# Patient Record
Sex: FEMALE | Race: BLACK OR AFRICAN AMERICAN | NOT HISPANIC OR LATINO | Employment: OTHER | URBAN - METROPOLITAN AREA
[De-identification: names, ages, dates, MRNs, and addresses within clinical notes are randomized per-mention and may not be internally consistent; named-entity substitution may affect disease eponyms.]

---

## 2017-04-03 ENCOUNTER — TELEPHONE (OUTPATIENT)
Dept: INTERNAL MEDICINE | Facility: CLINIC | Age: 82
End: 2017-04-03

## 2017-04-04 ENCOUNTER — TELEPHONE (OUTPATIENT)
Dept: INTERNAL MEDICINE | Facility: CLINIC | Age: 82
End: 2017-04-04

## 2017-04-04 NOTE — TELEPHONE ENCOUNTER
----- Message from Stella Wallace sent at 4/3/2017 11:03 AM CDT -----  Contact: Patient  Type: Sooner appointment than  is able to schedule    When is the first available appointment?  7/11/17    What is the nature of the appointment? Establish Care     What appointment type: NPP    Comments:  The patient was a patient of Dr. Bradley in the past, but she moved for a few years and is living here again.  She says she really needs to see Dr. Bradley and she cannot wait until July.  Please call her at [phone].      Thanks!

## 2017-04-06 ENCOUNTER — TELEPHONE (OUTPATIENT)
Dept: INTERNAL MEDICINE | Facility: CLINIC | Age: 82
End: 2017-04-06

## 2017-04-06 NOTE — TELEPHONE ENCOUNTER
----- Message from Arik Fierro sent at 4/5/2017 11:33 AM CDT -----  Contact: Shravan/ Chirag / [phone]  / [phone] fax /Ref # [id number]  Pt has signed up for a specialty program based on her chronic condition and the company needs to verify the chronic condition of diabetes.  Please call and verify.      Thank you

## 2017-04-06 NOTE — TELEPHONE ENCOUNTER
She just needs to call [phone] to make an appointment with tiffanie or [facility] to see Dr. Prado for glaucoma

## 2017-04-06 NOTE — TELEPHONE ENCOUNTER
Forms faxed.  Pt informed.    Pt wants to know if you could placed an referral in for her to see your  as an eye doctor. Please advise.

## 2017-04-18 ENCOUNTER — TELEPHONE (OUTPATIENT)
Dept: INTERNAL MEDICINE | Facility: CLINIC | Age: 82
End: 2017-04-18

## 2017-04-18 NOTE — TELEPHONE ENCOUNTER
----- Message from Haley S Soto sent at 4/17/2017  1:17 PM CDT -----  Contact: Call Daughter, Yolanda, [phone]  Yolanda called requesting to speak to you concerning a call to Pt for Medical records from previous DrBraxton Guerrero has access to all records.

## 2017-04-20 ENCOUNTER — OFFICE VISIT (OUTPATIENT)
Dept: INTERNAL MEDICINE | Facility: CLINIC | Age: 82
End: 2017-04-20
Payer: MEDICARE

## 2017-04-20 ENCOUNTER — TELEPHONE (OUTPATIENT)
Dept: INTERNAL MEDICINE | Facility: CLINIC | Age: 82
End: 2017-04-20

## 2017-04-20 ENCOUNTER — HOSPITAL ENCOUNTER (OUTPATIENT)
Dept: RADIOLOGY | Facility: HOSPITAL | Age: 82
Discharge: HOME | End: 2017-04-20
Attending: INTERNAL MEDICINE
Payer: MEDICARE

## 2017-04-20 VITALS
DIASTOLIC BLOOD PRESSURE: 60 MMHG | OXYGEN SATURATION: 98 % | WEIGHT: 162.06 LBS | HEART RATE: 58 BPM | SYSTOLIC BLOOD PRESSURE: 90 MMHG | BODY MASS INDEX: 24.56 KG/M2 | HEIGHT: 68 IN

## 2017-04-20 DIAGNOSIS — S80.00XA CONTUSION OF KNEE, UNSPECIFIED LATERALITY, INITIAL ENCOUNTER: ICD-10-CM
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
## 2017-04-20 DIAGNOSIS — Z79.4 TYPE 2 DIABETES MELLITUS WITH DIABETIC POLYNEUROPATHY, WITH LONG-TERM CURRENT USE OF INSULIN: ICD-10-CM

## 2017-04-20 DIAGNOSIS — I25.118 CORONARY ARTERY DISEASE WITH EXERTIONAL ANGINA: ICD-10-CM
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
## 2017-04-20 DIAGNOSIS — E53.8 VITAMIN B 12 DEFICIENCY: ICD-10-CM

## 2017-04-20 DIAGNOSIS — I48.20 CHRONIC ATRIAL FIBRILLATION: ICD-10-CM

## 2017-04-20 DIAGNOSIS — I10 ESSENTIAL HYPERTENSION: ICD-10-CM

## 2017-04-20 DIAGNOSIS — H40.9 GLAUCOMA, UNSPECIFIED GLAUCOMA, UNSPECIFIED LATERALITY: ICD-10-CM

## 2017-04-20 DIAGNOSIS — I25.2 OLD MI (MYOCARDIAL INFARCTION): ICD-10-CM

## 2017-04-20 DIAGNOSIS — I73.9 PERIPHERAL ANGIOPATHY: ICD-10-CM

## 2017-04-20 DIAGNOSIS — M11.20 PSEUDOGOUT: ICD-10-CM

## 2017-04-20 DIAGNOSIS — E79.0 HYPERURICEMIA WITHOUT SIGNS OF INFLAMMATORY ARTHRITIS AND TOPHACEOUS DISEASE: ICD-10-CM

## 2017-04-20 DIAGNOSIS — Z79.01 LONG TERM (CURRENT) USE OF ANTICOAGULANTS: ICD-10-CM

## 2017-04-20 DIAGNOSIS — E11.42 TYPE 2 DIABETES MELLITUS WITH DIABETIC POLYNEUROPATHY, WITH LONG-TERM CURRENT USE OF INSULIN: ICD-10-CM

## 2017-04-20 DIAGNOSIS — E78.2 HYPERLIPEMIA, MIXED: ICD-10-CM

## 2017-04-20 PROCEDURE — 71020 XR CHEST PA AND LATERAL: CPT | Mod: TC

## 2017-04-20 PROCEDURE — 1160F RVW MEDS BY RX/DR IN RCRD: CPT | Mod: S$GLB,,, | Performed by: INTERNAL MEDICINE

## 2017-04-20 PROCEDURE — 73560 X-RAY EXAM OF KNEE 1 OR 2: CPT | Mod: TC,50

## 2017-04-20 PROCEDURE — 73560 X-RAY EXAM OF KNEE 1 OR 2: CPT | Mod: 26,50,, | Performed by: RADIOLOGY

## 2017-04-20 PROCEDURE — 99205 OFFICE O/P NEW HI 60 MIN: CPT | Mod: S$GLB,,, | Performed by: INTERNAL MEDICINE

## 2017-04-20 PROCEDURE — 1159F MED LIST DOCD IN RCRD: CPT | Mod: S$GLB,,, | Performed by: INTERNAL MEDICINE

## 2017-04-20 PROCEDURE — 99999 PR PBB SHADOW E&M-EST. PATIENT-LVL V: CPT | Mod: PBBFAC,,, | Performed by: INTERNAL MEDICINE

## 2017-04-20 PROCEDURE — 1126F AMNT PAIN NOTED NONE PRSNT: CPT | Mod: S$GLB,,, | Performed by: INTERNAL MEDICINE

## 2017-04-20 PROCEDURE — 71020 XR CHEST PA AND LATERAL: CPT | Mod: 26,,, | Performed by: RADIOLOGY

## 2017-04-20 PROCEDURE — 93005 ELECTROCARDIOGRAM TRACING: CPT | Mod: S$GLB,,, | Performed by: INTERNAL MEDICINE

## 2017-04-20 PROCEDURE — 99499 UNLISTED E&M SERVICE: CPT | Mod: S$GLB,,, | Performed by: INTERNAL MEDICINE

## 2017-04-20 PROCEDURE — 93010 ELECTROCARDIOGRAM REPORT: CPT | Mod: S$GLB,,, | Performed by: INTERNAL MEDICINE

## 2017-04-20 RX ORDER — METOPROLOL TARTRATE 25 MG/1
25 TABLET, FILM COATED ORAL 2 TIMES DAILY
COMMUNITY
End: 2017-04-20 | Stop reason: SDUPTHER

## 2017-04-20 RX ORDER — ISOSORBIDE MONONITRATE 30 MG/1
30 TABLET, EXTENDED RELEASE ORAL EVERY MORNING
Qty: 90 TABLET | Refills: 3 | Status: SHIPPED | OUTPATIENT
Start: 2017-04-20 | End: 2018-04-17 | Stop reason: ALTCHOICE

## 2017-04-20 RX ORDER — RIVAROXABAN 10 MG/1
TABLET, FILM COATED ORAL
Refills: 0 | COMMUNITY
Start: 2017-04-15 | End: 2017-04-20 | Stop reason: SDUPTHER

## 2017-04-20 RX ORDER — RIVAROXABAN 10 MG/1
10 TABLET, FILM COATED ORAL
Qty: 30 TABLET | Refills: 11 | Status: SHIPPED | OUTPATIENT
Start: 2017-04-20 | End: 2017-05-25 | Stop reason: SDUPTHER

## 2017-04-20 RX ORDER — LEVOTHYROXINE SODIUM 75 UG/1
75 TABLET ORAL
Qty: 90 TABLET | Refills: 3 | Status: SHIPPED | OUTPATIENT
Start: 2017-04-20 | End: 2018-06-12 | Stop reason: SDUPTHER

## 2017-04-20 RX ORDER — FUROSEMIDE 40 MG/1
40 TABLET ORAL 2 TIMES DAILY
Qty: 180 TABLET | Refills: 3 | Status: SHIPPED | OUTPATIENT
Start: 2017-04-20 | End: 2018-05-04 | Stop reason: SDUPTHER

## 2017-04-20 RX ORDER — AMLODIPINE BESYLATE 5 MG/1
5 TABLET ORAL EVERY MORNING
Qty: 90 TABLET | Refills: 3 | Status: SHIPPED | OUTPATIENT
Start: 2017-04-20 | End: 2018-04-23 | Stop reason: SDUPTHER

## 2017-04-20 RX ORDER — ATORVASTATIN CALCIUM 40 MG/1
40 TABLET, FILM COATED ORAL DAILY
COMMUNITY
End: 2017-04-20 | Stop reason: SDUPTHER

## 2017-04-20 RX ORDER — AMLODIPINE BESYLATE 5 MG/1
5 TABLET ORAL DAILY
COMMUNITY
End: 2017-04-20 | Stop reason: SDUPTHER

## 2017-04-20 RX ORDER — ALBUTEROL SULFATE 90 UG/1
2 AEROSOL, METERED RESPIRATORY (INHALATION) EVERY 4 HOURS PRN
Qty: 3 INHALER | Refills: 4 | Status: SHIPPED | OUTPATIENT
Start: 2017-04-20 | End: 2018-07-17 | Stop reason: SDUPTHER

## 2017-04-20 RX ORDER — INSULIN PUMP SYRINGE, 3 ML
EACH MISCELLANEOUS
Qty: 1 EACH | Refills: 1 | Status: SHIPPED | OUTPATIENT
Start: 2017-04-20 | End: 2017-08-10 | Stop reason: SDUPTHER

## 2017-04-20 RX ORDER — METOPROLOL TARTRATE 25 MG/1
50 TABLET, FILM COATED ORAL 2 TIMES DAILY
Qty: 360 TABLET | Refills: 3 | Status: SHIPPED | OUTPATIENT
Start: 2017-04-20 | End: 2017-06-15 | Stop reason: ALTCHOICE

## 2017-04-20 RX ORDER — BRIMONIDINE TARTRATE 2 MG/ML
1 SOLUTION/ DROPS OPHTHALMIC 2 TIMES DAILY
COMMUNITY
End: 2017-08-10

## 2017-04-20 RX ORDER — FLUTICASONE PROPIONATE AND SALMETEROL 250; 50 UG/1; UG/1
1 POWDER RESPIRATORY (INHALATION) NIGHTLY
Qty: 60 EACH | Refills: 11 | Status: SHIPPED | OUTPATIENT
Start: 2017-04-20 | End: 2018-05-10 | Stop reason: SDUPTHER

## 2017-04-20 RX ORDER — ATORVASTATIN CALCIUM 40 MG/1
40 TABLET, FILM COATED ORAL DAILY
Qty: 90 TABLET | Refills: 3 | Status: SHIPPED | OUTPATIENT
Start: 2017-04-20 | End: 2018-05-11 | Stop reason: SDUPTHER

## 2017-04-20 RX ORDER — ISOSORBIDE MONONITRATE 30 MG/1
30 TABLET, EXTENDED RELEASE ORAL DAILY
COMMUNITY
End: 2017-04-20 | Stop reason: SDUPTHER

## 2017-04-20 RX ORDER — INSULIN ASPART 100 [IU]/ML
20 INJECTION, SUSPENSION SUBCUTANEOUS 2 TIMES DAILY
Qty: 1 BOX | Refills: 11 | Status: SHIPPED | OUTPATIENT
Start: 2017-04-20 | End: 2017-08-15 | Stop reason: SDUPTHER

## 2017-04-20 RX ORDER — FUROSEMIDE 40 MG/1
40 TABLET ORAL 2 TIMES DAILY
COMMUNITY
End: 2017-04-20 | Stop reason: SDUPTHER

## 2017-04-20 RX ORDER — MECLIZINE HCL 12.5 MG 12.5 MG/1
12.5 TABLET ORAL 3 TIMES DAILY PRN
COMMUNITY
End: 2017-04-20 | Stop reason: SDUPTHER

## 2017-04-20 RX ORDER — LANCETS
1 EACH MISCELLANEOUS
Qty: 300 EACH | Refills: 4 | Status: SHIPPED | OUTPATIENT
Start: 2017-04-20 | End: 2020-04-09 | Stop reason: SDUPTHER

## 2017-04-20 RX ORDER — MECLIZINE HCL 12.5 MG 12.5 MG/1
12.5 TABLET ORAL 3 TIMES DAILY PRN
Qty: 50 TABLET | Refills: 1 | Status: SHIPPED | OUTPATIENT
Start: 2017-04-20 | End: 2018-07-17 | Stop reason: SDUPTHER

## 2017-04-20 NOTE — MR AVS SNAPSHOT
[facility]  [address]  [facility] [address]  Phone: [phone]  Fax: [phone]                  Tiana KRISHNA [place]   2017 8:00 AM   Office Visit    Description:  Female : 3/14/1925   Provider:  Belen Braldey MD   Department:  [facility]           Reason for Visit     Annual Exam           Diagnoses this Visit        Comments    Fall, initial encounter    -  Primary     Old MI (myocardial infarction)         Coronary artery disease with exertional angina         Chronic atrial fibrillation         Type 2 diabetes mellitus with diabetic polyneuropathy, with long-term current use of insulin         Pseudogout         Persistent asthma         Peripheral angiopathy         Hyperuricemia without signs of inflammatory arthritis and tophaceous disease         Hyperlipemia, mixed         Essential hypertension         Long term (current) use of anticoagulants         Vitamin B 12 deficiency         Glaucoma, unspecified glaucoma, unspecified laterality         Contusion of knee, unspecified laterality, initial encounter                To Do List           Future Appointments        Provider Department Dept Phone    2017 9:40 AM Belen Bradley MD [facility] Medicine [phone]      Goals (5 Years of Data)     None       These Medications        Disp Refills Start End    XARELTO 10 mg Tab 30 tablet 11 2017     Take 1 tablet (10 mg total) by mouth daily with dinner or evening meal. - Oral    Pharmacy: [facility] [address] [place] [address] [address] [facility] [address] Ph #: [phone]       metoprolol tartrate (LOPRESSOR) 25 MG tablet 360 tablet 3 2017     Take 2 tablets (50 mg total) by mouth 2 (two) times daily. - Oral    Pharmacy: [facility] [address] [place] [address] [address] AT [employer] & [address] Ph #: [phone]       levothyroxine  (SYNTHROID) 75 MCG tablet 90 tablet 3 4/20/2017     Take 1 tablet (75 mcg total) by mouth before breakfast. - Oral    Pharmacy: [facility] [address] AT [place] & [address] Ph #: [phone]       furosemide (LASIX) 40 MG tablet 180 tablet 3 4/20/2017     Take 1 tablet (40 mg total) by mouth 2 (two) times daily. 1 tb am. 1 at 4pm. - Oral    Pharmacy: [facility] [address] AT [place] & [address] Ph #: [phone]       insulin aspart protamine-insulin aspart (NOVOLOG MIX 70-30 FLEXPEN) 100 unit/mL (70-30) InPn pen 1 Box 11 4/20/2017     Inject 20 Units into the skin 2 (two) times daily. 20 units before breakfast 5 units before supper - Subcutaneous    Pharmacy: [facility] [address] AT [place] & [address] Ph #: [phone]       isosorbide mononitrate (IMDUR) 30 MG 24 hr tablet 90 tablet 3 4/20/2017     Take 1 tablet (30 mg total) by mouth every morning. Taking for heart - Oral    Pharmacy: [facility] [address] AT [place] & [address] Ph #: [phone]       atorvastatin (LIPITOR) 40 MG tablet 90 tablet 3 4/20/2017     Take 1 tablet (40 mg total) by mouth once daily. - Oral    Pharmacy: [facility] [address] AT [place] & [address] Ph #: [phone]       amlodipine (NORVASC) 5 MG tablet 90 tablet 3 4/20/2017     Take 1 tablet (5 mg total) by mouth every morning. - Oral    Pharmacy: [facility] [address] AT [place] & [address] Ph #: [phone]       albuterol 90 mcg/actuation inhaler 3 Inhaler 4 4/20/2017     Inhale 2 puffs into the lungs every 4 (four) hours as needed. - Inhalation    Pharmacy: [id number] - [facility] [address] AT [place] & [address] Ph #: [phone]       meclizine (ANTIVERT) 12.5 mg tablet 50 tablet 1 4/20/2017     Take 1 tablet (12.5 mg total) by mouth 3 (three) times daily as needed. - Oral    Pharmacy: [facility] [address] [place] [address] AVE AT [place] & [address] Ph #: [phone]       fluticasone-salmeterol 250-50 mcg/dose (ADVAIR) 250-50 mcg/dose diskus inhaler 60 each 11 4/20/2017 4/20/2018    Inhale 1 puff into the lungs nightly. Controller - Inhalation    Ph[facility]: [facility] [address] [place] [address] AVE AT [place] & [address] Ph #: [phone]         [facility]     [facility] Nurse Care Line - 24/7 Assistance  Unless otherwise directed by your provider, please contact [facility], our nurse care line that is available for 24/7 assistance.     Registered nurses in the [facility] provide: appointment scheduling, clinical advisement, health education, and other advisory services.  Call: [phone] (toll free)               Medications           Message regarding Medications     Verify the changes and/or additions to your medication regime listed below are the same as discussed with your clinician today.  If any of these changes or additions are incorrect, please notify your healthcare provider.        START taking these NEW medications        Refills    XARELTO 10 mg Tab 11    Sig: Take 1 tablet (10 mg total) by mouth daily with dinner or evening meal.    Class: Normal    Route: Oral    metoprolol tartrate (LOPRESSOR) 25 MG tablet 3    Sig: Take 2 tablets (50 mg total) by mouth 2 (two) times daily.    Class: Normal    Route: Oral    furosemide (LASIX) 40 MG tablet 3    Sig: Take 1 tablet (40 mg total) by mouth 2 (two) times daily. 1 tb am. 1 at 4pm.    Class: Normal    Route: Oral    isosorbide mononitrate (IMDUR) 30 MG 24 hr tablet 3    Sig: Take 1 tablet (30 mg total) by  mouth every morning. Taking for heart    Class: Normal    Route: Oral    atorvastatin (LIPITOR) 40 MG tablet 3    Sig: Take 1 tablet (40 mg total) by mouth once daily.    Class: Normal    Route: Oral    amlodipine (NORVASC) 5 MG tablet 3    Sig: Take 1 tablet (5 mg total) by mouth every morning.    Class: Normal    Route: Oral    meclizine (ANTIVERT) 12.5 mg tablet 1    Sig: Take 1 tablet (12.5 mg total) by mouth 3 (three) times daily as needed.    Class: Normal    Route: Oral    fluticasone-salmeterol 250-50 mcg/dose (ADVAIR) 250-50 mcg/dose diskus inhaler 11    Sig: Inhale 1 puff into the lungs nightly. Controller    Class: Normal    Route: Inhalation      CHANGE how you are taking these medications     Start Taking Instead of    levothyroxine (SYNTHROID) 75 MCG tablet levothyroxine (SYNTHROID) 88 MCG tablet    Dosage:  Take 1 tablet (75 mcg total) by mouth before breakfast. Dosage:  Take 1 tablet (88 mcg total) by mouth before breakfast.    Reason for Change:  Reorder     insulin aspart protamine-insulin aspart (NOVOLOG MIX 70-30 FLEXPEN) 100 unit/mL (70-30) InPn pen insulin aspart protamine-insulin aspart (NOVOLOG MIX 70-30 FLEXPEN) 100 unit/mL (70-30) InPn pen    Dosage:  Inject 20 Units into the skin 2 (two) times daily. 20 units before breakfast 5 units before supper Dosage:  Inject 20 Units into the skin 2 (two) times daily. 20 units before breakfast 20 units before supper    Reason for Change:  Reorder     albuterol 90 mcg/actuation inhaler albuterol 90 mcg/actuation HFAA    Dosage:  Inhale 2 puffs into the lungs every 4 (four) hours as needed. Dosage:  Inhale 2 puffs into the lungs every 4 (four) hours as needed.    Reason for Change:  Reorder       STOP taking these medications     ADULT LOW DOSE ASPIRIN ORAL Take 81 mg by mouth Daily.    calcium-vitamin D 600 mg(1,500mg) -200 unit Tab Take 1 tablet by mouth 2 (two) times daily.     esomeprazole (NEXIUM) 40 MG capsule Take 1 capsule (40 mg total) by mouth  before breakfast.    hydrochlorothiazide (HYDRODIURIL) 12.5 MG Tab Take 1 tablet (12.5 mg total) by mouth once daily.    hydrocortisone-pramoxine (ANALPRAM-HC) 2.5-1 % Crea Place rectally 3 (three) times daily. Apply tid    lisinopril (PRINIVIL,ZESTRIL) 20 MG tablet Take 1 tablet (20 mg total) by mouth once daily.    lisinopril 10 MG tablet Take 10 mg by mouth nightly.    pravastatin (PRAVACHOL) 10 MG tablet Take 1 tablet (10 mg total) by mouth every evening.    predniSONE (DELTASONE) 20 MG tablet     terconazole (TERAZOL 7) 0.4 % Crea Place 1 applicator vaginally every evening.    vitamin E 400 UNIT capsule Take 400 Units by mouth once daily.    lorazepam (ATIVAN) 1 MG tablet Take 1 tablet (1 mg total) by mouth every 12 (twelve) hours as needed for Anxiety. Take half to one po prn for anxiety or insomnia           Verify that the below list of medications is an accurate representation of the medications you are currently taking.  If none reported, the list may be blank. If incorrect, please contact your healthcare provider. Carry this list with you in case of emergency.           Current Medications     albuterol 90 mcg/actuation inhaler Inhale 2 puffs into the lungs every 4 (four) hours as needed.    amlodipine (NORVASC) 5 MG tablet Take 1 tablet (5 mg total) by mouth every morning.    atorvastatin (LIPITOR) 40 MG tablet Take 1 tablet (40 mg total) by mouth once daily.    blood sugar diagnostic (ONE TOUCH ULTRA TEST) Strp Inject 1 strip into the skin 2 (two) times daily.    brimonidine 0.2% (ALPHAGAN) 0.2 % Drop 1 drop every 8 (eight) hours.    diclofenac sodium (VOLTAREN) 1 % Gel Apply topically 4 (four) times daily.    dorzolamide (TRUSOPT) 2 % ophthalmic solution Place 1 drop into both eyes 2 (two) times daily.    fluticasone-salmeterol 250-50 mcg/dose (ADVAIR DISKUS) 250-50 mcg/dose diskus inhaler Inhale 1 puff into the lungs 2 (two) times daily.    furosemide (LASIX) 40 MG tablet Take 1 tablet (40 mg total)  "by mouth 2 (two) times daily. 1 tb am. 1 at 4pm.    insulin aspart protamine-insulin aspart (NOVOLOG MIX 70-30 FLEXPEN) 100 unit/mL (70-30) InPn pen Inject 20 Units into the skin 2 (two) times daily. 20 units before breakfast 5 units before supper    insulin needles, disposable, (BD INSULIN PEN NEEDLE UF SHORT) 31 X 5/16 " Ndle Inject 1 Box into the skin 2 (two) times daily.    isosorbide mononitrate (IMDUR) 30 MG 24 hr tablet Take 1 tablet (30 mg total) by mouth every morning. Taking for heart    latanoprost 0.005 % ophthalmic solution Place 1 drop into the right eye every evening.    levothyroxine (SYNTHROID) 75 MCG tablet Take 1 tablet (75 mcg total) by mouth before breakfast.    meclizine (ANTIVERT) 12.5 mg tablet Take 1 tablet (12.5 mg total) by mouth 3 (three) times daily as needed.    metoprolol tartrate (LOPRESSOR) 25 MG tablet Take 2 tablets (50 mg total) by mouth 2 (two) times daily.    multivit-mineral-iron-lutein (CENTRUM SILVER ULTRA WOMEN'S) Tab Take 1 tablet by mouth once daily.    ONE TOUCH DELICA LANCETS Misc USE TO CHECK BLOOD SUGAR TWICE DAILY BEFORE BREAKFASTS AND DINNER AS NEEDED    XARELTO 10 mg Tab Take 1 tablet (10 mg total) by mouth daily with dinner or evening meal.    blood sugar diagnostic Strp 1 strip by Misc.(Non-Drug; Combo Route) route 3 (three) times daily with meals. Please provide the insurance preferred product.    blood-glucose meter kit Please provide the insurance approved preferred product. Thank you.    fluticasone-salmeterol 250-50 mcg/dose (ADVAIR) 250-50 mcg/dose diskus inhaler Inhale 1 puff into the lungs nightly. Controller    lancets Misc 1 Device by Misc.(Non-Drug; Combo Route) route 3 (three) times daily before meals. Please provide the insurance company preferred product.           Clinical Reference Information           Your Vitals Were     BP Pulse Height Weight SpO2 BMI    90/60 58 5' 8" (1.727 m) 73.5 kg (162 lb 0.6 oz) 98% 24.64 kg/m2      Blood Pressure       "    Most Recent Value    BP  90/60      Allergies as of 4/20/2017     Codeine      Immunizations Administered on Date of Encounter - 4/20/2017     None      Orders Placed During Today's Visit      Normal Orders This Visit    Ambulatory Referral to Cardiology     Ambulatory referral to Ophthalmology     Ambulatory referral to Podiatry     Future Labs/Procedures Expected by Expires    CBC auto differential  4/20/2017 4/20/2018    Comprehensive metabolic panel  4/20/2017 6/19/2018    Hemoglobin A1c  4/20/2017 6/19/2018    Lipid panel  4/20/2017 6/19/2018    TSH  4/20/2017 6/19/2018    Uric acid  4/20/2017 6/19/2018    Vitamin B12  4/20/2017 6/19/2018    X-ray AP Standing Knees with Both Lateral  4/20/2017 4/20/2018    X-Ray Chest PA And Lateral  4/20/2017 4/20/2018    Hemoglobin A1c  7/20/2017 6/19/2018    EKG 12-lead  As directed 4/20/2018      [facility] Sign-Up     Activating your [facility] account is as easy as 1-2-3!     1) Visit [email or web address], select Sign Up Now, enter this activation code and your date of birth, then select Next.  [email or web address]  Expires: 6/4/2017  9:33 AM      2) Create a username and password to use when you visit [facility] in the future and select a security question in case you lose your password and select Next.    3) Enter your e-mail address and click Sign Up!    Additional Information  If you have questions, please e-mail [email or web address] or call [phone] to talk to our [facility] staff. Remember, [facility] is NOT to be used for urgent needs. For medical emergencies, dial 911.         Language Assistance Services     ATTENTION: Language assistance services are available, free of charge. Please call [phone].      ATENCIÓN: Si habla español, tiene a modi disposición servicios gratuitos de asistencia lingüística. Llame al [phone].     CHÚ Ý: N?u b?n nói Ti?ng Vi?t, có các d?ch v? h? tr? ngôn ng? mi?n phí dành cho b?n. G?i s? [phone].         [facility] complies with applicable Federal civil rights laws and does not discriminate on the basis of race, color, national origin, age, disability, or sex.

## 2017-04-20 NOTE — PROGRESS NOTES
Subjective:       Patient ID: Tiana Mead is a 89 y.o. female.    Chief Complaint: Fall  She was last seen September 24, 2013.  She moved to [place], [place], Where she was unhappy.  She is so happy to be back in [place].  She missed her [employer], she missed her friends and she missed [place] itself.  She is living with her daughter Melanie who is recently retired.  Her home is on one level. So far, they have been getting along well.  Her daughter Yolanda accompanies her to this visit.    All of her medications were reviewed, ordered in a 90 day supply with refills and established at the pharmacy closest to her home which is a [facility].    In 2013, shortly after arriving in [place], she had a heart attack.  She has been in chronic atrial fib with controlled rate on a beta Blocker and Xarelto since the heart attack.This has weakened her heart.  However, she feels that her congestive heart failure is well compensated at the present time.  She has persistent peripheral edema that currently is mild.  She is not short of breath.  She has stable exertional angina.  She carries sublingual nitroglycerin.  She returned to [place] on March 23 and she has only used nitroglycerin 1 tablet sublingual once and she was just doing too much at that time.    2 weeks ago, on [place], she fell and landed on both of her knees.  She has minor abrasions, ecchymosis and small effusions.  She has been taking care of this and feels as though she is doing better each day.  She has been using a cane to help her walk for some time.    Her vision has deteriorated and she needs to reestablish with her glaucoma specialist.    She is also eager to establish with her podiatrist, Dr. Gentile.  She has diabetic peripheral neuropathy with loss of sensation in her feet and receives regular podiatry care every 3 months.  HPI  Review of Systems   Constitutional: Negative for activity change, appetite change, chills, fatigue, fever  and unexpected weight change.   HENT: Negative for dental problem, hearing loss, tinnitus, trouble swallowing and voice change.    Eyes: Negative for visual disturbance.   Respiratory: Negative for cough, chest tightness, shortness of breath and wheezing.    Cardiovascular: Negative for chest pain, palpitations and leg swelling.   Gastrointestinal: Negative for abdominal pain, blood in stool, constipation, diarrhea and nausea.   Genitourinary: Negative for difficulty urinating, dysuria, frequency and urgency.   Musculoskeletal: Negative for arthralgias, back pain, gait problem, joint swelling, myalgias, neck pain and neck stiffness.   Skin: Negative for rash.   Neurological: Negative for dizziness, tremors, speech difficulty, weakness, light-headedness and headaches.   Psychiatric/Behavioral: Negative for dysphoric mood and sleep disturbance. The patient is not nervous/anxious.        Objective:      Physical Exam   Constitutional: She is oriented to person, place, and time. She appears well-developed and well-nourished. No distress.   HENT:   Head: Normocephalic and atraumatic.   Right Ear: External ear normal.   Left Ear: External ear normal.   Nose: Nose normal.   Mouth/Throat: Oropharynx is clear and moist. No oropharyngeal exudate.   Eyes: Conjunctivae and EOM are normal. Pupils are equal, round, and reactive to light. Right eye exhibits no discharge. No scleral icterus.   Neck: Normal range of motion and full passive range of motion without pain. Neck supple. No spinous process tenderness and no muscular tenderness present. Carotid bruit is not present. No thyromegaly present.   Cardiovascular: Normal rate, S1 normal, S2 normal and normal heart sounds.  Exam reveals no gallop and no friction rub.    No murmur heard.  Atrial fib  controlled rate of 60-78    Absent peripheral pulses   Pulmonary/Chest: Effort normal and breath sounds normal. No respiratory distress. She has no wheezes. She has no rales. She  exhibits no tenderness.   Abdominal: Soft. Bowel sounds are normal. She exhibits no distension and no mass. There is no tenderness. There is no rebound and no guarding.   Genitourinary: Pelvic exam was performed with patient supine. Uterus is not deviated, not enlarged, not fixed and not tender. Cervix exhibits no motion tenderness, no discharge and no friability. Right adnexum displays no mass, no tenderness and no fullness. Left adnexum displays no mass, no tenderness and no fullness.   Musculoskeletal: Normal range of motion. She exhibits edema. She exhibits no tenderness.   Minor abrasions and contusions on both knees with marked ecchymosis.  Small bilateral cool effusions.   Lymphadenopathy:        Head (right side): No submental and no submandibular adenopathy present.        Head (left side): No submental and no submandibular adenopathy present.     She has no cervical adenopathy.        Right cervical: No superficial cervical, no deep cervical and no posterior cervical adenopathy present.       Left cervical: No superficial cervical, no deep cervical and no posterior cervical adenopathy present.        Right axillary: No pectoral and no lateral adenopathy present.        Left axillary: No pectoral and no lateral adenopathy present.       Right: No supraclavicular adenopathy present.        Left: No supraclavicular adenopathy present.   Neurological: She is alert and oriented to person, place, and time. She exhibits normal muscle tone. Coordination normal.   Skin: Skin is warm and dry. No rash noted.   Psychiatric: She has a normal mood and affect. Her behavior is normal. Her mood appears not anxious. Her speech is not rapid and/or pressured. She is not agitated. She does not exhibit a depressed mood.   Normal behavior, thought content, insight and judgement.   Nursing note and vitals reviewed.      Assessment:       1. Fall, initial encounter    2. Old MI (myocardial infarction), 2013    3. Coronary artery  disease with exertional angina, stable    4. Chronic atrial fibrillation    5. Type 2 diabetes mellitus with diabetic polyneuropathy, with long-term current use of insulin    6. Pseudogout, both knees.    7. Persistent asthma    8. Peripheral angiopathy    9. Hyperuricemia    10. Hyperlipemia, mixed    11. Essential hypertension    12. Long term (current) use of anticoagulants, Xarelto    13. Vitamin B 12 deficiency    14. Glaucoma, unspecified glaucoma, unspecified laterality    15. Contusion of knee, unspecified laterality, initial encounter        Plan:   Tiana was seen today for fall.    Diagnoses and all orders for this visit:    Fall, initial encounter  -     X-ray AP Standing Knees with Both Lateral; Future    Old MI (myocardial infarction), 2013  -     EKG 12-lead; Future  -     X-Ray Chest PA And Lateral; Future  -     Lipid panel; Future  -     Ambulatory Referral to Cardiology  -     EKG 12-lead    Coronary artery disease with exertional angina, stable  -     EKG 12-lead; Future  -     X-Ray Chest PA And Lateral; Future  -     Ambulatory Referral to Cardiology  -     EKG 12-lead    Chronic atrial fibrillation  -     EKG 12-lead; Future  -     X-Ray Chest PA And Lateral; Future  -     Ambulatory Referral to Cardiology  -     EKG 12-lead    Type 2 diabetes mellitus with diabetic polyneuropathy, with long-term current use of insulin  -     EKG 12-lead; Future  -     Comprehensive metabolic panel; Future  -     Lipid panel; Future  -     Hemoglobin A1c; Future  -     TSH; Future  -     Ambulatory Referral to Cardiology  -     Ambulatory referral to Podiatry  -     Hemoglobin A1c; Future  -     EKG 12-lead    Pseudogout, both knees.  -     Uric acid; Future    Persistent asthma    Peripheral angiopathy    Hyperuricemia    Hyperlipemia, mixed    Essential hypertension  -     EKG 12-lead; Future  -     EKG 12-lead    Long term (current) use of anticoagulants, Xarelto  -     CBC auto differential;  Future    Vitamin B 12 deficiency  -     Vitamin B12; Future    Glaucoma, unspecified glaucoma, unspecified laterality  -     Ambulatory referral to Ophthalmology    Contusion of knee, unspecified laterality, initial encounter  -     X-ray AP Standing Knees with Both Lateral; Future    Other orders  -     XARELTO 10 mg Tab; Take 1 tablet (10 mg total) by mouth daily with dinner or evening meal.  -     metoprolol tartrate (LOPRESSOR) 25 MG tablet; Take 2 tablets (50 mg total) by mouth 2 (two) times daily.  -     levothyroxine (SYNTHROID) 75 MCG tablet; Take 1 tablet (75 mcg total) by mouth before breakfast.  -     furosemide (LASIX) 40 MG tablet; Take 1 tablet (40 mg total) by mouth 2 (two) times daily. 1 tb am. 1 at 4pm.  -     insulin aspart protamine-insulin aspart (NOVOLOG MIX 70-30 FLEXPEN) 100 unit/mL (70-30) InPn pen; Inject 20 Units into the skin 2 (two) times daily. 20 units before breakfast 5 units before supper  -     isosorbide mononitrate (IMDUR) 30 MG 24 hr tablet; Take 1 tablet (30 mg total) by mouth every morning. Taking for heart  -     atorvastatin (LIPITOR) 40 MG tablet; Take 1 tablet (40 mg total) by mouth once daily.  -     amlodipine (NORVASC) 5 MG tablet; Take 1 tablet (5 mg total) by mouth every morning.  -     albuterol 90 mcg/actuation inhaler; Inhale 2 puffs into the lungs every 4 (four) hours as needed.  -     meclizine (ANTIVERT) 12.5 mg tablet; Take 1 tablet (12.5 mg total) by mouth 3 (three) times daily as needed.  -     fluticasone-salmeterol 250-50 mcg/dose (ADVAIR) 250-50 mcg/dose diskus inhaler; Inhale 1 puff into the lungs nightly. Controller    Return in about 3 months (around 7/25/2017).

## 2017-04-21 NOTE — PROGRESS NOTES
Patient, Tiana Mead (MRN #[id number]), presented with a recent Estimated Glumerular Filtration Rate (EGFR) between 15 and 29 consistent with the definition of chronic kidney disease stage 4 (ICD10 - N18.4).    eGFR if    Date Value Ref Range Status   04/20/2017 26.0 (A) >60 mL/min/1.73 m^2 Final       The patient's chronic kidney disease stage 4 was monitored, evaluated, addressed and/or treated. This addendum to the medical record is made on 04/20/2017.

## 2017-04-24 ENCOUNTER — TELEPHONE (OUTPATIENT)
Dept: INTERNAL MEDICINE | Facility: CLINIC | Age: 82
End: 2017-04-24

## 2017-04-24 DIAGNOSIS — E11.9 TYPE 2 DIABETES MELLITUS WITHOUT COMPLICATION: ICD-10-CM

## 2017-04-24 DIAGNOSIS — E11.618 TYPE 2 DIABETES MELLITUS WITH OTHER DIABETIC ARTHROPATHY, WITH LONG-TERM CURRENT USE OF INSULIN: ICD-10-CM

## 2017-04-24 DIAGNOSIS — E11.51 TYPE 2 DIABETES MELLITUS WITH DIABETIC PERIPHERAL ANGIOPATHY WITHOUT GANGRENE, WITH LONG-TERM CURRENT USE OF INSULIN: ICD-10-CM

## 2017-04-24 DIAGNOSIS — Z79.4 TYPE 2 DIABETES MELLITUS WITH OTHER DIABETIC ARTHROPATHY, WITH LONG-TERM CURRENT USE OF INSULIN: ICD-10-CM

## 2017-04-24 DIAGNOSIS — Z79.4 TYPE 2 DIABETES MELLITUS WITH DIABETIC PERIPHERAL ANGIOPATHY WITHOUT GANGRENE, WITH LONG-TERM CURRENT USE OF INSULIN: ICD-10-CM

## 2017-04-24 RX ORDER — LANCETS 33 GAUGE
1 EACH MISCELLANEOUS 3 TIMES DAILY
Qty: 300 EACH | Refills: 4 | Status: SHIPPED | OUTPATIENT
Start: 2017-04-24 | End: 2018-08-21 | Stop reason: SDUPTHER

## 2017-04-24 NOTE — TELEPHONE ENCOUNTER
----- Message from Antionette Sands sent at 4/24/2017  9:03 AM CDT -----  Contact: self [phone]  Patient would like a call back from the office to discuss visit from 04/20/17 and medication.        Please advise , Thanks !

## 2017-05-02 ENCOUNTER — OFFICE VISIT (OUTPATIENT)
Dept: CARDIOLOGY | Facility: CLINIC | Age: 82
End: 2017-05-02
Payer: MEDICARE

## 2017-05-02 ENCOUNTER — OFFICE VISIT (OUTPATIENT)
Dept: PODIATRY | Facility: CLINIC | Age: 82
End: 2017-05-02
Payer: MEDICARE

## 2017-05-02 VITALS
WEIGHT: 161.63 LBS | HEART RATE: 71 BPM | DIASTOLIC BLOOD PRESSURE: 67 MMHG | OXYGEN SATURATION: 99 % | BODY MASS INDEX: 23.94 KG/M2 | SYSTOLIC BLOOD PRESSURE: 125 MMHG | HEIGHT: 69 IN

## 2017-05-02 VITALS
BODY MASS INDEX: 24.4 KG/M2 | RESPIRATION RATE: 18 BRPM | WEIGHT: 161 LBS | HEART RATE: 75 BPM | DIASTOLIC BLOOD PRESSURE: 69 MMHG | SYSTOLIC BLOOD PRESSURE: 123 MMHG | HEIGHT: 68 IN

## 2017-05-02 DIAGNOSIS — I48.20 CHRONIC ATRIAL FIBRILLATION: ICD-10-CM

## 2017-05-02 DIAGNOSIS — E11.42 TYPE 2 DIABETES MELLITUS WITH DIABETIC POLYNEUROPATHY, WITH LONG-TERM CURRENT USE OF INSULIN: Primary | ICD-10-CM

## 2017-05-02 DIAGNOSIS — Z79.4 TYPE 2 DIABETES MELLITUS WITH DIABETIC POLYNEUROPATHY, WITH LONG-TERM CURRENT USE OF INSULIN: Primary | ICD-10-CM

## 2017-05-02 DIAGNOSIS — I50.9 CHRONIC CONGESTIVE HEART FAILURE, UNSPECIFIED CONGESTIVE HEART FAILURE TYPE: Primary | ICD-10-CM

## 2017-05-02 DIAGNOSIS — L84 CORN OR CALLUS: ICD-10-CM

## 2017-05-02 DIAGNOSIS — E78.2 HYPERLIPEMIA, MIXED: ICD-10-CM

## 2017-05-02 DIAGNOSIS — R07.9 CHEST PAIN AT REST: ICD-10-CM

## 2017-05-02 DIAGNOSIS — B35.1 ONYCHOMYCOSIS DUE TO DERMATOPHYTE: ICD-10-CM

## 2017-05-02 DIAGNOSIS — N18.4 CKD (CHRONIC KIDNEY DISEASE) STAGE 4, GFR 15-29 ML/MIN: Chronic | ICD-10-CM

## 2017-05-02 DIAGNOSIS — I10 ESSENTIAL HYPERTENSION: ICD-10-CM

## 2017-05-02 PROCEDURE — 99999 PR PBB SHADOW E&M-EST. PATIENT-LVL V: CPT | Mod: PBBFAC,,, | Performed by: INTERNAL MEDICINE

## 2017-05-02 PROCEDURE — 99203 OFFICE O/P NEW LOW 30 MIN: CPT | Mod: 25,S$GLB,, | Performed by: PODIATRIST

## 2017-05-02 PROCEDURE — 1160F RVW MEDS BY RX/DR IN RCRD: CPT | Mod: S$GLB,,, | Performed by: INTERNAL MEDICINE

## 2017-05-02 PROCEDURE — 1160F RVW MEDS BY RX/DR IN RCRD: CPT | Mod: S$GLB,,, | Performed by: PODIATRIST

## 2017-05-02 PROCEDURE — 99204 OFFICE O/P NEW MOD 45 MIN: CPT | Mod: S$GLB,,, | Performed by: INTERNAL MEDICINE

## 2017-05-02 PROCEDURE — 11721 DEBRIDE NAIL 6 OR MORE: CPT | Mod: 59,Q9,S$GLB, | Performed by: PODIATRIST

## 2017-05-02 PROCEDURE — 1159F MED LIST DOCD IN RCRD: CPT | Mod: S$GLB,,, | Performed by: INTERNAL MEDICINE

## 2017-05-02 PROCEDURE — 1126F AMNT PAIN NOTED NONE PRSNT: CPT | Mod: S$GLB,,, | Performed by: INTERNAL MEDICINE

## 2017-05-02 PROCEDURE — 1126F AMNT PAIN NOTED NONE PRSNT: CPT | Mod: S$GLB,,, | Performed by: PODIATRIST

## 2017-05-02 PROCEDURE — 99999 PR PBB SHADOW E&M-EST. PATIENT-LVL III: CPT | Mod: PBBFAC,,, | Performed by: PODIATRIST

## 2017-05-02 PROCEDURE — 11056 PARNG/CUTG B9 HYPRKR LES 2-4: CPT | Mod: Q9,S$GLB,, | Performed by: PODIATRIST

## 2017-05-02 PROCEDURE — 1159F MED LIST DOCD IN RCRD: CPT | Mod: S$GLB,,, | Performed by: PODIATRIST

## 2017-05-02 RX ORDER — BLOOD-GLUCOSE METER
EACH MISCELLANEOUS
Refills: 4 | COMMUNITY
Start: 2017-04-25 | End: 2020-04-09 | Stop reason: SDUPTHER

## 2017-05-02 RX ORDER — GLUCOSAM/CHON-MSM1/C/MANG/BOSW 500-416.6
TABLET ORAL
Refills: 2 | Status: ON HOLD | COMMUNITY
Start: 2017-04-22 | End: 2020-11-04

## 2017-05-02 NOTE — PROGRESS NOTES
Subjective:      Patient ID: Tiana Mead is a 89 y.o. female.    Chief Complaint: PCP (Belen Bradley MD 4/20/17); Diabetic Foot Exam; and Nail Care    Tiana is a 89 y.o. female who presents to the clinic for evaluation and treatment of high risk feet. Tiana has a past medical history of Allergy; Anemia; Arthritis; Asthma; Cataract; Chronic kidney disease; Degenerative disc disease; Diabetes mellitus type II; Glaucoma; History of pseudogout (8/23/2012); Hyperlipidemia; Hypertension; Iritis; and Thyroid disease. The patient's chief complaint is long, thick toenails. This patient has documented high risk feet requiring routine maintenance secondary to diabetes mellitis and those secondary complications of diabetes, as mentioned..    PCP: Belen Bradley MD    Date Last Seen by PCP:   Chief Complaint   Patient presents with    PCP     Belen Bradley MD 4/20/17    Diabetic Foot Exam    Nail Care         Current shoe gear:  Dm shoes      Hemoglobin A1C   Date Value Ref Range Status   04/20/2017 6.6 (H) 4.5 - 6.2 % Final     Comment:     According to ADA guidelines, hemoglobin A1C <7.0% represents  optimal control in non-pregnant diabetic patients.  Different  metrics may apply to specific populations.   Standards of Medical Care in Diabetes - 2016.  For the purpose of screening for the presence of diabetes:  <5.7%     Consistent with the absence of diabetes  5.7-6.4%  Consistent with increasing risk for diabetes   (prediabetes)  >or=6.5%  Consistent with diabetes  Currently no consensus exists for use of hemoglobin A1C  for diagnosis of diabetes for children.     04/20/2017 6.6 (H) 4.5 - 6.2 % Final     Comment:     According to ADA guidelines, hemoglobin A1C <7.0% represents  optimal control in non-pregnant diabetic patients.  Different  metrics may apply to specific populations.   Standards of Medical Care in Diabetes - 2016.  For the purpose of screening for the presence of diabetes:  <5.7%      Consistent with the absence of diabetes  5.7-6.4%  Consistent with increasing risk for diabetes   (prediabetes)  >or=6.5%  Consistent with diabetes  Currently no consensus exists for use of hemoglobin A1C  for diagnosis of diabetes for children.     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    HTN (hypertension)    Hyperuricemia    Fuch's endothelial dystrophy - Both Eyes    Chest pain, unspecified - nonischemic    Helicobacter pylori gastritis    Old MI (myocardial infarction), 2013    Coronary artery disease with exertional angina, stable    Chronic atrial fibrillation    Type 2 diabetes mellitus with diabetic polyneuropathy, with long-term current use of insulin    Long term (current) use of anticoagulants, Xarelto       Current Outpatient Prescriptions on File Prior to Visit   Medication Sig Dispense Refill    albuterol 90 mcg/actuation inhaler Inhale 2 puffs into the lungs every 4 (four) hours as needed. 3 Inhaler 4    amlodipine (NORVASC) 5 MG tablet Take 1 tablet (5 mg total) by mouth every morning. 90 tablet 3    atorvastatin (LIPITOR) 40 MG tablet Take 1 tablet (40 mg total) by mouth once daily. 90 tablet 3    blood sugar diagnostic (ONETOUCH ULTRA TEST) Strp Inject 1 strip into the skin 3 (three) times daily. 300 each 4    blood sugar diagnostic Strp 1 strip by Misc.(Non-Drug; Combo Route) route 3 (three) times daily with meals. Please provide the insurance preferred product. 300 strip 4     "blood-glucose meter kit Please provide the insurance approved preferred product. Thank you. 1 each 1    brimonidine 0.2% (ALPHAGAN) 0.2 % Drop 1 drop every 8 (eight) hours.      diclofenac sodium (VOLTAREN) 1 % Gel Apply topically 4 (four) times daily. 10 Tube 3    dorzolamide (TRUSOPT) 2 % ophthalmic solution Place 1 drop into both eyes 2 (two) times daily. 10 mL 4    fluticasone-salmeterol 250-50 mcg/dose (ADVAIR DISKUS) 250-50 mcg/dose diskus inhaler Inhale 1 puff into the lungs 2 (two) times daily. 60 each 5    fluticasone-salmeterol 250-50 mcg/dose (ADVAIR) 250-50 mcg/dose diskus inhaler Inhale 1 puff into the lungs nightly. Controller 60 each 11    furosemide (LASIX) 40 MG tablet Take 1 tablet (40 mg total) by mouth 2 (two) times daily. 1 tb am. 1 at 4pm. 180 tablet 3    insulin aspart protamine-insulin aspart (NOVOLOG MIX 70-30 FLEXPEN) 100 unit/mL (70-30) InPn pen Inject 20 Units into the skin 2 (two) times daily. 20 units before breakfast 5 units before supper 1 Box 11    insulin needles, disposable, (BD INSULIN PEN NEEDLE UF SHORT) 31 X 5/16 " Ndle Inject 1 Box into the skin 2 (two) times daily. 100 each 11    isosorbide mononitrate (IMDUR) 30 MG 24 hr tablet Take 1 tablet (30 mg total) by mouth every morning. Taking for heart 90 tablet 3    lancets (ONETOUCH DELICA LANCETS) 33 gauge Misc Apply 1 lancet topically 3 (three) times daily. 300 each 4    lancets Misc 1 Device by Misc.(Non-Drug; Combo Route) route 3 (three) times daily before meals. Please provide the insurance company preferred product. 300 each 4    latanoprost 0.005 % ophthalmic solution Place 1 drop into the right eye every evening. 3 Bottle 6    levothyroxine (SYNTHROID) 75 MCG tablet Take 1 tablet (75 mcg total) by mouth before breakfast. 90 tablet 3    meclizine (ANTIVERT) 12.5 mg tablet Take 1 tablet (12.5 mg total) by mouth 3 (three) times daily as needed. 50 tablet 1    metoprolol tartrate (LOPRESSOR) 25 MG tablet Take 2 " "tablets (50 mg total) by mouth 2 (two) times daily. 360 tablet 3    multivit-mineral-iron-lutein (CENTRUM SILVER ULTRA WOMEN'S) Tab Take 1 tablet by mouth once daily.      XARELTO 10 mg Tab Take 1 tablet (10 mg total) by mouth daily with dinner or evening meal. 30 tablet 11     No current facility-administered medications on file prior to visit.        Review of patient's allergies indicates:   Allergen Reactions    Codeine      Other reaction(s): Itching  Other reaction(s): Nausea       Past Surgical History:   Procedure Laterality Date    CARDIAC CATHETERIZATION      CATARACT EXTRACTION      IOL OS    gall stone      HYSTERECTOMY      VARICOSE VEIN SURGERY         Family History   Problem Relation Age of Onset    Diabetes Mother     Hypertension Mother     Glaucoma Mother     Hypertension Father     Thyroid disease Sister        Social History     Social History    Marital status:      Spouse name: N/A    Number of children: N/A    Years of education: N/A     Occupational History    Not on file.     Social History Main Topics    Smoking status: Never Smoker    Smokeless tobacco: Never Used    Alcohol use No    Drug use: No    Sexual activity: No     Other Topics Concern    Not on file     Social History Narrative           Review of Systems   Constitution: Negative for chills, decreased appetite and fever.   Cardiovascular: Negative for leg swelling.   Musculoskeletal: Negative for arthritis, joint pain, joint swelling and myalgias.   Gastrointestinal: Negative for nausea and vomiting.   Neurological: Negative for loss of balance, numbness and paresthesias.           Objective:       Vitals:    05/02/17 1126   BP: 123/69   Pulse: 75   Resp: 18   Weight: 73 kg (161 lb)   Height: 5' 8" (1.727 m)   PainSc: 0-No pain        Physical Exam   Constitutional: She is oriented to person, place, and time. She appears well-developed and well-nourished.   Cardiovascular:   Dorsalis pedis and " posterior tibial pulses are diminished Bilaterally. Toes are cool to touch. Feet are warm proximally.There is decreased digital hair. Skin is atrophic, slightly hyperpigmented, and mildly edematous       Musculoskeletal: Normal range of motion. She exhibits no edema or tenderness.   Adequate joint range of motion without pain, limitation, nor crepitation Bilateral feet and ankle joints. Muscle strength is 5/5 in all groups bilaterally.         Neurological: She is alert and oriented to person, place, and time.   Red[place]-Yolande 5.07 monofilamant testing is diminished Ney feet. Sharp/dull sensation diminished Bilaterally. Light touch absent Bilaterally.       Skin: Skin is warm and dry. No ecchymosis and no lesion noted. No erythema.   Nails x10 are elongated by  2-4mm's, thickened by 2-5 mm's, dystrophic, and are darkened in  coloration . Xerosis Bilaterally. No open lesions noted.    Hyperkeratotic tissue noted to lateral 5th toe b/l     Psychiatric: She has a normal mood and affect. Her behavior is normal.             Assessment:       Encounter Diagnoses   Name Primary?    Type 2 diabetes mellitus with diabetic polyneuropathy, with long-term current use of insulin Yes    Onychomycosis due to dermatophyte     Corn or callus          Plan:       Tiana was seen today for pcp, diabetic foot exam and nail care.    Diagnoses and all orders for this visit:    Type 2 diabetes mellitus with diabetic polyneuropathy, with long-term current use of insulin    Onychomycosis due to dermatophyte    Corn or callus      I counseled the patient on her conditions, their implications and medical management.        - Shoe inspection. Diabetic Foot Education. Patient reminded of the importance of good nutrition and blood sugar control to help prevent podiatric complications of diabetes. Patient instructed on proper foot hygeine. We discussed wearing proper shoe gear, daily foot inspections, never walking without protective shoe  gear, never putting sharp instruments to feet, routine podiatric nail visits every 2-3 months.      - With patient's permission, nails were aggressively reduced and debrided x 10 to their soft tissue attachment mechanically and with electric , removing all offending nail and debris. Patient relates relief following the procedure. She will continue to monitor the areas daily, inspect her feet, wear protective shoe gear when ambulatory, moisturizer to maintain skin integrity and follow in this office in approximately 2-3 months, sooner p.r.n.    - After cleansing the  area w/ alcohol prep pad the above mentioned hyperkeratosis was trimmed utilizing No 15 scapel, to a smooth base with out incident. Patient tolerated this  well and reported comfort to the area of lateral 5th toe b/l

## 2017-05-02 NOTE — LETTER
May 2, 2017      Belen Bradley MD  [address]  [facility] [address]           [facility]  [address]  [facility] [address]  Phone: [phone]          Patient: Tiana Mead   MR Number: [id number]   YOB: 1925   Date of Visit: 5/2/2017       Dear Dr. Belen Bradley:    Thank you for referring Tiana Mead to me for evaluation. Attached you will find relevant portions of my assessment and plan of care.    If you have questions, please do not hesitate to call me. I look forward to following Tiana Mead along with you.    Sincerely,    Mickey Erwin MD    Enclosure  CC:  No Recipients    If you would like to receive this communication electronically, please contact [email or web address] or [phone] to request more information on [email or web address] [employer] access.    For providers and/or their staff who would like to refer a patient to [facility], please contact us through our one-stop-shop provider referral line, [facility], at [phone].    If you feel you have received this communication in error or would no longer like to receive these types of communications, please e-mail [email or web address]

## 2017-05-02 NOTE — PROGRESS NOTES
"Subjective:   Patient ID:  Tiana Mead is a 89 y.o. female who presents for evaluation of Atrial Fibrillation and Congestive Heart Failure      HPI:   Tiana Meadpresents to re  establish cardiovascular care. She has been in [place] and while there had CHF as well as atrial fibrillation. Apparently had persistent edema and was over diuresed at one point. She was not told she had a " heart attack " and has not had coronary angiography.She will have occasional nocturnal chest pains for which she takes a TNG. No exertional pain but will have NOVOA.Tiana Mead has hypertension with good control. Tiana Mead has dyslipidemia  on high intensity statin At LDL goal. Tiana Mead denies  palpitations, presyncope , or syncope.   .  Review of Systems   Constitution: Negative for weakness, malaise/fatigue, weight gain and weight loss.   HENT: Negative for headaches.    Eyes: Positive for vision loss in left eye and vision loss in right eye. Negative for blurred vision.   Cardiovascular: Positive for chest pain and dyspnea on exertion. Negative for claudication, cyanosis, irregular heartbeat, leg swelling, near-syncope, orthopnea, palpitations, paroxysmal nocturnal dyspnea and syncope.   Respiratory: Negative for cough, shortness of breath and wheezing.    Musculoskeletal: Positive for arthritis and joint pain. Negative for falls and myalgias.   Gastrointestinal: Negative for abdominal pain, heartburn, nausea and vomiting.   Genitourinary: Negative for nocturia.   Neurological: Negative for brief paralysis, dizziness, focal weakness, numbness and paresthesias.        Recent mechanical fall.   Psychiatric/Behavioral: Negative for altered mental status.       Current Outpatient Prescriptions   Medication Sig    albuterol 90 mcg/actuation inhaler Inhale 2 puffs into the lungs every 4 (four) hours as needed.    amlodipine (NORVASC) 5 MG tablet Take 1 tablet (5 mg total) by mouth every morning.    " "atorvastatin (LIPITOR) 40 MG tablet Take 1 tablet (40 mg total) by mouth once daily.    blood sugar diagnostic (ONETOUCH ULTRA TEST) Strp Inject 1 strip into the skin 3 (three) times daily.    blood sugar diagnostic Strp 1 strip by Misc.(Non-Drug; Combo Route) route 3 (three) times daily with meals. Please provide the insurance preferred product.    blood-glucose meter kit Please provide the insurance approved preferred product. Thank you.    brimonidine 0.2% (ALPHAGAN) 0.2 % Drop 1 drop every 8 (eight) hours.    diclofenac sodium (VOLTAREN) 1 % Gel Apply topically 4 (four) times daily.    dorzolamide (TRUSOPT) 2 % ophthalmic solution Place 1 drop into both eyes 2 (two) times daily.    fluticasone-salmeterol 250-50 mcg/dose (ADVAIR DISKUS) 250-50 mcg/dose diskus inhaler Inhale 1 puff into the lungs 2 (two) times daily.    fluticasone-salmeterol 250-50 mcg/dose (ADVAIR) 250-50 mcg/dose diskus inhaler Inhale 1 puff into the lungs nightly. Controller    furosemide (LASIX) 40 MG tablet Take 1 tablet (40 mg total) by mouth 2 (two) times daily. 1 tb am. 1 at 4pm.    insulin aspart protamine-insulin aspart (NOVOLOG MIX 70-30 FLEXPEN) 100 unit/mL (70-30) InPn pen Inject 20 Units into the skin 2 (two) times daily. 20 units before breakfast 5 units before supper    insulin needles, disposable, (BD INSULIN PEN NEEDLE UF SHORT) 31 X 5/16 " Ndle Inject 1 Box into the skin 2 (two) times daily.    isosorbide mononitrate (IMDUR) 30 MG 24 hr tablet Take 1 tablet (30 mg total) by mouth every morning. Taking for heart    lancets (ONETOUCH DELICA LANCETS) 33 gauge Misc Apply 1 lancet topically 3 (three) times daily.    lancets Misc 1 Device by Misc.(Non-Drug; Combo Route) route 3 (three) times daily before meals. Please provide the insurance company preferred product.    latanoprost 0.005 % ophthalmic solution Place 1 drop into the right eye every evening.    levothyroxine (SYNTHROID) 75 MCG tablet Take 1 tablet (75 mcg " "total) by mouth before breakfast.    meclizine (ANTIVERT) 12.5 mg tablet Take 1 tablet (12.5 mg total) by mouth 3 (three) times daily as needed.    metoprolol tartrate (LOPRESSOR) 25 MG tablet Take 2 tablets (50 mg total) by mouth 2 (two) times daily.    multivit-mineral-iron-lutein (CENTRUM SILVER ULTRA WOMEN'S) Tab Take 1 tablet by mouth once daily.    TRUE METRIX GLUCOSE METER Misc TEST TID    TRUEPLUS LANCETS 30 gauge Misc USE TO TEST BLOOD SUGAR TID AC    XARELTO 10 mg Tab Take 1 tablet (10 mg total) by mouth daily with dinner or evening meal.     No current facility-administered medications for this visit.      Objective:   Physical Exam   Constitutional: She is oriented to person, place, and time. She appears well-developed. No distress.   /67 (BP Location: Left arm, Patient Position: Sitting, BP Method: Automatic)  Pulse 71  Ht 5' 8.5" (1.74 m)  Wt 73.3 kg (161 lb 9.6 oz)  SpO2 99%  BMI 24.21 kg/m2   HENT:   Head: Normocephalic.   Eyes: Conjunctivae are normal. Pupils are equal, round, and reactive to light. No scleral icterus.   Neck: Neck supple. No JVD present. No thyromegaly present.   Cardiovascular: Normal rate and intact distal pulses.  An irregularly irregular rhythm present. PMI is not displaced.  Exam reveals no gallop and no friction rub.    Murmur heard.  High-pitched mid to late systolic murmur is present with a grade of 2/6  at the lower left sternal border, apex Trace-1+ bilateral pedal edema.  Pulmonary/Chest: Effort normal and breath sounds normal. No respiratory distress. She has no wheezes. She has no rales.   Abdominal: Soft. She exhibits no distension. There is no splenomegaly or hepatomegaly. There is no tenderness.   Musculoskeletal: She exhibits no edema or tenderness.   Gait normal   Neurological: She is alert and oriented to person, place, and time.   Skin: Skin is warm and dry. She is not diaphoretic.   Psychiatric: She has a normal mood and affect. Her behavior is " normal.       Lab Results   Component Value Date     04/20/2017    K 4.1 04/20/2017     04/20/2017    CO2 29 04/20/2017    BUN 38 (H) 04/20/2017    CREATININE 1.9 (H) 04/20/2017     04/20/2017    HGBA1C 6.6 (H) 04/20/2017    HGBA1C 6.6 (H) 04/20/2017    MG 2.0 03/22/2012    AST 25 04/20/2017    ALT 30 04/20/2017    ALBUMIN 3.4 (L) 04/20/2017    PROT 6.7 04/20/2017    BILITOT 0.6 04/20/2017    WBC 6.97 04/20/2017    HGB 10.6 (L) 04/20/2017    HCT 32.8 (L) 04/20/2017    MCV 87 04/20/2017     04/20/2017    TSH 4.066 (H) 04/20/2017    CHOL 116 (L) 04/20/2017    HDL 52 04/20/2017    LDLCALC 55.2 (L) 04/20/2017    TRIG 44 04/20/2017       Assessment:     1. Chronic congestive heart failure, unspecified congestive heart failure type : Currently compensated   2. Chronic atrial fibrillation : Rate controlled on a DOAC   3. Chest pain at rest : Uncertain etiology. Will review records when available   4. Essential hypertension : Good control.   5. Hyperlipemia, mixed :  on high intensity statin At LDL goal   6. CKD (chronic kidney disease) stage 4, GFR 15-29 ml/min        Plan:     Tiana was seen today for atrial fibrillation and congestive heart failure.    Diagnoses and all orders for this visit:    Chronic congestive heart failure, unspecified congestive heart failure type  -     2D Echo w/ Color Flow Doppler; Future  Records from [place] and [place]  Chronic atrial fibrillation  -     2D Echo w/ Color Flow Doppler; Future  Continue current regimen    Chest pain at rest  Will review records.  Essential hypertension  Continue current regimen    Hyperlipemia, mixed  Continue current regimen    CKD (chronic kidney disease) stage 4, GFR 15-29 ml/min

## 2017-05-02 NOTE — LETTER
May 2, 2017      Belen Bradley MD  [address]  [facility] [address]           [facility]  [address]  [facility] [address]  Phone: [phone]          Patient: Tiana Mead   MR Number: [id number]   YOB: 1925   Date of Visit: 5/2/2017       Dear Dr. Belen Bradley:    Thank you for referring Tiana Mead to me for evaluation. Attached you will find relevant portions of my assessment and plan of care.    If you have questions, please do not hesitate to call me. I look forward to following Tiana Mead along with you.    Sincerely,    Aria Krishnamurthy, FABIANA    Enclosure  CC:  No Recipients    If you would like to receive this communication electronically, please contact [email or web address] or [phone] to request more information on Meet[employer]com Link access.    For providers and/or their staff who would like to refer a patient to [facility], please contact us through our one-stop-shop provider referral line, [facility] , at [phone].    If you feel you have received this communication in error or would no longer like to receive these types of communications, please e-mail [email or web address]

## 2017-05-02 NOTE — MR AVS SNAPSHOT
[facility] - Cardiology  [address]  [facility] [address]  Phone: [phone]                  Tiana KRISHNA [place]   2017 1:00 PM   Office Visit    Description:  Female : 3/14/1925   Provider:  Mickey Erwin MD   Department:  [facility]           Reason for Visit     Atrial Fibrillation     Congestive Heart Failure           Diagnoses this Visit        Comments    Chronic congestive heart failure, unspecified congestive heart failure type    -  Primary     Chronic atrial fibrillation         Chest pain at rest         Essential hypertension         Hyperlipemia, mixed                To Do List           Future Appointments        Provider Department Dept Phone    2017 8:30 AM David Prado MD [facility] - Ophthalmology [phone]    2017 11:20 AM Belen Bradley MD [facility] - Internal Medicine [phone]      Goals (5 Years of Data)     None      Follow-Up and Disposition     Return in about 3 months (around 2017).      [facility]     [facility] On Call Nurse Care Line -  Assistance  Unless otherwise directed by your provider, please contact [facility], our nurse care line that is available for  assistance.     Registered nurses in the [facility] On Call Center provide: appointment scheduling, clinical advisement, health education, and other advisory services.  Call: [phone] (toll free)               Medications           Message regarding Medications     Verify the changes and/or additions to your medication regime listed below are the same as discussed with your clinician today.  If any of these changes or additions are incorrect, please notify your healthcare provider.             Verify that the below list of medications is an accurate representation of the medications you are currently taking.  If none reported, the list may be blank. If incorrect, please contact your healthcare provider. Carry this list with you in case of emergency.  "          Current Medications     albuterol 90 mcg/actuation inhaler Inhale 2 puffs into the lungs every 4 (four) hours as needed.    amlodipine (NORVASC) 5 MG tablet Take 1 tablet (5 mg total) by mouth every morning.    atorvastatin (LIPITOR) 40 MG tablet Take 1 tablet (40 mg total) by mouth once daily.    blood sugar diagnostic (ONETOUCH ULTRA TEST) Strp Inject 1 strip into the skin 3 (three) times daily.    blood sugar diagnostic Strp 1 strip by Misc.(Non-Drug; Combo Route) route 3 (three) times daily with meals. Please provide the insurance preferred product.    blood-glucose meter kit Please provide the insurance approved preferred product. Thank you.    brimonidine 0.2% (ALPHAGAN) 0.2 % Drop 1 drop every 8 (eight) hours.    diclofenac sodium (VOLTAREN) 1 % Gel Apply topically 4 (four) times daily.    dorzolamide (TRUSOPT) 2 % ophthalmic solution Place 1 drop into both eyes 2 (two) times daily.    fluticasone-salmeterol 250-50 mcg/dose (ADVAIR DISKUS) 250-50 mcg/dose diskus inhaler Inhale 1 puff into the lungs 2 (two) times daily.    fluticasone-salmeterol 250-50 mcg/dose (ADVAIR) 250-50 mcg/dose diskus inhaler Inhale 1 puff into the lungs nightly. Controller    furosemide (LASIX) 40 MG tablet Take 1 tablet (40 mg total) by mouth 2 (two) times daily. 1 tb am. 1 at 4pm.    insulin aspart protamine-insulin aspart (NOVOLOG MIX 70-30 FLEXPEN) 100 unit/mL (70-30) InPn pen Inject 20 Units into the skin 2 (two) times daily. 20 units before breakfast 5 units before supper    insulin needles, disposable, (BD INSULIN PEN NEEDLE UF SHORT) 31 X 5/16 " Ndle Inject 1 Box into the skin 2 (two) times daily.    isosorbide mononitrate (IMDUR) 30 MG 24 hr tablet Take 1 tablet (30 mg total) by mouth every morning. Taking for heart    lancets (ONETOUCH DELICA LANCETS) 33 gauge Misc Apply 1 lancet topically 3 (three) times daily.    lancets Misc 1 Device by Misc.(Non-Drug; Combo Route) route 3 (three) times daily before meals. " "Please provide the insurance company preferred product.    latanoprost 0.005 % ophthalmic solution Place 1 drop into the right eye every evening.    levothyroxine (SYNTHROID) 75 MCG tablet Take 1 tablet (75 mcg total) by mouth before breakfast.    meclizine (ANTIVERT) 12.5 mg tablet Take 1 tablet (12.5 mg total) by mouth 3 (three) times daily as needed.    metoprolol tartrate (LOPRESSOR) 25 MG tablet Take 2 tablets (50 mg total) by mouth 2 (two) times daily.    multivit-mineral-iron-lutein (CENTRUM SILVER ULTRA WOMEN'S) Tab Take 1 tablet by mouth once daily.    TRUE METRIX GLUCOSE METER Misc TEST TID    TRUEPLUS LANCETS 30 gauge Misc USE TO TEST BLOOD SUGAR TID AC    XARELTO 10 mg Tab Take 1 tablet (10 mg total) by mouth daily with dinner or evening meal.           Clinical Reference Information           Your Vitals Were     BP Pulse Height Weight SpO2 BMI    125/67 (BP Location: Left arm, Patient Position: Sitting, BP Method: Automatic) 71 5' 8.5" (1.74 m) 73.3 kg (161 lb 9.6 oz) 99% 24.21 kg/m2      Blood Pressure          Most Recent Value    Right Arm BP - Sitting  130/72    Left Arm BP - Sitting  125/67    BP  125/67      Allergies as of 5/2/2017     Codeine      Immunizations Administered on Date of Encounter - 5/2/2017     None      Orders Placed During Today's Visit     Future Labs/Procedures Expected by Expires    2D Echo w/ Color Flow Doppler  As directed 5/2/2018      [facility] Sign-Up     Activating your [facility] account is as easy as 1-2-3!     1) Visit [email or web address], select Sign Up Now, enter this activation code and your date of birth, then select Next.  [email or web address]  Expires: 6/4/2017  9:33 AM      2) Create a username and password to use when you visit [facility] in the future and select a security question in case you lose your password and select Next.    3) Enter your e-mail address and click Sign Up!    Additional Information  If you have questions, please e-mail [email or web address] or " call [phone] to talk to our [facility] staff. Remember, [facility] is NOT to be used for urgent needs. For medical emergencies, dial 911.         Instructions    Continue current regimen         Language Assistance Services     ATTENTION: Language assistance services are available, free of charge. Please call [phone].      ATENCIÓN: Si habla español, tiene a modi disposición servicios gratuitos de asistencia lingüística. Llame al [phone].     CHÚ Ý: N?u b?n nói Ti?ng Vi?t, có các d?ch v? h? tr? ngôn ng? mi?n phí dành cho b?n. G?i s? [phone].         [facility] complies with applicable Federal civil rights laws and does not discriminate on the basis of race, color, national origin, age, disability, or sex.

## 2017-05-25 RX ORDER — RIVAROXABAN 10 MG/1
10 TABLET, FILM COATED ORAL
Qty: 30 TABLET | Refills: 11 | Status: SHIPPED | OUTPATIENT
Start: 2017-05-25 | End: 2017-08-15 | Stop reason: ALTCHOICE

## 2017-05-25 NOTE — TELEPHONE ENCOUNTER
----- Message from Mikel Smart sent at 5/25/2017 11:05 AM CDT -----  Contact: Self [phone]  Type: Rx    Name of medication(s):XARELTO 10 mg Tab     Is this a refill? New rx? Refill    Who prescribed medication? Dr Bradley    Pharmacy Name, Phone, & Location: [facility] on [address] on [address]    Comments: Balwinder ALVARADO, advice      Thanks

## 2017-05-29 ENCOUNTER — INITIAL CONSULT (OUTPATIENT)
Dept: OPHTHALMOLOGY | Facility: CLINIC | Age: 82
End: 2017-05-29
Payer: MEDICARE

## 2017-05-29 ENCOUNTER — TELEPHONE (OUTPATIENT)
Dept: CARDIOLOGY | Facility: CLINIC | Age: 82
End: 2017-05-29

## 2017-05-29 ENCOUNTER — HOSPITAL ENCOUNTER (OUTPATIENT)
Dept: CARDIOLOGY | Facility: CLINIC | Age: 82
Discharge: HOME | End: 2017-05-29
Payer: MEDICARE

## 2017-05-29 DIAGNOSIS — Z96.1 PSEUDOPHAKIA: ICD-10-CM

## 2017-05-29 DIAGNOSIS — I31.39 PERICARDIAL EFFUSION: ICD-10-CM

## 2017-05-29 DIAGNOSIS — I50.9 CHRONIC CONGESTIVE HEART FAILURE, UNSPECIFIED CONGESTIVE HEART FAILURE TYPE: ICD-10-CM

## 2017-05-29 DIAGNOSIS — H40.1132 PRIMARY OPEN ANGLE GLAUCOMA OF BOTH EYES, MODERATE STAGE: Primary | ICD-10-CM

## 2017-05-29 DIAGNOSIS — H25.11 NUCLEAR SCLEROSIS, RIGHT: ICD-10-CM

## 2017-05-29 DIAGNOSIS — H18.519 FUCHS' CORNEAL DYSTROPHY: ICD-10-CM

## 2017-05-29 DIAGNOSIS — I48.20 CHRONIC ATRIAL FIBRILLATION: ICD-10-CM

## 2017-05-29 DIAGNOSIS — H40.30X0 GLAUCOMA ASSOCIATED WITH OCULAR TRAUMA: ICD-10-CM

## 2017-05-29 LAB
AORTIC VALVE REGURGITATION: ABNORMAL
ESTIMATED PA SYSTOLIC PRESSURE: 57.25
GLOBAL PERICARDIAL EFFUSION: ABNORMAL
MITRAL VALVE MOBILITY: ABNORMAL
MITRAL VALVE STENOSIS: ABNORMAL
RETIRED EF AND QEF - SEE NOTES: 65 (ref 55–65)
TRICUSPID VALVE REGURGITATION: ABNORMAL

## 2017-05-29 PROCEDURE — 92004 COMPRE OPH EXAM NEW PT 1/>: CPT | Mod: S$GLB,,, | Performed by: OPHTHALMOLOGY

## 2017-05-29 PROCEDURE — 93306 TTE W/DOPPLER COMPLETE: CPT | Mod: S$GLB,,, | Performed by: INTERNAL MEDICINE

## 2017-05-29 PROCEDURE — 99999 PR PBB SHADOW E&M-EST. PATIENT-LVL II: CPT | Mod: PBBFAC,,, | Performed by: OPHTHALMOLOGY

## 2017-05-29 PROCEDURE — 92250 FUNDUS PHOTOGRAPHY W/I&R: CPT | Mod: S$GLB,,, | Performed by: OPHTHALMOLOGY

## 2017-05-29 NOTE — PROGRESS NOTES
HPI     Glaucoma    Additional comments: Overdue Glaucoma Chk           Comments   Patient states she has been followed by an Ophthalmologist in [place], [place] and in [place] over the past 4 years. She feels like her va   has decreased over the past several years.     Brimonidine BID OU  Dorzolamide BID OU  Latanoprost QHS OU       Last edited by Ino Estevez on 5/29/2017  8:44 AM. (History)            Assessment /Plan     For exam results, see Encounter Report.    Primary open angle glaucoma of both eyes, moderate stage  -     Color Fundus Photography - OU - Both Eyes    Nuclear sclerosis, right    Pseudophakia - Left Eye    Glaucoma associated with ocular trauma  Comments:  left eye : CE IOL elsewhere  Orders:  -     Color Fundus Photography - OU - Both Eyes    Fuchs' corneal dystrophy            Stable Glaucoma & Patient near target IOP range    Resolved IOP OD >OS with steroids R knee --> Has steroid response with elevated IOP    CCT  517 / 574    Mid teens    Both Eyes:  TCAI Trusopt BIDl  PG q day --> iritis quiet  Alphagan BID --> had dizziness in past    Hx Ryan in Past    No BB --> Asthma    Hold SLT OS    Cataract right Eyes: Cataract surgery PRN with good understanding.  Patient hesitant @ CE IOL OD --> sign Guttata as discussed    Fuchs K dystrophy   Observe      NIDDM  No BDR or CSME  control         Plan  IOP 5 month IOP & OCT RNFL  RTC sooner prn with good understanding

## 2017-05-29 NOTE — TELEPHONE ENCOUNTER
Tiana Mead called and the echocardiogram results discussed. Will repeat in 3 weeks r.e. Pericardial effusion.

## 2017-05-29 NOTE — LETTER
May 29, 2017      Belen Bradley MD  [address]  [facility] [address]           [facility]  [address]  [facility] [address]  Phone: [phone]  Fax: [phone]          Patient: Tiana Mead   MR Number: [id number]   YOB: 1925   Date of Visit: 5/29/2017       Dear Dr. Belen Bradley:    Thank you for referring Tiana Mead to me for evaluation. Attached you will find relevant portions of my assessment and plan of care.    If you have questions, please do not hesitate to call me. I look forward to following Tiana Mead along with you.    Sincerely,    David Prado MD    Enclosure  CC:  No Recipients    If you would like to receive this communication electronically, please contact [email or web address] or [phone] to request more information on Car[employer] Link access.    For providers and/or their staff who would like to refer a patient to [facility], please contact us through our one-stop-shop provider referral line, [facility], at [phone].    If you feel you have received this communication in error or would no longer like to receive these types of communications, please e-mail [email or web address]

## 2017-05-30 NOTE — TELEPHONE ENCOUNTER
Called patient and scheduled a repeat echo in 3 wks (6/22/17)  as  requested.     MD Reyna Lepe MA 6 hours ago (12:54 PM)      Please schedule 2 D echo for three weeks. (Routing comment)       Mickey Erwin MD 6 hours ago (12:51 PM)      Tiana Mead called and the echocardiogram results discussed. Will repeat in 3 weeks r.e. Pericardial effusion.

## 2017-06-12 ENCOUNTER — TELEPHONE (OUTPATIENT)
Dept: INTERNAL MEDICINE | Facility: CLINIC | Age: 82
End: 2017-06-12

## 2017-06-12 NOTE — TELEPHONE ENCOUNTER
----- Message from Antionette Sands sent at 6/12/2017 11:19 AM CDT -----  Contact: self [phone]  Patient stated she need a Prior Authorization on medication   XARELTO 10 mg Tab.  Patient only have two pills left . Please advise , Thanks !      ====  Please start PA for Dr. Marroquin

## 2017-06-13 ENCOUNTER — TELEPHONE (OUTPATIENT)
Dept: INTERNAL MEDICINE | Facility: CLINIC | Age: 82
End: 2017-06-13

## 2017-06-13 NOTE — TELEPHONE ENCOUNTER
Please schedule an appointment for this patient to discuss anticoagulant. Can't get Mynor authorized.  See if you can get her in 06-14 or 06-15, the sooner the better

## 2017-06-13 NOTE — TELEPHONE ENCOUNTER
I could not reach the patient but I left a detailed voicemail on her home answering machine.  Firstly, I have not been able to get Xarelto prior authorization.  Secondly, of the novel oral anticoagulants available I don't know if Xarelto is the safest agent for her to be taking given her chronic kidney disease, fall risk and visual impairment.  I think that Eliquis might be safer than Xarelto, but I don't know that for sure.    I'm going to send this message to Dr. Erwin in cardiology to ask for his input  to see which novel oral anticoagulant he recommends and if it would be possible to get [facility] to provide prior authorization for this medication.

## 2017-06-13 NOTE — TELEPHONE ENCOUNTER
----- Message from Stella Wallace sent at 6/13/2017  4:18 PM CDT -----  Contact: Patient, phone [phone]  The patient says that [facility] has not received prior authorization for Xarelto.  She states that the physician needs to call [facility].  She would like a call back.  She says she only has one pill left.     Thanks!

## 2017-06-15 ENCOUNTER — OFFICE VISIT (OUTPATIENT)
Dept: INTERNAL MEDICINE | Facility: CLINIC | Age: 82
End: 2017-06-15
Payer: MEDICARE

## 2017-06-15 ENCOUNTER — HOSPITAL ENCOUNTER (OUTPATIENT)
Dept: RADIOLOGY | Facility: HOSPITAL | Age: 82
Discharge: HOME | End: 2017-06-15
Attending: INTERNAL MEDICINE
Payer: MEDICARE

## 2017-06-15 VITALS
WEIGHT: 168.88 LBS | SYSTOLIC BLOOD PRESSURE: 110 MMHG | HEIGHT: 69 IN | TEMPERATURE: 97 F | BODY MASS INDEX: 25.01 KG/M2 | DIASTOLIC BLOOD PRESSURE: 60 MMHG | OXYGEN SATURATION: 98 % | HEART RATE: 68 BPM

## 2017-06-15 DIAGNOSIS — M79.89 LEFT LEG SWELLING: Primary | ICD-10-CM

## 2017-06-15 DIAGNOSIS — N18.4 CKD (CHRONIC KIDNEY DISEASE) STAGE 4, GFR 15-29 ML/MIN: Chronic | ICD-10-CM

## 2017-06-15 DIAGNOSIS — I48.20 CHRONIC ATRIAL FIBRILLATION: ICD-10-CM

## 2017-06-15 DIAGNOSIS — I50.9 CHRONIC CONGESTIVE HEART FAILURE, UNSPECIFIED CONGESTIVE HEART FAILURE TYPE: ICD-10-CM

## 2017-06-15 DIAGNOSIS — Z79.01 LONG TERM (CURRENT) USE OF ANTICOAGULANTS: ICD-10-CM

## 2017-06-15 DIAGNOSIS — M79.89 LEFT LEG SWELLING: ICD-10-CM

## 2017-06-15 DIAGNOSIS — I82.412 DVT FEMORAL (DEEP VENOUS THROMBOSIS) WITH THROMBOPHLEBITIS, LEFT: ICD-10-CM

## 2017-06-15 PROBLEM — I82.409 DEEP VEIN THROMBOSIS (DVT) DURING CURRENT HOSPITALIZATION: Status: ACTIVE | Noted: 2017-06-15

## 2017-06-15 PROCEDURE — 93970 EXTREMITY STUDY: CPT | Mod: 26,,, | Performed by: RADIOLOGY

## 2017-06-15 PROCEDURE — 99214 OFFICE O/P EST MOD 30 MIN: CPT | Mod: S$GLB,,, | Performed by: INTERNAL MEDICINE

## 2017-06-15 PROCEDURE — 1159F MED LIST DOCD IN RCRD: CPT | Mod: S$GLB,,, | Performed by: INTERNAL MEDICINE

## 2017-06-15 PROCEDURE — 99999 PR PBB SHADOW E&M-EST. PATIENT-LVL V: CPT | Mod: PBBFAC,,, | Performed by: INTERNAL MEDICINE

## 2017-06-15 PROCEDURE — 93970 EXTREMITY STUDY: CPT | Mod: TC

## 2017-06-15 PROCEDURE — 99499 UNLISTED E&M SERVICE: CPT | Mod: S$GLB,,, | Performed by: INTERNAL MEDICINE

## 2017-06-15 PROCEDURE — 1126F AMNT PAIN NOTED NONE PRSNT: CPT | Mod: S$GLB,,, | Performed by: INTERNAL MEDICINE

## 2017-06-15 RX ORDER — METOPROLOL TARTRATE 50 MG/1
50 TABLET ORAL 2 TIMES DAILY
Qty: 180 TABLET | Refills: 4 | Status: SHIPPED | OUTPATIENT
Start: 2017-06-15 | End: 2018-07-17 | Stop reason: SDUPTHER

## 2017-06-15 NOTE — LETTER
Yoly 15, 2017    Tiana Mead  [address] [facility] [address]             [facility]  [address]  [facility] [address]  Phone: [phone]  Fax: [phone] Dear Mrs. Tiana Mead:    Below are the results from your recent visit:    Resulted Orders   US Lower Extremity Veins Bilateral    Narrative    Time of Procedure: 06/15/17 14:24:00  Accession # [id number]    Technique: Duplex and color flow Doppler evaluation of the bilateral lower extremities.    Clinical indication:  Rule out DVT.    Findings:    Right - There is no evidence of acute venous thrombosis in the common femoral, superficial femoral, greater saphenous, popliteal, peroneal, anterior tibial and posterior tibial veins.  There is no reflux to suggest valvular incompetence.    Left - There is no evidence of acute venous thrombosis in the common femoral, superficial femoral, greater saphenous, popliteal, peroneal, anterior tibial and posterior tibial veins.  There is no reflux to suggest valvular incompetence.    Impression    1. No evidence of deep venous thrombosis bilaterally.  ______________________________________     Electronically signed by resident: ABBI FINE MD  Date:     06/15/17  Time:    14:52            As the supervising and teaching physician, I personally reviewed the images and resident's interpretation and I agree with the findings.          Electronically signed by: BERLIN OCHOA MD  Date:     06/15/17  Time:    14:59      Your results are normal.  Please don't hesitate to call our office if you have any questions or concerns.      Sincerely,        Belen Bradley MD

## 2017-06-15 NOTE — LETTER
Yoly 15, 2017    Tiana Mead  [address] [facility] [address]             [facility]  [address]  [facility] [address]  Phone: [phone]  Fax: [phone] Dear Mrs. Tiana Mead:    Below are the results from your recent visit:    Resulted Orders   US Lower Extremity Veins Bilateral    Narrative    Time of Procedure: 06/15/17 14:24:00  Accession # [id number]    Technique: Duplex and color flow Doppler evaluation of the bilateral lower extremities.    Clinical indication:  Rule out DVT.    Findings:    Right - There is no evidence of acute venous thrombosis in the common femoral, superficial femoral, greater saphenous, popliteal, peroneal, anterior tibial and posterior tibial veins.  There is no reflux to suggest valvular incompetence.    Left - There is no evidence of acute venous thrombosis in the common femoral, superficial femoral, greater saphenous, popliteal, peroneal, anterior tibial and posterior tibial veins.  There is no reflux to suggest valvular incompetence.    Impression    1. No evidence of deep venous thrombosis bilaterally.  ______________________________________     Electronically signed by resident: ABBI FINE MD  Date:     06/15/17  Time:    14:52            As the supervising and teaching physician, I personally reviewed the images and resident's interpretation and I agree with the findings.          Electronically signed by: BERLIN OCHOA MD  Date:     06/15/17  Time:    14:59      Your results are normal.  Please don't hesitate to call our office if you have any questions or concerns.      Sincerely,        Belen Bradley MD

## 2017-06-15 NOTE — LETTER
Yoly 15, 2017    Tiana Mead  [address] [facility] [address]             [facility]  [address]  [facility] [address]  Phone: [phone]  Fax: [phone] Dear Mrs. Tiana Mead:    Below are the results from your recent visit:    Resulted Orders   US Lower Extremity Veins Bilateral    Narrative    Time of Procedure: 06/15/17 14:24:00  Accession # [id number]    Technique: Duplex and color flow Doppler evaluation of the bilateral lower extremities.    Clinical indication:  Rule out DVT.    Findings:    Right - There is no evidence of acute venous thrombosis in the common femoral, superficial femoral, greater saphenous, popliteal, peroneal, anterior tibial and posterior tibial veins.  There is no reflux to suggest valvular incompetence.    Left - There is no evidence of acute venous thrombosis in the common femoral, superficial femoral, greater saphenous, popliteal, peroneal, anterior tibial and posterior tibial veins.  There is no reflux to suggest valvular incompetence.    Impression    1. No evidence of deep venous thrombosis bilaterally.  ______________________________________     Electronically signed by resident: ABBI FINE MD  Date:     06/15/17  Time:    14:52            As the supervising and teaching physician, I personally reviewed the images and resident's interpretation and I agree with the findings.          Electronically signed by: BERLIN OCHOA MD  Date:     06/15/17  Time:    14:59      Your results are normal.  Please don't hesitate to call our office if you have any questions or concerns.      Sincerely,        Belen Bradley MD

## 2017-06-16 NOTE — ADDENDUM NOTE
Encounter addended by: Belen Bradley MD on: 6/15/2017  8:06 PM<BR>    Actions taken: Letter status changed

## 2017-06-16 NOTE — PROGRESS NOTES
Subjective:       Patient ID: Tiana Mead is a 89 y.o. female.    Chief Complaint: medication discuss   the patient presents to the office today to discuss novel oral anticoagulation.    Her dtr krystyna is present.  In January 2016, less than 6 months ago, the patient was started on Xarelto initially at 20 mg per day and now taking 10 mg per day because of the deep venous thrombosis involving her right leg.  She returned to [place] and came in to reestablish care April 20, 2017 at which time her CMP demonstrated chronic kidney disease stage IV with an estimated glomerular filtration rate of 26.    I had called the patient to ask her to come in today to discuss Xarelto.  She's been taking 10 mg daily.  I got erroneous information that her insurance company refused to provide prior authorization.  I wanted to change her to Eliquis 2.5 mg twice daily because of her decreased kidney function.  She is not comfortable changing from Xarelto, largely because the physician who initially prescribed emphasized the danger of stephen a blood clot if the medication is stopped abruptly.    I was alarmed today when I saw her extremely swollen right leg with bulging superficial varicosities and have ordered a Doppler venous ultrasound of both lower extremities.  HPI  Review of Systems   Constitutional: Negative for activity change, appetite change, chills, fatigue, fever and unexpected weight change.   HENT: Negative for hearing loss.    Eyes: Negative for visual disturbance.   Respiratory: Negative for cough, chest tightness, shortness of breath and wheezing.    Cardiovascular: Negative for chest pain and palpitations.   Gastrointestinal: Negative for abdominal pain, constipation, nausea and vomiting.   Genitourinary: Negative for dysuria, frequency and urgency.   Musculoskeletal: Negative for arthralgias, back pain, gait problem, joint swelling and myalgias.   Skin: Negative for rash.   Neurological: Negative for  light-headedness and headaches.   Psychiatric/Behavioral: Negative for dysphoric mood and sleep disturbance. The patient is not nervous/anxious.        Objective:      Physical Exam   Constitutional: She appears well-nourished.   HENT:   Head: Atraumatic.   Eyes: Conjunctivae are normal. No scleral icterus.   Neck: Neck supple.   Cardiovascular: Normal rate and regular rhythm.    Pulmonary/Chest: Effort normal and breath sounds normal.   Abdominal: Soft. There is no tenderness.   Musculoskeletal: She exhibits edema.   Lymphadenopathy:     She has no cervical adenopathy.   Neurological: She is alert.   Skin: Skin is warm and dry.   Psychiatric: She has a normal mood and affect. Her behavior is normal.       Assessment:       1. Left leg swelling    2. CKD (chronic kidney disease) stage 4, GFR 15-29 ml/min    3. Chronic congestive heart failure, unspecified congestive heart failure type    4. Chronic atrial fibrillation    5. Long term (current) use of anticoagulants, Xarelto    6. DVT femoral (deep venous thrombosis) with thrombophlebitis, left        Plan:   Tiana was seen today for medication discuss.    Diagnoses and all orders for this visit:    Left leg swelling. No DVT  -     US Lower Extremity Veins Bilateral; Future    CKD (chronic kidney disease) stage 4, GFR 15-29 ml/min, monitor. All meds reviewed and call to [facility] pharmacist to discuss all meds.  -     US Lower Extremity Veins Bilateral; Future  -     CBC auto differential; Future  -     Comprehensive metabolic panel; Future    Chronic congestive heart failure, unspecified congestive heart failure type, compensated  -     US Lower Extremity Veins Bilateral; Future    Chronic atrial fibrillation  -     US Lower Extremity Veins Bilateral; Future    Long term (current) use of anticoagulants, Xarelto. I think Eliquis may be safer  -     Cancel: Cardiology Lab US Lower Extremity Veins Bilateral; Future  -     US Lower Extremity Veins Bilateral;  Future    DVT femoral (deep venous thrombosis) with thrombophlebitis, left, by history  -     Cancel: Cardiology Lab US Lower Extremity Veins Bilateral; Future  -     US Lower Extremity Veins Bilateral; Future    Other orders. Labs before appointment   -     metoprolol tartrate (LOPRESSOR) 50 MG tablet; Take 1 tablet (50 mg total) by mouth 2 (two) times daily.

## 2017-06-22 ENCOUNTER — HOSPITAL ENCOUNTER (OUTPATIENT)
Dept: CARDIOLOGY | Facility: CLINIC | Age: 82
Discharge: HOME | End: 2017-06-22
Payer: MEDICARE

## 2017-06-22 DIAGNOSIS — I31.39 PERICARDIAL EFFUSION: ICD-10-CM

## 2017-06-22 LAB
GLOBAL PERICARDIAL EFFUSION: ABNORMAL
MITRAL VALVE MOBILITY: ABNORMAL
RETIRED EF AND QEF - SEE NOTES: 68 (ref 55–65)

## 2017-06-22 PROCEDURE — 93307 TTE W/O DOPPLER COMPLETE: CPT | Mod: S$GLB,,, | Performed by: INTERNAL MEDICINE

## 2017-06-29 ENCOUNTER — HOSPITAL ENCOUNTER (OUTPATIENT)
Dept: CARDIOLOGY | Facility: CLINIC | Age: 82
Discharge: HOME | End: 2017-06-29
Payer: MEDICARE

## 2017-06-29 ENCOUNTER — OFFICE VISIT (OUTPATIENT)
Dept: CARDIOLOGY | Facility: CLINIC | Age: 82
End: 2017-06-29
Payer: MEDICARE

## 2017-06-29 VITALS
BODY MASS INDEX: 25.23 KG/M2 | WEIGHT: 166.44 LBS | SYSTOLIC BLOOD PRESSURE: 104 MMHG | HEART RATE: 64 BPM | HEIGHT: 68 IN | DIASTOLIC BLOOD PRESSURE: 56 MMHG

## 2017-06-29 DIAGNOSIS — I48.19 PERSISTENT ATRIAL FIBRILLATION: Chronic | ICD-10-CM

## 2017-06-29 DIAGNOSIS — I10 ESSENTIAL HYPERTENSION: Chronic | ICD-10-CM

## 2017-06-29 DIAGNOSIS — I82.409 DEEP VEIN THROMBOSIS (DVT) DURING CURRENT HOSPITALIZATION: ICD-10-CM

## 2017-06-29 DIAGNOSIS — I50.32 CHRONIC DIASTOLIC CONGESTIVE HEART FAILURE: Chronic | ICD-10-CM

## 2017-06-29 DIAGNOSIS — N18.4 CKD (CHRONIC KIDNEY DISEASE) STAGE 4, GFR 15-29 ML/MIN: Chronic | ICD-10-CM

## 2017-06-29 DIAGNOSIS — E78.2 HYPERLIPEMIA, MIXED: Chronic | ICD-10-CM

## 2017-06-29 DIAGNOSIS — I31.39 PERICARDIAL EFFUSION: ICD-10-CM

## 2017-06-29 DIAGNOSIS — I25.2 OLD MI (MYOCARDIAL INFARCTION): ICD-10-CM

## 2017-06-29 DIAGNOSIS — Z79.01 LONG TERM (CURRENT) USE OF ANTICOAGULANTS: Chronic | ICD-10-CM

## 2017-06-29 DIAGNOSIS — Z79.4 TYPE 2 DIABETES MELLITUS WITH DIABETIC POLYNEUROPATHY, WITH LONG-TERM CURRENT USE OF INSULIN: Chronic | ICD-10-CM

## 2017-06-29 DIAGNOSIS — E11.42 TYPE 2 DIABETES MELLITUS WITH DIABETIC POLYNEUROPATHY, WITH LONG-TERM CURRENT USE OF INSULIN: Chronic | ICD-10-CM

## 2017-06-29 DIAGNOSIS — I31.39 PERICARDIAL EFFUSION: Primary | Chronic | ICD-10-CM

## 2017-06-29 LAB
ESTIMATED PA SYSTOLIC PRESSURE: 30.85
GLOBAL PERICARDIAL EFFUSION: ABNORMAL
MITRAL VALVE REGURGITATION: ABNORMAL
RETIRED EF AND QEF - SEE NOTES: 60 (ref 55–65)
TRICUSPID VALVE REGURGITATION: ABNORMAL

## 2017-06-29 PROCEDURE — 99499 UNLISTED E&M SERVICE: CPT | Mod: S$GLB,,, | Performed by: NURSE PRACTITIONER

## 2017-06-29 PROCEDURE — 99214 OFFICE O/P EST MOD 30 MIN: CPT | Mod: S$GLB,,, | Performed by: NURSE PRACTITIONER

## 2017-06-29 PROCEDURE — 99999 PR PBB SHADOW E&M-EST. PATIENT-LVL V: CPT | Mod: PBBFAC,,, | Performed by: NURSE PRACTITIONER

## 2017-06-29 PROCEDURE — 1159F MED LIST DOCD IN RCRD: CPT | Mod: S$GLB,,, | Performed by: NURSE PRACTITIONER

## 2017-06-29 PROCEDURE — 93306 TTE W/DOPPLER COMPLETE: CPT | Mod: S$GLB,,, | Performed by: INTERNAL MEDICINE

## 2017-06-29 NOTE — PATIENT INSTRUCTIONS
1. Echo today  2. Blood draw in 1 week  3. Daily weights. Let us know if you do not have a decrease in your weight  4. Increase your lasix (furosemide) to 80mg two times a day for the next 5 days.   5. Call the clinic on Monday  Left- or Right- Side Congestive Heart Failure    The heart is a large muscle. It is a pump that circulates blood throughout the body. Blood carries oxygen to all of the organs, including the brain, muscles, and skin. After your body takes the oxygen out of the blood, the blood returns to the heart. The right side of the heart collects the blood from the body and pumps it to the lungs. In the lungs, it gets fresh oxygen and gives up carbon dioxide. The oxygen-rich blood from the lungs then returns to the left side of the heart, where it is pumped back out to the rest of your body, starting the process all over.  Congestive heart failure (CHF) occurs when the heart muscle is weakened. This affects the pumping action of the heart. Heart failure can affect the right side of the heart or the left side. But heart failure may affect not only the right side of the heart or only the left side. Although it may have started on one side, it can and often eventually does affect both sides.  Right-side heart failure  When the right side of the heart is weakened, it cant handle the blood it is getting from the rest of the body. This blood returns to the heart through veins. When too much pressure builds up in the veins, fluid leaks out into the tissues. Gravity then causes that fluid to move to those parts of the body that are the lowest. So one of the first symptoms of right-side CHF can include swelling in the feet and ankles. If the condition gets worse, the swelling can even go up past the knees. Sometimes it gets so severe, the liver can get congested as well.  Left-side heart failure  When the left side of the heart is weakened, it cant handle the blood it gets from the lungs. Pressure then builds  up in the veins of the lungs, causing fluid to leak into the lung tissues. This may cause CHF and pulmonary edema. This causes you to feel short of breath, weak, or dizzy. These symptoms are often worse with exertion, such as when climbing stairs or walking up hills. Lying with your head flat is uncomfortable and can make your breathing worse. This may make sleeping difficult. You may need to use extra pillows to elevate your upper body to sleep well. The same is true when just resting during the daytime.  There are many causes of heart failure including:  · Coronary artery disease  · Past heart attack (also known as acute myocardial infarction, or AMI)  · High blood pressure  · Damaged heart valve  · Diabetes  · Obesity  · Cigarette smoking  · Alcohol abuse  Heart failure is a chronic condition. There is no cure. The purpose of medical treatment is to improve the pumping action of the heart. The main way to do this is to remove excess water from the body. A number of medicines can help reach this goal, improve symptoms, and prevent the heart from becoming weaker. Sometimes, heart failure can become so severe that a device is placed in the heart to help with pumping. Another major goal is to better treat the causes of heart failure, such as diabetes and high blood pressure, by making changes in your lifestyle and maximizing medical control when needed.  Home care  Follow these guidelines when caring for yourself at home:  · Check your weight every day. This is very important because a sudden increase in weight gain could mean worsening heart failure. Keep these things in mind:  ¨ Use the same scale every day.  ¨ Weigh yourself at the same time every day.  ¨ Make sure the scale is on a hard floor surface, not on a rug or carpet.  ¨ Keep a record of your weight every day so your healthcare provider can see it. If you are not given a log sheet for this, keep a separate journal for this purpose.   · Cut back on the  amount of salt (sodium) you eat. Follow your healthcare provider's recommendation on how much salt or sodium you should have each day.  ¨ Avoid high-salt foods. These include olives, pickles, smoked meats, salted potato chips, and most prepared foods.  ¨ Don't add salt to your food at the table. Use only small amounts of salt when cooking.  ¨ Read the labels carefully on food packages to learn how much salt or sodium is in each serving in the package. Remember, a can or package of food may contain more than 1 serving. So if you eat all the food in the package, you may be getting more salt than you think.  · Follow your healthcare provider's recommendations about how much fluid you should have. Be aware that some foods, such as soup, pudding, and juicy fruits like oranges or melons, contain liquid. You'll need to count the liquid in those foods as part of your daily fluid intake. Your provider can help you with this.  · Stop smoking.  · Cut back on how much alcohol you drink.  · Lose weight if you are overweight. The excess weight adds a lot of stress on the workload of the heart.  · Stay active. Talk with your provider about an exercise program that is safe for your heart.  · Keep your feet elevated to reduce swelling. Ask your provider about support hose as a preventive treatment for daytime leg swelling.  Besides taking your medicine as instructed, an important part of treatment is lifestyle changes. These include diet, physical activity, stopping smoking, and weight control.  Improve your diet by including more fresh foods, cutting back on how much sugar and saturated fat you eat, and eating fewer processed foods and less salt.  Follow-up care  Follow up with your healthcare provider, or as advised.  Make sure to keep any appointments that were made for you. These can help better control your congestive heart failure. You will need to follow up with your provider on a routine basis to make sure your heart failure  is well managed.  If an X-ray, ECG, or other tests were done, you will be told of any new findings that may affect your care.  Call 911  Call 911 if you:  · Become severely short of breath  · Feel lightheaded, or feel like you might pass out or faint  · Have chest pain or discomfort that is different than usual, the medicines your doctor told you to use for this don't help, or the pain lasts longer than 10 to 15 minutes  · You suddenly develop a rapid heart rate  When to seek medical advice  The following may be signs that your heart failure is getting worse. Call your healthcare provider right away if any of these happen:  · Sudden weight gain. This means 3 or more pounds in one day, or 5 or more pounds in 1 week.  · Trouble breathing not related to being active  · New or increased swelling of your legs or ankles  · Swelling or pain in your abdomen  · Breathing trouble at night. This means waking up short of breath or needing more pillows to breathe.  · Frequent coughing that doesnt go away  · Feeling much more tired than usual  Date Last Reviewed: 1/4/2016  © 4515-9295 [employer]. [address] [place] [address]. All rights reserved. This information is not intended as a substitute for professional medical care. Always follow your healthcare professional's instructions.        Heart Failure: Making Changes to Your Diet  You have a condition called heart failure. When you have heart failure, excess fluid is more likely to build up in your body because your heart isn't working well. This makes the heart work harder to pump blood. Fluid buildup causes symptoms such as shortness of breath and swelling (edema). This is often referred to as congestive heart failure or CHF. Controlling the amount of salt (sodium) you eat may help stop fluid from building up. Your doctor may also tell you to reduce the amount of fluid you drink.  Reading food labels    Your healthcare provider will tell you how  much sodium you can eat each day. Read food labels to keep track. Keep in mind that certain foods are high in salt. These include canned, frozen, and processed foods. Check the amount of sodium in each serving. Watch out for high-sodium ingredients. These include MSG (monosodium glutamate), baking soda, and sodium phosphate.   Eating less salt  Give yourself time to get used to eating less salt. It may take a little while. Here are some tips to help:  · Take the saltshaker off the table. Replace it with salt-free herb mixes and spices.  · Eat fresh or plain frozen vegetables. These have much less salt than canned vegetables.  · Choose low-sodium snacks like sodium-free pretzels, crackers, or air-popped popcorn.  · Dont add salt to your food when youre cooking. Instead, season your foods with pepper, lemon, garlic, or onion.  · When you eat out, ask that your food be cooked without added salt.  · Avoid eating fried foods as these often have a great deal of salt.  If youre told to limit fluids  You may need to limit how much fluid you have to help prevent swelling. This includes anything that is liquid at room temperature, such as ice cream and soup. If your doctor tells you to limit fluid, try these tips:  · Measure drinks in a measuring cup before you drink them. This will help you meet daily goals.  · Chill drinks to make them more refreshing.  · Suck on frozen lemon wedges to quench thirst.  · Only drink when youre thirsty.  · Chew sugarless gum or suck on hard candy to keep your mouth moist.  · Weigh yourself daily to know if your body's fluid content is rising.  My sodium goal  Your healthcare provider may give you a sodium goal to meet each day. This includes sodium found in food as well as salt that you add. My goal is to eat no more than ___________ mg of sodium per day.     When to call your doctor  Call your doctor right away if you have any symptoms of worsening heart failure. These can  include:  · Sudden weight gain  · Increased swelling of your legs or ankles  · Trouble breathing when youre resting or at night  · Increase in the number of pillows you have to sleep on  · Chest pain, pressure, discomfort, or pain in the jaw, neck, or back   Date Last Reviewed: 3/21/2016  © 2624-8307 [employer]. [address] [place] [address]. All rights reserved. This information is not intended as a substitute for professional medical care. Always follow your healthcare professional's instructions.        Heart Failure: Evaluating Your Heart  You have a condition called heart failure. To evaluate your condition, your doctor will examine you, ask questions, and do some tests. Along with looking for signs of heart failure, the doctor looks for any other health problems that may have led to heart failure. The results of your evaluation will help your doctor form a treatment plan.  Health history and physical exam  Your visit will start with a health history. Tell the doctor about any symptoms youve noticed and about all medicines you take. Then youll have a physical exam. This includes listening to your heartbeat and breathing. Youll also be checked for swelling (edema) in your legs and neck. When you have fluid buildup or fluid in the lungs, it may be called congestive heart failure.  Diagnosing heart failure     During an echocardiogram, sound waves bounce off the heart. These are converted into a picture on the screen.   The following may be done to help your doctor form a diagnosis:  · X-rays show the size and shape of your heart. These pictures can also show fluid in your lungs.  · An electrocardiogram (ECG or EKG) shows the pattern of your heartbeat. Small pads (electrodes) are placed on your chest, arms, and legs. Wires connect the pads to the ECG machine, which records your hearts electrical signals. This can give the doctor information about heart function.  · An  echocardiogram uses ultrasound waves to show the structure and movement of your heart muscle. This shows how well the heart pumps. It also shows the thickness of the heart walls, and if the heart is enlarged. It is one of the most useful, non-invasive tests as it provides information about the heart's general function. This helps your doctor make treatment decisions.  · Lab tests evaluate small amounts of blood or urine for signs of problems. A BNP lab test can help diagnose and evaluate heart failure. BNP stands for B-type natriuretic peptide. The ventricles secrete more BNP when heart failure worsens. Lab tests can also provide information about metabolic dysfunction or heart dysfunction.  Your treatment plan  Based on the results of your evaluation and tests, your doctor will develop a treatment plan. This plan is designed to relieve some of your heart failure symptoms and help make you more comfortable. Your treatment plan may include:  · Medicine to help your heart work better and improve your quality of life  · Changes in what you eat and drink to help prevent fluid from backing up in your body  · Daily monitoring of your weight and heart failure symptoms to see how well your treatment plan is working  · Exercise to help you stay healthy  · Help with quitting smoking  · Emotional and psychological support to help adjust to the changes  · Referrals to other specialists to make sure you are being treated comprehensively  Date Last Reviewed: 3/21/2016  © 5278-9317 [employer], [employer]. [address] [place] [address], [place] [address]. All rights reserved. This information is not intended as a substitute for professional medical care. Always follow your healthcare professional's instructions.

## 2017-06-29 NOTE — PROGRESS NOTES
Ms. Mead is a patient of Dr. Erwin and was last seen in [facility] 5/2/2017.    Subjective:   Patient ID:  Tiana Mead is a 89 y.o. female who presents for follow-up of Shortness of Breath    HPI  Ms. Mead is a 89y.o.  female with HFpEF (EF 60-65%), moderate pericardial effusion with mild restriction on 6/22/17, hypertension, hyperlipidemia, asthma, and diabetes mellitus type II.  NOVOA started about 2 weeks ago and can only walk about a half block before she needs to catch her breath. She has noted some lower extremity edema that started about 3 weeks ago.  She started taking Amlodipine 5mg about a year ago.  She moved back from [place] to [place] in 3/2017 and was seen by a new PCP and was prescribed refills of her medications.  She began taking a second prescription of amlodipine about a month ago for a total of 10mg a day.  She is taking 40mg lasix twice a day but has noticed that it is no longer helping with the swelling and her breathing has worsened.  She needs at least 2 pillows to sleep because she feels like she's suffocating. She weighs herself daily and she has noticed a 5 pound increase in her weight.  She is following a low salt diet.  Denies nausea or change in appetite.    Review of Systems   Constitution: Negative for decreased appetite, diaphoresis, weakness, malaise/fatigue, weight gain and weight loss.   HENT: Negative for headaches.    Eyes: Negative for visual disturbance.   Cardiovascular: Positive for chest pain, dyspnea on exertion and orthopnea. Negative for claudication, irregular heartbeat, leg swelling, near-syncope, palpitations, paroxysmal nocturnal dyspnea and syncope.        Denies chest pressure   Respiratory: Negative for cough, hemoptysis, shortness of breath, sleep disturbances due to breathing and snoring.    Endocrine: Negative for cold intolerance and heat intolerance.   Hematologic/Lymphatic: Negative for bleeding problem. Does not bruise/bleed easily.    Musculoskeletal: Negative for myalgias.   Gastrointestinal: Negative for bloating, abdominal pain, anorexia, change in bowel habit, constipation, diarrhea, nausea and vomiting.   Neurological: Negative for difficulty with concentration, disturbances in coordination, excessive daytime sleepiness, dizziness, light-headedness, loss of balance and numbness.   Psychiatric/Behavioral: The patient does not have insomnia.      Allergies and current medications updated and reviewed:  Review of patient's allergies indicates:   Allergen Reactions    Codeine      Other reaction(s): Itching  Other reaction(s): Nausea     Current Outpatient Prescriptions   Medication Sig    albuterol 90 mcg/actuation inhaler Inhale 2 puffs into the lungs every 4 (four) hours as needed.    amlodipine (NORVASC) 5 MG tablet Take 1 tablet (5 mg total) by mouth every morning.    atorvastatin (LIPITOR) 40 MG tablet Take 1 tablet (40 mg total) by mouth once daily.    blood sugar diagnostic (ONETOUCH ULTRA TEST) Strp Inject 1 strip into the skin 3 (three) times daily.    blood sugar diagnostic Strp 1 strip by Misc.(Non-Drug; Combo Route) route 3 (three) times daily with meals. Please provide the insurance preferred product.    blood-glucose meter kit Please provide the insurance approved preferred product. Thank you.    brimonidine 0.2% (ALPHAGAN) 0.2 % Drop Place 1 drop into both eyes 2 (two) times daily.     diclofenac sodium (VOLTAREN) 1 % Gel Apply topically 4 (four) times daily.    dorzolamide (TRUSOPT) 2 % ophthalmic solution Place 1 drop into both eyes 2 (two) times daily.    fluticasone-salmeterol 250-50 mcg/dose (ADVAIR DISKUS) 250-50 mcg/dose diskus inhaler Inhale 1 puff into the lungs 2 (two) times daily.    fluticasone-salmeterol 250-50 mcg/dose (ADVAIR) 250-50 mcg/dose diskus inhaler Inhale 1 puff into the lungs nightly. Controller    furosemide (LASIX) 40 MG tablet Take 1 tablet (40 mg total) by mouth 2 (two) times daily. 1  "tb am. 1 at 4pm.    insulin aspart protamine-insulin aspart (NOVOLOG MIX 70-30 FLEXPEN) 100 unit/mL (70-30) InPn pen Inject 20 Units into the skin 2 (two) times daily. 20 units before breakfast 5 units before supper    insulin needles, disposable, (BD INSULIN PEN NEEDLE UF SHORT) 31 X 5/16 " Ndle Inject 1 Box into the skin 2 (two) times daily.    isosorbide mononitrate (IMDUR) 30 MG 24 hr tablet Take 1 tablet (30 mg total) by mouth every morning. Taking for heart    lancets (ONETOUCH DELICA LANCETS) 33 gauge Misc Apply 1 lancet topically 3 (three) times daily.    lancets Misc 1 Device by Misc.(Non-Drug; Combo Route) route 3 (three) times daily before meals. Please provide the insurance company preferred product.    latanoprost 0.005 % ophthalmic solution Place 1 drop into the right eye every evening. (Patient taking differently: Place 1 drop into both eyes every evening. )    levothyroxine (SYNTHROID) 75 MCG tablet Take 1 tablet (75 mcg total) by mouth before breakfast.    meclizine (ANTIVERT) 12.5 mg tablet Take 1 tablet (12.5 mg total) by mouth 3 (three) times daily as needed.    metoprolol tartrate (LOPRESSOR) 50 MG tablet Take 1 tablet (50 mg total) by mouth 2 (two) times daily.    multivit-mineral-iron-lutein (CENTRUM SILVER ULTRA WOMEN'S) Tab Take 1 tablet by mouth once daily.    TRUE METRIX GLUCOSE METER Misc TEST TID    TRUEPLUS LANCETS 30 gauge Misc USE TO TEST BLOOD SUGAR TID AC    XARELTO 10 mg Tab Take 1 tablet (10 mg total) by mouth daily with dinner or evening meal.     No current facility-administered medications for this visit.      Objective:     Right Arm BP - Sittin/60 (17)  Left Arm BP - Sittin/56 (17)    BP (!) 104/56 (BP Location: Left arm, Patient Position: Sitting, BP Method: Manual)   Pulse 64   Ht 5' 8" (1.727 m)   Wt 75.5 kg (166 lb 7.2 oz)   BMI 25.31 kg/m²     Physical Exam   Constitutional: She is oriented to person, place, and time. " Vital signs are normal. She appears well-developed and well-nourished. She is active. No distress.   HENT:   Head: Normocephalic and atraumatic.   Eyes: Conjunctivae and lids are normal. No scleral icterus.   Neck: Neck supple. Hepatojugular reflux and JVD (Tricuspid regurg that is moderate) present. Normal carotid pulses present. Carotid bruit is not present.   Cardiovascular: Normal rate, S1 normal and S2 normal.  An irregularly irregular rhythm present. PMI is not displaced.  Exam reveals no gallop and no friction rub.    Murmur heard.   Harsh midsystolic murmur is present with a grade of 1/6  at the upper right sternal border radiating to the neck Aortic flow murmur  Pulses:       Carotid pulses are 2+ on the right side, and 2+ on the left side.       Radial pulses are 2+ on the right side, and 2+ on the left side.        Dorsalis pedis pulses are 0 on the right side, and 0 on the left side.        Posterior tibial pulses are 0 on the right side, and 0 on the left side.   Pulmonary/Chest: Effort normal. No respiratory distress. She has decreased breath sounds in the left lower field. She has no wheezes. She has no rhonchi. She has no rales. She exhibits no tenderness.   Abdominal: Soft. Normal appearance and bowel sounds are normal. She exhibits no distension, no fluid wave, no abdominal bruit, no ascites and no pulsatile midline mass. There is no hepatosplenomegaly. There is no tenderness.   Musculoskeletal: She exhibits edema (BLE, sacral, and thigh edema).   Neurological: She is alert and oriented to person, place, and time. Gait normal.   Skin: Skin is warm, dry and intact. No rash noted. She is not diaphoretic. Nails show no clubbing.   Psychiatric: She has a normal mood and affect. Her speech is normal and behavior is normal. Judgment and thought content normal. Cognition and memory are normal.   Nursing note and vitals reviewed.      Chemistry        Component Value Date/Time     04/20/2017 1010     K 4.1 04/20/2017 1010     04/20/2017 1010    CO2 29 04/20/2017 1010    BUN 38 (H) 04/20/2017 1010    CREATININE 1.9 (H) 04/20/2017 1010     04/20/2017 1010        Component Value Date/Time    CALCIUM 9.8 04/20/2017 1010    ALKPHOS 129 04/20/2017 1010    AST 25 04/20/2017 1010    ALT 30 04/20/2017 1010    BILITOT 0.6 04/20/2017 1010    ESTGFRAFRICA 26.0 (A) 04/20/2017 1010    EGFRNONAA 22.6 (A) 04/20/2017 1010          Recent Labs  Lab 04/20/17  1010   WHITE BLOOD CELL COUNT 6.97   HEMOGLOBIN 10.6 L   HEMATOCRIT 32.8 L   MCV 87   PLATELETS 200   TSH 4.066 H   CHOLESTEROL 116 L   HDL 52   LDL CHOLESTEROL 55.2 L   TRIGLYCERIDES 44   HDL/CHOLESTEROL RATIO 44.8     Lab Results   Component Value Date    HGBA1C 6.6 (H) 04/20/2017          Test(s) Reviewed  I have reviewed the following in detail:  [] Stress test   [] Angiography   [x] Echocardiogram   [x] Labs   [] Other:       Assessment:     1. Pericardial effusion  Moderate pericardial effusion with mild restriction on 6/22. Distant heart sounds. Echo today the IVC pressure went from 15 to 8 but the pericardial effusion is still moderate with mild restriction.    2. Acute on chronic diastolic heart failure Sacral and BLE edema with increased IVC pressures and pericardial effusion suggest fluid overload secondary to acute on chronic diastolic heart failure.   3. Persistent atrial fibrillation  Anticoagulated and rate controlled   4. Old MI (myocardial infarction), 2013     6. Hyperlipemia, mixed  LDL <70   7. Essential hypertension  Blood pressure well controlled   8. H/O Deep vein thrombosis (DVT)     9. CKD (chronic kidney disease) stage 4, GFR 15-29 ml/min  CRT increased from 1.5 to 1.9.  Worsening renal function could be secondary to cardiorenal syndrome and would likely improve with increased diuresis short term.    10. Long term (current) use of anticoagulants, Xarelto     11. Type 2 diabetes mellitus with diabetic polyneuropathy, with long-term  current use of insulin  A1C <7      Plan:     Pericardial effusion  Comments:  Echo repeated today. Double lasix for the next 5 days. Daily weights. Restrict sodium to 2gm  Orders:  -     2D Echo w/ Color Flow Doppler; Future    Persistent atrial fibrillation  Comments:  Continue current therapy    Old MI (myocardial infarction), 2013    Hyperlipemia, mixed  Comments:  Continue atorvastatin 40mg    Essential hypertension  Comments:  Stop taking the extra 5mg of Norvasc.     H/O Deep vein thrombosis (DVT)    CKD (chronic kidney disease) stage 4, GFR 15-29 ml/min  Comments:  Repeat bmp in 1 week after increased lasix  Orders:  -     Comprehensive metabolic panel; Future; Expected date: 07/06/2017    Long term (current) use of anticoagulants, Xarelto    Type 2 diabetes mellitus with diabetic polyneuropathy, with long-term current use of insulin    Chronic diastolic congestive heart failure  Comments:  Cardiorenal syndrome will improve with increased diuresis in the short term.       Return in about 1 week (around 7/6/2017).

## 2017-07-03 ENCOUNTER — TELEPHONE (OUTPATIENT)
Dept: CARDIOLOGY | Facility: CLINIC | Age: 82
End: 2017-07-03

## 2017-07-03 NOTE — TELEPHONE ENCOUNTER
Pt instructed as ordered by Gretel FRAUSTO.She is able to repeat them back correctly.Reviewed dates and time for labs & f/u vist reports understanding of these instructions.

## 2017-07-03 NOTE — TELEPHONE ENCOUNTER
----- Message from Mel Dunawayt sent at 7/3/2017  8:34 AM CDT -----  Contact: pt  Pt called to report her weight since she saw NP on 6/29/17.  6/, 7/1-162, 7/2-160, 7/3-159  She also was told to double her fluid pill to 80 mg and she wants to know if she should continue this until she return Thursday? Please call the pt at the number listed.    Thanks

## 2017-07-03 NOTE — TELEPHONE ENCOUNTER
Ramonita Carcamo NP,        LOV:6/29/17.Pt is calling to report her wt since doubling her lasix dose from 40 mg BID to 80 mg BID.She reports her wt is down to 159 lbs,she was 166 lbs at her LOV(see msg below).She does report that the swelling to her LE's has improved and she said she is not SOB.She has an appt for Thurs. day 7/6/17 but she wanted to know if she should continue the lasix at 80 mg BID or should she resume her previous dose.Please advise.Thanks,Kathy

## 2017-07-06 ENCOUNTER — LAB VISIT (OUTPATIENT)
Dept: LAB | Facility: HOSPITAL | Age: 82
End: 2017-07-06
Payer: MEDICARE

## 2017-07-06 ENCOUNTER — OFFICE VISIT (OUTPATIENT)
Dept: CARDIOLOGY | Facility: CLINIC | Age: 82
End: 2017-07-06
Payer: MEDICARE

## 2017-07-06 VITALS
DIASTOLIC BLOOD PRESSURE: 65 MMHG | HEART RATE: 72 BPM | WEIGHT: 159.38 LBS | BODY MASS INDEX: 24.15 KG/M2 | SYSTOLIC BLOOD PRESSURE: 143 MMHG | HEIGHT: 68 IN

## 2017-07-06 DIAGNOSIS — N18.4 CKD (CHRONIC KIDNEY DISEASE) STAGE 4, GFR 15-29 ML/MIN: Chronic | ICD-10-CM

## 2017-07-06 DIAGNOSIS — Z79.4 TYPE 2 DIABETES MELLITUS WITH DIABETIC POLYNEUROPATHY, WITH LONG-TERM CURRENT USE OF INSULIN: ICD-10-CM

## 2017-07-06 DIAGNOSIS — E11.42 TYPE 2 DIABETES MELLITUS WITH DIABETIC POLYNEUROPATHY, WITH LONG-TERM CURRENT USE OF INSULIN: ICD-10-CM

## 2017-07-06 DIAGNOSIS — I50.32 CHRONIC DIASTOLIC CONGESTIVE HEART FAILURE: Primary | Chronic | ICD-10-CM

## 2017-07-06 DIAGNOSIS — I10 ESSENTIAL HYPERTENSION: Chronic | ICD-10-CM

## 2017-07-06 DIAGNOSIS — I31.39 PERICARDIAL EFFUSION: ICD-10-CM

## 2017-07-06 DIAGNOSIS — I82.409 DEEP VEIN THROMBOSIS (DVT) DURING CURRENT HOSPITALIZATION: ICD-10-CM

## 2017-07-06 DIAGNOSIS — I48.19 PERSISTENT ATRIAL FIBRILLATION: Chronic | ICD-10-CM

## 2017-07-06 DIAGNOSIS — E78.2 HYPERLIPEMIA, MIXED: ICD-10-CM

## 2017-07-06 DIAGNOSIS — I25.2 OLD MI (MYOCARDIAL INFARCTION): ICD-10-CM

## 2017-07-06 PROBLEM — R07.9 CHEST PAIN AT REST: Status: RESOLVED | Noted: 2017-05-02 | Resolved: 2017-07-06

## 2017-07-06 LAB
ALBUMIN SERPL BCP-MCNC: 3.4 G/DL
ALP SERPL-CCNC: 125 U/L
ALT SERPL W/O P-5'-P-CCNC: 19 U/L
ANION GAP SERPL CALC-SCNC: 8 MMOL/L
AST SERPL-CCNC: 23 U/L
BILIRUB SERPL-MCNC: 0.6 MG/DL
BUN SERPL-MCNC: 24 MG/DL
CALCIUM SERPL-MCNC: 9.5 MG/DL
CHLORIDE SERPL-SCNC: 103 MMOL/L
CO2 SERPL-SCNC: 30 MMOL/L
CREAT SERPL-MCNC: 1.6 MG/DL
EST. GFR  (AFRICAN AMERICAN): 32 ML/MIN/1.73 M^2
EST. GFR  (NON AFRICAN AMERICAN): 27.8 ML/MIN/1.73 M^2
GLUCOSE SERPL-MCNC: 74 MG/DL
POTASSIUM SERPL-SCNC: 4 MMOL/L
PROT SERPL-MCNC: 6.8 G/DL
SODIUM SERPL-SCNC: 141 MMOL/L

## 2017-07-06 PROCEDURE — 99214 OFFICE O/P EST MOD 30 MIN: CPT | Mod: S$GLB,,, | Performed by: NURSE PRACTITIONER

## 2017-07-06 PROCEDURE — 99499 UNLISTED E&M SERVICE: CPT | Mod: S$GLB,,, | Performed by: NURSE PRACTITIONER

## 2017-07-06 PROCEDURE — 1159F MED LIST DOCD IN RCRD: CPT | Mod: S$GLB,,, | Performed by: NURSE PRACTITIONER

## 2017-07-06 PROCEDURE — 99999 PR PBB SHADOW E&M-EST. PATIENT-LVL III: CPT | Mod: PBBFAC,,, | Performed by: NURSE PRACTITIONER

## 2017-07-06 PROCEDURE — 1126F AMNT PAIN NOTED NONE PRSNT: CPT | Mod: S$GLB,,, | Performed by: NURSE PRACTITIONER

## 2017-07-06 RX ORDER — LORAZEPAM 1 MG/1
1 TABLET ORAL EVERY 6 HOURS PRN
COMMUNITY
End: 2017-08-10

## 2017-07-06 RX ORDER — NITROGLYCERIN 0.4 MG/1
0.4 TABLET SUBLINGUAL EVERY 5 MIN PRN
COMMUNITY
End: 2018-07-17 | Stop reason: SDUPTHER

## 2017-07-06 NOTE — PROGRESS NOTES
Ms. Mead is a patient of Dr. Erwin and was last seen in [facility] 6/29/2017.    Subjective:   Patient ID:  Tiana Mead is a 89 y.o. female who presents for follow-up of Pericardial effusion (1 week fu)    HPI  Ms. Mead is a 89y.o.  female with HFpEF (EF 60-65%), moderate pericardial effusion with mild restriction on 6/22/17, hypertension, hyperlipidemia, asthma, and diabetes mellitus type II.  She is in clinic today to follow up after increased diuresis for acute heart failure and pericardial effusion.  Reports NOVOA and orthopnea are significantly improved.  She has been taking 80mg lasix twice a day and has lost about 7 pounds in the last week. Renal function has returned to baseline, CRT down from 1.9 to 1.6 today. She is weighing herself daily and does note improvement in lower extremity edema.  She is following a low salt diet.  Denies nausea or change in appetite.    Review of Systems   Constitution: Negative for decreased appetite, diaphoresis, weakness, malaise/fatigue, weight gain and weight loss.   HENT: Negative for headaches.    Eyes: Negative for visual disturbance.   Cardiovascular: Positive for leg swelling. Negative for chest pain, claudication, dyspnea on exertion, irregular heartbeat, near-syncope, orthopnea, palpitations, paroxysmal nocturnal dyspnea and syncope.        Denies chest pressure   Respiratory: Negative for cough, hemoptysis, shortness of breath, sleep disturbances due to breathing and snoring.    Endocrine: Negative for cold intolerance and heat intolerance.   Hematologic/Lymphatic: Negative for bleeding problem. Does not bruise/bleed easily.   Musculoskeletal: Negative for myalgias.   Gastrointestinal: Negative for bloating, abdominal pain, anorexia, change in bowel habit, constipation, diarrhea, nausea and vomiting.   Neurological: Negative for difficulty with concentration, disturbances in coordination, excessive daytime sleepiness, dizziness,  "light-headedness, loss of balance and numbness.   Psychiatric/Behavioral: The patient does not have insomnia.      Allergies and current medications updated and reviewed:  Review of patient's allergies indicates:   Allergen Reactions    Codeine      Other reaction(s): Itching  Other reaction(s): Nausea     Current Outpatient Prescriptions   Medication Sig    albuterol 90 mcg/actuation inhaler Inhale 2 puffs into the lungs every 4 (four) hours as needed.    amlodipine (NORVASC) 5 MG tablet Take 1 tablet (5 mg total) by mouth every morning.    atorvastatin (LIPITOR) 40 MG tablet Take 1 tablet (40 mg total) by mouth once daily.    blood sugar diagnostic (ONETOUCH ULTRA TEST) Strp Inject 1 strip into the skin 3 (three) times daily.    blood sugar diagnostic Strp 1 strip by Misc.(Non-Drug; Combo Route) route 3 (three) times daily with meals. Please provide the insurance preferred product.    blood-glucose meter kit Please provide the insurance approved preferred product. Thank you.    brimonidine 0.2% (ALPHAGAN) 0.2 % Drop Place 1 drop into both eyes 2 (two) times daily.     diclofenac sodium (VOLTAREN) 1 % Gel Apply topically 4 (four) times daily.    dorzolamide (TRUSOPT) 2 % ophthalmic solution Place 1 drop into both eyes 2 (two) times daily.    fluticasone-salmeterol 250-50 mcg/dose (ADVAIR) 250-50 mcg/dose diskus inhaler Inhale 1 puff into the lungs nightly. Controller    furosemide (LASIX) 40 MG tablet Take 1 tablet (40 mg total) by mouth 2 (two) times daily. 1 tb am. 1 at 4pm.    insulin aspart protamine-insulin aspart (NOVOLOG MIX 70-30 FLEXPEN) 100 unit/mL (70-30) InPn pen Inject 20 Units into the skin 2 (two) times daily. 20 units before breakfast 5 units before supper (Patient taking differently: Inject into the skin 2 (two) times daily. 15 units before breakfast 5 units before supper)    insulin needles, disposable, (BD INSULIN PEN NEEDLE UF SHORT) 31 X 5/16 " Ndle Inject 1 Box into the skin 2 " "(two) times daily.    isosorbide mononitrate (IMDUR) 30 MG 24 hr tablet Take 1 tablet (30 mg total) by mouth every morning. Taking for heart    lancets (ONETOUCH DELICA LANCETS) 33 gauge Misc Apply 1 lancet topically 3 (three) times daily.    lancets Misc 1 Device by Misc.(Non-Drug; Combo Route) route 3 (three) times daily before meals. Please provide the insurance company preferred product.    latanoprost 0.005 % ophthalmic solution Place 1 drop into the right eye every evening. (Patient taking differently: Place 1 drop into both eyes every evening. )    levothyroxine (SYNTHROID) 75 MCG tablet Take 1 tablet (75 mcg total) by mouth before breakfast.    lorazepam (ATIVAN) 1 MG tablet Take 1 mg by mouth every 6 (six) hours as needed for Anxiety.    meclizine (ANTIVERT) 12.5 mg tablet Take 1 tablet (12.5 mg total) by mouth 3 (three) times daily as needed.    metoprolol tartrate (LOPRESSOR) 50 MG tablet Take 1 tablet (50 mg total) by mouth 2 (two) times daily.    multivit-mineral-iron-lutein (CENTRUM SILVER ULTRA WOMEN'S) Tab Take 1 tablet by mouth once daily.    nitroGLYCERIN (NITROSTAT) 0.4 MG SL tablet Place 0.4 mg under the tongue every 5 (five) minutes as needed for Chest pain.    TRUE METRIX GLUCOSE METER Misc TEST TID    TRUEPLUS LANCETS 30 gauge Misc USE TO TEST BLOOD SUGAR TID AC    XARELTO 10 mg Tab Take 1 tablet (10 mg total) by mouth daily with dinner or evening meal.     No current facility-administered medications for this visit.        Objective:     Right Arm BP - Sittin/64 (17 1254)  Left Arm BP - Sittin/65 (17 1254)    BP (!) 143/65 (BP Location: Left arm, Patient Position: Sitting, BP Method: Automatic)   Pulse 72   Ht 5' 8" (1.727 m)   Wt 72.3 kg (159 lb 6.3 oz)   BMI 24.24 kg/m²       Physical Exam   Constitutional: She is oriented to person, place, and time. Vital signs are normal. She appears well-developed and well-nourished. She is active and cooperative. " No distress.   HENT:   Head: Normocephalic and atraumatic.   Eyes: Conjunctivae and lids are normal. No scleral icterus.   Neck: Neck supple. JVD present. Normal carotid pulses and no hepatojugular reflux present. Carotid bruit is not present.   Cardiovascular: Normal rate, S1 normal, S2 normal and intact distal pulses.  An irregularly irregular rhythm present. PMI is not displaced.  Exam reveals no gallop and no friction rub.    Murmur heard.  Pulses:       Carotid pulses are 2+ on the right side, and 2+ on the left side.       Radial pulses are 2+ on the right side, and 2+ on the left side.        Dorsalis pedis pulses are 2+ on the right side, and 2+ on the left side.        Posterior tibial pulses are 1+ on the right side, and 1+ on the left side.   Pulmonary/Chest: Effort normal. No respiratory distress. She has decreased breath sounds in the right lower field and the left lower field. She has no wheezes. She has no rhonchi. She has no rales. She exhibits no tenderness.   Abdominal: Soft. Normal appearance and bowel sounds are normal. She exhibits no distension, no fluid wave, no abdominal bruit, no ascites and no pulsatile midline mass. There is no hepatosplenomegaly. There is no tenderness.   Musculoskeletal: She exhibits edema (BLE +1 pitting).   Neurological: She is alert and oriented to person, place, and time. Gait normal.   Skin: Skin is warm, dry and intact. No rash noted. She is not diaphoretic. Nails show no clubbing.   Psychiatric: She has a normal mood and affect. Her speech is normal and behavior is normal. Judgment and thought content normal. Cognition and memory are normal.   Nursing note and vitals reviewed.      Chemistry        Component Value Date/Time     07/06/2017 1050    K 4.0 07/06/2017 1050     07/06/2017 1050    CO2 30 (H) 07/06/2017 1050    BUN 24 07/06/2017 1050    CREATININE 1.6 (H) 07/06/2017 1050    GLU 74 07/06/2017 1050        Component Value Date/Time    CALCIUM 9.5  07/06/2017 1050    ALKPHOS 125 07/06/2017 1050    AST 23 07/06/2017 1050    ALT 19 07/06/2017 1050    BILITOT 0.6 07/06/2017 1050    ESTGFRAFRICA 32.0 (A) 07/06/2017 1050    EGFRNONAA 27.8 (A) 07/06/2017 1050          Recent Labs  Lab 04/20/17  1010   WHITE BLOOD CELL COUNT 6.97   HEMOGLOBIN 10.6 L   HEMATOCRIT 32.8 L   MCV 87   PLATELETS 200   TSH 4.066 H   CHOLESTEROL 116 L   HDL 52   LDL CHOLESTEROL 55.2 L   TRIGLYCERIDES 44   HDL/CHOLESTEROL RATIO 44.8     Lab Results   Component Value Date    HGBA1C 6.6 (H) 04/20/2017          Test(s) Reviewed  I have reviewed the following in detail:  [] Stress test   [] Angiography   [x] Echocardiogram   [x] Labs   [] Other:       Assessment:     1. Chronic diastolic congestive heart failure  HF sx have improved subjectively and objectively. Edema has greatly improved but still persists in BLE +1 to just above knees. Lung sounds improved but continues to have very diminished basilar breath sounds. She no longer appears ill and is breathing without labor today.   2. H/O Deep vein thrombosis (DVT)     3. Essential hypertension  Well controlled   4. Hyperlipemia, mixed  LDL<70   5. Old MI (myocardial infarction), 2013     6. Pericardial effusion  IVC and restriction from the pericardial effusion had improved on 6/22 echo   7. Persistent atrial fibrillation     8. CKD (chronic kidney disease) stage 4, GFR 15-29 ml/min  Basic metabolic panel   9. Type 2 diabetes mellitus with diabetic polyneuropathy, with long-term current use of insulin  Well controlled. A1C<7     Plan:     Chronic diastolic congestive heart failure  Comments:  Decrease lasix to 40mg BID. Continue daily weights. 1,500mg salt restriction. 1.5L fluid restriction. Repeat BMP in 2 weeks.     H/O Deep vein thrombosis (DVT)    Essential hypertension  Comments:  Continue current regimen. Reduce salt intake to 1500mg/day    Hyperlipemia, mixed    Old MI (myocardial infarction), 2013    Pericardial  effusion  Comments:  Repeat echo after she is euvolemic to re-assess effussion    Persistent atrial fibrillation  Comments:  Continue anticoagulation with coumadin and metoprolol for rate control    CKD (chronic kidney disease) stage 4, GFR 15-29 ml/min  Comments:  CRT returned to baseline. Diuretic decreased and will repeat bmp in 2 weeks.  Orders:  -     Basic metabolic panel; Future; Expected date: 07/20/2017    Type 2 diabetes mellitus with diabetic polyneuropathy, with long-term current use of insulin    Return in about 2 weeks (around 7/20/2017).

## 2017-07-06 NOTE — PATIENT INSTRUCTIONS
1. Decrease lasix (furosemide) to 40 mg  2. Please get your blood drawn in 2 weeks  3. Continue weighing yourself daily and keep a record.

## 2017-07-17 ENCOUNTER — LAB VISIT (OUTPATIENT)
Dept: LAB | Facility: HOSPITAL | Age: 82
End: 2017-07-17
Payer: MEDICARE

## 2017-07-17 ENCOUNTER — TELEPHONE (OUTPATIENT)
Dept: CARDIOLOGY | Facility: CLINIC | Age: 82
End: 2017-07-17

## 2017-07-17 ENCOUNTER — OFFICE VISIT (OUTPATIENT)
Dept: CARDIOLOGY | Facility: CLINIC | Age: 82
End: 2017-07-17
Payer: MEDICARE

## 2017-07-17 VITALS
OXYGEN SATURATION: 99 % | BODY MASS INDEX: 24.49 KG/M2 | WEIGHT: 161.63 LBS | HEART RATE: 70 BPM | DIASTOLIC BLOOD PRESSURE: 59 MMHG | SYSTOLIC BLOOD PRESSURE: 116 MMHG | HEIGHT: 68 IN

## 2017-07-17 DIAGNOSIS — I48.19 PERSISTENT ATRIAL FIBRILLATION: ICD-10-CM

## 2017-07-17 DIAGNOSIS — I50.32 CHRONIC DIASTOLIC CONGESTIVE HEART FAILURE: Primary | Chronic | ICD-10-CM

## 2017-07-17 DIAGNOSIS — I10 ESSENTIAL HYPERTENSION: Chronic | ICD-10-CM

## 2017-07-17 DIAGNOSIS — E78.2 HYPERLIPEMIA, MIXED: ICD-10-CM

## 2017-07-17 DIAGNOSIS — Z79.01 LONG TERM (CURRENT) USE OF ANTICOAGULANTS: Chronic | ICD-10-CM

## 2017-07-17 DIAGNOSIS — I31.39 PERICARDIAL EFFUSION: ICD-10-CM

## 2017-07-17 DIAGNOSIS — N18.4 CKD (CHRONIC KIDNEY DISEASE) STAGE 4, GFR 15-29 ML/MIN: Chronic | ICD-10-CM

## 2017-07-17 PROBLEM — M79.89 LEFT LEG SWELLING: Status: RESOLVED | Noted: 2017-06-15 | Resolved: 2017-07-17

## 2017-07-17 LAB
ANION GAP SERPL CALC-SCNC: 11 MMOL/L
BUN SERPL-MCNC: 26 MG/DL
CALCIUM SERPL-MCNC: 9.1 MG/DL
CHLORIDE SERPL-SCNC: 103 MMOL/L
CO2 SERPL-SCNC: 25 MMOL/L
CREAT SERPL-MCNC: 1.5 MG/DL
EST. GFR  (AFRICAN AMERICAN): 34.6 ML/MIN/1.73 M^2
EST. GFR  (NON AFRICAN AMERICAN): 30 ML/MIN/1.73 M^2
GLUCOSE SERPL-MCNC: 49 MG/DL
POTASSIUM SERPL-SCNC: 3.8 MMOL/L
SODIUM SERPL-SCNC: 139 MMOL/L

## 2017-07-17 PROCEDURE — 99214 OFFICE O/P EST MOD 30 MIN: CPT | Mod: S$GLB,,, | Performed by: NURSE PRACTITIONER

## 2017-07-17 PROCEDURE — 1126F AMNT PAIN NOTED NONE PRSNT: CPT | Mod: S$GLB,,, | Performed by: NURSE PRACTITIONER

## 2017-07-17 PROCEDURE — 99499 UNLISTED E&M SERVICE: CPT | Mod: S$GLB,,, | Performed by: NURSE PRACTITIONER

## 2017-07-17 PROCEDURE — 99999 PR PBB SHADOW E&M-EST. PATIENT-LVL III: CPT | Mod: PBBFAC,,, | Performed by: NURSE PRACTITIONER

## 2017-07-17 PROCEDURE — 1159F MED LIST DOCD IN RCRD: CPT | Mod: S$GLB,,, | Performed by: NURSE PRACTITIONER

## 2017-07-17 NOTE — PROGRESS NOTES
Ms. Mead is a patient of Dr. Dennis and was last seen in [facility] 7/6/2017.    Subjective:   Patient ID:  Tiana Mead is a 89 y.o. female who presents for follow-up of Chronic diastolic congestive heart failure (2 weeks fu)    Problems:  HFpEF (EF 60-65%)  Pericardial effusion moderate  HTN  HLD    HPI  Ms. Mead is in clinic today to follow up regarding pericardial effusion with mild restriction. Last echo 6/29/17.  Patient denies chest pain with exertion or at rest, palpitations, SOB, NOVOA, dizziness, syncope, edema, orthopnea, PND, or claudication.  Reports no routine exercise.  She is walking with a walker inside the house.  Reports feeling much better.  Denies bleeding gums, epistaxis, hematuria, and hematochezia.    Review of Systems   Constitution: Negative for decreased appetite, diaphoresis, weakness, malaise/fatigue, weight gain and weight loss.   HENT: Negative for headaches.    Eyes: Negative for visual disturbance.   Cardiovascular: Negative for chest pain, claudication, dyspnea on exertion, irregular heartbeat, leg swelling, near-syncope, orthopnea, palpitations, paroxysmal nocturnal dyspnea and syncope.        Denies chest pressure   Respiratory: Negative for cough, hemoptysis, shortness of breath, sleep disturbances due to breathing and snoring.    Endocrine: Negative for cold intolerance and heat intolerance.   Hematologic/Lymphatic: Negative for bleeding problem. Does not bruise/bleed easily.   Musculoskeletal: Negative for myalgias.   Gastrointestinal: Negative for bloating, abdominal pain, anorexia, change in bowel habit, constipation, diarrhea, nausea and vomiting.   Neurological: Negative for difficulty with concentration, disturbances in coordination, excessive daytime sleepiness, dizziness, light-headedness, loss of balance and numbness.   Psychiatric/Behavioral: The patient does not have insomnia.        Allergies and current medications updated and reviewed:  Review of patient's  "allergies indicates:   Allergen Reactions    Codeine      Other reaction(s): Itching  Other reaction(s): Nausea     Current Outpatient Prescriptions   Medication Sig    albuterol 90 mcg/actuation inhaler Inhale 2 puffs into the lungs every 4 (four) hours as needed.    amlodipine (NORVASC) 5 MG tablet Take 1 tablet (5 mg total) by mouth every morning.    atorvastatin (LIPITOR) 40 MG tablet Take 1 tablet (40 mg total) by mouth once daily.    blood sugar diagnostic (ONETOUCH ULTRA TEST) Strp Inject 1 strip into the skin 3 (three) times daily.    blood sugar diagnostic Strp 1 strip by Misc.(Non-Drug; Combo Route) route 3 (three) times daily with meals. Please provide the insurance preferred product.    blood-glucose meter kit Please provide the insurance approved preferred product. Thank you.    brimonidine 0.2% (ALPHAGAN) 0.2 % Drop Place 1 drop into both eyes 2 (two) times daily.     diclofenac sodium (VOLTAREN) 1 % Gel Apply topically 4 (four) times daily.    dorzolamide (TRUSOPT) 2 % ophthalmic solution Place 1 drop into both eyes 2 (two) times daily.    fluticasone-salmeterol 250-50 mcg/dose (ADVAIR) 250-50 mcg/dose diskus inhaler Inhale 1 puff into the lungs nightly. Controller    furosemide (LASIX) 40 MG tablet Take 1 tablet (40 mg total) by mouth 2 (two) times daily. 1 tb am. 1 at 4pm.    insulin aspart protamine-insulin aspart (NOVOLOG MIX 70-30 FLEXPEN) 100 unit/mL (70-30) InPn pen Inject 20 Units into the skin 2 (two) times daily. 20 units before breakfast 5 units before supper (Patient taking differently: Inject into the skin 2 (two) times daily. 15 units before breakfast 5 units before supper)    insulin needles, disposable, (BD INSULIN PEN NEEDLE UF SHORT) 31 X 5/16 " Ndle Inject 1 Box into the skin 2 (two) times daily.    isosorbide mononitrate (IMDUR) 30 MG 24 hr tablet Take 1 tablet (30 mg total) by mouth every morning. Taking for heart    lancets (ONETOUCH DELICA LANCETS) 33 gauge Misc " "Apply 1 lancet topically 3 (three) times daily.    lancets Misc 1 Device by Misc.(Non-Drug; Combo Route) route 3 (three) times daily before meals. Please provide the insurance company preferred product.    latanoprost 0.005 % ophthalmic solution Place 1 drop into the right eye every evening. (Patient taking differently: Place 1 drop into both eyes every evening. )    levothyroxine (SYNTHROID) 75 MCG tablet Take 1 tablet (75 mcg total) by mouth before breakfast.    lorazepam (ATIVAN) 1 MG tablet Take 1 mg by mouth every 6 (six) hours as needed for Anxiety.    meclizine (ANTIVERT) 12.5 mg tablet Take 1 tablet (12.5 mg total) by mouth 3 (three) times daily as needed.    metoprolol tartrate (LOPRESSOR) 50 MG tablet Take 1 tablet (50 mg total) by mouth 2 (two) times daily.    multivit-mineral-iron-lutein (CENTRUM SILVER ULTRA WOMEN'S) Tab Take 1 tablet by mouth once daily.    nitroGLYCERIN (NITROSTAT) 0.4 MG SL tablet Place 0.4 mg under the tongue every 5 (five) minutes as needed for Chest pain.    TRUE METRIX GLUCOSE METER Misc TEST TID    TRUEPLUS LANCETS 30 gauge Misc USE TO TEST BLOOD SUGAR TID AC    XARELTO 10 mg Tab Take 1 tablet (10 mg total) by mouth daily with dinner or evening meal.     No current facility-administered medications for this visit.        Objective:     Right Arm BP - Sittin/56 (17 1321)  Left Arm BP - Sittin/59 (17 1321)    BP (!) 116/59 (BP Location: Left arm, Patient Position: Sitting, BP Method: Automatic)   Pulse 70   Ht 5' 8" (1.727 m)   Wt 73.3 kg (161 lb 9.6 oz)   SpO2 99%   BMI 24.57 kg/m²       Physical Exam   Constitutional: She is oriented to person, place, and time. Vital signs are normal. She appears well-developed and well-nourished. She is active. No distress.   HENT:   Head: Normocephalic and atraumatic.   Eyes: Conjunctivae and lids are normal. No scleral icterus.   Neck: Neck supple. Normal carotid pulses, no hepatojugular reflux and no " JVD present. Carotid bruit is not present.   Cardiovascular: Normal rate, regular rhythm, S1 normal, S2 normal and intact distal pulses.  PMI is not displaced.  Exam reveals no gallop and no friction rub.    No murmur heard.  Pulses:       Carotid pulses are 2+ on the right side, and 2+ on the left side.       Radial pulses are 2+ on the right side, and 2+ on the left side.        Dorsalis pedis pulses are 2+ on the right side, and 2+ on the left side.        Posterior tibial pulses are 1+ on the right side, and 1+ on the left side.   Pulmonary/Chest: Effort normal and breath sounds normal. No respiratory distress. She has no decreased breath sounds. She has no wheezes. She has no rhonchi. She has no rales. She exhibits no tenderness.   Abdominal: Soft. Normal appearance and bowel sounds are normal. She exhibits no distension, no fluid wave, no abdominal bruit, no ascites and no pulsatile midline mass. There is no hepatosplenomegaly. There is no tenderness.   Musculoskeletal: She exhibits no edema.   Neurological: She is alert and oriented to person, place, and time. Gait normal.   Skin: Skin is warm, dry and intact. No rash noted. She is not diaphoretic. Nails show no clubbing.   Psychiatric: She has a normal mood and affect. Her speech is normal and behavior is normal. Judgment and thought content normal. Cognition and memory are normal.   Nursing note and vitals reviewed.      Chemistry        Component Value Date/Time     07/17/2017 1135    K 3.8 07/17/2017 1135     07/17/2017 1135    CO2 25 07/17/2017 1135    BUN 26 07/17/2017 1135    CREATININE 1.5 (H) 07/17/2017 1135    GLU 49 (LL) 07/17/2017 1135        Component Value Date/Time    CALCIUM 9.1 07/17/2017 1135    ALKPHOS 125 07/06/2017 1050    AST 23 07/06/2017 1050    ALT 19 07/06/2017 1050    BILITOT 0.6 07/06/2017 1050    ESTGFRAFRICA 34.6 (A) 07/17/2017 1135    EGFRNONAA 30.0 (A) 07/17/2017 1135          Recent Labs  Lab 04/20/17  1010   WHITE  BLOOD CELL COUNT 6.97   HEMOGLOBIN 10.6 L   HEMATOCRIT 32.8 L   MCV 87   PLATELETS 200   TSH 4.066 H   CHOLESTEROL 116 L   HDL 52   LDL CHOLESTEROL 55.2 L   TRIGLYCERIDES 44   HDL/CHOLESTEROL RATIO 44.8              Test(s) Reviewed  I have reviewed the following in detail:  [] Stress test   [] Angiography   [x] Echocardiogram   [x] Labs   [] Other:       Assessment:     1. Pericardial effusion  No s/s of cardiac tampanade   2. Chronic diastolic congestive heart failure  No s/s of acute heart failure   3. Essential hypertension  Well controlled.    4. Hyperlipemia, mixed  LDL<70   5. Persistent atrial fibrillation  Rate controlled. Anticoagulated   6. CKD (chronic kidney disease) stage 4, GFR 15-29 ml/min  Stable   7. Long term (current) use of anticoagulants, Xarelto  No reported bleeding episodes      Plan:     Chronic diastolic congestive heart failure  Comments:  Continue current regimen. Restrict salt to no more than 2gm/day  Orders:  -     Brain natriuretic peptide; Future; Expected date: 10/17/2017    Essential hypertension  Comments:  No changes    Hyperlipemia, mixed    Pericardial effusion  -     TSH; Future; Expected date: 07/17/2017  -     Brain natriuretic peptide; Future; Expected date: 10/17/2017    Persistent atrial fibrillation    CKD (chronic kidney disease) stage 4, GFR 15-29 ml/min    Long term (current) use of anticoagulants, Xarelto        Return in about 3 months (around 10/17/2017).

## 2017-08-10 ENCOUNTER — LAB VISIT (OUTPATIENT)
Dept: LAB | Facility: HOSPITAL | Age: 82
End: 2017-08-10
Attending: INTERNAL MEDICINE
Payer: MEDICARE

## 2017-08-10 ENCOUNTER — OFFICE VISIT (OUTPATIENT)
Dept: RHEUMATOLOGY | Facility: CLINIC | Age: 82
End: 2017-08-10
Payer: MEDICARE

## 2017-08-10 VITALS — HEART RATE: 81 BPM | DIASTOLIC BLOOD PRESSURE: 61 MMHG | SYSTOLIC BLOOD PRESSURE: 101 MMHG

## 2017-08-10 DIAGNOSIS — N18.4 CKD (CHRONIC KIDNEY DISEASE) STAGE 4, GFR 15-29 ML/MIN: Chronic | ICD-10-CM

## 2017-08-10 DIAGNOSIS — M11.20 CHONDROCALCINOSIS: ICD-10-CM

## 2017-08-10 DIAGNOSIS — M15.9 GENERALIZED OSTEOARTHRITIS OF MULTIPLE SITES: Primary | ICD-10-CM

## 2017-08-10 LAB
ALBUMIN SERPL BCP-MCNC: 3.2 G/DL
ALP SERPL-CCNC: 146 U/L
ALT SERPL W/O P-5'-P-CCNC: 33 U/L
ANION GAP SERPL CALC-SCNC: 8 MMOL/L
AST SERPL-CCNC: 39 U/L
BASOPHILS # BLD AUTO: 0.05 K/UL
BASOPHILS NFR BLD: 0.9 %
BILIRUB SERPL-MCNC: 0.6 MG/DL
BUN SERPL-MCNC: 20 MG/DL
CALCIUM SERPL-MCNC: 9.3 MG/DL
CHLORIDE SERPL-SCNC: 106 MMOL/L
CO2 SERPL-SCNC: 27 MMOL/L
CREAT SERPL-MCNC: 1.6 MG/DL
DIFFERENTIAL METHOD: ABNORMAL
EOSINOPHIL # BLD AUTO: 0.2 K/UL
EOSINOPHIL NFR BLD: 4.3 %
ERYTHROCYTE [DISTWIDTH] IN BLOOD BY AUTOMATED COUNT: 16.6 %
EST. GFR  (AFRICAN AMERICAN): 32 ML/MIN/1.73 M^2
EST. GFR  (NON AFRICAN AMERICAN): 27.8 ML/MIN/1.73 M^2
GLUCOSE SERPL-MCNC: 56 MG/DL
HCT VFR BLD AUTO: 32.3 %
HGB BLD-MCNC: 10.7 G/DL
LYMPHOCYTES # BLD AUTO: 1.6 K/UL
LYMPHOCYTES NFR BLD: 29.1 %
MCH RBC QN AUTO: 28.2 PG
MCHC RBC AUTO-ENTMCNC: 33.1 G/DL
MCV RBC AUTO: 85 FL
MONOCYTES # BLD AUTO: 0.7 K/UL
MONOCYTES NFR BLD: 11.6 %
NEUTROPHILS # BLD AUTO: 3 K/UL
NEUTROPHILS NFR BLD: 53.7 %
PLATELET # BLD AUTO: 188 K/UL
PMV BLD AUTO: 10.8 FL
POTASSIUM SERPL-SCNC: 4.4 MMOL/L
PROT SERPL-MCNC: 6.3 G/DL
RBC # BLD AUTO: 3.79 M/UL
SODIUM SERPL-SCNC: 141 MMOL/L
WBC # BLD AUTO: 5.61 K/UL

## 2017-08-10 PROCEDURE — 99999 PR PBB SHADOW E&M-EST. PATIENT-LVL II: CPT | Mod: PBBFAC,,, | Performed by: INTERNAL MEDICINE

## 2017-08-10 PROCEDURE — 99203 OFFICE O/P NEW LOW 30 MIN: CPT | Mod: S$GLB,,, | Performed by: INTERNAL MEDICINE

## 2017-08-10 PROCEDURE — 1159F MED LIST DOCD IN RCRD: CPT | Mod: S$GLB,,, | Performed by: INTERNAL MEDICINE

## 2017-08-10 PROCEDURE — 1125F AMNT PAIN NOTED PAIN PRSNT: CPT | Mod: S$GLB,,, | Performed by: INTERNAL MEDICINE

## 2017-08-13 NOTE — PROGRESS NOTES
History of present illness:  89-year-old female has a history of osteoarthritis of the knees.  She also has chondrocalcinosis.  She was last seen here in 2013 by Dr. Eng.  She had an injection in the knee which helped.  She then moved out of the area.  She has had one further injection in her knee since that time.  She comes in now because of increasing pain in the knee.  It is been worse since March.  She has pain with walking.  She has had no swelling in the knee.  The pain does not wake her up at night.  She denies any increase morning pain or morning stiffness.  She has been taking Tylenol as needed which helps.  She had also been placed on Voltaren gel which she uses every other day.  She has had some pain in the hands but no other peripheral joint complaints.    Physical examination:  Musculoskeletal: Cervical spine is unremarkable.  She has bony hypertrophy of the shoulders bilaterally.  She has decreased range of motion at shoulders.  Elbows and wrist are unremarkable.  She has bony hypertrophy of the DIPs and PIPs.  She has no synovitis in the hands.  Lumbar spine and hips are unremarkable.  She has bony hypertrophy of the knees, worse on the right than on the left.  She has no effusion.  She has decreased range of motion of the knees.  Ankles and feet are unremarkable.    Assessment: Osteoarthritis    Plans: I offered to inject her knees but she does not think it is bad enough yet.  I recommend the use of Voltaren gel more frequently.  She is to return to see me as needed.

## 2017-08-15 ENCOUNTER — OFFICE VISIT (OUTPATIENT)
Dept: INTERNAL MEDICINE | Facility: CLINIC | Age: 82
End: 2017-08-15
Payer: MEDICARE

## 2017-08-15 ENCOUNTER — TELEPHONE (OUTPATIENT)
Dept: INTERNAL MEDICINE | Facility: CLINIC | Age: 82
End: 2017-08-15

## 2017-08-15 VITALS
HEART RATE: 66 BPM | HEIGHT: 68 IN | SYSTOLIC BLOOD PRESSURE: 104 MMHG | DIASTOLIC BLOOD PRESSURE: 58 MMHG | DIASTOLIC BLOOD PRESSURE: 58 MMHG | SYSTOLIC BLOOD PRESSURE: 104 MMHG | WEIGHT: 159.19 LBS | BODY MASS INDEX: 24.13 KG/M2 | BODY MASS INDEX: 24.2 KG/M2 | HEART RATE: 66 BPM | WEIGHT: 159.19 LBS

## 2017-08-15 DIAGNOSIS — I73.9 PERIPHERAL ANGIOPATHY: ICD-10-CM

## 2017-08-15 DIAGNOSIS — I25.2 OLD MI (MYOCARDIAL INFARCTION): ICD-10-CM

## 2017-08-15 DIAGNOSIS — E11.21 TYPE 2 DIABETES MELLITUS WITH DIABETIC NEPHROPATHY, WITH LONG-TERM CURRENT USE OF INSULIN: ICD-10-CM

## 2017-08-15 DIAGNOSIS — I82.409 DEEP VEIN THROMBOSIS (DVT) DURING CURRENT HOSPITALIZATION: ICD-10-CM

## 2017-08-15 DIAGNOSIS — I70.0 AORTIC ATHEROSCLEROSIS: ICD-10-CM

## 2017-08-15 DIAGNOSIS — Z79.4 TYPE 2 DIABETES MELLITUS WITH DIABETIC NEPHROPATHY, WITH LONG-TERM CURRENT USE OF INSULIN: ICD-10-CM

## 2017-08-15 DIAGNOSIS — Z79.4 TYPE 2 DIABETES MELLITUS WITH DIABETIC POLYNEUROPATHY, WITH LONG-TERM CURRENT USE OF INSULIN: ICD-10-CM

## 2017-08-15 DIAGNOSIS — N18.4 CKD (CHRONIC KIDNEY DISEASE) STAGE 4, GFR 15-29 ML/MIN: Chronic | ICD-10-CM

## 2017-08-15 DIAGNOSIS — M15.9 GENERALIZED OSTEOARTHRITIS OF MULTIPLE SITES: ICD-10-CM

## 2017-08-15 DIAGNOSIS — Z79.01 LONG TERM (CURRENT) USE OF ANTICOAGULANTS: ICD-10-CM

## 2017-08-15 DIAGNOSIS — I48.19 PERSISTENT ATRIAL FIBRILLATION: ICD-10-CM

## 2017-08-15 DIAGNOSIS — I25.10 CORONARY ARTERY DISEASE INVOLVING NATIVE CORONARY ARTERY OF NATIVE HEART WITHOUT ANGINA PECTORIS: ICD-10-CM

## 2017-08-15 DIAGNOSIS — Z79.4 TYPE 2 DIABETES MELLITUS WITH DIABETIC NEPHROPATHY, WITH LONG-TERM CURRENT USE OF INSULIN: Primary | ICD-10-CM

## 2017-08-15 DIAGNOSIS — M11.20 PSEUDOGOUT: ICD-10-CM

## 2017-08-15 DIAGNOSIS — E11.42 TYPE 2 DIABETES MELLITUS WITH DIABETIC POLYNEUROPATHY, WITH LONG-TERM CURRENT USE OF INSULIN: ICD-10-CM

## 2017-08-15 DIAGNOSIS — Z00.00 ENCOUNTER FOR PREVENTIVE HEALTH EXAMINATION: Primary | ICD-10-CM

## 2017-08-15 DIAGNOSIS — E11.42 DIABETIC POLYNEUROPATHY ASSOCIATED WITH TYPE 2 DIABETES MELLITUS: ICD-10-CM

## 2017-08-15 DIAGNOSIS — E78.2 HYPERLIPEMIA, MIXED: ICD-10-CM

## 2017-08-15 DIAGNOSIS — I31.39 PERICARDIAL EFFUSION: ICD-10-CM

## 2017-08-15 DIAGNOSIS — N18.30 CKD (CHRONIC KIDNEY DISEASE) STAGE 3, GFR 30-59 ML/MIN: ICD-10-CM

## 2017-08-15 DIAGNOSIS — E11.21 TYPE 2 DIABETES MELLITUS WITH DIABETIC NEPHROPATHY, WITH LONG-TERM CURRENT USE OF INSULIN: Primary | ICD-10-CM

## 2017-08-15 DIAGNOSIS — I50.32 CHRONIC DIASTOLIC (CONGESTIVE) HEART FAILURE: ICD-10-CM

## 2017-08-15 DIAGNOSIS — H40.1130 PRIMARY OPEN ANGLE GLAUCOMA OF BOTH EYES, UNSPECIFIED GLAUCOMA STAGE: ICD-10-CM

## 2017-08-15 DIAGNOSIS — I10 ESSENTIAL HYPERTENSION: ICD-10-CM

## 2017-08-15 PROBLEM — E11.29 TYPE 2 DIABETES MELLITUS WITH RENAL MANIFESTATIONS: Status: ACTIVE | Noted: 2017-08-15

## 2017-08-15 PROCEDURE — G0439 PPPS, SUBSEQ VISIT: HCPCS | Mod: S$GLB,,, | Performed by: NURSE PRACTITIONER

## 2017-08-15 PROCEDURE — 1126F AMNT PAIN NOTED NONE PRSNT: CPT | Mod: S$GLB,,, | Performed by: INTERNAL MEDICINE

## 2017-08-15 PROCEDURE — 99214 OFFICE O/P EST MOD 30 MIN: CPT | Mod: S$GLB,,, | Performed by: INTERNAL MEDICINE

## 2017-08-15 PROCEDURE — 99999 PR PBB SHADOW E&M-EST. PATIENT-LVL IV: CPT | Mod: PBBFAC,,, | Performed by: INTERNAL MEDICINE

## 2017-08-15 PROCEDURE — 3008F BODY MASS INDEX DOCD: CPT | Mod: S$GLB,,, | Performed by: INTERNAL MEDICINE

## 2017-08-15 PROCEDURE — 1159F MED LIST DOCD IN RCRD: CPT | Mod: S$GLB,,, | Performed by: INTERNAL MEDICINE

## 2017-08-15 PROCEDURE — 99499 UNLISTED E&M SERVICE: CPT | Mod: S$GLB,,, | Performed by: NURSE PRACTITIONER

## 2017-08-15 PROCEDURE — 99999 PR PBB SHADOW E&M-EST. PATIENT-LVL IV: CPT | Mod: PBBFAC,,, | Performed by: NURSE PRACTITIONER

## 2017-08-15 PROCEDURE — 99499 UNLISTED E&M SERVICE: CPT | Mod: S$GLB,,, | Performed by: INTERNAL MEDICINE

## 2017-08-15 RX ORDER — INSULIN ASPART 100 [IU]/ML
INJECTION, SUSPENSION SUBCUTANEOUS
Qty: 1 BOX | Refills: 11 | Status: SHIPPED | OUTPATIENT
Start: 2017-08-15 | End: 2017-12-13 | Stop reason: SDUPTHER

## 2017-08-15 NOTE — PROGRESS NOTES
Tianadanny Mead presented for a  Medicare AWV and comprehensive Health Risk Assessment today. The following components were reviewed and updated:    · Medical history  · Family History  · Social history  · Allergies and Current Medications  · Health Risk Assessment  · Health Maintenance  · Care Team     ** See Completed Assessments for Annual Wellness Visit within the encounter summary.**       The following assessments were completed:  · Living Situation  · CAGE  · Depression Screening  · Timed Get Up and Go  · Whisper Test  · Cognitive Function Screening  ·   ·   ·   · Nutrition Screening  · ADL Screening  · PAQ Screening    Vitals:    08/15/17 1405   BP: (!) 104/58   BP Location: Left arm   Pulse: 66   Weight: 72.2 kg (159 lb 2.8 oz)     Body mass index is 24.2 kg/m².  Physical Exam   Constitutional: She is oriented to person, place, and time. She appears well-developed and well-nourished.   Musculoskeletal:   Gait antalgic, in wheelchair today, difficulty rising from chair   Neurological: She is alert and oriented to person, place, and time.   Skin: Skin is warm and dry.   Psychiatric: She has a normal mood and affect.   Nursing note and vitals reviewed.        Diagnoses and health risks identified today and associated recommendations/orders:    1. Encounter for preventive health examination  Here for Health Risk Assessment. Follow up in one year.    2. Essential hypertension  Chronic, stable on current medications. Followed by PCP.    3. Persistent atrial fibrillation  Chronic, stable on current medications. Followed by Cardiology.    4. Long term (current) use of anticoagulants, Xarelto  Chronic, stable.  Followed by Cardiology.    5. Aortic atherosclerosis  Chronic, stable on current medications. Noted CXR 4/14/11. Followed by PCP.    6. Coronary artery disease involving native coronary artery of native heart without angina pectoris  Chronic, stable on current medications. Followed by Cardiology    7. Old MI  (myocardial infarction), 2013  Stable on current medications. Followed by Cardiology.    8. Chronic diastolic (congestive) heart failure  Chronic, stable on current medications. Followed by Cardiology.    9. Pericardial effusion  Chronic, stable on current medications. Followed by Cardiology    10. Peripheral angiopathy  Chronic, stable on current medications. Followed by Cardiology    11. Hyperlipemia, mixed  Chronic, stable on current medication. Followed by PCP, Cardiology.    12. H/O Deep vein thrombosis (DVT)  Chronic, stable on current medication. Followed by PCP, Cardiology.    13. Type 2 diabetes mellitus with diabetic nephropathy, with long-term current use of insulin  Chronic, stable on current medication. Followed by PCP.    14. CKD (chronic kidney disease) stage 4, GFR 15-29 ml/min  Chronic, stable. Followed by PCP.    15. Type 2 diabetes mellitus with diabetic polyneuropathy, with long-term current use of insulin  Chronic, stable on current medication. Followed by PCP.    16. Diabetic polyneuropathy associated with type 2 diabetes mellitus  Chronic, stable on current medication. Followed by PCP.    17. Persistent asthma  Chronic, stable on current medications. Followed by PCP.    18. Pseudogout, both knees.  Chronic, stable on current medications. Followed by PCP, Rheumatology.    19. Generalized osteoarthritis of multiple sites  Chronic, stable on current medications. Followed by PCP, Rheumatology.    20. Primary open angle glaucoma of both eyes, unspecified glaucoma stage  Chronic, stable on current medications. Followed by Ophthalmology.      Provided Tiana with a 5-10 year written screening schedule and personal prevention plan. Recommendations were developed using the USPSTF age appropriate recommendations. Education, counseling, and referrals were provided as needed. After Visit Summary printed and given to patient which includes a list of additional screenings\tests needed.    Follow up PCP:  8/15/2017    Ramonita Maynard, NP

## 2017-08-15 NOTE — TELEPHONE ENCOUNTER
Please schedule hemoglobin A1c and comprehensive metabolic panel on October 10.  She will be seeing the cardiologist on this day and can have her blood drawn at the same location as the cardiologist and she does not need to fast.

## 2017-08-15 NOTE — PROGRESS NOTES
Subjective:       Patient ID: Tiana Mead is a 89 y.o. female.    Chief Complaint: Follow-up  In 2013, shortly after arriving in [place], she had a heart attack.  She has been in chronic atrial fib with controlled rate on a beta Blocker and Xarelto since the heart attack.This has weakened her heart.  However, she feels that her congestive heart failure is well compensated at the present time.  She has persistent peripheral edema that currently is mild.  She is not short of breath.  She has stable exertional angina.  She carries sublingual nitroglycerin.  She returned to [place] on March 23 and she has only used nitroglycerin 1 tablet sublingual once and she was just doing too much at that time.     Because her estimated creatinine clearance has been between 29 and 34, I thought that she might be safer on apixiban if her cardiologist thinks it is ramos for her to continue on anticoagulation for persistent atrial fib.    She is being monitored closely in cardiology because of pericardial effusion.    Her most recent hospitalization occurred in January 2017 when she was living in [place] because of fluid buildup secondary to persistent atrial.    Overall, at the present time now she feels as though she is doing well and she is happy to be back in [place].  HPI  Review of Systems   Constitutional: Negative.  Negative for activity change, appetite change, chills, fatigue, fever and unexpected weight change.   HENT: Negative for hearing loss and tinnitus.    Eyes: Negative for visual disturbance.   Respiratory: Negative for cough, shortness of breath and wheezing.    Cardiovascular: Negative.  Negative for chest pain, palpitations and leg swelling.   Gastrointestinal: Negative for abdominal pain, blood in stool, constipation, diarrhea and nausea.   Genitourinary: Negative for dysuria, frequency and urgency.   Musculoskeletal: Negative for back pain and neck stiffness.   Skin: Negative for rash.    Neurological: Negative for headaches.   Psychiatric/Behavioral: Negative for dysphoric mood and sleep disturbance. The patient is not nervous/anxious.        Objective:      Physical Exam   Constitutional: She is oriented to person, place, and time. She appears well-developed and well-nourished. No distress.   HENT:   Head: Normocephalic and atraumatic.   Mouth/Throat: Oropharynx is clear and moist.   Eyes: Conjunctivae are normal. No scleral icterus.   Cardiovascular: Exam reveals no gallop.    Atrial fib controlled rate.   Pulmonary/Chest: Effort normal and breath sounds normal.   Abdominal: Soft.   Musculoskeletal: She exhibits edema.   Neurological: She is alert and oriented to person, place, and time.   Skin: Skin is warm and dry.   Psychiatric: She has a normal mood and affect. Her behavior is normal.       Assessment:       1. Type 2 diabetes mellitus with diabetic nephropathy, with long-term current use of insulin    2. CKD (chronic kidney disease) stage 3, GFR 30-59 ml/min, eGFR 32     3. Persistent atrial fibrillation    4. Long term (current) use of anticoagulants, Eliquis    5. Old MI (myocardial infarction), 2013    6. Pericardial effusion        Plan:   Tiana was seen today for follow-up.    Diagnoses and all orders for this visit:    Type 2 diabetes mellitus with diabetic nephropathy, with long-term current use of insulin.  Hypoglycemia is more dangerous than hyperglycemia.  The patient likes to have tight control of her diabetes.  She is very regular with her mealtime.  She does not report any symptomatic hypoglycemia.  She has been checking her blood sugar often 3 times daily; before breakfast, before lunch and before supper.  I recommend she reduce her insulin to NovoLog 70/30 taken once daily;   15 units before breakfast.  She is advised to stop using 5 units before supper.  She is advised to only check her blood sugar twice daily; once before breakfast and once before supper.  If she  finds that her blood sugar is about the same from one day to the next, then she is advised to only check her blood sugar once before breakfast.  I will repeat an A1c and comprehensive metabolic panel in October.    CKD (chronic kidney disease) stage 3, GFR 30-59 ml/min, eGFR 32 .  Because of her declining kidney function, I recommend apixiban instead of Xarelto as a novel oral anticoagulant.  She was unhappy on Coumadin because her diet is mostly vegetarian.    Persistent atrial fibrillation.  She will be seeing her cardiologist in October.  If her cardiologist thinks that she is best served by long-term anticoagulation,  I will send this note to ask if they think she should stay with Xarelto or switch to apixiban.    Long term (current) use of anticoagulants, Eliquis, new prescription sent to the [facility] pharmacy for possible change to this agent if prior authorization is obtainable and if her cardiologist thinks that this is the safest oral anticoagulant for her.    Old MI (myocardial infarction), 2013.  Her blood pressure has been running low.  She does not have symptoms from her blood pressure being low.  She specifically denies any lightheadedness upon standing.  I thought about stopping amlodipine 5 mg once daily,  but decided against stopping amlodipine for right now.    Pericardial effusion    Other orders.  Hemoglobin A1c encumbrance metabolic panel in October.  -     apixaban 2.5 mg Tab; Take 1 tablet (2.5 mg total) by mouth 2 (two) times daily.    Return in about 4 months (around 12/15/2017).

## 2017-08-15 NOTE — PATIENT INSTRUCTIONS
Counseling and Referral of Other Preventative  (Italic type indicates deductible and co-insurance are waived)    Patient Name: Tiana Mead  Today's Date: 8/15/2017      SERVICE LIMITATIONS RECOMMENDATION    Vaccines    · Pneumococcal (once after 65)    · Influenza (annually)    · Hepatitis B (if medium/high risk)    · Prevnar 13      Hepatitis B medium/high risk factors:       - End-stage renal disease       - Hemophiliacs who received Factor VII or         IX concentrates       - Clients of institutions for the mentally             retarded       - Persons who live in the same house as          a HepB carrier       - Homosexual men       - Illicit injectable drug abusers     Pneumococcal: Done, no repeat necessary     Influenza: due fall 2017     Hepatitis B: N/A     Prevnar 13: check with PCP    Mammogram (biennial age 50-74)  Annually (age 40 or over)  N/A    Pap (up to age 70 and after 70 if unknown history or abnormal study last 10 years)    N/A     The USPSTF recommends against screening for cervical cancer in women older than age 65 years who have had adequate prior screening and are not otherwise at high risk for cervical cancer.      Colorectal cancer screening (to age 75)    · Fecal occult blood test (annual)  · Flexible sigmoidoscopy (5y)  · Screening colonoscopy (10y)  · Barium enema   N/A    Diabetes self-management training (no USPSTF recommendations)  Requires referral by treating physician for patient with diabetes or renal disease. 10 hours of initial DSMT sessions of no less than 30 minutes each in a continuous 12-month period. 2 hours of follow-up DSMT in subsequent years.  N/A    Bone mass measurements (age 65 & older, biennial)  Requires diagnosis related to osteoporosis or estrogen deficiency. Biennial benefit unless patient has history of long-term glucocorticoid  Recommended to patient, declined    Glaucoma screening (no USPSTF recommendation)  Diabetes mellitus, family history     American, age 50 or over    American, age 65 or over  Done this year, repeat every year    Medical nutrition therapy for diabetes or renal disease (no recommended schedule)  Requires referral by treating physician for patient with diabetes or renal disease or kidney transplant within the past 3 years.  Can be provided in same year as diabetes self-management training (DSMT), and CMS recommends medical nutrition therapy take place after DSMT. Up to 3 hours for initial year and 2 hours in subsequent years.  N/A    Cardiovascular screening blood tests (every 5 years)  · Fasting lipid panel  Order as a panel if possible  Last done 1, recommend to repeat every year  years    Diabetes screening tests (at least every 3 years, Medicare covers annually or at 6-month intervals for prediabetic patients)  · Fasting blood sugar (FBS) or glucose tolerance test (GTT)  Patient must be diagnosed with one of the following:       - Hypertension       - Dyslipidemia       - Obesity (BMI 30kg/m2)       - Previous elevated impaired FBS or GTT       ... or any two of the following:       - Overweight (BMI 25 but <30)       - Family history of diabetes       - Age 65 or older       - History of gestational diabetes or birth of baby weighing more than 9 pounds  Last done 4/2017, recommend to repeat every 6  months    HIV screening (annually for increased risk patients)  · HIV-1 and HIV-2 by EIA, or JESSE, rapid antibody test or oral mucosa transudate  Patients must be at increased risk for HIV infection per USPSTF guidelines or pregnant. Tests covered annually for patient at increased risk or as requested by the patient. Pregnant patients may receive up to 3 tests during pregnancy.  Risks discussed, screening is not recommended    Smoking cessation counseling (up to 8 sessions per year)  Patients must be asymptomatic of tobacco-related conditions to receive as a preventative service.  Non-smoker    Subsequent annual wellness visit   At least 12 months since last AWV  Return in one year     The following information is provided to all patients.  This information is to help you find resources for any of the problems found today that may be affecting your health:                Living healthy guide: www.[email or web address]      Understanding Diabetes: www.diabetes.org      Eating healthy: www.cdc.gov/healthyweight      [facility] home safety checklist: www.cdc.gov/steadi/patient.html      Agency on Aging: www.goea.[place].[facility]      Alcoholics anonymous (AA): www.aa.org      Physical Activity: www.olinda.nih.gov/fm5uhym      Tobacco use: www.quitwithusla.org

## 2017-09-06 ENCOUNTER — TELEPHONE (OUTPATIENT)
Dept: OPHTHALMOLOGY | Facility: CLINIC | Age: 82
End: 2017-09-06

## 2017-10-03 ENCOUNTER — TELEPHONE (OUTPATIENT)
Dept: INTERNAL MEDICINE | Facility: CLINIC | Age: 82
End: 2017-10-03

## 2017-10-03 NOTE — TELEPHONE ENCOUNTER
----- Message from Francia Quintanilla sent at 10/3/2017 10:41 AM CDT -----  Contact: self/[phone]  Patient called in regards needing to talk with Dr Bradley about she is having a bad cough and was expecting for someone from the office call her back yesterday when she sent the 1st message. Please call and advise.       Thank you!!!

## 2017-10-04 ENCOUNTER — OFFICE VISIT (OUTPATIENT)
Dept: INTERNAL MEDICINE | Facility: CLINIC | Age: 82
End: 2017-10-04
Payer: MEDICARE

## 2017-10-04 VITALS
OXYGEN SATURATION: 98 % | DIASTOLIC BLOOD PRESSURE: 60 MMHG | BODY MASS INDEX: 23.65 KG/M2 | WEIGHT: 156.06 LBS | SYSTOLIC BLOOD PRESSURE: 118 MMHG | HEART RATE: 63 BPM | HEIGHT: 68 IN

## 2017-10-04 DIAGNOSIS — Z79.4 TYPE 2 DIABETES MELLITUS WITH DIABETIC NEPHROPATHY, WITH LONG-TERM CURRENT USE OF INSULIN: ICD-10-CM

## 2017-10-04 DIAGNOSIS — J44.1 ASTHMA EXACERBATION IN COPD: Primary | ICD-10-CM

## 2017-10-04 DIAGNOSIS — N18.30 CKD (CHRONIC KIDNEY DISEASE) STAGE 3, GFR 30-59 ML/MIN: ICD-10-CM

## 2017-10-04 DIAGNOSIS — J45.901 ASTHMA EXACERBATION IN COPD: Primary | ICD-10-CM

## 2017-10-04 DIAGNOSIS — E11.21 TYPE 2 DIABETES MELLITUS WITH DIABETIC NEPHROPATHY, WITH LONG-TERM CURRENT USE OF INSULIN: ICD-10-CM

## 2017-10-04 PROCEDURE — 99499 UNLISTED E&M SERVICE: CPT | Mod: S$GLB,,, | Performed by: INTERNAL MEDICINE

## 2017-10-04 PROCEDURE — 99999 PR PBB SHADOW E&M-EST. PATIENT-LVL III: CPT | Mod: PBBFAC,,, | Performed by: INTERNAL MEDICINE

## 2017-10-04 PROCEDURE — 99214 OFFICE O/P EST MOD 30 MIN: CPT | Mod: S$GLB,,, | Performed by: INTERNAL MEDICINE

## 2017-10-04 RX ORDER — AMOXICILLIN 500 MG/1
500 TABLET, FILM COATED ORAL EVERY 12 HOURS
Qty: 20 TABLET | Refills: 0 | Status: SHIPPED | OUTPATIENT
Start: 2017-10-04 | End: 2017-10-14

## 2017-10-05 NOTE — PROGRESS NOTES
Subjective:       Patient ID: Tiana Mead is a 89 y.o. female.    Chief Complaint: Cough and Nasal Congestion  This is her fourth day of being sick with cough and shortness of breath.  She is having fits of coughing and bringing up yellow phlegm.  She is wheezing much more.  She did feel like she had fever at one point took her temperature and it was 99.1.  She's had asthma all of her life.  She is concerned it could be a sinus infection because she also has some face pain and purulent nasal secretions.  HPI  Review of Systems   Constitutional: Positive for fatigue. Negative for activity change, appetite change, chills, fever and unexpected weight change.   HENT: Negative for hearing loss.    Eyes: Negative for visual disturbance.   Respiratory: Positive for cough and shortness of breath. Negative for chest tightness and wheezing.    Cardiovascular: Negative for chest pain, palpitations and leg swelling.   Gastrointestinal: Negative for abdominal pain, constipation, nausea and vomiting.   Genitourinary: Negative for dysuria, frequency and urgency.   Musculoskeletal: Negative for arthralgias, back pain, gait problem, joint swelling and myalgias.   Skin: Negative for rash.   Neurological: Negative for light-headedness and headaches.   Psychiatric/Behavioral: Negative for dysphoric mood and sleep disturbance. The patient is not nervous/anxious.        Objective:      Physical Exam   Constitutional: She is oriented to person, place, and time. She appears well-developed and well-nourished. No distress.   HENT:   Head: Normocephalic and atraumatic.   Right Ear: External ear normal.   Left Ear: External ear normal.   Nose: Nose normal.   Mouth/Throat: Oropharynx is clear and moist. No oropharyngeal exudate.   Eyes: Conjunctivae and EOM are normal. Pupils are equal, round, and reactive to light. Right eye exhibits no discharge. No scleral icterus.   Neck: Normal range of motion and full passive range of motion without  pain. Neck supple. No spinous process tenderness and no muscular tenderness present. Carotid bruit is not present. No thyromegaly present.   Cardiovascular: Normal rate, regular rhythm, S1 normal, S2 normal, normal heart sounds and intact distal pulses.  Exam reveals no gallop and no friction rub.    No murmur heard.  Pulmonary/Chest: Effort normal and breath sounds normal. No respiratory distress. She has no wheezes. She has no rales. She exhibits no tenderness.   Abdominal: Soft. Bowel sounds are normal. She exhibits no distension and no mass. There is no tenderness. There is no rebound and no guarding.   Genitourinary: Pelvic exam was performed with patient supine. Uterus is not deviated, not enlarged, not fixed and not tender. Cervix exhibits no motion tenderness, no discharge and no friability. Right adnexum displays no mass, no tenderness and no fullness. Left adnexum displays no mass, no tenderness and no fullness.   Musculoskeletal: Normal range of motion. She exhibits no edema or tenderness.   Lymphadenopathy:        Head (right side): No submental and no submandibular adenopathy present.        Head (left side): No submental and no submandibular adenopathy present.     She has no cervical adenopathy.        Right cervical: No superficial cervical, no deep cervical and no posterior cervical adenopathy present.       Left cervical: No superficial cervical, no deep cervical and no posterior cervical adenopathy present.        Right axillary: No pectoral and no lateral adenopathy present.        Left axillary: No pectoral and no lateral adenopathy present.       Right: No supraclavicular adenopathy present.        Left: No supraclavicular adenopathy present.   Neurological: She is alert and oriented to person, place, and time. She has normal reflexes. No cranial nerve deficit. She exhibits normal muscle tone. Coordination normal.   Skin: Skin is warm and dry. No rash noted.   Psychiatric: She has a normal mood  and affect. Her behavior is normal. Her mood appears not anxious. Her speech is not rapid and/or pressured. She is not agitated. She does not exhibit a depressed mood.   Normal behavior, thought content, insight and judgement.       Assessment:       1. Asthma exacerbation in COPD    2. CKD (chronic kidney disease) stage 3, GFR 30-59 ml/min, eGFR 32     3. Type 2 diabetes mellitus with diabetic nephropathy, with long-term current use of insulin        Plan:   Tiana was seen today for cough and nasal congestion.    Diagnoses and all orders for this visit:    Asthma exacerbation in COPD    CKD (chronic kidney disease) stage 3, GFR 30-59 ml/min, eGFR 32     Type 2 diabetes mellitus with diabetic nephropathy, with long-term current use of insulin    Other orders  -     amoxicillin (AMOXIL) 500 MG Tab; Take 1 tablet (500 mg total) by mouth every 12 (twelve) hours.    All her medical problems are addressed.  She's had a myocardial infarction 2013.  She has persistent atrial fib and is anticoagulated.  Although her blood pressure runs very low consistently, systolic of  and diastolicusually around 60, she denies any lightheadedness or easy fatigability.  No changes will be made in her medications

## 2017-10-10 ENCOUNTER — OFFICE VISIT (OUTPATIENT)
Dept: CARDIOLOGY | Facility: CLINIC | Age: 82
End: 2017-10-10
Payer: MEDICARE

## 2017-10-10 VITALS
BODY MASS INDEX: 23.52 KG/M2 | WEIGHT: 155.19 LBS | SYSTOLIC BLOOD PRESSURE: 123 MMHG | HEART RATE: 71 BPM | DIASTOLIC BLOOD PRESSURE: 64 MMHG | HEIGHT: 68 IN

## 2017-10-10 DIAGNOSIS — Z79.01 LONG TERM CURRENT USE OF ANTICOAGULANT THERAPY: ICD-10-CM

## 2017-10-10 DIAGNOSIS — I48.19 PERSISTENT ATRIAL FIBRILLATION: ICD-10-CM

## 2017-10-10 DIAGNOSIS — E78.2 HYPERLIPEMIA, MIXED: ICD-10-CM

## 2017-10-10 DIAGNOSIS — N18.30 CKD (CHRONIC KIDNEY DISEASE) STAGE 3, GFR 30-59 ML/MIN: ICD-10-CM

## 2017-10-10 DIAGNOSIS — I31.39 PERICARDIAL EFFUSION: ICD-10-CM

## 2017-10-10 DIAGNOSIS — I50.32 CHRONIC DIASTOLIC CONGESTIVE HEART FAILURE: Primary | ICD-10-CM

## 2017-10-10 DIAGNOSIS — I10 ESSENTIAL HYPERTENSION: ICD-10-CM

## 2017-10-10 DIAGNOSIS — R07.9 CHEST PAIN AT REST: ICD-10-CM

## 2017-10-10 PROCEDURE — 99999 PR PBB SHADOW E&M-EST. PATIENT-LVL III: CPT | Mod: PBBFAC,,, | Performed by: INTERNAL MEDICINE

## 2017-10-10 PROCEDURE — 99214 OFFICE O/P EST MOD 30 MIN: CPT | Mod: S$GLB,,, | Performed by: INTERNAL MEDICINE

## 2017-10-10 PROCEDURE — 99499 UNLISTED E&M SERVICE: CPT | Mod: S$GLB,,, | Performed by: INTERNAL MEDICINE

## 2017-10-10 RX ORDER — BRIMONIDINE TARTRATE 2 MG/ML
SOLUTION/ DROPS OPHTHALMIC
Refills: 3 | COMMUNITY
Start: 2017-09-06 | End: 2019-06-11

## 2017-10-10 NOTE — PROGRESS NOTES
Subjective:   Patient ID:  Tiana Mead is a 89 y.o. female who presents for follow-up of Chronic diastolic congestive heart failure (3 months fu)      HPI:   The patient returns for follow up of presu,med chronic diastolic heart failure ( preserved EF ). She is taking BID furosemide and denies SOB. She currently has a URI with nasal congestion and cough. Given an antibiotic with little change in symptoms. She has a moderate pericardial effusion with little change on repeat echos. Tiana Mead has chronic persistent  atrial fibrillation with rate control and on a DOAC. Tiana Mead chronic kidney disease stage II. Tiana Mead has dyslipidemia  on high intensity statin At goal..  Review of Systems   Constitution: Negative for weakness, malaise/fatigue, weight gain and weight loss.   HENT: Positive for congestion.    Eyes: Negative for blurred vision.   Cardiovascular: Positive for chest pain. Negative for claudication, cyanosis, dyspnea on exertion, irregular heartbeat, leg swelling, near-syncope, orthopnea, palpitations, paroxysmal nocturnal dyspnea and syncope.        Right submammary chest pain with coughing or torso movement.   Respiratory: Positive for cough. Negative for shortness of breath and wheezing.    Musculoskeletal: Positive for joint pain. Negative for falls and myalgias.   Gastrointestinal: Negative for abdominal pain, heartburn, nausea and vomiting.   Genitourinary: Negative for nocturia.   Neurological: Negative for brief paralysis, dizziness, focal weakness, headaches, numbness and paresthesias.   Psychiatric/Behavioral: Negative for altered mental status.       Current Outpatient Prescriptions   Medication Sig    albuterol 90 mcg/actuation inhaler Inhale 2 puffs into the lungs every 4 (four) hours as needed.    amlodipine (NORVASC) 5 MG tablet Take 1 tablet (5 mg total) by mouth every morning.    amoxicillin (AMOXIL) 500 MG Tab Take 1 tablet (500 mg total) by mouth every 12  "(twelve) hours.    apixaban 2.5 mg Tab Take 1 tablet (2.5 mg total) by mouth 2 (two) times daily.    atorvastatin (LIPITOR) 40 MG tablet Take 1 tablet (40 mg total) by mouth once daily.    blood sugar diagnostic (ONETOUCH ULTRA TEST) Strp Inject 1 strip into the skin 3 (three) times daily.    brimonidine 0.2% (ALPHAGAN) 0.2 % Drop INT 1 GTT INTO OU BID    diclofenac sodium (VOLTAREN) 1 % Gel Apply topically 4 (four) times daily.    dorzolamide (TRUSOPT) 2 % ophthalmic solution Place 1 drop into both eyes 2 (two) times daily.    fluticasone-salmeterol 250-50 mcg/dose (ADVAIR) 250-50 mcg/dose diskus inhaler Inhale 1 puff into the lungs nightly. Controller    furosemide (LASIX) 40 MG tablet Take 1 tablet (40 mg total) by mouth 2 (two) times daily. 1 tb am. 1 at 4pm.    insulin aspart protamine-insulin aspart (NOVOLOG MIX 70-30 FLEXPEN) 100 unit/mL (70-30) InPn pen Take 15 units once daily before breakfast.    insulin needles, disposable, (BD INSULIN PEN NEEDLE UF SHORT) 31 X 5/16 " Ndle Inject 1 Box into the skin 2 (two) times daily.    isosorbide mononitrate (IMDUR) 30 MG 24 hr tablet Take 1 tablet (30 mg total) by mouth every morning. Taking for heart    lancets (ONETOUCH DELICA LANCETS) 33 gauge Misc Apply 1 lancet topically 3 (three) times daily.    lancets Misc 1 Device by Misc.(Non-Drug; Combo Route) route 3 (three) times daily before meals. Please provide the insurance company preferred product.    latanoprost 0.005 % ophthalmic solution Place 1 drop into the right eye every evening. (Patient taking differently: Place 1 drop into both eyes every evening. )    levothyroxine (SYNTHROID) 75 MCG tablet Take 1 tablet (75 mcg total) by mouth before breakfast.    meclizine (ANTIVERT) 12.5 mg tablet Take 1 tablet (12.5 mg total) by mouth 3 (three) times daily as needed.    metoprolol tartrate (LOPRESSOR) 50 MG tablet Take 1 tablet (50 mg total) by mouth 2 (two) times daily.    " multivit-mineral-iron-lutein (CENTRUM SILVER ULTRA WOMEN'S) Tab Take 1 tablet by mouth once daily.    nitroGLYCERIN (NITROSTAT) 0.4 MG SL tablet Place 0.4 mg under the tongue every 5 (five) minutes as needed for Chest pain.    TRUE METRIX GLUCOSE METER Misc TEST TID    TRUEPLUS LANCETS 30 gauge Misc USE TO TEST BLOOD SUGAR TID AC     No current facility-administered medications for this visit.      Objective:   Physical Exam   Cardiovascular: An irregularly irregular rhythm present.   Murmur heard.   Medium-pitched mid to late systolic murmur is present with a grade of 1/6  at the lower left sternal border 1+ pedal edema bilateral.      Lab Results   Component Value Date     08/10/2017    K 4.4 08/10/2017     08/10/2017    CO2 27 08/10/2017    BUN 20 08/10/2017    CREATININE 1.6 (H) 08/10/2017    GLU 56 (L) 08/10/2017    HGBA1C 6.6 (H) 04/20/2017    HGBA1C 6.6 (H) 04/20/2017    MG 2.0 03/22/2012    AST 39 08/10/2017    ALT 33 08/10/2017    ALBUMIN 3.2 (L) 08/10/2017    PROT 6.3 08/10/2017    BILITOT 0.6 08/10/2017    WBC 5.61 08/10/2017    HGB 10.7 (L) 08/10/2017    HCT 32.3 (L) 08/10/2017    MCV 85 08/10/2017     08/10/2017    TSH 4.066 (H) 04/20/2017    CHOL 116 (L) 04/20/2017    HDL 52 04/20/2017    LDLCALC 55.2 (L) 04/20/2017    TRIG 44 04/20/2017       Assessment:     Problem List Items Addressed This Visit                Chronic diastolic congestive heart failure - Primary: Compensated    CKD (chronic kidney disease) stage 3, GFR 30-59 ml/min, eGFR 32     Essential hypertension: Adequate control    Hyperlipemia, mixed:  on high intensity statin At goal    Long term current use of anticoagulant therapy    Pericardial effusion: moderate - stable    Chest pain at rest: Appears musculoskeletal from coughing        Persistent atrial fibrillation: Rate controlled on a DOAC                  Plan:     Tiana was seen today for chronic diastolic congestive heart failure.    Diagnoses and  all orders for this visit:    Chronic diastolic congestive heart failure  Continue current regimen    Persistent atrial fibrillation  Continue current regimen    Pericardial effusion  -     2D Echo Only; Future; Expected date: 01/10/2018    Long term current use of anticoagulant therapy    Hyperlipemia, mixed  Continue current regimen    Essential hypertension  Continue current regimen    CKD (chronic kidney disease) stage 3, GFR 30-59 ml/min, eGFR 32     Other orders  -     brimonidine 0.2% (ALPHAGAN) 0.2 % Drop; INT 1 GTT INTO OU BID

## 2017-11-06 ENCOUNTER — TELEPHONE (OUTPATIENT)
Dept: INTERNAL MEDICINE | Facility: CLINIC | Age: 82
End: 2017-11-06

## 2017-11-06 NOTE — TELEPHONE ENCOUNTER
Spoke with pt and rescheduled her appt and a appt letter will be mailed out as a reminder for pt appt

## 2017-12-06 ENCOUNTER — LAB VISIT (OUTPATIENT)
Dept: LAB | Facility: HOSPITAL | Age: 82
End: 2017-12-06
Attending: INTERNAL MEDICINE
Payer: MEDICARE

## 2017-12-06 DIAGNOSIS — Z79.4 TYPE 2 DIABETES MELLITUS WITH DIABETIC NEPHROPATHY, WITH LONG-TERM CURRENT USE OF INSULIN: ICD-10-CM

## 2017-12-06 DIAGNOSIS — N18.30 CKD (CHRONIC KIDNEY DISEASE) STAGE 3, GFR 30-59 ML/MIN: ICD-10-CM

## 2017-12-06 DIAGNOSIS — E11.21 TYPE 2 DIABETES MELLITUS WITH DIABETIC NEPHROPATHY, WITH LONG-TERM CURRENT USE OF INSULIN: ICD-10-CM

## 2017-12-06 LAB
ALBUMIN SERPL BCP-MCNC: 3.6 G/DL
ALP SERPL-CCNC: 167 U/L
ALT SERPL W/O P-5'-P-CCNC: 45 U/L
ANION GAP SERPL CALC-SCNC: 7 MMOL/L
AST SERPL-CCNC: 46 U/L
BILIRUB SERPL-MCNC: 0.7 MG/DL
BUN SERPL-MCNC: 29 MG/DL
CALCIUM SERPL-MCNC: 9.6 MG/DL
CHLORIDE SERPL-SCNC: 107 MMOL/L
CO2 SERPL-SCNC: 31 MMOL/L
CREAT SERPL-MCNC: 1.7 MG/DL
EST. GFR  (AFRICAN AMERICAN): 29.8 ML/MIN/1.73 M^2
EST. GFR  (NON AFRICAN AMERICAN): 25.8 ML/MIN/1.73 M^2
ESTIMATED AVG GLUCOSE: 146 MG/DL
GLUCOSE SERPL-MCNC: 104 MG/DL
HBA1C MFR BLD HPLC: 6.7 %
POTASSIUM SERPL-SCNC: 4.1 MMOL/L
PROT SERPL-MCNC: 7.3 G/DL
SODIUM SERPL-SCNC: 145 MMOL/L

## 2017-12-06 PROCEDURE — 36415 COLL VENOUS BLD VENIPUNCTURE: CPT | Mod: PO

## 2017-12-06 PROCEDURE — 83036 HEMOGLOBIN GLYCOSYLATED A1C: CPT

## 2017-12-06 PROCEDURE — 80053 COMPREHEN METABOLIC PANEL: CPT

## 2017-12-13 ENCOUNTER — OFFICE VISIT (OUTPATIENT)
Dept: INTERNAL MEDICINE | Facility: CLINIC | Age: 82
End: 2017-12-13
Payer: MEDICARE

## 2017-12-13 VITALS
SYSTOLIC BLOOD PRESSURE: 115 MMHG | BODY MASS INDEX: 23.99 KG/M2 | DIASTOLIC BLOOD PRESSURE: 67 MMHG | WEIGHT: 158.31 LBS | HEART RATE: 66 BPM | HEIGHT: 68 IN

## 2017-12-13 DIAGNOSIS — Z79.4 TYPE 2 DIABETES MELLITUS WITH DIABETIC POLYNEUROPATHY, WITH LONG-TERM CURRENT USE OF INSULIN: Primary | ICD-10-CM

## 2017-12-13 DIAGNOSIS — Z79.01 LONG TERM CURRENT USE OF ANTICOAGULANT THERAPY: ICD-10-CM

## 2017-12-13 DIAGNOSIS — E03.9 PRIMARY HYPOTHYROIDISM: ICD-10-CM

## 2017-12-13 DIAGNOSIS — N18.30 CKD (CHRONIC KIDNEY DISEASE) STAGE 3, GFR 30-59 ML/MIN: ICD-10-CM

## 2017-12-13 DIAGNOSIS — I10 ESSENTIAL HYPERTENSION: ICD-10-CM

## 2017-12-13 DIAGNOSIS — I82.409 DEEP VEIN THROMBOSIS (DVT) DURING CURRENT HOSPITALIZATION: ICD-10-CM

## 2017-12-13 DIAGNOSIS — E11.42 TYPE 2 DIABETES MELLITUS WITH DIABETIC POLYNEUROPATHY, WITH LONG-TERM CURRENT USE OF INSULIN: Primary | ICD-10-CM

## 2017-12-13 DIAGNOSIS — M17.0 PRIMARY OSTEOARTHRITIS OF BOTH KNEES: ICD-10-CM

## 2017-12-13 DIAGNOSIS — M15.9 GENERALIZED OSTEOARTHRITIS OF MULTIPLE SITES: ICD-10-CM

## 2017-12-13 DIAGNOSIS — M25.469 EFFUSION OF KNEE, UNSPECIFIED LATERALITY: ICD-10-CM

## 2017-12-13 DIAGNOSIS — M11.20 PSEUDOGOUT: ICD-10-CM

## 2017-12-13 DIAGNOSIS — I48.19 PERSISTENT ATRIAL FIBRILLATION: ICD-10-CM

## 2017-12-13 PROCEDURE — 99999 PR PBB SHADOW E&M-EST. PATIENT-LVL III: CPT | Mod: PBBFAC,,, | Performed by: INTERNAL MEDICINE

## 2017-12-13 PROCEDURE — 99215 OFFICE O/P EST HI 40 MIN: CPT | Mod: S$GLB,,, | Performed by: INTERNAL MEDICINE

## 2017-12-13 PROCEDURE — 99499 UNLISTED E&M SERVICE: CPT | Mod: S$GLB,,, | Performed by: INTERNAL MEDICINE

## 2017-12-13 RX ORDER — DICLOFENAC SODIUM 10 MG/G
GEL TOPICAL 4 TIMES DAILY
Qty: 100 TUBE | Refills: 3 | Status: SHIPPED | OUTPATIENT
Start: 2017-12-13 | End: 2018-10-30 | Stop reason: SDUPTHER

## 2017-12-13 RX ORDER — INSULIN ASPART 100 [IU]/ML
INJECTION, SUSPENSION SUBCUTANEOUS
Qty: 1 BOX | Refills: 11 | Status: SHIPPED | OUTPATIENT
Start: 2017-12-13 | End: 2018-04-17 | Stop reason: SDUPTHER

## 2017-12-13 NOTE — PATIENT INSTRUCTIONS
Desitin or April's Butt Paste     Goal A1c 7.0 to 8.0.   On 12- 6.7, which is too low!  Reduce insulin to 12 units taken once daily before breakfast.    Arthritis in hands, [facility] carries IMAK arthritis gloves    For your shoulders try application of heat 20 min at a time during the day. Salon Pas many people find it provides good pain relief for hours.

## 2017-12-19 NOTE — PROGRESS NOTES
Subjective:       Patient ID: Tiana Mead is a 89 y.o. female.    Chief Complaint: Follow-up (type 2 diabetes mellitus with diabetic nepropathy, with long-term current use of insulin)  This note was created with the use of Dragon dictation  Her chief complaint is bodily discomfort from arthritis pain in both shoulders and in the small joints of the fingers of her hands as well as both knees.  She's not had any shortness of breath, chest pain, palpitations, orthopnea, or paroxysmal nocturnal dyspnea.  She's having problems with skin irritation in her gluteal fold.  She would like to continue on anticoagulant therapy.  She has not had any hypoglycemic episodes.  Diabetes Management Status    Statin: Taking  ACE/ARB: Not taking    Screening or Prevention Patient's value Goal Complete/Controlled?   HgA1C Testing and Control   Lab Results   Component Value Date    HGBA1C 6.7 (H) 12/06/2017      Annually/Less than 8% Yes   Lipid profile : 04/20/2017 Annually Yes   LDL control Lab Results   Component Value Date    LDLCALC 55.2 (L) 04/20/2017    Annually/Less than 100 mg/dl  Yes   Nephropathy screening Lab Results   Component Value Date    LABMICR 2.7 10/25/2011     Lab Results   Component Value Date    PROTEINUA Negative 04/02/2013    Annually Seeing a nephrologist   Blood pressure BP Readings from Last 1 Encounters:   12/13/17 115/67    Less than 140/90 Yes   Dilated retinal exam : 05/29/2017 Annually Yes   Foot exam   : 05/02/2017 Annually Yes     HPI  Review of Systems   Constitutional: Negative for activity change, appetite change, chills, fatigue, fever and unexpected weight change.   HENT: Negative for hearing loss.    Eyes: Negative for visual disturbance.   Respiratory: Negative for cough, chest tightness, shortness of breath and wheezing.    Cardiovascular: Negative for chest pain, palpitations and leg swelling.   Gastrointestinal: Negative for abdominal pain, constipation, nausea and vomiting.    Genitourinary: Negative for dysuria, frequency and urgency.   Musculoskeletal: Negative for arthralgias, back pain, gait problem, joint swelling and myalgias.   Skin: Negative for rash.   Neurological: Negative for light-headedness and headaches.   Psychiatric/Behavioral: Negative for dysphoric mood and sleep disturbance. The patient is not nervous/anxious.        Objective:      Physical Exam   Constitutional: She is oriented to person, place, and time. She appears well-developed and well-nourished. No distress.   HENT:   Head: Normocephalic and atraumatic.   Right Ear: External ear normal.   Left Ear: External ear normal.   Nose: Nose normal.   Mouth/Throat: Oropharynx is clear and moist. No oropharyngeal exudate.   Eyes: Conjunctivae and EOM are normal. Pupils are equal, round, and reactive to light. Right eye exhibits no discharge. No scleral icterus.   Neck: Normal range of motion and full passive range of motion without pain. Neck supple. No spinous process tenderness and no muscular tenderness present. Carotid bruit is not present. No thyromegaly present.   Cardiovascular: Normal rate, regular rhythm, S1 normal, S2 normal, normal heart sounds and intact distal pulses.  Exam reveals no gallop and no friction rub.    No murmur heard.  Pulmonary/Chest: Effort normal and breath sounds normal. No respiratory distress. She has no wheezes. She has no rales. She exhibits no tenderness.   Abdominal: Soft. Bowel sounds are normal. She exhibits no distension and no mass. There is no tenderness. There is no rebound and no guarding.   Genitourinary: Pelvic exam was performed with patient supine. Uterus is not deviated, not enlarged, not fixed and not tender. Cervix exhibits no motion tenderness, no discharge and no friability. Right adnexum displays no mass, no tenderness and no fullness. Left adnexum displays no mass, no tenderness and no fullness.   Musculoskeletal: Normal range of motion. She exhibits no edema or  tenderness.   Lymphadenopathy:        Head (right side): No submental and no submandibular adenopathy present.        Head (left side): No submental and no submandibular adenopathy present.     She has no cervical adenopathy.        Right cervical: No superficial cervical, no deep cervical and no posterior cervical adenopathy present.       Left cervical: No superficial cervical, no deep cervical and no posterior cervical adenopathy present.        Right axillary: No pectoral and no lateral adenopathy present.        Left axillary: No pectoral and no lateral adenopathy present.       Right: No supraclavicular adenopathy present.        Left: No supraclavicular adenopathy present.   Neurological: She is alert and oriented to person, place, and time. She has normal reflexes. No cranial nerve deficit. She exhibits normal muscle tone. Coordination normal.   Skin: Skin is warm and dry. No rash noted.   Psychiatric: She has a normal mood and affect. Her behavior is normal. Her mood appears not anxious. Her speech is not rapid and/or pressured. She is not agitated. She does not exhibit a depressed mood.   Normal behavior, thought content, insight and judgement.       Assessment:       1. Type 2 diabetes mellitus with diabetic polyneuropathy, with long-term current use of insulin    2. Long term current use of anticoagulant therapy    3. H/O Deep vein thrombosis (DVT)    4. Persistent atrial fibrillation    5. Pseudogout, both knees.    6. Essential hypertension    7. Generalized osteoarthritis of multiple sites    8. CKD (chronic kidney disease) stage 3, GFR 30-59 ml/min, eGFR 32     9. Effusion of knee, unspecified laterality    10. Primary osteoarthritis of both knees    11. Primary hypothyroidism        Plan:   Tiana was seen today for follow-up.    Diagnoses and all orders for this visit:    Type 2 diabetes mellitus with diabetic polyneuropathy, with long-term current use of insulin  -     Hemoglobin A1c;  Future    Long term current use of anticoagulant therapy  -     CBC auto differential; Future    H/O Deep vein thrombosis (DVT)    Persistent atrial fibrillation    Pseudogout, both knees.    Essential hypertension.  Comes any congestive heart failure.  Daily weight is stable from 153-156 pounds.  Blood pressure is steady at 115/67-70.    Generalized osteoarthritis of multiple sites.  Recommend sit and be fit.  -     diclofenac sodium (VOLTAREN) 1 % Gel; Apply topically 4 (four) times daily.    CKD (chronic kidney disease) stage 3, GFR 30-59 ml/min, eGFR 32   -     CBC auto differential; Future  -     Comprehensive metabolic panel; Future    Effusion of knee, unspecified laterality  -     diclofenac sodium (VOLTAREN) 1 % Gel; Apply topically 4 (four) times daily.    Primary osteoarthritis of both knees  -     diclofenac sodium (VOLTAREN) 1 % Gel; Apply topically 4 (four) times daily.    Primary hypothyroidism  -     TSH; Future    Other orders.  Over-the-counter zinc oxide ointment to gluteal fold.  No evidence of skin breakdown.  -     insulin aspart protamine-insulin aspart (NOVOLOG MIX 70-30 FLEXPEN) 100 unit/mL (70-30) InPn pen; Take 12 units once daily before breakfast.    Medication List with Changes/Refills   Current Medications    ALBUTEROL 90 MCG/ACTUATION INHALER    Inhale 2 puffs into the lungs every 4 (four) hours as needed.    AMLODIPINE (NORVASC) 5 MG TABLET    Take 1 tablet (5 mg total) by mouth every morning.    APIXABAN 2.5 MG TAB    Take 1 tablet (2.5 mg total) by mouth 2 (two) times daily.    ATORVASTATIN (LIPITOR) 40 MG TABLET    Take 1 tablet (40 mg total) by mouth once daily.    BLOOD SUGAR DIAGNOSTIC (ONETOUCH ULTRA TEST) STRP    Inject 1 strip into the skin 3 (three) times daily.    BRIMONIDINE 0.2% (ALPHAGAN) 0.2 % DROP    INT 1 GTT INTO OU BID    DORZOLAMIDE (TRUSOPT) 2 % OPHTHALMIC SOLUTION    Place 1 drop into both eyes 2 (two) times daily.    FLUTICASONE-SALMETEROL 250-50 MCG/DOSE  "(ADVAIR) 250-50 MCG/DOSE DISKUS INHALER    Inhale 1 puff into the lungs nightly. Controller    FUROSEMIDE (LASIX) 40 MG TABLET    Take 1 tablet (40 mg total) by mouth 2 (two) times daily. 1 tb am. 1 at 4pm.    INSULIN NEEDLES, DISPOSABLE, (BD INSULIN PEN NEEDLE UF SHORT) 31 X 5/16 " NDLE    Inject 1 Box into the skin 2 (two) times daily.    ISOSORBIDE MONONITRATE (IMDUR) 30 MG 24 HR TABLET    Take 1 tablet (30 mg total) by mouth every morning. Taking for heart    LANCETS (ONETOUCH DELICA LANCETS) 33 GAUGE MISC    Apply 1 lancet topically 3 (three) times daily.    LANCETS MISC    1 Device by Misc.(Non-Drug; Combo Route) route 3 (three) times daily before meals. Please provide the insurance company preferred product.    LATANOPROST 0.005 % OPHTHALMIC SOLUTION    Place 1 drop into the right eye every evening.    LEVOTHYROXINE (SYNTHROID) 75 MCG TABLET    Take 1 tablet (75 mcg total) by mouth before breakfast.    MECLIZINE (ANTIVERT) 12.5 MG TABLET    Take 1 tablet (12.5 mg total) by mouth 3 (three) times daily as needed.    METOPROLOL TARTRATE (LOPRESSOR) 50 MG TABLET    Take 1 tablet (50 mg total) by mouth 2 (two) times daily.    MULTIVIT-MINERAL-IRON-LUTEIN (CENTRUM SILVER ULTRA WOMEN'S) TAB    Take 1 tablet by mouth once daily.    NITROGLYCERIN (NITROSTAT) 0.4 MG SL TABLET    Place 0.4 mg under the tongue every 5 (five) minutes as needed for Chest pain.    TRUE METRIX GLUCOSE METER MISC    TEST TID    TRUEPLUS LANCETS 30 GAUGE MISC    USE TO TEST BLOOD SUGAR TID AC   Changed and/or Refilled Medications    Modified Medication Previous Medication    DICLOFENAC SODIUM (VOLTAREN) 1 % GEL diclofenac sodium (VOLTAREN) 1 % Gel       Apply topically 4 (four) times daily.    Apply topically 4 (four) times daily.    INSULIN ASPART PROTAMINE-INSULIN ASPART (NOVOLOG MIX 70-30 FLEXPEN) 100 UNIT/ML (70-30) INPN PEN insulin aspart protamine-insulin aspart (NOVOLOG MIX 70-30 FLEXPEN) 100 unit/mL (70-30) InPn pen       Take 12 units " once daily before breakfast.    Take 15 units once daily before breakfast.     Return in about 4 months (around 4/14/2018).

## 2017-12-29 NOTE — PROGRESS NOTES
Assessment /Plan     For exam results, see Encounter Report.    Primary open angle glaucoma of both eyes, unspecified glaucoma stage    Glaucoma associated with ocular trauma, unspecified glaucoma stage, unspecified laterality    Pseudophakia - Left Eye    Nuclear sclerosis of right eye      + Asthma  CHF        POAG  Stable Glaucoma & Patient near target IOP range    Steroid responder    CCT  517 / 574    Mid teens    Both Eyes:  Trusopt BID  PG q day --> iritis quiet  Alphagan BID --> had dizziness in past    Hx Ryan in Past    No BB --> Asthma    Hold SLT OS    Cataract right Eyes: Cataract surgery PRN with good understanding.  Patient hesitant @ CE IOL OD --> sign Guttata as discussed    Fuchs K dystrophy   Observe      NIDDM  No BDR or CSME  control         Plan  IOP 6 month IOP & DFE --> holding HVF  RTC sooner prn with good understanding

## 2017-12-30 ENCOUNTER — HOSPITAL ENCOUNTER (INPATIENT)
Facility: HOSPITAL | Age: 82
LOS: 3 days | Discharge: HOME | DRG: 291 | End: 2018-01-02
Attending: EMERGENCY MEDICINE | Admitting: EMERGENCY MEDICINE
Payer: MEDICARE

## 2017-12-30 DIAGNOSIS — Z79.01 LONG TERM CURRENT USE OF ANTICOAGULANT THERAPY: ICD-10-CM

## 2017-12-30 DIAGNOSIS — I50.9 CONGESTIVE HEART FAILURE, UNSPECIFIED CONGESTIVE HEART FAILURE CHRONICITY, UNSPECIFIED CONGESTIVE HEART FAILURE TYPE: ICD-10-CM

## 2017-12-30 DIAGNOSIS — E78.2 HYPERLIPEMIA, MIXED: ICD-10-CM

## 2017-12-30 DIAGNOSIS — J06.9 VIRAL URI: ICD-10-CM

## 2017-12-30 DIAGNOSIS — E11.42 DIABETIC POLYNEUROPATHY ASSOCIATED WITH TYPE 2 DIABETES MELLITUS: ICD-10-CM

## 2017-12-30 DIAGNOSIS — Z79.4 TYPE 2 DIABETES MELLITUS WITH DIABETIC POLYNEUROPATHY, WITH LONG-TERM CURRENT USE OF INSULIN: ICD-10-CM

## 2017-12-30 DIAGNOSIS — R06.02 SHORTNESS OF BREATH: ICD-10-CM

## 2017-12-30 DIAGNOSIS — I82.409 DEEP VEIN THROMBOSIS (DVT) DURING CURRENT HOSPITALIZATION: ICD-10-CM

## 2017-12-30 DIAGNOSIS — J45.901 MODERATE ASTHMA WITH EXACERBATION, UNSPECIFIED WHETHER PERSISTENT: ICD-10-CM

## 2017-12-30 DIAGNOSIS — M15.9 GENERALIZED OSTEOARTHRITIS OF MULTIPLE SITES: ICD-10-CM

## 2017-12-30 DIAGNOSIS — I25.10 CORONARY ARTERY DISEASE INVOLVING NATIVE CORONARY ARTERY OF NATIVE HEART WITHOUT ANGINA PECTORIS: ICD-10-CM

## 2017-12-30 DIAGNOSIS — E03.9 PRIMARY HYPOTHYROIDISM: ICD-10-CM

## 2017-12-30 DIAGNOSIS — Z79.4 TYPE 2 DIABETES MELLITUS WITH DIABETIC NEPHROPATHY, WITH LONG-TERM CURRENT USE OF INSULIN: ICD-10-CM

## 2017-12-30 DIAGNOSIS — E11.21 TYPE 2 DIABETES MELLITUS WITH DIABETIC NEPHROPATHY, WITH LONG-TERM CURRENT USE OF INSULIN: ICD-10-CM

## 2017-12-30 DIAGNOSIS — E11.9 DM2 (DIABETES MELLITUS, TYPE 2): ICD-10-CM

## 2017-12-30 DIAGNOSIS — E11.42 TYPE 2 DIABETES MELLITUS WITH DIABETIC POLYNEUROPATHY, WITH LONG-TERM CURRENT USE OF INSULIN: ICD-10-CM

## 2017-12-30 DIAGNOSIS — N18.30 CKD (CHRONIC KIDNEY DISEASE) STAGE 3, GFR 30-59 ML/MIN: ICD-10-CM

## 2017-12-30 DIAGNOSIS — I10 ESSENTIAL HYPERTENSION: ICD-10-CM

## 2017-12-30 DIAGNOSIS — H40.30X0 GLAUCOMA ASSOCIATED WITH OCULAR TRAUMA, UNSPECIFIED GLAUCOMA STAGE, UNSPECIFIED LATERALITY: ICD-10-CM

## 2017-12-30 DIAGNOSIS — I31.39 PERICARDIAL EFFUSION: ICD-10-CM

## 2017-12-30 DIAGNOSIS — I50.33 ACUTE ON CHRONIC DIASTOLIC CONGESTIVE HEART FAILURE: Primary | ICD-10-CM

## 2017-12-30 DIAGNOSIS — I50.9 CHF (CONGESTIVE HEART FAILURE): ICD-10-CM

## 2017-12-30 DIAGNOSIS — I48.19 PERSISTENT ATRIAL FIBRILLATION: ICD-10-CM

## 2017-12-30 DIAGNOSIS — M11.20 CHONDROCALCINOSIS: ICD-10-CM

## 2017-12-30 DIAGNOSIS — N18.9 CKD (CHRONIC KIDNEY DISEASE): ICD-10-CM

## 2017-12-30 LAB
ALBUMIN SERPL BCP-MCNC: 3.6 G/DL
ALP SERPL-CCNC: 166 U/L
ALT SERPL W/O P-5'-P-CCNC: 38 U/L
ANION GAP SERPL CALC-SCNC: 12 MMOL/L
AST SERPL-CCNC: 51 U/L
BASOPHILS # BLD AUTO: 0.04 K/UL
BASOPHILS NFR BLD: 0.4 %
BILIRUB SERPL-MCNC: 0.8 MG/DL
BNP SERPL-MCNC: 1279 PG/ML
BUN SERPL-MCNC: 24 MG/DL
CALCIUM SERPL-MCNC: 9.9 MG/DL
CHLORIDE SERPL-SCNC: 105 MMOL/L
CO2 SERPL-SCNC: 27 MMOL/L
CREAT SERPL-MCNC: 1.6 MG/DL
DIFFERENTIAL METHOD: ABNORMAL
EOSINOPHIL # BLD AUTO: 0.1 K/UL
EOSINOPHIL NFR BLD: 0.5 %
ERYTHROCYTE [DISTWIDTH] IN BLOOD BY AUTOMATED COUNT: 15.2 %
EST. GFR  (AFRICAN AMERICAN): 32 ML/MIN/1.73 M^2
EST. GFR  (NON AFRICAN AMERICAN): 27.8 ML/MIN/1.73 M^2
ESTIMATED AVG GLUCOSE: 143 MG/DL
FLUAV AG SPEC QL IA: NEGATIVE
FLUBV AG SPEC QL IA: NEGATIVE
GLUCOSE SERPL-MCNC: 170 MG/DL
HBA1C MFR BLD HPLC: 6.6 %
HCT VFR BLD AUTO: 36.8 %
HGB BLD-MCNC: 12 G/DL
IMM GRANULOCYTES # BLD AUTO: 0.05 K/UL
IMM GRANULOCYTES NFR BLD AUTO: 0.5 %
INR PPP: 1
LYMPHOCYTES # BLD AUTO: 0.6 K/UL
LYMPHOCYTES NFR BLD: 5.3 %
MCH RBC QN AUTO: 29 PG
MCHC RBC AUTO-ENTMCNC: 32.6 G/DL
MCV RBC AUTO: 89 FL
MONOCYTES # BLD AUTO: 0.4 K/UL
MONOCYTES NFR BLD: 3.3 %
NEUTROPHILS # BLD AUTO: 9.6 K/UL
NEUTROPHILS NFR BLD: 90 %
NRBC BLD-RTO: 0 /100 WBC
PLATELET # BLD AUTO: 154 K/UL
PMV BLD AUTO: 11.3 FL
POCT GLUCOSE: 168 MG/DL (ref 70–110)
POCT GLUCOSE: 281 MG/DL (ref 70–110)
POCT GLUCOSE: 306 MG/DL (ref 70–110)
POTASSIUM SERPL-SCNC: 4 MMOL/L
PROT SERPL-MCNC: 7.6 G/DL
PROTHROMBIN TIME: 10.4 SEC
RBC # BLD AUTO: 4.14 M/UL
SODIUM SERPL-SCNC: 144 MMOL/L
SPECIMEN SOURCE: NORMAL
TROPONIN I SERPL DL<=0.01 NG/ML-MCNC: 0.01 NG/ML
TROPONIN I SERPL DL<=0.01 NG/ML-MCNC: <0.006 NG/ML
WBC # BLD AUTO: 10.63 K/UL

## 2017-12-30 PROCEDURE — 12000002 HC ACUTE/MED SURGE SEMI-PRIVATE ROOM

## 2017-12-30 PROCEDURE — 84484 ASSAY OF TROPONIN QUANT: CPT | Mod: 91

## 2017-12-30 PROCEDURE — 63600175 PHARM REV CODE 636 W HCPCS: Performed by: PHYSICIAN ASSISTANT

## 2017-12-30 PROCEDURE — 93010 ELECTROCARDIOGRAM REPORT: CPT | Mod: ,,, | Performed by: INTERNAL MEDICINE

## 2017-12-30 PROCEDURE — 96374 THER/PROPH/DIAG INJ IV PUSH: CPT

## 2017-12-30 PROCEDURE — 63600175 PHARM REV CODE 636 W HCPCS: Performed by: HOSPITALIST

## 2017-12-30 PROCEDURE — 94640 AIRWAY INHALATION TREATMENT: CPT

## 2017-12-30 PROCEDURE — 83036 HEMOGLOBIN GLYCOSYLATED A1C: CPT

## 2017-12-30 PROCEDURE — 80053 COMPREHEN METABOLIC PANEL: CPT

## 2017-12-30 PROCEDURE — 85025 COMPLETE CBC W/AUTO DIFF WBC: CPT

## 2017-12-30 PROCEDURE — 25000242 PHARM REV CODE 250 ALT 637 W/ HCPCS: Performed by: PHYSICIAN ASSISTANT

## 2017-12-30 PROCEDURE — 36415 COLL VENOUS BLD VENIPUNCTURE: CPT

## 2017-12-30 PROCEDURE — 82962 GLUCOSE BLOOD TEST: CPT

## 2017-12-30 PROCEDURE — 93005 ELECTROCARDIOGRAM TRACING: CPT

## 2017-12-30 PROCEDURE — 84484 ASSAY OF TROPONIN QUANT: CPT

## 2017-12-30 PROCEDURE — 99285 EMERGENCY DEPT VISIT HI MDM: CPT | Mod: 25

## 2017-12-30 PROCEDURE — 99285 EMERGENCY DEPT VISIT HI MDM: CPT | Mod: ,,, | Performed by: EMERGENCY MEDICINE

## 2017-12-30 PROCEDURE — 25000003 PHARM REV CODE 250: Performed by: PHYSICIAN ASSISTANT

## 2017-12-30 PROCEDURE — 96372 THER/PROPH/DIAG INJ SC/IM: CPT

## 2017-12-30 PROCEDURE — 99223 1ST HOSP IP/OBS HIGH 75: CPT | Mod: AI,,, | Performed by: HOSPITALIST

## 2017-12-30 PROCEDURE — 87400 INFLUENZA A/B EACH AG IA: CPT | Mod: 59

## 2017-12-30 PROCEDURE — 25000242 PHARM REV CODE 250 ALT 637 W/ HCPCS: Performed by: HOSPITALIST

## 2017-12-30 PROCEDURE — 83880 ASSAY OF NATRIURETIC PEPTIDE: CPT

## 2017-12-30 PROCEDURE — 85610 PROTHROMBIN TIME: CPT

## 2017-12-30 PROCEDURE — 25000003 PHARM REV CODE 250: Performed by: HOSPITALIST

## 2017-12-30 RX ORDER — ACETAMINOPHEN 325 MG/1
650 TABLET ORAL EVERY 6 HOURS PRN
Status: DISCONTINUED | OUTPATIENT
Start: 2017-12-30 | End: 2018-01-02 | Stop reason: HOSPADM

## 2017-12-30 RX ORDER — IBUPROFEN 200 MG
24 TABLET ORAL
Status: DISCONTINUED | OUTPATIENT
Start: 2017-12-30 | End: 2018-01-02 | Stop reason: HOSPADM

## 2017-12-30 RX ORDER — PREDNISONE 20 MG/1
40 TABLET ORAL
Status: COMPLETED | OUTPATIENT
Start: 2017-12-30 | End: 2017-12-30

## 2017-12-30 RX ORDER — FUROSEMIDE 10 MG/ML
40 INJECTION INTRAMUSCULAR; INTRAVENOUS 2 TIMES DAILY
Status: DISCONTINUED | OUTPATIENT
Start: 2017-12-31 | End: 2018-01-01

## 2017-12-30 RX ORDER — SODIUM CHLORIDE 0.9 % (FLUSH) 0.9 %
5 SYRINGE (ML) INJECTION
Status: DISCONTINUED | OUTPATIENT
Start: 2017-12-30 | End: 2018-01-02 | Stop reason: HOSPADM

## 2017-12-30 RX ORDER — ISOSORBIDE MONONITRATE 30 MG/1
30 TABLET, EXTENDED RELEASE ORAL EVERY MORNING
Status: DISCONTINUED | OUTPATIENT
Start: 2017-12-31 | End: 2018-01-02 | Stop reason: HOSPADM

## 2017-12-30 RX ORDER — ONDANSETRON 2 MG/ML
4 INJECTION INTRAMUSCULAR; INTRAVENOUS EVERY 8 HOURS PRN
Status: DISCONTINUED | OUTPATIENT
Start: 2017-12-30 | End: 2018-01-02 | Stop reason: HOSPADM

## 2017-12-30 RX ORDER — INSULIN ASPART 100 [IU]/ML
0-5 INJECTION, SOLUTION INTRAVENOUS; SUBCUTANEOUS
Status: DISCONTINUED | OUTPATIENT
Start: 2017-12-30 | End: 2018-01-02 | Stop reason: HOSPADM

## 2017-12-30 RX ORDER — AMLODIPINE BESYLATE 5 MG/1
5 TABLET ORAL EVERY MORNING
Status: DISCONTINUED | OUTPATIENT
Start: 2017-12-31 | End: 2018-01-02 | Stop reason: HOSPADM

## 2017-12-30 RX ORDER — IPRATROPIUM BROMIDE AND ALBUTEROL SULFATE 2.5; .5 MG/3ML; MG/3ML
3 SOLUTION RESPIRATORY (INHALATION) EVERY 4 HOURS
Status: DISCONTINUED | OUTPATIENT
Start: 2017-12-30 | End: 2018-01-02 | Stop reason: HOSPADM

## 2017-12-30 RX ORDER — FUROSEMIDE 10 MG/ML
40 INJECTION INTRAMUSCULAR; INTRAVENOUS 2 TIMES DAILY
Status: DISCONTINUED | OUTPATIENT
Start: 2017-12-30 | End: 2017-12-30

## 2017-12-30 RX ORDER — LATANOPROST 50 UG/ML
1 SOLUTION/ DROPS OPHTHALMIC NIGHTLY
Status: DISCONTINUED | OUTPATIENT
Start: 2017-12-30 | End: 2018-01-02 | Stop reason: HOSPADM

## 2017-12-30 RX ORDER — OSELTAMIVIR PHOSPHATE 75 MG/1
75 CAPSULE ORAL
Status: COMPLETED | OUTPATIENT
Start: 2017-12-30 | End: 2017-12-30

## 2017-12-30 RX ORDER — IBUPROFEN 200 MG
16 TABLET ORAL
Status: DISCONTINUED | OUTPATIENT
Start: 2017-12-30 | End: 2018-01-02 | Stop reason: HOSPADM

## 2017-12-30 RX ORDER — ATORVASTATIN CALCIUM 20 MG/1
40 TABLET, FILM COATED ORAL DAILY
Status: DISCONTINUED | OUTPATIENT
Start: 2017-12-31 | End: 2018-01-02 | Stop reason: HOSPADM

## 2017-12-30 RX ORDER — NITROGLYCERIN 0.4 MG/1
0.4 TABLET SUBLINGUAL EVERY 5 MIN PRN
Status: DISCONTINUED | OUTPATIENT
Start: 2017-12-30 | End: 2018-01-02 | Stop reason: HOSPADM

## 2017-12-30 RX ORDER — DICLOFENAC SODIUM 10 MG/G
GEL TOPICAL 4 TIMES DAILY
Status: DISCONTINUED | OUTPATIENT
Start: 2017-12-30 | End: 2018-01-02 | Stop reason: HOSPADM

## 2017-12-30 RX ORDER — GLUCAGON 1 MG
1 KIT INJECTION
Status: DISCONTINUED | OUTPATIENT
Start: 2017-12-30 | End: 2018-01-02 | Stop reason: HOSPADM

## 2017-12-30 RX ORDER — METOPROLOL TARTRATE 50 MG/1
50 TABLET ORAL 2 TIMES DAILY
Status: DISCONTINUED | OUTPATIENT
Start: 2017-12-30 | End: 2018-01-02 | Stop reason: HOSPADM

## 2017-12-30 RX ORDER — PREDNISONE 20 MG/1
40 TABLET ORAL DAILY
Status: DISCONTINUED | OUTPATIENT
Start: 2017-12-31 | End: 2018-01-02 | Stop reason: HOSPADM

## 2017-12-30 RX ORDER — BRIMONIDINE TARTRATE 2 MG/ML
1 SOLUTION/ DROPS OPHTHALMIC 2 TIMES DAILY
Status: DISCONTINUED | OUTPATIENT
Start: 2017-12-30 | End: 2018-01-02 | Stop reason: HOSPADM

## 2017-12-30 RX ORDER — IPRATROPIUM BROMIDE AND ALBUTEROL SULFATE 2.5; .5 MG/3ML; MG/3ML
3 SOLUTION RESPIRATORY (INHALATION)
Status: COMPLETED | OUTPATIENT
Start: 2017-12-30 | End: 2017-12-30

## 2017-12-30 RX ORDER — DORZOLAMIDE HCL 20 MG/ML
1 SOLUTION/ DROPS OPHTHALMIC 2 TIMES DAILY
Status: DISCONTINUED | OUTPATIENT
Start: 2017-12-30 | End: 2018-01-02 | Stop reason: HOSPADM

## 2017-12-30 RX ORDER — FUROSEMIDE 10 MG/ML
20 INJECTION INTRAMUSCULAR; INTRAVENOUS ONCE
Status: COMPLETED | OUTPATIENT
Start: 2017-12-30 | End: 2017-12-30

## 2017-12-30 RX ORDER — LEVOTHYROXINE SODIUM 75 UG/1
75 TABLET ORAL
Status: DISCONTINUED | OUTPATIENT
Start: 2017-12-31 | End: 2018-01-02 | Stop reason: HOSPADM

## 2017-12-30 RX ORDER — FLUTICASONE FUROATE AND VILANTEROL 100; 25 UG/1; UG/1
1 POWDER RESPIRATORY (INHALATION) DAILY
Status: DISCONTINUED | OUTPATIENT
Start: 2017-12-31 | End: 2018-01-02 | Stop reason: HOSPADM

## 2017-12-30 RX ORDER — FUROSEMIDE 10 MG/ML
40 INJECTION INTRAMUSCULAR; INTRAVENOUS
Status: COMPLETED | OUTPATIENT
Start: 2017-12-30 | End: 2017-12-30

## 2017-12-30 RX ORDER — METOPROLOL TARTRATE 1 MG/ML
5 INJECTION, SOLUTION INTRAVENOUS EVERY 5 MIN PRN
Status: DISCONTINUED | OUTPATIENT
Start: 2017-12-30 | End: 2018-01-02 | Stop reason: HOSPADM

## 2017-12-30 RX ADMIN — IPRATROPIUM BROMIDE AND ALBUTEROL SULFATE 3 ML: .5; 3 SOLUTION RESPIRATORY (INHALATION) at 01:12

## 2017-12-30 RX ADMIN — FUROSEMIDE 20 MG: 10 INJECTION, SOLUTION INTRAMUSCULAR; INTRAVENOUS at 08:12

## 2017-12-30 RX ADMIN — OSELTAMIVIR PHOSPHATE 75 MG: 75 CAPSULE ORAL at 04:12

## 2017-12-30 RX ADMIN — APIXABAN 2.5 MG: 2.5 TABLET, FILM COATED ORAL at 08:12

## 2017-12-30 RX ADMIN — FUROSEMIDE 40 MG: 10 INJECTION, SOLUTION INTRAMUSCULAR; INTRAVENOUS at 03:12

## 2017-12-30 RX ADMIN — METOPROLOL TARTRATE 5 MG: 5 INJECTION INTRAVENOUS at 07:12

## 2017-12-30 RX ADMIN — BRIMONIDINE TARTRATE 1 DROP: 2 SOLUTION OPHTHALMIC at 09:12

## 2017-12-30 RX ADMIN — LATANOPROST 1 DROP: 50 SOLUTION OPHTHALMIC at 08:12

## 2017-12-30 RX ADMIN — PREDNISONE 40 MG: 20 TABLET ORAL at 04:12

## 2017-12-30 RX ADMIN — METOPROLOL TARTRATE 50 MG: 50 TABLET ORAL at 08:12

## 2017-12-30 RX ADMIN — DORZOLAMIDE HYDROCHLORIDE 1 DROP: 20 SOLUTION/ DROPS OPHTHALMIC at 09:12

## 2017-12-30 RX ADMIN — IPRATROPIUM BROMIDE AND ALBUTEROL SULFATE 3 ML: .5; 3 SOLUTION RESPIRATORY (INHALATION) at 09:12

## 2017-12-30 RX ADMIN — INSULIN ASPART 3 UNITS: 100 INJECTION, SOLUTION INTRAVENOUS; SUBCUTANEOUS at 06:12

## 2017-12-30 NOTE — ED PROVIDER NOTES
Encounter Date: 12/30/2017    SCRIBE #1 NOTE: I, Marzena Boo, am scribing for, and in the presence of,  Dr. Lopez. I have scribed the following portions of the note - the APC attestation.       History     Chief Complaint   Patient presents with    Shortness of Breath     This is a 89 year old female with a PMH of HTN, persistent A Fib rate controlled on Eliquis, diastolic HFpEF (60%), CKD, hypothyroid, asthma who presents to the ED with a chief complaint of shortness of breath. Patient reports she began with URI symptoms 2 days ago including nasal congestion, dry cough, and fatigue. She awoke last night with difficulty breathing and orthopnea, having to sleep in her recliner. She reports worsening SOB, chest tightness, wheezing and gasping for air early this morning. Her daughter brought her to urgent care where she was transferred via EMS here for further evaluation. She received Duo nebs en route with significant symptomatic improvement, however after 1 hour wait to be seen by provider her symptoms have returned. She notes chest pain associated with coughing, denies chest pain at rest. She reports medication compliance and has taken her morning medications today. Denies fever, headache, vision changes, nausea/vomiting, abdominal pain, dysuria, diarrhea, peripheral edema. She is a nonsmoker. She does admit to increased dietary sodium intake over the holidays with her relatives preparing foods; she usually seasons her food with salt free seasonings. She weighs herself daily and has not noticed weight changes.          Review of patient's allergies indicates:   Allergen Reactions    Codeine      Other reaction(s): Itching  Other reaction(s): Nausea     Past Medical History:   Diagnosis Date    Allergy     Anemia     Arthritis     Asthma     Cataract     right eye    CHF (congestive heart failure) 5/2/2017    Chronic kidney disease     Degenerative disc disease     Diabetes mellitus type II      Glaucoma     History of pseudogout 8/23/2012    Hyperlipidemia     Hypertension     Iritis     Thyroid disease      Past Surgical History:   Procedure Laterality Date    CARDIAC CATHETERIZATION      CATARACT EXTRACTION      IOL OS    CHOLECYSTECTOMY      EYE SURGERY      gall stone      HYSTERECTOMY      VARICOSE VEIN SURGERY       Family History   Problem Relation Age of Onset    Diabetes Mother     Hypertension Mother     Glaucoma Mother     Hypertension Father     Diabetes Son     No Known Problems Daughter     Diabetes Daughter     No Known Problems Son      Social History   Substance Use Topics    Smoking status: Never Smoker    Smokeless tobacco: Never Used    Alcohol use No     Review of Systems   Constitutional: Negative for chills and fever.   HENT: Positive for congestion and rhinorrhea. Negative for sore throat.    Respiratory: Positive for cough, chest tightness, shortness of breath and wheezing.    Cardiovascular: Positive for chest pain.   Gastrointestinal: Negative for nausea.   Genitourinary: Negative for dysuria.   Musculoskeletal: Negative for back pain.   Skin: Negative for rash.   Neurological: Negative for weakness.   Hematological: Does not bruise/bleed easily.       Physical Exam     Initial Vitals [12/30/17 1135]   BP Pulse Resp Temp SpO2   (!) 166/88 88 (!) 22 97.1 °F (36.2 °C) 99 %      MAP       114         Physical Exam    Constitutional: She appears well-developed and well-nourished.   HENT:   Head: Normocephalic and atraumatic.   Right Ear: Tympanic membrane and ear canal normal.   Left Ear: Tympanic membrane and ear canal normal.   Mouth/Throat: Mucous membranes are dry.   Eyes: Conjunctivae and EOM are normal. Pupils are equal, round, and reactive to light.   Neck: Normal range of motion. Neck supple. No JVD present.   Cardiovascular: Normal rate, regular rhythm, normal heart sounds and intact distal pulses.   No peripheral edema.   Pulmonary/Chest: No  respiratory distress. She has decreased breath sounds. She has wheezes (throughout). She has no rhonchi. She has no rales.   Abdominal: Soft. Bowel sounds are normal. There is no tenderness. There is no rebound and no guarding.   Neurological: She is alert and oriented to person, place, and time.   Skin: Skin is warm and dry. No rash noted.   Skin tenting         ED Course   Procedures  Labs Reviewed   CBC W/ AUTO DIFFERENTIAL - Abnormal; Notable for the following:        Result Value    Hematocrit 36.8 (*)     RDW 15.2 (*)     Gran # 9.6 (*)     Immature Grans (Abs) 0.05 (*)     Lymph # 0.6 (*)     Gran% 90.0 (*)     Lymph% 5.3 (*)     Mono% 3.3 (*)     All other components within normal limits   COMPREHENSIVE METABOLIC PANEL - Abnormal; Notable for the following:     Glucose 170 (*)     Creatinine 1.6 (*)     Alkaline Phosphatase 166 (*)     AST 51 (*)     eGFR if  32.0 (*)     eGFR if non  27.8 (*)     All other components within normal limits   B-TYPE NATRIURETIC PEPTIDE - Abnormal; Notable for the following:     BNP 1,279 (*)     All other components within normal limits   HEMOGLOBIN A1C - Abnormal; Notable for the following:     Hemoglobin A1C 6.6 (*)     Estimated Avg Glucose 143 (*)     All other components within normal limits    Narrative:     Hemoglobin Alc add on per MD Shelby  12/30/2017  16:08    POCT GLUCOSE - Abnormal; Notable for the following:     POCT Glucose 168 (*)     All other components within normal limits   POCT GLUCOSE - Abnormal; Notable for the following:     POCT Glucose 306 (*)     All other components within normal limits   TROPONIN I   PROTIME-INR   INFLUENZA A AND B ANTIGEN   HEMOGLOBIN A1C   TROPONIN I   POCT GLUCOSE MONITORING CONTINUOUS   POCT GLUCOSE MONITORING CONTINUOUS         Imaging Results          X-Ray Chest AP Portable (Final result)  Result time 12/30/17 13:54:13    Final result by Leland Santiago MD (12/30/17 13:54:13)                  Impression:      Minimal central hilar congestive change, otherwise chronic interstitial findings, no convincing large focal consolidation.        Electronically signed by: ARTURO GRANGER MD  Date:     12/30/17  Time:    13:54              Narrative:    Chest AP portable    Indication:Congestive heart failure    Comparison:4/20/2017    Findings: Exam is limited secondary to patient rotation. The cardiomediastinal silhouette is prominent, although appears slightly decreased in size as compared to the previous exam noting calcified tortuous aorta.  There is no pleural effusion.  The trachea is midline.  The lungs are symmetrically expanded bilaterally with prominent interstitial attenuation bilaterally, primarily in a perihilar distribution suggesting likely chronic congestive change.  Evaluation of the right upper lung zone is limited given patient rotation, hazy attenuation is likely reflection of rotation rather than developing consolidation. No large focal consolidation seen.  There is no pneumothorax.  The osseous structures are remarkable for degenerative changes.                                   Medical Decision Making:   History:   Old Medical Records: I decided to obtain old medical records.  Clinical Tests:   Lab Tests: Ordered and Reviewed  Radiological Study: Ordered and Reviewed  Medical Tests: Ordered and Reviewed  Other:   I have discussed this case with another health care provider.       APC / Resident Notes:   89 year old female presents with URI symptoms and SOB.  On exam she is afebrile and nontoxic. She has diffuse wheezing throughout all lung fields, decreased sounds at the bases. No peripheral edema or JVD. She appears euvolemic.     DDX includes but is not limited to influenza, viral URI, CHF exacerbation, pneumonia.    Patient reports significant improvement with Duonebs, wheezing has resolved. She continues to have decreased breath sounds at the bases bilaterally. Her labs show  elevated BNP 1279, normal troponin. CXR with mild edema, no focal consolidation. Will given prednisone and tamiflu to address viral URI with asthma exacerbation, lasix 40mg and admit with CHF exacerbation.  I discussed the care of this patient with my supervising MD.        Heri Attestation:   Scribe #1: I performed the above scribed service and the documentation accurately describes the services I performed. I attest to the accuracy of the note.    Attending Attestation:     Physician Attestation Statement for NP/PA:   I have conducted a face to face encounter with this patient in addition to the NP/PA, due to Medical Complexity    Other NP/PA Attestation Additions:    History of Present Illness: Pt presents with SOB, coughing and wheezing.   Physical Exam: On exam, she has diffuse wheezes.   Medical Decision Making: CXR has mild pulmonary edema and she has elevated BNP. Will bring in to Medicine for mild CHF and RAD.                   ED Course      Clinical Impression:   The primary encounter diagnosis was Acute on chronic diastolic congestive heart failure. Diagnoses of Shortness of breath, Viral URI, and CHF (congestive heart failure) were also pertinent to this visit.    Disposition:   Disposition: Admitted  Condition: Stable                        Janny Pineda PA-C  12/30/17 1588

## 2017-12-30 NOTE — ED NOTES
Attempted to call report to floor- Pt has not been assigned to an RN yet, unable to give report at this time.

## 2017-12-30 NOTE — H&P
[facility] Hospital Medicine  History & Physical    Patient Name: Tiana Mead  MRN: [id number]  Admission Date: 12/30/2017  Attending Physician: Jean Carlos Garcia MD  Primary Care Provider: Belen Wood MD    [facility] Medicine Team: [facility]OSP MED B Jean Carlos Garcia MD     Patient information was obtained from patient, past medical records and ER records.     Subjective:     Principal Problem:Acute on chronic diastolic congestive heart failure    Chief Complaint:   Chief Complaint   Patient presents with    Shortness of Breath        HPI: Tiana Mead is a 89 y.o. female with a PMH of Diastolic CHF  who presented to the ED on 12/30/2017 diagnosed with  Shortness of Breath  AAOx3,  reports she began having rhinorrhea with fatigue  2 days ago iassociated. productive cough  started yesterday with associated  worsening SOB at rest and exertion, chest tightness and wheezing .  She awoke last night with difficulty breathing and orthopnea, having to sleep in her recliner elda for 2-3hr. reports weight gain of 1-2lb and increased intake of salt recently on christmas eve.Daughter took her to urgent care where she was transferred via EMS to [facility]  for further evaluation. She received Duo nebs en route. C/o  chest pain associated with coughing, denies chest pain at rest. She reports medication complianc. smoked a pack of cigaretted in her life.    Lives with daughter. ambulates with cane. ADL independent        Past Medical History:   Diagnosis Date    Allergy     Anemia     Arthritis     Asthma     Cataract     right eye    CHF (congestive heart failure) 5/2/2017    Chronic kidney disease     Degenerative disc disease     Diabetes mellitus type II     Glaucoma     History of pseudogout 8/23/2012    Hyperlipidemia     Hypertension     Iritis     Thyroid disease        Past Surgical History:   Procedure Laterality Date    CARDIAC CATHETERIZATION      CATARACT EXTRACTION      IOL OS  "   CHOLECYSTECTOMY      EYE SURGERY      gall stone      HYSTERECTOMY      VARICOSE VEIN SURGERY         Review of patient's allergies indicates:   Allergen Reactions    Codeine      Other reaction(s): Itching  Other reaction(s): Nausea       No current facility-administered medications on file prior to encounter.      Current Outpatient Prescriptions on File Prior to Encounter   Medication Sig    albuterol 90 mcg/actuation inhaler Inhale 2 puffs into the lungs every 4 (four) hours as needed.    amlodipine (NORVASC) 5 MG tablet Take 1 tablet (5 mg total) by mouth every morning.    apixaban 2.5 mg Tab Take 1 tablet (2.5 mg total) by mouth 2 (two) times daily.    atorvastatin (LIPITOR) 40 MG tablet Take 1 tablet (40 mg total) by mouth once daily.    brimonidine 0.2% (ALPHAGAN) 0.2 % Drop INT 1 GTT INTO OU BID    diclofenac sodium (VOLTAREN) 1 % Gel Apply topically 4 (four) times daily.    dorzolamide (TRUSOPT) 2 % ophthalmic solution Place 1 drop into both eyes 2 (two) times daily.    fluticasone-salmeterol 250-50 mcg/dose (ADVAIR) 250-50 mcg/dose diskus inhaler Inhale 1 puff into the lungs nightly. Controller    insulin aspart protamine-insulin aspart (NOVOLOG MIX 70-30 FLEXPEN) 100 unit/mL (70-30) InPn pen Take 12 units once daily before breakfast.    insulin needles, disposable, (BD INSULIN PEN NEEDLE UF SHORT) 31 X 5/16 " Ndle Inject 1 Box into the skin 2 (two) times daily.    isosorbide mononitrate (IMDUR) 30 MG 24 hr tablet Take 1 tablet (30 mg total) by mouth every morning. Taking for heart    lancets (ONETOUCH DELICA LANCETS) 33 gauge Misc Apply 1 lancet topically 3 (three) times daily.    lancets Misc 1 Device by Misc.(Non-Drug; Combo Route) route 3 (three) times daily before meals. Please provide the insurance company preferred product.    latanoprost 0.005 % ophthalmic solution Place 1 drop into the right eye every evening. (Patient taking differently: Place 1 drop into both eyes every " evening. )    levothyroxine (SYNTHROID) 75 MCG tablet Take 1 tablet (75 mcg total) by mouth before breakfast.    metoprolol tartrate (LOPRESSOR) 50 MG tablet Take 1 tablet (50 mg total) by mouth 2 (two) times daily.    multivit-mineral-iron-lutein (CENTRUM SILVER ULTRA WOMEN'S) Tab Take 1 tablet by mouth once daily.    TRUE METRIX GLUCOSE METER Misc TEST TID    TRUEPLUS LANCETS 30 gauge Misc USE TO TEST BLOOD SUGAR TID AC    blood sugar diagnostic (ONETOUCH ULTRA TEST) Strp Inject 1 strip into the skin 3 (three) times daily.    furosemide (LASIX) 40 MG tablet Take 1 tablet (40 mg total) by mouth 2 (two) times daily. 1 tb am. 1 at 4pm.    meclizine (ANTIVERT) 12.5 mg tablet Take 1 tablet (12.5 mg total) by mouth 3 (three) times daily as needed.    nitroGLYCERIN (NITROSTAT) 0.4 MG SL tablet Place 0.4 mg under the tongue every 5 (five) minutes as needed for Chest pain.     Family History     Problem Relation (Age of Onset)    Diabetes Mother, Son, Daughter    Glaucoma Mother    Hypertension Mother, Father    No Known Problems Daughter, Son        Social History Main Topics    Smoking status: Never Smoker    Smokeless tobacco: Never Used    Alcohol use No    Drug use: No    Sexual activity: No     Review of Systems   Constitutional: Positive for activity change, fatigue (weight gain of 1-2Lb) and unexpected weight change. Negative for appetite change, chills, diaphoresis and fever.   HENT: Positive for rhinorrhea and sneezing. Negative for dental problem, ear discharge, ear pain, facial swelling, hearing loss and sinus pressure. Postnasal drip: intermittent.    Eyes: Negative for pain, discharge and itching.   Respiratory: Positive for cough, shortness of breath and wheezing. Negative for apnea, choking and chest tightness (productive).    Cardiovascular: Positive for chest pain (with cough), palpitations and leg swelling (trace).   Gastrointestinal: Negative for abdominal distention, abdominal pain,  constipation, diarrhea, nausea and vomiting.   Endocrine: Negative for cold intolerance.   Genitourinary: Negative for difficulty urinating, dysuria, frequency, hematuria and urgency.   Musculoskeletal: Positive for arthralgias (shoulders, knee and fingers) and gait problem. Negative for back pain (ambulates with cane), joint swelling, myalgias, neck pain and neck stiffness.   Skin: Negative for color change, pallor and rash.   Allergic/Immunologic: Negative for environmental allergies.   Neurological: Positive for weakness. Negative for dizziness, tremors, syncope, facial asymmetry, speech difficulty, light-headedness and headaches.   Psychiatric/Behavioral: Negative for dysphoric mood.     Objective:     Vital Signs (Most Recent):  Temp: 99 °F (37.2 °C) (12/30/17 1450)  Pulse: (!) 118 (12/30/17 1450)  Resp: 18 (12/30/17 1450)  BP: 129/69 (12/30/17 1450)  SpO2: (!) 92 % (12/30/17 1450) Vital Signs (24h Range):  Temp:  [97.1 °F (36.2 °C)-99 °F (37.2 °C)] 99 °F (37.2 °C)  Pulse:  [] 118  Resp:  [18-22] 18  SpO2:  [92 %-99 %] 92 %  BP: (129-166)/(69-88) 129/69        There is no height or weight on file to calculate BMI.    Physical Exam   Constitutional: She is oriented to person, place, and time. She appears well-developed and well-nourished.   HENT:   Head: Normocephalic and atraumatic.   Mouth/Throat: Oropharynx is clear and moist. No oropharyngeal exudate.   Eyes: Conjunctivae and EOM are normal. Right eye exhibits no discharge. Left eye exhibits no discharge.   left eye surgical pupil, cataracts present   Neck: Normal range of motion. Neck supple. No JVD present. No tracheal deviation present.   Cardiovascular: Normal rate, regular rhythm and normal heart sounds.  Exam reveals no gallop and no friction rub.    No murmur heard.  Pulmonary/Chest: She has wheezes. She has rales. She exhibits no tenderness.   Abdominal: Soft. Bowel sounds are normal. She exhibits no distension and no mass. There is no  tenderness. There is no guarding.   Musculoskeletal: Normal range of motion. She exhibits edema (trace).   RLE > LLE (chronic)   Lymphadenopathy:     She has no cervical adenopathy.   Neurological: She is oriented to person, place, and time.   able to move upper and lower extremities without limitation   Skin: Skin is warm and dry.   Psychiatric: She has a normal mood and affect.   Nursing note and vitals reviewed.        CRANIAL NERVES     CN III, IV, VI   Extraocular motions are normal.       Significant Labs:   A1C:     Recent Labs  Lab 12/06/17  0852   HGBA1C 6.7*     ABGs: No results for input(s): PH, PCO2, HCO3, POCSATURATED, BE, TOTALHB, COHB, METHB, O2HB, POCFIO2 in the last 48 hours.  Bilirubin:     Recent Labs  Lab 12/06/17  0852 12/30/17  1333   BILITOT 0.7 0.8     Blood Culture: No results for input(s): LABBLOO in the last 48 hours.  BMP:     Recent Labs  Lab 12/30/17  1333   *      K 4.0      CO2 27   BUN 24   CREATININE 1.6*   CALCIUM 9.9     CBC:     Recent Labs  Lab 12/30/17  1333   WBC 10.63   HGB 12.0   HCT 36.8*        CMP:     Recent Labs  Lab 12/30/17  1333      K 4.0      CO2 27   *   BUN 24   CREATININE 1.6*   CALCIUM 9.9   PROT 7.6   ALBUMIN 3.6   BILITOT 0.8   ALKPHOS 166*   AST 51*   ALT 38   ANIONGAP 12   EGFRNONAA 27.8*     Cardiac Markers:     Recent Labs  Lab 12/30/17  1333   BNP 1,279*     Coagulation:     Recent Labs  Lab 12/30/17  1333   INR 1.0     Lactic Acid: No results for input(s): LACTATE in the last 48 hours.  Lipase: No results for input(s): LIPASE in the last 48 hours.  Lipid Panel: No results for input(s): CHOL, HDL, LDLCALC, TRIG, CHOLHDL in the last 48 hours.  Magnesium: No results for input(s): MG in the last 48 hours.  POCT Glucose:     Recent Labs  Lab 12/30/17  1342   POCTGLUCOSE 168*     Prealbumin: No results for input(s): PREALBUMIN in the last 48 hours.  Respiratory Culture: No results for input(s): GSRESP, RESPIRATORYC  in the last 48 hours.  Troponin:     Recent Labs  Lab 12/30/17  1333   TROPONINI <0.006     TSH: No results for input(s): TSH in the last 4320 hours.  Urine Culture: No results for input(s): LABURIN in the last 48 hours.  Urine Studies: No results for input(s): COLORU, APPEARANCEUA, PHUR, SPECGRAV, PROTEINUA, GLUCUA, KETONESU, BILIRUBINUA, OCCULTUA, NITRITE, UROBILINOGEN, LEUKOCYTESUR, RBCUA, WBCUA, BACTERIA, SQUAMEPITHEL, HYALINECASTS in the last 48 hours.    Invalid input(s): WRIGHTSUR  All pertinent labs within the past 24 hours have been reviewed.    Significant Imaging:   Imaging Results          X-Ray Chest AP Portable (Final result)  Result time 12/30/17 13:54:13    Final result by Arturo Santiago MD (12/30/17 13:54:13)                 Impression:      Minimal central hilar congestive change, otherwise chronic interstitial findings, no convincing large focal consolidation.        Electronically signed by: ARTURO SANTIAGO MD  Date:     12/30/17  Time:    13:54              Narrative:    Chest AP portable    Indication:Congestive heart failure    Comparison:4/20/2017    Findings: Exam is limited secondary to patient rotation. The cardiomediastinal silhouette is prominent, although appears slightly decreased in size as compared to the previous exam noting calcified tortuous aorta.  There is no pleural effusion.  The trachea is midline.  The lungs are symmetrically expanded bilaterally with prominent interstitial attenuation bilaterally, primarily in a perihilar distribution suggesting likely chronic congestive change.  Evaluation of the right upper lung zone is limited given patient rotation, hazy attenuation is likely reflection of rotation rather than developing consolidation. No large focal consolidation seen.  There is no pneumothorax.  The osseous structures are remarkable for degenerative changes.                            EKG -     Assessment/Plan:     Active Diagnoses:    Diagnosis Date Noted POA     PRINCIPAL PROBLEM:  Acute on chronic diastolic congestive heart failure [I50.33]BNP 1200. I/O monitor, daily weight. on 3L oxygen NC. Taper oxygen as tolerated to Spo2 goal of low 90s. Echo 06/17.s erial troponins   12/30/2017 Yes    Asthma exacerbation [J45.901] influenza swab negative. Duonebs q 4hrly. prednisone 12/30/2017 Yes    Primary hypothyroidism [E03.9]on levothyroxine 12/13/2017 Yes    CKD (chronic kidney disease) stage 3, GFR 30-59 ml/min, eGFR 32  [N18.3]seems to be at baseline  08/15/2017 Yes    Coronary artery disease involving native coronary artery without angina pectoris [I25.10] 08/15/2017 Yes    Polyneuropathy, diabetic [E11.42]not on medications 08/15/2017 Yes    Type 2 diabetes mellitus with renal manifestations [E11.29]as above 08/15/2017 Yes    H/O Deep vein thrombosis (DVT) [I82.409]10yr back 06/15/2017 Yes    Glaucoma associated with ocular trauma [H40.30X0]continue eye drops 05/29/2017 Yes    Pericardial effusion [I31.3]Normal left ventricular systolic function (EF 60-65%) with Moderate pericardial effusion.repeat echocardiogram  05/29/2017 Yes    Long term current use of anticoagulant therapy [Z79.01]continue apixaban  04/20/2017 Not Applicable    Persistent atrial fibrillation [I48.1]continue metoprolol and apixaban  04/20/2017 Yes    Type 2 diabetes mellitus with diabetic polyneuropathy, with long-term current use of insulin [E11.42, Z79.4]FS AC and HS. SSI 04/20/2017 Not Applicable    Chondrocalcinosis [M11.20]knee, shoulder and hand pains. Tylenol as needed  10/30/2012 Yes    Generalized osteoarthritis of multiple sites [M15.9]as above 08/15/2012 Yes    Essential hypertension [I10]controlled on norvasc and metoprolol  07/03/2012 Yes    Hyperlipemia, mixed [E78.2]on Atorvastatin  07/03/2012 Yes      Problems Resolved During this Admission:    Diagnosis Date Noted Date Resolved POA     VTE Risk Mitigation     None            Jean Carlos Garcia MD  Department of  Hospital Medicine [facility]

## 2017-12-30 NOTE — ED TRIAGE NOTES
"SOB x last two days. Has achest cold x 3 days , Feels "short winded". Denies swelling. Productive cough w/ white. Coming from [facility] in [place]. Chest pain with coughing. Denies fever.Denies sick contacts.   "

## 2017-12-30 NOTE — ED NOTES
LOC: The patient is awake and alert; oriented x 3 and speaking appropriately.  APPEARANCE: Patient resting comfortably, patient is clean and well groomed  SKIN: warm and dry, normal skin turgor & moist mucus membranes, skin intact, no breakdown noted.  MUSCULOSKELETAL: Patient moving all extremities well, no obvious swelling or deformities noted  RESPIRATORY: Airway is open and patent, audible wheezing noted; respirations are spontaneous, normal effort and rate  CARDIAC: Patient has a normal rate, no peripheral edema noted, capillary refill < 3 seconds;  complaints of chest pain with coughing  ABDOMEN: Soft and non tender to palpation, no distention noted. Bowel sounds present x 4

## 2017-12-31 LAB
ALBUMIN SERPL BCP-MCNC: 3.4 G/DL
ALBUMIN SERPL BCP-MCNC: 3.4 G/DL
ALP SERPL-CCNC: 154 U/L
ALP SERPL-CCNC: 154 U/L
ALT SERPL W/O P-5'-P-CCNC: 37 U/L
ALT SERPL W/O P-5'-P-CCNC: 37 U/L
ANION GAP SERPL CALC-SCNC: 13 MMOL/L
AST SERPL-CCNC: 37 U/L
AST SERPL-CCNC: 37 U/L
BASOPHILS # BLD AUTO: 0.01 K/UL
BASOPHILS NFR BLD: 0.1 %
BILIRUB DIRECT SERPL-MCNC: 0.3 MG/DL
BILIRUB SERPL-MCNC: 0.7 MG/DL
BILIRUB SERPL-MCNC: 0.7 MG/DL
BUN SERPL-MCNC: 29 MG/DL
CALCIUM SERPL-MCNC: 9.5 MG/DL
CHLORIDE SERPL-SCNC: 103 MMOL/L
CO2 SERPL-SCNC: 27 MMOL/L
CREAT SERPL-MCNC: 1.6 MG/DL
DIFFERENTIAL METHOD: ABNORMAL
EOSINOPHIL # BLD AUTO: 0 K/UL
EOSINOPHIL NFR BLD: 0.1 %
ERYTHROCYTE [DISTWIDTH] IN BLOOD BY AUTOMATED COUNT: 15.1 %
EST. GFR  (AFRICAN AMERICAN): 32 ML/MIN/1.73 M^2
EST. GFR  (NON AFRICAN AMERICAN): 27.8 ML/MIN/1.73 M^2
GLUCOSE SERPL-MCNC: 242 MG/DL
HCT VFR BLD AUTO: 35.5 %
HGB BLD-MCNC: 11.7 G/DL
IMM GRANULOCYTES # BLD AUTO: 0.07 K/UL
IMM GRANULOCYTES NFR BLD AUTO: 0.5 %
LYMPHOCYTES # BLD AUTO: 0.8 K/UL
LYMPHOCYTES NFR BLD: 5.9 %
MAGNESIUM SERPL-MCNC: 1.9 MG/DL
MCH RBC QN AUTO: 29 PG
MCHC RBC AUTO-ENTMCNC: 33 G/DL
MCV RBC AUTO: 88 FL
MONOCYTES # BLD AUTO: 1 K/UL
MONOCYTES NFR BLD: 6.9 %
NEUTROPHILS # BLD AUTO: 12 K/UL
NEUTROPHILS NFR BLD: 86.5 %
NRBC BLD-RTO: 0 /100 WBC
PHOSPHATE SERPL-MCNC: 3.8 MG/DL
PLATELET # BLD AUTO: 159 K/UL
PMV BLD AUTO: 12.1 FL
POCT GLUCOSE: 276 MG/DL (ref 70–110)
POCT GLUCOSE: 286 MG/DL (ref 70–110)
POCT GLUCOSE: 289 MG/DL (ref 70–110)
POCT GLUCOSE: 296 MG/DL (ref 70–110)
POTASSIUM SERPL-SCNC: 4.1 MMOL/L
PROT SERPL-MCNC: 7.4 G/DL
PROT SERPL-MCNC: 7.4 G/DL
RBC # BLD AUTO: 4.03 M/UL
SODIUM SERPL-SCNC: 143 MMOL/L
TROPONIN I SERPL DL<=0.01 NG/ML-MCNC: 0.01 NG/ML
TROPONIN I SERPL DL<=0.01 NG/ML-MCNC: 0.03 NG/ML
WBC # BLD AUTO: 13.83 K/UL

## 2017-12-31 PROCEDURE — 27000221 HC OXYGEN, UP TO 24 HOURS

## 2017-12-31 PROCEDURE — 80053 COMPREHEN METABOLIC PANEL: CPT

## 2017-12-31 PROCEDURE — 25000003 PHARM REV CODE 250: Performed by: HOSPITALIST

## 2017-12-31 PROCEDURE — 25000242 PHARM REV CODE 250 ALT 637 W/ HCPCS: Performed by: HOSPITALIST

## 2017-12-31 PROCEDURE — 85025 COMPLETE CBC W/AUTO DIFF WBC: CPT

## 2017-12-31 PROCEDURE — 12000002 HC ACUTE/MED SURGE SEMI-PRIVATE ROOM

## 2017-12-31 PROCEDURE — 83735 ASSAY OF MAGNESIUM: CPT

## 2017-12-31 PROCEDURE — 84100 ASSAY OF PHOSPHORUS: CPT

## 2017-12-31 PROCEDURE — 99233 SBSQ HOSP IP/OBS HIGH 50: CPT | Mod: ,,, | Performed by: HOSPITALIST

## 2017-12-31 PROCEDURE — 94761 N-INVAS EAR/PLS OXIMETRY MLT: CPT

## 2017-12-31 PROCEDURE — 63600175 PHARM REV CODE 636 W HCPCS: Performed by: HOSPITALIST

## 2017-12-31 PROCEDURE — 84484 ASSAY OF TROPONIN QUANT: CPT

## 2017-12-31 PROCEDURE — 94640 AIRWAY INHALATION TREATMENT: CPT

## 2017-12-31 RX ORDER — GUAIFENESIN 100 MG/5ML
200 SOLUTION ORAL EVERY 4 HOURS PRN
Status: DISCONTINUED | OUTPATIENT
Start: 2017-12-31 | End: 2018-01-02 | Stop reason: HOSPADM

## 2017-12-31 RX ADMIN — APIXABAN 2.5 MG: 2.5 TABLET, FILM COATED ORAL at 08:12

## 2017-12-31 RX ADMIN — APIXABAN 2.5 MG: 2.5 TABLET, FILM COATED ORAL at 09:12

## 2017-12-31 RX ADMIN — LATANOPROST 1 DROP: 50 SOLUTION OPHTHALMIC at 09:12

## 2017-12-31 RX ADMIN — BRIMONIDINE TARTRATE 1 DROP: 2 SOLUTION OPHTHALMIC at 09:12

## 2017-12-31 RX ADMIN — IPRATROPIUM BROMIDE AND ALBUTEROL SULFATE 3 ML: .5; 3 SOLUTION RESPIRATORY (INHALATION) at 04:12

## 2017-12-31 RX ADMIN — GUAIFENESIN 200 MG: 200 SOLUTION ORAL at 09:12

## 2017-12-31 RX ADMIN — LEVOTHYROXINE SODIUM 75 MCG: 75 TABLET ORAL at 06:12

## 2017-12-31 RX ADMIN — INSULIN ASPART 2 UNITS: 100 INJECTION, SOLUTION INTRAVENOUS; SUBCUTANEOUS at 11:12

## 2017-12-31 RX ADMIN — METOPROLOL TARTRATE 50 MG: 50 TABLET ORAL at 09:12

## 2017-12-31 RX ADMIN — INSULIN DETEMIR 10 UNITS: 100 INJECTION, SOLUTION SUBCUTANEOUS at 08:12

## 2017-12-31 RX ADMIN — BRIMONIDINE TARTRATE 1 DROP: 2 SOLUTION OPHTHALMIC at 08:12

## 2017-12-31 RX ADMIN — DICLOFENAC: 10 GEL TOPICAL at 12:12

## 2017-12-31 RX ADMIN — AMLODIPINE BESYLATE 5 MG: 5 TABLET ORAL at 06:12

## 2017-12-31 RX ADMIN — INSULIN ASPART 3 UNITS: 100 INJECTION, SOLUTION INTRAVENOUS; SUBCUTANEOUS at 06:12

## 2017-12-31 RX ADMIN — PREDNISONE 40 MG: 20 TABLET ORAL at 08:12

## 2017-12-31 RX ADMIN — DORZOLAMIDE HYDROCHLORIDE 1 DROP: 20 SOLUTION/ DROPS OPHTHALMIC at 09:12

## 2017-12-31 RX ADMIN — IPRATROPIUM BROMIDE AND ALBUTEROL SULFATE 3 ML: .5; 3 SOLUTION RESPIRATORY (INHALATION) at 08:12

## 2017-12-31 RX ADMIN — FUROSEMIDE 40 MG: 10 INJECTION, SOLUTION INTRAMUSCULAR; INTRAVENOUS at 05:12

## 2017-12-31 RX ADMIN — DORZOLAMIDE HYDROCHLORIDE 1 DROP: 20 SOLUTION/ DROPS OPHTHALMIC at 08:12

## 2017-12-31 RX ADMIN — ISOSORBIDE MONONITRATE 30 MG: 30 TABLET, EXTENDED RELEASE ORAL at 06:12

## 2017-12-31 RX ADMIN — INSULIN ASPART 3 UNITS: 100 INJECTION, SOLUTION INTRAVENOUS; SUBCUTANEOUS at 01:12

## 2017-12-31 RX ADMIN — ATORVASTATIN CALCIUM 40 MG: 20 TABLET, FILM COATED ORAL at 08:12

## 2017-12-31 RX ADMIN — DICLOFENAC: 10 GEL TOPICAL at 05:12

## 2017-12-31 RX ADMIN — INSULIN ASPART 1 UNITS: 100 INJECTION, SOLUTION INTRAVENOUS; SUBCUTANEOUS at 12:12

## 2017-12-31 RX ADMIN — FUROSEMIDE 40 MG: 10 INJECTION, SOLUTION INTRAMUSCULAR; INTRAVENOUS at 08:12

## 2017-12-31 RX ADMIN — INSULIN ASPART 3 UNITS: 100 INJECTION, SOLUTION INTRAVENOUS; SUBCUTANEOUS at 09:12

## 2017-12-31 RX ADMIN — FLUTICASONE FUROATE AND VILANTEROL TRIFENATATE 1 PUFF: 100; 25 POWDER RESPIRATORY (INHALATION) at 10:12

## 2017-12-31 RX ADMIN — IPRATROPIUM BROMIDE AND ALBUTEROL SULFATE 3 ML: .5; 3 SOLUTION RESPIRATORY (INHALATION) at 12:12

## 2017-12-31 RX ADMIN — IPRATROPIUM BROMIDE AND ALBUTEROL SULFATE 3 ML: .5; 3 SOLUTION RESPIRATORY (INHALATION) at 01:12

## 2017-12-31 RX ADMIN — METOPROLOL TARTRATE 50 MG: 50 TABLET ORAL at 08:12

## 2017-12-31 RX ADMIN — IPRATROPIUM BROMIDE AND ALBUTEROL SULFATE 3 ML: .5; 3 SOLUTION RESPIRATORY (INHALATION) at 09:12

## 2017-12-31 NOTE — PLAN OF CARE
Problem: Patient Care Overview  Goal: Plan of Care Review   12/31/17 0319   Coping/Psychosocial   Plan Of Care Reviewed With patient   Plan of care reviewed with patient; patient on cardiac monitoring and cont pulse ox; call light within place; frequent monitoring continued; side rails x3; no s/sx of distress. Will cont to monitor patient.

## 2017-12-31 NOTE — PLAN OF CARE
Problem: Patient Care Overview  Goal: Plan of Care Review  Outcome: Ongoing (interventions implemented as appropriate)  Received patient awake in bed. Greeted her and gained consent for nursing care. Remains on cardiac monitor/continous pulse ox. With ongoing O2 3 LPM via [place]. Weaned on O2, sat 98% at room air. Mobilized to the toilet with assistance. Capillary blood sugar monitored. On strict I&O, patient on external catheter. On regular neurological assessment. Will continue to monitor.

## 2017-12-31 NOTE — PROGRESS NOTES
Progress Note  Hospital Medicine    Admit Date: 12/30/2017  Length of Stay:  LOS: 1 day     SUBJECTIVE:         Follow-up For:  Acute on chronic diastolic congestive heart failure    HPI/Interval history (See H&P for complete P,F,SHx) :     12/31/17  negative balance of 700ml. had atrial fibrillation RVR last night controlled with IV Metoprolol       Review of Systems: List if applicable  Pain scale: 0 /10  Constitutional- Positive for Weakness  Resp- Positive for Cough, SOB  Musculoskeletal: Positive for arthralgias (shoulders, knee and fingers)    OBJECTIVE:     Vital Signs Range (Last 24H):  Temp:  [97.1 °F (36.2 °C)-99.1 °F (37.3 °C)]   Pulse:  []   Resp:  [16-22]   BP: (124-166)/(69-98)   SpO2:  [90 %-99 %]     Physical Exam   Constitutional: She is oriented to person, place, and time. She appears well-developed and well-nourished.   HENT:   Head: Normocephalic and atraumatic.   Mouth/Throat: Oropharynx is clear and moist. No oropharyngeal exudate.   Eyes: Conjunctivae and EOM are normal. Right eye exhibits no discharge. Left eye exhibits no discharge.   left eye surgical pupil, cataracts present   Neck: Normal range of motion. Neck supple. No JVD present. No tracheal deviation present.   Cardiovascular: Normal rate, regular rhythm and normal heart sounds.  Exam reveals no gallop and no friction rub.    No murmur heard.  Pulmonary/Chest: She has wheezes. She has rales. She exhibits no tenderness.   Abdominal: Soft. Bowel sounds are normal. She exhibits no distension and no mass. There is no tenderness. There is no guarding.   Musculoskeletal: Normal range of motion. She exhibits edema (trace).   RLE > LLE (chronic)   Lymphadenopathy:     She has no cervical adenopathy.   Neurological: She is oriented to person, place, and time.   able to move upper and lower extremities without limitation   Skin: Skin is warm and dry.   Psychiatric: She has a normal mood and affect.   Nursing note and vitals  reviewed.          CRANIAL NERVES      CN III, IV, VI   Extraocular motions are normal.        Medications:  Medication list was reviewed and changes noted under Assessment/Plan.      Current Facility-Administered Medications:     acetaminophen tablet 650 mg, 650 mg, Oral, Q6H PRN, Jean Carlos Garcia MD    albuterol-ipratropium 2.5mg-0.5mg/3mL nebulizer solution 3 mL, 3 mL, Nebulization, Q4H, Jean Carlos Garcia MD, 3 mL at 12/31/17 0906    amLODIPine tablet 5 mg, 5 mg, Oral, QAM, Jean Carlos Garcia MD, 5 mg at 12/31/17 0610    apixaban tablet 2.5 mg, 2.5 mg, Oral, BID, Jean Carlos Garcia MD, 2.5 mg at 12/31/17 0824    atorvastatin tablet 40 mg, 40 mg, Oral, Daily, Jean Carlos Garcia MD, 40 mg at 12/31/17 0824    brimonidine 0.2% ophthalmic solution 1 drop, 1 drop, Both Eyes, BID, Jean Carlos Garcia MD, 1 drop at 12/31/17 0824    dextrose 50% injection 12.5 g, 12.5 g, Intravenous, PRN, Jean Carlos Garcia MD    dextrose 50% injection 25 g, 25 g, Intravenous, PRN, Jean Carlos Garcia MD    diclofenac sodium 1 % gel, , Topical, QID, Jean Carlos Garcia MD    dorzolamide 2 % ophthalmic solution 1 drop, 1 drop, Both Eyes, BID, Jean Carlos Garcia MD, 1 drop at 12/31/17 0824    fluticasone-vilanterol 100-25 mcg/dose diskus inhaler 1 puff, 1 puff, Inhalation, Daily, Jean Carlos Garcia MD, 1 puff at 12/31/17 1041    furosemide injection 40 mg, 40 mg, Intravenous, BID, Jean Carlos Garcia MD, 40 mg at 12/31/17 0825    glucagon (human recombinant) injection 1 mg, 1 mg, Intramuscular, PRN, Jean Carlos Garcia MD    glucose chewable tablet 16 g, 16 g, Oral, PRN, Jean Carlos Garcia MD    glucose chewable tablet 24 g, 24 g, Oral, PRN, Jean Carlos Garcia MD    insulin aspart pen 0-5 Units, 0-5 Units, Subcutaneous, QID (AC + HS) PRN, Jean Carlos Garcia MD, 3 Units at 12/31/17 0923    insulin detemir pen 10 Units, 10 Units, Subcutaneous, Daily, Jean Carlos Garcia MD, 10 Units at 12/31/17 0824    isosorbide mononitrate 24  hr tablet 30 mg, 30 mg, Oral, QAM, Jean Carlos Garcia MD, 30 mg at 12/31/17 0610    latanoprost 0.005 % ophthalmic solution 1 drop, 1 drop, Both Eyes, QHS, Jean Carlos Garcia MD, 1 drop at 12/30/17 2047    levothyroxine tablet 75 mcg, 75 mcg, Oral, Before breakfast, Jean Carlos Garcia MD, 75 mcg at 12/31/17 0610    metoprolol injection 5 mg, 5 mg, Intravenous, Q5 Min PRN, Lokesh Baum PA-C, 5 mg at 12/30/17 1957    metoprolol tartrate (LOPRESSOR) tablet 50 mg, 50 mg, Oral, BID, Jean Carlos Garcia MD, 50 mg at 12/31/17 0824    nitroGLYCERIN SL tablet 0.4 mg, 0.4 mg, Sublingual, Q5 Min PRN, Jean Carlos Garcia MD    ondansetron injection 4 mg, 4 mg, Intravenous, Q8H PRN, Jean Carlos Garcia MD    predniSONE tablet 40 mg, 40 mg, Oral, Daily, Jean Carlos Garcia MD, 40 mg at 12/31/17 0823    sodium chloride 0.9% flush 5 mL, 5 mL, Intravenous, PRN, Jean Carlos Garcia MD    acetaminophen, dextrose 50%, dextrose 50%, glucagon (human recombinant), glucose, glucose, insulin aspart, metoprolol, nitroGLYCERIN, ondansetron, sodium chloride 0.9%    Laboratory/Diagnostic Data:  Reviewed and noted in plan where applicable- Please see chart for full lab data.      Recent Labs  Lab 12/30/17  1333 12/31/17  0402   WBC 10.63 13.83*   HGB 12.0 11.7*   HCT 36.8* 35.5*    159         Recent Labs  Lab 12/30/17  1333 12/31/17  0403    143   K 4.0 4.1    103   CO2 27 27   BUN 24 29   CREATININE 1.6* 1.6*   * 242*   CALCIUM 9.9 9.5   MG  --  1.9   PHOS  --  3.8         Recent Labs  Lab 12/30/17  1333 12/31/17  0403   ALKPHOS 166* 154*  154*   ALT 38 37  37   AST 51* 37  37   ALBUMIN 3.6 3.4*  3.4*   PROT 7.6 7.4  7.4   BILITOT 0.8 0.7  0.7   INR 1.0  --         Microbiology labs for the last week  Microbiology Results (last 7 days)     ** No results found for the last 168 hours. **           Imaging Results          X-Ray Chest AP Portable (Final result)  Result time 12/30/17 13:54:13    Final result by  "Arturo Santiago MD (12/30/17 13:54:13)                 Impression:      Minimal central hilar congestive change, otherwise chronic interstitial findings, no convincing large focal consolidation.        Electronically signed by: ARTURO SANTIAGO MD  Date:     12/30/17  Time:    13:54              Narrative:    Chest AP portable    Indication:Congestive heart failure    Comparison:4/20/2017    Findings: Exam is limited secondary to patient rotation. The cardiomediastinal silhouette is prominent, although appears slightly decreased in size as compared to the previous exam noting calcified tortuous aorta.  There is no pleural effusion.  The trachea is midline.  The lungs are symmetrically expanded bilaterally with prominent interstitial attenuation bilaterally, primarily in a perihilar distribution suggesting likely chronic congestive change.  Evaluation of the right upper lung zone is limited given patient rotation, hazy attenuation is likely reflection of rotation rather than developing consolidation. No large focal consolidation seen.  There is no pneumothorax.  The osseous structures are remarkable for degenerative changes.                              Estimated body mass index is 23.81 kg/m² as calculated from the following:    Height as of this encounter: 5' 8" (1.727 m).    Weight as of this encounter: 71 kg (156 lb 9.6 oz).    I & O (Last 24H):    Intake/Output Summary (Last 24 hours) at 12/31/17 1050  Last data filed at 12/31/17 0612   Gross per 24 hour   Intake                0 ml   Output              700 ml   Net             -700 ml       Estimated Creatinine Clearance: 22.6 mL/min (based on SCr of 1.6 mg/dL (H)).    ASSESSMENT/PLAN:     Active Problems:    Active Diagnoses:     Diagnosis Date Noted POA    PRINCIPAL PROBLEM:  Acute on chronic diastolic congestive heart failure [I50.33]BNP 1200. I/O monitor, daily weight. tapered to 2L oxygen NC. Taper oxygen as tolerated to Spo2 goal of low 90s. Echo " 06/17.s erial troponins negative.negative balance of 700ml 12/30/2017 Yes    Asthma exacerbation [J45.901] influenza swab negative. Duonebs q 4hrly. 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 SSI. uncontrolled with prednisone. determir increased to 13units  04/20/2017 Not Applicable    Chondrocalcinosis [M11.20]knee, shoulder and hand pains. Tylenol as needed  10/30/2012 Yes    Generalized osteoarthritis of multiple sites [M15.9]as above 08/15/2012 Yes    Essential hypertension [I10]controlled on norvasc and metoprolol  07/03/2012 Yes    Hyperlipemia, mixed [E78.2]on Atorvastatin  07/03/2012 Yes         Disposition- PT /OT pending    DVT prophylaxis addressed with: yana Garcia MD  Attending Staff Physician  [facility]  pager- [phone]  Spectralink -  [phone]

## 2017-12-31 NOTE — NURSING
Patient to unit @ 1915; arrival to floor patient HR 120s-140s afib; CHRISTIANO Baum notified. PRN lopressor IV push ordered. Will continue to monitor patient.   Patient oriented to room and plan of care; Patient A&Ox4; call light within place; siderails up x3. No s/sx of distress @ this time. Patient remains on cardiac monitoring/continuous pulse ox. Will continue to monitor.

## 2018-01-01 PROBLEM — I50.33 ACUTE ON CHRONIC DIASTOLIC CONGESTIVE HEART FAILURE: Status: RESOLVED | Noted: 2017-12-30 | Resolved: 2018-01-01

## 2018-01-01 LAB
ALBUMIN SERPL BCP-MCNC: 3.1 G/DL
ALBUMIN SERPL BCP-MCNC: 3.1 G/DL
ALP SERPL-CCNC: 122 U/L
ALP SERPL-CCNC: 122 U/L
ALT SERPL W/O P-5'-P-CCNC: 29 U/L
ALT SERPL W/O P-5'-P-CCNC: 29 U/L
ANION GAP SERPL CALC-SCNC: 11 MMOL/L
AST SERPL-CCNC: 29 U/L
AST SERPL-CCNC: 29 U/L
BASOPHILS # BLD AUTO: 0.02 K/UL
BASOPHILS NFR BLD: 0.1 %
BILIRUB DIRECT SERPL-MCNC: 0.3 MG/DL
BILIRUB SERPL-MCNC: 0.6 MG/DL
BILIRUB SERPL-MCNC: 0.6 MG/DL
BUN SERPL-MCNC: 36 MG/DL
CALCIUM SERPL-MCNC: 9.2 MG/DL
CHLORIDE SERPL-SCNC: 100 MMOL/L
CO2 SERPL-SCNC: 28 MMOL/L
CREAT SERPL-MCNC: 1.5 MG/DL
DIFFERENTIAL METHOD: ABNORMAL
EOSINOPHIL # BLD AUTO: 0.1 K/UL
EOSINOPHIL NFR BLD: 0.9 %
ERYTHROCYTE [DISTWIDTH] IN BLOOD BY AUTOMATED COUNT: 15 %
EST. GFR  (AFRICAN AMERICAN): 34.6 ML/MIN/1.73 M^2
EST. GFR  (NON AFRICAN AMERICAN): 30 ML/MIN/1.73 M^2
GLUCOSE SERPL-MCNC: 156 MG/DL
HCT VFR BLD AUTO: 34.8 %
HGB BLD-MCNC: 11.8 G/DL
IMM GRANULOCYTES # BLD AUTO: 0.07 K/UL
IMM GRANULOCYTES NFR BLD AUTO: 0.5 %
LYMPHOCYTES # BLD AUTO: 1.5 K/UL
LYMPHOCYTES NFR BLD: 10.4 %
MAGNESIUM SERPL-MCNC: 1.8 MG/DL
MCH RBC QN AUTO: 29.5 PG
MCHC RBC AUTO-ENTMCNC: 33.9 G/DL
MCV RBC AUTO: 87 FL
MONOCYTES # BLD AUTO: 1.6 K/UL
MONOCYTES NFR BLD: 11.1 %
NEUTROPHILS # BLD AUTO: 10.8 K/UL
NEUTROPHILS NFR BLD: 77 %
NRBC BLD-RTO: 0 /100 WBC
PHOSPHATE SERPL-MCNC: 2.8 MG/DL
PLATELET # BLD AUTO: 151 K/UL
PMV BLD AUTO: 11.3 FL
POCT GLUCOSE: 155 MG/DL (ref 70–110)
POCT GLUCOSE: 229 MG/DL (ref 70–110)
POCT GLUCOSE: 245 MG/DL (ref 70–110)
POCT GLUCOSE: 300 MG/DL (ref 70–110)
POCT GLUCOSE: 303 MG/DL (ref 70–110)
POTASSIUM SERPL-SCNC: 3.9 MMOL/L
PROT SERPL-MCNC: 6.6 G/DL
PROT SERPL-MCNC: 6.6 G/DL
RBC # BLD AUTO: 4 M/UL
SODIUM SERPL-SCNC: 139 MMOL/L
WBC # BLD AUTO: 14 K/UL

## 2018-01-01 PROCEDURE — 36415 COLL VENOUS BLD VENIPUNCTURE: CPT

## 2018-01-01 PROCEDURE — 12000002 HC ACUTE/MED SURGE SEMI-PRIVATE ROOM

## 2018-01-01 PROCEDURE — 83735 ASSAY OF MAGNESIUM: CPT

## 2018-01-01 PROCEDURE — 84100 ASSAY OF PHOSPHORUS: CPT

## 2018-01-01 PROCEDURE — 94664 DEMO&/EVAL PT USE INHALER: CPT

## 2018-01-01 PROCEDURE — 25000242 PHARM REV CODE 250 ALT 637 W/ HCPCS: Performed by: HOSPITALIST

## 2018-01-01 PROCEDURE — 99232 SBSQ HOSP IP/OBS MODERATE 35: CPT | Mod: ,,, | Performed by: HOSPITALIST

## 2018-01-01 PROCEDURE — 85025 COMPLETE CBC W/AUTO DIFF WBC: CPT

## 2018-01-01 PROCEDURE — 94640 AIRWAY INHALATION TREATMENT: CPT

## 2018-01-01 PROCEDURE — 80053 COMPREHEN METABOLIC PANEL: CPT

## 2018-01-01 PROCEDURE — 25000003 PHARM REV CODE 250: Performed by: HOSPITALIST

## 2018-01-01 PROCEDURE — 94761 N-INVAS EAR/PLS OXIMETRY MLT: CPT

## 2018-01-01 PROCEDURE — 63600175 PHARM REV CODE 636 W HCPCS: Performed by: HOSPITALIST

## 2018-01-01 RX ORDER — PREDNISONE 20 MG/1
40 TABLET ORAL DAILY
Qty: 6 TABLET | Refills: 0 | Status: SHIPPED | OUTPATIENT
Start: 2018-01-01 | End: 2018-01-04

## 2018-01-01 RX ORDER — FUROSEMIDE 40 MG/1
40 TABLET ORAL 2 TIMES DAILY
Status: DISCONTINUED | OUTPATIENT
Start: 2018-01-01 | End: 2018-01-02 | Stop reason: HOSPADM

## 2018-01-01 RX ADMIN — METOPROLOL TARTRATE 50 MG: 50 TABLET ORAL at 08:01

## 2018-01-01 RX ADMIN — DICLOFENAC: 10 GEL TOPICAL at 05:01

## 2018-01-01 RX ADMIN — IPRATROPIUM BROMIDE AND ALBUTEROL SULFATE 3 ML: .5; 3 SOLUTION RESPIRATORY (INHALATION) at 01:01

## 2018-01-01 RX ADMIN — DICLOFENAC: 10 GEL TOPICAL at 06:01

## 2018-01-01 RX ADMIN — GUAIFENESIN 200 MG: 200 SOLUTION ORAL at 09:01

## 2018-01-01 RX ADMIN — IPRATROPIUM BROMIDE AND ALBUTEROL SULFATE 3 ML: .5; 3 SOLUTION RESPIRATORY (INHALATION) at 04:01

## 2018-01-01 RX ADMIN — LATANOPROST 1 DROP: 50 SOLUTION OPHTHALMIC at 09:01

## 2018-01-01 RX ADMIN — FLUTICASONE FUROATE AND VILANTEROL TRIFENATATE 1 PUFF: 100; 25 POWDER RESPIRATORY (INHALATION) at 08:01

## 2018-01-01 RX ADMIN — INSULIN ASPART 2 UNITS: 100 INJECTION, SOLUTION INTRAVENOUS; SUBCUTANEOUS at 12:01

## 2018-01-01 RX ADMIN — DORZOLAMIDE HYDROCHLORIDE 1 DROP: 20 SOLUTION/ DROPS OPHTHALMIC at 08:01

## 2018-01-01 RX ADMIN — ACETAMINOPHEN 650 MG: 325 TABLET ORAL at 11:01

## 2018-01-01 RX ADMIN — ATORVASTATIN CALCIUM 40 MG: 20 TABLET, FILM COATED ORAL at 08:01

## 2018-01-01 RX ADMIN — METOPROLOL TARTRATE 50 MG: 50 TABLET ORAL at 09:01

## 2018-01-01 RX ADMIN — ISOSORBIDE MONONITRATE 30 MG: 30 TABLET, EXTENDED RELEASE ORAL at 06:01

## 2018-01-01 RX ADMIN — INSULIN ASPART 1 UNITS: 100 INJECTION, SOLUTION INTRAVENOUS; SUBCUTANEOUS at 11:01

## 2018-01-01 RX ADMIN — DICLOFENAC: 10 GEL TOPICAL at 11:01

## 2018-01-01 RX ADMIN — IPRATROPIUM BROMIDE AND ALBUTEROL SULFATE 3 ML: .5; 3 SOLUTION RESPIRATORY (INHALATION) at 09:01

## 2018-01-01 RX ADMIN — BRIMONIDINE TARTRATE 1 DROP: 2 SOLUTION OPHTHALMIC at 08:01

## 2018-01-01 RX ADMIN — FUROSEMIDE 40 MG: 40 TABLET ORAL at 08:01

## 2018-01-01 RX ADMIN — BRIMONIDINE TARTRATE 1 DROP: 2 SOLUTION OPHTHALMIC at 09:01

## 2018-01-01 RX ADMIN — DORZOLAMIDE HYDROCHLORIDE 1 DROP: 20 SOLUTION/ DROPS OPHTHALMIC at 09:01

## 2018-01-01 RX ADMIN — ACETAMINOPHEN 650 MG: 325 TABLET ORAL at 09:01

## 2018-01-01 RX ADMIN — LEVOTHYROXINE SODIUM 75 MCG: 75 TABLET ORAL at 05:01

## 2018-01-01 RX ADMIN — APIXABAN 2.5 MG: 2.5 TABLET, FILM COATED ORAL at 09:01

## 2018-01-01 RX ADMIN — INSULIN ASPART 4 UNITS: 100 INJECTION, SOLUTION INTRAVENOUS; SUBCUTANEOUS at 05:01

## 2018-01-01 RX ADMIN — PREDNISONE 40 MG: 20 TABLET ORAL at 08:01

## 2018-01-01 RX ADMIN — IPRATROPIUM BROMIDE AND ALBUTEROL SULFATE 3 ML: .5; 3 SOLUTION RESPIRATORY (INHALATION) at 08:01

## 2018-01-01 RX ADMIN — AMLODIPINE BESYLATE 5 MG: 5 TABLET ORAL at 06:01

## 2018-01-01 RX ADMIN — FUROSEMIDE 40 MG: 40 TABLET ORAL at 05:01

## 2018-01-01 RX ADMIN — APIXABAN 2.5 MG: 2.5 TABLET, FILM COATED ORAL at 08:01

## 2018-01-01 NOTE — PROGRESS NOTES
Progress Note  Hospital Medicine    Admit Date: 12/30/2017  Length of Stay:  LOS: 2 days     SUBJECTIVE:         Follow-up For:  Acute on chronic diastolic congestive heart failure    HPI/Interval history (See H&P for complete P,F,SHx) :     12/31/17  negative balance of 700ml. had atrial fibrillation RVR last night controlled with IV Metoprolol     1/1/18  tapered to room air. not comfortable going home today as no family to help her at home    Review of Systems: List if applicable  Pain scale: 0 /10  Constitutional- Positive for Weakness  Resp- Positive for Cough, SOB  Musculoskeletal: Positive for arthralgias (shoulders, knee and fingers)    OBJECTIVE:     Vital Signs Range (Last 24H):  Temp:  [97.6 °F (36.4 °C)-99 °F (37.2 °C)]   Pulse:  []   Resp:  [16-20]   BP: (122-140)/(60-82)   SpO2:  [91 %-100 %]     Physical Exam   Constitutional: She is oriented to person, place, and time. She appears well-developed and well-nourished.   HENT:   Head: Normocephalic and atraumatic.   Mouth/Throat: Oropharynx is clear and moist. No oropharyngeal exudate.   Eyes: Conjunctivae and EOM are normal. Right eye exhibits no discharge. Left eye exhibits no discharge.   left eye surgical pupil, cataracts present   Neck: Normal range of motion. Neck supple. No JVD present. No tracheal deviation present.   Cardiovascular: Normal rate, regular rhythm and normal heart sounds.  Exam reveals no gallop and no friction rub.    No murmur heard.  Pulmonary/Chest: She has wheezes. She has rales. She exhibits no tenderness.   Abdominal: Soft. Bowel sounds are normal. She exhibits no distension and no mass. There is no tenderness. There is no guarding.   Musculoskeletal: Normal range of motion. She exhibits edema (trace).   RLE > LLE (chronic)   Lymphadenopathy:     She has no cervical adenopathy.   Neurological: She is oriented to person, place, and time.   able to move upper and lower extremities without limitation   Skin: Skin is warm  and dry.   Psychiatric: She has a normal mood and affect.   Nursing note and vitals reviewed.          CRANIAL NERVES      CN III, IV, VI   Extraocular motions are normal.        Medications:  Medication list was reviewed and changes noted under Assessment/Plan.      Current Facility-Administered Medications:     acetaminophen tablet 650 mg, 650 mg, Oral, Q6H PRN, Jean Carlos Garcia MD    albuterol-ipratropium 2.5mg-0.5mg/3mL nebulizer solution 3 mL, 3 mL, Nebulization, Q4H, Jean Carlos Garcia MD, 3 mL at 01/01/18 0828    amLODIPine tablet 5 mg, 5 mg, Oral, QAM, Jean Carlos Garcia MD, 5 mg at 01/01/18 0600    apixaban tablet 2.5 mg, 2.5 mg, Oral, BID, Jean Carlos Garcia MD, 2.5 mg at 01/01/18 0804    atorvastatin tablet 40 mg, 40 mg, Oral, Daily, Jean Carlos Garcia MD, 40 mg at 01/01/18 0804    brimonidine 0.2% ophthalmic solution 1 drop, 1 drop, Both Eyes, BID, Jean Carlos Garcia MD, 1 drop at 01/01/18 0804    dextrose 50% injection 12.5 g, 12.5 g, Intravenous, PRN, Jean Carlos Garcia MD    dextrose 50% injection 25 g, 25 g, Intravenous, PRN, Jean Carlos Garcia MD    diclofenac sodium 1 % gel, , Topical, QID, Jean Carlos Garcia MD    dorzolamide 2 % ophthalmic solution 1 drop, 1 drop, Both Eyes, BID, Jean Carlos Garcia MD, 1 drop at 01/01/18 0804    fluticasone-vilanterol 100-25 mcg/dose diskus inhaler 1 puff, 1 puff, Inhalation, Daily, Jean Carlos Garcia MD, 1 puff at 01/01/18 0804    furosemide tablet 40 mg, 40 mg, Oral, BID, Jean Carlos Garcia MD, 40 mg at 01/01/18 0804    glucagon (human recombinant) injection 1 mg, 1 mg, Intramuscular, PRN, Jean Carlos Garcia MD    glucose chewable tablet 16 g, 16 g, Oral, PRN, Jean Carlos Garcia MD    glucose chewable tablet 24 g, 24 g, Oral, PRN, Jean Carlos Garcia MD    guaifenesin 100 mg/5 ml syrup 200 mg, 200 mg, Oral, Q4H PRN, Jean Carlos Garcia MD, 200 mg at 12/31/17 2122    insulin aspart pen 0-5 Units, 0-5 Units, Subcutaneous, QID (AC + HS) PRN, Jean Carlos  LIVAN Garcia MD, 2 Units at 12/31/17 2328    insulin detemir pen 13 Units, 13 Units, Subcutaneous, Daily, Jean Carlos Garcia MD, 13 Units at 01/01/18 0804    isosorbide mononitrate 24 hr tablet 30 mg, 30 mg, Oral, QAM, Jaen Carlos Garcia MD, 30 mg at 01/01/18 0600    latanoprost 0.005 % ophthalmic solution 1 drop, 1 drop, Both Eyes, QHS, Jean Carlos Garcia MD, 1 drop at 12/31/17 2124    levothyroxine tablet 75 mcg, 75 mcg, Oral, Before breakfast, Jean Carlos Garcia MD, 75 mcg at 01/01/18 0559    metoprolol injection 5 mg, 5 mg, Intravenous, Q5 Min PRN, Lokesh Baum PA-C, 5 mg at 12/30/17 1957    metoprolol tartrate (LOPRESSOR) tablet 50 mg, 50 mg, Oral, BID, Jean Carlos Garcia MD, 50 mg at 01/01/18 0804    nitroGLYCERIN SL tablet 0.4 mg, 0.4 mg, Sublingual, Q5 Min PRN, Jean Carlos Garcia MD    ondansetron injection 4 mg, 4 mg, Intravenous, Q8H PRN, Jean Carlos Garcia MD    predniSONE tablet 40 mg, 40 mg, Oral, Daily, Jean Carlos Garcia MD, 40 mg at 01/01/18 0804    sodium chloride 0.9% flush 5 mL, 5 mL, Intravenous, PRN, Jean Carlos Garcia MD    acetaminophen, dextrose 50%, dextrose 50%, glucagon (human recombinant), glucose, glucose, guaifenesin 100 mg/5 ml, insulin aspart, metoprolol, nitroGLYCERIN, ondansetron, sodium chloride 0.9%    Laboratory/Diagnostic Data:  Reviewed and noted in plan where applicable- Please see chart for full lab data.      Recent Labs  Lab 12/30/17  1333 12/31/17  0402 01/01/18  0730   WBC 10.63 13.83* 14.00*   HGB 12.0 11.7* 11.8*   HCT 36.8* 35.5* 34.8*    159 151         Recent Labs  Lab 12/30/17  1333 12/31/17  0403 01/01/18  0730    143 139   K 4.0 4.1 3.9    103 100   CO2 27 27 28   BUN 24 29 36*   CREATININE 1.6* 1.6* 1.5*   * 242* 156*   CALCIUM 9.9 9.5 9.2   MG  --  1.9 1.8   PHOS  --  3.8 2.8         Recent Labs  Lab 12/30/17  1333 12/31/17  0403 01/01/18  0730   ALKPHOS 166* 154*  154* 122  122   ALT 38 37  37 29  29   AST 51* 37  37  "29  29   ALBUMIN 3.6 3.4*  3.4* 3.1*  3.1*   PROT 7.6 7.4  7.4 6.6  6.6   BILITOT 0.8 0.7  0.7 0.6  0.6   INR 1.0  --   --         Microbiology labs for the last week  Microbiology Results (last 7 days)     ** No results found for the last 168 hours. **           Imaging Results          X-Ray Chest AP Portable (Final result)  Result time 12/30/17 13:54:13    Final result by Arturo Santiago MD (12/30/17 13:54:13)                 Impression:      Minimal central hilar congestive change, otherwise chronic interstitial findings, no convincing large focal consolidation.        Electronically signed by: ARTURO SANTIAGO MD  Date:     12/30/17  Time:    13:54              Narrative:    Chest AP portable    Indication:Congestive heart failure    Comparison:4/20/2017    Findings: Exam is limited secondary to patient rotation. The cardiomediastinal silhouette is prominent, although appears slightly decreased in size as compared to the previous exam noting calcified tortuous aorta.  There is no pleural effusion.  The trachea is midline.  The lungs are symmetrically expanded bilaterally with prominent interstitial attenuation bilaterally, primarily in a perihilar distribution suggesting likely chronic congestive change.  Evaluation of the right upper lung zone is limited given patient rotation, hazy attenuation is likely reflection of rotation rather than developing consolidation. No large focal consolidation seen.  There is no pneumothorax.  The osseous structures are remarkable for degenerative changes.                              Estimated body mass index is 23.28 kg/m² as calculated from the following:    Height as of this encounter: 5' 8" (1.727 m).    Weight as of this encounter: 69.4 kg (153 lb 1.6 oz).    I & O (Last 24H):    Intake/Output Summary (Last 24 hours) at 01/01/18 0959  Last data filed at 01/01/18 0608   Gross per 24 hour   Intake             1130 ml   Output             1930 ml   Net             " -800 ml       Estimated Creatinine Clearance: 24.1 mL/min (based on SCr of 1.5 mg/dL (H)).    ASSESSMENT/PLAN:     Active Problems:    Active Diagnoses:     Diagnosis Date Noted POA    PRINCIPAL PROBLEM:  Acute on chronic diastolic congestive heart failure [I50.33]BNP 1200. I/O monitor, daily weight. tapered to 2L oxygen NC. Taper oxygen as tolerated to Spo2 goal of low 90s. Echo 06/17.s erial troponins negative.negative balance of 1500ml. changed to oral lasix 12/30/2017 Yes    Asthma exacerbation [J45.901] influenza swab negative. Duonebs q 4hrly. 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 SSI. uncontrolled with prednisone. determir increased to 13units  04/20/2017 Not Applicable    Chondrocalcinosis [M11.20]knee, shoulder and hand pains. Tylenol as needed  10/30/2012 Yes     Generalized osteoarthritis of multiple sites [M15.9]as above 08/15/2012 Yes    Essential hypertension [I10]controlled on norvasc and metoprolol  07/03/2012 Yes    Hyperlipemia, mixed [E78.2]on Atorvastatin  07/03/2012 Yes         Disposition- PT /OT pending    DVT prophylaxis addressed with: apixaban            Jean Carlos Garcia MD  Attending Staff Physician  [facility]  pager- [phone] [id number] [phone]

## 2018-01-01 NOTE — DISCHARGE SUMMARY
[facility] Hospital Medicine  Discharge Summary      Patient Name: Tiana Mead  MRN: [id number]  Admission Date: 12/30/2017  Hospital Length of Stay: 2 days  Discharge Date and Time:  01/01/2018 7:17 AM  Attending Physician: Jean Carlos Garcia MD   Discharging Provider: Jean Carlos Garcia MD  Primary Care Provider: Belen Wood MD    [place] Medicine [place] [facility] Jean Carlos Garcia MD    HPI:    Tiana Mead is a 89 y.o. female with a PMH of Diastolic CHF  who presented to the ED on 12/30/2017 diagnosed with  Shortness of Breath  AAOx3,  reports she began having rhinorrhea with fatigue  2 days ago iassociated. productive cough  started yesterday with associated  worsening SOB at rest and exertion, chest tightness and wheezing .  She awoke last night with difficulty breathing and orthopnea, having to sleep in her recliner elda for 2-3hr. reports weight gain of 1-2lb and increased intake of salt recently on christmas eve.Daughter took her to urgent care where [place] [facility]  [place]. She received Duo nebs en route. C/o  chest pain associated with coughing, denies chest pain at rest. She reports medication complianc. smoked a pack of cigaretted in her life.     Lives with daughter. ambulates with cane. ADL independent    * No surgery found *      Hospital Course:     Active Diagnoses:     Diagnosis Date Noted POA    PRINCIPAL PROBLEM:  Acute on chronic diastolic congestive heart failure [I50.33]BNP 1200. I/O monitor, daily weight. tapered to 2L oxygen NC. Tapered to room air Echo 06/17.s erial troponins negative.negative balance of 700ml 12/30/2017 Yes    Asthma exacerbation [J45.901] influenza swab negative. Duonebs q 4hrly. 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 SSI. uncontrolled with prednisone. determir increased to 13units  04/20/2017 Not Applicable    Chondrocalcinosis [M11.20]knee, shoulder and hand pains. Tylenol as needed  10/30/2012 Yes    Generalized osteoarthritis of multiple sites [M15.9]as above 08/15/2012 Yes    Essential hypertension [I10]controlled on norvasc and metoprolol  07/03/2012 Yes    Hyperlipemia, mixed [E78.2]on Atorvastatin  07/03/2012 Yes      Being discharged with PCP follow up  Consults:     Final Active Diagnoses:    Diagnosis Date Noted POA    PRINCIPAL PROBLEM:  Acute on chronic diastolic congestive heart failure [I50.33] 12/30/2017 Yes    Asthma exacerbation [J45.901] 12/30/2017 Yes    Primary hypothyroidism [E03.9] 12/13/2017 Yes    CKD (chronic kidney disease) stage 3, GFR 30-59 ml/min, eGFR 32  [N18.3] 08/15/2017 Yes    Coronary artery disease involving native coronary artery without angina pectoris [I25.10] 08/15/2017 Yes    Polyneuropathy, diabetic [E11.42]  08/15/2017 Yes    Type 2 diabetes mellitus with renal manifestations [E11.29] 08/15/2017 Yes    H/O Deep vein thrombosis (DVT) [I82.409] 06/15/2017 Yes    Glaucoma associated with ocular trauma [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] 05/29/2017 Yes    Pericardial effusion [I31.3] 05/29/2017 Yes    Long term current use of anticoagulant therapy [Z79.01] 04/20/2017 Not Applicable    Persistent atrial fibrillation [I48.1] 04/20/2017 Yes    Type 2 diabetes mellitus with diabetic polyneuropathy, with long-term current use of insulin [E11.42, Z79.4] 04/20/2017 Not Applicable    Chondrocalcinosis [M11.20] 10/30/2012 Yes    Generalized osteoarthritis of multiple sites [M15.9] 08/15/2012 Yes    Essential hypertension [I10] 07/03/2012 Yes    Hyperlipemia, mixed [E78.2] 07/03/2012 Yes      Problems Resolved During this Admission:    Diagnosis Date Noted Date Resolved POA      Discharged Condition: fair    Disposition: Home    Follow Up:    Patient Instructions:   No discharge procedures on file.  Medications:  Reconciled Home Medications:   Current Discharge Medication List      START taking these medications    Details   predniSONE (DELTASONE) 20 MG tablet Take 2 tablets (40 mg total) by mouth once daily.  Qty: 6 tablet, Refills: 0         CONTINUE these medications which have NOT CHANGED    Details   albuterol 90 mcg/actuation inhaler Inhale 2 puffs into the lungs every 4 (four) hours as needed.  Qty: 3 Inhaler, Refills: 4      amlodipine (NORVASC) 5 MG tablet Take 1 tablet (5 mg total) by mouth every morning.  Qty: 90 tablet, Refills: 3      apixaban 2.5 mg Tab Take 1 tablet (2.5 mg total) by mouth 2 (two) times daily.  Qty: 60 tablet, Refills: 5      atorvastatin (LIPITOR) 40 MG tablet Take 1 tablet (40 mg total) by mouth once daily.  Qty: 90 tablet, Refills: 3      brimonidine 0.2% (ALPHAGAN) 0.2 % Drop INT 1 GTT INTO OU BID  Refills: 3      diclofenac sodium (VOLTAREN) 1 % Gel Apply topically 4 (four) times daily.  Qty: 100 Tube, Refills:  "3    Associated Diagnoses: Effusion of knee, unspecified laterality; Primary osteoarthritis of both knees; Generalized osteoarthritis of multiple sites      dorzolamide (TRUSOPT) 2 % ophthalmic solution Place 1 drop into both eyes 2 (two) times daily.  Qty: 10 mL, Refills: 4    Associated Diagnoses: Primary open-angle glaucoma(365.11)      fluticasone-salmeterol 250-50 mcg/dose (ADVAIR) 250-50 mcg/dose diskus inhaler Inhale 1 puff into the lungs nightly. Controller  Qty: 60 each, Refills: 11      insulin aspart protamine-insulin aspart (NOVOLOG MIX 70-30 FLEXPEN) 100 unit/mL (70-30) InPn pen Take 12 units once daily before breakfast.  Qty: 1 Box, Refills: 11      insulin needles, disposable, (BD INSULIN PEN NEEDLE UF SHORT) 31 X 5/16 " Ndle Inject 1 Box into the skin 2 (two) times daily.  Qty: 100 each, Refills: 11      isosorbide mononitrate (IMDUR) 30 MG 24 hr tablet Take 1 tablet (30 mg total) by mouth every morning. Taking for heart  Qty: 90 tablet, Refills: 3      !! lancets (ONETOUCH DELICA LANCETS) 33 gauge Misc Apply 1 lancet topically 3 (three) times daily.  Qty: 300 each, Refills: 4    Comments: I don't know what brand the patient needs  Associated Diagnoses: Type 2 diabetes mellitus with other diabetic arthropathy, with long-term current use of insulin      !! lancets Misc 1 Device by Misc.(Non-Drug; Combo Route) route 3 (three) times daily before meals. Please provide the insurance company preferred product.  Qty: 300 each, Refills: 4      latanoprost 0.005 % ophthalmic solution Place 1 drop into the right eye every evening.  Qty: 3 Bottle, Refills: 6    Associated Diagnoses: POAG (primary open-angle glaucoma)      levothyroxine (SYNTHROID) 75 MCG tablet Take 1 tablet (75 mcg total) by mouth before breakfast.  Qty: 90 tablet, Refills: 3      metoprolol tartrate (LOPRESSOR) 50 MG tablet Take 1 tablet (50 mg total) by mouth 2 (two) times daily.  Qty: 180 tablet, Refills: 4    Comments: Previously taking " two 25 mg tablets twice daily      multivit-mineral-iron-lutein (CENTRUM SILVER ULTRA WOMEN'S) Tab Take 1 tablet by mouth once daily.      TRUE METRIX GLUCOSE METER Misc TEST TID  Refills: 4      !! TRUEPLUS LANCETS 30 gauge Misc USE TO TEST BLOOD SUGAR TID AC  Refills: 2      blood sugar diagnostic (ONETOUCH ULTRA TEST) Strp Inject 1 strip into the skin 3 (three) times daily.  Qty: 300 each, Refills: 4    Comments: I don't know what brand the patient needs  Associated Diagnoses: Type 2 diabetes mellitus with diabetic peripheral angiopathy without gangrene, with long-term current use of insulin      furosemide (LASIX) 40 MG tablet Take 1 tablet (40 mg total) by mouth 2 (two) times daily. 1 tb am. 1 at 4pm.  Qty: 180 tablet, Refills: 3      meclizine (ANTIVERT) 12.5 mg tablet Take 1 tablet (12.5 mg total) by mouth 3 (three) times daily as needed.  Qty: 50 tablet, Refills: 1      nitroGLYCERIN (NITROSTAT) 0.4 MG SL tablet Place 0.4 mg under the tongue every 5 (five) minutes as needed for Chest pain.       !! - Potential duplicate medications found. Please discuss with provider.          Significant Diagnostic Studies: Labs:   BMP:   Recent Labs  Lab 12/30/17  1333 12/31/17  0403   * 242*    143   K 4.0 4.1    103   CO2 27 27   BUN 24 29   CREATININE 1.6* 1.6*   CALCIUM 9.9 9.5   MG  --  1.9   , CMP   Recent Labs  Lab 12/30/17  1333 12/31/17  0403    143   K 4.0 4.1    103   CO2 27 27   * 242*   BUN 24 29   CREATININE 1.6* 1.6*   CALCIUM 9.9 9.5   PROT 7.6 7.4  7.4   ALBUMIN 3.6 3.4*  3.4*   BILITOT 0.8 0.7  0.7   ALKPHOS 166* 154*  154*   AST 51* 37  37   ALT 38 37  37   ANIONGAP 12 13   ESTGFRAFRICA 32.0* 32.0*   EGFRNONAA 27.8* 27.8*   , CBC   Recent Labs  Lab 12/30/17  1333 12/31/17  0402   WBC 10.63 13.83*   HGB 12.0 11.7*   HCT 36.8* 35.5*    159   , INR   Lab Results   Component Value Date    INR 1.0 12/30/2017    INR 0.9 06/11/2013    INR 1.0 03/22/2012   ,  Lipid Panel   Lab Results   Component Value Date    CHOL 116 (L) 04/20/2017    HDL 52 04/20/2017    LDLCALC 55.2 (L) 04/20/2017    TRIG 44 04/20/2017    CHOLHDL 44.8 04/20/2017   , Troponin   Recent Labs  Lab 12/31/17  0403   TROPONINI 0.030*   , A1C:   Recent Labs  Lab 12/06/17  0852 12/30/17  1333   HGBA1C 6.7* 6.6*    and All labs within the past 24 hours have been reviewed  Microbiology:   Blood Culture No results found for: LABBLOO, Sputum Culture No results found for: GSRESP, RESPIRATORYC and Urine Culture    Lab Results   Component Value Date    LABURIN  10/25/2011     MULTIPLE ORGANISMS ISOLATED. NONE IN PREDOMINANCE.  REPEAT IF CLINICALLY NECESSARY.     Radiology:  Imaging Results          X-Ray Chest AP Portable (Final result)  Result time 12/30/17 13:54:13    Final result by Arturo Santiago MD (12/30/17 13:54:13)                 Impression:      Minimal central hilar congestive change, otherwise chronic interstitial findings, no convincing large focal consolidation.        Electronically signed by: ARTURO SANTIAGO MD  Date:     12/30/17  Time:    13:54              Narrative:    Chest AP portable    Indication:Congestive heart failure    Comparison:4/20/2017    Findings: Exam is limited secondary to patient rotation. The cardiomediastinal silhouette is prominent, although appears slightly decreased in size as compared to the previous exam noting calcified tortuous aorta.  There is no pleural effusion.  The trachea is midline.  The lungs are symmetrically expanded bilaterally with prominent interstitial attenuation bilaterally, primarily in a perihilar distribution suggesting likely chronic congestive change.  Evaluation of the right upper lung zone is limited given patient rotation, hazy attenuation is likely reflection of rotation rather than developing consolidation. No large focal consolidation seen.  There is no pneumothorax.  The osseous structures are remarkable for degenerative changes.                             Cardiac Graphics: EKG - Atrial fibrillation with premature ventricular or aberrantly conducted complexes    Pending Diagnostic Studies:     Procedure Component Value Units Date/Time    2D echo with color flow doppler [[id number]]     Order Status:  Sent Lab Status:  No result     CBC auto differential [[id number]]     Order Status:  Sent Lab Status:  No result     Specimen:  Blood from Blood     CBC auto differential [[id number]]     Order Status:  Sent Lab Status:  No result     Specimen:  Blood from Blood     Comprehensive Metabolic Panel (CMP) [[id number]]     Order Status:  Sent Lab Status:  No result     Specimen:  Blood from Blood     Comprehensive metabolic panel [[id number]]     Order Status:  Sent Lab Status:  No result     Specimen:  Blood from Blood     Hepatic function panel [[id number]]     Order Status:  Sent Lab Status:  No result     Specimen:  Blood from Blood     Magnesium [[id number]]     Order Status:  Sent Lab Status:  No result     Specimen:  Blood from Blood     Phosphorus [[id number]]     Order Status:  Sent Lab Status:  No result     Specimen:  Blood from Blood         Indwelling Lines/Drains at time of discharge:   Lines/Drains/Airways     Drain            Female External Urinary Catheter 12/31/17 0000 1 day                       Jean Carlos Garcia MD  Department of Hospital Medicine  [facility]

## 2018-01-01 NOTE — PLAN OF CARE
Problem: Patient Care Overview  Goal: Plan of Care Review  Outcome: Ongoing (interventions implemented as appropriate)  Pt A&O x 4.  VSS on room air, SpO2 95-96%.  Telemetry a-fib.  Tachycardic at start of shift 100-110s bpm.  Lowered to 80s-100s bpm after metoprolol administration.  No complaints of pain.  Given PRN guanfenisen for cough.  Moderate relief obtained.  No SOB reported.  Urinary output 850 mL via purewick cath.  Evening BG high at 300.  Given 2 units supplemental insulin.

## 2018-01-01 NOTE — PROGRESS NOTES
Pt with d/c order in place, <48 hour admission to IP. Unable to assess for DMHF program this admit. Will assess for DMHF program enrollment should patient return to hospital for CHF exacerbation.    Removed from HF list.

## 2018-01-02 ENCOUNTER — TELEPHONE (OUTPATIENT)
Dept: INTERNAL MEDICINE | Facility: CLINIC | Age: 83
End: 2018-01-02

## 2018-01-02 VITALS
HEART RATE: 104 BPM | OXYGEN SATURATION: 94 % | WEIGHT: 160.31 LBS | RESPIRATION RATE: 18 BRPM | DIASTOLIC BLOOD PRESSURE: 83 MMHG | SYSTOLIC BLOOD PRESSURE: 134 MMHG | HEIGHT: 68 IN | BODY MASS INDEX: 24.3 KG/M2 | TEMPERATURE: 98 F

## 2018-01-02 PROBLEM — E11.21 TYPE 2 DIABETES MELLITUS WITH DIABETIC NEPHROPATHY, WITH LONG-TERM CURRENT USE OF INSULIN: Status: ACTIVE | Noted: 2017-08-15

## 2018-01-02 PROBLEM — Z79.4 TYPE 2 DIABETES MELLITUS WITH DIABETIC NEPHROPATHY, WITH LONG-TERM CURRENT USE OF INSULIN: Status: ACTIVE | Noted: 2017-08-15

## 2018-01-02 PROBLEM — J45.901 MODERATE ASTHMA WITH EXACERBATION: Status: ACTIVE | Noted: 2018-01-02

## 2018-01-02 PROBLEM — J06.9 VIRAL URI: Status: ACTIVE | Noted: 2018-01-02

## 2018-01-02 PROBLEM — I50.9 CONGESTIVE HEART FAILURE: Status: ACTIVE | Noted: 2018-01-02

## 2018-01-02 PROBLEM — I25.10 CORONARY ARTERY DISEASE INVOLVING NATIVE CORONARY ARTERY OF NATIVE HEART WITHOUT ANGINA PECTORIS: Status: ACTIVE | Noted: 2018-01-02

## 2018-01-02 LAB
ALBUMIN SERPL BCP-MCNC: 3 G/DL
ALBUMIN SERPL BCP-MCNC: 3 G/DL
ALP SERPL-CCNC: 143 U/L
ALP SERPL-CCNC: 143 U/L
ALT SERPL W/O P-5'-P-CCNC: 27 U/L
ALT SERPL W/O P-5'-P-CCNC: 27 U/L
ANION GAP SERPL CALC-SCNC: 9 MMOL/L
AORTIC VALVE REGURGITATION: ABNORMAL
AST SERPL-CCNC: 26 U/L
AST SERPL-CCNC: 26 U/L
BASOPHILS # BLD AUTO: 0.03 K/UL
BASOPHILS NFR BLD: 0.3 %
BILIRUB DIRECT SERPL-MCNC: 0.3 MG/DL
BILIRUB SERPL-MCNC: 0.6 MG/DL
BILIRUB SERPL-MCNC: 0.6 MG/DL
BUN SERPL-MCNC: 37 MG/DL
CALCIUM SERPL-MCNC: 9 MG/DL
CHLORIDE SERPL-SCNC: 102 MMOL/L
CO2 SERPL-SCNC: 28 MMOL/L
CREAT SERPL-MCNC: 1.4 MG/DL
DIFFERENTIAL METHOD: ABNORMAL
EOSINOPHIL # BLD AUTO: 0.1 K/UL
EOSINOPHIL NFR BLD: 0.5 %
ERYTHROCYTE [DISTWIDTH] IN BLOOD BY AUTOMATED COUNT: 14.9 %
EST. GFR  (AFRICAN AMERICAN): 37.6 ML/MIN/1.73 M^2
EST. GFR  (NON AFRICAN AMERICAN): 32.6 ML/MIN/1.73 M^2
ESTIMATED PA SYSTOLIC PRESSURE: 50.28
GLOBAL PERICARDIAL EFFUSION: ABNORMAL
GLUCOSE SERPL-MCNC: 138 MG/DL
HCT VFR BLD AUTO: 36.6 %
HGB BLD-MCNC: 12 G/DL
IMM GRANULOCYTES # BLD AUTO: 0.05 K/UL
IMM GRANULOCYTES NFR BLD AUTO: 0.5 %
LYMPHOCYTES # BLD AUTO: 1.5 K/UL
LYMPHOCYTES NFR BLD: 14.6 %
MAGNESIUM SERPL-MCNC: 2.1 MG/DL
MCH RBC QN AUTO: 28.6 PG
MCHC RBC AUTO-ENTMCNC: 32.8 G/DL
MCV RBC AUTO: 87 FL
MITRAL VALVE MOBILITY: ABNORMAL
MITRAL VALVE STENOSIS: ABNORMAL
MONOCYTES # BLD AUTO: 1.3 K/UL
MONOCYTES NFR BLD: 12.7 %
NEUTROPHILS # BLD AUTO: 7.1 K/UL
NEUTROPHILS NFR BLD: 71.4 %
NRBC BLD-RTO: 0 /100 WBC
PHOSPHATE SERPL-MCNC: 2.5 MG/DL
PLATELET # BLD AUTO: 151 K/UL
PMV BLD AUTO: 11.8 FL
POCT GLUCOSE: 127 MG/DL (ref 70–110)
POTASSIUM SERPL-SCNC: 4.1 MMOL/L
PROT SERPL-MCNC: 6.7 G/DL
PROT SERPL-MCNC: 6.7 G/DL
RBC # BLD AUTO: 4.2 M/UL
RETIRED EF AND QEF - SEE NOTES: 65 (ref 55–65)
SODIUM SERPL-SCNC: 139 MMOL/L
TRICUSPID VALVE REGURGITATION: ABNORMAL
WBC # BLD AUTO: 9.98 K/UL

## 2018-01-02 PROCEDURE — 36415 COLL VENOUS BLD VENIPUNCTURE: CPT

## 2018-01-02 PROCEDURE — 99238 HOSP IP/OBS DSCHRG MGMT 30/<: CPT | Mod: ,,, | Performed by: HOSPITALIST

## 2018-01-02 PROCEDURE — 94640 AIRWAY INHALATION TREATMENT: CPT

## 2018-01-02 PROCEDURE — 93306 TTE W/DOPPLER COMPLETE: CPT

## 2018-01-02 PROCEDURE — 83735 ASSAY OF MAGNESIUM: CPT

## 2018-01-02 PROCEDURE — 25000003 PHARM REV CODE 250: Performed by: HOSPITALIST

## 2018-01-02 PROCEDURE — 85025 COMPLETE CBC W/AUTO DIFF WBC: CPT

## 2018-01-02 PROCEDURE — 93306 TTE W/DOPPLER COMPLETE: CPT | Mod: 26,,, | Performed by: INTERNAL MEDICINE

## 2018-01-02 PROCEDURE — 84100 ASSAY OF PHOSPHORUS: CPT

## 2018-01-02 PROCEDURE — 97161 PT EVAL LOW COMPLEX 20 MIN: CPT

## 2018-01-02 PROCEDURE — 97165 OT EVAL LOW COMPLEX 30 MIN: CPT

## 2018-01-02 PROCEDURE — 63600175 PHARM REV CODE 636 W HCPCS: Performed by: HOSPITALIST

## 2018-01-02 PROCEDURE — 25000242 PHARM REV CODE 250 ALT 637 W/ HCPCS: Performed by: HOSPITALIST

## 2018-01-02 PROCEDURE — 94761 N-INVAS EAR/PLS OXIMETRY MLT: CPT

## 2018-01-02 PROCEDURE — 80053 COMPREHEN METABOLIC PANEL: CPT

## 2018-01-02 PROCEDURE — 99900035 HC TECH TIME PER 15 MIN (STAT)

## 2018-01-02 RX ADMIN — APIXABAN 2.5 MG: 2.5 TABLET, FILM COATED ORAL at 09:01

## 2018-01-02 RX ADMIN — DICLOFENAC: 10 GEL TOPICAL at 06:01

## 2018-01-02 RX ADMIN — FLUTICASONE FUROATE AND VILANTEROL TRIFENATATE 1 PUFF: 100; 25 POWDER RESPIRATORY (INHALATION) at 09:01

## 2018-01-02 RX ADMIN — IPRATROPIUM BROMIDE AND ALBUTEROL SULFATE 3 ML: .5; 3 SOLUTION RESPIRATORY (INHALATION) at 05:01

## 2018-01-02 RX ADMIN — AMLODIPINE BESYLATE 5 MG: 5 TABLET ORAL at 06:01

## 2018-01-02 RX ADMIN — IPRATROPIUM BROMIDE AND ALBUTEROL SULFATE 3 ML: .5; 3 SOLUTION RESPIRATORY (INHALATION) at 01:01

## 2018-01-02 RX ADMIN — METOPROLOL TARTRATE 50 MG: 50 TABLET ORAL at 09:01

## 2018-01-02 RX ADMIN — FUROSEMIDE 40 MG: 40 TABLET ORAL at 09:01

## 2018-01-02 RX ADMIN — GUAIFENESIN 200 MG: 200 SOLUTION ORAL at 06:01

## 2018-01-02 RX ADMIN — BRIMONIDINE TARTRATE 1 DROP: 2 SOLUTION OPHTHALMIC at 09:01

## 2018-01-02 RX ADMIN — PREDNISONE 40 MG: 20 TABLET ORAL at 09:01

## 2018-01-02 RX ADMIN — ATORVASTATIN CALCIUM 40 MG: 20 TABLET, FILM COATED ORAL at 09:01

## 2018-01-02 RX ADMIN — LEVOTHYROXINE SODIUM 75 MCG: 75 TABLET ORAL at 06:01

## 2018-01-02 RX ADMIN — ISOSORBIDE MONONITRATE 30 MG: 30 TABLET, EXTENDED RELEASE ORAL at 06:01

## 2018-01-02 RX ADMIN — DORZOLAMIDE HYDROCHLORIDE 1 DROP: 20 SOLUTION/ DROPS OPHTHALMIC at 09:01

## 2018-01-02 NOTE — PLAN OF CARE
Discharge planning: Discussed home health recommendation with patient . She prefers [facility]. Referral sent via [facility]. She states that she has a ride home with a friend.    Received VM from Sarah with [facility] and they are unable to accept patient due to staffing. Referral sent to [facility].

## 2018-01-02 NOTE — PT/OT/SLP EVAL
Occupational Therapy   Evaluation and Discharge Note    Name: Tiana Mead  MRN: [id number]  Admitting Diagnosis:  Acute on chronic diastolic congestive heart failure      Recommendations:     Discharge Recommendations: home with home health  Discharge Equipment Recommendations:   (Possibly TTB vs shower chair; HH therapy to determine)  Barriers to discharge:  None    History:     Occupational Profile:  Living Environment: pt lives with daughter in a 1SH with no CHERELLE, has a tub/shower combo  Previous level of function: (I) in ADLs and uses QC for ambulation  Roles and Routines: mother  Equipment Owned:  cane, quad  Assistance upon Discharge: available nightly    Past Medical History:   Diagnosis Date    Allergy     Anemia     Arthritis     Asthma     Cataract     right eye    CHF (congestive heart failure) 5/2/2017    Chronic kidney disease     Degenerative disc disease     Diabetes mellitus type II     Glaucoma     History of pseudogout 8/23/2012    Hyperlipidemia     Hypertension     Iritis     Thyroid disease        Past Surgical History:   Procedure Laterality Date    CARDIAC CATHETERIZATION      CATARACT EXTRACTION      IOL OS    CHOLECYSTECTOMY      EYE SURGERY      gall stone      HYSTERECTOMY      VARICOSE VEIN SURGERY         Subjective     Chief Complaint: cough  Patient/Family stated goals: get better and go home  Communicated with: RN prior to session.  Pain/Comfort:  · Pain Rating 1: 0/10  · Pain Rating Post-Intervention 1: 0/10    Objective:     Patient found with: telemetryPatriciock    General Precautions: Standard, fall     Occupational Performance:    Bed Mobility:    · Patient completed Supine to Sit with supervision    Functional Mobility/Transfers:  · Patient completed Sit <> Stand Transfer with stand by assistance with  no assistive device   · Patient completed Bed <> Chair Transfer using Stand Pivot technique with stand by assistance with quad cane  · Patient completed  "Toilet Transfer Stand Pivot technique with stand by assistance with  quad cane    Activities of Daily Living:  · UB Dressing: stand by assistance robe in stance  · LB Dressing: stand by assistance socks  · Toileting: supervision at toilet    Cognitive/Visual Perceptual:  Cognitive/Psychosocial Skills:     -       Oriented to: Person, Place, Time and Situation   -       Follows Commands/attention:Follows multistep  commands  -       Communication: clear/fluent  -       Memory: No Deficits noted  -       Safety awareness/insight to disability: intact   -       Mood/Affect/Coping skills/emotional control: Appropriate to situation  Visual/Perceptual:      -Intact    Physical Exam:  Balance:    -       Good sitting, fair standing  Postural examination/scapula alignment:    -       No postural abnormalities identified  Dominant hand:    -       R  Upper Extremity Range of Motion:     -       Right Upper Extremity: WFL  -       Left Upper Extremity: WFL  Upper Extremity Strength:    -       Right Upper Extremity: 3+/5  -       Left Upper Extremity: 3+/5   Strength:    -       Right Upper Extremity: WFL  -       Left Upper Extremity: WFL  Fine Motor Coordination:    -       Intact  Gross motor coordination:   WFL    Patient left up in chair with RN present    [facility] 6 Click:  [facility] Total Score: 20    Treatment & Education:  Pt ed re OT role and POC. Pt is able to perform UBD, don socks, perform toileting with SBA - Supervision, and able to ambulate with SBA and QC.  Education:    Assessment:     Tiana Mead is a 89 y.o. female with a medical diagnosis of Acute on chronic diastolic congestive heart failure. At this time, patient is functioning at their prior level of function and does not require further acute OT services.     Clinical Decision Makin.  OT Low:  "Pt evaluation falls under low complexity for evaluation coding due to performance deficits noted in 1-3 areas as stated above and 0 co-morbities " "affecting current functional status. Data obtained from problem focused assessments. No modifications or assistance was required for completion of evaluation. Only brief occupational profile and history review completed."     Plan:     During this hospitalization, patient does not require further acute OT services.  Please re-consult if situation changes.    · Plan of Care Reviewed with: patient    This Plan of care has been discussed with the patient who was involved in its development and understands and is in agreement with the identified goals and treatment plan    GOALS:    Occupational Therapy Goals     Not on file          Multidisciplinary Problems (Resolved)        Problem: Occupational Therapy Goal    Goal Priority Disciplines Outcome Interventions   Occupational Therapy Goal   (Resolved)     OT, PT/OT Outcome(s) achieved                    Time Tracking:     OT Date of Treatment: 01/02/18  OT Start Time: 0739  OT Stop Time: 0747  OT Total Time (min): 8 min    Billable Minutes:Evaluation 8 minutes    SHAKIR Valdes  1/2/2018  Pager: [phone]    "

## 2018-01-02 NOTE — PLAN OF CARE
Problem: Patient Care Overview  Goal: Plan of Care Review  Outcome: Ongoing (interventions implemented as appropriate)  Pt A&O x 4.  VSS on room air, SpO2 95-96%.  Telemetry a-fib.  Given PRN guanfenisen for cough.  Moderate relief obtained.  Given tylenol PRN x 1 for L shoulder pain 5/10.  Reassessed pain 2/10.  No SOB reported.  Urinary output via purewick cath recorded in flowsheets.  Evening .  Given  supplemental insulin.

## 2018-01-02 NOTE — TELEPHONE ENCOUNTER
----- Message from Sallie Hallman sent at 1/2/2018  9:25 AM CST -----  Contact: [facility]Rosalinda [phone]  Patient was admited at [facility] on 12/30.    Please call and advise.    Thank You

## 2018-01-02 NOTE — PLAN OF CARE
Problem: Occupational Therapy Goal  Goal: Occupational Therapy Goal  Outcome: Outcome(s) achieved Date Met: 01/02/18  Pt has no acute OT needs, but would benefit from HH therapy to improve strength, balance, and safety with self care, as well as to assess for DME needs after home assessment.  OT to d/c.    SHAKIR Valdes  1/2/2018  Pager: [phone]

## 2018-01-02 NOTE — PROGRESS NOTES
Pt discharged to home via wheelchair. Pt denies pain at the present time. Condition stable. Follows commands. IV removed and catheter intact. Pt given prescriptions and copy of discharge home care instructions. Pt verbalized understanding of activity limitations and s/s of when to call the Dr. Pt also given information on followup appointment. All belongings with the pt. Pt verbalized understanding of all discharge instructions. Pt waiting for transport. Family at bedside.

## 2018-01-02 NOTE — PT/OT/SLP EVAL
Physical Therapy Evaluation and Discharge Note    Patient Name:  Tiana Mead   MRN:  [id number]    Recommendations:     Discharge Recommendations:  home with home health   Discharge Equipment Recommendations:     Barriers to discharge: None    Assessment:     Tiana Mead is a 89 y.o. female admitted with a medical diagnosis of Acute on chronic diastolic congestive heart failure. .  At this time, patient is functioning at their prior level of function and does not require further acute PT services.     Recent Surgery: * No surgery found *      Plan:     During this hospitalization, patient does not require further acute PT services.  Please re-consult if situation changes.     Plan of Care Reviewed with: patient    Subjective     Communicated with nurse prior to session.  Patient found supine upon PT entry to room, agreeable to evaluation.      Chief Complaint: sob  Patient comments/goals: to get better  Pain/Comfort:  · Pain Rating 1: 0/10    Patients cultural, spiritual, Methodist conflicts given the current situation: none stated    Living Environment:  Pt lives with her daughter in a [facility], [address]. Patient has a tub/shower combo and is alone during the day.    Prior to admission, patients level of function was Independent.  Patient has the following equipment: cane, quad.  DME owned (not currently used): quad cane.  Upon discharge, patient will have assistance from daughter at night.    Objective:     Patient found with: telemetry     General Precautions: Standard,     Orthopedic Precautions:    Braces:       Exams:  · Cognitive Exam:  Patient is oriented to Person, Place, Time and Situation and follows 100% of all commands   · Gross Motor Coordination:  WFL  · RLE ROM: WFL  · RLE Strength: WFL  · LLE ROM: WFL  · LLE Strength: WFL    Functional Mobility:  · Bed Mobility:     · Supine to Sit: stand by assistance  · Transfers:     · Sit to Stand:  stand by assistance with no AD  · Gait: 100'  · Patient able to  ambulate 50' w/ no AD and 50' w/ quad cane at SBA. No LOB noted throughout gait. Patient has decreased step length and nathen.    AM-PAC 6 CLICK MOBILITY  Total Score:22       Therapeutic Activities and Exercises:   PT eval completed    Patient left standing in restroom with all lines intact and nurse present.    GOALS:    Physical Therapy Goals     Not on file          Multidisciplinary Problems (Resolved)        Problem: Physical Therapy Goal    Goal Priority Disciplines Outcome Goal Variances Interventions   Physical Therapy Goal   (Resolved)     PT/OT, PT Outcome(s) achieved     Description:  Eval and DC complete  Patient has no inpatient PT needs  Discontinue Orders                          History:     Past Medical History:   Diagnosis Date    Allergy     Anemia     Arthritis     Asthma     Cataract     right eye    CHF (congestive heart failure) 5/2/2017    Chronic kidney disease     Degenerative disc disease     Diabetes mellitus type II     Glaucoma     History of pseudogout 8/23/2012    Hyperlipidemia     Hypertension     Iritis     Thyroid disease        Past Surgical History:   Procedure Laterality Date    CARDIAC CATHETERIZATION      CATARACT EXTRACTION      IOL OS    CHOLECYSTECTOMY      EYE SURGERY      gall stone      HYSTERECTOMY      VARICOSE VEIN SURGERY         Clinical Decision Making:     History  Co-morbidities and personal factors that may impact the plan of care Examination  Body Structures and Functions, activity limitations and participation restrictions that may impact the plan of care Clinical Presentation   Decision Making/ Complexity Score   Co-morbidities:   [] Time since onset of injury / illness / exacerbation  [] Status of current condition  []Patient's cognitive status and safety concerns    [] Multiple Medical Problems (see med hx)  Personal Factors:   [] Patient's age  [] Prior Level of function   [] Patient's home situation (environment and family  support)  [] Patient's level of motivation  [] Expected progression of patient      HISTORY:(criteria)    [] 91620 - no personal factors/history    [x] 46417 - has 1-2 personal factor/comorbidity     [] 64612 - has >3 personal factor/comorbidity     Body Regions:  [] Objective examination findings  [] Head     []  Neck  [] Trunk   [] Upper Extremity  [] Lower Extremity    Body Systems:  [] For communication ability, affect, cognition, language, and learning style: the assessment of the ability to make needs known, consciousness, orientation (person, place, and time), expected emotional /behavioral responses, and learning preferences (eg, learning barriers, education  needs)  [] For the neuromuscular system: a general assessment of gross coordinated movement (eg, balance, gait, locomotion, transfers, and transitions) and motor function  (motor control and motor learning)  [] For the musculoskeletal system: the assessment of gross symmetry, gross range of motion, gross strength, height, and weight  [] For the integumentary system: the assessment of pliability(texture), presence of scar formation, skin color, and skin integrity  [] For cardiovascular/pulmonary system: the assessment of heart rate, respiratory rate, blood pressure, and edema     Activity limitations:    [] Patient's cognitive status and saf ety concerns          [] Status of current condition      [] Weight bearing restriction  [] Cardiopulmunary Restriction    Participation Restrictions:   [] Goals and goal agreement with the patient     [] Rehab potential (prognosis) and probable outcome      Examination of Body System: (criteria)    [x] 10370 - addressing 1-2 elements    [] 87622 - addressing a total of 3 or more elements     [] 20262 -  Addressing a total of 4 or more elements         Clinical Presentation: (criteria)  Stable - 72251     On examination of body system using standardized tests and measures patient presents with 1-2 elements from any  of the following: body structures and functions, activity limitations, and/or participation restrictions.  Leading to a clinical presentation that is considered stable and/or uncomplicated                              Clinical Decision Making  (Eval Complexity):  Low- 38783     Time Tracking:     PT Received On: 01/02/18  PT Start Time: 0738     PT Stop Time: 0748  PT Total Time (min): 10 min     Billable Minutes: Evaluation 10 Min      Markos Leyva, PT  01/02/2018

## 2018-01-02 NOTE — PLAN OF CARE
[facility]    HOME HEALTH ORDERS  FACE TO FACE ENCOUNTER    Patient Name: Tiana Mead  YOB: 1925    PCP: Belen Wood MD   PCP Address: [address] / [place] [address]  PCP Phone Number: [phone]  PCP Fax: [phone]    Encounter Date: 01/02/2018    Admit to Home Health    Diagnoses:  Active Hospital Problems    Diagnosis  POA    Moderate asthma with exacerbation [J45.901]  Yes    Congestive heart failure [I50.9]  Yes    Coronary artery disease involving native coronary artery of native heart without angina pectoris [I25.10]  Yes    Viral URI [J06.9, B97.89]  Yes    Asthma exacerbation [J45.901]  Yes    Primary hypothyroidism [E03.9]  Yes    CKD (chronic kidney disease) stage 3, GFR 30-59 ml/min, eGFR 32  [N18.3]  Yes    Coronary artery disease involving native coronary artery without angina pectoris [I25.10]  Yes    Diabetic polyneuropathy associated with type 2 diabetes mellitus [E11.42]  Yes    Type 2 diabetes mellitus with diabetic nephropathy, with long-term current use of insulin [E11.21, Z79.4]  Yes    H/O Deep vein thrombosis (DVT) [I82.409]  Yes    Glaucoma associated with ocular trauma [H40.30X0]  Yes    Pericardial effusion [I31.3]  Yes    Long term current use of anticoagulant therapy [Z79.01]  Not Applicable    Persistent atrial fibrillation [I48.1]  Yes    Type 2 diabetes mellitus with diabetic polyneuropathy, with long-term current use of insulin [E11.42, Z79.4]  Not Applicable    Chondrocalcinosis [M11.20]  Yes    Generalized osteoarthritis of multiple sites [M15.9]  Yes    Essential hypertension [I10]  Yes    Hyperlipemia, mixed [E78.2]  Yes      Resolved Hospital Problems    Diagnosis Date Resolved POA    *Acute on chronic diastolic congestive heart failure [I50.33] 01/01/2018 Yes       Future Appointments  Date Time Provider Department Center   1/8/2018 10:30 AM David Prado MD [facility]    1/10/2018 4:00 PM Mickey Erwin MD [facility]   4/17/2018 9:00 AM Belen Wood MD [facility] PC[facility]     Follow-up Information     Belen Wood MD In 1 week.    Specialty:  Internal Medicine  Contact information:  [address]  [place] [address]  [phone]             Mickey Erwin MD On 1/10/2018.    Specialty:  Cardiology  Why:  appointment 4:00pm  Contact information:  [address] [place] [address]  [phone]                     I have seen and examined this patient face to face today. My clinical findings that support the need for the home health skilled services and home bound status are the following:  Weakness/numbness causing balance and gait disturbance due to Heart Failure making it taxing to leave home.    Allergies:  Review of patient's allergies indicates:   Allergen Reactions    Codeine      Other reaction(s): Itching  Other reaction(s): Nausea       Diet: cardiac diet    Activities: activity as tolerated    Nursing:   SN to complete comprehensive assessment including routine vital signs. Instruct on disease process and s/s of complications to report to MD. Review/verify medication list sent home with the patient at time of discharge  and instruct patient/caregiver as needed. Frequency may be adjusted depending on start of care date.    Notify MD if SBP > 160 or < 90; DBP > 90 or < 50; HR > 120 or < 50; Temp > 101; Other:         CONSULTS:    Physical Therapy to evaluate and treat. Evaluate for home safety and equipment needs; Establish/upgrade home exercise program. Perform / instruct on therapeutic exercises, gait training, transfer training, and Range of Motion.  Occupational Therapy to evaluate and treat. Evaluate home environment for safety and equipment needs. Perform/Instruct on transfers, ADL training, ROM, and therapeutic exercises.   to evaluate for community resources/long-range planning.  Aide to provide assistance with personal  care, ADLs, and vital signs.    MISCELLANEOUS CARE:  Heart Failure:      SN to instruct on the following:    Instruct on the definition of CHF.   Instruct on the signs/sympoms of CHF to be reported.   Instruct on and monitor daily weights.   Instruct on factors that cause exacerbation.   Instruct on action, dose, schedule, and side effects of medications.   Instruct on diet as prescribed.   Instruct on activity allowed.   Instruct on life-style modifications for life long management of CHF   SN to assess compliance with daily weights, diet, medications, fluid retention,    safety precautions, activities permitted and life-style modifications.   Additional 1-2 SN visits per week as needed for signs and symptoms     of CHF exacerbation.      For Weight Gain > 2-3 lbs in 1 day or 4-6 lbs over 1 week notify PCP:  Increase Lasix 40mg  (oral diuretic) dose to 80 mg for 5 days temporarily  Contact your Primary Care Physician for follow-up ASAP.    WOUND CARE ORDERS  n/a      Medications: Review discharge medications with patient and family and provide education.      Current Discharge Medication List      START taking these medications    Details   predniSONE (DELTASONE) 20 MG tablet Take 2 tablets (40 mg total) by mouth once daily.  Qty: 6 tablet, Refills: 0         CONTINUE these medications which have NOT CHANGED    Details   albuterol 90 mcg/actuation inhaler Inhale 2 puffs into the lungs every 4 (four) hours as needed.  Qty: 3 Inhaler, Refills: 4      amlodipine (NORVASC) 5 MG tablet Take 1 tablet (5 mg total) by mouth every morning.  Qty: 90 tablet, Refills: 3      apixaban 2.5 mg Tab Take 1 tablet (2.5 mg total) by mouth 2 (two) times daily.  Qty: 60 tablet, Refills: 5      atorvastatin (LIPITOR) 40 MG tablet Take 1 tablet (40 mg total) by mouth once daily.  Qty: 90 tablet, Refills: 3      brimonidine 0.2% (ALPHAGAN) 0.2 % Drop INT 1 GTT INTO OU BID  Refills: 3      diclofenac sodium (VOLTAREN) 1 % Gel Apply  "topically 4 (four) times daily.  Qty: 100 Tube, Refills: 3    Associated Diagnoses: Effusion of knee, unspecified laterality; Primary osteoarthritis of both knees; Generalized osteoarthritis of multiple sites      dorzolamide (TRUSOPT) 2 % ophthalmic solution Place 1 drop into both eyes 2 (two) times daily.  Qty: 10 mL, Refills: 4    Associated Diagnoses: Primary open-angle glaucoma(365.11)      fluticasone-salmeterol 250-50 mcg/dose (ADVAIR) 250-50 mcg/dose diskus inhaler Inhale 1 puff into the lungs nightly. Controller  Qty: 60 each, Refills: 11      insulin aspart protamine-insulin aspart (NOVOLOG MIX 70-30 FLEXPEN) 100 unit/mL (70-30) InPn pen Take 12 units once daily before breakfast.  Qty: 1 Box, Refills: 11      insulin needles, disposable, (BD INSULIN PEN NEEDLE UF SHORT) 31 X 5/16 " Ndle Inject 1 Box into the skin 2 (two) times daily.  Qty: 100 each, Refills: 11      isosorbide mononitrate (IMDUR) 30 MG 24 hr tablet Take 1 tablet (30 mg total) by mouth every morning. Taking for heart  Qty: 90 tablet, Refills: 3      !! lancets (ONETOUCH DELICA LANCETS) 33 gauge Misc Apply 1 lancet topically 3 (three) times daily.  Qty: 300 each, Refills: 4    Comments: I don't know what brand the patient needs  Associated Diagnoses: Type 2 diabetes mellitus with other diabetic arthropathy, with long-term current use of insulin      !! lancets Misc 1 Device by Misc.(Non-Drug; Combo Route) route 3 (three) times daily before meals. Please provide the insurance company preferred product.  Qty: 300 each, Refills: 4      latanoprost 0.005 % ophthalmic solution Place 1 drop into the right eye every evening.  Qty: 3 Bottle, Refills: 6    Associated Diagnoses: POAG (primary open-angle glaucoma)      levothyroxine (SYNTHROID) 75 MCG tablet Take 1 tablet (75 mcg total) by mouth before breakfast.  Qty: 90 tablet, Refills: 3      metoprolol tartrate (LOPRESSOR) 50 MG tablet Take 1 tablet (50 mg total) by mouth 2 (two) times daily.  Qty: " 180 tablet, Refills: 4    Comments: Previously taking two 25 mg tablets twice daily      multivit-mineral-iron-lutein (CENTRUM SILVER ULTRA WOMEN'S) Tab Take 1 tablet by mouth once daily.      TRUE METRIX GLUCOSE METER Misc TEST TID  Refills: 4      !! TRUEPLUS LANCETS 30 gauge Misc USE TO TEST BLOOD SUGAR TID AC  Refills: 2      blood sugar diagnostic (ONETOUCH ULTRA TEST) Strp Inject 1 strip into the skin 3 (three) times daily.  Qty: 300 each, Refills: 4    Comments: I don't know what brand the patient needs  Associated Diagnoses: Type 2 diabetes mellitus with diabetic peripheral angiopathy without gangrene, with long-term current use of insulin      furosemide (LASIX) 40 MG tablet Take 1 tablet (40 mg total) by mouth 2 (two) times daily. 1 tb am. 1 at 4pm.  Qty: 180 tablet, Refills: 3      meclizine (ANTIVERT) 12.5 mg tablet Take 1 tablet (12.5 mg total) by mouth 3 (three) times daily as needed.  Qty: 50 tablet, Refills: 1      nitroGLYCERIN (NITROSTAT) 0.4 MG SL tablet Place 0.4 mg under the tongue every 5 (five) minutes as needed for Chest pain.       !! - Potential duplicate medications found. Please discuss with provider.          I certify that this patient is confined to her home and needs physical therapy and occupational therapy.

## 2018-01-02 NOTE — PLAN OF CARE
Problem: Physical Therapy Goal  Goal: Physical Therapy Goal  Eval and DC complete  Patient has no inpatient PT needs  Discontinue Orders        Outcome: Outcome(s) achieved Date Met: 01/02/18  Eval and DC complete  Patient has no inpatient PT needs  Discontinue Orders    Markos Leyva, PT  1/2/2018

## 2018-01-04 ENCOUNTER — PATIENT OUTREACH (OUTPATIENT)
Dept: ADMINISTRATIVE | Facility: CLINIC | Age: 83
End: 2018-01-04

## 2018-01-05 NOTE — PATIENT INSTRUCTIONS

## 2018-01-08 ENCOUNTER — OFFICE VISIT (OUTPATIENT)
Dept: OPHTHALMOLOGY | Facility: CLINIC | Age: 83
End: 2018-01-08
Payer: MEDICARE

## 2018-01-08 DIAGNOSIS — Z96.1 PSEUDOPHAKIA: ICD-10-CM

## 2018-01-08 DIAGNOSIS — H40.30X0 GLAUCOMA ASSOCIATED WITH OCULAR TRAUMA: ICD-10-CM

## 2018-01-08 DIAGNOSIS — H25.11 NUCLEAR SCLEROSIS OF RIGHT EYE: ICD-10-CM

## 2018-01-08 DIAGNOSIS — H40.1132 PRIMARY OPEN ANGLE GLAUCOMA (POAG) OF BOTH EYES, MODERATE STAGE: ICD-10-CM

## 2018-01-08 DIAGNOSIS — H40.1132 PRIMARY OPEN ANGLE GLAUCOMA OF BOTH EYES, MODERATE STAGE: ICD-10-CM

## 2018-01-08 DIAGNOSIS — H40.30X0 GLAUCOMA ASSOCIATED WITH OCULAR TRAUMA, UNSPECIFIED GLAUCOMA STAGE, UNSPECIFIED LATERALITY: Primary | ICD-10-CM

## 2018-01-08 PROCEDURE — 92014 COMPRE OPH EXAM EST PT 1/>: CPT | Mod: S$GLB,,, | Performed by: OPHTHALMOLOGY

## 2018-01-08 PROCEDURE — 92133 CPTRZD OPH DX IMG PST SGM ON: CPT | Mod: S$GLB,,, | Performed by: OPHTHALMOLOGY

## 2018-01-08 PROCEDURE — 99999 PR PBB SHADOW E&M-EST. PATIENT-LVL II: CPT | Mod: PBBFAC,,, | Performed by: OPHTHALMOLOGY

## 2018-01-08 PROCEDURE — 99499 UNLISTED E&M SERVICE: CPT | Mod: S$GLB,,, | Performed by: OPHTHALMOLOGY

## 2018-01-10 ENCOUNTER — OFFICE VISIT (OUTPATIENT)
Dept: CARDIOLOGY | Facility: CLINIC | Age: 83
End: 2018-01-10
Payer: MEDICARE

## 2018-01-10 VITALS
HEART RATE: 105 BPM | DIASTOLIC BLOOD PRESSURE: 63 MMHG | BODY MASS INDEX: 24.15 KG/M2 | OXYGEN SATURATION: 97 % | WEIGHT: 159.38 LBS | HEIGHT: 68 IN | SYSTOLIC BLOOD PRESSURE: 100 MMHG

## 2018-01-10 DIAGNOSIS — I27.20 PULMONARY HYPERTENSION: ICD-10-CM

## 2018-01-10 DIAGNOSIS — I31.39 PERICARDIAL EFFUSION: ICD-10-CM

## 2018-01-10 DIAGNOSIS — E78.2 HYPERLIPEMIA, MIXED: ICD-10-CM

## 2018-01-10 DIAGNOSIS — I36.1 NON-RHEUMATIC TRICUSPID VALVE INSUFFICIENCY: ICD-10-CM

## 2018-01-10 DIAGNOSIS — J45.909 PERSISTENT ASTHMA, UNSPECIFIED ASTHMA SEVERITY, UNSPECIFIED WHETHER COMPLICATED: ICD-10-CM

## 2018-01-10 DIAGNOSIS — I48.19 PERSISTENT ATRIAL FIBRILLATION: ICD-10-CM

## 2018-01-10 DIAGNOSIS — I50.32 CHRONIC DIASTOLIC CONGESTIVE HEART FAILURE: Primary | ICD-10-CM

## 2018-01-10 DIAGNOSIS — I10 ESSENTIAL HYPERTENSION: ICD-10-CM

## 2018-01-10 PROCEDURE — 99999 PR PBB SHADOW E&M-EST. PATIENT-LVL IV: CPT | Mod: PBBFAC,,, | Performed by: INTERNAL MEDICINE

## 2018-01-10 PROCEDURE — 99499 UNLISTED E&M SERVICE: CPT | Mod: S$GLB,,, | Performed by: INTERNAL MEDICINE

## 2018-01-10 PROCEDURE — 99214 OFFICE O/P EST MOD 30 MIN: CPT | Mod: S$GLB,,, | Performed by: INTERNAL MEDICINE

## 2018-01-10 NOTE — PATIENT INSTRUCTIONS
Continue current regimen  Continue to weigh and adjust furosemide per weight gain as discussed  Appointment to see Dr Bradley as soon as available

## 2018-01-10 NOTE — PROGRESS NOTES
Subjective:   Patient ID:  Tiana Mead is a 89 y.o. female who presents for follow-up of Congestive Heart Failure (Hospital d/c1/02/17); Shortness of Breath; and Cough      HPI:   Tiana Mead presents for follow up of a recent hospitalization for decompensated diastolic CHF. BNP was elevated. She was diuresed but still is having issues with coughing and wheezing ( known asthma ). Has not had swelling or weight gain. She has moderate pulmonary hypertension and severe TR. Moderate stable pericardial effusion.  Tiana Mead has chronic atrial fibrillation with rate control on a DOAC. Tiana Maed denies chest pain,  palpitations, presyncope , or syncope. Tiana Mead has hypertension with good control.Tiana Mead has dyslipidemia  on high intensity statin.  Review of Systems   Constitution: Negative for weakness, malaise/fatigue, weight gain and weight loss.   Eyes: Negative for blurred vision.   Cardiovascular: Positive for dyspnea on exertion. Negative for chest pain, claudication, cyanosis, irregular heartbeat, leg swelling, near-syncope, orthopnea, palpitations, paroxysmal nocturnal dyspnea and syncope.   Respiratory: Positive for wheezing. Negative for shortness of breath.    Musculoskeletal: Positive for joint pain. Negative for falls and myalgias.   Gastrointestinal: Negative for abdominal pain, heartburn, nausea and vomiting.   Genitourinary: Negative for nocturia.   Neurological: Negative for brief paralysis, dizziness, focal weakness, headaches, numbness and paresthesias.   Psychiatric/Behavioral: Negative for altered mental status.       Current Outpatient Prescriptions   Medication Sig    albuterol 90 mcg/actuation inhaler Inhale 2 puffs into the lungs every 4 (four) hours as needed.    amlodipine (NORVASC) 5 MG tablet Take 1 tablet (5 mg total) by mouth every morning.    apixaban 2.5 mg Tab Take 1 tablet (2.5 mg total) by mouth 2 (two) times daily.    atorvastatin (LIPITOR)  "40 MG tablet Take 1 tablet (40 mg total) by mouth once daily.    blood sugar diagnostic (ONETOUCH ULTRA TEST) Strp Inject 1 strip into the skin 3 (three) times daily.    brimonidine 0.2% (ALPHAGAN) 0.2 % Drop INT 1 GTT INTO OU BID    diclofenac sodium (VOLTAREN) 1 % Gel Apply topically 4 (four) times daily.    dorzolamide (TRUSOPT) 2 % ophthalmic solution Place 1 drop into both eyes 2 (two) times daily.    fluticasone-salmeterol 250-50 mcg/dose (ADVAIR) 250-50 mcg/dose diskus inhaler Inhale 1 puff into the lungs nightly. Controller    furosemide (LASIX) 40 MG tablet Take 1 tablet (40 mg total) by mouth 2 (two) times daily. 1 tb am. 1 at 4pm.    insulin aspart protamine-insulin aspart (NOVOLOG MIX 70-30 FLEXPEN) 100 unit/mL (70-30) InPn pen Take 12 units once daily before breakfast. (Patient taking differently: Take 12 units once daily before breakfast. Pt takes additional units in the evening per sliding scale while prednisone)    insulin needles, disposable, (BD INSULIN PEN NEEDLE UF SHORT) 31 X 5/16 " Ndle Inject 1 Box into the skin 2 (two) times daily.    isosorbide mononitrate (IMDUR) 30 MG 24 hr tablet Take 1 tablet (30 mg total) by mouth every morning. Taking for heart    lancets (ONETOUCH DELICA LANCETS) 33 gauge Misc Apply 1 lancet topically 3 (three) times daily.    lancets Misc 1 Device by Misc.(Non-Drug; Combo Route) route 3 (three) times daily before meals. Please provide the insurance company preferred product.    latanoprost 0.005 % ophthalmic solution Place 1 drop into the right eye every evening. (Patient taking differently: Place 1 drop into both eyes every evening. )    levothyroxine (SYNTHROID) 75 MCG tablet Take 1 tablet (75 mcg total) by mouth before breakfast.    meclizine (ANTIVERT) 12.5 mg tablet Take 1 tablet (12.5 mg total) by mouth 3 (three) times daily as needed.    metoprolol tartrate (LOPRESSOR) 50 MG tablet Take 1 tablet (50 mg total) by mouth 2 (two) times daily.    " "multivit-mineral-iron-lutein (CENTRUM SILVER ULTRA WOMEN'S) Tab Take 1 tablet by mouth once daily.    nitroGLYCERIN (NITROSTAT) 0.4 MG SL tablet Place 0.4 mg under the tongue every 5 (five) minutes as needed for Chest pain.    TRUE METRIX GLUCOSE METER Misc TEST TID    TRUEPLUS LANCETS 30 gauge Misc USE TO TEST BLOOD SUGAR TID AC     No current facility-administered medications for this visit.      Objective:   Physical Exam   Constitutional: She is oriented to person, place, and time. She appears well-developed. No distress.   /63 (BP Location: Left arm, Patient Position: Sitting, BP Method: Large (Automatic))   Pulse 105   Ht 5' 8" (1.727 m)   Wt 72.3 kg (159 lb 6.3 oz)   LMP  (LMP Unknown)   SpO2 97%   BMI 24.24 kg/m²    HENT:   Head: Normocephalic.   Eyes: Conjunctivae are normal. Pupils are equal, round, and reactive to light. No scleral icterus.   Neck: Neck supple. No JVD present. No thyromegaly present.   Cardiovascular: Normal rate and intact distal pulses.  An irregularly irregular rhythm present. PMI is not displaced.  Exam reveals no gallop and no friction rub.    Murmur heard.  High-pitched mid to late systolic murmur is present with a grade of 2/6  at the lower left sternal border No presacral or pedal edema.  Pulmonary/Chest: Effort normal. No respiratory distress. She has wheezes. She has no rales.   End inspiratory wheeze   Abdominal: Soft. She exhibits no distension. There is no splenomegaly or hepatomegaly. There is no tenderness.   Musculoskeletal: She exhibits no edema or tenderness.   Gait normal   Neurological: She is alert and oriented to person, place, and time.   Skin: Skin is warm and dry. She is not diaphoretic.   Psychiatric: She has a normal mood and affect. Her behavior is normal.       Lab Results   Component Value Date     01/02/2018    K 4.1 01/02/2018     01/02/2018    CO2 28 01/02/2018    BUN 37 (H) 01/02/2018    CREATININE 1.4 01/02/2018     (H) " 01/02/2018    HGBA1C 6.6 (H) 12/30/2017    MG 2.1 01/02/2018    AST 26 01/02/2018    AST 26 01/02/2018    ALT 27 01/02/2018    ALT 27 01/02/2018    ALBUMIN 3.0 (L) 01/02/2018    ALBUMIN 3.0 (L) 01/02/2018    PROT 6.7 01/02/2018    PROT 6.7 01/02/2018    BILITOT 0.6 01/02/2018    BILITOT 0.6 01/02/2018    WBC 9.98 01/02/2018    HGB 12.0 01/02/2018    HCT 36.6 (L) 01/02/2018    MCV 87 01/02/2018     01/02/2018    TSH 4.066 (H) 04/20/2017    CHOL 116 (L) 04/20/2017    HDL 52 04/20/2017    LDLCALC 55.2 (L) 04/20/2017    TRIG 44 04/20/2017       Assessment:     1. Chronic diastolic congestive heart failure : Does not currently appear volume overloaded   2. Persistent atrial fibrillation : Rate controlled on a DOAC   3. Persistent asthma, unspecified asthma severity, unspecified whether complicated : Contributing to sxs   4. Essential hypertension : Good control.   5. Pulmonary hypertension : Moderate   6. Non-rheumatic tricuspid valve insufficiency: Severe     7. Pericardial effusion : Stable   8. Hyperlipemia, mixed :  on high intensity statin       Plan:     Tiana was seen today for congestive heart failure, shortness of breath and cough.    Diagnoses and all orders for this visit:    Chronic diastolic congestive heart failure  Continue current regimen including adjusting furosemide for weight gain    Persistent atrial fibrillation  Continue current regimen    Persistent asthma, unspecified asthma severity, unspecified whether complicated  To see her PCP  Essential hypertension  Continue current regimen    Pulmonary hypertension    Non-rheumatic tricuspid valve insufficiency    Pericardial effusion    Hyperlipemia, mixed

## 2018-01-11 ENCOUNTER — TELEPHONE (OUTPATIENT)
Dept: INTERNAL MEDICINE | Facility: CLINIC | Age: 83
End: 2018-01-11

## 2018-01-11 NOTE — TELEPHONE ENCOUNTER
----- Message from Rose Marie Benjamin sent at 1/11/2018  6:48 AM CST -----  Contact: self  Patient states was in hospital 4 days.   Patient states was  advised need to follow up with PCP for today.   Please call pt at [phone]

## 2018-01-15 ENCOUNTER — OFFICE VISIT (OUTPATIENT)
Dept: INTERNAL MEDICINE | Facility: CLINIC | Age: 83
End: 2018-01-15
Payer: MEDICARE

## 2018-01-15 ENCOUNTER — TELEPHONE (OUTPATIENT)
Dept: INTERNAL MEDICINE | Facility: CLINIC | Age: 83
End: 2018-01-15

## 2018-01-15 VITALS — OXYGEN SATURATION: 98 % | DIASTOLIC BLOOD PRESSURE: 62 MMHG | HEART RATE: 101 BPM | SYSTOLIC BLOOD PRESSURE: 110 MMHG

## 2018-01-15 DIAGNOSIS — J31.0 RHINITIS, UNSPECIFIED CHRONICITY, UNSPECIFIED TYPE: ICD-10-CM

## 2018-01-15 DIAGNOSIS — J32.9 SINUSITIS, UNSPECIFIED CHRONICITY, UNSPECIFIED LOCATION: Primary | ICD-10-CM

## 2018-01-15 PROCEDURE — 99499 UNLISTED E&M SERVICE: CPT | Mod: S$GLB,,, | Performed by: NURSE PRACTITIONER

## 2018-01-15 PROCEDURE — 99214 OFFICE O/P EST MOD 30 MIN: CPT | Mod: S$GLB,,, | Performed by: NURSE PRACTITIONER

## 2018-01-15 PROCEDURE — 99999 PR PBB SHADOW E&M-EST. PATIENT-LVL V: CPT | Mod: PBBFAC,,, | Performed by: NURSE PRACTITIONER

## 2018-01-15 RX ORDER — IPRATROPIUM BROMIDE 21 UG/1
2 SPRAY, METERED NASAL 2 TIMES DAILY
Qty: 30 ML | Refills: 1 | Status: SHIPPED | OUTPATIENT
Start: 2018-01-15 | End: 2018-02-24 | Stop reason: SDUPTHER

## 2018-01-15 RX ORDER — AMOXICILLIN AND CLAVULANATE POTASSIUM 500; 125 MG/1; MG/1
1 TABLET, FILM COATED ORAL 2 TIMES DAILY
Qty: 14 TABLET | Refills: 0 | Status: SHIPPED | OUTPATIENT
Start: 2018-01-15 | End: 2018-04-17 | Stop reason: ALTCHOICE

## 2018-01-15 NOTE — PATIENT INSTRUCTIONS
Use nasal spray twice a day    Take antibiotic with a meal    Continue other medications the same    Call for any worsening

## 2018-01-15 NOTE — PROGRESS NOTES
Subjective:       Patient ID: Tiana Mead is a 89 y.o. female.    Chief Complaint: Hospital Follow Up    Pt here for hospital follow up.  Pt was admitted 12/30/17 with SOB, PMHx of CHF.  Pt on O2 in hospital which was tapered off.  Pt has already seen cardiology but here today with c/o continued cough.  Tiana Mead presents for follow up of a recent hospitalization for decompensated diastolic CHF. BNP was elevated. She was diuresed but still is having issues with coughing and wheezing ( known asthma ). Has not had swelling or weight gain. She has moderate pulmonary hypertension and severe TR. Moderate stable pericardial effusion.  Tiana Mead has chronic atrial fibrillation with rate control on a DOAC. Tiana Mead denies chest pain,  palpitations, presyncope , or syncope. Tiana Mead has hypertension with good control.Tiana Mead has dyslipidemia  on high intensity statin    Pt and daughter state appetite is good.  No SOB.  No swelling of legs.  Weight self daily.  Has Lasix parameters per cardiology        Past Medical History:   Diagnosis Date    Allergy     Anemia     Arthritis     Asthma     Cataract     right eye    CHF (congestive heart failure) 5/2/2017    Chronic kidney disease     Degenerative disc disease     Diabetes mellitus type II     Glaucoma     History of pseudogout 8/23/2012    Hyperlipidemia     Hypertension     Iritis     Thyroid disease      Past Surgical History:   Procedure Laterality Date    CARDIAC CATHETERIZATION      CATARACT EXTRACTION      IOL OS    CHOLECYSTECTOMY      EYE SURGERY      gall stone      HYSTERECTOMY      VARICOSE VEIN SURGERY       Social History     Social History Narrative    No narrative on file     Family History   Problem Relation Age of Onset    Diabetes Mother     Hypertension Mother     Glaucoma Mother     Hypertension Father     Diabetes Son     No Known Problems Daughter     Diabetes Daughter     No  "Known Problems Son      Vitals:    01/15/18 1356 01/15/18 1435   BP: (!) 80/60 110/62   Pulse: 101    SpO2: 98%    PainSc: 0-No pain      Outpatient Encounter Prescriptions as of 1/15/2018   Medication Sig Dispense Refill    albuterol 90 mcg/actuation inhaler Inhale 2 puffs into the lungs every 4 (four) hours as needed. 3 Inhaler 4    amlodipine (NORVASC) 5 MG tablet Take 1 tablet (5 mg total) by mouth every morning. 90 tablet 3    apixaban 2.5 mg Tab Take 1 tablet (2.5 mg total) by mouth 2 (two) times daily. 60 tablet 5    atorvastatin (LIPITOR) 40 MG tablet Take 1 tablet (40 mg total) by mouth once daily. 90 tablet 3    blood sugar diagnostic (ONETOUCH ULTRA TEST) Strp Inject 1 strip into the skin 3 (three) times daily. 300 each 4    brimonidine 0.2% (ALPHAGAN) 0.2 % Drop INT 1 GTT INTO OU BID  3    diclofenac sodium (VOLTAREN) 1 % Gel Apply topically 4 (four) times daily. 100 Tube 3    dorzolamide (TRUSOPT) 2 % ophthalmic solution Place 1 drop into both eyes 2 (two) times daily. 10 mL 4    fluticasone-salmeterol 250-50 mcg/dose (ADVAIR) 250-50 mcg/dose diskus inhaler Inhale 1 puff into the lungs nightly. Controller 60 each 11    furosemide (LASIX) 40 MG tablet Take 1 tablet (40 mg total) by mouth 2 (two) times daily. 1 tb am. 1 at 4pm. 180 tablet 3    insulin aspart protamine-insulin aspart (NOVOLOG MIX 70-30 FLEXPEN) 100 unit/mL (70-30) InPn pen Take 12 units once daily before breakfast. (Patient taking differently: Take 12 units once daily before breakfast. Pt takes additional units in the evening per sliding scale while prednisone) 1 Box 11    insulin needles, disposable, (BD INSULIN PEN NEEDLE UF SHORT) 31 X 5/16 " Ndle Inject 1 Box into the skin 2 (two) times daily. 100 each 11    isosorbide mononitrate (IMDUR) 30 MG 24 hr tablet Take 1 tablet (30 mg total) by mouth every morning. Taking for heart 90 tablet 3    lancets (ONETOUCH DELICA LANCETS) 33 gauge Misc Apply 1 lancet topically 3 (three) " "times daily. 300 each 4    lancets Misc 1 Device by Misc.(Non-Drug; Combo Route) route 3 (three) times daily before meals. Please provide the insurance company preferred product. 300 each 4    latanoprost 0.005 % ophthalmic solution Place 1 drop into the right eye every evening. (Patient taking differently: Place 1 drop into both eyes every evening. ) 3 Bottle 6    levothyroxine (SYNTHROID) 75 MCG tablet Take 1 tablet (75 mcg total) by mouth before breakfast. 90 tablet 3    meclizine (ANTIVERT) 12.5 mg tablet Take 1 tablet (12.5 mg total) by mouth 3 (three) times daily as needed. 50 tablet 1    metoprolol tartrate (LOPRESSOR) 50 MG tablet Take 1 tablet (50 mg total) by mouth 2 (two) times daily. 180 tablet 4    multivit-mineral-iron-lutein (CENTRUM SILVER ULTRA WOMEN'S) Tab Take 1 tablet by mouth once daily.      nitroGLYCERIN (NITROSTAT) 0.4 MG SL tablet Place 0.4 mg under the tongue every 5 (five) minutes as needed for Chest pain.      TRUE METRIX GLUCOSE METER Misc TEST TID  4    TRUEPLUS LANCETS 30 gauge Misc USE TO TEST BLOOD SUGAR TID AC  2    amoxicillin-clavulanate 500-125mg (AUGMENTIN) 500-125 mg Tab Take 1 tablet (500 mg total) by mouth 2 (two) times daily. 14 tablet 0    ipratropium (ATROVENT) 0.03 % nasal spray 2 sprays by Nasal route 2 (two) times daily. 30 mL 1     No facility-administered encounter medications on file as of 1/15/2018.      Wt Readings from Last 3 Encounters:   01/10/18 72.3 kg (159 lb 6.3 oz)   01/02/18 72.7 kg (160 lb 4.8 oz)   12/13/17 71.8 kg (158 lb 4.6 oz)     Last 3 sets of Vitals    Vitals - 1 value per visit 1/2/2018 1/10/2018 1/15/2018   SYSTOLIC 134 100 110   DIASTOLIC 83 63 62   PULSE 104 105 101   TEMPERATURE 97.5 - -   RESPIRATIONS 18 - -   SPO2 94 97 98   Weight (lb) 160.3 159.39 -   Weight (kg) 72.712 72.3 -   HEIGHT - 5' 8" -   BODY MASS INDEX - 24.24 -   VISIT REPORT - - -   Pain Score  - 0 0   Some recent data might be hidden     No results found for: " CBC  Review of Systems   Constitutional: Negative for activity change and appetite change.   Respiratory: Positive for cough and wheezing.    Cardiovascular: Negative for chest pain and palpitations.   Gastrointestinal: Negative for abdominal pain, diarrhea, nausea and vomiting.   Genitourinary: Negative for difficulty urinating.   Musculoskeletal: Negative for arthralgias, myalgias, neck pain and neck stiffness.   Skin: Negative for rash.   Psychiatric/Behavioral: Negative for sleep disturbance.       Objective:      Physical Exam   Constitutional: She is oriented to person, place, and time. She appears well-developed and well-nourished. No distress.   HENT:   Head: Normocephalic and atraumatic.   Right Ear: External ear normal.   Left Ear: External ear normal.   Nose: Mucosal edema and rhinorrhea present.   Mouth/Throat: Uvula is midline, oropharynx is clear and moist and mucous membranes are normal. No oropharyngeal exudate. No tonsillar exudate.   Cloudy PND noted     Eyes: EOM are normal. Pupils are equal, round, and reactive to light. Right eye exhibits no discharge. Left eye exhibits no discharge.   Neck: Normal range of motion.   Cardiovascular: Normal rate and normal heart sounds.    Pulmonary/Chest: Effort normal and breath sounds normal. No stridor. No respiratory distress. She has no wheezes. She has no rales.   Abdominal: Soft.   Lymphadenopathy:     She has no cervical adenopathy.   Neurological: She is alert and oriented to person, place, and time.   Skin: Skin is warm and dry. Capillary refill takes less than 2 seconds. No rash noted. She is not diaphoretic. No erythema. No pallor.   Psychiatric: She has a normal mood and affect. Her behavior is normal. Judgment and thought content normal.   Nursing note and vitals reviewed.      Assessment:       1. Sinusitis, unspecified chronicity, unspecified location    2. Rhinitis, unspecified chronicity, unspecified type        Plan:       Tiana was seen  today for hospital follow up.    Diagnoses and all orders for this visit:    Sinusitis, unspecified chronicity, unspecified location  -     amoxicillin-clavulanate 500-125mg (AUGMENTIN) 500-125 mg Tab; Take 1 tablet (500 mg total) by mouth 2 (two) times daily.    Rhinitis, unspecified chronicity, unspecified type  -     ipratropium (ATROVENT) 0.03 % nasal spray; 2 sprays by Nasal route 2 (two) times daily.      Patient Instructions   Use nasal spray twice a day    Take antibiotic with a meal    Continue other medications the same    Call for any worsening

## 2018-01-22 RX ORDER — APIXABAN 2.5 MG/1
TABLET, FILM COATED ORAL
Qty: 60 TABLET | Refills: 5 | Status: SHIPPED | OUTPATIENT
Start: 2018-01-22 | End: 2018-07-17 | Stop reason: SDUPTHER

## 2018-01-30 ENCOUNTER — OFFICE VISIT (OUTPATIENT)
Dept: PODIATRY | Facility: CLINIC | Age: 83
End: 2018-01-30
Payer: MEDICARE

## 2018-01-30 ENCOUNTER — TELEPHONE (OUTPATIENT)
Dept: CARDIOLOGY | Facility: CLINIC | Age: 83
End: 2018-01-30

## 2018-01-30 VITALS
BODY MASS INDEX: 24.1 KG/M2 | RESPIRATION RATE: 18 BRPM | WEIGHT: 159 LBS | HEIGHT: 68 IN | HEART RATE: 61 BPM | DIASTOLIC BLOOD PRESSURE: 65 MMHG | SYSTOLIC BLOOD PRESSURE: 139 MMHG

## 2018-01-30 DIAGNOSIS — L84 CORN OR CALLUS: ICD-10-CM

## 2018-01-30 DIAGNOSIS — B35.1 ONYCHOMYCOSIS DUE TO DERMATOPHYTE: ICD-10-CM

## 2018-01-30 DIAGNOSIS — Z79.4 TYPE 2 DIABETES MELLITUS WITH DIABETIC POLYNEUROPATHY, WITH LONG-TERM CURRENT USE OF INSULIN: Primary | ICD-10-CM

## 2018-01-30 DIAGNOSIS — E11.42 TYPE 2 DIABETES MELLITUS WITH DIABETIC POLYNEUROPATHY, WITH LONG-TERM CURRENT USE OF INSULIN: Primary | ICD-10-CM

## 2018-01-30 PROCEDURE — 11721 DEBRIDE NAIL 6 OR MORE: CPT | Mod: 59,Q9,S$GLB, | Performed by: PODIATRIST

## 2018-01-30 PROCEDURE — 11056 PARNG/CUTG B9 HYPRKR LES 2-4: CPT | Mod: Q9,S$GLB,, | Performed by: PODIATRIST

## 2018-01-30 PROCEDURE — 99499 UNLISTED E&M SERVICE: CPT | Mod: S$GLB,,, | Performed by: PODIATRIST

## 2018-01-30 PROCEDURE — 99999 PR PBB SHADOW E&M-EST. PATIENT-LVL III: CPT | Mod: PBBFAC,,, | Performed by: PODIATRIST

## 2018-01-30 NOTE — TELEPHONE ENCOUNTER
Called the pt to schedule her an appt with another Cardiologist or with the [facility] since  is out until 2/5/18.Pt said that she depends on others for transportation but did agree to schedule an appt on Wed to see oziel Carcamo NP.Pt reports that when she saw  on 1/10/18 and he did tell her to f/u with her PCP ASAP and has not done this yet.She will keep the appt scheduled with the NP on Wed. 1/31/18 for now but if she can get an federico with her PCP she will cancel this appt .Notified Consuelo  nurse with this information.She reports understanding of these instructions and said she will be seeing the pt again next week.

## 2018-01-30 NOTE — PROGRESS NOTES
Subjective:      Patient ID: Tiana Mead is a 89 y.o. female.    Chief Complaint: PCP (KAYLEN Pascual 1/15/18); Diabetic Foot Exam; Nail Problem; and Nail Care    Tiana is a 89 y.o. female who presents to the clinic for evaluation and treatment of high risk feet. Tiana has a past medical history of Allergy; Anemia; Arthritis; Asthma; Cataract; CHF (congestive heart failure) (5/2/2017); Chronic kidney disease; Degenerative disc disease; Diabetes mellitus type II; Glaucoma; History of pseudogout (8/23/2012); Hyperlipidemia; Hypertension; Iritis; and Thyroid disease. The patient's chief complaint is long, thick toenails. This patient has documented high risk feet requiring routine maintenance secondary to diabetes mellitis and those secondary complications of diabetes, as mentioned..    PCP: Belen Wood MD    Date Last Seen by PCP:   Chief Complaint   Patient presents with    PCP     KAYLEN Pascual 1/15/18    Diabetic Foot Exam    Nail Problem    Nail Care         Current shoe gear:  Dm shoes      Hemoglobin A1C   Date Value Ref Range Status   12/30/2017 6.6 (H) 4.0 - 5.6 % Final     Comment:     According to ADA guidelines, hemoglobin A1c <7.0% represents  optimal control in non-pregnant diabetic patients. Different  metrics may apply to specific patient populations.   Standards of Medical Care in Diabetes-2016.  For the purpose of screening for the presence of diabetes:  <5.7%     Consistent with the absence of diabetes  5.7-6.4%  Consistent with increasing risk for diabetes   (prediabetes)  >or=6.5%  Consistent with diabetes  Currently, no consensus exists for use of hemoglobin A1c  for diagnosis of diabetes for children.  This Hemoglobin A1c assay has significant interference with fetal   hemoglobin   (HbF). The results are invalid for patients with abnormal amounts of   HbF,   including those with known Hereditary Persistence   of Fetal Hemoglobin. Heterozygous  hemoglobin variants (HbAS, HbAC,   HbAD, HbAE, HbA2) do not significantly interfere with this assay;   however, presence of multiple variants in a sample may impact the %   interference.     12/06/2017 6.7 (H) 4.0 - 5.6 % Final     Comment:     According to ADA guidelines, hemoglobin A1c <7.0% represents  optimal control in non-pregnant diabetic patients. Different  metrics may apply to specific patient populations.   Standards of Medical Care in Diabetes-2016.  For the purpose of screening for the presence of diabetes:  <5.7%     Consistent with the absence of diabetes  5.7-6.4%  Consistent with increasing risk for diabetes   (prediabetes)  >or=6.5%  Consistent with diabetes  Currently, no consensus exists for use of hemoglobin A1c  for diagnosis of diabetes for children.  This Hemoglobin A1c assay has significant interference with fetal   hemoglobin   (HbF). The results are invalid for patients with abnormal amounts of   HbF,   including those with known Hereditary Persistence   of Fetal Hemoglobin. Heterozygous hemoglobin variants (HbAS, HbAC,   HbAD, HbAE, HbA2) do not significantly interfere with this assay;   however, presence of multiple variants in a sample may impact the %   interference.     04/20/2017 6.6 (H) 4.5 - 6.2 % Final     Comment:     According to ADA guidelines, hemoglobin A1C <7.0% represents  optimal control in non-pregnant diabetic patients.  Different  metrics may apply to specific populations.   Standards of Medical Care in Diabetes - 2016.  For the purpose of screening for the presence of diabetes:  <5.7%     Consistent with the absence of diabetes  5.7-6.4%  Consistent with increasing risk for diabetes   (prediabetes)  >or=6.5%  Consistent with diabetes  Currently no consensus exists for use of hemoglobin A1C  for diagnosis of diabetes for children.     04/20/2017 6.6 (H) 4.5 - 6.2 % Final     Comment:     According to ADA guidelines, hemoglobin A1C <7.0% represents  optimal control in  non-pregnant diabetic patients.  Different  metrics may apply to specific populations.   Standards of Medical Care in Diabetes - 2016.  For the purpose of screening for the presence of diabetes:  <5.7%     Consistent with the absence of diabetes  5.7-6.4%  Consistent with increasing risk for diabetes   (prediabetes)  >or=6.5%  Consistent with diabetes  Currently no consensus exists for use of hemoglobin A1C  for diagnosis of diabetes for children.               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    Hyperuricemia    Helicobacter pylori gastritis    Old MI (myocardial infarction), 2013    Persistent atrial fibrillation    Type 2 diabetes mellitus with diabetic polyneuropathy, with long-term current use of insulin    Long term current use of anticoagulant therapy    Chest pain at rest    Glaucoma associated with ocular trauma    Fuchs' corneal dystrophy    Pericardial effusion    H/O Deep vein thrombosis (DVT)    Aortic atherosclerosis    Chronic diastolic congestive heart failure    Type 2 diabetes mellitus with diabetic nephropathy, with long-term current use of insulin    Diabetic polyneuropathy associated with type 2 diabetes mellitus    CKD (chronic kidney disease) stage 3, GFR 30-59 ml/min, eGFR 32     Primary hypothyroidism    Asthma exacerbation    Moderate asthma with exacerbation    Coronary artery disease involving native coronary artery of native heart without angina pectoris    Viral URI    Primary open angle glaucoma (POAG) of both eyes, moderate stage  "   Pulmonary hypertension    Non-rheumatic tricuspid valve insufficiency       Current Outpatient Prescriptions on File Prior to Visit   Medication Sig Dispense Refill    albuterol 90 mcg/actuation inhaler Inhale 2 puffs into the lungs every 4 (four) hours as needed. 3 Inhaler 4    amlodipine (NORVASC) 5 MG tablet Take 1 tablet (5 mg total) by mouth every morning. 90 tablet 3    amoxicillin-clavulanate 500-125mg (AUGMENTIN) 500-125 mg Tab Take 1 tablet (500 mg total) by mouth 2 (two) times daily. 14 tablet 0    atorvastatin (LIPITOR) 40 MG tablet Take 1 tablet (40 mg total) by mouth once daily. 90 tablet 3    blood sugar diagnostic (ONETOUCH ULTRA TEST) Strp Inject 1 strip into the skin 3 (three) times daily. 300 each 4    brimonidine 0.2% (ALPHAGAN) 0.2 % Drop INT 1 GTT INTO OU BID  3    diclofenac sodium (VOLTAREN) 1 % Gel Apply topically 4 (four) times daily. 100 Tube 3    dorzolamide (TRUSOPT) 2 % ophthalmic solution Place 1 drop into both eyes 2 (two) times daily. 10 mL 4    ELIQUIS 2.5 mg Tab TAKE ONE TABLET BY MOUTH TWICE DAILY 60 tablet 5    fluticasone-salmeterol 250-50 mcg/dose (ADVAIR) 250-50 mcg/dose diskus inhaler Inhale 1 puff into the lungs nightly. Controller 60 each 11    furosemide (LASIX) 40 MG tablet Take 1 tablet (40 mg total) by mouth 2 (two) times daily. 1 tb am. 1 at 4pm. 180 tablet 3    insulin aspart protamine-insulin aspart (NOVOLOG MIX 70-30 FLEXPEN) 100 unit/mL (70-30) InPn pen Take 12 units once daily before breakfast. (Patient taking differently: Take 12 units once daily before breakfast. Pt takes additional units in the evening per sliding scale while prednisone) 1 Box 11    insulin needles, disposable, (BD INSULIN PEN NEEDLE UF SHORT) 31 X 5/16 " Ndle Inject 1 Box into the skin 2 (two) times daily. 100 each 11    ipratropium (ATROVENT) 0.03 % nasal spray 2 sprays by Nasal route 2 (two) times daily. 30 mL 1    isosorbide mononitrate (IMDUR) 30 MG 24 hr tablet Take 1 " tablet (30 mg total) by mouth every morning. Taking for heart 90 tablet 3    lancets (ONETOUCH DELICA LANCETS) 33 gauge Misc Apply 1 lancet topically 3 (three) times daily. 300 each 4    lancets Misc 1 Device by Misc.(Non-Drug; Combo Route) route 3 (three) times daily before meals. Please provide the insurance company preferred product. 300 each 4    latanoprost 0.005 % ophthalmic solution Place 1 drop into the right eye every evening. (Patient taking differently: Place 1 drop into both eyes every evening. ) 3 Bottle 6    levothyroxine (SYNTHROID) 75 MCG tablet Take 1 tablet (75 mcg total) by mouth before breakfast. 90 tablet 3    meclizine (ANTIVERT) 12.5 mg tablet Take 1 tablet (12.5 mg total) by mouth 3 (three) times daily as needed. 50 tablet 1    metoprolol tartrate (LOPRESSOR) 50 MG tablet Take 1 tablet (50 mg total) by mouth 2 (two) times daily. 180 tablet 4    multivit-mineral-iron-lutein (CENTRUM SILVER ULTRA WOMEN'S) Tab Take 1 tablet by mouth once daily.      nitroGLYCERIN (NITROSTAT) 0.4 MG SL tablet Place 0.4 mg under the tongue every 5 (five) minutes as needed for Chest pain.      TRUE METRIX GLUCOSE METER Misc TEST TID  4    TRUEPLUS LANCETS 30 gauge Misc USE TO TEST BLOOD SUGAR TID AC  2     No current facility-administered medications on file prior to visit.        Review of patient's allergies indicates:   Allergen Reactions    Codeine      Other reaction(s): Itching  Other reaction(s): Nausea       Past Surgical History:   Procedure Laterality Date    CARDIAC CATHETERIZATION      CATARACT EXTRACTION      IOL OS    CHOLECYSTECTOMY      EYE SURGERY      gall stone      HYSTERECTOMY      VARICOSE VEIN SURGERY         Family History   Problem Relation Age of Onset    Diabetes Mother     Hypertension Mother     Glaucoma Mother     Hypertension Father     Diabetes Son     No Known Problems Daughter     Diabetes Daughter     No Known Problems Son        Social History     Social  "History    Marital status:      Spouse name: N/A    Number of children: N/A    Years of education: N/A     Occupational History    Not on file.     Social History Main Topics    Smoking status: Never Smoker    Smokeless tobacco: Never Used    Alcohol use No    Drug use: No    Sexual activity: No     Other Topics Concern    Not on file     Social History Narrative    No narrative on file           Review of Systems   Constitution: Negative for chills, decreased appetite and fever.   Cardiovascular: Negative for leg swelling.   Skin: Positive for dry skin and nail changes.   Musculoskeletal: Positive for arthritis. Negative for joint pain, joint swelling and myalgias.   Gastrointestinal: Negative for nausea and vomiting.   Neurological: Negative for loss of balance, numbness and paresthesias.           Objective:       Vitals:    01/30/18 1529   BP: 139/65   Pulse: 61   Resp: 18   Weight: 72.1 kg (159 lb)   Height: 5' 8" (1.727 m)   PainSc: 0-No pain        Physical Exam   Constitutional: She is oriented to person, place, and time. She appears well-developed and well-nourished.   Cardiovascular:   Dorsalis pedis and posterior tibial pulses are diminished Bilaterally. Toes are cool to touch. Feet are warm proximally.There is decreased digital hair. Skin is atrophic, slightly hyperpigmented, and mildly edematous       Musculoskeletal: Normal range of motion. She exhibits no edema or tenderness.   Adequate joint range of motion without pain, limitation, nor crepitation Bilateral feet and ankle joints. Muscle strength is 5/5 in all groups bilaterally.         Neurological: She is alert and oriented to person, place, and time.   [place] 5.07 monofilamant testing is diminished Ney feet. Sharp/dull sensation diminished Bilaterally. Light touch absent Bilaterally.       Skin: Skin is warm and dry. No ecchymosis and no lesion noted. No erythema.   Nails x10 are elongated by  2-6mm's, thickened by 2-5 " mm's, dystrophic, and are darkened in  coloration . Xerosis Bilaterally. No open lesions noted.    Hyperkeratotic tissue noted to lateral 5th toe b/l     Psychiatric: She has a normal mood and affect. Her behavior is normal.   Vitals reviewed.            Assessment:       Encounter Diagnoses   Name Primary?    Type 2 diabetes mellitus with diabetic polyneuropathy, with long-term current use of insulin Yes    Onychomycosis due to dermatophyte     Corn or callus          Plan:       Tiana was seen today for pcp, diabetic foot exam, nail problem and nail care.    Diagnoses and all orders for this visit:    Type 2 diabetes mellitus with diabetic polyneuropathy, with long-term current use of insulin    Onychomycosis due to dermatophyte    Corn or callus      I counseled the patient on her conditions, their implications and medical management.        - Shoe inspection. Diabetic Foot Education. Patient reminded of the importance of good nutrition and blood sugar control to help prevent podiatric complications of diabetes. Patient instructed on proper foot hygeine. We discussed wearing proper shoe gear, daily foot inspections, never walking without protective shoe gear, never putting sharp instruments to feet, routine podiatric nail visits every 2-3 months.      - With patient's permission, nails were aggressively reduced and debrided x 10 to their soft tissue attachment mechanically and with electric , removing all offending nail and debris. Patient relates relief following the procedure. She will continue to monitor the areas daily, inspect her feet, wear protective shoe gear when ambulatory, moisturizer to maintain skin integrity and follow in this office in approximately 2-3 months, sooner p.r.n.    - After cleansing the  area w/ alcohol prep pad the above mentioned hyperkeratosis was trimmed utilizing No 15 scapel, to a smooth base with out incident. Patient tolerated this  well and reported comfort to the  area of lateral 5th toe b/l

## 2018-02-02 ENCOUNTER — TELEPHONE (OUTPATIENT)
Dept: INTERNAL MEDICINE | Facility: CLINIC | Age: 83
End: 2018-02-02

## 2018-02-02 NOTE — TELEPHONE ENCOUNTER
----- Message from Audrey Banerjee sent at 2/1/2018  3:55 PM CST -----  Contact: Self. Call her at [phone]  Patient would like to get roller walker with the seat. Please write an rx for this and when it's ready for . Mail this to her.

## 2018-02-05 ENCOUNTER — TELEPHONE (OUTPATIENT)
Dept: INTERNAL MEDICINE | Facility: CLINIC | Age: 83
End: 2018-02-05

## 2018-02-05 DIAGNOSIS — J45.41 MODERATE PERSISTENT ASTHMA WITH EXACERBATION: ICD-10-CM

## 2018-02-05 DIAGNOSIS — E11.42 TYPE 2 DIABETES MELLITUS WITH DIABETIC POLYNEUROPATHY, WITH LONG-TERM CURRENT USE OF INSULIN: ICD-10-CM

## 2018-02-05 DIAGNOSIS — E11.42 DIABETIC POLYNEUROPATHY ASSOCIATED WITH TYPE 2 DIABETES MELLITUS: ICD-10-CM

## 2018-02-05 DIAGNOSIS — M11.20 PSEUDOGOUT: Primary | ICD-10-CM

## 2018-02-05 DIAGNOSIS — H40.1132 PRIMARY OPEN ANGLE GLAUCOMA (POAG) OF BOTH EYES, MODERATE STAGE: ICD-10-CM

## 2018-02-05 DIAGNOSIS — H18.519 FUCHS' CORNEAL DYSTROPHY: ICD-10-CM

## 2018-02-05 DIAGNOSIS — Z79.4 TYPE 2 DIABETES MELLITUS WITH DIABETIC POLYNEUROPATHY, WITH LONG-TERM CURRENT USE OF INSULIN: ICD-10-CM

## 2018-02-05 DIAGNOSIS — M15.9 GENERALIZED OSTEOARTHRITIS OF MULTIPLE SITES: ICD-10-CM

## 2018-02-05 NOTE — TELEPHONE ENCOUNTER
Order for walker entered  However, she might be able to find what she is looking for on [employer] for a better price.

## 2018-02-05 NOTE — TELEPHONE ENCOUNTER
Phuc ordered  But she might find a better product at a better price than using her [employer] insurance on [employer]

## 2018-02-06 NOTE — TELEPHONE ENCOUNTER
----- Message from Dane Oliva sent at 2/6/2018  1:47 PM CST -----  Contact: Melanie/Nurse Reviewer with [employer] /[phone] ext [phone]  [employer] is calling to speak with someone in the office. Please call and advise.    Thank You

## 2018-02-06 NOTE — TELEPHONE ENCOUNTER
Spoke with pt and she stated that she needs a prescription wrote out for a walker that you can sit on mailed out to her so she can take it to the place where her insurance can pay for it. Pt was told that arvind will be told that a prescription is needed and will be mailed out to pt

## 2018-02-15 ENCOUNTER — OFFICE VISIT (OUTPATIENT)
Dept: INTERNAL MEDICINE | Facility: CLINIC | Age: 83
End: 2018-02-15
Payer: MEDICARE

## 2018-02-15 VITALS
WEIGHT: 158 LBS | OXYGEN SATURATION: 99 % | BODY MASS INDEX: 23.95 KG/M2 | HEART RATE: 73 BPM | SYSTOLIC BLOOD PRESSURE: 130 MMHG | HEIGHT: 68 IN | DIASTOLIC BLOOD PRESSURE: 68 MMHG

## 2018-02-15 DIAGNOSIS — E03.9 PRIMARY HYPOTHYROIDISM: ICD-10-CM

## 2018-02-15 DIAGNOSIS — I10 ESSENTIAL HYPERTENSION: Primary | ICD-10-CM

## 2018-02-15 DIAGNOSIS — E11.42 TYPE 2 DIABETES MELLITUS WITH DIABETIC POLYNEUROPATHY, WITH LONG-TERM CURRENT USE OF INSULIN: ICD-10-CM

## 2018-02-15 DIAGNOSIS — Z79.4 TYPE 2 DIABETES MELLITUS WITH DIABETIC POLYNEUROPATHY, WITH LONG-TERM CURRENT USE OF INSULIN: ICD-10-CM

## 2018-02-15 DIAGNOSIS — E11.21 TYPE 2 DIABETES MELLITUS WITH DIABETIC NEPHROPATHY, WITH LONG-TERM CURRENT USE OF INSULIN: ICD-10-CM

## 2018-02-15 DIAGNOSIS — E11.42 DIABETIC POLYNEUROPATHY ASSOCIATED WITH TYPE 2 DIABETES MELLITUS: ICD-10-CM

## 2018-02-15 DIAGNOSIS — Z79.01 LONG TERM CURRENT USE OF ANTICOAGULANT THERAPY: ICD-10-CM

## 2018-02-15 DIAGNOSIS — I82.409 DEEP VEIN THROMBOSIS (DVT) DURING CURRENT HOSPITALIZATION: ICD-10-CM

## 2018-02-15 DIAGNOSIS — I48.19 PERSISTENT ATRIAL FIBRILLATION: ICD-10-CM

## 2018-02-15 DIAGNOSIS — N18.30 CKD (CHRONIC KIDNEY DISEASE) STAGE 3, GFR 30-59 ML/MIN: ICD-10-CM

## 2018-02-15 DIAGNOSIS — I25.2 OLD MI (MYOCARDIAL INFARCTION): ICD-10-CM

## 2018-02-15 DIAGNOSIS — Z79.4 TYPE 2 DIABETES MELLITUS WITH DIABETIC NEPHROPATHY, WITH LONG-TERM CURRENT USE OF INSULIN: ICD-10-CM

## 2018-02-15 DIAGNOSIS — M15.9 GENERALIZED OSTEOARTHRITIS OF MULTIPLE SITES: ICD-10-CM

## 2018-02-15 PROCEDURE — 1125F AMNT PAIN NOTED PAIN PRSNT: CPT | Mod: S$GLB,,, | Performed by: INTERNAL MEDICINE

## 2018-02-15 PROCEDURE — 99999 PR PBB SHADOW E&M-EST. PATIENT-LVL III: CPT | Mod: PBBFAC,,, | Performed by: INTERNAL MEDICINE

## 2018-02-15 PROCEDURE — 3008F BODY MASS INDEX DOCD: CPT | Mod: S$GLB,,, | Performed by: INTERNAL MEDICINE

## 2018-02-15 PROCEDURE — 1159F MED LIST DOCD IN RCRD: CPT | Mod: S$GLB,,, | Performed by: INTERNAL MEDICINE

## 2018-02-15 PROCEDURE — 99214 OFFICE O/P EST MOD 30 MIN: CPT | Mod: S$GLB,,, | Performed by: INTERNAL MEDICINE

## 2018-02-15 PROCEDURE — 99499 UNLISTED E&M SERVICE: CPT | Mod: S$GLB,,, | Performed by: INTERNAL MEDICINE

## 2018-02-15 RX ORDER — TRAMADOL HYDROCHLORIDE 50 MG/1
50 TABLET ORAL NIGHTLY PRN
Qty: 30 TABLET | Refills: 0 | Status: SHIPPED | OUTPATIENT
Start: 2018-02-15 | End: 2018-02-25

## 2018-02-15 NOTE — PATIENT INSTRUCTIONS
Tramadol tablets  What is this medicine?  TRAMADOL (TRA ma dole) is a pain reliever. It is used to treat moderate to severe pain in adults.  How should I use this medicine?  Take this medicine by mouth with a full glass of water. Follow the directions on the prescription label. You can take it with or without food. If it upsets your stomach, take it with food. Do not take your medicine more often than directed.  A special MedGuide will be given to you by the pharmacist with each prescription and refill. Be sure to read this information carefully each time.  Talk to your pediatrician regarding the use of this medicine in children. Special care may be needed.  What side effects may I notice from receiving this medicine?  Side effects that you should report to your doctor or health care professional as soon as possible:  · allergic reactions like skin rash, itching or hives, swelling of the face, lips, or tongue  · breathing problems  · confusion  · seizures  · signs and symptoms of low blood pressure like dizziness; feeling faint or lightheaded, falls; unusually weak or tired  · trouble passing urine or change in the amount of urine  Side effects that usually do not require medical attention (report to your doctor or health care professional if they continue or are bothersome):  · constipation  · dry mouth  · nausea, vomiting  · tiredness  What may interact with this medicine?  Do not take this medication with any of the following medicines:  · MAOIs like Carbex, Eldepryl, Marplan, Nardil, and Parnate  This medicine may also interact with the following medications:  · alcohol  · antihistamines for allergy, cough and cold  · certain medicines for anxiety or sleep  · certain medicines for depression like amitriptyline, fluoxetine, sertraline  · certain medicines for migraine headache like almotriptan, eletriptan, frovatriptan, naratriptan, rizatriptan, sumatriptan, zolmitriptan  · certain medicines for seizures like  carbamazepine, oxcarbazepine, phenobarbital, primidone  · certain medicines that treat or prevent blood clots like warfarin  · digoxin  · furazolidone  · general anesthetics like halothane, isoflurane, methoxyflurane, propofol  · linezolid  · local anesthetics like lidocaine, pramoxine, tetracaine  · medicines that relax muscles for surgery  · other narcotic medicines for pain or cough  · phenothiazines like chlorpromazine, mesoridazine, prochlorperazine, thioridazine  · procarbazine  What if I miss a dose?  If you miss a dose, take it as soon as you can. If it is almost time for your next dose, take only that dose. Do not take double or extra doses.  Where should I keep my medicine?  Keep out of the reach of children.  This medicine may cause accidental overdose and death if it taken by other adults, children, or pets. Mix any unused medicine with a substance like cat litter or coffee grounds. Then throw the medicine away in a sealed container like a sealed bag or a coffee can with a lid. Do not use the medicine after the expiration date.  Store at room temperature between 15 and 30 degrees C (59 and 86 degrees F).  What should I tell my health care provider before I take this medicine?  They need to know if you have any of these conditions:  · brain tumor  · depression  · drug abuse or addiction  · head injury  · if you frequently drink alcohol containing drinks  · kidney disease or trouble passing urine  · liver disease  · lung disease, asthma, or breathing problems  · seizures or epilepsy  · suicidal thoughts, plans, or attempt; a previous suicide attempt by you or a family member  · an unusual or allergic reaction to tramadol, codeine, other medicines, foods, dyes, or preservatives  · pregnant or trying to get pregnant  · breast-feeding  What should I watch for while using this medicine?  Tell your doctor or health care professional if your pain does not go away, if it gets worse, or if you have new or a  different type of pain. You may develop tolerance to the medicine. Tolerance means that you will need a higher dose of the medicine for pain relief. Tolerance is normal and is expected if you take this medicine for a long time.  Do not suddenly stop taking your medicine because you may develop a severe reaction. Your body becomes used to the medicine. This does NOT mean you are addicted. Addiction is a behavior related to getting and using a drug for a non-medical reason. If you have pain, you have a medical reason to take pain medicine. Your doctor will tell you how much medicine to take. If your doctor wants you to stop the medicine, the dose will be slowly lowered over time to avoid any side effects.  There are different types of narcotic medicines (opiates). If you take more than one type at the same time or if you are taking another medicine that also causes drowsiness, you may have more side effects. Give your health care provider a list of all medicines you use. Your doctor will tell you how much medicine to take. Do not take more medicine than directed. Call emergency for help if you have problems breathing or unusual sleepiness.  You may get drowsy or dizzy. Do not drive, use machinery, or do anything that needs mental alertness until you know how this medicine affects you. Do not stand or sit up quickly, especially if you are an older patient. This reduces the risk of dizzy or fainting spells. Alcohol can increase or decrease the effects of this medicine. Avoid alcoholic drinks.  You may have constipation. Try to have a bowel movement at least every 2 to 3 days. If you do not have a bowel movement for 3 days, call your doctor or health care professional.  Your mouth may get dry. Chewing sugarless gum or sucking hard candy, and drinking plenty of water may help. Contact your doctor if the problem does not go away or is severe.  NOTE:This sheet is a summary. It may not cover all possible information. If you  have questions about this medicine, talk to your doctor, pharmacist, or health care provider. Copyright© 2017 [employer]

## 2018-02-16 NOTE — PROGRESS NOTES
Subjective:       Patient ID: Tiana Mead is a 89 y.o. female.    Chief Complaint: Follow-up   she was hospitalized December 30, December 31 and discharged January 1, 2018  It looks like she had a viral respiratory infection which resulted in exacerbation of her underlying asthma and an exacerbation of her underlying congestive heart failure with orthopnea and paroxysmal nocturnal dyspnea.  Her chief complaint was shortness of breath.    The past 6 weeks, since her discharge she has mostly been inside.  She has been taking it easy.  She does not have any shortness of breath.  Her weight has been stable.    Her chief complaint today is that of arthritis pain.  Both of her shoulders, especially her right shoulder ache and she has difficulty getting comfortable in bed to fall asleep.  She request a pain pill.  She has been applying Voltaren gel and taking over-the-counter Tylenol arthritis with some relief but not enough to feel comfortable or to initiate sleep.    Pain pills in general were discussed.  The patient cannot remember if she has ever taken tramadol.  She has never had a problem with pain medication.    The possible side effect of tramadol causing confusion, drowsiness, constipation was discussed.  She would like to try this medication.  She will only take it if needed to initiate sleep because of pain in her shoulders and neck.  HPI  Review of Systems   Constitutional: Negative.  Negative for activity change, appetite change, chills, fatigue, fever and unexpected weight change.   HENT: Negative for hearing loss and tinnitus.    Eyes: Negative for visual disturbance.   Respiratory: Negative for cough, shortness of breath and wheezing.    Cardiovascular: Negative.  Negative for chest pain, palpitations and leg swelling.   Gastrointestinal: Negative for abdominal pain, blood in stool, constipation, diarrhea and nausea.   Genitourinary: Negative for dysuria, frequency and urgency.   Musculoskeletal:  Positive for arthralgias, gait problem, neck pain and neck stiffness. Negative for back pain.   Skin: Negative for rash.   Neurological: Negative for headaches.   Psychiatric/Behavioral: Negative for dysphoric mood and sleep disturbance. The patient is not nervous/anxious.        Objective:      Physical Exam   Constitutional: She appears well-nourished.   HENT:   Head: Atraumatic.   Eyes: Conjunctivae are normal. No scleral icterus.   Neck: Neck supple.   Cardiovascular: Normal rate and regular rhythm.    Pulmonary/Chest: Effort normal and breath sounds normal.   Abdominal: Soft. There is no tenderness.   Musculoskeletal: She exhibits no edema.   Lymphadenopathy:     She has no cervical adenopathy.   Neurological: She is alert.   Skin: Skin is warm and dry.   Psychiatric: She has a normal mood and affect. Her behavior is normal.   Nursing note and vitals reviewed.      Assessment:       1. Essential hypertension    2. Diabetic polyneuropathy associated with type 2 diabetes mellitus    3. Generalized osteoarthritis of multiple sites    4. H/O Deep vein thrombosis (DVT)    5. Long term current use of anticoagulant therapy    6. Old MI (myocardial infarction), 2013    7. Persistent atrial fibrillation    8. Primary hypothyroidism    9. Type 2 diabetes mellitus with diabetic nephropathy, with long-term current use of insulin    10. Type 2 diabetes mellitus with diabetic polyneuropathy, with long-term current use of insulin    11. CKD (chronic kidney disease) stage 3, GFR 30-59 ml/min, eGFR 32         Plan:   Tiana was seen today for follow-up.    Diagnoses and all orders for this visit:    Essential hypertension, good control.    Diabetic polyneuropathy associated with type 2 diabetes mellitus.  Her unsteady gait has been addressed with a new walker.  She is getting around better.    Generalized osteoarthritis of multiple sites.  She will continue to use Voltaren gel and over-the-counter Tylenol.    H/O Deep  vein thrombosis (DVT)  Long term current use of anticoagulant therapy.  No difficulty with compliance.    Old MI (myocardial infarction), 2013    Persistent atrial fibrillation    Primary hypothyroidism    Type 2 diabetes mellitus with diabetic nephropathy, with long-term current use of insulin    Type 2 diabetes mellitus with diabetic polyneuropathy, with long-term current use of insulin    CKD (chronic kidney disease) stage 3, GFR 30-59 ml/min, eGFR 32     Other orders  -     traMADol (ULTRAM) 50 mg tablet; Take 1 tablet (50 mg total) by mouth nightly as needed for Pain (for shoulder and neck pain that disturbs sleep).    Medication List with Changes/Refills   New Medications    TRAMADOL (ULTRAM) 50 MG TABLET    Take 1 tablet (50 mg total) by mouth nightly as needed for Pain (for shoulder and neck pain that disturbs sleep).   Current Medications    ALBUTEROL 90 MCG/ACTUATION INHALER    Inhale 2 puffs into the lungs every 4 (four) hours as needed.    AMLODIPINE (NORVASC) 5 MG TABLET    Take 1 tablet (5 mg total) by mouth every morning.    AMOXICILLIN-CLAVULANATE 500-125MG (AUGMENTIN) 500-125 MG TAB    Take 1 tablet (500 mg total) by mouth 2 (two) times daily.    ATORVASTATIN (LIPITOR) 40 MG TABLET    Take 1 tablet (40 mg total) by mouth once daily.    BLOOD SUGAR DIAGNOSTIC (ONETOUCH ULTRA TEST) STRP    Inject 1 strip into the skin 3 (three) times daily.    BRIMONIDINE 0.2% (ALPHAGAN) 0.2 % DROP    INT 1 GTT INTO OU BID    DICLOFENAC SODIUM (VOLTAREN) 1 % GEL    Apply topically 4 (four) times daily.    DORZOLAMIDE (TRUSOPT) 2 % OPHTHALMIC SOLUTION    Place 1 drop into both eyes 2 (two) times daily.    ELIQUIS 2.5 MG TAB    TAKE ONE TABLET BY MOUTH TWICE DAILY    FLUTICASONE-SALMETEROL 250-50 MCG/DOSE (ADVAIR) 250-50 MCG/DOSE DISKUS INHALER    Inhale 1 puff into the lungs nightly. Controller    FUROSEMIDE (LASIX) 40 MG TABLET    Take 1 tablet (40 mg total) by mouth 2 (two) times daily. 1 tb am. 1 at 4pm.     "INSULIN ASPART PROTAMINE-INSULIN ASPART (NOVOLOG MIX 70-30 FLEXPEN) 100 UNIT/ML (70-30) INPN PEN    Take 12 units once daily before breakfast.    INSULIN NEEDLES, DISPOSABLE, (BD INSULIN PEN NEEDLE UF SHORT) 31 X 5/16 " NDLE    Inject 1 Box into the skin 2 (two) times daily.    IPRATROPIUM (ATROVENT) 0.03 % NASAL SPRAY    2 sprays by Nasal route 2 (two) times daily.    ISOSORBIDE MONONITRATE (IMDUR) 30 MG 24 HR TABLET    Take 1 tablet (30 mg total) by mouth every morning. Taking for heart    LANCETS (ONETOUCH DELICA LANCETS) 33 GAUGE MISC    Apply 1 lancet topically 3 (three) times daily.    LANCETS MISC    1 Device by Misc.(Non-Drug; Combo Route) route 3 (three) times daily before meals. Please provide the insurance company preferred product.    LATANOPROST 0.005 % OPHTHALMIC SOLUTION    Place 1 drop into the right eye every evening.    LEVOTHYROXINE (SYNTHROID) 75 MCG TABLET    Take 1 tablet (75 mcg total) by mouth before breakfast.    MECLIZINE (ANTIVERT) 12.5 MG TABLET    Take 1 tablet (12.5 mg total) by mouth 3 (three) times daily as needed.    METOPROLOL TARTRATE (LOPRESSOR) 50 MG TABLET    Take 1 tablet (50 mg total) by mouth 2 (two) times daily.    MULTIVIT-MINERAL-IRON-LUTEIN (CENTRUM SILVER ULTRA WOMEN'S) TAB    Take 1 tablet by mouth once daily.    NITROGLYCERIN (NITROSTAT) 0.4 MG SL TABLET    Place 0.4 mg under the tongue every 5 (five) minutes as needed for Chest pain.    TRUE METRIX GLUCOSE METER MISC    TEST TID    TRUEPLUS LANCETS 30 GAUGE MISC    USE TO TEST BLOOD SUGAR TID AC       She has appointment for April 17 for a recheck.      "

## 2018-02-24 DIAGNOSIS — J31.0 RHINITIS, UNSPECIFIED CHRONICITY, UNSPECIFIED TYPE: ICD-10-CM

## 2018-02-26 RX ORDER — IPRATROPIUM BROMIDE 21 UG/1
SPRAY, METERED NASAL
Qty: 60 ML | Refills: 1 | Status: ON HOLD | OUTPATIENT
Start: 2018-02-26 | End: 2020-11-04

## 2018-03-13 ENCOUNTER — TELEPHONE (OUTPATIENT)
Dept: INTERNAL MEDICINE | Facility: CLINIC | Age: 83
End: 2018-03-13

## 2018-03-13 NOTE — TELEPHONE ENCOUNTER
Spoke with pt scheduled lab appt. A letter will be mailed out as a reminder for pt appt date and time

## 2018-03-13 NOTE — TELEPHONE ENCOUNTER
----- Message from Kiersten Kumar sent at 3/13/2018 10:51 AM CDT -----  Contact: self [phone]  Patient is calling to request if some blood work can be ordered before her apt on 4/17. She states she's concern she may have some infection because she continues to have pain on her extremities, please call her back and advise    Thank you

## 2018-04-04 ENCOUNTER — LAB VISIT (OUTPATIENT)
Dept: LAB | Facility: HOSPITAL | Age: 83
End: 2018-04-04
Attending: INTERNAL MEDICINE
Payer: MEDICARE

## 2018-04-04 DIAGNOSIS — E11.42 TYPE 2 DIABETES MELLITUS WITH DIABETIC POLYNEUROPATHY, WITH LONG-TERM CURRENT USE OF INSULIN: ICD-10-CM

## 2018-04-04 DIAGNOSIS — Z79.4 TYPE 2 DIABETES MELLITUS WITH DIABETIC POLYNEUROPATHY, WITH LONG-TERM CURRENT USE OF INSULIN: ICD-10-CM

## 2018-04-04 DIAGNOSIS — Z79.01 LONG TERM CURRENT USE OF ANTICOAGULANT THERAPY: ICD-10-CM

## 2018-04-04 DIAGNOSIS — E03.9 PRIMARY HYPOTHYROIDISM: ICD-10-CM

## 2018-04-04 DIAGNOSIS — N18.30 CKD (CHRONIC KIDNEY DISEASE) STAGE 3, GFR 30-59 ML/MIN: ICD-10-CM

## 2018-04-04 LAB
ALBUMIN SERPL BCP-MCNC: 3.6 G/DL
ALP SERPL-CCNC: 160 U/L
ALT SERPL W/O P-5'-P-CCNC: 36 U/L
ANION GAP SERPL CALC-SCNC: 8 MMOL/L
AST SERPL-CCNC: 30 U/L
BASOPHILS # BLD AUTO: 0.05 K/UL
BASOPHILS NFR BLD: 0.7 %
BILIRUB SERPL-MCNC: 0.7 MG/DL
BUN SERPL-MCNC: 30 MG/DL
CALCIUM SERPL-MCNC: 9.5 MG/DL
CHLORIDE SERPL-SCNC: 105 MMOL/L
CO2 SERPL-SCNC: 30 MMOL/L
CREAT SERPL-MCNC: 1.6 MG/DL
DIFFERENTIAL METHOD: ABNORMAL
EOSINOPHIL # BLD AUTO: 0.2 K/UL
EOSINOPHIL NFR BLD: 2.3 %
ERYTHROCYTE [DISTWIDTH] IN BLOOD BY AUTOMATED COUNT: 14.8 %
EST. GFR  (AFRICAN AMERICAN): 31.8 ML/MIN/1.73 M^2
EST. GFR  (NON AFRICAN AMERICAN): 27.6 ML/MIN/1.73 M^2
ESTIMATED AVG GLUCOSE: 146 MG/DL
GLUCOSE SERPL-MCNC: 118 MG/DL
HBA1C MFR BLD HPLC: 6.7 %
HCT VFR BLD AUTO: 36.5 %
HGB BLD-MCNC: 12 G/DL
IMM GRANULOCYTES # BLD AUTO: 0.02 K/UL
IMM GRANULOCYTES NFR BLD AUTO: 0.3 %
LYMPHOCYTES # BLD AUTO: 1.5 K/UL
LYMPHOCYTES NFR BLD: 20.7 %
MCH RBC QN AUTO: 30.2 PG
MCHC RBC AUTO-ENTMCNC: 32.9 G/DL
MCV RBC AUTO: 92 FL
MONOCYTES # BLD AUTO: 1 K/UL
MONOCYTES NFR BLD: 13.4 %
NEUTROPHILS # BLD AUTO: 4.6 K/UL
NEUTROPHILS NFR BLD: 62.6 %
NRBC BLD-RTO: 0 /100 WBC
PLATELET # BLD AUTO: 169 K/UL
PMV BLD AUTO: 11.2 FL
POTASSIUM SERPL-SCNC: 4.3 MMOL/L
PROT SERPL-MCNC: 6.6 G/DL
RBC # BLD AUTO: 3.98 M/UL
SODIUM SERPL-SCNC: 143 MMOL/L
T4 FREE SERPL-MCNC: 1.21 NG/DL
TSH SERPL DL<=0.005 MIU/L-ACNC: 6.93 UIU/ML
WBC # BLD AUTO: 7.33 K/UL

## 2018-04-04 PROCEDURE — 84443 ASSAY THYROID STIM HORMONE: CPT

## 2018-04-04 PROCEDURE — 80053 COMPREHEN METABOLIC PANEL: CPT

## 2018-04-04 PROCEDURE — 83036 HEMOGLOBIN GLYCOSYLATED A1C: CPT

## 2018-04-04 PROCEDURE — 84439 ASSAY OF FREE THYROXINE: CPT

## 2018-04-04 PROCEDURE — 36415 COLL VENOUS BLD VENIPUNCTURE: CPT | Mod: PO

## 2018-04-04 PROCEDURE — 85025 COMPLETE CBC W/AUTO DIFF WBC: CPT

## 2018-04-12 ENCOUNTER — PES CALL (OUTPATIENT)
Dept: ADMINISTRATIVE | Facility: CLINIC | Age: 83
End: 2018-04-12

## 2018-04-17 ENCOUNTER — OFFICE VISIT (OUTPATIENT)
Dept: INTERNAL MEDICINE | Facility: CLINIC | Age: 83
End: 2018-04-17
Payer: MEDICARE

## 2018-04-17 ENCOUNTER — LAB VISIT (OUTPATIENT)
Dept: LAB | Facility: HOSPITAL | Age: 83
End: 2018-04-17
Attending: INTERNAL MEDICINE
Payer: MEDICARE

## 2018-04-17 ENCOUNTER — OFFICE VISIT (OUTPATIENT)
Dept: CARDIOLOGY | Facility: CLINIC | Age: 83
End: 2018-04-17
Payer: MEDICARE

## 2018-04-17 VITALS
WEIGHT: 159.38 LBS | SYSTOLIC BLOOD PRESSURE: 100 MMHG | HEART RATE: 71 BPM | DIASTOLIC BLOOD PRESSURE: 54 MMHG | BODY MASS INDEX: 24.15 KG/M2 | HEIGHT: 68 IN | OXYGEN SATURATION: 97 %

## 2018-04-17 VITALS
WEIGHT: 159.19 LBS | SYSTOLIC BLOOD PRESSURE: 108 MMHG | HEART RATE: 60 BPM | BODY MASS INDEX: 24.13 KG/M2 | DIASTOLIC BLOOD PRESSURE: 58 MMHG | HEIGHT: 68 IN

## 2018-04-17 DIAGNOSIS — J32.9 SINUSITIS, UNSPECIFIED CHRONICITY, UNSPECIFIED LOCATION: ICD-10-CM

## 2018-04-17 DIAGNOSIS — I50.32 CHRONIC DIASTOLIC CONGESTIVE HEART FAILURE: ICD-10-CM

## 2018-04-17 DIAGNOSIS — E11.42 DIABETIC POLYNEUROPATHY ASSOCIATED WITH TYPE 2 DIABETES MELLITUS: Primary | ICD-10-CM

## 2018-04-17 DIAGNOSIS — I31.39 PERICARDIAL EFFUSION: ICD-10-CM

## 2018-04-17 DIAGNOSIS — E11.42 DIABETIC POLYNEUROPATHY ASSOCIATED WITH TYPE 2 DIABETES MELLITUS: ICD-10-CM

## 2018-04-17 DIAGNOSIS — N18.30 CKD (CHRONIC KIDNEY DISEASE) STAGE 3, GFR 30-59 ML/MIN: ICD-10-CM

## 2018-04-17 DIAGNOSIS — I48.19 PERSISTENT ATRIAL FIBRILLATION: ICD-10-CM

## 2018-04-17 DIAGNOSIS — I10 ESSENTIAL HYPERTENSION: ICD-10-CM

## 2018-04-17 DIAGNOSIS — E78.2 HYPERLIPEMIA, MIXED: ICD-10-CM

## 2018-04-17 DIAGNOSIS — I50.32 CHRONIC DIASTOLIC CONGESTIVE HEART FAILURE: Primary | ICD-10-CM

## 2018-04-17 DIAGNOSIS — R10.31 CONTINUOUS RLQ ABDOMINAL PAIN: ICD-10-CM

## 2018-04-17 DIAGNOSIS — I36.1 NON-RHEUMATIC TRICUSPID VALVE INSUFFICIENCY: ICD-10-CM

## 2018-04-17 DIAGNOSIS — I27.20 PULMONARY HYPERTENSION: ICD-10-CM

## 2018-04-17 DIAGNOSIS — K59.00 CONSTIPATION, UNSPECIFIED CONSTIPATION TYPE: ICD-10-CM

## 2018-04-17 LAB
ALBUMIN SERPL BCP-MCNC: 3.8 G/DL
ALP SERPL-CCNC: 163 U/L
ALT SERPL W/O P-5'-P-CCNC: 24 U/L
ANION GAP SERPL CALC-SCNC: 8 MMOL/L
AST SERPL-CCNC: 26 U/L
BILIRUB SERPL-MCNC: 0.6 MG/DL
BUN SERPL-MCNC: 38 MG/DL
CALCIUM SERPL-MCNC: 9.9 MG/DL
CHLORIDE SERPL-SCNC: 103 MMOL/L
CO2 SERPL-SCNC: 30 MMOL/L
CREAT SERPL-MCNC: 1.7 MG/DL
EST. GFR  (AFRICAN AMERICAN): 29.5 ML/MIN/1.73 M^2
EST. GFR  (NON AFRICAN AMERICAN): 25.6 ML/MIN/1.73 M^2
ESTIMATED AVG GLUCOSE: 146 MG/DL
GLUCOSE SERPL-MCNC: 50 MG/DL
HBA1C MFR BLD HPLC: 6.7 %
POTASSIUM SERPL-SCNC: 4.2 MMOL/L
PROT SERPL-MCNC: 7.1 G/DL
SODIUM SERPL-SCNC: 141 MMOL/L

## 2018-04-17 PROCEDURE — 99999 PR PBB SHADOW E&M-EST. PATIENT-LVL V: CPT | Mod: PBBFAC,,, | Performed by: INTERNAL MEDICINE

## 2018-04-17 PROCEDURE — 99214 OFFICE O/P EST MOD 30 MIN: CPT | Mod: S$GLB,,, | Performed by: INTERNAL MEDICINE

## 2018-04-17 PROCEDURE — 80053 COMPREHEN METABOLIC PANEL: CPT

## 2018-04-17 PROCEDURE — 99999 PR PBB SHADOW E&M-EST. PATIENT-LVL III: CPT | Mod: PBBFAC,,, | Performed by: INTERNAL MEDICINE

## 2018-04-17 PROCEDURE — 36415 COLL VENOUS BLD VENIPUNCTURE: CPT

## 2018-04-17 PROCEDURE — 99499 UNLISTED E&M SERVICE: CPT | Mod: S$PBB,,, | Performed by: INTERNAL MEDICINE

## 2018-04-17 PROCEDURE — 83036 HEMOGLOBIN GLYCOSYLATED A1C: CPT

## 2018-04-17 RX ORDER — INSULIN ASPART 100 [IU]/ML
INJECTION, SUSPENSION SUBCUTANEOUS
Qty: 1 BOX | Refills: 11
Start: 2018-04-17 | End: 2019-01-06 | Stop reason: SDUPTHER

## 2018-04-17 NOTE — PROGRESS NOTES
Subjective:   Patient ID:  Tiana Mead is a 89 y.o. female who presents for follow-up of Congestive Heart Failure      HPI:   Tiana Mead presents for follow up of diastolic heart failure.In addition, she has a stable moderate pericardial effusion as well as moderate pulmonary hypertension and severe TR. Since last seen, she has had no worsening SOB. She has  chronic atrial fibrillation with rate control on a DOAC.Tiana Mead denies chest pain,palpitations, presyncope , or syncope. Tiana Mead has hypertension with BP currently low. Tiana Mead has dyslipidemia  on high intensity statin. Tiana Mead chronic kidney disease stage III.    Review of Systems   Constitution: Negative for weakness, malaise/fatigue, weight gain and weight loss.   Eyes: Negative for blurred vision.   Cardiovascular: Positive for dyspnea on exertion. Negative for chest pain, claudication, cyanosis, irregular heartbeat, leg swelling, near-syncope, orthopnea, palpitations, paroxysmal nocturnal dyspnea and syncope.   Respiratory: Negative for cough, shortness of breath and wheezing.    Musculoskeletal: Positive for arthritis and joint pain. Negative for falls and myalgias.   Gastrointestinal: Positive for abdominal pain and constipation. Negative for heartburn, nausea and vomiting.   Genitourinary: Positive for nocturia.   Neurological: Negative for brief paralysis, dizziness, focal weakness, headaches, numbness and paresthesias.   Psychiatric/Behavioral: Negative for altered mental status.       Current Outpatient Prescriptions   Medication Sig    albuterol 90 mcg/actuation inhaler Inhale 2 puffs into the lungs every 4 (four) hours as needed.    amlodipine (NORVASC) 5 MG tablet Take 1 tablet (5 mg total) by mouth every morning.    atorvastatin (LIPITOR) 40 MG tablet Take 1 tablet (40 mg total) by mouth once daily.    blood sugar diagnostic (ONETOUCH ULTRA TEST) Strp Inject 1 strip into the skin 3 (three) times  "daily.    brimonidine 0.2% (ALPHAGAN) 0.2 % Drop INT 1 GTT INTO OU BID    diclofenac sodium (VOLTAREN) 1 % Gel Apply topically 4 (four) times daily.    dorzolamide (TRUSOPT) 2 % ophthalmic solution Place 1 drop into both eyes 2 (two) times daily.    ELIQUIS 2.5 mg Tab TAKE ONE TABLET BY MOUTH TWICE DAILY    fluticasone-salmeterol 250-50 mcg/dose (ADVAIR) 250-50 mcg/dose diskus inhaler Inhale 1 puff into the lungs nightly. Controller    furosemide (LASIX) 40 MG tablet Take 1 tablet (40 mg total) by mouth 2 (two) times daily. 1 tb am. 1 at 4pm.    insulin aspart protamine-insulin aspart (NOVOLOG MIX 70-30FLEXPEN U-100) 100 unit/mL (70-30) InPn pen Take 10 units once daily before breakfast.    insulin needles, disposable, (BD INSULIN PEN NEEDLE UF SHORT) 31 X 5/16 " Ndle Inject 1 Box into the skin 2 (two) times daily.    ipratropium (ATROVENT) 0.03 % nasal spray USE 2 SPRAYS IN EACH NOSTRIL TWICE DAILY    lancets (ONETOUCH DELICA LANCETS) 33 gauge Misc Apply 1 lancet topically 3 (three) times daily.    lancets Misc 1 Device by Misc.(Non-Drug; Combo Route) route 3 (three) times daily before meals. Please provide the insurance company preferred product.    latanoprost 0.005 % ophthalmic solution Place 1 drop into the right eye every evening. (Patient taking differently: Place 1 drop into both eyes every evening. )    levothyroxine (SYNTHROID) 75 MCG tablet Take 1 tablet (75 mcg total) by mouth before breakfast.    meclizine (ANTIVERT) 12.5 mg tablet Take 1 tablet (12.5 mg total) by mouth 3 (three) times daily as needed.    metoprolol tartrate (LOPRESSOR) 50 MG tablet Take 1 tablet (50 mg total) by mouth 2 (two) times daily.    multivit-mineral-iron-lutein (CENTRUM SILVER ULTRA WOMEN'S) Tab Take 1 tablet by mouth once daily.    nitroGLYCERIN (NITROSTAT) 0.4 MG SL tablet Place 0.4 mg under the tongue every 5 (five) minutes as needed for Chest pain.    TRUE METRIX GLUCOSE METER Misc TEST TID    TRUEPLUS " "LANCETS 30 gauge Misc USE TO TEST BLOOD SUGAR TID AC     No current facility-administered medications for this visit.      Objective:   Physical Exam   Constitutional: She is oriented to person, place, and time. She appears well-developed. No distress.   BP (!) 108/58 (BP Location: Left arm, Patient Position: Sitting, BP Method: Large (Manual))   Pulse 60   Ht 5' 8" (1.727 m)   Wt 72.2 kg (159 lb 2.8 oz)   LMP  (LMP Unknown)   BMI 24.20 kg/m²    HENT:   Head: Normocephalic.   Eyes: Conjunctivae are normal. Pupils are equal, round, and reactive to light. No scleral icterus.   Neck: Neck supple. No JVD present. No thyromegaly present.   Cardiovascular: Normal rate and intact distal pulses.  An irregularly irregular rhythm present. PMI is not displaced.  Exam reveals no gallop and no friction rub.    Murmur heard.   Medium-pitched midsystolic murmur is present with a grade of 1/6  at the lower left sternal border No JVD or edema  Pulmonary/Chest: Effort normal and breath sounds normal. No respiratory distress. She has no wheezes. She has no rales.   Abdominal: Soft. She exhibits no distension. There is no splenomegaly or hepatomegaly. There is no tenderness.   Musculoskeletal: She exhibits no edema or tenderness.   Uses a walker.   Neurological: She is alert and oriented to person, place, and time.   Skin: Skin is warm and dry. She is not diaphoretic.   Psychiatric: She has a normal mood and affect. Her behavior is normal.       Lab Results   Component Value Date     04/04/2018    K 4.3 04/04/2018     04/04/2018    CO2 30 (H) 04/04/2018    BUN 30 04/04/2018    CREATININE 1.6 (H) 04/04/2018     (H) 04/04/2018    HGBA1C 6.7 (H) 04/04/2018    MG 2.1 01/02/2018    AST 30 04/04/2018    ALT 36 04/04/2018    ALBUMIN 3.6 04/04/2018    PROT 6.6 04/04/2018    BILITOT 0.7 04/04/2018    WBC 7.33 04/04/2018    HGB 12.0 04/04/2018    HCT 36.5 (L) 04/04/2018    MCV 92 04/04/2018     04/04/2018    TSH " 6.932 (H) 04/04/2018    CHOL 116 (L) 04/20/2017    HDL 52 04/20/2017    LDLCALC 55.2 (L) 04/20/2017    TRIG 44 04/20/2017       Assessment:     1. Chronic diastolic congestive heart failure : Compensated   2. Pulmonary hypertension: Moderate    3. Persistent atrial fibrillation : Rate controlled on a DOAC   4. Non-rheumatic tricuspid valve insufficiency : Severe but no JVD   5. Essential hypertension : BP now low   6. Pericardial effusion : Moderate stable   7. Hyperlipemia, mixed :  on high intensity statin At LDL goal   8. CKD (chronic kidney disease) stage 3, GFR 30-59 ml/min, eGFR 32         Plan:     Tiana was seen today for congestive heart failure.    Diagnoses and all orders for this visit:    Chronic diastolic congestive heart failure  Continue current regimen    Pulmonary hypertension    Persistent atrial fibrillation    Non-rheumatic tricuspid valve insufficiency    Essential hypertension  Stop  Imdur  Pericardial effusion    Hyperlipemia, mixed  Continue current regimen    CKD (chronic kidney disease) stage 3, GFR 30-59 ml/min, eGFR 32

## 2018-04-17 NOTE — PATIENT INSTRUCTIONS
Results for orders placed or performed in visit on 04/04/18   CBC auto differential   Result Value Ref Range    WBC 7.33 3.90 - 12.70 K/uL    RBC 3.98 (L) 4.00 - 5.40 M/uL    Hemoglobin 12.0 12.0 - 16.0 g/dL    Hematocrit 36.5 (L) 37.0 - 48.5 %    MCV 92 82 - 98 fL    MCH 30.2 27.0 - 31.0 pg    MCHC 32.9 32.0 - 36.0 g/dL    RDW 14.8 (H) 11.5 - 14.5 %    Platelets 169 150 - 350 K/uL    MPV 11.2 9.2 - 12.9 fL    Immature Granulocytes 0.3 0.0 - 0.5 %    Gran # (ANC) 4.6 1.8 - 7.7 K/uL    Immature Grans (Abs) 0.02 0.00 - 0.04 K/uL    Lymph # 1.5 1.0 - 4.8 K/uL    Mono # 1.0 0.3 - 1.0 K/uL    Eos # 0.2 0.0 - 0.5 K/uL    Baso # 0.05 0.00 - 0.20 K/uL    nRBC 0 0 /100 WBC    Gran% 62.6 38.0 - 73.0 %    Lymph% 20.7 18.0 - 48.0 %    Mono% 13.4 4.0 - 15.0 %    Eosinophil% 2.3 0.0 - 8.0 %    Basophil% 0.7 0.0 - 1.9 %    Differential Method Automated    Comprehensive metabolic panel   Result Value Ref Range    Sodium 143 136 - 145 mmol/L    Potassium 4.3 3.5 - 5.1 mmol/L    Chloride 105 95 - 110 mmol/L    CO2 30 (H) 23 - 29 mmol/L    Glucose 118 (H) 70 - 110 mg/dL    BUN, Bld 30 10 - 30 mg/dL    Creatinine 1.6 (H) 0.5 - 1.4 mg/dL    Calcium 9.5 8.7 - 10.5 mg/dL    Total Protein 6.6 6.0 - 8.4 g/dL    Albumin 3.6 3.5 - 5.2 g/dL    Total Bilirubin 0.7 0.1 - 1.0 mg/dL    Alkaline Phosphatase 160 (H) 55 - 135 U/L    AST 30 10 - 40 U/L    ALT 36 10 - 44 U/L    Anion Gap 8 8 - 16 mmol/L    eGFR if African American 31.8 (A) >60 mL/min/1.73 m^2    eGFR if non  27.6 (A) >60 mL/min/1.73 m^2   Hemoglobin A1c   Result Value Ref Range    Hemoglobin A1C 6.7 (H) 4.0 - 5.6 %    Estimated Avg Glucose 146 (H) 68 - 131 mg/dL   TSH   Result Value Ref Range    TSH 6.932 (H) 0.400 - 4.000 uIU/mL   T4, free   Result Value Ref Range    Free T4 1.21 0.71 - 1.51 ng/dL     Ask the cardiologist if okay to reduce Lasix   (furosemide).    Hemoglobin A1C   Date Value Ref Range Status   04/04/2018 6.7 (H) 4.0 - 5.6 % Final     Comment:      According to ADA guidelines, hemoglobin A1c <7.0% represents  optimal control in non-pregnant diabetic patients. Different  metrics may apply to specific patient populations.   Standards of Medical Care in Diabetes-2016.  For the purpose of screening for the presence of diabetes:  <5.7%     Consistent with the absence of diabetes  5.7-6.4%  Consistent with increasing risk for diabetes   (prediabetes)  >or=6.5%  Consistent with diabetes  Currently, no consensus exists for use of hemoglobin A1c  for diagnosis of diabetes for children.  This Hemoglobin A1c assay has significant interference with fetal   hemoglobin   (HbF). The results are invalid for patients with abnormal amounts of   HbF,   including those with known Hereditary Persistence   of Fetal Hemoglobin. Heterozygous hemoglobin variants (HbAS, HbAC,   HbAD, HbAE, HbA2) do not significantly interfere with this assay;   however, presence of multiple variants in a sample may impact the %   interference.     12/30/2017 6.6 (H) 4.0 - 5.6 % Final     Comment:     According to ADA guidelines, hemoglobin A1c <7.0% represents  optimal control in non-pregnant diabetic patients. Different  metrics may apply to specific patient populations.   Standards of Medical Care in Diabetes-2016.  For the purpose of screening for the presence of diabetes:  <5.7%     Consistent with the absence of diabetes  5.7-6.4%  Consistent with increasing risk for diabetes   (prediabetes)  >or=6.5%  Consistent with diabetes  Currently, no consensus exists for use of hemoglobin A1c  for diagnosis of diabetes for children.  This Hemoglobin A1c assay has significant interference with fetal   hemoglobin   (HbF). The results are invalid for patients with abnormal amounts of   HbF,   including those with known Hereditary Persistence   of Fetal Hemoglobin. Heterozygous hemoglobin variants (HbAS, HbAC,   HbAD, HbAE, HbA2) do not significantly interfere with this assay;   however, presence of multiple  variants in a sample may impact the %   interference.     12/06/2017 6.7 (H) 4.0 - 5.6 % Final     Comment:     According to ADA guidelines, hemoglobin A1c <7.0% represents  optimal control in non-pregnant diabetic patients. Different  metrics may apply to specific patient populations.   Standards of Medical Care in Diabetes-2016.  For the purpose of screening for the presence of diabetes:  <5.7%     Consistent with the absence of diabetes  5.7-6.4%  Consistent with increasing risk for diabetes   (prediabetes)  >or=6.5%  Consistent with diabetes  Currently, no consensus exists for use of hemoglobin A1c  for diagnosis of diabetes for children.  This Hemoglobin A1c assay has significant interference with fetal   hemoglobin   (HbF). The results are invalid for patients with abnormal amounts of   HbF,   including those with known Hereditary Persistence   of Fetal Hemoglobin. Heterozygous hemoglobin variants (HbAS, HbAC,   HbAD, HbAE, HbA2) do not significantly interfere with this assay;   however, presence of multiple variants in a sample may impact the %   interference.       Medication List with Changes/Refills   Current Medications    ALBUTEROL 90 MCG/ACTUATION INHALER    Inhale 2 puffs into the lungs every 4 (four) hours as needed.    AMLODIPINE (NORVASC) 5 MG TABLET    Take 1 tablet (5 mg total) by mouth every morning.    ATORVASTATIN (LIPITOR) 40 MG TABLET    Take 1 tablet (40 mg total) by mouth once daily.    BLOOD SUGAR DIAGNOSTIC (ONETOUCH ULTRA TEST) STRP    Inject 1 strip into the skin 3 (three) times daily.    BRIMONIDINE 0.2% (ALPHAGAN) 0.2 % DROP    INT 1 GTT INTO OU BID    DICLOFENAC SODIUM (VOLTAREN) 1 % GEL    Apply topically 4 (four) times daily.    DORZOLAMIDE (TRUSOPT) 2 % OPHTHALMIC SOLUTION    Place 1 drop into both eyes 2 (two) times daily.    ELIQUIS 2.5 MG TAB    TAKE ONE TABLET BY MOUTH TWICE DAILY    FLUTICASONE-SALMETEROL 250-50 MCG/DOSE (ADVAIR) 250-50 MCG/DOSE DISKUS INHALER    Inhale 1  "puff into the lungs nightly. Controller    FUROSEMIDE (LASIX) 40 MG TABLET    Take 1 tablet (40 mg total) by mouth 2 (two) times daily. 1 tb am. 1 at 4pm.    INSULIN NEEDLES, DISPOSABLE, (BD INSULIN PEN NEEDLE UF SHORT) 31 X 5/16 " NDLE    Inject 1 Box into the skin 2 (two) times daily.    IPRATROPIUM (ATROVENT) 0.03 % NASAL SPRAY    USE 2 SPRAYS IN EACH NOSTRIL TWICE DAILY    ISOSORBIDE MONONITRATE (IMDUR) 30 MG 24 HR TABLET    Take 1 tablet (30 mg total) by mouth every morning. Taking for heart    LANCETS (ONETOUCH DELICA LANCETS) 33 GAUGE MISC    Apply 1 lancet topically 3 (three) times daily.    LANCETS MISC    1 Device by Misc.(Non-Drug; Combo Route) route 3 (three) times daily before meals. Please provide the insurance company preferred product.    LATANOPROST 0.005 % OPHTHALMIC SOLUTION    Place 1 drop into the right eye every evening.    LEVOTHYROXINE (SYNTHROID) 75 MCG TABLET    Take 1 tablet (75 mcg total) by mouth before breakfast.    MECLIZINE (ANTIVERT) 12.5 MG TABLET    Take 1 tablet (12.5 mg total) by mouth 3 (three) times daily as needed.    METOPROLOL TARTRATE (LOPRESSOR) 50 MG TABLET    Take 1 tablet (50 mg total) by mouth 2 (two) times daily.    MULTIVIT-MINERAL-IRON-LUTEIN (CENTRUM SILVER ULTRA WOMEN'S) TAB    Take 1 tablet by mouth once daily.    NITROGLYCERIN (NITROSTAT) 0.4 MG SL TABLET    Place 0.4 mg under the tongue every 5 (five) minutes as needed for Chest pain.    TRUE METRIX GLUCOSE METER MISC    TEST TID    TRUEPLUS LANCETS 30 GAUGE MISC    USE TO TEST BLOOD SUGAR TID AC   Changed and/or Refilled Medications    Modified Medication Previous Medication    INSULIN ASPART PROTAMINE-INSULIN ASPART (NOVOLOG MIX 70-30FLEXPEN U-100) 100 UNIT/ML (70-30) INPN PEN insulin aspart protamine-insulin aspart (NOVOLOG MIX 70-30 FLEXPEN) 100 unit/mL (70-30) InPn pen       Take 10 units once daily before breakfast.    Take 12 units once daily before breakfast.   Discontinued Medications    " AMOXICILLIN-CLAVULANATE 500-125MG (AUGMENTIN) 500-125 MG TAB    Take 1 tablet (500 mg total) by mouth 2 (two) times daily.

## 2018-04-17 NOTE — PROGRESS NOTES
Subjective:       Patient ID: Tiana Mead is a 89 y.o. female.    Chief Complaint: Follow-up and Abdominal Pain (rate 7)    RLQ abd pain 2 months.  constipated     MAYBE DIURETIC IS TOO STRONG?    aic too low. Reduce insulin     BLOOD PRESSURE TOO LOW    Pulse is good.  HPI  Review of Systems   Constitutional: Negative for activity change, appetite change, chills, fatigue, fever and unexpected weight change.   HENT: Negative for hearing loss.    Eyes: Negative for visual disturbance.   Respiratory: Negative for cough, chest tightness, shortness of breath and wheezing.    Cardiovascular: Negative for chest pain, palpitations and leg swelling.   Gastrointestinal: Positive for abdominal pain and constipation. Negative for abdominal distention, anal bleeding, blood in stool, diarrhea, nausea, rectal pain and vomiting.   Genitourinary: Negative for dysuria, frequency and urgency.   Musculoskeletal: Negative for arthralgias, back pain, gait problem, joint swelling and myalgias.   Skin: Negative for rash.   Neurological: Negative for light-headedness and headaches.   Psychiatric/Behavioral: Negative for dysphoric mood and sleep disturbance. The patient is not nervous/anxious.        Objective:      Physical Exam   Constitutional: She is oriented to person, place, and time. She appears well-developed and well-nourished. No distress.   HENT:   Head: Normocephalic and atraumatic.   Right Ear: External ear normal.   Left Ear: External ear normal.   Nose: Nose normal.   Mouth/Throat: Oropharynx is clear and moist. No oropharyngeal exudate.   Eyes: Conjunctivae and EOM are normal. Pupils are equal, round, and reactive to light. Right eye exhibits no discharge. No scleral icterus.   Neck: Normal range of motion and full passive range of motion without pain. Neck supple. No spinous process tenderness and no muscular tenderness present. Carotid bruit is not present. No thyromegaly present.   Cardiovascular: Normal rate,  regular rhythm, S1 normal, S2 normal, normal heart sounds and intact distal pulses.  Exam reveals no gallop and no friction rub.    No murmur heard.  Pulmonary/Chest: Effort normal and breath sounds normal. No respiratory distress. She has no wheezes. She has no rales. She exhibits no tenderness.   Abdominal: Soft. Bowel sounds are normal. She exhibits no distension and no mass. There is tenderness. There is no rebound and no guarding.   Minimal RLQ tenderness   Genitourinary: Pelvic exam was performed with patient supine. Uterus is not deviated, not enlarged, not fixed and not tender. Cervix exhibits no motion tenderness, no discharge and no friability. Right adnexum displays no mass, no tenderness and no fullness. Left adnexum displays no mass, no tenderness and no fullness.   Musculoskeletal: Normal range of motion. She exhibits no edema or tenderness.   Lymphadenopathy:        Head (right side): No submental and no submandibular adenopathy present.        Head (left side): No submental and no submandibular adenopathy present.     She has no cervical adenopathy.        Right cervical: No superficial cervical, no deep cervical and no posterior cervical adenopathy present.       Left cervical: No superficial cervical, no deep cervical and no posterior cervical adenopathy present.        Right axillary: No pectoral and no lateral adenopathy present.        Left axillary: No pectoral and no lateral adenopathy present.       Right: No supraclavicular adenopathy present.        Left: No supraclavicular adenopathy present.   Neurological: She is alert and oriented to person, place, and time. She has normal reflexes. No cranial nerve deficit. She exhibits normal muscle tone. Coordination normal.   Skin: Skin is warm and dry. No rash noted.   Psychiatric: She has a normal mood and affect. Her behavior is normal. Her mood appears not anxious. Her speech is not rapid and/or pressured. She is not agitated. She does not  exhibit a depressed mood.   Normal behavior, thought content, insight and judgement.   Nursing note and vitals reviewed.      Assessment:       1. Diabetic polyneuropathy associated with type 2 diabetes mellitus    2. CKD (chronic kidney disease) stage 3, GFR 30-59 ml/min, eGFR 32     3. Chronic diastolic congestive heart failure    4. Essential hypertension    5. Continuous RLQ abdominal pain    6. Constipation, unspecified constipation type    7. Sinusitis, unspecified chronicity, unspecified location        Plan:   Tiana was seen today for follow-up and abdominal pain.    Diagnoses and all orders for this visit:    Diabetic polyneuropathy associated with type 2 diabetes mellitus  -     Hemoglobin A1c; Future  -     Comprehensive metabolic panel; Future    CKD (chronic kidney disease) stage 3, GFR 30-59 ml/min, eGFR 32   -     Hemoglobin A1c; Future  -     Comprehensive metabolic panel; Future    Chronic diastolic congestive heart failure    Essential hypertension    Continuous RLQ abdominal pain, observe. No associated sxs. Consider iFOBT    Constipation, unspecified constipation type. UP MIRALAX    Sinusitis, unspecified chronicity, unspecified location    Other orders  -     insulin aspart protamine-insulin aspart (NOVOLOG MIX 70-30FLEXPEN U-100) 100 unit/mL (70-30) InPn pen; Take 10 units once daily before breakfast.    Medication List with Changes/Refills   Current Medications    ALBUTEROL 90 MCG/ACTUATION INHALER    Inhale 2 puffs into the lungs every 4 (four) hours as needed.    AMLODIPINE (NORVASC) 5 MG TABLET    Take 1 tablet (5 mg total) by mouth every morning.    ATORVASTATIN (LIPITOR) 40 MG TABLET    Take 1 tablet (40 mg total) by mouth once daily.    BLOOD SUGAR DIAGNOSTIC (ONETOUCH ULTRA TEST) STRP    Inject 1 strip into the skin 3 (three) times daily.    BRIMONIDINE 0.2% (ALPHAGAN) 0.2 % DROP    INT 1 GTT INTO OU BID    DICLOFENAC SODIUM (VOLTAREN) 1 % GEL    Apply topically 4  "(four) times daily.    DORZOLAMIDE (TRUSOPT) 2 % OPHTHALMIC SOLUTION    Place 1 drop into both eyes 2 (two) times daily.    ELIQUIS 2.5 MG TAB    TAKE ONE TABLET BY MOUTH TWICE DAILY    FLUTICASONE-SALMETEROL 250-50 MCG/DOSE (ADVAIR) 250-50 MCG/DOSE DISKUS INHALER    Inhale 1 puff into the lungs nightly. Controller    FUROSEMIDE (LASIX) 40 MG TABLET    Take 1 tablet (40 mg total) by mouth 2 (two) times daily. 1 tb am. 1 at 4pm.    INSULIN NEEDLES, DISPOSABLE, (BD INSULIN PEN NEEDLE UF SHORT) 31 X 5/16 " NDLE    Inject 1 Box into the skin 2 (two) times daily.    IPRATROPIUM (ATROVENT) 0.03 % NASAL SPRAY    USE 2 SPRAYS IN EACH NOSTRIL TWICE DAILY    ISOSORBIDE MONONITRATE (IMDUR) 30 MG 24 HR TABLET    Take 1 tablet (30 mg total) by mouth every morning. Taking for heart    LANCETS (ONETOUCH DELICA LANCETS) 33 GAUGE MISC    Apply 1 lancet topically 3 (three) times daily.    LANCETS MISC    1 Device by Misc.(Non-Drug; Combo Route) route 3 (three) times daily before meals. Please provide the insurance company preferred product.    LATANOPROST 0.005 % OPHTHALMIC SOLUTION    Place 1 drop into the right eye every evening.    LEVOTHYROXINE (SYNTHROID) 75 MCG TABLET    Take 1 tablet (75 mcg total) by mouth before breakfast.    MECLIZINE (ANTIVERT) 12.5 MG TABLET    Take 1 tablet (12.5 mg total) by mouth 3 (three) times daily as needed.    METOPROLOL TARTRATE (LOPRESSOR) 50 MG TABLET    Take 1 tablet (50 mg total) by mouth 2 (two) times daily.    MULTIVIT-MINERAL-IRON-LUTEIN (CENTRUM SILVER ULTRA WOMEN'S) TAB    Take 1 tablet by mouth once daily.    NITROGLYCERIN (NITROSTAT) 0.4 MG SL TABLET    Place 0.4 mg under the tongue every 5 (five) minutes as needed for Chest pain.    TRUE METRIX GLUCOSE METER MISC    TEST TID    TRUEPLUS LANCETS 30 GAUGE MISC    USE TO TEST BLOOD SUGAR TID AC   Changed and/or Refilled Medications    Modified Medication Previous Medication    INSULIN ASPART PROTAMINE-INSULIN ASPART (NOVOLOG MIX " 70-30FLEXPEN U-100) 100 UNIT/ML (70-30) INPN PEN insulin aspart protamine-insulin aspart (NOVOLOG MIX 70-30 FLEXPEN) 100 unit/mL (70-30) InPn pen       Take 10 units once daily before breakfast.    Take 12 units once daily before breakfast.   Discontinued Medications    AMOXICILLIN-CLAVULANATE 500-125MG (AUGMENTIN) 500-125 MG TAB    Take 1 tablet (500 mg total) by mouth 2 (two) times daily.       Follow-up in about 3 months (around 7/17/2018) for after labs.

## 2018-04-18 ENCOUNTER — TELEPHONE (OUTPATIENT)
Dept: CARDIOLOGY | Facility: CLINIC | Age: 83
End: 2018-04-18

## 2018-04-18 NOTE — TELEPHONE ENCOUNTER
----- Message from Sujey Salas sent at 4/18/2018 10:55 AM CDT -----  Contact: Patient  The Pt is calling to clarify her medication. She was told on her last visit to stop her blood pressure medication and someone wrote down what she needed to stop and when she got home she saw it read on her bottle that Imdur was for her heart not blood pressure. Please call her back @ [phone] or [phone]. Thanks, Sujey   4/17/2018 Dr. Erwin

## 2018-04-18 NOTE — TELEPHONE ENCOUNTER
Spoke with the pt - Imdur was stopped by Dr. Erwin - discussed cardiac meds - and BP low now - the pt verbalized understanding.

## 2018-04-23 RX ORDER — AMLODIPINE BESYLATE 5 MG/1
TABLET ORAL
Qty: 90 TABLET | Refills: 0 | Status: SHIPPED | OUTPATIENT
Start: 2018-04-23 | End: 2018-07-17 | Stop reason: SDUPTHER

## 2018-04-25 ENCOUNTER — TELEPHONE (OUTPATIENT)
Dept: INTERNAL MEDICINE | Facility: CLINIC | Age: 83
End: 2018-04-25

## 2018-04-25 NOTE — TELEPHONE ENCOUNTER
Pt states that dr Bradley saw her recently for the same problem and was told to return if problem continued.  She refused to see another provider. She accepted an appointment with Dr Bradley tomorrow am.

## 2018-04-25 NOTE — TELEPHONE ENCOUNTER
----- Message from Rachel Vee MA sent at 4/25/2018  2:57 PM CDT -----  Contact: Patient [phone]      ----- Message -----  From: Emerita Ross  Sent: 4/25/2018   2:37 PM  To: Mirna BRICENO Staff    Patient is experiencing pain on right side of stomach and would like an appt next   Please call and advise.    Thank You

## 2018-04-26 ENCOUNTER — OFFICE VISIT (OUTPATIENT)
Dept: INTERNAL MEDICINE | Facility: CLINIC | Age: 83
End: 2018-04-26
Payer: MEDICARE

## 2018-04-26 VITALS
HEART RATE: 45 BPM | DIASTOLIC BLOOD PRESSURE: 64 MMHG | SYSTOLIC BLOOD PRESSURE: 114 MMHG | HEIGHT: 68 IN | BODY MASS INDEX: 24.49 KG/M2 | WEIGHT: 161.63 LBS | OXYGEN SATURATION: 98 %

## 2018-04-26 DIAGNOSIS — K59.00 CONSTIPATION, UNSPECIFIED CONSTIPATION TYPE: ICD-10-CM

## 2018-04-26 DIAGNOSIS — I95.9 HYPOTENSION, UNSPECIFIED HYPOTENSION TYPE: Primary | ICD-10-CM

## 2018-04-26 DIAGNOSIS — I50.32 CHRONIC DIASTOLIC CONGESTIVE HEART FAILURE: ICD-10-CM

## 2018-04-26 DIAGNOSIS — E16.2 HYPOGLYCEMIA: ICD-10-CM

## 2018-04-26 DIAGNOSIS — R10.11 RUQ ABDOMINAL PAIN: ICD-10-CM

## 2018-04-26 DIAGNOSIS — N18.30 CKD (CHRONIC KIDNEY DISEASE) STAGE 3, GFR 30-59 ML/MIN: ICD-10-CM

## 2018-04-26 DIAGNOSIS — N17.9 AKI (ACUTE KIDNEY INJURY): ICD-10-CM

## 2018-04-26 PROCEDURE — 99499 UNLISTED E&M SERVICE: CPT | Mod: S$PBB,,, | Performed by: INTERNAL MEDICINE

## 2018-04-26 PROCEDURE — 99214 OFFICE O/P EST MOD 30 MIN: CPT | Mod: S$GLB,,, | Performed by: INTERNAL MEDICINE

## 2018-04-26 PROCEDURE — 99999 PR PBB SHADOW E&M-EST. PATIENT-LVL V: CPT | Mod: PBBFAC,,, | Performed by: INTERNAL MEDICINE

## 2018-04-26 NOTE — PATIENT INSTRUCTIONS
Possible Gallstone with Biliary Colic (Presumed)    Your abdominal pain is may be due to spasm of the gallbladder. This is called gallbladder or biliary colic. The gallbladder is a small sac under the liver, which stores and releases bile. Bile is a fluid that aids in the digestion of fat. Eating fatty food stimulates the gallbladder to contract, and release the bile. A gallstone may form in this sac. Although most people do not have symptoms, when the stone moves and blocks the passage of bile out of the bladder, it can cause pain and even an infection.  To be more certain of the diagnosis, you may need to have an ultrasound, CT-scan or other special test.  A number of things increase the risk for developing gallstones:  · Women are more likely  · Obesity  · Increasing age  · Losing or gaining weight quickly  · High calorie diet  · Pregnancy  · Hormone therapy  · Diabetes  The most common symptoms are:  · Abdominal pain, cramping, aching  · Nausea, vomiting  · Fever  Many illnesses can cause these symptoms. This pain usually starts in the upper right side of your abdomen. Sometimes it can radiate to your right shoulder, back and arm. It usually starts suddenly, becomes more intense quickly, and then gradually decreases and disappears over a couple of hours. Elderly people and diabetics may have difficulty showing where the pain is exactly.  Home care  · Rest in bed and follow a clear liquid diet until feeling better. If pain or nausea medicine was given to help with your symptoms, take these as directed.  · You can take acetaminophen or ibuprofen for pain, unless you were given a different pain medicine to use.Note: If you have chronic liver or kidney disease or ever had a stomach ulcer or GI bleeding or are taking blood thinner medications, talk with your doctor before using these medicines.  · Fat in your diet makes the gallbladder contract and may cause increased pain. Therefore, avoid fat in your diet over  the next two days and follow a low-fat diet after that. If you are overweight, a low fat diet will help you lose weight.  Follow-up care  If a test was already scheduled for you, keep this appointment. Be sure you know how to prepare yourself for the test. Usually, you will be asked not to eat or drink anything for at least 8 hours before the test. Schedule an appointment with your own doctor after your test is complete to discuss the findings.  When to seek medical advice  Call your healthcare provider if any of the following occur:  · Pain gets worse or moves to the right lower abdomen  · Repeated vomiting  · Swelling of the abdomen  · Pain lasts over 6 hours  · Fever of 100.4º F (38º C) or higher, or as directed by your healthcare provider  · Weakness, dizziness  · Dark urine or light colored stools  · Yellow color of the skin or eyes  · Chest, arm, back, neck or jaw pain  Call 911  Get emergency medical care right away if any of these occur:  · Trouble breathing  · Very confused  · Very drowsy or trouble awakening  · Fainting or loss of consciousness  · Rapid heart rate  Date Last Reviewed: 8/23/2015  © 2586-9231 [employer]. [address] [place] [address], [place] [address]. All rights reserved. This information is not intended as a substitute for professional medical care. Always follow your healthcare professional's instructions.

## 2018-04-26 NOTE — PROGRESS NOTES
Subjective:       Patient ID: Tiana Mead is a 89 y.o. female.    Chief Complaint: Follow-up; Abdominal Pain; and Hip Pain  URGENT     RLQ abd pain 2 months, worse, constant now N, no V or D, no fever, no jaundice .  BM look normal        Reduced insulin no hypos, 120 fasting, checks bid     BLOOD PRESSURE is good. Weight steady        HPI  Review of Systems   Constitutional: Negative for activity change, appetite change, chills, fatigue, fever and unexpected weight change.   HENT: Negative for hearing loss.    Eyes: Negative for visual disturbance.   Respiratory: Negative for cough, chest tightness, shortness of breath and wheezing.    Cardiovascular: Negative for chest pain, palpitations and leg swelling.   Gastrointestinal: Positive for abdominal pain and nausea. Negative for abdominal distention, anal bleeding, blood in stool, constipation, diarrhea, rectal pain and vomiting.   Genitourinary: Negative for dysuria, frequency and urgency.   Musculoskeletal: Negative for arthralgias, back pain, gait problem, joint swelling and myalgias.   Skin: Negative for rash.   Neurological: Negative for light-headedness and headaches.   Psychiatric/Behavioral: Negative for dysphoric mood and sleep disturbance. The patient is not nervous/anxious.        Objective:      Physical Exam   Constitutional: She is oriented to person, place, and time. She appears well-developed and well-nourished. No distress.   HENT:   Head: Normocephalic and atraumatic.   Right Ear: External ear normal.   Left Ear: External ear normal.   Nose: Nose normal.   Mouth/Throat: Oropharynx is clear and moist. No oropharyngeal exudate.   Eyes: Conjunctivae and EOM are normal. Pupils are equal, round, and reactive to light. Right eye exhibits no discharge. No scleral icterus.   Neck: Normal range of motion and full passive range of motion without pain. Neck supple. No spinous process tenderness and no muscular tenderness present. Carotid bruit is not  present. No thyromegaly present.   Cardiovascular: Normal rate, regular rhythm, S1 normal, S2 normal, normal heart sounds and intact distal pulses.  Exam reveals no gallop and no friction rub.    No murmur heard.  Pulmonary/Chest: Effort normal and breath sounds normal. No respiratory distress. She has no wheezes. She has no rales. She exhibits no tenderness.   Abdominal: Soft. Bowel sounds are normal. She exhibits no distension and no mass. There is no tenderness. There is no rebound and no guarding.   Genitourinary: Pelvic exam was performed with patient supine. Uterus is not deviated, not enlarged, not fixed and not tender. Cervix exhibits no motion tenderness, no discharge and no friability. Right adnexum displays no mass, no tenderness and no fullness. Left adnexum displays no mass, no tenderness and no fullness.   Musculoskeletal: Normal range of motion. She exhibits no edema or tenderness.   Lymphadenopathy:        Head (right side): No submental and no submandibular adenopathy present.        Head (left side): No submental and no submandibular adenopathy present.     She has no cervical adenopathy.        Right cervical: No superficial cervical, no deep cervical and no posterior cervical adenopathy present.       Left cervical: No superficial cervical, no deep cervical and no posterior cervical adenopathy present.        Right axillary: No pectoral and no lateral adenopathy present.        Left axillary: No pectoral and no lateral adenopathy present.       Right: No supraclavicular adenopathy present.        Left: No supraclavicular adenopathy present.   Neurological: She is alert and oriented to person, place, and time. She has normal reflexes. No cranial nerve deficit. She exhibits normal muscle tone. Coordination normal.   Skin: Skin is warm and dry. No rash noted.   Psychiatric: She has a normal mood and affect. Her behavior is normal. Her mood appears not anxious. Her speech is not rapid and/or  pressured. She is not agitated. She does not exhibit a depressed mood.   Normal behavior, thought content, insight and judgement.   Nursing note and vitals reviewed.      Assessment:       1. Hypotension, unspecified hypotension type    2. Hypoglycemia    3. RUQ abdominal pain    4. Constipation, unspecified constipation type    5. MATHIEU (acute kidney injury)    6. CKD (chronic kidney disease) stage 3, GFR 30-59 ml/min, eGFR 32     7. Chronic diastolic congestive heart failure        Plan:   Tiana was seen today for follow-up, abdominal pain and hip pain.    Diagnoses and all orders for this visit:    Hypotension, better    Hypoglycemia, safer BS now    RUQ abdominal pain, worse, now with N, r/o sudge in bile ducts    Constipation, unspecified constipation type    MATHIEU (acute kidney injury)    CKD (chronic kidney disease) stage 3, GFR 30-59 ml/min, eGFR 32     Chronic diastolic congestive heart failure    Call your healthcare provider if any of the following occur:  · Pain gets worse or moves to the right lower abdomen  · Repeated vomiting  · Swelling of the abdomen  · Pain lasts over 6 hours  · Fever of 100.4º F (38º C) or higher, or as directed by your healthcare provider  · Weakness, dizziness  · Dark urine or light colored stools  · Yellow color of the skin or eyes  · Chest, arm, back, neck or jaw pain  Call 911  Get emergency medical care right away if any of these occur:  · Trouble breathing  · Very confused  · Very drowsy or trouble awakening  · Fainting or loss of consciousness  · Rapid heart rate

## 2018-05-07 RX ORDER — FUROSEMIDE 40 MG/1
TABLET ORAL
Qty: 180 TABLET | Refills: 0 | Status: SHIPPED | OUTPATIENT
Start: 2018-05-07 | End: 2018-07-17 | Stop reason: SDUPTHER

## 2018-05-09 ENCOUNTER — HOSPITAL ENCOUNTER (EMERGENCY)
Facility: OTHER | Age: 83
Discharge: HOME | End: 2018-05-09
Attending: EMERGENCY MEDICINE
Payer: MEDICARE

## 2018-05-09 ENCOUNTER — HOSPITAL ENCOUNTER (OUTPATIENT)
Dept: RADIOLOGY | Facility: OTHER | Age: 83
Discharge: HOME | End: 2018-05-09
Attending: INTERNAL MEDICINE
Payer: MEDICARE

## 2018-05-09 VITALS
HEART RATE: 102 BPM | DIASTOLIC BLOOD PRESSURE: 62 MMHG | TEMPERATURE: 98 F | WEIGHT: 155 LBS | OXYGEN SATURATION: 97 % | SYSTOLIC BLOOD PRESSURE: 125 MMHG | RESPIRATION RATE: 20 BRPM | HEIGHT: 69 IN | BODY MASS INDEX: 22.96 KG/M2

## 2018-05-09 DIAGNOSIS — K52.9 GASTROENTERITIS: Primary | ICD-10-CM

## 2018-05-09 DIAGNOSIS — R74.8 ELEVATED LIPASE: ICD-10-CM

## 2018-05-09 DIAGNOSIS — R11.2 NAUSEA & VOMITING: ICD-10-CM

## 2018-05-09 DIAGNOSIS — R10.11 RUQ ABDOMINAL PAIN: ICD-10-CM

## 2018-05-09 LAB
ALBUMIN SERPL BCP-MCNC: 4 G/DL
ALP SERPL-CCNC: 164 U/L
ALT SERPL W/O P-5'-P-CCNC: 34 U/L
ANION GAP SERPL CALC-SCNC: 14 MMOL/L
AST SERPL-CCNC: 33 U/L
BASOPHILS # BLD AUTO: 0.01 K/UL
BASOPHILS NFR BLD: 0.1 %
BILIRUB SERPL-MCNC: 0.7 MG/DL
BUN SERPL-MCNC: 32 MG/DL
CALCIUM SERPL-MCNC: 10 MG/DL
CHLORIDE SERPL-SCNC: 103 MMOL/L
CO2 SERPL-SCNC: 23 MMOL/L
CREAT SERPL-MCNC: 1.5 MG/DL
DIFFERENTIAL METHOD: ABNORMAL
EOSINOPHIL # BLD AUTO: 0.1 K/UL
EOSINOPHIL NFR BLD: 0.6 %
ERYTHROCYTE [DISTWIDTH] IN BLOOD BY AUTOMATED COUNT: 13.9 %
EST. GFR  (AFRICAN AMERICAN): 34 ML/MIN/1.73 M^2
EST. GFR  (NON AFRICAN AMERICAN): 30 ML/MIN/1.73 M^2
GLUCOSE SERPL-MCNC: 177 MG/DL
HCT VFR BLD AUTO: 40.5 %
HGB BLD-MCNC: 13.4 G/DL
LACTATE SERPL-SCNC: 2.3 MMOL/L
LIPASE SERPL-CCNC: 115 U/L
LYMPHOCYTES # BLD AUTO: 1 K/UL
LYMPHOCYTES NFR BLD: 8.3 %
MCH RBC QN AUTO: 30 PG
MCHC RBC AUTO-ENTMCNC: 33.1 G/DL
MCV RBC AUTO: 91 FL
MONOCYTES # BLD AUTO: 0.6 K/UL
MONOCYTES NFR BLD: 5.3 %
NEUTROPHILS # BLD AUTO: 9.8 K/UL
NEUTROPHILS NFR BLD: 85.4 %
PLATELET # BLD AUTO: 162 K/UL
PMV BLD AUTO: 11.4 FL
POTASSIUM SERPL-SCNC: 4.7 MMOL/L
PROT SERPL-MCNC: 7.7 G/DL
RBC # BLD AUTO: 4.46 M/UL
SODIUM SERPL-SCNC: 140 MMOL/L
WBC # BLD AUTO: 11.46 K/UL

## 2018-05-09 PROCEDURE — 83605 ASSAY OF LACTIC ACID: CPT

## 2018-05-09 PROCEDURE — 76700 US EXAM ABDOM COMPLETE: CPT | Mod: TC

## 2018-05-09 PROCEDURE — 76700 US EXAM ABDOM COMPLETE: CPT | Mod: 26,,, | Performed by: RADIOLOGY

## 2018-05-09 PROCEDURE — 25000003 PHARM REV CODE 250: Performed by: EMERGENCY MEDICINE

## 2018-05-09 PROCEDURE — 85025 COMPLETE CBC W/AUTO DIFF WBC: CPT

## 2018-05-09 PROCEDURE — 80053 COMPREHEN METABOLIC PANEL: CPT

## 2018-05-09 PROCEDURE — 83690 ASSAY OF LIPASE: CPT

## 2018-05-09 PROCEDURE — 96360 HYDRATION IV INFUSION INIT: CPT

## 2018-05-09 PROCEDURE — 93010 ELECTROCARDIOGRAM REPORT: CPT | Mod: ,,, | Performed by: INTERNAL MEDICINE

## 2018-05-09 PROCEDURE — 93005 ELECTROCARDIOGRAM TRACING: CPT

## 2018-05-09 PROCEDURE — 99284 EMERGENCY DEPT VISIT MOD MDM: CPT | Mod: 25

## 2018-05-09 RX ORDER — ONDANSETRON 4 MG/1
4 TABLET, FILM COATED ORAL EVERY 6 HOURS
Qty: 12 TABLET | Refills: 0 | Status: SHIPPED | OUTPATIENT
Start: 2018-05-09 | End: 2018-07-17 | Stop reason: SDUPTHER

## 2018-05-09 RX ORDER — ONDANSETRON 4 MG/1
4 TABLET, FILM COATED ORAL
Status: COMPLETED | OUTPATIENT
Start: 2018-05-09 | End: 2018-05-09

## 2018-05-09 RX ADMIN — SODIUM CHLORIDE 500 ML: 9 INJECTION, SOLUTION INTRAVENOUS at 07:05

## 2018-05-09 RX ADMIN — ONDANSETRON 4 MG: 4 TABLET, FILM COATED ORAL at 07:05

## 2018-05-09 NOTE — ED TRIAGE NOTES
Pt states that she has had RLQ abd pain x 2 months. Pt had US today. Pt presents to ED for numerous episodes of bilious vomiting and diarrhea x 4. Pt denies blood in vomit or stool. Pt mucous membranes are dry. Pt denies: CP, SHOB, Cough or urinary symptoms. Pt lung sounds clear/equal bilat. Pt noted to have redness to her left eye, pt denies pain and states that she has chronic eye issues. Pt placed in gown on continuous BP, cardiac and SPO2 monitoring. Pt has call bell, family at bedside, bed locked and in lowest position with side rails up.

## 2018-05-09 NOTE — ED PROVIDER NOTES
"Encounter Date: 5/9/2018    SCRIBE #1 NOTE: I, Kavita Coleman, am scribing for, and in the presence of, Dr. Mendoza.       History     Chief Complaint   Patient presents with    Emesis     pt reports emesis starting after having an ultrasound of her gallbladder today, also reports having several bowel movements      Time seen by provider: 6:54 PM    This is a 89 y.o. female who presents with complaint of six episodes of vomiting today. Onset began after abdominal ultrasound this morning. She denies recently consuming any food outside of her normal diet. She notes experiencing 8/10 "cramping" abdominal pain for two months, but denies pain today. She reports nausea and diarrhea. She denies fever, cough, SOB, chest pain, blood in vomit or stool, back pain, dysuria, change in color of urine, or headache.      The history is provided by the patient.     Review of patient's allergies indicates:   Allergen Reactions    Codeine      Other reaction(s): Itching  Other reaction(s): Nausea     Past Medical History:   Diagnosis Date    Allergy     Anemia     Arthritis     Asthma     Cataract     right eye    CHF (congestive heart failure) 5/2/2017    Chronic kidney disease     Degenerative disc disease     Diabetes mellitus type II     Glaucoma     History of pseudogout 8/23/2012    Hyperlipidemia     Hypertension     Iritis     Thyroid disease      Past Surgical History:   Procedure Laterality Date    CARDIAC CATHETERIZATION      CATARACT EXTRACTION      IOL OS    CHOLECYSTECTOMY      EYE SURGERY      gall stone      HYSTERECTOMY      VARICOSE VEIN SURGERY       Family History   Problem Relation Age of Onset    Diabetes Mother     Hypertension Mother     Glaucoma Mother     Hypertension Father     Diabetes Son     No Known Problems Daughter     Diabetes Daughter     No Known Problems Son      Social History   Substance Use Topics    Smoking status: Never Smoker    Smokeless tobacco: Never Used "    Alcohol use No     Review of Systems   Constitutional: Negative for chills and fever.   HENT: Negative for congestion, rhinorrhea and sore throat.    Respiratory: Negative for cough and shortness of breath.    Cardiovascular: Negative for chest pain.   Gastrointestinal: Positive for abdominal pain, diarrhea, nausea and vomiting. Negative for abdominal distention, blood in stool and constipation.        Negative for blood in vomit.   Endocrine: Negative for polyuria.   Genitourinary: Negative for decreased urine volume and dysuria.        Negative for color change of urine.   Musculoskeletal: Negative for back pain.   Skin: Negative for rash and wound.   Allergic/Immunologic: Negative for immunocompromised state.   Neurological: Negative for dizziness, weakness, light-headedness, numbness and headaches.   Hematological: Does not bruise/bleed easily.   Psychiatric/Behavioral: Negative for confusion.       Physical Exam     Initial Vitals [05/09/18 1800]   BP Pulse Resp Temp SpO2   (!) 164/74 87 18 97.8 °F (36.6 °C) 96 %      MAP       104         Physical Exam    Nursing note and vitals reviewed.  Constitutional: She appears well-developed and well-nourished. She is not diaphoretic. No distress.   HENT:   Head: Normocephalic and atraumatic.   Mouth/Throat: Mucous membranes are dry.   Eyes: EOM are normal. Pupils are equal, round, and reactive to light. Left conjunctiva is injected (mild). No scleral icterus.   Neck: Normal range of motion. Neck supple.   Cardiovascular: Normal rate, normal heart sounds and intact distal pulses. An irregularly irregular rhythm present. Exam reveals no gallop and no friction rub.    No murmur heard.  2+ distal pulses.   Pulmonary/Chest: Breath sounds normal. No respiratory distress. She has no wheezes. She has no rhonchi. She has no rales.   Abdominal: Soft. Bowel sounds are normal. She exhibits no distension. There is no tenderness. There is no rebound, no guarding, no tenderness  at McBurney's point and negative Blanca's sign.   Musculoskeletal: Normal range of motion. She exhibits no edema or tenderness.   Neurological: She is alert and oriented to person, place, and time.   Skin: Skin is warm and dry. No rash noted. No pallor.         ED Course   Procedures  Labs Reviewed   CBC W/ AUTO DIFFERENTIAL - Abnormal; Notable for the following:        Result Value    Gran # (ANC) 9.8 (*)     Gran% 85.4 (*)     Lymph% 8.3 (*)     All other components within normal limits   COMPREHENSIVE METABOLIC PANEL - Abnormal; Notable for the following:     Glucose 177 (*)     BUN, Bld 32 (*)     Creatinine 1.5 (*)     Alkaline Phosphatase 164 (*)     eGFR if  34 (*)     eGFR if non  30 (*)     All other components within normal limits   LIPASE - Abnormal; Notable for the following:     Lipase 115 (*)     All other components within normal limits   LACTIC ACID, PLASMA - Abnormal; Notable for the following:     Lactate (Lactic Acid) 2.3 (*)     All other components within normal limits     EKG Readings: (Independently Interpreted)   Atrial fibrillation at rate of 90 bpm with PVCs. No ischemic changes.          Medical Decision Making:   History:   Old Medical Records: I decided to obtain old medical records.  Old Records Summarized: records from previous admission(s).       <> Summary of Records: 5/9/2018: Right upper quadrant ultrasound negative for acute gallbladder pathology  Initial Assessment:   Emergent evaluation of 89-year-old female presenting with nausea, vomiting, diarrhea.  Patient has been having significant history of right upper quadrant pain but states this is different.    Differential Diagnosis:   Gastroenteritis, colitis, dehydration, electrolyte abnormality, ischemic colitis  Clinical Tests:   Lab Tests: Reviewed and Ordered  Medical Tests: Ordered and Reviewed  ED Management:  - labs  - 500 cc IVF  - zofran ODT  - EKG    Labs reviewed, significant for mildly  elevated lactate and lipase.  Pt reports significant improvement of symptoms after treatment.      UA was ordered, pt tried to collect specimen but was not able to collect in urine hat.  She denies dysuria, malodours urine, frequency or urgency.     Patient PO challenged.  Pt stable for discharge to follow up with Dr. Mirna Liriano Attestation:   Scribe #1: I performed the above scribed service and the documentation accurately describes the services I performed. I attest to the accuracy of the note.    Attending Attestation:           Physician Attestation for Scribe:  Physician Attestation Statement for Scribe #1: I, Dr. Mendoza, reviewed documentation, as scribed by Kavita Coleman in my presence, and it is both accurate and complete.     Comments: I, Dr. Mary Mendoza, personally performed the services described in this documentation. All medical record entries made by the scribe were at my direction and in my presence.  I have reviewed the chart and agree that the record reflects my personal performance and is accurate and complete. Mary Mendoza MD.                 Clinical Impression:     1. Gastroenteritis    2. Nausea & vomiting    3. Elevated lipase        Disposition:   Disposition: Discharged  Condition: Stable                        Mary Mendoza MD  05/09/18 [id number]

## 2018-05-09 NOTE — Clinical Note
Removed PIV. Provided/reviewed D/C instructions with pt and answered all questions. Provided prescription x 1. Discussed follow-up and when to seek emergency assit

## 2018-05-10 RX ORDER — FLUTICASONE PROPIONATE AND SALMETEROL 50; 250 UG/1; UG/1
POWDER RESPIRATORY (INHALATION)
Qty: 60 EACH | Refills: 0 | Status: SHIPPED | OUTPATIENT
Start: 2018-05-10 | End: 2018-07-12 | Stop reason: SDUPTHER

## 2018-05-10 NOTE — ED NOTES
Pt states she would like to attempt to void now. Pt escorted to bathroom. Will collect urine specimen if one provided.

## 2018-05-10 NOTE — ED NOTES
Pt still unable to void. Pt will notify RN when she needs to. Pt reports nausea has improved post fluids and zofran.

## 2018-05-10 NOTE — ED NOTES
MD to bedside to speak with pt. Pt allowed to drink water. Will note if pt tolerates without emesis. Per MD okay to not collect UA.

## 2018-05-14 RX ORDER — ATORVASTATIN CALCIUM 40 MG/1
TABLET, FILM COATED ORAL
Qty: 90 TABLET | Refills: 0 | Status: SHIPPED | OUTPATIENT
Start: 2018-05-14 | End: 2018-07-17 | Stop reason: SDUPTHER

## 2018-05-16 ENCOUNTER — OFFICE VISIT (OUTPATIENT)
Dept: PODIATRY | Facility: CLINIC | Age: 83
End: 2018-05-16
Payer: MEDICARE

## 2018-05-16 VITALS
RESPIRATION RATE: 18 BRPM | DIASTOLIC BLOOD PRESSURE: 66 MMHG | BODY MASS INDEX: 22.96 KG/M2 | SYSTOLIC BLOOD PRESSURE: 115 MMHG | WEIGHT: 155 LBS | HEIGHT: 69 IN | HEART RATE: 62 BPM

## 2018-05-16 DIAGNOSIS — E11.42 TYPE 2 DIABETES MELLITUS WITH DIABETIC POLYNEUROPATHY, WITH LONG-TERM CURRENT USE OF INSULIN: Primary | ICD-10-CM

## 2018-05-16 DIAGNOSIS — Z79.4 TYPE 2 DIABETES MELLITUS WITH DIABETIC POLYNEUROPATHY, WITH LONG-TERM CURRENT USE OF INSULIN: Primary | ICD-10-CM

## 2018-05-16 DIAGNOSIS — B35.1 ONYCHOMYCOSIS DUE TO DERMATOPHYTE: ICD-10-CM

## 2018-05-16 DIAGNOSIS — L84 CORN OR CALLUS: ICD-10-CM

## 2018-05-16 PROCEDURE — 99999 PR PBB SHADOW E&M-EST. PATIENT-LVL III: CPT | Mod: PBBFAC,,, | Performed by: PODIATRIST

## 2018-05-16 PROCEDURE — 11721 DEBRIDE NAIL 6 OR MORE: CPT | Mod: 59,Q9,S$GLB, | Performed by: PODIATRIST

## 2018-05-16 PROCEDURE — 99499 UNLISTED E&M SERVICE: CPT | Mod: S$PBB,,, | Performed by: PODIATRIST

## 2018-05-16 PROCEDURE — 11056 PARNG/CUTG B9 HYPRKR LES 2-4: CPT | Mod: Q9,S$GLB,, | Performed by: PODIATRIST

## 2018-05-16 PROCEDURE — 99499 UNLISTED E&M SERVICE: CPT | Mod: S$GLB,,, | Performed by: PODIATRIST

## 2018-05-16 NOTE — PROGRESS NOTES
Subjective:      Patient ID: Tiana Mead is a 89 y.o. female.    Chief Complaint: PCP (Belen Bradley MD 4/26/18); Diabetic Foot Exam; Nail Care; and Nail Problem    Tiana is a 89 y.o. female who presents to the clinic for evaluation and treatment of high risk feet. Tiana has a past medical history of Allergy; Anemia; Arthritis; Asthma; Cataract; CHF (congestive heart failure) (5/2/2017); Chronic kidney disease; Degenerative disc disease; Diabetes mellitus type II; Glaucoma; History of pseudogout (8/23/2012); Hyperlipidemia; Hypertension; Iritis; and Thyroid disease. The patient's chief complaint is long, thick toenails. This patient has documented high risk feet requiring routine maintenance secondary to diabetes mellitis and those secondary complications of diabetes, as mentioned..    PCP: Belen Bradley MD    Date Last Seen by PCP:   Chief Complaint   Patient presents with    PCP     Belen Bradley MD 4/26/18    Diabetic Foot Exam    Nail Care    Nail Problem         Current shoe gear:  Dm shoes      Hemoglobin A1C   Date Value Ref Range Status   04/17/2018 6.7 (H) 4.0 - 5.6 % Final     Comment:     According to ADA guidelines, hemoglobin A1c <7.0% represents  optimal control in non-pregnant diabetic patients. Different  metrics may apply to specific patient populations.   Standards of Medical Care in Diabetes-2016.  For the purpose of screening for the presence of diabetes:  <5.7%     Consistent with the absence of diabetes  5.7-6.4%  Consistent with increasing risk for diabetes   (prediabetes)  >or=6.5%  Consistent with diabetes  Currently, no consensus exists for use of hemoglobin A1c  for diagnosis of diabetes for children.  This Hemoglobin A1c assay has significant interference with fetal   hemoglobin   (HbF). The results are invalid for patients with abnormal amounts of   HbF,   including those with known Hereditary Persistence   of Fetal Hemoglobin. Heterozygous hemoglobin  variants (HbAS, HbAC,   HbAD, HbAE, HbA2) do not significantly interfere with this assay;   however, presence of multiple variants in a sample may impact the %   interference.     04/04/2018 6.7 (H) 4.0 - 5.6 % Final     Comment:     According to ADA guidelines, hemoglobin A1c <7.0% represents  optimal control in non-pregnant diabetic patients. Different  metrics may apply to specific patient populations.   Standards of Medical Care in Diabetes-2016.  For the purpose of screening for the presence of diabetes:  <5.7%     Consistent with the absence of diabetes  5.7-6.4%  Consistent with increasing risk for diabetes   (prediabetes)  >or=6.5%  Consistent with diabetes  Currently, no consensus exists for use of hemoglobin A1c  for diagnosis of diabetes for children.  This Hemoglobin A1c assay has significant interference with fetal   hemoglobin   (HbF). The results are invalid for patients with abnormal amounts of   HbF,   including those with known Hereditary Persistence   of Fetal Hemoglobin. Heterozygous hemoglobin variants (HbAS, HbAC,   HbAD, HbAE, HbA2) do not significantly interfere with this assay;   however, presence of multiple variants in a sample may impact the %   interference.     12/30/2017 6.6 (H) 4.0 - 5.6 % Final     Comment:     According to ADA guidelines, hemoglobin A1c <7.0% represents  optimal control in non-pregnant diabetic patients. Different  metrics may apply to specific patient populations.   Standards of Medical Care in Diabetes-2016.  For the purpose of screening for the presence of diabetes:  <5.7%     Consistent with the absence of diabetes  5.7-6.4%  Consistent with increasing risk for diabetes   (prediabetes)  >or=6.5%  Consistent with diabetes  Currently, no consensus exists for use of hemoglobin A1c  for diagnosis of diabetes for children.  This Hemoglobin A1c assay has significant interference with fetal   hemoglobin   (HbF). The results are invalid for patients with abnormal  amounts of   HbF,   including those with known Hereditary Persistence   of Fetal Hemoglobin. Heterozygous hemoglobin variants (HbAS, HbAC,   HbAD, HbAE, HbA2) do not significantly interfere with this assay;   however, presence of multiple variants in a sample may impact the %   interference.               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    Hyperuricemia    Helicobacter pylori gastritis    Old MI (myocardial infarction), 2013    Persistent atrial fibrillation    Type 2 diabetes mellitus with diabetic polyneuropathy, with long-term current use of insulin    Long term current use of anticoagulant therapy    Chest pain at rest    Glaucoma associated with ocular trauma    Fuchs' corneal dystrophy    Pericardial effusion    H/O Deep vein thrombosis (DVT)    Aortic atherosclerosis    Chronic diastolic congestive heart failure    Type 2 diabetes mellitus with diabetic nephropathy, with long-term current use of insulin    Diabetic polyneuropathy associated with type 2 diabetes mellitus    CKD (chronic kidney disease) stage 3, GFR 30-59 ml/min, eGFR 32     Primary hypothyroidism    Asthma exacerbation    Moderate asthma with exacerbation    Viral URI    Primary open angle glaucoma (POAG) of both eyes, moderate stage    Pulmonary hypertension    Non-rheumatic tricuspid valve insufficiency       Current Outpatient Prescriptions on File Prior to Visit   Medication Sig Dispense Refill    ADVAIR DISKUS 250-50 mcg/dose diskus inhaler INHALE 1 PUFF BY MOUTH INTO THE LUNGS  "NIGHTLY 60 each 0    albuterol 90 mcg/actuation inhaler Inhale 2 puffs into the lungs every 4 (four) hours as needed. 3 Inhaler 4    amLODIPine (NORVASC) 5 MG tablet TAKE 1 TABLET BY MOUTH EVERY MORNING 90 tablet 0    atorvastatin (LIPITOR) 40 MG tablet TAKE 1 TABLET BY MOUTH EVERY DAY 90 tablet 0    blood sugar diagnostic (ONETOUCH ULTRA TEST) Strp Inject 1 strip into the skin 3 (three) times daily. 300 each 4    brimonidine 0.2% (ALPHAGAN) 0.2 % Drop INT 1 GTT INTO OU BID  3    diclofenac sodium (VOLTAREN) 1 % Gel Apply topically 4 (four) times daily. 100 Tube 3    dorzolamide (TRUSOPT) 2 % ophthalmic solution Place 1 drop into both eyes 2 (two) times daily. 10 mL 4    ELIQUIS 2.5 mg Tab TAKE ONE TABLET BY MOUTH TWICE DAILY 60 tablet 5    furosemide (LASIX) 40 MG tablet TAKE 1 TABLET BY MOUTH TWICE DAILY. ONE TABLET IN THE MORNING AND 1AM TABLET BY MOUTH AT 4PM 180 tablet 0    insulin aspart protamine-insulin aspart (NOVOLOG MIX 70-30FLEXPEN U-100) 100 unit/mL (70-30) InPn pen Take 10 units once daily before breakfast. 1 Box 11    insulin needles, disposable, (BD INSULIN PEN NEEDLE UF SHORT) 31 X 5/16 " Ndle Inject 1 Box into the skin 2 (two) times daily. 100 each 11    ipratropium (ATROVENT) 0.03 % nasal spray USE 2 SPRAYS IN EACH NOSTRIL TWICE DAILY 60 mL 1    lancets (ONETOUCH DELICA LANCETS) 33 gauge Misc Apply 1 lancet topically 3 (three) times daily. 300 each 4    lancets Misc 1 Device by Misc.(Non-Drug; Combo Route) route 3 (three) times daily before meals. Please provide the insurance company preferred product. 300 each 4    latanoprost 0.005 % ophthalmic solution Place 1 drop into the right eye every evening. (Patient taking differently: Place 1 drop into both eyes every evening. ) 3 Bottle 6    levothyroxine (SYNTHROID) 75 MCG tablet Take 1 tablet (75 mcg total) by mouth before breakfast. 90 tablet 3    meclizine (ANTIVERT) 12.5 mg tablet Take 1 tablet (12.5 mg total) by mouth 3 (three) " times daily as needed. 50 tablet 1    metoprolol tartrate (LOPRESSOR) 50 MG tablet Take 1 tablet (50 mg total) by mouth 2 (two) times daily. 180 tablet 4    multivit-mineral-iron-lutein (CENTRUM SILVER ULTRA WOMEN'S) Tab Take 1 tablet by mouth once daily.      nitroGLYCERIN (NITROSTAT) 0.4 MG SL tablet Place 0.4 mg under the tongue every 5 (five) minutes as needed for Chest pain.      ondansetron (ZOFRAN) 4 MG tablet Take 1 tablet (4 mg total) by mouth every 6 (six) hours. 12 tablet 0    TRUE METRIX GLUCOSE METER Misc TEST TID  4    TRUEPLUS LANCETS 30 gauge Misc USE TO TEST BLOOD SUGAR TID AC  2     No current facility-administered medications on file prior to visit.        Review of patient's allergies indicates:   Allergen Reactions    Codeine      Other reaction(s): Itching  Other reaction(s): Nausea       Past Surgical History:   Procedure Laterality Date    CARDIAC CATHETERIZATION      CATARACT EXTRACTION      IOL OS    CHOLECYSTECTOMY      EYE SURGERY      gall stone      HYSTERECTOMY      VARICOSE VEIN SURGERY         Family History   Problem Relation Age of Onset    Diabetes Mother     Hypertension Mother     Glaucoma Mother     Hypertension Father     Diabetes Son     No Known Problems Daughter     Diabetes Daughter     No Known Problems Son        Social History     Social History    Marital status:      Spouse name: N/A    Number of children: N/A    Years of education: N/A     Occupational History    Not on file.     Social History Main Topics    Smoking status: Never Smoker    Smokeless tobacco: Never Used    Alcohol use No    Drug use: No    Sexual activity: No     Other Topics Concern    Not on file     Social History Narrative    No narrative on file           Review of Systems   Constitution: Negative for chills, decreased appetite and fever.   Cardiovascular: Negative for leg swelling.   Skin: Positive for dry skin and nail changes. Negative for color change.  "  Musculoskeletal: Positive for arthritis. Negative for joint pain, joint swelling and myalgias.   Gastrointestinal: Negative for nausea and vomiting.   Neurological: Negative for loss of balance, numbness and paresthesias.           Objective:       Vitals:    05/16/18 0906   BP: 115/66   Pulse: 62   Resp: 18   Weight: 70.3 kg (155 lb)   Height: 5' 9" (1.753 m)   PainSc: 0-No pain        Physical Exam   Constitutional: She is oriented to person, place, and time. She appears well-developed and well-nourished.   Cardiovascular:   Dorsalis pedis and posterior tibial pulses are diminished Bilaterally. Toes are cool to touch. Feet are warm proximally.There is decreased digital hair. Skin is atrophic, slightly hyperpigmented, and mildly edematous       Musculoskeletal: Normal range of motion. She exhibits no edema or tenderness.   Adequate joint range of motion without pain, limitation, nor crepitation Bilateral feet and ankle joints. Muscle strength is 5/5 in all groups bilaterally.         Neurological: She is alert and oriented to person, place, and time.   [place] 5.07 monofilamant testing is diminished Ney feet. Sharp/dull sensation diminished Bilaterally. Light touch absent Bilaterally.       Skin: Skin is warm and dry. No ecchymosis and no lesion noted. No erythema.   Nails x10 are elongated by  2-4mm's, thickened by 2-5 mm's, dystrophic, and are darkened in  coloration . Xerosis Bilaterally. No open lesions noted.    Hyperkeratotic tissue noted to lateral 5th toe b/l     Psychiatric: She has a normal mood and affect. Her behavior is normal.   Vitals reviewed.            Assessment:       Encounter Diagnoses   Name Primary?    Type 2 diabetes mellitus with diabetic polyneuropathy, with long-term current use of insulin Yes    Onychomycosis due to dermatophyte     Corn or callus          Plan:       Tiana was seen today for pcp, diabetic foot exam, nail care and nail problem.    Diagnoses and all " orders for this visit:    Type 2 diabetes mellitus with diabetic polyneuropathy, with long-term current use of insulin    Onychomycosis due to dermatophyte    Corn or callus      I counseled the patient on her conditions, their implications and medical management.        - Shoe inspection. Diabetic Foot Education. Patient reminded of the importance of good nutrition and blood sugar control to help prevent podiatric complications of diabetes. Patient instructed on proper foot hygeine. We discussed wearing proper shoe gear, daily foot inspections, never walking without protective shoe gear, never putting sharp instruments to feet, routine podiatric nail visits every 2-3 months.      - With patient's permission, nails were aggressively reduced and debrided x 10 to their soft tissue attachment mechanically and with electric , removing all offending nail and debris. Patient relates relief following the procedure. She will continue to monitor the areas daily, inspect her feet, wear protective shoe gear when ambulatory, moisturizer to maintain skin integrity and follow in this office in approximately 2-3 months, sooner p.r.n.    - After cleansing the  area w/ alcohol prep pad the above mentioned hyperkeratosis was trimmed utilizing No 15 scapel, to a smooth base with out incident. Patient tolerated this  well and reported comfort to the area of lateral 5th toe b/l

## 2018-05-21 RX ORDER — ISOSORBIDE MONONITRATE 30 MG/1
TABLET, EXTENDED RELEASE ORAL
Qty: 90 TABLET | Refills: 0 | Status: SHIPPED | OUTPATIENT
Start: 2018-05-21 | End: 2019-01-06 | Stop reason: SDUPTHER

## 2018-05-23 ENCOUNTER — TELEPHONE (OUTPATIENT)
Dept: INTERNAL MEDICINE | Facility: CLINIC | Age: 83
End: 2018-05-23

## 2018-05-23 NOTE — TELEPHONE ENCOUNTER
Message Contents   Samuel Glover L Staff   Caller: [facility] [phone] (Today,  8:37 AM)             Pharmacy stating patient is wondering if are suppose to take Rx isosorbide mononitrate (IMDUR) 30 MG 24 hr tablet, patient stated to pharmacy that  Took patient off Rx, Pharmacy stating wanting to know if patient is suppose to take Rx before patient  the Rx from pharmacy, Pharmacy requesting clarification from  Office, please. [phone]     Please call and advise   Thank you      Please advise  Thank you  MAYANK Cline

## 2018-05-23 NOTE — TELEPHONE ENCOUNTER
----- Message from Samuel Colon sent at 5/23/2018  8:37 AM CDT -----  Contact: [facility] [phone]  Pharmacy stating patient is wondering if are suppose to take Rx isosorbide mononitrate (IMDUR) 30 MG 24 hr tablet, patient stated to pharmacy that  Took patient off Rx, Pharmacy stating wanting to know if patient is suppose to take Rx before patient  the Rx from pharmacy, Pharmacy requesting clarification from  Office, please. [phone]    Please call and advise  Thank you

## 2018-05-24 NOTE — TELEPHONE ENCOUNTER
Dear Nurse,  Please call patient.  On 04-, the EPIC chart says that   Dr. Erwin in cardiology discontinued Isosorbide Mononitrate.  So, unless there was some other visit: such as ER, UC, Same Day that I cannot find in the EPIC chart,   Then she is to stop this.  Please notify the pharmacy that medication has been   discontinued by Dr. Erwin unless the pharmacy knows something else or the patient.  Sincerely, Dr. Belen Bradley

## 2018-05-31 ENCOUNTER — TELEPHONE (OUTPATIENT)
Dept: INTERNAL MEDICINE | Facility: CLINIC | Age: 83
End: 2018-05-31

## 2018-05-31 DIAGNOSIS — H40.63X0 STEROID-INDUCED GLAUCOMA OF BOTH EYES: ICD-10-CM

## 2018-05-31 DIAGNOSIS — T38.0X5A STEROID-INDUCED GLAUCOMA OF BOTH EYES: ICD-10-CM

## 2018-05-31 DIAGNOSIS — M11.20 PSEUDOGOUT: Primary | ICD-10-CM

## 2018-05-31 NOTE — TELEPHONE ENCOUNTER
Received: Today   Message Contents   Ashlee BRICENO Staff   Caller: self/[phone] (Today,  2:15 PM)             Pt called in regards to getting a referral for a rheumatology due to leg hurting and knees swollen.       Please advise      Please advise  Thank you

## 2018-05-31 NOTE — TELEPHONE ENCOUNTER
----- Message from Ashlee Joyce sent at 5/31/2018  2:15 PM CDT -----  Contact: self/[phone]  Pt called in regards to getting a referral for a rheumatology due to leg hurting and knees swollen.      Please advise

## 2018-06-01 NOTE — TELEPHONE ENCOUNTER
Dear Shazia,  The patient wants to see rheumatology.  I placed the order.  Can she be scheduled in rheumatology?  Sincerely, Dr. Belen Bradley

## 2018-06-13 ENCOUNTER — OFFICE VISIT (OUTPATIENT)
Dept: INTERNAL MEDICINE | Facility: CLINIC | Age: 83
End: 2018-06-13
Payer: MEDICARE

## 2018-06-13 VITALS
WEIGHT: 159.63 LBS | OXYGEN SATURATION: 98 % | SYSTOLIC BLOOD PRESSURE: 115 MMHG | DIASTOLIC BLOOD PRESSURE: 66 MMHG | BODY MASS INDEX: 23.64 KG/M2 | HEART RATE: 64 BPM | HEIGHT: 69 IN

## 2018-06-13 DIAGNOSIS — Z79.4 TYPE 2 DIABETES MELLITUS WITH DIABETIC POLYNEUROPATHY, WITH LONG-TERM CURRENT USE OF INSULIN: ICD-10-CM

## 2018-06-13 DIAGNOSIS — I27.20 PULMONARY HYPERTENSION: ICD-10-CM

## 2018-06-13 DIAGNOSIS — I36.1 NON-RHEUMATIC TRICUSPID VALVE INSUFFICIENCY: ICD-10-CM

## 2018-06-13 DIAGNOSIS — E78.2 HYPERLIPEMIA, MIXED: ICD-10-CM

## 2018-06-13 DIAGNOSIS — H20.10 CHRONIC IRITIS: ICD-10-CM

## 2018-06-13 DIAGNOSIS — M15.9 GENERALIZED OSTEOARTHRITIS OF MULTIPLE SITES: ICD-10-CM

## 2018-06-13 DIAGNOSIS — Z87.898 HISTORY OF CHEST PAIN AT REST: ICD-10-CM

## 2018-06-13 DIAGNOSIS — I77.1 TORTUOUS AORTA: ICD-10-CM

## 2018-06-13 DIAGNOSIS — Z79.4 TYPE 2 DIABETES MELLITUS WITH DIABETIC NEPHROPATHY, WITH LONG-TERM CURRENT USE OF INSULIN: ICD-10-CM

## 2018-06-13 DIAGNOSIS — I82.409 DEEP VEIN THROMBOSIS (DVT) DURING CURRENT HOSPITALIZATION: ICD-10-CM

## 2018-06-13 DIAGNOSIS — J45.41 MODERATE PERSISTENT ASTHMA WITH EXACERBATION: ICD-10-CM

## 2018-06-13 DIAGNOSIS — H18.519 FUCHS' CORNEAL DYSTROPHY: ICD-10-CM

## 2018-06-13 DIAGNOSIS — I73.9 PERIPHERAL ANGIOPATHY: ICD-10-CM

## 2018-06-13 DIAGNOSIS — Z00.00 ENCOUNTER FOR PREVENTIVE HEALTH EXAMINATION: Primary | ICD-10-CM

## 2018-06-13 DIAGNOSIS — E11.21 TYPE 2 DIABETES MELLITUS WITH DIABETIC NEPHROPATHY, WITH LONG-TERM CURRENT USE OF INSULIN: ICD-10-CM

## 2018-06-13 DIAGNOSIS — I10 ESSENTIAL HYPERTENSION: ICD-10-CM

## 2018-06-13 DIAGNOSIS — I25.2 OLD MI (MYOCARDIAL INFARCTION): ICD-10-CM

## 2018-06-13 DIAGNOSIS — I70.0 AORTIC ATHEROSCLEROSIS: ICD-10-CM

## 2018-06-13 DIAGNOSIS — Z79.01 LONG TERM CURRENT USE OF ANTICOAGULANT THERAPY: ICD-10-CM

## 2018-06-13 DIAGNOSIS — I48.19 PERSISTENT ATRIAL FIBRILLATION: ICD-10-CM

## 2018-06-13 DIAGNOSIS — E11.42 DIABETIC POLYNEUROPATHY ASSOCIATED WITH TYPE 2 DIABETES MELLITUS: ICD-10-CM

## 2018-06-13 DIAGNOSIS — N18.30 CKD (CHRONIC KIDNEY DISEASE) STAGE 3, GFR 30-59 ML/MIN: ICD-10-CM

## 2018-06-13 DIAGNOSIS — I50.32 CHRONIC DIASTOLIC CONGESTIVE HEART FAILURE: ICD-10-CM

## 2018-06-13 DIAGNOSIS — H40.1132 PRIMARY OPEN ANGLE GLAUCOMA (POAG) OF BOTH EYES, MODERATE STAGE: ICD-10-CM

## 2018-06-13 DIAGNOSIS — E11.42 TYPE 2 DIABETES MELLITUS WITH DIABETIC POLYNEUROPATHY, WITH LONG-TERM CURRENT USE OF INSULIN: ICD-10-CM

## 2018-06-13 PROBLEM — J45.901 ASTHMA EXACERBATION: Status: RESOLVED | Noted: 2017-12-30 | Resolved: 2018-06-13

## 2018-06-13 PROBLEM — H40.30X0 GLAUCOMA ASSOCIATED WITH OCULAR TRAUMA: Status: RESOLVED | Noted: 2017-05-29 | Resolved: 2018-06-13

## 2018-06-13 PROBLEM — J06.9 VIRAL URI: Status: RESOLVED | Noted: 2018-01-02 | Resolved: 2018-06-13

## 2018-06-13 PROCEDURE — 99999 PR PBB SHADOW E&M-EST. PATIENT-LVL V: CPT | Mod: PBBFAC,,, | Performed by: NURSE PRACTITIONER

## 2018-06-13 PROCEDURE — G0439 PPPS, SUBSEQ VISIT: HCPCS | Mod: S$GLB,,, | Performed by: NURSE PRACTITIONER

## 2018-06-13 PROCEDURE — 99499 UNLISTED E&M SERVICE: CPT | Mod: S$PBB,,, | Performed by: NURSE PRACTITIONER

## 2018-06-13 RX ORDER — ACETAMINOPHEN 500 MG
1000 TABLET ORAL DAILY PRN
COMMUNITY
End: 2021-08-27 | Stop reason: SDUPTHER

## 2018-06-13 RX ORDER — LEVOTHYROXINE SODIUM 75 UG/1
TABLET ORAL
Qty: 90 TABLET | Refills: 3 | Status: SHIPPED | OUTPATIENT
Start: 2018-06-13 | End: 2019-05-30 | Stop reason: SDUPTHER

## 2018-06-13 RX ORDER — LORAZEPAM 1 MG/1
1 TABLET ORAL EVERY 6 HOURS PRN
COMMUNITY
End: 2018-09-26

## 2018-06-13 RX ORDER — PEN NEEDLE, DIABETIC 31 GX5/16"
NEEDLE, DISPOSABLE MISCELLANEOUS
Qty: 100 EACH | Refills: 4 | Status: SHIPPED | OUTPATIENT
Start: 2018-06-13 | End: 2019-03-31 | Stop reason: SDUPTHER

## 2018-06-13 NOTE — PROGRESS NOTES
"Tiana Mead presented for a  Medicare AWV and comprehensive Health Risk Assessment today. The following components were reviewed and updated:    · Medical history  · Family History  · Social history  · Allergies and Current Medications  · Health Risk Assessment  · Health Maintenance  · Care Team     ** See Completed Assessments for Annual Wellness Visit within the encounter summary.**       The following assessments were completed:  · Living Situation  · CAGE  · Depression Screening  · Timed Get Up and Go  · Whisper Test  · Cognitive Function Screening*extremely poor eyesight-per patient  · Nutrition Screening  · ADL Screening  · PAQ Screening    Vitals:    06/13/18 0914   BP: 115/66   Pulse: 64   SpO2: 98%   Weight: 72.4 kg (159 lb 9.8 oz)   Height: 5' 9" (1.753 m)     Body mass index is 23.57 kg/m².  Physical Exam   Constitutional: She is oriented to person, place, and time. She appears well-developed and well-nourished.   HENT:   Head: Normocephalic and atraumatic.   Nose: Nose normal.   Eyes: Conjunctivae and EOM are normal.   Cardiovascular: Normal rate, regular rhythm, normal heart sounds and intact distal pulses.    No murmur heard.  Pulmonary/Chest: Effort normal and breath sounds normal.   Musculoskeletal:   Cane and wheelchair use today.   Neurological: She is alert and oriented to person, place, and time.   Skin: Skin is warm and dry.   Psychiatric: She has a normal mood and affect. Her behavior is normal. Judgment and thought content normal.   Nursing note and vitals reviewed.        Diagnoses and health risks identified today and associated recommendations/orders:    1. Encounter for preventive health examination  Assessment performed. Health maintenance updated. Chart review completed.  Patient reports having second pneumococcal vaccine in [place]. Advised to bring records.  Fasting labs due.     2. Aortic atherosclerosis  Noted on imaging. Stable with blood pressure control and statin therapy. " Followed by PCP.    3. Chronic diastolic congestive heart failure  Chronic. Continue regimen as instructed. Followed by Cardiology.    4. Non-rheumatic tricuspid valve insufficiency  Chronic. Continue regimen as instructed. Followed by Cardiology.    5. Peripheral angiopathy  Chronic. Continue regimen as instructed. Followed by Cardiology.    6. Persistent atrial fibrillation  Chronic. Continue regimen as instructed. Followed by Cardiology.    7. Old MI (myocardial infarction), 2013  Chronic. Continue regimen as instructed. Followed by Cardiology.    8. Pulmonary hypertension  Chronic. Continue regimen as instructed. Followed by Cardiology.    9. Moderate persistent asthma with exacerbation  Chronic. Stable with current regimen. Followed by PCP.    10. Diabetic polyneuropathy associated with type 2 diabetes mellitus  Chronic. Stable. Followed by PCP.  Component      Latest Ref Rng & Units 4/17/2018             Hemoglobin A1C      4.0 - 5.6 % 6.7 (H)     11. Type 2 diabetes mellitus with diabetic nephropathy, with long-term current use of insulin  Chronic. Stable. Followed by PCP.  Component      Latest Ref Rng & Units 5/9/2018             Creatinine      0.5 - 1.4 mg/dL 1.5 (H)     Component      Latest Ref Rng & Units 4/17/2018             Hemoglobin A1C      4.0 - 5.6 % 6.7 (H)     12. CKD (chronic kidney disease) stage 3, GFR 30-59 ml/min, eGFR 32   Chronic. Stable. Followed by PCP.  Component      Latest Ref Rng & Units 5/9/2018             Creatinine      0.5 - 1.4 mg/dL 1.5 (H)     13. Type 2 diabetes mellitus with diabetic polyneuropathy, with long-term current use of insulin  Chronic. Stable. Followed by PCP.  Component      Latest Ref Rng & Units 4/17/2018             Hemoglobin A1C      4.0 - 5.6 % 6.7 (H)     14. Fuchs' corneal dystrophy  Chronic. Stable. Followed by Opthalmology.    15. Primary open angle glaucoma (POAG) of both eyes, moderate stage  Chronic. Stable. Followed by  Opthalmology.    16. Chronic iritis - Left Eye  Chronic. Stable. Followed by Opthalmology.    17. Essential hypertension  Chronic. Stable on current regimen. Followed by PCP.    18. Hyperlipemia, mixed  Chronic. Stable on current regimen. Followed by PCP.    19. H/O Deep vein thrombosis (DVT)  Chronic. Stable on current regimen. Followed by Cardiology.    20. Long term current use of anticoagulant therapy  Chronic. Stable on current regimen. Followed by Cardiology.    21. Generalized osteoarthritis of multiple sites  Chronic. Stable. Followed by PCP.    22. History of chest pain at rest  Stable. Carries nitroglycerin in purse. Reports that it does not  until September. Followed by PCP.    23. Tortuous aorta  Noted on imaging. Stable with blood pressure control and statin therapy. Followed by PCP.      Provided Tiana with a 5-10 year written screening schedule and personal prevention plan. Recommendations were developed using the USPSTF age appropriate recommendations. Education, counseling, and referrals were provided as needed. After Visit Summary printed and given to patient which includes a list of additional screenings\tests needed.    Follow-up for follow up with Primary Care Provider as instructed, ;sooner if problems, HRA in 1 year.    SRINATH Barton

## 2018-06-13 NOTE — PATIENT INSTRUCTIONS
Counseling and Referral of Other Preventative  (Italic type indicates deductible and co-insurance are waived)    Patient Name: Tiana Mead  Today's Date: 6/13/2018    Health Maintenance       Date Due Completion Date    Pneumococcal (65+) (2 of 2 - PCV13) 10/02/2002 10/2/2001    Lipid Panel 04/20/2018 4/20/2017    TETANUS VACCINE 08/15/2018 (Originally 3/14/1943) ---    Influenza Vaccine 08/01/2018 12/13/2017 (Done)    Override on 12/13/2017: Done    Hemoglobin A1c 10/17/2018 4/17/2018    Eye Exam 01/08/2019 1/8/2018    Override on 6/20/2012: (N/S)    Foot Exam 05/16/2019 5/16/2018 (Done)    Override on 5/16/2018: Done    Override on 5/2/2017: Done        No orders of the defined types were placed in this encounter.    The following information is provided to all patients.  This information is to help you find resources for any of the problems found today that may be affecting your health:                Living healthy guide: www.[facility].[place].gov      Understanding Diabetes: www.diabetes.org      Eating healthy: www.cdc.gov/healthyweight      CDC home safety checklist: www.cdc.gov/steadi/patient.html      Agency on Aging: www.goea.[place].gov      Alcoholics anonymous (AA): www.aa.org      Physical Activity: www.olinda.nih.gov/uw0eonh      Tobacco use: www.quitwithusla.org

## 2018-06-20 ENCOUNTER — OFFICE VISIT (OUTPATIENT)
Dept: RHEUMATOLOGY | Facility: CLINIC | Age: 83
End: 2018-06-20
Payer: MEDICARE

## 2018-06-20 VITALS
DIASTOLIC BLOOD PRESSURE: 71 MMHG | WEIGHT: 160 LBS | SYSTOLIC BLOOD PRESSURE: 127 MMHG | HEART RATE: 75 BPM | BODY MASS INDEX: 23.63 KG/M2

## 2018-06-20 DIAGNOSIS — E11.42 TYPE 2 DIABETES MELLITUS WITH DIABETIC POLYNEUROPATHY, WITH LONG-TERM CURRENT USE OF INSULIN: ICD-10-CM

## 2018-06-20 DIAGNOSIS — Z79.4 TYPE 2 DIABETES MELLITUS WITH DIABETIC POLYNEUROPATHY, WITH LONG-TERM CURRENT USE OF INSULIN: ICD-10-CM

## 2018-06-20 DIAGNOSIS — M17.11 PRIMARY OSTEOARTHRITIS OF RIGHT KNEE: Primary | ICD-10-CM

## 2018-06-20 DIAGNOSIS — M11.20 CHONDROCALCINOSIS: ICD-10-CM

## 2018-06-20 DIAGNOSIS — M15.9 GENERALIZED OSTEOARTHRITIS OF MULTIPLE SITES: ICD-10-CM

## 2018-06-20 PROCEDURE — 99499 UNLISTED E&M SERVICE: CPT | Mod: S$PBB,,, | Performed by: INTERNAL MEDICINE

## 2018-06-20 PROCEDURE — 99213 OFFICE O/P EST LOW 20 MIN: CPT | Mod: 25,S$GLB,, | Performed by: INTERNAL MEDICINE

## 2018-06-20 PROCEDURE — 20610 DRAIN/INJ JOINT/BURSA W/O US: CPT | Mod: RT,S$GLB,, | Performed by: INTERNAL MEDICINE

## 2018-06-20 PROCEDURE — 99999 PR PBB SHADOW E&M-EST. PATIENT-LVL III: CPT | Mod: PBBFAC,,, | Performed by: INTERNAL MEDICINE

## 2018-06-20 RX ORDER — TRIAMCINOLONE ACETONIDE 40 MG/ML
40 INJECTION, SUSPENSION INTRA-ARTICULAR; INTRAMUSCULAR
Status: DISCONTINUED | OUTPATIENT
Start: 2018-06-20 | End: 2018-06-20 | Stop reason: HOSPADM

## 2018-06-20 RX ADMIN — TRIAMCINOLONE ACETONIDE 40 MG: 40 INJECTION, SUSPENSION INTRA-ARTICULAR; INTRAMUSCULAR at 09:06

## 2018-06-20 NOTE — LETTER
June 20, 2018      Belen Bradley MD  [address]  [place] [address]           [facility] - Rheumatology  [address]  [place] [address]  Phone: [phone]  Fax: [phone]          Patient: Tiana Mead   MR Number: [id number]   YOB: 1925   Date of Visit: 6/20/2018       Dear Dr. Belen Bradley:    Thank you for referring Tiana Mead to me for evaluation. Attached you will find relevant portions of my assessment and plan of care.    If you have questions, please do not hesitate to call me. I look forward to following Tiana Mead along with you.    Sincerely,    Grabiel Bean MD    Enclosure  CC:  No Recipients    If you would like to receive this communication electronically, please contact [email or web address] or [phone] to request more information on [facility] access.    For providers and/or their staff who would like to refer a patient to [facility], please contact us through our one-stop-shop provider referral line, [facility], at [phone].    If you feel you have received this communication in error or would no longer like to receive these types of communications, please e-mail [email or web address]

## 2018-06-20 NOTE — PROCEDURES
Large Joint Aspiration/Injection  Date/Time: 6/20/2018 9:48 AM  Performed by: KACI PRITCHARD  Authorized by: KACI PRITCHARD     Consent Done?:  Yes (Verbal)  Indications:  Pain  Procedure site marked: Yes      Location:  Knee  Site:  R knee  Prep: Patient was prepped and draped in usual sterile fashion    Needle size:  25 G  Approach:  Medial  Medications:  40 mg triamcinolone acetonide 40 mg/mL  Patient tolerance:  Patient tolerated the procedure well with no immediate complications

## 2018-06-25 ENCOUNTER — TELEPHONE (OUTPATIENT)
Dept: OPHTHALMOLOGY | Facility: CLINIC | Age: 83
End: 2018-06-25

## 2018-06-25 NOTE — TELEPHONE ENCOUNTER
----- Message from Heather Bolanos sent at 6/25/2018  9:52 AM CDT -----  Contact: Tiana Mead   Pt would like to speak with  nurse to rescheduled the appointment please ,pt can be reached at [phone] please thank you.

## 2018-07-03 ENCOUNTER — TELEPHONE (OUTPATIENT)
Dept: OPHTHALMOLOGY | Facility: CLINIC | Age: 83
End: 2018-07-03

## 2018-07-10 ENCOUNTER — TELEPHONE (OUTPATIENT)
Dept: INTERNAL MEDICINE | Facility: CLINIC | Age: 83
End: 2018-07-10

## 2018-07-10 DIAGNOSIS — E78.2 HYPERLIPEMIA, MIXED: ICD-10-CM

## 2018-07-10 DIAGNOSIS — N18.30 CKD (CHRONIC KIDNEY DISEASE) STAGE 3, GFR 30-59 ML/MIN: Primary | ICD-10-CM

## 2018-07-10 NOTE — TELEPHONE ENCOUNTER
Blood tests ordered. I do not recommend fasting if fasting changes her routine or is difficult for her to do

## 2018-07-10 NOTE — TELEPHONE ENCOUNTER
----- Message from Samuel Colon sent at 7/10/2018  3:53 PM CDT -----  Contact: Patient [phone]  7/17/18 Annual Physical need lab orders placed and linked    Patient is requesting to have labs done at the [facility]. Location and can get them done on 07/12/18, requesting a call back to inform Appt can be scheduled for labs.    Thank you

## 2018-07-10 NOTE — TELEPHONE ENCOUNTER
Patient is requesting to have labs done at the [facility]. Location and can get them done on 07/12/18, requesting a call back to inform Appt can be scheduled for labs.     Please advise   And place annual labs   Thank you

## 2018-07-12 ENCOUNTER — LAB VISIT (OUTPATIENT)
Dept: LAB | Facility: HOSPITAL | Age: 83
End: 2018-07-12
Attending: INTERNAL MEDICINE
Payer: MEDICARE

## 2018-07-12 DIAGNOSIS — N18.30 CKD (CHRONIC KIDNEY DISEASE) STAGE 3, GFR 30-59 ML/MIN: ICD-10-CM

## 2018-07-12 DIAGNOSIS — E78.2 HYPERLIPEMIA, MIXED: ICD-10-CM

## 2018-07-12 LAB
ALBUMIN SERPL BCP-MCNC: 3.5 G/DL
ALP SERPL-CCNC: 139 U/L
ALT SERPL W/O P-5'-P-CCNC: 30 U/L
ANION GAP SERPL CALC-SCNC: 7 MMOL/L
AST SERPL-CCNC: 29 U/L
BILIRUB SERPL-MCNC: 0.7 MG/DL
BUN SERPL-MCNC: 35 MG/DL
CALCIUM SERPL-MCNC: 9.4 MG/DL
CHLORIDE SERPL-SCNC: 103 MMOL/L
CHOLEST SERPL-MCNC: 148 MG/DL
CHOLEST/HDLC SERPL: 2.7 {RATIO}
CO2 SERPL-SCNC: 29 MMOL/L
CREAT SERPL-MCNC: 1.7 MG/DL
EST. GFR  (AFRICAN AMERICAN): 29.5 ML/MIN/1.73 M^2
EST. GFR  (NON AFRICAN AMERICAN): 25.6 ML/MIN/1.73 M^2
GLUCOSE SERPL-MCNC: 160 MG/DL
HDLC SERPL-MCNC: 54 MG/DL
HDLC SERPL: 36.5 %
LDLC SERPL CALC-MCNC: 78.8 MG/DL
NONHDLC SERPL-MCNC: 94 MG/DL
POTASSIUM SERPL-SCNC: 4.6 MMOL/L
PROT SERPL-MCNC: 6.7 G/DL
SODIUM SERPL-SCNC: 139 MMOL/L
TRIGL SERPL-MCNC: 76 MG/DL

## 2018-07-12 PROCEDURE — 80061 LIPID PANEL: CPT

## 2018-07-12 PROCEDURE — 80053 COMPREHEN METABOLIC PANEL: CPT

## 2018-07-12 PROCEDURE — 36415 COLL VENOUS BLD VENIPUNCTURE: CPT | Mod: PO

## 2018-07-12 RX ORDER — FLUTICASONE PROPIONATE AND SALMETEROL 50; 250 UG/1; UG/1
POWDER RESPIRATORY (INHALATION)
Qty: 60 EACH | Refills: 0 | Status: SHIPPED | OUTPATIENT
Start: 2018-07-12 | End: 2018-09-26 | Stop reason: SDUPTHER

## 2018-07-17 ENCOUNTER — OFFICE VISIT (OUTPATIENT)
Dept: INTERNAL MEDICINE | Facility: CLINIC | Age: 83
End: 2018-07-17
Payer: MEDICARE

## 2018-07-17 VITALS
OXYGEN SATURATION: 95 % | DIASTOLIC BLOOD PRESSURE: 58 MMHG | HEART RATE: 62 BPM | WEIGHT: 157.19 LBS | BODY MASS INDEX: 23.21 KG/M2 | SYSTOLIC BLOOD PRESSURE: 110 MMHG

## 2018-07-17 DIAGNOSIS — Z79.4 TYPE 2 DIABETES MELLITUS WITH DIABETIC NEPHROPATHY, WITH LONG-TERM CURRENT USE OF INSULIN: ICD-10-CM

## 2018-07-17 DIAGNOSIS — G89.29 CHRONIC RIGHT SHOULDER PAIN: ICD-10-CM

## 2018-07-17 DIAGNOSIS — E11.42 TYPE 2 DIABETES MELLITUS WITH DIABETIC POLYNEUROPATHY, WITH LONG-TERM CURRENT USE OF INSULIN: ICD-10-CM

## 2018-07-17 DIAGNOSIS — I20.89 ANGINA AT REST: Primary | ICD-10-CM

## 2018-07-17 DIAGNOSIS — Z79.4 TYPE 2 DIABETES MELLITUS WITH DIABETIC POLYNEUROPATHY, WITH LONG-TERM CURRENT USE OF INSULIN: ICD-10-CM

## 2018-07-17 DIAGNOSIS — M54.2 NECK PAIN: ICD-10-CM

## 2018-07-17 DIAGNOSIS — M25.511 CHRONIC RIGHT SHOULDER PAIN: ICD-10-CM

## 2018-07-17 DIAGNOSIS — Z79.01 LONG TERM CURRENT USE OF ANTICOAGULANT THERAPY: ICD-10-CM

## 2018-07-17 DIAGNOSIS — N18.30 CKD (CHRONIC KIDNEY DISEASE) STAGE 3, GFR 30-59 ML/MIN: ICD-10-CM

## 2018-07-17 DIAGNOSIS — I50.32 CHRONIC DIASTOLIC CONGESTIVE HEART FAILURE: ICD-10-CM

## 2018-07-17 DIAGNOSIS — I48.19 PERSISTENT ATRIAL FIBRILLATION: ICD-10-CM

## 2018-07-17 DIAGNOSIS — E11.21 TYPE 2 DIABETES MELLITUS WITH DIABETIC NEPHROPATHY, WITH LONG-TERM CURRENT USE OF INSULIN: ICD-10-CM

## 2018-07-17 DIAGNOSIS — I25.2 OLD MI (MYOCARDIAL INFARCTION): ICD-10-CM

## 2018-07-17 PROCEDURE — 99999 PR PBB SHADOW E&M-EST. PATIENT-LVL III: CPT | Mod: PBBFAC,,, | Performed by: INTERNAL MEDICINE

## 2018-07-17 PROCEDURE — 99214 OFFICE O/P EST MOD 30 MIN: CPT | Mod: S$GLB,,, | Performed by: INTERNAL MEDICINE

## 2018-07-17 PROCEDURE — 99499 UNLISTED E&M SERVICE: CPT | Mod: HCNC,S$GLB,, | Performed by: INTERNAL MEDICINE

## 2018-07-17 RX ORDER — AMLODIPINE BESYLATE 5 MG/1
5 TABLET ORAL EVERY MORNING
Qty: 90 TABLET | Refills: 3 | Status: SHIPPED | OUTPATIENT
Start: 2018-07-17 | End: 2019-07-25 | Stop reason: SDUPTHER

## 2018-07-17 RX ORDER — ATORVASTATIN CALCIUM 40 MG/1
40 TABLET, FILM COATED ORAL DAILY
Qty: 90 TABLET | Refills: 3 | Status: SHIPPED | OUTPATIENT
Start: 2018-07-17 | End: 2019-07-29 | Stop reason: SDUPTHER

## 2018-07-17 RX ORDER — ALBUTEROL SULFATE 90 UG/1
2 AEROSOL, METERED RESPIRATORY (INHALATION) EVERY 4 HOURS PRN
Qty: 1 INHALER | Refills: 11 | Status: ON HOLD | OUTPATIENT
Start: 2018-07-17 | End: 2020-04-05 | Stop reason: SDUPTHER

## 2018-07-17 RX ORDER — LORAZEPAM 1 MG/1
1 TABLET ORAL EVERY 6 HOURS PRN
Status: CANCELLED | OUTPATIENT
Start: 2018-07-17

## 2018-07-17 RX ORDER — METOPROLOL TARTRATE 50 MG/1
50 TABLET ORAL 2 TIMES DAILY
Qty: 180 TABLET | Refills: 4 | Status: SHIPPED | OUTPATIENT
Start: 2018-07-17 | End: 2019-09-27 | Stop reason: SDUPTHER

## 2018-07-17 RX ORDER — NITROGLYCERIN 0.4 MG/1
0.4 TABLET SUBLINGUAL EVERY 5 MIN PRN
Qty: 25 TABLET | Refills: 3 | Status: SHIPPED | OUTPATIENT
Start: 2018-07-17 | End: 2018-07-17 | Stop reason: SDUPTHER

## 2018-07-17 RX ORDER — FUROSEMIDE 40 MG/1
TABLET ORAL
Qty: 180 TABLET | Refills: 3 | Status: SHIPPED | OUTPATIENT
Start: 2018-07-17 | End: 2018-09-26 | Stop reason: SDUPTHER

## 2018-07-17 RX ORDER — MECLIZINE HCL 12.5 MG 12.5 MG/1
12.5 TABLET ORAL 3 TIMES DAILY PRN
Qty: 50 TABLET | Refills: 1 | Status: SHIPPED | OUTPATIENT
Start: 2018-07-17 | End: 2019-11-20 | Stop reason: ALTCHOICE

## 2018-07-17 RX ORDER — NITROGLYCERIN 0.4 MG/1
TABLET SUBLINGUAL
Qty: 150 TABLET | Refills: 3 | Status: SHIPPED | OUTPATIENT
Start: 2018-07-17 | End: 2019-07-25 | Stop reason: SDUPTHER

## 2018-07-17 RX ORDER — ONDANSETRON 4 MG/1
4 TABLET, FILM COATED ORAL EVERY 12 HOURS PRN
Qty: 30 TABLET | Refills: 0 | Status: SHIPPED | OUTPATIENT
Start: 2018-07-17 | End: 2018-10-17

## 2018-07-17 NOTE — PATIENT INSTRUCTIONS
Results for orders placed or performed in visit on 07/12/18   Lipid panel   Result Value Ref Range    Cholesterol 148 120 - 199 mg/dL    Triglycerides 76 30 - 150 mg/dL    HDL 54 40 - 75 mg/dL    LDL Cholesterol 78.8 63.0 - 159.0 mg/dL    HDL/Chol Ratio 36.5 20.0 - 50.0 %    Total Cholesterol/HDL Ratio 2.7 2.0 - 5.0    Non-HDL Cholesterol 94 mg/dL   Comprehensive metabolic panel   Result Value Ref Range    Sodium 139 136 - 145 mmol/L    Potassium 4.6 3.5 - 5.1 mmol/L    Chloride 103 95 - 110 mmol/L    CO2 29 23 - 29 mmol/L    Glucose 160 (H) 70 - 110 mg/dL    BUN, Bld 35 (H) 10 - 30 mg/dL    Creatinine 1.7 (H) 0.5 - 1.4 mg/dL    Calcium 9.4 8.7 - 10.5 mg/dL    Total Protein 6.7 6.0 - 8.4 g/dL    Albumin 3.5 3.5 - 5.2 g/dL    Total Bilirubin 0.7 0.1 - 1.0 mg/dL    Alkaline Phosphatase 139 (H) 55 - 135 U/L    AST 29 10 - 40 U/L    ALT 30 10 - 44 U/L    Anion Gap 7 (L) 8 - 16 mmol/L    eGFR if African American 29.5 (A) >60 mL/min/1.73 m^2    eGFR if non African American 25.6 (A) >60 mL/min/1.73 m^2     Hemoglobin A1C   Date Value Ref Range Status   04/17/2018 6.7 (H) 4.0 - 5.6 % Final     Comment:     According to ADA guidelines, hemoglobin A1c <7.0% represents  optimal control in non-pregnant diabetic patients. Different  metrics may apply to specific patient populations.   Standards of Medical Care in Diabetes-2016.  For the purpose of screening for the presence of diabetes:  <5.7%     Consistent with the absence of diabetes  5.7-6.4%  Consistent with increasing risk for diabetes   (prediabetes)  >or=6.5%  Consistent with diabetes  Currently, no consensus exists for use of hemoglobin A1c  for diagnosis of diabetes for children.  This Hemoglobin A1c assay has significant interference with fetal   hemoglobin   (HbF). The results are invalid for patients with abnormal amounts of   HbF,   including those with known Hereditary Persistence   of Fetal Hemoglobin. Heterozygous hemoglobin variants (HbAS, HbAC,   HbAD, HbAE,  HbA2) do not significantly interfere with this assay;   however, presence of multiple variants in a sample may impact the %   interference.     04/04/2018 6.7 (H) 4.0 - 5.6 % Final     Comment:     According to ADA guidelines, hemoglobin A1c <7.0% represents  optimal control in non-pregnant diabetic patients. Different  metrics may apply to specific patient populations.   Standards of Medical Care in Diabetes-2016.  For the purpose of screening for the presence of diabetes:  <5.7%     Consistent with the absence of diabetes  5.7-6.4%  Consistent with increasing risk for diabetes   (prediabetes)  >or=6.5%  Consistent with diabetes  Currently, no consensus exists for use of hemoglobin A1c  for diagnosis of diabetes for children.  This Hemoglobin A1c assay has significant interference with fetal   hemoglobin   (HbF). The results are invalid for patients with abnormal amounts of   HbF,   including those with known Hereditary Persistence   of Fetal Hemoglobin. Heterozygous hemoglobin variants (HbAS, HbAC,   HbAD, HbAE, HbA2) do not significantly interfere with this assay;   however, presence of multiple variants in a sample may impact the %   interference.     12/30/2017 6.6 (H) 4.0 - 5.6 % Final     Comment:     According to ADA guidelines, hemoglobin A1c <7.0% represents  optimal control in non-pregnant diabetic patients. Different  metrics may apply to specific patient populations.   Standards of Medical Care in Diabetes-2016.  For the purpose of screening for the presence of diabetes:  <5.7%     Consistent with the absence of diabetes  5.7-6.4%  Consistent with increasing risk for diabetes   (prediabetes)  >or=6.5%  Consistent with diabetes  Currently, no consensus exists for use of hemoglobin A1c  for diagnosis of diabetes for children.  This Hemoglobin A1c assay has significant interference with fetal   hemoglobin   (HbF). The results are invalid for patients with abnormal amounts of   HbF,   including those with  known Hereditary Persistence   of Fetal Hemoglobin. Heterozygous hemoglobin variants (HbAS, HbAC,   HbAD, HbAE, HbA2) do not significantly interfere with this assay;   however, presence of multiple variants in a sample may impact the %   interference.       Fasting blood sugar less than 180 goal  2 hours after a meal less than 200  At bedtime less than 180  Goals  Goal a1c is 7.0 to 8.0  So, you are actually a bit TOO LOW!    Salon [place]   Neck Exercises: Passive Neck Rotation    To start, lie on your back, knees bent and feet flat on the floor. Keep your ears, shoulders, and hips aligned, but dont press your lower back to the floor. Rest your hands on your pelvis. Breathe deeply and relax.  · With your neck relaxed, place the palm of one hand on your forehead. Use your hand to turn your head to one side until you feel a stretch in the neck muscles. Do not push through pain.  · Hold for 5 seconds. Then turn to the other side.  · Repeat 5 times on each side.   Note: Keep your shoulders on the floor. Dont lift your chin as you turn your head.  Date Last Reviewed: 8/16/2015 © 2000-2017 [employer]. [address] [place] [address]. All rights reserved. This information is not intended as a substitute for professional medical care. Always follow your healthcare professional's instructions.        Neck Exercises: Active Neck Rotation    To start, lie on your back, knees bent and feet flat on the floor. Keep your ears, shoulders, and hips aligned, but dont press your lower back to the floor. Rest your hands on your pelvis. Breathe deeply and relax.  · Use your neck muscles to turn your head to one side until you feel a stretch in the muscles.  · Hold for 5 seconds. Then turn to the other side.  · Repeat 5 times on each side.  Note: Keep your shoulders on the floor. Dont lift or tuck your chin as you turn your head.  Date Last Reviewed: 8/16/2015 © 2000-2017 [employer]  [address] [place] [address]. All rights reserved. This information is not intended as a substitute for professional medical care. Always follow your healthcare professional's instructions.        Treating Frozen Shoulder: Preparing for Your Exercises  Doing special exercises is the first way to treat frozen shoulder. You may see a physical therapist who can help you learn to do them. If these exercises dont help, you may need further medical treatment.  Shoulder stretches    Doing stretches is often the best way to treat frozen shoulder. Stretching each day can help lessen the pain and restore shoulder flexibility. But it often takes a significant amount of time before you notice results. Try to be patient.  To warm up, do the pendulum. While standing, let the hand on your frozen side dangle freely as you hold the back of a chair or a table top with your other hand. Slowly make circles and side-to-side motions with the frozen arm.  Physical therapy  Your healthcare provider may refer you to physical therapy. This hands-on care helps you learn how to do stretching exercises at home. A physical therapist may also work on restoring your shoulder flexibility. To do this, he or she may gently stretch and move your frozen shoulder.  Tips for shoulder stretches  · Anti-inflammatory medicines can help relieve pain. This may help you do your stretches. Your healthcare provider can tell you more.  · Mild and moist heat can help loosen your shoulder. Try taking a warm shower or bath just before you stretch.  · A cold or ice pack can limit pain and swelling. Try icing your shoulder for a few minutes after you do your stretches.  Date Last Reviewed: 10/14/2015  © 4324-9766 [employer]. [address] [place] [address]. All rights reserved. This information is not intended as a substitute for professional medical care. Always follow your healthcare professional's instructions.

## 2018-07-19 ENCOUNTER — TELEPHONE (OUTPATIENT)
Dept: CARDIOLOGY | Facility: CLINIC | Age: 83
End: 2018-07-19

## 2018-07-19 ENCOUNTER — NURSE TRIAGE (OUTPATIENT)
Dept: ADMINISTRATIVE | Facility: CLINIC | Age: 83
End: 2018-07-19

## 2018-07-19 NOTE — TELEPHONE ENCOUNTER
----- Message from Sujey Salas sent at 7/19/2018  1:20 PM CDT -----  Contact: Patient  The Pt would like to know if it's okay for her to ride in a van for 4 hours? Please call her back @ [phone]. Thanks, Sujey

## 2018-07-20 NOTE — PROGRESS NOTES
Assessment /Plan     For exam results, see Encounter Report.    Primary open angle glaucoma (POAG) of both eyes, moderate stage    Chronic iritis - Left Eye    Nuclear sclerosis of right eye    Fuchs' corneal dystrophy    Pseudophakia - Left Eye      + Asthma  CHF      POAG  Stable Glaucoma & Patient near target IOP range    Steroid responder    CCT  517 / 574    Mid teens    Both Eyes:  Trusopt BID  PG q day --> iritis quiet  Alphagan BID --> had dizziness in past    Hx Ryan in Past    No BB --> Asthma    Hold SLT OS    Cataract right Eyes: Cataract surgery PRN with good understanding.  Patient hesitant @ CE IOL OD --> sign Guttata as discussed    Fuchs K dystrophy   Observe      NIDDM  No BDR or CSME  control         Plan  RTC Cornea service --> consider CE IOL with Fucraya  RTC Johan 6 months  RTC sooner prn with good understanding

## 2018-07-20 NOTE — PROGRESS NOTES
Subjective:       Patient ID: Tiana Mead is a 89 y.o. female.    Chief Complaint: Annual Exam and Shoulder Pain (rate 8)    Since stopping isosorbide mononitrate tablet every morning on April 17, 2018 she has had more exertional chest pain and chest pain in general, she requests a prescription for sublingual nitroglycerin.    Overall, she is getting along well enough.      Her chief complaint today is right shoulder pain.  Sometimes the entire right arm is painful.    Just this morning she noticed a swelling on the left side of her neck.  It is nontender.  She is not having any difficulty swallowing or breathing.  It is not painful.    She continues to monitor her blood sugar at home frequently.  I recommend she check her blood sugar about 3 times a week.  Or she can check her blood sugar if she is concerned that there could be a problem.  Otherwise her numbers are about the same from 1 day to the next.  I think she should save her fingers from unnecessary sticks.  HPI  Review of Systems   Constitutional: Negative for activity change, appetite change, chills, fatigue, fever and unexpected weight change.   HENT: Negative for hearing loss.    Eyes: Negative for visual disturbance.   Respiratory: Negative for cough, chest tightness, shortness of breath and wheezing.    Cardiovascular: Negative for chest pain, palpitations and leg swelling.   Gastrointestinal: Negative for abdominal pain, constipation, nausea and vomiting.   Genitourinary: Negative for dysuria, frequency and urgency.   Musculoskeletal: Positive for arthralgias and neck pain. Negative for back pain, gait problem, joint swelling and myalgias.   Skin: Negative for rash.   Neurological: Negative for light-headedness and headaches.   Psychiatric/Behavioral: Negative for dysphoric mood and sleep disturbance. The patient is not nervous/anxious.        Objective:      Physical Exam   Constitutional: She is oriented to person, place, and time. She appears  well-developed and well-nourished. No distress.   HENT:   Head: Normocephalic and atraumatic.   Right Ear: External ear normal.   Left Ear: External ear normal.   Nose: Nose normal.   Mouth/Throat: Oropharynx is clear and moist. No oropharyngeal exudate.   Eyes: Conjunctivae and EOM are normal. Pupils are equal, round, and reactive to light. Right eye exhibits no discharge. No scleral icterus.   Neck: Normal range of motion and full passive range of motion without pain. Neck supple. No spinous process tenderness and no muscular tenderness present. Carotid bruit is not present. No thyromegaly present.   Cardiovascular: Normal rate, S1 normal, S2 normal and intact distal pulses.  Exam reveals no gallop and no friction rub.    No murmur heard.  Pulmonary/Chest: Effort normal and breath sounds normal. No respiratory distress. She has no wheezes. She has no rales. She exhibits no tenderness.   Abdominal: Soft. Bowel sounds are normal. She exhibits no distension and no mass. There is no tenderness. There is no rebound and no guarding.   Genitourinary: Pelvic exam was performed with patient supine. Uterus is not deviated, not enlarged, not fixed and not tender. Cervix exhibits no motion tenderness, no discharge and no friability. Right adnexum displays no mass, no tenderness and no fullness. Left adnexum displays no mass, no tenderness and no fullness.   Musculoskeletal: Normal range of motion. She exhibits no edema or tenderness.   Lymphadenopathy:        Head (right side): No submental and no submandibular adenopathy present.        Head (left side): No submental and no submandibular adenopathy present.     She has no cervical adenopathy.        Right cervical: No superficial cervical, no deep cervical and no posterior cervical adenopathy present.       Left cervical: No superficial cervical, no deep cervical and no posterior cervical adenopathy present.        Right axillary: No pectoral and no lateral adenopathy  present.        Left axillary: No pectoral and no lateral adenopathy present.       Right: No supraclavicular adenopathy present.        Left: No supraclavicular adenopathy present.   Neurological: She is alert and oriented to person, place, and time. She has normal reflexes. No cranial nerve deficit. She exhibits normal muscle tone. Coordination normal.   Skin: Skin is warm and dry. No rash noted.   Psychiatric: She has a normal mood and affect. Her behavior is normal. Her mood appears not anxious. Her speech is not rapid and/or pressured. She is not agitated. She does not exhibit a depressed mood.   Normal behavior, thought content, insight and judgement.   Nursing note and vitals reviewed.      Assessment:       1. Angina, stable    2. Old MI (myocardial infarction), 2013    3. CKD (chronic kidney disease) stage 3, GFR 30-59 ml/min, eGFR 32     4. Chronic diastolic congestive heart failure    5. Type 2 diabetes mellitus with diabetic nephropathy, with long-term current use of insulin    6. Type 2 diabetes mellitus with diabetic polyneuropathy, with long-term current use of insulin    7. Persistent atrial fibrillation    8. Chronic right shoulder pain    9. Neck pain    10. Long term current use of anticoagulant therapy        Plan:   Tiana was seen today for annual exam and shoulder pain.    Diagnoses and all orders for this visit:    Angina, stable.  Her son who lives in [facility] accompanies her to this visit today.  The patient knows how to use sublingual nitroglycerin.  -     CBC auto differential; Future    Old MI (myocardial infarction), 2013  -     CBC auto differential; Future    CKD (chronic kidney disease) stage 3, GFR 30-59 ml/min, eGFR 32   -     CBC auto differential; Future  -     Comprehensive metabolic panel; Future  -     Hemoglobin A1c; Future    Chronic diastolic congestive heart failure, compensated.    Type 2 diabetes mellitus with diabetic nephropathy, with  long-term current use of insulin.  Blood test reviewed.  Blood test attached to after visit summary.  -     Hemoglobin A1c; Future    Type 2 diabetes mellitus with diabetic polyneuropathy, with long-term current use of insulin    Persistent atrial fibrillation, rate controlled and anticoagulated.    Chronic right shoulder pain. I recommend she try topical analgesics such as spray salon pas.  Physical therapy exercises given, walking the wall and circles.    Neck pain, no definite findings on physical examination.  Monitor.  Physical therapy exercises attached After Visit summary.    Long term current use of anticoagulant therapy  -     CBC auto differential; Future    Other orders  -     meclizine (ANTIVERT) 12.5 mg tablet; Take 1 tablet (12.5 mg total) by mouth 3 (three) times daily as needed.  -     Discontinue: nitroGLYCERIN (NITROSTAT) 0.4 MG SL tablet; Place 1 tablet (0.4 mg total) under the tongue every 5 (five) minutes as needed for Chest pain. Pharmacist supply as provided by   -     Cancel: LORazepam (ATIVAN) 1 MG tablet; Take 1 tablet (1 mg total) by mouth every 6 (six) hours as needed for Anxiety (For flying).  -     metoprolol tartrate (LOPRESSOR) 50 MG tablet; Take 1 tablet (50 mg total) by mouth 2 (two) times daily.  -     amLODIPine (NORVASC) 5 MG tablet; Take 1 tablet (5 mg total) by mouth every morning.  -     apixaban (ELIQUIS) 2.5 mg Tab; Take 1 tablet (2.5 mg total) by mouth 2 (two) times daily.  -     ondansetron (ZOFRAN) 4 MG tablet; Take 1 tablet (4 mg total) by mouth every 12 (twelve) hours as needed for Nausea.  -     atorvastatin (LIPITOR) 40 MG tablet; Take 1 tablet (40 mg total) by mouth once daily.  -     furosemide (LASIX) 40 MG tablet; TAKE 1 TABLET BY MOUTH TWICE DAILY. ONE TABLET IN THE MORNING AND 1AM TABLET BY MOUTH AT 4PM  -     albuterol 90 mcg/actuation inhaler; Inhale 2 puffs into the lungs every 4 (four) hours as needed.    Medication List with Changes/Refills  "  Current Medications    ACETAMINOPHEN (TYLENOL) 500 MG TABLET    Take 500 mg by mouth every 6 (six) hours as needed for Pain.    ADVAIR DISKUS 250-50 MCG/DOSE DISKUS INHALER    INHALE 1 PUFF INTO THE LUNGS EVERY EVENING    BD ULTRA-FINE SHORT PEN NEEDLE 31 GAUGE X 5/16" NDLE    USE WITH INSULIN PENS AS DIRECTED    BLOOD SUGAR DIAGNOSTIC (ONETOUCH ULTRA TEST) STRP    Inject 1 strip into the skin 3 (three) times daily.    BRIMONIDINE 0.2% (ALPHAGAN) 0.2 % DROP    INT 1 GTT INTO OU BID    DICLOFENAC SODIUM (VOLTAREN) 1 % GEL    Apply topically 4 (four) times daily.    DORZOLAMIDE (TRUSOPT) 2 % OPHTHALMIC SOLUTION    Place 1 drop into both eyes 2 (two) times daily.    INSULIN ASPART PROTAMINE-INSULIN ASPART (NOVOLOG MIX 70-30FLEXPEN U-100) 100 UNIT/ML (70-30) INPN PEN    Take 10 units once daily before breakfast.    IPRATROPIUM (ATROVENT) 0.03 % NASAL SPRAY    USE 2 SPRAYS IN EACH NOSTRIL TWICE DAILY    LANCETS (ONETOUCH DELICA LANCETS) 33 GAUGE MISC    Apply 1 lancet topically 3 (three) times daily.    LANCETS MISC    1 Device by Misc.(Non-Drug; Combo Route) route 3 (three) times daily before meals. Please provide the insurance company preferred product.    LATANOPROST 0.005 % OPHTHALMIC SOLUTION    Place 1 drop into the right eye every evening.    LEVOTHYROXINE (SYNTHROID) 75 MCG TABLET    TAKE 1 TABLET BY MOUTH EVERY DAY BEFORE BREAKFAST    LORAZEPAM (ATIVAN) 1 MG TABLET    Take 1 mg by mouth every 6 (six) hours as needed for Anxiety (For flying).    MULTIVIT-MINERAL-IRON-LUTEIN (CENTRUM SILVER ULTRA WOMEN'S) TAB    Take 1 tablet by mouth once daily.    TRUE METRIX GLUCOSE METER MISC    TEST TID    TRUEPLUS LANCETS 30 GAUGE MISC    USE TO TEST BLOOD SUGAR TID AC   Changed and/or Refilled Medications    Modified Medication Previous Medication    ALBUTEROL 90 MCG/ACTUATION INHALER albuterol 90 mcg/actuation inhaler       Inhale 2 puffs into the lungs every 4 (four) hours as needed.    Inhale 2 puffs into the lungs " every 4 (four) hours as needed.    AMLODIPINE (NORVASC) 5 MG TABLET amLODIPine (NORVASC) 5 MG tablet       Take 1 tablet (5 mg total) by mouth every morning.    TAKE 1 TABLET BY MOUTH EVERY MORNING    APIXABAN (ELIQUIS) 2.5 MG TAB ELIQUIS 2.5 mg Tab       Take 1 tablet (2.5 mg total) by mouth 2 (two) times daily.    TAKE ONE TABLET BY MOUTH TWICE DAILY    ATORVASTATIN (LIPITOR) 40 MG TABLET atorvastatin (LIPITOR) 40 MG tablet       Take 1 tablet (40 mg total) by mouth once daily.    TAKE 1 TABLET BY MOUTH EVERY DAY    FUROSEMIDE (LASIX) 40 MG TABLET furosemide (LASIX) 40 MG tablet       TAKE 1 TABLET BY MOUTH TWICE DAILY. ONE TABLET IN THE MORNING AND 1AM TABLET BY MOUTH AT 4PM    TAKE 1 TABLET BY MOUTH TWICE DAILY. ONE TABLET IN THE MORNING AND 1AM TABLET BY MOUTH AT 4PM    MECLIZINE (ANTIVERT) 12.5 MG TABLET meclizine (ANTIVERT) 12.5 mg tablet       Take 1 tablet (12.5 mg total) by mouth 3 (three) times daily as needed.    Take 1 tablet (12.5 mg total) by mouth 3 (three) times daily as needed.    METOPROLOL TARTRATE (LOPRESSOR) 50 MG TABLET metoprolol tartrate (LOPRESSOR) 50 MG tablet       Take 1 tablet (50 mg total) by mouth 2 (two) times daily.    Take 1 tablet (50 mg total) by mouth 2 (two) times daily.    NITROGLYCERIN (NITROSTAT) 0.4 MG SL TABLET nitroGLYCERIN (NITROSTAT) 0.4 MG SL tablet       ONE TABLET UNDER THE TONGUE EVERY 5 MINUTES AS NEEDED FOR CHEST PAIN    Place 0.4 mg under the tongue every 5 (five) minutes as needed for Chest pain.    ONDANSETRON (ZOFRAN) 4 MG TABLET ondansetron (ZOFRAN) 4 MG tablet       Take 1 tablet (4 mg total) by mouth every 12 (twelve) hours as needed for Nausea.    Take 1 tablet (4 mg total) by mouth every 6 (six) hours.       Follow-up in about 3 months (around 10/17/2018).

## 2018-07-20 NOTE — TELEPHONE ENCOUNTER
"  Reason for Disposition   [1] Caller requesting NON-URGENT health information AND [2] PCP's office is the best resource    Answer Assessment - Initial Assessment Questions  1. REASON FOR CALL or QUESTION: "What is your reason for calling today?" or "How can I best help you?" or "What question do you have that I can help answer?"      Pt had called earlier to find out from her cardiologist if she was ok to take a trip in a van for about 4 hrs each way. Wants to go on a Taoist trip at the end of August for the week-end. Has to pay her money now and if she pays and finds out the dr doesn't recommend she go she loses her money.    Protocols used: ST INFORMATION ONLY CALL-A-AH    "

## 2018-08-01 ENCOUNTER — OFFICE VISIT (OUTPATIENT)
Dept: OPHTHALMOLOGY | Facility: CLINIC | Age: 83
End: 2018-08-01
Payer: MEDICARE

## 2018-08-01 DIAGNOSIS — H18.519 FUCHS' CORNEAL DYSTROPHY: ICD-10-CM

## 2018-08-01 DIAGNOSIS — H40.1132 PRIMARY OPEN ANGLE GLAUCOMA (POAG) OF BOTH EYES, MODERATE STAGE: Primary | ICD-10-CM

## 2018-08-01 DIAGNOSIS — H20.10 CHRONIC IRITIS: ICD-10-CM

## 2018-08-01 DIAGNOSIS — H25.11 NUCLEAR SCLEROSIS OF RIGHT EYE: ICD-10-CM

## 2018-08-01 DIAGNOSIS — Z96.1 PSEUDOPHAKIA: ICD-10-CM

## 2018-08-01 PROCEDURE — 99999 PR PBB SHADOW E&M-EST. PATIENT-LVL II: CPT | Mod: PBBFAC,,, | Performed by: OPHTHALMOLOGY

## 2018-08-01 PROCEDURE — 92014 COMPRE OPH EXAM EST PT 1/>: CPT | Mod: S$GLB,,, | Performed by: OPHTHALMOLOGY

## 2018-08-06 ENCOUNTER — TELEPHONE (OUTPATIENT)
Dept: PODIATRY | Facility: CLINIC | Age: 83
End: 2018-08-06

## 2018-08-06 ENCOUNTER — TELEPHONE (OUTPATIENT)
Dept: INTERNAL MEDICINE | Facility: CLINIC | Age: 83
End: 2018-08-06

## 2018-08-06 NOTE — TELEPHONE ENCOUNTER
Returned patients call, LVM for patient to call the office in regards to her message.    MAYANK Cline

## 2018-08-09 ENCOUNTER — OFFICE VISIT (OUTPATIENT)
Dept: PODIATRY | Facility: CLINIC | Age: 83
End: 2018-08-09
Payer: MEDICARE

## 2018-08-09 VITALS
WEIGHT: 157 LBS | BODY MASS INDEX: 23.25 KG/M2 | HEIGHT: 69 IN | HEART RATE: 74 BPM | DIASTOLIC BLOOD PRESSURE: 58 MMHG | SYSTOLIC BLOOD PRESSURE: 98 MMHG

## 2018-08-09 DIAGNOSIS — E11.42 DIABETIC POLYNEUROPATHY ASSOCIATED WITH TYPE 2 DIABETES MELLITUS: Primary | ICD-10-CM

## 2018-08-09 DIAGNOSIS — B35.1 ONYCHOMYCOSIS DUE TO DERMATOPHYTE: ICD-10-CM

## 2018-08-09 DIAGNOSIS — L84 CORN OR CALLUS: ICD-10-CM

## 2018-08-09 PROCEDURE — 11721 DEBRIDE NAIL 6 OR MORE: CPT | Mod: 59,Q9,S$GLB, | Performed by: PODIATRIST

## 2018-08-09 PROCEDURE — 99499 UNLISTED E&M SERVICE: CPT | Mod: S$GLB,,, | Performed by: PODIATRIST

## 2018-08-09 PROCEDURE — 99999 PR PBB SHADOW E&M-EST. PATIENT-LVL V: CPT | Mod: PBBFAC,,, | Performed by: PODIATRIST

## 2018-08-09 PROCEDURE — 11056 PARNG/CUTG B9 HYPRKR LES 2-4: CPT | Mod: Q9,S$GLB,, | Performed by: PODIATRIST

## 2018-08-09 NOTE — PROGRESS NOTES
Subjective:      Patient ID: Tiana Mead is a 89 y.o. female.    Chief Complaint: Diabetes Mellitus (biateral pcp Dr Bradley 7/17/18); Diabetic Foot Exam; and Callouses    Tiana is a 89 y.o. female who presents to the clinic for evaluation and treatment of high risk feet. Tiana has a past medical history of Allergy, Anemia, Arthritis, Asthma, Cataract, CHF (congestive heart failure) (5/2/2017), Chronic kidney disease, Degenerative disc disease, Diabetes mellitus type II, Glaucoma, History of pseudogout (8/23/2012), Hyperlipidemia, Hypertension, Iritis, and Thyroid disease. The patient's chief complaint is long, thick toenails and calluses on both feet. Routine trimming helps. No other pedal concerns today.  This patient has documented high risk feet requiring routine maintenance secondary to diabetes mellitis and those secondary complications of diabetes, as mentioned..    PCP: Belen Bradley MD    Date Last Seen by PCP:   Chief Complaint   Patient presents with    Diabetes Mellitus     biateral pcp Dr Bradley 7/17/18    Diabetic Foot Exam    Callouses         Current shoe gear:  Dm shoes      Hemoglobin A1C   Date Value Ref Range Status   04/17/2018 6.7 (H) 4.0 - 5.6 % Final     Comment:     According to ADA guidelines, hemoglobin A1c <7.0% represents  optimal control in non-pregnant diabetic patients. Different  metrics may apply to specific patient populations.   Standards of Medical Care in Diabetes-2016.  For the purpose of screening for the presence of diabetes:  <5.7%     Consistent with the absence of diabetes  5.7-6.4%  Consistent with increasing risk for diabetes   (prediabetes)  >or=6.5%  Consistent with diabetes  Currently, no consensus exists for use of hemoglobin A1c  for diagnosis of diabetes for children.  This Hemoglobin A1c assay has significant interference with fetal   hemoglobin   (HbF). The results are invalid for patients with abnormal amounts of   HbF,   including those  with known Hereditary Persistence   of Fetal Hemoglobin. Heterozygous hemoglobin variants (HbAS, HbAC,   HbAD, HbAE, HbA2) do not significantly interfere with this assay;   however, presence of multiple variants in a sample may impact the %   interference.     04/04/2018 6.7 (H) 4.0 - 5.6 % Final     Comment:     According to ADA guidelines, hemoglobin A1c <7.0% represents  optimal control in non-pregnant diabetic patients. Different  metrics may apply to specific patient populations.   Standards of Medical Care in Diabetes-2016.  For the purpose of screening for the presence of diabetes:  <5.7%     Consistent with the absence of diabetes  5.7-6.4%  Consistent with increasing risk for diabetes   (prediabetes)  >or=6.5%  Consistent with diabetes  Currently, no consensus exists for use of hemoglobin A1c  for diagnosis of diabetes for children.  This Hemoglobin A1c assay has significant interference with fetal   hemoglobin   (HbF). The results are invalid for patients with abnormal amounts of   HbF,   including those with known Hereditary Persistence   of Fetal Hemoglobin. Heterozygous hemoglobin variants (HbAS, HbAC,   HbAD, HbAE, HbA2) do not significantly interfere with this assay;   however, presence of multiple variants in a sample may impact the %   interference.     12/30/2017 6.6 (H) 4.0 - 5.6 % Final     Comment:     According to ADA guidelines, hemoglobin A1c <7.0% represents  optimal control in non-pregnant diabetic patients. Different  metrics may apply to specific patient populations.   Standards of Medical Care in Diabetes-2016.  For the purpose of screening for the presence of diabetes:  <5.7%     Consistent with the absence of diabetes  5.7-6.4%  Consistent with increasing risk for diabetes   (prediabetes)  >or=6.5%  Consistent with diabetes  Currently, no consensus exists for use of hemoglobin A1c  for diagnosis of diabetes for children.  This Hemoglobin A1c assay has significant interference with  fetal   hemoglobin   (HbF). The results are invalid for patients with abnormal amounts of   HbF,   including those with known Hereditary Persistence   of Fetal Hemoglobin. Heterozygous hemoglobin variants (HbAS, HbAC,   HbAD, HbAE, HbA2) do not significantly interfere with this assay;   however, presence of multiple variants in a sample may impact the %   interference.               Patient Active Problem List   Diagnosis    Essential hypertension    Hyperlipemia, mixed    Generalized osteoarthritis of multiple sites    Chronic iritis - Left Eye    Nuclear sclerosis - Right Eye    Pseudophakia - Left Eye    Osteoarthritis of both knees    Chondrocalcinosis    Peripheral angiopathy    Pseudogout, both knees.    Hyperuricemia    Helicobacter pylori gastritis    Old MI (myocardial infarction), 2013    Persistent atrial fibrillation    Type 2 diabetes mellitus with diabetic polyneuropathy, with long-term current use of insulin    Long term current use of anticoagulant therapy, eliquis    History of chest pain at rest    Fuchs' corneal dystrophy    Pericardial effusion    H/O Deep vein thrombosis (DVT)    Aortic atherosclerosis    Chronic diastolic congestive heart failure    Type 2 diabetes mellitus with diabetic nephropathy, with long-term current use of insulin    Diabetic polyneuropathy associated with type 2 diabetes mellitus    CKD (chronic kidney disease) stage 3, GFR 30-59 ml/min, eGFR 32     Primary hypothyroidism    Moderate asthma with exacerbation    Primary open angle glaucoma (POAG) of both eyes, moderate stage    Pulmonary hypertension    Non-rheumatic tricuspid valve insufficiency    Tortuous aorta    Angina, stable       Current Outpatient Medications on File Prior to Visit   Medication Sig Dispense Refill    acetaminophen (TYLENOL) 500 MG tablet Take 500 mg by mouth every 6 (six) hours as needed for Pain.      ADVAIR DISKUS 250-50 mcg/dose diskus inhaler INHALE 1  "PUFF INTO THE LUNGS EVERY EVENING 60 each 0    albuterol 90 mcg/actuation inhaler Inhale 2 puffs into the lungs every 4 (four) hours as needed. 1 Inhaler 11    amLODIPine (NORVASC) 5 MG tablet Take 1 tablet (5 mg total) by mouth every morning. 90 tablet 3    apixaban (ELIQUIS) 2.5 mg Tab Take 1 tablet (2.5 mg total) by mouth 2 (two) times daily. 60 tablet 5    atorvastatin (LIPITOR) 40 MG tablet Take 1 tablet (40 mg total) by mouth once daily. 90 tablet 3    BD ULTRA-FINE SHORT PEN NEEDLE 31 gauge x 5/16" Ndle USE WITH INSULIN PENS AS DIRECTED 100 each 4    blood sugar diagnostic (ONETOUCH ULTRA TEST) Strp Inject 1 strip into the skin 3 (three) times daily. 300 each 4    brimonidine 0.2% (ALPHAGAN) 0.2 % Drop INT 1 GTT INTO OU BID  3    diclofenac sodium (VOLTAREN) 1 % Gel Apply topically 4 (four) times daily. 100 Tube 3    dorzolamide (TRUSOPT) 2 % ophthalmic solution Place 1 drop into both eyes 2 (two) times daily. 10 mL 4    furosemide (LASIX) 40 MG tablet TAKE 1 TABLET BY MOUTH TWICE DAILY. ONE TABLET IN THE MORNING AND 1AM TABLET BY MOUTH AT 4PM 180 tablet 3    insulin aspart protamine-insulin aspart (NOVOLOG MIX 70-30FLEXPEN U-100) 100 unit/mL (70-30) InPn pen Take 10 units once daily before breakfast. 1 Box 11    ipratropium (ATROVENT) 0.03 % nasal spray USE 2 SPRAYS IN EACH NOSTRIL TWICE DAILY 60 mL 1    lancets (ONETOUCH DELICA LANCETS) 33 gauge Misc Apply 1 lancet topically 3 (three) times daily. 300 each 4    lancets Misc 1 Device by Misc.(Non-Drug; Combo Route) route 3 (three) times daily before meals. Please provide the insurance company preferred product. 300 each 4    latanoprost 0.005 % ophthalmic solution Place 1 drop into the right eye every evening. (Patient taking differently: Place 1 drop into both eyes every evening. ) 3 Bottle 6    levothyroxine (SYNTHROID) 75 MCG tablet TAKE 1 TABLET BY MOUTH EVERY DAY BEFORE BREAKFAST 90 tablet 3    LORazepam (ATIVAN) 1 MG tablet Take 1 mg " by mouth every 6 (six) hours as needed for Anxiety (For flying).      meclizine (ANTIVERT) 12.5 mg tablet Take 1 tablet (12.5 mg total) by mouth 3 (three) times daily as needed. 50 tablet 1    metoprolol tartrate (LOPRESSOR) 50 MG tablet Take 1 tablet (50 mg total) by mouth 2 (two) times daily. 180 tablet 4    multivit-mineral-iron-lutein (CENTRUM SILVER ULTRA WOMEN'S) Tab Take 1 tablet by mouth once daily.      nitroGLYCERIN (NITROSTAT) 0.4 MG SL tablet ONE TABLET UNDER THE TONGUE EVERY 5 MINUTES AS NEEDED FOR CHEST PAIN 150 tablet 3    ondansetron (ZOFRAN) 4 MG tablet Take 1 tablet (4 mg total) by mouth every 12 (twelve) hours as needed for Nausea. 30 tablet 0    TRUE METRIX GLUCOSE METER Misc TEST TID  4    TRUEPLUS LANCETS 30 gauge Misc USE TO TEST BLOOD SUGAR TID AC  2     No current facility-administered medications on file prior to visit.        Review of patient's allergies indicates:   Allergen Reactions    Codeine      Other reaction(s): Itching  Other reaction(s): Nausea       Past Surgical History:   Procedure Laterality Date    CARDIAC CATHETERIZATION      CATARACT EXTRACTION      IOL OS    CHOLECYSTECTOMY      EYE SURGERY      gall stone      HYSTERECTOMY      VARICOSE VEIN SURGERY         Family History   Problem Relation Age of Onset    Diabetes Mother     Hypertension Mother     Glaucoma Mother     Hypertension Father     Diabetes Son     No Known Problems Daughter     Diabetes Daughter     No Known Problems Son        Social History     Socioeconomic History    Marital status:      Spouse name: Not on file    Number of children: Not on file    Years of education: Not on file    Highest education level: Not on file   Social Needs    Financial resource strain: Not on file    Food insecurity - worry: Not on file    Food insecurity - inability: Not on file    Transportation needs - medical: Not on file    Transportation needs - non-medical: Not on file  "  Occupational History    Not on file   Tobacco Use    Smoking status: Never Smoker    Smokeless tobacco: Never Used   Substance and Sexual Activity    Alcohol use: No    Drug use: No    Sexual activity: No   Other Topics Concern    Not on file   Social History Narrative    Not on file           Review of Systems   Constitution: Negative for chills, decreased appetite and fever.   Cardiovascular: Negative for leg swelling.   Skin: Positive for dry skin and nail changes. Negative for color change.   Musculoskeletal: Positive for arthritis. Negative for joint pain, joint swelling and myalgias.   Gastrointestinal: Negative for nausea and vomiting.   Neurological: Negative for loss of balance, numbness and paresthesias.           Objective:       Vitals:    08/09/18 0757   BP: (!) 98/58   Pulse: 74   Weight: 71.2 kg (157 lb)   Height: 5' 9" (1.753 m)   PainSc: 0-No pain        Physical Exam   Constitutional: She is oriented to person, place, and time. She appears well-developed and well-nourished.   Cardiovascular:   Dorsalis pedis and posterior tibial pulses are diminished Bilaterally. Toes are cool to touch. Feet are warm proximally.There is decreased digital hair. Skin is atrophic, slightly hyperpigmented, and mildly edematous       Musculoskeletal: Normal range of motion. She exhibits no edema or tenderness.   Adequate joint range of motion without pain, limitation, nor crepitation Bilateral feet and ankle joints. Muscle strength is 5/5 in all groups bilaterally.         Neurological: She is alert and oriented to person, place, and time.   [place] 5.07 monofilamant testing is diminished Ney feet. Sharp/dull sensation diminished Bilaterally. Light touch absent Bilaterally.       Skin: Skin is warm and dry. No ecchymosis and no lesion noted. No erythema.   Nails x10 are elongated by  2-4mm's, thickened by 2-5 mm's, dystrophic, and are darkened in  coloration . Xerosis Bilaterally. No open lesions " noted.    Hyperkeratotic tissue noted to lateral 5th toe b/l     Psychiatric: She has a normal mood and affect. Her behavior is normal.   Vitals reviewed.            Assessment:       Encounter Diagnoses   Name Primary?    Diabetic polyneuropathy associated with type 2 diabetes mellitus Yes    Onychomycosis due to dermatophyte     Corn or callus          Plan:       Tiana was seen today for diabetes mellitus, diabetic foot exam and callouses.    Diagnoses and all orders for this visit:    Diabetic polyneuropathy associated with type 2 diabetes mellitus    Onychomycosis due to dermatophyte    Corn or callus      I counseled the patient on her conditions, their implications and medical management.        - Shoe inspection. Diabetic Foot Education. Patient reminded of the importance of good nutrition and blood sugar control to help prevent podiatric complications of diabetes. Patient instructed on proper foot hygeine. We discussed wearing proper shoe gear, daily foot inspections, never walking without protective shoe gear, never putting sharp instruments to feet, routine podiatric nail visits every 2-3 months.      - With patient's permission, nails were aggressively reduced and debrided x 10 to their soft tissue attachment mechanically and with electric , removing all offending nail and debris. Patient relates relief following the procedure. She will continue to monitor the areas daily, inspect her feet, wear protective shoe gear when ambulatory, moisturizer to maintain skin integrity and follow in this office in approximately 2-3 months, sooner p.r.n.    - After cleansing the  area w/ alcohol prep pad the above mentioned hyperkeratosis was trimmed utilizing No 15 scapel, to a smooth base with out incident. Patient tolerated this  well and reported comfort to the area of lateral 5th toe b/l    Discussed regular and routine moisturizer to skin of both feet to help improve dry skin. Advised to apply twice  daily until resolution of symptoms. Avoid between toes.     RTC 3 months, sooner PRN

## 2018-08-12 RX ORDER — ATORVASTATIN CALCIUM 40 MG/1
TABLET, FILM COATED ORAL
Qty: 90 TABLET | Refills: 0 | Status: SHIPPED | OUTPATIENT
Start: 2018-08-12 | End: 2018-11-11 | Stop reason: SDUPTHER

## 2018-08-12 RX ORDER — FUROSEMIDE 40 MG/1
TABLET ORAL
Qty: 180 TABLET | Refills: 0 | Status: SHIPPED | OUTPATIENT
Start: 2018-08-12 | End: 2018-10-17 | Stop reason: SDUPTHER

## 2018-08-14 ENCOUNTER — TELEPHONE (OUTPATIENT)
Dept: CARDIOLOGY | Facility: CLINIC | Age: 83
End: 2018-08-14

## 2018-08-14 DIAGNOSIS — R00.2 PALPITATIONS: Primary | ICD-10-CM

## 2018-08-15 ENCOUNTER — OFFICE VISIT (OUTPATIENT)
Dept: CARDIOLOGY | Facility: CLINIC | Age: 83
End: 2018-08-15
Payer: MEDICARE

## 2018-08-15 VITALS
DIASTOLIC BLOOD PRESSURE: 67 MMHG | HEART RATE: 67 BPM | SYSTOLIC BLOOD PRESSURE: 115 MMHG | WEIGHT: 160.69 LBS | HEIGHT: 68 IN | BODY MASS INDEX: 24.35 KG/M2

## 2018-08-15 DIAGNOSIS — N18.30 CKD (CHRONIC KIDNEY DISEASE) STAGE 3, GFR 30-59 ML/MIN: ICD-10-CM

## 2018-08-15 DIAGNOSIS — Z79.4 TYPE 2 DIABETES MELLITUS WITH DIABETIC POLYNEUROPATHY, WITH LONG-TERM CURRENT USE OF INSULIN: ICD-10-CM

## 2018-08-15 DIAGNOSIS — I27.20 PULMONARY HYPERTENSION: ICD-10-CM

## 2018-08-15 DIAGNOSIS — I31.39 PERICARDIAL EFFUSION: Primary | ICD-10-CM

## 2018-08-15 DIAGNOSIS — E11.42 TYPE 2 DIABETES MELLITUS WITH DIABETIC POLYNEUROPATHY, WITH LONG-TERM CURRENT USE OF INSULIN: ICD-10-CM

## 2018-08-15 DIAGNOSIS — I48.19 PERSISTENT ATRIAL FIBRILLATION: ICD-10-CM

## 2018-08-15 DIAGNOSIS — E11.42 DIABETIC POLYNEUROPATHY ASSOCIATED WITH TYPE 2 DIABETES MELLITUS: ICD-10-CM

## 2018-08-15 PROCEDURE — 99214 OFFICE O/P EST MOD 30 MIN: CPT | Mod: S$GLB,,, | Performed by: INTERNAL MEDICINE

## 2018-08-15 PROCEDURE — 99999 PR PBB SHADOW E&M-EST. PATIENT-LVL III: CPT | Mod: PBBFAC,,, | Performed by: INTERNAL MEDICINE

## 2018-08-15 NOTE — PROGRESS NOTES
Subjective:   Patient ID:  Tiana Mead is a 89 y.o. female who presents for follow-up of Chronic diastolic congestive heart failure (4 months fu)    Tiana Mead presents for follow up of diastolic heart failure.In addition, she has a stable moderate pericardial effusion as well as moderate pulmonary hypertension and severe TR. Since last seen, she has had no worsening SOB. She has  chronic atrial fibrillation with rate control on a DOAC.Tiana Mead denies chest pain,palpitations, presyncope , or syncope. Tiana Mead has hypertension with BP currently low. Tiana Mead has dyslipidemia  on high intensity statin. Tiana Mead chronic kidney disease stage III.    HPI:   Denies palpitations or fluttering in the chest  Patient stays at home     Echo: 1/2018    1 - Concentric remodeling.     2 - No wall motion abnormalities.     3 - Normal left ventricular systolic function (EF 60-65%).     4 - Biatrial enlargement.     5 - Low normal right ventricular systolic function .     6 - Pulmonary hypertension. The estimated PA systolic pressure is 50 mmHg.     7 - Trivial aortic regurgitation.     8 - The mitral valve is moderately sclerotic with mildly restricted leaflet mobility.     9 - Mild mitral stenosis, MVA = 2.29 cm2.     10 - Severe tricuspid regurgitation.     11 - Trivial pulmonic regurgitation.     12 - Moderate pericardial effusion.     13 - Increased central venous pressure.     Patient Active Problem List   Diagnosis    Hyperlipemia, mixed    Generalized osteoarthritis of multiple sites    Chronic iritis - Left Eye    Nuclear sclerosis - Right Eye    Pseudophakia - Left Eye    Osteoarthritis of both knees    Chondrocalcinosis    Peripheral angiopathy    Pseudogout, both knees.    Hyperuricemia    Helicobacter pylori gastritis    Old MI (myocardial infarction), 2013    Persistent atrial fibrillation    Type 2 diabetes mellitus with diabetic polyneuropathy, with long-term  "current use of insulin    Long term current use of anticoagulant therapy, eliquis    History of chest pain at rest    Fuchs' corneal dystrophy    Pericardial effusion    H/O Deep vein thrombosis (DVT)    Aortic atherosclerosis    Chronic diastolic congestive heart failure    Type 2 diabetes mellitus with diabetic nephropathy, with long-term current use of insulin    Diabetic polyneuropathy associated with type 2 diabetes mellitus    CKD (chronic kidney disease) stage 3, GFR 30-59 ml/min, eGFR 32     Primary hypothyroidism    Moderate asthma with exacerbation    Primary open angle glaucoma (POAG) of both eyes, moderate stage    Pulmonary hypertension    Non-rheumatic tricuspid valve insufficiency    Tortuous aorta    Angina, stable     /67 (BP Location: Left arm, Patient Position: Sitting, BP Method: Large (Automatic))   Pulse 67   Ht 5' 8" (1.727 m)   Wt 72.9 kg (160 lb 11.5 oz)   LMP  (LMP Unknown)   BMI 24.44 kg/m²   Body mass index is 24.44 kg/m².  CrCl cannot be calculated (Patient's most recent lab result is older than the maximum 7 days allowed.).    Lab Results   Component Value Date     07/12/2018    K 4.6 07/12/2018     07/12/2018    CO2 29 07/12/2018    BUN 35 (H) 07/12/2018    CREATININE 1.7 (H) 07/12/2018     (H) 07/12/2018    HGBA1C 6.7 (H) 04/17/2018    MG 2.1 01/02/2018    AST 29 07/12/2018    ALT 30 07/12/2018    ALBUMIN 3.5 07/12/2018    PROT 6.7 07/12/2018    BILITOT 0.7 07/12/2018    WBC 11.46 05/09/2018    HGB 13.4 05/09/2018    HCT 40.5 05/09/2018    MCV 91 05/09/2018     05/09/2018    INR 1.0 12/30/2017    TSH 6.932 (H) 04/04/2018    CHOL 148 07/12/2018    HDL 54 07/12/2018    LDLCALC 78.8 07/12/2018    TRIG 76 07/12/2018       Current Outpatient Medications   Medication Sig    acetaminophen (TYLENOL) 500 MG tablet Take 500 mg by mouth every 6 (six) hours as needed for Pain.    ADVAIR DISKUS 250-50 mcg/dose diskus inhaler INHALE 1 " "PUFF INTO THE LUNGS EVERY EVENING    albuterol 90 mcg/actuation inhaler Inhale 2 puffs into the lungs every 4 (four) hours as needed.    amLODIPine (NORVASC) 5 MG tablet Take 1 tablet (5 mg total) by mouth every morning.    apixaban (ELIQUIS) 2.5 mg Tab Take 1 tablet (2.5 mg total) by mouth 2 (two) times daily.    atorvastatin (LIPITOR) 40 MG tablet Take 1 tablet (40 mg total) by mouth once daily.    atorvastatin (LIPITOR) 40 MG tablet TAKE 1 TABLET BY MOUTH EVERY DAY    BD ULTRA-FINE SHORT PEN NEEDLE 31 gauge x 5/16" Ndle USE WITH INSULIN PENS AS DIRECTED    blood sugar diagnostic (ONETOUCH ULTRA TEST) Strp Inject 1 strip into the skin 3 (three) times daily.    brimonidine 0.2% (ALPHAGAN) 0.2 % Drop INT 1 GTT INTO OU BID    diclofenac sodium (VOLTAREN) 1 % Gel Apply topically 4 (four) times daily.    dorzolamide (TRUSOPT) 2 % ophthalmic solution Place 1 drop into both eyes 2 (two) times daily.    furosemide (LASIX) 40 MG tablet TAKE 1 TABLET BY MOUTH TWICE DAILY. ONE TABLET IN THE MORNING AND 1AM TABLET BY MOUTH AT 4PM    furosemide (LASIX) 40 MG tablet TAKE 1 TABLET BY MOUTH TWICE DAILY. ONE TABLET IN THE MORNING AND 1AM TABLET BY MOUTH AT 4PM    insulin aspart protamine-insulin aspart (NOVOLOG MIX 70-30FLEXPEN U-100) 100 unit/mL (70-30) InPn pen Take 10 units once daily before breakfast.    ipratropium (ATROVENT) 0.03 % nasal spray USE 2 SPRAYS IN EACH NOSTRIL TWICE DAILY    lancets (ONETOUCH DELICA LANCETS) 33 gauge Misc Apply 1 lancet topically 3 (three) times daily.    lancets Misc 1 Device by Misc.(Non-Drug; Combo Route) route 3 (three) times daily before meals. Please provide the insurance company preferred product.    latanoprost 0.005 % ophthalmic solution Place 1 drop into the right eye every evening. (Patient taking differently: Place 1 drop into both eyes every evening. )    levothyroxine (SYNTHROID) 75 MCG tablet TAKE 1 TABLET BY MOUTH EVERY DAY BEFORE BREAKFAST    LORazepam (ATIVAN) " 1 MG tablet Take 1 mg by mouth every 6 (six) hours as needed for Anxiety (For flying).    meclizine (ANTIVERT) 12.5 mg tablet Take 1 tablet (12.5 mg total) by mouth 3 (three) times daily as needed.    metoprolol tartrate (LOPRESSOR) 50 MG tablet Take 1 tablet (50 mg total) by mouth 2 (two) times daily.    multivit-mineral-iron-lutein (CENTRUM SILVER ULTRA WOMEN'S) Tab Take 1 tablet by mouth once daily.    nitroGLYCERIN (NITROSTAT) 0.4 MG SL tablet ONE TABLET UNDER THE TONGUE EVERY 5 MINUTES AS NEEDED FOR CHEST PAIN    ondansetron (ZOFRAN) 4 MG tablet Take 1 tablet (4 mg total) by mouth every 12 (twelve) hours as needed for Nausea.    TRUE METRIX GLUCOSE METER Misc TEST TID    TRUEPLUS LANCETS 30 gauge Misc USE TO TEST BLOOD SUGAR TID AC     No current facility-administered medications for this visit.        Review of Systems   Constitution: Negative for malaise/fatigue, weight gain and weight loss.   Eyes: Negative for blurred vision, double vision and visual disturbance.   Cardiovascular: Negative for chest pain, claudication, cyanosis, dyspnea on exertion, irregular heartbeat, leg swelling, near-syncope, orthopnea, palpitations, paroxysmal nocturnal dyspnea and syncope.   Respiratory: Negative for cough, hemoptysis, shortness of breath, sleep disturbances due to breathing and snoring.    Neurological: Negative for difficulty with concentration, dizziness, light-headedness, loss of balance and numbness.       Objective:   Physical Exam   Constitutional: She is oriented to person, place, and time. She appears well-developed and well-nourished. No distress.   HENT:   Head: Normocephalic and atraumatic.   Nose: Nose normal.   Mouth/Throat: Oropharynx is clear and moist. No oropharyngeal exudate.   Eyes: Conjunctivae and EOM are normal. Pupils are equal, round, and reactive to light. Right eye exhibits no discharge. Left eye exhibits no discharge. No scleral icterus.   Neck: Normal range of motion. Neck supple.  No JVD present. No tracheal deviation present. No thyromegaly present.   Cardiovascular: Normal rate, regular rhythm, normal heart sounds and intact distal pulses. Exam reveals no gallop and no friction rub.   No murmur heard.  Pulmonary/Chest: Effort normal and breath sounds normal. No stridor. No respiratory distress. She has no wheezes. She has no rales. She exhibits no tenderness.   Abdominal: Soft. Bowel sounds are normal. She exhibits no distension and no mass. There is no tenderness. There is no rebound and no guarding.   Musculoskeletal: Normal range of motion. She exhibits no edema or tenderness.   Lymphadenopathy:     She has no cervical adenopathy.   Neurological: She is alert and oriented to person, place, and time. She has normal reflexes. No cranial nerve deficit. She exhibits normal muscle tone. Coordination normal.   Skin: Skin is warm. No rash noted. She is not diaphoretic. No erythema. No pallor.   Psychiatric: She has a normal mood and affect. Her behavior is normal. Judgment and thought content normal.       Assessment:     1. Pericardial effusion    2. Diabetic polyneuropathy associated with type 2 diabetes mellitus    3. Pulmonary hypertension    4. CKD (chronic kidney disease) stage 3, GFR 30-59 ml/min, eGFR 32     5. Type 2 diabetes mellitus with diabetic polyneuropathy, with long-term current use of insulin    6. Persistent atrial fibrillation        Plan:   Discussion with patient re etiology of pericardial effusion. This could be connective tissue disease, malignancy or CKD Given she has mild CKD with  No significant volume overload it is lower on my list of differential.  Patient to make a decision if she wants to proceed with a diagnostic tap after the echo.    No changes in medications  Counseled on importance of heart healthy diet low in saturated and trans fat and salt as well gradually starting a regular aerobic exercise regimen with goal of 30min 5x/week. Recommend BP diary.  Call if systolic BP > 130 mmHg on checking repeatedly  Limit sodium intake to less then 2 gram sodium and 1500cc fluid restriction.  Graded exercise program as tolerated.  Call if  more than 3 lbs in 1 day or 5 lbs in 1 week.      RTC 3-4 mon with Dr. Erwin or me.

## 2018-08-20 ENCOUNTER — TELEPHONE (OUTPATIENT)
Dept: OPHTHALMOLOGY | Facility: CLINIC | Age: 83
End: 2018-08-20

## 2018-08-21 DIAGNOSIS — E11.618 TYPE 2 DIABETES MELLITUS WITH OTHER DIABETIC ARTHROPATHY, WITH LONG-TERM CURRENT USE OF INSULIN: ICD-10-CM

## 2018-08-21 DIAGNOSIS — Z79.4 TYPE 2 DIABETES MELLITUS WITH OTHER DIABETIC ARTHROPATHY, WITH LONG-TERM CURRENT USE OF INSULIN: ICD-10-CM

## 2018-08-21 RX ORDER — LANCETS 33 GAUGE
EACH MISCELLANEOUS
Qty: 300 EACH | Refills: 0 | Status: SHIPPED | OUTPATIENT
Start: 2018-08-21 | End: 2018-11-17 | Stop reason: SDUPTHER

## 2018-08-23 RX ORDER — CALCIUM CITRATE/VITAMIN D3 200MG-6.25
TABLET ORAL
Qty: 300 STRIP | Refills: 0 | Status: SHIPPED | OUTPATIENT
Start: 2018-08-23 | End: 2018-11-23 | Stop reason: SDUPTHER

## 2018-08-31 ENCOUNTER — HOSPITAL ENCOUNTER (OUTPATIENT)
Dept: CARDIOLOGY | Facility: CLINIC | Age: 83
Discharge: HOME | End: 2018-08-31
Attending: INTERNAL MEDICINE
Payer: MEDICARE

## 2018-08-31 DIAGNOSIS — I31.39 PERICARDIAL EFFUSION: ICD-10-CM

## 2018-08-31 LAB
ESTIMATED PA SYSTOLIC PRESSURE: 26.83
MITRAL VALVE MOBILITY: ABNORMAL
MITRAL VALVE STENOSIS: ABNORMAL
RETIRED EF AND QEF - SEE NOTES: 70 (ref 55–65)
TRICUSPID VALVE REGURGITATION: ABNORMAL

## 2018-08-31 PROCEDURE — 93306 TTE W/DOPPLER COMPLETE: CPT | Mod: S$GLB,,, | Performed by: INTERNAL MEDICINE

## 2018-09-10 RX ORDER — FLUTICASONE PROPIONATE AND SALMETEROL 50; 250 UG/1; UG/1
POWDER RESPIRATORY (INHALATION)
Refills: 0 | OUTPATIENT
Start: 2018-09-10

## 2018-09-16 ENCOUNTER — NURSE TRIAGE (OUTPATIENT)
Dept: ADMINISTRATIVE | Facility: CLINIC | Age: 83
End: 2018-09-16

## 2018-09-16 NOTE — TELEPHONE ENCOUNTER
LM x2 at 317pm at [phone]    Reason for Disposition   Message left on identified answering machine.    Protocols used: ST NO CONTACT OR DUPLICATE CONTACT CALL-A-AH

## 2018-09-21 RX ORDER — FLUTICASONE PROPIONATE AND SALMETEROL 50; 250 UG/1; UG/1
POWDER RESPIRATORY (INHALATION)
Qty: 1 EACH | Refills: 11 | Status: SHIPPED | OUTPATIENT
Start: 2018-09-21 | End: 2019-02-20 | Stop reason: SDUPTHER

## 2018-09-26 ENCOUNTER — OFFICE VISIT (OUTPATIENT)
Dept: OPHTHALMOLOGY | Facility: CLINIC | Age: 83
End: 2018-09-26
Payer: MEDICARE

## 2018-09-26 DIAGNOSIS — H25.11 NUCLEAR SCLEROSIS OF RIGHT EYE: Primary | ICD-10-CM

## 2018-09-26 PROCEDURE — 92014 COMPRE OPH EXAM EST PT 1/>: CPT | Mod: S$PBB,,, | Performed by: OPHTHALMOLOGY

## 2018-09-26 PROCEDURE — 99212 OFFICE O/P EST SF 10 MIN: CPT | Mod: PBBFAC | Performed by: OPHTHALMOLOGY

## 2018-09-26 PROCEDURE — 99499 UNLISTED E&M SERVICE: CPT | Mod: HCNC,S$GLB,, | Performed by: OPHTHALMOLOGY

## 2018-09-26 PROCEDURE — 99999 PR PBB SHADOW E&M-EST. PATIENT-LVL II: CPT | Mod: PBBFAC,,, | Performed by: OPHTHALMOLOGY

## 2018-09-26 RX ORDER — TETRACAINE HYDROCHLORIDE 5 MG/ML
1 SOLUTION OPHTHALMIC
Status: CANCELLED | OUTPATIENT
Start: 2018-09-26

## 2018-09-26 RX ORDER — MOXIFLOXACIN 5 MG/ML
1 SOLUTION/ DROPS OPHTHALMIC
Status: CANCELLED | OUTPATIENT
Start: 2018-09-26

## 2018-09-26 RX ORDER — PHENYLEPHRINE HYDROCHLORIDE 25 MG/ML
1 SOLUTION/ DROPS OPHTHALMIC
Status: CANCELLED | OUTPATIENT
Start: 2018-09-26

## 2018-09-26 RX ORDER — TROPICAMIDE 10 MG/ML
1 SOLUTION/ DROPS OPHTHALMIC
Status: CANCELLED | OUTPATIENT
Start: 2018-09-26

## 2018-09-26 NOTE — PROGRESS NOTES
HPI     Referred by Dr Prado    S/p phaco w/IOL OS (Kristal at age 65)    Meds:  Brimonidine bid ou               Dorzolamide bid ou               Latanoprost qhs ou     Pt referred by Dr Prado for cataract eval and DSEK eval OD.  Pt states   she doesn't see well at all.  Pt states pain OS (8 on scale).    Last edited by Jinny Wallace MA on 9/26/2018  9:13 AM.   (History)            Assessment /Plan     For exam results, see Encounter Report.    Nuclear sclerosis of right eye      Visually Significant Cataract: Patient reports decreased vision consistent with the clinical amount of lenticular opacity, which reaches the level of visual significance and affects activities of daily living. Risks, benefits, and alternatives to cataract surgery were discussed and the consent reviewed. IOL options were discussed, including ATIOLs and the associated side effects and additional patient cost associated with them.   IOL Selections:   Right eye  IOL: [id number] 21.0 at 180     Left eye  IOL: na    Pt wishes to have right eye done first. Referred by JN for guttata and pigment. . Discussed poss need for DMEK later.  The patient expresses a desire to reduce spectacle dependence. I reviewed various IOL and LASER refractive surgical options and we will attempt to minimize spectacle dependence by managing astigmatism and optimizing IOL selection. Femtosecond LASER assisted cataract surgery (FLACS) technology was explained to the patient with educational videos and discussion.  The patient voices understanding and wishes to implement this technology during the cataract procedure.  I explained the increased precision of the LASER versus manual techniques, especially as it relates to astigmatism reduction with arcuate incisions.  I emphasized that although our goal is to reduce the need for refractive correction after surgery, there may still be a need for spectacle correction to achieve optimal visual acuity, and that  a reasonable range of functional vision should be the expectation.  No guarantees are made about post operative refraction or visual acuity, as the eye may heal in unpredictable ways, and the standard risks, benefits, and alternatives to cataract surgery were explained.  The patient understands that the refractive portions of this cataract procedure are not covered by insurance, and that there is an out of pocket expense of $1500 per eye. I also explained that even though our pre-operative plan is to utilize advanced refractive technologies during surgery, that I may decide to eliminate part or all of this plan if surgical challenges or complications arise, or I feel that it is not in the patient's best interest. Consent forms and an ABN form were given to the patient to review.    Catalys Parameters:  Right Eye:   CARTER:  12mm   ?Need to salvador patient sitting up?: Yes  Capsulotomy: Scanned Capsule   stGstrstastdstest:st st1st Arcuate:  Toric Salvador: at  Axis: 180   Incisions: OFF

## 2018-09-26 NOTE — H&P (VIEW-ONLY)
HPI     Referred by Dr Prado    S/p phaco w/IOL OS (Kristal at age 65)    Meds:  Brimonidine bid ou               Dorzolamide bid ou               Latanoprost qhs ou     Pt referred by Dr Prado for cataract eval and DSEK eval OD.  Pt states   she doesn't see well at all.  Pt states pain OS (8 on scale).    Last edited by Jinny Wallace MA on 9/26/2018  9:13 AM.   (History)            Assessment /Plan     For exam results, see Encounter Report.    Nuclear sclerosis of right eye      Visually Significant Cataract: Patient reports decreased vision consistent with the clinical amount of lenticular opacity, which reaches the level of visual significance and affects activities of daily living. Risks, benefits, and alternatives to cataract surgery were discussed and the consent reviewed. IOL options were discussed, including ATIOLs and the associated side effects and additional patient cost associated with them.   IOL Selections:   Right eye  IOL: [id number] 21.0 at 180     Left eye  IOL: na    Pt wishes to have right eye done first. Referred by JN for guttata and pigment. . Discussed poss need for DMEK later.  The patient expresses a desire to reduce spectacle dependence. I reviewed various IOL and LASER refractive surgical options and we will attempt to minimize spectacle dependence by managing astigmatism and optimizing IOL selection. Femtosecond LASER assisted cataract surgery (FLACS) technology was explained to the patient with educational videos and discussion.  The patient voices understanding and wishes to implement this technology during the cataract procedure.  I explained the increased precision of the LASER versus manual techniques, especially as it relates to astigmatism reduction with arcuate incisions.  I emphasized that although our goal is to reduce the need for refractive correction after surgery, there may still be a need for spectacle correction to achieve optimal visual acuity, and that  a reasonable range of functional vision should be the expectation.  No guarantees are made about post operative refraction or visual acuity, as the eye may heal in unpredictable ways, and the standard risks, benefits, and alternatives to cataract surgery were explained.  The patient understands that the refractive portions of this cataract procedure are not covered by insurance, and that there is an out of pocket expense of $1500 per eye. I also explained that even though our pre-operative plan is to utilize advanced refractive technologies during surgery, that I may decide to eliminate part or all of this plan if surgical challenges or complications arise, or I feel that it is not in the patient's best interest. Consent forms and an ABN form were given to the patient to review.    Catalys Parameters:  Right Eye:   CARTER:  12mm   ?Need to salvador patient sitting up?: Yes  Capsulotomy: Scanned Capsule   rdGrdrrdarddrderd:rd rd3rd Arcuate:  Toric Salvador: at  Axis: 180   Incisions: OFF

## 2018-10-02 ENCOUNTER — TELEPHONE (OUTPATIENT)
Dept: OPHTHALMOLOGY | Facility: CLINIC | Age: 83
End: 2018-10-02

## 2018-10-02 DIAGNOSIS — H25.11 NUCLEAR SCLEROTIC CATARACT OF RIGHT EYE: Primary | ICD-10-CM

## 2018-10-03 ENCOUNTER — TELEPHONE (OUTPATIENT)
Dept: OPHTHALMOLOGY | Facility: CLINIC | Age: 83
End: 2018-10-03

## 2018-10-03 NOTE — TELEPHONE ENCOUNTER
----- Message from Elisabet Paz sent at 10/3/2018  4:17 PM CDT -----  Contact: Tiana  Needs Advice    Reason for call:Pt called to f/u on getting advice about her meds before surgery.        Communication Preference:[phone]    Additional Information:Pt ask to please call her twice if she misses the call.

## 2018-10-03 NOTE — TELEPHONE ENCOUNTER
Patient asking about what meds not to take the morning of sx. Instructed patient not to take Dm meds or fluid pills. Told patient she can take the rest of her meds.

## 2018-10-04 ENCOUNTER — LAB VISIT (OUTPATIENT)
Dept: LAB | Facility: HOSPITAL | Age: 83
End: 2018-10-04
Attending: INTERNAL MEDICINE
Payer: MEDICARE

## 2018-10-04 ENCOUNTER — TELEPHONE (OUTPATIENT)
Dept: OPTOMETRY | Facility: CLINIC | Age: 83
End: 2018-10-04

## 2018-10-04 DIAGNOSIS — N18.30 CKD (CHRONIC KIDNEY DISEASE) STAGE 3, GFR 30-59 ML/MIN: ICD-10-CM

## 2018-10-04 DIAGNOSIS — I20.89 ANGINA AT REST: ICD-10-CM

## 2018-10-04 DIAGNOSIS — Z79.01 LONG TERM CURRENT USE OF ANTICOAGULANT THERAPY: ICD-10-CM

## 2018-10-04 DIAGNOSIS — E11.21 TYPE 2 DIABETES MELLITUS WITH DIABETIC NEPHROPATHY, WITH LONG-TERM CURRENT USE OF INSULIN: ICD-10-CM

## 2018-10-04 DIAGNOSIS — I25.2 OLD MI (MYOCARDIAL INFARCTION): ICD-10-CM

## 2018-10-04 DIAGNOSIS — Z79.4 TYPE 2 DIABETES MELLITUS WITH DIABETIC NEPHROPATHY, WITH LONG-TERM CURRENT USE OF INSULIN: ICD-10-CM

## 2018-10-04 LAB
ALBUMIN SERPL BCP-MCNC: 3.6 G/DL
ALP SERPL-CCNC: 137 U/L
ALT SERPL W/O P-5'-P-CCNC: 19 U/L
ANION GAP SERPL CALC-SCNC: 10 MMOL/L
AST SERPL-CCNC: 28 U/L
BASOPHILS # BLD AUTO: 0.06 K/UL
BASOPHILS NFR BLD: 0.8 %
BILIRUB SERPL-MCNC: 0.7 MG/DL
BUN SERPL-MCNC: 29 MG/DL
CALCIUM SERPL-MCNC: 9.9 MG/DL
CHLORIDE SERPL-SCNC: 104 MMOL/L
CO2 SERPL-SCNC: 26 MMOL/L
CREAT SERPL-MCNC: 1.7 MG/DL
DIFFERENTIAL METHOD: ABNORMAL
EOSINOPHIL # BLD AUTO: 0.2 K/UL
EOSINOPHIL NFR BLD: 2.1 %
ERYTHROCYTE [DISTWIDTH] IN BLOOD BY AUTOMATED COUNT: 14.1 %
EST. GFR  (AFRICAN AMERICAN): 29.5 ML/MIN/1.73 M^2
EST. GFR  (NON AFRICAN AMERICAN): 25.6 ML/MIN/1.73 M^2
GLUCOSE SERPL-MCNC: 146 MG/DL
HCT VFR BLD AUTO: 38.2 %
HGB BLD-MCNC: 12.4 G/DL
IMM GRANULOCYTES # BLD AUTO: 0.02 K/UL
IMM GRANULOCYTES NFR BLD AUTO: 0.3 %
LYMPHOCYTES # BLD AUTO: 1.8 K/UL
LYMPHOCYTES NFR BLD: 22.7 %
MCH RBC QN AUTO: 30 PG
MCHC RBC AUTO-ENTMCNC: 32.5 G/DL
MCV RBC AUTO: 92 FL
MONOCYTES # BLD AUTO: 1.2 K/UL
MONOCYTES NFR BLD: 15.3 %
NEUTROPHILS # BLD AUTO: 4.6 K/UL
NEUTROPHILS NFR BLD: 58.8 %
NRBC BLD-RTO: 0 /100 WBC
PLATELET # BLD AUTO: 166 K/UL
PMV BLD AUTO: 11.8 FL
POTASSIUM SERPL-SCNC: 4.4 MMOL/L
PROT SERPL-MCNC: 7 G/DL
RBC # BLD AUTO: 4.14 M/UL
SODIUM SERPL-SCNC: 140 MMOL/L
WBC # BLD AUTO: 7.76 K/UL

## 2018-10-04 PROCEDURE — 80053 COMPREHEN METABOLIC PANEL: CPT

## 2018-10-04 PROCEDURE — 85025 COMPLETE CBC W/AUTO DIFF WBC: CPT

## 2018-10-04 PROCEDURE — 83036 HEMOGLOBIN GLYCOSYLATED A1C: CPT

## 2018-10-04 PROCEDURE — 36415 COLL VENOUS BLD VENIPUNCTURE: CPT | Mod: PO

## 2018-10-05 ENCOUNTER — TELEPHONE (OUTPATIENT)
Dept: OPHTHALMOLOGY | Facility: CLINIC | Age: 83
End: 2018-10-05

## 2018-10-05 LAB
ESTIMATED AVG GLUCOSE: 171 MG/DL
HBA1C MFR BLD HPLC: 7.6 %

## 2018-10-05 NOTE — TELEPHONE ENCOUNTER
Spoke with Daughter and she understands patients they will  drops at the sx Center the day of sx.

## 2018-10-05 NOTE — TELEPHONE ENCOUNTER
----- Message from Aria Gould sent at 10/5/2018  2:33 PM CDT -----  Contact: Tiana  Pt calling because she has not yet received her drops for pre surgery.  She also questions with regard to her being a diabetic and not eating after 9pm the night before.  Please contact her to discuss [phone].

## 2018-10-05 NOTE — TELEPHONE ENCOUNTER
Spoke with pharmacy and they did not have an rx for pt. Spoke with Melvi and they will dispense drops to patient Monday after sx. SDF

## 2018-10-08 ENCOUNTER — ANESTHESIA EVENT (OUTPATIENT)
Dept: SURGERY | Facility: OTHER | Age: 83
End: 2018-10-08
Payer: MEDICARE

## 2018-10-08 ENCOUNTER — HOSPITAL ENCOUNTER (OUTPATIENT)
Facility: OTHER | Age: 83
Discharge: HOME | End: 2018-10-08
Attending: OPHTHALMOLOGY | Admitting: OPHTHALMOLOGY
Payer: MEDICARE

## 2018-10-08 ENCOUNTER — ANESTHESIA (OUTPATIENT)
Dept: SURGERY | Facility: OTHER | Age: 83
End: 2018-10-08
Payer: MEDICARE

## 2018-10-08 VITALS
DIASTOLIC BLOOD PRESSURE: 73 MMHG | SYSTOLIC BLOOD PRESSURE: 121 MMHG | TEMPERATURE: 98 F | BODY MASS INDEX: 23.79 KG/M2 | WEIGHT: 157 LBS | HEIGHT: 68 IN | HEART RATE: 88 BPM | OXYGEN SATURATION: 98 % | RESPIRATION RATE: 18 BRPM

## 2018-10-08 DIAGNOSIS — H25.11 NUCLEAR SCLEROSIS OF RIGHT EYE: Primary | ICD-10-CM

## 2018-10-08 LAB — POCT GLUCOSE: 128 MG/DL (ref 70–110)

## 2018-10-08 PROCEDURE — 66984 XCAPSL CTRC RMVL W/O ECP: CPT | Mod: RT,,, | Performed by: OPHTHALMOLOGY

## 2018-10-08 PROCEDURE — 25000003 PHARM REV CODE 250: Performed by: OPHTHALMOLOGY

## 2018-10-08 PROCEDURE — 37000008 HC ANESTHESIA 1ST 15 MINUTES: Performed by: OPHTHALMOLOGY

## 2018-10-08 PROCEDURE — 63600175 PHARM REV CODE 636 W HCPCS: Performed by: NURSE ANESTHETIST, CERTIFIED REGISTERED

## 2018-10-08 PROCEDURE — 71000015 HC POSTOP RECOV 1ST HR: Performed by: OPHTHALMOLOGY

## 2018-10-08 PROCEDURE — 37000009 HC ANESTHESIA EA ADD 15 MINS: Performed by: OPHTHALMOLOGY

## 2018-10-08 PROCEDURE — 66999 UNLISTED PX ANT SEGMENT EYE: CPT | Mod: ,,, | Performed by: OPHTHALMOLOGY

## 2018-10-08 PROCEDURE — 36000707: Performed by: OPHTHALMOLOGY

## 2018-10-08 PROCEDURE — V2787 ASTIGMATISM-CORRECT FUNCTION: HCPCS | Performed by: OPHTHALMOLOGY

## 2018-10-08 PROCEDURE — 36000706: Performed by: OPHTHALMOLOGY

## 2018-10-08 RX ORDER — PHENYLEPHRINE HYDROCHLORIDE 25 MG/ML
1 SOLUTION/ DROPS OPHTHALMIC
Status: COMPLETED | OUTPATIENT
Start: 2018-10-08 | End: 2018-10-08

## 2018-10-08 RX ORDER — TETRACAINE HYDROCHLORIDE 5 MG/ML
SOLUTION OPHTHALMIC
Status: DISCONTINUED | OUTPATIENT
Start: 2018-10-08 | End: 2018-10-08 | Stop reason: HOSPADM

## 2018-10-08 RX ORDER — MOXIFLOXACIN 5 MG/ML
SOLUTION/ DROPS OPHTHALMIC
Status: DISCONTINUED | OUTPATIENT
Start: 2018-10-08 | End: 2018-10-08 | Stop reason: HOSPADM

## 2018-10-08 RX ORDER — TROPICAMIDE 10 MG/ML
1 SOLUTION/ DROPS OPHTHALMIC
Status: DISCONTINUED | OUTPATIENT
Start: 2018-10-08 | End: 2018-10-08

## 2018-10-08 RX ORDER — PROPARACAINE HYDROCHLORIDE 5 MG/ML
1 SOLUTION/ DROPS OPHTHALMIC
Status: DISCONTINUED | OUTPATIENT
Start: 2018-10-08 | End: 2018-10-08 | Stop reason: HOSPADM

## 2018-10-08 RX ORDER — MOXIFLOXACIN 5 MG/ML
1 SOLUTION/ DROPS OPHTHALMIC
Status: COMPLETED | OUTPATIENT
Start: 2018-10-08 | End: 2018-10-08

## 2018-10-08 RX ORDER — TETRACAINE HYDROCHLORIDE 5 MG/ML
1 SOLUTION OPHTHALMIC
Status: DISCONTINUED | OUTPATIENT
Start: 2018-10-08 | End: 2018-10-08

## 2018-10-08 RX ORDER — TROPICAMIDE 10 MG/ML
1 SOLUTION/ DROPS OPHTHALMIC
Status: COMPLETED | OUTPATIENT
Start: 2018-10-08 | End: 2018-10-08

## 2018-10-08 RX ORDER — ACETAMINOPHEN 325 MG/1
650 TABLET ORAL EVERY 4 HOURS PRN
Status: DISCONTINUED | OUTPATIENT
Start: 2018-10-08 | End: 2018-10-08 | Stop reason: HOSPADM

## 2018-10-08 RX ORDER — PHENYLEPHRINE HYDROCHLORIDE 25 MG/ML
1 SOLUTION/ DROPS OPHTHALMIC
Status: DISCONTINUED | OUTPATIENT
Start: 2018-10-08 | End: 2018-10-08

## 2018-10-08 RX ORDER — PHENYLEPHRINE HYDROCHLORIDE 100 MG/ML
SOLUTION/ DROPS OPHTHALMIC
Status: DISCONTINUED | OUTPATIENT
Start: 2018-10-08 | End: 2018-10-08 | Stop reason: HOSPADM

## 2018-10-08 RX ORDER — MIDAZOLAM HYDROCHLORIDE 1 MG/ML
INJECTION INTRAMUSCULAR; INTRAVENOUS
Status: DISCONTINUED | OUTPATIENT
Start: 2018-10-08 | End: 2018-10-08

## 2018-10-08 RX ORDER — LIDOCAINE HYDROCHLORIDE 40 MG/ML
INJECTION, SOLUTION RETROBULBAR
Status: DISCONTINUED | OUTPATIENT
Start: 2018-10-08 | End: 2018-10-08 | Stop reason: HOSPADM

## 2018-10-08 RX ORDER — TETRACAINE HYDROCHLORIDE 5 MG/ML
1 SOLUTION OPHTHALMIC
Status: COMPLETED | OUTPATIENT
Start: 2018-10-08 | End: 2018-10-08

## 2018-10-08 RX ORDER — MOXIFLOXACIN 5 MG/ML
1 SOLUTION/ DROPS OPHTHALMIC
Status: DISCONTINUED | OUTPATIENT
Start: 2018-10-08 | End: 2018-10-08

## 2018-10-08 RX ADMIN — TETRACAINE HYDROCHLORIDE 1 DROP: 5 SOLUTION OPHTHALMIC at 08:10

## 2018-10-08 RX ADMIN — MOXIFLOXACIN HYDROCHLORIDE 1 DROP: 5 SOLUTION/ DROPS OPHTHALMIC at 08:10

## 2018-10-08 RX ADMIN — TROPICAMIDE 1 DROP: 10 SOLUTION/ DROPS OPHTHALMIC at 08:10

## 2018-10-08 RX ADMIN — MIDAZOLAM HYDROCHLORIDE 1 MG: 1 INJECTION, SOLUTION INTRAMUSCULAR; INTRAVENOUS at 10:10

## 2018-10-08 RX ADMIN — PHENYLEPHRINE HYDROCHLORIDE 1 DROP: 25 SOLUTION/ DROPS OPHTHALMIC at 08:10

## 2018-10-08 RX ADMIN — MOXIFLOXACIN HYDROCHLORIDE 1 DROP: 5 SOLUTION/ DROPS OPHTHALMIC at 11:10

## 2018-10-08 NOTE — DISCHARGE SUMMARY
Outcome: Successful outpatient ophthalmic surgical procedure  Preprinted Instructions given to patient.  Regular diet.  Activity: No restrictions  Meds: see Med Rec  Condition: stable  Follow up: 1 day with Dr Henson  Disposition: Home  Diagnosis: s/p eye surgery

## 2018-10-08 NOTE — ANESTHESIA PREPROCEDURE EVALUATION
10/08/2018  Tiana Mead is a 89 y.o., female.    Anesthesia Evaluation    I have reviewed the Patient Summary Reports.    I have reviewed the Nursing Notes.   I have reviewed the Medications.     Review of Systems  Social:  Non-Smoker    Cardiovascular:   Hypertension, well controlled Past MI  Angina CHF    Pulmonary:   Asthma mild    Renal/:   Chronic Renal Disease, CRI    Musculoskeletal:   Arthritis     Neurological:   Neuromuscular Disease,    Endocrine:   Diabetes, well controlled Hypothyroidism        Physical Exam  General:  Well nourished    Airway/Jaw/Neck:  Airway Findings: Mouth Opening: Normal Tongue: Normal  General Airway Assessment: Adult  Mallampati: II  TM Distance: Normal, at least 6 cm  Jaw/Neck Findings:     Neck ROM: Normal ROM      Dental:  Dental Findings: Upper Dentures, Lower Dentures             Anesthesia Plan  Type of Anesthesia, risks & benefits discussed:  Anesthesia Type:  MAC  Patient's Preference:   Intra-op Monitoring Plan:   Intra-op Monitoring Plan Comments:   Post Op Pain Control Plan:   Post Op Pain Control Plan Comments:   Induction:   IV  Beta Blocker:         Informed Consent: Patient understands risks and agrees with Anesthesia plan.  Questions answered. Anesthesia consent signed with patient.  ASA Score: 3     Day of Surgery Review of History & Physical:    H&P update referred to the surgeon.         Ready For Surgery From Anesthesia Perspective.

## 2018-10-08 NOTE — DISCHARGE INSTRUCTIONS
Home Care Instructions for Eye Surgeries    1. ACTIVITY:   Limit your activity today. Increase activity gradually. You may return to work or school as directed by your physician.    2. DIET:   Drink plenty of fluids. Resume your normal diet unless instructed otherwise.    3. PAIN:   Expect a moderate amount of pain. If a prescription for pain is not sent home with you, you may take your commonly used pain reliever as directed. If this is not sufficient, call your physician. You may resume any other prescription medication unless otherwise directed by your physician.     4. DRESSING:   Keep your dressing dry and intact.    5. COMMENTS:   No driving, operating heavy machinery or signing legal documents for 24 hours.    No bending forward at the waist.   See attached schedule of eye drops.    Discuss any problem with your physician as soon as it arises. Do not Delay.      EMERGENCY- If you are unable to contact your physician, please go to the nearest Emergency Room.       Anesthesia: Monitored Anesthesia Care (MAC)    Anesthesia Safety  · Have an adult family member or friend drive you home after the procedure.  · For the first 24 hours after your surgery:  ¨ Do not drive or use heavy equipment.  ¨ Do not make important decisions or sign documents.  ¨ Avoid alcohol.  ¨ Have someone stay with you, if possible. They can watch for problems and help keep you safe.    PLEASE FOLLOW ANY OTHER INSTRUCTIONS PROVIDED TO YOU BY DR. BROWN!

## 2018-10-08 NOTE — OP NOTE
SURGEON:  Gume Henson M.D.    PREOPERATIVE DIAGNOSIS:    Nuclear Sclerotic Cataract Right Eye    POSTOPERATIVE DIAGNOSIS:    Nuclear Sclerotic Cataract Right Eye    PROCEDURES:    Phacoemulsification with  intraocular lens, Right eye (43777)  With Femtosecond LASER assist    DATE OF SURGERY: 10/08/2018    IMPLANT: [id number] 21.0    ANESTHESIA:  MAC with topical Lidocaine    COMPLICATIONS:  None    ESTIMATED BLOOD LOSS: None    SPECIMENS: None    INDICATIONS:    The patient has a history of painless progressive visual loss and  difficulty with activities of daily living secondary to cataract formation.  After a thorough discussion of the risks, benefits, and alternatives to cataract surgery, including, but not limited to, the rare risks of infection, retinal detachment, hemorrhage, need for additional surgery, loss of vision, and even loss of the eye, the patient voices understanding and desires to proceed.    DESCRIPTION OF PROCEDURE:    After verification of consent and marking of the operative eye, the patient was positioned under the femtosecond LASER. Topical anesthetic drops were administered. A surgical timeout was initiated with verification of patient identifiers and the laser surgical plan. The eye was docked securely and the laser portion of the cataract procedure was carried out without complication.  The patient was returned to the pre-operative area to await the intraocular surgical portion of the cataract procedure.  The patients IOL calculations were reviewed, and the lens selection confirmed.   After verification and marking of the proper eye in the preop holding area, the patient was brought to the operating room in supine position where the eye was prepped and draped in standard sterile fashion with 5% Betadine and a lid speculum placed in the eye.   Topical 4% Lidocaine was used in addition to the preoperative anesthesia and the procedure was begun by the creation of a paracentesis incision through  which viscoelastic was used to fill the anterior chamber.  Next, a keratome blade was used to create a triplanar temporal clear corneal incision and a cystotome and Utrata forceps used to fashion a continuous curvilinear capsulorrhexis.  Hydrodissection was carried out using the Mistry hydrodissection cannula and the nucleus was found to be mobile.  Phacoemulsification of the nucleus was carried out using a quick chop technique, and all remaining epinuclear and cortical material was removed.  The eye was then reformed with Viscoelastic and the  intraocular lens was implanted into the capsular bag.  All remaining viscoelastics were removed from the eye and at the end of the case the pupil was round, the lens was well-centered within the capsular bag and all wounds were found to be water tight.  Drops of Vigamox and Pred Forte were instilled and a shield was placed over the eye. The patient will follow up with Dr. Henson in the morning.

## 2018-10-08 NOTE — ANESTHESIA POSTPROCEDURE EVALUATION
"Anesthesia Post Evaluation    Patient: Tiana H Boston    Procedure(s) Performed: Procedure(s) (LRB):  EXTRACTION, CATARACT, WITH IOL INSERTION (Right)    Final Anesthesia Type: MAC  Patient location during evaluation: [facility]  Patient participation: Yes- Able to Participate  Level of consciousness: awake and alert  Post-procedure vital signs: reviewed and stable  Pain management: adequate  Airway patency: patent  PONV status at discharge: No PONV  Anesthetic complications: no      Cardiovascular status: blood pressure returned to baseline  Respiratory status: unassisted, room air and spontaneous ventilation  Hydration status: euvolemic  Follow-up not needed.        Visit Vitals  BP (!) 142/67 (BP Location: Left arm, Patient Position: Lying)   Pulse 61   Temp 36.6 °C (97.8 °F) (Oral)   Resp 18   Ht 5' 8" (1.727 m)   Wt 71.2 kg (157 lb)   LMP  (LMP Unknown)   SpO2 95%   BMI 23.87 kg/m²       Pain/Lavelle Score: Pain Assessment Performed: Yes (10/8/2018  8:51 AM)  Presence of Pain: denies (10/8/2018  8:51 AM)  Lavelle Score: 10 (10/8/2018  8:51 AM)        "

## 2018-10-09 ENCOUNTER — OFFICE VISIT (OUTPATIENT)
Dept: OPHTHALMOLOGY | Facility: CLINIC | Age: 83
End: 2018-10-09
Attending: OPHTHALMOLOGY
Payer: MEDICARE

## 2018-10-09 DIAGNOSIS — Z98.890 POST-OPERATIVE STATE: Primary | ICD-10-CM

## 2018-10-09 PROCEDURE — 99212 OFFICE O/P EST SF 10 MIN: CPT | Mod: PBBFAC | Performed by: OPHTHALMOLOGY

## 2018-10-09 PROCEDURE — 99999 PR PBB SHADOW E&M-EST. PATIENT-LVL II: CPT | Mod: PBBFAC,,, | Performed by: OPHTHALMOLOGY

## 2018-10-09 PROCEDURE — 99024 POSTOP FOLLOW-UP VISIT: CPT | Mod: ,,, | Performed by: OPHTHALMOLOGY

## 2018-10-09 NOTE — PROGRESS NOTES
HPI     1 day CE with IOL OD (10/8/2018).  Pt states that vision seems to be slightly improved since having sx   yesterday. No flashes or floaters OU. No pain in the eye but does have a   slight headache.     Pt confirms using  Pred, gati, brom TID OD          Last edited by Reny Brunner on 10/9/2018  8:21 AM. (History)            Assessment /Plan     For exam results, see Encounter Report.    Post-operative state      Slit lamp exam:  L/L: nl  K: clear, wound sealed  AC: 1+ cell  Lens: IOL centered and stable    POD1 s/p Phaco/IOL  Appropriate precautions and post op medications reviewed.  Patient instructed to call or come in if symptoms of redness, decreased vision, or pain are experienced.  TOric IOL recheck MRx in 1 week.

## 2018-10-17 ENCOUNTER — OFFICE VISIT (OUTPATIENT)
Dept: INTERNAL MEDICINE | Facility: CLINIC | Age: 83
End: 2018-10-17
Payer: MEDICARE

## 2018-10-17 ENCOUNTER — OFFICE VISIT (OUTPATIENT)
Dept: OPHTHALMOLOGY | Facility: CLINIC | Age: 83
End: 2018-10-17
Payer: MEDICARE

## 2018-10-17 ENCOUNTER — IMMUNIZATION (OUTPATIENT)
Dept: INTERNAL MEDICINE | Facility: CLINIC | Age: 83
End: 2018-10-17
Payer: MEDICARE

## 2018-10-17 VITALS
WEIGHT: 162.06 LBS | HEART RATE: 67 BPM | HEIGHT: 68 IN | BODY MASS INDEX: 24.56 KG/M2 | DIASTOLIC BLOOD PRESSURE: 62 MMHG | SYSTOLIC BLOOD PRESSURE: 110 MMHG | OXYGEN SATURATION: 96 %

## 2018-10-17 DIAGNOSIS — I50.32 CHRONIC DIASTOLIC CONGESTIVE HEART FAILURE: ICD-10-CM

## 2018-10-17 DIAGNOSIS — I20.89 ANGINA AT REST: Primary | ICD-10-CM

## 2018-10-17 DIAGNOSIS — Z79.4 TYPE 2 DIABETES MELLITUS WITH DIABETIC NEPHROPATHY, WITH LONG-TERM CURRENT USE OF INSULIN: ICD-10-CM

## 2018-10-17 DIAGNOSIS — Z98.890 POST-OPERATIVE STATE: Primary | ICD-10-CM

## 2018-10-17 DIAGNOSIS — M15.9 GENERALIZED OSTEOARTHRITIS OF MULTIPLE SITES: ICD-10-CM

## 2018-10-17 DIAGNOSIS — N18.30 CKD (CHRONIC KIDNEY DISEASE) STAGE 3, GFR 30-59 ML/MIN: ICD-10-CM

## 2018-10-17 DIAGNOSIS — E03.9 PRIMARY HYPOTHYROIDISM: ICD-10-CM

## 2018-10-17 DIAGNOSIS — E11.21 TYPE 2 DIABETES MELLITUS WITH DIABETIC NEPHROPATHY, WITH LONG-TERM CURRENT USE OF INSULIN: ICD-10-CM

## 2018-10-17 DIAGNOSIS — E11.42 TYPE 2 DIABETES MELLITUS WITH DIABETIC POLYNEUROPATHY, WITH LONG-TERM CURRENT USE OF INSULIN: ICD-10-CM

## 2018-10-17 DIAGNOSIS — Z79.4 TYPE 2 DIABETES MELLITUS WITH DIABETIC POLYNEUROPATHY, WITH LONG-TERM CURRENT USE OF INSULIN: ICD-10-CM

## 2018-10-17 DIAGNOSIS — I82.409 DEEP VEIN THROMBOSIS (DVT) DURING CURRENT HOSPITALIZATION: ICD-10-CM

## 2018-10-17 DIAGNOSIS — E11.42 DIABETIC POLYNEUROPATHY ASSOCIATED WITH TYPE 2 DIABETES MELLITUS: ICD-10-CM

## 2018-10-17 PROCEDURE — 99024 POSTOP FOLLOW-UP VISIT: CPT | Mod: ,,, | Performed by: OPHTHALMOLOGY

## 2018-10-17 PROCEDURE — 90662 IIV NO PRSV INCREASED AG IM: CPT | Mod: PBBFAC

## 2018-10-17 PROCEDURE — 1101F PT FALLS ASSESS-DOCD LE1/YR: CPT | Mod: CPTII,,, | Performed by: INTERNAL MEDICINE

## 2018-10-17 PROCEDURE — 99999 PR PBB SHADOW E&M-EST. PATIENT-LVL III: CPT | Mod: PBBFAC,,, | Performed by: INTERNAL MEDICINE

## 2018-10-17 PROCEDURE — 99999 PR PBB SHADOW E&M-EST. PATIENT-LVL II: CPT | Mod: PBBFAC,,, | Performed by: OPHTHALMOLOGY

## 2018-10-17 PROCEDURE — 99213 OFFICE O/P EST LOW 20 MIN: CPT | Mod: PBBFAC,27 | Performed by: INTERNAL MEDICINE

## 2018-10-17 PROCEDURE — 99212 OFFICE O/P EST SF 10 MIN: CPT | Mod: PBBFAC,25 | Performed by: OPHTHALMOLOGY

## 2018-10-17 PROCEDURE — 99214 OFFICE O/P EST MOD 30 MIN: CPT | Mod: S$PBB,,, | Performed by: INTERNAL MEDICINE

## 2018-10-17 RX ORDER — FUROSEMIDE 40 MG/1
40 TABLET ORAL 2 TIMES DAILY
Qty: 180 TABLET | Refills: 1 | Status: SHIPPED | OUTPATIENT
Start: 2018-10-17 | End: 2019-02-20 | Stop reason: SDUPTHER

## 2018-10-17 NOTE — PROGRESS NOTES
HPI     Post-op Evaluation      Additional comments: Pt c/o itchy right eye but denies any pain or other   complaints              Comments     S/p Phaco IOL OD with Femtosecond LASER Assist 10/8/18    MEDS:  PGB TID OD  Brimonidine BID OU  Dorzolmaide BID OU  Latanoprost QHS OU          Last edited by Cecille Gonzales MA on 10/17/2018  8:18 AM. (History)            Assessment /Plan     For exam results, see Encounter Report.    Post-operative state      Slit lamp exam:  L/L: nl  K: clear, wound sealed  AC: trace cell  Iris/Lens: IOL centered and stable    POW1 s/p phaco: Surgery healing well with no signs of infection or abnormal inflammation.   Significant pigment dispersion and guttata

## 2018-10-18 ENCOUNTER — TELEPHONE (OUTPATIENT)
Dept: INTERNAL MEDICINE | Facility: CLINIC | Age: 83
End: 2018-10-18

## 2018-10-18 NOTE — PROGRESS NOTES
Subjective:       Patient ID: Tiana Mead is a 89 y.o. female.    Chief Complaint: Follow-up (3 month f/u)     She has had eye surgery which has improved her vision.  She can see at a distance and she can read.  This surgery occurred on October 8th.    She continues to have pain in the  right side of her abdomen, (normal abdominal ultrasound May 9, 2018)  but does not wish to pursue any additional diagnostic testing at this time.    Diabetes Management Status    Statin: Taking  ACE/ARB: Not taking    Screening or Prevention Patient's value Goal Complete/Controlled?   HgA1C Testing and Control   Lab Results   Component Value Date    HGBA1C 7.6 (H) 10/04/2018      Annually/Less than 8% Yes   Lipid profile : 07/12/2018 Annually Yes   LDL control Lab Results   Component Value Date    LDLCALC 78.8 07/12/2018    Annually/Less than 100 mg/dl  Yes   Nephropathy screening Lab Results   Component Value Date    LABMICR 2.7 10/25/2011     Lab Results   Component Value Date    PROTEINUA Negative 04/02/2013    Annually Yes   Blood pressure BP Readings from Last 1 Encounters:   10/17/18 110/62    Less than 140/90 Yes   Dilated retinal exam : 09/26/2018 Annually Yes   Foot exam   : 05/16/2018 Annually Yes       HPI  Review of Systems    Objective:      Physical Exam   Constitutional: She appears well-nourished.   HENT:   Head: Atraumatic.   Eyes: Conjunctivae are normal. No scleral icterus.   Neck: Neck supple.   Cardiovascular: Normal rate and regular rhythm.   Pulmonary/Chest: Effort normal and breath sounds normal.   Abdominal: Soft. There is no tenderness.   Musculoskeletal: She exhibits no edema.   Protective Sensation (w/ 10 gram monofilament):  Right: Decreased  Left: Decreased    Visual Inspection:  Normal -  Bilateral    Pedal Pulses:   Right: Diminshed  Left: Diminshed    Posterior tibialis:   Right:Diminshed  Left: Diminshed   Lymphadenopathy:     She has no cervical adenopathy.   Neurological: She is alert.   Skin:  Skin is warm and dry.   Psychiatric: She has a normal mood and affect. Her behavior is normal.   Nursing note and vitals reviewed.      Assessment:       1. Angina, stable    2. Chronic diastolic congestive heart failure    3. CKD (chronic kidney disease) stage 3, GFR 30-59 ml/min, eGFR 32     4. Diabetic polyneuropathy associated with type 2 diabetes mellitus    5. Generalized osteoarthritis of multiple sites    6. H/O Deep vein thrombosis (DVT)    7. Primary hypothyroidism    8. Type 2 diabetes mellitus with diabetic nephropathy, with long-term current use of insulin    9. Type 2 diabetes mellitus with diabetic polyneuropathy, with long-term current use of insulin        Plan:   Tiana was seen today for follow-up and hip pain.    Diagnoses and all orders for this visit:    Angina, stable    Chronic diastolic congestive heart failure, compensated.    CKD (chronic kidney disease) stage 3, GFR 30-59 ml/min, eGFR 32 , stable continue to monitor  -     Comprehensive metabolic panel; Future  -     CBC auto differential; Future    Diabetic polyneuropathy associated with type 2 diabetes mellitus, she is taking excellent care of her feet.    Generalized osteoarthritis of multiple sites, she is managing.    H/O Deep vein thrombosis (DVT), anticoagulated    Primary hypothyroidism, check TSH.       Type 2 diabetes mellitus with diabetic nephropathy, with long-term current use of insulin    Type 2 diabetes mellitus with diabetic polyneuropathy, with long-term current use of insulin  -     Hemoglobin A1c; Future  -     Comprehensive metabolic panel; Future  -     CBC auto differential; Future    Other orders  -     apixaban (ELIQUIS) 2.5 mg Tab; Take 1 tablet (2.5 mg total) by mouth 2 (two) times daily.  -     furosemide (LASIX) 40 MG tablet; Take 1 tablet (40 mg total) by mouth 2 (two) times daily.    Follow-up in about 4 months (around 2/20/2019).

## 2018-10-29 ENCOUNTER — TELEPHONE (OUTPATIENT)
Dept: INTERNAL MEDICINE | Facility: CLINIC | Age: 83
End: 2018-10-29

## 2018-10-29 NOTE — TELEPHONE ENCOUNTER
----- Message from Jannet Paz sent at 10/29/2018 11:19 AM CDT -----  Contact: Patient [phone]  Pt is calling to speak with someone in the office in regards to a pain in her hip. She states that the pain is bothering her even to sit. Please call back and advise.      Thanks

## 2018-10-30 ENCOUNTER — OFFICE VISIT (OUTPATIENT)
Dept: INTERNAL MEDICINE | Facility: CLINIC | Age: 83
End: 2018-10-30
Payer: MEDICARE

## 2018-10-30 ENCOUNTER — HOSPITAL ENCOUNTER (OUTPATIENT)
Dept: RADIOLOGY | Facility: HOSPITAL | Age: 83
Discharge: HOME | End: 2018-10-30
Attending: INTERNAL MEDICINE
Payer: MEDICARE

## 2018-10-30 VITALS
DIASTOLIC BLOOD PRESSURE: 64 MMHG | SYSTOLIC BLOOD PRESSURE: 104 MMHG | WEIGHT: 161.19 LBS | BODY MASS INDEX: 24.43 KG/M2 | HEART RATE: 60 BPM | HEIGHT: 68 IN

## 2018-10-30 DIAGNOSIS — M17.0 PRIMARY OSTEOARTHRITIS OF BOTH KNEES: ICD-10-CM

## 2018-10-30 DIAGNOSIS — M25.551 RIGHT HIP PAIN: Primary | ICD-10-CM

## 2018-10-30 DIAGNOSIS — M15.9 GENERALIZED OSTEOARTHRITIS OF MULTIPLE SITES: ICD-10-CM

## 2018-10-30 DIAGNOSIS — M25.551 RIGHT HIP PAIN: ICD-10-CM

## 2018-10-30 DIAGNOSIS — M54.30 SCIATICA, UNSPECIFIED LATERALITY: ICD-10-CM

## 2018-10-30 PROCEDURE — 73502 X-RAY EXAM HIP UNI 2-3 VIEWS: CPT | Mod: 26,RT,, | Performed by: RADIOLOGY

## 2018-10-30 PROCEDURE — 99214 OFFICE O/P EST MOD 30 MIN: CPT | Mod: S$PBB,,, | Performed by: INTERNAL MEDICINE

## 2018-10-30 PROCEDURE — 99999 PR PBB SHADOW E&M-EST. PATIENT-LVL III: CPT | Mod: PBBFAC,,, | Performed by: INTERNAL MEDICINE

## 2018-10-30 PROCEDURE — 73502 X-RAY EXAM HIP UNI 2-3 VIEWS: CPT | Mod: TC,RT

## 2018-10-30 PROCEDURE — 99213 OFFICE O/P EST LOW 20 MIN: CPT | Mod: PBBFAC,25 | Performed by: INTERNAL MEDICINE

## 2018-10-30 PROCEDURE — 1101F PT FALLS ASSESS-DOCD LE1/YR: CPT | Mod: CPTII,,, | Performed by: INTERNAL MEDICINE

## 2018-10-30 RX ORDER — DICLOFENAC SODIUM 10 MG/G
GEL TOPICAL 4 TIMES DAILY PRN
Qty: 100 TUBE | Refills: 3 | Status: SHIPPED | OUTPATIENT
Start: 2018-10-30 | End: 2019-11-20 | Stop reason: SDUPTHER

## 2018-10-30 NOTE — PROGRESS NOTES
"Subjective:       Patient ID: Tiana Mead is a 89 y.o. female.    Chief Complaint: Hip Pain   This is a 89-year-old presents today with hip discomfort she reports calls no injury or fall.  She does have trouble with arthritis in her knees and hips and over the last 4 days has been having increasing discomfort in the right hip.  She denies back pain has some pain in the right hip area and into the right top of the thigh on occasion but no pain radiating down the leg she does have pain in her knees  She has good range of motion but reports it hurts her when she sits or stands or changes position.  She is unable to take anti-inflammatories due to her multiple medical problems has used topical anti-inflammatory ointment in the past she would like a refill of that.  She has also been using Tylenol    HPI  Review of Systems   Gastrointestinal: Negative for abdominal pain.   Musculoskeletal: Negative for back pain.        Right hip pain  No fall or bruising  Some radiation to top leg at times with walking  Denies back pain     Has arthritis knees        Objective:     Blood pressure 104/64, pulse 60, height 5' 8" (1.727 m), weight 73.1 kg (161 lb 2.5 oz).    Physical Exam   Constitutional: No distress.   Walks with walker    HENT:   Head: Normocephalic.   Eyes: No scleral icterus.   Cardiovascular: Normal rate and regular rhythm. Exam reveals no gallop and no friction rub.   No murmur heard.  Pulmonary/Chest: Effort normal. No respiratory distress.   Abdominal: Soft. Bowel sounds are normal. She exhibits no mass. There is no tenderness.   Musculoskeletal: She exhibits no edema.   Some discomfort with positional changes  Negative leg raise  No bruisiing or swelling noted     Crepitus knees    Skin: No erythema.   Psychiatric: She has a normal mood and affect.   Vitals reviewed.      Assessment:       1. Right hip pain    2. Primary osteoarthritis of both knees    3. Generalized osteoarthritis of multiple sites    4. " Sciatica, unspecified laterality        Plan:       Tiana was seen today for hip pain.    Diagnoses and all orders for this visit:    Right hip pain  -     diclofenac sodium (VOLTAREN) 1 % Gel; Apply topically 4 (four) times daily as needed.  -     X-Ray Hip 2 View Right; Future    Primary osteoarthritis of both knees  Continued fall precautions discussed  -     diclofenac sodium (VOLTAREN) 1 % Gel; Apply topically 4 (four) times daily as needed.    Generalized osteoarthritis of multiple sites  -     diclofenac sodium (VOLTAREN) 1 % Gel; Apply topically 4 (four) times daily as needed.    Sciatica, unspecified laterality  Discussed xray for further evaluation    pcp follow up as recommended     Consider pt/ortho if no improvement

## 2018-10-31 ENCOUNTER — TELEPHONE (OUTPATIENT)
Dept: INTERNAL MEDICINE | Facility: CLINIC | Age: 83
End: 2018-10-31

## 2018-10-31 NOTE — TELEPHONE ENCOUNTER
----- Message from Netta Estevez MD sent at 10/30/2018  1:00 PM CDT -----  Call and notify pt her xray showed mild arthirtis changes and calcifications  But no fracture or significant bony abnormalities

## 2018-11-12 RX ORDER — FUROSEMIDE 40 MG/1
TABLET ORAL
Qty: 180 TABLET | Refills: 0 | Status: SHIPPED | OUTPATIENT
Start: 2018-11-12 | End: 2019-02-20 | Stop reason: SDUPTHER

## 2018-11-12 RX ORDER — ATORVASTATIN CALCIUM 40 MG/1
TABLET, FILM COATED ORAL
Qty: 90 TABLET | Refills: 0 | Status: SHIPPED | OUTPATIENT
Start: 2018-11-12 | End: 2019-02-20 | Stop reason: SDUPTHER

## 2018-11-14 ENCOUNTER — OFFICE VISIT (OUTPATIENT)
Dept: OPTOMETRY | Facility: CLINIC | Age: 83
End: 2018-11-14
Payer: MEDICARE

## 2018-11-14 DIAGNOSIS — Z98.41 STATUS POST RIGHT CATARACT EXTRACTION: Primary | ICD-10-CM

## 2018-11-14 PROCEDURE — 99999 PR PBB SHADOW E&M-EST. PATIENT-LVL II: CPT | Mod: PBBFAC,HCNC,, | Performed by: OPTOMETRIST

## 2018-11-14 PROCEDURE — 99024 POSTOP FOLLOW-UP VISIT: CPT | Mod: HCNC,S$GLB,, | Performed by: OPTOMETRIST

## 2018-11-14 NOTE — PROGRESS NOTES
HPI     Post-op Evaluation      Additional comments: 5 wk phaco OD              Comments     Last eye exam was 10/17/18 with Dr. Henson.  Patient states vision is better since cataract sx OD. Wanted to get an   updated glasses rx today.    10/08/2018 IMPLANT: [id number] 21.0 OD    Brimonidine BID OU  Dorzolmaide BID OU  Latanoprost QHS OU           Last edited by Chayito Rodriguez on 11/14/2018  8:56 AM. (History)            Assessment /Plan     For exam results, see Encounter Report.    Status post right cataract extraction            1.  Pt doing well.  Bifocal rx given.   Retina flat and intact OU--no holes, tears, breaks, or RDs.  RTC as scheduled with Dr. Prado.

## 2018-11-17 DIAGNOSIS — E11.618 TYPE 2 DIABETES MELLITUS WITH OTHER DIABETIC ARTHROPATHY, WITH LONG-TERM CURRENT USE OF INSULIN: ICD-10-CM

## 2018-11-17 DIAGNOSIS — Z79.4 TYPE 2 DIABETES MELLITUS WITH OTHER DIABETIC ARTHROPATHY, WITH LONG-TERM CURRENT USE OF INSULIN: ICD-10-CM

## 2018-11-17 RX ORDER — LANCETS 33 GAUGE
EACH MISCELLANEOUS
Qty: 300 EACH | Refills: 0 | Status: SHIPPED | OUTPATIENT
Start: 2018-11-17 | End: 2020-04-09 | Stop reason: SDUPTHER

## 2018-11-23 RX ORDER — CALCIUM CITRATE/VITAMIN D3 200MG-6.25
TABLET ORAL
Qty: 300 STRIP | Refills: 0 | Status: SHIPPED | OUTPATIENT
Start: 2018-11-23 | End: 2019-02-20 | Stop reason: SDUPTHER

## 2018-11-28 ENCOUNTER — OFFICE VISIT (OUTPATIENT)
Dept: PODIATRY | Facility: CLINIC | Age: 83
End: 2018-11-28
Payer: MEDICARE

## 2018-11-28 VITALS
SYSTOLIC BLOOD PRESSURE: 142 MMHG | BODY MASS INDEX: 24.4 KG/M2 | DIASTOLIC BLOOD PRESSURE: 77 MMHG | WEIGHT: 161 LBS | HEIGHT: 68 IN | HEART RATE: 80 BPM

## 2018-11-28 DIAGNOSIS — L84 CORN OR CALLUS: ICD-10-CM

## 2018-11-28 DIAGNOSIS — E11.42 DIABETIC POLYNEUROPATHY ASSOCIATED WITH TYPE 2 DIABETES MELLITUS: Primary | ICD-10-CM

## 2018-11-28 DIAGNOSIS — B35.1 ONYCHOMYCOSIS DUE TO DERMATOPHYTE: ICD-10-CM

## 2018-11-28 PROCEDURE — 99999 PR PBB SHADOW E&M-EST. PATIENT-LVL III: CPT | Mod: PBBFAC,HCNC,, | Performed by: PODIATRIST

## 2018-11-28 PROCEDURE — 11721 DEBRIDE NAIL 6 OR MORE: CPT | Mod: 59,Q9,HCNC,S$GLB | Performed by: PODIATRIST

## 2018-11-28 PROCEDURE — 11057 PARNG/CUTG B9 HYPRKR LES >4: CPT | Mod: Q9,HCNC,S$GLB, | Performed by: PODIATRIST

## 2018-11-28 PROCEDURE — 99499 UNLISTED E&M SERVICE: CPT | Mod: HCNC,S$GLB,, | Performed by: PODIATRIST

## 2018-11-28 NOTE — PROGRESS NOTES
Subjective:      Patient ID: Tiana Mead is a 89 y.o. female.    Chief Complaint: PCP (Netta Estevez MD  10/30/18); Diabetic Foot Exam (Corns on the toes ); Nail Problem; and Nail Care    Tiana is a 89 y.o. female who presents to the clinic for evaluation and treatment of high risk feet. Tiana has a past medical history of Allergy, Anemia, Arthritis, Asthma, CHF (congestive heart failure) (5/2/2017), Chronic kidney disease, Degenerative disc disease, Diabetes mellitus type II, Essential hypertension (7/3/2012), Glaucoma, History of pseudogout (8/23/2012), Hyperlipidemia, Hypertension, Iritis, and Thyroid disease. The patient's chief complaint is long, thick toenails and calluses on both feet. Routine trimming helps. No other pedal concerns today.  This patient has documented high risk feet requiring routine maintenance secondary to diabetes mellitis and those secondary complications of diabetes, as mentioned..    PCP: Belen Wood MD    Date Last Seen by PCP:   Chief Complaint   Patient presents with    PCP     Netta Estevez MD  10/30/18    Diabetic Foot Exam     Corns on the toes     Nail Problem    Nail Care         Current shoe gear:  Dm shoes      Hemoglobin A1C   Date Value Ref Range Status   10/04/2018 7.6 (H) 4.0 - 5.6 % Final     Comment:     ADA Screening Guidelines:  5.7-6.4%  Consistent with prediabetes  >or=6.5%  Consistent with diabetes  High levels of fetal hemoglobin interfere with the HbA1C  assay. Heterozygous hemoglobin variants (HbS, HgC, etc)do  not significantly interfere with this assay.   However, presence of multiple variants may affect accuracy.     04/17/2018 6.7 (H) 4.0 - 5.6 % Final     Comment:     According to ADA guidelines, hemoglobin A1c <7.0% represents  optimal control in non-pregnant diabetic patients. Different  metrics may apply to specific patient populations.   Standards of Medical Care in Diabetes-2016.  For the purpose of screening  for the presence of diabetes:  <5.7%     Consistent with the absence of diabetes  5.7-6.4%  Consistent with increasing risk for diabetes   (prediabetes)  >or=6.5%  Consistent with diabetes  Currently, no consensus exists for use of hemoglobin A1c  for diagnosis of diabetes for children.  This Hemoglobin A1c assay has significant interference with fetal   hemoglobin   (HbF). The results are invalid for patients with abnormal amounts of   HbF,   including those with known Hereditary Persistence   of Fetal Hemoglobin. Heterozygous hemoglobin variants (HbAS, HbAC,   HbAD, HbAE, HbA2) do not significantly interfere with this assay;   however, presence of multiple variants in a sample may impact the %   interference.     04/04/2018 6.7 (H) 4.0 - 5.6 % Final     Comment:     According to ADA guidelines, hemoglobin A1c <7.0% represents  optimal control in non-pregnant diabetic patients. Different  metrics may apply to specific patient populations.   Standards of Medical Care in Diabetes-2016.  For the purpose of screening for the presence of diabetes:  <5.7%     Consistent with the absence of diabetes  5.7-6.4%  Consistent with increasing risk for diabetes   (prediabetes)  >or=6.5%  Consistent with diabetes  Currently, no consensus exists for use of hemoglobin A1c  for diagnosis of diabetes for children.  This Hemoglobin A1c assay has significant interference with fetal   hemoglobin   (HbF). The results are invalid for patients with abnormal amounts of   HbF,   including those with known Hereditary Persistence   of Fetal Hemoglobin. Heterozygous hemoglobin variants (HbAS, HbAC,   HbAD, HbAE, HbA2) do not significantly interfere with this assay;   however, presence of multiple variants in a sample may impact the %   interference.               Patient Active Problem List   Diagnosis    Hyperlipemia, mixed    Generalized osteoarthritis of multiple sites    Chronic iritis - Left Eye    Nuclear sclerosis - Right Eye     "Pseudophakia - Left Eye    Osteoarthritis of both knees    Chondrocalcinosis    Peripheral angiopathy    Pseudogout, both knees.    Hyperuricemia    Helicobacter pylori gastritis    Old MI (myocardial infarction), 2013    Persistent atrial fibrillation    Type 2 diabetes mellitus with diabetic polyneuropathy, with long-term current use of insulin    Long term current use of anticoagulant therapy, eliquis    History of chest pain at rest    Fuchs' corneal dystrophy    Pericardial effusion    H/O Deep vein thrombosis (DVT)    Aortic atherosclerosis    Chronic diastolic congestive heart failure    Type 2 diabetes mellitus with diabetic nephropathy, with long-term current use of insulin    Diabetic polyneuropathy associated with type 2 diabetes mellitus    CKD (chronic kidney disease) stage 3, GFR 30-59 ml/min, eGFR 32     Primary hypothyroidism    Moderate asthma with exacerbation    Primary open angle glaucoma (POAG) of both eyes, moderate stage    Pulmonary hypertension    Non-rheumatic tricuspid valve insufficiency    Tortuous aorta    Angina, stable       Current Outpatient Medications on File Prior to Visit   Medication Sig Dispense Refill    acetaminophen (TYLENOL) 500 MG tablet Take 500 mg by mouth every 6 (six) hours as needed for Pain.      ADVAIR DISKUS 250-50 mcg/dose diskus inhaler INHALE 1 PUFF BY MOUTH INTO THE LUNGS NIGHTLY 1 each 11    albuterol 90 mcg/actuation inhaler Inhale 2 puffs into the lungs every 4 (four) hours as needed. 1 Inhaler 11    amLODIPine (NORVASC) 5 MG tablet Take 1 tablet (5 mg total) by mouth every morning. 90 tablet 3    apixaban (ELIQUIS) 2.5 mg Tab Take 1 tablet (2.5 mg total) by mouth 2 (two) times daily. 60 tablet 5    atorvastatin (LIPITOR) 40 MG tablet TAKE 1 TABLET BY MOUTH EVERY DAY 90 tablet 0    BD ULTRA-FINE SHORT PEN NEEDLE 31 gauge x 5/16" Ndle USE WITH INSULIN PENS AS DIRECTED 100 each 4    blood sugar diagnostic (ONETOUCH " ULTRA TEST) Strp Inject 1 strip into the skin 3 (three) times daily. 300 each 4    brimonidine 0.2% (ALPHAGAN) 0.2 % Drop INT 1 GTT INTO OU BID  3    diclofenac sodium (VOLTAREN) 1 % Gel Apply topically 4 (four) times daily as needed. 100 Tube 3    dorzolamide (TRUSOPT) 2 % ophthalmic solution Place 1 drop into both eyes 2 (two) times daily. 10 mL 4    furosemide (LASIX) 40 MG tablet Take 1 tablet (40 mg total) by mouth 2 (two) times daily. 180 tablet 1    furosemide (LASIX) 40 MG tablet TAKE 1 TABLET BY MOUTH TWICE DAILY. ONE TABLET IN THE MORNING AND 1AM TABLET BY MOUTH AT 4PM 180 tablet 0    insulin aspart protamine-insulin aspart (NOVOLOG MIX 70-30FLEXPEN U-100) 100 unit/mL (70-30) InPn pen Take 10 units once daily before breakfast. 1 Box 11    ipratropium (ATROVENT) 0.03 % nasal spray USE 2 SPRAYS IN EACH NOSTRIL TWICE DAILY 60 mL 1    lancets Misc 1 Device by Misc.(Non-Drug; Combo Route) route 3 (three) times daily before meals. Please provide the insurance company preferred product. 300 each 4    latanoprost 0.005 % ophthalmic solution Place 1 drop into the right eye every evening. (Patient taking differently: Place 1 drop into both eyes every evening. ) 3 Bottle 6    levothyroxine (SYNTHROID) 75 MCG tablet TAKE 1 TABLET BY MOUTH EVERY DAY BEFORE BREAKFAST 90 tablet 3    meclizine (ANTIVERT) 12.5 mg tablet Take 1 tablet (12.5 mg total) by mouth 3 (three) times daily as needed. 50 tablet 1    metoprolol tartrate (LOPRESSOR) 50 MG tablet Take 1 tablet (50 mg total) by mouth 2 (two) times daily. 180 tablet 4    multivit-mineral-iron-lutein (CENTRUM SILVER ULTRA WOMEN'S) Tab Take 1 tablet by mouth once daily.      nitroGLYCERIN (NITROSTAT) 0.4 MG SL tablet ONE TABLET UNDER THE TONGUE EVERY 5 MINUTES AS NEEDED FOR CHEST PAIN 150 tablet 3    TRUE METRIX GLUCOSE METER Misc TEST TID  4    TRUE METRIX GLUCOSE TEST STRIP Strp USE TO TEST BLOOD SUGAR WITH MEALS THREE TIMES DAILY 300 strip 0    TRUEPLUS  LANCETS 30 gauge Misc USE TO TEST BLOOD SUGAR TID AC  2    TRUEPLUS LANCETS 33 gauge Misc TESTING THREE TIMES DAILY 300 each 0     No current facility-administered medications on file prior to visit.        Review of patient's allergies indicates:   Allergen Reactions    Codeine      Other reaction(s): Itching  Other reaction(s): Nausea       Past Surgical History:   Procedure Laterality Date    CARDIAC CATHETERIZATION      CATARACT EXTRACTION W/  INTRAOCULAR LENS IMPLANT Left n/a    CATARACT EXTRACTION W/  INTRAOCULAR LENS IMPLANT Right 10/8/2018    With Femtosecond LASER assist (Dr. Henson)    CHOLECYSTECTOMY      EXTRACTION, CATARACT, WITH IOL INSERTION Right 10/8/2018    Performed by Gume Henson [facility] at [facility] OR    EYE SURGERY      gall stone      HYSTERECTOMY      VARICOSE VEIN SURGERY         Family History   Problem Relation Age of Onset    Diabetes Mother     Hypertension Mother     Glaucoma Mother     Hypertension Father     Diabetes Son     No Known Problems Daughter     Diabetes Daughter     No Known Problems Son        Social History     Socioeconomic History    Marital status:      Spouse name: Not on file    Number of children: Not on file    Years of education: Not on file    Highest education level: Not on file   Social Needs    Financial resource strain: Not on file    Food insecurity - worry: Not on file    Food insecurity - inability: Not on file    Transportation needs - medical: Not on file    Transportation needs - non-medical: Not on file   Occupational History    Not on file   Tobacco Use    Smoking status: Never Smoker    Smokeless tobacco: Never Used   Substance and Sexual Activity    Alcohol use: No    Drug use: No    Sexual activity: No   Other Topics Concern    Not on file   Social History Narrative    Not on file           Review of Systems   Constitution: Negative for chills, decreased appetite and fever.   Cardiovascular: Negative for leg  "swelling.   Skin: Positive for dry skin and nail changes. Negative for color change, flushing and itching.   Musculoskeletal: Positive for arthritis. Negative for joint pain, joint swelling and myalgias.   Gastrointestinal: Negative for nausea and vomiting.   Neurological: Negative for loss of balance, numbness and paresthesias.           Objective:       Vitals:    11/28/18 1027   BP: (!) 142/77   Pulse: 80   Weight: 73 kg (161 lb)   Height: 5' 8" (1.727 m)   PainSc: 0-No pain        Physical Exam   Constitutional: She is oriented to person, place, and time. She appears well-developed and well-nourished.   Cardiovascular:   Dorsalis pedis and posterior tibial pulses are diminished Bilaterally. Toes are cool to touch. Feet are warm proximally.There is decreased digital hair. Skin is atrophic, slightly hyperpigmented, and mildly edematous       Musculoskeletal: Normal range of motion. She exhibits no edema or tenderness.   Adequate joint range of motion without pain, limitation, nor crepitation Bilateral feet and ankle joints. Muscle strength is 5/5 in all groups bilaterally.         Neurological: She is alert and oriented to person, place, and time.   [place] 5.07 monofilamant testing is diminished Ney feet. Sharp/dull sensation diminished Bilaterally. Light touch absent Bilaterally.       Skin: Skin is warm, dry and intact. No ecchymosis and no lesion noted. No erythema.   Nails x10 are elongated by  2-6mm's, thickened by 2-5 mm's, dystrophic, and are darkened in  coloration . Xerosis Bilaterally. No open lesions noted.    Hyperkeratotic tissue noted to lateral 5th toe b/l, distal 3rd toe b/l, medial L 5th toe      Psychiatric: She has a normal mood and affect. Her behavior is normal.   Vitals reviewed.            Assessment:       Encounter Diagnoses   Name Primary?    Diabetic polyneuropathy associated with type 2 diabetes mellitus Yes    Onychomycosis due to dermatophyte     Corn or callus        "   Plan:       Tiana was seen today for pcp, diabetic foot exam, nail problem and nail care.    Diagnoses and all orders for this visit:    Diabetic polyneuropathy associated with type 2 diabetes mellitus    Onychomycosis due to dermatophyte    Corn or callus      I counseled the patient on her conditions, their implications and medical management.        - Shoe inspection. Diabetic Foot Education. Patient reminded of the importance of good nutrition and blood sugar control to help prevent podiatric complications of diabetes. Patient instructed on proper foot hygeine. We discussed wearing proper shoe gear, daily foot inspections, never walking without protective shoe gear, never putting sharp instruments to feet, routine podiatric nail visits every 2-3 months.      - With patient's permission, nails were aggressively reduced and debrided x 10 to their soft tissue attachment mechanically and with electric , removing all offending nail and debris. Patient relates relief following the procedure. She will continue to monitor the areas daily, inspect her feet, wear protective shoe gear when ambulatory, moisturizer to maintain skin integrity and follow in this office in approximately 2-3 months, sooner p.r.n.    - After cleansing the  area w/ alcohol prep pad the above mentioned hyperkeratosis was trimmed utilizing No 15 scapel, to a smooth base with out incident. Patient tolerated this  well and reported comfort to the area of lateral 5th toe b/l, distal 3rd toe b/l, medial L 5th toe

## 2018-12-03 ENCOUNTER — TELEPHONE (OUTPATIENT)
Dept: OPHTHALMOLOGY | Facility: CLINIC | Age: 83
End: 2018-12-03

## 2019-01-06 RX ORDER — ISOSORBIDE MONONITRATE 30 MG/1
TABLET, EXTENDED RELEASE ORAL
Qty: 90 TABLET | Refills: 0 | Status: SHIPPED | OUTPATIENT
Start: 2019-01-06 | End: 2019-02-20 | Stop reason: SDUPTHER

## 2019-01-06 RX ORDER — INSULIN ASPART 100 [IU]/ML
INJECTION, SUSPENSION SUBCUTANEOUS
Qty: 15 ML | Refills: 0 | Status: SHIPPED | OUTPATIENT
Start: 2019-01-06 | End: 2019-02-20 | Stop reason: SDUPTHER

## 2019-01-09 ENCOUNTER — OFFICE VISIT (OUTPATIENT)
Dept: CARDIOLOGY | Facility: CLINIC | Age: 84
End: 2019-01-09
Payer: MEDICARE

## 2019-01-09 ENCOUNTER — HOSPITAL ENCOUNTER (OUTPATIENT)
Dept: CARDIOLOGY | Facility: CLINIC | Age: 84
Discharge: HOME | End: 2019-01-09
Payer: MEDICARE

## 2019-01-09 ENCOUNTER — HOSPITAL ENCOUNTER (OUTPATIENT)
Dept: CARDIOLOGY | Facility: CLINIC | Age: 84
Discharge: HOME | End: 2019-01-09
Attending: INTERNAL MEDICINE
Payer: MEDICARE

## 2019-01-09 VITALS
BODY MASS INDEX: 24.4 KG/M2 | DIASTOLIC BLOOD PRESSURE: 69 MMHG | WEIGHT: 164.88 LBS | HEIGHT: 68 IN | SYSTOLIC BLOOD PRESSURE: 135 MMHG | HEIGHT: 68 IN | BODY MASS INDEX: 24.99 KG/M2 | WEIGHT: 161 LBS | HEART RATE: 70 BPM | DIASTOLIC BLOOD PRESSURE: 72 MMHG | SYSTOLIC BLOOD PRESSURE: 138 MMHG | HEART RATE: 82 BPM

## 2019-01-09 DIAGNOSIS — I48.19 PERSISTENT ATRIAL FIBRILLATION: ICD-10-CM

## 2019-01-09 DIAGNOSIS — N18.4 CKD (CHRONIC KIDNEY DISEASE) STAGE 4, GFR 15-29 ML/MIN: Chronic | ICD-10-CM

## 2019-01-09 DIAGNOSIS — I50.32 CHRONIC DIASTOLIC CONGESTIVE HEART FAILURE: Primary | ICD-10-CM

## 2019-01-09 DIAGNOSIS — R07.9 CHEST PAIN AT REST: ICD-10-CM

## 2019-01-09 DIAGNOSIS — R00.2 PALPITATIONS: ICD-10-CM

## 2019-01-09 DIAGNOSIS — I27.20 PULMONARY HYPERTENSION: ICD-10-CM

## 2019-01-09 DIAGNOSIS — I31.39 PERICARDIAL EFFUSION: ICD-10-CM

## 2019-01-09 DIAGNOSIS — I10 ESSENTIAL HYPERTENSION: ICD-10-CM

## 2019-01-09 DIAGNOSIS — E78.2 HYPERLIPEMIA, MIXED: ICD-10-CM

## 2019-01-09 DIAGNOSIS — I36.1 NON-RHEUMATIC TRICUSPID VALVE INSUFFICIENCY: ICD-10-CM

## 2019-01-09 LAB
ASCENDING AORTA: 3.31 CM
AV INDEX (PROSTH): 0.9
AV MEAN GRADIENT: 3.86 MMHG
AV PEAK GRADIENT: 6.45 MMHG
AV VALVE AREA: 2.79 CM2
BSA FOR ECHO PROCEDURE: 1.87 M2
CV ECHO LV RWT: 0.49 CM
DOP CALC AO PEAK VEL: 1.27 M/S
DOP CALC AO VTI: 27.85 CM
DOP CALC LVOT AREA: 3.11 CM2
DOP CALC LVOT DIAMETER: 1.99 CM
DOP CALC LVOT STROKE VOLUME: 77.75 CM3
DOP CALCLVOT PEAK VEL VTI: 25.01 CM
E/E' RATIO: 23.71
ECHO LV POSTERIOR WALL: 1.08 CM (ref 0.6–1.1)
FRACTIONAL SHORTENING: 35 % (ref 28–44)
INTERVENTRICULAR SEPTUM: 0.98 CM (ref 0.6–1.1)
IVRT: 0.09 MSEC
LA MAJOR: 7.7 CM
LA MINOR: 7.25 CM
LA WIDTH: 4.68 CM
LEFT ATRIUM SIZE: 5.37 CM
LEFT ATRIUM VOLUME INDEX: 85.6 ML/M2
LEFT ATRIUM VOLUME: 159.54 CM3
LEFT INTERNAL DIMENSION IN SYSTOLE: 2.86 CM (ref 2.1–4)
LEFT VENTRICLE DIASTOLIC VOLUME INDEX: 47.78 ML/M2
LEFT VENTRICLE DIASTOLIC VOLUME: 89.06 ML
LEFT VENTRICLE MASS INDEX: 83.5 G/M2
LEFT VENTRICLE SYSTOLIC VOLUME INDEX: 16.8 ML/M2
LEFT VENTRICLE SYSTOLIC VOLUME: 31.25 ML
LEFT VENTRICULAR INTERNAL DIMENSION IN DIASTOLE: 4.43 CM (ref 3.5–6)
LEFT VENTRICULAR MASS: 155.7 G
LV LATERAL E/E' RATIO: 20.75
LV SEPTAL E/E' RATIO: 27.67
MV PEAK E VEL: 1.66 M/S
PISA TR MAX VEL: 2.5 M/S
PULM VEIN S/D RATIO: 0.72
PV PEAK D VEL: 0.32 M/S
PV PEAK S VEL: 0.23 M/S
RA MAJOR: 7.76 CM
RA PRESSURE: 3 MMHG
RA WIDTH: 5.39 CM
RIGHT VENTRICULAR END-DIASTOLIC DIMENSION: 4.77 CM
SINUS: 4 CM
STJ: 2.34 CM
TDI LATERAL: 0.08
TDI SEPTAL: 0.06
TDI: 0.07
TR MAX PG: 25 MMHG
TRICUSPID ANNULAR PLANE SYSTOLIC EXCURSION: 1.73 CM
TV REST PULMONARY ARTERY PRESSURE: 28 MMHG

## 2019-01-09 PROCEDURE — 1101F PT FALLS ASSESS-DOCD LE1/YR: CPT | Mod: CPTII,HCNC,S$GLB, | Performed by: INTERNAL MEDICINE

## 2019-01-09 PROCEDURE — 99499 RISK ADDL DX/OHS AUDIT: ICD-10-PCS | Mod: HCNC,S$GLB,, | Performed by: INTERNAL MEDICINE

## 2019-01-09 PROCEDURE — 93010 ELECTROCARDIOGRAM REPORT: CPT | Mod: HCNC,S$GLB,, | Performed by: INTERNAL MEDICINE

## 2019-01-09 PROCEDURE — 99999 PR PBB SHADOW E&M-EST. PATIENT-LVL III: CPT | Mod: PBBFAC,HCNC,, | Performed by: INTERNAL MEDICINE

## 2019-01-09 PROCEDURE — 93306 TTE W/DOPPLER COMPLETE: CPT | Mod: HCNC,S$GLB,, | Performed by: INTERNAL MEDICINE

## 2019-01-09 PROCEDURE — 93306 TRANSTHORACIC ECHO (TTE) COMPLETE: ICD-10-PCS | Mod: HCNC,S$GLB,, | Performed by: INTERNAL MEDICINE

## 2019-01-09 PROCEDURE — 93005 ELECTROCARDIOGRAM TRACING: CPT | Mod: HCNC,S$GLB,, | Performed by: INTERNAL MEDICINE

## 2019-01-09 PROCEDURE — 1101F PR PT FALLS ASSESS DOC 0-1 FALLS W/OUT INJ PAST YR: ICD-10-PCS | Mod: CPTII,HCNC,S$GLB, | Performed by: INTERNAL MEDICINE

## 2019-01-09 PROCEDURE — 99214 OFFICE O/P EST MOD 30 MIN: CPT | Mod: HCNC,S$GLB,, | Performed by: INTERNAL MEDICINE

## 2019-01-09 PROCEDURE — 99999 PR PBB SHADOW E&M-EST. PATIENT-LVL III: ICD-10-PCS | Mod: PBBFAC,HCNC,, | Performed by: INTERNAL MEDICINE

## 2019-01-09 PROCEDURE — 93005 EKG 12-LEAD: ICD-10-PCS | Mod: HCNC,S$GLB,, | Performed by: INTERNAL MEDICINE

## 2019-01-09 PROCEDURE — 93010 EKG 12-LEAD: ICD-10-PCS | Mod: HCNC,S$GLB,, | Performed by: INTERNAL MEDICINE

## 2019-01-09 PROCEDURE — 99214 PR OFFICE/OUTPT VISIT, EST, LEVL IV, 30-39 MIN: ICD-10-PCS | Mod: HCNC,S$GLB,, | Performed by: INTERNAL MEDICINE

## 2019-01-09 PROCEDURE — 99499 UNLISTED E&M SERVICE: CPT | Mod: HCNC,S$GLB,, | Performed by: INTERNAL MEDICINE

## 2019-01-09 NOTE — PROGRESS NOTES
Subjective:   Patient ID:  Tiana Mead is a 89 y.o. female who presents for follow-up of Pericardial effusion (4 month f/u )      HPI:   The patient returns for follow up of chronic diastolic heart failure. In addition, the patient has a chronic moderate pericardial effusion, moderate pulmonary hypertension, severe TR, and chronic rate controlled atrial fibrillation. She has had ans still does, random episodes of chest pain only at rest and primarily;y at night. Limited activity but Tiana Mead denies  shortness of breath, palpitations, presyncope , or syncope. Tiana Mead has hypertension with home BPs in the 120s/ .Tiana Mead has dyslipidemia  on high intensity statin At LDL goal  ..    Review of Systems   Constitution: Negative for weakness, malaise/fatigue, weight gain and weight loss.   Eyes: Negative for blurred vision.   Cardiovascular: Positive for chest pain. Negative for claudication, cyanosis, dyspnea on exertion, irregular heartbeat, leg swelling, near-syncope, orthopnea, palpitations, paroxysmal nocturnal dyspnea and syncope.   Respiratory: Negative for cough, shortness of breath and wheezing.    Musculoskeletal: Positive for arthritis and joint pain. Negative for falls and myalgias.   Gastrointestinal: Positive for constipation. Negative for abdominal pain, heartburn, nausea and vomiting.   Genitourinary: Negative for nocturia.   Neurological: Negative for brief paralysis, dizziness, focal weakness, headaches, numbness and paresthesias.   Psychiatric/Behavioral: Negative for altered mental status.       Current Outpatient Medications   Medication Sig    acetaminophen (TYLENOL) 500 MG tablet Take 500 mg by mouth every 6 (six) hours as needed for Pain.    ADVAIR DISKUS 250-50 mcg/dose diskus inhaler INHALE 1 PUFF BY MOUTH INTO THE LUNGS NIGHTLY    albuterol 90 mcg/actuation inhaler Inhale 2 puffs into the lungs every 4 (four) hours as needed.    amLODIPine (NORVASC) 5 MG tablet  "Take 1 tablet (5 mg total) by mouth every morning.    apixaban (ELIQUIS) 2.5 mg Tab Take 1 tablet (2.5 mg total) by mouth 2 (two) times daily.    atorvastatin (LIPITOR) 40 MG tablet TAKE 1 TABLET BY MOUTH EVERY DAY    BD ULTRA-FINE SHORT PEN NEEDLE 31 gauge x 5/16" Ndle USE WITH INSULIN PENS AS DIRECTED    blood sugar diagnostic (ONETOUCH ULTRA TEST) Strp Inject 1 strip into the skin 3 (three) times daily.    brimonidine 0.2% (ALPHAGAN) 0.2 % Drop INT 1 GTT INTO OU BID    diclofenac sodium (VOLTAREN) 1 % Gel Apply topically 4 (four) times daily as needed.    dorzolamide (TRUSOPT) 2 % ophthalmic solution Place 1 drop into both eyes 2 (two) times daily.    furosemide (LASIX) 40 MG tablet Take 1 tablet (40 mg total) by mouth 2 (two) times daily.    furosemide (LASIX) 40 MG tablet TAKE 1 TABLET BY MOUTH TWICE DAILY. ONE TABLET IN THE MORNING AND 1AM TABLET BY MOUTH AT 4PM    insulin aspart protamine-insulin aspart (NOVOLOG MIX 70-30FLEXPEN U-100) 100 unit/mL (70-30) InPn pen INJECT 15 UNITS UNDER THE SKIN EVERY MORNING BEFORE BREAKFAST    ipratropium (ATROVENT) 0.03 % nasal spray USE 2 SPRAYS IN EACH NOSTRIL TWICE DAILY    isosorbide mononitrate (IMDUR) 30 MG 24 hr tablet TAKE 1 TABLET BY MOUTH EVERY MORNING FOR HEART    lancets Misc 1 Device by Misc.(Non-Drug; Combo Route) route 3 (three) times daily before meals. Please provide the insurance company preferred product.    latanoprost 0.005 % ophthalmic solution Place 1 drop into the right eye every evening. (Patient taking differently: Place 1 drop into both eyes every evening. )    levothyroxine (SYNTHROID) 75 MCG tablet TAKE 1 TABLET BY MOUTH EVERY DAY BEFORE BREAKFAST    meclizine (ANTIVERT) 12.5 mg tablet Take 1 tablet (12.5 mg total) by mouth 3 (three) times daily as needed.    metoprolol tartrate (LOPRESSOR) 50 MG tablet Take 1 tablet (50 mg total) by mouth 2 (two) times daily.    multivit-mineral-iron-lutein (CENTRUM SILVER ULTRA WOMEN'S) Tab " "Take 1 tablet by mouth once daily.    nitroGLYCERIN (NITROSTAT) 0.4 MG SL tablet ONE TABLET UNDER THE TONGUE EVERY 5 MINUTES AS NEEDED FOR CHEST PAIN    TRUE METRIX GLUCOSE METER Misc TEST TID    TRUE METRIX GLUCOSE TEST STRIP Strp USE TO TEST BLOOD SUGAR WITH MEALS THREE TIMES DAILY    TRUEPLUS LANCETS 30 gauge Misc USE TO TEST BLOOD SUGAR TID AC    TRUEPLUS LANCETS 33 gauge Misc TESTING THREE TIMES DAILY     No current facility-administered medications for this visit.      Objective:   Physical Exam   Constitutional: She is oriented to person, place, and time. She appears well-developed. No distress.   /69 (BP Location: Left arm, Patient Position: Sitting, BP Method: Large (Automatic))   Pulse 70   Ht 5' 8" (1.727 m)   Wt 74.8 kg (164 lb 14.5 oz)   LMP  (LMP Unknown)   BMI 25.07 kg/m²    HENT:   Head: Normocephalic.   Eyes: Conjunctivae are normal. Pupils are equal, round, and reactive to light. No scleral icterus.   Neck: Neck supple. No JVD present. No thyromegaly present.   Cardiovascular: Normal rate and intact distal pulses. An irregularly irregular rhythm present. PMI is not displaced. Exam reveals no gallop and no friction rub.   Murmur heard.   Medium-pitched mid to late systolic murmur is present with a grade of 1/6 at the lower left sternal border.  No JVD  No edema   Pulmonary/Chest: Effort normal and breath sounds normal. No respiratory distress. She has no wheezes. She has no rales.   Abdominal: Soft. She exhibits no distension. There is no splenomegaly or hepatomegaly. There is no tenderness.   Musculoskeletal: She exhibits no edema or tenderness.   Uses a cane   Neurological: She is alert and oriented to person, place, and time.   Skin: Skin is warm and dry. She is not diaphoretic.   Psychiatric: She has a normal mood and affect. Her behavior is normal.       Lab Results   Component Value Date     10/04/2018    K 4.4 10/04/2018     10/04/2018    CO2 26 10/04/2018    BUN " 29 10/04/2018    CREATININE 1.7 (H) 10/04/2018     (H) 10/04/2018    HGBA1C 7.6 (H) 10/04/2018    MG 2.1 01/02/2018    AST 28 10/04/2018    ALT 19 10/04/2018    ALBUMIN 3.6 10/04/2018    PROT 7.0 10/04/2018    BILITOT 0.7 10/04/2018    WBC 7.76 10/04/2018    HGB 12.4 10/04/2018    HCT 38.2 10/04/2018    MCV 92 10/04/2018     10/04/2018    TSH 6.932 (H) 04/04/2018    CHOL 148 07/12/2018    HDL 54 07/12/2018    LDLCALC 78.8 07/12/2018    TRIG 76 07/12/2018       Assessment:     1. Chronic diastolic congestive heart failure: Compensated    2. Essential hypertension: Good control per home readings    3. Non-rheumatic tricuspid valve insufficiency : Severe   4. Persistent atrial fibrillation    5. Pulmonary hypertension : Moderate   6. Pericardial effusion : Moderate-stable   7. Hyperlipemia, mixed    8. CKD (chronic kidney disease) stage 4, GFR 15-29 ml/min    9. Chest pain at rest : Atypical for myocardial ischemia       Plan:     Tiana was seen today for pericardial effusion.    Diagnoses and all orders for this visit:    Chronic diastolic congestive heart failure  Continue current regimen    Essential hypertension  Continue current regimen    Non-rheumatic tricuspid valve insufficiency    Persistent atrial fibrillation  Continue current regimen    Pulmonary hypertension    Pericardial effusion    Hyperlipemia, mixed  Continue current regimen    CKD (chronic kidney disease) stage 4, GFR 15-29 ml/min    Chest pain at rest

## 2019-01-10 ENCOUNTER — TELEPHONE (OUTPATIENT)
Dept: CARDIOLOGY | Facility: CLINIC | Age: 84
End: 2019-01-10

## 2019-02-04 ENCOUNTER — TELEPHONE (OUTPATIENT)
Dept: OPHTHALMOLOGY | Facility: CLINIC | Age: 84
End: 2019-02-04

## 2019-02-04 NOTE — TELEPHONE ENCOUNTER
----- Message from Netta Lange sent at 2/4/2019 11:50 AM CST -----  Contact: Pt  Patient Requesting Appointment.     Reason for appt.: Follow up, Pt is requesting the [facility]     Communication Preference: [phone]  Additional Information:

## 2019-02-13 ENCOUNTER — LAB VISIT (OUTPATIENT)
Dept: LAB | Facility: HOSPITAL | Age: 84
End: 2019-02-13
Attending: INTERNAL MEDICINE
Payer: MEDICARE

## 2019-02-13 DIAGNOSIS — E03.9 PRIMARY HYPOTHYROIDISM: ICD-10-CM

## 2019-02-13 DIAGNOSIS — N18.30 CKD (CHRONIC KIDNEY DISEASE) STAGE 3, GFR 30-59 ML/MIN: ICD-10-CM

## 2019-02-13 DIAGNOSIS — Z79.4 TYPE 2 DIABETES MELLITUS WITH DIABETIC POLYNEUROPATHY, WITH LONG-TERM CURRENT USE OF INSULIN: ICD-10-CM

## 2019-02-13 DIAGNOSIS — E11.42 TYPE 2 DIABETES MELLITUS WITH DIABETIC POLYNEUROPATHY, WITH LONG-TERM CURRENT USE OF INSULIN: ICD-10-CM

## 2019-02-13 LAB
ALBUMIN SERPL BCP-MCNC: 3.6 G/DL
ALP SERPL-CCNC: 151 U/L
ALT SERPL W/O P-5'-P-CCNC: 17 U/L
ANION GAP SERPL CALC-SCNC: 7 MMOL/L
AST SERPL-CCNC: 21 U/L
BASOPHILS # BLD AUTO: 0.06 K/UL
BASOPHILS NFR BLD: 0.9 %
BILIRUB SERPL-MCNC: 0.5 MG/DL
BUN SERPL-MCNC: 25 MG/DL
CALCIUM SERPL-MCNC: 9.8 MG/DL
CHLORIDE SERPL-SCNC: 104 MMOL/L
CO2 SERPL-SCNC: 28 MMOL/L
CREAT SERPL-MCNC: 1.7 MG/DL
DIFFERENTIAL METHOD: ABNORMAL
EOSINOPHIL # BLD AUTO: 0.2 K/UL
EOSINOPHIL NFR BLD: 3.3 %
ERYTHROCYTE [DISTWIDTH] IN BLOOD BY AUTOMATED COUNT: 14.2 %
EST. GFR  (AFRICAN AMERICAN): 29.5 ML/MIN/1.73 M^2
EST. GFR  (NON AFRICAN AMERICAN): 25.6 ML/MIN/1.73 M^2
ESTIMATED AVG GLUCOSE: 163 MG/DL
GLUCOSE SERPL-MCNC: 132 MG/DL
HBA1C MFR BLD HPLC: 7.3 %
HCT VFR BLD AUTO: 37.1 %
HGB BLD-MCNC: 12 G/DL
IMM GRANULOCYTES # BLD AUTO: 0.02 K/UL
IMM GRANULOCYTES NFR BLD AUTO: 0.3 %
LYMPHOCYTES # BLD AUTO: 1.5 K/UL
LYMPHOCYTES NFR BLD: 22.1 %
MCH RBC QN AUTO: 30.3 PG
MCHC RBC AUTO-ENTMCNC: 32.3 G/DL
MCV RBC AUTO: 94 FL
MONOCYTES # BLD AUTO: 0.9 K/UL
MONOCYTES NFR BLD: 13.4 %
NEUTROPHILS # BLD AUTO: 4 K/UL
NEUTROPHILS NFR BLD: 60 %
NRBC BLD-RTO: 0 /100 WBC
PLATELET # BLD AUTO: 161 K/UL
PMV BLD AUTO: 12 FL
POTASSIUM SERPL-SCNC: 4.4 MMOL/L
PROT SERPL-MCNC: 6.9 G/DL
RBC # BLD AUTO: 3.96 M/UL
SODIUM SERPL-SCNC: 139 MMOL/L
TSH SERPL DL<=0.005 MIU/L-ACNC: 3.95 UIU/ML
WBC # BLD AUTO: 6.62 K/UL

## 2019-02-13 PROCEDURE — 80053 COMPREHEN METABOLIC PANEL: CPT | Mod: HCNC

## 2019-02-13 PROCEDURE — 85025 COMPLETE CBC W/AUTO DIFF WBC: CPT | Mod: HCNC

## 2019-02-13 PROCEDURE — 84443 ASSAY THYROID STIM HORMONE: CPT | Mod: HCNC

## 2019-02-13 PROCEDURE — 83036 HEMOGLOBIN GLYCOSYLATED A1C: CPT | Mod: HCNC

## 2019-02-13 PROCEDURE — 36415 COLL VENOUS BLD VENIPUNCTURE: CPT | Mod: HCNC,PO

## 2019-02-20 ENCOUNTER — OFFICE VISIT (OUTPATIENT)
Dept: INTERNAL MEDICINE | Facility: CLINIC | Age: 84
End: 2019-02-20
Payer: MEDICARE

## 2019-02-20 VITALS
DIASTOLIC BLOOD PRESSURE: 68 MMHG | WEIGHT: 165 LBS | SYSTOLIC BLOOD PRESSURE: 110 MMHG | HEART RATE: 55 BPM | BODY MASS INDEX: 25.01 KG/M2 | HEIGHT: 68 IN

## 2019-02-20 DIAGNOSIS — I77.1 TORTUOUS AORTA: ICD-10-CM

## 2019-02-20 DIAGNOSIS — M15.9 GENERALIZED OSTEOARTHRITIS OF MULTIPLE SITES: ICD-10-CM

## 2019-02-20 DIAGNOSIS — N18.30 CKD (CHRONIC KIDNEY DISEASE) STAGE 3, GFR 30-59 ML/MIN: ICD-10-CM

## 2019-02-20 DIAGNOSIS — J45.41 MODERATE PERSISTENT ASTHMA WITH EXACERBATION: ICD-10-CM

## 2019-02-20 DIAGNOSIS — E03.9 PRIMARY HYPOTHYROIDISM: ICD-10-CM

## 2019-02-20 DIAGNOSIS — I25.2 OLD MI (MYOCARDIAL INFARCTION): ICD-10-CM

## 2019-02-20 DIAGNOSIS — E11.42 TYPE 2 DIABETES MELLITUS WITH DIABETIC POLYNEUROPATHY, WITH LONG-TERM CURRENT USE OF INSULIN: Primary | ICD-10-CM

## 2019-02-20 DIAGNOSIS — I48.19 PERSISTENT ATRIAL FIBRILLATION: ICD-10-CM

## 2019-02-20 DIAGNOSIS — Z79.01 LONG TERM CURRENT USE OF ANTICOAGULANT THERAPY: ICD-10-CM

## 2019-02-20 DIAGNOSIS — E11.21 TYPE 2 DIABETES MELLITUS WITH DIABETIC NEPHROPATHY, WITH LONG-TERM CURRENT USE OF INSULIN: ICD-10-CM

## 2019-02-20 DIAGNOSIS — H61.23 BILATERAL IMPACTED CERUMEN: ICD-10-CM

## 2019-02-20 DIAGNOSIS — Z79.4 TYPE 2 DIABETES MELLITUS WITH DIABETIC POLYNEUROPATHY, WITH LONG-TERM CURRENT USE OF INSULIN: Primary | ICD-10-CM

## 2019-02-20 DIAGNOSIS — I27.20 PULMONARY HYPERTENSION: ICD-10-CM

## 2019-02-20 DIAGNOSIS — H83.3X1 NOISE-INDUCED HEARING LOSS OF RIGHT EAR, UNSPECIFIED HEARING STATUS ON CONTRALATERAL SIDE: ICD-10-CM

## 2019-02-20 DIAGNOSIS — E11.42 DIABETIC POLYNEUROPATHY ASSOCIATED WITH TYPE 2 DIABETES MELLITUS: ICD-10-CM

## 2019-02-20 DIAGNOSIS — Z79.4 TYPE 2 DIABETES MELLITUS WITH DIABETIC NEPHROPATHY, WITH LONG-TERM CURRENT USE OF INSULIN: ICD-10-CM

## 2019-02-20 DIAGNOSIS — I70.0 AORTIC ATHEROSCLEROSIS: ICD-10-CM

## 2019-02-20 DIAGNOSIS — E78.2 HYPERLIPEMIA, MIXED: ICD-10-CM

## 2019-02-20 DIAGNOSIS — I82.409 DEEP VEIN THROMBOSIS (DVT) DURING CURRENT HOSPITALIZATION: ICD-10-CM

## 2019-02-20 PROBLEM — R07.9 CHEST PAIN AT REST: Status: RESOLVED | Noted: 2019-01-09 | Resolved: 2019-02-20

## 2019-02-20 PROCEDURE — 99499 RISK ADDL DX/OHS AUDIT: ICD-10-PCS | Mod: HCNC,S$GLB,, | Performed by: INTERNAL MEDICINE

## 2019-02-20 PROCEDURE — 1101F PR PT FALLS ASSESS DOC 0-1 FALLS W/OUT INJ PAST YR: ICD-10-PCS | Mod: HCNC,CPTII,S$GLB, | Performed by: INTERNAL MEDICINE

## 2019-02-20 PROCEDURE — 99214 PR OFFICE/OUTPT VISIT, EST, LEVL IV, 30-39 MIN: ICD-10-PCS | Mod: HCNC,S$GLB,, | Performed by: INTERNAL MEDICINE

## 2019-02-20 PROCEDURE — 99999 PR PBB SHADOW E&M-EST. PATIENT-LVL IV: CPT | Mod: PBBFAC,HCNC,, | Performed by: INTERNAL MEDICINE

## 2019-02-20 PROCEDURE — 99214 OFFICE O/P EST MOD 30 MIN: CPT | Mod: HCNC,S$GLB,, | Performed by: INTERNAL MEDICINE

## 2019-02-20 PROCEDURE — 99999 PR PBB SHADOW E&M-EST. PATIENT-LVL IV: ICD-10-PCS | Mod: PBBFAC,HCNC,, | Performed by: INTERNAL MEDICINE

## 2019-02-20 PROCEDURE — 99499 UNLISTED E&M SERVICE: CPT | Mod: HCNC,S$GLB,, | Performed by: INTERNAL MEDICINE

## 2019-02-20 PROCEDURE — 1101F PT FALLS ASSESS-DOCD LE1/YR: CPT | Mod: HCNC,CPTII,S$GLB, | Performed by: INTERNAL MEDICINE

## 2019-02-20 RX ORDER — FLUTICASONE PROPIONATE AND SALMETEROL 250; 50 UG/1; UG/1
POWDER RESPIRATORY (INHALATION)
Qty: 1 EACH | Refills: 11 | Status: SHIPPED | OUTPATIENT
Start: 2019-02-20 | End: 2019-11-20 | Stop reason: SDUPTHER

## 2019-02-20 RX ORDER — FUROSEMIDE 40 MG/1
40 TABLET ORAL 2 TIMES DAILY
Qty: 180 TABLET | Refills: 3 | Status: SHIPPED | OUTPATIENT
Start: 2019-02-20 | End: 2019-11-20 | Stop reason: SDUPTHER

## 2019-02-20 RX ORDER — ATORVASTATIN CALCIUM 40 MG/1
40 TABLET, FILM COATED ORAL DAILY
Qty: 90 TABLET | Refills: 3 | Status: SHIPPED | OUTPATIENT
Start: 2019-02-20 | End: 2019-11-20 | Stop reason: SDUPTHER

## 2019-02-20 RX ORDER — ISOSORBIDE MONONITRATE 30 MG/1
TABLET, EXTENDED RELEASE ORAL
Qty: 90 TABLET | Refills: 3 | Status: SHIPPED | OUTPATIENT
Start: 2019-02-20 | End: 2019-07-24 | Stop reason: SDUPTHER

## 2019-02-20 RX ORDER — CALCIUM CITRATE/VITAMIN D3 200MG-6.25
TABLET ORAL
Qty: 300 STRIP | Refills: 4 | Status: ON HOLD | OUTPATIENT
Start: 2019-02-20 | End: 2020-04-02

## 2019-02-20 RX ORDER — INSULIN ASPART 100 [IU]/ML
INJECTION, SUSPENSION SUBCUTANEOUS
Qty: 15 ML | Refills: 0 | Status: SHIPPED | OUTPATIENT
Start: 2019-02-20 | End: 2019-10-03 | Stop reason: SDUPTHER

## 2019-02-20 NOTE — PATIENT INSTRUCTIONS
Results for orders placed or performed in visit on 02/13/19   Hemoglobin A1c   Result Value Ref Range    Hemoglobin A1C 7.3 (H) 4.0 - 5.6 %    Estimated Avg Glucose 163 (H) 68 - 131 mg/dL   Comprehensive metabolic panel   Result Value Ref Range    Sodium 139 136 - 145 mmol/L    Potassium 4.4 3.5 - 5.1 mmol/L    Chloride 104 95 - 110 mmol/L    CO2 28 23 - 29 mmol/L    Glucose 132 (H) 70 - 110 mg/dL    BUN, Bld 25 10 - 30 mg/dL    Creatinine 1.7 (H) 0.5 - 1.4 mg/dL    Calcium 9.8 8.7 - 10.5 mg/dL    Total Protein 6.9 6.0 - 8.4 g/dL    Albumin 3.6 3.5 - 5.2 g/dL    Total Bilirubin 0.5 0.1 - 1.0 mg/dL    Alkaline Phosphatase 151 (H) 55 - 135 U/L    AST 21 10 - 40 U/L    ALT 17 10 - 44 U/L    Anion Gap 7 (L) 8 - 16 mmol/L    eGFR if African American 29.5 (A) >60 mL/min/1.73 m^2    eGFR if non African American 25.6 (A) >60 mL/min/1.73 m^2   CBC auto differential   Result Value Ref Range    WBC 6.62 3.90 - 12.70 K/uL    RBC 3.96 (L) 4.00 - 5.40 M/uL    Hemoglobin 12.0 12.0 - 16.0 g/dL    Hematocrit 37.1 37.0 - 48.5 %    MCV 94 82 - 98 fL    MCH 30.3 27.0 - 31.0 pg    MCHC 32.3 32.0 - 36.0 g/dL    RDW 14.2 11.5 - 14.5 %    Platelets 161 150 - 350 K/uL    MPV 12.0 9.2 - 12.9 fL    Immature Granulocytes 0.3 0.0 - 0.5 %    Gran # (ANC) 4.0 1.8 - 7.7 K/uL    Immature Grans (Abs) 0.02 0.00 - 0.04 K/uL    Lymph # 1.5 1.0 - 4.8 K/uL    Mono # 0.9 0.3 - 1.0 K/uL    Eos # 0.2 0.0 - 0.5 K/uL    Baso # 0.06 0.00 - 0.20 K/uL    nRBC 0 0 /100 WBC    Gran% 60.0 38.0 - 73.0 %    Lymph% 22.1 18.0 - 48.0 %    Mono% 13.4 4.0 - 15.0 %    Eosinophil% 3.3 0.0 - 8.0 %    Basophil% 0.9 0.0 - 1.9 %    Differential Method Automated    TSH   Result Value Ref Range    TSH 3.950 0.400 - 4.000 uIU/mL

## 2019-03-06 ENCOUNTER — OFFICE VISIT (OUTPATIENT)
Dept: PODIATRY | Facility: CLINIC | Age: 84
End: 2019-03-06
Payer: MEDICARE

## 2019-03-06 VITALS
HEART RATE: 67 BPM | SYSTOLIC BLOOD PRESSURE: 121 MMHG | RESPIRATION RATE: 18 BRPM | HEIGHT: 69 IN | DIASTOLIC BLOOD PRESSURE: 69 MMHG | BODY MASS INDEX: 24.69 KG/M2 | WEIGHT: 166.69 LBS

## 2019-03-06 DIAGNOSIS — E11.42 DIABETIC POLYNEUROPATHY ASSOCIATED WITH TYPE 2 DIABETES MELLITUS: Primary | ICD-10-CM

## 2019-03-06 DIAGNOSIS — B35.1 ONYCHOMYCOSIS DUE TO DERMATOPHYTE: ICD-10-CM

## 2019-03-06 DIAGNOSIS — L84 CORN OR CALLUS: ICD-10-CM

## 2019-03-06 PROCEDURE — 99999 PR PBB SHADOW E&M-EST. PATIENT-LVL III: ICD-10-PCS | Mod: PBBFAC,HCNC,, | Performed by: PODIATRIST

## 2019-03-06 PROCEDURE — 11721 DEBRIDE NAIL 6 OR MORE: CPT | Mod: 59,Q9,HCNC,S$GLB | Performed by: PODIATRIST

## 2019-03-06 PROCEDURE — 11057 PARNG/CUTG B9 HYPRKR LES >4: CPT | Mod: Q9,HCNC,S$GLB, | Performed by: PODIATRIST

## 2019-03-06 PROCEDURE — 99499 UNLISTED E&M SERVICE: CPT | Mod: HCNC,S$GLB,, | Performed by: PODIATRIST

## 2019-03-06 PROCEDURE — 99999 PR PBB SHADOW E&M-EST. PATIENT-LVL III: CPT | Mod: PBBFAC,HCNC,, | Performed by: PODIATRIST

## 2019-03-06 PROCEDURE — 11057 PR TRIM BENIGN HYPERKERATOTIC SKIN LESION,>4: ICD-10-PCS | Mod: Q9,HCNC,S$GLB, | Performed by: PODIATRIST

## 2019-03-06 PROCEDURE — 11721 PR DEBRIDEMENT OF NAILS, 6 OR MORE: ICD-10-PCS | Mod: 59,Q9,HCNC,S$GLB | Performed by: PODIATRIST

## 2019-03-06 PROCEDURE — 99499 NO LOS: ICD-10-PCS | Mod: HCNC,S$GLB,, | Performed by: PODIATRIST

## 2019-03-06 NOTE — PROGRESS NOTES
Subjective:      Patient ID: Tiana Mead is a 89 y.o. female.    Chief Complaint: PCP (Belen Bradley MD 2/20/19); Diabetic Foot Exam; Nail Care; and Callouses (Corns )    Tiana is a 89 y.o. female who presents to the clinic for evaluation and treatment of high risk feet. Tiana has a past medical history of Allergy, Anemia, Arthritis, Asthma, CHF (congestive heart failure) (5/2/2017), Chronic kidney disease, Degenerative disc disease, Diabetes mellitus type II, Essential hypertension (7/3/2012), Glaucoma, History of pseudogout (8/23/2012), Hyperlipidemia, Hypertension, Iritis, and Thyroid disease. The patient's chief complaint is long, thick toenails and calluses on both feet. Routine trimming helps. No other pedal concerns today.  This patient has documented high risk feet requiring routine maintenance secondary to diabetes mellitis and those secondary complications of diabetes, as mentioned..    PCP: Belen Bradley MD    Date Last Seen by PCP:   Chief Complaint   Patient presents with    PCP     Belen Bradley MD 2/20/19    Diabetic Foot Exam    Nail Care    Callouses     Corns          Current shoe gear:  Dm shoes      Hemoglobin A1C   Date Value Ref Range Status   02/13/2019 7.3 (H) 4.0 - 5.6 % Final     Comment:     ADA Screening Guidelines:  5.7-6.4%  Consistent with prediabetes  >or=6.5%  Consistent with diabetes  High levels of fetal hemoglobin interfere with the HbA1C  assay. Heterozygous hemoglobin variants (HbS, HgC, etc)do  not significantly interfere with this assay.   However, presence of multiple variants may affect accuracy.     10/04/2018 7.6 (H) 4.0 - 5.6 % Final     Comment:     ADA Screening Guidelines:  5.7-6.4%  Consistent with prediabetes  >or=6.5%  Consistent with diabetes  High levels of fetal hemoglobin interfere with the HbA1C  assay. Heterozygous hemoglobin variants (HbS, HgC, etc)do  not significantly interfere with this assay.   However, presence of multiple  variants may affect accuracy.     04/17/2018 6.7 (H) 4.0 - 5.6 % Final     Comment:     According to ADA guidelines, hemoglobin A1c <7.0% represents  optimal control in non-pregnant diabetic patients. Different  metrics may apply to specific patient populations.   Standards of Medical Care in Diabetes-2016.  For the purpose of screening for the presence of diabetes:  <5.7%     Consistent with the absence of diabetes  5.7-6.4%  Consistent with increasing risk for diabetes   (prediabetes)  >or=6.5%  Consistent with diabetes  Currently, no consensus exists for use of hemoglobin A1c  for diagnosis of diabetes for children.  This Hemoglobin A1c assay has significant interference with fetal   hemoglobin   (HbF). The results are invalid for patients with abnormal amounts of   HbF,   including those with known Hereditary Persistence   of Fetal Hemoglobin. Heterozygous hemoglobin variants (HbAS, HbAC,   HbAD, HbAE, HbA2) do not significantly interfere with this assay;   however, presence of multiple variants in a sample may impact the %   interference.               Patient Active Problem List   Diagnosis    Hyperlipemia, mixed    Generalized osteoarthritis of multiple sites    Chronic iritis - Left Eye    Nuclear sclerosis - Right Eye    Pseudophakia - Left Eye    Osteoarthritis of both knees    Chondrocalcinosis    Peripheral angiopathy    Pseudogout, both knees.    Hyperuricemia    Helicobacter pylori gastritis    Old MI (myocardial infarction), 2013    Persistent atrial fibrillation    Type 2 diabetes mellitus with diabetic polyneuropathy, with long-term current use of insulin    Long term current use of anticoagulant therapy, eliquis    History of chest pain at rest    Fuchs' corneal dystrophy    Pericardial effusion    H/O Deep vein thrombosis (DVT)    Aortic atherosclerosis    Chronic diastolic congestive heart failure    Type 2 diabetes mellitus with diabetic nephropathy, with long-term current  "use of insulin    Diabetic polyneuropathy associated with type 2 diabetes mellitus    CKD (chronic kidney disease) stage 3, GFR 30-59 ml/min, eGFR 32     Primary hypothyroidism    Moderate persistent asthma with exacerbation    Primary open angle glaucoma (POAG) of both eyes, moderate stage    Pulmonary hypertension    Non-rheumatic tricuspid valve insufficiency    Tortuous aorta       Current Outpatient Medications on File Prior to Visit   Medication Sig Dispense Refill    acetaminophen (TYLENOL) 500 MG tablet Take 500 mg by mouth every 6 (six) hours as needed for Pain.      albuterol 90 mcg/actuation inhaler Inhale 2 puffs into the lungs every 4 (four) hours as needed. 1 Inhaler 11    amLODIPine (NORVASC) 5 MG tablet Take 1 tablet (5 mg total) by mouth every morning. 90 tablet 3    apixaban (ELIQUIS) 2.5 mg Tab Take 1 tablet (2.5 mg total) by mouth 2 (two) times daily. 60 tablet 5    atorvastatin (LIPITOR) 40 MG tablet Take 1 tablet (40 mg total) by mouth once daily. 90 tablet 3    BD ULTRA-FINE SHORT PEN NEEDLE 31 gauge x 5/16" Ndle USE WITH INSULIN PENS AS DIRECTED 100 each 4    blood sugar diagnostic (ONETOUCH ULTRA TEST) Strp Inject 1 strip into the skin 3 (three) times daily. 300 each 4    brimonidine 0.2% (ALPHAGAN) 0.2 % Drop INT 1 GTT INTO OU BID  3    diclofenac sodium (VOLTAREN) 1 % Gel Apply topically 4 (four) times daily as needed. 100 Tube 3    dorzolamide (TRUSOPT) 2 % ophthalmic solution Place 1 drop into both eyes 2 (two) times daily. 10 mL 4    fluticasone-salmeterol 250-50 mcg/dose (ADVAIR DISKUS) 250-50 mcg/dose diskus inhaler INHALE 1 PUFF BY MOUTH INTO THE LUNGS NIGHTLY 1 each 11    furosemide (LASIX) 40 MG tablet Take 1 tablet (40 mg total) by mouth 2 (two) times daily. 180 tablet 3    insulin aspart protamine-insulin aspart (NOVOLOG MIX 70-30FLEXPEN U-100) 100 unit/mL (70-30) InPn pen INJECT 10 UNITS UNDER THE SKIN EVERY MORNING BEFORE BREAKFAST 15 mL 0    " ipratropium (ATROVENT) 0.03 % nasal spray USE 2 SPRAYS IN EACH NOSTRIL TWICE DAILY 60 mL 1    isosorbide mononitrate (IMDUR) 30 MG 24 hr tablet TAKE 1 TABLET BY MOUTH EVERY MORNING FOR HEART, to prevent chest pain and reduce shortness of breath 90 tablet 3    lancets Misc 1 Device by Misc.(Non-Drug; Combo Route) route 3 (three) times daily before meals. Please provide the insurance company preferred product. 300 each 4    latanoprost 0.005 % ophthalmic solution Place 1 drop into the right eye every evening. (Patient taking differently: Place 1 drop into both eyes every evening. ) 3 Bottle 6    levothyroxine (SYNTHROID) 75 MCG tablet TAKE 1 TABLET BY MOUTH EVERY DAY BEFORE BREAKFAST 90 tablet 3    meclizine (ANTIVERT) 12.5 mg tablet Take 1 tablet (12.5 mg total) by mouth 3 (three) times daily as needed. 50 tablet 1    metoprolol tartrate (LOPRESSOR) 50 MG tablet Take 1 tablet (50 mg total) by mouth 2 (two) times daily. 180 tablet 4    multivit-mineral-iron-lutein (CENTRUM SILVER ULTRA WOMEN'S) Tab Take 1 tablet by mouth once daily.      nitroGLYCERIN (NITROSTAT) 0.4 MG SL tablet ONE TABLET UNDER THE TONGUE EVERY 5 MINUTES AS NEEDED FOR CHEST PAIN 150 tablet 3    TRUE METRIX GLUCOSE METER Misc TEST TID  4    TRUE METRIX GLUCOSE TEST STRIP Strp USE TO TEST BLOOD SUGAR WITH MEALS THREE TIMES DAILY 300 strip 4    TRUEPLUS LANCETS 30 gauge Misc USE TO TEST BLOOD SUGAR TID AC  2    TRUEPLUS LANCETS 33 gauge Misc TESTING THREE TIMES DAILY 300 each 0     No current facility-administered medications on file prior to visit.        Review of patient's allergies indicates:   Allergen Reactions    Codeine      Other reaction(s): Itching  Other reaction(s): Nausea       Past Surgical History:   Procedure Laterality Date    CARDIAC CATHETERIZATION      CATARACT EXTRACTION W/  INTRAOCULAR LENS IMPLANT Left n/a    CHOLECYSTECTOMY      EXTRACTION, CATARACT, WITH IOL INSERTION Right 10/8/2018    Performed by Gume OROURKE  "[facility] at [facility]    EYE SURGERY      gall stone      HYSTERECTOMY      VARICOSE VEIN SURGERY         Family History   Problem Relation Age of Onset    Diabetes Mother     Hypertension Mother     Glaucoma Mother     Hypertension Father     Diabetes Son     No Known Problems Daughter     Diabetes Daughter     No Known Problems Son        Social History     Socioeconomic History    Marital status:      Spouse name: Not on file    Number of children: Not on file    Years of education: Not on file    Highest education level: Not on file   Social Needs    Financial resource strain: Not on file    Food insecurity - worry: Not on file    Food insecurity - inability: Not on file    Transportation needs - medical: Not on file    Transportation needs - non-medical: Not on file   Occupational History    Not on file   Tobacco Use    Smoking status: Never Smoker    Smokeless tobacco: Never Used   Substance and Sexual Activity    Alcohol use: No    Drug use: No    Sexual activity: No   Other Topics Concern    Not on file   Social History Narrative    Not on file           Review of Systems   Constitution: Negative for chills, decreased appetite and fever.   Cardiovascular: Negative for leg swelling.   Skin: Positive for dry skin and nail changes. Negative for color change, flushing, itching and poor wound healing.   Musculoskeletal: Positive for arthritis. Negative for joint pain, joint swelling and myalgias.   Gastrointestinal: Negative for nausea and vomiting.   Neurological: Negative for loss of balance, numbness and paresthesias.           Objective:       Vitals:    03/06/19 0859   BP: 121/69   Pulse: 67   Resp: 18   Weight: 75.6 kg (166 lb 10.7 oz)   Height: 5' 9" (1.753 m)   PainSc: 0-No pain        Physical Exam   Constitutional: She is oriented to person, place, and time. She appears well-developed and well-nourished.   Cardiovascular:   Dorsalis pedis and posterior tibial pulses are " diminished Bilaterally. Toes are cool to touch. Feet are warm proximally.There is decreased digital hair. Skin is atrophic, slightly hyperpigmented, and mildly edematous       Musculoskeletal: Normal range of motion. She exhibits no edema or tenderness.   Adequate joint range of motion without pain, limitation, nor crepitation Bilateral feet and ankle joints. Muscle strength is 5/5 in all groups bilaterally.         Neurological: She is alert and oriented to person, place, and time.   [place] 5.07 monofilamant testing is diminished Ney feet. Sharp/dull sensation diminished Bilaterally. Light touch absent Bilaterally.       Skin: Skin is warm, dry and intact. No ecchymosis and no lesion noted. No erythema.   Nails x10 are elongated by  2-4mm's, thickened by 2-5 mm's, dystrophic, and are darkened in  coloration . Xerosis Bilaterally. No open lesions noted.    Hyperkeratotic tissue noted to lateral 5th toe b/l, distal 3rd toe b/l, medial L 5th toe      Psychiatric: She has a normal mood and affect. Her behavior is normal.   Vitals reviewed.            Assessment:       Encounter Diagnoses   Name Primary?    Diabetic polyneuropathy associated with type 2 diabetes mellitus Yes    Onychomycosis due to dermatophyte     Corn or callus          Plan:       Tiana was seen today for pcp, diabetic foot exam, nail care and callouses.    Diagnoses and all orders for this visit:    Diabetic polyneuropathy associated with type 2 diabetes mellitus  -     DIABETIC SHOES FOR HOME USE    Onychomycosis due to dermatophyte  -     DIABETIC SHOES FOR HOME USE    Corn or callus  -     DIABETIC SHOES FOR HOME USE      I counseled the patient on her conditions, their implications and medical management.        - Shoe inspection. Diabetic Foot Education. Patient reminded of the importance of good nutrition and blood sugar control to help prevent podiatric complications of diabetes. Patient instructed on proper foot hygeine. We  discussed wearing proper shoe gear, daily foot inspections, never walking without protective shoe gear, never putting sharp instruments to feet, routine podiatric nail visits every 2-3 months.      - With patient's permission, nails were aggressively reduced and debrided x 10 to their soft tissue attachment mechanically and with electric , removing all offending nail and debris. Patient relates relief following the procedure. She will continue to monitor the areas daily, inspect her feet, wear protective shoe gear when ambulatory, moisturizer to maintain skin integrity and follow in this office in approximately 2-3 months, sooner p.r.n.    - After cleansing the  area w/ alcohol prep pad the above mentioned hyperkeratosis was trimmed utilizing No 15 scapel, to a smooth base with out incident. Patient tolerated this  well and reported comfort to the area of lateral 5th toe b/l, distal 3rd toe b/l, medial L 5th toe

## 2019-03-08 ENCOUNTER — NURSE TRIAGE (OUTPATIENT)
Dept: ADMINISTRATIVE | Facility: CLINIC | Age: 84
End: 2019-03-08

## 2019-03-09 NOTE — TELEPHONE ENCOUNTER
Patient called to report the following:     -not sure if I took my medication   -pill box is filled for today   -bp is 111/57 HR 62  -advised home care, taking medication as prescribed, keeping med log   -advised on when to call back       Reason for Disposition   Caller has medication question only, adult not sick, and triager answers question    Protocols used: MEDICATION QUESTION CALL-A-AH

## 2019-03-18 ENCOUNTER — TELEPHONE (OUTPATIENT)
Dept: PODIATRY | Facility: CLINIC | Age: 84
End: 2019-03-18

## 2019-03-18 NOTE — TELEPHONE ENCOUNTER
Spoke to patient and she said she lost her RX for diabetic shoes. Printed a new RX and send it out to her house with Vendor list

## 2019-03-18 NOTE — TELEPHONE ENCOUNTER
----- Message from Lia Soler sent at 3/18/2019  9:41 AM CDT -----  Contact: [email or web address]  Patient needs an prescription mailed to her for diabetic shoes please call patient.

## 2019-03-25 ENCOUNTER — TELEPHONE (OUTPATIENT)
Dept: PODIATRY | Facility: CLINIC | Age: 84
End: 2019-03-25

## 2019-03-29 ENCOUNTER — TELEPHONE (OUTPATIENT)
Dept: INTERNAL MEDICINE | Facility: CLINIC | Age: 84
End: 2019-03-29

## 2019-03-29 ENCOUNTER — HOSPITAL ENCOUNTER (OUTPATIENT)
Dept: RADIOLOGY | Facility: HOSPITAL | Age: 84
Discharge: HOME | End: 2019-03-29
Attending: INTERNAL MEDICINE
Payer: MEDICARE

## 2019-03-29 ENCOUNTER — OFFICE VISIT (OUTPATIENT)
Dept: INTERNAL MEDICINE | Facility: CLINIC | Age: 84
End: 2019-03-29
Payer: MEDICARE

## 2019-03-29 VITALS
BODY MASS INDEX: 23.87 KG/M2 | OXYGEN SATURATION: 97 % | WEIGHT: 161.19 LBS | SYSTOLIC BLOOD PRESSURE: 122 MMHG | TEMPERATURE: 98 F | DIASTOLIC BLOOD PRESSURE: 68 MMHG | HEART RATE: 62 BPM | HEIGHT: 69 IN

## 2019-03-29 DIAGNOSIS — R05.9 COUGH: ICD-10-CM

## 2019-03-29 DIAGNOSIS — R06.2 WHEEZING: ICD-10-CM

## 2019-03-29 DIAGNOSIS — R05.9 COUGH: Primary | ICD-10-CM

## 2019-03-29 PROCEDURE — 71046 XR CHEST PA AND LATERAL: ICD-10-PCS | Mod: 26,HCNC,, | Performed by: RADIOLOGY

## 2019-03-29 PROCEDURE — 71046 X-RAY EXAM CHEST 2 VIEWS: CPT | Mod: TC,HCNC

## 2019-03-29 PROCEDURE — 99999 PR PBB SHADOW E&M-EST. PATIENT-LVL IV: CPT | Mod: PBBFAC,HCNC,, | Performed by: INTERNAL MEDICINE

## 2019-03-29 PROCEDURE — 1101F PR PT FALLS ASSESS DOC 0-1 FALLS W/OUT INJ PAST YR: ICD-10-PCS | Mod: HCNC,CPTII,S$GLB, | Performed by: INTERNAL MEDICINE

## 2019-03-29 PROCEDURE — 71046 X-RAY EXAM CHEST 2 VIEWS: CPT | Mod: 26,HCNC,, | Performed by: RADIOLOGY

## 2019-03-29 PROCEDURE — 94640 AIRWAY INHALATION TREATMENT: CPT | Mod: HCNC,S$GLB,, | Performed by: INTERNAL MEDICINE

## 2019-03-29 PROCEDURE — 99214 OFFICE O/P EST MOD 30 MIN: CPT | Mod: HCNC,25,S$GLB, | Performed by: INTERNAL MEDICINE

## 2019-03-29 PROCEDURE — 99214 PR OFFICE/OUTPT VISIT, EST, LEVL IV, 30-39 MIN: ICD-10-PCS | Mod: HCNC,25,S$GLB, | Performed by: INTERNAL MEDICINE

## 2019-03-29 PROCEDURE — 1101F PT FALLS ASSESS-DOCD LE1/YR: CPT | Mod: HCNC,CPTII,S$GLB, | Performed by: INTERNAL MEDICINE

## 2019-03-29 PROCEDURE — 99999 PR PBB SHADOW E&M-EST. PATIENT-LVL IV: ICD-10-PCS | Mod: PBBFAC,HCNC,, | Performed by: INTERNAL MEDICINE

## 2019-03-29 PROCEDURE — 94640 PR INHAL RX, AIRWAY OBST/DX SPUTUM INDUCT: ICD-10-PCS | Mod: HCNC,S$GLB,, | Performed by: INTERNAL MEDICINE

## 2019-03-29 RX ORDER — IPRATROPIUM BROMIDE AND ALBUTEROL SULFATE 2.5; .5 MG/3ML; MG/3ML
3 SOLUTION RESPIRATORY (INHALATION)
Status: COMPLETED | OUTPATIENT
Start: 2019-03-29 | End: 2019-03-29

## 2019-03-29 RX ORDER — DOXYCYCLINE 100 MG/1
100 CAPSULE ORAL 2 TIMES DAILY
Qty: 20 CAPSULE | Refills: 0 | Status: SHIPPED | OUTPATIENT
Start: 2019-03-29 | End: 2019-04-08

## 2019-03-29 RX ADMIN — IPRATROPIUM BROMIDE AND ALBUTEROL SULFATE 3 ML: 2.5; .5 SOLUTION RESPIRATORY (INHALATION) at 02:03

## 2019-03-29 NOTE — PROGRESS NOTES
Subjective:       Patient ID: Tiana Mead is a 89 y.o. female.    Chief Complaint: Sinusitis (nasal congestion)    89 year old lady with history of asthma says she cannot get rid of her cold.  She has begun to wheeze after 2 weeks of cold.  No fever or chills.  Has history of a fib on Eliquis    Review of Systems   Constitutional: Negative for activity change, chills, fatigue and fever.   HENT: Negative for congestion, ear pain, nosebleeds, postnasal drip, sinus pressure and sore throat.    Eyes: Negative.  Negative for visual disturbance.   Respiratory: Negative for cough, chest tightness, shortness of breath and wheezing.    Cardiovascular: Negative for chest pain.   Gastrointestinal: Negative for abdominal pain, diarrhea, nausea and vomiting.   Genitourinary: Negative for difficulty urinating, dysuria, frequency and urgency.   Musculoskeletal: Negative for arthralgias and neck stiffness.   Skin: Negative for rash.   Neurological: Negative for dizziness, weakness and headaches.   Psychiatric/Behavioral: Negative for sleep disturbance. The patient is not nervous/anxious.        Objective:      Physical Exam   Constitutional: She is oriented to person, place, and time. She appears well-developed and well-nourished.  Non-toxic appearance. No distress.   HENT:   Head: Normocephalic and atraumatic.   Right Ear: Tympanic membrane, external ear and ear canal normal.   Left Ear: Tympanic membrane, external ear and ear canal normal.   Eyes: Pupils are equal, round, and reactive to light. EOM are normal. No scleral icterus.   Neck: Normal range of motion. Neck supple. No thyromegaly present.   Cardiovascular: Normal rate, regular rhythm and normal heart sounds.   Pulmonary/Chest: Effort normal. She has wheezes in the right upper field, the right middle field, the right lower field, the left upper field, the left middle field and the left lower field. She has no rhonchi. She has no rales.           Abdominal: Soft.  Bowel sounds are normal. She exhibits no mass. There is no tenderness. There is no rebound.   Musculoskeletal: Normal range of motion.   Lymphadenopathy:     She has no cervical adenopathy.   Neurological: She is alert and oriented to person, place, and time. She has normal reflexes. She displays normal reflexes. No cranial nerve deficit. She exhibits normal muscle tone. Coordination normal.   Skin: Skin is warm and dry.   Psychiatric: She has a normal mood and affect. Her behavior is normal.       Assessment:       1. Cough    2. Wheezing        Plan:   Tiana was seen today for sinusitis.    Diagnoses and all orders for this visit:    Cough  -     X-Ray Chest PA And Lateral; Future    Wheezing  -     X-Ray Chest PA And Lateral; Future    Other orders  -     albuterol-ipratropium 2.5 mg-0.5 mg/3 mL nebulizer solution 3 mL  -     doxycycline (VIBRAMYCIN) 100 MG Cap; Take 1 capsule (100 mg total) by mouth 2 (two) times daily. for 10 days

## 2019-03-29 NOTE — PROGRESS NOTES
Two pt identifiers verified (name & ). Pt tolerated well.  Pt advised to remain in clinic to complete visit with MD.

## 2019-04-01 RX ORDER — PEN NEEDLE, DIABETIC 31 GX5/16"
NEEDLE, DISPOSABLE MISCELLANEOUS
Qty: 100 EACH | Refills: 11 | Status: ON HOLD | OUTPATIENT
Start: 2019-04-01 | End: 2020-04-03 | Stop reason: SDUPTHER

## 2019-04-15 ENCOUNTER — CLINICAL SUPPORT (OUTPATIENT)
Dept: AUDIOLOGY | Facility: CLINIC | Age: 84
End: 2019-04-15
Payer: MEDICARE

## 2019-04-15 ENCOUNTER — OFFICE VISIT (OUTPATIENT)
Dept: OTOLARYNGOLOGY | Facility: CLINIC | Age: 84
End: 2019-04-15
Payer: MEDICARE

## 2019-04-15 VITALS
SYSTOLIC BLOOD PRESSURE: 141 MMHG | WEIGHT: 163 LBS | DIASTOLIC BLOOD PRESSURE: 68 MMHG | HEART RATE: 72 BPM | BODY MASS INDEX: 24.07 KG/M2

## 2019-04-15 DIAGNOSIS — H90.3 SENSORINEURAL HEARING LOSS, BILATERAL: ICD-10-CM

## 2019-04-15 DIAGNOSIS — H90.3 SENSORINEURAL HEARING LOSS OF BOTH EARS: Primary | ICD-10-CM

## 2019-04-15 DIAGNOSIS — H61.23 BILATERAL IMPACTED CERUMEN: Primary | ICD-10-CM

## 2019-04-15 PROCEDURE — 99999 PR PBB SHADOW E&M-EST. PATIENT-LVL V: ICD-10-PCS | Mod: PBBFAC,HCNC,, | Performed by: OTOLARYNGOLOGY

## 2019-04-15 PROCEDURE — 99203 PR OFFICE/OUTPT VISIT, NEW, LEVL III, 30-44 MIN: ICD-10-PCS | Mod: 25,HCNC,S$GLB, | Performed by: OTOLARYNGOLOGY

## 2019-04-15 PROCEDURE — 1101F PT FALLS ASSESS-DOCD LE1/YR: CPT | Mod: HCNC,CPTII,S$GLB, | Performed by: OTOLARYNGOLOGY

## 2019-04-15 PROCEDURE — 92567 TYMPANOMETRY: CPT | Mod: HCNC,S$GLB,, | Performed by: AUDIOLOGIST

## 2019-04-15 PROCEDURE — 99203 OFFICE O/P NEW LOW 30 MIN: CPT | Mod: 25,HCNC,S$GLB, | Performed by: OTOLARYNGOLOGY

## 2019-04-15 PROCEDURE — 92557 COMPREHENSIVE HEARING TEST: CPT | Mod: HCNC,S$GLB,, | Performed by: AUDIOLOGIST

## 2019-04-15 PROCEDURE — 92557 PR COMPREHENSIVE HEARING TEST: ICD-10-PCS | Mod: HCNC,S$GLB,, | Performed by: AUDIOLOGIST

## 2019-04-15 PROCEDURE — 99999 PR PBB SHADOW E&M-EST. PATIENT-LVL V: CPT | Mod: PBBFAC,HCNC,, | Performed by: OTOLARYNGOLOGY

## 2019-04-15 PROCEDURE — 99999 PR PBB SHADOW E&M-EST. PATIENT-LVL I: ICD-10-PCS | Mod: PBBFAC,HCNC,,

## 2019-04-15 PROCEDURE — 1101F PR PT FALLS ASSESS DOC 0-1 FALLS W/OUT INJ PAST YR: ICD-10-PCS | Mod: HCNC,CPTII,S$GLB, | Performed by: OTOLARYNGOLOGY

## 2019-04-15 PROCEDURE — G0268 PR REMOVAL OF IMPACTED WAX MD: ICD-10-PCS | Mod: HCNC,S$GLB,, | Performed by: OTOLARYNGOLOGY

## 2019-04-15 PROCEDURE — 99999 PR PBB SHADOW E&M-EST. PATIENT-LVL I: CPT | Mod: PBBFAC,HCNC,,

## 2019-04-15 PROCEDURE — 92567 PR TYMPA2METRY: ICD-10-PCS | Mod: HCNC,S$GLB,, | Performed by: AUDIOLOGIST

## 2019-04-15 PROCEDURE — G0268 REMOVAL OF IMPACTED WAX MD: HCPCS | Mod: HCNC,S$GLB,, | Performed by: OTOLARYNGOLOGY

## 2019-04-15 NOTE — PROGRESS NOTES
Subjective:       Patient ID: Tiana Mead is a 89 y.o. female.    Chief Complaint: Cerumen Impaction and Hearing Loss    HPI: Ms. Mead is a 89-year-old  female who is here for ear cleaning procedure today as well as audiometric testing.  Her ears were last cleaned while she was out in [facility].  It was a painful procedure as she remembers.  She last completed an audiometric study in 2012 here which indicated her bilateral 20 decibel SRT scores and significant high-frequency sensorineural hearing loss in both ears  measured at 6 and 8 K re:  pure tone responses.  There was evidence for a mild hearing loss in both ears at 1 and 2 K. Tympanometry revealed low amplitude grafts the -15 pressure indicated for the left ear versus -5 for the right ear.  She was evaluated by Dr. Belen Bradley 02/20/2019.  She was diagnosed with type 2 diabetes with polyneuropathy with long term current use of insulin, diabetic nephropathy, aortic atherosclerosis, chronic kidney disease stage 3, generalized osteoarthritis, history of deep vein thrombosis, mixed hyperlipidemia, use of Eliquis, moderate persistent asthma, old MRI 2013, persistent atrial fibrillation, primary hypothyroidism, pulmonary hypertension, tortuous aorta, noise induced hearing loss of right ear and bilateral cerumen impactions.  His she was referred to the ENT service for evaluation of hearing loss.  She was evaluated for cough and wheezing by Dr. Arevalo 03/29/2019.    PMH:  Heart disease, diabetes, hearing loss  Past Medical History:   Diagnosis Date    Allergy     Anemia     Arthritis     Asthma     CHF (congestive heart failure) 5/2/2017    Chronic kidney disease     Degenerative disc disease     Diabetes mellitus type II     Essential hypertension 7/3/2012    Glaucoma     History of pseudogout 8/23/2012    Hyperlipidemia     Hypertension     Iritis     Thyroid disease      Past Surgical History:   Procedure Laterality  "Date    CARDIAC CATHETERIZATION      CATARACT EXTRACTION W/  INTRAOCULAR LENS IMPLANT Left n/a    CHOLECYSTECTOMY      EXTRACTION, CATARACT, WITH IOL INSERTION Right 10/8/2018    [facility]    EYE SURGERY      gall stone      HYSTERECTOMY      VARICOSE VEIN SURGERY      Family history:  Heart disease, high blood pressure, glaucoma, diabetes  Occupation:  Retired      Review of Systems   Ears: Positive for hearing loss, ear pain and ear pressure.    Respiratory:  Positive for asthma.    Other:  Negative for rash.        The  patient completed an audiometric study performed by the [facility] audiology service after her ears were cleaned.  The study is duplicated below and the results are reviewed with her in detail  Objective:          Blood pressure 141/68 pulse 72 height 5 ft 9 in weight 163 lb  General:  Alert and oriented lady in no acute distress  Both ears were examined under the microscope in the micro procedure room  Procedures:  A large dry cerumen "plug" is extracted from the right ear canal with a blunt curette micro forceps.  Dry cerumen is removed from the  occluded left ear canal with micro forceps.  Physical Exam   Constitutional: She is oriented to person, place, and time. She appears well-developed and well-nourished.   HENT:   Head: Normocephalic.   Right Ear: Tympanic membrane and external ear normal. No drainage. No foreign bodies. No mastoid tenderness. Tympanic membrane is not perforated. No decreased hearing is noted.   Left Ear: Tympanic membrane and external ear normal. No drainage. No foreign bodies. No mastoid tenderness. Tympanic membrane is not perforated. No decreased hearing is noted.   Ears:    Nose: Nose normal. No nasal deformity, septal deviation or nasal septal hematoma. No epistaxis. Right sinus exhibits no maxillary sinus tenderness and no frontal sinus tenderness. Left sinus exhibits no maxillary sinus tenderness and no frontal sinus tenderness. "   Mouth/Throat: Uvula is midline, oropharynx is clear and moist and mucous membranes are normal. No oral lesions. No trismus in the jaw. No uvula swelling. No oropharyngeal exudate or tonsillar abscesses.   Neck: Neck supple. No tracheal deviation present. No thyromegaly present.   Pulmonary/Chest: Effort normal. No stridor.   Lymphadenopathy:     She has no cervical adenopathy.   Neurological: She is alert and oriented to person, place, and time.   Skin: No rash noted.       Assessment:       1. Bilateral impacted cerumen    2. Sensorineural hearing loss, bilateral        Plan:     Dry cerumen plug removed from AD eac  Dry cerumen removed from AS eac  Audiometry reviewed  Pt. is a candidate for amplificaton for one or both ears  Copy of audiogram/HAVEN Holley's card provided  Monitor hearing yearly  RTC prn ear cleaning/yearly

## 2019-04-15 NOTE — LETTER
April 16, 2019      Belen Bradley MD  [address]  [facility] [address]           [facility]  [address]  [facility] [address]  Phone: [phone]  Fax: [phone]          Patient: Tiana Mead   MR Number: [id number]   YOB: 1925   Date of Visit: 4/15/2019       Dear Dr. Belen Bradley:    Thank you for referring Tiana Mead to me for evaluation. Attached you will find relevant portions of my assessment and plan of care.    If you have questions, please do not hesitate to call me. I look forward to following Tiana Mead along with you.    Sincerely,    Mathew Martinez III, MD    Enclosure  CC:  No Recipients    If you would like to receive this communication electronically, please contact [email or web address] or [phone] to request more information on [facility] Link access.    For providers and/or their staff who would like to refer a patient to [facility], please contact us through our one-stop-shop provider referral line, [facility], at [phone].    If you feel you have received this communication in error or would no longer like to receive these types of communications, please e-mail [email or web address]

## 2019-04-15 NOTE — PATIENT INSTRUCTIONS
Dry cerumen plug removed from AD eac  Dry cerumen removed from AS eac  Audiometry reviewed  Pt. is a candidate for amplificaton for one or both ears  Copy of audiogram/HAVEN Holley's card provided  Monitor hearing yearly  RTC prn ear cleaning/yearly

## 2019-04-15 NOTE — PROGRESS NOTES
Ms. Tiana Mead was seen in the clinic today for an audiological evaluation following cerumen removal performed by Dr. Martinez.    Audiological testing revealed normal hearing at 250 Hz sloping to a mild to moderately-severe sensorineural hearing loss of the right ear and a mild to severe sensorineural hearing loss of the left ear.  A speech reception threshold was obtained at 30 dBHL for each ear.  Speech discrimination was 84% for the right ear and 88% for the left ear.      Tympanometry testing revealed a Type As tympanogram for each ear.     Recommendations:  1. Annual audiological evaluation  2. Hearing protection when in noise   3. Hearing aid consultation

## 2019-04-18 NOTE — PROGRESS NOTES
Assessment /Plan     For exam results, see Encounter Report.    Primary open angle glaucoma (POAG) of both eyes, moderate stage    Chronic iritis - Left Eye    Allergic conjunctivitis of both eyes    Status post cataract extraction and insertion of intraocular lens, unspecified laterality      + Asthma  CHF      POAG  Stable Glaucoma & Patient near target IOP range    Steroid responder    CCT  517 / 574    Mid teens    Both Eyes:  Trusopt BID  PG q day --> iritis quiet  Alphagan BID --> stop --> itchy // Follicles --> ? Allergic component --> discussed possible hi IOP  No BB --> Asthma    Hx Ryan in Past    Hold SLT OS    PC IOL OU  Quiet    Fuchs K dystrophy   Observe      NIDDM  No BDR or CSME  control       Plan  RTC [place] 1 months IOP DFE & HVF / OCT RNFL --> off alphagan  RTC sooner prn with good understanding

## 2019-04-20 RX ORDER — ONDANSETRON 4 MG/1
TABLET, FILM COATED ORAL
Qty: 30 TABLET | Refills: 0 | Status: SHIPPED | OUTPATIENT
Start: 2019-04-20 | End: 2019-11-20 | Stop reason: SDUPTHER

## 2019-04-26 RX ORDER — AMLODIPINE BESYLATE 5 MG/1
TABLET ORAL
Qty: 90 TABLET | Refills: 0 | Status: SHIPPED | OUTPATIENT
Start: 2019-04-26 | End: 2019-09-27 | Stop reason: SDUPTHER

## 2019-05-01 ENCOUNTER — OFFICE VISIT (OUTPATIENT)
Dept: OPHTHALMOLOGY | Facility: CLINIC | Age: 84
End: 2019-05-01
Payer: MEDICARE

## 2019-05-01 DIAGNOSIS — H20.10 CHRONIC IRITIS: ICD-10-CM

## 2019-05-01 DIAGNOSIS — H10.13 ALLERGIC CONJUNCTIVITIS OF BOTH EYES: ICD-10-CM

## 2019-05-01 DIAGNOSIS — Z96.1 STATUS POST CATARACT EXTRACTION AND INSERTION OF INTRAOCULAR LENS, UNSPECIFIED LATERALITY: ICD-10-CM

## 2019-05-01 DIAGNOSIS — H40.1132 PRIMARY OPEN ANGLE GLAUCOMA (POAG) OF BOTH EYES, MODERATE STAGE: Primary | ICD-10-CM

## 2019-05-01 DIAGNOSIS — Z98.49 STATUS POST CATARACT EXTRACTION AND INSERTION OF INTRAOCULAR LENS, UNSPECIFIED LATERALITY: ICD-10-CM

## 2019-05-01 PROCEDURE — 92012 PR EYE EXAM, EST PATIENT,INTERMED: ICD-10-PCS | Mod: HCNC,S$GLB,, | Performed by: OPHTHALMOLOGY

## 2019-05-01 PROCEDURE — 99999 PR PBB SHADOW E&M-EST. PATIENT-LVL II: CPT | Mod: PBBFAC,HCNC,, | Performed by: OPHTHALMOLOGY

## 2019-05-01 PROCEDURE — 92012 INTRM OPH EXAM EST PATIENT: CPT | Mod: HCNC,S$GLB,, | Performed by: OPHTHALMOLOGY

## 2019-05-01 PROCEDURE — 99999 PR PBB SHADOW E&M-EST. PATIENT-LVL II: ICD-10-PCS | Mod: PBBFAC,HCNC,, | Performed by: OPHTHALMOLOGY

## 2019-05-13 ENCOUNTER — TELEPHONE (OUTPATIENT)
Dept: PODIATRY | Facility: CLINIC | Age: 84
End: 2019-05-13

## 2019-05-13 NOTE — TELEPHONE ENCOUNTER
----- Message from Leobardo Gottlieb sent at 5/13/2019  9:59 AM CDT -----  Contact: Self/ [phone]  Patient would like the office to mail her a list of location that she can go to for her orthopedic shoes.

## 2019-05-28 ENCOUNTER — OFFICE VISIT (OUTPATIENT)
Dept: INTERNAL MEDICINE | Facility: CLINIC | Age: 84
End: 2019-05-28
Payer: MEDICARE

## 2019-05-28 VITALS
HEIGHT: 68 IN | HEART RATE: 60 BPM | DIASTOLIC BLOOD PRESSURE: 62 MMHG | WEIGHT: 164.88 LBS | SYSTOLIC BLOOD PRESSURE: 116 MMHG | BODY MASS INDEX: 24.99 KG/M2

## 2019-05-28 DIAGNOSIS — N18.4 CKD (CHRONIC KIDNEY DISEASE) STAGE 4, GFR 15-29 ML/MIN: Chronic | ICD-10-CM

## 2019-05-28 DIAGNOSIS — I50.32 CHRONIC DIASTOLIC CONGESTIVE HEART FAILURE: ICD-10-CM

## 2019-05-28 DIAGNOSIS — I82.409 DEEP VEIN THROMBOSIS (DVT) DURING CURRENT HOSPITALIZATION: ICD-10-CM

## 2019-05-28 DIAGNOSIS — E11.21 TYPE 2 DIABETES MELLITUS WITH DIABETIC NEPHROPATHY, WITH LONG-TERM CURRENT USE OF INSULIN: ICD-10-CM

## 2019-05-28 DIAGNOSIS — Z79.4 TYPE 2 DIABETES MELLITUS WITH DIABETIC NEPHROPATHY, WITH LONG-TERM CURRENT USE OF INSULIN: ICD-10-CM

## 2019-05-28 DIAGNOSIS — H40.1132 PRIMARY OPEN ANGLE GLAUCOMA (POAG) OF BOTH EYES, MODERATE STAGE: ICD-10-CM

## 2019-05-28 DIAGNOSIS — E78.2 HYPERLIPEMIA, MIXED: ICD-10-CM

## 2019-05-28 DIAGNOSIS — I70.0 AORTIC ATHEROSCLEROSIS: ICD-10-CM

## 2019-05-28 DIAGNOSIS — E03.9 PRIMARY HYPOTHYROIDISM: ICD-10-CM

## 2019-05-28 DIAGNOSIS — J45.40 MODERATE PERSISTENT ASTHMA WITHOUT COMPLICATION: ICD-10-CM

## 2019-05-28 DIAGNOSIS — I25.2 OLD MI (MYOCARDIAL INFARCTION): ICD-10-CM

## 2019-05-28 DIAGNOSIS — Z79.01 LONG TERM CURRENT USE OF ANTICOAGULANT THERAPY: ICD-10-CM

## 2019-05-28 DIAGNOSIS — Z00.00 ENCOUNTER FOR PREVENTIVE HEALTH EXAMINATION: Primary | ICD-10-CM

## 2019-05-28 DIAGNOSIS — I73.9 PERIPHERAL ANGIOPATHY: ICD-10-CM

## 2019-05-28 DIAGNOSIS — E11.42 DIABETIC POLYNEUROPATHY ASSOCIATED WITH TYPE 2 DIABETES MELLITUS: ICD-10-CM

## 2019-05-28 DIAGNOSIS — Z79.4 TYPE 2 DIABETES MELLITUS WITH DIABETIC POLYNEUROPATHY, WITH LONG-TERM CURRENT USE OF INSULIN: ICD-10-CM

## 2019-05-28 DIAGNOSIS — I77.1 TORTUOUS AORTA: ICD-10-CM

## 2019-05-28 DIAGNOSIS — I48.19 PERSISTENT ATRIAL FIBRILLATION: ICD-10-CM

## 2019-05-28 DIAGNOSIS — I27.20 PULMONARY HYPERTENSION: ICD-10-CM

## 2019-05-28 DIAGNOSIS — M15.9 GENERALIZED OSTEOARTHRITIS OF MULTIPLE SITES: ICD-10-CM

## 2019-05-28 DIAGNOSIS — E11.42 TYPE 2 DIABETES MELLITUS WITH DIABETIC POLYNEUROPATHY, WITH LONG-TERM CURRENT USE OF INSULIN: ICD-10-CM

## 2019-05-28 PROCEDURE — G0439 PPPS, SUBSEQ VISIT: HCPCS | Mod: HCNC,S$GLB,, | Performed by: NURSE PRACTITIONER

## 2019-05-28 PROCEDURE — G0439 PR MEDICARE ANNUAL WELLNESS SUBSEQUENT VISIT: ICD-10-PCS | Mod: HCNC,S$GLB,, | Performed by: NURSE PRACTITIONER

## 2019-05-28 PROCEDURE — 99999 PR PBB SHADOW E&M-EST. PATIENT-LVL V: CPT | Mod: PBBFAC,HCNC,, | Performed by: NURSE PRACTITIONER

## 2019-05-28 PROCEDURE — 99999 PR PBB SHADOW E&M-EST. PATIENT-LVL V: ICD-10-PCS | Mod: PBBFAC,HCNC,, | Performed by: NURSE PRACTITIONER

## 2019-05-28 NOTE — PATIENT INSTRUCTIONS
Counseling and Referral of Other Preventative  (Italic type indicates deductible and co-insurance are waived)    Patient Name: Tiana Mead  Today's Date: 5/28/2019    Health Maintenance       Date Due Completion Date    TETANUS VACCINE 03/14/1943 ---    Shingles Vaccine (2 of 3) 09/09/2010 7/15/2010    Lipid Panel 07/12/2019 7/12/2018    Influenza Vaccine 08/01/2019 10/17/2018    Override on 12/13/2017: Done    Hemoglobin A1c 08/13/2019 2/13/2019    Foot Exam 10/17/2019 10/17/2018    Override on 5/16/2018: Done    Override on 5/2/2017: Done    Eye Exam 05/01/2020 5/1/2019    Override on 6/20/2012: (N/S)        No orders of the defined types were placed in this encounter.    The following information is provided to all patients.  This information is to help you find resources for any of the problems found today that may be affecting your health:                Living healthy guide: www.[facility].[place].[facility]      Understanding Diabetes: www.diabetes.org      Eating healthy: www.cdc.gov/healthyweight      CDC home safety checklist: www.cdc.gov/steadi/patient.html      Agency on Aging: www.goea.[place].gov      Alcoholics anonymous (AA): www.aa.org      Physical Activity: www.olinda.nih.gov/bm1mqkh      Tobacco use: www.quitwithusla.org

## 2019-05-28 NOTE — PROGRESS NOTES
"Tiana Mead presented for a  Medicare AWV and comprehensive Health Risk Assessment today. The following components were reviewed and updated:    · Medical history  · Family History  · Social history  · Allergies and Current Medications  · Health Risk Assessment  · Health Maintenance  · Care Team     ** See Completed Assessments for Annual Wellness Visit within the encounter summary.**       The following assessments were completed:  · Living Situation  · CAGE  · Depression Screening  · Timed Get Up and Go  · Whisper Test  · Cognitive Function Screening  · Nutrition Screening  · ADL Screening  · PAQ Screening        Vitals:    05/28/19 1044   BP: 116/62   BP Location: Left arm   Patient Position: Sitting   Pulse: 60   Weight: 74.8 kg (164 lb 14.5 oz)   Height: 5' 8" (1.727 m)     Body mass index is 25.07 kg/m².     Physical Exam   Constitutional: She is oriented to person, place, and time. She appears well-developed and well-nourished. No distress.   Ambulating with walker   HENT:   Head: Normocephalic and atraumatic.   Eyes: No scleral icterus.   Neck: Normal range of motion. Neck supple.   Cardiovascular: Normal rate, regular rhythm, normal heart sounds and intact distal pulses.   Pulmonary/Chest: Effort normal and breath sounds normal. No respiratory distress.   Abdominal: She exhibits no distension.   Musculoskeletal: Normal range of motion. She exhibits no edema.   Neurological: She is alert and oriented to person, place, and time.   Skin: Skin is warm and dry. She is not diaphoretic.   Psychiatric: She has a normal mood and affect. Her behavior is normal.       Diagnoses and health risks identified today and associated recommendations/orders:    1. Encounter for preventive health examination  Assessments completed  Preventive health recommendations reviewed.   Mini cognitive screening abnormal: patient lives with daughter, she does not drive, she does cook some (cooking safety discussed), she denies serious " memory concerns.    2. Type 2 diabetes mellitus with diabetic nephropathy, with long-term current use of insulin  Stable. Last HgbA1c was 7.3  Controlled with current medical therapy  Followed by PCP.     3. CKD (chronic kidney disease) stage 4, GFR 15-29 ml/min  Stable.   Followed by PCP.     4. Type 2 diabetes mellitus with diabetic polyneuropathy, with long-term current use of insulin  Stable. Last HgbA1C was 7.3  Controlled with current medical therapy  Followed by PCP.     5. Diabetic polyneuropathy associated with type 2 diabetes mellitus  Stable.   Controlled with current medical therapy  Followed by PCP and podiatry     6. Moderate persistent asthma without complication  Stable.   Controlled with current medical therapy  Followed by PCP.     7. Pulmonary hypertension  Stable.   Controlled with current medical therapy  Followed by cardiology    8. Aortic atherosclerosis  Stable. Seen on imangint from 12/30/17  Controlled with current medical therapy  Followed by PCP.     9. Chronic diastolic congestive heart failure  Stable.   Controlled with current medical therapy  Followed by cardiology.     10. Peripheral angiopathy  Stable.   Controlled with current medical therapy  Followed by PCP.     11. Persistent atrial fibrillation  Stable.   Controlled with current medical therapy  Followed by cardiology     12. Long term current use of anticoagulant therapy, eliquis  Stable.   Controlled with current medical therapy  Followed by cardiology     13. Tortuous aorta  Stable.   Seen on imaging from 03/13/13  Followed by PCP.     14. Hyperlipemia, mixed  Stable.   Controlled with current medical therapy  Followed by PCP.     15. Old MI (myocardial infarction), 2013  Stable.   Controlled with current medical therapy  Followed by cardiology    16. H/O Deep vein thrombosis (DVT)  Stable.   Controlled with current medical therapy  Followed by PCP.     17. Primary hypothyroidism  Stable.   Controlled with current medical  therapy  Followed by PCP.     18. Generalized osteoarthritis of multiple sites  Stable.   Controlled with current medical therapy  Followed by PCP.     19. Primary open angle glaucoma (POAG) of both eyes, moderate stage  Stable.   Controlled with current medical therapy  Followed by ophthalmology     Provided Tiana with a 5-10 year written screening schedule and personal prevention plan. Recommendations were developed using the USPSTF age appropriate recommendations. Education, counseling, and referrals were provided as needed. After Visit Summary printed and given to patient which includes a list of additional screenings\tests needed.    Follow up in 8 weeks (on 7/24/2019) for a routine visit with your primary care provider or sooner if problems arise. .    Suzette Mcduffie, NP

## 2019-05-28 NOTE — PROGRESS NOTES
I offered to discuss end of life issues, including information on how to make advance directives that the patient could use to name someone who would make medical decisions on their behalf if they became too ill to make themselves.    _x__Patient declined, has already had discussions with her children about this, does not feel that paperwork is necessary  _Patient is interested, I provided paper work and offered to discuss.

## 2019-05-30 RX ORDER — LEVOTHYROXINE SODIUM 75 UG/1
TABLET ORAL
Qty: 90 TABLET | Refills: 2 | Status: SHIPPED | OUTPATIENT
Start: 2019-05-30 | End: 2019-11-20 | Stop reason: SDUPTHER

## 2019-06-03 ENCOUNTER — TELEPHONE (OUTPATIENT)
Dept: PODIATRY | Facility: CLINIC | Age: 84
End: 2019-06-03

## 2019-06-03 NOTE — TELEPHONE ENCOUNTER
----- Message from Sharla Sands sent at 6/3/2019 12:55 PM CDT -----  Contact: self  Needs Advice    Reason for call: Due to transportation, pt ask to reschedule her appointment to 6/11 the same day as her eye appointment. Pt ask for a call        Communication Preference: Phone     Additional Information: n/a

## 2019-06-06 NOTE — PROGRESS NOTES
Assessment /Plan     For exam results, see Encounter Report.    Fuchs' corneal dystrophy    Primary open angle glaucoma (POAG) of both eyes, moderate stage    Chronic iritis - Left Eye    Status post cataract extraction and insertion of intraocular lens, unspecified laterality      + Asthma  CHF      POAG  Stable Glaucoma & Patient near target IOP range    Steroid responder    CCT  517 / 574    Mid teens    Both Eyes --> good adherence  Trusopt BID  PG q day --> iritis quiet  Alphagan BID --> stop --> itchy // Follicles --> resolved / improved greatly  No BB --> Asthma    Hx Ryan in Past    Hold SLT OS    PC IOL OU  Quiet    Fuchs K dystrophy   Observe      NIDDM  No BDR or CSME  control     ERM OD  Not VS  Observe      Plan  RTC 4 months IOP  RTC sooner prn with good understanding

## 2019-06-11 ENCOUNTER — OFFICE VISIT (OUTPATIENT)
Dept: PODIATRY | Facility: CLINIC | Age: 84
End: 2019-06-11
Payer: MEDICARE

## 2019-06-11 ENCOUNTER — OFFICE VISIT (OUTPATIENT)
Dept: OPHTHALMOLOGY | Facility: CLINIC | Age: 84
End: 2019-06-11
Payer: MEDICARE

## 2019-06-11 ENCOUNTER — CLINICAL SUPPORT (OUTPATIENT)
Dept: OPHTHALMOLOGY | Facility: CLINIC | Age: 84
End: 2019-06-11
Payer: MEDICARE

## 2019-06-11 VITALS
SYSTOLIC BLOOD PRESSURE: 121 MMHG | WEIGHT: 164 LBS | RESPIRATION RATE: 18 BRPM | HEIGHT: 68 IN | HEART RATE: 62 BPM | DIASTOLIC BLOOD PRESSURE: 74 MMHG | BODY MASS INDEX: 24.86 KG/M2

## 2019-06-11 DIAGNOSIS — H40.1132 PRIMARY OPEN ANGLE GLAUCOMA (POAG) OF BOTH EYES, MODERATE STAGE: Primary | ICD-10-CM

## 2019-06-11 DIAGNOSIS — L84 CORN OR CALLUS: ICD-10-CM

## 2019-06-11 DIAGNOSIS — H20.10 CHRONIC IRITIS: ICD-10-CM

## 2019-06-11 DIAGNOSIS — H18.519 FUCHS' CORNEAL DYSTROPHY: ICD-10-CM

## 2019-06-11 DIAGNOSIS — Z98.49 STATUS POST CATARACT EXTRACTION AND INSERTION OF INTRAOCULAR LENS, UNSPECIFIED LATERALITY: ICD-10-CM

## 2019-06-11 DIAGNOSIS — Z96.1 STATUS POST CATARACT EXTRACTION AND INSERTION OF INTRAOCULAR LENS, UNSPECIFIED LATERALITY: ICD-10-CM

## 2019-06-11 DIAGNOSIS — E11.42 DIABETIC POLYNEUROPATHY ASSOCIATED WITH TYPE 2 DIABETES MELLITUS: Primary | ICD-10-CM

## 2019-06-11 DIAGNOSIS — B35.1 ONYCHOMYCOSIS DUE TO DERMATOPHYTE: ICD-10-CM

## 2019-06-11 LAB
LEFT EYE DM RETINOPATHY: NEGATIVE
RIGHT EYE DM RETINOPATHY: NEGATIVE

## 2019-06-11 PROCEDURE — 99999 PR PBB SHADOW E&M-EST. PATIENT-LVL III: ICD-10-PCS | Mod: PBBFAC,HCNC,, | Performed by: PODIATRIST

## 2019-06-11 PROCEDURE — 99999 PR PBB SHADOW E&M-EST. PATIENT-LVL II: ICD-10-PCS | Mod: PBBFAC,HCNC,, | Performed by: OPHTHALMOLOGY

## 2019-06-11 PROCEDURE — 92014 COMPRE OPH EXAM EST PT 1/>: CPT | Mod: HCNC,S$GLB,, | Performed by: OPHTHALMOLOGY

## 2019-06-11 PROCEDURE — 99499 RISK ADDL DX/OHS AUDIT: ICD-10-PCS | Mod: HCNC,S$GLB,, | Performed by: OPHTHALMOLOGY

## 2019-06-11 PROCEDURE — 99499 UNLISTED E&M SERVICE: CPT | Mod: HCNC,S$GLB,, | Performed by: OPHTHALMOLOGY

## 2019-06-11 PROCEDURE — 99999 PR PBB SHADOW E&M-EST. PATIENT-LVL II: CPT | Mod: PBBFAC,HCNC,, | Performed by: OPHTHALMOLOGY

## 2019-06-11 PROCEDURE — 11057 PR TRIM BENIGN HYPERKERATOTIC SKIN LESION,>4: ICD-10-PCS | Mod: Q9,HCNC,S$GLB, | Performed by: PODIATRIST

## 2019-06-11 PROCEDURE — 92083 HUMPHREY VISUAL FIELD - OU - BOTH EYES: ICD-10-PCS | Mod: HCNC,S$GLB,, | Performed by: OPHTHALMOLOGY

## 2019-06-11 PROCEDURE — 11721 PR DEBRIDEMENT OF NAILS, 6 OR MORE: ICD-10-PCS | Mod: 59,Q9,HCNC,S$GLB | Performed by: PODIATRIST

## 2019-06-11 PROCEDURE — 11721 DEBRIDE NAIL 6 OR MORE: CPT | Mod: 59,Q9,HCNC,S$GLB | Performed by: PODIATRIST

## 2019-06-11 PROCEDURE — 11057 PARNG/CUTG B9 HYPRKR LES >4: CPT | Mod: Q9,HCNC,S$GLB, | Performed by: PODIATRIST

## 2019-06-11 PROCEDURE — 92014 PR EYE EXAM, EST PATIENT,COMPREHESV: ICD-10-PCS | Mod: HCNC,S$GLB,, | Performed by: OPHTHALMOLOGY

## 2019-06-11 PROCEDURE — 99999 PR PBB SHADOW E&M-EST. PATIENT-LVL III: CPT | Mod: PBBFAC,HCNC,, | Performed by: PODIATRIST

## 2019-06-11 PROCEDURE — 99499 NO LOS: ICD-10-PCS | Mod: HCNC,S$GLB,, | Performed by: PODIATRIST

## 2019-06-11 PROCEDURE — 92133 CPTRZD OPH DX IMG PST SGM ON: CPT | Mod: HCNC,S$GLB,, | Performed by: OPHTHALMOLOGY

## 2019-06-11 PROCEDURE — 92083 EXTENDED VISUAL FIELD XM: CPT | Mod: HCNC,S$GLB,, | Performed by: OPHTHALMOLOGY

## 2019-06-11 PROCEDURE — 92133 POSTERIOR SEGMENT OCT OPTIC NERVE(OCULAR COHERENCE TOMOGRAPHY) - OU - BOTH EYES: ICD-10-PCS | Mod: HCNC,S$GLB,, | Performed by: OPHTHALMOLOGY

## 2019-06-11 PROCEDURE — 99499 UNLISTED E&M SERVICE: CPT | Mod: HCNC,S$GLB,, | Performed by: PODIATRIST

## 2019-06-12 NOTE — PROGRESS NOTES
Subjective:      Patient ID: Tiana Mead is a 89 y.o. female.    Chief Complaint: PCP (Suzette Mcduffie NP 5/28/19); Diabetic Foot Exam; and Nail Care    Tiana is a 89 y.o. female who presents to the clinic for evaluation and treatment of high risk feet. Tiana has a past medical history of Allergy, Anemia, Arthritis, Asthma, CHF (congestive heart failure) (5/2/2017), Chronic kidney disease, Degenerative disc disease, Diabetes mellitus type II, Essential hypertension (7/3/2012), Glaucoma, History of pseudogout (8/23/2012), Hyperlipidemia, Hypertension, Iritis, Myocardial infarction, Pneumonia, and Thyroid disease. The patient's chief complaint is long, thick toenails and calluses on both feet. Routine trimming helps. No other pedal concerns today.  This patient has documented high risk feet requiring routine maintenance secondary to diabetes mellitis and those secondary complications of diabetes, as mentioned..    PCP: Belen Wood MD    Date Last Seen by PCP:   Chief Complaint   Patient presents with    PCP     Suzette Mcduffie NP 5/28/19    Diabetic Foot Exam    Nail Care         Current shoe gear:  Dm shoes      Hemoglobin A1C   Date Value Ref Range Status   02/13/2019 7.3 (H) 4.0 - 5.6 % Final     Comment:     ADA Screening Guidelines:  5.7-6.4%  Consistent with prediabetes  >or=6.5%  Consistent with diabetes  High levels of fetal hemoglobin interfere with the HbA1C  assay. Heterozygous hemoglobin variants (HbS, HgC, etc)do  not significantly interfere with this assay.   However, presence of multiple variants may affect accuracy.     10/04/2018 7.6 (H) 4.0 - 5.6 % Final     Comment:     ADA Screening Guidelines:  5.7-6.4%  Consistent with prediabetes  >or=6.5%  Consistent with diabetes  High levels of fetal hemoglobin interfere with the HbA1C  assay. Heterozygous hemoglobin variants (HbS, HgC, etc)do  not significantly interfere with this assay.   However, presence of multiple variants  may affect accuracy.     04/17/2018 6.7 (H) 4.0 - 5.6 % Final     Comment:     According to ADA guidelines, hemoglobin A1c <7.0% represents  optimal control in non-pregnant diabetic patients. Different  metrics may apply to specific patient populations.   Standards of Medical Care in Diabetes-2016.  For the purpose of screening for the presence of diabetes:  <5.7%     Consistent with the absence of diabetes  5.7-6.4%  Consistent with increasing risk for diabetes   (prediabetes)  >or=6.5%  Consistent with diabetes  Currently, no consensus exists for use of hemoglobin A1c  for diagnosis of diabetes for children.  This Hemoglobin A1c assay has significant interference with fetal   hemoglobin   (HbF). The results are invalid for patients with abnormal amounts of   HbF,   including those with known Hereditary Persistence   of Fetal Hemoglobin. Heterozygous hemoglobin variants (HbAS, HbAC,   HbAD, HbAE, HbA2) do not significantly interfere with this assay;   however, presence of multiple variants in a sample may impact the %   interference.               Patient Active Problem List   Diagnosis    Hyperlipemia, mixed    Generalized osteoarthritis of multiple sites    Chronic iritis - Left Eye    Osteoarthritis of both knees    Chondrocalcinosis    Peripheral angiopathy    Pseudogout, both knees.    Hyperuricemia    Helicobacter pylori gastritis    Old MI (myocardial infarction), 2013    Persistent atrial fibrillation    Type 2 diabetes mellitus with diabetic polyneuropathy, with long-term current use of insulin    Long term current use of anticoagulant therapy, eliquis    History of chest pain at rest    CKD (chronic kidney disease) stage 4, GFR 15-29 ml/min    Fuchs' corneal dystrophy    Pericardial effusion    H/O Deep vein thrombosis (DVT)    Aortic atherosclerosis    Chronic diastolic congestive heart failure    Type 2 diabetes mellitus with diabetic nephropathy, with long-term current use of  "insulin    Diabetic polyneuropathy associated with type 2 diabetes mellitus    Primary hypothyroidism    Moderate persistent asthma without complication    Primary open angle glaucoma (POAG) of both eyes, moderate stage    Pulmonary hypertension    Non-rheumatic tricuspid valve insufficiency    Tortuous aorta    Allergic conjunctivitis of both eyes    Status post cataract extraction and insertion of intraocular lens       Current Outpatient Medications on File Prior to Visit   Medication Sig Dispense Refill    acetaminophen (TYLENOL) 500 MG tablet Take 500 mg by mouth every 6 (six) hours as needed for Pain.      albuterol 90 mcg/actuation inhaler Inhale 2 puffs into the lungs every 4 (four) hours as needed. 1 Inhaler 11    amLODIPine (NORVASC) 5 MG tablet TAKE 1 TABLET(5 MG) BY MOUTH EVERY MORNING 90 tablet 0    apixaban (ELIQUIS) 2.5 mg Tab Take 1 tablet (2.5 mg total) by mouth 2 (two) times daily. 60 tablet 5    atorvastatin (LIPITOR) 40 MG tablet Take 1 tablet (40 mg total) by mouth once daily. 90 tablet 3    BD ULTRA-FINE SHORT PEN NEEDLE 31 gauge x 5/16" Ndle USE WITH INSULIN PENS AS DIRECTED 100 each 11    blood sugar diagnostic (ONETOUCH ULTRA TEST) Strp Inject 1 strip into the skin 3 (three) times daily. 300 each 4    diclofenac sodium (VOLTAREN) 1 % Gel Apply topically 4 (four) times daily as needed. 100 Tube 3    dorzolamide (TRUSOPT) 2 % ophthalmic solution Place 1 drop into both eyes 2 (two) times daily. 10 mL 4    fluticasone-salmeterol 250-50 mcg/dose (ADVAIR DISKUS) 250-50 mcg/dose diskus inhaler INHALE 1 PUFF BY MOUTH INTO THE LUNGS NIGHTLY 1 each 11    furosemide (LASIX) 40 MG tablet Take 1 tablet (40 mg total) by mouth 2 (two) times daily. 180 tablet 3    insulin aspart protamine-insulin aspart (NOVOLOG MIX 70-30FLEXPEN U-100) 100 unit/mL (70-30) InPn pen INJECT 10 UNITS UNDER THE SKIN EVERY MORNING BEFORE BREAKFAST 15 mL 0    ipratropium (ATROVENT) 0.03 % nasal spray USE 2 " SPRAYS IN EACH NOSTRIL TWICE DAILY 60 mL 1    isosorbide mononitrate (IMDUR) 30 MG 24 hr tablet TAKE 1 TABLET BY MOUTH EVERY MORNING FOR HEART, to prevent chest pain and reduce shortness of breath 90 tablet 3    lancets Misc 1 Device by Misc.(Non-Drug; Combo Route) route 3 (three) times daily before meals. Please provide the insurance company preferred product. 300 each 4    latanoprost 0.005 % ophthalmic solution Place 1 drop into the right eye every evening. (Patient taking differently: Place 1 drop into both eyes every evening. ) 3 Bottle 6    levothyroxine (SYNTHROID) 75 MCG tablet TAKE 1 TABLET BY MOUTH EVERY DAY BEFORE BREAKFAST 90 tablet 2    meclizine (ANTIVERT) 12.5 mg tablet Take 1 tablet (12.5 mg total) by mouth 3 (three) times daily as needed. 50 tablet 1    metoprolol tartrate (LOPRESSOR) 50 MG tablet Take 1 tablet (50 mg total) by mouth 2 (two) times daily. 180 tablet 4    multivit-mineral-iron-lutein (CENTRUM SILVER ULTRA WOMEN'S) Tab Take 1 tablet by mouth once daily.      nitroGLYCERIN (NITROSTAT) 0.4 MG SL tablet ONE TABLET UNDER THE TONGUE EVERY 5 MINUTES AS NEEDED FOR CHEST PAIN 150 tablet 3    ondansetron (ZOFRAN) 4 MG tablet TAKE ONE TABLET BY MOUTH EVERY 12 HOURS AS NEEDED FOR NAUSEA 30 tablet 0    TRUE METRIX GLUCOSE METER Misc TEST TID  4    TRUE METRIX GLUCOSE TEST STRIP Strp USE TO TEST BLOOD SUGAR WITH MEALS THREE TIMES DAILY 300 strip 4    TRUEPLUS LANCETS 30 gauge Misc USE TO TEST BLOOD SUGAR TID AC  2    TRUEPLUS LANCETS 33 gauge Misc TESTING THREE TIMES DAILY 300 each 0     No current facility-administered medications on file prior to visit.        Review of patient's allergies indicates:   Allergen Reactions    Codeine      Other reaction(s): Itching  Other reaction(s): Nausea       Past Surgical History:   Procedure Laterality Date    CARDIAC CATHETERIZATION      CATARACT EXTRACTION W/  INTRAOCULAR LENS IMPLANT Left n/a    CHOLECYSTECTOMY      EXTRACTION, CATARACT,  WITH IOL INSERTION Right 10/8/2018    [facility]    EYE SURGERY      gall stone      HYSTERECTOMY      VARICOSE VEIN SURGERY         Family History   Problem Relation Age of Onset    Diabetes Mother     Hypertension Mother     Glaucoma Mother     Hypertension Father     Diabetes Son     No Known Problems Daughter     Diabetes Daughter     No Known Problems Son        Social History     Socioeconomic History    Marital status:      Spouse name: Not on file    Number of children: Not on file    Years of education: Not on file    Highest education level: Not on file   Occupational History    Not on file   Social Needs    Financial resource strain: Not on file    Food insecurity:     Worry: Not on file     Inability: Not on file    Transportation needs:     Medical: Not on file     Non-medical: Not on file   Tobacco Use    Smoking status: Never Smoker    Smokeless tobacco: Never Used   Substance and Sexual Activity    Alcohol use: No    Drug use: No    Sexual activity: Never   Lifestyle    Physical activity:     Days per week: Not on file     Minutes per session: Not on file    Stress: Not on file   Relationships    Social connections:     Talks on phone: Not on file     Gets together: Not on file     Attends Restorationism service: Not on file     Active member of club or organization: Not on file     Attends meetings of clubs or organizations: Not on file     Relationship status: Not on file   Other Topics Concern    Not on file   Social History Narrative    Not on file           Review of Systems   Constitution: Negative for chills, decreased appetite and fever.   Cardiovascular: Negative for leg swelling.   Skin: Positive for dry skin and nail changes. Negative for color change, flushing, itching and poor wound healing.   Musculoskeletal: Positive for arthritis. Negative for falls, joint pain, joint swelling and myalgias.   Gastrointestinal: Negative for  "nausea and vomiting.   Neurological: Negative for loss of balance, numbness and paresthesias.           Objective:       Vitals:    06/11/19 1053   BP: 121/74   Pulse: 62   Resp: 18   Weight: 74.4 kg (164 lb)   Height: 5' 8" (1.727 m)   PainSc: 0-No pain        Physical Exam   Constitutional: She is oriented to person, place, and time. She appears well-developed and well-nourished.   Cardiovascular:   Dorsalis pedis and posterior tibial pulses are diminished Bilaterally. Toes are cool to touch. Feet are warm proximally.There is decreased digital hair. Skin is atrophic, slightly hyperpigmented, and mildly edematous       Musculoskeletal: Normal range of motion. She exhibits no edema or tenderness.   Adequate joint range of motion without pain, limitation, nor crepitation Bilateral feet and ankle joints. Muscle strength is 5/5 in all groups bilaterally.         Neurological: She is alert and oriented to person, place, and time.   [place] 5.07 monofilamant testing is diminished Ney feet. Sharp/dull sensation diminished Bilaterally. Light touch absent Bilaterally.       Skin: Skin is warm, dry and intact. No ecchymosis and no lesion noted. No erythema.   Nails x10 are elongated by  4-5 mm's, thickened by 2-5 mm's, dystrophic, and are darkened in  coloration . Xerosis Bilaterally. No open lesions noted.    Hyperkeratotic tissue noted to lateral 5th toe b/l, distal 3rd toe b/l, medial L 5th toe      Psychiatric: She has a normal mood and affect. Her behavior is normal.   Vitals reviewed.            Assessment:       Encounter Diagnoses   Name Primary?    Diabetic polyneuropathy associated with type 2 diabetes mellitus Yes    Onychomycosis due to dermatophyte     Corn or callus          Plan:       Tiana was seen today for pcp, diabetic foot exam and nail care.    Diagnoses and all orders for this visit:    Diabetic polyneuropathy associated with type 2 diabetes mellitus    Onychomycosis due to " dermatophyte    Corn or callus      I counseled the patient on her conditions, their implications and medical management.        - Shoe inspection. Diabetic Foot Education. Patient reminded of the importance of good nutrition and blood sugar control to help prevent podiatric complications of diabetes. Patient instructed on proper foot hygeine. We discussed wearing proper shoe gear, daily foot inspections, never walking without protective shoe gear, never putting sharp instruments to feet, routine podiatric nail visits every 2-3 months.      - With patient's permission, nails were aggressively reduced and debrided x 10 to their soft tissue attachment mechanically and with electric , removing all offending nail and debris. Patient relates relief following the procedure. She will continue to monitor the areas daily, inspect her feet, wear protective shoe gear when ambulatory, moisturizer to maintain skin integrity and follow in this office in approximately 2-3 months, sooner p.r.n.    - After cleansing the  area w/ alcohol prep pad the above mentioned hyperkeratosis was trimmed utilizing No 15 scapel, to a smooth base with out incident. Patient tolerated this  well and reported comfort to the area of lateral 5th toe b/l, distal 3rd toe b/l, medial L 5th toe

## 2019-07-08 ENCOUNTER — HOSPITAL ENCOUNTER (OUTPATIENT)
Dept: CARDIOLOGY | Facility: CLINIC | Age: 84
Discharge: HOME | End: 2019-07-08
Payer: MEDICARE

## 2019-07-08 ENCOUNTER — OFFICE VISIT (OUTPATIENT)
Dept: CARDIOLOGY | Facility: CLINIC | Age: 84
End: 2019-07-08
Payer: MEDICARE

## 2019-07-08 VITALS
DIASTOLIC BLOOD PRESSURE: 67 MMHG | SYSTOLIC BLOOD PRESSURE: 140 MMHG | BODY MASS INDEX: 24.79 KG/M2 | WEIGHT: 163.56 LBS | OXYGEN SATURATION: 96 % | HEART RATE: 67 BPM | HEIGHT: 68 IN

## 2019-07-08 DIAGNOSIS — I50.32 CHRONIC DIASTOLIC CONGESTIVE HEART FAILURE: ICD-10-CM

## 2019-07-08 DIAGNOSIS — I10 ESSENTIAL HYPERTENSION: ICD-10-CM

## 2019-07-08 DIAGNOSIS — I27.20 PULMONARY HYPERTENSION: ICD-10-CM

## 2019-07-08 DIAGNOSIS — I48.19 PERSISTENT ATRIAL FIBRILLATION: ICD-10-CM

## 2019-07-08 DIAGNOSIS — R07.89 OTHER CHEST PAIN: Primary | ICD-10-CM

## 2019-07-08 DIAGNOSIS — N18.4 CKD (CHRONIC KIDNEY DISEASE) STAGE 4, GFR 15-29 ML/MIN: ICD-10-CM

## 2019-07-08 DIAGNOSIS — I31.39 PERICARDIAL EFFUSION: ICD-10-CM

## 2019-07-08 DIAGNOSIS — I82.409 DEEP VEIN THROMBOSIS (DVT) DURING CURRENT HOSPITALIZATION: ICD-10-CM

## 2019-07-08 PROCEDURE — 99214 OFFICE O/P EST MOD 30 MIN: CPT | Mod: HCNC,S$GLB,, | Performed by: INTERNAL MEDICINE

## 2019-07-08 PROCEDURE — 1101F PR PT FALLS ASSESS DOC 0-1 FALLS W/OUT INJ PAST YR: ICD-10-PCS | Mod: HCNC,CPTII,S$GLB, | Performed by: INTERNAL MEDICINE

## 2019-07-08 PROCEDURE — 1101F PT FALLS ASSESS-DOCD LE1/YR: CPT | Mod: HCNC,CPTII,S$GLB, | Performed by: INTERNAL MEDICINE

## 2019-07-08 PROCEDURE — 93000 EKG 12-LEAD: ICD-10-PCS | Mod: HCNC,S$GLB,, | Performed by: INTERNAL MEDICINE

## 2019-07-08 PROCEDURE — 93000 ELECTROCARDIOGRAM COMPLETE: CPT | Mod: HCNC,S$GLB,, | Performed by: INTERNAL MEDICINE

## 2019-07-08 PROCEDURE — 99214 PR OFFICE/OUTPT VISIT, EST, LEVL IV, 30-39 MIN: ICD-10-PCS | Mod: HCNC,S$GLB,, | Performed by: INTERNAL MEDICINE

## 2019-07-08 PROCEDURE — 99999 PR PBB SHADOW E&M-EST. PATIENT-LVL III: ICD-10-PCS | Mod: PBBFAC,HCNC,, | Performed by: INTERNAL MEDICINE

## 2019-07-08 PROCEDURE — 99999 PR PBB SHADOW E&M-EST. PATIENT-LVL III: CPT | Mod: PBBFAC,HCNC,, | Performed by: INTERNAL MEDICINE

## 2019-07-08 NOTE — PROGRESS NOTES
Subjective:   Patient ID:  Tiana Mead is a 89 y.o. female who presents for follow-up of Chronic diastolic congestive heart failure (6 month f/u ); Chest Pain; and Shortness of Breath    Tiana Mead is a 89 y.o. female who presents for follow-up of Chronic diastolic congestive heart failure (4 months fu)  Tiana Mead presents for follow up of diastolic heart failure.In addition, she has a stable moderate pericardial effusion as well as moderate pulmonary hypertension and severe TR. Since last seen, she has had no worsening SOB. She has  chronic atrial fibrillation with rate control on a DOAC.Tiana Mead denies chest pain,palpitations, presyncope , or syncope. Tiana Mead has hypertension with BP currently low. Tiana Mead has dyslipidemia  on high intensity statin. Tiana Mead chronic kidney disease stage III.     HPI:   Denies palpitations or fluttering in the chest  Patient stays at home      Echo: 1/2018    1 - Concentric remodeling.     2 - No wall motion abnormalities.     3 - Normal left ventricular systolic function (EF 60-65%).     4 - Biatrial enlargement.     5 - Low normal right ventricular systolic function .     6 - Pulmonary hypertension. The estimated PA systolic pressure is 50 mmHg.     7 - Trivial aortic regurgitation.     8 - The mitral valve is moderately sclerotic with mildly restricted leaflet mobility.     9 - Mild mitral stenosis, MVA = 2.29 cm2.     10 - Severe tricuspid regurgitation.     11 - Trivial pulmonic regurgitation.     12 - Moderate pericardial effusion.     13 - Increased central venous pressure.     1/19  · Concentric left ventricular remodeling. Normal left ventricular systolic function. The estimated ejection fraction is 60%  · Severe left atrial enlargement.  · Left ventricular diastolic dysfunction.  · Normal right ventricular systolic function.  · Normal central venous pressure (3 mm Hg).  · The estimated PA systolic pressure is 28 mm  "Hg    HPI:   Patient feels chest pressure especially after eating or other activity lasts few seconds or minute.   Patient gets very SOB after a shower and does cooking as well but overall feels well.  Occasional pounding in the chest.  Patient is overall active.       Patient Active Problem List   Diagnosis    Hyperlipemia, mixed    Generalized osteoarthritis of multiple sites    Chronic iritis - Left Eye    Osteoarthritis of both knees    Chondrocalcinosis    Peripheral angiopathy    Pseudogout, both knees.    Hyperuricemia    Helicobacter pylori gastritis    Old MI (myocardial infarction), 2013    Persistent atrial fibrillation    Type 2 diabetes mellitus with diabetic polyneuropathy, with long-term current use of insulin    Long term current use of anticoagulant therapy, eliquis    History of chest pain at rest    CKD (chronic kidney disease) stage 4, GFR 15-29 ml/min    Fuchs' corneal dystrophy    Pericardial effusion    H/O Deep vein thrombosis (DVT)    Aortic atherosclerosis    Chronic diastolic congestive heart failure    Type 2 diabetes mellitus with diabetic nephropathy, with long-term current use of insulin    Diabetic polyneuropathy associated with type 2 diabetes mellitus    Primary hypothyroidism    Moderate persistent asthma without complication    Primary open angle glaucoma (POAG) of both eyes, moderate stage    Pulmonary hypertension    Non-rheumatic tricuspid valve insufficiency    Tortuous aorta    Allergic conjunctivitis of both eyes    Status post cataract extraction and insertion of intraocular lens     BP (!) 140/67 (BP Location: Left arm, Patient Position: Sitting, BP Method: Medium (Automatic))   Pulse 67   Ht 5' 8" (1.727 m)   Wt 74.2 kg (163 lb 9.3 oz)   LMP  (LMP Unknown)   SpO2 96%   BMI 24.87 kg/m²   Body mass index is 24.87 kg/m².  CrCl cannot be calculated (Patient's most recent lab result is older than the maximum 7 days allowed.).    Lab Results " "  Component Value Date     02/13/2019    K 4.4 02/13/2019     02/13/2019    CO2 28 02/13/2019    BUN 25 02/13/2019    CREATININE 1.7 (H) 02/13/2019     (H) 02/13/2019    HGBA1C 7.3 (H) 02/13/2019    MG 2.1 01/02/2018    AST 21 02/13/2019    ALT 17 02/13/2019    ALBUMIN 3.6 02/13/2019    PROT 6.9 02/13/2019    BILITOT 0.5 02/13/2019    WBC 6.62 02/13/2019    HGB 12.0 02/13/2019    HCT 37.1 02/13/2019    MCV 94 02/13/2019     02/13/2019    INR 1.0 12/30/2017    TSH 3.950 02/13/2019    CHOL 148 07/12/2018    HDL 54 07/12/2018    LDLCALC 78.8 07/12/2018    TRIG 76 07/12/2018       Current Outpatient Medications   Medication Sig    acetaminophen (TYLENOL) 500 MG tablet Take 500 mg by mouth every 6 (six) hours as needed for Pain.    albuterol 90 mcg/actuation inhaler Inhale 2 puffs into the lungs every 4 (four) hours as needed.    amLODIPine (NORVASC) 5 MG tablet TAKE 1 TABLET(5 MG) BY MOUTH EVERY MORNING    apixaban (ELIQUIS) 2.5 mg Tab Take 1 tablet (2.5 mg total) by mouth 2 (two) times daily.    atorvastatin (LIPITOR) 40 MG tablet Take 1 tablet (40 mg total) by mouth once daily.    BD ULTRA-FINE SHORT PEN NEEDLE 31 gauge x 5/16" Ndle USE WITH INSULIN PENS AS DIRECTED    blood sugar diagnostic (ONETOUCH ULTRA TEST) Strp Inject 1 strip into the skin 3 (three) times daily.    diclofenac sodium (VOLTAREN) 1 % Gel Apply topically 4 (four) times daily as needed.    dorzolamide (TRUSOPT) 2 % ophthalmic solution Place 1 drop into both eyes 2 (two) times daily.    fluticasone-salmeterol 250-50 mcg/dose (ADVAIR DISKUS) 250-50 mcg/dose diskus inhaler INHALE 1 PUFF BY MOUTH INTO THE LUNGS NIGHTLY    furosemide (LASIX) 40 MG tablet Take 1 tablet (40 mg total) by mouth 2 (two) times daily.    insulin aspart protamine-insulin aspart (NOVOLOG MIX 70-30FLEXPEN U-100) 100 unit/mL (70-30) InPn pen INJECT 10 UNITS UNDER THE SKIN EVERY MORNING BEFORE BREAKFAST    ipratropium (ATROVENT) 0.03 % nasal " spray USE 2 SPRAYS IN EACH NOSTRIL TWICE DAILY    isosorbide mononitrate (IMDUR) 30 MG 24 hr tablet TAKE 1 TABLET BY MOUTH EVERY MORNING FOR HEART, to prevent chest pain and reduce shortness of breath    lancets Misc 1 Device by Misc.(Non-Drug; Combo Route) route 3 (three) times daily before meals. Please provide the insurance company preferred product.    latanoprost 0.005 % ophthalmic solution Place 1 drop into the right eye every evening. (Patient taking differently: Place 1 drop into both eyes every evening. )    levothyroxine (SYNTHROID) 75 MCG tablet TAKE 1 TABLET BY MOUTH EVERY DAY BEFORE BREAKFAST    meclizine (ANTIVERT) 12.5 mg tablet Take 1 tablet (12.5 mg total) by mouth 3 (three) times daily as needed.    metoprolol tartrate (LOPRESSOR) 50 MG tablet Take 1 tablet (50 mg total) by mouth 2 (two) times daily.    multivit-mineral-iron-lutein (CENTRUM SILVER ULTRA WOMEN'S) Tab Take 1 tablet by mouth once daily.    nitroGLYCERIN (NITROSTAT) 0.4 MG SL tablet ONE TABLET UNDER THE TONGUE EVERY 5 MINUTES AS NEEDED FOR CHEST PAIN    ondansetron (ZOFRAN) 4 MG tablet TAKE ONE TABLET BY MOUTH EVERY 12 HOURS AS NEEDED FOR NAUSEA    TRUE METRIX GLUCOSE METER Misc TEST TID    TRUE METRIX GLUCOSE TEST STRIP Strp USE TO TEST BLOOD SUGAR WITH MEALS THREE TIMES DAILY    TRUEPLUS LANCETS 30 gauge Misc USE TO TEST BLOOD SUGAR TID AC    TRUEPLUS LANCETS 33 gauge Misc TESTING THREE TIMES DAILY     No current facility-administered medications for this visit.        ROS    Objective:   Physical Exam   Constitutional: She is oriented to person, place, and time. She appears well-developed and well-nourished. No distress.   HENT:   Head: Normocephalic and atraumatic.   Nose: Nose normal.   Mouth/Throat: Oropharynx is clear and moist. No oropharyngeal exudate.   Eyes: Pupils are equal, round, and reactive to light. Conjunctivae and EOM are normal. Right eye exhibits no discharge. Left eye exhibits no discharge. No scleral  icterus.   Neck: Normal range of motion. Neck supple. No JVD present. No tracheal deviation present. No thyromegaly present.   Cardiovascular: Normal rate, regular rhythm, normal heart sounds and intact distal pulses. Exam reveals no gallop and no friction rub.   No murmur heard.  Pulmonary/Chest: Effort normal and breath sounds normal. No stridor. No respiratory distress. She has no wheezes. She has no rales. She exhibits no tenderness.   Abdominal: Soft. Bowel sounds are normal. She exhibits no distension and no mass. There is no tenderness. There is no rebound and no guarding.   Musculoskeletal: Normal range of motion. She exhibits no edema or tenderness.   Lymphadenopathy:     She has no cervical adenopathy.   Neurological: She is alert and oriented to person, place, and time. She has normal reflexes. No cranial nerve deficit. She exhibits normal muscle tone. Coordination normal.   Skin: Skin is warm. No rash noted. She is not diaphoretic. No erythema. No pallor.   Psychiatric: She has a normal mood and affect. Her behavior is normal. Judgment and thought content normal.       Assessment:     1. Other chest pain    2. Pulmonary hypertension    3. Essential hypertension    4. Pericardial effusion    5. H/O Deep vein thrombosis (DVT)    6. CKD (chronic kidney disease) stage 4, GFR 15-29 ml/min    7. Persistent atrial fibrillation    8. Chronic diastolic congestive heart failure        Plan:   We discussed goal of care in a 89 y/o female with chest pain/SOB and prior pericardial effusion. Will r/o another pericardial effusion, we discussed stress testing in this patient, they did not want to pursue any aggressive intervention in the setting of obstructive CAD (though this is less likely based on her sx). Will continue to monitor  Her.  No changes in meds. Continue diuretics.

## 2019-07-10 ENCOUNTER — LAB VISIT (OUTPATIENT)
Dept: LAB | Facility: OTHER | Age: 84
End: 2019-07-10
Payer: MEDICARE

## 2019-07-10 DIAGNOSIS — Z79.4 TYPE 2 DIABETES MELLITUS WITH DIABETIC POLYNEUROPATHY, WITH LONG-TERM CURRENT USE OF INSULIN: ICD-10-CM

## 2019-07-10 DIAGNOSIS — E11.42 TYPE 2 DIABETES MELLITUS WITH DIABETIC POLYNEUROPATHY, WITH LONG-TERM CURRENT USE OF INSULIN: ICD-10-CM

## 2019-07-10 DIAGNOSIS — Z79.01 LONG TERM CURRENT USE OF ANTICOAGULANT THERAPY: ICD-10-CM

## 2019-07-10 LAB
ALBUMIN SERPL BCP-MCNC: 3.9 G/DL (ref 3.5–5.2)
ALP SERPL-CCNC: 173 U/L (ref 55–135)
ALT SERPL W/O P-5'-P-CCNC: 20 U/L (ref 10–44)
ANION GAP SERPL CALC-SCNC: 11 MMOL/L (ref 8–16)
AST SERPL-CCNC: 23 U/L (ref 10–40)
BASOPHILS # BLD AUTO: 0.06 K/UL (ref 0–0.2)
BASOPHILS NFR BLD: 1 % (ref 0–1.9)
BILIRUB SERPL-MCNC: 0.5 MG/DL (ref 0.1–1)
BUN SERPL-MCNC: 24 MG/DL (ref 10–30)
CALCIUM SERPL-MCNC: 10 MG/DL (ref 8.7–10.5)
CHLORIDE SERPL-SCNC: 104 MMOL/L (ref 95–110)
CO2 SERPL-SCNC: 28 MMOL/L (ref 23–29)
CREAT SERPL-MCNC: 1.7 MG/DL (ref 0.5–1.4)
DIFFERENTIAL METHOD: NORMAL
EOSINOPHIL # BLD AUTO: 0.3 K/UL (ref 0–0.5)
EOSINOPHIL NFR BLD: 4.4 % (ref 0–8)
ERYTHROCYTE [DISTWIDTH] IN BLOOD BY AUTOMATED COUNT: 14.3 % (ref 11.5–14.5)
EST. GFR  (AFRICAN AMERICAN): 29 ML/MIN/1.73 M^2
EST. GFR  (NON AFRICAN AMERICAN): 25 ML/MIN/1.73 M^2
ESTIMATED AVG GLUCOSE: 154 MG/DL (ref 68–131)
GLUCOSE SERPL-MCNC: 114 MG/DL (ref 70–110)
HBA1C MFR BLD HPLC: 7 % (ref 4–5.6)
HCT VFR BLD AUTO: 37.7 % (ref 37–48.5)
HGB BLD-MCNC: 12.4 G/DL (ref 12–16)
LYMPHOCYTES # BLD AUTO: 1.9 K/UL (ref 1–4.8)
LYMPHOCYTES NFR BLD: 31.1 % (ref 18–48)
MCH RBC QN AUTO: 30 PG (ref 27–31)
MCHC RBC AUTO-ENTMCNC: 32.9 G/DL (ref 32–36)
MCV RBC AUTO: 91 FL (ref 82–98)
MONOCYTES # BLD AUTO: 0.8 K/UL (ref 0.3–1)
MONOCYTES NFR BLD: 12.4 % (ref 4–15)
NEUTROPHILS # BLD AUTO: 3.1 K/UL (ref 1.8–7.7)
NEUTROPHILS NFR BLD: 51.1 % (ref 38–73)
PLATELET # BLD AUTO: 179 K/UL (ref 150–350)
PMV BLD AUTO: 11.5 FL (ref 9.2–12.9)
POTASSIUM SERPL-SCNC: 4.4 MMOL/L (ref 3.5–5.1)
PROT SERPL-MCNC: 7.3 G/DL (ref 6–8.4)
RBC # BLD AUTO: 4.14 M/UL (ref 4–5.4)
SODIUM SERPL-SCNC: 143 MMOL/L (ref 136–145)
WBC # BLD AUTO: 6.15 K/UL (ref 3.9–12.7)

## 2019-07-10 PROCEDURE — 36415 COLL VENOUS BLD VENIPUNCTURE: CPT | Mod: HCNC

## 2019-07-10 PROCEDURE — 85025 COMPLETE CBC W/AUTO DIFF WBC: CPT | Mod: HCNC

## 2019-07-10 PROCEDURE — 83036 HEMOGLOBIN GLYCOSYLATED A1C: CPT | Mod: HCNC

## 2019-07-10 PROCEDURE — 80053 COMPREHEN METABOLIC PANEL: CPT | Mod: HCNC

## 2019-07-17 ENCOUNTER — HOSPITAL ENCOUNTER (OUTPATIENT)
Dept: CARDIOLOGY | Facility: OTHER | Age: 84
Discharge: HOME | End: 2019-07-17
Attending: INTERNAL MEDICINE
Payer: MEDICARE

## 2019-07-17 DIAGNOSIS — R07.89 OTHER CHEST PAIN: ICD-10-CM

## 2019-07-17 DIAGNOSIS — I27.20 PULMONARY HYPERTENSION: ICD-10-CM

## 2019-07-17 PROCEDURE — 93306 TRANSTHORACIC ECHO (TTE) COMPLETE (CUPID ONLY): ICD-10-PCS | Mod: 26,HCNC,, | Performed by: INTERNAL MEDICINE

## 2019-07-17 PROCEDURE — 93306 TTE W/DOPPLER COMPLETE: CPT | Mod: 26,HCNC,, | Performed by: INTERNAL MEDICINE

## 2019-07-17 PROCEDURE — 93306 TTE W/DOPPLER COMPLETE: CPT | Mod: HCNC

## 2019-07-19 LAB
ASCENDING AORTA: 3.2 CM
AV INDEX (PROSTH): 0.85
AV MEAN GRADIENT: 5 MMHG
AV PEAK GRADIENT: 8 MMHG
AV VALVE AREA: 2.87 CM2
AV VELOCITY RATIO: 0.94
CV ECHO LV RWT: 0.54 CM
DOP CALC AO PEAK VEL: 1.41 M/S
DOP CALC AO VTI: 40.88 CM
DOP CALC LVOT AREA: 3.4 CM2
DOP CALC LVOT DIAMETER: 2.07 CM
DOP CALC LVOT PEAK VEL: 1.33 M/S
DOP CALC LVOT STROKE VOLUME: 117.29 CM3
DOP CALCLVOT PEAK VEL VTI: 34.87 CM
E/E' RATIO: 21.13 M/S
ECHO LV POSTERIOR WALL: 1.04 CM (ref 0.6–1.1)
FRACTIONAL SHORTENING: 34 % (ref 28–44)
INTERVENTRICULAR SEPTUM: 1.1 CM (ref 0.6–1.1)
LA MAJOR: 7.48 CM
LA MINOR: 6.83 CM
LA WIDTH: 4.35 CM
LEFT ATRIUM SIZE: 4.68 CM
LEFT ATRIUM VOLUME: 123.56 CM3
LEFT INTERNAL DIMENSION IN SYSTOLE: 2.56 CM (ref 2.1–4)
LEFT VENTRICLE DIASTOLIC VOLUME: 63.89 ML
LEFT VENTRICLE SYSTOLIC VOLUME: 23.6 ML
LEFT VENTRICULAR INTERNAL DIMENSION IN DIASTOLE: 3.85 CM (ref 3.5–6)
LEFT VENTRICULAR MASS: 131.94 G
LV LATERAL E/E' RATIO: 18.78 M/S
LV SEPTAL E/E' RATIO: 24.14 M/S
MV MEAN GRADIENT: 5 MMHG
MV PEAK E VEL: 1.69 M/S
PISA TR MAX VEL: 3.19 M/S
PV PEAK VELOCITY: 1.15 CM/S
RA MAJOR: 6.91 CM
RA PRESSURE: 15 MMHG
RA WIDTH: 4.13 CM
RIGHT VENTRICULAR END-DIASTOLIC DIMENSION: 3.53 CM
SINUS: 3.06 CM
STJ: 2.63 CM
TDI LATERAL: 0.09 M/S
TDI SEPTAL: 0.07 M/S
TDI: 0.08 M/S
TR MAX PG: 41 MMHG
TRICUSPID ANNULAR PLANE SYSTOLIC EXCURSION: 1.28 CM
TV REST PULMONARY ARTERY PRESSURE: 56 MMHG

## 2019-07-22 ENCOUNTER — TELEPHONE (OUTPATIENT)
Dept: CARDIOLOGY | Facility: CLINIC | Age: 84
End: 2019-07-22

## 2019-07-22 NOTE — TELEPHONE ENCOUNTER
----- Message from Dia Murcia MD sent at 7/22/2019  2:22 PM CDT -----  plz let pt. Know that echo (ultrasound of the heart) is normal. Pressures in the heart are high that is well known. Please make sure that you are taking your lasix pill 2 times a day

## 2019-07-22 NOTE — PROGRESS NOTES
plz let pt. Know that echo (ultrasound of the heart) is normal. Pressures in the heart are high that is well known. Please make sure that you are taking your lasix pill 2 times a day

## 2019-07-24 ENCOUNTER — OFFICE VISIT (OUTPATIENT)
Dept: INTERNAL MEDICINE | Facility: CLINIC | Age: 84
End: 2019-07-24
Payer: MEDICARE

## 2019-07-24 VITALS
HEART RATE: 67 BPM | BODY MASS INDEX: 25.29 KG/M2 | WEIGHT: 166.88 LBS | HEIGHT: 68 IN | SYSTOLIC BLOOD PRESSURE: 112 MMHG | OXYGEN SATURATION: 98 % | DIASTOLIC BLOOD PRESSURE: 52 MMHG

## 2019-07-24 DIAGNOSIS — I27.20 PULMONARY HYPERTENSION: ICD-10-CM

## 2019-07-24 DIAGNOSIS — E03.9 PRIMARY HYPOTHYROIDISM: ICD-10-CM

## 2019-07-24 DIAGNOSIS — I25.2 OLD MI (MYOCARDIAL INFARCTION): ICD-10-CM

## 2019-07-24 DIAGNOSIS — N18.4 CKD (CHRONIC KIDNEY DISEASE) STAGE 4, GFR 15-29 ML/MIN: Chronic | ICD-10-CM

## 2019-07-24 DIAGNOSIS — R07.9 CHEST PAIN, UNSPECIFIED TYPE: ICD-10-CM

## 2019-07-24 DIAGNOSIS — E11.21 TYPE 2 DIABETES MELLITUS WITH DIABETIC NEPHROPATHY, WITH LONG-TERM CURRENT USE OF INSULIN: ICD-10-CM

## 2019-07-24 DIAGNOSIS — Z79.4 TYPE 2 DIABETES MELLITUS WITH DIABETIC NEPHROPATHY, WITH LONG-TERM CURRENT USE OF INSULIN: ICD-10-CM

## 2019-07-24 DIAGNOSIS — R06.09 DOE (DYSPNEA ON EXERTION): Primary | ICD-10-CM

## 2019-07-24 PROCEDURE — 1101F PR PT FALLS ASSESS DOC 0-1 FALLS W/OUT INJ PAST YR: ICD-10-PCS | Mod: HCNC,CPTII,S$GLB, | Performed by: INTERNAL MEDICINE

## 2019-07-24 PROCEDURE — 99999 PR PBB SHADOW E&M-EST. PATIENT-LVL III: CPT | Mod: PBBFAC,HCNC,, | Performed by: INTERNAL MEDICINE

## 2019-07-24 PROCEDURE — 99999 PR PBB SHADOW E&M-EST. PATIENT-LVL III: ICD-10-PCS | Mod: PBBFAC,HCNC,, | Performed by: INTERNAL MEDICINE

## 2019-07-24 PROCEDURE — 99214 OFFICE O/P EST MOD 30 MIN: CPT | Mod: HCNC,S$GLB,, | Performed by: INTERNAL MEDICINE

## 2019-07-24 PROCEDURE — 1101F PT FALLS ASSESS-DOCD LE1/YR: CPT | Mod: HCNC,CPTII,S$GLB, | Performed by: INTERNAL MEDICINE

## 2019-07-24 PROCEDURE — 99214 PR OFFICE/OUTPT VISIT, EST, LEVL IV, 30-39 MIN: ICD-10-PCS | Mod: HCNC,S$GLB,, | Performed by: INTERNAL MEDICINE

## 2019-07-24 RX ORDER — ISOSORBIDE MONONITRATE 60 MG/1
TABLET, EXTENDED RELEASE ORAL
Qty: 90 TABLET | Refills: 3 | Status: SHIPPED | OUTPATIENT
Start: 2019-07-24 | End: 2020-08-01

## 2019-07-24 NOTE — PATIENT INSTRUCTIONS
Isosorbide mononitrate take two of the 30 mg tablets once daily to reduce chest pain and shortness of breath on exertion.  I sent a new prescription to your [facility] for the 60 mg tablet.

## 2019-07-25 RX ORDER — AMLODIPINE BESYLATE 5 MG/1
TABLET ORAL
Qty: 90 TABLET | Refills: 0 | Status: SHIPPED | OUTPATIENT
Start: 2019-07-25 | End: 2019-09-27 | Stop reason: SDUPTHER

## 2019-07-25 RX ORDER — NITROGLYCERIN 0.4 MG/1
TABLET SUBLINGUAL
Qty: 25 TABLET | Refills: 0 | Status: SHIPPED | OUTPATIENT
Start: 2019-07-25 | End: 2019-09-23 | Stop reason: SDUPTHER

## 2019-07-25 NOTE — PROGRESS NOTES
Subjective:       Patient ID: Tiana Mead is a 89 y.o. female.    Chief Complaint: Chest Pain (about 2 weeks, goes from front to back and across chest. pain is a 9 )   For about the past 2 weeks she has pain in her chest that comes and goes, may be brought on by exertion but also occurs at rest   goes across the front of her chest between her shoulders.  She has noticed that she is more short of breath recently without having any wheezing or cough and no sinus difficulty.  For example when walking from the bathroom to the kitchen she is feeling short of breath, a little bit more short of breath than usual.    She is sleeping well she wakes up about 4 times a night to go to the bathroom to urinate whether she takes furosemide early not.  So she has been taking her medication twice a day at 9 a.m. and 6:00 p.m.  She generally naps a couple of hours during the day  Her recent labs are reviewed:  Diabetes Management Status    Statin: Taking  ACE/ARB: Not taking    Screening or Prevention Patient's value Goal Complete/Controlled?   HgA1C Testing and Control   Lab Results   Component Value Date    HGBA1C 7.0 (H) 07/10/2019      Annually/Less than 8% Yes   Lipid profile : 07/12/2018 Annually No   LDL control Lab Results   Component Value Date    LDLCALC 78.8 07/12/2018    Annually/Less than 100 mg/dl  No   Nephropathy screening Lab Results   Component Value Date    LABMICR 2.7 10/25/2011     Lab Results   Component Value Date    PROTEINUA Negative 04/02/2013    Annually No   Blood pressure BP Readings from Last 1 Encounters:   07/24/19 (!) 112/52    Less than 140/90 Yes   Dilated retinal exam : 06/11/2019 Annually Yes   Foot exam   : 10/17/2018 Annually Yes       EKG and transthoracic echo are reviewed.    HPI  Review of Systems   Constitutional: Negative for activity change, appetite change, chills, fatigue, fever and unexpected weight change.   HENT: Negative for hearing loss.    Eyes: Negative for visual  disturbance.   Respiratory: Positive for chest tightness and shortness of breath. Negative for cough and wheezing.    Cardiovascular: Negative for chest pain, palpitations and leg swelling.   Gastrointestinal: Negative for abdominal pain, constipation, nausea and vomiting.   Genitourinary: Negative for dysuria, frequency and urgency.   Musculoskeletal: Negative for arthralgias, back pain, gait problem, joint swelling and myalgias.   Skin: Negative for rash.   Neurological: Negative for light-headedness and headaches.   Psychiatric/Behavioral: Negative for dysphoric mood and sleep disturbance. The patient is not nervous/anxious.        Objective:      Physical Exam   Constitutional: She appears well-developed and well-nourished. No distress.   HENT:   Head: Normocephalic and atraumatic.   Eyes: Conjunctivae are normal. No scleral icterus.   Neck: No thyromegaly present.   Cardiovascular: Normal rate. Exam reveals no gallop and no friction rub.   No murmur heard.  AFib controlled rate   Pulmonary/Chest: Effort normal and breath sounds normal. She has no wheezes. She has no rales.   Musculoskeletal:   Plus one pretibial edema   Lymphadenopathy:     She has no cervical adenopathy.   Psychiatric: She has a normal mood and affect. Her behavior is normal.   Nursing note and vitals reviewed.      Assessment:       1. NOVOA (dyspnea on exertion)    2. Chest pain, unspecified type    3. Primary hypothyroidism    4. CKD (chronic kidney disease) stage 4, GFR 15-29 ml/min    5. Old MI (myocardial infarction), 2013    6. Pulmonary hypertension    7. Type 2 diabetes mellitus with diabetic nephropathy, with long-term current use of insulin        Plan:   Tiana was seen today for chest pain.    Diagnoses and all orders for this visit:    NOVOA (dyspnea on exertion)  Chest pain, unspecified type, will try some a simple measure, increasing isosorbide mononitrate from 30 mg daily  to 60 mg taken once daily.  Should her chest pain or  shortness of breath worsen   or if any new symptoms develop, she will contact the office.    Primary hypothyroidism    CKD (chronic kidney disease) stage 4, GFR 15-29 ml/min  -     Comprehensive metabolic panel; Future  -     CBC auto differential; Future    Old MI (myocardial infarction), 2013    Pulmonary hypertension    Type 2 diabetes mellitus with diabetic nephropathy, with long-term current use of insulin  -     Hemoglobin A1c; Future    Other orders  -     isosorbide mononitrate (IMDUR) 60 MG 24 hr tablet; TAKE 1 TABLET BY MOUTH EVERY MORNING FOR HEART, to prevent chest pain and reduce shortness of breath    Follow up in about 4 months (around 11/10/2019) for after labs.

## 2019-07-29 RX ORDER — ATORVASTATIN CALCIUM 40 MG/1
TABLET, FILM COATED ORAL
Qty: 90 TABLET | Refills: 0 | Status: SHIPPED | OUTPATIENT
Start: 2019-07-29 | End: 2019-11-20 | Stop reason: SDUPTHER

## 2019-08-23 ENCOUNTER — HOSPITAL ENCOUNTER (OUTPATIENT)
Facility: HOSPITAL | Age: 84
Discharge: HOME HEALTH | End: 2019-08-27
Attending: EMERGENCY MEDICINE | Admitting: INTERNAL MEDICINE
Payer: MEDICARE

## 2019-08-23 ENCOUNTER — OFFICE VISIT (OUTPATIENT)
Dept: URGENT CARE | Facility: CLINIC | Age: 84
End: 2019-08-23
Payer: MEDICARE

## 2019-08-23 VITALS
HEART RATE: 76 BPM | TEMPERATURE: 100 F | BODY MASS INDEX: 24.4 KG/M2 | WEIGHT: 161 LBS | OXYGEN SATURATION: 96 % | HEIGHT: 68 IN | RESPIRATION RATE: 18 BRPM | SYSTOLIC BLOOD PRESSURE: 150 MMHG | DIASTOLIC BLOOD PRESSURE: 86 MMHG

## 2019-08-23 DIAGNOSIS — M11.20 PSEUDOGOUT: ICD-10-CM

## 2019-08-23 DIAGNOSIS — I20.89 ANGINA AT REST: ICD-10-CM

## 2019-08-23 DIAGNOSIS — M15.9 GENERALIZED OSTEOARTHRITIS OF MULTIPLE SITES: ICD-10-CM

## 2019-08-23 DIAGNOSIS — M25.562 CHRONIC PAIN OF LEFT KNEE: ICD-10-CM

## 2019-08-23 DIAGNOSIS — R06.02 SHORTNESS OF BREATH: ICD-10-CM

## 2019-08-23 DIAGNOSIS — G89.29 CHRONIC PAIN OF LEFT KNEE: ICD-10-CM

## 2019-08-23 DIAGNOSIS — M54.2 NECK PAIN: ICD-10-CM

## 2019-08-23 DIAGNOSIS — R07.9 CHEST PAIN: Primary | ICD-10-CM

## 2019-08-23 DIAGNOSIS — R07.9 CHEST PAIN, RULE OUT ACUTE MYOCARDIAL INFARCTION: ICD-10-CM

## 2019-08-23 DIAGNOSIS — M25.462 EFFUSION OF LEFT KNEE JOINT: Primary | ICD-10-CM

## 2019-08-23 DIAGNOSIS — I48.91 ATRIAL FIBRILLATION: ICD-10-CM

## 2019-08-23 DIAGNOSIS — M25.462 EFFUSION, LEFT KNEE: ICD-10-CM

## 2019-08-23 PROBLEM — J45.40 MODERATE PERSISTENT ASTHMA WITHOUT COMPLICATION: Chronic | Status: ACTIVE | Noted: 2018-01-02

## 2019-08-23 PROBLEM — E11.42 TYPE 2 DIABETES MELLITUS WITH DIABETIC POLYNEUROPATHY, WITH LONG-TERM CURRENT USE OF INSULIN: Chronic | Status: ACTIVE | Noted: 2017-04-20

## 2019-08-23 PROBLEM — Z79.01 LONG TERM CURRENT USE OF ANTICOAGULANT THERAPY: Chronic | Status: ACTIVE | Noted: 2017-04-20

## 2019-08-23 PROBLEM — I82.409 DEEP VEIN THROMBOSIS (DVT) DURING CURRENT HOSPITALIZATION: Chronic | Status: ACTIVE | Noted: 2017-06-15

## 2019-08-23 PROBLEM — Z79.4 TYPE 2 DIABETES MELLITUS WITH DIABETIC POLYNEUROPATHY, WITH LONG-TERM CURRENT USE OF INSULIN: Chronic | Status: ACTIVE | Noted: 2017-04-20

## 2019-08-23 PROBLEM — I50.32 CHRONIC DIASTOLIC CONGESTIVE HEART FAILURE: Chronic | Status: ACTIVE | Noted: 2017-08-15

## 2019-08-23 PROBLEM — I48.19 PERSISTENT ATRIAL FIBRILLATION: Chronic | Status: ACTIVE | Noted: 2017-04-20

## 2019-08-23 PROBLEM — E03.9 PRIMARY HYPOTHYROIDISM: Chronic | Status: ACTIVE | Noted: 2017-12-13

## 2019-08-23 LAB
ALBUMIN SERPL BCP-MCNC: 3.6 G/DL (ref 3.5–5.2)
ALP SERPL-CCNC: 132 U/L (ref 55–135)
ALT SERPL W/O P-5'-P-CCNC: 24 U/L (ref 10–44)
ANION GAP SERPL CALC-SCNC: 9 MMOL/L (ref 8–16)
AST SERPL-CCNC: 23 U/L (ref 10–40)
BASOPHILS # BLD AUTO: 0.04 K/UL (ref 0–0.2)
BASOPHILS NFR BLD: 0.4 % (ref 0–1.9)
BILIRUB SERPL-MCNC: 0.8 MG/DL (ref 0.1–1)
BNP SERPL-MCNC: 687 PG/ML (ref 0–99)
BUN SERPL-MCNC: 24 MG/DL (ref 10–30)
CALCIUM SERPL-MCNC: 9.6 MG/DL (ref 8.7–10.5)
CHLORIDE SERPL-SCNC: 104 MMOL/L (ref 95–110)
CO2 SERPL-SCNC: 26 MMOL/L (ref 23–29)
CREAT SERPL-MCNC: 1.5 MG/DL (ref 0.5–1.4)
DIFFERENTIAL METHOD: ABNORMAL
EOSINOPHIL # BLD AUTO: 0.1 K/UL (ref 0–0.5)
EOSINOPHIL NFR BLD: 0.8 % (ref 0–8)
ERYTHROCYTE [DISTWIDTH] IN BLOOD BY AUTOMATED COUNT: 14.2 % (ref 11.5–14.5)
EST. GFR  (AFRICAN AMERICAN): 34.1 ML/MIN/1.73 M^2
EST. GFR  (NON AFRICAN AMERICAN): 29.6 ML/MIN/1.73 M^2
GLUCOSE SERPL-MCNC: 200 MG/DL (ref 70–110)
HCT VFR BLD AUTO: 37.5 % (ref 37–48.5)
HGB BLD-MCNC: 12.3 G/DL (ref 12–16)
IMM GRANULOCYTES # BLD AUTO: 0.06 K/UL (ref 0–0.04)
IMM GRANULOCYTES NFR BLD AUTO: 0.6 % (ref 0–0.5)
LYMPHOCYTES # BLD AUTO: 1.4 K/UL (ref 1–4.8)
LYMPHOCYTES NFR BLD: 15.1 % (ref 18–48)
MCH RBC QN AUTO: 30.3 PG (ref 27–31)
MCHC RBC AUTO-ENTMCNC: 32.8 G/DL (ref 32–36)
MCV RBC AUTO: 92 FL (ref 82–98)
MONOCYTES # BLD AUTO: 1.6 K/UL (ref 0.3–1)
MONOCYTES NFR BLD: 17 % (ref 4–15)
NEUTROPHILS # BLD AUTO: 6.1 K/UL (ref 1.8–7.7)
NEUTROPHILS NFR BLD: 66.1 % (ref 38–73)
NRBC BLD-RTO: 0 /100 WBC
PLATELET # BLD AUTO: 147 K/UL (ref 150–350)
PMV BLD AUTO: 11.6 FL (ref 9.2–12.9)
POTASSIUM SERPL-SCNC: 4.2 MMOL/L (ref 3.5–5.1)
PROT SERPL-MCNC: 7 G/DL (ref 6–8.4)
RBC # BLD AUTO: 4.06 M/UL (ref 4–5.4)
SODIUM SERPL-SCNC: 139 MMOL/L (ref 136–145)
TROPONIN I SERPL DL<=0.01 NG/ML-MCNC: <0.006 NG/ML (ref 0–0.03)
TROPONIN I SERPL DL<=0.01 NG/ML-MCNC: <0.006 NG/ML (ref 0–0.03)
WBC # BLD AUTO: 9.29 K/UL (ref 3.9–12.7)

## 2019-08-23 PROCEDURE — 36415 COLL VENOUS BLD VENIPUNCTURE: CPT | Mod: HCNC

## 2019-08-23 PROCEDURE — 99220 PR INITIAL OBSERVATION CARE,LEVL III: ICD-10-PCS | Mod: HCNC,,, | Performed by: PHYSICIAN ASSISTANT

## 2019-08-23 PROCEDURE — 99285 PR EMERGENCY DEPT VISIT,LEVEL V: ICD-10-PCS | Mod: ,,, | Performed by: EMERGENCY MEDICINE

## 2019-08-23 PROCEDURE — 93010 EKG 12-LEAD: ICD-10-PCS | Mod: HCNC,,, | Performed by: INTERNAL MEDICINE

## 2019-08-23 PROCEDURE — 85025 COMPLETE CBC W/AUTO DIFF WBC: CPT | Mod: HCNC

## 2019-08-23 PROCEDURE — 99285 EMERGENCY DEPT VISIT HI MDM: CPT | Mod: 25,HCNC

## 2019-08-23 PROCEDURE — 99499 UNLISTED E&M SERVICE: CPT | Mod: S$GLB,,, | Performed by: EMERGENCY MEDICINE

## 2019-08-23 PROCEDURE — 25000003 PHARM REV CODE 250: Mod: HCNC | Performed by: PHYSICIAN ASSISTANT

## 2019-08-23 PROCEDURE — G0378 HOSPITAL OBSERVATION PER HR: HCPCS | Mod: HCNC

## 2019-08-23 PROCEDURE — 99214 PR OFFICE/OUTPT VISIT, EST, LEVL IV, 30-39 MIN: ICD-10-PCS | Mod: 25,S$GLB,, | Performed by: EMERGENCY MEDICINE

## 2019-08-23 PROCEDURE — 84484 ASSAY OF TROPONIN QUANT: CPT | Mod: HCNC

## 2019-08-23 PROCEDURE — 20610 DRAIN/INJ JOINT/BURSA W/O US: CPT | Mod: LT,S$GLB,, | Performed by: EMERGENCY MEDICINE

## 2019-08-23 PROCEDURE — 80053 COMPREHEN METABOLIC PANEL: CPT | Mod: HCNC

## 2019-08-23 PROCEDURE — 93005 ELECTROCARDIOGRAM TRACING: CPT | Mod: HCNC

## 2019-08-23 PROCEDURE — 1101F PT FALLS ASSESS-DOCD LE1/YR: CPT | Mod: CPTII,S$GLB,, | Performed by: EMERGENCY MEDICINE

## 2019-08-23 PROCEDURE — 93010 ELECTROCARDIOGRAM REPORT: CPT | Mod: HCNC,,, | Performed by: INTERNAL MEDICINE

## 2019-08-23 PROCEDURE — 99499 RISK ADDL DX/OHS AUDIT: ICD-10-PCS | Mod: S$GLB,,, | Performed by: EMERGENCY MEDICINE

## 2019-08-23 PROCEDURE — 1101F PR PT FALLS ASSESS DOC 0-1 FALLS W/OUT INJ PAST YR: ICD-10-PCS | Mod: CPTII,S$GLB,, | Performed by: EMERGENCY MEDICINE

## 2019-08-23 PROCEDURE — 99220 PR INITIAL OBSERVATION CARE,LEVL III: CPT | Mod: HCNC,,, | Performed by: PHYSICIAN ASSISTANT

## 2019-08-23 PROCEDURE — 99214 OFFICE O/P EST MOD 30 MIN: CPT | Mod: 25,S$GLB,, | Performed by: EMERGENCY MEDICINE

## 2019-08-23 PROCEDURE — 83880 ASSAY OF NATRIURETIC PEPTIDE: CPT | Mod: HCNC

## 2019-08-23 PROCEDURE — 20610 PR DRAIN/INJECT LARGE JOINT/BURSA: ICD-10-PCS | Mod: LT,S$GLB,, | Performed by: EMERGENCY MEDICINE

## 2019-08-23 PROCEDURE — 99285 EMERGENCY DEPT VISIT HI MDM: CPT | Mod: ,,, | Performed by: EMERGENCY MEDICINE

## 2019-08-23 RX ORDER — ACETAMINOPHEN 325 MG/1
650 TABLET ORAL
Status: COMPLETED | OUTPATIENT
Start: 2019-08-23 | End: 2019-08-23

## 2019-08-23 RX ORDER — IBUPROFEN 200 MG
16 TABLET ORAL
Status: DISCONTINUED | OUTPATIENT
Start: 2019-08-23 | End: 2019-08-27 | Stop reason: HOSPADM

## 2019-08-23 RX ORDER — ONDANSETRON 8 MG/1
8 TABLET, ORALLY DISINTEGRATING ORAL EVERY 8 HOURS PRN
Status: DISCONTINUED | OUTPATIENT
Start: 2019-08-23 | End: 2019-08-27 | Stop reason: HOSPADM

## 2019-08-23 RX ORDER — FUROSEMIDE 40 MG/1
40 TABLET ORAL 2 TIMES DAILY
Status: DISCONTINUED | OUTPATIENT
Start: 2019-08-23 | End: 2019-08-27 | Stop reason: HOSPADM

## 2019-08-23 RX ORDER — LEVOTHYROXINE SODIUM 75 UG/1
75 TABLET ORAL
Status: DISCONTINUED | OUTPATIENT
Start: 2019-08-24 | End: 2019-08-27 | Stop reason: HOSPADM

## 2019-08-23 RX ORDER — GLUCAGON 1 MG
1 KIT INJECTION
Status: DISCONTINUED | OUTPATIENT
Start: 2019-08-23 | End: 2019-08-27 | Stop reason: HOSPADM

## 2019-08-23 RX ORDER — ASPIRIN 325 MG
325 TABLET ORAL
Status: ACTIVE | OUTPATIENT
Start: 2019-08-23 | End: 2019-08-24

## 2019-08-23 RX ORDER — RAMELTEON 8 MG/1
8 TABLET ORAL NIGHTLY PRN
Status: DISCONTINUED | OUTPATIENT
Start: 2019-08-23 | End: 2019-08-27 | Stop reason: HOSPADM

## 2019-08-23 RX ORDER — ATORVASTATIN CALCIUM 20 MG/1
40 TABLET, FILM COATED ORAL DAILY
Status: DISCONTINUED | OUTPATIENT
Start: 2019-08-24 | End: 2019-08-27 | Stop reason: HOSPADM

## 2019-08-23 RX ORDER — IPRATROPIUM BROMIDE AND ALBUTEROL SULFATE 2.5; .5 MG/3ML; MG/3ML
3 SOLUTION RESPIRATORY (INHALATION) EVERY 4 HOURS PRN
Status: DISCONTINUED | OUTPATIENT
Start: 2019-08-23 | End: 2019-08-27 | Stop reason: HOSPADM

## 2019-08-23 RX ORDER — ISOSORBIDE MONONITRATE 30 MG/1
60 TABLET, EXTENDED RELEASE ORAL DAILY
Status: DISCONTINUED | OUTPATIENT
Start: 2019-08-24 | End: 2019-08-24

## 2019-08-23 RX ORDER — PROMETHAZINE HYDROCHLORIDE 25 MG/1
25 TABLET ORAL EVERY 6 HOURS PRN
Status: DISCONTINUED | OUTPATIENT
Start: 2019-08-23 | End: 2019-08-27 | Stop reason: HOSPADM

## 2019-08-23 RX ORDER — SODIUM CHLORIDE 0.9 % (FLUSH) 0.9 %
10 SYRINGE (ML) INJECTION
Status: DISCONTINUED | OUTPATIENT
Start: 2019-08-23 | End: 2019-08-27 | Stop reason: HOSPADM

## 2019-08-23 RX ORDER — IBUPROFEN 200 MG
24 TABLET ORAL
Status: DISCONTINUED | OUTPATIENT
Start: 2019-08-23 | End: 2019-08-27 | Stop reason: HOSPADM

## 2019-08-23 RX ORDER — DICLOFENAC SODIUM 10 MG/G
2 GEL TOPICAL 4 TIMES DAILY PRN
Status: DISCONTINUED | OUTPATIENT
Start: 2019-08-23 | End: 2019-08-27 | Stop reason: HOSPADM

## 2019-08-23 RX ORDER — ACETAMINOPHEN 325 MG/1
650 TABLET ORAL EVERY 4 HOURS PRN
Status: DISCONTINUED | OUTPATIENT
Start: 2019-08-23 | End: 2019-08-27 | Stop reason: HOSPADM

## 2019-08-23 RX ORDER — LATANOPROST 50 UG/ML
1 SOLUTION/ DROPS OPHTHALMIC NIGHTLY
Status: DISCONTINUED | OUTPATIENT
Start: 2019-08-23 | End: 2019-08-27 | Stop reason: HOSPADM

## 2019-08-23 RX ORDER — SODIUM CHLORIDE 0.9 % (FLUSH) 0.9 %
5 SYRINGE (ML) INJECTION
Status: DISCONTINUED | OUTPATIENT
Start: 2019-08-23 | End: 2019-08-27 | Stop reason: HOSPADM

## 2019-08-23 RX ORDER — FLUTICASONE FUROATE AND VILANTEROL 100; 25 UG/1; UG/1
1 POWDER RESPIRATORY (INHALATION) DAILY
Status: DISCONTINUED | OUTPATIENT
Start: 2019-08-24 | End: 2019-08-27 | Stop reason: HOSPADM

## 2019-08-23 RX ORDER — MECLIZINE HCL 12.5 MG 12.5 MG/1
12.5 TABLET ORAL 3 TIMES DAILY PRN
Status: DISCONTINUED | OUTPATIENT
Start: 2019-08-23 | End: 2019-08-27 | Stop reason: HOSPADM

## 2019-08-23 RX ORDER — LIDOCAINE 50 MG/G
1 PATCH TOPICAL
Status: DISCONTINUED | OUTPATIENT
Start: 2019-08-23 | End: 2019-08-27 | Stop reason: HOSPADM

## 2019-08-23 RX ORDER — AMOXICILLIN 250 MG
1 CAPSULE ORAL 2 TIMES DAILY
Status: DISCONTINUED | OUTPATIENT
Start: 2019-08-23 | End: 2019-08-27 | Stop reason: HOSPADM

## 2019-08-23 RX ORDER — AMLODIPINE BESYLATE 5 MG/1
5 TABLET ORAL DAILY
Status: DISCONTINUED | OUTPATIENT
Start: 2019-08-24 | End: 2019-08-27 | Stop reason: HOSPADM

## 2019-08-23 RX ADMIN — ACETAMINOPHEN 650 MG: 325 TABLET ORAL at 08:08

## 2019-08-23 RX ADMIN — APIXABAN 2.5 MG: 2.5 TABLET, FILM COATED ORAL at 11:08

## 2019-08-23 RX ADMIN — FUROSEMIDE 40 MG: 40 TABLET ORAL at 11:08

## 2019-08-23 RX ADMIN — SENNOSIDES,DOCUSATE SODIUM 1 TABLET: 8.6; 5 TABLET, FILM COATED ORAL at 11:08

## 2019-08-23 RX ADMIN — LIDOCAINE 1 PATCH: 50 PATCH TOPICAL at 10:08

## 2019-08-23 NOTE — ED NOTES
Pt identifiers Tiana H [place] were checked and are correct  LOC: The patient is awake, alert, aware of environment with an appropriate affect. Oriented x4, speaking appropriately  APPEARANCE: Pt rates left knee pain an 8/10  And 7/10 chronic neck pain across back of neck , in no acute distress, pt is clean and well groomed, clothing properly fastened  SKIN: Skin warm, dry and intact, normal skin turgor, moist mucus membranes  RESPIRATORY: Airway is open and patent, respirations are spontaneous, even and unlabored, normal effort and rate Breath sounds clear iliana to all lung fields on auscultation  CARDIAC: Radial pulses strong and irregular, no peripheral edema noted, capillary refill < 3 seconds, bilateral radial pulses 2+  ABDOMEN: Soft, nontender, nondistended. Bowel sounds present to all four quad of abd on auscultation  NEUROLOGIC: PERRL, facial expression is symmetrical, patient moving all extremities spontaneously, normal sensation in all extremities when touched with a finger.  Follows all commands appropriately  MUSCULOSKELETAL: Ace wrap noted to left knee

## 2019-08-23 NOTE — ED PROVIDER NOTES
"Encounter Date: 8/23/2019       History     Chief Complaint   Patient presents with    Chest Pain     c/o chest pain onset yesterday, seen at urgent care today for chest pain and left knee pain post slip and fall, knee wrapped with ace wrap, was instructed to come to ED to address chest pain     Patient is a 89-yo -American female with a PMHx of asthma, CHF, CKD, DMII, HLD, HTN, MI, and thyroid disease who presents to the ED from Urgent Care for urgent evaluation of chest pain. Patient underwent a right knee joint aspiration at  and was sent here for further evaluation of CP. Patient reports a 2 day history of intermittent left upper chest pain with radiation to bilateral neck and associated SOB.  She states the pain is brought on both with exertion and at rest.  She states if I walk fast using my walker down the hallway, the pain will come back". She states she has used sublingual NTG with relief of pain. She states in the past she has used NTG about once per month, but since the beginning of August she has taken it twice per week. She notes bilateral neck pain has been present for 1 month and is exacerbating with head turning. She also reports increased use of her albuterol inhaler, stating that she uses it twice per day when in the past it was used "once in a while". She denies chest pain at present. She denies fever/chills, HA, nausea/vomiting, abdominal pain, diarrhea, constipation, dysuria, or focal weakness.     The history is provided by the patient.     Review of patient's allergies indicates:   Allergen Reactions    Codeine Itching and Nausea Only            Past Medical History:   Diagnosis Date    Allergy     Anemia     Arthritis     Asthma     CHF (congestive heart failure) 5/2/2017    Chronic kidney disease     Degenerative disc disease     Diabetes mellitus type II     Essential hypertension 7/3/2012    Glaucoma     History of pseudogout 8/23/2012    Hyperlipidemia     " Hypertension     Iritis     Myocardial infarction     Pneumonia     Thyroid disease      Past Surgical History:   Procedure Laterality Date    CARDIAC CATHETERIZATION      CATARACT EXTRACTION W/  INTRAOCULAR LENS IMPLANT Left n/a    CHOLECYSTECTOMY      EXTRACTION, CATARACT, WITH IOL INSERTION Right 10/8/2018    Performed by Gume Henson MD at [facility] OR    EYE SURGERY      gall stone      HYSTERECTOMY      VARICOSE VEIN SURGERY       Family History   Problem Relation Age of Onset    Diabetes Mother     Hypertension Mother     Glaucoma Mother     Hypertension Father     Diabetes Son     No Known Problems Daughter     Diabetes Daughter     No Known Problems Son      Social History     Tobacco Use    Smoking status: Never Smoker    Smokeless tobacco: Never Used   Substance Use Topics    Alcohol use: No    Drug use: No     Review of Systems   Constitutional: Negative for chills and fever.   HENT: Negative for sore throat.    Eyes: Negative for visual disturbance.   Respiratory: Positive for shortness of breath. Negative for cough.    Cardiovascular: Positive for chest pain. Negative for leg swelling.   Gastrointestinal: Negative for abdominal pain, constipation, diarrhea, nausea and vomiting.   Genitourinary: Negative for dysuria.   Musculoskeletal: Positive for arthralgias and neck pain.   Skin: Negative for rash.   Neurological: Negative for dizziness, weakness and numbness.   Psychiatric/Behavioral: Negative for dysphoric mood.       Physical Exam     Initial Vitals [08/23/19 1526]   BP Pulse Resp Temp SpO2   (!) 135/59 83 15 99.1 °F (37.3 °C) 98 %      MAP       --         Physical Exam    Nursing note and vitals reviewed.  Constitutional: She appears well-developed and well-nourished. She is not diaphoretic. No distress.   HENT:   Head: Normocephalic and atraumatic.   Mouth/Throat: Oropharynx is clear and moist. No oropharyngeal exudate.   Eyes: Conjunctivae and EOM are normal. Pupils are  equal, round, and reactive to light.   Neck: Normal range of motion. Neck supple.   No cervical midline spinal tenderness. No trapezius tenderness. Full neck ROM, noting pain with neck extension.   Cardiovascular: Normal rate, normal heart sounds and intact distal pulses. An irregularly irregular rhythm present.    Pulmonary/Chest: Breath sounds normal. No respiratory distress. She has no wheezes. She has no rales.   Abdominal: Soft. Bowel sounds are normal. There is no tenderness. There is no rebound and no guarding.   Musculoskeletal: Normal range of motion. She exhibits no edema or tenderness.   Neurological: She is alert and oriented to person, place, and time. She has normal strength. GCS score is 15. GCS eye subscore is 4. GCS verbal subscore is 5. GCS motor subscore is 6.   Skin: Skin is warm and dry.   Psychiatric: She has a normal mood and affect. Thought content normal.         ED Course   Procedures  Labs Reviewed   CBC W/ AUTO DIFFERENTIAL - Abnormal; Notable for the following components:       Result Value    Platelets 147 (*)     Immature Granulocytes 0.6 (*)     Immature Grans (Abs) 0.06 (*)     Mono # 1.6 (*)     Lymph% 15.1 (*)     Mono% 17.0 (*)     All other components within normal limits   COMPREHENSIVE METABOLIC PANEL - Abnormal; Notable for the following components:    Glucose 200 (*)     Creatinine 1.5 (*)     eGFR if  34.1 (*)     eGFR if non  29.6 (*)     All other components within normal limits   B-TYPE NATRIURETIC PEPTIDE - Abnormal; Notable for the following components:     (*)     All other components within normal limits   TROPONIN I   TROPONIN I        ECG Results          EKG 12-lead (Final result)  Result time 08/23/19 16:56:16    Final result by Interface, Lab In [facility] (08/23/19 16:56:16)                 Narrative:    Test Reason : R07.9,    Vent. Rate : 088 BPM     Atrial Rate : 110 BPM     P-R Int : 000 ms          QRS Dur : 086 ms       QT Int : 358 ms       P-R-T Axes : 000 062 -09 degrees     QTc Int : 433 ms    Atrial fibrillation  Abnormal ECG  When compared with ECG of 08-JUL-2019 12:11,  Nonspecific T wave abnormality No longer present  Confirmed by CARRIE HALL MD ([id number]) on 8/23/2019 4:56:12 PM    Referred By:             Confirmed By:CARRIE HALL MD                            Imaging Results          X-Ray Chest PA And Lateral (Final result)  Result time 08/23/19 19:23:49    Final result by Justin Dias MD (08/23/19 19:23:49)                 Impression:      No acute process.  Stable appearance of cardiomegaly without evidence of CHF.      Electronically signed by: Justin Dias MD  Date:    08/23/2019  Time:    19:23             Narrative:    EXAMINATION:  XR CHEST PA AND LATERAL    CLINICAL HISTORY:  Chest Pain;    TECHNIQUE:  PA and lateral views of the chest were performed.    COMPARISON:  03/29/2019.    FINDINGS:  Monitoring EKG leads are present.  The trachea is unremarkable.  There is stable enlargement of the cardiomediastinal silhouette.  There is mild elevation the right hemidiaphragm.  The left hemidiaphragm is unremarkable.  There is no evidence of free air beneath the hemidiaphragms.  There are no pleural effusions.  There is no evidence of a pneumothorax.  There is no evidence of pneumomediastinum.  No airspace opacity is present.  The osseous structures demonstrate degenerative changes.  There are chronic deformities involving the thoracic vertebral bodies.  No acute fracture is present.                                 Medical Decision Making:   History:   Old Medical Records: I decided to obtain old medical records.  Clinical Tests:   Lab Tests: Ordered and Reviewed  Radiological Study: Ordered and Reviewed  Medical Tests: Ordered and Reviewed  Other:   I have discussed this case with another health care provider.       <> Summary of the Discussion: Hospital Medicine    Additional MDM:   Heart Score:    History:           Moderately suspicious.  ECG:             Normal  Age:               >65 years  Risk factors: >= 3 risk factors or history of atherosclerotic disease  Troponin:       Less than or equal to normal limit  Final Score: 5        APC / Resident Notes:   89 year old patient with hx of asthma, CHF, CKD, DMII, HLD, HTN, MI, and thyroid disease  presenting secondary to chest pain. Patient is at higher risk of ACS due to risk factors and HPI with a heart score of 5. Patient has received an aspirin. Troponins, Cxr, ekg, and labs ordered which showed neg Trop x1, CBC unremarkable, CMP with Glu 200, BUN 24, Cr 1.5 (baseline), CXR without acute process.  (previous 1279 from 12/17).  Patient denies chest pain at this time.    Also considered but less likely:     Pneumonia: chest xray negative. No fever or leukoscytosis. No cough and lungs non consistent with pna.  Tamponade: unlikely due to chest xray and ekg  STEMI: No STEMI on ekg.  Dissection: equal pulses bilaterally and no ripping chest pain to the back  Esophageal rupture: no dysphagia or vomiting and chest xray negative for mediastinal air  CHF: no fluid overload on Cxr and physical exam    Patient reassessed, reports persistent neck pain; denies chest pain. Will apply Lidocaine patch and give Tylenol 650 mg PO. Suspect musculoskeletal component, however, given significant risk factors for CVD, patient to be admitted to observation for ACS rule-out. Patient understands and agrees with plan.  All questions answered. I have discussed patient case with my supervisory physician, who is in agreement with my assessment and plan.                     Clinical Impression:       ICD-10-CM ICD-9-CM   1. Chest pain R07.9 786.50   2. Neck pain M54.2 723.1   3. Shortness of breath R06.02 786.05         Disposition:   Disposition: Placed in Observation  Condition: Stable                        Suzette Betancourt PA-C  08/23/19 2015

## 2019-08-23 NOTE — ED TRIAGE NOTES
Pt complains of swelling to left knee times three days  She was seen at Urgent care today and had fluid aspirated from her knee  She was sent to the main campus by urgent care MD

## 2019-08-23 NOTE — PROGRESS NOTES
"Subjective:       Patient ID: Tiana Meda is a 89 y.o. female.    Vitals:  height is 5' 8" (1.727 m) and weight is 73 kg (161 lb). Her temperature is 100.3 °F (37.9 °C). Her blood pressure is 150/86 (abnormal) and her pulse is 76. Her respiration is 18 and oxygen saturation is 96%.     Chief Complaint: Knee Pain (Left ) and Chest Pain    Pt states been having chest,knee, and neck pain for months. Pt states no injury to knee, just pain and sever swelling, limiting her from getting around. In the past she has had arthrocentesis with relief. No trauma, no falls, no injuries. Also she remarks that she has been having increase in amount, frequency, duration, of midsternal chest pain, neck pain and jaw pain that goes away with her nitroglycerin. She used to have to take it about once per week or oncve per month, however lately has been taking every other day. No sob    Chest Pain    This is a new problem. The current episode started 1 to 4 weeks ago. The onset quality is sudden. The problem occurs constantly. The problem has been unchanged. The quality of the pain is described as dull. The pain radiates to the left arm and left shoulder. Associated symptoms include numbness. Pertinent negatives include no cough, dizziness, fever, headaches, nausea, shortness of breath, vomiting or weakness. Treatments tried: Tylenol  There are no known risk factors.       Constitution: Negative for chills, fatigue and fever.   HENT: Negative for congestion and sore throat.    Neck: Positive for neck pain. Negative for painful lymph nodes.   Cardiovascular: Positive for chest pain. Negative for leg swelling.   Eyes: Negative for double vision and blurred vision.   Respiratory: Negative for cough and shortness of breath.    Gastrointestinal: Negative for nausea, vomiting and diarrhea.   Genitourinary: Negative for dysuria, frequency, urgency and history of kidney stones.   Musculoskeletal: Positive for pain. Negative for joint pain, " joint swelling, muscle cramps and muscle ache.   Skin: Negative for color change, pale, rash and bruising.   Allergic/Immunologic: Negative for seasonal allergies.   Neurological: Positive for numbness. Negative for dizziness, history of vertigo, light-headedness, passing out and headaches.   Hematologic/Lymphatic: Negative for swollen lymph nodes.   Psychiatric/Behavioral: Negative for nervous/anxious, sleep disturbance and depression. The patient is not nervous/anxious.        Objective:      Physical Exam   Constitutional: She is oriented to person, place, and time. She appears well-developed and well-nourished. She is cooperative.  Non-toxic appearance. She does not appear ill. No distress.   HENT:   Head: Normocephalic and atraumatic.   Right Ear: Hearing, tympanic membrane, external ear and ear canal normal.   Left Ear: Hearing, tympanic membrane, external ear and ear canal normal.   Nose: Nose normal. No mucosal edema, rhinorrhea or nasal deformity. No epistaxis. Right sinus exhibits no maxillary sinus tenderness and no frontal sinus tenderness. Left sinus exhibits no maxillary sinus tenderness and no frontal sinus tenderness.   Mouth/Throat: Uvula is midline, oropharynx is clear and moist and mucous membranes are normal. No trismus in the jaw. Normal dentition. No uvula swelling. No posterior oropharyngeal erythema.   Eyes: Conjunctivae, EOM and lids are normal. No scleral icterus.   Sclera clear bilat   Neck: Trachea normal, normal range of motion, full passive range of motion without pain and phonation normal. Neck supple.   Cardiovascular: Normal rate, regular rhythm, normal heart sounds, intact distal pulses and normal pulses.   Pulmonary/Chest: Effort normal and breath sounds normal. No respiratory distress. She exhibits no tenderness.   Abdominal: Soft. Normal appearance and bowel sounds are normal. There is no tenderness.   Musculoskeletal: She exhibits edema and tenderness. She exhibits no deformity.    Distally nv intact, severe swelling to the left knee, no erythema   Neurological: She is alert and oriented to person, place, and time. She exhibits normal muscle tone. Coordination normal.   Skin: Skin is warm, dry and intact. She is not diaphoretic. No pallor.   Psychiatric: She has a normal mood and affect. Her speech is normal. Cognition and memory are normal.   Nursing note and vitals reviewed.        Procedure note:  indication knee effusion left knee  After the knee prepped with betadine and anesthetised with 1% lidocaine, an 18 gauge needle used for arthrocentesis of the left knee and 80 cc of straw colored viscous synovial fluid removed, no pus, no blood, and patient tolerated very well. No complication.    Patient is having concerning symptoms of unstable angina, increasing usage of ntg, and although not having active chest pain now, sent to the ER for symptoms of UA    Assessment:       1. Effusion of left knee joint    2. 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      Patient Instructions   ALTHOUGH NOT HAVING CHEST PAIN AT THIS TIME OR ARM//NECK PAIN. SINCE YOU ARE HAVING MORE EPISODES OVER THE LAST FEW DAYS OF HAVING TO TAKE YOUR NITROGLYCERIN EVERY OTHER DAY OR DAILY WITH RELIEF, PLEASE GO TO THE ER FOR FURTHER EVALUATION OF CHEST PAIN, R/O ACS/UNSTABLE ANGINA, NECK AND ARM PAIN. URGENT CARE IS NOT THE RIGHT PLACE TO EVALUATE AND APPROPRIATELY WORK THIS UP.    80 CC OF JOINT FLUID REMOVED WITHOUT COMPLICATION FROM THE LEFT KNEE JOINT.  REST, ICE, ELEVATE, ACE WRAP AND FOLLOW UP WITH ORTHOPEDICS FOR CHRONIC AND RECURRENT LEFT KNEE PAIN      Water on the Knee    Water on the knee is also known as knee effusion. The knee joint normally has less than 1 ounce of fluid. Injury or inflammation of the knee joint causes extra fluid to collect  there. When this happens, the knee joint looks swollen and is usually painful. It may be hard to fully bend the knee.  The most common cause of water on the knee is osteoarthritis due to wear and tear on the joint cartilage. Other causes include injury to the cartilage, inflammatory arthritis such as gout or rheumatoid arthritis, and infection of the joint.  You may need a needle aspiration, if the cause of your water on the knee is not certain. This procedure removes a sample of joint fluid from the knee for testing. This is done with a local anesthetic. Removing excess fluid may also relieve swelling and pain.  Home care  · Limit your activities. Stay off the injured leg as much as possible until pain improves.  · Keep your leg elevated to reduce pain and swelling. When sleeping, place a pillow under the injured leg. When sitting, support the injured leg so it is level with your waist. This is very important during the first 48 hours.  · Apply an ice pack over the injured area for 15 to 20 minutes every 3 to 6 hours. You should do this for the first 24 to 48 hours. You can make an ice pack by filling a plastic bag that seals at the top with ice cubes and then wrapping it with a thin towel. Continue to use ice packs for relief of pain and swelling as needed. As the ice melts, be careful to avoid getting your wrap, splint, or cast wet. After 48 hours, apply heat(warm shower or warm bath) for 15 to 20 minutes several times a day, or alternate ice and heat. If you have to wear a hook-and-loop knee brace, you can open it to apply the ice pack, or heat, directly to the knee. Never put ice directly on the skin. Always wrap the ice in a towel or other type of cloth.  · You may use over-the-counter pain medicine to control pain, unless another pain medicine was prescribed. If you have chronic liver or kidney disease or have ever had a stomach ulcer or GI bleeding, talk with your healthcare provider before using these  medicines.  · If crutches or a walker have been recommended, do not put weight on the injured leg until you can do so without pain. Check with your healthcare provider before returning to sports or full work duties.  · If you have a hook-and-loop knee brace, you can remove it to bathe and sleep, unless told otherwise.  Follow-up care  Follow up with your healthcare provider as advised.  If you are overweight, talk to your healthcare provider about a weight loss program. The excess weight puts extra strain on your knees.  When to seek medical advice  Call your healthcare provider right away if any of these occur:  · Increasing pain, redness, or swelling of the knee  · Fever of 100.4°F (38°C) or above lasting for 24 to 48 hours  Date Last Reviewed: 11/23/2015  © 7450-8933 [employer]. [address] [place] [address], [place] [address]. All rights reserved. This information is not intended as a substitute for professional medical care. Always follow your healthcare professional's instructions.

## 2019-08-23 NOTE — PATIENT INSTRUCTIONS
ALTHOUGH NOT HAVING CHEST PAIN AT THIS TIME OR ARM//NECK PAIN. SINCE YOU ARE HAVING MORE EPISODES OVER THE LAST FEW DAYS OF HAVING TO TAKE YOUR NITROGLYCERIN EVERY OTHER DAY OR DAILY WITH RELIEF, PLEASE GO TO THE ER FOR FURTHER EVALUATION OF CHEST PAIN, R/O ACS/UNSTABLE ANGINA, NECK AND ARM PAIN. URGENT CARE IS NOT THE RIGHT PLACE TO EVALUATE AND APPROPRIATELY WORK THIS UP.    80 CC OF JOINT FLUID REMOVED WITHOUT COMPLICATION FROM THE LEFT KNEE JOINT.  REST, ICE, ELEVATE, ACE WRAP AND FOLLOW UP WITH ORTHOPEDICS FOR CHRONIC AND RECURRENT LEFT KNEE PAIN      Water on the Knee    Water on the knee is also known as knee effusion. The knee joint normally has less than 1 ounce of fluid. Injury or inflammation of the knee joint causes extra fluid to collect there. When this happens, the knee joint looks swollen and is usually painful. It may be hard to fully bend the knee.  The most common cause of water on the knee is osteoarthritis due to wear and tear on the joint cartilage. Other causes include injury to the cartilage, inflammatory arthritis such as gout or rheumatoid arthritis, and infection of the joint.  You may need a needle aspiration, if the cause of your water on the knee is not certain. This procedure removes a sample of joint fluid from the knee for testing. This is done with a local anesthetic. Removing excess fluid may also relieve swelling and pain.  Home care  · Limit your activities. Stay off the injured leg as much as possible until pain improves.  · Keep your leg elevated to reduce pain and swelling. When sleeping, place a pillow under the injured leg. When sitting, support the injured leg so it is level with your waist. This is very important during the first 48 hours.  · Apply an ice pack over the injured area for 15 to 20 minutes every 3 to 6 hours. You should do this for the first 24 to 48 hours. You can make an ice pack by filling a plastic bag that seals at the top with ice cubes and then wrapping  it with a thin towel. Continue to use ice packs for relief of pain and swelling as needed. As the ice melts, be careful to avoid getting your wrap, splint, or cast wet. After 48 hours, apply heat(warm shower or warm bath) for 15 to 20 minutes several times a day, or alternate ice and heat. If you have to wear a hook-and-loop knee brace, you can open it to apply the ice pack, or heat, directly to the knee. Never put ice directly on the skin. Always wrap the ice in a towel or other type of cloth.  · You may use over-the-counter pain medicine to control pain, unless another pain medicine was prescribed. If you have chronic liver or kidney disease or have ever had a stomach ulcer or GI bleeding, talk with your healthcare provider before using these medicines.  · If crutches or a walker have been recommended, do not put weight on the injured leg until you can do so without pain. Check with your healthcare provider before returning to sports or full work duties.  · If you have a hook-and-loop knee brace, you can remove it to bathe and sleep, unless told otherwise.  Follow-up care  Follow up with your healthcare provider as advised.  If you are overweight, talk to your healthcare provider about a weight loss program. The excess weight puts extra strain on your knees.  When to seek medical advice  Call your healthcare provider right away if any of these occur:  · Increasing pain, redness, or swelling of the knee  · Fever of 100.4°F (38°C) or above lasting for 24 to 48 hours  Date Last Reviewed: 11/23/2015  © 5000-1277 [employer] [facility]. [address] [place] [address], [place] [address]. All rights reserved. This information is not intended as a substitute for professional medical care. Always follow your healthcare professional's instructions.

## 2019-08-23 NOTE — PROVIDER PROGRESS NOTES - EMERGENCY DEPT.
Encounter Date: 8/23/2019    ED Physician Progress Notes         EKG - STEMI Decision  Initial Reading: No STEMI present.  Response: No Action Needed.     AFib with 88 beats per minute ventricular response.  ____________________  Oral Hernandez MD, [facility]  Emergency Medicine Staff  3:35 PM 8/23/2019

## 2019-08-24 PROBLEM — M25.562 LEFT KNEE PAIN: Status: ACTIVE | Noted: 2019-08-24

## 2019-08-24 LAB
ALBUMIN SERPL BCP-MCNC: 3.3 G/DL (ref 3.5–5.2)
ALP SERPL-CCNC: 123 U/L (ref 55–135)
ALT SERPL W/O P-5'-P-CCNC: 20 U/L (ref 10–44)
ANION GAP SERPL CALC-SCNC: 11 MMOL/L (ref 8–16)
AST SERPL-CCNC: 21 U/L (ref 10–40)
BASOPHILS # BLD AUTO: 0.05 K/UL (ref 0–0.2)
BASOPHILS NFR BLD: 0.7 % (ref 0–1.9)
BILIRUB SERPL-MCNC: 0.8 MG/DL (ref 0.1–1)
BUN SERPL-MCNC: 23 MG/DL (ref 10–30)
CALCIUM SERPL-MCNC: 9.1 MG/DL (ref 8.7–10.5)
CHLORIDE SERPL-SCNC: 104 MMOL/L (ref 95–110)
CO2 SERPL-SCNC: 23 MMOL/L (ref 23–29)
CREAT SERPL-MCNC: 1.5 MG/DL (ref 0.5–1.4)
CRP SERPL-MCNC: 72.6 MG/L (ref 0–8.2)
DIFFERENTIAL METHOD: ABNORMAL
EOSINOPHIL # BLD AUTO: 0.1 K/UL (ref 0–0.5)
EOSINOPHIL NFR BLD: 1.3 % (ref 0–8)
ERYTHROCYTE [DISTWIDTH] IN BLOOD BY AUTOMATED COUNT: 14.2 % (ref 11.5–14.5)
ERYTHROCYTE [SEDIMENTATION RATE] IN BLOOD BY WESTERGREN METHOD: 71 MM/HR (ref 0–36)
EST. GFR  (AFRICAN AMERICAN): 34.1 ML/MIN/1.73 M^2
EST. GFR  (NON AFRICAN AMERICAN): 29.6 ML/MIN/1.73 M^2
ESTIMATED AVG GLUCOSE: 151 MG/DL (ref 68–131)
GLUCOSE SERPL-MCNC: 143 MG/DL (ref 70–110)
HBA1C MFR BLD HPLC: 6.9 % (ref 4–5.6)
HCT VFR BLD AUTO: 35.4 % (ref 37–48.5)
HGB BLD-MCNC: 11.2 G/DL (ref 12–16)
IMM GRANULOCYTES # BLD AUTO: 0.04 K/UL (ref 0–0.04)
IMM GRANULOCYTES NFR BLD AUTO: 0.5 % (ref 0–0.5)
LYMPHOCYTES # BLD AUTO: 1.3 K/UL (ref 1–4.8)
LYMPHOCYTES NFR BLD: 16.4 % (ref 18–48)
MAGNESIUM SERPL-MCNC: 1.9 MG/DL (ref 1.6–2.6)
MCH RBC QN AUTO: 29.3 PG (ref 27–31)
MCHC RBC AUTO-ENTMCNC: 31.6 G/DL (ref 32–36)
MCV RBC AUTO: 93 FL (ref 82–98)
MONOCYTES # BLD AUTO: 1.5 K/UL (ref 0.3–1)
MONOCYTES NFR BLD: 19.9 % (ref 4–15)
NEUTROPHILS # BLD AUTO: 4.7 K/UL (ref 1.8–7.7)
NEUTROPHILS NFR BLD: 61.2 % (ref 38–73)
NRBC BLD-RTO: 0 /100 WBC
PHOSPHATE SERPL-MCNC: 3 MG/DL (ref 2.7–4.5)
PLATELET # BLD AUTO: 135 K/UL (ref 150–350)
PMV BLD AUTO: 12 FL (ref 9.2–12.9)
POCT GLUCOSE: 121 MG/DL (ref 70–110)
POCT GLUCOSE: 123 MG/DL (ref 70–110)
POCT GLUCOSE: 137 MG/DL (ref 70–110)
POCT GLUCOSE: 162 MG/DL (ref 70–110)
POTASSIUM SERPL-SCNC: 3.8 MMOL/L (ref 3.5–5.1)
PROT SERPL-MCNC: 6.6 G/DL (ref 6–8.4)
RBC # BLD AUTO: 3.82 M/UL (ref 4–5.4)
SODIUM SERPL-SCNC: 138 MMOL/L (ref 136–145)
TROPONIN I SERPL DL<=0.01 NG/ML-MCNC: 0.01 NG/ML (ref 0–0.03)
TROPONIN I SERPL DL<=0.01 NG/ML-MCNC: 0.01 NG/ML (ref 0–0.03)
WBC # BLD AUTO: 7.69 K/UL (ref 3.9–12.7)

## 2019-08-24 PROCEDURE — 83036 HEMOGLOBIN GLYCOSYLATED A1C: CPT | Mod: HCNC

## 2019-08-24 PROCEDURE — 84484 ASSAY OF TROPONIN QUANT: CPT | Mod: 91,HCNC

## 2019-08-24 PROCEDURE — 99226 PR SUBSEQUENT OBSERVATION CARE,LEVEL III: ICD-10-PCS | Mod: HCNC,,, | Performed by: PHYSICIAN ASSISTANT

## 2019-08-24 PROCEDURE — G0378 HOSPITAL OBSERVATION PER HR: HCPCS | Mod: HCNC

## 2019-08-24 PROCEDURE — 99226 PR SUBSEQUENT OBSERVATION CARE,LEVEL III: CPT | Mod: HCNC,,, | Performed by: PHYSICIAN ASSISTANT

## 2019-08-24 PROCEDURE — 85652 RBC SED RATE AUTOMATED: CPT | Mod: HCNC

## 2019-08-24 PROCEDURE — 83735 ASSAY OF MAGNESIUM: CPT | Mod: HCNC

## 2019-08-24 PROCEDURE — 80053 COMPREHEN METABOLIC PANEL: CPT | Mod: HCNC

## 2019-08-24 PROCEDURE — 25000003 PHARM REV CODE 250: Mod: HCNC | Performed by: PHYSICIAN ASSISTANT

## 2019-08-24 PROCEDURE — 25000242 PHARM REV CODE 250 ALT 637 W/ HCPCS: Mod: HCNC | Performed by: PHYSICIAN ASSISTANT

## 2019-08-24 PROCEDURE — 36415 COLL VENOUS BLD VENIPUNCTURE: CPT | Mod: HCNC

## 2019-08-24 PROCEDURE — 85025 COMPLETE CBC W/AUTO DIFF WBC: CPT | Mod: HCNC

## 2019-08-24 PROCEDURE — 84100 ASSAY OF PHOSPHORUS: CPT | Mod: HCNC

## 2019-08-24 PROCEDURE — 86140 C-REACTIVE PROTEIN: CPT | Mod: HCNC

## 2019-08-24 PROCEDURE — 84484 ASSAY OF TROPONIN QUANT: CPT | Mod: HCNC

## 2019-08-24 RX ORDER — POTASSIUM CHLORIDE 20 MEQ/1
40 TABLET, EXTENDED RELEASE ORAL EVERY 4 HOURS
Status: DISCONTINUED | OUTPATIENT
Start: 2019-08-24 | End: 2019-08-24

## 2019-08-24 RX ORDER — ISOSORBIDE MONONITRATE 30 MG/1
90 TABLET, EXTENDED RELEASE ORAL DAILY
Status: DISCONTINUED | OUTPATIENT
Start: 2019-08-25 | End: 2019-08-27 | Stop reason: HOSPADM

## 2019-08-24 RX ORDER — ISOSORBIDE MONONITRATE 30 MG/1
30 TABLET, EXTENDED RELEASE ORAL ONCE
Status: COMPLETED | OUTPATIENT
Start: 2019-08-24 | End: 2019-08-24

## 2019-08-24 RX ORDER — DORZOLAMIDE HCL 20 MG/ML
1 SOLUTION/ DROPS OPHTHALMIC 2 TIMES DAILY
Status: DISCONTINUED | OUTPATIENT
Start: 2019-08-24 | End: 2019-08-27 | Stop reason: HOSPADM

## 2019-08-24 RX ADMIN — AMLODIPINE BESYLATE 5 MG: 5 TABLET ORAL at 08:08

## 2019-08-24 RX ADMIN — ISOSORBIDE MONONITRATE 30 MG: 30 TABLET, EXTENDED RELEASE ORAL at 02:08

## 2019-08-24 RX ADMIN — FLUTICASONE FUROATE AND VILANTEROL TRIFENATATE 1 PUFF: 100; 25 POWDER RESPIRATORY (INHALATION) at 11:08

## 2019-08-24 RX ADMIN — DICLOFENAC 2 G: 10 GEL TOPICAL at 04:08

## 2019-08-24 RX ADMIN — DORZOLAMIDE HYDROCHLORIDE 1 DROP: 20 SOLUTION/ DROPS OPHTHALMIC at 07:08

## 2019-08-24 RX ADMIN — FLUTICASONE FUROATE AND VILANTEROL TRIFENATATE 1 PUFF: 100; 25 POWDER RESPIRATORY (INHALATION) at 09:08

## 2019-08-24 RX ADMIN — ATORVASTATIN CALCIUM 40 MG: 20 TABLET, FILM COATED ORAL at 08:08

## 2019-08-24 RX ADMIN — DORZOLAMIDE HYDROCHLORIDE 1 DROP: 20 SOLUTION/ DROPS OPHTHALMIC at 11:08

## 2019-08-24 RX ADMIN — LIDOCAINE 1 PATCH: 50 PATCH TOPICAL at 07:08

## 2019-08-24 RX ADMIN — SENNOSIDES,DOCUSATE SODIUM 1 TABLET: 8.6; 5 TABLET, FILM COATED ORAL at 08:08

## 2019-08-24 RX ADMIN — APIXABAN 2.5 MG: 2.5 TABLET, FILM COATED ORAL at 08:08

## 2019-08-24 RX ADMIN — SENNOSIDES,DOCUSATE SODIUM 1 TABLET: 8.6; 5 TABLET, FILM COATED ORAL at 07:08

## 2019-08-24 RX ADMIN — ISOSORBIDE MONONITRATE 60 MG: 30 TABLET, EXTENDED RELEASE ORAL at 08:08

## 2019-08-24 RX ADMIN — ACETAMINOPHEN 650 MG: 325 TABLET ORAL at 04:08

## 2019-08-24 RX ADMIN — LEVOTHYROXINE SODIUM 75 MCG: 75 TABLET ORAL at 08:08

## 2019-08-24 RX ADMIN — FUROSEMIDE 40 MG: 40 TABLET ORAL at 07:08

## 2019-08-24 RX ADMIN — APIXABAN 2.5 MG: 2.5 TABLET, FILM COATED ORAL at 07:08

## 2019-08-24 RX ADMIN — FUROSEMIDE 40 MG: 40 TABLET ORAL at 08:08

## 2019-08-24 RX ADMIN — LATANOPROST 1 DROP: 50 SOLUTION OPHTHALMIC at 08:08

## 2019-08-24 NOTE — ASSESSMENT & PLAN NOTE
- C/W Fluticasone furoate-vilanterol 100-25 mcg/dose diskus inhaler 1 puff inhaled daily  - C/W albuterol-ipratropium 2.5 mg-0.5 mg/3mL nebulizer solution 3 mL q4 hours PRN

## 2019-08-24 NOTE — SUBJECTIVE & OBJECTIVE
Interval History: No repeated episodes of chest pain, patient unable to have DSE due to atrial fibrillation. Will discuss further testing with cardiology. She has been without acute episodes of chest pain. She also reports impaired mobility due to knee pain. Apparently she presented to an urgent care facility on yesterday and an aspiration was completed. Unable to find documentation in the chart. Will order X-rays and PT/OT evaluation.      Review of Systems   Constitutional: Negative for activity change, appetite change, chills, fatigue, fever and unexpected weight change.   HENT: Negative for congestion, rhinorrhea, sore throat, trouble swallowing and voice change.    Eyes: Negative for visual disturbance.   Respiratory: Positive for shortness of breath. Negative for cough, choking, chest tightness and wheezing.         RESOLVED   Cardiovascular: Positive for chest pain. Negative for palpitations and leg swelling.        RESOLVED   Gastrointestinal: Negative for abdominal distention, abdominal pain, anal bleeding, blood in stool, constipation, diarrhea, nausea and vomiting.   Endocrine: Negative for cold intolerance, heat intolerance, polydipsia and polyuria.   Genitourinary: Negative for dysuria, flank pain, frequency, hematuria and urgency.   Musculoskeletal: Positive for arthralgias (L knee), joint swelling and neck pain. Negative for back pain and myalgias.        Bilateral knee swelling and pain, currently resolved.   Skin: Negative for color change and rash.   Neurological: Negative for dizziness, seizures, syncope, facial asymmetry, speech difficulty, weakness, light-headedness, numbness and headaches.   Hematological: Negative for adenopathy. Does not bruise/bleed easily.   Psychiatric/Behavioral: Negative for agitation, confusion, hallucinations and suicidal ideas.     Objective:     Vital Signs (Most Recent):  Temp: 98.1 °F (36.7 °C) (08/24/19 1147)  Pulse: 92 (08/24/19 1508)  Resp: 18 (08/24/19  1147)  BP: (!) 154/73 (08/24/19 1147)  SpO2: 96 % (08/24/19 1147) Vital Signs (24h Range):  Temp:  [98.1 °F (36.7 °C)-99.9 °F (37.7 °C)] 98.1 °F (36.7 °C)  Pulse:  [75-98] 92  Resp:  [16-21] 18  SpO2:  [95 %-98 %] 96 %  BP: (152-185)/(73-79) 154/73     Weight: 76 kg (167 lb 8.8 oz)  Body mass index is 25.48 kg/m².    Intake/Output Summary (Last 24 hours) at 8/24/2019 1535  Last data filed at 8/24/2019 1200  Gross per 24 hour   Intake 170 ml   Output 200 ml   Net -30 ml      Physical Exam   Constitutional: She is oriented to person, place, and time. She appears well-developed and well-nourished. No distress.   HENT:   Head: Normocephalic and atraumatic.   Eyes: Pupils are equal, round, and reactive to light.   Neck: Neck supple. Carotid bruit is not present. No thyromegaly present.   Cardiovascular: Normal rate and normal heart sounds. An irregularly irregular rhythm present. Exam reveals no gallop.   No murmur heard.  Pulmonary/Chest: Effort normal and breath sounds normal. No respiratory distress. She has no wheezes. She exhibits no tenderness.   Abdominal: Soft. Bowel sounds are normal. She exhibits no distension and no mass. There is no splenomegaly or hepatomegaly. There is no tenderness.   Musculoskeletal: She exhibits edema (L knee). She exhibits no deformity.   Decreased ROM to L knee   Neurological: She is alert and oriented to person, place, and time. No cranial nerve deficit or sensory deficit.   Skin: Skin is warm and dry. No rash noted.   Psychiatric: She has a normal mood and affect. Her behavior is normal.   Nursing note and vitals reviewed.      Significant Labs: All pertinent labs within the past 24 hours have been reviewed.    Significant Imaging: I have reviewed all pertinent imaging results/findings within the past 24 hours.

## 2019-08-24 NOTE — ASSESSMENT & PLAN NOTE
- C/W furosemide tablet 40 mg PO BID  -  (Previous 1,279 1 yr ago)  - Last Echocardiogram on 7/19/2019 showed concentric LVH, estimated EF >70%, sever bi-atrial enlargement, severe tricuspid regurgitation, estimated PA systolic pressure is 56 mm Hg, and elevated central venous pressure of 15 mm Hg.  - CXR PA and Lateral showed no acute abnormalities or any evidence of CHF exacerbation.  - Strict I&Os, daily weights.  - BB held pending cardiac workup.  Not on ACEi/ARB likely secondary to CKD.

## 2019-08-24 NOTE — PLAN OF CARE
Problem: Adult Inpatient Plan of Care  Goal: Plan of Care Review  Outcome: Ongoing (interventions implemented as appropriate)  POC reviewed with pt, verbalized an understanding; NADN; VSS; pt rested quietly through the night; pt denies any chest pain this morning; no acute event/fall this shift; bed alarm on, call bell in reach, bed in lowest position

## 2019-08-24 NOTE — HPI
Tiana Mead is a 89 y.o. AA female with PMHx of MI, HTN, HLD, CHF, OA, T2DM, Asthma, Thyroid dz, and CKD who presents to the ED c/o increased frequency of chest pain with associated SOB with exertion and at rest and bilateral neck pain (x1 month). Pt additionally complains of chronic bilaterally knee pain and swelling that is frequently treated with aspirations and unspecified injections. Pt reports she deals with chest pain and SOB chronically for which she treats with a Nitro tablet and Albuterol inhaler. She reports aborting the CP and SOB through use of a single Nitro tablet and single use of albuterol inhaler. Pt states she is having CP with SOB more frequently as of late and subsequently having to take Nitro tablets and Albuterol inhaler more frequently. Pt reports SOB with lying down, and has to sleep with multiple pillows for elevation. Pt states neck pain is worse with movement of neck. She currently denies CP, SOB, nausea, vomiting, fever, chills, sweats, abdominal pain, constipation, diarrhea, dysuria, and hematuria. Pt reports no complaints at this time. Pt states compliance with her medications.      ED: AFVSS, no leukocytosis, Troponin <0.006 >>> <0.006, Will complete trend.  (prev 1,279 1y ago) Creatinine at 1.5 (BL 1.4-1.7). eGFR at 34.1 (BL 25-32.6). Glucose elevated at 200 (114-177). EKG showed A-fib. CXR PA and Lateral showed no acute findings and no evidence of CHF exacerbation.

## 2019-08-24 NOTE — ASSESSMENT & PLAN NOTE
Remote MI    - Troponin trend flat   - EKG with atrial fibrillation  - will discuss further work-up with cards as atrial fib is a contraindication to DSE - consider nuclear stress  - C/W Isosorbide Mononitrate 24 hr tablet 60 mg PO daily --- increased to 90 mg   - CXR PA and Lateral showed no acute abnormalities or any evidence of CHF exacerbation.  - Last echo from July 19, 2019 noted below.  -  monitor on telemetry

## 2019-08-24 NOTE — ASSESSMENT & PLAN NOTE
Remote MI    - Troponin <0.006 x2, will complete trend.  - C/W Isosorbide Mononitrate 24 hr tablet 60 mg PO daily  - CXR PA and Lateral showed no acute abnormalities or any evidence of CHF exacerbation.  - Last echo from July 19, 2019 noted below.  - Will hold BB, monitor on telemetry, NPO midnight.

## 2019-08-24 NOTE — SUBJECTIVE & OBJECTIVE
"Past Medical History:   Diagnosis Date    Allergy     Anemia     Arthritis     Asthma     CHF (congestive heart failure) 5/2/2017    Chronic kidney disease     Degenerative disc disease     Diabetes mellitus type II     Essential hypertension 7/3/2012    Glaucoma     History of pseudogout 8/23/2012    Hyperlipidemia     Hypertension     Iritis     Myocardial infarction     Pneumonia     Thyroid disease        Past Surgical History:   Procedure Laterality Date    CARDIAC CATHETERIZATION      CATARACT EXTRACTION W/  INTRAOCULAR LENS IMPLANT Left n/a    CHOLECYSTECTOMY      EXTRACTION, CATARACT, WITH IOL INSERTION Right 10/8/2018    Performed by Gume Henson [facility] at [facility] OR    EYE SURGERY      gall stone      HYSTERECTOMY      VARICOSE VEIN SURGERY         Review of patient's allergies indicates:   Allergen Reactions    Codeine Itching and Nausea Only              No current facility-administered medications on file prior to encounter.      Current Outpatient Medications on File Prior to Encounter   Medication Sig    albuterol 90 mcg/actuation inhaler Inhale 2 puffs into the lungs every 4 (four) hours as needed.    amLODIPine (NORVASC) 5 MG tablet TAKE 1 TABLET(5 MG) BY MOUTH EVERY MORNING    apixaban (ELIQUIS) 2.5 mg Tab Take 1 tablet (2.5 mg total) by mouth 2 (two) times daily.    atorvastatin (LIPITOR) 40 MG tablet Take 1 tablet (40 mg total) by mouth once daily.    BD ULTRA-FINE SHORT PEN NEEDLE 31 gauge x 5/16" Ndle USE WITH INSULIN PENS AS DIRECTED    blood sugar diagnostic (ONETOUCH ULTRA TEST) Strp Inject 1 strip into the skin 3 (three) times daily.    diclofenac sodium (VOLTAREN) 1 % Gel Apply topically 4 (four) times daily as needed.    dorzolamide (TRUSOPT) 2 % ophthalmic solution Place 1 drop into both eyes 2 (two) times daily.    fluticasone-salmeterol 250-50 mcg/dose (ADVAIR DISKUS) 250-50 mcg/dose diskus inhaler INHALE 1 PUFF BY MOUTH INTO THE LUNGS NIGHTLY    " furosemide (LASIX) 40 MG tablet Take 1 tablet (40 mg total) by mouth 2 (two) times daily.    insulin aspart protamine-insulin aspart (NOVOLOG MIX 70-30FLEXPEN U-100) 100 unit/mL (70-30) InPn pen INJECT 10 UNITS UNDER THE SKIN EVERY MORNING BEFORE BREAKFAST    latanoprost 0.005 % ophthalmic solution Place 1 drop into the right eye every evening. (Patient taking differently: Place 1 drop into both eyes every evening. )    levothyroxine (SYNTHROID) 75 MCG tablet TAKE 1 TABLET BY MOUTH EVERY DAY BEFORE BREAKFAST    metoprolol tartrate (LOPRESSOR) 50 MG tablet Take 1 tablet (50 mg total) by mouth 2 (two) times daily.    multivit-mineral-iron-lutein (CENTRUM SILVER ULTRA WOMEN'S) Tab Take 1 tablet by mouth once daily.    TRUE METRIX GLUCOSE TEST STRIP Strp USE TO TEST BLOOD SUGAR WITH MEALS THREE TIMES DAILY    TRUEPLUS LANCETS 33 gauge Misc TESTING THREE TIMES DAILY    acetaminophen (TYLENOL) 500 MG tablet Take 500 mg by mouth every 6 (six) hours as needed for Pain.    amLODIPine (NORVASC) 5 MG tablet TAKE 1 TABLET(5 MG) BY MOUTH EVERY MORNING    atorvastatin (LIPITOR) 40 MG tablet TAKE 1 TABLET(40 MG) BY MOUTH EVERY DAY    ipratropium (ATROVENT) 0.03 % nasal spray USE 2 SPRAYS IN EACH NOSTRIL TWICE DAILY    isosorbide mononitrate (IMDUR) 60 MG 24 hr tablet TAKE 1 TABLET BY MOUTH EVERY MORNING FOR HEART, to prevent chest pain and reduce shortness of breath    lancets Misc 1 Device by Misc.(Non-Drug; Combo Route) route 3 (three) times daily before meals. Please provide the insurance company preferred product.    meclizine (ANTIVERT) 12.5 mg tablet Take 1 tablet (12.5 mg total) by mouth 3 (three) times daily as needed.    nitroGLYCERIN (NITROSTAT) 0.4 MG SL tablet ONE TABLET UNDER THE TONGUE EVERY 5 MINUTES AS NEEDED FOR CHEST PAIN    ondansetron (ZOFRAN) 4 MG tablet TAKE ONE TABLET BY MOUTH EVERY 12 HOURS AS NEEDED FOR NAUSEA    TRUE METRIX GLUCOSE METER Misc TEST TID    TRUEPLUS LANCETS 30 gauge Misc  USE TO TEST BLOOD SUGAR TID AC     Family History     Problem Relation (Age of Onset)    Diabetes Mother, Son, Daughter    Glaucoma Mother    Hypertension Mother, Father    No Known Problems Daughter, Son        Tobacco Use    Smoking status: Never Smoker    Smokeless tobacco: Never Used   Substance and Sexual Activity    Alcohol use: No    Drug use: No    Sexual activity: Never     Review of Systems   Constitutional: Negative for activity change, appetite change, chills, fatigue, fever and unexpected weight change.   HENT: Negative for congestion, rhinorrhea, sore throat, trouble swallowing and voice change.    Eyes: Negative for visual disturbance.   Respiratory: Positive for shortness of breath. Negative for cough, choking, chest tightness and wheezing.         RESOLVED   Cardiovascular: Positive for chest pain. Negative for palpitations and leg swelling.        RESOLVED   Gastrointestinal: Negative for abdominal distention, abdominal pain, anal bleeding, blood in stool, constipation, diarrhea, nausea and vomiting.   Endocrine: Negative for cold intolerance, heat intolerance, polydipsia and polyuria.   Genitourinary: Negative for dysuria, flank pain, frequency, hematuria and urgency.   Musculoskeletal: Positive for joint swelling and neck pain. Negative for arthralgias, back pain and myalgias.        Bilateral knee swelling and pain, currently resolved.   Skin: Negative for color change and rash.   Neurological: Negative for dizziness, seizures, syncope, facial asymmetry, speech difficulty, weakness, light-headedness, numbness and headaches.   Hematological: Negative for adenopathy. Does not bruise/bleed easily.   Psychiatric/Behavioral: Negative for agitation, confusion, hallucinations and suicidal ideas.     Objective:     Vital Signs (Most Recent):  Temp: 98.7 °F (37.1 °C) (08/23/19 2156)  Pulse: 75 (08/23/19 2319)  Resp: 16 (08/23/19 2156)  BP: (!) 185/79 (08/23/19 2156)  SpO2: 95 % (08/23/19 2156) Vital  Signs (24h Range):  Temp:  [98.7 °F (37.1 °C)-100.3 °F (37.9 °C)] 98.7 °F (37.1 °C)  Pulse:  [75-98] 75  Resp:  [15-21] 16  SpO2:  [95 %-98 %] 95 %  BP: (135-185)/(59-86) 185/79     Weight: 76 kg (167 lb 8.8 oz)  Body mass index is 25.48 kg/m².    Physical Exam   Constitutional: She is oriented to person, place, and time. She appears well-developed and well-nourished. No distress.   HENT:   Head: Normocephalic and atraumatic.   Eyes: Pupils are equal, round, and reactive to light.   Neck: Neck supple. Carotid bruit is not present. No thyromegaly present.   Cardiovascular: Normal rate and normal heart sounds. An irregularly irregular rhythm present. Exam reveals no gallop.   No murmur heard.  Pulmonary/Chest: Effort normal and breath sounds normal. No respiratory distress. She has no wheezes. She exhibits no tenderness.   Abdominal: Soft. Bowel sounds are normal. She exhibits no distension and no mass. There is no splenomegaly or hepatomegaly. There is no tenderness.   Musculoskeletal: Normal range of motion. She exhibits no edema or deformity.   Neurological: She is alert and oriented to person, place, and time. No cranial nerve deficit or sensory deficit.   Skin: Skin is warm and dry. No rash noted.   Psychiatric: She has a normal mood and affect. Her behavior is normal.         CRANIAL NERVES     CN III, IV, VI   Pupils are equal, round, and reactive to light.       Significant Labs:   CBC:   Recent Labs   Lab 08/23/19  1820   WBC 9.29   HGB 12.3   HCT 37.5   *     CMP:   Recent Labs   Lab 08/23/19  1820      K 4.2      CO2 26   *   BUN 24   CREATININE 1.5*   CALCIUM 9.6   PROT 7.0   ALBUMIN 3.6   BILITOT 0.8   ALKPHOS 132   AST 23   ALT 24   ANIONGAP 9   EGFRNONAA 29.6*     Cardiac Markers:   Recent Labs   Lab 08/23/19  1820   *     Troponin:   Recent Labs   Lab 08/23/19  1820 08/23/19  2201   TROPONINI <0.006 <0.006     Urine Studies: No results for input(s): ARIADNA  APPEARANCEUA, PHUR, SPECGRAV, PROTEINUA, GLUCUA, KETONESU, BILIRUBINUA, OCCULTUA, NITRITE, UROBILINOGEN, LEUKOCYTESUR, RBCUA, WBCUA, BACTERIA, SQUAMEPITHEL, HYALINECASTS in the last 48 hours.    Invalid input(s): MARIA    Significant Imaging: I have reviewed all pertinent imaging results/findings within the past 24 hours.

## 2019-08-24 NOTE — H&P
[facility] Hospital Medicine  History & Physical    Patient Name: Tiana Mead  MRN: [id number]  Admission Date: 8/23/2019  Attending Physician: NGA Vaughn MD   Primary Care Provider: Belen Wood MD    [facility] Medicine Team: [facility] MED E Diana Feldman PA-C     Patient information was obtained from patient, past medical records and ER records.     Subjective:     Principal Problem:Chest pain, rule out acute myocardial infarction    Chief Complaint:   Chief Complaint   Patient presents with    Chest Pain     c/o chest pain onset yesterday, seen at urgent care today for chest pain and left knee pain post slip and fall, knee wrapped with ace wrap, was instructed to come to ED to address chest pain        HPI: Tiana Mead is a 89 y.o. AA female with PMHx of MI, HTN, HLD, CHF, OA, T2DM, Asthma, Thyroid dz, and CKD who presents to the ED c/o increased frequency of chest pain with associated SOB with exertion and at rest and bilateral neck pain (x1 month). Pt additionally complains of chronic bilaterally knee pain and swelling that is frequently treated with aspirations and unspecified injections. Pt reports she deals with chest pain and SOB chronically for which she treats with a Nitro tablet and Albuterol inhaler. She reports aborting the CP and SOB through use of a single Nitro tablet and single use of albuterol inhaler. Pt states she is having CP with SOB more frequently as of late and subsequently having to take Nitro tablets and Albuterol inhaler more frequently. Pt reports SOB with lying down, and has to sleep with multiple pillows for elevation. Pt states neck pain is worse with movement of neck. She currently denies CP, SOB, nausea, vomiting, fever, chills, sweats, abdominal pain, constipation, diarrhea, dysuria, and hematuria. Pt reports no complaints at this time. Pt states compliance with her medications.      ED: AFVSS, no leukocytosis, Troponin <0.006 >>> <0.006,  "Will complete trend.  (prev 1,279 1y ago) Creatinine at 1.5 (BL 1.4-1.7). eGFR at 34.1 (BL 25-32.6). Glucose elevated at 200 (114-177). EKG showed A-fib. CXR PA and Lateral showed no acute findings and no evidence of CHF exacerbation.     Past Medical History:   Diagnosis Date    Allergy     Anemia     Arthritis     Asthma     CHF (congestive heart failure) 5/2/2017    Chronic kidney disease     Degenerative disc disease     Diabetes mellitus type II     Essential hypertension 7/3/2012    Glaucoma     History of pseudogout 8/23/2012    Hyperlipidemia     Hypertension     Iritis     Myocardial infarction     Pneumonia     Thyroid disease        Past Surgical History:   Procedure Laterality Date    CARDIAC CATHETERIZATION      CATARACT EXTRACTION W/  INTRAOCULAR LENS IMPLANT Left n/a    CHOLECYSTECTOMY      EXTRACTION, CATARACT, WITH IOL INSERTION Right 10/8/2018    Performed by Gume Henson [facility] at [facility] OR    EYE SURGERY      gall stone      HYSTERECTOMY      VARICOSE VEIN SURGERY         Review of patient's allergies indicates:   Allergen Reactions    Codeine Itching and Nausea Only              No current facility-administered medications on file prior to encounter.      Current Outpatient Medications on File Prior to Encounter   Medication Sig    albuterol 90 mcg/actuation inhaler Inhale 2 puffs into the lungs every 4 (four) hours as needed.    amLODIPine (NORVASC) 5 MG tablet TAKE 1 TABLET(5 MG) BY MOUTH EVERY MORNING    apixaban (ELIQUIS) 2.5 mg Tab Take 1 tablet (2.5 mg total) by mouth 2 (two) times daily.    atorvastatin (LIPITOR) 40 MG tablet Take 1 tablet (40 mg total) by mouth once daily.    BD ULTRA-FINE SHORT PEN NEEDLE 31 gauge x 5/16" Ndle USE WITH INSULIN PENS AS DIRECTED    blood sugar diagnostic (ONETOUCH ULTRA TEST) Strp Inject 1 strip into the skin 3 (three) times daily.    diclofenac sodium (VOLTAREN) 1 % Gel Apply topically 4 (four) times daily as needed. "    dorzolamide (TRUSOPT) 2 % ophthalmic solution Place 1 drop into both eyes 2 (two) times daily.    fluticasone-salmeterol 250-50 mcg/dose (ADVAIR DISKUS) 250-50 mcg/dose diskus inhaler INHALE 1 PUFF BY MOUTH INTO THE LUNGS NIGHTLY    furosemide (LASIX) 40 MG tablet Take 1 tablet (40 mg total) by mouth 2 (two) times daily.    insulin aspart protamine-insulin aspart (NOVOLOG MIX 70-30FLEXPEN U-100) 100 unit/mL (70-30) InPn pen INJECT 10 UNITS UNDER THE SKIN EVERY MORNING BEFORE BREAKFAST    latanoprost 0.005 % ophthalmic solution Place 1 drop into the right eye every evening. (Patient taking differently: Place 1 drop into both eyes every evening. )    levothyroxine (SYNTHROID) 75 MCG tablet TAKE 1 TABLET BY MOUTH EVERY DAY BEFORE BREAKFAST    metoprolol tartrate (LOPRESSOR) 50 MG tablet Take 1 tablet (50 mg total) by mouth 2 (two) times daily.    multivit-mineral-iron-lutein (CENTRUM SILVER ULTRA WOMEN'S) Tab Take 1 tablet by mouth once daily.    TRUE METRIX GLUCOSE TEST STRIP Strp USE TO TEST BLOOD SUGAR WITH MEALS THREE TIMES DAILY    TRUEPLUS LANCETS 33 gauge Misc TESTING THREE TIMES DAILY    acetaminophen (TYLENOL) 500 MG tablet Take 500 mg by mouth every 6 (six) hours as needed for Pain.    amLODIPine (NORVASC) 5 MG tablet TAKE 1 TABLET(5 MG) BY MOUTH EVERY MORNING    atorvastatin (LIPITOR) 40 MG tablet TAKE 1 TABLET(40 MG) BY MOUTH EVERY DAY    ipratropium (ATROVENT) 0.03 % nasal spray USE 2 SPRAYS IN EACH NOSTRIL TWICE DAILY    isosorbide mononitrate (IMDUR) 60 MG 24 hr tablet TAKE 1 TABLET BY MOUTH EVERY MORNING FOR HEART, to prevent chest pain and reduce shortness of breath    lancets Misc 1 Device by Misc.(Non-Drug; Combo Route) route 3 (three) times daily before meals. Please provide the insurance company preferred product.    meclizine (ANTIVERT) 12.5 mg tablet Take 1 tablet (12.5 mg total) by mouth 3 (three) times daily as needed.    nitroGLYCERIN (NITROSTAT) 0.4 MG SL tablet ONE  TABLET UNDER THE TONGUE EVERY 5 MINUTES AS NEEDED FOR CHEST PAIN    ondansetron (ZOFRAN) 4 MG tablet TAKE ONE TABLET BY MOUTH EVERY 12 HOURS AS NEEDED FOR NAUSEA    TRUE METRIX GLUCOSE METER Misc TEST TID    TRUEPLUS LANCETS 30 gauge Misc USE TO TEST BLOOD SUGAR TID AC     Family History     Problem Relation (Age of Onset)    Diabetes Mother, Son, Daughter    Glaucoma Mother    Hypertension Mother, Father    No Known Problems Daughter, Son        Tobacco Use    Smoking status: Never Smoker    Smokeless tobacco: Never Used   Substance and Sexual Activity    Alcohol use: No    Drug use: No    Sexual activity: Never     Review of Systems   Constitutional: Negative for activity change, appetite change, chills, fatigue, fever and unexpected weight change.   HENT: Negative for congestion, rhinorrhea, sore throat, trouble swallowing and voice change.    Eyes: Negative for visual disturbance.   Respiratory: Positive for shortness of breath. Negative for cough, choking, chest tightness and wheezing.         RESOLVED   Cardiovascular: Positive for chest pain. Negative for palpitations and leg swelling.        RESOLVED   Gastrointestinal: Negative for abdominal distention, abdominal pain, anal bleeding, blood in stool, constipation, diarrhea, nausea and vomiting.   Endocrine: Negative for cold intolerance, heat intolerance, polydipsia and polyuria.   Genitourinary: Negative for dysuria, flank pain, frequency, hematuria and urgency.   Musculoskeletal: Positive for joint swelling and neck pain. Negative for arthralgias, back pain and myalgias.        Bilateral knee swelling and pain, currently resolved.   Skin: Negative for color change and rash.   Neurological: Negative for dizziness, seizures, syncope, facial asymmetry, speech difficulty, weakness, light-headedness, numbness and headaches.   Hematological: Negative for adenopathy. Does not bruise/bleed easily.   Psychiatric/Behavioral: Negative for agitation,  confusion, hallucinations and suicidal ideas.     Objective:     Vital Signs (Most Recent):  Temp: 98.7 °F (37.1 °C) (08/23/19 2156)  Pulse: 75 (08/23/19 2319)  Resp: 16 (08/23/19 2156)  BP: (!) 185/79 (08/23/19 2156)  SpO2: 95 % (08/23/19 2156) Vital Signs (24h Range):  Temp:  [98.7 °F (37.1 °C)-100.3 °F (37.9 °C)] 98.7 °F (37.1 °C)  Pulse:  [75-98] 75  Resp:  [15-21] 16  SpO2:  [95 %-98 %] 95 %  BP: (135-185)/(59-86) 185/79     Weight: 76 kg (167 lb 8.8 oz)  Body mass index is 25.48 kg/m².    Physical Exam   Constitutional: She is oriented to person, place, and time. She appears well-developed and well-nourished. No distress.   HENT:   Head: Normocephalic and atraumatic.   Eyes: Pupils are equal, round, and reactive to light.   Neck: Neck supple. Carotid bruit is not present. No thyromegaly present.   Cardiovascular: Normal rate and normal heart sounds. An irregularly irregular rhythm present. Exam reveals no gallop.   No murmur heard.  Pulmonary/Chest: Effort normal and breath sounds normal. No respiratory distress. She has no wheezes. She exhibits no tenderness.   Abdominal: Soft. Bowel sounds are normal. She exhibits no distension and no mass. There is no splenomegaly or hepatomegaly. There is no tenderness.   Musculoskeletal: Normal range of motion. She exhibits no edema or deformity.   Neurological: She is alert and oriented to person, place, and time. No cranial nerve deficit or sensory deficit.   Skin: Skin is warm and dry. No rash noted.   Psychiatric: She has a normal mood and affect. Her behavior is normal.         CRANIAL NERVES     CN III, IV, VI   Pupils are equal, round, and reactive to light.       Significant Labs:   CBC:   Recent Labs   Lab 08/23/19  1820   WBC 9.29   HGB 12.3   HCT 37.5   *     CMP:   Recent Labs   Lab 08/23/19  1820      K 4.2      CO2 26   *   BUN 24   CREATININE 1.5*   CALCIUM 9.6   PROT 7.0   ALBUMIN 3.6   BILITOT 0.8   ALKPHOS 132   AST 23   ALT 24    ANIONGAP 9   EGFRNONAA 29.6*     Cardiac Markers:   Recent Labs   Lab 08/23/19  1820   *     Troponin:   Recent Labs   Lab 08/23/19  1820 08/23/19  2201   TROPONINI <0.006 <0.006     Urine Studies: No results for input(s): COLORU, APPEARANCEUA, PHUR, SPECGRAV, PROTEINUA, GLUCUA, KETONESU, BILIRUBINUA, OCCULTUA, NITRITE, UROBILINOGEN, LEUKOCYTESUR, RBCUA, WBCUA, BACTERIA, SQUAMEPITHEL, HYALINECASTS in the last 48 hours.    Invalid input(s): WRIGHTSUR    Significant Imaging: I have reviewed all pertinent imaging results/findings within the past 24 hours.    Assessment/Plan:     * Chest pain, rule out acute myocardial infarction  Remote MI    - Troponin <0.006 x2, will complete trend.  - C/W Isosorbide Mononitrate 24 hr tablet 60 mg PO daily  - CXR PA and Lateral showed no acute abnormalities or any evidence of CHF exacerbation.  - Last echo from July 19, 2019 noted below.  - Will hold BB, monitor on telemetry, NPO midnight.    Moderate persistent asthma without complication  - C/W Fluticasone furoate-vilanterol 100-25 mcg/dose diskus inhaler 1 puff inhaled daily  - C/W albuterol-ipratropium 2.5 mg-0.5 mg/3mL nebulizer solution 3 mL q4 hours PRN      Primary hypothyroidism  - C/W Levothyroxine 75 mcg PO before breakfast daily    Chronic diastolic congestive heart failure  - C/W furosemide tablet 40 mg PO BID  -  (Previous 1,279 1 yr ago)  - Last Echocardiogram on 7/19/2019 showed concentric LVH, estimated EF >70%, sever bi-atrial enlargement, severe tricuspid regurgitation, estimated PA systolic pressure is 56 mm Hg, and elevated central venous pressure of 15 mm Hg.  - CXR PA and Lateral showed no acute abnormalities or any evidence of CHF exacerbation.  - Strict I&Os, daily weights.  - BB held pending cardiac workup.  Not on ACEi/ARB likely secondary to CKD.      CKD (chronic kidney disease) stage 4, GFR 15-29 ml/min  - Creatinine at baseline of 1.5, GFR 34.1  - Avoid Nephrotoxic substances  - Renally  dose medications        Type 2 diabetes mellitus with diabetic polyneuropathy, with long-term current use of insulin  - NPO SSI      Persistent atrial fibrillation  H/O Deep vein thrombosis (DVT)  Long term current use of anticoagulant therapy, eliquis    - C/W Apixaban tablet 2.5 mg PO BID  - EKG showed atrial fibrillation with no other abnormalities present.    Hyperlipemia, mixed  - C/W Atorvastatin tablet 40 mg PO daily      VTE Risk Mitigation (From admission, onward)        Ordered     apixaban tablet 2.5 mg  2 times daily      08/23/19 2243     IP VTE HIGH RISK PATIENT  Once      08/23/19 2243     Place sequential compression device  Until discontinued      08/23/19 2243     Place JOANIE hose  Until discontinued      08/23/19 2243     Reason for No Pharmacological VTE Prophylaxis  Once      08/23/19 2243             Diana Feldman PA-C  Department of Hospital Medicine   [facility]

## 2019-08-24 NOTE — HOSPITAL COURSE
89 y.o. who was admitted to hospital medicine for chest pain and impaired mobility due to R knee pain. Patient with chronic atrial fibrillation therefore DSE a contraindication. Her chest pain improved with increase of Imdur. As patient's chest pain resolved, EKG was non-ischemic and troponin's were unremarkable, outpatient nuclear stress testing ordered. Patient was also found to have pseudogout per synovial fluid analysis; aspiration performed per orthopedic surgery. Patient was started on PO prednisone with improvement in symptoms, will send outpatient referral to rheumatology for follow-up. PT/OT consulted and recommended HH. Stable for discharge. Return precautions discussed, no further questions at discharge.

## 2019-08-24 NOTE — ASSESSMENT & PLAN NOTE
- Creatinine at baseline of 1.5, GFR 34.1  - Avoid Nephrotoxic substances  - Renally dose medications

## 2019-08-24 NOTE — ASSESSMENT & PLAN NOTE
- impaired mobility due to acute L knee pain, unable to ambulate   - seen at urgent care on yesterday and reports having it aspirated  - XRAYs ordered  - ESR/CRP, uric acid  ordered   - PT/OT eval

## 2019-08-24 NOTE — NURSING
Pt arrived to floor via stretcher; NADN; tele monitor on; pt denies cp/sob at this time; will continue to monitor

## 2019-08-24 NOTE — PLAN OF CARE
Problem: Adult Inpatient Plan of Care  Goal: Plan of Care Review  Outcome: Ongoing (interventions implemented as appropriate)  Ms. Mead is alert, oriented x 4 and has mobility that is impaired only by a left knee that is swollen and painful. She had a dressing and ace wrap to left knee from what she describes as a tap that was done outside the hospital at her doctor's house.She removed ace wrap and asked me for ice pack, she said this was recommended by her doctor yesterday. Her knee is somewhat swollen and definitely painful. She is on telemetry and has been in Afib today, additional isosorbide given this afternoon. She will have xray of knee today and PT referral ordered. She understands the current plan of care.

## 2019-08-24 NOTE — ASSESSMENT & PLAN NOTE
H/O Deep vein thrombosis (DVT)  Long term current use of anticoagulant therapy, eliquis    - C/W Apixaban tablet 2.5 mg PO BID  - EKG showed atrial fibrillation with no other abnormalities present.

## 2019-08-24 NOTE — PROGRESS NOTES
[facility] Hospital Medicine  Progress Note    Patient Name: Tiana Mead  MRN: [id number]  Patient Class: OP- Observation   Admission Date: 8/23/2019  Length of Stay: 0 days  Attending Physician: NGA Vaughn MD  Primary Care Provider: Belen Wood MD    [facility] Medicine Team: [facility] [place] [facility] MED E Olaf Marquez PA-C    Subjective:     Principal Problem:Chest pain, rule out acute myocardial infarction        HPI:  Tiana Mead is a 89 y.o. AA female with PMHx of MI, HTN, HLD, CHF, OA, T2DM, Asthma, Thyroid dz, and CKD who presents to the ED c/o increased frequency of chest pain with associated SOB with exertion and at rest and bilateral neck pain (x1 month). Pt additionally complains of chronic bilaterally knee pain and swelling that is frequently treated with aspirations and unspecified injections. Pt reports she deals with chest pain and SOB chronically for which she treats with a Nitro tablet and Albuterol inhaler. She reports aborting the CP and SOB through use of a single Nitro tablet and single use of albuterol inhaler. Pt states she is having CP with SOB more frequently as of late and subsequently having to take Nitro tablets and Albuterol inhaler more frequently. Pt reports SOB with lying down, and has to sleep with multiple pillows for elevation. Pt states neck pain is worse with movement of neck. She currently denies CP, SOB, nausea, vomiting, fever, chills, sweats, abdominal pain, constipation, diarrhea, dysuria, and hematuria. Pt reports no complaints at this time. Pt states compliance with her medications.      ED: AFVSS, no leukocytosis, Troponin <0.006 >>> <0.006, Will complete trend.  (prev 1,279 1y ago) Creatinine at 1.5 (BL 1.4-1.7). eGFR at 34.1 (BL 25-32.6). Glucose elevated at 200 (114-177). EKG showed A-fib. CXR PA and Lateral showed no acute findings and no evidence of CHF exacerbation.     Overview/Hospital Course:  89 y.o. who was admitted to hospital  medicine for chest pain and impaired mobility due to R knee pain. X-rays ordered for further evaluation.     Interval History: No repeated episodes of chest pain, patient unable to have DSE due to atrial fibrillation. Will discuss further testing with cardiology. She has been without acute episodes of chest pain. She also reports impaired mobility due to knee pain. Apparently she presented to an urgent care facility on yesterday and an aspiration was completed. Unable to find documentation in the chart. Will order X-rays and PT/OT evaluation.      Review of Systems   Constitutional: Negative for activity change, appetite change, chills, fatigue, fever and unexpected weight change.   HENT: Negative for congestion, rhinorrhea, sore throat, trouble swallowing and voice change.    Eyes: Negative for visual disturbance.   Respiratory: Positive for shortness of breath. Negative for cough, choking, chest tightness and wheezing.         RESOLVED   Cardiovascular: Positive for chest pain. Negative for palpitations and leg swelling.        RESOLVED   Gastrointestinal: Negative for abdominal distention, abdominal pain, anal bleeding, blood in stool, constipation, diarrhea, nausea and vomiting.   Endocrine: Negative for cold intolerance, heat intolerance, polydipsia and polyuria.   Genitourinary: Negative for dysuria, flank pain, frequency, hematuria and urgency.   Musculoskeletal: Positive for arthralgias (L knee), joint swelling and neck pain. Negative for back pain and myalgias.        Bilateral knee swelling and pain, currently resolved.   Skin: Negative for color change and rash.   Neurological: Negative for dizziness, seizures, syncope, facial asymmetry, speech difficulty, weakness, light-headedness, numbness and headaches.   Hematological: Negative for adenopathy. Does not bruise/bleed easily.   Psychiatric/Behavioral: Negative for agitation, confusion, hallucinations and suicidal ideas.     Objective:     Vital Signs  (Most Recent):  Temp: 98.1 °F (36.7 °C) (08/24/19 1147)  Pulse: 92 (08/24/19 1508)  Resp: 18 (08/24/19 1147)  BP: (!) 154/73 (08/24/19 1147)  SpO2: 96 % (08/24/19 1147) Vital Signs (24h Range):  Temp:  [98.1 °F (36.7 °C)-99.9 °F (37.7 °C)] 98.1 °F (36.7 °C)  Pulse:  [75-98] 92  Resp:  [16-21] 18  SpO2:  [95 %-98 %] 96 %  BP: (152-185)/(73-79) 154/73     Weight: 76 kg (167 lb 8.8 oz)  Body mass index is 25.48 kg/m².    Intake/Output Summary (Last 24 hours) at 8/24/2019 1535  Last data filed at 8/24/2019 1200  Gross per 24 hour   Intake 170 ml   Output 200 ml   Net -30 ml      Physical Exam   Constitutional: She is oriented to person, place, and time. She appears well-developed and well-nourished. No distress.   HENT:   Head: Normocephalic and atraumatic.   Eyes: Pupils are equal, round, and reactive to light.   Neck: Neck supple. Carotid bruit is not present. No thyromegaly present.   Cardiovascular: Normal rate and normal heart sounds. An irregularly irregular rhythm present. Exam reveals no gallop.   No murmur heard.  Pulmonary/Chest: Effort normal and breath sounds normal. No respiratory distress. She has no wheezes. She exhibits no tenderness.   Abdominal: Soft. Bowel sounds are normal. She exhibits no distension and no mass. There is no splenomegaly or hepatomegaly. There is no tenderness.   Musculoskeletal: She exhibits edema (L knee). She exhibits no deformity.   Decreased ROM to L knee   Neurological: She is alert and oriented to person, place, and time. No cranial nerve deficit or sensory deficit.   Skin: Skin is warm and dry. No rash noted.   Psychiatric: She has a normal mood and affect. Her behavior is normal.   Nursing note and vitals reviewed.      Significant Labs: All pertinent labs within the past 24 hours have been reviewed.    Significant Imaging: I have reviewed all pertinent imaging results/findings within the past 24 hours.      Assessment/Plan:      * Chest pain, rule out acute myocardial  infarction  Remote MI    - Troponin trend flat   - EKG with atrial fibrillation  - will discuss further work-up with cards as atrial fib is a contraindication to DSE - consider nuclear stress  - C/W Isosorbide Mononitrate 24 hr tablet 60 mg PO daily --- increased to 90 mg   - CXR PA and Lateral showed no acute abnormalities or any evidence of CHF exacerbation.  - Last echo from July 19, 2019 noted below.  -  monitor on telemetry    Left knee pain  - impaired mobility due to acute L knee pain, unable to ambulate   - seen at urgent care on yesterday and reports having it aspirated  - XRAYs ordered  - ESR/CRP, uric acid  ordered   - PT/OT eval     Moderate persistent asthma without complication  - C/W Fluticasone furoate-vilanterol 100-25 mcg/dose diskus inhaler 1 puff inhaled daily  - C/W albuterol-ipratropium 2.5 mg-0.5 mg/3mL nebulizer solution 3 mL q4 hours PRN      Primary hypothyroidism  - C/W Levothyroxine 75 mcg PO before breakfast daily    Chronic diastolic congestive heart failure  - C/W furosemide tablet 40 mg PO BID  -  (Previous 1,279 1 yr ago)  - Last Echocardiogram on 7/19/2019 showed concentric LVH, estimated EF >70%, sever bi-atrial enlargement, severe tricuspid regurgitation, estimated PA systolic pressure is 56 mm Hg, and elevated central venous pressure of 15 mm Hg.  - CXR PA and Lateral showed no acute abnormalities or any evidence of CHF exacerbation.  - Strict I&Os, daily weights.  - BB held pending cardiac workup.  Not on ACEi/ARB likely secondary to CKD.      CKD (chronic kidney disease) stage 4, GFR 15-29 ml/min  - Creatinine at baseline of 1.5, GFR 34.1  - Avoid Nephrotoxic substances  - Renally dose medications        Type 2 diabetes mellitus with diabetic polyneuropathy, with long-term current use of insulin  - NPO SSI      Persistent atrial fibrillation  H/O Deep vein thrombosis (DVT)  Long term current use of anticoagulant therapy, eliquis    - C/W Apixaban tablet 2.5 mg PO BID  -  EKG showed atrial fibrillation with no other abnormalities present.    Hyperlipemia, mixed  - C/W Atorvastatin tablet 40 mg PO daily      VTE Risk Mitigation (From admission, onward)        Ordered     apixaban tablet 2.5 mg  2 times daily      08/23/19 2243     IP VTE HIGH RISK PATIENT  Once      08/23/19 2243     Place sequential compression device  Until discontinued      08/23/19 2243     Place JOANIE hose  Until discontinued      08/23/19 2243     Reason for No Pharmacological VTE Prophylaxis  Once      08/23/19 2243                Olaf Marquez PA-C  Department of Hospital Medicine   [facility]

## 2019-08-25 PROBLEM — R50.9 FEVER: Status: ACTIVE | Noted: 2019-08-25

## 2019-08-25 LAB
ALBUMIN SERPL BCP-MCNC: 3.2 G/DL (ref 3.5–5.2)
ALP SERPL-CCNC: 108 U/L (ref 55–135)
ALT SERPL W/O P-5'-P-CCNC: 17 U/L (ref 10–44)
ANION GAP SERPL CALC-SCNC: 11 MMOL/L (ref 8–16)
APPEARANCE FLD: NORMAL
AST SERPL-CCNC: 19 U/L (ref 10–40)
BACTERIA #/AREA URNS AUTO: ABNORMAL /HPF
BASOPHILS # BLD AUTO: 0.04 K/UL (ref 0–0.2)
BASOPHILS NFR BLD: 0.5 % (ref 0–1.9)
BILIRUB SERPL-MCNC: 1 MG/DL (ref 0.1–1)
BILIRUB UR QL STRIP: NEGATIVE
BODY FLD TYPE: ABNORMAL
BODY FLD TYPE: NORMAL
BUN SERPL-MCNC: 19 MG/DL (ref 10–30)
CALCIUM SERPL-MCNC: 9 MG/DL (ref 8.7–10.5)
CHLORIDE SERPL-SCNC: 102 MMOL/L (ref 95–110)
CLARITY UR REFRACT.AUTO: CLEAR
CO2 SERPL-SCNC: 25 MMOL/L (ref 23–29)
COLOR FLD: YELLOW
COLOR UR AUTO: YELLOW
CREAT SERPL-MCNC: 1.4 MG/DL (ref 0.5–1.4)
CRYSTALS FLD MICRO: POSITIVE
DIFFERENTIAL METHOD: ABNORMAL
EOSINOPHIL # BLD AUTO: 0.1 K/UL (ref 0–0.5)
EOSINOPHIL NFR BLD: 0.6 % (ref 0–8)
ERYTHROCYTE [DISTWIDTH] IN BLOOD BY AUTOMATED COUNT: 13.9 % (ref 11.5–14.5)
EST. GFR  (AFRICAN AMERICAN): 37.1 ML/MIN/1.73 M^2
EST. GFR  (NON AFRICAN AMERICAN): 32.2 ML/MIN/1.73 M^2
GLUCOSE SERPL-MCNC: 159 MG/DL (ref 70–110)
GLUCOSE UR QL STRIP: NEGATIVE
GRAM STN SPEC: NORMAL
GRAM STN SPEC: NORMAL
HCT VFR BLD AUTO: 35.9 % (ref 37–48.5)
HGB BLD-MCNC: 11.6 G/DL (ref 12–16)
HGB UR QL STRIP: ABNORMAL
HYALINE CASTS UR QL AUTO: 3 /LPF
IMM GRANULOCYTES # BLD AUTO: 0.03 K/UL (ref 0–0.04)
IMM GRANULOCYTES NFR BLD AUTO: 0.4 % (ref 0–0.5)
INR PPP: 1 (ref 0.8–1.2)
KETONES UR QL STRIP: NEGATIVE
LEUKOCYTE ESTERASE UR QL STRIP: ABNORMAL
LYMPHOCYTES # BLD AUTO: 1.1 K/UL (ref 1–4.8)
LYMPHOCYTES NFR BLD: 13 % (ref 18–48)
LYMPHOCYTES NFR FLD MANUAL: 2 %
MCH RBC QN AUTO: 29.4 PG (ref 27–31)
MCHC RBC AUTO-ENTMCNC: 32.3 G/DL (ref 32–36)
MCV RBC AUTO: 91 FL (ref 82–98)
MICROSCOPIC COMMENT: ABNORMAL
MONOCYTES # BLD AUTO: 1.4 K/UL (ref 0.3–1)
MONOCYTES NFR BLD: 17.1 % (ref 4–15)
MONOS+MACROS NFR FLD MANUAL: 10 %
NEUTROPHILS # BLD AUTO: 5.6 K/UL (ref 1.8–7.7)
NEUTROPHILS NFR BLD: 68.4 % (ref 38–73)
NEUTROPHILS NFR FLD MANUAL: 88 %
NITRITE UR QL STRIP: NEGATIVE
NRBC BLD-RTO: 0 /100 WBC
PH UR STRIP: 6 [PH] (ref 5–8)
PLATELET # BLD AUTO: 141 K/UL (ref 150–350)
PMV BLD AUTO: 12.6 FL (ref 9.2–12.9)
POCT GLUCOSE: 137 MG/DL (ref 70–110)
POCT GLUCOSE: 170 MG/DL (ref 70–110)
POCT GLUCOSE: 193 MG/DL (ref 70–110)
POCT GLUCOSE: 262 MG/DL (ref 70–110)
POCT GLUCOSE: 274 MG/DL (ref 70–110)
POTASSIUM SERPL-SCNC: 3.6 MMOL/L (ref 3.5–5.1)
PROT SERPL-MCNC: 6.6 G/DL (ref 6–8.4)
PROT UR QL STRIP: NEGATIVE
PROTHROMBIN TIME: 10.8 SEC (ref 9–12.5)
RBC # BLD AUTO: 3.94 M/UL (ref 4–5.4)
RBC #/AREA URNS AUTO: 4 /HPF (ref 0–4)
SODIUM SERPL-SCNC: 138 MMOL/L (ref 136–145)
SP GR UR STRIP: 1.01 (ref 1–1.03)
SQUAMOUS #/AREA URNS AUTO: 4 /HPF
URATE SERPL-MCNC: 6.6 MG/DL (ref 2.4–5.7)
URATE SERPL-MCNC: 6.8 MG/DL (ref 2.4–5.7)
URN SPEC COLLECT METH UR: ABNORMAL
WBC # BLD AUTO: 8.2 K/UL (ref 3.9–12.7)
WBC # FLD: NORMAL /CU MM
WBC #/AREA URNS AUTO: 11 /HPF (ref 0–5)

## 2019-08-25 PROCEDURE — 89060 EXAM SYNOVIAL FLUID CRYSTALS: CPT | Mod: HCNC

## 2019-08-25 PROCEDURE — 81001 URINALYSIS AUTO W/SCOPE: CPT | Mod: HCNC

## 2019-08-25 PROCEDURE — 84550 ASSAY OF BLOOD/URIC ACID: CPT | Mod: 91,HCNC

## 2019-08-25 PROCEDURE — 87116 MYCOBACTERIA CULTURE: CPT | Mod: HCNC

## 2019-08-25 PROCEDURE — 87205 SMEAR GRAM STAIN: CPT | Mod: HCNC

## 2019-08-25 PROCEDURE — 89051 BODY FLUID CELL COUNT: CPT | Mod: HCNC

## 2019-08-25 PROCEDURE — 85610 PROTHROMBIN TIME: CPT | Mod: HCNC

## 2019-08-25 PROCEDURE — 99226 PR SUBSEQUENT OBSERVATION CARE,LEVEL III: ICD-10-PCS | Mod: HCNC,,, | Performed by: PHYSICIAN ASSISTANT

## 2019-08-25 PROCEDURE — 80053 COMPREHEN METABOLIC PANEL: CPT | Mod: HCNC

## 2019-08-25 PROCEDURE — 63600175 PHARM REV CODE 636 W HCPCS: Mod: HCNC | Performed by: PHYSICIAN ASSISTANT

## 2019-08-25 PROCEDURE — 87040 BLOOD CULTURE FOR BACTERIA: CPT | Mod: HCNC

## 2019-08-25 PROCEDURE — 84550 ASSAY OF BLOOD/URIC ACID: CPT | Mod: HCNC

## 2019-08-25 PROCEDURE — 87102 FUNGUS ISOLATION CULTURE: CPT | Mod: HCNC

## 2019-08-25 PROCEDURE — 87077 CULTURE AEROBIC IDENTIFY: CPT | Mod: HCNC

## 2019-08-25 PROCEDURE — 87088 URINE BACTERIA CULTURE: CPT | Mod: HCNC

## 2019-08-25 PROCEDURE — 99203 OFFICE O/P NEW LOW 30 MIN: CPT | Mod: HCNC,,, | Performed by: ORTHOPAEDIC SURGERY

## 2019-08-25 PROCEDURE — 87186 SC STD MICRODIL/AGAR DIL: CPT | Mod: 59,HCNC

## 2019-08-25 PROCEDURE — 87075 CULTR BACTERIA EXCEPT BLOOD: CPT | Mod: HCNC

## 2019-08-25 PROCEDURE — 87206 SMEAR FLUORESCENT/ACID STAI: CPT | Mod: HCNC

## 2019-08-25 PROCEDURE — 20610 DRAIN/INJ JOINT/BURSA W/O US: CPT

## 2019-08-25 PROCEDURE — 87070 CULTURE OTHR SPECIMN AEROBIC: CPT | Mod: HCNC,59

## 2019-08-25 PROCEDURE — 85025 COMPLETE CBC W/AUTO DIFF WBC: CPT | Mod: HCNC

## 2019-08-25 PROCEDURE — 99203 PR OFFICE/OUTPT VISIT, NEW, LEVL III, 30-44 MIN: ICD-10-PCS | Mod: HCNC,,, | Performed by: ORTHOPAEDIC SURGERY

## 2019-08-25 PROCEDURE — 25000003 PHARM REV CODE 250: Mod: HCNC | Performed by: PHYSICIAN ASSISTANT

## 2019-08-25 PROCEDURE — 87086 URINE CULTURE/COLONY COUNT: CPT | Mod: HCNC

## 2019-08-25 PROCEDURE — 99226 PR SUBSEQUENT OBSERVATION CARE,LEVEL III: CPT | Mod: HCNC,,, | Performed by: PHYSICIAN ASSISTANT

## 2019-08-25 PROCEDURE — 36415 COLL VENOUS BLD VENIPUNCTURE: CPT | Mod: HCNC

## 2019-08-25 PROCEDURE — G0378 HOSPITAL OBSERVATION PER HR: HCPCS | Mod: HCNC

## 2019-08-25 RX ORDER — INSULIN ASPART 100 [IU]/ML
0-5 INJECTION, SOLUTION INTRAVENOUS; SUBCUTANEOUS
Status: DISCONTINUED | OUTPATIENT
Start: 2019-08-25 | End: 2019-08-27 | Stop reason: HOSPADM

## 2019-08-25 RX ORDER — PREDNISONE 20 MG/1
40 TABLET ORAL DAILY
Status: DISCONTINUED | OUTPATIENT
Start: 2019-08-25 | End: 2019-08-26

## 2019-08-25 RX ADMIN — LEVOTHYROXINE SODIUM 75 MCG: 75 TABLET ORAL at 04:08

## 2019-08-25 RX ADMIN — APIXABAN 2.5 MG: 2.5 TABLET, FILM COATED ORAL at 08:08

## 2019-08-25 RX ADMIN — LATANOPROST 1 DROP: 50 SOLUTION OPHTHALMIC at 08:08

## 2019-08-25 RX ADMIN — FLUTICASONE FUROATE AND VILANTEROL TRIFENATATE 1 PUFF: 100; 25 POWDER RESPIRATORY (INHALATION) at 09:08

## 2019-08-25 RX ADMIN — LIDOCAINE 1 PATCH: 50 PATCH TOPICAL at 08:08

## 2019-08-25 RX ADMIN — PREDNISONE 40 MG: 20 TABLET ORAL at 03:08

## 2019-08-25 RX ADMIN — AMLODIPINE BESYLATE 5 MG: 5 TABLET ORAL at 09:08

## 2019-08-25 RX ADMIN — ATORVASTATIN CALCIUM 40 MG: 20 TABLET, FILM COATED ORAL at 09:08

## 2019-08-25 RX ADMIN — FUROSEMIDE 40 MG: 40 TABLET ORAL at 08:08

## 2019-08-25 RX ADMIN — ISOSORBIDE MONONITRATE 90 MG: 30 TABLET, EXTENDED RELEASE ORAL at 09:08

## 2019-08-25 RX ADMIN — INSULIN ASPART 1 UNITS: 100 INJECTION, SOLUTION INTRAVENOUS; SUBCUTANEOUS at 09:08

## 2019-08-25 RX ADMIN — FUROSEMIDE 40 MG: 40 TABLET ORAL at 09:08

## 2019-08-25 RX ADMIN — DORZOLAMIDE HYDROCHLORIDE 1 DROP: 20 SOLUTION/ DROPS OPHTHALMIC at 09:08

## 2019-08-25 RX ADMIN — SENNOSIDES,DOCUSATE SODIUM 1 TABLET: 8.6; 5 TABLET, FILM COATED ORAL at 08:08

## 2019-08-25 RX ADMIN — DORZOLAMIDE HYDROCHLORIDE 1 DROP: 20 SOLUTION/ DROPS OPHTHALMIC at 08:08

## 2019-08-25 RX ADMIN — APIXABAN 2.5 MG: 2.5 TABLET, FILM COATED ORAL at 09:08

## 2019-08-25 RX ADMIN — ACETAMINOPHEN 650 MG: 325 TABLET ORAL at 04:08

## 2019-08-25 NOTE — SUBJECTIVE & OBJECTIVE
Past Medical History:   Diagnosis Date    Allergy     Anemia     Arthritis     Asthma     CHF (congestive heart failure) 5/2/2017    Chronic kidney disease     Degenerative disc disease     Diabetes mellitus type II     Essential hypertension 7/3/2012    Glaucoma     History of pseudogout 8/23/2012    Hyperlipidemia     Hypertension     Iritis     Myocardial infarction     Pneumonia     Thyroid disease        Past Surgical History:   Procedure Laterality Date    CARDIAC CATHETERIZATION      CATARACT EXTRACTION W/  INTRAOCULAR LENS IMPLANT Left n/a    CHOLECYSTECTOMY      EXTRACTION, CATARACT, WITH IOL INSERTION Right 10/8/2018    Performed by Gume Henson MD at [facility] [place]    EYE SURGERY      gall stone      HYSTERECTOMY      VARICOSE VEIN SURGERY         Review of patient's allergies indicates:   Allergen Reactions    Codeine Itching and Nausea Only              Current Facility-Administered Medications   Medication    acetaminophen tablet 650 mg    albuterol-ipratropium 2.5 mg-0.5 mg/3 mL nebulizer solution 3 mL    amLODIPine tablet 5 mg    apixaban tablet 2.5 mg    atorvastatin tablet 40 mg    dextrose 10% (D10W) Bolus    dextrose 10% (D10W) Bolus    diclofenac sodium 1 % gel 2 g    dorzolamide 2 % ophthalmic solution 1 drop    fluticasone furoate-vilanterol 100-25 mcg/dose diskus inhaler 1 puff    furosemide tablet 40 mg    glucagon (human recombinant) injection 1 mg    glucose chewable tablet 16 g    glucose chewable tablet 24 g    isosorbide mononitrate 24 hr tablet 90 mg    latanoprost 0.005 % ophthalmic solution 1 drop    levothyroxine tablet 75 mcg    lidocaine 5 % patch 1 patch    meclizine tablet 12.5 mg    ondansetron disintegrating tablet 8 mg    promethazine tablet 25 mg    ramelteon tablet 8 mg    senna-docusate 8.6-50 mg per tablet 1 tablet    sodium chloride 0.9% flush 10 mL    sodium chloride 0.9% flush 5 mL     Family History     Problem Relation  "(Age of Onset)    Diabetes Mother, Son, Daughter    Glaucoma Mother    Hypertension Mother, Father    No Known Problems Daughter, Son        Tobacco Use    Smoking status: Never Smoker    Smokeless tobacco: Never Used   Substance and Sexual Activity    Alcohol use: No    Drug use: No    Sexual activity: Never     Review of Systems   Constitution: Negative for chills and fever.   Eyes: Positive for pain. Negative for visual disturbance.   Cardiovascular: Negative for chest pain and syncope.   Respiratory: Negative for cough and shortness of breath.    Skin: Negative for color change.   Musculoskeletal: Positive for joint pain and myalgias. Negative for back pain and falls.   Gastrointestinal: Negative for abdominal pain, nausea and vomiting.   Genitourinary: Negative for hematuria.   Neurological: Negative for headaches, light-headedness and numbness.      Objective:     Vital Signs (Most Recent):  Temp: (!) 100.9 °F (38.3 °C) (08/25/19 0748)  Pulse: 100 (08/25/19 0922)  Resp: 18 (08/25/19 0922)  BP: 133/61 (08/25/19 0748)  SpO2: 97 % (08/25/19 0748) Vital Signs (24h Range):  Temp:  [98.1 °F (36.7 °C)-101.2 °F (38.4 °C)] 100.9 °F (38.3 °C)  Pulse:  [] 100  Resp:  [17-18] 18  SpO2:  [96 %-98 %] 97 %  BP: (133-168)/(61-74) 133/61     Weight: 76 kg (167 lb 8.8 oz)  Height: 5' 8" (172.7 cm)  Body mass index is 25.48 kg/m².      Intake/Output Summary (Last 24 hours) at 8/25/2019 1030  Last data filed at 8/25/2019 0900  Gross per 24 hour   Intake 240 ml   Output --   Net 240 ml       Ortho/SPM Exam     Gen:  No acute distress  CV:  Peripherally well-perfused.    Lungs:  Normal respiratory effort.  Head/Neck:  Normocephalic.  Atraumatic.    LLE:  - Skin intact throughout, no scars present  - Suprapatellar swelling with erythema compared to contralateral side  - Mild TTP suprapatellar bursae, nonTTP on rest of exam  - AROM and PROM intact, but limited to 70 deg flexion 2/2 to swelling and pain  - " TA/EHL/Gastroc/FHL assessed in isolation without deficit  - SILT throughout  - Compartments soft  - DP and PT palpated  2+  - Capillary Refill <3s  - Negative Log roll  - Negative Stinchfield    All other joints (shoulder/elbow/wrist/hip/knee/ankle) were examined and had full ROM and were non-tender to palpation.    Significant Labs: All pertinent labs within the past 24 hours have been reviewed.    Significant Imaging: I have reviewed all pertinent imaging results/findings.   Xray L knee: severe DJD w/ osteophyte over 2 oclock patella. No acute fractures or dislocations.    Procedure Note: L knee aspiration  Patient was explained risks, benefits, and alternatives to treatment and verbalized consent to proceed. Time out was performed and patient name, , site, and procedure were confirmed. Skin was sterilely prepared with alcohol solution. 18 gauge needle on a 60 cc syringe was used for aspiration through superolateral approach. 50 cc of straw colored fluid collected. Fluid and cultures were obtained and sent for analysis. Aspiration site was covered with a bandaid.

## 2019-08-25 NOTE — ASSESSMENT & PLAN NOTE
Left knee  Orthopedic surgery consulted, appreciate assistance    - aspiration performed 8/25: Gram stain negative, +Ca P crystals, 37k WBCs (88% segs)  - prednisone 40mg daily   - febrile without leukocytosis concern for joint infection as infectious work-up unremarkable  - pt lives alone and is completes all ADLs independently at discharge, currently unable to ambulate indendently   - denies trauma to knee, has had knee swelling in the past  - impaired mobility due to acute L knee pain, unable to ambulate   - seen at urgent care on yesterday and reports having it aspirated  - XRAYs ordered with effusion   - ESR/CRP 71/72, uric acid 6.6  - PT/OT eval

## 2019-08-25 NOTE — CONSULTS
[facility]  Orthopedics  Consult Note    Patient Name: Tiana Mead  MRN: [id number]  Admission Date: 8/23/2019  Hospital Length of Stay: 0 days  Attending Provider: NGA Vaughn MD  Primary Care Provider: Belen Wood MD    Inpatient consult to Orthopedic Surgery  Consult performed by: Jacob Mcneill MD  Consult ordered by: Olaf Marquez PA-C        Subjective:     Principal Problem:Chest pain, rule out acute myocardial infarction    Chief Complaint:   Chief Complaint   Patient presents with    Chest Pain     c/o chest pain onset yesterday, seen at urgent care today for chest pain and left knee pain post slip and fall, knee wrapped with ace wrap, was instructed to come to ED to address chest pain        HPI: Tiana Mead is a 89 y.o. female with PMH of asthma, CHF, CKD, DMII, HLD, HTN, MI, and thyroid disease who presents with L knee pain and swelling. Pt presented to urgent care 2 days ago for her knee giving out (denies hitting or falling on it) and feeling immediate pain and swelling. Her knee was aspirated at urgent care and can't remember how much and what the aspirate looked like. Later that day she had chest pain and presented to ED, then admitted for CP r/o. She has been febrile (101.2) and hasn't had leukocytosis. She has elevated inflammatory markers and uric acid. Orthopedic surgery was consulted for septic joint r/o. Currently she says her pain is suprapatellar that is painful on activity and has gotten mildly better. She has not been able to weight bear since injury, but walks with a walker at baseline. Patient denies numbness and tingling.Pt denies hx of any lacerations to knee or recent illnesses. Denies hx of infected joint, or I&D of any joints. Denies hx of gout or any orthopedic injuries other than chronic bilateral knee arthritis. Patient denies any head trauma or LOC. The patient denies prior hx of falls. On eliquis. Last ate sausage and biscuit for  breakfast at 7AM today.      Past Medical History:   Diagnosis Date    Allergy     Anemia     Arthritis     Asthma     CHF (congestive heart failure) 5/2/2017    Chronic kidney disease     Degenerative disc disease     Diabetes mellitus type II     Essential hypertension 7/3/2012    Glaucoma     History of pseudogout 8/23/2012    Hyperlipidemia     Hypertension     Iritis     Myocardial infarction     Pneumonia     Thyroid disease        Past Surgical History:   Procedure Laterality Date    CARDIAC CATHETERIZATION      CATARACT EXTRACTION W/  INTRAOCULAR LENS IMPLANT Left n/a    CHOLECYSTECTOMY      EXTRACTION, CATARACT, WITH IOL INSERTION Right 10/8/2018    Per[facility] at [facility]    EYE SURGERY      gall stone      HYSTERECTOMY      VARICOSE VEIN SURGERY         Review of patient's allergies indicates:   Allergen Reactions    Codeine Itching and Nausea Only              Current Facility-Administered Medications   Medication    acetaminophen tablet 650 mg    albuterol-ipratropium 2.5 mg-0.5 mg/3 mL nebulizer solution 3 mL    amLODIPine tablet 5 mg    apixaban tablet 2.5 mg    atorvastatin tablet 40 mg    dextrose 10% (D10W) Bolus    dextrose 10% (D10W) Bolus    diclofenac sodium 1 % gel 2 g    dorzolamide 2 % ophthalmic solution 1 drop    fluticasone furoate-vilanterol 100-25 mcg/dose diskus inhaler 1 puff    furosemide tablet 40 mg    glucagon (human recombinant) injection 1 mg    glucose chewable tablet 16 g    glucose chewable tablet 24 g    isosorbide mononitrate 24 hr tablet 90 mg    latanoprost 0.005 % ophthalmic solution 1 drop    levothyroxine tablet 75 mcg    lidocaine 5 % patch 1 patch    meclizine tablet 12.5 mg    ondansetron disintegrating tablet 8 mg    promethazine tablet 25 mg    ramelteon tablet 8 mg    senna-docusate 8.6-50 mg per tablet 1 tablet    sodium chloride 0.9% flush 10 mL    sodium chloride 0.9% flush 5 mL     Family  "History     Problem Relation (Age of Onset)    Diabetes Mother, Son, Daughter    Glaucoma Mother    Hypertension Mother, Father    No Known Problems Daughter, Son        Tobacco Use    Smoking status: Never Smoker    Smokeless tobacco: Never Used   Substance and Sexual Activity    Alcohol use: No    Drug use: No    Sexual activity: Never     Review of Systems   Constitution: Negative for chills and fever.   Eyes: Positive for pain. Negative for visual disturbance.   Cardiovascular: Negative for chest pain and syncope.   Respiratory: Negative for cough and shortness of breath.    Skin: Negative for color change.   Musculoskeletal: Positive for joint pain and myalgias. Negative for back pain and falls.   Gastrointestinal: Negative for abdominal pain, nausea and vomiting.   Genitourinary: Negative for hematuria.   Neurological: Negative for headaches, light-headedness and numbness.      Objective:     Vital Signs (Most Recent):  Temp: (!) 100.9 °F (38.3 °C) (08/25/19 0748)  Pulse: 100 (08/25/19 0922)  Resp: 18 (08/25/19 0922)  BP: 133/61 (08/25/19 0748)  SpO2: 97 % (08/25/19 0748) Vital Signs (24h Range):  Temp:  [98.1 °F (36.7 °C)-101.2 °F (38.4 °C)] 100.9 °F (38.3 °C)  Pulse:  [] 100  Resp:  [17-18] 18  SpO2:  [96 %-98 %] 97 %  BP: (133-168)/(61-74) 133/61     Weight: 76 kg (167 lb 8.8 oz)  Height: 5' 8" (172.7 cm)  Body mass index is 25.48 kg/m².      Intake/Output Summary (Last 24 hours) at 8/25/2019 1030  Last data filed at 8/25/2019 0900  Gross per 24 hour   Intake 240 ml   Output --   Net 240 ml       Ortho/SPM Exam     Gen:  No acute distress  CV:  Peripherally well-perfused.    Lungs:  Normal respiratory effort.  Head/Neck:  Normocephalic.  Atraumatic.    LLE:  - Skin intact throughout, no scars present  - Suprapatellar swelling with erythema compared to contralateral side  - Mild TTP suprapatellar bursae, nonTTP on rest of exam  - AROM and PROM intact, but limited to 70 deg flexion 2/2 to swelling " and pain  - TA/EHL/Gastroc/FHL assessed in isolation without deficit  - SILT throughout  - Compartments soft  - DP and PT palpated  2+  - Capillary Refill <3s  - Negative Log roll  - Negative Stinchfield    All other joints (shoulder/elbow/wrist/hip/knee/ankle) were examined and had full ROM and were non-tender to palpation.    Significant Labs: All pertinent labs within the past 24 hours have been reviewed.    Significant Imaging: I have reviewed all pertinent imaging results/findings.   Xray L knee: severe DJD w/ osteophyte over 2 oclock patella. No acute fractures or dislocations.    Procedure Note: L knee aspiration  Patient was explained risks, benefits, and alternatives to treatment and verbalized consent to proceed. Time out was performed and patient name, , site, and procedure were confirmed. Skin was sterilely prepared with alcohol solution. 18 gauge needle on a 60 cc syringe was used for aspiration through superolateral approach. 50 cc of straw colored fluid collected. Fluid and cultures were obtained and sent for analysis. Aspiration site was covered with a bandaid.      Assessment/Plan:     Left knee pain  Tiana ERENDIRA Mead is a 89 y.o. female with chronic B knee arthritis presents w L knee swelling, denies hx of gout or septic arthritis of any joint. Tmax 102.2, no leukocytosis, ESR 71, CRP 72.9, Uric acid 6.8   - Aspirated L knee joint and sent to lab for analysis which returned: Gram stain negative, +Ca P crystals, 37k WBCs (88% segs)  - WBAT LLE  - Abx: none   - Pain control per primary  - DVTppx: per primary team    Dispo: Patient with gout of L knee. Recommend inpatient Rheumatology consult. No orthopedic intervention warranted.           Jacob Mcneill MD  Orthopedics  [facility]

## 2019-08-25 NOTE — ASSESSMENT & PLAN NOTE
Orthopedic surgery consulted, appreciate assistance    - aspiration performed 8/25   - follow fluid analysis   - impaired mobility due to acute L knee pain, unable to ambulate   - seen at urgent care on yesterday and reports having it aspirated  - XRAYs ordered  - ESR/CRP 71/72, uric acid 6.6  - PT/OT eval

## 2019-08-25 NOTE — SUBJECTIVE & OBJECTIVE
Interval History: Patient febrile overnight and this morning. Infectious work-up in process. Ortho consulted for aspiration, fluid to be evaluated.     Review of Systems   Constitutional: Negative for activity change, appetite change, chills, fatigue, fever and unexpected weight change.   HENT: Negative for congestion, rhinorrhea, sore throat, trouble swallowing and voice change.    Eyes: Negative for visual disturbance.   Respiratory: Positive for shortness of breath. Negative for cough, choking, chest tightness and wheezing.         RESOLVED   Cardiovascular: Positive for chest pain. Negative for palpitations and leg swelling.        RESOLVED   Gastrointestinal: Negative for abdominal distention, abdominal pain, anal bleeding, blood in stool, constipation, diarrhea, nausea and vomiting.   Endocrine: Negative for cold intolerance, heat intolerance, polydipsia and polyuria.   Genitourinary: Negative for dysuria, flank pain, frequency, hematuria and urgency.   Musculoskeletal: Positive for arthralgias (L knee), joint swelling and neck pain. Negative for back pain and myalgias.        Bilateral knee swelling and pain, currently resolved.   Skin: Negative for color change and rash.   Neurological: Negative for dizziness, seizures, syncope, facial asymmetry, speech difficulty, weakness, light-headedness, numbness and headaches.   Hematological: Negative for adenopathy. Does not bruise/bleed easily.   Psychiatric/Behavioral: Negative for agitation, confusion, hallucinations and suicidal ideas.     Objective:     Vital Signs (Most Recent):  Temp: (!) 100.9 °F (38.3 °C) (08/25/19 0748)  Pulse: 100 (08/25/19 0922)  Resp: 18 (08/25/19 0922)  BP: 133/61 (08/25/19 0748)  SpO2: 97 % (08/25/19 0748) Vital Signs (24h Range):  Temp:  [98.1 °F (36.7 °C)-101.2 °F (38.4 °C)] 100.9 °F (38.3 °C)  Pulse:  [] 100  Resp:  [17-18] 18  SpO2:  [96 %-98 %] 97 %  BP: (133-168)/(61-74) 133/61     Weight: 76 kg (167 lb 8.8 oz)  Body mass  index is 25.48 kg/m².    Intake/Output Summary (Last 24 hours) at 8/25/2019 1004  Last data filed at 8/25/2019 0900  Gross per 24 hour   Intake 240 ml   Output --   Net 240 ml      Physical Exam   Constitutional: She is oriented to person, place, and time. She appears well-developed and well-nourished. No distress.   HENT:   Head: Normocephalic and atraumatic.   Eyes: Pupils are equal, round, and reactive to light.   Neck: Neck supple. Carotid bruit is not present. No thyromegaly present.   Cardiovascular: Normal rate and normal heart sounds. An irregularly irregular rhythm present. Exam reveals no gallop.   No murmur heard.  Pulmonary/Chest: Effort normal and breath sounds normal. No respiratory distress. She has no wheezes. She exhibits no tenderness.   Abdominal: Soft. Bowel sounds are normal. She exhibits no distension and no mass. There is no splenomegaly or hepatomegaly. There is no tenderness.   Musculoskeletal: She exhibits edema (L knee). She exhibits no deformity.   Decreased ROM to L knee   Neurological: She is alert and oriented to person, place, and time. No cranial nerve deficit or sensory deficit.   Skin: Skin is warm and dry. No rash noted.   Psychiatric: She has a normal mood and affect. Her behavior is normal.   Nursing note and vitals reviewed.      Significant Labs: All pertinent labs within the past 24 hours have been reviewed.    Significant Imaging: I have reviewed all pertinent imaging results/findings within the past 24 hours.

## 2019-08-25 NOTE — HPI
Tiana Mead is a 89 y.o. female with PMH of asthma, CHF, CKD, DMII, HLD, HTN, MI, and thyroid disease who presents with L knee pain and swelling. Pt presented to urgent care 2 days ago for her knee giving out (denies hitting or falling on it) and feeling immediate pain and swelling. Her knee was aspirated at urgent care and can't remember how much and what the aspirate looked like. Later that day she had chest pain and presented to ED, then admitted for CP r/o. She has been febrile (101.2) and hasn't had leukocytosis. She has elevated inflammatory markers and uric acid. Orthopedic surgery was consulted for septic joint r/o. Currently she says her pain is suprapatellar that is painful on activity and has gotten mildly better. She has not been able to weight bear since injury, but walks with a walker at baseline. Patient denies numbness and tingling.Pt denies hx of any lacerations to knee or recent illnesses. Denies hx of infected joint, or I&D of any joints. Denies hx of gout or any orthopedic injuries other than chronic bilateral knee arthritis. Patient denies any head trauma or LOC. The patient denies prior hx of falls. On eliquis. Last ate sausage and biscuit for breakfast at 7AM today.

## 2019-08-25 NOTE — PROGRESS NOTES
[facility] Hospital Medicine  Progress Note    Patient Name: Tiana Mead  MRN: [id number]  Patient Class: OP- Observation   Admission Date: 8/23/2019  Length of Stay: 0 days  Attending Physician: NGA Vaughn MD  Primary Care Provider: Belen Wood MD    [facility]: [facility] E Olaf Marquez PA-C    Subjective:     Principal Problem:Chest pain, rule out acute myocardial infarction        HPI:  Tiana Mead is a 89 y.o. AA female with PMHx of MI, HTN, HLD, CHF, OA, T2DM, Asthma, Thyroid dz, and CKD who presents to the ED c/o increased frequency of chest pain with associated SOB with exertion and at rest and bilateral neck pain (x1 month). Pt additionally complains of chronic bilaterally knee pain and swelling that is frequently treated with aspirations and unspecified injections. Pt reports she deals with chest pain and SOB chronically for which she treats with a Nitro tablet and Albuterol inhaler. She reports aborting the CP and SOB through use of a single Nitro tablet and single use of albuterol inhaler. Pt states she is having CP with SOB more frequently as of late and subsequently having to take Nitro tablets and Albuterol inhaler more frequently. Pt reports SOB with lying down, and has to sleep with multiple pillows for elevation. Pt states neck pain is worse with movement of neck. She currently denies CP, SOB, nausea, vomiting, fever, chills, sweats, abdominal pain, constipation, diarrhea, dysuria, and hematuria. Pt reports no complaints at this time. Pt states compliance with her medications.      ED: AFVSS, no leukocytosis, Troponin <0.006 >>> <0.006, Will complete trend.  (prev 1,279 1y ago) Creatinine at 1.5 (BL 1.4-1.7). eGFR at 34.1 (BL 25-32.6). Glucose elevated at 200 (114-177). EKG showed A-fib. CXR PA and Lateral showed no acute findings and no evidence of CHF exacerbation.     Overview/Hospital Course:  89 y.o. who was admitted to hospital  medicine for chest pain and impaired mobility due to R knee pain. X-rays ordered for further evaluation. Ortho consulted for aspiration.     Interval History: Patient febrile overnight and this morning. Infectious work-up in process. Ortho consulted for aspiration, fluid to be evaluated.     Review of Systems   Constitutional: Negative for activity change, appetite change, chills, fatigue, fever and unexpected weight change.   HENT: Negative for congestion, rhinorrhea, sore throat, trouble swallowing and voice change.    Eyes: Negative for visual disturbance.   Respiratory: Positive for shortness of breath. Negative for cough, choking, chest tightness and wheezing.         RESOLVED   Cardiovascular: Positive for chest pain. Negative for palpitations and leg swelling.        RESOLVED   Gastrointestinal: Negative for abdominal distention, abdominal pain, anal bleeding, blood in stool, constipation, diarrhea, nausea and vomiting.   Endocrine: Negative for cold intolerance, heat intolerance, polydipsia and polyuria.   Genitourinary: Negative for dysuria, flank pain, frequency, hematuria and urgency.   Musculoskeletal: Positive for arthralgias (L knee), joint swelling and neck pain. Negative for back pain and myalgias.        Bilateral knee swelling and pain, currently resolved.   Skin: Negative for color change and rash.   Neurological: Negative for dizziness, seizures, syncope, facial asymmetry, speech difficulty, weakness, light-headedness, numbness and headaches.   Hematological: Negative for adenopathy. Does not bruise/bleed easily.   Psychiatric/Behavioral: Negative for agitation, confusion, hallucinations and suicidal ideas.     Objective:     Vital Signs (Most Recent):  Temp: (!) 100.9 °F (38.3 °C) (08/25/19 0748)  Pulse: 100 (08/25/19 0922)  Resp: 18 (08/25/19 0922)  BP: 133/61 (08/25/19 0748)  SpO2: 97 % (08/25/19 0748) Vital Signs (24h Range):  Temp:  [98.1 °F (36.7 °C)-101.2 °F (38.4 °C)] 100.9 °F (38.3  °C)  Pulse:  [] 100  Resp:  [17-18] 18  SpO2:  [96 %-98 %] 97 %  BP: (133-168)/(61-74) 133/61     Weight: 76 kg (167 lb 8.8 oz)  Body mass index is 25.48 kg/m².    Intake/Output Summary (Last 24 hours) at 8/25/2019 1004  Last data filed at 8/25/2019 0900  Gross per 24 hour   Intake 240 ml   Output --   Net 240 ml      Physical Exam   Constitutional: She is oriented to person, place, and time. She appears well-developed and well-nourished. No distress.   HENT:   Head: Normocephalic and atraumatic.   Eyes: Pupils are equal, round, and reactive to light.   Neck: Neck supple. Carotid bruit is not present. No thyromegaly present.   Cardiovascular: Normal rate and normal heart sounds. An irregularly irregular rhythm present. Exam reveals no gallop.   No murmur heard.  Pulmonary/Chest: Effort normal and breath sounds normal. No respiratory distress. She has no wheezes. She exhibits no tenderness.   Abdominal: Soft. Bowel sounds are normal. She exhibits no distension and no mass. There is no splenomegaly or hepatomegaly. There is no tenderness.   Musculoskeletal: She exhibits edema (L knee). She exhibits no deformity.   Decreased ROM to L knee   Neurological: She is alert and oriented to person, place, and time. No cranial nerve deficit or sensory deficit.   Skin: Skin is warm and dry. No rash noted.   Psychiatric: She has a normal mood and affect. Her behavior is normal.   Nursing note and vitals reviewed.      Significant Labs: All pertinent labs within the past 24 hours have been reviewed.    Significant Imaging: I have reviewed all pertinent imaging results/findings within the past 24 hours.      Assessment/Plan:      * Chest pain, rule out acute myocardial infarction  Remote MI    - Troponin trend flat   - EKG with atrial fibrillation  - will discuss further work-up with cards as atrial fib is a contraindication to DSE - consider outpatient nuclear stress   - C/W Isosorbide Mononitrate 24 hr tablet 60 mg PO daily  --- increased to 90 mg   - CXR PA and Lateral showed no acute abnormalities or any evidence of CHF exacerbation.  - Last echo from July 19, 2019 noted below.  -  monitor on telemetry    Left knee pain  Orthopedic surgery consulted, appreciate assistance    - aspiration performed 8/25   - follow fluid analysis   - febrile without leukocytosis concern for joint infection as infectious work-up unremarkable  - pt lives alone and is completes all ADLs independently at discharge, currently unable to ambulate indendently   - denies trauma to knee, has had knee swelling in the past  - impaired mobility due to acute L knee pain, unable to ambulate   - seen at urgent care on yesterday and reports having it aspirated  - XRAYs ordered with effusion   - ESR/CRP 71/72, uric acid 6.6  - PT/OT eval     Moderate persistent asthma without complication  - C/W Fluticasone furoate-vilanterol 100-25 mcg/dose diskus inhaler 1 puff inhaled daily  - C/W albuterol-ipratropium 2.5 mg-0.5 mg/3mL nebulizer solution 3 mL q4 hours PRN      Primary hypothyroidism  - C/W Levothyroxine 75 mcg PO before breakfast daily    Chronic diastolic congestive heart failure  - C/W furosemide tablet 40 mg PO BID  -  (Previous 1,279 1 yr ago)  - Last Echocardiogram on 7/19/2019 showed concentric LVH, estimated EF >70%, sever bi-atrial enlargement, severe tricuspid regurgitation, estimated PA systolic pressure is 56 mm Hg, and elevated central venous pressure of 15 mm Hg.  - CXR PA and Lateral showed no acute abnormalities or any evidence of CHF exacerbation.  - Strict I&Os, daily weights.  - BB held pending cardiac workup.  Not on ACEi/ARB likely secondary to CKD.      CKD (chronic kidney disease) stage 4, GFR 15-29 ml/min  - Creatinine at baseline of 1.5, GFR 34.1  - Avoid Nephrotoxic substances  - Renally dose medications        Type 2 diabetes mellitus with diabetic polyneuropathy, with long-term current use of insulin  - NPO SSI      Persistent atrial  fibrillation  H/O Deep vein thrombosis (DVT)  Long term current use of anticoagulant therapy, eliquis    - C/W Apixaban tablet 2.5 mg PO BID  - EKG showed atrial fibrillation with no other abnormalities present.    Hyperlipemia, mixed  - C/W Atorvastatin tablet 40 mg PO daily      VTE Risk Mitigation (From admission, onward)        Ordered     apixaban tablet 2.5 mg  2 times daily      08/23/19 2243     IP VTE HIGH RISK PATIENT  Once      08/23/19 2243     Place sequential compression device  Until discontinued      08/23/19 2243     Place JOANIE hose  Until discontinued      08/23/19 2243     Reason for No Pharmacological VTE Prophylaxis  Once      08/23/19 2243                Olaf Marquez PA-C  Department of Hospital Medicine   [facility]

## 2019-08-25 NOTE — ASSESSMENT & PLAN NOTE
Remote MI    - Troponin trend flat   - EKG with atrial fibrillation  - will discuss further work-up with cards as atrial fib is a contraindication to DSE - consider outpatient nuclear stress   - C/W Isosorbide Mononitrate 24 hr tablet 60 mg PO daily --- increased to 90 mg   - CXR PA and Lateral showed no acute abnormalities or any evidence of CHF exacerbation.  - Last echo from July 19, 2019 noted below.  -  monitor on telemetry

## 2019-08-25 NOTE — ASSESSMENT & PLAN NOTE
Tiana Mead is a 89 y.o. female with chronic B knee arthritis presents w L knee swelling, denies hx of gout or septic arthritis of any joint. Tmax 102.2, no leukocytosis, ESR 71, CRP 72.9, Uric acid 6.8   - Aspirated L knee joint and sent to lab for analysis which returned: Gram stain negative, +Ca P crystals, 37k WBCs (88% segs)  - WBAT LLE  - Abx: none   - Pain control per primary  - DVTppx: per primary team    Dispo: Patient with gout of L knee. Recommend inpatient Rheumatology consult. No orthopedic intervention warranted.

## 2019-08-26 PROBLEM — M11.20 PSEUDOGOUT: Status: ACTIVE | Noted: 2019-08-24

## 2019-08-26 LAB
ALBUMIN SERPL BCP-MCNC: 2.8 G/DL (ref 3.5–5.2)
ALP SERPL-CCNC: 101 U/L (ref 55–135)
ALT SERPL W/O P-5'-P-CCNC: 16 U/L (ref 10–44)
ANION GAP SERPL CALC-SCNC: 11 MMOL/L (ref 8–16)
AST SERPL-CCNC: 18 U/L (ref 10–40)
BACTERIA #/AREA URNS AUTO: ABNORMAL /HPF
BASOPHILS # BLD AUTO: 0.01 K/UL (ref 0–0.2)
BASOPHILS NFR BLD: 0.1 % (ref 0–1.9)
BILIRUB SERPL-MCNC: 0.6 MG/DL (ref 0.1–1)
BILIRUB UR QL STRIP: NEGATIVE
BUN SERPL-MCNC: 29 MG/DL (ref 10–30)
CALCIUM SERPL-MCNC: 8.8 MG/DL (ref 8.7–10.5)
CHLORIDE SERPL-SCNC: 102 MMOL/L (ref 95–110)
CLARITY UR REFRACT.AUTO: ABNORMAL
CO2 SERPL-SCNC: 23 MMOL/L (ref 23–29)
COLOR UR AUTO: YELLOW
CREAT SERPL-MCNC: 1.7 MG/DL (ref 0.5–1.4)
DIFFERENTIAL METHOD: ABNORMAL
EOSINOPHIL # BLD AUTO: 0 K/UL (ref 0–0.5)
EOSINOPHIL NFR BLD: 0 % (ref 0–8)
ERYTHROCYTE [DISTWIDTH] IN BLOOD BY AUTOMATED COUNT: 13.7 % (ref 11.5–14.5)
EST. GFR  (AFRICAN AMERICAN): 29.3 ML/MIN/1.73 M^2
EST. GFR  (NON AFRICAN AMERICAN): 25.5 ML/MIN/1.73 M^2
GLUCOSE SERPL-MCNC: 274 MG/DL (ref 70–110)
GLUCOSE UR QL STRIP: ABNORMAL
HCT VFR BLD AUTO: 34.1 % (ref 37–48.5)
HGB BLD-MCNC: 11.1 G/DL (ref 12–16)
HGB UR QL STRIP: ABNORMAL
HYALINE CASTS UR QL AUTO: 6 /LPF
IMM GRANULOCYTES # BLD AUTO: 0.03 K/UL (ref 0–0.04)
IMM GRANULOCYTES NFR BLD AUTO: 0.4 % (ref 0–0.5)
KETONES UR QL STRIP: NEGATIVE
LEUKOCYTE ESTERASE UR QL STRIP: ABNORMAL
LYMPHOCYTES # BLD AUTO: 0.7 K/UL (ref 1–4.8)
LYMPHOCYTES NFR BLD: 9.3 % (ref 18–48)
MAGNESIUM SERPL-MCNC: 1.9 MG/DL (ref 1.6–2.6)
MCH RBC QN AUTO: 29.3 PG (ref 27–31)
MCHC RBC AUTO-ENTMCNC: 32.6 G/DL (ref 32–36)
MCV RBC AUTO: 90 FL (ref 82–98)
MICROSCOPIC COMMENT: ABNORMAL
MONOCYTES # BLD AUTO: 0.4 K/UL (ref 0.3–1)
MONOCYTES NFR BLD: 6.1 % (ref 4–15)
NEUTROPHILS # BLD AUTO: 6.1 K/UL (ref 1.8–7.7)
NEUTROPHILS NFR BLD: 84.1 % (ref 38–73)
NITRITE UR QL STRIP: NEGATIVE
NRBC BLD-RTO: 0 /100 WBC
PATH INTERP FLD-IMP: NORMAL
PH UR STRIP: 5 [PH] (ref 5–8)
PLATELET # BLD AUTO: 138 K/UL (ref 150–350)
PMV BLD AUTO: 12.3 FL (ref 9.2–12.9)
POCT GLUCOSE: 233 MG/DL (ref 70–110)
POCT GLUCOSE: 369 MG/DL (ref 70–110)
POCT GLUCOSE: 413 MG/DL (ref 70–110)
POCT GLUCOSE: 417 MG/DL (ref 70–110)
POTASSIUM SERPL-SCNC: 4.2 MMOL/L (ref 3.5–5.1)
PROT SERPL-MCNC: 6.2 G/DL (ref 6–8.4)
PROT UR QL STRIP: NEGATIVE
RBC # BLD AUTO: 3.79 M/UL (ref 4–5.4)
RBC #/AREA URNS AUTO: 2 /HPF (ref 0–4)
SODIUM SERPL-SCNC: 136 MMOL/L (ref 136–145)
SP GR UR STRIP: 1.01 (ref 1–1.03)
URN SPEC COLLECT METH UR: ABNORMAL
WBC # BLD AUTO: 7.22 K/UL (ref 3.9–12.7)
WBC #/AREA URNS AUTO: 12 /HPF (ref 0–5)

## 2019-08-26 PROCEDURE — 83735 ASSAY OF MAGNESIUM: CPT | Mod: HCNC

## 2019-08-26 PROCEDURE — 82962 GLUCOSE BLOOD TEST: CPT | Mod: HCNC

## 2019-08-26 PROCEDURE — 25000003 PHARM REV CODE 250: Mod: HCNC | Performed by: PHYSICIAN ASSISTANT

## 2019-08-26 PROCEDURE — 85025 COMPLETE CBC W/AUTO DIFF WBC: CPT | Mod: HCNC

## 2019-08-26 PROCEDURE — G0378 HOSPITAL OBSERVATION PER HR: HCPCS | Mod: HCNC

## 2019-08-26 PROCEDURE — 99226 PR SUBSEQUENT OBSERVATION CARE,LEVEL III: ICD-10-PCS | Mod: HCNC,,, | Performed by: PHYSICIAN ASSISTANT

## 2019-08-26 PROCEDURE — 87086 URINE CULTURE/COLONY COUNT: CPT | Mod: HCNC

## 2019-08-26 PROCEDURE — 97116 GAIT TRAINING THERAPY: CPT | Mod: HCNC

## 2019-08-26 PROCEDURE — 97161 PT EVAL LOW COMPLEX 20 MIN: CPT | Mod: HCNC

## 2019-08-26 PROCEDURE — 80053 COMPREHEN METABOLIC PANEL: CPT | Mod: HCNC

## 2019-08-26 PROCEDURE — 36415 COLL VENOUS BLD VENIPUNCTURE: CPT | Mod: HCNC

## 2019-08-26 PROCEDURE — 63600175 PHARM REV CODE 636 W HCPCS: Mod: HCNC | Performed by: PHYSICIAN ASSISTANT

## 2019-08-26 PROCEDURE — 81001 URINALYSIS AUTO W/SCOPE: CPT | Mod: HCNC

## 2019-08-26 PROCEDURE — 99226 PR SUBSEQUENT OBSERVATION CARE,LEVEL III: CPT | Mod: HCNC,,, | Performed by: PHYSICIAN ASSISTANT

## 2019-08-26 PROCEDURE — 93010 EKG 12-LEAD: ICD-10-PCS | Mod: HCNC,,, | Performed by: INTERNAL MEDICINE

## 2019-08-26 PROCEDURE — 93005 ELECTROCARDIOGRAM TRACING: CPT | Mod: HCNC

## 2019-08-26 PROCEDURE — 93010 ELECTROCARDIOGRAM REPORT: CPT | Mod: HCNC,,, | Performed by: INTERNAL MEDICINE

## 2019-08-26 PROCEDURE — S5571 INSULIN DISPOS PEN 3 ML: HCPCS | Mod: HCNC | Performed by: PHYSICIAN ASSISTANT

## 2019-08-26 RX ORDER — PREDNISONE 20 MG/1
40 TABLET ORAL DAILY
Status: DISCONTINUED | OUTPATIENT
Start: 2019-08-26 | End: 2019-08-27 | Stop reason: HOSPADM

## 2019-08-26 RX ORDER — METOPROLOL TARTRATE 50 MG/1
50 TABLET ORAL 2 TIMES DAILY
Status: DISCONTINUED | OUTPATIENT
Start: 2019-08-26 | End: 2019-08-27 | Stop reason: HOSPADM

## 2019-08-26 RX ORDER — METOPROLOL TARTRATE 50 MG/1
50 TABLET ORAL 2 TIMES DAILY
Status: DISCONTINUED | OUTPATIENT
Start: 2019-08-26 | End: 2019-08-26

## 2019-08-26 RX ORDER — INSULIN ASPART 100 [IU]/ML
7 INJECTION, SOLUTION INTRAVENOUS; SUBCUTANEOUS
Status: DISCONTINUED | OUTPATIENT
Start: 2019-08-26 | End: 2019-08-27 | Stop reason: HOSPADM

## 2019-08-26 RX ORDER — METOPROLOL TARTRATE 1 MG/ML
5 INJECTION, SOLUTION INTRAVENOUS EVERY 5 MIN PRN
Status: DISCONTINUED | OUTPATIENT
Start: 2019-08-26 | End: 2019-08-27 | Stop reason: HOSPADM

## 2019-08-26 RX ORDER — INSULIN ASPART 100 [IU]/ML
4 INJECTION, SOLUTION INTRAVENOUS; SUBCUTANEOUS
Status: DISCONTINUED | OUTPATIENT
Start: 2019-08-26 | End: 2019-08-26

## 2019-08-26 RX ORDER — PREDNISONE 20 MG/1
40 TABLET ORAL DAILY
Qty: 10 TABLET | Refills: 0 | Status: SHIPPED | OUTPATIENT
Start: 2019-08-26 | End: 2019-08-31

## 2019-08-26 RX ADMIN — INSULIN ASPART 5 UNITS: 100 INJECTION, SOLUTION INTRAVENOUS; SUBCUTANEOUS at 04:08

## 2019-08-26 RX ADMIN — SODIUM CHLORIDE 500 ML: 0.9 INJECTION, SOLUTION INTRAVENOUS at 10:08

## 2019-08-26 RX ADMIN — DORZOLAMIDE HYDROCHLORIDE 1 DROP: 20 SOLUTION/ DROPS OPHTHALMIC at 08:08

## 2019-08-26 RX ADMIN — METOPROLOL TARTRATE 50 MG: 50 TABLET ORAL at 03:08

## 2019-08-26 RX ADMIN — INSULIN ASPART 2 UNITS: 100 INJECTION, SOLUTION INTRAVENOUS; SUBCUTANEOUS at 07:08

## 2019-08-26 RX ADMIN — LATANOPROST 1 DROP: 50 SOLUTION OPHTHALMIC at 08:08

## 2019-08-26 RX ADMIN — INSULIN DETEMIR 11 UNITS: 100 INJECTION, SOLUTION SUBCUTANEOUS at 12:08

## 2019-08-26 RX ADMIN — LEVOTHYROXINE SODIUM 75 MCG: 75 TABLET ORAL at 06:08

## 2019-08-26 RX ADMIN — INSULIN ASPART 5 UNITS: 100 INJECTION, SOLUTION INTRAVENOUS; SUBCUTANEOUS at 11:08

## 2019-08-26 RX ADMIN — SENNOSIDES,DOCUSATE SODIUM 1 TABLET: 8.6; 5 TABLET, FILM COATED ORAL at 08:08

## 2019-08-26 RX ADMIN — FUROSEMIDE 40 MG: 40 TABLET ORAL at 08:08

## 2019-08-26 RX ADMIN — APIXABAN 2.5 MG: 2.5 TABLET, FILM COATED ORAL at 08:08

## 2019-08-26 RX ADMIN — INSULIN ASPART 7 UNITS: 100 INJECTION, SOLUTION INTRAVENOUS; SUBCUTANEOUS at 04:08

## 2019-08-26 RX ADMIN — PREDNISONE 40 MG: 20 TABLET ORAL at 07:08

## 2019-08-26 RX ADMIN — INSULIN ASPART 4 UNITS: 100 INJECTION, SOLUTION INTRAVENOUS; SUBCUTANEOUS at 11:08

## 2019-08-26 RX ADMIN — LIDOCAINE 1 PATCH: 50 PATCH TOPICAL at 08:08

## 2019-08-26 RX ADMIN — ISOSORBIDE MONONITRATE 90 MG: 30 TABLET, EXTENDED RELEASE ORAL at 08:08

## 2019-08-26 RX ADMIN — AMLODIPINE BESYLATE 5 MG: 5 TABLET ORAL at 08:08

## 2019-08-26 RX ADMIN — ATORVASTATIN CALCIUM 40 MG: 20 TABLET, FILM COATED ORAL at 08:08

## 2019-08-26 NOTE — ASSESSMENT & PLAN NOTE
Remote MI    Outpatient cardiac testing ordered  - Troponin trend flat   - EKG with atrial fibrillation, non-ischemic  - will discuss further work-up with cards as atrial fib is a contraindication to DSE - consider outpatient nuclear stress   - C/W Isosorbide Mononitrate 24 hr tablet 60 mg PO daily --- increased to 90 mg with improvement of symptoms   - CXR PA and Lateral showed no acute abnormalities or any evidence of CHF exacerbation.  - Last echo from July 19, 2019 noted below.

## 2019-08-26 NOTE — PLAN OF CARE
PCP- DR. SHERLY MARVIN    PT HAS A RIDE HOME AND FAMILY SUPPORT WITH ADULT DAUGHTER. PT REQUESTS [facility].     PHARMACY- [facility] [id number] [place] [address]    Coverage Name [employer] MEDICARE HMO Auth Phone     Employer Group   Group Number [id number]   Subscriber Name LOUISA MANNING Subscriber Number [id number]   Subscriber Date of Birth 3/14/1925            08/26/19 1720   Discharge Assessment   Assessment Type Discharge Planning Assessment   Confirmed/corrected address and phone number on facesheet? Yes   Assessment information obtained from? Patient   Expected Length of Stay (days) 3   Communicated expected length of stay with patient/caregiver yes   Prior to hospitilization cognitive status: Alert/Oriented   Prior to hospitalization functional status: Assistive Equipment   Current cognitive status: Alert/Oriented   Current Functional Status: Assistive Equipment   Lives With child(sarthak), adult   Able to Return to Prior Arrangements yes   Is patient able to care for self after discharge? Yes   Patient's perception of discharge disposition home health   Readmission Within the Last 30 Days no previous admission in last 30 days   Patient currently being followed by outpatient case management? No   Patient currently receives any other outside agency services? No   Equipment Currently Used at Home bath bench;raised toilet;rollator;cane, straight   Do you have any problems affording any of your prescribed medications? No   Is the patient taking medications as prescribed? yes   Does the patient have transportation home? Yes   Transportation Anticipated family or friend will provide;health plan transportation   Does the patient receive services at the [facility]? No   Discharge Plan A Home Health;Home with family   Discharge Plan B Home with family;Home Health   Patient/Family in Agreement with Plan yes

## 2019-08-26 NOTE — PLAN OF CARE
08/26/19 1505   Post-Acute Status   Post-Acute Authorization Home Health/Hospice   Post-Acute Placement Status Referrals Sent     Sw sent home health orders to [facility] per request, Sw to follow with acceptance. Pt accepted to [facility], will be seen on Wed.

## 2019-08-26 NOTE — SUBJECTIVE & OBJECTIVE
Interval History: Mobility and L knee improved. RVR resolved with beta blocker. Glucose elevated, insulin ordered and titrated.     Review of Systems   Constitutional: Negative for activity change, appetite change, chills, fatigue, fever and unexpected weight change.   HENT: Negative for congestion, rhinorrhea, sore throat, trouble swallowing and voice change.    Eyes: Negative for visual disturbance.   Respiratory: Positive for shortness of breath. Negative for cough, choking, chest tightness and wheezing.         RESOLVED   Cardiovascular: Positive for chest pain. Negative for palpitations and leg swelling.        RESOLVED   Gastrointestinal: Negative for abdominal distention, abdominal pain, anal bleeding, blood in stool, constipation, diarrhea, nausea and vomiting.   Endocrine: Negative for cold intolerance, heat intolerance, polydipsia and polyuria.   Genitourinary: Negative for dysuria, flank pain, frequency, hematuria and urgency.   Musculoskeletal: Positive for arthralgias (L knee), joint swelling and neck pain. Negative for back pain and myalgias.        Bilateral knee swelling and pain, currently resolved.   Skin: Negative for color change and rash.   Neurological: Negative for dizziness, seizures, syncope, facial asymmetry, speech difficulty, weakness, light-headedness, numbness and headaches.   Hematological: Negative for adenopathy. Does not bruise/bleed easily.   Psychiatric/Behavioral: Negative for agitation, confusion, hallucinations and suicidal ideas.     Objective:     Vital Signs (Most Recent):  Temp: 98.1 °F (36.7 °C) (08/26/19 1605)  Pulse: 83 (08/26/19 1605)  Resp: 18 (08/26/19 1605)  BP: (!) 120/59 (08/26/19 1605)  SpO2: 96 % (08/26/19 1605) Vital Signs (24h Range):  Temp:  [97.9 °F (36.6 °C)-98.9 °F (37.2 °C)] 98.1 °F (36.7 °C)  Pulse:  [] 83  Resp:  [18-19] 18  SpO2:  [96 %-97 %] 96 %  BP: (116-148)/(59-71) 120/59     Weight: 76 kg (167 lb 8.8 oz)  Body mass index is 25.48  kg/m².    Intake/Output Summary (Last 24 hours) at 8/26/2019 1727  Last data filed at 8/26/2019 0800  Gross per 24 hour   Intake --   Output 650 ml   Net -650 ml      Physical Exam   Constitutional: She is oriented to person, place, and time. She appears well-developed and well-nourished. No distress.   HENT:   Head: Normocephalic and atraumatic.   Eyes: Pupils are equal, round, and reactive to light.   Neck: Neck supple. Carotid bruit is not present. No thyromegaly present.   Cardiovascular: Normal rate and normal heart sounds. An irregularly irregular rhythm present. Exam reveals no gallop.   No murmur heard.  Pulmonary/Chest: Effort normal and breath sounds normal. No respiratory distress. She has no wheezes. She exhibits no tenderness.   Abdominal: Soft. Bowel sounds are normal. She exhibits no distension and no mass. There is no splenomegaly or hepatomegaly. There is no tenderness.   Musculoskeletal: She exhibits edema (L knee). She exhibits no deformity.   Decreased ROM to L knee   Neurological: She is alert and oriented to person, place, and time. No cranial nerve deficit or sensory deficit.   Skin: Skin is warm and dry. No rash noted.   Psychiatric: She has a normal mood and affect. Her behavior is normal.   Nursing note and vitals reviewed.      Significant Labs: All pertinent labs within the past 24 hours have been reviewed.    Significant Imaging: I have reviewed all pertinent imaging results/findings within the past 24 hours.

## 2019-08-26 NOTE — PROGRESS NOTES
[facility] Hospital Medicine  Progress Note    Patient Name: Tiana Mead  MRN: [id number]  Patient Class: OP- Observation   Admission Date: 8/23/2019  Length of Stay: 0 days  Attending Physician: NGA Vaughn MD  Primary Care Provider: Belen Wood MD    [facility] Medicine Team: [facility] [place] Olaf Marquez PA-C    Subjective:     Principal Problem:Chest pain, rule out acute myocardial infarction        HPI:  Tiana Mead is a 89 y.o. AA female with PMHx of MI, HTN, HLD, CHF, OA, T2DM, Asthma, Thyroid dz, and CKD who presents to the ED c/o increased frequency of chest pain with associated SOB with exertion and at rest and bilateral neck pain (x1 month). Pt additionally complains of chronic bilaterally knee pain and swelling that is frequently treated with aspirations and unspecified injections. Pt reports she deals with chest pain and SOB chronically for which she treats with a Nitro tablet and Albuterol inhaler. She reports aborting the CP and SOB through use of a single Nitro tablet and single use of albuterol inhaler. Pt states she is having CP with SOB more frequently as of late and subsequently having to take Nitro tablets and Albuterol inhaler more frequently. Pt reports SOB with lying down, and has to sleep with multiple pillows for elevation. Pt states neck pain is worse with movement of neck. She currently denies CP, SOB, nausea, vomiting, fever, chills, sweats, abdominal pain, constipation, diarrhea, dysuria, and hematuria. Pt reports no complaints at this time. Pt states compliance with her medications.      ED: AFVSS, no leukocytosis, Troponin <0.006 >>> <0.006, Will complete trend.  (prev 1,279 1y ago) Creatinine at 1.5 (BL 1.4-1.7). eGFR at 34.1 (BL 25-32.6). Glucose elevated at 200 (114-177). EKG showed A-fib. CXR PA and Lateral showed no acute findings and no evidence of CHF exacerbation.     Overview/Hospital Course:  89 y.o. who was admitted to hospital  medicine for chest pain and impaired mobility due to R knee pain. Patient with chronic atrial fibrillation therefore DSE a contraindication. Her chest pain improved with increase of Imdur. As patient's chest pain resolved, EKG was non-ischemic and troponin's were unremarkable, outpatient nuclear stress testing ordered. Patient was also found to have pseudogout per synovial fluid analysis; aspiration performed per orthopedic surgery. Patient was started on PO prednisone with improvement in symptoms, will send outpatient referral to rheumatology for follow-up. PT/OT consulted and recommended HH.     Interval History: Mobility and L knee improved. RVR resolved with beta blocker. Glucose elevated, insulin ordered and titrated.     Review of Systems   Constitutional: Negative for activity change, appetite change, chills, fatigue, fever and unexpected weight change.   HENT: Negative for congestion, rhinorrhea, sore throat, trouble swallowing and voice change.    Eyes: Negative for visual disturbance.   Respiratory: Positive for shortness of breath. Negative for cough, choking, chest tightness and wheezing.         RESOLVED   Cardiovascular: Positive for chest pain. Negative for palpitations and leg swelling.        RESOLVED   Gastrointestinal: Negative for abdominal distention, abdominal pain, anal bleeding, blood in stool, constipation, diarrhea, nausea and vomiting.   Endocrine: Negative for cold intolerance, heat intolerance, polydipsia and polyuria.   Genitourinary: Negative for dysuria, flank pain, frequency, hematuria and urgency.   Musculoskeletal: Positive for arthralgias (L knee), joint swelling and neck pain. Negative for back pain and myalgias.        Bilateral knee swelling and pain, currently resolved.   Skin: Negative for color change and rash.   Neurological: Negative for dizziness, seizures, syncope, facial asymmetry, speech difficulty, weakness, light-headedness, numbness and headaches.   Hematological:  Negative for adenopathy. Does not bruise/bleed easily.   Psychiatric/Behavioral: Negative for agitation, confusion, hallucinations and suicidal ideas.     Objective:     Vital Signs (Most Recent):  Temp: 98.1 °F (36.7 °C) (08/26/19 1605)  Pulse: 83 (08/26/19 1605)  Resp: 18 (08/26/19 1605)  BP: (!) 120/59 (08/26/19 1605)  SpO2: 96 % (08/26/19 1605) Vital Signs (24h Range):  Temp:  [97.9 °F (36.6 °C)-98.9 °F (37.2 °C)] 98.1 °F (36.7 °C)  Pulse:  [] 83  Resp:  [18-19] 18  SpO2:  [96 %-97 %] 96 %  BP: (116-148)/(59-71) 120/59     Weight: 76 kg (167 lb 8.8 oz)  Body mass index is 25.48 kg/m².    Intake/Output Summary (Last 24 hours) at 8/26/2019 1727  Last data filed at 8/26/2019 0800  Gross per 24 hour   Intake --   Output 650 ml   Net -650 ml      Physical Exam   Constitutional: She is oriented to person, place, and time. She appears well-developed and well-nourished. No distress.   HENT:   Head: Normocephalic and atraumatic.   Eyes: Pupils are equal, round, and reactive to light.   Neck: Neck supple. Carotid bruit is not present. No thyromegaly present.   Cardiovascular: Normal rate and normal heart sounds. An irregularly irregular rhythm present. Exam reveals no gallop.   No murmur heard.  Pulmonary/Chest: Effort normal and breath sounds normal. No respiratory distress. She has no wheezes. She exhibits no tenderness.   Abdominal: Soft. Bowel sounds are normal. She exhibits no distension and no mass. There is no splenomegaly or hepatomegaly. There is no tenderness.   Musculoskeletal: She exhibits edema (L knee). She exhibits no deformity.   Decreased ROM to L knee   Neurological: She is alert and oriented to person, place, and time. No cranial nerve deficit or sensory deficit.   Skin: Skin is warm and dry. No rash noted.   Psychiatric: She has a normal mood and affect. Her behavior is normal.   Nursing note and vitals reviewed.      Significant Labs: All pertinent labs within the past 24 hours have been  reviewed.    Significant Imaging: I have reviewed all pertinent imaging results/findings within the past 24 hours.      Assessment/Plan:      * Chest pain, rule out acute myocardial infarction  Remote MI    Outpatient cardiac testing ordered  - Troponin trend flat   - EKG with atrial fibrillation, non-ischemic  - will discuss further work-up with cards as atrial fib is a contraindication to DSE - consider outpatient nuclear stress   - C/W Isosorbide Mononitrate 24 hr tablet 60 mg PO daily --- increased to 90 mg with improvement of symptoms   - CXR PA and Lateral showed no acute abnormalities or any evidence of CHF exacerbation.  - Last echo from July 19, 2019 noted below.    Pseudogout  Left knee  Orthopedic surgery consulted, appreciate assistance    - aspiration performed 8/25: Gram stain negative, +Ca P crystals, 37k WBCs (88% segs)  - prednisone 40mg daily   - febrile without leukocytosis concern for joint infection as infectious work-up unremarkable  - pt lives alone and is completes all ADLs independently at discharge, currently unable to ambulate indendently   - denies trauma to knee, has had knee swelling in the past  - impaired mobility due to acute L knee pain, unable to ambulate   - seen at urgent care on yesterday and reports having it aspirated  - XRAYs ordered with effusion   - ESR/CRP 71/72, uric acid 6.6  - PT/OT eval     Moderate persistent asthma without complication  - C/W Fluticasone furoate-vilanterol 100-25 mcg/dose diskus inhaler 1 puff inhaled daily  - C/W albuterol-ipratropium 2.5 mg-0.5 mg/3mL nebulizer solution 3 mL q4 hours PRN      Primary hypothyroidism  - C/W Levothyroxine 75 mcg PO before breakfast daily    Chronic diastolic congestive heart failure  - C/W furosemide tablet 40 mg PO BID  -  (Previous 1,279 1 yr ago)  - Last Echocardiogram on 7/19/2019 showed concentric LVH, estimated EF >70%, sever bi-atrial enlargement, severe tricuspid regurgitation, estimated PA systolic  pressure is 56 mm Hg, and elevated central venous pressure of 15 mm Hg.  - CXR PA and Lateral showed no acute abnormalities or any evidence of CHF exacerbation.  - Strict I&Os, daily weights.  - BB held pending cardiac workup.  Not on ACEi/ARB likely secondary to CKD.      CKD (chronic kidney disease) stage 4, GFR 15-29 ml/min  - Creatinine at baseline of 1.5, GFR 34.1  - Avoid Nephrotoxic substances  - Renally dose medications        Type 2 diabetes mellitus with diabetic polyneuropathy, with long-term current use of insulin  - uncontrolled, >400  - insulin detemir and aspart administered  - titrate as needed    Persistent atrial fibrillation  H/O Deep vein thrombosis (DVT)  Long term current use of anticoagulant therapy, eliquis    - C/W Apixaban tablet 2.5 mg PO BID  - EKG showed atrial fibrillation with no other abnormalities present.    Hyperlipemia, mixed  - C/W Atorvastatin tablet 40 mg PO daily      VTE Risk Mitigation (From admission, onward)        Ordered     apixaban tablet 2.5 mg  2 times daily      08/23/19 2243     IP VTE HIGH RISK PATIENT  Once      08/23/19 2243     Place sequential compression device  Until discontinued      08/23/19 2243     Place JOANIE hose  Until discontinued      08/23/19 2243     Reason for No Pharmacological VTE Prophylaxis  Once      08/23/19 2243                RILEY ChadwickC  Department of Hospital Medicine   [facility]

## 2019-08-26 NOTE — PT/OT/SLP EVAL
Physical Therapy  Evaluation and Treatment    Tiana Mead   [id number]    Time Tracking:     PT Received On: 08/26/19   PT Start Time: 1330   PT Stop Time: 1350   PT Total Time (min): 20 min    Billable Minutes: Evaluation 12 and Gait Training 8      Recommendations:     Discharge recommendations: Home with HHPT     Equipment recommendations: Rolling Walker    Barriers to Discharge: Decreased caregiver support (lives with her daughter but daughter works 8-10 hours per day, has other friend/family she can call PRN)    Patient Information:     Recent Surgery: none    Diagnosis: Chest pain, rule out acute myocardial infarction    General Precautions: Standard, fall  Orthopedic Precautions: LLE WBAT    Assessment:     Tiana Mead is a 89 y.o. [place] [facility] [place] for Chest pain, rule out acute myocardial infarction. Patient also with recent onset of L knee pain, limiting mobility; seen by orthopedics, suspected gout, cleared for LLE weightbearing. Tiana Mead tolerated evaluation well today. She was very pleasant and agreeable to evaluation. Endorses improved L knee function today, joint aspirated yesterday. Demonstrates full L knee flexion/extension AROM at edge of chair, slight increase in pain with extension compared to flexion. Educated on transfer and gait mechanics using rolling walker, educated on gait sequencing with 3 pt-pattern due to L knee pain. Ambulates ~30 ft with rolling walker and stand-by assistance. Safe to d/c home with family, will need rolling walker upon d/c (has a rollator at home); recommend  PT and OT services. Discussed PT role, POC, goals and recommendations with patient; verbalized understanding. Tiana Mead would benefit from acute PT services to promote mobility during this admission and improve return to PLOF.    Problem List: weakness, impaired self-care skills, impaired mobility, decreased sitting or standing balance, gait instability and pain    Rehab  Prognosis: Good; patient would benefit from acute skilled PT services to address these deficits and reach maximum level of function.    Plan:     Patient to be seen 3 x/week to address the above listed problems via therapeutic activities, therapeutic exercises, gait training    Plan of Care Expires: 09/25/19  Plan of Care reviewed with: patient    Subjective:     Communicated with RN prior to evaluation, appropriate to see for evaluation.    Pt found reclined in bedside chair upon PT entry to room, agreeable to evaluation.    Does this patient have any cultural, spiritual, Yazidism conflicts given the current situation? Patient has no barriers to learning. Patient verbalizes understanding of his/her program and goals and demonstrates them correctly. No cultural, spiritual, or educational needs identified.    Past Medical History:   Diagnosis Date    Allergy     Anemia     Arthritis     Asthma     CHF (congestive heart failure) 5/2/2017    Chronic kidney disease     Degenerative disc disease     Diabetes mellitus type II     Essential hypertension 7/3/2012    Glaucoma     History of pseudogout 8/23/2012    Hyperlipidemia     Hypertension     Iritis     Myocardial infarction     Pneumonia     Thyroid disease      Past Surgical History:   Procedure Laterality Date    CARDIAC CATHETERIZATION      CATARACT EXTRACTION W/  INTRAOCULAR LENS IMPLANT Left n/a    CHOLECYSTECTOMY      EXTRACTION, CATARACT, WITH IOL INSERTION Right 10/8/2018    Performed by [facility] Henson [facility]    EYE SURGERY      gall stone      HYSTERECTOMY      VARICOSE VEIN SURGERY       Living Environment:  Pt states she lives with her daughter in a 1  with 1 CHERELLE. Uses a tub-shower combo, has transfer tub bench at home. Has raised toilet over existing toilet at home.    PLOF:  Prior to admission, patient was ambulatory using either her straight cane or rollator. Most recently using rollator due to L knee pain. She is (I)  with toileting with raised toilet, (I) with bathing using bath bench though she does state it takes increased time. Dresses herself (I).    DME:  Patient owns or has access to the following DME: Transfer Tub Bench and rollator, raised toilet, straight cane    Upon discharge, patient will have assistance from daughter or friend.    Objective:     Patient found with: no lines    Pain:  Pain Rating 1: 0/10 at L knee while reclined in chair  Pain Rating Post-Intervention 1: 2/10 at L knee while ambulating    Cognitive Exam:  Patient is oriented to Person, Place, Time and Situation.  Patient follows 100% of single-step commands.    Sensation:   Intact    Skin:  Noted L knee warmth compared to R    Lower Extremity Range of Motion:  Right Lower Extremity: WFL  Left Lower Extremity: WFL    Lower Extremity Strength:  Right Lower Extremity: WFL  Left Lower Extremity: WFL except increased pain noted with knee ext    Functional Mobility:    · Bed Mobility:  · NT today; OOBTC before and after session    · Transfers:  · Sit to Stand: SBA from BS chair with RW x 1 trial; initial cueing for pushing R hand from armrest, L hand on walker  · Stand to Sit: SBA to BS chair with no AD x 1 trial(s)    · Gait:  · 30 feet; used 3 pt-gait sequence, decreased weightbearing on L compared to R. Distance limited by pain at L knee    · Assist level: Stand-By Assist  · Device: Rolling walker    · Balance:  · Static Sit: Independent at edge of chair    · Static Stand: Supervision with Rolling walker    Additional Therapeutic Activity/Exercises:     1. Discussed PT role, POC, goals and recommendations with patient and/or family; verbalized understanding.    AM-PAC 6 CLICK MOBILITY  Turning over in bed (including adjusting bedclothes, sheets and blankets)?: 4  Sitting down on and standing up from a chair with arms (e.g., wheelchair, bedside commode, etc.): 3  Moving from lying on back to sitting on the side of the bed?: 4  Moving to and from a bed to  a chair (including a wheelchair)?: 3  Need to walk in hospital room?: 3  Climbing 3-5 steps with a railing?: 3  Basic Mobility Total Score: 20    Patient was left reclined in bedside chair with all lines intact, call button in reach and RN notified.    Clinical Decision Making for Evaluation Complexity:  1. Body System(s) Examination: 1-2  2. Clinical Presentation: Evolving  3. Evaluation Complexity: Low    GOALS:   Multidisciplinary Problems     Physical Therapy Goals        Problem: Physical Therapy Goal    Goal Priority Disciplines Outcome Goal Variances Interventions   Physical Therapy Goal     PT, PT/OT      Description:  Goals to be met by: 19    Patient will increase functional independence with mobility by performin. Supine to sit with Modified [place] - Not met  2. Sit to stand transfer with Supervision with RW - Not met  3. Bed to chair transfer with Supervision using Rolling Walker - Not met  4. Gait  x 100 feet with Stand-by Assistance using Rolling Walker - Not met  5. Ascend/Descend 6 inch curb step with Contact Guard Assistance using Rolling Walker - Not met  6. Lower extremity exercise program x 15 reps per handout, with independence - Not met                  Manan Villa, PT  2019

## 2019-08-26 NOTE — PLAN OF CARE
Problem: Adult Inpatient Plan of Care  Goal: Plan of Care Review  Tiana Mead is a 89 y.o. female admitted to [facility] [place]23/19 for Chest pain, rule out acute myocardial infarction. Patient also with recent onset of L knee pain, limiting mobility; seen by orthopedics, suspected gout, cleared for LLE weightbearing. Tiana Mead tolerated evaluation well today. She was very pleasant and agreeable to evaluation. Endorses improved L knee function today, joint aspirated yesterday. Demonstrates full L knee flexion/extension AROM at edge of chair, slight increase in pain with extension compared to flexion. Educated on transfer and gait mechanics using rolling walker, educated on gait sequencing with 3 pt-pattern due to L knee pain. Ambulates ~30 ft with rolling walker and stand-by assistance. Safe to d/c home with family, will need rolling walker upon d/c (has a rollator at home); recommend  PT and OT services. Discussed PT role, POC, goals and recommendations with patient; verbalized understanding. Tiana Mead would benefit from acute PT services to promote mobility during this admission and improve return to PLOF.    **Patient is safe to ambulate with nursing using rolling walker**    Manan Villa, PT  8/26/2019

## 2019-08-26 NOTE — PLAN OF CARE
[facility]    HOME HEALTH ORDERS  FACE TO FACE ENCOUNTER    Patient Name: Tiana Mead  YOB: 1925    PCP: Belen Wood MD   PCP Address: [address] [facility] [place] [address]  PCP Phone Number: [phone]  PCP Fax: [phone]    Encounter Date: 08/26/2019    Admit to Home Health    Diagnoses:  Active Hospital Problems    Diagnosis  POA    *Chest pain, rule out acute myocardial infarction [R07.9]  Yes    Pseudogout [M11.20]  Yes     Priority: 2     Fever [R50.9]  No    Effusion, left knee [M25.462]  No    Moderate persistent asthma without complication [J45.40]  Yes     Chronic    Primary hypothyroidism [E03.9]  Yes     Chronic    Chronic diastolic congestive heart failure [I50.32]  Yes     Chronic    CKD (chronic kidney disease) stage 4, GFR 15-29 ml/min [N18.4]  Yes     Chronic    Persistent atrial fibrillation [I48.1]  Yes     Chronic    Type 2 diabetes mellitus with diabetic polyneuropathy, with long-term current use of insulin [E11.42, Z79.4]  Not Applicable     Chronic    Hyperlipemia, mixed [E78.2]  Yes     Chronic      Resolved Hospital Problems   No resolved problems to display.       Future Appointments   Date Time Provider Department Center   9/16/2019  1:00 PM Aria Krishnamurthy DPM [facility]   10/15/2019 10:30 AM David Prado MD [facility] [facility]   11/13/2019  8:00 AM LAB, SAME DAY [facility]   11/20/2019  9:20 AM Belen Wood MD [facility] PCW           I have seen and examined this patient face to face today. My clinical findings that support the need for the home health skilled services and home bound status are the following:  Weakness/numbness causing balance and gait disturbance due to Weakness/Debility and pseudogout making it taxing to leave home.    Allergies:  Review of patient's allergies indicates:   Allergen Reactions    Codeine Itching and Nausea Only              Diet:  cardiac diet    Activities: activity as tolerated and no driving for today and ambulate in house with assistance    Nursing:   SN to complete comprehensive assessment including routine vital signs. Instruct on disease process and s/s of complications to report to MD. Review/verify medication list sent home with the patient at time of discharge  and instruct patient/caregiver as needed. Frequency may be adjusted depending on start of care date.    Notify MD if SBP > 160 or < 90; DBP > 90 or < 50; HR > 120 or < 50; Temp > 101      CONSULTS:    Physical Therapy to evaluate and treat. Evaluate for home safety and equipment needs; Establish/upgrade home exercise program. Perform / instruct on therapeutic exercises, gait training, transfer training, and Range of Motion.  Occupational Therapy to evaluate and treat. Evaluate home environment for safety and equipment needs. Perform/Instruct on transfers, ADL training, ROM, and therapeutic exercises.  Aide to provide assistance with personal care, ADLs, and vital signs.    MISCELLANEOUS CARE:  N/A    WOUND CARE ORDERS  n/a      Medications: Review discharge medications with patient and family and provide education.      Current Discharge Medication List      START taking these medications    Details   predniSONE (DELTASONE) 20 MG tablet Take 2 tablets (40 mg total) by mouth once daily. for 5 days  Qty: 10 tablet, Refills: 0         CONTINUE these medications which have NOT CHANGED    Details   albuterol 90 mcg/actuation inhaler Inhale 2 puffs into the lungs every 4 (four) hours as needed.  Qty: 1 Inhaler, Refills: 11      !! amLODIPine (NORVASC) 5 MG tablet TAKE 1 TABLET(5 MG) BY MOUTH EVERY MORNING  Qty: 90 tablet, Refills: 0      apixaban (ELIQUIS) 2.5 mg Tab Take 1 tablet (2.5 mg total) by mouth 2 (two) times daily.  Qty: 60 tablet, Refills: 5      !! atorvastatin (LIPITOR) 40 MG tablet Take 1 tablet (40 mg total) by mouth once daily.  Qty: 90 tablet, Refills: 3      BD  "ULTRA-FINE SHORT PEN NEEDLE 31 gauge x 5/16" Ndle USE WITH INSULIN PENS AS DIRECTED  Qty: 100 each, Refills: 11      !! blood sugar diagnostic (ONETOUCH ULTRA TEST) Strp Inject 1 strip into the skin 3 (three) times daily.  Qty: 300 each, Refills: 4    Comments: I don't know what brand the patient needs  Associated Diagnoses: Type 2 diabetes mellitus with diabetic peripheral angiopathy without gangrene, with long-term current use of insulin      diclofenac sodium (VOLTAREN) 1 % Gel Apply topically 4 (four) times daily as needed.  Qty: 100 Tube, Refills: 3    Associated Diagnoses: Primary osteoarthritis of both knees; Generalized osteoarthritis of multiple sites; Right hip pain      dorzolamide (TRUSOPT) 2 % ophthalmic solution Place 1 drop into both eyes 2 (two) times daily.  Qty: 10 mL, Refills: 4    Associated Diagnoses: Primary open-angle glaucoma(365.11)      fluticasone-salmeterol 250-50 mcg/dose (ADVAIR DISKUS) 250-50 mcg/dose diskus inhaler INHALE 1 PUFF BY MOUTH INTO THE LUNGS NIGHTLY  Qty: 1 each, Refills: 11      furosemide (LASIX) 40 MG tablet Take 1 tablet (40 mg total) by mouth 2 (two) times daily.  Qty: 180 tablet, Refills: 3      insulin aspart protamine-insulin aspart (NOVOLOG MIX 70-30FLEXPEN U-100) 100 unit/mL (70-30) InPn pen INJECT 10 UNITS UNDER THE SKIN EVERY MORNING BEFORE BREAKFAST  Qty: 15 mL, Refills: 0      latanoprost 0.005 % ophthalmic solution Place 1 drop into the right eye every evening.  Qty: 3 Bottle, Refills: 6    Associated Diagnoses: POAG (primary open-angle glaucoma)      levothyroxine (SYNTHROID) 75 MCG tablet TAKE 1 TABLET BY MOUTH EVERY DAY BEFORE BREAKFAST  Qty: 90 tablet, Refills: 2      metoprolol tartrate (LOPRESSOR) 50 MG tablet Take 1 tablet (50 mg total) by mouth 2 (two) times daily.  Qty: 180 tablet, Refills: 4      multivit-mineral-iron-lutein (CENTRUM SILVER ULTRA WOMEN'S) Tab Take 1 tablet by mouth once daily.      !! TRUE METRIX GLUCOSE TEST STRIP Strp USE TO TEST " BLOOD SUGAR WITH MEALS THREE TIMES DAILY  Qty: 300 strip, Refills: 4      !! TRUEPLUS LANCETS 33 gauge Misc TESTING THREE TIMES DAILY  Qty: 300 each, Refills: 0    Associated Diagnoses: Type 2 diabetes mellitus with other diabetic arthropathy, with long-term current use of insulin      acetaminophen (TYLENOL) 500 MG tablet Take 500 mg by mouth every 6 (six) hours as needed for Pain.      !! amLODIPine (NORVASC) 5 MG tablet TAKE 1 TABLET(5 MG) BY MOUTH EVERY MORNING  Qty: 90 tablet, Refills: 0      !! atorvastatin (LIPITOR) 40 MG tablet TAKE 1 TABLET(40 MG) BY MOUTH EVERY DAY  Qty: 90 tablet, Refills: 0      ipratropium (ATROVENT) 0.03 % nasal spray USE 2 SPRAYS IN EACH NOSTRIL TWICE DAILY  Qty: 60 mL, Refills: 1    Comments: **Patient requests 90 days supply**  Associated Diagnoses: Rhinitis, unspecified chronicity, unspecified type      isosorbide mononitrate (IMDUR) 60 MG 24 hr tablet TAKE 1 TABLET BY MOUTH EVERY MORNING FOR HEART, to prevent chest pain and reduce shortness of breath  Qty: 90 tablet, Refills: 3      !! lancets Misc 1 Device by Misc.(Non-Drug; Combo Route) route 3 (three) times daily before meals. Please provide the insurance company preferred product.  Qty: 300 each, Refills: 4      meclizine (ANTIVERT) 12.5 mg tablet Take 1 tablet (12.5 mg total) by mouth 3 (three) times daily as needed.  Qty: 50 tablet, Refills: 1      nitroGLYCERIN (NITROSTAT) 0.4 MG SL tablet ONE TABLET UNDER THE TONGUE EVERY 5 MINUTES AS NEEDED FOR CHEST PAIN  Qty: 25 tablet, Refills: 0      ondansetron (ZOFRAN) 4 MG tablet TAKE ONE TABLET BY MOUTH EVERY 12 HOURS AS NEEDED FOR NAUSEA  Qty: 30 tablet, Refills: 0      TRUE METRIX GLUCOSE METER Misc TEST TID  Refills: 4      !! TRUEPLUS LANCETS 30 gauge Misc USE TO TEST BLOOD SUGAR TID AC  Refills: 2       !! - Potential duplicate medications found. Please discuss with provider.          I certify that this patient is confined to her home and needs intermittent skilled nursing  care, physical therapy and occupational therapy.

## 2019-08-27 ENCOUNTER — TELEPHONE (OUTPATIENT)
Dept: INTERNAL MEDICINE | Facility: CLINIC | Age: 84
End: 2019-08-27

## 2019-08-27 ENCOUNTER — TELEPHONE (OUTPATIENT)
Dept: PODIATRY | Facility: CLINIC | Age: 84
End: 2019-08-27

## 2019-08-27 VITALS
TEMPERATURE: 98 F | SYSTOLIC BLOOD PRESSURE: 126 MMHG | HEART RATE: 83 BPM | OXYGEN SATURATION: 98 % | RESPIRATION RATE: 20 BRPM | BODY MASS INDEX: 25.39 KG/M2 | DIASTOLIC BLOOD PRESSURE: 60 MMHG | HEIGHT: 68 IN | WEIGHT: 167.56 LBS

## 2019-08-27 LAB
ALBUMIN SERPL BCP-MCNC: 2.8 G/DL (ref 3.5–5.2)
ALP SERPL-CCNC: 107 U/L (ref 55–135)
ALT SERPL W/O P-5'-P-CCNC: 15 U/L (ref 10–44)
ANION GAP SERPL CALC-SCNC: 8 MMOL/L (ref 8–16)
AST SERPL-CCNC: 18 U/L (ref 10–40)
BACTERIA UR CULT: NORMAL
BASOPHILS # BLD AUTO: 0.01 K/UL (ref 0–0.2)
BASOPHILS NFR BLD: 0.1 % (ref 0–1.9)
BILIRUB SERPL-MCNC: 0.4 MG/DL (ref 0.1–1)
BUN SERPL-MCNC: 39 MG/DL (ref 10–30)
CALCIUM SERPL-MCNC: 9.1 MG/DL (ref 8.7–10.5)
CHLORIDE SERPL-SCNC: 104 MMOL/L (ref 95–110)
CO2 SERPL-SCNC: 24 MMOL/L (ref 23–29)
CREAT SERPL-MCNC: 1.6 MG/DL (ref 0.5–1.4)
DIFFERENTIAL METHOD: ABNORMAL
EOSINOPHIL # BLD AUTO: 0 K/UL (ref 0–0.5)
EOSINOPHIL NFR BLD: 0.1 % (ref 0–8)
ERYTHROCYTE [DISTWIDTH] IN BLOOD BY AUTOMATED COUNT: 13.6 % (ref 11.5–14.5)
EST. GFR  (AFRICAN AMERICAN): 31.6 ML/MIN/1.73 M^2
EST. GFR  (NON AFRICAN AMERICAN): 27.4 ML/MIN/1.73 M^2
GLUCOSE SERPL-MCNC: 162 MG/DL (ref 70–110)
HCT VFR BLD AUTO: 32.8 % (ref 37–48.5)
HGB BLD-MCNC: 10.9 G/DL (ref 12–16)
IMM GRANULOCYTES # BLD AUTO: 0.06 K/UL (ref 0–0.04)
IMM GRANULOCYTES NFR BLD AUTO: 0.6 % (ref 0–0.5)
LYMPHOCYTES # BLD AUTO: 1.2 K/UL (ref 1–4.8)
LYMPHOCYTES NFR BLD: 11.6 % (ref 18–48)
MCH RBC QN AUTO: 29.3 PG (ref 27–31)
MCHC RBC AUTO-ENTMCNC: 33.2 G/DL (ref 32–36)
MCV RBC AUTO: 88 FL (ref 82–98)
MONOCYTES # BLD AUTO: 0.9 K/UL (ref 0.3–1)
MONOCYTES NFR BLD: 8.7 % (ref 4–15)
NEUTROPHILS # BLD AUTO: 8.3 K/UL (ref 1.8–7.7)
NEUTROPHILS NFR BLD: 78.9 % (ref 38–73)
NRBC BLD-RTO: 0 /100 WBC
PLATELET # BLD AUTO: 151 K/UL (ref 150–350)
PMV BLD AUTO: 12.1 FL (ref 9.2–12.9)
POCT GLUCOSE: 135 MG/DL (ref 70–110)
POCT GLUCOSE: 186 MG/DL (ref 70–110)
POTASSIUM SERPL-SCNC: 4.4 MMOL/L (ref 3.5–5.1)
PROT SERPL-MCNC: 6.3 G/DL (ref 6–8.4)
RBC # BLD AUTO: 3.72 M/UL (ref 4–5.4)
SODIUM SERPL-SCNC: 136 MMOL/L (ref 136–145)
WBC # BLD AUTO: 10.55 K/UL (ref 3.9–12.7)

## 2019-08-27 PROCEDURE — G0378 HOSPITAL OBSERVATION PER HR: HCPCS | Mod: HCNC

## 2019-08-27 PROCEDURE — 99217 PR OBSERVATION CARE DISCHARGE: ICD-10-PCS | Mod: HCNC,,, | Performed by: PHYSICIAN ASSISTANT

## 2019-08-27 PROCEDURE — 25000003 PHARM REV CODE 250: Mod: HCNC | Performed by: PHYSICIAN ASSISTANT

## 2019-08-27 PROCEDURE — 85025 COMPLETE CBC W/AUTO DIFF WBC: CPT | Mod: HCNC

## 2019-08-27 PROCEDURE — 63600175 PHARM REV CODE 636 W HCPCS: Mod: HCNC | Performed by: PHYSICIAN ASSISTANT

## 2019-08-27 PROCEDURE — 36415 COLL VENOUS BLD VENIPUNCTURE: CPT | Mod: HCNC

## 2019-08-27 PROCEDURE — 99217 PR OBSERVATION CARE DISCHARGE: CPT | Mod: HCNC,,, | Performed by: PHYSICIAN ASSISTANT

## 2019-08-27 PROCEDURE — S5571 INSULIN DISPOS PEN 3 ML: HCPCS | Mod: HCNC | Performed by: PHYSICIAN ASSISTANT

## 2019-08-27 PROCEDURE — 80053 COMPREHEN METABOLIC PANEL: CPT | Mod: HCNC

## 2019-08-27 RX ADMIN — APIXABAN 2.5 MG: 2.5 TABLET, FILM COATED ORAL at 08:08

## 2019-08-27 RX ADMIN — ISOSORBIDE MONONITRATE 90 MG: 30 TABLET, EXTENDED RELEASE ORAL at 08:08

## 2019-08-27 RX ADMIN — METOPROLOL TARTRATE 50 MG: 50 TABLET ORAL at 08:08

## 2019-08-27 RX ADMIN — INSULIN DETEMIR 11 UNITS: 100 INJECTION, SOLUTION SUBCUTANEOUS at 08:08

## 2019-08-27 RX ADMIN — DICLOFENAC 2 G: 10 GEL TOPICAL at 08:08

## 2019-08-27 RX ADMIN — LEVOTHYROXINE SODIUM 75 MCG: 75 TABLET ORAL at 06:08

## 2019-08-27 RX ADMIN — ATORVASTATIN CALCIUM 40 MG: 20 TABLET, FILM COATED ORAL at 08:08

## 2019-08-27 RX ADMIN — PREDNISONE 40 MG: 20 TABLET ORAL at 08:08

## 2019-08-27 RX ADMIN — AMLODIPINE BESYLATE 5 MG: 5 TABLET ORAL at 08:08

## 2019-08-27 RX ADMIN — FLUTICASONE FUROATE AND VILANTEROL TRIFENATATE 1 PUFF: 100; 25 POWDER RESPIRATORY (INHALATION) at 08:08

## 2019-08-27 RX ADMIN — FUROSEMIDE 40 MG: 40 TABLET ORAL at 08:08

## 2019-08-27 NOTE — ASSESSMENT & PLAN NOTE
- C/W furosemide tablet 40 mg PO BID  -  (Previous 1,279 1 yr ago)  - Last Echocardiogram on 7/19/2019 showed concentric LVH, estimated EF >70%, sever bi-atrial enlargement, severe tricuspid regurgitation, estimated PA systolic pressure is 56 mm Hg, and elevated central venous pressure of 15 mm Hg.  - CXR PA and Lateral showed no acute abnormalities or any evidence of CHF exacerbation.  - Strict I&Os, daily weights.  - BB held pending cardiac workup.  Not on ACEi/ARB likely secondary to CKD.  OK to resume BB

## 2019-08-27 NOTE — TELEPHONE ENCOUNTER
Call placed to patient concerning the need to reschedule their podiatry appt.  Nature of call and contact information left on recorder.

## 2019-08-27 NOTE — NURSING
Pt is a/ox4, [place] [facility] She denies pain, n/v, dizziness, and sob. Fall risks and precautions, as well as pain management  were discussed, and any questions addressed. Pt remains free from falls and injury. Personal belongings and call light are within reach. Will continue to monitor.

## 2019-08-27 NOTE — PLAN OF CARE
08/27/19 1517   Final Note   Assessment Type Final Discharge Note     Patient discharged home with [facility] on 8/27/19.

## 2019-08-27 NOTE — DISCHARGE SUMMARY
[facility] Hospital Medicine  Discharge Summary      Patient Name: Tiana Mead  MRN: [id number]  Admission Date: 8/23/2019  Hospital Length of Stay: 0 days  Discharge Date and Time: 8/27/2019 10:50 AM  Attending Physician: Fela att. providers found   Discharging Provider: Yue Vega PA-C  Primary Care Provider: Belen Wood MD  [facility] Medicine Team: [facility] HOSP MED E Yue Vega PA-C    HPI:   Tiana Mead is a 89 y.o. AA female with PMHx of MI, HTN, HLD, CHF, OA, T2DM, Asthma, Thyroid dz, and CKD who presents to the ED c/o increased frequency of chest pain with associated SOB with exertion and at rest and bilateral neck pain (x1 month). Pt additionally complains of chronic bilaterally knee pain and swelling that is frequently treated with aspirations and unspecified injections. Pt reports she deals with chest pain and SOB chronically for which she treats with a Nitro tablet and Albuterol inhaler. She reports aborting the CP and SOB through use of a single Nitro tablet and single use of albuterol inhaler. Pt states she is having CP with SOB more frequently as of late and subsequently having to take Nitro tablets and Albuterol inhaler more frequently. Pt reports SOB with lying down, and has to sleep with multiple pillows for elevation. Pt states neck pain is worse with movement of neck. She currently denies CP, SOB, nausea, vomiting, fever, chills, sweats, abdominal pain, constipation, diarrhea, dysuria, and hematuria. Pt reports no complaints at this time. Pt states compliance with her medications.      ED: AFVSS, no leukocytosis, Troponin <0.006 >>> <0.006, Will complete trend.  (prev 1,279 1y ago) Creatinine at 1.5 (BL 1.4-1.7). eGFR at 34.1 (BL 25-32.6). Glucose elevated at 200 (114-177). EKG showed A-fib. CXR PA and Lateral showed no acute findings and no evidence of CHF exacerbation.     * No surgery found *      Hospital Course:   89 y.o. who was admitted to  hospital medicine for chest pain and impaired mobility due to R knee pain. Patient with chronic atrial fibrillation therefore DSE a contraindication. Her chest pain improved with increase of Imdur. As patient's chest pain resolved, EKG was non-ischemic and troponin's were unremarkable, outpatient nuclear stress testing ordered. Patient was also found to have pseudogout per synovial fluid analysis; aspiration performed per orthopedic surgery. Patient was started on PO prednisone with improvement in symptoms, will send outpatient referral to rheumatology for follow-up. PT/OT consulted and recommended HH. Stable for discharge. Return precautions discussed, no further questions at discharge.      Consults:   Consults (From admission, onward)        Status Ordering Provider     Inpatient consult to Orthopedic Surgery  Once     Provider:  (Not yet assigned)    Completed ASPEN CHRISTOPHER          * Chest pain, rule out acute myocardial infarction  Remote MI    Outpatient cardiac testing ordered  - Troponin trend flat   - EKG with atrial fibrillation, non-ischemic  - will discuss further work-up with cards as atrial fib is a contraindication to DSE - consider outpatient nuclear stress   - C/W Isosorbide Mononitrate 24 hr tablet 60 mg PO daily --- increased to 90 mg with improvement of symptoms   - CXR PA and Lateral showed no acute abnormalities or any evidence of CHF exacerbation.  - Last echo from July 19, 2019 noted below.  No CP at time of d/c    Pseudogout  Left knee  Orthopedic surgery consulted, appreciate assistance    - aspiration performed 8/25: Gram stain negative, +Ca P crystals, 37k WBCs (88% segs)  - prednisone 40mg daily   - febrile without leukocytosis concern for joint infection as infectious work-up unremarkable  - pt lives alone and is completes all ADLs independently at discharge, currently unable to ambulate indendently   - denies trauma to knee, has had knee swelling in the past  - impaired mobility  due to acute L knee pain, unable to ambulate   - seen at urgent care on yesterday and reports having it aspirated  - XRAYs ordered with effusion   - ESR/CRP 71/72, uric acid 6.6  - PT/OT eval : [facility]  - rheum follow up outpt    Moderate persistent asthma without complication  - C/W Fluticasone furoate-vilanterol 100-25 mcg/dose diskus inhaler 1 puff inhaled daily  - C/W albuterol-ipratropium 2.5 mg-0.5 mg/3mL nebulizer solution 3 mL q4 hours PRN      Primary hypothyroidism  - C/W Levothyroxine 75 mcg PO before breakfast daily    Chronic diastolic congestive heart failure  - C/W furosemide tablet 40 mg PO BID  -  (Previous 1,279 1 yr ago)  - Last Echocardiogram on 7/19/2019 showed concentric LVH, estimated EF >70%, sever bi-atrial enlargement, severe tricuspid regurgitation, estimated PA systolic pressure is 56 mm Hg, and elevated central venous pressure of 15 mm Hg.  - CXR PA and Lateral showed no acute abnormalities or any evidence of CHF exacerbation.  - Strict I&Os, daily weights.  - BB held pending cardiac workup.  Not on ACEi/ARB likely secondary to CKD.  OK to resume BB       CKD (chronic kidney disease) stage 4, GFR 15-29 ml/min  - Creatinine at baseline of 1.5, GFR 34.1  - Avoid Nephrotoxic substances  - Renally dose medications        Type 2 diabetes mellitus with diabetic polyneuropathy, with long-term current use of insulin  - uncontrolled, >400  - insulin detemir and aspart administered  - titrate as needed  BG controlled at d/c; PCP follow up    Persistent atrial fibrillation  H/O Deep vein thrombosis (DVT)  Long term current use of anticoagulant therapy, eliquis    - C/W Apixaban tablet 2.5 mg PO BID and BB  - EKG showed atrial fibrillation with no other abnormalities present.    Hyperlipemia, mixed  - C/W Atorvastatin tablet 40 mg PO daily        Final Active Diagnoses:    Diagnosis Date Noted POA    PRINCIPAL PROBLEM:  Chest pain, rule out acute myocardial infarction [R07.9] 08/23/2019 Yes     "Pseudogout [M11.20] 08/24/2019 Yes    Moderate persistent asthma without complication [J45.40] 01/02/2018 Yes     Chronic    Primary hypothyroidism [E03.9] 12/13/2017 Yes     Chronic    Chronic diastolic congestive heart failure [I50.32] 08/15/2017 Yes     Chronic    CKD (chronic kidney disease) stage 4, GFR 15-29 ml/min [N18.4] 05/02/2017 Yes     Chronic    Type 2 diabetes mellitus with diabetic polyneuropathy, with long-term current use of insulin [E11.42, Z79.4] 04/20/2017 Not Applicable     Chronic    Persistent atrial fibrillation [I48.1] 04/20/2017 Yes     Chronic    Hyperlipemia, mixed [E78.2] 07/03/2012 Yes     Chronic    Fever [R50.9] 08/25/2019 No    Effusion, left knee [M25.462]  No      Problems Resolved During this Admission:       Discharged Condition: stable    Disposition: Home or Self Care    Follow Up:  Follow-up Information     [facility] In 1 day.    Specialty:  Home Health Services  Contact information:  [address] [place] [address].  [address]  [facility] [address]  [phone]             Belen Wood MD On 9/4/2019.    Specialty:  Internal Medicine  Why:  at 9:40 AM; hospital follow up appointment  Contact information:  [address] [place] [id number] [phone]                 Patient Instructions:      WALKER FOR HOME USE     Order Specific Question Answer Comments   Type of Walker: Adult (5'4"-6'6")    With wheels? Yes    Height: 5' 8" (1.727 m)    Weight: 76 kg (167 lb 8.8 oz)    Length of need (1-99 months): 99    Does patient have medical equipment at home? bath bench    Does patient have medical equipment at home? raised toilet    Does patient have medical equipment at home? rollator    Does patient have medical equipment at home? cane, straight    Please check all that apply: Patient is unable to safely ambulate without equipment.    Vendor: Other (use comments)    Expected Date of Delivery: 8/26/2019      WALKER FOR HOME USE     Order Specific " "Question Answer Comments   Type of Walker: Adult (5'4"-6'6")    With wheels? Yes    Height: 5' 8" (1.727 m)    Weight: 76 kg (167 lb 8.8 oz)    Length of need (1-99 months): 99    Does patient have medical equipment at home? bath bench    Does patient have medical equipment at home? raised toilet    Does patient have medical equipment at home? rollator    Does patient have medical equipment at home? cane, straight    Please check all that apply: Patient's condition impairs ambulation.    Please check all that apply: Patient is unable to safely ambulate without equipment.    Please check all that apply: Patient needs help to get in and out of chair.    Please check all that apply: Walker will be used for gait training.    Vendor: Other (use comments)    Expected Date of Delivery: 8/26/2019      Ambulatory referral to Outpatient Case Management   Referral Priority: Routine Referral Type: Consultation   Referral Reason: Specialty Services Required   Number of Visits Requested: 1     Notify your health care provider if you experience any of the following:  temperature >100.4     Notify your health care provider if you experience any of the following:  severe uncontrolled pain     Notify your health care provider if you experience any of the following:  redness, tenderness, or signs of infection (pain, swelling, redness, odor or green/yellow discharge around incision site)     Notify your health care provider if you experience any of the following:  worsening rash     Notify your health care provider if you experience any of the following:  increased confusion or weakness     Activity as tolerated       Significant Diagnostic Studies: Labs:   Troponin   Recent Labs   Lab 08/24/19  1555   TROPONINI 0.013     Radiology: X-Ray: knee: moderate left suprapatellar synovial complex/effusion.    Pending Diagnostic Studies:     None         Medications:  Reconciled Home Medications:      Medication List      START taking these " "medications    predniSONE 20 MG tablet  Commonly known as:  DELTASONE  Take 2 tablets (40 mg total) by mouth once daily. for 5 days        CHANGE how you take these medications    latanoprost 0.005 % ophthalmic solution  Place 1 drop into the right eye every evening.  What changed:  how to take this        CONTINUE taking these medications    acetaminophen 500 MG tablet  Commonly known as:  TYLENOL  Take 500 mg by mouth every 6 (six) hours as needed for Pain.     albuterol 90 mcg/actuation inhaler  Commonly known as:  PROVENTIL/VENTOLIN HFA  Inhale 2 puffs into the lungs every 4 (four) hours as needed.     * amLODIPine 5 MG tablet  Commonly known as:  NORVASC  TAKE 1 TABLET(5 MG) BY MOUTH EVERY MORNING     * amLODIPine 5 MG tablet  Commonly known as:  NORVASC  TAKE 1 TABLET(5 MG) BY MOUTH EVERY MORNING     apixaban 2.5 mg Tab  Commonly known as:  ELIQUIS  Take 1 tablet (2.5 mg total) by mouth 2 (two) times daily.     * atorvastatin 40 MG tablet  Commonly known as:  LIPITOR  Take 1 tablet (40 mg total) by mouth once daily.     * atorvastatin 40 MG tablet  Commonly known as:  LIPITOR  TAKE 1 TABLET(40 MG) BY MOUTH EVERY DAY     BD ULTRA-FINE SHORT PEN NEEDLE 31 gauge x 5/16" Ndle  Generic drug:  pen needle, diabetic  USE WITH INSULIN PENS AS DIRECTED     * blood sugar diagnostic Strp  Commonly known as:  ONETOUCH ULTRA TEST  Inject 1 strip into the skin 3 (three) times daily.     * TRUE METRIX GLUCOSE TEST STRIP Strp  Generic drug:  blood sugar diagnostic  USE TO TEST BLOOD SUGAR WITH MEALS THREE TIMES DAILY     CENTRUM SILVER ULTRA WOMEN'S Tab  Generic drug:  multivit-mineral-iron-lutein  Take 1 tablet by mouth once daily.     diclofenac sodium 1 % Gel  Commonly known as:  VOLTAREN  Apply topically 4 (four) times daily as needed.     dorzolamide 2 % ophthalmic solution  Commonly known as:  TRUSOPT  Place 1 drop into both eyes 2 (two) times daily.     fluticasone-salmeterol 250-50 mcg/dose 250-50 mcg/dose diskus " inhaler  Commonly known as:  ADVAIR DISKUS  INHALE 1 PUFF BY MOUTH INTO THE LUNGS NIGHTLY     furosemide 40 MG tablet  Commonly known as:  LASIX  Take 1 tablet (40 mg total) by mouth 2 (two) times daily.     insulin aspart protamine-insulin aspart 100 unit/mL (70-30) Inpn pen  Commonly known as:  NovoLOG Mix 70-30FlexPen U-100  INJECT 10 UNITS UNDER THE SKIN EVERY MORNING BEFORE BREAKFAST     ipratropium 0.03 % nasal spray  Commonly known as:  ATROVENT  USE 2 SPRAYS IN EACH NOSTRIL TWICE DAILY     isosorbide mononitrate 60 MG 24 hr tablet  Commonly known as:  IMDUR  TAKE 1 TABLET BY MOUTH EVERY MORNING FOR HEART, to prevent chest pain and reduce shortness of breath     * lancets Misc  1 Device by Misc.(Non-Drug; Combo Route) route 3 (three) times daily before meals. Please provide the insurance company preferred product.     * TRUEPLUS LANCETS 30 gauge Misc  Generic drug:  lancets  USE TO TEST BLOOD SUGAR TID AC     * TRUEPLUS LANCETS 33 gauge Misc  Generic drug:  lancets  TESTING THREE TIMES DAILY     levothyroxine 75 MCG tablet  Commonly known as:  SYNTHROID  TAKE 1 TABLET BY MOUTH EVERY DAY BEFORE BREAKFAST     meclizine 12.5 mg tablet  Commonly known as:  ANTIVERT  Take 1 tablet (12.5 mg total) by mouth 3 (three) times daily as needed.     metoprolol tartrate 50 MG tablet  Commonly known as:  LOPRESSOR  Take 1 tablet (50 mg total) by mouth 2 (two) times daily.     nitroGLYCERIN 0.4 MG SL tablet  Commonly known as:  NITROSTAT  ONE TABLET UNDER THE TONGUE EVERY 5 MINUTES AS NEEDED FOR CHEST PAIN     ondansetron 4 MG tablet  Commonly known as:  ZOFRAN  TAKE ONE TABLET BY MOUTH EVERY 12 HOURS AS NEEDED FOR NAUSEA     TRUE METRIX GLUCOSE METER Misc  Generic drug:  blood-glucose meter  TEST TID         * This list has 9 medication(s) that are the same as other medications prescribed for you. Read the directions carefully, and ask your doctor or other care provider to review them with you.                Indwelling  Lines/Drains at time of discharge:   Lines/Drains/Airways          None          Time spent on the discharge of patient: >35 minutes  Patient was seen and examined on the date of discharge and determined to be suitable for discharge.       Discussed with staff  Yue Vega PA-C  Department of Hospital Medicine  [facility]

## 2019-08-27 NOTE — ASSESSMENT & PLAN NOTE
- uncontrolled, >400  - insulin detemir and aspart administered  - titrate as needed  BG controlled at d/c; PCP follow up

## 2019-08-27 NOTE — ASSESSMENT & PLAN NOTE
Remote MI    Outpatient cardiac testing ordered  - Troponin trend flat   - EKG with atrial fibrillation, non-ischemic  - will discuss further work-up with cards as atrial fib is a contraindication to DSE - consider outpatient nuclear stress   - C/W Isosorbide Mononitrate 24 hr tablet 60 mg PO daily --- increased to 90 mg with improvement of symptoms   - CXR PA and Lateral showed no acute abnormalities or any evidence of CHF exacerbation.  - Last echo from July 19, 2019 noted below.  No CP at time of d/c

## 2019-08-27 NOTE — PLAN OF CARE
[facility]    HOME HEALTH ORDERS  FACE TO FACE ENCOUNTER    Patient Name: Tiana Mead  YOB: 1925    PCP: Belen Wood MD   PCP Address: [address] [facility] [place] [address]  PCP Phone Number: [phone]  PCP Fax: [phone]    Encounter Date: 08/27/2019    Admit to Home Health    Diagnoses:  Active Hospital Problems    Diagnosis  POA    *Chest pain, rule out acute myocardial infarction [R07.9]  Yes    Fever [R50.9]  No    Effusion, left knee [M25.462]  No    Pseudogout [M11.20]  Yes    Moderate persistent asthma without complication [J45.40]  Yes     Chronic    Primary hypothyroidism [E03.9]  Yes     Chronic    Chronic diastolic congestive heart failure [I50.32]  Yes     Chronic    CKD (chronic kidney disease) stage 4, GFR 15-29 ml/min [N18.4]  Yes     Chronic    Persistent atrial fibrillation [I48.1]  Yes     Chronic    Type 2 diabetes mellitus with diabetic polyneuropathy, with long-term current use of insulin [E11.42, Z79.4]  Not Applicable     Chronic    Hyperlipemia, mixed [E78.2]  Yes     Chronic      Resolved Hospital Problems   No resolved problems to display.       Future Appointments   Date Time Provider Department Center   9/16/2019  1:00 PM Aria Krishnamurthy DPM [facility] POD [facility]   10/15/2019 10:30 AM David Prado MD [facility] OPHTHAL [facility]   11/13/2019  8:00 AM [facility]   11/20/2019  9:20 AM [facility]     Follow-up Information     [facility] [place] In 2 days.    Specialty:  Home Health Services  Contact information:  [address] [place] [address].  [address]  [place] [id number] [phone]                     I have seen and examined this patient face to face today. My clinical findings that support the need for the home health skilled services and home bound status are the following:  Weakness/numbness causing balance and gait disturbance due to  Weakness/Debility making it taxing to leave home.    Allergies:  Review of patient's allergies indicates:   Allergen Reactions    Codeine Itching and Nausea Only              Diet: cardiac diet    Activities: activity as tolerated    Nursing:   SN to complete comprehensive assessment including routine vital signs. Instruct on disease process and s/s of complications to report to MD. Review/verify medication list sent home with the patient at time of discharge  and instruct patient/caregiver as needed. Frequency may be adjusted depending on start of care date.    Notify MD if SBP > 160 or < 90; DBP > 90 or < 50; HR > 120 or < 50; Temp > 101      CONSULTS:    Physical Therapy to evaluate and treat. Evaluate for home safety and equipment needs; Establish/upgrade home exercise program. Perform / instruct on therapeutic exercises, gait training, transfer training, and Range of Motion.  Occupational Therapy to evaluate and treat. Evaluate home environment for safety and equipment needs. Perform/Instruct on transfers, ADL training, ROM, and therapeutic exercises.    MISCELLANEOUS CARE:  N/A    WOUND CARE ORDERS  n/a      Medications: Review discharge medications with patient and family and provide education.      Current Discharge Medication List      START taking these medications    Details   predniSONE (DELTASONE) 20 MG tablet Take 2 tablets (40 mg total) by mouth once daily. for 5 days  Qty: 10 tablet, Refills: 0         CONTINUE these medications which have NOT CHANGED    Details   albuterol 90 mcg/actuation inhaler Inhale 2 puffs into the lungs every 4 (four) hours as needed.  Qty: 1 Inhaler, Refills: 11      !! amLODIPine (NORVASC) 5 MG tablet TAKE 1 TABLET(5 MG) BY MOUTH EVERY MORNING  Qty: 90 tablet, Refills: 0      apixaban (ELIQUIS) 2.5 mg Tab Take 1 tablet (2.5 mg total) by mouth 2 (two) times daily.  Qty: 60 tablet, Refills: 5      !! atorvastatin (LIPITOR) 40 MG tablet Take 1 tablet (40 mg total) by mouth  "once daily.  Qty: 90 tablet, Refills: 3      BD ULTRA-FINE SHORT PEN NEEDLE 31 gauge x 5/16" Ndle USE WITH INSULIN PENS AS DIRECTED  Qty: 100 each, Refills: 11      !! blood sugar diagnostic (ONETOUCH ULTRA TEST) Strp Inject 1 strip into the skin 3 (three) times daily.  Qty: 300 each, Refills: 4    Comments: I don't know what brand the patient needs  Associated Diagnoses: Type 2 diabetes mellitus with diabetic peripheral angiopathy without gangrene, with long-term current use of insulin      diclofenac sodium (VOLTAREN) 1 % Gel Apply topically 4 (four) times daily as needed.  Qty: 100 Tube, Refills: 3    Associated Diagnoses: Primary osteoarthritis of both knees; Generalized osteoarthritis of multiple sites; Right hip pain      dorzolamide (TRUSOPT) 2 % ophthalmic solution Place 1 drop into both eyes 2 (two) times daily.  Qty: 10 mL, Refills: 4    Associated Diagnoses: Primary open-angle glaucoma(365.11)      fluticasone-salmeterol 250-50 mcg/dose (ADVAIR DISKUS) 250-50 mcg/dose diskus inhaler INHALE 1 PUFF BY MOUTH INTO THE LUNGS NIGHTLY  Qty: 1 each, Refills: 11      furosemide (LASIX) 40 MG tablet Take 1 tablet (40 mg total) by mouth 2 (two) times daily.  Qty: 180 tablet, Refills: 3      insulin aspart protamine-insulin aspart (NOVOLOG MIX 70-30FLEXPEN U-100) 100 unit/mL (70-30) InPn pen INJECT 10 UNITS UNDER THE SKIN EVERY MORNING BEFORE BREAKFAST  Qty: 15 mL, Refills: 0      latanoprost 0.005 % ophthalmic solution Place 1 drop into the right eye every evening.  Qty: 3 Bottle, Refills: 6    Associated Diagnoses: POAG (primary open-angle glaucoma)      levothyroxine (SYNTHROID) 75 MCG tablet TAKE 1 TABLET BY MOUTH EVERY DAY BEFORE BREAKFAST  Qty: 90 tablet, Refills: 2      metoprolol tartrate (LOPRESSOR) 50 MG tablet Take 1 tablet (50 mg total) by mouth 2 (two) times daily.  Qty: 180 tablet, Refills: 4      multivit-mineral-iron-lutein (CENTRUM SILVER ULTRA WOMEN'S) Tab Take 1 tablet by mouth once daily.      !! " TRUE METRIX GLUCOSE TEST STRIP Strp USE TO TEST BLOOD SUGAR WITH MEALS THREE TIMES DAILY  Qty: 300 strip, Refills: 4      !! TRUEPLUS LANCETS 33 gauge Misc TESTING THREE TIMES DAILY  Qty: 300 each, Refills: 0    Associated Diagnoses: Type 2 diabetes mellitus with other diabetic arthropathy, with long-term current use of insulin      acetaminophen (TYLENOL) 500 MG tablet Take 500 mg by mouth every 6 (six) hours as needed for Pain.      !! amLODIPine (NORVASC) 5 MG tablet TAKE 1 TABLET(5 MG) BY MOUTH EVERY MORNING  Qty: 90 tablet, Refills: 0      !! atorvastatin (LIPITOR) 40 MG tablet TAKE 1 TABLET(40 MG) BY MOUTH EVERY DAY  Qty: 90 tablet, Refills: 0      ipratropium (ATROVENT) 0.03 % nasal spray USE 2 SPRAYS IN EACH NOSTRIL TWICE DAILY  Qty: 60 mL, Refills: 1    Comments: **Patient requests 90 days supply**  Associated Diagnoses: Rhinitis, unspecified chronicity, unspecified type      isosorbide mononitrate (IMDUR) 60 MG 24 hr tablet TAKE 1 TABLET BY MOUTH EVERY MORNING FOR HEART, to prevent chest pain and reduce shortness of breath  Qty: 90 tablet, Refills: 3      !! lancets Misc 1 Device by Misc.(Non-Drug; Combo Route) route 3 (three) times daily before meals. Please provide the insurance company preferred product.  Qty: 300 each, Refills: 4      meclizine (ANTIVERT) 12.5 mg tablet Take 1 tablet (12.5 mg total) by mouth 3 (three) times daily as needed.  Qty: 50 tablet, Refills: 1      nitroGLYCERIN (NITROSTAT) 0.4 MG SL tablet ONE TABLET UNDER THE TONGUE EVERY 5 MINUTES AS NEEDED FOR CHEST PAIN  Qty: 25 tablet, Refills: 0      ondansetron (ZOFRAN) 4 MG tablet TAKE ONE TABLET BY MOUTH EVERY 12 HOURS AS NEEDED FOR NAUSEA  Qty: 30 tablet, Refills: 0      TRUE METRIX GLUCOSE METER Misc TEST TID  Refills: 4      !! TRUEPLUS LANCETS 30 gauge Misc USE TO TEST BLOOD SUGAR TID AC  Refills: 2       !! - Potential duplicate medications found. Please discuss with provider.          I certify that this patient is confined to  her home and needs physical therapy and occupational therapy.

## 2019-08-27 NOTE — PLAN OF CARE
08/27/19 0829   Post-Acute Status   Post-Acute Authorization Home Health/Hospice   Post-Acute Placement Status Awaiting Internal Medical Clearance

## 2019-08-27 NOTE — ASSESSMENT & PLAN NOTE
H/O Deep vein thrombosis (DVT)  Long term current use of anticoagulant therapy, eliquis    - C/W Apixaban tablet 2.5 mg PO BID and BB  - EKG showed atrial fibrillation with no other abnormalities present.

## 2019-08-27 NOTE — ASSESSMENT & PLAN NOTE
Left knee  Orthopedic surgery consulted, appreciate assistance    - aspiration performed 8/25: Gram stain negative, +Ca P crystals, 37k WBCs (88% segs)  - prednisone 40mg daily   - febrile without leukocytosis concern for joint infection as infectious work-up unremarkable  - pt lives alone and is completes all ADLs independently at discharge, currently unable to ambulate indendently   - denies trauma to knee, has had knee swelling in the past  - impaired mobility due to acute L knee pain, unable to ambulate   - seen at urgent care on yesterday and reports having it aspirated  - XRAYs ordered with effusion   - ESR/CRP 71/72, uric acid 6.6  - PT/OT eval : [facility]  - rheum follow up outpt

## 2019-08-27 NOTE — PLAN OF CARE
08/27/19 0848   Post-Acute Status   Post-Acute Authorization Home Health/Hospice   Post-Acute Placement Status Referrals Sent     HH orders resent to [facility], Pt ok to [facility] [place] today.

## 2019-08-27 NOTE — NURSING
Patient given discharge instructions, reviewed w/ pt and family. IV removed and bandage applied. Telemetry monitor removed and returned to the nursing station. Patient awaiting transportation out of facility.  8/27/2019 10:01 AM

## 2019-08-27 NOTE — TELEPHONE ENCOUNTER
----- Message from Sherry Fraga sent at 8/27/2019  3:52 PM CDT -----  Contact: self / [phone]  Patient is requesting a call back regarding, lab orders. Please advise

## 2019-08-27 NOTE — TELEPHONE ENCOUNTER
Informed patient that she does not need labs for 9-4-19 appointment. Labs that were drawn while she was in the hospital will be reviewed at the appointment.

## 2019-08-28 LAB
BACTERIA SPEC AEROBE CULT: NO GROWTH
BACTERIA UR CULT: ABNORMAL
BACTERIA UR CULT: ABNORMAL

## 2019-08-29 ENCOUNTER — TELEPHONE (OUTPATIENT)
Dept: OPHTHALMOLOGY | Facility: CLINIC | Age: 84
End: 2019-08-29

## 2019-08-29 PROCEDURE — G0180 PR HOME HEALTH MD CERTIFICATION: ICD-10-PCS | Mod: ,,, | Performed by: INTERNAL MEDICINE

## 2019-08-29 PROCEDURE — G0180 MD CERTIFICATION HHA PATIENT: HCPCS | Mod: ,,, | Performed by: INTERNAL MEDICINE

## 2019-08-30 LAB
BACTERIA BLD CULT: NORMAL
BACTERIA BLD CULT: NORMAL

## 2019-09-02 LAB — BACTERIA SPEC ANAEROBE CULT: NORMAL

## 2019-09-04 ENCOUNTER — OFFICE VISIT (OUTPATIENT)
Dept: INTERNAL MEDICINE | Facility: CLINIC | Age: 84
End: 2019-09-04
Payer: MEDICARE

## 2019-09-04 ENCOUNTER — TELEPHONE (OUTPATIENT)
Dept: INTERNAL MEDICINE | Facility: CLINIC | Age: 84
End: 2019-09-04

## 2019-09-04 VITALS
HEIGHT: 68 IN | WEIGHT: 157.63 LBS | BODY MASS INDEX: 23.89 KG/M2 | DIASTOLIC BLOOD PRESSURE: 64 MMHG | HEART RATE: 77 BPM | OXYGEN SATURATION: 97 % | SYSTOLIC BLOOD PRESSURE: 116 MMHG

## 2019-09-04 DIAGNOSIS — M11.20 PSEUDOGOUT: Primary | ICD-10-CM

## 2019-09-04 PROCEDURE — 99999 PR PBB SHADOW E&M-EST. PATIENT-LVL III: ICD-10-PCS | Mod: PBBFAC,HCNC,, | Performed by: INTERNAL MEDICINE

## 2019-09-04 PROCEDURE — 99999 PR PBB SHADOW E&M-EST. PATIENT-LVL III: CPT | Mod: PBBFAC,HCNC,, | Performed by: INTERNAL MEDICINE

## 2019-09-04 PROCEDURE — 99499 UNLISTED E&M SERVICE: CPT | Mod: HCNC,S$GLB,, | Performed by: INTERNAL MEDICINE

## 2019-09-04 PROCEDURE — 99499 RISK ADDL DX/OHS AUDIT: ICD-10-PCS | Mod: HCNC,S$GLB,, | Performed by: INTERNAL MEDICINE

## 2019-09-04 PROCEDURE — 99214 OFFICE O/P EST MOD 30 MIN: CPT | Mod: HCNC,S$GLB,, | Performed by: INTERNAL MEDICINE

## 2019-09-04 PROCEDURE — 99214 PR OFFICE/OUTPT VISIT, EST, LEVL IV, 30-39 MIN: ICD-10-PCS | Mod: HCNC,S$GLB,, | Performed by: INTERNAL MEDICINE

## 2019-09-05 NOTE — TELEPHONE ENCOUNTER
Call her daughter, Yolanda Jones on her mobile.  The patient is 89 years old and she has pseudogout involving her knees.  She was recently hospitalized with a flare.  I heard back from the Rheumatology Department.  There is no reason for her to make an appointment to be seen in Rheumatology now,   as long as she is happy with how her knees are doing at the present time.    She should continue in physical therapy.    Her appointment in the Department of Rheumatology is not until mid October, so she can wait a while before she cancels the appointment.  If her knee flares up she should call me and my office will arrange for her to be taken care of in the Rheumatology Department.  The only way that she would end up in the emergency room is if the flare occurred after office hours or on a Saturday, holiday or Sunday  If she is up in the emergency room the on call Rheumatology doctor will attend her in the emergency room

## 2019-09-05 NOTE — PROGRESS NOTES
Transitional Care Note  Subjective:       Patient ID: Tiana Mead is a 89 y.o. female.  Chief Complaint: Hospital Follow Up and Leg Pain (pain rate 5)    Family and/or Caretaker present at visit?  Yes.  Diagnostic tests reviewed/disposition: No diagnosic tests pending after this hospitalization.  Disease/illness education: ONGOING  Home health/community services discussion/referrals: Patient has home health established at [facility].   Establishment or re-establishment of referral orders for community resources: No other necessary community resources.   Discussion with other health care providers: case discussed with Rheumatology.     The patient awakened with swollen and painful knees, she fell to the floor, striking her left knee.  Her daughter took her to the urgent care center where the doctor withdrew fluid from her left knee and while at the urgent care center the patient complained of chest pain and the   instructed the daughter to bring her to the emergency room.  Orthopedic surgery was consulted, they swathi fluid from her knee submitted for analysis and it was confirmed pseudogout.  She was treated with oral prednisone and her blood sugar went to 400 since she is an insulin-dependent diabetic.  She has felt very weak since her hospital discharge. She has a home health nurse who came to evaluate her yesterday and a physical therapist is also, seeing her in her home to help her regain strength in her legs.  She will try to increase protein in her diet and she has lost nearly 10 lb.  HPI  Review of Systems   Musculoskeletal: Positive for gait problem.   Neurological: Positive for weakness.       Objective:      Physical Exam   Constitutional: She is oriented to person, place, and time. She appears well-developed and well-nourished. No distress.   HENT:   Head: Normocephalic and atraumatic.   Right Ear: External ear normal.   Left Ear: External ear normal.   Nose: Nose normal.   Mouth/Throat: Oropharynx is  clear and moist. No oropharyngeal exudate.   Eyes: Pupils are equal, round, and reactive to light. Conjunctivae and EOM are normal. Right eye exhibits no discharge. No scleral icterus.   Neck: Normal range of motion and full passive range of motion without pain. Neck supple. No spinous process tenderness and no muscular tenderness present. Carotid bruit is not present. No thyromegaly present.   Cardiovascular: Normal rate, regular rhythm, S1 normal, S2 normal, normal heart sounds and intact distal pulses. Exam reveals no gallop and no friction rub.   No murmur heard.  Pulmonary/Chest: Effort normal and breath sounds normal. No respiratory distress. She has no wheezes. She has no rales. She exhibits no tenderness.   Abdominal: Soft. Bowel sounds are normal. She exhibits no distension and no mass. There is no tenderness. There is no rebound and no guarding.   Genitourinary: Pelvic exam was performed with patient supine. Uterus is not deviated, not enlarged, not fixed and not tender. Cervix exhibits no motion tenderness, no discharge and no friability. Right adnexum displays no mass, no tenderness and no fullness. Left adnexum displays no mass, no tenderness and no fullness.   Musculoskeletal: Normal range of motion. She exhibits no edema or tenderness.   She is unable to stand without pushing up with her arms.  Her left leg is weaker than her right leg.   Lymphadenopathy:        Head (right side): No submental and no submandibular adenopathy present.        Head (left side): No submental and no submandibular adenopathy present.     She has no cervical adenopathy.        Right cervical: No superficial cervical, no deep cervical and no posterior cervical adenopathy present.       Left cervical: No superficial cervical, no deep cervical and no posterior cervical adenopathy present.        Right axillary: No pectoral and no lateral adenopathy present.        Left axillary: No pectoral and no lateral adenopathy present.        Right: No supraclavicular adenopathy present.        Left: No supraclavicular adenopathy present.   Neurological: She is alert and oriented to person, place, and time. She has normal reflexes. No cranial nerve deficit. She exhibits normal muscle tone. Coordination normal.   Skin: Skin is warm and dry. No rash noted.   Psychiatric: She has a normal mood and affect. Her behavior is normal. Her mood appears not anxious. Her speech is not rapid and/or pressured. She is not agitated. She does not exhibit a depressed mood.   Normal behavior, thought content, insight and judgement.   Vitals reviewed.      Assessment:       1. Pseudogout, knees        Plan:        At the present time, the attack of pseudogout has resolved.  Future attacks r unpredictable.  I spoke with the Department of Rheumatology and they made an appointment for her to be seen in mid October but she may not need to go    if she is doing okay.    She will wait to cancel that appointment.    If she has another attack and develops a large knee effusion the thing to do would be to call my office and I will make arrangements for her to be seen in Rheumatology urgently.    The only way that she would end up in the emergency room again is if a flare-up occurs on holiday, Saturday or Sunday or after hours.  Nutritional counseling considered.  Cardiovascular status is stable.

## 2019-09-13 ENCOUNTER — TELEPHONE (OUTPATIENT)
Dept: INTERNAL MEDICINE | Facility: CLINIC | Age: 84
End: 2019-09-13

## 2019-09-13 RX ORDER — NYSTATIN 100000 [USP'U]/G
POWDER TOPICAL 4 TIMES DAILY
Qty: 60 G | Refills: 3 | Status: SHIPPED | OUTPATIENT
Start: 2019-09-13 | End: 2021-09-09 | Stop reason: SDUPTHER

## 2019-09-13 NOTE — TELEPHONE ENCOUNTER
Nurse Ji with [facility] called due to pt having a red rash under her left breast and would like to know if the doctor wants to call in a power or something else for the pt and would like a call back at [phone]       Mrs Mead is requesting advise and medication for a rash under her left breast.    Please advise  Thank you

## 2019-09-13 NOTE — TELEPHONE ENCOUNTER
I left a voicemail for Margy, the home health nurse.  I sent nystatin powder to her [facility]  This is an antifungal powder that she can use under her breast.

## 2019-09-16 ENCOUNTER — TELEPHONE (OUTPATIENT)
Dept: PODIATRY | Facility: CLINIC | Age: 84
End: 2019-09-16

## 2019-09-16 NOTE — TELEPHONE ENCOUNTER
Spoke to patient daughter and advice her that day is already book and im not able to reschedule it for an early time.

## 2019-09-16 NOTE — TELEPHONE ENCOUNTER
----- Message from Sharla Sands sent at 9/16/2019  2:40 PM CDT -----  Contact: Daughter  Yolanda   Dr Gentile      Daughter ask if pt can be seen for 11a if possible on her schedule appointment date      Contact info: [phone]

## 2019-09-18 ENCOUNTER — OUTPATIENT CASE MANAGEMENT (OUTPATIENT)
Dept: ADMINISTRATIVE | Facility: OTHER | Age: 84
End: 2019-09-18

## 2019-09-18 NOTE — PROGRESS NOTES
Summary:  Contacted patient to complete low/mod risk assessment.  No concerns reported by patient or identified by LCSW at this time.     Interventions:  None.    Plan:  Discharge

## 2019-09-20 ENCOUNTER — EXTERNAL HOME HEALTH (OUTPATIENT)
Dept: HOME HEALTH SERVICES | Facility: HOSPITAL | Age: 84
End: 2019-09-20
Payer: MEDICARE

## 2019-09-20 ENCOUNTER — PATIENT OUTREACH (OUTPATIENT)
Dept: ADMINISTRATIVE | Facility: OTHER | Age: 84
End: 2019-09-20

## 2019-09-23 ENCOUNTER — OFFICE VISIT (OUTPATIENT)
Dept: CARDIOLOGY | Facility: CLINIC | Age: 84
End: 2019-09-23
Payer: MEDICARE

## 2019-09-23 ENCOUNTER — OFFICE VISIT (OUTPATIENT)
Dept: PODIATRY | Facility: CLINIC | Age: 84
End: 2019-09-23
Payer: MEDICARE

## 2019-09-23 VITALS
WEIGHT: 164.25 LBS | HEIGHT: 68 IN | BODY MASS INDEX: 24.89 KG/M2 | SYSTOLIC BLOOD PRESSURE: 112 MMHG | HEART RATE: 68 BPM | DIASTOLIC BLOOD PRESSURE: 59 MMHG | OXYGEN SATURATION: 98 %

## 2019-09-23 VITALS
BODY MASS INDEX: 24.86 KG/M2 | HEART RATE: 68 BPM | DIASTOLIC BLOOD PRESSURE: 79 MMHG | SYSTOLIC BLOOD PRESSURE: 127 MMHG | WEIGHT: 164 LBS | HEIGHT: 68 IN

## 2019-09-23 DIAGNOSIS — E78.2 HYPERLIPEMIA, MIXED: Chronic | ICD-10-CM

## 2019-09-23 DIAGNOSIS — I50.32 CHRONIC DIASTOLIC CONGESTIVE HEART FAILURE: Chronic | ICD-10-CM

## 2019-09-23 DIAGNOSIS — I48.19 PERSISTENT ATRIAL FIBRILLATION: Chronic | ICD-10-CM

## 2019-09-23 DIAGNOSIS — E11.21 TYPE 2 DIABETES MELLITUS WITH DIABETIC NEPHROPATHY, WITH LONG-TERM CURRENT USE OF INSULIN: ICD-10-CM

## 2019-09-23 DIAGNOSIS — I27.20 PULMONARY HYPERTENSION: ICD-10-CM

## 2019-09-23 DIAGNOSIS — E11.42 DIABETIC POLYNEUROPATHY ASSOCIATED WITH TYPE 2 DIABETES MELLITUS: Primary | ICD-10-CM

## 2019-09-23 DIAGNOSIS — Z79.4 TYPE 2 DIABETES MELLITUS WITH DIABETIC NEPHROPATHY, WITH LONG-TERM CURRENT USE OF INSULIN: ICD-10-CM

## 2019-09-23 DIAGNOSIS — I31.39 PERICARDIAL EFFUSION: ICD-10-CM

## 2019-09-23 DIAGNOSIS — R07.9 CHEST PAIN AT REST: Primary | ICD-10-CM

## 2019-09-23 DIAGNOSIS — L84 CORN OR CALLUS: ICD-10-CM

## 2019-09-23 DIAGNOSIS — B35.1 ONYCHOMYCOSIS DUE TO DERMATOPHYTE: ICD-10-CM

## 2019-09-23 DIAGNOSIS — I36.1 NON-RHEUMATIC TRICUSPID VALVE INSUFFICIENCY: ICD-10-CM

## 2019-09-23 DIAGNOSIS — Z79.01 LONG TERM CURRENT USE OF ANTICOAGULANT THERAPY: Chronic | ICD-10-CM

## 2019-09-23 PROCEDURE — 99214 PR OFFICE/OUTPT VISIT, EST, LEVL IV, 30-39 MIN: ICD-10-PCS | Mod: HCNC,S$GLB,, | Performed by: INTERNAL MEDICINE

## 2019-09-23 PROCEDURE — 1101F PR PT FALLS ASSESS DOC 0-1 FALLS W/OUT INJ PAST YR: ICD-10-PCS | Mod: HCNC,CPTII,S$GLB, | Performed by: INTERNAL MEDICINE

## 2019-09-23 PROCEDURE — 99999 PR PBB SHADOW E&M-EST. PATIENT-LVL IV: CPT | Mod: PBBFAC,HCNC,, | Performed by: INTERNAL MEDICINE

## 2019-09-23 PROCEDURE — 1101F PR PT FALLS ASSESS DOC 0-1 FALLS W/OUT INJ PAST YR: ICD-10-PCS | Mod: HCNC,CPTII,S$GLB, | Performed by: PODIATRIST

## 2019-09-23 PROCEDURE — 99213 OFFICE O/P EST LOW 20 MIN: CPT | Mod: 25,HCNC,S$GLB, | Performed by: PODIATRIST

## 2019-09-23 PROCEDURE — 99999 PR PBB SHADOW E&M-EST. PATIENT-LVL III: CPT | Mod: PBBFAC,HCNC,, | Performed by: PODIATRIST

## 2019-09-23 PROCEDURE — 99999 PR PBB SHADOW E&M-EST. PATIENT-LVL IV: ICD-10-PCS | Mod: PBBFAC,HCNC,, | Performed by: INTERNAL MEDICINE

## 2019-09-23 PROCEDURE — 99999 PR PBB SHADOW E&M-EST. PATIENT-LVL III: ICD-10-PCS | Mod: PBBFAC,HCNC,, | Performed by: PODIATRIST

## 2019-09-23 PROCEDURE — 99213 PR OFFICE/OUTPT VISIT, EST, LEVL III, 20-29 MIN: ICD-10-PCS | Mod: 25,HCNC,S$GLB, | Performed by: PODIATRIST

## 2019-09-23 PROCEDURE — 1101F PT FALLS ASSESS-DOCD LE1/YR: CPT | Mod: HCNC,CPTII,S$GLB, | Performed by: INTERNAL MEDICINE

## 2019-09-23 PROCEDURE — 99214 OFFICE O/P EST MOD 30 MIN: CPT | Mod: HCNC,S$GLB,, | Performed by: INTERNAL MEDICINE

## 2019-09-23 PROCEDURE — 1101F PT FALLS ASSESS-DOCD LE1/YR: CPT | Mod: HCNC,CPTII,S$GLB, | Performed by: PODIATRIST

## 2019-09-23 RX ORDER — NITROGLYCERIN 0.4 MG/1
TABLET SUBLINGUAL
Qty: 25 TABLET | Refills: 0 | Status: SHIPPED | OUTPATIENT
Start: 2019-09-23 | End: 2019-11-20 | Stop reason: SDUPTHER

## 2019-09-23 NOTE — PROGRESS NOTES
Subjective:   Patient ID:  Tiana Mead is a 89 y.o. female who presents for evaluation of Chest Pain; Palpitations; Shortness of Breath; and Excessive Sweating      HPI:   Tiana Mead presents for follow up of a recent hospitalization for chest pain . She was recently admitted with chest pain and right knee discomfort felt to be pseudogout . Bio markers and ECGs unremarkable other than know atrial fib with RVR during the admission. Outpatient stress test planned but not ordered. Since returning home she has continued to have episodes of anterior chest pain often with torso movement in bed. Doing some rehab exercises. 2 days ago had an episode of sudden sweating and weakness with increased heart rate. Did not check her blood sugar. Has chronic diastolic heart failure with moderate PHTN and severe TR. Pericardial effusion has resolved. Tiana Mead has hypertension with good control. Tiana Mead has dyslipidemia  on high intensity statin.  Review of Systems   Constitution: Negative for malaise/fatigue, weight gain and weight loss.   Eyes: Negative for blurred vision.   Cardiovascular: Positive for chest pain. Negative for claudication, cyanosis, dyspnea on exertion, irregular heartbeat, leg swelling, near-syncope, orthopnea, palpitations, paroxysmal nocturnal dyspnea and syncope.   Respiratory: Negative for cough, shortness of breath and wheezing.    Musculoskeletal: Positive for arthritis and joint pain. Negative for falls and myalgias.   Gastrointestinal: Positive for constipation. Negative for abdominal pain, heartburn, nausea and vomiting.   Genitourinary: Negative for nocturia.   Neurological: Negative for brief paralysis, dizziness, focal weakness, headaches, numbness, paresthesias and weakness.   Psychiatric/Behavioral: Negative for altered mental status.       Current Outpatient Medications   Medication Sig    acetaminophen (TYLENOL) 500 MG tablet Take 500 mg by mouth every 6 (six) hours as  "needed for Pain.    albuterol 90 mcg/actuation inhaler Inhale 2 puffs into the lungs every 4 (four) hours as needed.    amLODIPine (NORVASC) 5 MG tablet TAKE 1 TABLET(5 MG) BY MOUTH EVERY MORNING    amLODIPine (NORVASC) 5 MG tablet TAKE 1 TABLET(5 MG) BY MOUTH EVERY MORNING    apixaban (ELIQUIS) 2.5 mg Tab Take 1 tablet (2.5 mg total) by mouth 2 (two) times daily.    atorvastatin (LIPITOR) 40 MG tablet Take 1 tablet (40 mg total) by mouth once daily.    atorvastatin (LIPITOR) 40 MG tablet TAKE 1 TABLET(40 MG) BY MOUTH EVERY DAY    BD ULTRA-FINE SHORT PEN NEEDLE 31 gauge x 5/16" Ndle USE WITH INSULIN PENS AS DIRECTED    blood sugar diagnostic (ONETOUCH ULTRA TEST) Strp Inject 1 strip into the skin 3 (three) times daily.    diclofenac sodium (VOLTAREN) 1 % Gel Apply topically 4 (four) times daily as needed.    dorzolamide (TRUSOPT) 2 % ophthalmic solution Place 1 drop into both eyes 2 (two) times daily.    fluticasone-salmeterol 250-50 mcg/dose (ADVAIR DISKUS) 250-50 mcg/dose diskus inhaler INHALE 1 PUFF BY MOUTH INTO THE LUNGS NIGHTLY    furosemide (LASIX) 40 MG tablet Take 1 tablet (40 mg total) by mouth 2 (two) times daily.    insulin aspart protamine-insulin aspart (NOVOLOG MIX 70-30FLEXPEN U-100) 100 unit/mL (70-30) InPn pen INJECT 10 UNITS UNDER THE SKIN EVERY MORNING BEFORE BREAKFAST    isosorbide mononitrate (IMDUR) 60 MG 24 hr tablet TAKE 1 TABLET BY MOUTH EVERY MORNING FOR HEART, to prevent chest pain and reduce shortness of breath    lancets Misc 1 Device by Misc.(Non-Drug; Combo Route) route 3 (three) times daily before meals. Please provide the insurance company preferred product.    latanoprost 0.005 % ophthalmic solution Place 1 drop into the right eye every evening. (Patient taking differently: Place 1 drop into both eyes every evening. )    levothyroxine (SYNTHROID) 75 MCG tablet TAKE 1 TABLET BY MOUTH EVERY DAY BEFORE BREAKFAST    meclizine (ANTIVERT) 12.5 mg tablet Take 1 tablet " "(12.5 mg total) by mouth 3 (three) times daily as needed.    metoprolol tartrate (LOPRESSOR) 50 MG tablet Take 1 tablet (50 mg total) by mouth 2 (two) times daily.    multivit-mineral-iron-lutein (CENTRUM SILVER ULTRA WOMEN'S) Tab Take 1 tablet by mouth once daily.    nitroGLYCERIN (NITROSTAT) 0.4 MG SL tablet ONE TABLET UNDER THE TONGUE EVERY 5 MINUTES AS NEEDED FOR CHEST PAIN    nystatin (MYCOSTATIN) powder Apply topically 4 (four) times daily.    ondansetron (ZOFRAN) 4 MG tablet TAKE ONE TABLET BY MOUTH EVERY 12 HOURS AS NEEDED FOR NAUSEA    TRUE METRIX GLUCOSE METER Misc TEST TID    TRUE METRIX GLUCOSE TEST STRIP Strp USE TO TEST BLOOD SUGAR WITH MEALS THREE TIMES DAILY    TRUEPLUS LANCETS 30 gauge Misc USE TO TEST BLOOD SUGAR TID AC    TRUEPLUS LANCETS 33 gauge Misc TESTING THREE TIMES DAILY    ipratropium (ATROVENT) 0.03 % nasal spray USE 2 SPRAYS IN EACH NOSTRIL TWICE DAILY (Patient not taking: Reported on 9/23/2019)     No current facility-administered medications for this visit.      Objective:   Physical Exam   Constitutional: She is oriented to person, place, and time. She appears well-developed. No distress.   BP (!) 112/59   Pulse 68   Ht 5' 8" (1.727 m)   Wt 74.5 kg (164 lb 3.9 oz)   LMP  (LMP Unknown)   SpO2 98%   BMI 24.97 kg/m²    HENT:   Head: Normocephalic.   Eyes: Pupils are equal, round, and reactive to light. Conjunctivae are normal. No scleral icterus.   Neck: Neck supple. No JVD present. No thyromegaly present.   Cardiovascular: Normal rate and intact distal pulses. An irregularly irregular rhythm present. PMI is not displaced. Exam reveals no gallop and no friction rub.   Murmur heard.   Medium-pitched midsystolic murmur is present with a grade of 2/6 at the lower left sternal border. No JVD sitting and no edema  Pulmonary/Chest: Effort normal and breath sounds normal. No respiratory distress. She has no wheezes. She has no rales.   Abdominal: Soft. She exhibits no " distension. There is no splenomegaly or hepatomegaly. There is no tenderness.   Musculoskeletal: She exhibits no edema or tenderness.   In a wheelchair   Neurological: She is alert and oriented to person, place, and time.   Skin: Skin is warm and dry. She is not diaphoretic.   Psychiatric: She has a normal mood and affect. Her behavior is normal.       Lab Results   Component Value Date     08/27/2019    K 4.4 08/27/2019     08/27/2019    CO2 24 08/27/2019    BUN 39 (H) 08/27/2019    CREATININE 1.6 (H) 08/27/2019     (H) 08/27/2019    HGBA1C 6.9 (H) 08/24/2019    MG 1.9 08/26/2019    AST 18 08/27/2019    ALT 15 08/27/2019    ALBUMIN 2.8 (L) 08/27/2019    PROT 6.3 08/27/2019    BILITOT 0.4 08/27/2019    WBC 10.55 08/27/2019    HGB 10.9 (L) 08/27/2019    HCT 32.8 (L) 08/27/2019    MCV 88 08/27/2019     08/27/2019    TSH 3.950 02/13/2019    CHOL 148 07/12/2018    HDL 54 07/12/2018    LDLCALC 78.8 07/12/2018    TRIG 76 07/12/2018       Assessment:     1. Chest pain at rest : Atypical for myocardial ischemia but multiple risk factors   2. Chronic diastolic congestive heart failure : Compensated   3. Pulmonary hypertension : Moderate   4. Non-rheumatic tricuspid valve insufficiency    5. Persistent atrial fibrillation : rate controlled   6. Pericardial effusion : Resolved   7. Hyperlipemia, mixed:  on high intensity statin needing recheck    8. Long term current use of anticoagulant therapy, eliquis    9. Type 2 diabetes mellitus with diabetic nephropathy, with long-term current use of insulin      Episode of diaphoresis and weakness uncertain origin . Possible hypoglycemia  Plan:     Tiana was seen today for chest pain, palpitations, shortness of breath and excessive sweating.    Diagnoses and all orders for this visit:    Chest pain at rest  -     Cardiac PET Scan Stress; Future    Chronic diastolic congestive heart failure  Continue current regimen    Pulmonary hypertension    Non-rheumatic  tricuspid valve insufficiency    Persistent atrial fibrillation  Continue current regimen    Pericardial effusion    Hyperlipemia, mixed  -     Lipid panel; Future; Expected date: 09/23/2019  Continue current regimen    Long term current use of anticoagulant therapy, eliquis    Type 2 diabetes mellitus with diabetic nephropathy, with long-term current use of insulin

## 2019-09-24 LAB — FUNGUS SPEC CULT: NORMAL

## 2019-09-27 RX ORDER — AMLODIPINE BESYLATE 5 MG/1
TABLET ORAL
Qty: 90 TABLET | Refills: 0 | Status: SHIPPED | OUTPATIENT
Start: 2019-09-27 | End: 2019-11-20 | Stop reason: SDUPTHER

## 2019-09-27 RX ORDER — METOPROLOL TARTRATE 50 MG/1
TABLET ORAL
Qty: 180 TABLET | Refills: 0 | Status: SHIPPED | OUTPATIENT
Start: 2019-09-27 | End: 2019-11-20 | Stop reason: SDUPTHER

## 2019-09-29 NOTE — PROGRESS NOTES
Subjective:      Patient ID: Tiana Mead is a 89 y.o. female.    Chief Complaint: Diabetic polyneuropathy associated with type 2 diabetes dejuan (bilateral pcp Dr Bradley 9/4/19)    Tiana is a 89 y.o. female who presents to the clinic for evaluation and treatment of high risk feet. Tiana has a past medical history of Allergy, Anemia, Arthritis, Asthma, CHF (congestive heart failure) (5/2/2017), Chronic kidney disease, Degenerative disc disease, Diabetes mellitus type II, Essential hypertension (7/3/2012), Glaucoma, History of pseudogout (8/23/2012), Hyperlipidemia, Hypertension, Iritis, Myocardial infarction, Pneumonia, and Thyroid disease. The patient's chief complaint is long, thick toenails and calluses on both feet. Routine trimming helps. No other pedal concerns today.  This patient has documented high risk feet requiring routine maintenance secondary to diabetes mellitis and those secondary complications of diabetes, as mentioned..    PCP: Belen Bradley MD    Date Last Seen by PCP:   Chief Complaint   Patient presents with    Diabetic polyneuropathy associated with type 2 diabetes dejuan     bilateral pcp Dr Bradley 9/4/19         Current shoe gear:  Dm shoes      Hemoglobin A1C   Date Value Ref Range Status   08/24/2019 6.9 (H) 4.0 - 5.6 % Final     Comment:     ADA Screening Guidelines:  5.7-6.4%  Consistent with prediabetes  >or=6.5%  Consistent with diabetes  High levels of fetal hemoglobin interfere with the HbA1C  assay. Heterozygous hemoglobin variants (HbS, HgC, etc)do  not significantly interfere with this assay.   However, presence of multiple variants may affect accuracy.     07/10/2019 7.0 (H) 4.0 - 5.6 % Final     Comment:     ADA Screening Guidelines:  5.7-6.4%  Consistent with prediabetes  >or=6.5%  Consistent with diabetes  High levels of fetal hemoglobin interfere with the HbA1C  assay. Heterozygous hemoglobin variants (HbS, HgC, etc)do  not significantly interfere with this  assay.   However, presence of multiple variants may affect accuracy.     02/13/2019 7.3 (H) 4.0 - 5.6 % Final     Comment:     ADA Screening Guidelines:  5.7-6.4%  Consistent with prediabetes  >or=6.5%  Consistent with diabetes  High levels of fetal hemoglobin interfere with the HbA1C  assay. Heterozygous hemoglobin variants (HbS, HgC, etc)do  not significantly interfere with this assay.   However, presence of multiple variants may affect accuracy.               Patient Active Problem List   Diagnosis    Hyperlipemia, mixed    Generalized osteoarthritis of multiple sites    Chronic iritis - Left Eye    Osteoarthritis of both knees    Chondrocalcinosis    Peripheral angiopathy    Hyperuricemia    Helicobacter pylori gastritis    Old MI (myocardial infarction), 2013    Persistent atrial fibrillation    Type 2 diabetes mellitus with diabetic polyneuropathy, with long-term current use of insulin    Long term current use of anticoagulant therapy, eliquis    History of chest pain at rest    CKD (chronic kidney disease) stage 4, GFR 15-29 ml/min    Fuchs' corneal dystrophy    Pericardial effusion    H/O Deep vein thrombosis (DVT)    Aortic atherosclerosis    Chronic diastolic congestive heart failure    Type 2 diabetes mellitus with diabetic nephropathy, with long-term current use of insulin    Diabetic polyneuropathy associated with type 2 diabetes mellitus    Primary hypothyroidism    Moderate persistent asthma without complication    Primary open angle glaucoma (POAG) of both eyes, moderate stage    Pulmonary hypertension    Non-rheumatic tricuspid valve insufficiency    Tortuous aorta    Chest pain at rest    Allergic conjunctivitis of both eyes    Status post cataract extraction and insertion of intraocular lens    Chest pain, rule out acute myocardial infarction    Pseudogout, knees    Fever    Effusion, left knee       Current Outpatient Medications on File Prior to Visit  "  Medication Sig Dispense Refill    acetaminophen (TYLENOL) 500 MG tablet Take 500 mg by mouth every 6 (six) hours as needed for Pain.      albuterol 90 mcg/actuation inhaler Inhale 2 puffs into the lungs every 4 (four) hours as needed. 1 Inhaler 11    apixaban (ELIQUIS) 2.5 mg Tab Take 1 tablet (2.5 mg total) by mouth 2 (two) times daily. 60 tablet 5    atorvastatin (LIPITOR) 40 MG tablet Take 1 tablet (40 mg total) by mouth once daily. 90 tablet 3    atorvastatin (LIPITOR) 40 MG tablet TAKE 1 TABLET(40 MG) BY MOUTH EVERY DAY 90 tablet 0    BD ULTRA-FINE SHORT PEN NEEDLE 31 gauge x 5/16" Ndle USE WITH INSULIN PENS AS DIRECTED 100 each 11    blood sugar diagnostic (ONETOUCH ULTRA TEST) Strp Inject 1 strip into the skin 3 (three) times daily. 300 each 4    diclofenac sodium (VOLTAREN) 1 % Gel Apply topically 4 (four) times daily as needed. 100 Tube 3    dorzolamide (TRUSOPT) 2 % ophthalmic solution Place 1 drop into both eyes 2 (two) times daily. 10 mL 4    fluticasone-salmeterol 250-50 mcg/dose (ADVAIR DISKUS) 250-50 mcg/dose diskus inhaler INHALE 1 PUFF BY MOUTH INTO THE LUNGS NIGHTLY 1 each 11    furosemide (LASIX) 40 MG tablet Take 1 tablet (40 mg total) by mouth 2 (two) times daily. 180 tablet 3    insulin aspart protamine-insulin aspart (NOVOLOG MIX 70-30FLEXPEN U-100) 100 unit/mL (70-30) InPn pen INJECT 10 UNITS UNDER THE SKIN EVERY MORNING BEFORE BREAKFAST 15 mL 0    ipratropium (ATROVENT) 0.03 % nasal spray USE 2 SPRAYS IN EACH NOSTRIL TWICE DAILY 60 mL 1    isosorbide mononitrate (IMDUR) 60 MG 24 hr tablet TAKE 1 TABLET BY MOUTH EVERY MORNING FOR HEART, to prevent chest pain and reduce shortness of breath 90 tablet 3    lancets Misc 1 Device by Misc.(Non-Drug; Combo Route) route 3 (three) times daily before meals. Please provide the insurance company preferred product. 300 each 4    latanoprost 0.005 % ophthalmic solution Place 1 drop into the right eye every evening. (Patient taking " differently: Place 1 drop into both eyes every evening. ) 3 Bottle 6    levothyroxine (SYNTHROID) 75 MCG tablet TAKE 1 TABLET BY MOUTH EVERY DAY BEFORE BREAKFAST 90 tablet 2    meclizine (ANTIVERT) 12.5 mg tablet Take 1 tablet (12.5 mg total) by mouth 3 (three) times daily as needed. 50 tablet 1    multivit-mineral-iron-lutein (CENTRUM SILVER ULTRA WOMEN'S) Tab Take 1 tablet by mouth once daily.      nystatin (MYCOSTATIN) powder Apply topically 4 (four) times daily. 60 g 3    ondansetron (ZOFRAN) 4 MG tablet TAKE ONE TABLET BY MOUTH EVERY 12 HOURS AS NEEDED FOR NAUSEA 30 tablet 0    TRUE METRIX GLUCOSE METER Misc TEST TID  4    TRUE METRIX GLUCOSE TEST STRIP Strp USE TO TEST BLOOD SUGAR WITH MEALS THREE TIMES DAILY 300 strip 4    TRUEPLUS LANCETS 30 gauge Misc USE TO TEST BLOOD SUGAR TID AC  2    TRUEPLUS LANCETS 33 gauge Misc TESTING THREE TIMES DAILY 300 each 0     No current facility-administered medications on file prior to visit.        Review of patient's allergies indicates:   Allergen Reactions    Codeine      Other reaction(s): Itching  Other reaction(s): Nausea       Past Surgical History:   Procedure Laterality Date    CARDIAC CATHETERIZATION      CATARACT EXTRACTION W/  INTRAOCULAR LENS IMPLANT Left n/a    CATARACT EXTRACTION W/  INTRAOCULAR LENS IMPLANT Right 10/8/2018    With Femtosecond LASER assist (Dr. Henson)    CHOLECYSTECTOMY      EYE SURGERY      gall stone      HYSTERECTOMY      VARICOSE VEIN SURGERY         Family History   Problem Relation Age of Onset    Diabetes Mother     Hypertension Mother     Glaucoma Mother     Hypertension Father     Diabetes Son     No Known Problems Daughter     Diabetes Daughter     No Known Problems Son        Social History     Socioeconomic History    Marital status:      Spouse name: Not on file    Number of children: Not on file    Years of education: Not on file    Highest education level: Not on file   Occupational History  "   Not on file   Social Needs    Financial resource strain: Not on file    Food insecurity:     Worry: Not on file     Inability: Not on file    Transportation needs:     Medical: Not on file     Non-medical: Not on file   Tobacco Use    Smoking status: Never Smoker    Smokeless tobacco: Never Used   Substance and Sexual Activity    Alcohol use: No    Drug use: No    Sexual activity: Never   Lifestyle    Physical activity:     Days per week: Not on file     Minutes per session: Not on file    Stress: Not on file   Relationships    Social connections:     Talks on phone: Not on file     Gets together: Not on file     Attends Yazdanism service: Not on file     Active member of club or organization: Not on file     Attends meetings of clubs or organizations: Not on file     Relationship status: Not on file   Other Topics Concern    Not on file   Social History Narrative    Not on file           Review of Systems   Constitution: Negative for chills, decreased appetite and fever.   Cardiovascular: Negative for leg swelling.   Skin: Positive for dry skin and nail changes. Negative for color change, flushing, itching and poor wound healing.   Musculoskeletal: Positive for arthritis. Negative for falls, joint pain, joint swelling and myalgias.   Gastrointestinal: Negative for nausea and vomiting.   Neurological: Negative for loss of balance, numbness and paresthesias.           Objective:       Vitals:    09/23/19 1323   BP: 127/79   Pulse: 68   Weight: 74.4 kg (164 lb)   Height: 5' 8" (1.727 m)   PainSc: 0-No pain        Physical Exam   Constitutional: She is oriented to person, place, and time. She appears well-developed and well-nourished.   Cardiovascular:   Dorsalis pedis and posterior tibial pulses are diminished Bilaterally. Toes are cool to touch. Feet are warm proximally.There is decreased digital hair. Skin is atrophic, slightly hyperpigmented, and mildly edematous       Musculoskeletal: Normal range " of motion. She exhibits no edema or tenderness.   Adequate joint range of motion without pain, limitation, nor crepitation Bilateral feet and ankle joints. Muscle strength is 5/5 in all groups bilaterally.         Neurological: She is alert and oriented to person, place, and time.   [place] 5.07 monofilamant testing is diminished Ney feet. Sharp/dull sensation diminished Bilaterally. Light touch absent Bilaterally.       Skin: Skin is warm, dry and intact. No ecchymosis and no lesion noted. No erythema.   Nails x10 are elongated by  4-5 mm's, thickened by 2-5 mm's, dystrophic, and are darkened in  coloration . Xerosis Bilaterally. No open lesions noted.    Hyperkeratotic tissue noted to lateral 5th toe b/l, distal 3rd toe b/l, medial L 5th toe      Psychiatric: She has a normal mood and affect. Her behavior is normal.   Vitals reviewed.            Assessment:       Encounter Diagnoses   Name Primary?    Diabetic polyneuropathy associated with type 2 diabetes mellitus Yes    Onychomycosis due to dermatophyte     Corn or callus          Plan:       Tiana was seen today for diabetic polyneuropathy associated with type 2 diabetes dejuan.    Diagnoses and all orders for this visit:    Diabetic polyneuropathy associated with type 2 diabetes mellitus    Onychomycosis due to dermatophyte    Corn or callus      I counseled the patient on her conditions, their implications and medical management.      Routine Foot Care    Performed by:  Filiberto Nath. FABIANA  Authorized by:  Patient     Consent Done?:  Yes (Verbal)     Nail Care Type:  Debride  Location(s): All  (Left 1st Toe, Left 3rd Toe, Left 2nd Toe, Left 4th Toe, Left 5th Toe, Right 1st Toe, Right 2nd Toe, Right 3rd Toe, Right 4th Toe and Right 5th Toe)  Patient tolerance:  Patient tolerated the procedure well with no immediate complications     With patient's permission, the toenails mentioned above were aggressively reduced and debrided using a nail  nipper, removing all offending nail and debris. The patient will continue to monitor the areas daily, inspect the feet, wear protective shoe gear when ambulatory, and moisturizer to maintain skin integrity.      Callus Care Type: Debride    With patient's permission, the calluses/hyperkeratotic lesions mentioned above were aggressively reduced and debrided using a number 15 blade. The patient will continue to monitor the areas daily, inspect the feet, wear protective shoe gear when ambulatory, and moisturizer to maintain skin integrity.     Shoe inspection. Diabetic Foot Education. Patient reminded of the importance of good nutrition and blood sugar control to help prevent podiatric complications of diabetes. Patient instructed on proper foot hygeine. We discussed wearing proper shoe gear, daily foot inspections and Diabetic foot education in detail.    Return to clinic in 3-6 months or sooner if problems arise

## 2019-09-30 ENCOUNTER — TELEPHONE (OUTPATIENT)
Dept: INTERNAL MEDICINE | Facility: CLINIC | Age: 84
End: 2019-09-30

## 2019-09-30 NOTE — TELEPHONE ENCOUNTER
Contact: self/[phone]  Pt is calling to speak with Dr. Bradley in regards to her Stress test to go and take on 10/10.  did not order for the pt she just has some concerns about taking it and wanted to speak with Dr. Bradley about what she thinks. Please call and advise.      Thank You         Please advise

## 2019-10-01 NOTE — TELEPHONE ENCOUNTER
Patient is very nervous about PET stress test on 10-10-19, and would really prefer pcp to call her about some concerns she is having.

## 2019-10-01 NOTE — PROGRESS NOTES
Assessment /Plan     For exam results, see Encounter Report.    Primary open angle glaucoma (POAG) of both eyes, moderate stage    Chronic iritis - Left Eye    Status post cataract extraction and insertion of intraocular lens, unspecified laterality      + Asthma  CHF      POAG  Stable Glaucoma & Patient near target IOP range    Steroid responder    CCT  517 / 574    Mid teens    Both Eyes --> good adherence  Trusopt BID  PG q day --> iritis quiet  Alphagan BID --> stop --> itchy // Follicles --> resolved / improved greatly  No BB --> Asthma    Hx Ryan in Past    Hold SLT OS    PC IOL OU  Quiet    Fuchs K dystrophy --> edema OS  Observe  Jony 128 2% QID      NIDDM  No BDR or CSME  control     ERM OD  Not VS  Observe      Plan  RTC 6 months IOP & DFE  RTC sooner prn with good understanding

## 2019-10-01 NOTE — TELEPHONE ENCOUNTER
She was worried about her medication on the morning of the Pet STRESS test  I recommend she take all of her medication as usual EXCEPT do not take insulin that morning, to prevent hypoglycemia in the event the test is delayed or runs late and she does not have time to get lunch

## 2019-10-03 ENCOUNTER — PATIENT OUTREACH (OUTPATIENT)
Dept: ADMINISTRATIVE | Facility: HOSPITAL | Age: 84
End: 2019-10-03

## 2019-10-03 RX ORDER — INSULIN ASPART 100 [IU]/ML
INJECTION, SUSPENSION SUBCUTANEOUS
Qty: 15 ML | Refills: 0 | Status: SHIPPED | OUTPATIENT
Start: 2019-10-03 | End: 2019-11-20 | Stop reason: SDUPTHER

## 2019-10-03 NOTE — PROGRESS NOTES
Rcvd notification from [facility] RE: pt got flu shot 9/24/19. Immunizations updated. Chart review completed. Pt had eye exam 6/2019 [facility]/ Dr. Prado, negative for DM retinopathy- results entered. Pt due for labs, appt has been scheduled. Needs shingles & tetanus vaccines.

## 2019-10-09 ENCOUNTER — TELEPHONE (OUTPATIENT)
Dept: INTERNAL MEDICINE | Facility: CLINIC | Age: 84
End: 2019-10-09

## 2019-10-10 ENCOUNTER — CLINICAL SUPPORT (OUTPATIENT)
Dept: CARDIOLOGY | Facility: CLINIC | Age: 84
End: 2019-10-10
Attending: INTERNAL MEDICINE
Payer: MEDICARE

## 2019-10-10 VITALS — DIASTOLIC BLOOD PRESSURE: 50 MMHG | SYSTOLIC BLOOD PRESSURE: 118 MMHG | HEART RATE: 74 BPM

## 2019-10-10 DIAGNOSIS — R07.9 CHEST PAIN AT REST: ICD-10-CM

## 2019-10-10 LAB
CFR FLOW - ANTERIOR: 2.21 CC/MIN/G
CFR FLOW - INFERIOR: 1.95 CC/MIN/G
CFR FLOW - LATERAL: 2.22 CC/MIN/G
CFR FLOW - MAX: 2.9 CC/MIN/G
CFR FLOW - MIN: 1.3 CC/MIN/G
CFR FLOW - SEPTAL: 1.92 CC/MIN/G
CFR FLOW - WHOLE HEART: 2.08 CC/MIN/G
CV PHARM DOSE: 41.8 MG
CV STRESS BASE HR: 71 BPM
DIASTOLIC BLOOD PRESSURE: 83 MMHG
END DIASTOLIC INDEX-HIGH: 170 ML/M2
END SYSTOLIC INDEX-HIGH: 70 ML/M2
NUC REST DIASTOLIC VOLUME INDEX: 32
NUC REST EJECTION FRACTION: 90
NUC REST SYSTOLIC VOLUME INDEX: 3
NUC STRESS DIASTOLIC VOLUME INDEX: 45
NUC STRESS EJECTION FRACTION: 90 %
NUC STRESS SYSTOLIC VOLUME INDEX: 5
OHS CV CPX 85 PERCENT MAX PREDICTED HEART RATE MALE: 105
OHS CV CPX MAX PREDICTED HEART RATE: 123
OHS CV CPX PATIENT IS FEMALE: 1
OHS CV CPX PATIENT IS MALE: 0
OHS CV CPX PEAK DIASTOLIC BLOOD PRESSURE: 61 MMHG
OHS CV CPX PEAK HEAR RATE: 80 BPM
OHS CV CPX PEAK RATE PRESSURE PRODUCT: NORMAL
OHS CV CPX PEAK SYSTOLIC BLOOD PRESSURE: 127 MMHG
OHS CV CPX PERCENT MAX PREDICTED HEART RATE ACHIEVED: 65
OHS CV CPX RATE PRESSURE PRODUCT PRESENTING: NORMAL
REST FLOW - ANTERIOR: 0.97 CC/MIN/G
REST FLOW - INFERIOR: 1.34 CC/MIN/G
REST FLOW - LATERAL: 0.99 CC/MIN/G
REST FLOW - MAX: 1.7 CC/MIN/G
REST FLOW - MIN: 0.7 CC/MIN/G
REST FLOW - SEPTAL: 1.29 CC/MIN/G
REST FLOW - WHOLE HEART: 1.15
RETIRED EF AND QEF - SEE NOTES: 51 %
STRESS ECHO TARGET HR: 107.1 BPM
STRESS FLOW - ANTERIOR: 2.08 CC/MIN/G
STRESS FLOW - INFERIOR: 2.6 CC/MIN/G
STRESS FLOW - LATERAL: 2.16 CC/MIN/G
STRESS FLOW - MAX: 3.2 CC/MIN/G
STRESS FLOW - MIN: 1.4 CC/MIN/G
STRESS FLOW - SEPTAL: 2.44 CC/MIN/G
STRESS FLOW - WHOLE HEART: 2.32 CC/MIN/G
SYSTOLIC BLOOD PRESSURE: 146 MMHG

## 2019-10-10 PROCEDURE — 78492 MYOCRD IMG PET MLT RST&STRS: CPT | Mod: HCNC,S$GLB,, | Performed by: INTERNAL MEDICINE

## 2019-10-10 PROCEDURE — 99999 PR PBB SHADOW E&M-EST. PATIENT-LVL I: ICD-10-PCS | Mod: PBBFAC,HCNC,,

## 2019-10-10 PROCEDURE — 93015 CV STRESS TEST SUPVJ I&R: CPT | Mod: HCNC,S$GLB,, | Performed by: INTERNAL MEDICINE

## 2019-10-10 PROCEDURE — 99999 PR PBB SHADOW E&M-EST. PATIENT-LVL I: CPT | Mod: PBBFAC,HCNC,,

## 2019-10-10 PROCEDURE — 78492 CARDIAC PET SCAN STRESS (CUPID ONLY): ICD-10-PCS | Mod: HCNC,S$GLB,, | Performed by: INTERNAL MEDICINE

## 2019-10-10 PROCEDURE — A9555 CARDIAC PET SCAN STRESS (CUPID ONLY): ICD-10-PCS | Mod: HCNC,S$GLB,, | Performed by: INTERNAL MEDICINE

## 2019-10-10 PROCEDURE — 93015 CARDIAC PET SCAN STRESS (CUPID ONLY): ICD-10-PCS | Mod: HCNC,S$GLB,, | Performed by: INTERNAL MEDICINE

## 2019-10-10 PROCEDURE — A9555 RB82 RUBIDIUM: HCPCS | Mod: HCNC,S$GLB,, | Performed by: INTERNAL MEDICINE

## 2019-10-10 RX ORDER — DIPYRIDAMOLE 5 MG/ML
41.75 INJECTION INTRAVENOUS
Status: COMPLETED | OUTPATIENT
Start: 2019-10-10 | End: 2019-10-10

## 2019-10-10 RX ADMIN — DIPYRIDAMOLE 41.75 MG: 5 INJECTION INTRAVENOUS at 11:10

## 2019-10-10 NOTE — TELEPHONE ENCOUNTER
----- Message from Tabatha Burrell sent at 10/10/2019  2:11 PM CDT -----  Contact: Pt self Mobile/Home [phone]  Caller is requesting to schedule their annual screening mammogram appointment. Order is not listed in Epic.  Please enter order and contact patient to schedule.  Where would they like the mammogram performed?:  At the [facility] location   Would the patient rather receive a phone call back or a response via [facility]?:   A phone call please.

## 2019-10-10 NOTE — TELEPHONE ENCOUNTER
Spoke with patient and informed her that I have requested mammo orders from pcp, and once they are placed, I will call her to schedule

## 2019-10-11 ENCOUNTER — TELEPHONE (OUTPATIENT)
Dept: CARDIOLOGY | Facility: CLINIC | Age: 84
End: 2019-10-11

## 2019-10-11 NOTE — TELEPHONE ENCOUNTER
----- Message from Mickey Erwin MD sent at 10/10/2019  6:20 PM CDT -----  Please inform the patient that the stress test is negative indicating her chest pain is not from her heart.

## 2019-10-14 ENCOUNTER — PATIENT OUTREACH (OUTPATIENT)
Dept: ADMINISTRATIVE | Facility: OTHER | Age: 84
End: 2019-10-14

## 2019-10-14 DIAGNOSIS — Z12.31 ENCOUNTER FOR SCREENING MAMMOGRAM FOR MALIGNANT NEOPLASM OF BREAST: Primary | ICD-10-CM

## 2019-10-14 NOTE — TELEPHONE ENCOUNTER
Dr Bradley, Ms Mead said that you all discussed having her take 5 extra units at night if her glucose was running high which means she doesn't have enough and running short when she does have to take 5 units so she needs a new rx for her insulin she will be running out tomorrow afternoon.

## 2019-10-14 NOTE — TELEPHONE ENCOUNTER
Please call patient - I'm not sure what her question is regarding mammogram.    Please also confirm insulin that is needed - looks like Dr. Bradley sent to pharmacy 10/3/19.

## 2019-10-14 NOTE — TELEPHONE ENCOUNTER
Mammogram booked      ----- Message from Rama Gonzalez sent at 10/14/2019 11:52 AM CDT -----  Contact: [phone]  Patient is requesting a call from the office regarding mammogram requesting and medication.    Please advise, thank you

## 2019-10-15 ENCOUNTER — HOSPITAL ENCOUNTER (OUTPATIENT)
Dept: RADIOLOGY | Facility: HOSPITAL | Age: 84
Discharge: HOME | End: 2019-10-15
Attending: INTERNAL MEDICINE
Payer: MEDICARE

## 2019-10-15 ENCOUNTER — OFFICE VISIT (OUTPATIENT)
Dept: OPHTHALMOLOGY | Facility: CLINIC | Age: 84
End: 2019-10-15
Payer: MEDICARE

## 2019-10-15 ENCOUNTER — OFFICE VISIT (OUTPATIENT)
Dept: RHEUMATOLOGY | Facility: CLINIC | Age: 84
End: 2019-10-15
Payer: MEDICARE

## 2019-10-15 VITALS
BODY MASS INDEX: 25.42 KG/M2 | HEART RATE: 65 BPM | WEIGHT: 167.75 LBS | HEIGHT: 68 IN | DIASTOLIC BLOOD PRESSURE: 61 MMHG | SYSTOLIC BLOOD PRESSURE: 114 MMHG

## 2019-10-15 DIAGNOSIS — H20.10 CHRONIC IRITIS: ICD-10-CM

## 2019-10-15 DIAGNOSIS — M25.561 RIGHT KNEE PAIN, UNSPECIFIED CHRONICITY: ICD-10-CM

## 2019-10-15 DIAGNOSIS — Z96.1 STATUS POST CATARACT EXTRACTION AND INSERTION OF INTRAOCULAR LENS, UNSPECIFIED LATERALITY: ICD-10-CM

## 2019-10-15 DIAGNOSIS — M11.20 PSEUDOGOUT: ICD-10-CM

## 2019-10-15 DIAGNOSIS — H40.1132 PRIMARY OPEN ANGLE GLAUCOMA (POAG) OF BOTH EYES, MODERATE STAGE: Primary | ICD-10-CM

## 2019-10-15 DIAGNOSIS — Z98.49 STATUS POST CATARACT EXTRACTION AND INSERTION OF INTRAOCULAR LENS, UNSPECIFIED LATERALITY: ICD-10-CM

## 2019-10-15 DIAGNOSIS — M25.561 RIGHT KNEE PAIN, UNSPECIFIED CHRONICITY: Primary | ICD-10-CM

## 2019-10-15 PROCEDURE — 99215 OFFICE O/P EST HI 40 MIN: CPT | Mod: HCNC,S$GLB,, | Performed by: INTERNAL MEDICINE

## 2019-10-15 PROCEDURE — 1101F PR PT FALLS ASSESS DOC 0-1 FALLS W/OUT INJ PAST YR: ICD-10-PCS | Mod: HCNC,CPTII,S$GLB, | Performed by: INTERNAL MEDICINE

## 2019-10-15 PROCEDURE — 73562 X-RAY EXAM OF KNEE 3: CPT | Mod: TC,HCNC,RT

## 2019-10-15 PROCEDURE — 92012 PR EYE EXAM, EST PATIENT,INTERMED: ICD-10-PCS | Mod: HCNC,S$GLB,, | Performed by: OPHTHALMOLOGY

## 2019-10-15 PROCEDURE — 73562 XR KNEE 3 VIEW RIGHT: ICD-10-PCS | Mod: 26,HCNC,RT, | Performed by: RADIOLOGY

## 2019-10-15 PROCEDURE — 99999 PR PBB SHADOW E&M-EST. PATIENT-LVL III: ICD-10-PCS | Mod: PBBFAC,HCNC,, | Performed by: INTERNAL MEDICINE

## 2019-10-15 PROCEDURE — 99499 RISK ADDL DX/OHS AUDIT: ICD-10-PCS | Mod: HCNC,S$GLB,, | Performed by: OPHTHALMOLOGY

## 2019-10-15 PROCEDURE — 73562 X-RAY EXAM OF KNEE 3: CPT | Mod: 26,HCNC,RT, | Performed by: RADIOLOGY

## 2019-10-15 PROCEDURE — 99999 PR PBB SHADOW E&M-EST. PATIENT-LVL III: CPT | Mod: PBBFAC,HCNC,, | Performed by: INTERNAL MEDICINE

## 2019-10-15 PROCEDURE — 99215 PR OFFICE/OUTPT VISIT, EST, LEVL V, 40-54 MIN: ICD-10-PCS | Mod: HCNC,S$GLB,, | Performed by: INTERNAL MEDICINE

## 2019-10-15 PROCEDURE — 99999 PR PBB SHADOW E&M-EST. PATIENT-LVL II: CPT | Mod: PBBFAC,HCNC,, | Performed by: OPHTHALMOLOGY

## 2019-10-15 PROCEDURE — 92012 INTRM OPH EXAM EST PATIENT: CPT | Mod: HCNC,S$GLB,, | Performed by: OPHTHALMOLOGY

## 2019-10-15 PROCEDURE — 99499 UNLISTED E&M SERVICE: CPT | Mod: HCNC,S$GLB,, | Performed by: OPHTHALMOLOGY

## 2019-10-15 PROCEDURE — 99999 PR PBB SHADOW E&M-EST. PATIENT-LVL II: ICD-10-PCS | Mod: PBBFAC,HCNC,, | Performed by: OPHTHALMOLOGY

## 2019-10-15 PROCEDURE — 1101F PT FALLS ASSESS-DOCD LE1/YR: CPT | Mod: HCNC,CPTII,S$GLB, | Performed by: INTERNAL MEDICINE

## 2019-10-15 RX ORDER — INSULIN ASPART 100 [IU]/ML
INJECTION, SUSPENSION SUBCUTANEOUS
Qty: 15 ML | Refills: 1 | Status: SHIPPED | OUTPATIENT
Start: 2019-10-15 | End: 2019-11-20 | Stop reason: SDUPTHER

## 2019-10-15 NOTE — PROGRESS NOTES
Subjective:       Patient ID: Tiana Mead is a 89 y.o. female.    Chief Complaint: Disease Management    HPI    89 year old F with PMH of  DM II, asthma, CKD, iritis,  H/o DVT on eliquis, HTN, HL here for evaluation.  She was previously seen by Dr. Bean. She was admitted to the hospital for chest pain in august and was found to have left knee swelling.  Orthopedic surgery consulted, appreciate assistance           - aspiration performed 8/25: Gram stain negative, +Ca P crystals, 37k WBCs (88% segs).  She was given prednisone but her glucose went up.    She is having pain in right knee for 3 weeks. Reports that her pain level is 7/10, aching. Reports swelling in right knee.  Denies trauma to the knee.      Past Medical History:   Diagnosis Date    Allergy     Anemia     Arthritis     Asthma     CHF (congestive heart failure) 5/2/2017    Chronic kidney disease     Degenerative disc disease     Diabetes mellitus type II     Essential hypertension 7/3/2012    Glaucoma     History of pseudogout 8/23/2012    Hyperlipidemia     Hypertension     Iritis     Myocardial infarction     Pneumonia     Thyroid disease        Review of Systems   Constitutional: Positive for fatigue. Negative for activity change, appetite change, chills, diaphoresis, fever and unexpected weight change.   HENT: Negative for congestion, ear discharge, ear pain, facial swelling, mouth sores, sinus pressure, sneezing, sore throat, tinnitus and trouble swallowing.    Eyes: Negative for photophobia, pain, discharge, redness, itching and visual disturbance.   Respiratory: Negative for apnea, chest tightness, shortness of breath, wheezing and stridor.    Cardiovascular: Negative for leg swelling.   Gastrointestinal: Negative for abdominal distention, abdominal pain, anal bleeding, blood in stool, constipation, diarrhea and nausea.   Endocrine: Negative for cold intolerance and heat intolerance.   Genitourinary: Negative for difficulty  "urinating and dysuria.   Musculoskeletal: Positive for arthralgias and joint swelling. Negative for back pain, gait problem, myalgias and neck stiffness.   Skin: Negative for color change, pallor, rash and wound.   Neurological: Negative for dizziness, seizures, light-headedness and numbness.   Hematological: Negative for adenopathy. Does not bruise/bleed easily.   Psychiatric/Behavioral: Negative for sleep disturbance. The patient is not nervous/anxious.            Objective:   /61   Pulse 65   Ht 5' 8" (1.727 m)   Wt 76.1 kg (167 lb 12.3 oz)   LMP  (LMP Unknown)   BMI 25.51 kg/m²      Physical Exam   Constitutional: She is oriented to person, place, and time.   HENT:   Head: Normocephalic and atraumatic.   Right Ear: External ear normal.   Left Ear: External ear normal.   Nose: Nose normal.   Mouth/Throat: Oropharynx is clear and moist. No oropharyngeal exudate.   Eyes: Conjunctivae and EOM are normal. Pupils are equal, round, and reactive to light. Right eye exhibits no discharge. Left eye exhibits no discharge. No scleral icterus.   Neck: Neck supple. No JVD present. No thyromegaly present.   Cardiovascular: Normal rate, regular rhythm, normal heart sounds and intact distal pulses.  Exam reveals no gallop and no friction rub.    No murmur heard.  Pulmonary/Chest: Effort normal and breath sounds normal. No respiratory distress. She has no wheezes. She has no rales. She exhibits no tenderness.   Abdominal: Soft. Bowel sounds are normal. She exhibits no distension and no mass. There is no tenderness. There is no rebound and no guarding.   Lymphadenopathy:     She has no cervical adenopathy.   Neurological: She is alert and oriented to person, place, and time. No cranial nerve deficit. Gait normal. Coordination normal.   Skin: Skin is dry. No rash noted. No erythema. No pallor.     Psychiatric: Affect and judgment normal.   Musculoskeletal: She exhibits edema, tenderness and deformity.   Right knee with " bony hypertrophy and mild swelling              Left knee xray (2019): I personally reviewed; Increased moderate left suprapatellar synovial complex/effusion.    Unchanged severe left patellofemoral DJD.    Unchanged amorphous calcifications projecting in the cartilage of the medial and lateral compartments of the left knee femoral tibial articulation.  No data to display     Assessment:     89 year old F with PMH of  DM II, asthma, CKD, iritis,  H/o DVT on eliquis, HTN, HL here for follow up of pseudogout of knees.  She is flaring from right knee. On exam, she has advanced DJD of right knee as well.  Will set her up with orthopedics given her advanced DJD.  No diagnosis found.        Plan:     ortho consultation  Told daughter and patient that we can consider colchicine or plaquenil (would need clearance from eye doctor given her eye issues)  (they prefer no medications at this time)  Avoid NSAIDs given CKD and eliquis  Right knee xray    FOLLOW Up with Dr. Bean

## 2019-10-15 NOTE — LETTER
October 15, 2019      Belen Bradley MD  [address]  [place] [address]           [facility]  [address]  [facility] [address]  Phone: [phone]  Fax: [phone]          Patient: Tiana Mead   MR Number: [id number]   YOB: 1925   Date of Visit: 10/15/2019       Dear Dr. Belen Bradley:    Thank you for referring Tiana Mead to me for evaluation. Attached you will find relevant portions of my assessment and plan of care.    If you have questions, please do not hesitate to call me. I look forward to following Tiana Mead along with you.    Sincerely,    Francia Moreno MD    Enclosure  CC:  No Recipients    If you would like to receive this communication electronically, please contact [email or web address] or [phone] to request more information on [facility] [facility] access.    For providers and/or their staff who would like to refer a patient to [facility], please contact us through our one-stop-shop provider referral line, [facility], at [phone].    If you feel you have received this communication in error or would no longer like to receive these types of communications, please e-mail [email or web address]

## 2019-10-17 ENCOUNTER — TELEPHONE (OUTPATIENT)
Dept: ORTHOPEDICS | Facility: CLINIC | Age: 84
End: 2019-10-17

## 2019-10-20 ENCOUNTER — PATIENT OUTREACH (OUTPATIENT)
Dept: ADMINISTRATIVE | Facility: OTHER | Age: 84
End: 2019-10-20

## 2019-10-21 DIAGNOSIS — M25.561 RIGHT KNEE PAIN, UNSPECIFIED CHRONICITY: Primary | ICD-10-CM

## 2019-10-22 ENCOUNTER — HOSPITAL ENCOUNTER (OUTPATIENT)
Dept: RADIOLOGY | Facility: HOSPITAL | Age: 84
Discharge: HOME | End: 2019-10-22
Attending: ORTHOPAEDIC SURGERY
Payer: MEDICARE

## 2019-10-22 ENCOUNTER — OFFICE VISIT (OUTPATIENT)
Dept: ORTHOPEDICS | Facility: CLINIC | Age: 84
End: 2019-10-22
Payer: MEDICARE

## 2019-10-22 VITALS — WEIGHT: 162.38 LBS | BODY MASS INDEX: 24.61 KG/M2 | HEIGHT: 68 IN

## 2019-10-22 DIAGNOSIS — M17.11 PRIMARY OSTEOARTHRITIS OF RIGHT KNEE: Primary | ICD-10-CM

## 2019-10-22 DIAGNOSIS — M25.561 RIGHT KNEE PAIN, UNSPECIFIED CHRONICITY: ICD-10-CM

## 2019-10-22 PROCEDURE — 73562 XR KNEE ORTHO RIGHT WITH FLEXION: ICD-10-PCS | Mod: 26,59,HCNC,LT | Performed by: RADIOLOGY

## 2019-10-22 PROCEDURE — 20610 DRAIN/INJ JOINT/BURSA W/O US: CPT | Mod: HCNC,RT,S$GLB, | Performed by: ORTHOPAEDIC SURGERY

## 2019-10-22 PROCEDURE — 73564 X-RAY EXAM KNEE 4 OR MORE: CPT | Mod: 26,HCNC,RT, | Performed by: RADIOLOGY

## 2019-10-22 PROCEDURE — 20610 LARGE JOINT ASPIRATION/INJECTION: R KNEE: ICD-10-PCS | Mod: HCNC,RT,S$GLB, | Performed by: ORTHOPAEDIC SURGERY

## 2019-10-22 PROCEDURE — 99999 PR PBB SHADOW E&M-EST. PATIENT-LVL IV: CPT | Mod: PBBFAC,HCNC,, | Performed by: ORTHOPAEDIC SURGERY

## 2019-10-22 PROCEDURE — 99214 OFFICE O/P EST MOD 30 MIN: CPT | Mod: 25,HCNC,S$GLB, | Performed by: ORTHOPAEDIC SURGERY

## 2019-10-22 PROCEDURE — 1101F PT FALLS ASSESS-DOCD LE1/YR: CPT | Mod: HCNC,CPTII,S$GLB, | Performed by: ORTHOPAEDIC SURGERY

## 2019-10-22 PROCEDURE — 99214 PR OFFICE/OUTPT VISIT, EST, LEVL IV, 30-39 MIN: ICD-10-PCS | Mod: 25,HCNC,S$GLB, | Performed by: ORTHOPAEDIC SURGERY

## 2019-10-22 PROCEDURE — 73564 XR KNEE ORTHO RIGHT WITH FLEXION: ICD-10-PCS | Mod: 26,HCNC,RT, | Performed by: RADIOLOGY

## 2019-10-22 PROCEDURE — 73564 X-RAY EXAM KNEE 4 OR MORE: CPT | Mod: TC,HCNC,RT

## 2019-10-22 PROCEDURE — 73562 X-RAY EXAM OF KNEE 3: CPT | Mod: 26,59,HCNC,LT | Performed by: RADIOLOGY

## 2019-10-22 PROCEDURE — 1101F PR PT FALLS ASSESS DOC 0-1 FALLS W/OUT INJ PAST YR: ICD-10-PCS | Mod: HCNC,CPTII,S$GLB, | Performed by: ORTHOPAEDIC SURGERY

## 2019-10-22 PROCEDURE — 99999 PR PBB SHADOW E&M-EST. PATIENT-LVL IV: ICD-10-PCS | Mod: PBBFAC,HCNC,, | Performed by: ORTHOPAEDIC SURGERY

## 2019-10-22 RX ORDER — TRIAMCINOLONE ACETONIDE 40 MG/ML
40 INJECTION, SUSPENSION INTRA-ARTICULAR; INTRAMUSCULAR
Status: DISCONTINUED | OUTPATIENT
Start: 2019-10-22 | End: 2019-10-22 | Stop reason: HOSPADM

## 2019-10-22 RX ADMIN — TRIAMCINOLONE ACETONIDE 40 MG: 40 INJECTION, SUSPENSION INTRA-ARTICULAR; INTRAMUSCULAR at 08:10

## 2019-10-22 NOTE — LETTER
October 22, 2019      Francia Moreno MD  [address]  [address]  [facility] [address]           [facility] - Orthopedics  [address] [facility] [address]  [facility] [address]  Phone: [phone]          Patient: Tiana Mead   MR Number: [id number]   YOB: 1925   Date of Visit: 10/22/2019       Dear Dr. Francia Moreno:    Thank you for referring Tiana Mead to me for evaluation. Attached you will find relevant portions of my assessment and plan of care.    If you have questions, please do not hesitate to call me. I look forward to following Tiana Mead along with you.    Sincerely,    Harsh Sandy III, MD    Enclosure  CC:  No Recipients    If you would like to receive this communication electronically, please contact [email or web address] or [phone] to request more information on [employer] Link access.    For providers and/or their staff who would like to refer a patient to [facility], please contact us through our one-stop-shop provider referral line, [facility], at [phone].    If you feel you have received this communication in error or would no longer like to receive these types of communications, please e-mail [email or web address]

## 2019-10-22 NOTE — PROGRESS NOTES
Subjective:     HPI:   Tiana Mead is a 89 y.o. female who presents for evaluation of right knee arthritis referred by rheumatology Dr. Goff    She has got many years of who arthritic type pain in both knees.  She saw Dr. [facility] in 2012 and discuss knee replacement it is unclear why she did not proceed.  In any event she feels like she is too old for that now she does not want surgery    She was admitted to the hospital in August for chest pain that time she had left knee swelling ortho was consulted and on August 25th she had an aspiration is positive for pseudogout and had 37 white blood cells she was given some oral prednisone but it increased her blood sugars    She was seen more recently by Rheumatology and referred here for some right knee pain and swelling.   She complains of global moderate right knee pain activity related and relieved with rest.    She has Tylenol and Voltaren gel listed as medications she cannot take NSAIDs due to kidney disease.  She has had injections in the past she is unsure if this was the left to the right knee. No bracing assistive devices today she has a limited community ambulator.  She lives with her daughter who is currently out of town so she came here on her own today       ROS:  The updated medical history is in the chart and has been reviewed. A review of systems is updated and there is no reported vision changes, ear/nose/mouth/throat complaints,  chest pain, shortness of breath, abdominal pain, urological complaints, fevers or chills, psychiatric complaints. Musculoskeletal and neurologcial symptoms are as documented. All other systems are negative.      Objective:   Exam:  There were no vitals filed for this visit.  Body mass index is 24.69 kg/m².    Physical examination included assessment of the patient's general appearance with particular attention to development, nutrition, body habitus, attention to grooming, and any evidence of distress.  Constitutional:  The patient is a well-developed, well-nourished patient in no acute distress.   Cardiovascular: Vascular examination included warmth and capillary refill as well inspection for edema and assessment of pedal pulses. Pulses are palpable and regular.  Musculoskeletal: Gait was assessed as to whether it was steady, non-antalgic, and/or required the use of an assist device. The patient was also asked to walk independently and get onto the examination table.  Skin: The skin was examined for any obvious rashes or lesions in the extremity.  Neurologic: Sensation is intact to light touch in the extremity. The patient has good coordination without hyperreflexia and is alert and oriented to person, place and time and has normal mood and affect.     All of the above were examined and found to be within normal limits except for the following pertinent clinical findings:    Slight antalgic gait no significant limp favoring the right knee  degree knee range of motion valgus alignment knee stable anterior-posterior varus and valgus stresses moderate effusion tender palpation medial lateral patellofemoral joint line significant flexion contracture but no extensor lag      Imaging:  Indication:  Right knee pain  Exam Ordered: Radiographs of the right knee include a standing anteroposterior view, a standing posterioanterior view, a lateral view in full flexion, and a sunrise view  Details of Examination: Todays exam shows evidence of joint space narrowing, osteophyte formation, and subchondral sclerosis, all consistent with degenerative arthritis of the knee.  No other significant findings are noted.  Impression:  Degenerative Arthritis, Right Knee        Assessment:     Primary osteoarthritis of right knee [M17.11]  Bilateral knee arthritis.  Recent diagnosis of pseudogout the left knee August 25th    Diabetes, A1c 6.9  Significant cardiac history including atrial fibrillation on Eliquis congestive heart failure mile in 2013  tricuspid insufficiency and pulmonary hypertension  Stage 4 chronic kidney disease  History of DVT     Plan:       The above findings were discussed with patient length. We discussed the risks of conservative versus surgical management knee arthritis. Conservative management consisting of anti-inflammatory medications, glucosamine/chondroitin sulfate, weight loss, physical therapy, activity modification, as well as injections (lubricant versus corticosteroid) was discussed at length. At this point considering the patient's level of activity, pain, and radiographic findings I recommend continued conservative management of the knee arthritis. Conservative management could involve treatment with anti-inflammatory medications.     The patient was given a handout with treatment strategies for hip and knee joint care prior to surgery from [employer], the [employer].   This included information regarding medications, injections, weight loss, exercise, braces, physical therapy, and alternative therapies.     Given the patient's age and medical comorbidities I think she has missed the window to be an acceptable risk candidate for knee replacement. She agrees and says she does not want surgery.    She would like a right knee steroid injection today.  We discussed she needs to watch her blood sugars closely.  Will give her an information sheet about non operative arthritis management and a knee home exercise conditioning program.    Would recommend ongoing follow-up through with Rheumatology or 1 of our Ortho [facility]'s for continued non operative arthritis management    Procedure:  The patient would like to proceed with a steroid injection into the knee.  We discussed the risks and benefits of cortisone injections.  After sterile preparation 1 cc of 40 mg of triamcinolone and 4 cc of 1% lidocaine was injected into the knee.  The patient tolerated the injection without any difficulty.  We discussed  that this can be repeated every 3-4 months at the earliest.   Right knee    Orders Placed This Encounter   Procedures    Large Joint Aspiration/Injection: R knee     This order was created via procedure documentation       Past Medical History:   Diagnosis Date    Allergy     Anemia     Arthritis     Asthma     CHF (congestive heart failure) 5/2/2017    Chronic kidney disease     Degenerative disc disease     Diabetes mellitus type II     Essential hypertension 7/3/2012    Glaucoma     History of pseudogout 8/23/2012    Hyperlipidemia     Hypertension     Iritis     Myocardial infarction     Pneumonia     Thyroid disease        Past Surgical History:   Procedure Laterality Date    CARDIAC CATHETERIZATION      CATARACT EXTRACTION W/  INTRAOCULAR LENS IMPLANT Left n/a    CATARACT EXTRACTION W/  INTRAOCULAR LENS IMPLANT Right 10/8/2018    With Femtosecond LASER assist (Dr. Henson)    CHOLECYSTECTOMY      EYE SURGERY      gall stone      HYSTERECTOMY      VARICOSE VEIN SURGERY         Family History   Problem Relation Age of Onset    Diabetes Mother     Hypertension Mother     Glaucoma Mother     Hypertension Father     Diabetes Son     No Known Problems Daughter     Diabetes Daughter     No Known Problems Son        Social History     Socioeconomic History    Marital status:      Spouse name: Not on file    Number of children: Not on file    Years of education: Not on file    Highest education level: Not on file   Occupational History    Not on file   Social Needs    Financial resource strain: Not on file    Food insecurity:     Worry: Not on file     Inability: Not on file    Transportation needs:     Medical: Not on file     Non-medical: Not on file   Tobacco Use    Smoking status: Never Smoker    Smokeless tobacco: Never Used   Substance and Sexual Activity    Alcohol use: No    Drug use: No    Sexual activity: Never   Lifestyle    Physical activity:     Days per  week: Not on file     Minutes per session: Not on file    Stress: Not on file   Relationships    Social connections:     Talks on phone: Not on file     Gets together: Not on file     Attends Mandaen service: Not on file     Active member of club or organization: Not on file     Attends meetings of clubs or organizations: Not on file     Relationship status: Not on file   Other Topics Concern    Not on file   Social History Narrative    Not on file

## 2019-10-22 NOTE — PROCEDURES
Large Joint Aspiration/Injection: R knee  Date/Time: 10/22/2019 8:40 AM  Performed by: Harsh Sandy III, MD  Authorized by: Harsh Sandy III, MD     Consent Done?:  Yes (Verbal)  Indications:  Pain  Timeout: Prior to procedure the correct patient, procedure, and site was verified    Anesthesia  Local anesthesia used  Anesthetic: lidocaine 1% without epinephrine  Anesthetic total: 4mL    Location:  Knee  Site:  R knee  Prep: Patient was prepped and draped in usual sterile fashion    Needle size:  21 G  Approach: anterior peripatellar.  Medications:  40 mg triamcinolone acetonide 40 mg/mL  Patient tolerance:  Patient tolerated the procedure well with no immediate complications

## 2019-10-27 LAB
ACID FAST MOD KINY STN SPEC: NORMAL
MYCOBACTERIUM SPEC QL CULT: NORMAL

## 2019-10-29 RX ORDER — AMLODIPINE BESYLATE 5 MG/1
TABLET ORAL
Qty: 90 TABLET | Refills: 0 | Status: SHIPPED | OUTPATIENT
Start: 2019-10-29 | End: 2019-11-20 | Stop reason: SDUPTHER

## 2019-11-06 ENCOUNTER — PATIENT OUTREACH (OUTPATIENT)
Dept: ADMINISTRATIVE | Facility: HOSPITAL | Age: 84
End: 2019-11-06

## 2019-11-13 ENCOUNTER — LAB VISIT (OUTPATIENT)
Dept: LAB | Facility: HOSPITAL | Age: 84
End: 2019-11-13
Attending: INTERNAL MEDICINE
Payer: MEDICARE

## 2019-11-13 DIAGNOSIS — E11.21 TYPE 2 DIABETES MELLITUS WITH DIABETIC NEPHROPATHY, WITH LONG-TERM CURRENT USE OF INSULIN: ICD-10-CM

## 2019-11-13 DIAGNOSIS — Z79.4 TYPE 2 DIABETES MELLITUS WITH DIABETIC NEPHROPATHY, WITH LONG-TERM CURRENT USE OF INSULIN: ICD-10-CM

## 2019-11-13 DIAGNOSIS — N18.4 CKD (CHRONIC KIDNEY DISEASE) STAGE 4, GFR 15-29 ML/MIN: Chronic | ICD-10-CM

## 2019-11-13 DIAGNOSIS — E78.2 HYPERLIPEMIA, MIXED: Chronic | ICD-10-CM

## 2019-11-13 LAB
ALBUMIN SERPL BCP-MCNC: 3.6 G/DL (ref 3.5–5.2)
ALP SERPL-CCNC: 177 U/L (ref 55–135)
ALT SERPL W/O P-5'-P-CCNC: 44 U/L (ref 10–44)
ANION GAP SERPL CALC-SCNC: 8 MMOL/L (ref 8–16)
AST SERPL-CCNC: 46 U/L (ref 10–40)
BASOPHILS # BLD AUTO: 0.04 K/UL (ref 0–0.2)
BASOPHILS NFR BLD: 0.6 % (ref 0–1.9)
BILIRUB SERPL-MCNC: 0.6 MG/DL (ref 0.1–1)
BUN SERPL-MCNC: 32 MG/DL (ref 10–30)
CALCIUM SERPL-MCNC: 9.7 MG/DL (ref 8.7–10.5)
CHLORIDE SERPL-SCNC: 105 MMOL/L (ref 95–110)
CHOLEST SERPL-MCNC: 154 MG/DL (ref 120–199)
CHOLEST/HDLC SERPL: 2.5 {RATIO} (ref 2–5)
CO2 SERPL-SCNC: 29 MMOL/L (ref 23–29)
CREAT SERPL-MCNC: 1.7 MG/DL (ref 0.5–1.4)
DIFFERENTIAL METHOD: ABNORMAL
EOSINOPHIL # BLD AUTO: 0.2 K/UL (ref 0–0.5)
EOSINOPHIL NFR BLD: 3.4 % (ref 0–8)
ERYTHROCYTE [DISTWIDTH] IN BLOOD BY AUTOMATED COUNT: 15.1 % (ref 11.5–14.5)
EST. GFR  (AFRICAN AMERICAN): 29.3 ML/MIN/1.73 M^2
EST. GFR  (NON AFRICAN AMERICAN): 25.5 ML/MIN/1.73 M^2
ESTIMATED AVG GLUCOSE: 166 MG/DL (ref 68–131)
GLUCOSE SERPL-MCNC: 116 MG/DL (ref 70–110)
HBA1C MFR BLD HPLC: 7.4 % (ref 4–5.6)
HCT VFR BLD AUTO: 39 % (ref 37–48.5)
HDLC SERPL-MCNC: 61 MG/DL (ref 40–75)
HDLC SERPL: 39.6 % (ref 20–50)
HGB BLD-MCNC: 12 G/DL (ref 12–16)
IMM GRANULOCYTES # BLD AUTO: 0.01 K/UL (ref 0–0.04)
IMM GRANULOCYTES NFR BLD AUTO: 0.2 % (ref 0–0.5)
LDLC SERPL CALC-MCNC: 81.4 MG/DL (ref 63–159)
LYMPHOCYTES # BLD AUTO: 1.6 K/UL (ref 1–4.8)
LYMPHOCYTES NFR BLD: 25.9 % (ref 18–48)
MCH RBC QN AUTO: 28.9 PG (ref 27–31)
MCHC RBC AUTO-ENTMCNC: 30.8 G/DL (ref 32–36)
MCV RBC AUTO: 94 FL (ref 82–98)
MONOCYTES # BLD AUTO: 1 K/UL (ref 0.3–1)
MONOCYTES NFR BLD: 15.7 % (ref 4–15)
NEUTROPHILS # BLD AUTO: 3.4 K/UL (ref 1.8–7.7)
NEUTROPHILS NFR BLD: 54.2 % (ref 38–73)
NONHDLC SERPL-MCNC: 93 MG/DL
NRBC BLD-RTO: 0 /100 WBC
PLATELET # BLD AUTO: 152 K/UL (ref 150–350)
PMV BLD AUTO: 12.3 FL (ref 9.2–12.9)
POTASSIUM SERPL-SCNC: 4.1 MMOL/L (ref 3.5–5.1)
PROT SERPL-MCNC: 6.8 G/DL (ref 6–8.4)
RBC # BLD AUTO: 4.15 M/UL (ref 4–5.4)
SODIUM SERPL-SCNC: 142 MMOL/L (ref 136–145)
TRIGL SERPL-MCNC: 58 MG/DL (ref 30–150)
WBC # BLD AUTO: 6.26 K/UL (ref 3.9–12.7)

## 2019-11-13 PROCEDURE — 83036 HEMOGLOBIN GLYCOSYLATED A1C: CPT | Mod: HCNC

## 2019-11-13 PROCEDURE — 36415 COLL VENOUS BLD VENIPUNCTURE: CPT | Mod: HCNC,PN

## 2019-11-13 PROCEDURE — 80053 COMPREHEN METABOLIC PANEL: CPT | Mod: HCNC

## 2019-11-13 PROCEDURE — 80061 LIPID PANEL: CPT | Mod: HCNC

## 2019-11-13 PROCEDURE — 85025 COMPLETE CBC W/AUTO DIFF WBC: CPT | Mod: HCNC

## 2019-11-20 ENCOUNTER — OFFICE VISIT (OUTPATIENT)
Dept: INTERNAL MEDICINE | Facility: CLINIC | Age: 84
End: 2019-11-20
Payer: MEDICARE

## 2019-11-20 VITALS
BODY MASS INDEX: 24.25 KG/M2 | OXYGEN SATURATION: 98 % | HEIGHT: 68 IN | DIASTOLIC BLOOD PRESSURE: 60 MMHG | WEIGHT: 160 LBS | SYSTOLIC BLOOD PRESSURE: 108 MMHG | HEART RATE: 55 BPM

## 2019-11-20 DIAGNOSIS — E11.42 TYPE 2 DIABETES MELLITUS WITH DIABETIC POLYNEUROPATHY, WITH LONG-TERM CURRENT USE OF INSULIN: Primary | Chronic | ICD-10-CM

## 2019-11-20 DIAGNOSIS — E03.9 PRIMARY HYPOTHYROIDISM: Chronic | ICD-10-CM

## 2019-11-20 DIAGNOSIS — I48.19 PERSISTENT ATRIAL FIBRILLATION: Chronic | ICD-10-CM

## 2019-11-20 DIAGNOSIS — J45.40 MODERATE PERSISTENT ASTHMA WITHOUT COMPLICATION: Chronic | ICD-10-CM

## 2019-11-20 DIAGNOSIS — M11.20 PSEUDOGOUT: ICD-10-CM

## 2019-11-20 DIAGNOSIS — M15.9 GENERALIZED OSTEOARTHRITIS OF MULTIPLE SITES: ICD-10-CM

## 2019-11-20 DIAGNOSIS — M25.551 RIGHT HIP PAIN: ICD-10-CM

## 2019-11-20 DIAGNOSIS — E78.2 HYPERLIPEMIA, MIXED: Chronic | ICD-10-CM

## 2019-11-20 DIAGNOSIS — Z79.4 TYPE 2 DIABETES MELLITUS WITH DIABETIC POLYNEUROPATHY, WITH LONG-TERM CURRENT USE OF INSULIN: Primary | Chronic | ICD-10-CM

## 2019-11-20 DIAGNOSIS — E79.0 HYPERURICEMIA WITHOUT SIGNS OF INFLAMMATORY ARTHRITIS AND TOPHACEOUS DISEASE: ICD-10-CM

## 2019-11-20 DIAGNOSIS — N18.4 CKD (CHRONIC KIDNEY DISEASE) STAGE 4, GFR 15-29 ML/MIN: Chronic | ICD-10-CM

## 2019-11-20 DIAGNOSIS — I25.2 OLD MI (MYOCARDIAL INFARCTION): ICD-10-CM

## 2019-11-20 DIAGNOSIS — M17.0 PRIMARY OSTEOARTHRITIS OF BOTH KNEES: ICD-10-CM

## 2019-11-20 PROCEDURE — 99214 PR OFFICE/OUTPT VISIT, EST, LEVL IV, 30-39 MIN: ICD-10-PCS | Mod: HCNC,S$GLB,, | Performed by: INTERNAL MEDICINE

## 2019-11-20 PROCEDURE — 99999 PR PBB SHADOW E&M-EST. PATIENT-LVL III: ICD-10-PCS | Mod: PBBFAC,HCNC,, | Performed by: INTERNAL MEDICINE

## 2019-11-20 PROCEDURE — 1126F AMNT PAIN NOTED NONE PRSNT: CPT | Mod: HCNC,S$GLB,, | Performed by: INTERNAL MEDICINE

## 2019-11-20 PROCEDURE — 1101F PT FALLS ASSESS-DOCD LE1/YR: CPT | Mod: HCNC,CPTII,S$GLB, | Performed by: INTERNAL MEDICINE

## 2019-11-20 PROCEDURE — 1159F MED LIST DOCD IN RCRD: CPT | Mod: HCNC,S$GLB,, | Performed by: INTERNAL MEDICINE

## 2019-11-20 PROCEDURE — 1126F PR PAIN SEVERITY QUANTIFIED, NO PAIN PRESENT: ICD-10-PCS | Mod: HCNC,S$GLB,, | Performed by: INTERNAL MEDICINE

## 2019-11-20 PROCEDURE — 99214 OFFICE O/P EST MOD 30 MIN: CPT | Mod: HCNC,S$GLB,, | Performed by: INTERNAL MEDICINE

## 2019-11-20 PROCEDURE — 1101F PR PT FALLS ASSESS DOC 0-1 FALLS W/OUT INJ PAST YR: ICD-10-PCS | Mod: HCNC,CPTII,S$GLB, | Performed by: INTERNAL MEDICINE

## 2019-11-20 PROCEDURE — 99999 PR PBB SHADOW E&M-EST. PATIENT-LVL III: CPT | Mod: PBBFAC,HCNC,, | Performed by: INTERNAL MEDICINE

## 2019-11-20 PROCEDURE — 1159F PR MEDICATION LIST DOCUMENTED IN MEDICAL RECORD: ICD-10-PCS | Mod: HCNC,S$GLB,, | Performed by: INTERNAL MEDICINE

## 2019-11-20 RX ORDER — ATORVASTATIN CALCIUM 40 MG/1
40 TABLET, FILM COATED ORAL DAILY
Qty: 90 TABLET | Refills: 3 | Status: SHIPPED | OUTPATIENT
Start: 2019-11-20 | End: 2021-02-15 | Stop reason: SDUPTHER

## 2019-11-20 RX ORDER — CLOTRIMAZOLE AND BETAMETHASONE DIPROPIONATE 10; .64 MG/G; MG/G
CREAM TOPICAL 2 TIMES DAILY
Qty: 45 G | Refills: 3 | Status: SHIPPED | OUTPATIENT
Start: 2019-11-20 | End: 2021-09-09 | Stop reason: SDUPTHER

## 2019-11-20 RX ORDER — INSULIN ASPART 100 [IU]/ML
INJECTION, SUSPENSION SUBCUTANEOUS
Qty: 15 ML | Refills: 11 | Status: SHIPPED | OUTPATIENT
Start: 2019-11-20 | End: 2020-12-21 | Stop reason: ALTCHOICE

## 2019-11-20 RX ORDER — FUROSEMIDE 40 MG/1
40 TABLET ORAL 2 TIMES DAILY
Qty: 180 TABLET | Refills: 3 | Status: SHIPPED | OUTPATIENT
Start: 2019-11-20 | End: 2020-12-11 | Stop reason: SDUPTHER

## 2019-11-20 RX ORDER — NITROGLYCERIN 0.4 MG/1
TABLET SUBLINGUAL
Qty: 25 TABLET | Refills: 6 | Status: SHIPPED | OUTPATIENT
Start: 2019-11-20 | End: 2021-08-10 | Stop reason: SDUPTHER

## 2019-11-20 RX ORDER — AMLODIPINE BESYLATE 5 MG/1
TABLET ORAL
Qty: 90 TABLET | Refills: 3 | Status: SHIPPED | OUTPATIENT
Start: 2019-11-20 | End: 2021-02-03 | Stop reason: SDUPTHER

## 2019-11-20 RX ORDER — FLUTICASONE PROPIONATE AND SALMETEROL 250; 50 UG/1; UG/1
POWDER RESPIRATORY (INHALATION)
Qty: 1 EACH | Refills: 11 | Status: ON HOLD | OUTPATIENT
Start: 2019-11-20 | End: 2020-11-04

## 2019-11-20 RX ORDER — METOPROLOL TARTRATE 50 MG/1
TABLET ORAL
Qty: 180 TABLET | Refills: 3 | Status: ON HOLD | OUTPATIENT
Start: 2019-11-20 | End: 2020-11-06 | Stop reason: SDUPTHER

## 2019-11-20 RX ORDER — DICLOFENAC SODIUM 10 MG/G
GEL TOPICAL 4 TIMES DAILY PRN
Qty: 100 TUBE | Refills: 3 | Status: SHIPPED | OUTPATIENT
Start: 2019-11-20 | End: 2020-07-01

## 2019-11-20 RX ORDER — LEVOTHYROXINE SODIUM 75 UG/1
75 TABLET ORAL
Qty: 90 TABLET | Refills: 3 | Status: SHIPPED | OUTPATIENT
Start: 2019-11-20 | End: 2020-12-11 | Stop reason: SDUPTHER

## 2019-11-20 RX ORDER — ONDANSETRON 4 MG/1
TABLET, FILM COATED ORAL
Qty: 30 TABLET | Refills: 0 | Status: SHIPPED | OUTPATIENT
Start: 2019-11-20 | End: 2021-02-04 | Stop reason: ALTCHOICE

## 2019-11-20 NOTE — PROGRESS NOTES
Subjective:       Patient ID: Tiana Mead is a 89 y.o. female.    Chief Complaint: Follow-up (4 month f/u )  DOING GREAT  Best visit in a long time  Lots of meaningful work to do    HPI  Review of Systems   Constitutional: Negative for activity change, appetite change, chills, fatigue, fever and unexpected weight change.   HENT: Negative for hearing loss.    Eyes: Negative for visual disturbance.   Respiratory: Negative for cough, chest tightness, shortness of breath and wheezing.    Cardiovascular: Negative for chest pain, palpitations and leg swelling.   Gastrointestinal: Negative for abdominal pain, constipation, nausea and vomiting.   Genitourinary: Negative for dysuria, frequency and urgency.   Musculoskeletal: Negative for arthralgias, back pain, gait problem, joint swelling and myalgias.   Skin: Negative for rash.   Neurological: Negative for light-headedness and headaches.   Psychiatric/Behavioral: Negative for dysphoric mood and sleep disturbance. The patient is not nervous/anxious.        Objective:      Physical Exam   Constitutional: She is oriented to person, place, and time. She appears well-developed and well-nourished. No distress.   HENT:   Head: Normocephalic and atraumatic.   Eyes: Conjunctivae are normal. No scleral icterus.   Neck: Normal range of motion. Neck supple.   Cardiovascular: Normal rate. Exam reveals no gallop and no friction rub.   No murmur heard.  Bradycardia   Pulmonary/Chest: Effort normal and breath sounds normal.   Abdominal: Soft. There is no tenderness.   Musculoskeletal: She exhibits no edema.   Lymphadenopathy:     She has no cervical adenopathy.   Neurological: She is alert and oriented to person, place, and time. She exhibits normal muscle tone. Coordination normal.   Skin: Skin is warm and dry.   Psychiatric: She has a normal mood and affect. Her behavior is normal.   Nursing note and vitals reviewed.      Assessment:       1. Type 2 diabetes mellitus with diabetic  polyneuropathy, with long-term current use of insulin    2. Pseudogout, knees    3. Primary hypothyroidism    4. Persistent atrial fibrillation    5. Old MI (myocardial infarction), 2013    6. Moderate persistent asthma without complication    7. Hyperuricemia    8. Hyperlipemia, mixed    9. CKD (chronic kidney disease) stage 4, GFR 15-29 ml/min    10. Primary osteoarthritis of both knees    11. Generalized osteoarthritis of multiple sites    12. Right hip pain        Plan:   Tiana was seen today for follow-up.    Diagnoses and all orders for this visit:    Type 2 diabetes mellitus with diabetic polyneuropathy, with long-term current use of insulin  -     Hemoglobin A1c; Future    Pseudogout, knees    Primary hypothyroidism  -     TSH; Future    Persistent atrial fibrillation    Old MI (myocardial infarction), 2013    Moderate persistent asthma without complication    Hyperuricemia    Hyperlipemia, mixed    CKD (chronic kidney disease) stage 4, GFR 15-29 ml/min  -     Comprehensive metabolic panel; Future    Primary osteoarthritis of both knees  -     diclofenac sodium (VOLTAREN) 1 % Gel; Apply topically 4 (four) times daily as needed.    Generalized osteoarthritis of multiple sites  -     diclofenac sodium (VOLTAREN) 1 % Gel; Apply topically 4 (four) times daily as needed.    Right hip pain  -     diclofenac sodium (VOLTAREN) 1 % Gel; Apply topically 4 (four) times daily as needed.    Other orders  -     clotrimazole-betamethasone 1-0.05% (LOTRISONE) cream; Apply topically 2 (two) times daily. For intertriginous itching  -     nitroGLYCERIN (NITROSTAT) 0.4 MG SL tablet; ONE TABLET UNDER THE TONGUE EVERY 5 MINUTES AS NEEDED FOR CHEST PAIN  -     ondansetron (ZOFRAN) 4 MG tablet; TAKE ONE TABLET BY MOUTH EVERY 12 HOURS AS NEEDED FOR NAUSEA OR DIZZINESS  -     atorvastatin (LIPITOR) 40 MG tablet; Take 1 tablet (40 mg total) by mouth once daily.  -     fluticasone-salmeterol diskus inhaler 250-50 mcg; INHALE 1 PUFF  BY MOUTH INTO THE LUNGS NIGHTLY  -     metoprolol tartrate (LOPRESSOR) 50 MG tablet; TAKE 1 TABLET(50 MG) BY MOUTH TWICE DAILY  -     amLODIPine (NORVASC) 5 MG tablet; TAKE 1 TABLET(5 MG) BY MOUTH EVERY MORNING  -     apixaban (ELIQUIS) 2.5 mg Tab; Take 1 tablet (2.5 mg total) by mouth 2 (two) times daily.  -     furosemide (LASIX) 40 MG tablet; Take 1 tablet (40 mg total) by mouth 2 (two) times daily.  -     insulin aspart protamine-insulin aspart (NOVOLOG MIX 70-30FLEXPEN U-100) 100 unit/mL (70-30) InPn pen; INJECT 10 UNITS UNDER THE SKIN ONCE DAILY EVERY MORNING BEFORE BREAKFAST  -     levothyroxine (SYNTHROID) 75 MCG tablet; Take 1 tablet (75 mcg total) by mouth before breakfast.    Follow up in about 4 months (around 3/20/2020).  Results for orders placed or performed in visit on 11/13/19   Hemoglobin A1c   Result Value Ref Range    Hemoglobin A1C 7.4 (H) 4.0 - 5.6 %    Estimated Avg Glucose 166 (H) 68 - 131 mg/dL   Comprehensive metabolic panel   Result Value Ref Range    Sodium 142 136 - 145 mmol/L    Potassium 4.1 3.5 - 5.1 mmol/L    Chloride 105 95 - 110 mmol/L    CO2 29 23 - 29 mmol/L    Glucose 116 (H) 70 - 110 mg/dL    BUN, Bld 32 (H) 10 - 30 mg/dL    Creatinine 1.7 (H) 0.5 - 1.4 mg/dL    Calcium 9.7 8.7 - 10.5 mg/dL    Total Protein 6.8 6.0 - 8.4 g/dL    Albumin 3.6 3.5 - 5.2 g/dL    Total Bilirubin 0.6 0.1 - 1.0 mg/dL    Alkaline Phosphatase 177 (H) 55 - 135 U/L    AST 46 (H) 10 - 40 U/L    ALT 44 10 - 44 U/L    Anion Gap 8 8 - 16 mmol/L    eGFR if African American 29.3 (A) >60 mL/min/1.73 m^2    eGFR if non African American 25.5 (A) >60 mL/min/1.73 m^2   CBC auto differential   Result Value Ref Range    WBC 6.26 3.90 - 12.70 K/uL    RBC 4.15 4.00 - 5.40 M/uL    Hemoglobin 12.0 12.0 - 16.0 g/dL    Hematocrit 39.0 37.0 - 48.5 %    Mean Corpuscular Volume 94 82 - 98 fL    Mean Corpuscular Hemoglobin 28.9 27.0 - 31.0 pg    Mean Corpuscular Hemoglobin Conc 30.8 (L) 32.0 - 36.0 g/dL    RDW 15.1 (H) 11.5 -  14.5 %    Platelets 152 150 - 350 K/uL    MPV 12.3 9.2 - 12.9 fL    Immature Granulocytes 0.2 0.0 - 0.5 %    Gran # (ANC) 3.4 1.8 - 7.7 K/uL    Immature Grans (Abs) 0.01 0.00 - 0.04 K/uL    Lymph # 1.6 1.0 - 4.8 K/uL    Mono # 1.0 0.3 - 1.0 K/uL    Eos # 0.2 0.0 - 0.5 K/uL    Baso # 0.04 0.00 - 0.20 K/uL    nRBC 0 0 /100 WBC    Gran% 54.2 38.0 - 73.0 %    Lymph% 25.9 18.0 - 48.0 %    Mono% 15.7 (H) 4.0 - 15.0 %    Eosinophil% 3.4 0.0 - 8.0 %    Basophil% 0.6 0.0 - 1.9 %    Differential Method Automated    Lipid panel   Result Value Ref Range    Cholesterol 154 120 - 199 mg/dL    Triglycerides 58 30 - 150 mg/dL    HDL 61 40 - 75 mg/dL    LDL Cholesterol 81.4 63.0 - 159.0 mg/dL    Hdl/Cholesterol Ratio 39.6 20.0 - 50.0 %    Total Cholesterol/HDL Ratio 2.5 2.0 - 5.0    Non-HDL Cholesterol 93 mg/dL     CKD handout

## 2019-11-20 NOTE — PATIENT INSTRUCTIONS
Results for orders placed or performed in visit on 11/13/19   Hemoglobin A1c   Result Value Ref Range    Hemoglobin A1C 7.4 (H) 4.0 - 5.6 %    Estimated Avg Glucose 166 (H) 68 - 131 mg/dL   Comprehensive metabolic panel   Result Value Ref Range    Sodium 142 136 - 145 mmol/L    Potassium 4.1 3.5 - 5.1 mmol/L    Chloride 105 95 - 110 mmol/L    CO2 29 23 - 29 mmol/L    Glucose 116 (H) 70 - 110 mg/dL    BUN, Bld 32 (H) 10 - 30 mg/dL    Creatinine 1.7 (H) 0.5 - 1.4 mg/dL    Calcium 9.7 8.7 - 10.5 mg/dL    Total Protein 6.8 6.0 - 8.4 g/dL    Albumin 3.6 3.5 - 5.2 g/dL    Total Bilirubin 0.6 0.1 - 1.0 mg/dL    Alkaline Phosphatase 177 (H) 55 - 135 U/L    AST 46 (H) 10 - 40 U/L    ALT 44 10 - 44 U/L    Anion Gap 8 8 - 16 mmol/L    eGFR if African American 29.3 (A) >60 mL/min/1.73 m^2    eGFR if non African American 25.5 (A) >60 mL/min/1.73 m^2   CBC auto differential   Result Value Ref Range    WBC 6.26 3.90 - 12.70 K/uL    RBC 4.15 4.00 - 5.40 M/uL    Hemoglobin 12.0 12.0 - 16.0 g/dL    Hematocrit 39.0 37.0 - 48.5 %    Mean Corpuscular Volume 94 82 - 98 fL    Mean Corpuscular Hemoglobin 28.9 27.0 - 31.0 pg    Mean Corpuscular Hemoglobin Conc 30.8 (L) 32.0 - 36.0 g/dL    RDW 15.1 (H) 11.5 - 14.5 %    Platelets 152 150 - 350 K/uL    MPV 12.3 9.2 - 12.9 fL    Immature Granulocytes 0.2 0.0 - 0.5 %    Gran # (ANC) 3.4 1.8 - 7.7 K/uL    Immature Grans (Abs) 0.01 0.00 - 0.04 K/uL    Lymph # 1.6 1.0 - 4.8 K/uL    Mono # 1.0 0.3 - 1.0 K/uL    Eos # 0.2 0.0 - 0.5 K/uL    Baso # 0.04 0.00 - 0.20 K/uL    nRBC 0 0 /100 WBC    Gran% 54.2 38.0 - 73.0 %    Lymph% 25.9 18.0 - 48.0 %    Mono% 15.7 (H) 4.0 - 15.0 %    Eosinophil% 3.4 0.0 - 8.0 %    Basophil% 0.6 0.0 - 1.9 %    Differential Method Automated    Lipid panel   Result Value Ref Range    Cholesterol 154 120 - 199 mg/dL    Triglycerides 58 30 - 150 mg/dL    HDL 61 40 - 75 mg/dL    LDL Cholesterol 81.4 63.0 - 159.0 mg/dL    Hdl/Cholesterol Ratio 39.6 20.0 - 50.0 %    Total  Cholesterol/HDL Ratio 2.5 2.0 - 5.0    Non-HDL Cholesterol 93 mg/dL       Chronic Kidney Disease (CKD)     The role of the kidneys is to remove waste products and extra water from the blood.  When the kidneys do not work as they should, waste products begin to build up in the blood. This is called chronic kidney disease (CKD). CKD means that you have kidney damage or a decrease in kidney function lasting at least 3 months. CKD allows extra water, waste, and toxins to build up in the body. This can eventually become life-threatening. You might need dialysis or a kidney transplant to stay alive. This most severe form is called end stage renal disease.  Diabetes is the leading causes of chronic renal failure. Other causes include high blood pressure, hardening of the arteries (atherosclerosis), lupus, inflammation of the blood vessels (vasculitis), and past viral or bacterial infections. Certain over-the-counter pain medicines can cause renal failure when taken often over a long period of time. These include aspirin, ibuprofen, and related anti-inflammatory medicines called NSAIDs (nonsteroidal anti-inflammatory drugs).  Home care  The following guidelines will help you care for yourself at home:  · If you have diabetes, talk with your healthcare provider about keeping your blood sugar under control. Ask if you need to make and changes to your diet, lifestyle, or medicines.  · If you have high blood pressure:  ¨ Take prescribed medicine to lower your blood pressure to the recommended goal of less than 130/80.  ¨ Start a regular exercise program that you enjoy. Check with your healthcare provider to be sure your planned exercise program is right for you.  ¨ Eat less salt (sodium). Your healthcare provider can tell you how much salt per day is safe for you.  · If you are overweight, talk with your healthcare provider about a weight loss plan.  · If you smoke, you must quit. Smoking makes kidney disease worse. Talk with  your healthcare provider about ways to help you quit.  For more information, visit the following links:  ¨ www.smokefree.gov/sites/default/files/pdf/clearing-the-air-accessible.pdf  ¨ www.smokefree.gov  ¨ www.cancer.org/healthy/stayawayfromtobacco/guidetoquittingsmoking/  · Most people with CKD need to follow a special diet.  Be sure you understand yours. In general, you will need to limit protein, salt, potassium, and phosphorus. You also need to limit how much fluid you drink.   · CKD is a risk factor for heart disease. Talk with your healthcare provider about any other risk factors you might have and what you can do to lessen them.  · Talk with your healthcare provider about any medicines you are taking to find out if they need to be reduced or stopped.  · Don't use the following over-the-counter medicines, or consult your healthcare provider before using:  ¨ Aspirin and NSAIDs such as ibuprofen or naproxen. Using acetaminophen for fever or pain is OK.  ¨ Laxatives and antacids containing magnesium or aluminum  ¨ Fleet or phospho soda enemas containing phosphorus  ¨ Certain stomach acid-blocking medicine such as cimetidine or ranitidine   ¨ Decongestants containing pseudoephedrine   ¨ Herbal supplements  Follow-up care  Follow up with your healthcare provider, or as advised. Contact one of the following for more information:  · American Association of Kidney Patients 735-801-5862 www.aakp.org  · National Kidney Foundation 567-297-3589 www.kidney.org  · American Kidney Fund 746-450-6010 www.kidneyfund.org  · National Kidney Disease Education Program 866-4KIDNEY www.nkdep.nih.gov  If an X-ray, ECG (cardiogram), or other diagnostic test was taken, you will be told of any new findings that may affect your care.  Call 911  Call 911 if you have any of the following:  · Severe weakness, dizziness, fainting, drowsiness, or confusion  · Chest pain or shortness of breath  · Heart beating fast, slow, or irregularly  When to  seek medical advice  Call your healthcare provider right away if any of these occur:  · Nausea or vomiting  · Fever of 100.4°F (38°C) or higher, or as directed by your healthcare provider  · Unexpected weight gain or swelling in the legs, ankles, or around the eyes  · Decrease or absent urine output  Date Last Reviewed: 9/1/2016  © 0981-9889 [employer]. [address] [place] [address]. All rights reserved. This information is not intended as a substitute for professional medical care. Always follow your healthcare professional's instructions.

## 2019-11-22 RX ORDER — FLUTICASONE PROPIONATE AND SALMETEROL 250; 50 UG/1; UG/1
1 POWDER RESPIRATORY (INHALATION) 2 TIMES DAILY
COMMUNITY
End: 2019-11-22 | Stop reason: SDUPTHER

## 2019-11-22 RX ORDER — FLUTICASONE PROPIONATE AND SALMETEROL 250; 50 UG/1; UG/1
1 POWDER RESPIRATORY (INHALATION) 2 TIMES DAILY
Qty: 60 EACH | Refills: 11 | Status: SHIPPED | OUTPATIENT
Start: 2019-11-22 | End: 2021-02-04 | Stop reason: SDUPTHER

## 2019-11-22 NOTE — TELEPHONE ENCOUNTER
----- Message from Ashlee Joyce sent at 11/22/2019  1:05 PM CST -----  Contact: self/[phone]  Pt called in regards to getting a new Rx for     ADVAIR DISKUS 250-50 mcg/dose diskus inhaler (Discontinued) 1 each 11 9/21/2018 2/20/2019 No  Sig: INHALE 1 PUFF BY MOUTH INTO THE LUNGS NIGHTLY    Due to is was discontinued.     [phone]   Please advise

## 2019-12-11 ENCOUNTER — TELEPHONE (OUTPATIENT)
Dept: PODIATRY | Facility: CLINIC | Age: 84
End: 2019-12-11

## 2019-12-11 NOTE — TELEPHONE ENCOUNTER
Called pat back and resched her appt to see dr. Pereira in February, send appointment slip out to her house

## 2019-12-11 NOTE — TELEPHONE ENCOUNTER
----- Message from Yvette Dillard sent at 12/11/2019  9:36 AM CST -----  Contact: Pt  Pt would like to cancel due to not being able to see Aria Krishnamurthy. Would like a callback to be seen by Raghu    Callback [phone] (home)

## 2020-01-08 ENCOUNTER — PATIENT OUTREACH (OUTPATIENT)
Dept: ADMINISTRATIVE | Facility: OTHER | Age: 85
End: 2020-01-08

## 2020-01-09 ENCOUNTER — OFFICE VISIT (OUTPATIENT)
Dept: DERMATOLOGY | Facility: CLINIC | Age: 85
End: 2020-01-09
Payer: MEDICARE

## 2020-01-09 DIAGNOSIS — B02.9 HERPES ZOSTER WITHOUT COMPLICATION: Primary | ICD-10-CM

## 2020-01-09 DIAGNOSIS — L23.9 ALLERGIC CONTACT DERMATITIS, UNSPECIFIED TRIGGER: ICD-10-CM

## 2020-01-09 PROCEDURE — 3288F PR FALLS RISK ASSESSMENT DOCUMENTED: ICD-10-PCS | Mod: HCNC,CPTII,S$GLB, | Performed by: PATHOLOGY

## 2020-01-09 PROCEDURE — 1125F PR PAIN SEVERITY QUANTIFIED, PAIN PRESENT: ICD-10-PCS | Mod: HCNC,S$GLB,, | Performed by: PATHOLOGY

## 2020-01-09 PROCEDURE — 3288F FALL RISK ASSESSMENT DOCD: CPT | Mod: HCNC,CPTII,S$GLB, | Performed by: PATHOLOGY

## 2020-01-09 PROCEDURE — 99202 OFFICE O/P NEW SF 15 MIN: CPT | Mod: HCNC,S$GLB,, | Performed by: PATHOLOGY

## 2020-01-09 PROCEDURE — 99999 PR PBB SHADOW E&M-EST. PATIENT-LVL II: CPT | Mod: PBBFAC,HCNC,, | Performed by: PATHOLOGY

## 2020-01-09 PROCEDURE — 99999 PR PBB SHADOW E&M-EST. PATIENT-LVL II: ICD-10-PCS | Mod: PBBFAC,HCNC,, | Performed by: PATHOLOGY

## 2020-01-09 PROCEDURE — 99202 PR OFFICE/OUTPT VISIT, NEW, LEVL II, 15-29 MIN: ICD-10-PCS | Mod: HCNC,S$GLB,, | Performed by: PATHOLOGY

## 2020-01-09 PROCEDURE — 1159F MED LIST DOCD IN RCRD: CPT | Mod: HCNC,S$GLB,, | Performed by: PATHOLOGY

## 2020-01-09 PROCEDURE — 1125F AMNT PAIN NOTED PAIN PRSNT: CPT | Mod: HCNC,S$GLB,, | Performed by: PATHOLOGY

## 2020-01-09 PROCEDURE — 1100F PTFALLS ASSESS-DOCD GE2>/YR: CPT | Mod: HCNC,CPTII,S$GLB, | Performed by: PATHOLOGY

## 2020-01-09 PROCEDURE — 1159F PR MEDICATION LIST DOCUMENTED IN MEDICAL RECORD: ICD-10-PCS | Mod: HCNC,S$GLB,, | Performed by: PATHOLOGY

## 2020-01-09 PROCEDURE — 1100F PR PT FALLS ASSESS DOC 2+ FALLS/FALL W/INJURY/YR: ICD-10-PCS | Mod: HCNC,CPTII,S$GLB, | Performed by: PATHOLOGY

## 2020-01-09 RX ORDER — TRIAMCINOLONE ACETONIDE 0.25 MG/G
OINTMENT TOPICAL 2 TIMES DAILY
Qty: 80 G | Refills: 2 | Status: SHIPPED | OUTPATIENT
Start: 2020-01-09

## 2020-01-09 RX ORDER — VALACYCLOVIR HYDROCHLORIDE 1 G/1
1000 TABLET, FILM COATED ORAL 3 TIMES DAILY
Qty: 21 TABLET | Refills: 0 | Status: ON HOLD | OUTPATIENT
Start: 2020-01-09 | End: 2020-04-05 | Stop reason: HOSPADM

## 2020-01-09 NOTE — PROGRESS NOTES
Subjective:       Patient ID:  Tiana Mead is a 89 y.o. female who presents for   Chief Complaint   Patient presents with    Mass     Eyelid, x 2weeks, itchy, red , painful, Tx, none     Mass     Little bumps , scalp , itchy , painful, tx, selsum blue      HPI  Pt with 1 week h/o initially itchy but now tender lesions to right scalp, right forehead, right brow, and lateral to right eye.  Using vaseline to these areas.  At the same time, developed redness, focal scaling to left cheek, left side of nose, left forehead, left upper cutaneous lip.  Eyes itch but no pain, irritation or visual change.    Review of Systems   Constitutional: Negative for fever and chills.   Eyes: Positive for itching. Negative for eye irritation and visual change.   Skin: Positive for itching and rash. Negative for recent sunburn.        Objective:    Physical Exam   Constitutional: She appears well-developed and well-nourished.   Neurological: She is alert.   Skin:   Areas Examined (abnormalities noted in diagram):   Scalp / Hair Palpated and Inspected  Head / Face Inspection Performed  Neck Inspection Performed              Diagram Legend     Erythematous scaling macule/papule c/w actinic keratosis       Vascular papule c/w angioma      Pigmented verrucoid papule/plaque c/w seborrheic keratosis      Yellow umbilicated papule c/w sebaceous hyperplasia      Irregularly shaped tan macule c/w lentigo     1-2 mm smooth white papules consistent with Milia      Movable subcutaneous cyst with punctum c/w epidermal inclusion cyst      Subcutaneous movable cyst c/w pilar cyst      Firm pink to brown papule c/w dermatofibroma      Pedunculated fleshy papule(s) c/w skin tag(s)      Evenly pigmented macule c/w junctional nevus     Mildly variegated pigmented, slightly irregular-bordered macule c/w mildly atypical nevus      Flesh colored to evenly pigmented papule c/w intradermal nevus       Pink pearly papule/plaque c/w basal cell carcinoma       Erythematous hyperkeratotic cursted plaque c/w SCC      Surgical scar with no sign of skin cancer recurrence      Open and closed comedones      Inflammatory papules and pustules      Verrucoid papule consistent consistent with wart     Erythematous eczematous patches and plaques     Dystrophic onycholytic nail with subungual debris c/w onychomycosis     Umbilicated papule    Erythematous-base heme-crusted tan verrucoid plaque consistent with inflamed seborrheic keratosis     Erythematous Silvery Scaling Plaque c/w Psoriasis     See annotation      Assessment / Plan:        Herpes zoster without complication  -     valACYclovir (VALTREX) 1000 MG tablet; Take 1 tablet (1,000 mg total) by mouth 3 (three) times daily. for 7 days  Dispense: 21 tablet; Refill: 0  -     Varicella zoster virus DFA Other (specify) (left brow); Future; Expected date: 01/09/2020  - given patient's age, hesitate to add systemic steroids, but will follow her closely for improvement  - low threshold to involve ophthalmology    Allergic contact dermatitis, unspecified trigger - left side of face  -     triamcinolone acetonide 0.025% (KENALOG) 0.025 % Oint; Apply topically 2 (two) times daily.  Dispense: 80 g; Refill: 2               No follow-ups on file.

## 2020-01-10 ENCOUNTER — HOSPITAL ENCOUNTER (OUTPATIENT)
Dept: RADIOLOGY | Facility: HOSPITAL | Age: 85
Discharge: HOME | End: 2020-01-10
Attending: PHYSICIAN ASSISTANT
Payer: MEDICARE

## 2020-01-10 ENCOUNTER — TELEPHONE (OUTPATIENT)
Dept: INTERNAL MEDICINE | Facility: CLINIC | Age: 85
End: 2020-01-10

## 2020-01-10 ENCOUNTER — OFFICE VISIT (OUTPATIENT)
Dept: INTERNAL MEDICINE | Facility: CLINIC | Age: 85
End: 2020-01-10
Payer: MEDICARE

## 2020-01-10 ENCOUNTER — TELEPHONE (OUTPATIENT)
Dept: DERMATOLOGY | Facility: CLINIC | Age: 85
End: 2020-01-10

## 2020-01-10 VITALS — SYSTOLIC BLOOD PRESSURE: 130 MMHG | HEART RATE: 80 BPM | DIASTOLIC BLOOD PRESSURE: 70 MMHG | OXYGEN SATURATION: 98 %

## 2020-01-10 DIAGNOSIS — J45.40 MODERATE PERSISTENT ASTHMA WITHOUT COMPLICATION: Chronic | ICD-10-CM

## 2020-01-10 DIAGNOSIS — I27.20 PULMONARY HYPERTENSION: ICD-10-CM

## 2020-01-10 DIAGNOSIS — B02.9 HERPES ZOSTER WITHOUT COMPLICATION: Primary | ICD-10-CM

## 2020-01-10 DIAGNOSIS — M19.90 ARTHRITIS: ICD-10-CM

## 2020-01-10 DIAGNOSIS — E11.42 TYPE 2 DIABETES MELLITUS WITH DIABETIC POLYNEUROPATHY, WITH LONG-TERM CURRENT USE OF INSULIN: Chronic | ICD-10-CM

## 2020-01-10 DIAGNOSIS — M25.511 ACUTE PAIN OF RIGHT SHOULDER: Primary | ICD-10-CM

## 2020-01-10 DIAGNOSIS — N18.4 CKD (CHRONIC KIDNEY DISEASE) STAGE 4, GFR 15-29 ML/MIN: Chronic | ICD-10-CM

## 2020-01-10 DIAGNOSIS — Z79.4 TYPE 2 DIABETES MELLITUS WITH DIABETIC POLYNEUROPATHY, WITH LONG-TERM CURRENT USE OF INSULIN: Chronic | ICD-10-CM

## 2020-01-10 DIAGNOSIS — M25.411 SWELLING OF JOINT OF RIGHT SHOULDER: ICD-10-CM

## 2020-01-10 DIAGNOSIS — I77.1 TORTUOUS AORTA: ICD-10-CM

## 2020-01-10 DIAGNOSIS — M25.511 ACUTE PAIN OF RIGHT SHOULDER: ICD-10-CM

## 2020-01-10 DIAGNOSIS — I50.32 CHRONIC DIASTOLIC CONGESTIVE HEART FAILURE: Chronic | ICD-10-CM

## 2020-01-10 PROCEDURE — 99999 PR PBB SHADOW E&M-EST. PATIENT-LVL V: ICD-10-PCS | Mod: PBBFAC,HCNC,, | Performed by: PHYSICIAN ASSISTANT

## 2020-01-10 PROCEDURE — 99214 OFFICE O/P EST MOD 30 MIN: CPT | Mod: HCNC,S$GLB,, | Performed by: PHYSICIAN ASSISTANT

## 2020-01-10 PROCEDURE — 1100F PTFALLS ASSESS-DOCD GE2>/YR: CPT | Mod: HCNC,CPTII,S$GLB, | Performed by: PHYSICIAN ASSISTANT

## 2020-01-10 PROCEDURE — 99499 UNLISTED E&M SERVICE: CPT | Mod: HCNC,S$GLB,, | Performed by: PHYSICIAN ASSISTANT

## 2020-01-10 PROCEDURE — 1100F PR PT FALLS ASSESS DOC 2+ FALLS/FALL W/INJURY/YR: ICD-10-PCS | Mod: HCNC,CPTII,S$GLB, | Performed by: PHYSICIAN ASSISTANT

## 2020-01-10 PROCEDURE — 99999 PR PBB SHADOW E&M-EST. PATIENT-LVL V: CPT | Mod: PBBFAC,HCNC,, | Performed by: PHYSICIAN ASSISTANT

## 2020-01-10 PROCEDURE — 73030 X-RAY EXAM OF SHOULDER: CPT | Mod: TC,HCNC,RT

## 2020-01-10 PROCEDURE — 3288F PR FALLS RISK ASSESSMENT DOCUMENTED: ICD-10-PCS | Mod: HCNC,CPTII,S$GLB, | Performed by: PHYSICIAN ASSISTANT

## 2020-01-10 PROCEDURE — 99214 PR OFFICE/OUTPT VISIT, EST, LEVL IV, 30-39 MIN: ICD-10-PCS | Mod: HCNC,S$GLB,, | Performed by: PHYSICIAN ASSISTANT

## 2020-01-10 PROCEDURE — 1159F PR MEDICATION LIST DOCUMENTED IN MEDICAL RECORD: ICD-10-PCS | Mod: HCNC,S$GLB,, | Performed by: PHYSICIAN ASSISTANT

## 2020-01-10 PROCEDURE — 3288F FALL RISK ASSESSMENT DOCD: CPT | Mod: HCNC,CPTII,S$GLB, | Performed by: PHYSICIAN ASSISTANT

## 2020-01-10 PROCEDURE — 99499 RISK ADDL DX/OHS AUDIT: ICD-10-PCS | Mod: HCNC,S$GLB,, | Performed by: PHYSICIAN ASSISTANT

## 2020-01-10 PROCEDURE — 1125F AMNT PAIN NOTED PAIN PRSNT: CPT | Mod: HCNC,S$GLB,, | Performed by: PHYSICIAN ASSISTANT

## 2020-01-10 PROCEDURE — 1159F MED LIST DOCD IN RCRD: CPT | Mod: HCNC,S$GLB,, | Performed by: PHYSICIAN ASSISTANT

## 2020-01-10 PROCEDURE — 1125F PR PAIN SEVERITY QUANTIFIED, PAIN PRESENT: ICD-10-PCS | Mod: HCNC,S$GLB,, | Performed by: PHYSICIAN ASSISTANT

## 2020-01-10 PROCEDURE — 73030 XR SHOULDER COMPLETE 2 OR MORE VIEWS RIGHT: ICD-10-PCS | Mod: 26,HCNC,RT, | Performed by: RADIOLOGY

## 2020-01-10 PROCEDURE — 73030 X-RAY EXAM OF SHOULDER: CPT | Mod: 26,HCNC,RT, | Performed by: RADIOLOGY

## 2020-01-10 NOTE — TELEPHONE ENCOUNTER
My 1:30 pm appt was blocked as I had already offered to another patient. Her appointment has been cancelled. Please reschedule this patient.

## 2020-01-10 NOTE — TELEPHONE ENCOUNTER
Spoke to pt and daughter in regards to coming in to recheck right brow and get another sample for DFA. Pt is willing to come in. Daughter will bring her today.     KRISTIN

## 2020-01-10 NOTE — PROGRESS NOTES
Subjective:       Patient ID: Tiana Mead is a 89 y.o. female.    Chief Complaint: Shoulder Pain (right)    HPI   Established pt of Belen Wood MD (new to me)      C/o right shoulder pain, chronic in nature but flare started one day ago. She denies any injury. She questions if she possible slept wrong. She notes mild swelling to her shoulder. Was seen in dermatology today and advised to come to IM for an xray. Had ortho appt earlier this week but was unable to make it. Using voltaren gel which helps. No redness, warmth, numbness or tingling. Pain has limited ROM but was able to get dressed this AM unassisted.     Past Medical History:   Diagnosis Date    Allergy     Anemia     Arthritis     Asthma     CHF (congestive heart failure) 5/2/2017    Chronic kidney disease     Degenerative disc disease     Diabetes mellitus type II     Essential hypertension 7/3/2012    Glaucoma     History of pseudogout 8/23/2012    Hyperlipidemia     Hypertension     Iritis     Myocardial infarction     Pneumonia     Thyroid disease      Social History     Tobacco Use    Smoking status: Never Smoker    Smokeless tobacco: Never Used   Substance Use Topics    Alcohol use: No    Drug use: No     Review of patient's allergies indicates:   Allergen Reactions    Codeine Itching and Nausea Only              Review of Systems   Constitutional: Negative for chills, fever and unexpected weight change.   Respiratory: Negative for cough and shortness of breath.    Cardiovascular: Negative for chest pain and leg swelling.   Gastrointestinal: Negative for abdominal pain, nausea and vomiting.   Musculoskeletal: Positive for arthralgias and joint swelling.   Skin: Negative for rash.   Neurological: Negative for weakness and headaches.       Objective: /70   Pulse 80   LMP  (LMP Unknown)   SpO2 98%         Physical Exam   Constitutional: She appears well-developed and well-nourished. No distress.   HENT:   Head:  Normocephalic and atraumatic.   Mouth/Throat: Oropharynx is clear and moist.   Cardiovascular: Normal rate. An irregularly irregular rhythm present. Exam reveals no friction rub.   Murmur heard.  Pulmonary/Chest: Effort normal and breath sounds normal. She has no wheezes. She has no rales.   Abdominal: Soft. Bowel sounds are normal. There is no tenderness.   Musculoskeletal:        Right shoulder: She exhibits decreased range of motion, tenderness, swelling, pain and decreased strength (secondary to pain). She exhibits normal pulse.   Mild swelling to anterior shoulder. I question ?lipoma.   No redness, bruising or warmth.    Neurological: She is alert.   Skin: Skin is warm and dry. Capillary refill takes less than 2 seconds. No rash noted.   Psychiatric: She has a normal mood and affect.   Vitals reviewed.      Assessment:       1. Acute pain of right shoulder    2. Arthritis    3. Swelling of joint of right shoulder        Plan:         Tiana was seen today for shoulder pain.    Diagnoses and all orders for this visit:    Acute pain of right shoulder  Swelling of joint of right shoulder  Arthritis  -     X-ray Shoulder 2 or More Views Right; Future  -     Ambulatory referral/consult to Orthopedics; Future  -     Voltaren gel TID, Ice prn  -     Keep schedule Ortho f/u    Chronic diastolic congestive heart failure  Stable  Followed by Cardiology      Moderate persistent asthma without complication  Stable on current inhalers  Followed by PCP    Pulmonary hypertension  Stable  Last Echo July 2019  · The estimated PA systolic pressure is 56 mm Hg  Followed by Cardiology    Tortuous aorta  Noted on prior imaging  Stable    CKD (chronic kidney disease) stage 4, GFR 15-29 ml/min  Lab Results   Component Value Date    CREATININE 1.7 (H) 11/13/2019   AVOID NSAIDs  Followed by PCP      Type 2 diabetes mellitus with diabetic polyneuropathy, with long-term current use of insulin  Lab Results   Component Value Date     HGBA1C 7.4 (H) 11/13/2019   Stable  Followed by PCP        Daksha Stringer PA-C

## 2020-01-10 NOTE — PATIENT INSTRUCTIONS
ICE 20 MINS ON ABOUT 3 TIME A DAY    USE THE VOLTAREN GEL UP TO 3 - 4 TIMES DAY    FOLLOW UP WITH ORTHO.     IF THINGS WORSEN OR FAIL TO IMPROVE PLEASE CALL US.

## 2020-01-14 ENCOUNTER — PATIENT OUTREACH (OUTPATIENT)
Dept: ADMINISTRATIVE | Facility: OTHER | Age: 85
End: 2020-01-14

## 2020-01-16 ENCOUNTER — TELEPHONE (OUTPATIENT)
Dept: INTERNAL MEDICINE | Facility: CLINIC | Age: 85
End: 2020-01-16

## 2020-01-16 RX ORDER — MECLIZINE HCL 12.5 MG 12.5 MG/1
12.5 TABLET ORAL 3 TIMES DAILY PRN
Qty: 20 TABLET | Refills: 2 | Status: SHIPPED | OUTPATIENT
Start: 2020-01-16 | End: 2021-02-04 | Stop reason: SDUPTHER

## 2020-01-17 ENCOUNTER — OFFICE VISIT (OUTPATIENT)
Dept: DERMATOLOGY | Facility: CLINIC | Age: 85
End: 2020-01-17
Payer: MEDICARE

## 2020-01-17 ENCOUNTER — OFFICE VISIT (OUTPATIENT)
Dept: OPHTHALMOLOGY | Facility: CLINIC | Age: 85
End: 2020-01-17
Payer: MEDICARE

## 2020-01-17 ENCOUNTER — OFFICE VISIT (OUTPATIENT)
Dept: ORTHOPEDICS | Facility: CLINIC | Age: 85
End: 2020-01-17
Payer: MEDICARE

## 2020-01-17 DIAGNOSIS — H40.1132 PRIMARY OPEN ANGLE GLAUCOMA (POAG) OF BOTH EYES, MODERATE STAGE: ICD-10-CM

## 2020-01-17 DIAGNOSIS — Z98.49 STATUS POST CATARACT EXTRACTION AND INSERTION OF INTRAOCULAR LENS, UNSPECIFIED LATERALITY: ICD-10-CM

## 2020-01-17 DIAGNOSIS — B02.9 HERPES ZOSTER WITHOUT COMPLICATION: Primary | ICD-10-CM

## 2020-01-17 DIAGNOSIS — M19.90 ARTHRITIS: ICD-10-CM

## 2020-01-17 DIAGNOSIS — H20.10 CHRONIC IRITIS: ICD-10-CM

## 2020-01-17 DIAGNOSIS — M25.511 ACUTE PAIN OF RIGHT SHOULDER: Primary | ICD-10-CM

## 2020-01-17 DIAGNOSIS — L23.9 ALLERGIC CONTACT DERMATITIS, UNSPECIFIED TRIGGER: ICD-10-CM

## 2020-01-17 DIAGNOSIS — H18.519 FUCHS' CORNEAL DYSTROPHY: ICD-10-CM

## 2020-01-17 DIAGNOSIS — Z96.1 STATUS POST CATARACT EXTRACTION AND INSERTION OF INTRAOCULAR LENS, UNSPECIFIED LATERALITY: ICD-10-CM

## 2020-01-17 PROCEDURE — 99999 PR PBB SHADOW E&M-EST. PATIENT-LVL II: ICD-10-PCS | Mod: PBBFAC,HCNC,, | Performed by: OPHTHALMOLOGY

## 2020-01-17 PROCEDURE — 99999 PR PBB SHADOW E&M-EST. PATIENT-LVL I: CPT | Mod: PBBFAC,HCNC,, | Performed by: PATHOLOGY

## 2020-01-17 PROCEDURE — 1159F PR MEDICATION LIST DOCUMENTED IN MEDICAL RECORD: ICD-10-PCS | Mod: HCNC,S$GLB,, | Performed by: NURSE PRACTITIONER

## 2020-01-17 PROCEDURE — 92014 PR EYE EXAM, EST PATIENT,COMPREHESV: ICD-10-PCS | Mod: HCNC,S$GLB,, | Performed by: OPHTHALMOLOGY

## 2020-01-17 PROCEDURE — 1159F PR MEDICATION LIST DOCUMENTED IN MEDICAL RECORD: ICD-10-PCS | Mod: HCNC,S$GLB,, | Performed by: PATHOLOGY

## 2020-01-17 PROCEDURE — 99999 PR PBB SHADOW E&M-EST. PATIENT-LVL I: ICD-10-PCS | Mod: PBBFAC,HCNC,, | Performed by: PATHOLOGY

## 2020-01-17 PROCEDURE — 1101F PR PT FALLS ASSESS DOC 0-1 FALLS W/OUT INJ PAST YR: ICD-10-PCS | Mod: HCNC,CPTII,S$GLB, | Performed by: PATHOLOGY

## 2020-01-17 PROCEDURE — 99999 PR PBB SHADOW E&M-EST. PATIENT-LVL IV: CPT | Mod: PBBFAC,HCNC,, | Performed by: NURSE PRACTITIONER

## 2020-01-17 PROCEDURE — 1101F PT FALLS ASSESS-DOCD LE1/YR: CPT | Mod: HCNC,CPTII,S$GLB, | Performed by: NURSE PRACTITIONER

## 2020-01-17 PROCEDURE — 99999 PR PBB SHADOW E&M-EST. PATIENT-LVL II: CPT | Mod: PBBFAC,HCNC,, | Performed by: OPHTHALMOLOGY

## 2020-01-17 PROCEDURE — 1101F PR PT FALLS ASSESS DOC 0-1 FALLS W/OUT INJ PAST YR: ICD-10-PCS | Mod: HCNC,CPTII,S$GLB, | Performed by: NURSE PRACTITIONER

## 2020-01-17 PROCEDURE — 1159F MED LIST DOCD IN RCRD: CPT | Mod: HCNC,S$GLB,, | Performed by: NURSE PRACTITIONER

## 2020-01-17 PROCEDURE — 99212 OFFICE O/P EST SF 10 MIN: CPT | Mod: HCNC,S$GLB,, | Performed by: PATHOLOGY

## 2020-01-17 PROCEDURE — 92014 COMPRE OPH EXAM EST PT 1/>: CPT | Mod: HCNC,S$GLB,, | Performed by: OPHTHALMOLOGY

## 2020-01-17 PROCEDURE — 1101F PT FALLS ASSESS-DOCD LE1/YR: CPT | Mod: HCNC,CPTII,S$GLB, | Performed by: PATHOLOGY

## 2020-01-17 PROCEDURE — 20605 PR DRAIN/INJECT INTERMEDIATE JOINT/BURSA: ICD-10-PCS | Mod: HCNC,RT,S$GLB, | Performed by: NURSE PRACTITIONER

## 2020-01-17 PROCEDURE — 1159F MED LIST DOCD IN RCRD: CPT | Mod: HCNC,S$GLB,, | Performed by: PATHOLOGY

## 2020-01-17 PROCEDURE — 99214 OFFICE O/P EST MOD 30 MIN: CPT | Mod: HCNC,25,S$GLB, | Performed by: NURSE PRACTITIONER

## 2020-01-17 PROCEDURE — 99999 PR PBB SHADOW E&M-EST. PATIENT-LVL IV: ICD-10-PCS | Mod: PBBFAC,HCNC,, | Performed by: NURSE PRACTITIONER

## 2020-01-17 PROCEDURE — 99212 PR OFFICE/OUTPT VISIT, EST, LEVL II, 10-19 MIN: ICD-10-PCS | Mod: HCNC,S$GLB,, | Performed by: PATHOLOGY

## 2020-01-17 PROCEDURE — 99214 PR OFFICE/OUTPT VISIT, EST, LEVL IV, 30-39 MIN: ICD-10-PCS | Mod: HCNC,25,S$GLB, | Performed by: NURSE PRACTITIONER

## 2020-01-17 PROCEDURE — 20605 DRAIN/INJ JOINT/BURSA W/O US: CPT | Mod: HCNC,RT,S$GLB, | Performed by: NURSE PRACTITIONER

## 2020-01-17 RX ORDER — TRIAMCINOLONE ACETONIDE 40 MG/ML
40 INJECTION, SUSPENSION INTRA-ARTICULAR; INTRAMUSCULAR
Status: COMPLETED | OUTPATIENT
Start: 2020-01-17 | End: 2020-01-17

## 2020-01-17 RX ADMIN — TRIAMCINOLONE ACETONIDE 40 MG: 40 INJECTION, SUSPENSION INTRA-ARTICULAR; INTRAMUSCULAR at 11:01

## 2020-01-17 NOTE — PROGRESS NOTES
Subjective:       Patient ID:  Tiana Mead is a 89 y.o. female who presents for   Chief Complaint   Patient presents with    Herpes Zoster     Follow up     HPI  Pt with resolving zoster involving right superior scalp, right forehead, right brow, and lateral to right eye.  Also with suspected allergic contact dermatitis to left forehead, cheek, periorbital area with continued irritation and redness to left > right eye.  No new blisters and existing lesions resolving - currently on last day of valtrex.  Still with residual pruritus to right scalp and right forehead area.  Pt has been using TAC cream to this area, but not to the area with erythema and eczematous changes on left side of face.  Still with itching and irritation to left face.  Saw ophthalmology - chronic iritis.  No concern for ocular involvement by zoster.    Review of Systems   Constitutional: Negative for fever, chills, weight loss, weight gain, fatigue, night sweats and malaise.   Skin: Positive for itching and rash. Negative for daily sunscreen use, activity-related sunscreen use and recent sunburn.   Hematologic/Lymphatic: Bruises/bleeds easily.        Objective:    Physical Exam   Constitutional: She appears well-developed and well-nourished.   Neurological: She is alert.   Skin:   Areas Examined (abnormalities noted in diagram):   Scalp / Hair Palpated and Inspected  Head / Face Inspection Performed              Diagram Legend     Erythematous scaling macule/papule c/w actinic keratosis       Vascular papule c/w angioma      Pigmented verrucoid papule/plaque c/w seborrheic keratosis      Yellow umbilicated papule c/w sebaceous hyperplasia      Irregularly shaped tan macule c/w lentigo     1-2 mm smooth white papules consistent with Milia      Movable subcutaneous cyst with punctum c/w epidermal inclusion cyst      Subcutaneous movable cyst c/w pilar cyst      Firm pink to brown papule c/w dermatofibroma      Pedunculated fleshy papule(s)  c/w skin tag(s)      Evenly pigmented macule c/w junctional nevus     Mildly variegated pigmented, slightly irregular-bordered macule c/w mildly atypical nevus      Flesh colored to evenly pigmented papule c/w intradermal nevus       Pink pearly papule/plaque c/w basal cell carcinoma      Erythematous hyperkeratotic cursted plaque c/w SCC      Surgical scar with no sign of skin cancer recurrence      Open and closed comedones      Inflammatory papules and pustules      Verrucoid papule consistent consistent with wart     Erythematous eczematous patches and plaques     Dystrophic onycholytic nail with subungual debris c/w onychomycosis     Umbilicated papule    Erythematous-base heme-crusted tan verrucoid plaque consistent with inflamed seborrheic keratosis     Erythematous Silvery Scaling Plaque c/w Psoriasis     See annotation      Assessment / Plan:        Herpes zoster without complication - resolving on valtrex; no new lesions over the past week; finish last dose as prescribed today    Allergic contact dermatitis, unspecified trigger - left side of face; instructed pt of importance of applying TAC cream bid to these areas of pruritus and erythema.  Instructed pt to avoid other topical creams, ointments.  Consider biopsy and/or patch testing if this persists.             No follow-ups on file.

## 2020-01-17 NOTE — PROGRESS NOTES
Assessment /Plan     For exam results, see Encounter Report.    Herpes zoster without complication    Primary open angle glaucoma (POAG) of both eyes, moderate stage    Status post cataract extraction and insertion of intraocular lens, unspecified laterality    Fuchs' corneal dystrophy    Chronic iritis - Left Eye      + Asthma  CHF    Zoster Left V1 @ 2-3 weeks ago  No ophthalmic involvement  Eye tends to be irriatated --> long standing  Trial Acular BID PRN as discussed      POAG  Stable Glaucoma & Patient near target IOP range    Steroid responder    CCT  517 / 574    Mid teens    Both Eyes --> good adherence  Trusopt BID  PG q day --> iritis quiet  Alphagan BID --> stop --> itchy // Follicles --> resolved / improved greatly  No BB --> Asthma    Hx Ryan in Past    Hold SLT OS    PC IOL OU  Quiet    Fuchs K dystrophy --> edema OS  Observe  Jony 128 2% QID      NIDDM  No BDR or CSME  control     ERM OD  Not VS  Observe      Plan  RTC 6 months IOP / iritis OS check --> keep as scheduled  RTC sooner prn with good understanding

## 2020-01-17 NOTE — LETTER
January 17, 2020      Daksha Stringer PA-C  [address]  [facility] [address]           [facility] - Orthoped[facility]  [address]  [facility] [address]  Phone: [phone]          Patient: Tiana Mead   MR Number: [id number]   YOB: 1925   Date of Visit: 1/17/2020       Dear Daksha Stringer:    Thank you for referring Tiana Mead to me for evaluation. Attached you will find relevant portions of my assessment and plan of care.    If you have questions, please do not hesitate to call me. I look forward to following Tiana Mead along with you.    Sincerely,    Sidney Melgar, LISBET    Enclosure  CC:  No Recipients    If you would like to receive this communication electronically, please contact [email or web address] or [phone] to request more information on [employer] access.    For providers and/or their staff who would like to refer a patient to [facility], please contact us through our one-stop-shop provider referral line, [facility], at [phone].    If you feel you have received this communication in error or would no longer like to receive these types of communications, please e-mail [email or web address]

## 2020-01-17 NOTE — PROGRESS NOTES
"  SUBJECTIVE:     Chief Complaint & History of Present Illness:  Tiana Mead is a Established 89 y.o. year old female patient presenting with constant right shoulder pain that started 2 weeks ago.  She is Right hand dominant.  There is not a history of injury.  The pain is located in the anterior, posterior,  upper arm and AC joint aspect of the shoulder.  The pain is described as sharp.  It is aggravated by activity, lifting and pushing out of chair.  Associated symptoms include pain radiating to arm/hand.  Previous treatments include nothing.  There is not a history of previous injury or surgery to the shoulder.   She states she notices the pain more at night and does endorse some tension on the right side of her neck as well.    Review of patient's allergies indicates:   Allergen Reactions    Codeine Itching and Nausea Only                Current Outpatient Medications   Medication Sig Dispense Refill    acetaminophen (TYLENOL) 500 MG tablet Take 500 mg by mouth every 6 (six) hours as needed for Pain.      albuterol 90 mcg/actuation inhaler Inhale 2 puffs into the lungs every 4 (four) hours as needed. 1 Inhaler 11    amLODIPine (NORVASC) 5 MG tablet TAKE 1 TABLET(5 MG) BY MOUTH EVERY MORNING 90 tablet 3    apixaban (ELIQUIS) 2.5 mg Tab Take 1 tablet (2.5 mg total) by mouth 2 (two) times daily. 60 tablet 5    atorvastatin (LIPITOR) 40 MG tablet Take 1 tablet (40 mg total) by mouth once daily. 90 tablet 3    BD ULTRA-FINE SHORT PEN NEEDLE 31 gauge x 5/16" Ndle USE WITH INSULIN PENS AS DIRECTED 100 each 11    blood sugar diagnostic (ONETOUCH ULTRA TEST) Strp Inject 1 strip into the skin 3 (three) times daily. 300 each 4    diclofenac sodium (VOLTAREN) 1 % Gel Apply topically 4 (four) times daily as needed. 100 Tube 3    dorzolamide (TRUSOPT) 2 % ophthalmic solution Place 1 drop into both eyes 2 (two) times daily. 10 mL 4    fluticasone-salmeterol diskus inhaler 250-50 mcg Inhale 1 puff into the lungs " 2 (two) times daily. Controller 60 each 11    furosemide (LASIX) 40 MG tablet Take 1 tablet (40 mg total) by mouth 2 (two) times daily. 180 tablet 3    insulin aspart protamine-insulin aspart (NOVOLOG MIX 70-30FLEXPEN U-100) 100 unit/mL (70-30) InPn pen INJECT 10 UNITS UNDER THE SKIN ONCE DAILY EVERY MORNING BEFORE BREAKFAST 15 mL 11    ipratropium (ATROVENT) 0.03 % nasal spray USE 2 SPRAYS IN EACH NOSTRIL TWICE DAILY 60 mL 1    isosorbide mononitrate (IMDUR) 60 MG 24 hr tablet TAKE 1 TABLET BY MOUTH EVERY MORNING FOR HEART, to prevent chest pain and reduce shortness of breath 90 tablet 3    lancets Misc 1 Device by Misc.(Non-Drug; Combo Route) route 3 (three) times daily before meals. Please provide the insurance company preferred product. 300 each 4    latanoprost 0.005 % ophthalmic solution Place 1 drop into the right eye every evening. (Patient taking differently: Place 1 drop into both eyes every evening. ) 3 Bottle 6    levothyroxine (SYNTHROID) 75 MCG tablet Take 1 tablet (75 mcg total) by mouth before breakfast. 90 tablet 3    meclizine (ANTIVERT) 12.5 mg tablet Take 1 tablet (12.5 mg total) by mouth 3 (three) times daily as needed for Dizziness. 20 tablet 2    metoprolol tartrate (LOPRESSOR) 50 MG tablet TAKE 1 TABLET(50 MG) BY MOUTH TWICE DAILY 180 tablet 3    multivit-mineral-iron-lutein (CENTRUM SILVER ULTRA WOMEN'S) Tab Take 1 tablet by mouth once daily.      nitroGLYCERIN (NITROSTAT) 0.4 MG SL tablet ONE TABLET UNDER THE TONGUE EVERY 5 MINUTES AS NEEDED FOR CHEST PAIN 25 tablet 6    ondansetron (ZOFRAN) 4 MG tablet TAKE ONE TABLET BY MOUTH EVERY 12 HOURS AS NEEDED FOR NAUSEA OR DIZZINESS 30 tablet 0    triamcinolone acetonide 0.025% (KENALOG) 0.025 % Oint Apply topically 2 (two) times daily. 80 g 2    TRUE METRIX GLUCOSE TEST STRIP Str USE TO TEST BLOOD SUGAR WITH MEALS THREE TIMES DAILY 300 strip 4    TRUEPLUS LANCETS 30 gauge Misc USE TO TEST BLOOD SUGAR TID AC  2    TRUEPLUS LANCETS  33 gauge Misc TESTING THREE TIMES DAILY 300 each 0    clotrimazole-betamethasone 1-0.05% (LOTRISONE) cream Apply topically 2 (two) times daily. For intertriginous itching (Patient not taking: Reported on 1/17/2020) 45 g 3    fluticasone-salmeterol diskus inhaler 250-50 mcg INHALE 1 PUFF BY MOUTH INTO THE LUNGS NIGHTLY (Patient not taking: Reported on 1/17/2020) 1 each 11    FLUZONE HIGH-DOSE 2019-20, PF, 180 mcg/0.5 mL Syrg ADM 0.5ML IM UTD  0    nystatin (MYCOSTATIN) powder Apply topically 4 (four) times daily. (Patient not taking: Reported on 1/17/2020) 60 g 3    TRUE METRIX GLUCOSE METER Misc TEST TID  4    valACYclovir (VALTREX) 1000 MG tablet Take 1 tablet (1,000 mg total) by mouth 3 (three) times daily. for 7 days 21 tablet 0     No current facility-administered medications for this visit.        Past Medical History:   Diagnosis Date    Allergy     Anemia     Arthritis     Asthma     CHF (congestive heart failure) 5/2/2017    Chronic kidney disease     Degenerative disc disease     Diabetes mellitus type II     Essential hypertension 7/3/2012    Glaucoma     History of pseudogout 8/23/2012    Hyperlipidemia     Hypertension     Iritis     Myocardial infarction     Pneumonia     Thyroid disease        Past Surgical History:   Procedure Laterality Date    CARDIAC CATHETERIZATION      CATARACT EXTRACTION W/  INTRAOCULAR LENS IMPLANT Left n/a    CATARACT EXTRACTION W/  INTRAOCULAR LENS IMPLANT Right 10/8/2018    With Femtosecond LASER assist (Dr. Henson)    CHOLECYSTECTOMY      EYE SURGERY      gall stone      HYSTERECTOMY      VARICOSE VEIN SURGERY         Family History   Problem Relation Age of Onset    Diabetes Mother     Hypertension Mother     Glaucoma Mother     Hypertension Father     Diabetes Son     No Known Problems Daughter     Diabetes Daughter     No Known Problems Son     Melanoma Neg Hx            Review of Systems:  ROS:  Constitutional: no fever or  chills  Eyes: no visual changes  ENT: no nasal congestion or sore throat  Respiratory: no cough or shortness of breath  Cardiovascular: no chest pain or palpitations  Gastrointestinal: no nausea or vomiting, tolerating diet  Genitourinary: no hematuria or dysuria  Integument/Breast: no rash or pruritis  Hematologic/Lymphatic: no easy bruising or lymphadenopathy  Musculoskeletal: right shoulder pain  Neurological: no seizures or tremors  Behavioral/Psych: no auditory or visual hallucinations  Endocrine: no heat or cold intolerance      OBJECTIVE:     PHYSICAL EXAM:  Vital Signs (Most Recent)  There were no vitals filed for this visit.     ,   General Appearance: Well nourished, well developed, in no acute distress.  HENT: Normal cephalic, oropharynx pink and moist  Eyes: PERRLA bilaterally and EOM x 4  Respiratory: Even and unlabored  Skin: Warm and Dry.   Psychiatric: AAO x 4, Mood & affect are normal.    Shoulder exam: right  Tenderness: AC joint, trapezius muscle  ROM: pain at the extremes of mobility  Shoulder Strength: biceps 3/5, triceps 4/5, abduction 4/5, adduction 4/5  positive for tenderness over the acromioclavicular joint, sensory exam normal, motor exam normal and radial pulse intact  Stability tests: normal      RADIOGRAPHS:  X-ray of right shoulder obtained and personally reviewed by me.  There is no acute fractures or dislocations seen in her shoulder or clavicle region.    ASSESSMENT/PLAN:       ICD-10-CM ICD-9-CM   1. Acute pain of right shoulder M25.511 719.41   2. Arthritis M19.90 716.90       Plan: We discussed with the patient at length all the different treatment options available for her right shoulder including anti-inflammatories, acetaminophen, rest, ice, Physical therapy to include strengthening exercise, occasional cortisone injections for temporary relief, arthroscopic surgical repair, and finally shoulder arthroplasty.     -Patient presents with 2 week history of right shoulder pain.   No reported trauma.  -X-ray as above.  -We discussed therapy and CSI, patient would like to try CSI but wait on therapy due to transportation limitations.  Cannot use NSAIDs given she is on Eliquis.  -Advised patient to monitor her CBG closely as CSI can raise her glucose.  -Patient to call if pain not improve or worsens over next 2-3 weeks, if so she will do therapy and may consider MRI of her shoulder.      PROCEDURE:  I have explained the risks, benefits, and alternatives of the procedure in detail.  The patient voices understanding and all questions have been answered.  The patient agrees to proceed as planned. So after I performed a sterile prep of the skin in the normal fashion the right shoulder is injected from the posterior approach using a 22 gauge needle with a combination of 4cc 1% plain lidocaine and 40 mg of Kenalog. The patient is cautioned and immediate relief of pain is secondary to the local anesthetic and will be temporary.  After the anesthetic wears off there may be a increase in pain that may last for a few hours or a few days and they should use ice to help alleviate this flair up of pain.      Patient tolerated procedure well and post injection they reported improvement in their pain.

## 2020-01-18 ENCOUNTER — PES CALL (OUTPATIENT)
Dept: ADMINISTRATIVE | Facility: CLINIC | Age: 85
End: 2020-01-18

## 2020-02-02 ENCOUNTER — PATIENT OUTREACH (OUTPATIENT)
Dept: ADMINISTRATIVE | Facility: OTHER | Age: 85
End: 2020-02-02

## 2020-02-04 ENCOUNTER — OFFICE VISIT (OUTPATIENT)
Dept: PODIATRY | Facility: CLINIC | Age: 85
End: 2020-02-04
Payer: MEDICARE

## 2020-02-04 VITALS
BODY MASS INDEX: 24.25 KG/M2 | HEIGHT: 68 IN | DIASTOLIC BLOOD PRESSURE: 70 MMHG | RESPIRATION RATE: 18 BRPM | HEART RATE: 59 BPM | WEIGHT: 160 LBS | SYSTOLIC BLOOD PRESSURE: 114 MMHG

## 2020-02-04 DIAGNOSIS — B35.1 ONYCHOMYCOSIS DUE TO DERMATOPHYTE: ICD-10-CM

## 2020-02-04 DIAGNOSIS — E11.42 DIABETIC POLYNEUROPATHY ASSOCIATED WITH TYPE 2 DIABETES MELLITUS: Primary | ICD-10-CM

## 2020-02-04 DIAGNOSIS — L84 CORN OR CALLUS: ICD-10-CM

## 2020-02-04 PROCEDURE — 99499 NO LOS: ICD-10-PCS | Mod: HCNC,S$GLB,, | Performed by: PODIATRIST

## 2020-02-04 PROCEDURE — 11721 PR DEBRIDEMENT OF NAILS, 6 OR MORE: ICD-10-PCS | Mod: Q9,59,HCNC,S$GLB | Performed by: PODIATRIST

## 2020-02-04 PROCEDURE — 99499 UNLISTED E&M SERVICE: CPT | Mod: HCNC,S$GLB,, | Performed by: PODIATRIST

## 2020-02-04 PROCEDURE — 11057 PARNG/CUTG B9 HYPRKR LES >4: CPT | Mod: Q9,HCNC,S$GLB, | Performed by: PODIATRIST

## 2020-02-04 PROCEDURE — 99999 PR PBB SHADOW E&M-EST. PATIENT-LVL III: CPT | Mod: PBBFAC,HCNC,, | Performed by: PODIATRIST

## 2020-02-04 PROCEDURE — 11721 DEBRIDE NAIL 6 OR MORE: CPT | Mod: Q9,59,HCNC,S$GLB | Performed by: PODIATRIST

## 2020-02-04 PROCEDURE — 11057 PR TRIM BENIGN HYPERKERATOTIC SKIN LESION,>4: ICD-10-PCS | Mod: Q9,HCNC,S$GLB, | Performed by: PODIATRIST

## 2020-02-04 PROCEDURE — 99999 PR PBB SHADOW E&M-EST. PATIENT-LVL III: ICD-10-PCS | Mod: PBBFAC,HCNC,, | Performed by: PODIATRIST

## 2020-02-04 NOTE — PROGRESS NOTES
Subjective:      Patient ID: Tiana Mead is a 89 y.o. female.    Chief Complaint: PCP (Daksha Stringer PA-C 1/10/20); Diabetic Foot Exam; Nail Problem; Nail Care; and Callouses    Tiana is a 89 y.o. female who presents to the clinic for evaluation and treatment of high risk feet. Tiana has a past medical history of Allergy, Anemia, Arthritis, Asthma, CHF (congestive heart failure) (5/2/2017), Chronic kidney disease, Degenerative disc disease, Diabetes mellitus type II, Essential hypertension (7/3/2012), Glaucoma, History of pseudogout (8/23/2012), Hyperlipidemia, Hypertension, Iritis, Myocardial infarction, Pneumonia, and Thyroid disease. The patient's chief complaint is long, thick toenails and calluses on both feet. Routine trimming helps. No other pedal concerns today.  This patient has documented high risk feet requiring routine maintenance secondary to diabetes mellitis and those secondary complications of diabetes, as mentioned..    PCP: Belen Wood MD    Date Last Seen by PCP:   Chief Complaint   Patient presents with    PCP     Daksha Stringer PA-C 1/10/20    Diabetic Foot Exam    Nail Problem    Nail Care    Callouses         Current shoe gear:  Dm shoes      Hemoglobin A1C   Date Value Ref Range Status   11/13/2019 7.4 (H) 4.0 - 5.6 % Final     Comment:     ADA Screening Guidelines:  5.7-6.4%  Consistent with prediabetes  >or=6.5%  Consistent with diabetes  High levels of fetal hemoglobin interfere with the HbA1C  assay. Heterozygous hemoglobin variants (HbS, HgC, etc)do  not significantly interfere with this assay.   However, presence of multiple variants may affect accuracy.     08/24/2019 6.9 (H) 4.0 - 5.6 % Final     Comment:     ADA Screening Guidelines:  5.7-6.4%  Consistent with prediabetes  >or=6.5%  Consistent with diabetes  High levels of fetal hemoglobin interfere with the HbA1C  assay. Heterozygous hemoglobin variants (HbS, HgC, etc)do  not significantly interfere with  this assay.   However, presence of multiple variants may affect accuracy.     07/10/2019 7.0 (H) 4.0 - 5.6 % Final     Comment:     ADA Screening Guidelines:  5.7-6.4%  Consistent with prediabetes  >or=6.5%  Consistent with diabetes  High levels of fetal hemoglobin interfere with the HbA1C  assay. Heterozygous hemoglobin variants (HbS, HgC, etc)do  not significantly interfere with this assay.   However, presence of multiple variants may affect accuracy.               Patient Active Problem List   Diagnosis    Hyperlipemia, mixed    Generalized osteoarthritis of multiple sites    Chronic iritis - Left Eye    Osteoarthritis of both knees    Chondrocalcinosis    Peripheral angiopathy    Hyperuricemia    Helicobacter pylori gastritis    Old MI (myocardial infarction), 2013    Persistent atrial fibrillation    Type 2 diabetes mellitus with diabetic polyneuropathy, with long-term current use of insulin    Long term current use of anticoagulant therapy, eliquis    History of chest pain at rest    CKD (chronic kidney disease) stage 4, GFR 15-29 ml/min    Fuchs' corneal dystrophy    Pericardial effusion    H/O Deep vein thrombosis (DVT)    Aortic atherosclerosis    Chronic diastolic congestive heart failure    Type 2 diabetes mellitus with diabetic nephropathy, with long-term current use of insulin    Diabetic polyneuropathy associated with type 2 diabetes mellitus    Primary hypothyroidism    Moderate persistent asthma without complication    Primary open angle glaucoma (POAG) of both eyes, moderate stage    Pulmonary hypertension    Non-rheumatic tricuspid valve insufficiency    Tortuous aorta    Chest pain at rest    Allergic conjunctivitis of both eyes    Status post cataract extraction and insertion of intraocular lens    Chest pain, rule out acute myocardial infarction    Pseudogout, knees    Fever    Effusion, left knee    Herpes zoster without complication       Current Outpatient  "Medications on File Prior to Visit   Medication Sig Dispense Refill    acetaminophen (TYLENOL) 500 MG tablet Take 500 mg by mouth every 6 (six) hours as needed for Pain.      albuterol 90 mcg/actuation inhaler Inhale 2 puffs into the lungs every 4 (four) hours as needed. 1 Inhaler 11    amLODIPine (NORVASC) 5 MG tablet TAKE 1 TABLET(5 MG) BY MOUTH EVERY MORNING 90 tablet 3    apixaban (ELIQUIS) 2.5 mg Tab Take 1 tablet (2.5 mg total) by mouth 2 (two) times daily. 60 tablet 5    atorvastatin (LIPITOR) 40 MG tablet Take 1 tablet (40 mg total) by mouth once daily. 90 tablet 3    BD ULTRA-FINE SHORT PEN NEEDLE 31 gauge x 5/16" Ndle USE WITH INSULIN PENS AS DIRECTED 100 each 11    blood sugar diagnostic (ONETOUCH ULTRA TEST) Strp Inject 1 strip into the skin 3 (three) times daily. 300 each 4    clotrimazole-betamethasone 1-0.05% (LOTRISONE) cream Apply topically 2 (two) times daily. For intertriginous itching 45 g 3    diclofenac sodium (VOLTAREN) 1 % Gel Apply topically 4 (four) times daily as needed. 100 Tube 3    dorzolamide (TRUSOPT) 2 % ophthalmic solution Place 1 drop into both eyes 2 (two) times daily. 10 mL 4    fluticasone-salmeterol diskus inhaler 250-50 mcg INHALE 1 PUFF BY MOUTH INTO THE LUNGS NIGHTLY 1 each 11    fluticasone-salmeterol diskus inhaler 250-50 mcg Inhale 1 puff into the lungs 2 (two) times daily. Controller 60 each 11    FLUZONE HIGH-DOSE 2019-20, PF, 180 mcg/0.5 mL Syrg ADM 0.5ML IM UTD  0    furosemide (LASIX) 40 MG tablet Take 1 tablet (40 mg total) by mouth 2 (two) times daily. 180 tablet 3    insulin aspart protamine-insulin aspart (NOVOLOG MIX 70-30FLEXPEN U-100) 100 unit/mL (70-30) InPn pen INJECT 10 UNITS UNDER THE SKIN ONCE DAILY EVERY MORNING BEFORE BREAKFAST 15 mL 11    ipratropium (ATROVENT) 0.03 % nasal spray USE 2 SPRAYS IN EACH NOSTRIL TWICE DAILY 60 mL 1    isosorbide mononitrate (IMDUR) 60 MG 24 hr tablet TAKE 1 TABLET BY MOUTH EVERY MORNING FOR HEART, to " prevent chest pain and reduce shortness of breath 90 tablet 3    lancets Misc 1 Device by Misc.(Non-Drug; Combo Route) route 3 (three) times daily before meals. Please provide the insurance company preferred product. 300 each 4    latanoprost 0.005 % ophthalmic solution Place 1 drop into the right eye every evening. (Patient taking differently: Place 1 drop into both eyes every evening. ) 3 Bottle 6    levothyroxine (SYNTHROID) 75 MCG tablet Take 1 tablet (75 mcg total) by mouth before breakfast. 90 tablet 3    meclizine (ANTIVERT) 12.5 mg tablet Take 1 tablet (12.5 mg total) by mouth 3 (three) times daily as needed for Dizziness. 20 tablet 2    metoprolol tartrate (LOPRESSOR) 50 MG tablet TAKE 1 TABLET(50 MG) BY MOUTH TWICE DAILY 180 tablet 3    multivit-mineral-iron-lutein (CENTRUM SILVER ULTRA WOMEN'S) Tab Take 1 tablet by mouth once daily.      nitroGLYCERIN (NITROSTAT) 0.4 MG SL tablet ONE TABLET UNDER THE TONGUE EVERY 5 MINUTES AS NEEDED FOR CHEST PAIN 25 tablet 6    nystatin (MYCOSTATIN) powder Apply topically 4 (four) times daily. 60 g 3    ondansetron (ZOFRAN) 4 MG tablet TAKE ONE TABLET BY MOUTH EVERY 12 HOURS AS NEEDED FOR NAUSEA OR DIZZINESS 30 tablet 0    triamcinolone acetonide 0.025% (KENALOG) 0.025 % Oint Apply topically 2 (two) times daily. 80 g 2    TRUE METRIX GLUCOSE METER Misc TEST TID  4    TRUE METRIX GLUCOSE TEST STRIP Strp USE TO TEST BLOOD SUGAR WITH MEALS THREE TIMES DAILY 300 strip 4    TRUEPLUS LANCETS 30 gauge Misc USE TO TEST BLOOD SUGAR TID AC  2    TRUEPLUS LANCETS 33 gauge Misc TESTING THREE TIMES DAILY 300 each 0    valACYclovir (VALTREX) 1000 MG tablet Take 1 tablet (1,000 mg total) by mouth 3 (three) times daily. for 7 days 21 tablet 0     No current facility-administered medications on file prior to visit.        Review of patient's allergies indicates:   Allergen Reactions    Codeine      Other reaction(s): Itching  Other reaction(s): Nausea       Past Surgical  History:   Procedure Laterality Date    CARDIAC CATHETERIZATION      CATARACT EXTRACTION W/  INTRAOCULAR LENS IMPLANT Left n/a    CATARACT EXTRACTION W/  INTRAOCULAR LENS IMPLANT Right 10/8/2018    With Femtosecond LASER assist (Dr. Henson)    CHOLECYSTECTOMY      EYE SURGERY      gall stone      HYSTERECTOMY      VARICOSE VEIN SURGERY         Family History   Problem Relation Age of Onset    Diabetes Mother     Hypertension Mother     Glaucoma Mother     Hypertension Father     Diabetes Son     No Known Problems Daughter     Diabetes Daughter     No Known Problems Son     Melanoma Neg Hx        Social History     Socioeconomic History    Marital status:      Spouse name: Not on file    Number of children: Not on file    Years of education: Not on file    Highest education level: Not on file   Occupational History    Not on file   Social Needs    Financial resource strain: Not on file    Food insecurity:     Worry: Not on file     Inability: Not on file    Transportation needs:     Medical: Not on file     Non-medical: Not on file   Tobacco Use    Smoking status: Never Smoker    Smokeless tobacco: Never Used   Substance and Sexual Activity    Alcohol use: No    Drug use: No    Sexual activity: Never   Lifestyle    Physical activity:     Days per week: Not on file     Minutes per session: Not on file    Stress: Not on file   Relationships    Social connections:     Talks on phone: Not on file     Gets together: Not on file     Attends Taoism service: Not on file     Active member of club or organization: Not on file     Attends meetings of clubs or organizations: Not on file     Relationship status: Not on file   Other Topics Concern    Are you pregnant or think you may be? Not Asked    Breast-feeding Not Asked   Social History Narrative    Not on file           Review of Systems   Constitution: Negative for chills, decreased appetite and fever.   Cardiovascular: Negative  "for chest pain, claudication and leg swelling.   Skin: Positive for dry skin and nail changes. Negative for color change, flushing, itching and poor wound healing.   Musculoskeletal: Positive for arthritis. Negative for falls, joint pain, joint swelling and myalgias.   Gastrointestinal: Negative for nausea and vomiting.   Neurological: Negative for loss of balance, numbness and paresthesias.           Objective:       Vitals:    02/04/20 1009   BP: 114/70   Pulse: (!) 59   Resp: 18   Weight: 72.6 kg (160 lb)   Height: 5' 8" (1.727 m)   PainSc: 0-No pain        Physical Exam   Constitutional: She is oriented to person, place, and time. She appears well-developed and well-nourished.   Cardiovascular:   Dorsalis pedis and posterior tibial pulses are diminished Bilaterally. Toes are cool to touch. Feet are warm proximally.There is decreased digital hair. Skin is atrophic, slightly hyperpigmented, and mildly edematous       Musculoskeletal: Normal range of motion. She exhibits no edema or tenderness.   Adequate joint range of motion without pain, limitation, nor crepitation Bilateral feet and ankle joints. Muscle strength is 5/5 in all groups bilaterally.         Neurological: She is alert and oriented to person, place, and time.   C[place]-Yolande 5.07 monofilamant testing is diminished Ney feet. Sharp/dull sensation diminished Bilaterally. Light touch absent Bilaterally.       Skin: Skin is warm, dry and intact. No ecchymosis and no lesion noted. No erythema.   Nails x10 are elongated by  4-6 mm's, thickened by 2-5 mm's, dystrophic, and are darkened in  coloration . Xerosis Bilaterally. No open lesions noted.    Hyperkeratotic tissue noted to lateral 5th toe b/l, distal 3rd toe b/l, medial L 5th toe      Psychiatric: She has a normal mood and affect. Her behavior is normal.   Vitals reviewed.            Assessment:       Encounter Diagnoses   Name Primary?    Diabetic polyneuropathy associated with type 2 diabetes " mellitus Yes    Onychomycosis due to dermatophyte     Corn or callus          Plan:       Tiana was seen today for pcp, diabetic foot exam, nail problem, nail care and callouses.    Diagnoses and all orders for this visit:    Diabetic polyneuropathy associated with type 2 diabetes mellitus    Onychomycosis due to dermatophyte    Corn or callus      I counseled the patient on her conditions, their implications and medical management.    Shoe inspection. Diabetic Foot Education. Patient reminded of the importance of good nutrition and blood sugar control to help prevent podiatric complications of diabetes. Patient instructed on proper foot hygeine. We discussed wearing proper shoe gear, daily foot inspections, never walking without protective shoe gear, never putting sharp instruments to feet    - With patient's permission, nails were aggressively reduced and debrided x 10 to their soft tissue attachment mechanically and with electric , removing all offending nail and debris. Patient relates relief following the procedure. She will continue to monitor the areas daily, inspect her feet, wear protective shoe gear when ambulatory, moisturizer to maintain skin integrity and follow in this office in approximately 2-3 months, sooner p.r.n.    - After cleansing the  area w/ alcohol prep pad the above mentioned hyperkeratosis was trimmed utilizing No 15 scapel, to a smooth base with out incident. Patient tolerated this  well and reported comfort to the area of x6    - Return to clinic in 3m or sooner if problems arise

## 2020-02-14 ENCOUNTER — NURSE TRIAGE (OUTPATIENT)
Dept: ADMINISTRATIVE | Facility: CLINIC | Age: 85
End: 2020-02-14

## 2020-02-14 NOTE — TELEPHONE ENCOUNTER
Patient called requesting her [place] vaccination status. Informed of last shingles vaccine and need for additional immunization. Understanding verbalized.    Reason for Disposition   Health Information question, no triage required and triager able to answer question    Protocols used: ST INFORMATION ONLY CALL-A-AH

## 2020-03-11 ENCOUNTER — PATIENT OUTREACH (OUTPATIENT)
Dept: ADMINISTRATIVE | Facility: HOSPITAL | Age: 85
End: 2020-03-11

## 2020-03-12 ENCOUNTER — LAB VISIT (OUTPATIENT)
Dept: LAB | Facility: HOSPITAL | Age: 85
End: 2020-03-12
Attending: INTERNAL MEDICINE
Payer: MEDICARE

## 2020-03-12 DIAGNOSIS — Z79.4 TYPE 2 DIABETES MELLITUS WITH DIABETIC POLYNEUROPATHY, WITH LONG-TERM CURRENT USE OF INSULIN: Chronic | ICD-10-CM

## 2020-03-12 DIAGNOSIS — E11.42 TYPE 2 DIABETES MELLITUS WITH DIABETIC POLYNEUROPATHY, WITH LONG-TERM CURRENT USE OF INSULIN: Chronic | ICD-10-CM

## 2020-03-12 DIAGNOSIS — N18.4 CKD (CHRONIC KIDNEY DISEASE) STAGE 4, GFR 15-29 ML/MIN: Chronic | ICD-10-CM

## 2020-03-12 DIAGNOSIS — E03.9 PRIMARY HYPOTHYROIDISM: Chronic | ICD-10-CM

## 2020-03-12 LAB
ALBUMIN SERPL BCP-MCNC: 3.6 G/DL (ref 3.5–5.2)
ALP SERPL-CCNC: 152 U/L (ref 55–135)
ALT SERPL W/O P-5'-P-CCNC: 33 U/L (ref 10–44)
ANION GAP SERPL CALC-SCNC: 9 MMOL/L (ref 8–16)
AST SERPL-CCNC: 33 U/L (ref 10–40)
BILIRUB SERPL-MCNC: 0.5 MG/DL (ref 0.1–1)
BUN SERPL-MCNC: 27 MG/DL (ref 10–30)
CALCIUM SERPL-MCNC: 9.3 MG/DL (ref 8.7–10.5)
CHLORIDE SERPL-SCNC: 106 MMOL/L (ref 95–110)
CO2 SERPL-SCNC: 28 MMOL/L (ref 23–29)
CREAT SERPL-MCNC: 1.8 MG/DL (ref 0.5–1.4)
EST. GFR  (AFRICAN AMERICAN): 27.4 ML/MIN/1.73 M^2
EST. GFR  (NON AFRICAN AMERICAN): 23.8 ML/MIN/1.73 M^2
ESTIMATED AVG GLUCOSE: 154 MG/DL (ref 68–131)
GLUCOSE SERPL-MCNC: 171 MG/DL (ref 70–110)
HBA1C MFR BLD HPLC: 7 % (ref 4–5.6)
POTASSIUM SERPL-SCNC: 4 MMOL/L (ref 3.5–5.1)
PROT SERPL-MCNC: 6.7 G/DL (ref 6–8.4)
SODIUM SERPL-SCNC: 143 MMOL/L (ref 136–145)
TSH SERPL DL<=0.005 MIU/L-ACNC: 3.27 UIU/ML (ref 0.4–4)

## 2020-03-12 PROCEDURE — 80053 COMPREHEN METABOLIC PANEL: CPT | Mod: HCNC

## 2020-03-12 PROCEDURE — 36415 COLL VENOUS BLD VENIPUNCTURE: CPT | Mod: HCNC,PN

## 2020-03-12 PROCEDURE — 84443 ASSAY THYROID STIM HORMONE: CPT | Mod: HCNC

## 2020-03-12 PROCEDURE — 83036 HEMOGLOBIN GLYCOSYLATED A1C: CPT | Mod: HCNC

## 2020-03-19 ENCOUNTER — NURSE TRIAGE (OUTPATIENT)
Dept: ADMINISTRATIVE | Facility: CLINIC | Age: 85
End: 2020-03-19

## 2020-03-19 ENCOUNTER — OFFICE VISIT (OUTPATIENT)
Dept: INTERNAL MEDICINE | Facility: CLINIC | Age: 85
End: 2020-03-19
Payer: MEDICARE

## 2020-03-19 ENCOUNTER — HOSPITAL ENCOUNTER (OUTPATIENT)
Dept: RADIOLOGY | Facility: HOSPITAL | Age: 85
Discharge: HOME | End: 2020-03-19
Attending: PHYSICIAN ASSISTANT
Payer: MEDICARE

## 2020-03-19 ENCOUNTER — PES CALL (OUTPATIENT)
Dept: ADMINISTRATIVE | Facility: CLINIC | Age: 85
End: 2020-03-19

## 2020-03-19 VITALS
OXYGEN SATURATION: 96 % | HEART RATE: 76 BPM | DIASTOLIC BLOOD PRESSURE: 60 MMHG | TEMPERATURE: 100 F | SYSTOLIC BLOOD PRESSURE: 126 MMHG

## 2020-03-19 DIAGNOSIS — Z79.4 TYPE 2 DIABETES MELLITUS WITH DIABETIC NEPHROPATHY, WITH LONG-TERM CURRENT USE OF INSULIN: ICD-10-CM

## 2020-03-19 DIAGNOSIS — M15.9 GENERALIZED OSTEOARTHRITIS OF MULTIPLE SITES: ICD-10-CM

## 2020-03-19 DIAGNOSIS — R11.2 NON-INTRACTABLE VOMITING WITH NAUSEA, UNSPECIFIED VOMITING TYPE: ICD-10-CM

## 2020-03-19 DIAGNOSIS — R19.4 FREQUENT BOWEL MOVEMENTS: ICD-10-CM

## 2020-03-19 DIAGNOSIS — R10.84 GENERALIZED ABDOMINAL PAIN: Primary | ICD-10-CM

## 2020-03-19 DIAGNOSIS — E11.21 TYPE 2 DIABETES MELLITUS WITH DIABETIC NEPHROPATHY, WITH LONG-TERM CURRENT USE OF INSULIN: ICD-10-CM

## 2020-03-19 DIAGNOSIS — R10.84 GENERALIZED ABDOMINAL PAIN: ICD-10-CM

## 2020-03-19 DIAGNOSIS — N18.4 CKD (CHRONIC KIDNEY DISEASE) STAGE 4, GFR 15-29 ML/MIN: Chronic | ICD-10-CM

## 2020-03-19 LAB
BACTERIA #/AREA URNS AUTO: ABNORMAL /HPF
BILIRUB UR QL STRIP: NEGATIVE
CLARITY UR REFRACT.AUTO: ABNORMAL
COLOR UR AUTO: YELLOW
GLUCOSE UR QL STRIP: NEGATIVE
HGB UR QL STRIP: NEGATIVE
HYALINE CASTS UR QL AUTO: 18 /LPF
KETONES UR QL STRIP: NEGATIVE
LEUKOCYTE ESTERASE UR QL STRIP: ABNORMAL
MICROSCOPIC COMMENT: ABNORMAL
NITRITE UR QL STRIP: NEGATIVE
PH UR STRIP: 6 [PH] (ref 5–8)
PROT UR QL STRIP: ABNORMAL
RBC #/AREA URNS AUTO: 4 /HPF (ref 0–4)
SP GR UR STRIP: 1.01 (ref 1–1.03)
SQUAMOUS #/AREA URNS AUTO: 0 /HPF
URN SPEC COLLECT METH UR: ABNORMAL
WBC #/AREA URNS AUTO: 9 /HPF (ref 0–5)

## 2020-03-19 PROCEDURE — 99214 OFFICE O/P EST MOD 30 MIN: CPT | Mod: HCNC,25,S$GLB, | Performed by: PHYSICIAN ASSISTANT

## 2020-03-19 PROCEDURE — 81003 URINALYSIS AUTO W/O SCOPE: CPT | Mod: QW,HCNC,S$GLB, | Performed by: PHYSICIAN ASSISTANT

## 2020-03-19 PROCEDURE — 81003 POCT URINALYSIS: ICD-10-PCS | Mod: QW,HCNC,S$GLB, | Performed by: PHYSICIAN ASSISTANT

## 2020-03-19 PROCEDURE — 74018 XR ABDOMEN AP 1 VIEW: ICD-10-PCS | Mod: 26,HCNC,, | Performed by: RADIOLOGY

## 2020-03-19 PROCEDURE — 1101F PT FALLS ASSESS-DOCD LE1/YR: CPT | Mod: HCNC,CPTII,S$GLB, | Performed by: PHYSICIAN ASSISTANT

## 2020-03-19 PROCEDURE — 3051F PR MOST RECENT HEMOGLOBIN A1C LEVEL 7.0 - < 8.0%: ICD-10-PCS | Mod: HCNC,CPTII,S$GLB, | Performed by: PHYSICIAN ASSISTANT

## 2020-03-19 PROCEDURE — 1125F AMNT PAIN NOTED PAIN PRSNT: CPT | Mod: HCNC,S$GLB,, | Performed by: PHYSICIAN ASSISTANT

## 2020-03-19 PROCEDURE — 99999 PR PBB SHADOW E&M-EST. PATIENT-LVL V: CPT | Mod: PBBFAC,HCNC,, | Performed by: PHYSICIAN ASSISTANT

## 2020-03-19 PROCEDURE — 1125F PR PAIN SEVERITY QUANTIFIED, PAIN PRESENT: ICD-10-PCS | Mod: HCNC,S$GLB,, | Performed by: PHYSICIAN ASSISTANT

## 2020-03-19 PROCEDURE — 81001 URINALYSIS AUTO W/SCOPE: CPT | Mod: HCNC

## 2020-03-19 PROCEDURE — 1159F PR MEDICATION LIST DOCUMENTED IN MEDICAL RECORD: ICD-10-PCS | Mod: HCNC,S$GLB,, | Performed by: PHYSICIAN ASSISTANT

## 2020-03-19 PROCEDURE — 74018 RADEX ABDOMEN 1 VIEW: CPT | Mod: TC,HCNC

## 2020-03-19 PROCEDURE — 1159F MED LIST DOCD IN RCRD: CPT | Mod: HCNC,S$GLB,, | Performed by: PHYSICIAN ASSISTANT

## 2020-03-19 PROCEDURE — 3051F HG A1C>EQUAL 7.0%<8.0%: CPT | Mod: HCNC,CPTII,S$GLB, | Performed by: PHYSICIAN ASSISTANT

## 2020-03-19 PROCEDURE — 74018 RADEX ABDOMEN 1 VIEW: CPT | Mod: 26,HCNC,, | Performed by: RADIOLOGY

## 2020-03-19 PROCEDURE — 1101F PR PT FALLS ASSESS DOC 0-1 FALLS W/OUT INJ PAST YR: ICD-10-PCS | Mod: HCNC,CPTII,S$GLB, | Performed by: PHYSICIAN ASSISTANT

## 2020-03-19 PROCEDURE — 99214 PR OFFICE/OUTPT VISIT, EST, LEVL IV, 30-39 MIN: ICD-10-PCS | Mod: HCNC,25,S$GLB, | Performed by: PHYSICIAN ASSISTANT

## 2020-03-19 PROCEDURE — 99999 PR PBB SHADOW E&M-EST. PATIENT-LVL V: ICD-10-PCS | Mod: PBBFAC,HCNC,, | Performed by: PHYSICIAN ASSISTANT

## 2020-03-19 NOTE — TELEPHONE ENCOUNTER
Patient has been with no appetite for a couple of days associated with some weight loss. She's had body aches and more frequent stools over the past day as well as some vomiting this morning. She's started experincing some intermittent abdominal pain.  - Recommended fluids and to see patient today  - Patient did not feel as though she could participate in [facility] but did want to see a provider today  - Appointment was scheduled with Daksha Stringer today  - Told to call PCP or call back if symptoms worsen    Reason for Disposition   MODERATE OR MILD pain that comes and goes (cramps) lasts > 24 hours    Additional Information   Negative: Passed out (i.e., fainted, collapsed and was not responding)   Negative: Shock suspected (e.g., cold/pale/clammy skin, too weak to stand, low BP, rapid pulse)   Negative: Sounds like a life-threatening emergency to the triager   Negative: Chest pain   Negative: Pain is mainly in upper abdomen (if needed ask: 'is it mainly above the belly button?')   Negative: Abdominal pain and pregnant > 20 weeks   Negative: Abdominal pain and pregnant < 20 weeks   Negative: SEVERE abdominal pain (e.g., excruciating)   Negative: Vomiting red blood or black (coffee ground) material   Negative: Bloody, black, or tarry bowel movements   Negative: Constant abdominal pain lasting > 2 hours   Negative: Vomiting bile (green color)   Negative: Patient sounds very sick or weak to the triager   Negative: Vomiting and abdomen looks much more swollen than usual   Negative: White of the eyes have turned yellow (i.e., jaundice)   Negative: Blood in urine (red, pink, or tea-colored)   Negative: Fever > 103 F (39.4 C)   Negative: Fever > 101 F (38.3 C) and over 60 years of age   Negative: Fever > 100.0 F (37.8 C) and has diabetes mellitus or a weak immune system (e.g., HIV positive, cancer chemotherapy, organ transplant, splenectomy, chronic steroids)   Negative: Fever > 100.0 F  (37.8 C) and bedridden (e.g., nursing home patient, stroke, chronic illness, recovering from surgery)   Negative: Pregnant or could be pregnant (i.e., missed last menstrual period)    Protocols used: ABDOMINAL PAIN - FEMALE-A-OH

## 2020-03-19 NOTE — PROGRESS NOTES
Subjective:       Patient ID: Tiana Mead is a 89 y.o. female.    Chief Complaint: Abdominal Pain and Emesis    HPI     Established pt of Belen Wood MD       C/o generalized abdominal cramping, frequent bowels movements, and one episode of vomiting(food contents). Onset 3 days ago. Notes decrease in appetite. She denies any fevers, cough, sob, diarrhea, melena, or brbpr. No dysuria. No known sick exposures. No recent travel. Lives at home with her daughter. Last ate some apple sauce which she tolerated well. Glucose around 130s at home. Had lab on 3/12 that was fairly stable. Has not tried any relieving measures. Pian not improved by bowel movements. Of note she hx of taking Zofran chronically as needed for nausea, has not tried recently.     Past Medical History:   Diagnosis Date    Allergy     Anemia     Arthritis     Asthma     CHF (congestive heart failure) 5/2/2017    Chronic kidney disease     Degenerative disc disease     Diabetes mellitus type II     Essential hypertension 7/3/2012    Glaucoma     History of pseudogout 8/23/2012    Hyperlipidemia     Hypertension     Iritis     Myocardial infarction     Pneumonia     Thyroid disease      Social History     Tobacco Use    Smoking status: Never Smoker    Smokeless tobacco: Never Used   Substance Use Topics    Alcohol use: No    Drug use: No     Review of patient's allergies indicates:   Allergen Reactions    Codeine Itching and Nausea Only                       Review of Systems   Constitutional: Positive for appetite change. Negative for chills, diaphoresis, fever and unexpected weight change.   HENT: Negative for congestion and sore throat.    Respiratory: Negative for cough and shortness of breath.    Cardiovascular: Negative for chest pain and leg swelling.   Gastrointestinal: Positive for abdominal pain, nausea and vomiting. Negative for abdominal distention, blood in stool, constipation and diarrhea.   Genitourinary:  Negative for difficulty urinating, dysuria, flank pain and hematuria.   Musculoskeletal: Positive for arthralgias (chronic).   Skin: Negative for rash.   Neurological: Negative for weakness and headaches.       Objective: /60   Pulse 76   Temp 99.9 °F (37.7 °C)   LMP  (LMP Unknown)   SpO2 96%         Physical Exam   Constitutional: She appears well-developed and well-nourished. No distress.   HENT:   Head: Normocephalic and atraumatic.   Mouth/Throat: Oropharynx is clear and moist.   Cardiovascular: Normal rate. An irregular rhythm present. Exam reveals no friction rub.   Murmur heard.  Pulmonary/Chest: Effort normal and breath sounds normal. She has no wheezes. She has no rales.   Abdominal: Soft. Bowel sounds are normal. She exhibits no mass. There is no tenderness. There is no rebound and no guarding.   Musculoskeletal: She exhibits no edema.   Neurological: She is alert.   Skin: Skin is warm and dry. No rash noted.   Psychiatric: She has a normal mood and affect.   Vitals reviewed.      Assessment:       1. Generalized abdominal pain    2. Frequent bowel movements    3. Non-intractable vomiting with nausea, unspecified vomiting type    4. CKD (chronic kidney disease) stage 4, GFR 15-29 ml/min    5. Type 2 diabetes mellitus with diabetic nephropathy, with long-term current use of insulin    6. Generalized osteoarthritis of multiple sites        Plan:         Tiana was seen today for abdominal pain and emesis.    Diagnoses and all orders for this visit:    Generalized abdominal pain  -     CBC auto differential; Future  -     Comprehensive metabolic panel; Future  -     Lipase; Future  -     POCT URINALYSIS  -     Cancel: X-Ray Abdomen AP 1 View; Future  -     X-Ray Abdomen AP 1 View; Future  -     Cancel: Urinalysis, Reflex to Urine Culture Urine, Clean Catch; Future  -     Urinalysis, Reflex to Urine Culture Urine, Clean Catch; Future  -     Urinalysis, Reflex to Urine Culture Urine, Clean  Catch    Frequent bowel movements  -     X-Ray Abdomen AP 1 View; Future  -     Cancel: Urinalysis, Reflex to Urine Culture Urine, Clean Catch; Future  -     Urinalysis, Reflex to Urine Culture Urine, Clean Catch; Future  -     Urinalysis, Reflex to Urine Culture Urine, Clean Catch    Non-intractable vomiting with nausea, unspecified vomiting type  -     X-Ray Abdomen AP 1 View; Future  -     Cancel: Urinalysis, Reflex to Urine Culture Urine, Clean Catch; Future  -     Urinalysis, Reflex to Urine Culture Urine, Clean Catch; Future  -     Urinalysis, Reflex to Urine Culture Urine, Clean Catch    CKD (chronic kidney disease) stage 4, GFR 15-29 ml/min    Type 2 diabetes mellitus with diabetic nephropathy, with long-term current use of insulin    Generalized osteoarthritis of multiple sites      Stat lab and imaging as above  Advised on Zofran prn, hydration, tylenol prn  Reserve diet, could try Glucerna  Case reviewed/seen with Dr. Bradley  Further rec to follow pending lab/imaging.  Strict ED and RTC precautions discussed    Daksha Stringer PA-C

## 2020-03-20 ENCOUNTER — TELEPHONE (OUTPATIENT)
Dept: INTERNAL MEDICINE | Facility: CLINIC | Age: 85
End: 2020-03-20

## 2020-03-20 LAB
BILIRUB SERPL-MCNC: ABNORMAL MG/DL
BLOOD, POC UA: ABNORMAL
GLUCOSE UR QL STRIP: NORMAL
KETONES UR QL STRIP: ABNORMAL
LEUKOCYTE ESTERASE URINE, POC: ABNORMAL
NITRITE, POC UA: ABNORMAL
PH, POC UA: 6
PROTEIN, POC: ABNORMAL
SPECIFIC GRAVITY, POC UA: 1.02
UROBILINOGEN, POC UA: NORMAL

## 2020-03-23 ENCOUNTER — TELEPHONE (OUTPATIENT)
Dept: INTERNAL MEDICINE | Facility: CLINIC | Age: 85
End: 2020-03-23

## 2020-03-23 NOTE — TELEPHONE ENCOUNTER
Courtesy call to patient  No more vomiting.   Mild decrease of appetite eating applesauce, oatmeal and drinking cranberry juices. Tolerating well.   BM are now normal  No fevers.   Abdominal pain is mild  Advised to continue current plan of care  Call with any worsening symptoms or concerns.     Future Appointments   Date Time Provider Department Center   3/25/2020  8:30 AM Belen Bradley MD [facility] IM [facility]   4/14/2020  3:00 PM Mickey Erwin MD [facility] CARDIO [facility]   5/11/2020 10:30 AM Aria Krishnamurthy DPM [facility] POD [facility]   6/9/2020 11:00 AM Brissa Gonzalez NP [address]

## 2020-03-25 ENCOUNTER — TELEPHONE (OUTPATIENT)
Dept: INTERNAL MEDICINE | Facility: CLINIC | Age: 85
End: 2020-03-25

## 2020-03-25 ENCOUNTER — HOSPITAL ENCOUNTER (INPATIENT)
Facility: HOSPITAL | Age: 85
LOS: 11 days | Discharge: HOME | DRG: 177 | End: 2020-04-05
Attending: EMERGENCY MEDICINE | Admitting: EMERGENCY MEDICINE
Payer: MEDICARE

## 2020-03-25 DIAGNOSIS — J18.9 COMMUNITY ACQUIRED PNEUMONIA, UNSPECIFIED LATERALITY: Primary | ICD-10-CM

## 2020-03-25 DIAGNOSIS — Z20.822 SUSPECTED COVID-19 VIRUS INFECTION: ICD-10-CM

## 2020-03-25 DIAGNOSIS — R00.0 TACHYCARDIA: ICD-10-CM

## 2020-03-25 DIAGNOSIS — D72.829 LEUKOCYTOSIS, UNSPECIFIED TYPE: ICD-10-CM

## 2020-03-25 DIAGNOSIS — U07.1 COVID-19 VIRUS INFECTION: ICD-10-CM

## 2020-03-25 DIAGNOSIS — I48.91 ATRIAL FIBRILLATION WITH RVR: ICD-10-CM

## 2020-03-25 LAB
ALBUMIN SERPL BCP-MCNC: 2.4 G/DL (ref 3.5–5.2)
ALLENS TEST: ABNORMAL
ALP SERPL-CCNC: 81 U/L (ref 55–135)
ALT SERPL W/O P-5'-P-CCNC: 25 U/L (ref 10–44)
ANION GAP SERPL CALC-SCNC: 13 MMOL/L (ref 8–16)
AST SERPL-CCNC: 43 U/L (ref 10–40)
BASOPHILS # BLD AUTO: 0.02 K/UL (ref 0–0.2)
BASOPHILS NFR BLD: 0.1 % (ref 0–1.9)
BILIRUB SERPL-MCNC: 1.1 MG/DL (ref 0.1–1)
BNP SERPL-MCNC: 714 PG/ML (ref 0–99)
BUN SERPL-MCNC: 21 MG/DL (ref 10–30)
CALCIUM SERPL-MCNC: 8.5 MG/DL (ref 8.7–10.5)
CHLORIDE SERPL-SCNC: 99 MMOL/L (ref 95–110)
CK SERPL-CCNC: 86 U/L (ref 20–180)
CO2 SERPL-SCNC: 22 MMOL/L (ref 23–29)
CREAT SERPL-MCNC: 1.6 MG/DL (ref 0.5–1.4)
CRP SERPL-MCNC: 150.8 MG/L (ref 0–8.2)
DELSYS: ABNORMAL
DIFFERENTIAL METHOD: ABNORMAL
EOSINOPHIL # BLD AUTO: 0 K/UL (ref 0–0.5)
EOSINOPHIL NFR BLD: 0.1 % (ref 0–8)
ERYTHROCYTE [DISTWIDTH] IN BLOOD BY AUTOMATED COUNT: 15 % (ref 11.5–14.5)
EST. GFR  (AFRICAN AMERICAN): 31.3 ML/MIN/1.73 M^2
EST. GFR  (NON AFRICAN AMERICAN): 27.2 ML/MIN/1.73 M^2
FERRITIN SERPL-MCNC: 383 NG/ML (ref 20–300)
FIO2: 28
FLOW: 2
GLUCOSE SERPL-MCNC: 204 MG/DL (ref 70–110)
HCO3 UR-SCNC: 22.8 MMOL/L (ref 24–28)
HCT VFR BLD AUTO: 38.7 % (ref 37–48.5)
HGB BLD-MCNC: 12.9 G/DL (ref 12–16)
IMM GRANULOCYTES # BLD AUTO: 0.26 K/UL (ref 0–0.04)
IMM GRANULOCYTES NFR BLD AUTO: 1.7 % (ref 0–0.5)
INFLUENZA A, MOLECULAR: NEGATIVE
INFLUENZA B, MOLECULAR: NEGATIVE
LACTATE SERPL-SCNC: 1 MMOL/L (ref 0.5–2.2)
LACTATE SERPL-SCNC: 2 MMOL/L (ref 0.5–2.2)
LIPASE SERPL-CCNC: 141 U/L (ref 4–60)
LYMPHOCYTES # BLD AUTO: 0.6 K/UL (ref 1–4.8)
LYMPHOCYTES NFR BLD: 4.1 % (ref 18–48)
MAGNESIUM SERPL-MCNC: 1.8 MG/DL (ref 1.6–2.6)
MCH RBC QN AUTO: 29.5 PG (ref 27–31)
MCHC RBC AUTO-ENTMCNC: 33.3 G/DL (ref 32–36)
MCV RBC AUTO: 88 FL (ref 82–98)
MODE: ABNORMAL
MONOCYTES # BLD AUTO: 1 K/UL (ref 0.3–1)
MONOCYTES NFR BLD: 6.8 % (ref 4–15)
NEUTROPHILS # BLD AUTO: 13.4 K/UL (ref 1.8–7.7)
NEUTROPHILS NFR BLD: 87.2 % (ref 38–73)
NRBC BLD-RTO: 0 /100 WBC
PCO2 BLDA: 32.3 MMHG (ref 35–45)
PH SMN: 7.46 [PH] (ref 7.35–7.45)
PHOSPHATE SERPL-MCNC: 2.4 MG/DL (ref 2.7–4.5)
PLATELET # BLD AUTO: 176 K/UL (ref 150–350)
PMV BLD AUTO: 12.2 FL (ref 9.2–12.9)
PO2 BLDA: 46 MMHG (ref 40–60)
POC BE: -1 MMOL/L
POC SATURATED O2: 85 % (ref 95–100)
POC TCO2: 24 MMOL/L (ref 24–29)
POCT GLUCOSE: 194 MG/DL (ref 70–110)
POCT GLUCOSE: 210 MG/DL (ref 70–110)
POTASSIUM SERPL-SCNC: 3.6 MMOL/L (ref 3.5–5.1)
PROCALCITONIN SERPL IA-MCNC: 0.4 NG/ML
PROT SERPL-MCNC: 6.1 G/DL (ref 6–8.4)
RBC # BLD AUTO: 4.38 M/UL (ref 4–5.4)
SAMPLE: ABNORMAL
SITE: ABNORMAL
SODIUM SERPL-SCNC: 134 MMOL/L (ref 136–145)
SP02: 96
SPECIMEN SOURCE: NORMAL
TROPONIN I SERPL DL<=0.01 NG/ML-MCNC: 0.02 NG/ML (ref 0–0.03)
WBC # BLD AUTO: 15.31 K/UL (ref 3.9–12.7)

## 2020-03-25 PROCEDURE — 99900035 HC TECH TIME PER 15 MIN (STAT): Mod: HCNC

## 2020-03-25 PROCEDURE — 86140 C-REACTIVE PROTEIN: CPT | Mod: HCNC

## 2020-03-25 PROCEDURE — 96375 TX/PRO/DX INJ NEW DRUG ADDON: CPT | Mod: HCNC

## 2020-03-25 PROCEDURE — 99233 PR SUBSEQUENT HOSPITAL CARE,LEVL III: ICD-10-PCS | Mod: HCNC,GC,, | Performed by: HOSPITALIST

## 2020-03-25 PROCEDURE — 83605 ASSAY OF LACTIC ACID: CPT | Mod: HCNC

## 2020-03-25 PROCEDURE — 82803 BLOOD GASES ANY COMBINATION: CPT | Mod: HCNC

## 2020-03-25 PROCEDURE — 83735 ASSAY OF MAGNESIUM: CPT | Mod: HCNC

## 2020-03-25 PROCEDURE — 99291 CRITICAL CARE FIRST HOUR: CPT | Mod: ,,, | Performed by: EMERGENCY MEDICINE

## 2020-03-25 PROCEDURE — 85025 COMPLETE CBC W/AUTO DIFF WBC: CPT | Mod: HCNC

## 2020-03-25 PROCEDURE — 83880 ASSAY OF NATRIURETIC PEPTIDE: CPT | Mod: HCNC

## 2020-03-25 PROCEDURE — 84145 PROCALCITONIN (PCT): CPT | Mod: HCNC

## 2020-03-25 PROCEDURE — 93005 ELECTROCARDIOGRAM TRACING: CPT | Mod: HCNC

## 2020-03-25 PROCEDURE — 80053 COMPREHEN METABOLIC PANEL: CPT | Mod: HCNC

## 2020-03-25 PROCEDURE — 85347 COAGULATION TIME ACTIVATED: CPT | Mod: HCNC

## 2020-03-25 PROCEDURE — 93010 ELECTROCARDIOGRAM REPORT: CPT | Mod: HCNC,,, | Performed by: INTERNAL MEDICINE

## 2020-03-25 PROCEDURE — 84484 ASSAY OF TROPONIN QUANT: CPT | Mod: HCNC

## 2020-03-25 PROCEDURE — 96366 THER/PROPH/DIAG IV INF ADDON: CPT | Mod: HCNC

## 2020-03-25 PROCEDURE — 87040 BLOOD CULTURE FOR BACTERIA: CPT | Mod: 59,HCNC

## 2020-03-25 PROCEDURE — 96365 THER/PROPH/DIAG IV INF INIT: CPT | Mod: HCNC

## 2020-03-25 PROCEDURE — 99291 PR CRITICAL CARE, E/M 30-74 MINUTES: ICD-10-PCS | Mod: ,,, | Performed by: EMERGENCY MEDICINE

## 2020-03-25 PROCEDURE — 99233 SBSQ HOSP IP/OBS HIGH 50: CPT | Mod: HCNC,GC,, | Performed by: HOSPITALIST

## 2020-03-25 PROCEDURE — 93010 EKG 12-LEAD: ICD-10-PCS | Mod: HCNC,,, | Performed by: INTERNAL MEDICINE

## 2020-03-25 PROCEDURE — 82728 ASSAY OF FERRITIN: CPT | Mod: HCNC

## 2020-03-25 PROCEDURE — U0002 COVID-19 LAB TEST NON-CDC: HCPCS | Mod: HCNC

## 2020-03-25 PROCEDURE — 82550 ASSAY OF CK (CPK): CPT | Mod: HCNC

## 2020-03-25 PROCEDURE — 87502 INFLUENZA DNA AMP PROBE: CPT | Mod: HCNC

## 2020-03-25 PROCEDURE — 63600175 PHARM REV CODE 636 W HCPCS: Mod: HCNC | Performed by: EMERGENCY MEDICINE

## 2020-03-25 PROCEDURE — 11000001 HC ACUTE MED/SURG PRIVATE ROOM: Mod: HCNC

## 2020-03-25 PROCEDURE — 83690 ASSAY OF LIPASE: CPT | Mod: HCNC

## 2020-03-25 PROCEDURE — 84100 ASSAY OF PHOSPHORUS: CPT | Mod: HCNC

## 2020-03-25 PROCEDURE — 25000003 PHARM REV CODE 250: Mod: HCNC | Performed by: STUDENT IN AN ORGANIZED HEALTH CARE EDUCATION/TRAINING PROGRAM

## 2020-03-25 PROCEDURE — 63600175 PHARM REV CODE 636 W HCPCS: Mod: HCNC | Performed by: HOSPITALIST

## 2020-03-25 PROCEDURE — 25000003 PHARM REV CODE 250: Mod: HCNC | Performed by: EMERGENCY MEDICINE

## 2020-03-25 PROCEDURE — 99285 EMERGENCY DEPT VISIT HI MDM: CPT | Mod: 25,HCNC

## 2020-03-25 PROCEDURE — 96361 HYDRATE IV INFUSION ADD-ON: CPT | Mod: HCNC

## 2020-03-25 RX ORDER — ATORVASTATIN CALCIUM 20 MG/1
40 TABLET, FILM COATED ORAL DAILY
Status: DISCONTINUED | OUTPATIENT
Start: 2020-03-26 | End: 2020-04-05 | Stop reason: HOSPADM

## 2020-03-25 RX ORDER — LATANOPROST 50 UG/ML
1 SOLUTION/ DROPS OPHTHALMIC NIGHTLY
Status: DISCONTINUED | OUTPATIENT
Start: 2020-03-25 | End: 2020-04-05 | Stop reason: HOSPADM

## 2020-03-25 RX ORDER — ACETAMINOPHEN 325 MG/1
650 TABLET ORAL
Status: COMPLETED | OUTPATIENT
Start: 2020-03-25 | End: 2020-03-25

## 2020-03-25 RX ORDER — CEFTRIAXONE 1 G/1
1 INJECTION, POWDER, FOR SOLUTION INTRAMUSCULAR; INTRAVENOUS
Status: DISCONTINUED | OUTPATIENT
Start: 2020-03-26 | End: 2020-03-31

## 2020-03-25 RX ORDER — ONDANSETRON 4 MG/1
4 TABLET, FILM COATED ORAL EVERY 12 HOURS PRN
Status: DISCONTINUED | OUTPATIENT
Start: 2020-03-25 | End: 2020-04-05 | Stop reason: HOSPADM

## 2020-03-25 RX ORDER — ALBUTEROL SULFATE 90 UG/1
2 AEROSOL, METERED RESPIRATORY (INHALATION) EVERY 4 HOURS PRN
Status: DISCONTINUED | OUTPATIENT
Start: 2020-03-25 | End: 2020-04-05 | Stop reason: HOSPADM

## 2020-03-25 RX ORDER — GLUCAGON 1 MG
1 KIT INJECTION
Status: DISCONTINUED | OUTPATIENT
Start: 2020-03-25 | End: 2020-04-05 | Stop reason: HOSPADM

## 2020-03-25 RX ORDER — INSULIN ASPART 100 [IU]/ML
0-5 INJECTION, SOLUTION INTRAVENOUS; SUBCUTANEOUS
Status: DISCONTINUED | OUTPATIENT
Start: 2020-03-25 | End: 2020-04-05 | Stop reason: HOSPADM

## 2020-03-25 RX ORDER — SODIUM CHLORIDE 0.9 % (FLUSH) 0.9 %
10 SYRINGE (ML) INJECTION
Status: DISCONTINUED | OUTPATIENT
Start: 2020-03-25 | End: 2020-04-05 | Stop reason: HOSPADM

## 2020-03-25 RX ORDER — IBUPROFEN 200 MG
24 TABLET ORAL
Status: DISCONTINUED | OUTPATIENT
Start: 2020-03-25 | End: 2020-04-05 | Stop reason: HOSPADM

## 2020-03-25 RX ORDER — METOPROLOL TARTRATE 50 MG/1
50 TABLET ORAL 2 TIMES DAILY
Status: DISCONTINUED | OUTPATIENT
Start: 2020-03-25 | End: 2020-04-05 | Stop reason: HOSPADM

## 2020-03-25 RX ORDER — IBUPROFEN 200 MG
16 TABLET ORAL
Status: DISCONTINUED | OUTPATIENT
Start: 2020-03-25 | End: 2020-04-05 | Stop reason: HOSPADM

## 2020-03-25 RX ORDER — ISOSORBIDE MONONITRATE 60 MG/1
60 TABLET, EXTENDED RELEASE ORAL DAILY
Status: DISCONTINUED | OUTPATIENT
Start: 2020-03-26 | End: 2020-04-05 | Stop reason: HOSPADM

## 2020-03-25 RX ORDER — AZITHROMYCIN 250 MG/1
250 TABLET, FILM COATED ORAL DAILY
Status: COMPLETED | OUTPATIENT
Start: 2020-03-25 | End: 2020-03-29

## 2020-03-25 RX ORDER — LEVOTHYROXINE SODIUM 75 UG/1
75 TABLET ORAL
Status: DISCONTINUED | OUTPATIENT
Start: 2020-03-26 | End: 2020-04-05 | Stop reason: HOSPADM

## 2020-03-25 RX ORDER — NITROGLYCERIN 0.4 MG/1
0.4 TABLET SUBLINGUAL EVERY 5 MIN PRN
Status: DISCONTINUED | OUTPATIENT
Start: 2020-03-25 | End: 2020-04-05 | Stop reason: HOSPADM

## 2020-03-25 RX ORDER — CEFTRIAXONE 1 G/1
1 INJECTION, POWDER, FOR SOLUTION INTRAMUSCULAR; INTRAVENOUS ONCE
Status: COMPLETED | OUTPATIENT
Start: 2020-03-25 | End: 2020-03-25

## 2020-03-25 RX ORDER — AZITHROMYCIN 250 MG/1
250 TABLET, FILM COATED ORAL DAILY
Status: DISCONTINUED | OUTPATIENT
Start: 2020-03-26 | End: 2020-03-25

## 2020-03-25 RX ORDER — DORZOLAMIDE HCL 20 MG/ML
1 SOLUTION/ DROPS OPHTHALMIC 2 TIMES DAILY
Status: DISCONTINUED | OUTPATIENT
Start: 2020-03-25 | End: 2020-04-05 | Stop reason: HOSPADM

## 2020-03-25 RX ADMIN — APIXABAN 2.5 MG: 2.5 TABLET, FILM COATED ORAL at 08:03

## 2020-03-25 RX ADMIN — SODIUM CHLORIDE 1000 ML: 0.9 INJECTION, SOLUTION INTRAVENOUS at 10:03

## 2020-03-25 RX ADMIN — ACETAMINOPHEN 650 MG: 325 TABLET ORAL at 10:03

## 2020-03-25 RX ADMIN — AZITHROMYCIN MONOHYDRATE 250 MG: 250 TABLET ORAL at 08:03

## 2020-03-25 RX ADMIN — DEXTROSE 250 MG: 5 SOLUTION INTRAVENOUS at 11:03

## 2020-03-25 RX ADMIN — CEFTRIAXONE SODIUM 1 G: 1 INJECTION, POWDER, FOR SOLUTION INTRAMUSCULAR; INTRAVENOUS at 11:03

## 2020-03-25 RX ADMIN — DORZOLAMIDE HYDROCHLORIDE 1 DROP: 20 SOLUTION/ DROPS OPHTHALMIC at 08:03

## 2020-03-25 RX ADMIN — INSULIN ASPART 2 UNITS: 100 INJECTION, SOLUTION INTRAVENOUS; SUBCUTANEOUS at 06:03

## 2020-03-25 RX ADMIN — METOPROLOL TARTRATE 50 MG: 50 TABLET, FILM COATED ORAL at 08:03

## 2020-03-25 NOTE — ED TRIAGE NOTES
Tiana Mead, a 89 y.o. female presents to the ED w/ complaint of abdominal pain, weakness, reduced appetite for a couple of days     Triage note:  Chief Complaint   Patient presents with    Fatigue     loose stools, fever, abdominal pain     Review of patient's allergies indicates:   Allergen Reactions    Codeine Itching and Nausea Only            Past Medical History:   Diagnosis Date    Allergy     Anemia     Arthritis     Asthma     CHF (congestive heart failure) 5/2/2017    Chronic kidney disease     Degenerative disc disease     Diabetes mellitus type II     Essential hypertension 7/3/2012    Glaucoma     History of pseudogout 8/23/2012    Hyperlipidemia     Hypertension     Iritis     Myocardial infarction     Pneumonia     Thyroid disease      LOC: Patient name and date of birth verified. The patient is awake, alert and aware of environment with an appropriate affect, the patient is oriented x 3 and speaking appropriately.   APPEARANCE: Patient resting comfortably, patient is clean and well groomed, patient's clothing is properly fastened.  SKIN: The skin is warm and dry, color consistent with ethnicity, patient has normal skin turgor and moist mucus membranes, skin intact, no breakdown or bruising noted.  MUSCULOSKELETAL: Patient moving all extremities well, no obvious swelling or deformities noted.   RESPIRATORY: Respirations are spontaneous, patient has a normal effort and rate, no accessory muscle use noted.  CARDIAC: Patient has a normal rate and rhythm, no periphreal edema noted, capillary refill < 3 seconds.  ABDOMEN: Soft and non tender to palpation, no distention noted. Bowel sounds present in all four quadrants. Abdominal pain reported by patient.  NEUROLOGIC: Eyes open spontaneously, behavior appropriate to situation, follows commands, facial expression symmetrical, bilateral hand grasp equal and even, purposeful motor response noted, normal sensation in all extremities when  touched with a finger.

## 2020-03-25 NOTE — ED PROVIDER NOTES
Encounter Date: 3/25/2020    SCRIBE #1 NOTE: I, Supriya Johnson, am scribing for, and in the presence of,  Claudio Baldwin MD. I have scribed the following portions of the note - the EKG reading.       History     Chief Complaint   Patient presents with    Fatigue     loose stools, fever, abdominal pain     89 yo W with pmhx DM, HLD, HTN, CKD, HFpEF (PA pressure 56) presents with a chief complaint of weakness.  According to the patient, she has been suffering from abdominal pain for the past 4 days.  Pain is in generalized abdomen.  It is constant.  It is associated with anorexia and decreased p.o. intake.  Occasional loose stools.  Patient denies any nausea, vomiting, diarrhea, constipation, dysuria, urinary frequency.  Patient reports that today, the pain was associated with weakness so she decided to come to the emergency department.  According to review of the EMR, the patient has been suffering from this abdominal pain for the past 9 days.  Her daughter spoke with with pt's PCP today. Daughter is home as a COVID suspect. Pt has been suffering from elevated temperatures and a dry cough. No abdominal surgical history. Pt reports never having abdominal pain like this before.        Review of patient's allergies indicates:   Allergen Reactions    Codeine Itching and Nausea Only            Past Medical History:   Diagnosis Date    Allergy     Anemia     Arthritis     Asthma     CHF (congestive heart failure) 5/2/2017    Chronic kidney disease     Degenerative disc disease     Diabetes mellitus type II     Essential hypertension 7/3/2012    Glaucoma     History of pseudogout 8/23/2012    Hyperlipidemia     Hypertension     Iritis     Myocardial infarction     Pneumonia     Thyroid disease      Past Surgical History:   Procedure Laterality Date    CARDIAC CATHETERIZATION      CATARACT EXTRACTION W/  INTRAOCULAR LENS IMPLANT Left n/a    CATARACT EXTRACTION W/  INTRAOCULAR LENS IMPLANT Right 10/8/2018     With Femtosecond LASER assist (Dr. Henson)    CHOLECYSTECTOMY      EYE SURGERY      gall stone      HYSTERECTOMY      VARICOSE VEIN SURGERY       Family History   Problem Relation Age of Onset    Diabetes Mother     Hypertension Mother     Glaucoma Mother     Hypertension Father     Diabetes Son     No Known Problems Daughter     Diabetes Daughter     No Known Problems Son     Melanoma Neg Hx      Social History     Tobacco Use    Smoking status: Never Smoker    Smokeless tobacco: Never Used   Substance Use Topics    Alcohol use: No    Drug use: No     Review of Systems   Constitutional: Positive for activity change and fatigue.   Respiratory: Positive for cough (diarrhea). Negative for shortness of breath.    Cardiovascular: Negative for chest pain.   Gastrointestinal: Positive for abdominal pain. Negative for anal bleeding, blood in stool, constipation, diarrhea, nausea, rectal pain and vomiting.   Genitourinary: Negative for dysuria.   Musculoskeletal: Negative for back pain.   Skin: Negative for wound.   Neurological: Positive for weakness. Negative for headaches.   Psychiatric/Behavioral: Negative for confusion.       Physical Exam     Initial Vitals   BP Pulse Resp Temp SpO2   03/25/20 1055 03/25/20 1016 03/25/20 1055 03/25/20 1016 03/25/20 1016   (!) 105/55 (!) 135 20 99.6 °F (37.6 °C) (!) 85 %      MAP       --                Physical Exam    Nursing note and vitals reviewed.  Constitutional: She appears well-developed and well-nourished. She is not diaphoretic. No distress.   HENT:   Head: Normocephalic and atraumatic.   Dry mucus membranes   Eyes: EOM are normal. Right eye exhibits no discharge. Left eye exhibits no discharge. No scleral icterus.   Neck: Normal range of motion. Neck supple. No JVD present.   Cardiovascular: Intact distal pulses. An irregularly irregular rhythm present.  Tachycardia present.  Exam reveals no gallop and no friction rub.    No murmur  heard.  Pulmonary/Chest: Breath sounds normal. No respiratory distress. She has no wheezes. She has no rhonchi. She has no rales. She exhibits no tenderness.   Abdominal: Soft. Bowel sounds are normal. She exhibits no distension and no mass. There is no tenderness. There is no rebound and no guarding.   No CVA tenderness bilat   Musculoskeletal: Normal range of motion. She exhibits no edema or tenderness.   Neurological: She is alert. She has normal strength.   Oriented to month and year, uncertain of day of month   Skin: Skin is warm and dry. Capillary refill takes 2 to 3 seconds.   Psychiatric: She has a normal mood and affect.         ED Course   Procedures  Labs Reviewed   CBC W/ AUTO DIFFERENTIAL - Abnormal; Notable for the following components:       Result Value    WBC 15.31 (*)     RDW 15.0 (*)     Immature Granulocytes 1.7 (*)     Gran # (ANC) 13.4 (*)     Immature Grans (Abs) 0.26 (*)     Lymph # 0.6 (*)     Gran% 87.2 (*)     Lymph% 4.1 (*)     All other components within normal limits   COMPREHENSIVE METABOLIC PANEL - Abnormal; Notable for the following components:    Sodium 134 (*)     CO2 22 (*)     Glucose 204 (*)     Creatinine 1.6 (*)     Calcium 8.5 (*)     Albumin 2.4 (*)     Total Bilirubin 1.1 (*)     AST 43 (*)     eGFR if  31.3 (*)     eGFR if non  27.2 (*)     All other components within normal limits   PHOSPHORUS - Abnormal; Notable for the following components:    Phosphorus 2.4 (*)     All other components within normal limits   B-TYPE NATRIURETIC PEPTIDE - Abnormal; Notable for the following components:     (*)     All other components within normal limits   LIPASE - Abnormal; Notable for the following components:    Lipase 141 (*)     All other components within normal limits   PROCALCITONIN - Abnormal; Notable for the following components:    Procalcitonin 0.40 (*)     All other components within normal limits   FERRITIN - Abnormal; Notable for the  following components:    Ferritin 383 (*)     All other components within normal limits   C-REACTIVE PROTEIN - Abnormal; Notable for the following components:    .8 (*)     All other components within normal limits   ISTAT PROCEDURE - Abnormal; Notable for the following components:    POC PH 7.457 (*)     POC PCO2 32.3 (*)     POC HCO3 22.8 (*)     POC SATURATED O2 85 (*)     All other components within normal limits   INFLUENZA A & B BY MOLECULAR   CULTURE, BLOOD   CULTURE, BLOOD   LACTIC ACID, PLASMA   MAGNESIUM   TROPONIN I   LACTIC ACID, PLASMA   URINALYSIS, REFLEX TO URINE CULTURE   SARS-COV-2 (COVID-19) QUALITATIVE PCR   ISTAT CHEM8     EKG Readings: (Independently Interpreted)   Rhythm: Atrial Fibrillation. Heart Rate: 112. Conduction: RBBB (incomplete). ST Segments: Normal ST Segments. T Waves Flipped: III and AVF. Axis: Left Axis Deviation. Clinical Impression: Atrial Fibrillation with RVR     ECG Results          EKG 12-lead (In process)  Result time 03/25/20 13:48:14    In process by Interface, Lab In [facility] (03/25/20 13:48:14)                 Narrative:    Test Reason : R00.0,    Vent. Rate : 112 BPM     Atrial Rate : 174 BPM     P-R Int : 000 ms          QRS Dur : 098 ms      QT Int : 318 ms       P-R-T Axes : 000 003 -28 degrees     QTc Int : 434 ms    Age and gender specific analysis  Atrial fibrillation with rapid ventricular response  Cannot rule out Anterior infarct (cited on or before 25-MAR-2020)  Abnormal ECG  When compared with ECG of 10-OCT-2019 10:30,  Previous ECG has undetermined rhythm, needs review  ST now depressed in Inferior leads  Inverted T waves have replaced nonspecific T wave abnormality in Inferior  leads    Referred By: [email or web address]   SELF           Confirmed By:                             Imaging Results          X-Ray Chest AP Portable (Final result)  Result time 03/25/20 11:32:02    Final result by Ino Carrion MD (03/25/20 11:32:02)                 Impression:       See above      Electronically signed by: Ino Carrion MD  Date:    03/25/2020  Time:    11:32             Narrative:    EXAMINATION:  XR CHEST AP PORTABLE    CLINICAL HISTORY:  Sepsis;    TECHNIQUE:  Single frontal view of the chest was performed.    COMPARISON:  No 08/23/2019 ne    FINDINGS:  Cardiomegaly.  Ill-defined patchy airspace consolidation  identified in the left upper lobe and at the lung bases.  No significant pleural effusion.                                 Medical Decision Making:   History:   I obtained history from: someone other than patient.  Old Medical Records: I decided to obtain old medical records.  Initial Assessment:   89 yo W with pmhx DM, HLD, HTN, CKD, HFpEF (PA pressure 56) presents with a chief complaint of weakness, in the setting of 9 days of abdominal pain, decreased appetite, and now low-grade temperatures and a dry cough.  Patient is tachycardic and hypoxic.  Differential Diagnosis:   Dehydration, acute kidney injury, electrolyte abnormalities, ACS, sepsis, UTI, pneumonia, infectious issues, influenza, COVID-19, ischemic bowel  Independently Interpreted Test(s):   I have ordered and independently interpreted EKG Reading(s) - see prior notes  Clinical Tests:   Lab Tests: Ordered  Radiological Study: Ordered  Medical Tests: Ordered and Reviewed  ED Management:  Patient was placed on 2 L nasal cannula with improvement in pulse ox.  Despite the initial hypoxia, she was in no respiratory distress.  Will investigate for sepsis with labs and cultures and chest x-ray.  Will administer 1 L bolus given uncertain cardiac status and Tylenol.    Reassessment:  Chest x-ray with bilateral lower lobe pneumonia and left upper lobe pneumonia.  Will administer ceftriaxone and azithromycin for community-acquired pneumonia.  Overall, I am suspicious for COVID-19.  CBC with a leukocytosis of 15.31.  VBG reassuring.  Influenza is negative.  Procalcitonin is elevated at 0.4.  Lactic acid is 2.   "Troponin is normal.  BNP is elevated at 714.  CRP elevated at 151.  CMP with creatinine of 1.6 which is at the patient's baseline.  EKG revealed AFib with RVR however patient's rate returned to 84 on repeat assessment she remains resting on nasal cannula in no significant distress.  She reports feeling "better." Will admit for hypoxia.  Will trend lactic acid.            Scribe Attestation:   Scribe #1: I performed the above scribed service and the documentation accurately describes the services I performed. I attest to the accuracy of the note.    Attending Attestation:         Attending Critical Care:   Critical Care Times:   Direct Patient Care (initial evaluation, reassessments, and time considering the case)................................................................15 minutes.   Additional History from reviewing old medical records or taking additional history from the family, EMS, PCP, etc.......................5 minutes.   Ordering, Reviewing, and Interpreting Diagnostic Studies...............................................................................................................5 minutes.   Documentation..................................................................................................................................................................................5 minutes.   Consultation with other Physicians. .................................................................................................................................................5 minutes.   ==============================================================  · Total Critical Care Time - exclusive of procedural time: 35 minutes.  ==============================================================  Critical care was necessary to treat or prevent imminent or life-threatening deterioration of the following conditions: respiratory failure.     Physician Attestation for Scribe:      Comments: I, Dr. Claudio Baldwin, " personally performed the services described in this documentation. All medical record entries made by the scribe were at my direction and in my presence.  I have reviewed the chart and agree that the record reflects my personal performance and is accurate and complete. Claudio Baldwin MD.  2:21 PM 03/25/2020                            Clinical Impression:       ICD-10-CM ICD-9-CM   1. Community acquired pneumonia, unspecified laterality J18.9 486   2. Tachycardia R00.0 785.0   3. Suspected Covid-19 Virus Infection R68.89    4. Leukocytosis, unspecified type D72.829 288.60   5. Atrial fibrillation with RVR I48.91 427.31         Disposition:   Disposition: Admitted     ED Disposition Condition    Admit                           Claudio Baldwin MD  03/25/20 2006

## 2020-03-26 LAB
ALBUMIN SERPL BCP-MCNC: 2.1 G/DL (ref 3.5–5.2)
ALP SERPL-CCNC: 107 U/L (ref 55–135)
ALT SERPL W/O P-5'-P-CCNC: 20 U/L (ref 10–44)
ANION GAP SERPL CALC-SCNC: 9 MMOL/L (ref 8–16)
AST SERPL-CCNC: 37 U/L (ref 10–40)
BASOPHILS # BLD AUTO: 0.02 K/UL (ref 0–0.2)
BASOPHILS NFR BLD: 0.2 % (ref 0–1.9)
BILIRUB SERPL-MCNC: 0.8 MG/DL (ref 0.1–1)
BUN SERPL-MCNC: 20 MG/DL (ref 10–30)
CALCIUM SERPL-MCNC: 8.7 MG/DL (ref 8.7–10.5)
CHLORIDE SERPL-SCNC: 102 MMOL/L (ref 95–110)
CO2 SERPL-SCNC: 27 MMOL/L (ref 23–29)
CREAT SERPL-MCNC: 1.4 MG/DL (ref 0.5–1.4)
D DIMER PPP IA.FEU-MCNC: 15.95 MG/L FEU
DIFFERENTIAL METHOD: ABNORMAL
EOSINOPHIL # BLD AUTO: 0.1 K/UL (ref 0–0.5)
EOSINOPHIL NFR BLD: 1.1 % (ref 0–8)
ERYTHROCYTE [DISTWIDTH] IN BLOOD BY AUTOMATED COUNT: 15.3 % (ref 11.5–14.5)
ERYTHROCYTE [SEDIMENTATION RATE] IN BLOOD BY WESTERGREN METHOD: 62 MM/HR (ref 0–36)
EST. GFR  (AFRICAN AMERICAN): 36.8 ML/MIN/1.73 M^2
EST. GFR  (NON AFRICAN AMERICAN): 32 ML/MIN/1.73 M^2
GLUCOSE SERPL-MCNC: 121 MG/DL (ref 70–110)
HCT VFR BLD AUTO: 38.8 % (ref 37–48.5)
HGB BLD-MCNC: 12.6 G/DL (ref 12–16)
IMM GRANULOCYTES # BLD AUTO: 0.13 K/UL (ref 0–0.04)
IMM GRANULOCYTES NFR BLD AUTO: 1 % (ref 0–0.5)
LDH SERPL L TO P-CCNC: 431 U/L (ref 110–260)
LYMPHOCYTES # BLD AUTO: 0.8 K/UL (ref 1–4.8)
LYMPHOCYTES NFR BLD: 6.2 % (ref 18–48)
MAGNESIUM SERPL-MCNC: 1.8 MG/DL (ref 1.6–2.6)
MCH RBC QN AUTO: 29 PG (ref 27–31)
MCHC RBC AUTO-ENTMCNC: 32.5 G/DL (ref 32–36)
MCV RBC AUTO: 89 FL (ref 82–98)
MONOCYTES # BLD AUTO: 0.8 K/UL (ref 0.3–1)
MONOCYTES NFR BLD: 6.3 % (ref 4–15)
NEUTROPHILS # BLD AUTO: 10.9 K/UL (ref 1.8–7.7)
NEUTROPHILS NFR BLD: 85.2 % (ref 38–73)
NRBC BLD-RTO: 0 /100 WBC
PHOSPHATE SERPL-MCNC: 2.5 MG/DL (ref 2.7–4.5)
PLATELET # BLD AUTO: 179 K/UL (ref 150–350)
PMV BLD AUTO: 12.3 FL (ref 9.2–12.9)
POCT GLUCOSE: 132 MG/DL (ref 70–110)
POCT GLUCOSE: 181 MG/DL (ref 70–110)
POCT GLUCOSE: 185 MG/DL (ref 70–110)
POCT GLUCOSE: 202 MG/DL (ref 70–110)
POTASSIUM SERPL-SCNC: 4 MMOL/L (ref 3.5–5.1)
PROT SERPL-MCNC: 5.9 G/DL (ref 6–8.4)
RBC # BLD AUTO: 4.35 M/UL (ref 4–5.4)
SARS-COV-2 RNA RESP QL NAA+PROBE: DETECTED
SODIUM SERPL-SCNC: 138 MMOL/L (ref 136–145)
WBC # BLD AUTO: 12.8 K/UL (ref 3.9–12.7)

## 2020-03-26 PROCEDURE — 63600175 PHARM REV CODE 636 W HCPCS: Mod: HCNC | Performed by: STUDENT IN AN ORGANIZED HEALTH CARE EDUCATION/TRAINING PROGRAM

## 2020-03-26 PROCEDURE — 84100 ASSAY OF PHOSPHORUS: CPT | Mod: HCNC

## 2020-03-26 PROCEDURE — 83615 LACTATE (LD) (LDH) ENZYME: CPT | Mod: HCNC

## 2020-03-26 PROCEDURE — 82962 GLUCOSE BLOOD TEST: CPT | Mod: HCNC

## 2020-03-26 PROCEDURE — 85025 COMPLETE CBC W/AUTO DIFF WBC: CPT | Mod: HCNC

## 2020-03-26 PROCEDURE — 85379 FIBRIN DEGRADATION QUANT: CPT | Mod: HCNC

## 2020-03-26 PROCEDURE — 11000001 HC ACUTE MED/SURG PRIVATE ROOM: Mod: HCNC

## 2020-03-26 PROCEDURE — 83735 ASSAY OF MAGNESIUM: CPT | Mod: HCNC

## 2020-03-26 PROCEDURE — 36415 COLL VENOUS BLD VENIPUNCTURE: CPT | Mod: HCNC

## 2020-03-26 PROCEDURE — 99232 SBSQ HOSP IP/OBS MODERATE 35: CPT | Mod: HCNC,GC,, | Performed by: INTERNAL MEDICINE

## 2020-03-26 PROCEDURE — C9399 UNCLASSIFIED DRUGS OR BIOLOG: HCPCS | Mod: HCNC | Performed by: STUDENT IN AN ORGANIZED HEALTH CARE EDUCATION/TRAINING PROGRAM

## 2020-03-26 PROCEDURE — 99232 PR SUBSEQUENT HOSPITAL CARE,LEVL II: ICD-10-PCS | Mod: HCNC,GC,, | Performed by: INTERNAL MEDICINE

## 2020-03-26 PROCEDURE — 85652 RBC SED RATE AUTOMATED: CPT | Mod: HCNC

## 2020-03-26 PROCEDURE — 25000003 PHARM REV CODE 250: Mod: HCNC | Performed by: STUDENT IN AN ORGANIZED HEALTH CARE EDUCATION/TRAINING PROGRAM

## 2020-03-26 PROCEDURE — 80053 COMPREHEN METABOLIC PANEL: CPT | Mod: HCNC

## 2020-03-26 RX ADMIN — CEFTRIAXONE 1 G: 1 INJECTION, POWDER, FOR SOLUTION INTRAMUSCULAR; INTRAVENOUS at 10:03

## 2020-03-26 RX ADMIN — METOPROLOL TARTRATE 50 MG: 50 TABLET, FILM COATED ORAL at 10:03

## 2020-03-26 RX ADMIN — DORZOLAMIDE HYDROCHLORIDE 1 DROP: 20 SOLUTION/ DROPS OPHTHALMIC at 10:03

## 2020-03-26 RX ADMIN — ATORVASTATIN CALCIUM 40 MG: 20 TABLET, FILM COATED ORAL at 10:03

## 2020-03-26 RX ADMIN — ISOSORBIDE MONONITRATE 60 MG: 60 TABLET, EXTENDED RELEASE ORAL at 10:03

## 2020-03-26 RX ADMIN — APIXABAN 2.5 MG: 2.5 TABLET, FILM COATED ORAL at 10:03

## 2020-03-26 RX ADMIN — LEVOTHYROXINE SODIUM 75 MCG: 75 TABLET ORAL at 05:03

## 2020-03-26 RX ADMIN — LATANOPROST 1 DROP: 50 SOLUTION OPHTHALMIC at 10:03

## 2020-03-26 RX ADMIN — INSULIN DETEMIR 6 UNITS: 100 INJECTION, SOLUTION SUBCUTANEOUS at 09:03

## 2020-03-26 RX ADMIN — DORZOLAMIDE HYDROCHLORIDE 1 DROP: 20 SOLUTION/ DROPS OPHTHALMIC at 09:03

## 2020-03-26 NOTE — PLAN OF CARE
03/26/20 1334   Discharge Assessment   Assessment Type Discharge Planning Assessment   Assessment information obtained from? Medical Record   Expected Length of Stay (days) 4   Prior to hospitilization cognitive status: Alert/Oriented   Current cognitive status: Alert/Oriented   Lives With child(sarthak), adult   Able to Return to Prior Arrangements yes   Is patient able to care for self after discharge? Unable to determine at this time (comments)   Patient's perception of discharge disposition home or selfcare   Discharge Plan A Home with family   Discharge Plan B Home Health      obtained information from patient medical records per COVID-19 protocol. Per chart review, patient lives at home with her daughter who tested for COVID +1 day ago.     Belen Wood MD    [id number] - [facility] [address] [place] [address]  [address] [facility] [address]  Phone: [phone] Fax: [phone]    [facility] [place] - [place], [place] - [address]  [address]  [facility] [address]  Phone: [phone] Fax: [phone]    Elvia Tan LMSW  [facility]  [id number]

## 2020-03-26 NOTE — H&P
Hospital Medicine  History and Physical  [facility]      Patient Name: Tiana Mead  MRN:  [id number]  Hospital Medicine Team: [facility] S Min Wyatt MD  Date of Admission:  3/25/2020     Length of Stay:  LOS: 0 days     Principal Problem: Suspected Covid-19 Virus Infection    Chief complaint  Generalized weakness, fatigue and cough    HPI    Patient is 89 y.o. F w/ DM, CKD 4, HFpEF, CAD, AFib, HTN presented to ED w/ 4 days of generalized weakness, fatigue. Patient reports insidious onset of above symptoms which has worsened in the last 24 hours. Also developed a dry cough in the past 48 hours. Denies any fevers or chills. Her daughter was tested for COVID19 one day prior.     Patient reports 4-lb weight loss due to no appetite. Denies any swelling or orthopnea. Does not smoke or drink. Has h/o ischemic cardiomyopathy. Recent echo noted EF >70% with severe biatrial enlargement. Also is on apixaban for Afib; denies hematemesis or melena.     Review of Systems    Constitutional: Positive for fatigue, poor appetite   HENT: negative for trouble swallowing.    Eyes: Negative for photophobia, visual disturbance.   Respiratory: Positive for cough, shortness of breath  Cardiovascular: Negative for chest pain, palpitations, leg swelling.   Gastrointestinal: Positive for loose BM Negative for abdominal pain, constipation, nausea, vomiting.   Endocrine: Negative for cold intolerance, heat intolerance.   Genitourinary: Negative for dysuria, frequency.   Musculoskeletal: Negative for arthralgias, myalgias.   Skin: Negative for rash  Neurological: Negative for dizziness, syncope, light-headedness.   Psychiatric/Behavioral: Negative for confusion, hallucinations, anxiety    Past Medical History:   Diagnosis Date    Allergy     Anemia     Arthritis     Asthma     CHF (congestive heart failure) 5/2/2017    Chronic kidney disease     Degenerative disc disease     Diabetes mellitus type II      Essential hypertension 7/3/2012    Glaucoma     History of pseudogout 8/23/2012    Hyperlipidemia     Hypertension     Iritis     Myocardial infarction     Pneumonia     Thyroid disease      Past Surgical History:   Procedure Laterality Date    CARDIAC CATHETERIZATION      CATARACT EXTRACTION W/  INTRAOCULAR LENS IMPLANT Left n/a    CATARACT EXTRACTION W/  INTRAOCULAR LENS IMPLANT Right 10/8/2018    With Femtosecond LASER assist (Dr. Henson)    CHOLECYSTECTOMY      EYE SURGERY      gall stone      HYSTERECTOMY      VARICOSE VEIN SURGERY       Family History   Problem Relation Age of Onset    Diabetes Mother     Hypertension Mother     Glaucoma Mother     Hypertension Father     Diabetes Son     No Known Problems Daughter     Diabetes Daughter     No Known Problems Son     Melanoma Neg Hx      Social History     Socioeconomic History    Marital status:      Spouse name: Not on file    Number of children: Not on file    Years of education: Not on file    Highest education level: Not on file   Occupational History    Not on file   Social Needs    Financial resource strain: Not on file    Food insecurity:     Worry: Not on file     Inability: Not on file    Transportation needs:     Medical: Not on file     Non-medical: Not on file   Tobacco Use    Smoking status: Never Smoker    Smokeless tobacco: Never Used   Substance and Sexual Activity    Alcohol use: No    Drug use: No    Sexual activity: Never   Lifestyle    Physical activity:     Days per week: Not on file     Minutes per session: Not on file    Stress: Not on file   Relationships    Social connections:     Talks on phone: Not on file     Gets together: Not on file     Attends Denominational service: Not on file     Active member of club or organization: Not on file     Attends meetings of clubs or organizations: Not on file     Relationship status: Not on file   Other Topics Concern    Are you pregnant or think you may  "be? Not Asked    Breast-feeding Not Asked   Social History Narrative    Not on file       Medications  No current facility-administered medications on file prior to encounter.      Current Outpatient Medications on File Prior to Encounter   Medication Sig Dispense Refill    acetaminophen (TYLENOL) 500 MG tablet Take 500 mg by mouth every 6 (six) hours as needed for Pain.      albuterol 90 mcg/actuation inhaler Inhale 2 puffs into the lungs every 4 (four) hours as needed. 1 Inhaler 11    amLODIPine (NORVASC) 5 MG tablet TAKE 1 TABLET(5 MG) BY MOUTH EVERY MORNING 90 tablet 3    apixaban (ELIQUIS) 2.5 mg Tab Take 1 tablet (2.5 mg total) by mouth 2 (two) times daily. 60 tablet 5    atorvastatin (LIPITOR) 40 MG tablet Take 1 tablet (40 mg total) by mouth once daily. 90 tablet 3    BD ULTRA-FINE SHORT PEN NEEDLE 31 gauge x 5/16" Ndle USE WITH INSULIN PENS AS DIRECTED 100 each 11    blood sugar diagnostic (ONETOUCH ULTRA TEST) Strp Inject 1 strip into the skin 3 (three) times daily. 300 each 4    clotrimazole-betamethasone 1-0.05% (LOTRISONE) cream Apply topically 2 (two) times daily. For intertriginous itching 45 g 3    diclofenac sodium (VOLTAREN) 1 % Gel Apply topically 4 (four) times daily as needed. 100 Tube 3    dorzolamide (TRUSOPT) 2 % ophthalmic solution Place 1 drop into both eyes 2 (two) times daily. 10 mL 4    fluticasone-salmeterol diskus inhaler 250-50 mcg INHALE 1 PUFF BY MOUTH INTO THE LUNGS NIGHTLY 1 each 11    fluticasone-salmeterol diskus inhaler 250-50 mcg Inhale 1 puff into the lungs 2 (two) times daily. Controller 60 each 11    FLUZONE HIGH-DOSE 2019-20, PF, 180 mcg/0.5 mL Syrg ADM 0.5ML IM UTD  0    furosemide (LASIX) 40 MG tablet Take 1 tablet (40 mg total) by mouth 2 (two) times daily. 180 tablet 3    insulin aspart protamine-insulin aspart (NOVOLOG MIX 70-30FLEXPEN U-100) 100 unit/mL (70-30) InPn pen INJECT 10 UNITS UNDER THE SKIN ONCE DAILY EVERY MORNING BEFORE BREAKFAST 15 mL 11 "    ipratropium (ATROVENT) 0.03 % nasal spray USE 2 SPRAYS IN EACH NOSTRIL TWICE DAILY 60 mL 1    isosorbide mononitrate (IMDUR) 60 MG 24 hr tablet TAKE 1 TABLET BY MOUTH EVERY MORNING FOR HEART, to prevent chest pain and reduce shortness of breath 90 tablet 3    lancets Misc 1 Device by Misc.(Non-Drug; Combo Route) route 3 (three) times daily before meals. Please provide the insurance company preferred product. 300 each 4    latanoprost 0.005 % ophthalmic solution Place 1 drop into the right eye every evening. (Patient taking differently: Place 1 drop into both eyes every evening. ) 3 Bottle 6    levothyroxine (SYNTHROID) 75 MCG tablet Take 1 tablet (75 mcg total) by mouth before breakfast. 90 tablet 3    meclizine (ANTIVERT) 12.5 mg tablet Take 1 tablet (12.5 mg total) by mouth 3 (three) times daily as needed for Dizziness. 20 tablet 2    metoprolol tartrate (LOPRESSOR) 50 MG tablet TAKE 1 TABLET(50 MG) BY MOUTH TWICE DAILY 180 tablet 3    multivit-mineral-iron-lutein (CENTRUM SILVER ULTRA WOMEN'S) Tab Take 1 tablet by mouth once daily.      nitroGLYCERIN (NITROSTAT) 0.4 MG SL tablet ONE TABLET UNDER THE TONGUE EVERY 5 MINUTES AS NEEDED FOR CHEST PAIN 25 tablet 6    nystatin (MYCOSTATIN) powder Apply topically 4 (four) times daily. 60 g 3    ondansetron (ZOFRAN) 4 MG tablet TAKE ONE TABLET BY MOUTH EVERY 12 HOURS AS NEEDED FOR NAUSEA OR DIZZINESS 30 tablet 0    triamcinolone acetonide 0.025% (KENALOG) 0.025 % Oint Apply topically 2 (two) times daily. 80 g 2    TRUE METRIX GLUCOSE METER Misc TEST TID  4    TRUE METRIX GLUCOSE TEST STRIP Strp USE TO TEST BLOOD SUGAR WITH MEALS THREE TIMES DAILY 300 strip 4    TRUEPLUS LANCETS 30 gauge Misc USE TO TEST BLOOD SUGAR TID AC  2    TRUEPLUS LANCETS 33 gauge Misc TESTING THREE TIMES DAILY 300 each 0    valACYclovir (VALTREX) 1000 MG tablet Take 1 tablet (1,000 mg total) by mouth 3 (three) times daily. for 7 days 21 tablet 0        Allergies  Codeine    Physical Examination  Temp:  [98.1 °F (36.7 °C)-99.6 °F (37.6 °C)]   Pulse:  []   Resp:  [18-24]   BP: ()/(55-76)   SpO2:  [85 %-99 %]     Gen: NAD, conversant  Head: NC, AT  Eyes: PERRLA, EOMI  Throat: MMM, OP clear  CV: RRR, no M/R/G, no peripheral edema, no JVD  Resp: coarse bilateral breath sounds, no increased work of breathing on RA  GI: Soft, NT, ND, +BS  Ext: MAEW, no c/c/e  Neuro: AAOx3, CN grossly intact, no focal neurologic deficits  Psychiatry: Normal mood, normal affect    Laboratory:  Recent Labs   Lab 03/19/20 1645 03/25/20  1036   WBC 5.65 15.31*   LYMPH 16.5*  0.9* 4.1*  0.6*   HGB 12.6 12.9   HCT 36.9* 38.7   * 176     Recent Labs   Lab 03/19/20 1645 03/25/20  1036   * 134*   K 3.4* 3.6   CL 97 99   CO2 26 22*   BUN 21 21   CREATININE 1.6* 1.6*   * 204*   CALCIUM 8.9 8.5*   MG  --  1.8   PHOS  --  2.4*     Recent Labs   Lab 03/19/20  1645 03/25/20  1036   ALKPHOS 90 81   ALT 27 25   AST 38 43*   ALBUMIN 3.3* 2.4*   PROT 6.5 6.1   BILITOT 0.5 1.1*      Recent Labs     03/25/20  1036 03/25/20  1437   FERRITIN 383*  --    .8*  --    *  --    TROPONINI 0.023  --    LACTATE 2.0 1.0       All labs within the last 24 hours were reviewed.     Microbiology:  No results found for: RMD27MGPTLXA    Microbiology Results (last 7 days)     Procedure Component Value Units Date/Time    Culture, Respiratory with Gram Stain [[id number]]     Order Status:  No result Specimen:  Sputum, Expectorated     Influenza A & B by Molecular [[id number]] Collected:  03/25/20 1040    Order Status:  Completed Specimen:  Nasopharyngeal Swab Updated:  03/25/20 1143     Influenza A, Molecular Negative     Influenza B, Molecular Negative     Flu A & B Source Nasal swab    Blood culture x two cultures. Draw prior to antibiotics. [[id number]] Collected:  03/25/20 1037    Order Status:  Sent Specimen:  Blood from Peripheral, Hand, Right Updated:  03/25/20 1104     Blood culture x two cultures. Draw prior to antibiotics. [[id number]] Collected:  03/25/20 1036    Order Status:  Sent Specimen:  Blood from Peripheral, Antecubital, Right Updated:  03/25/20 1103            Imaging  ECG Results          EKG 12-lead (Final result)  Result time 03/25/20 15:08:57    Final result by Interface, Lab In [facility] (03/25/20 15:08:57)                 Narrative:    Test Reason : R00.0,    Vent. Rate : 112 BPM     Atrial Rate : 174 BPM     P-R Int : 000 ms          QRS Dur : 098 ms      QT Int : 318 ms       P-R-T Axes : 000 003 -28 degrees     QTc Int : 434 ms    Atrial fibrillation with rapid ventricular response  Nonspecific ST and/or T wave abnormalities  Abnormal ECG  When compared with ECG of 10-OCT-2019 10:30,  No significant change was found  Confirmed by Agatha Rosas MD ([id number]) on 3/25/2020 3:08:52 PM    Referred By: [email or web address]   SELF           Confirmed By:Agatha Rosas MD                              Results for orders placed during the hospital encounter of 07/17/19   Transthoracic echo (TTE) 2D with Color Flow    Narrative · Concentric left ventricular hypertrophy.  · Hyperdynamic left ventricular systolic function. The estimated ejection   fraction is >70%.  · Mildly reduced right ventricular systolic function.  · Severe batrial enlargement.  · Severe tricuspid regurgitation.  · The estimated PA systolic pressure is 56 mm Hg  · Elevated central venous pressure (15 mm Hg).          X-Ray Chest AP Portable  Narrative: EXAMINATION:  XR CHEST AP PORTABLE    CLINICAL HISTORY:  Sepsis;    TECHNIQUE:  Single frontal view of the chest was performed.    COMPARISON:  No 08/23/2019 ne    FINDINGS:  Cardiomegaly.  Ill-defined patchy airspace consolidation  identified in the left upper lobe and at the lung bases.  No significant pleural effusion.  Impression: See above    Electronically signed by: Ino Carrion MD  Date:    03/25/2020  Time:    11:32      All imaging within the last 24 hours was  reviewed.     Imaging  ECG Results          EKG 12-lead (Final result)  Result time 03/25/20 15:08:57    Final result by Interface, Lab In [facility] (03/25/20 15:08:57)                 Narrative:    Test Reason : R00.0,    Vent. Rate : 112 BPM     Atrial Rate : 174 BPM     P-R Int : 000 ms          QRS Dur : 098 ms      QT Int : 318 ms       P-R-T Axes : 000 003 -28 degrees     QTc Int : 434 ms    Atrial fibrillation with rapid ventricular response  Nonspecific ST and/or T wave abnormalities  Abnormal ECG  When compared with ECG of 10-OCT-2019 10:30,  No significant change was found  Confirmed by Agatha Rosas MD ([id number]) on 3/25/2020 3:08:52 PM    Referred By: [email or web address]   SELF           Confirmed By:Agatha Rosas MD                                Results for orders placed during the hospital encounter of 07/17/19   Transthoracic echo (TTE) 2D with Color Flow    Narrative · Concentric left ventricular hypertrophy.  · Hyperdynamic left ventricular systolic function. The estimated ejection   fraction is >70%.  · Mildly reduced right ventricular systolic function.  · Severe batrial enlargement.  · Severe tricuspid regurgitation.  · The estimated PA systolic pressure is 56 mm Hg  · Elevated central venous pressure (15 mm Hg).            X-Ray Chest AP Portable  Narrative: EXAMINATION:  XR CHEST AP PORTABLE    CLINICAL HISTORY:  Sepsis;    TECHNIQUE:  Single frontal view of the chest was performed.    COMPARISON:  No 08/23/2019 ne    FINDINGS:  Cardiomegaly.  Ill-defined patchy airspace consolidation  identified in the left upper lobe and at the lung bases.  No significant pleural effusion.  Impression: See above    Electronically signed by: Ino Carrion MD  Date:    03/25/2020  Time:    11:32          Assessment and Plan:    Active Hospital Problems    Diagnosis  POA    *Suspected Covid-19 Virus Infection [R68.89]  Yes    Community acquired pneumonia [J18.9]  Yes    H/O Deep vein thrombosis (DVT) [I82.409]  Yes      Chronic    CKD (chronic kidney disease) stage 4, GFR 15-29 ml/min [N18.4]  Yes     Chronic    Type 2 diabetes mellitus with diabetic polyneuropathy, with long-term current use of insulin [E11.42, Z79.4]  Not Applicable     Chronic      Resolved Hospital Problems   No resolved problems to display.       Suspected Covid-19 Virus Infection  Person under investigation (PUI) for COVID-19  - COVID-19 testing sent on 3/25/2020.  - Infection Control notified    - Isolation:   - Airborne and Droplet Precautions  - N95 masks must be fit tested, wear eye protection  - 20 second hand hygiene   - Limit visitors per hospital policy   - Consolidating lab draws, nursing care, and interventions    - Diagnostics: (leukopenia/lymphopenia, hyponatremia, hyperferritinemia, elevated troponin, elevated d-dimer, age, and comorbidities are significant predictors of poor clinical outcome)   - CBC:  trend Q48hrs  - CMP:       trend Q48hrs  - Procalcitonin:0.4  - D-dimer: trend Q48hrs  - Ferritin: 383  - CRP:       150; trend Q48hrs  - LDH:  pending  - BNP:  714  - Troponin: 0.023   - ECG:  AFib rhythm; nonspecific ST changes. QTc 434   - rapid Flu: negative   - Legionella antigen: pending   - Blood culture x2: pending   - Sputum culture: pending   - CXR:  Bilateral infiltrates   - UA and culture: pending    - CPK  86    - Management:   - Bundle care as able to minimize in/out of room   - Supplemental O2 to maintain SpO2 >92%,   if requiring 6L NC or higher, place on nonrebreather and discuss case with MICU   - Telemetry & continuous Pulse Ox   - If wheezing   - albuterol INHALER PRN 4puff Q6hr approximates a nebulizer (avoid nebulization of secretions)  - ipratropium daily    - apap PRN fever   - Avoiding NIPPV to prevent aerosolization   (including home CPAP/BiPAP unless on a case-by-case basis and only in negative pressure room)   - Cautious use of NSAIDS for fever per WHO recommendations (3/16/2020)   - No new ACEi/ARB start or  discontinuation of chronic med unless hypotensive (Esler et al. Journal of Hypertension 2020, 38:000-000)   - Careful use of steroids in the absence of other indications   - unless septic shock due to increased viral replication   - Fluid sparing resuscitation   - Empiric antibiotics per likely source & patient allergies    - CAP: x 5 day course  Ceftriaxone 1g IV Q24hrs            Azithromycin 500mg IV day #1, then 250mg PO daily x4 days                 If MRSA risk factors, add Vancomycin IV (PharmD consult)   - continue statin (if CPK WNL)   - hydroxychloroquine contraindicated in this patient with cardiomyopathy     Goals of care, counseling/discussion  - Discussed the typical clinical course of COVID19 with Tiana Boston, including the potential for acute decompensation requiring intubation and mechanical ventilation.  - Following this discussion, patient/POA requested code status of DNR/DNI, will update EMR and paper chart accordingly.    T2DM   - detemir 6U QD   - LDSSI    HFpEF   - Signs of volume overload   -    - diurese with IV furosemide 40mg BID   - strict I/O; daily weights   - resume home furosemide 40mg BID when euvolemic     CKD 4   - avoid nephrotoxins   - renally dose all medications    AFib   - continue apixaban 2.5mg BID    VTE High Risk Prophylaxis: apixaban    Initial Hospital Care   Level 2 35912 Total visit time was 50 minutes or greater with greater than 50% of time spent in counseling and coordination of care.     Min Wyatt MD  PGY-2  Internal Medicine

## 2020-03-26 NOTE — PROGRESS NOTES
Hospital Medicine  Progress Note  [facility]      Patient Name: Tiana Mead  MRN:  [id number]  [place] [facility] [place] [facility] [place] Min Wyatt MD  Date of Admission:  3/25/2020     Length of Stay:  LOS: 1 day       Principal Problem:  Suspected Covid-19 Virus Infection      Hospital Course:  Patient admitted for evaluation of possible COVID-19.    Interval History:     Stable on 2L NC overnight.     Review of Systems:  Constitutional: Positive for fatigue, poor appetite   HENT: negative for trouble swallowing.    Eyes: Negative for photophobia, visual disturbance.   Respiratory: Positive for cough, shortness of breath  Cardiovascular: Negative for chest pain, palpitations, leg swelling.   Gastrointestinal: Positive for loose BM Negative for abdominal pain, constipation, nausea, vomiting.     Inpatient Medications:    Current Facility-Administered Medications:     albuterol inhaler 2 puff, 2 puff, Inhalation, Q4H PRN, Min Wyatt MD    apixaban tablet 2.5 mg, 2.5 mg, Oral, BID, Min Wyatt MD, 2.5 mg at 03/26/20 1000    atorvastatin tablet 40 mg, 40 mg, Oral, Daily, Min Wyatt MD, 40 mg at 03/26/20 1000    azithromycin tablet 250 mg, 250 mg, Oral, Daily, Min Wyatt MD, 250 mg at 03/25/20 2045    cefTRIAXone injection 1 g, 1 g, Intravenous, Q24H, Min Wyatt MD, 1 g at 03/26/20 1044    dextrose 10% (D10W) Bolus, 12.5 g, Intravenous, PRN, Min Wyatt MD    dextrose 10% (D10W) Bolus, 25 g, Intravenous, PRN, Min Wyatt MD    dorzolamide 2 % ophthalmic solution 1 drop, 1 drop, Both Eyes, BID, Min Wyatt MD, 1 drop at 03/26/20 0900    glucagon (human recombinant) injection 1 mg, 1 mg, Intramuscular, PRN, Min Wyatt MD    glucagon (human recombinant) injection 1 mg, 1 mg, Intramuscular, PRN, Kendytammy Landon MD    glucose chewable tablet 16 g, 16 g, Oral, PRN, Min Wyatt MD    glucose chewable tablet 16 g, 16 g, Oral, PRN, Kendy SHERRI Landon MD    glucose chewable tablet 24 g, 24 g, Oral,  "PRN, Min Wyatt MD    glucose chewable tablet 24 g, 24 g, Oral, PRN, Kendy Landon MD    insulin aspart U-100 pen 0-5 Units, 0-5 Units, Subcutaneous, QID (AC + HS) PRN, Kendy Landon MD, 2 Units at 03/25/20 1857    insulin detemir U-100 pen 6 Units, 6 Units, Subcutaneous, Daily, Min Wyatt MD, 6 Units at 03/26/20 0900    isosorbide mononitrate 24 hr tablet 60 mg, 60 mg, Oral, Daily, Min Wyatt MD, 60 mg at 03/26/20 1000    latanoprost 0.005 % ophthalmic solution 1 drop, 1 drop, Both Eyes, QHS, Min Wyatt MD    levothyroxine tablet 75 mcg, 75 mcg, Oral, Before breakfast, Min Wyatt MD, 75 mcg at 03/26/20 0556    metoprolol tartrate (LOPRESSOR) tablet 50 mg, 50 mg, Oral, BID, Min Wyatt MD, 50 mg at 03/26/20 1000    nitroGLYCERIN SL tablet 0.4 mg, 0.4 mg, Sublingual, Q5 Min PRN, Min Wyatt MD    ondansetron tablet 4 mg, 4 mg, Oral, Q12H PRN, Min Wyatt MD    sodium chloride 0.9% flush 10 mL, 10 mL, Intravenous, PRN, Claudio Baldwin MD    sodium chloride 0.9% flush 10 mL, 10 mL, Intravenous, PRN, Min Wyatt MD      Physical Exam:      Intake/Output Summary (Last 24 hours) at 3/26/2020 1455  Last data filed at 3/26/2020 0054  Gross per 24 hour   Intake 240 ml   Output 300 ml   Net -60 ml     Wt Readings from Last 3 Encounters:   03/25/20 69.4 kg (153 lb)   02/04/20 72.6 kg (160 lb)   11/20/19 72.6 kg (160 lb)       /64 (Patient Position: Lying)   Pulse 93   Temp 99.3 °F (37.4 °C) (Oral)   Resp 20   Ht 5' 8" (1.727 m)   Wt 69.4 kg (153 lb)   LMP  (LMP Unknown)   SpO2 (!) 94%   Breastfeeding? No   BMI 23.26 kg/m²     GEN: NAD, conversant  Resp: no respiratory distress on NC  CV: no edema  GI: soft, NTND  Skin: no rash    Laboratory:  No results found for: [id number]    Recent Labs   Lab 03/19/20  1645 03/25/20  1036 03/26/20  0710   WBC 5.65 15.31* 12.80*   LYMPH 16.5*  0.9* 4.1*  0.6* 6.2*  0.8*   HGB 12.6 12.9 12.6   HCT 36.9* 38.7 38.8   * 176 179     Recent Labs   Lab " 03/19/20  1645 03/25/20  1036 03/26/20  0710   * 134* 138   K 3.4* 3.6 4.0   CL 97 99 102   CO2 26 22* 27   BUN 21 21 20   CREATININE 1.6* 1.6* 1.4   * 204* 121*   CALCIUM 8.9 8.5* 8.7   MG  --  1.8 1.8   PHOS  --  2.4* 2.5*   LIPASE 135* 141*  --      Recent Labs   Lab 03/19/20  1645 03/25/20  1036 03/26/20  0710   ALKPHOS 90 81 107   ALT 27 25 20   AST 38 43* 37   ALBUMIN 3.3* 2.4* 2.1*   PROT 6.5 6.1 5.9*   BILITOT 0.5 1.1* 0.8        Recent Labs     03/25/20  1036 03/26/20  0710   DDIMER  --  15.95*   FERRITIN 383*  --    .8*  --    LDH  --  431*   *  --    TROPONINI 0.023  --        All labs within the last 24 hours were reviewed.     Microbiology:  Microbiology Results (last 7 days)     Procedure Component Value Units Date/Time    Blood culture x two cultures. Draw prior to antibiotics. [[id number]] Collected:  03/25/20 1036    Order Status:  Completed Specimen:  Blood from Peripheral, Antecubital, Right Updated:  03/26/20 1315     Blood Culture, Routine No Growth to date    Narrative:       Aerobic and anaerobic    Blood culture x two cultures. Draw prior to antibiotics. [[id number]] Collected:  03/25/20 1037    Order Status:  Completed Specimen:  Blood from Peripheral, Hand, Right Updated:  03/26/20 1315     Blood Culture, Routine No Growth to date    Narrative:       Aerobic and anaerobic    Culture, Respiratory with Gram Stain [[id number]]     Order Status:  No result Specimen:  Sputum, Expectorated     Influenza A & B by Molecular [[id number]] Collected:  03/25/20 1040    Order Status:  Completed Specimen:  Nasopharyngeal Swab Updated:  03/25/20 1143     Influenza A, Molecular Negative     Influenza B, Molecular Negative     Flu A & B Source Nasal swab            Imaging  ECG Results          EKG 12-lead (Final result)  Result time 03/25/20 15:08:57    Final result by Interface, Lab In [facility] (03/25/20 15:08:57)                 Narrative:    Test Reason : R00.0,    Vent. Rate : 112  BPM     Atrial Rate : 174 BPM     P-R Int : 000 ms          QRS Dur : 098 ms      QT Int : 318 ms       P-R-T Axes : 000 003 -28 degrees     QTc Int : 434 ms    Atrial fibrillation with rapid ventricular response  Nonspecific ST and/or T wave abnormalities  Abnormal ECG  When compared with ECG of 10-OCT-2019 10:30,  No significant change was found  Confirmed by Agatha Rosas MD ([id number]) on 3/25/2020 3:08:52 PM    Referred By: [email or web address]   SELF           Confirmed By:Agatha Rosas MD                              Results for orders placed during the hospital encounter of 07/17/19   Transthoracic echo (TTE) 2D with Color Flow    Narrative · Concentric left ventricular hypertrophy.  · Hyperdynamic left ventricular systolic function. The estimated ejection   fraction is >70%.  · Mildly reduced right ventricular systolic function.  · Severe batrial enlargement.  · Severe tricuspid regurgitation.  · The estimated PA systolic pressure is 56 mm Hg  · Elevated central venous pressure (15 mm Hg).          X-Ray Chest AP Portable  Narrative: EXAMINATION:  XR CHEST AP PORTABLE    CLINICAL HISTORY:  Sepsis;    TECHNIQUE:  Single frontal view of the chest was performed.    COMPARISON:  No 08/23/2019 ne    FINDINGS:  Cardiomegaly.  Ill-defined patchy airspace consolidation  identified in the left upper lobe and at the lung bases.  No significant pleural effusion.  Impression: See above    Electronically signed by: Ino Carrion MD  Date:    03/25/2020  Time:    11:32      All imaging within the last 24 hours was reviewed.     Assessment and Plan:    Active Hospital Problems    Diagnosis  POA    *Suspected Covid-19 Virus Infection [R68.89]  Yes    Community acquired pneumonia [J18.9]  Yes    H/O Deep vein thrombosis (DVT) [I82.409]  Yes     Chronic    CKD (chronic kidney disease) stage 4, GFR 15-29 ml/min [N18.4]  Yes     Chronic    Type 2 diabetes mellitus with diabetic polyneuropathy, with long-term current use of insulin [E11.42,  Z79.4]  Not Applicable     Chronic      Resolved Hospital Problems   No resolved problems to display.       Suspected Covid-19 Virus Infection  Person under investigation (PUI) for COVID-19  - COVID-19 testing sent on 3/25/2020.  - Infection Control notified     - Isolation:   - Airborne and Droplet Precautions  - N95 masks must be fit tested, wear eye protection  - 20 second hand hygiene              - Limit visitors per hospital policy              - Consolidating lab draws, nursing care, and interventions     - Diagnostics: (leukopenia/lymphopenia, hyponatremia, hyperferritinemia, elevated troponin, elevated d-dimer, age, and comorbidities are significant predictors of poor clinical outcome)              - CBC:             trend Q48hrs  - CMP:             trend Q48hrs  - Procalcitonin:0.4  - D-dimer:        15.95; trend Q48hrs  - Ferritin:          383  - CRP:             150; trend Q48hrs  - LDH:              431  - BNP:              714  - Troponin:       0.023              - ECG:             AFib rhythm; nonspecific ST changes. QTc 434              - rapid Flu:       negative              - Legionella antigen:   pending              - Blood culture x2:       pending              - Sputum culture:        pending              - CXR:             Bilateral infiltrates              - UA and culture:         pending                         - CPK               86     - Management:              - Bundle care as able to minimize in/out of room   - Supplemental O2 to maintain SpO2 >92%,   if requiring 6L NC or higher, place on nonrebreather and discuss case with MICU              - Telemetry & continuous Pulse Ox              - If wheezing   - albuterol INHALER PRN 4puff Q6hr approximates a nebulizer (avoid nebulization of secretions)  - ipratropium daily               - apap PRN fever              - Avoiding NIPPV to prevent aerosolization   (including home CPAP/BiPAP unless on a case-by-case basis and only in negative  pressure room)              - Cautious use of NSAIDS for fever per WHO recommendations (3/16/2020)              - No new ACEi/ARB start or discontinuation of chronic med unless hypotensive (Esler et al. Journal of Hypertension 2020, 38:000-000)              - Careful use of steroids in the absence of other indications   - unless septic shock due to increased viral replication              - Fluid sparing resuscitation              - Empiric antibiotics per likely source & patient allergies                          - CAP: x 5 day course  Ceftriaxone 1g IV Q24hrs                                      Azithromycin 500mg IV day #1, then 250mg PO daily x4 days                                      If MRSA risk factors, add Vancomycin IV (PharmD consult)              - continue statin (if CPK WNL)              - hydroxychloroquine contraindicated in this patient with cardiomyopathy      Goals of care, counseling/discussion  - Discussed the typical clinical course of COVID19 with Tiana Boston, including the potential for acute decompensation requiring intubation and mechanical ventilation.  - Following this discussion, patient/POA requested code status of DNR/DNI, will update EMR and paper chart accordingly.     T2DM              - detemir 6U QD              - LDSSI     HFpEF              - Signs of volume overload              -               - diurese with IV furosemide 40mg BID              - strict I/O; daily weights              - resume home furosemide 40mg BID when euvolemic                CKD 4              - avoid nephrotoxins              - renally dose all medications     AFib              - continue apixaban 2.5mg BID     VTE High Risk Prophylaxis: apixaban    Subsequent Hospital Care   Level 1 [id number] Total visit time was 15 minutes or greater with greater than 50% of time spent in counseling and coordination of care.      Min Wyatt MD  PGY-2  Internal Medicine

## 2020-03-26 NOTE — NURSING
Pt in bed watching TV. Plan of care and meds discussed with pt. COVID isolation maintained. Fall precautions discussed and maintained. Pt assisted with putting on hospital gown. Pt given mesh underwear and maxi pads due to incontinence episodes. Pt instructed to call for any respiratory distress. Call light in reach. Comfort and safety maintained. Reassurance given.

## 2020-03-27 PROBLEM — U07.1 COVID-19 VIRUS INFECTION: Status: ACTIVE | Noted: 2020-03-25

## 2020-03-27 LAB
ALBUMIN SERPL BCP-MCNC: 2.1 G/DL (ref 3.5–5.2)
ALP SERPL-CCNC: 99 U/L (ref 55–135)
ALT SERPL W/O P-5'-P-CCNC: 20 U/L (ref 10–44)
ANION GAP SERPL CALC-SCNC: 9 MMOL/L (ref 8–16)
AST SERPL-CCNC: 32 U/L (ref 10–40)
BILIRUB SERPL-MCNC: 0.8 MG/DL (ref 0.1–1)
BILIRUB UR QL STRIP: NEGATIVE
BUN SERPL-MCNC: 25 MG/DL (ref 10–30)
CALCIUM SERPL-MCNC: 8.8 MG/DL (ref 8.7–10.5)
CHLORIDE SERPL-SCNC: 98 MMOL/L (ref 95–110)
CLARITY UR REFRACT.AUTO: CLEAR
CO2 SERPL-SCNC: 28 MMOL/L (ref 23–29)
COLOR UR AUTO: YELLOW
CREAT SERPL-MCNC: 1.6 MG/DL (ref 0.5–1.4)
CRP SERPL-MCNC: 148.7 MG/L (ref 0–8.2)
EST. GFR  (AFRICAN AMERICAN): 31.3 ML/MIN/1.73 M^2
EST. GFR  (NON AFRICAN AMERICAN): 27.2 ML/MIN/1.73 M^2
FERRITIN SERPL-MCNC: 420 NG/ML (ref 20–300)
GLUCOSE SERPL-MCNC: 145 MG/DL (ref 70–110)
GLUCOSE UR QL STRIP: NEGATIVE
HGB UR QL STRIP: NEGATIVE
KETONES UR QL STRIP: NEGATIVE
LDH SERPL L TO P-CCNC: 383 U/L (ref 110–260)
LEUKOCYTE ESTERASE UR QL STRIP: NEGATIVE
MAGNESIUM SERPL-MCNC: 2.1 MG/DL (ref 1.6–2.6)
NITRITE UR QL STRIP: NEGATIVE
PH UR STRIP: 5 [PH] (ref 5–8)
PHOSPHATE SERPL-MCNC: 2.9 MG/DL (ref 2.7–4.5)
POCT GLUCOSE: 123 MG/DL (ref 70–110)
POCT GLUCOSE: 170 MG/DL (ref 70–110)
POCT GLUCOSE: 188 MG/DL (ref 70–110)
POCT GLUCOSE: 219 MG/DL (ref 70–110)
POTASSIUM SERPL-SCNC: 3.6 MMOL/L (ref 3.5–5.1)
PROT SERPL-MCNC: 6.1 G/DL (ref 6–8.4)
PROT UR QL STRIP: NEGATIVE
SODIUM SERPL-SCNC: 135 MMOL/L (ref 136–145)
SP GR UR STRIP: 1.01 (ref 1–1.03)
URN SPEC COLLECT METH UR: NORMAL

## 2020-03-27 PROCEDURE — 36415 COLL VENOUS BLD VENIPUNCTURE: CPT | Mod: HCNC

## 2020-03-27 PROCEDURE — 99232 PR SUBSEQUENT HOSPITAL CARE,LEVL II: ICD-10-PCS | Mod: HCNC,GC,, | Performed by: INTERNAL MEDICINE

## 2020-03-27 PROCEDURE — 82728 ASSAY OF FERRITIN: CPT | Mod: HCNC

## 2020-03-27 PROCEDURE — 80053 COMPREHEN METABOLIC PANEL: CPT | Mod: HCNC

## 2020-03-27 PROCEDURE — 83735 ASSAY OF MAGNESIUM: CPT | Mod: HCNC

## 2020-03-27 PROCEDURE — 87449 NOS EACH ORGANISM AG IA: CPT | Mod: HCNC

## 2020-03-27 PROCEDURE — 99232 SBSQ HOSP IP/OBS MODERATE 35: CPT | Mod: HCNC,GC,, | Performed by: INTERNAL MEDICINE

## 2020-03-27 PROCEDURE — 81003 URINALYSIS AUTO W/O SCOPE: CPT | Mod: HCNC

## 2020-03-27 PROCEDURE — 84100 ASSAY OF PHOSPHORUS: CPT | Mod: HCNC

## 2020-03-27 PROCEDURE — 11000001 HC ACUTE MED/SURG PRIVATE ROOM: Mod: HCNC

## 2020-03-27 PROCEDURE — 83615 LACTATE (LD) (LDH) ENZYME: CPT | Mod: HCNC

## 2020-03-27 PROCEDURE — C9399 UNCLASSIFIED DRUGS OR BIOLOG: HCPCS | Mod: HCNC | Performed by: STUDENT IN AN ORGANIZED HEALTH CARE EDUCATION/TRAINING PROGRAM

## 2020-03-27 PROCEDURE — 63600175 PHARM REV CODE 636 W HCPCS: Mod: HCNC | Performed by: STUDENT IN AN ORGANIZED HEALTH CARE EDUCATION/TRAINING PROGRAM

## 2020-03-27 PROCEDURE — 86140 C-REACTIVE PROTEIN: CPT | Mod: HCNC

## 2020-03-27 PROCEDURE — 25000003 PHARM REV CODE 250: Mod: HCNC | Performed by: STUDENT IN AN ORGANIZED HEALTH CARE EDUCATION/TRAINING PROGRAM

## 2020-03-27 RX ORDER — POLYETHYLENE GLYCOL 3350 17 G/17G
17 POWDER, FOR SOLUTION ORAL DAILY PRN
Status: DISCONTINUED | OUTPATIENT
Start: 2020-03-27 | End: 2020-04-05 | Stop reason: HOSPADM

## 2020-03-27 RX ORDER — FUROSEMIDE 10 MG/ML
40 INJECTION INTRAMUSCULAR; INTRAVENOUS 2 TIMES DAILY
Status: DISCONTINUED | OUTPATIENT
Start: 2020-03-27 | End: 2020-03-30

## 2020-03-27 RX ADMIN — APIXABAN 2.5 MG: 2.5 TABLET, FILM COATED ORAL at 08:03

## 2020-03-27 RX ADMIN — LATANOPROST 1 DROP: 50 SOLUTION OPHTHALMIC at 09:03

## 2020-03-27 RX ADMIN — ATORVASTATIN CALCIUM 40 MG: 20 TABLET, FILM COATED ORAL at 10:03

## 2020-03-27 RX ADMIN — METOPROLOL TARTRATE 50 MG: 50 TABLET, FILM COATED ORAL at 08:03

## 2020-03-27 RX ADMIN — APIXABAN 2.5 MG: 2.5 TABLET, FILM COATED ORAL at 10:03

## 2020-03-27 RX ADMIN — METOPROLOL TARTRATE 50 MG: 50 TABLET, FILM COATED ORAL at 10:03

## 2020-03-27 RX ADMIN — DORZOLAMIDE HYDROCHLORIDE 1 DROP: 20 SOLUTION/ DROPS OPHTHALMIC at 09:03

## 2020-03-27 RX ADMIN — CEFTRIAXONE 1 G: 1 INJECTION, POWDER, FOR SOLUTION INTRAMUSCULAR; INTRAVENOUS at 11:03

## 2020-03-27 RX ADMIN — ISOSORBIDE MONONITRATE 60 MG: 60 TABLET, EXTENDED RELEASE ORAL at 10:03

## 2020-03-27 RX ADMIN — FUROSEMIDE 40 MG: 10 INJECTION, SOLUTION INTRAMUSCULAR; INTRAVENOUS at 06:03

## 2020-03-27 RX ADMIN — LEVOTHYROXINE SODIUM 75 MCG: 75 TABLET ORAL at 06:03

## 2020-03-27 RX ADMIN — AZITHROMYCIN MONOHYDRATE 250 MG: 250 TABLET ORAL at 10:03

## 2020-03-27 RX ADMIN — INSULIN DETEMIR 6 UNITS: 100 INJECTION, SOLUTION SUBCUTANEOUS at 09:03

## 2020-03-27 RX ADMIN — POLYETHYLENE GLYCOL 3350 17 G: 17 POWDER, FOR SOLUTION ORAL at 02:03

## 2020-03-27 NOTE — PLAN OF CARE
Patient AAOX4 vital signs stable. Safety and infection precautions maintained. Patient is comfortable at this time,bed is locked and in a low position with call light in reach. Will cont to monitor.

## 2020-03-27 NOTE — PLAN OF CARE
POC reviewed with pt, verbalized an understanding; NADN; VSS; pt denies complaint at this time. No acute event/fall this shift. Call light in reach, bed in lowest position. Safety precautions maintained.

## 2020-03-27 NOTE — PROGRESS NOTES
Hospital Medicine  Progress Note  [facility]      Patient Name: Tiana Mead  MRN:  [id number]  Hospital Medicine Team: [facility] MED S Min Wyatt MD  Date of Admission:  3/25/2020     Length of Stay:  LOS: 2 days       Principal Problem:  COVID-19 virus infection      Hospital Course:  Patient admitted for evaluation of possible COVID-19.    Interval History:     Reports stable symptoms, though O2 sat trending down today. On chart review, I realized that furosemide was never ordered, although documented as part of plan. Re-ordered furosemide 40mg BID IV to start immediately. Nurse notified.      Review of Systems:  Constitutional: Positive for fatigue, poor appetite   HENT: negative for trouble swallowing.    Eyes: Negative for photophobia, visual disturbance.   Respiratory: Positive for cough, shortness of breath    Inpatient Medications:    Current Facility-Administered Medications:     albuterol inhaler 2 puff, 2 puff, Inhalation, Q4H PRN, Min Wyatt MD    apixaban tablet 2.5 mg, 2.5 mg, Oral, BID, Min Wyatt MD, 2.5 mg at 03/27/20 1000    atorvastatin tablet 40 mg, 40 mg, Oral, Daily, Min Wyatt MD, 40 mg at 03/27/20 1000    azithromycin tablet 250 mg, 250 mg, Oral, Daily, Min Wyatt MD, 250 mg at 03/27/20 1000    cefTRIAXone injection 1 g, 1 g, Intravenous, Q24H, Min Wyatt MD, 1 g at 03/27/20 1113    dextrose 10% (D10W) Bolus, 12.5 g, Intravenous, PRN, Min Wyatt MD    dextrose 10% (D10W) Bolus, 25 g, Intravenous, PRN, Min Wyatt MD    dorzolamide 2 % ophthalmic solution 1 drop, 1 drop, Both Eyes, BID, Min Wyatt MD, 1 drop at 03/27/20 0900    glucagon (human recombinant) injection 1 mg, 1 mg, Intramuscular, PRN, Min Wyatt MD    glucagon (human recombinant) injection 1 mg, 1 mg, Intramuscular, PRN, Kendy Landon MD    glucose chewable tablet 16 g, 16 g, Oral, PRN, Min Wyatt MD    glucose chewable tablet 16 g, 16 g, Oral, PRN, Kendy Landon MD    glucose chewable  "tablet 24 g, 24 g, Oral, PRN, Min Wyatt MD    glucose chewable tablet 24 g, 24 g, Oral, PRN, Kendy Landon MD    insulin aspart U-100 pen 0-5 Units, 0-5 Units, Subcutaneous, QID (AC + HS) PRN, Kendy Landon MD, 2 Units at 03/25/20 1857    insulin detemir U-100 pen 6 Units, 6 Units, Subcutaneous, Daily, Min Wyatt MD, 6 Units at 03/27/20 0900    isosorbide mononitrate 24 hr tablet 60 mg, 60 mg, Oral, Daily, Min Wyatt MD, 60 mg at 03/27/20 1000    latanoprost 0.005 % ophthalmic solution 1 drop, 1 drop, Both Eyes, QHS, Min Wyatt MD, 1 drop at 03/26/20 2250    levothyroxine tablet 75 mcg, 75 mcg, Oral, Before breakfast, Min Wyatt MD, 75 mcg at 03/27/20 0627    metoprolol tartrate (LOPRESSOR) tablet 50 mg, 50 mg, Oral, BID, Min Wyatt MD, 50 mg at 03/27/20 1000    nitroGLYCERIN SL tablet 0.4 mg, 0.4 mg, Sublingual, Q5 Min PRN, Min Wyatt MD    ondansetron tablet 4 mg, 4 mg, Oral, Q12H PRN, Min Wyatt MD    polyethylene glycol packet 17 g, 17 g, Oral, Daily PRN, Kvng Perez MD, 17 g at 03/27/20 0201    sodium chloride 0.9% flush 10 mL, 10 mL, Intravenous, PRN, Claudio Baldwin MD    sodium chloride 0.9% flush 10 mL, 10 mL, Intravenous, PRN, Min Wyatt MD      Physical Exam:      Intake/Output Summary (Last 24 hours) at 3/27/2020 1606  Last data filed at 3/27/2020 0200  Gross per 24 hour   Intake 250 ml   Output 200 ml   Net 50 ml     Wt Readings from Last 3 Encounters:   03/25/20 69.4 kg (153 lb)   02/04/20 72.6 kg (160 lb)   11/20/19 72.6 kg (160 lb)       BP (!) 143/66 (BP Location: Right arm, Patient Position: Lying)   Pulse 99   Temp 97.7 °F (36.5 °C) (Oral)   Resp 19   Ht 5' 8" (1.727 m)   Wt 69.4 kg (153 lb)   LMP  (LMP Unknown)   SpO2 (!) 94%   Breastfeeding? No   BMI 23.26 kg/m²     Patient examined over telephone conversation.     Laboratory:  Lab Results   Component Value Date    [id number] Detected (A) 03/25/2020       Recent Labs   Lab 03/25/20  1036 03/26/20  0710   WBC " 15.31* 12.80*   LYMPH 4.1*  0.6* 6.2*  0.8*   HGB 12.9 12.6   HCT 38.7 38.8    179     Recent Labs   Lab 03/25/20  1036 03/26/20  0710 03/27/20  0851   * 138 135*   K 3.6 4.0 3.6   CL 99 102 98   CO2 22* 27 28   BUN 21 20 25   CREATININE 1.6* 1.4 1.6*   * 121* 145*   CALCIUM 8.5* 8.7 8.8   MG 1.8 1.8 2.1   PHOS 2.4* 2.5* 2.9   LIPASE 141*  --   --      Recent Labs   Lab 03/25/20  1036 03/26/20  0710 03/27/20  0851   ALKPHOS 81 107 99   ALT 25 20 20   AST 43* 37 32   ALBUMIN 2.4* 2.1* 2.1*   PROT 6.1 5.9* 6.1   BILITOT 1.1* 0.8 0.8        Recent Labs     03/25/20  1036  03/26/20  0710 03/27/20  0851   DDIMER  --   --  15.95*  --    FERRITIN 383*  --   --  420*   .8*  --   --  148.7*   LDH  --    < > 431* 383*   *  --   --   --    TROPONINI 0.023  --   --   --     < > = values in this interval not displayed.       All labs within the last 24 hours were reviewed.     Microbiology:  Microbiology Results (last 7 days)     Procedure Component Value Units Date/Time    Blood culture x two cultures. Draw prior to antibiotics. [[id number]] Collected:  03/25/20 1036    Order Status:  Completed Specimen:  Blood from Peripheral, Antecubital, Right Updated:  03/27/20 1212     Blood Culture, Routine No Growth to date      No Growth to date    Narrative:       Aerobic and anaerobic    Blood culture x two cultures. Draw prior to antibiotics. [[id number]] Collected:  03/25/20 1037    Order Status:  Completed Specimen:  Blood from Peripheral, Hand, Right Updated:  03/27/20 1212     Blood Culture, Routine No Growth to date      No Growth to date    Narrative:       Aerobic and anaerobic    Culture, Respiratory with Gram Stain [[id number]]     Order Status:  No result Specimen:  Sputum, Expectorated     Influenza A & B by Molecular [[id number]] Collected:  03/25/20 1040    Order Status:  Completed Specimen:  Nasopharyngeal Swab Updated:  03/25/20 1143     Influenza A, Molecular Negative     Influenza B,  Molecular Negative     Flu A & B Source Nasal swab            Imaging  ECG Results          EKG 12-lead (Final result)  Result time 03/25/20 15:08:57    Final result by Interface, Lab In [facility] (03/25/20 15:08:57)                 Narrative:    Test Reason : R00.0,    Vent. Rate : 112 BPM     Atrial Rate : 174 BPM     P-R Int : 000 ms          QRS Dur : 098 ms      QT Int : 318 ms       P-R-T Axes : 000 003 -28 degrees     QTc Int : 434 ms    Atrial fibrillation with rapid ventricular response  Nonspecific ST and/or T wave abnormalities  Abnormal ECG  When compared with ECG of 10-OCT-2019 10:30,  No significant change was found  Confirmed by Agatha Rosas MD ([id number]) on 3/25/2020 3:08:52 PM    Referred By: [email or web address]   SELF           Confirmed By:Agatha Rosas MD                              Results for orders placed during the hospital encounter of 07/17/19   Transthoracic echo (TTE) 2D with Color Flow    Narrative · Concentric left ventricular hypertrophy.  · Hyperdynamic left ventricular systolic function. The estimated ejection   fraction is >70%.  · Mildly reduced right ventricular systolic function.  · Severe batrial enlargement.  · Severe tricuspid regurgitation.  · The estimated PA systolic pressure is 56 mm Hg  · Elevated central venous pressure (15 mm Hg).          X-Ray Chest AP Portable  Narrative: EXAMINATION:  XR CHEST AP PORTABLE    CLINICAL HISTORY:  Sepsis;    TECHNIQUE:  Single frontal view of the chest was performed.    COMPARISON:  No 08/23/2019 ne    FINDINGS:  Cardiomegaly.  Ill-defined patchy airspace consolidation  identified in the left upper lobe and at the lung bases.  No significant pleural effusion.  Impression: See above    Electronically signed by: Ino Carrion MD  Date:    03/25/2020  Time:    11:32      All imaging within the last 24 hours was reviewed.     Assessment and Plan:    Active Hospital Problems    Diagnosis  POA    *COVID-19 virus infection [J22, B97.29]  Yes    Community  acquired pneumonia [J18.9]  Yes    H/O Deep vein thrombosis (DVT) [I82.409]  Yes     Chronic    CKD (chronic kidney disease) stage 4, GFR 15-29 ml/min [N18.4]  Yes     Chronic    Type 2 diabetes mellitus with diabetic polyneuropathy, with long-term current use of insulin [E11.42, Z79.4]  Not Applicable     Chronic      Resolved Hospital Problems   No resolved problems to display.       Suspected Covid-19 Virus Infection  Person under investigation (PUI) for COVID-19  - COVID-19 testing sent on 3/25/2020.  - Infection Control notified     - Isolation:   - Airborne and Droplet Precautions  - N95 masks must be fit tested, wear eye protection  - 20 second hand hygiene              - Limit visitors per hospital policy              - Consolidating lab draws, nursing care, and interventions     - Diagnostics: (leukopenia/lymphopenia, hyponatremia, hyperferritinemia, elevated troponin, elevated d-dimer, age, and comorbidities are significant predictors of poor clinical outcome)              - CBC:             trend Q48hrs  - CMP:             trend Q48hrs  - Procalcitonin:0.4  - D-dimer:        15.95; trend Q48hrs  - Ferritin:          383  - CRP:             150; trend Q48hrs  - LDH:              431  - BNP:              714  - Troponin:       0.023              - ECG:             AFib rhythm; nonspecific ST changes. QTc 434              - rapid Flu:       negative              - Legionella antigen:   pending              - Blood culture x2:       pending              - Sputum culture:        pending              - CXR:             Bilateral infiltrates              - UA and culture:         pending                         - CPK               86     - Management:              - Bundle care as able to minimize in/out of room   - Supplemental O2 to maintain SpO2 >92%,   if requiring 6L NC or higher, place on nonrebreather and discuss case with MICU              - Telemetry & continuous Pulse Ox              - If wheezing    - albuterol INHALER PRN 4puff Q6hr approximates a nebulizer (avoid nebulization of secretions)  - ipratropium daily               - apap PRN fever              - Avoiding NIPPV to prevent aerosolization   (including home CPAP/BiPAP unless on a case-by-case basis and only in negative pressure room)              - Cautious use of NSAIDS for fever per WHO recommendations (3/16/2020)              - No new ACEi/ARB start or discontinuation of chronic med unless hypotensive (Esler et al. Journal of Hypertension 2020, 38:000-000)              - Careful use of steroids in the absence of other indications   - unless septic shock due to increased viral replication              - Fluid sparing resuscitation              - Empiric antibiotics per likely source & patient allergies                          - CAP: x 5 day course  Ceftriaxone 1g IV Q24hrs                                      Azithromycin 500mg IV day #1, then 250mg PO daily x4 days                                      If MRSA risk factors, add Vancomycin IV (PharmD consult)              - continue statin (if CPK WNL)              - hydroxychloroquine contraindicated in this patient with cardiomyopathy      Goals of care, counseling/discussion  - Discussed the typical clinical course of COVID19 with Tiana Boston, including the potential for acute decompensation requiring intubation and mechanical ventilation.  - Following this discussion, patient/POA requested code status of DNR/DNI, will update EMR and paper chart accordingly.     T2DM              - detemir 6U QD              - LDSSI     HFpEF              - Signs of volume overload              -               - diurese with IV furosemide 40mg BID              - strict I/O; daily weights              - resume home furosemide 40mg BID when euvolemic     CKD 4              - avoid nephrotoxins              - renally dose all medications     AFib              - continue apixaban 2.5mg BID     VTE High  Risk Prophylaxis: apixaban     Subsequent Hospital Care   Level 1 [id number] Total visit time was 15 minutes or greater with greater than 50% of time spent in counseling and coordination of care.        Min Wyatt MD  PGY-2  Internal Medicine

## 2020-03-28 LAB
ALBUMIN SERPL BCP-MCNC: 1.9 G/DL (ref 3.5–5.2)
ALP SERPL-CCNC: 103 U/L (ref 55–135)
ALT SERPL W/O P-5'-P-CCNC: 20 U/L (ref 10–44)
ANION GAP SERPL CALC-SCNC: 11 MMOL/L (ref 8–16)
ANISOCYTOSIS BLD QL SMEAR: SLIGHT
AST SERPL-CCNC: 34 U/L (ref 10–40)
BASOPHILS # BLD AUTO: 0.04 K/UL (ref 0–0.2)
BASOPHILS NFR BLD: 0.5 % (ref 0–1.9)
BILIRUB SERPL-MCNC: 0.6 MG/DL (ref 0.1–1)
BUN SERPL-MCNC: 28 MG/DL (ref 10–30)
BURR CELLS BLD QL SMEAR: ABNORMAL
CALCIUM SERPL-MCNC: 8.3 MG/DL (ref 8.7–10.5)
CHLORIDE SERPL-SCNC: 99 MMOL/L (ref 95–110)
CO2 SERPL-SCNC: 23 MMOL/L (ref 23–29)
CREAT SERPL-MCNC: 1.5 MG/DL (ref 0.5–1.4)
CRP SERPL-MCNC: 124.5 MG/L (ref 0–8.2)
DIFFERENTIAL METHOD: ABNORMAL
EOSINOPHIL # BLD AUTO: 0.2 K/UL (ref 0–0.5)
EOSINOPHIL NFR BLD: 2.4 % (ref 0–8)
ERYTHROCYTE [DISTWIDTH] IN BLOOD BY AUTOMATED COUNT: 15.3 % (ref 11.5–14.5)
EST. GFR  (AFRICAN AMERICAN): 33.9 ML/MIN/1.73 M^2
EST. GFR  (NON AFRICAN AMERICAN): 29.4 ML/MIN/1.73 M^2
GLUCOSE SERPL-MCNC: 130 MG/DL (ref 70–110)
HCT VFR BLD AUTO: 34.8 % (ref 37–48.5)
HGB BLD-MCNC: 11.5 G/DL (ref 12–16)
HYPOCHROMIA BLD QL SMEAR: ABNORMAL
IMM GRANULOCYTES # BLD AUTO: 0.1 K/UL (ref 0–0.04)
IMM GRANULOCYTES NFR BLD AUTO: 1.1 % (ref 0–0.5)
LYMPHOCYTES # BLD AUTO: 1 K/UL (ref 1–4.8)
LYMPHOCYTES NFR BLD: 11.2 % (ref 18–48)
MAGNESIUM SERPL-MCNC: 2 MG/DL (ref 1.6–2.6)
MCH RBC QN AUTO: 29.3 PG (ref 27–31)
MCHC RBC AUTO-ENTMCNC: 33 G/DL (ref 32–36)
MCV RBC AUTO: 89 FL (ref 82–98)
MONOCYTES # BLD AUTO: 1.2 K/UL (ref 0.3–1)
MONOCYTES NFR BLD: 13.7 % (ref 4–15)
NEUTROPHILS # BLD AUTO: 6.3 K/UL (ref 1.8–7.7)
NEUTROPHILS NFR BLD: 71.1 % (ref 38–73)
NRBC BLD-RTO: 0 /100 WBC
OVALOCYTES BLD QL SMEAR: ABNORMAL
PHOSPHATE SERPL-MCNC: 2.8 MG/DL (ref 2.7–4.5)
PLATELET # BLD AUTO: 179 K/UL (ref 150–350)
PLATELET BLD QL SMEAR: ABNORMAL
PMV BLD AUTO: 12.2 FL (ref 9.2–12.9)
POCT GLUCOSE: 128 MG/DL (ref 70–110)
POCT GLUCOSE: 166 MG/DL (ref 70–110)
POCT GLUCOSE: 230 MG/DL (ref 70–110)
POCT GLUCOSE: 259 MG/DL (ref 70–110)
POIKILOCYTOSIS BLD QL SMEAR: SLIGHT
POLYCHROMASIA BLD QL SMEAR: ABNORMAL
POTASSIUM SERPL-SCNC: 3.7 MMOL/L (ref 3.5–5.1)
PROT SERPL-MCNC: 5.6 G/DL (ref 6–8.4)
RBC # BLD AUTO: 3.92 M/UL (ref 4–5.4)
SODIUM SERPL-SCNC: 133 MMOL/L (ref 136–145)
SPHEROCYTES BLD QL SMEAR: ABNORMAL
WBC # BLD AUTO: 8.78 K/UL (ref 3.9–12.7)

## 2020-03-28 PROCEDURE — 99232 PR SUBSEQUENT HOSPITAL CARE,LEVL II: ICD-10-PCS | Mod: HCNC,GC,, | Performed by: INTERNAL MEDICINE

## 2020-03-28 PROCEDURE — 83735 ASSAY OF MAGNESIUM: CPT | Mod: HCNC

## 2020-03-28 PROCEDURE — 63600175 PHARM REV CODE 636 W HCPCS: Mod: HCNC | Performed by: STUDENT IN AN ORGANIZED HEALTH CARE EDUCATION/TRAINING PROGRAM

## 2020-03-28 PROCEDURE — 11000001 HC ACUTE MED/SURG PRIVATE ROOM: Mod: HCNC

## 2020-03-28 PROCEDURE — 84100 ASSAY OF PHOSPHORUS: CPT | Mod: HCNC

## 2020-03-28 PROCEDURE — 80053 COMPREHEN METABOLIC PANEL: CPT | Mod: HCNC

## 2020-03-28 PROCEDURE — 25000003 PHARM REV CODE 250: Mod: HCNC | Performed by: STUDENT IN AN ORGANIZED HEALTH CARE EDUCATION/TRAINING PROGRAM

## 2020-03-28 PROCEDURE — 86140 C-REACTIVE PROTEIN: CPT | Mod: HCNC

## 2020-03-28 PROCEDURE — 85025 COMPLETE CBC W/AUTO DIFF WBC: CPT | Mod: HCNC

## 2020-03-28 PROCEDURE — 63600175 PHARM REV CODE 636 W HCPCS: Mod: HCNC | Performed by: HOSPITALIST

## 2020-03-28 PROCEDURE — 25000003 PHARM REV CODE 250: Mod: HCNC | Performed by: INTERNAL MEDICINE

## 2020-03-28 PROCEDURE — 99232 SBSQ HOSP IP/OBS MODERATE 35: CPT | Mod: HCNC,GC,, | Performed by: INTERNAL MEDICINE

## 2020-03-28 RX ORDER — AMLODIPINE BESYLATE 5 MG/1
5 TABLET ORAL DAILY
Status: DISCONTINUED | OUTPATIENT
Start: 2020-03-28 | End: 2020-04-05 | Stop reason: HOSPADM

## 2020-03-28 RX ORDER — POLYETHYLENE GLYCOL 3350 17 G/17G
17 POWDER, FOR SOLUTION ORAL DAILY
Status: DISCONTINUED | OUTPATIENT
Start: 2020-03-29 | End: 2020-03-31

## 2020-03-28 RX ORDER — SENNOSIDES 8.6 MG/1
8.6 TABLET ORAL DAILY PRN
Status: DISCONTINUED | OUTPATIENT
Start: 2020-03-28 | End: 2020-04-05 | Stop reason: HOSPADM

## 2020-03-28 RX ORDER — SENNOSIDES 8.6 MG/1
8.6 TABLET ORAL NIGHTLY
Status: DISCONTINUED | OUTPATIENT
Start: 2020-03-28 | End: 2020-04-05 | Stop reason: HOSPADM

## 2020-03-28 RX ADMIN — INSULIN DETEMIR 6 UNITS: 100 INJECTION, SOLUTION SUBCUTANEOUS at 09:03

## 2020-03-28 RX ADMIN — SENNOSIDES 8.6 MG: 8.6 TABLET, FILM COATED ORAL at 05:03

## 2020-03-28 RX ADMIN — LATANOPROST 1 DROP: 50 SOLUTION OPHTHALMIC at 09:03

## 2020-03-28 RX ADMIN — DORZOLAMIDE HYDROCHLORIDE 1 DROP: 20 SOLUTION/ DROPS OPHTHALMIC at 09:03

## 2020-03-28 RX ADMIN — AMLODIPINE BESYLATE 5 MG: 5 TABLET ORAL at 03:03

## 2020-03-28 RX ADMIN — INSULIN ASPART 2 UNITS: 100 INJECTION, SOLUTION INTRAVENOUS; SUBCUTANEOUS at 05:03

## 2020-03-28 RX ADMIN — FUROSEMIDE 40 MG: 10 INJECTION, SOLUTION INTRAMUSCULAR; INTRAVENOUS at 09:03

## 2020-03-28 RX ADMIN — CEFTRIAXONE 1 G: 1 INJECTION, POWDER, FOR SOLUTION INTRAMUSCULAR; INTRAVENOUS at 09:03

## 2020-03-28 RX ADMIN — ISOSORBIDE MONONITRATE 60 MG: 60 TABLET, EXTENDED RELEASE ORAL at 09:03

## 2020-03-28 RX ADMIN — APIXABAN 2.5 MG: 2.5 TABLET, FILM COATED ORAL at 09:03

## 2020-03-28 RX ADMIN — LEVOTHYROXINE SODIUM 75 MCG: 75 TABLET ORAL at 09:03

## 2020-03-28 RX ADMIN — ATORVASTATIN CALCIUM 40 MG: 20 TABLET, FILM COATED ORAL at 09:03

## 2020-03-28 RX ADMIN — APIXABAN 2.5 MG: 2.5 TABLET, FILM COATED ORAL at 08:03

## 2020-03-28 RX ADMIN — SENNOSIDES 8.6 MG: 8.6 TABLET, FILM COATED ORAL at 08:03

## 2020-03-28 RX ADMIN — AZITHROMYCIN MONOHYDRATE 250 MG: 250 TABLET ORAL at 09:03

## 2020-03-28 RX ADMIN — METOPROLOL TARTRATE 50 MG: 50 TABLET, FILM COATED ORAL at 09:03

## 2020-03-28 RX ADMIN — METOPROLOL TARTRATE 50 MG: 50 TABLET, FILM COATED ORAL at 08:03

## 2020-03-28 RX ADMIN — FUROSEMIDE 40 MG: 10 INJECTION, SOLUTION INTRAMUSCULAR; INTRAVENOUS at 05:03

## 2020-03-28 NOTE — PLAN OF CARE
Plan of care reviewed with patient. Oxygen extension applied for ambulation to the bathroom. Patient request for bedside commode as she gets dyspneic on exertion. Noted. Safety protocol reinforced. Call light within reach. Instructed pt to call for assistance to bathroom. Patient denies any discomfort. Will monitor.

## 2020-03-28 NOTE — PROGRESS NOTES
Hospital Medicine  Progress Note  [facility]      Patient Name: Tiana Mead  MRN:  [id number]  Hospital Medicine Team: [facility] MED S Min Wyatt MD  Date of Admission:  3/25/2020     Length of Stay:  LOS: 3 days       Principal Problem:  COVID-19 virus infection      Hospital Course:  Patient admitted for evaluation of possible COVID-19.    Interval History:     Continues to require 4L NC. Significant dyspnea on minimal exertion today. Pure wick ordered to prevent falls. Family updated.     Review of Systems:  Constitutional: Positive for fatigue, poor appetite   HENT: negative for trouble swallowing.    Eyes: Negative for photophobia, visual disturbance.   Respiratory: Positive for cough, shortness of breath    Inpatient Medications:    Current Facility-Administered Medications:     albuterol inhaler 2 puff, 2 puff, Inhalation, Q4H PRN, Min Wyatt MD    amLODIPine tablet 5 mg, 5 mg, Oral, Daily, Min Wyatt MD    apixaban tablet 2.5 mg, 2.5 mg, Oral, BID, iMn Wyatt MD, 2.5 mg at 03/28/20 0953    atorvastatin tablet 40 mg, 40 mg, Oral, Daily, Min Wyatt MD, 40 mg at 03/28/20 0955    azithromycin tablet 250 mg, 250 mg, Oral, Daily, Min Wyatt MD, 250 mg at 03/28/20 0953    cefTRIAXone injection 1 g, 1 g, Intravenous, Q24H, Min Wyatt MD, 1 g at 03/28/20 0909    dextrose 10% (D10W) Bolus, 12.5 g, Intravenous, PRN, Min Wyatt MD    dextrose 10% (D10W) Bolus, 25 g, Intravenous, PRN, Min Wyatt MD    dorzolamide 2 % ophthalmic solution 1 drop, 1 drop, Both Eyes, BID, Min Wyatt MD, 1 drop at 03/28/20 0900    furosemide injection 40 mg, 40 mg, Intravenous, BID, Min Wyatt MD, 40 mg at 03/28/20 0909    glucagon (human recombinant) injection 1 mg, 1 mg, Intramuscular, PRN, Min Wyatt MD    glucagon (human recombinant) injection 1 mg, 1 mg, Intramuscular, PRN, Kendy Landon MD    glucose chewable tablet 16 g, 16 g, Oral, PRN, Min Wyatt MD    glucose chewable tablet 16 g, 16 g,  "Oral, PRN, Kendy Landon MD    glucose chewable tablet 24 g, 24 g, Oral, PRN, Min Wyatt MD    glucose chewable tablet 24 g, 24 g, Oral, PRN, Kendy Landon MD    insulin aspart U-100 pen 0-5 Units, 0-5 Units, Subcutaneous, QID (AC + HS) PRN, Kendy Landon MD, 2 Units at 03/25/20 1857    insulin detemir U-100 pen 6 Units, 6 Units, Subcutaneous, Daily, Min Wyatt MD, 6 Units at 03/28/20 0957    isosorbide mononitrate 24 hr tablet 60 mg, 60 mg, Oral, Daily, Min Wyatt MD, 60 mg at 03/28/20 0955    latanoprost 0.005 % ophthalmic solution 1 drop, 1 drop, Both Eyes, QHS, Min Wyatt MD, 1 drop at 03/27/20 2100    levothyroxine tablet 75 mcg, 75 mcg, Oral, Before breakfast, Min Wyatt MD, 75 mcg at 03/28/20 0955    metoprolol tartrate (LOPRESSOR) tablet 50 mg, 50 mg, Oral, BID, Min Wyatt MD, 50 mg at 03/28/20 0954    nitroGLYCERIN SL tablet 0.4 mg, 0.4 mg, Sublingual, Q5 Min PRN, Min Wyatt MD    ondansetron tablet 4 mg, 4 mg, Oral, Q12H PRN, Min Wyatt MD    polyethylene glycol packet 17 g, 17 g, Oral, Daily PRN, Kvng Perez MD, 17 g at 03/27/20 0201    senna tablet 8.6 mg, 8.6 mg, Oral, Daily PRN, Min Wyatt MD    sodium chloride 0.9% flush 10 mL, 10 mL, Intravenous, PRN, Claudio Baldwin MD    sodium chloride 0.9% flush 10 mL, 10 mL, Intravenous, PRN, Min Wyatt MD      Physical Exam:      Intake/Output Summary (Last 24 hours) at 3/28/2020 1343  Last data filed at 3/28/2020 1145  Gross per 24 hour   Intake 530 ml   Output 700 ml   Net -170 ml     Wt Readings from Last 3 Encounters:   03/28/20 72.4 kg (159 lb 9.8 oz)   02/04/20 72.6 kg (160 lb)   11/20/19 72.6 kg (160 lb)       /60 (Patient Position: Lying)   Pulse 100   Temp 97.7 °F (36.5 °C) (Oral)   Resp 20   Ht 5' 8" (1.727 m)   Wt 72.4 kg (159 lb 9.8 oz)   LMP  (LMP Unknown)   SpO2 (!) 94%   Breastfeeding? No   BMI 24.27 kg/m²     GEN: NAD, conversant, lethargic  Resp: increased work of breathing.   CV: no edema  GI: soft, " NTND  Skin: no rash    Laboratory:  Lab Results   Component Value Date    [id number] Detected (A) 03/25/2020       Recent Labs   Lab 03/25/20  1036 03/26/20  0710 03/28/20  0500   WBC 15.31* 12.80* 8.78   LYMPH 4.1*  0.6* 6.2*  0.8* 11.2*  1.0   HGB 12.9 12.6 11.5*   HCT 38.7 38.8 34.8*    179 179     Recent Labs   Lab 03/25/20  1036 03/26/20  0710 03/27/20  0851 03/28/20  0500   * 138 135* 133*   K 3.6 4.0 3.6 3.7   CL 99 102 98 99   CO2 22* 27 28 23   BUN 21 20 25 28   CREATININE 1.6* 1.4 1.6* 1.5*   * 121* 145* 130*   CALCIUM 8.5* 8.7 8.8 8.3*   MG 1.8 1.8 2.1 2.0   PHOS 2.4* 2.5* 2.9 2.8   LIPASE 141*  --   --   --      Recent Labs   Lab 03/26/20  0710 03/27/20  0851 03/28/20  0500   ALKPHOS 107 99 103   ALT 20 20 20   AST 37 32 34   ALBUMIN 2.1* 2.1* 1.9*   PROT 5.9* 6.1 5.6*   BILITOT 0.8 0.8 0.6        Recent Labs     03/26/20  0710 03/27/20  0851 03/28/20  0500   DDIMER 15.95*  --   --    FERRITIN  --  420*  --    CRP  --  148.7* 124.5*   * 383*  --        All labs within the last 24 hours were reviewed.     Microbiology:  Microbiology Results (last 7 days)     Procedure Component Value Units Date/Time    Blood culture x two cultures. Draw prior to antibiotics. [[id number]] Collected:  03/25/20 1037    Order Status:  Completed Specimen:  Blood from Peripheral, Hand, Right Updated:  03/28/20 1212     Blood Culture, Routine No Growth to date      No Growth to date      No Growth to date    Narrative:       Aerobic and anaerobic    Blood culture x two cultures. Draw prior to antibiotics. [[id number]] Collected:  03/25/20 1036    Order Status:  Completed Specimen:  Blood from Peripheral, Antecubital, Right Updated:  03/28/20 1212     Blood Culture, Routine No Growth to date      No Growth to date      No Growth to date    Narrative:       Aerobic and anaerobic    Culture, Respiratory with Gram Stain [[id number]]     Order Status:  No result Specimen:  Sputum, Expectorated      Influenza A & B by Molecular [[id number]] Collected:  03/25/20 1040    Order Status:  Completed Specimen:  Nasopharyngeal Swab Updated:  03/25/20 1143     Influenza A, Molecular Negative     Influenza B, Molecular Negative     Flu A & B Source Nasal swab            Imaging  ECG Results          EKG 12-lead (Final result)  Result time 03/25/20 15:08:57    Final result by Interface, Lab In [facility] (03/25/20 15:08:57)                 Narrative:    Test Reason : R00.0,    Vent. Rate : 112 BPM     Atrial Rate : 174 BPM     P-R Int : 000 ms          QRS Dur : 098 ms      QT Int : 318 ms       P-R-T Axes : 000 003 -28 degrees     QTc Int : 434 ms    Atrial fibrillation with rapid ventricular response  Nonspecific ST and/or T wave abnormalities  Abnormal ECG  When compared with ECG of 10-OCT-2019 10:30,  No significant change was found  Confirmed by Agatha Rosas MD ([id number]) on 3/25/2020 3:08:52 PM    Referred By: [email or web address]   SELF           Confirmed By:Agatha Rosas MD                              Results for orders placed during the hospital encounter of 07/17/19   Transthoracic echo (TTE) 2D with Color Flow    Narrative · Concentric left ventricular hypertrophy.  · Hyperdynamic left ventricular systolic function. The estimated ejection   fraction is >70%.  · Mildly reduced right ventricular systolic function.  · Severe batrial enlargement.  · Severe tricuspid regurgitation.  · The estimated PA systolic pressure is 56 mm Hg  · Elevated central venous pressure (15 mm Hg).          X-Ray Chest AP Portable  Narrative: EXAMINATION:  XR CHEST AP PORTABLE    CLINICAL HISTORY:  Sepsis;    TECHNIQUE:  Single frontal view of the chest was performed.    COMPARISON:  No 08/23/2019 ne    FINDINGS:  Cardiomegaly.  Ill-defined patchy airspace consolidation  identified in the left upper lobe and at the lung bases.  No significant pleural effusion.  Impression: See above    Electronically signed by: Ino Carrion  MD  Date:    03/25/2020  Time:    11:32      All imaging within the last 24 hours was reviewed.     Assessment and Plan:    Active Hospital Problems    Diagnosis  POA    *COVID-19 virus infection [J22, B97.29]  Yes    Community acquired pneumonia [J18.9]  Yes    H/O Deep vein thrombosis (DVT) [I82.409]  Yes     Chronic    CKD (chronic kidney disease) stage 4, GFR 15-29 ml/min [N18.4]  Yes     Chronic    Type 2 diabetes mellitus with diabetic polyneuropathy, with long-term current use of insulin [E11.42, Z79.4]  Not Applicable     Chronic      Resolved Hospital Problems   No resolved problems to display.       Suspected Covid-19 Virus Infection  Person under investigation (PUI) for COVID-19  - COVID-19 testing sent on 3/25/2020.  - Infection Control notified     - Isolation:   - Airborne and Droplet Precautions  - N95 masks must be fit tested, wear eye protection  - 20 second hand hygiene              - Limit visitors per hospital policy              - Consolidating lab draws, nursing care, and interventions     - Diagnostics: (leukopenia/lymphopenia, hyponatremia, hyperferritinemia, elevated troponin, elevated d-dimer, age, and comorbidities are significant predictors of poor clinical outcome)              - CBC:             trend Q48hrs  - CMP:             trend Q48hrs  - Procalcitonin:0.4  - D-dimer:        15.95; trend Q48hrs  - Ferritin:          383  - CRP:             150; trend Q48hrs  - LDH:              431  - BNP:              714  - Troponin:       0.023              - ECG:             AFib rhythm; nonspecific ST changes. QTc 434              - rapid Flu:       negative              - Legionella antigen:   pending              - Blood culture x2:       pending              - Sputum culture:        pending              - CXR:             Bilateral infiltrates              - UA and culture:         pending                         - CPK               86     - Management:              - Bundle care as  able to minimize in/out of room   - Supplemental O2 to maintain SpO2 >92%,   if requiring 6L NC or higher, place on nonrebreather and discuss case with MICU              - Telemetry & continuous Pulse Ox              - If wheezing   - albuterol INHALER PRN 4puff Q6hr approximates a nebulizer (avoid nebulization of secretions)  - ipratropium daily               - apap PRN fever              - Avoiding NIPPV to prevent aerosolization   (including home CPAP/BiPAP unless on a case-by-case basis and only in negative pressure room)              - Cautious use of NSAIDS for fever per WHO recommendations (3/16/2020)              - No new ACEi/ARB start or discontinuation of chronic med unless hypotensive (Esler et al. Journal of Hypertension 2020, 38:000-000)              - Careful use of steroids in the absence of other indications   - unless septic shock due to increased viral replication              - Fluid sparing resuscitation              - Empiric antibiotics per likely source & patient allergies                          - CAP: x 5 day course  Ceftriaxone 1g IV Q24hrs                                      Azithromycin 500mg IV day #1, then 250mg PO daily x4 days                                      If MRSA risk factors, add Vancomycin IV (PharmD consult)              - continue statin (if CPK WNL)              - hydroxychloroquine contraindicated in this patient with cardiomyopathy      Goals of care, counseling/discussion  - Discussed the typical clinical course of COVID19 with Tiana Boston, including the potential for acute decompensation requiring intubation and mechanical ventilation.  - Following this discussion, patient/POA requested code status of DNR/DNI, will update EMR and paper chart accordingly.     T2DM              - detemir 6U QD              - LDSSI     HFpEF              - Signs of volume overload              -               - diurese with IV furosemide 40mg BID              - strict I/O;  daily weights              - resume home furosemide 40mg BID when euvolemic     CKD 4              - avoid nephrotoxins              - renally dose all medications     AFib              - continue apixaban 2.5mg BID     VTE High Risk Prophylaxis: apixaban     Subsequent Hospital Care   Level 1 10444 Total visit time was 15 minutes or greater with greater than 50% of time spent in counseling and coordination of care.        Min Wyatt MD  PGY-2  Internal Medicine

## 2020-03-28 NOTE — PLAN OF CARE
Patient AAOX4 vital signs stable. Safety and infection precautions maintained. Patient is comfortable at this time,bedis lock and in a low position with call light in reach.

## 2020-03-28 NOTE — PLAN OF CARE
Patient  AAOX4 vital signs stable. Safety and infection precautions maintained. Patient is comfortable at this time,bed is locked and in a low position with call light in reach.

## 2020-03-29 LAB
ALBUMIN SERPL BCP-MCNC: 1.9 G/DL (ref 3.5–5.2)
ALP SERPL-CCNC: 122 U/L (ref 55–135)
ALT SERPL W/O P-5'-P-CCNC: 21 U/L (ref 10–44)
ANION GAP SERPL CALC-SCNC: 10 MMOL/L (ref 8–16)
AST SERPL-CCNC: 32 U/L (ref 10–40)
BILIRUB SERPL-MCNC: 0.7 MG/DL (ref 0.1–1)
BUN SERPL-MCNC: 28 MG/DL (ref 10–30)
CALCIUM SERPL-MCNC: 8.6 MG/DL (ref 8.7–10.5)
CHLORIDE SERPL-SCNC: 101 MMOL/L (ref 95–110)
CO2 SERPL-SCNC: 25 MMOL/L (ref 23–29)
CREAT SERPL-MCNC: 1.2 MG/DL (ref 0.5–1.4)
EST. GFR  (AFRICAN AMERICAN): 44.4 ML/MIN/1.73 M^2
EST. GFR  (NON AFRICAN AMERICAN): 38.5 ML/MIN/1.73 M^2
GLUCOSE SERPL-MCNC: 158 MG/DL (ref 70–110)
MAGNESIUM SERPL-MCNC: 1.9 MG/DL (ref 1.6–2.6)
PHOSPHATE SERPL-MCNC: 2.7 MG/DL (ref 2.7–4.5)
POCT GLUCOSE: 135 MG/DL (ref 70–110)
POCT GLUCOSE: 147 MG/DL (ref 70–110)
POTASSIUM SERPL-SCNC: 3.6 MMOL/L (ref 3.5–5.1)
PROT SERPL-MCNC: 5.7 G/DL (ref 6–8.4)
SODIUM SERPL-SCNC: 136 MMOL/L (ref 136–145)

## 2020-03-29 PROCEDURE — 84100 ASSAY OF PHOSPHORUS: CPT | Mod: HCNC

## 2020-03-29 PROCEDURE — 99232 SBSQ HOSP IP/OBS MODERATE 35: CPT | Mod: HCNC,GC,, | Performed by: INTERNAL MEDICINE

## 2020-03-29 PROCEDURE — 25000003 PHARM REV CODE 250: Mod: HCNC | Performed by: INTERNAL MEDICINE

## 2020-03-29 PROCEDURE — 36415 COLL VENOUS BLD VENIPUNCTURE: CPT | Mod: HCNC

## 2020-03-29 PROCEDURE — 99232 PR SUBSEQUENT HOSPITAL CARE,LEVL II: ICD-10-PCS | Mod: HCNC,GC,, | Performed by: INTERNAL MEDICINE

## 2020-03-29 PROCEDURE — 11000001 HC ACUTE MED/SURG PRIVATE ROOM: Mod: HCNC

## 2020-03-29 PROCEDURE — 63600175 PHARM REV CODE 636 W HCPCS: Mod: HCNC | Performed by: STUDENT IN AN ORGANIZED HEALTH CARE EDUCATION/TRAINING PROGRAM

## 2020-03-29 PROCEDURE — 80053 COMPREHEN METABOLIC PANEL: CPT | Mod: HCNC

## 2020-03-29 PROCEDURE — 25000003 PHARM REV CODE 250: Mod: HCNC | Performed by: STUDENT IN AN ORGANIZED HEALTH CARE EDUCATION/TRAINING PROGRAM

## 2020-03-29 PROCEDURE — 83735 ASSAY OF MAGNESIUM: CPT | Mod: HCNC

## 2020-03-29 RX ADMIN — AZITHROMYCIN MONOHYDRATE 250 MG: 250 TABLET ORAL at 09:03

## 2020-03-29 RX ADMIN — LATANOPROST 1 DROP: 50 SOLUTION OPHTHALMIC at 09:03

## 2020-03-29 RX ADMIN — APIXABAN 2.5 MG: 2.5 TABLET, FILM COATED ORAL at 09:03

## 2020-03-29 RX ADMIN — DORZOLAMIDE HYDROCHLORIDE 1 DROP: 20 SOLUTION/ DROPS OPHTHALMIC at 09:03

## 2020-03-29 RX ADMIN — INSULIN DETEMIR 6 UNITS: 100 INJECTION, SOLUTION SUBCUTANEOUS at 10:03

## 2020-03-29 RX ADMIN — FUROSEMIDE 40 MG: 10 INJECTION, SOLUTION INTRAMUSCULAR; INTRAVENOUS at 04:03

## 2020-03-29 RX ADMIN — CEFTRIAXONE 1 G: 1 INJECTION, POWDER, FOR SOLUTION INTRAMUSCULAR; INTRAVENOUS at 09:03

## 2020-03-29 RX ADMIN — METOPROLOL TARTRATE 50 MG: 50 TABLET, FILM COATED ORAL at 09:03

## 2020-03-29 RX ADMIN — FUROSEMIDE 40 MG: 10 INJECTION, SOLUTION INTRAMUSCULAR; INTRAVENOUS at 09:03

## 2020-03-29 RX ADMIN — SENNOSIDES 8.6 MG: 8.6 TABLET, FILM COATED ORAL at 09:03

## 2020-03-29 RX ADMIN — ISOSORBIDE MONONITRATE 60 MG: 60 TABLET, EXTENDED RELEASE ORAL at 09:03

## 2020-03-29 RX ADMIN — AMLODIPINE BESYLATE 5 MG: 5 TABLET ORAL at 09:03

## 2020-03-29 RX ADMIN — POLYETHYLENE GLYCOL 3350 17 G: 17 POWDER, FOR SOLUTION ORAL at 09:03

## 2020-03-29 RX ADMIN — DORZOLAMIDE HYDROCHLORIDE 1 DROP: 20 SOLUTION/ DROPS OPHTHALMIC at 10:03

## 2020-03-29 RX ADMIN — ATORVASTATIN CALCIUM 40 MG: 20 TABLET, FILM COATED ORAL at 09:03

## 2020-03-29 RX ADMIN — LEVOTHYROXINE SODIUM 75 MCG: 75 TABLET ORAL at 05:03

## 2020-03-29 NOTE — PROGRESS NOTES
[facility] Hospital Medicine  Progress Note  [facility]      Patient Name: Tiana Mead  MRN:  [id number]  Hospital Medicine Team: [facility] HOSP MED S Min Wyatt MD  Date of Admission:  3/25/2020     Length of Stay:  LOS: 4 days       Principal Problem:  COVID-19 virus infection      Hospital Course:  Patient admitted for evaluation of possible COVID-19.    Interval History:     Requiring 5L NC today and tachycardic. Pure wick ordered to prevent falls and conserve energy. Bed rest.     Review of Systems:  Constitutional: Positive for fatigue, poor appetite   HENT: negative for trouble swallowing.    Eyes: Negative for photophobia, visual disturbance.   Respiratory: Positive for cough, shortness of breath    Inpatient Medications:    Current Facility-Administered Medications:     albuterol inhaler 2 puff, 2 puff, Inhalation, Q4H PRN, Min Wyatt MD    amLODIPine tablet 5 mg, 5 mg, Oral, Daily, Min Wyatt MD, 5 mg at 03/29/20 0955    apixaban tablet 2.5 mg, 2.5 mg, Oral, BID, Min Wyatt MD, 2.5 mg at 03/29/20 0955    atorvastatin tablet 40 mg, 40 mg, Oral, Daily, Min Wyatt MD, 40 mg at 03/29/20 0956    cefTRIAXone injection 1 g, 1 g, Intravenous, Q24H, Min Wyatt MD, 1 g at 03/29/20 0956    dextrose 10% (D10W) Bolus, 12.5 g, Intravenous, PRN, Min Wyatt MD    dextrose 10% (D10W) Bolus, 25 g, Intravenous, PRN, Min Wyatt MD    dorzolamide 2 % ophthalmic solution 1 drop, 1 drop, Both Eyes, BID, Min Wyatt MD, 1 drop at 03/29/20 1018    furosemide injection 40 mg, 40 mg, Intravenous, BID, Min Wyatt MD, 40 mg at 03/29/20 0957    glucagon (human recombinant) injection 1 mg, 1 mg, Intramuscular, PRN, Min Wyatt MD    glucagon (human recombinant) injection 1 mg, 1 mg, Intramuscular, PRN, Kendy SHERRI Landon MD    glucose chewable tablet 16 g, 16 g, Oral, PRN, Min Wyatt MD    glucose chewable tablet 16 g, 16 g, Oral, PRN, Kendy Landon MD    glucose chewable tablet 24 g, 24 g, Oral, PRN, Min Wyatt,  "MD    glucose chewable tablet 24 g, 24 g, Oral, PRN, Kendy Landon MD    insulin aspart U-100 pen 0-5 Units, 0-5 Units, Subcutaneous, QID (AC + HS) PRN, Kendy Landon MD, 2 Units at 03/28/20 1749    insulin detemir U-100 pen 6 Units, 6 Units, Subcutaneous, Daily, Min Wyatt MD, 6 Units at 03/29/20 1020    isosorbide mononitrate 24 hr tablet 60 mg, 60 mg, Oral, Daily, Min Wyatt MD, 60 mg at 03/29/20 0958    latanoprost 0.005 % ophthalmic solution 1 drop, 1 drop, Both Eyes, QHS, Min Wyatt MD, 1 drop at 03/28/20 2100    levothyroxine tablet 75 mcg, 75 mcg, Oral, Before breakfast, Min Wyatt MD, 75 mcg at 03/29/20 0533    metoprolol tartrate (LOPRESSOR) tablet 50 mg, 50 mg, Oral, BID, Min Wyatt MD, 50 mg at 03/29/20 0958    nitroGLYCERIN SL tablet 0.4 mg, 0.4 mg, Sublingual, Q5 Min PRN, Min Wyatt MD    ondansetron tablet 4 mg, 4 mg, Oral, Q12H PRN, Min Wyatt MD    polyethylene glycol packet 17 g, 17 g, Oral, Daily PRN, Kvng Perez MD, 17 g at 03/27/20 0201    polyethylene glycol packet 17 g, 17 g, Oral, Daily, Shivani Nelson MD, 17 g at 03/29/20 0958    senna tablet 8.6 mg, 8.6 mg, Oral, Daily PRN, Min Wyatt MD, 8.6 mg at 03/28/20 1750    senna tablet 8.6 mg, 8.6 mg, Oral, QHS, Shivani Nelson MD, 8.6 mg at 03/28/20 2012    sodium chloride 0.9% flush 10 mL, 10 mL, Intravenous, PRN, Claudio Baldwin MD    sodium chloride 0.9% flush 10 mL, 10 mL, Intravenous, PRN, Min Wyatt MD      Physical Exam:      Intake/Output Summary (Last 24 hours) at 3/29/2020 1414  Last data filed at 3/28/2020 1532  Gross per 24 hour   Intake --   Output 650 ml   Net -650 ml     Wt Readings from Last 3 Encounters:   03/28/20 72.4 kg (159 lb 9.8 oz)   02/04/20 72.6 kg (160 lb)   11/20/19 72.6 kg (160 lb)       BP (!) 144/67   Pulse 102   Temp 96.6 °F (35.9 °C)   Resp (!) 21   Ht 5' 8" (1.727 m)   Wt 72.4 kg (159 lb 9.8 oz)   LMP  (LMP Unknown)   SpO2 (!) 92%   Breastfeeding? No   BMI 24.27 kg/m² "     GEN: NAD, conversant, lethargic  Resp: increased work of breathing.   CV: no edema  GI: soft, NTND  Skin: no rash    Laboratory:  Lab Results   Component Value Date    [id number] Detected (A) 03/25/2020       Recent Labs   Lab 03/25/20  1036 03/26/20  0710 03/28/20  0500   WBC 15.31* 12.80* 8.78   LYMPH 4.1*  0.6* 6.2*  0.8* 11.2*  1.0   HGB 12.9 12.6 11.5*   HCT 38.7 38.8 34.8*    179 179     Recent Labs   Lab 03/25/20  1036  03/27/20  0851 03/28/20  0500 03/29/20  0444   *   < > 135* 133* 136   K 3.6   < > 3.6 3.7 3.6   CL 99   < > 98 99 101   CO2 22*   < > 28 23 25   BUN 21   < > 25 28 28   CREATININE 1.6*   < > 1.6* 1.5* 1.2   *   < > 145* 130* 158*   CALCIUM 8.5*   < > 8.8 8.3* 8.6*   MG 1.8   < > 2.1 2.0 1.9   PHOS 2.4*   < > 2.9 2.8 2.7   LIPASE 141*  --   --   --   --     < > = values in this interval not displayed.     Recent Labs   Lab 03/27/20  0851 03/28/20  0500 03/29/20  0444   ALKPHOS 99 103 122   ALT 20 20 21   AST 32 34 32   ALBUMIN 2.1* 1.9* 1.9*   PROT 6.1 5.6* 5.7*   BILITOT 0.8 0.6 0.7        Recent Labs     03/27/20  0851 03/28/20  0500   FERRITIN 420*  --    .7* 124.5*   *  --        All labs within the last 24 hours were reviewed.     Microbiology:  Microbiology Results (last 7 days)     Procedure Component Value Units Date/Time    Blood culture x two cultures. Draw prior to antibiotics. [[id number]] Collected:  03/25/20 1036    Order Status:  Completed Specimen:  Blood from Peripheral, Antecubital, Right Updated:  03/29/20 1212     Blood Culture, Routine No Growth to date      No Growth to date      No Growth to date      No Growth to date    Narrative:       Aerobic and anaerobic    Blood culture x two cultures. Draw prior to antibiotics. [[id number]] Collected:  03/25/20 1037    Order Status:  Completed Specimen:  Blood from Peripheral, Hand, Right Updated:  03/29/20 1212     Blood Culture, Routine No Growth to date      No Growth to date      No  Growth to date      No Growth to date    Narrative:       Aerobic and anaerobic    Culture, Respiratory with Gram Stain [[id number]]     Order Status:  No result Specimen:  Sputum, Expectorated     Influenza A & B by Molecular [[id number]] Collected:  03/25/20 1040    Order Status:  Completed Specimen:  Nasopharyngeal Swab Updated:  03/25/20 1143     Influenza A, Molecular Negative     Influenza B, Molecular Negative     Flu A & B Source Nasal swab            Imaging  ECG Results          EKG 12-lead (Final result)  Result time 03/25/20 15:08:57    Final result by Interface, Lab In [facility] (03/25/20 15:08:57)                 Narrative:    Test Reason : R00.0,    Vent. Rate : 112 BPM     Atrial Rate : 174 BPM     P-R Int : 000 ms          QRS Dur : 098 ms      QT Int : 318 ms       P-R-T Axes : 000 003 -28 degrees     QTc Int : 434 ms    Atrial fibrillation with rapid ventricular response  Nonspecific ST and/or T wave abnormalities  Abnormal ECG  When compared with ECG of 10-OCT-2019 10:30,  No significant change was found  Confirmed by Agatha Rosas MD ([id number]) on 3/25/2020 3:08:52 PM    Referred By: [email or web address]   SELF           Confirmed By:Agatha Rosas MD                              Results for orders placed during the hospital encounter of 07/17/19   Transthoracic echo (TTE) 2D with Color Flow    Narrative · Concentric left ventricular hypertrophy.  · Hyperdynamic left ventricular systolic function. The estimated ejection   fraction is >70%.  · Mildly reduced right ventricular systolic function.  · Severe batrial enlargement.  · Severe tricuspid regurgitation.  · The estimated PA systolic pressure is 56 mm Hg  · Elevated central venous pressure (15 mm Hg).          X-Ray Chest AP Portable  Narrative: EXAMINATION:  XR CHEST AP PORTABLE    CLINICAL HISTORY:  Sepsis;    TECHNIQUE:  Single frontal view of the chest was performed.    COMPARISON:  No 08/23/2019 ne    FINDINGS:  Cardiomegaly.  Ill-defined patchy airspace  consolidation  identified in the left upper lobe and at the lung bases.  No significant pleural effusion.  Impression: See above    Electronically signed by: Ino Carrion MD  Date:    03/25/2020  Time:    11:32      All imaging within the last 24 hours was reviewed.     Assessment and Plan:    Active Hospital Problems    Diagnosis  POA    *COVID-19 virus infection [J22, B97.29]  Yes    Community acquired pneumonia [J18.9]  Yes    H/O Deep vein thrombosis (DVT) [I82.409]  Yes     Chronic    CKD (chronic kidney disease) stage 4, GFR 15-29 ml/min [N18.4]  Yes     Chronic    Type 2 diabetes mellitus with diabetic polyneuropathy, with long-term current use of insulin [E11.42, Z79.4]  Not Applicable     Chronic      Resolved Hospital Problems   No resolved problems to display.       Suspected Covid-19 Virus Infection  Person under investigation (PUI) for COVID-19  - COVID-19 testing sent on 3/25/2020.  - Infection Control notified     - Isolation:   - Airborne and Droplet Precautions  - N95 masks must be fit tested, wear eye protection  - 20 second hand hygiene              - Limit visitors per hospital policy              - Consolidating lab draws, nursing care, and interventions     - Diagnostics: (leukopenia/lymphopenia, hyponatremia, hyperferritinemia, elevated troponin, elevated d-dimer, age, and comorbidities are significant predictors of poor clinical outcome)              - CBC:             trend Q48hrs  - CMP:             trend Q48hrs  - Procalcitonin:0.4  - D-dimer:        15.95; trend Q48hrs  - Ferritin:          383  - CRP:             150; trend Q48hrs  - LDH:              431  - BNP:              714  - Troponin:       0.023              - ECG:             AFib rhythm; nonspecific ST changes. QTc 434              - rapid Flu:       negative              - Legionella antigen:   pending              - Blood culture x2:       pending              - Sputum culture:        pending              -  CXR:             Bilateral infiltrates              - UA and culture:         pending                         - CPK               86     - Management:              - Bundle care as able to minimize in/out of room   - Supplemental O2 to maintain SpO2 >92%,   if requiring 6L NC or higher, place on nonrebreather and discuss case with MICU              - Telemetry & continuous Pulse Ox              - If wheezing   - albuterol INHALER PRN 4puff Q6hr approximates a nebulizer (avoid nebulization of secretions)  - ipratropium daily               - apap PRN fever              - Avoiding NIPPV to prevent aerosolization   (including home CPAP/BiPAP unless on a case-by-case basis and only in negative pressure room)              - Cautious use of NSAIDS for fever per WHO recommendations (3/16/2020)              - No new ACEi/ARB start or discontinuation of chronic med unless hypotensive (Esler et al. Journal of Hypertension 2020, 38:000-000)              - Careful use of steroids in the absence of other indications   - unless septic shock due to increased viral replication              - Fluid sparing resuscitation              - Empiric antibiotics per likely source & patient allergies                          - CAP: x 5 day course  Ceftriaxone 1g IV Q24hrs                                      Azithromycin 500mg IV day #1, then 250mg PO daily x4 days                                      If MRSA risk factors, add Vancomycin IV (PharmD consult)              - continue statin (if CPK WNL)              - hydroxychloroquine contraindicated in this patient with cardiomyopathy      Goals of care, counseling/discussion  - Discussed the typical clinical course of COVID19 with Tiana Boston, including the potential for acute decompensation requiring intubation and mechanical ventilation.  - Following this discussion, patient/POA requested code status of DNR/DNI, will update EMR and paper chart accordingly.     T2DM              -  detemir 6U QD              - LDSSI     HFpEF              - Signs of volume overload              -               - diurese with IV furosemide 40mg BID              - strict I/O; daily weights              - resume home furosemide 40mg BID when euvolemic     CKD 4              - avoid nephrotoxins              - renally dose all medications     AFib              - continue apixaban 2.5mg BID     VTE High Risk Prophylaxis: apixaban     Min Wyatt MD  PGY-2  Internal Medicine

## 2020-03-30 LAB
ALBUMIN SERPL BCP-MCNC: 1.9 G/DL (ref 3.5–5.2)
ALP SERPL-CCNC: 122 U/L (ref 55–135)
ALT SERPL W/O P-5'-P-CCNC: 19 U/L (ref 10–44)
ANION GAP SERPL CALC-SCNC: 8 MMOL/L (ref 8–16)
AST SERPL-CCNC: 31 U/L (ref 10–40)
BACTERIA BLD CULT: NORMAL
BACTERIA BLD CULT: NORMAL
BASOPHILS # BLD AUTO: 0.06 K/UL (ref 0–0.2)
BASOPHILS NFR BLD: 0.6 % (ref 0–1.9)
BILIRUB SERPL-MCNC: 0.7 MG/DL (ref 0.1–1)
BUN SERPL-MCNC: 27 MG/DL (ref 10–30)
CALCIUM SERPL-MCNC: 8.6 MG/DL (ref 8.7–10.5)
CHLORIDE SERPL-SCNC: 100 MMOL/L (ref 95–110)
CO2 SERPL-SCNC: 29 MMOL/L (ref 23–29)
CREAT SERPL-MCNC: 1.4 MG/DL (ref 0.5–1.4)
CRP SERPL-MCNC: 138.1 MG/L (ref 0–8.2)
DIFFERENTIAL METHOD: ABNORMAL
EOSINOPHIL # BLD AUTO: 0.2 K/UL (ref 0–0.5)
EOSINOPHIL NFR BLD: 1.8 % (ref 0–8)
ERYTHROCYTE [DISTWIDTH] IN BLOOD BY AUTOMATED COUNT: 15.2 % (ref 11.5–14.5)
EST. GFR  (AFRICAN AMERICAN): 36.8 ML/MIN/1.73 M^2
EST. GFR  (NON AFRICAN AMERICAN): 32 ML/MIN/1.73 M^2
GLUCOSE SERPL-MCNC: 81 MG/DL (ref 70–110)
HCT VFR BLD AUTO: 37.3 % (ref 37–48.5)
HGB BLD-MCNC: 12.1 G/DL (ref 12–16)
IMM GRANULOCYTES # BLD AUTO: 0.19 K/UL (ref 0–0.04)
IMM GRANULOCYTES NFR BLD AUTO: 1.8 % (ref 0–0.5)
L PNEUMO AG UR QL IA: NOT DETECTED
LYMPHOCYTES # BLD AUTO: 1 K/UL (ref 1–4.8)
LYMPHOCYTES NFR BLD: 9.3 % (ref 18–48)
MAGNESIUM SERPL-MCNC: 2 MG/DL (ref 1.6–2.6)
MCH RBC QN AUTO: 29 PG (ref 27–31)
MCHC RBC AUTO-ENTMCNC: 32.4 G/DL (ref 32–36)
MCV RBC AUTO: 89 FL (ref 82–98)
MONOCYTES # BLD AUTO: 1.5 K/UL (ref 0.3–1)
MONOCYTES NFR BLD: 14.2 % (ref 4–15)
NEUTROPHILS # BLD AUTO: 7.5 K/UL (ref 1.8–7.7)
NEUTROPHILS NFR BLD: 72.3 % (ref 38–73)
NRBC BLD-RTO: 0 /100 WBC
PHOSPHATE SERPL-MCNC: 2.9 MG/DL (ref 2.7–4.5)
PLATELET # BLD AUTO: 223 K/UL (ref 150–350)
PMV BLD AUTO: 12.2 FL (ref 9.2–12.9)
POCT GLUCOSE: 127 MG/DL (ref 70–110)
POCT GLUCOSE: 147 MG/DL (ref 70–110)
POCT GLUCOSE: 164 MG/DL (ref 70–110)
POCT GLUCOSE: 87 MG/DL (ref 70–110)
POTASSIUM SERPL-SCNC: 3.6 MMOL/L (ref 3.5–5.1)
PROT SERPL-MCNC: 6.1 G/DL (ref 6–8.4)
RBC # BLD AUTO: 4.17 M/UL (ref 4–5.4)
SODIUM SERPL-SCNC: 137 MMOL/L (ref 136–145)
WBC # BLD AUTO: 10.38 K/UL (ref 3.9–12.7)

## 2020-03-30 PROCEDURE — 83735 ASSAY OF MAGNESIUM: CPT | Mod: HCNC

## 2020-03-30 PROCEDURE — 25000003 PHARM REV CODE 250: Mod: HCNC | Performed by: STUDENT IN AN ORGANIZED HEALTH CARE EDUCATION/TRAINING PROGRAM

## 2020-03-30 PROCEDURE — 25000003 PHARM REV CODE 250: Mod: HCNC | Performed by: INTERNAL MEDICINE

## 2020-03-30 PROCEDURE — 11000001 HC ACUTE MED/SURG PRIVATE ROOM: Mod: HCNC

## 2020-03-30 PROCEDURE — 63600175 PHARM REV CODE 636 W HCPCS: Mod: HCNC | Performed by: STUDENT IN AN ORGANIZED HEALTH CARE EDUCATION/TRAINING PROGRAM

## 2020-03-30 PROCEDURE — 80053 COMPREHEN METABOLIC PANEL: CPT | Mod: HCNC

## 2020-03-30 PROCEDURE — 99232 SBSQ HOSP IP/OBS MODERATE 35: CPT | Mod: HCNC,GC,, | Performed by: INTERNAL MEDICINE

## 2020-03-30 PROCEDURE — 85025 COMPLETE CBC W/AUTO DIFF WBC: CPT | Mod: HCNC

## 2020-03-30 PROCEDURE — 99232 PR SUBSEQUENT HOSPITAL CARE,LEVL II: ICD-10-PCS | Mod: HCNC,GC,, | Performed by: INTERNAL MEDICINE

## 2020-03-30 PROCEDURE — 36415 COLL VENOUS BLD VENIPUNCTURE: CPT | Mod: HCNC

## 2020-03-30 PROCEDURE — 86140 C-REACTIVE PROTEIN: CPT | Mod: HCNC

## 2020-03-30 PROCEDURE — 84100 ASSAY OF PHOSPHORUS: CPT | Mod: HCNC

## 2020-03-30 RX ORDER — ACETAMINOPHEN 325 MG/1
650 TABLET ORAL EVERY 6 HOURS PRN
Status: DISCONTINUED | OUTPATIENT
Start: 2020-03-30 | End: 2020-04-05 | Stop reason: HOSPADM

## 2020-03-30 RX ADMIN — ATORVASTATIN CALCIUM 40 MG: 20 TABLET, FILM COATED ORAL at 09:03

## 2020-03-30 RX ADMIN — SENNOSIDES 8.6 MG: 8.6 TABLET, FILM COATED ORAL at 02:03

## 2020-03-30 RX ADMIN — AMLODIPINE BESYLATE 5 MG: 5 TABLET ORAL at 09:03

## 2020-03-30 RX ADMIN — INSULIN DETEMIR 6 UNITS: 100 INJECTION, SOLUTION SUBCUTANEOUS at 09:03

## 2020-03-30 RX ADMIN — CEFTRIAXONE 1 G: 1 INJECTION, POWDER, FOR SOLUTION INTRAMUSCULAR; INTRAVENOUS at 09:03

## 2020-03-30 RX ADMIN — APIXABAN 2.5 MG: 2.5 TABLET, FILM COATED ORAL at 09:03

## 2020-03-30 RX ADMIN — METOPROLOL TARTRATE 50 MG: 50 TABLET, FILM COATED ORAL at 08:03

## 2020-03-30 RX ADMIN — METOPROLOL TARTRATE 50 MG: 50 TABLET, FILM COATED ORAL at 09:03

## 2020-03-30 RX ADMIN — LEVOTHYROXINE SODIUM 75 MCG: 75 TABLET ORAL at 05:03

## 2020-03-30 RX ADMIN — FUROSEMIDE 40 MG: 10 INJECTION, SOLUTION INTRAMUSCULAR; INTRAVENOUS at 09:03

## 2020-03-30 RX ADMIN — FUROSEMIDE 40 MG: 10 INJECTION, SOLUTION INTRAMUSCULAR; INTRAVENOUS at 05:03

## 2020-03-30 RX ADMIN — SENNOSIDES 8.6 MG: 8.6 TABLET, FILM COATED ORAL at 08:03

## 2020-03-30 RX ADMIN — DORZOLAMIDE HYDROCHLORIDE 1 DROP: 20 SOLUTION/ DROPS OPHTHALMIC at 09:03

## 2020-03-30 RX ADMIN — LATANOPROST 1 DROP: 50 SOLUTION OPHTHALMIC at 09:03

## 2020-03-30 RX ADMIN — ISOSORBIDE MONONITRATE 60 MG: 60 TABLET, EXTENDED RELEASE ORAL at 09:03

## 2020-03-30 RX ADMIN — APIXABAN 2.5 MG: 2.5 TABLET, FILM COATED ORAL at 08:03

## 2020-03-31 LAB
ALBUMIN SERPL BCP-MCNC: 1.8 G/DL (ref 3.5–5.2)
ALP SERPL-CCNC: 122 U/L (ref 55–135)
ALT SERPL W/O P-5'-P-CCNC: 22 U/L (ref 10–44)
ANION GAP SERPL CALC-SCNC: 9 MMOL/L (ref 8–16)
AST SERPL-CCNC: 35 U/L (ref 10–40)
BILIRUB SERPL-MCNC: 0.7 MG/DL (ref 0.1–1)
BUN SERPL-MCNC: 33 MG/DL (ref 10–30)
CALCIUM SERPL-MCNC: 8.5 MG/DL (ref 8.7–10.5)
CHLORIDE SERPL-SCNC: 101 MMOL/L (ref 95–110)
CO2 SERPL-SCNC: 28 MMOL/L (ref 23–29)
CREAT SERPL-MCNC: 1.5 MG/DL (ref 0.5–1.4)
D DIMER PPP IA.FEU-MCNC: >33 MG/L FEU
EST. GFR  (AFRICAN AMERICAN): 33.9 ML/MIN/1.73 M^2
EST. GFR  (NON AFRICAN AMERICAN): 29.4 ML/MIN/1.73 M^2
FERRITIN SERPL-MCNC: 657 NG/ML (ref 20–300)
GLUCOSE SERPL-MCNC: 112 MG/DL (ref 70–110)
MAGNESIUM SERPL-MCNC: 2.1 MG/DL (ref 1.6–2.6)
PHOSPHATE SERPL-MCNC: 3.9 MG/DL (ref 2.7–4.5)
POCT GLUCOSE: 114 MG/DL (ref 70–110)
POCT GLUCOSE: 176 MG/DL (ref 70–110)
POCT GLUCOSE: 183 MG/DL (ref 70–110)
POCT GLUCOSE: 247 MG/DL (ref 70–110)
POTASSIUM SERPL-SCNC: 3.9 MMOL/L (ref 3.5–5.1)
PROT SERPL-MCNC: 6 G/DL (ref 6–8.4)
SODIUM SERPL-SCNC: 138 MMOL/L (ref 136–145)

## 2020-03-31 PROCEDURE — 80053 COMPREHEN METABOLIC PANEL: CPT | Mod: HCNC

## 2020-03-31 PROCEDURE — 36415 COLL VENOUS BLD VENIPUNCTURE: CPT | Mod: HCNC

## 2020-03-31 PROCEDURE — 82728 ASSAY OF FERRITIN: CPT | Mod: HCNC

## 2020-03-31 PROCEDURE — 85379 FIBRIN DEGRADATION QUANT: CPT | Mod: HCNC

## 2020-03-31 PROCEDURE — 84100 ASSAY OF PHOSPHORUS: CPT | Mod: HCNC

## 2020-03-31 PROCEDURE — 63600175 PHARM REV CODE 636 W HCPCS: Mod: HCNC | Performed by: STUDENT IN AN ORGANIZED HEALTH CARE EDUCATION/TRAINING PROGRAM

## 2020-03-31 PROCEDURE — 25000003 PHARM REV CODE 250: Mod: HCNC | Performed by: INTERNAL MEDICINE

## 2020-03-31 PROCEDURE — 83735 ASSAY OF MAGNESIUM: CPT | Mod: HCNC

## 2020-03-31 PROCEDURE — 25000003 PHARM REV CODE 250: Mod: HCNC | Performed by: STUDENT IN AN ORGANIZED HEALTH CARE EDUCATION/TRAINING PROGRAM

## 2020-03-31 PROCEDURE — 99232 SBSQ HOSP IP/OBS MODERATE 35: CPT | Mod: HCNC,GC,, | Performed by: INTERNAL MEDICINE

## 2020-03-31 PROCEDURE — 11000001 HC ACUTE MED/SURG PRIVATE ROOM: Mod: HCNC

## 2020-03-31 PROCEDURE — 99232 PR SUBSEQUENT HOSPITAL CARE,LEVL II: ICD-10-PCS | Mod: HCNC,GC,, | Performed by: INTERNAL MEDICINE

## 2020-03-31 RX ORDER — GLYCERIN 1 G/1
1 SUPPOSITORY RECTAL ONCE
Status: DISCONTINUED | OUTPATIENT
Start: 2020-03-31 | End: 2020-04-02

## 2020-03-31 RX ORDER — POLYETHYLENE GLYCOL 3350 17 G/17G
17 POWDER, FOR SOLUTION ORAL 2 TIMES DAILY
Status: DISCONTINUED | OUTPATIENT
Start: 2020-03-31 | End: 2020-04-05 | Stop reason: HOSPADM

## 2020-03-31 RX ADMIN — AMLODIPINE BESYLATE 5 MG: 5 TABLET ORAL at 09:03

## 2020-03-31 RX ADMIN — METOPROLOL TARTRATE 50 MG: 50 TABLET, FILM COATED ORAL at 08:03

## 2020-03-31 RX ADMIN — CEFTRIAXONE 1 G: 1 INJECTION, POWDER, FOR SOLUTION INTRAMUSCULAR; INTRAVENOUS at 09:03

## 2020-03-31 RX ADMIN — SENNOSIDES 8.6 MG: 8.6 TABLET, FILM COATED ORAL at 08:03

## 2020-03-31 RX ADMIN — LATANOPROST 1 DROP: 50 SOLUTION OPHTHALMIC at 09:03

## 2020-03-31 RX ADMIN — APIXABAN 2.5 MG: 2.5 TABLET, FILM COATED ORAL at 08:03

## 2020-03-31 RX ADMIN — ACETAMINOPHEN 650 MG: 325 TABLET ORAL at 01:03

## 2020-03-31 RX ADMIN — METOPROLOL TARTRATE 50 MG: 50 TABLET, FILM COATED ORAL at 09:03

## 2020-03-31 RX ADMIN — ATORVASTATIN CALCIUM 40 MG: 20 TABLET, FILM COATED ORAL at 09:03

## 2020-03-31 RX ADMIN — POLYETHYLENE GLYCOL 3350 17 G: 17 POWDER, FOR SOLUTION ORAL at 08:03

## 2020-03-31 RX ADMIN — INSULIN DETEMIR 6 UNITS: 100 INJECTION, SOLUTION SUBCUTANEOUS at 09:03

## 2020-03-31 RX ADMIN — POLYETHYLENE GLYCOL 3350 17 G: 17 POWDER, FOR SOLUTION ORAL at 09:03

## 2020-03-31 RX ADMIN — DORZOLAMIDE HYDROCHLORIDE 1 DROP: 20 SOLUTION/ DROPS OPHTHALMIC at 09:03

## 2020-03-31 RX ADMIN — ISOSORBIDE MONONITRATE 60 MG: 60 TABLET, EXTENDED RELEASE ORAL at 09:03

## 2020-03-31 RX ADMIN — INSULIN ASPART 1 UNITS: 100 INJECTION, SOLUTION INTRAVENOUS; SUBCUTANEOUS at 08:03

## 2020-03-31 RX ADMIN — APIXABAN 2.5 MG: 2.5 TABLET, FILM COATED ORAL at 09:03

## 2020-03-31 NOTE — PLAN OF CARE
Patient's VSS, AAOx4, COVID protocols maintained throughout shift. Initiated HR monitor with continuous O2. Blood glucose monitored and maintained throughout shift. Patient tolerated 5L NC with no decrease in O2 during shift. Continue to monitor patient

## 2020-03-31 NOTE — PLAN OF CARE
Pt is not medically ready at this time - pt currently on 5L o2 NC. Pt may need to d/c to home on home o2.     03/31/20 1228   Discharge Reassessment   Assessment Type Discharge Planning Reassessment   Discharge Plan A Home with family;Home Health   Discharge Plan B Skilled Nursing Facility   Post-Acute Status   Post-Acute Authorization Other   Other Status No Post-Acute Service Needs   Discharge Delays None known at this time

## 2020-03-31 NOTE — PROGRESS NOTES
Hospital Medicine  Progress Note  [facility]      Patient Name: Tiana Mead  MRN:  [id number]  Hospital Medicine Team: [facility] Moni Montilla MD  Date of Admission:  3/25/2020     Length of Stay:  LOS: 5 days       Principal Problem:  COVID-19 virus infection      Hospital Course:  Patient admitted for evaluation of possible COVID-19.    Interval History: NAEO. Patient reports some symptomatic improvement. Patient reports that she is tolerating 5L NC well.        Review of Systems:  Respiratory: Positive for cough and SOB  Cardiovascular: Negative for chest pain, palpitations, and LE edema   GI: Negative for N/V, abd pain, and diarrhea     Inpatient Medications:    Current Facility-Administered Medications:     acetaminophen tablet 650 mg, 650 mg, Oral, Q6H PRN, Shivani Nelson MD    albuterol inhaler 2 puff, 2 puff, Inhalation, Q4H PRN, Min Wyatt MD    amLODIPine tablet 5 mg, 5 mg, Oral, Daily, Min Wyatt MD, 5 mg at 03/30/20 0915    apixaban tablet 2.5 mg, 2.5 mg, Oral, BID, Min Wyatt MD, 2.5 mg at 03/30/20 2058    atorvastatin tablet 40 mg, 40 mg, Oral, Daily, Min Wyatt MD, 40 mg at 03/30/20 0915    cefTRIAXone injection 1 g, 1 g, Intravenous, Q24H, Min Wyatt MD, 1 g at 03/30/20 0915    dextrose 10% (D10W) Bolus, 12.5 g, Intravenous, PRN, Min Wyatt MD    dextrose 10% (D10W) Bolus, 25 g, Intravenous, PRN, Min Wyatt MD    dorzolamide 2 % ophthalmic solution 1 drop, 1 drop, Both Eyes, BID, Min Wyatt MD, 1 drop at 03/30/20 2100    furosemide injection 40 mg, 40 mg, Intravenous, BID, Min Wyatt MD, 40 mg at 03/30/20 1747    glucagon (human recombinant) injection 1 mg, 1 mg, Intramuscular, PRN, Min Wyatt MD    glucagon (human recombinant) injection 1 mg, 1 mg, Intramuscular, PRN, Kendy Landon MD    glucose chewable tablet 16 g, 16 g, Oral, PRN, Min Wyatt MD    glucose chewable tablet 16 g, 16 g, Oral, PRN, Kendy Landon MD    glucose chewable  "tablet 24 g, 24 g, Oral, PRN, Min Wyatt MD    glucose chewable tablet 24 g, 24 g, Oral, PRN, Kendy Landon MD    insulin aspart U-100 pen 0-5 Units, 0-5 Units, Subcutaneous, QID (AC + HS) PRN, Kendy Landon MD, 2 Units at 03/28/20 1749    insulin detemir U-100 pen 6 Units, 6 Units, Subcutaneous, Daily, Min Wyatt MD, 6 Units at 03/30/20 0916    isosorbide mononitrate 24 hr tablet 60 mg, 60 mg, Oral, Daily, Min Wyatt MD, 60 mg at 03/30/20 0915    latanoprost 0.005 % ophthalmic solution 1 drop, 1 drop, Both Eyes, QHS, Min Wyatt MD, 1 drop at 03/30/20 2100    levothyroxine tablet 75 mcg, 75 mcg, Oral, Before breakfast, Min Wyatt MD, 75 mcg at 03/30/20 0530    metoprolol tartrate (LOPRESSOR) tablet 50 mg, 50 mg, Oral, BID, Min Wyatt MD, 50 mg at 03/30/20 2058    nitroGLYCERIN SL tablet 0.4 mg, 0.4 mg, Sublingual, Q5 Min PRN, Min Wyatt MD    ondansetron tablet 4 mg, 4 mg, Oral, Q12H PRN, Min Wyatt MD    polyethylene glycol packet 17 g, 17 g, Oral, Daily PRN, Kvng Perez MD, 17 g at 03/27/20 0201    polyethylene glycol packet 17 g, 17 g, Oral, Daily, Shivani Nelson MD, 17 g at 03/29/20 0958    senna tablet 8.6 mg, 8.6 mg, Oral, Daily PRN, Min Wyatt MD, 8.6 mg at 03/30/20 1410    senna tablet 8.6 mg, 8.6 mg, Oral, QHS, Shivani Nelson MD, 8.6 mg at 03/30/20 2059    sodium chloride 0.9% flush 10 mL, 10 mL, Intravenous, PRN, Claudio Baldwin MD    sodium chloride 0.9% flush 10 mL, 10 mL, Intravenous, PRN, Min Wyatt MD      Physical Exam:      Intake/Output Summary (Last 24 hours) at 3/30/2020 2246  Last data filed at 3/30/2020 1200  Gross per 24 hour   Intake 440 ml   Output 1350 ml   Net -910 ml     Wt Readings from Last 3 Encounters:   03/28/20 72.4 kg (159 lb 9.8 oz)   02/04/20 72.6 kg (160 lb)   11/20/19 72.6 kg (160 lb)       /78 (BP Location: Left arm, Patient Position: Lying)   Pulse 110   Temp (!) 100.4 °F (38 °C) (Oral)   Resp 20   Ht 5' 8" (1.727 m)   Wt 72.4 kg " (159 lb 9.8 oz)   LMP  (LMP Unknown)   SpO2 97%   Breastfeeding? No   BMI 24.27 kg/m²     GEN: NAD, conversant  Resp: coarse bilateral breath sounds, no wheezes or rales, normal work of breathing   CV: RRR, no m/r/g, no edema  GI: soft, NTND  Skin: no rash    Laboratory:  Lab Results   Component Value Date    [id number] Detected (A) 03/25/2020       Recent Labs   Lab 03/26/20  0710 03/28/20  0500 03/30/20  0442   WBC 12.80* 8.78 10.38   LYMPH 6.2*  0.8* 11.2*  1.0 9.3*  1.0   HGB 12.6 11.5* 12.1   HCT 38.8 34.8* 37.3    179 223     Recent Labs   Lab 03/25/20  1036  03/28/20  0500 03/29/20  0444 03/30/20 0442   *   < > 133* 136 137   K 3.6   < > 3.7 3.6 3.6   CL 99   < > 99 101 100   CO2 22*   < > 23 25 29   BUN 21   < > 28 28 27   CREATININE 1.6*   < > 1.5* 1.2 1.4   *   < > 130* 158* 81   CALCIUM 8.5*   < > 8.3* 8.6* 8.6*   MG 1.8   < > 2.0 1.9 2.0   PHOS 2.4*   < > 2.8 2.7 2.9   LIPASE 141*  --   --   --   --     < > = values in this interval not displayed.     Recent Labs   Lab 03/28/20  0500 03/29/20 0444 03/30/20  0442   ALKPHOS 103 122 122   ALT 20 21 19   AST 34 32 31   ALBUMIN 1.9* 1.9* 1.9*   PROT 5.6* 5.7* 6.1   BILITOT 0.6 0.7 0.7        Recent Labs     03/28/20  0500 03/30/20  0442   .5* 138.1*       All labs within the last 24 hours were reviewed.     Microbiology:  Microbiology Results (last 7 days)     Procedure Component Value Units Date/Time    Blood culture x two cultures. Draw prior to antibiotics. [[id number]] Collected:  03/25/20 1036    Order Status:  Completed Specimen:  Blood from Peripheral, Antecubital, Right Updated:  03/30/20 1212     Blood Culture, Routine No Growth after 4 days.     Narrative:       Aerobic and anaerobic    Blood culture x two cultures. Draw prior to antibiotics. [[id number]] Collected:  03/25/20 1037    Order Status:  Completed Specimen:  Blood from Peripheral, Hand, Right Updated:  03/30/20 1212     Blood Culture, Routine No Growth  after 4 days.     Narrative:       Aerobic and anaerobic    Culture, Respiratory with Gram Stain [[id number]]     Order Status:  No result Specimen:  Sputum, Expectorated     Influenza A & B by Molecular [[id number]] Collected:  03/25/20 1040    Order Status:  Completed Specimen:  Nasopharyngeal Swab Updated:  03/25/20 1143     Influenza A, Molecular Negative     Influenza B, Molecular Negative     Flu A & B Source Nasal swab            Imaging  ECG Results          EKG 12-lead (Final result)  Result time 03/25/20 15:08:57    Final result by Interface, Lab In [facility] (03/25/20 15:08:57)                 Narrative:    Test Reason : R00.0,    Vent. Rate : 112 BPM     Atrial Rate : 174 BPM     P-R Int : 000 ms          QRS Dur : 098 ms      QT Int : 318 ms       P-R-T Axes : 000 003 -28 degrees     QTc Int : 434 ms    Atrial fibrillation with rapid ventricular response  Nonspecific ST and/or T wave abnormalities  Abnormal ECG  When compared with ECG of 10-OCT-2019 10:30,  No significant change was found  Confirmed by Agatha Rosas MD ([id number]) on 3/25/2020 3:08:52 PM    Referred By: [email or web address]   SELF           Confirmed By:Agatha Rosas MD                              Results for orders placed during the hospital encounter of 07/17/19   Transthoracic echo (TTE) 2D with Color Flow    Narrative · Concentric left ventricular hypertrophy.  · Hyperdynamic left ventricular systolic function. The estimated ejection   fraction is >70%.  · Mildly reduced right ventricular systolic function.  · Severe batrial enlargement.  · Severe tricuspid regurgitation.  · The estimated PA systolic pressure is 56 mm Hg  · Elevated central venous pressure (15 mm Hg).          X-Ray Chest AP Portable  Narrative: EXAMINATION:  XR CHEST AP PORTABLE    CLINICAL HISTORY:  Sepsis;    TECHNIQUE:  Single frontal view of the chest was performed.    COMPARISON:  No 08/23/2019 ne    FINDINGS:  Cardiomegaly.  Ill-defined patchy airspace consolidation  identified  in the left upper lobe and at the lung bases.  No significant pleural effusion.  Impression: See above    Electronically signed by: Ino Carrion MD  Date:    03/25/2020  Time:    11:32      All imaging within the last 24 hours was reviewed.     Assessment and Plan:    Active Hospital Problems    Diagnosis  POA    *COVID-19 virus infection [J22, B97.29]  Yes    Community acquired pneumonia [J18.9]  Yes    H/O Deep vein thrombosis (DVT) [I82.409]  Yes     Chronic    CKD (chronic kidney disease) stage 4, GFR 15-29 ml/min [N18.4]  Yes     Chronic    Type 2 diabetes mellitus with diabetic polyneuropathy, with long-term current use of insulin [E11.42, Z79.4]  Not Applicable     Chronic      Resolved Hospital Problems   No resolved problems to display.       Suspected Covid-19 Virus Infection  Person Under Investigation (PUI) for COVID-19  - COVID-19 testing: Collection Date: 3/25/2020 Collection Time:   2:37 PM  - Infection Control notified    - Isolation:   - Airborne and Droplet Precautions  - Surgical mask on patient   - N95 masks must be fit tested, wear eye protection  - 20 second hand hygiene   - Limit visitors per hospital policy   - Consolidate lab draws, nursing care, and interventions    - Diagnostics: (Lymphopenia, hyponatremia, hyperferritinemia, elevated troponin, elevated d-dimer, age, and comorbidities are significant predictors of poor clinical outcome)   - CBC:   trend Q48hrs  - CMP:        trend Q48hrs  - Procalcitonin:  - D-dimer:  repeat prior to discharge  - Ferritin:  repeat prior to discharge   - CRP:        trend Q48hrs  - LDH:   repeat prior to discharge  - BNP:  - Troponin:    - ECG:   - rapid Flu:   - RIP only if BMT/solid transplant:   - Legionella antigen:   - Blood culture x2:   - Sputum culture:   - CXR:   - UA and culture:   - CPK:    - Management:   Bundle care as able to maintain isolation & minimize in/out of room   - Supplemental O2 to maintain SpO2 92%-96%   if requiring 6L NC or  higher, place on nonrebreather and discuss case with MICU   - Telemetry & continuous pulse oximetry    - If wheezing   - albuterol inhaler 2-4 puff Q6hr PRN    - ipratropium daily    - acetaminophen 650mg PO Q6hr PRN fever   - loperamide PRN for viral diarrhea  - Empiric antibiotics per likely source & patient allergies    - CAP: x 5 day course (d/c early if low concern for bacterial co-infection)  Ceftriaxone 1g IV Q24hrs            Azithromycin 500mg IV day #1, then 250mg PO daily x4 days     If azithromycin is not available, start doxycycline                 If MRSA risk factors, add Vancomycin IV (PharmD consult)   - Investigational Treatment Protocol: (if patient meets criteria)   [email or web address]     - statin: atorvastatin 40mg po daily (if CPK WNL)    - start HCQ 400mg PO BID x1 day, then 400mg PO daily x 4 days   (check glucose 6 phosphate dehydrogenase (NOT G6PD Quant), ECG, and start Qshift POCT glucose)    Safety notes:   - Avoid NIPPV (CPAP/BiPAP) to prevent aerosolization, use on a case-by-case basis if in neg pressure room   - Cautious use of NSAIDS for fever per WHO recommendations (3/16/2020)   - No new ACEi/ARB start or discontinuation of chronic med unless hypotensive (Esler et al. Journal of Hypertension 2020, 38:000-000)   - Careful use of steroids in the absence of other indications (shock, ARDS)   - Fluid sparing resuscitation, avoid maintenance fluids           Advance Care Planning  Goals of care, counseling/discussion  - Discussed the typical clinical course of COVID19 with Tiana Boston, including the potential for acute decompensation requiring intubation and mechanical ventilation.  - Following this discussion, patient/POA requested code status of DNR/DNI, will update EMR and paper chart accordingly.     T2DM              - detemir 6U QD              - LDSSI     HFpEF              - Signs  of volume overload              -               - strict I/O; daily weights   - will hold IV lasix 2/2 increasing Cr and Bicarb              - resume home furosemide 40mg BID when euvolemic     CKD 4              - avoid nephrotoxins              - renally dose all medications     AFib              - continue apixaban 2.5mg BID     VTE High Risk Prophylaxis: apixaban    Moni Montilla MD/MS  [facility]   Internal Medicine, PGY-2

## 2020-03-31 NOTE — PROGRESS NOTES
Hospital Medicine  Progress Note  [facility]      Patient Name: Tiana Mead  MRN:  [id number]  Hospital Medicine Team: [facility] Moni Montilla MD  Date of Admission:  3/25/2020     Length of Stay:  LOS: 6 days       Principal Problem:  COVID-19 virus infection      Hospital Course:  Patient admitted for evaluation of possible COVID-19.    Interval History: Patient was febrile overnight. Patient without any complaints in the morning. Patient reports some symptomatic improvement. Patient reports that she is tolerating 5L NC well.        Review of Systems:  Respiratory: Positive for cough and SOB  Cardiovascular: Negative for chest pain, palpitations, and LE edema   GI: Negative for N/V, abd pain, and diarrhea     Inpatient Medications:    Current Facility-Administered Medications:     acetaminophen tablet 650 mg, 650 mg, Oral, Q6H PRN, Shivani Nelson MD, 650 mg at 03/31/20 0142    albuterol inhaler 2 puff, 2 puff, Inhalation, Q4H PRN, Min Wyatt MD    amLODIPine tablet 5 mg, 5 mg, Oral, Daily, Min Wyatt MD, 5 mg at 03/31/20 0908    apixaban tablet 2.5 mg, 2.5 mg, Oral, BID, Min Wyatt MD, 2.5 mg at 03/31/20 0908    atorvastatin tablet 40 mg, 40 mg, Oral, Daily, Min Wyatt MD, 40 mg at 03/31/20 0908    dextrose 10% (D10W) Bolus, 12.5 g, Intravenous, PRN, Min Wyatt MD    dextrose 10% (D10W) Bolus, 25 g, Intravenous, PRN, Min Wyatt MD    dorzolamide 2 % ophthalmic solution 1 drop, 1 drop, Both Eyes, BID, Min Wyatt MD, 1 drop at 03/31/20 0900    glucagon (human recombinant) injection 1 mg, 1 mg, Intramuscular, PRN, Min Wyatt MD    glucagon (human recombinant) injection 1 mg, 1 mg, Intramuscular, PRN, Kendy Landon MD    glucose chewable tablet 16 g, 16 g, Oral, PRN, Min Wyatt MD    glucose chewable tablet 16 g, 16 g, Oral, PRN, Kendy SHERRI Landon MD    glucose chewable tablet 24 g, 24 g, Oral, PRN, Min Wyatt MD    glucose chewable tablet 24 g, 24 g, Oral,  "PRN, Kendy Landon MD    glycerin adult suppository 1 suppository, 1 suppository, Rectal, Once, Moni Montilla MD    insulin aspart U-100 pen 0-5 Units, 0-5 Units, Subcutaneous, QID (AC + HS) PRN, Kendy Landon MD, 2 Units at 03/28/20 1749    insulin detemir U-100 pen 6 Units, 6 Units, Subcutaneous, Daily, Min Wyatt MD, 6 Units at 03/31/20 0900    isosorbide mononitrate 24 hr tablet 60 mg, 60 mg, Oral, Daily, Min Wyatt MD, 60 mg at 03/31/20 0910    latanoprost 0.005 % ophthalmic solution 1 drop, 1 drop, Both Eyes, QHS, Min Wyatt MD, 1 drop at 03/30/20 2100    levothyroxine tablet 75 mcg, 75 mcg, Oral, Before breakfast, Min Wyatt MD, 75 mcg at 03/30/20 0530    metoprolol tartrate (LOPRESSOR) tablet 50 mg, 50 mg, Oral, BID, Min Wyatt MD, 50 mg at 03/31/20 0910    nitroGLYCERIN SL tablet 0.4 mg, 0.4 mg, Sublingual, Q5 Min PRN, Min Wyatt MD    ondansetron tablet 4 mg, 4 mg, Oral, Q12H PRN, Min Wyatt MD    polyethylene glycol packet 17 g, 17 g, Oral, Daily PRN, Kvng Perez MD, 17 g at 03/27/20 0201    polyethylene glycol packet 17 g, 17 g, Oral, BID, Moni Montilla MD    senna tablet 8.6 mg, 8.6 mg, Oral, Daily PRN, Min Wyatt MD, 8.6 mg at 03/30/20 1410    senna tablet 8.6 mg, 8.6 mg, Oral, QHS, Shivani Nelson MD, 8.6 mg at 03/30/20 2059    sodium chloride 0.9% flush 10 mL, 10 mL, Intravenous, PRN, Claudio Baldwin MD    sodium chloride 0.9% flush 10 mL, 10 mL, Intravenous, PRN, iMn Wyatt MD      Physical Exam:      Intake/Output Summary (Last 24 hours) at 3/31/2020 1900  Last data filed at 3/31/2020 1700  Gross per 24 hour   Intake 510 ml   Output 900 ml   Net -390 ml     Wt Readings from Last 3 Encounters:   03/28/20 72.4 kg (159 lb 9.8 oz)   02/04/20 72.6 kg (160 lb)   11/20/19 72.6 kg (160 lb)       /69 (Patient Position: Lying)   Pulse 100   Temp 98.7 °F (37.1 °C) (Oral)   Resp 18   Ht 5' 8" (1.727 m)   Wt 72.4 kg (159 lb 9.8 oz)   LMP  (LMP Unknown)  "  SpO2 95%   Breastfeeding? No   BMI 24.27 kg/m²     GEN: NAD, conversant  Resp: coarse bilateral breath sounds, no wheezes or rales, normal work of breathing   CV: RRR, no m/r/g, no edema  GI: soft, NTND  Skin: no rash    Laboratory:  Lab Results   Component Value Date    [id number] Detected (A) 03/25/2020       Recent Labs   Lab 03/26/20  0710 03/28/20  0500 03/30/20 0442   WBC 12.80* 8.78 10.38   LYMPH 6.2*  0.8* 11.2*  1.0 9.3*  1.0   HGB 12.6 11.5* 12.1   HCT 38.8 34.8* 37.3    179 223     Recent Labs   Lab 03/25/20  1036  03/29/20 0444 03/30/20 0442 03/31/20  0542   *   < > 136 137 138   K 3.6   < > 3.6 3.6 3.9   CL 99   < > 101 100 101   CO2 22*   < > 25 29 28   BUN 21   < > 28 27 33*   CREATININE 1.6*   < > 1.2 1.4 1.5*   *   < > 158* 81 112*   CALCIUM 8.5*   < > 8.6* 8.6* 8.5*   MG 1.8   < > 1.9 2.0 2.1   PHOS 2.4*   < > 2.7 2.9 3.9   LIPASE 141*  --   --   --   --     < > = values in this interval not displayed.     Recent Labs   Lab 03/29/20 0444 03/30/20 0442 03/31/20  0542   ALKPHOS 122 122 122   ALT 21 19 22   AST 32 31 35   ALBUMIN 1.9* 1.9* 1.8*   PROT 5.7* 6.1 6.0   BILITOT 0.7 0.7 0.7        Recent Labs     03/30/20 0442 03/31/20  0542   DDIMER  --  >33.00*   FERRITIN  --  657*   .1*  --        All labs within the last 24 hours were reviewed.     Microbiology:  Microbiology Results (last 7 days)     Procedure Component Value Units Date/Time    Blood culture x two cultures. Draw prior to antibiotics. [[id number]] Collected:  03/25/20 1036    Order Status:  Completed Specimen:  Blood from Peripheral, Antecubital, Right Updated:  03/30/20 1212     Blood Culture, Routine No Growth after 4 days.     Narrative:       Aerobic and anaerobic    Blood culture x two cultures. Draw prior to antibiotics. [[id number]] Collected:  03/25/20 1037    Order Status:  Completed Specimen:  Blood from Peripheral, Hand, Right Updated:  03/30/20 1212     Blood Culture, Routine No  Growth after 4 days.     Narrative:       Aerobic and anaerobic    Culture, Respiratory with Gram Stain [[id number]]     Order Status:  No result Specimen:  Sputum, Expectorated     Influenza A & B by Molecular [[id number]] Collected:  03/25/20 1040    Order Status:  Completed Specimen:  Nasopharyngeal Swab Updated:  03/25/20 1143     Influenza A, Molecular Negative     Influenza B, Molecular Negative     Flu A & B Source Nasal swab            Imaging  ECG Results          EKG 12-lead (Final result)  Result time 03/25/20 15:08:57    Final result by Interface, Lab In [facility] (03/25/20 15:08:57)                 Narrative:    Test Reason : R00.0,    Vent. Rate : 112 BPM     Atrial Rate : 174 BPM     P-R Int : 000 ms          QRS Dur : 098 ms      QT Int : 318 ms       P-R-T Axes : 000 003 -28 degrees     QTc Int : 434 ms    Atrial fibrillation with rapid ventricular response  Nonspecific ST and/or T wave abnormalities  Abnormal ECG  When compared with ECG of 10-OCT-2019 10:30,  No significant change was found  Confirmed by Agatha Rosas MD ([id number]) on 3/25/2020 3:08:52 PM    Referred By: [email or web address]   SELF           Confirmed By:Agatha Rosas MD                              Results for orders placed during the hospital encounter of 07/17/19   Transthoracic echo (TTE) 2D with Color Flow    Narrative · Concentric left ventricular hypertrophy.  · Hyperdynamic left ventricular systolic function. The estimated ejection   fraction is >70%.  · Mildly reduced right ventricular systolic function.  · Severe batrial enlargement.  · Severe tricuspid regurgitation.  · The estimated PA systolic pressure is 56 mm Hg  · Elevated central venous pressure (15 mm Hg).          X-Ray Chest AP Portable  Narrative: EXAMINATION:  XR CHEST AP PORTABLE    CLINICAL HISTORY:  Sepsis;    TECHNIQUE:  Single frontal view of the chest was performed.    COMPARISON:  No 08/23/2019 ne    FINDINGS:  Cardiomegaly.  Ill-defined patchy airspace consolidation   identified in the left upper lobe and at the lung bases.  No significant pleural effusion.  Impression: See above    Electronically signed by: Ino Carrion MD  Date:    03/25/2020  Time:    11:32      All imaging within the last 24 hours was reviewed.     Assessment and Plan:    Active Hospital Problems    Diagnosis  POA    *COVID-19 virus infection [J22, B97.29]  Yes    Community acquired pneumonia [J18.9]  Yes    H/O Deep vein thrombosis (DVT) [I82.409]  Yes     Chronic    CKD (chronic kidney disease) stage 4, GFR 15-29 ml/min [N18.4]  Yes     Chronic    Type 2 diabetes mellitus with diabetic polyneuropathy, with long-term current use of insulin [E11.42, Z79.4]  Not Applicable     Chronic      Resolved Hospital Problems   No resolved problems to display.       Suspected Covid-19 Virus Infection  Person Under Investigation (PUI) for COVID-19  - COVID-19 testing: Collection Date: 3/25/2020 Collection Time:   2:37 PM  - Infection Control notified    - Isolation:   - Airborne and Droplet Precautions  - Surgical mask on patient   - N95 masks must be fit tested, wear eye protection  - 20 second hand hygiene   - Limit visitors per hospital policy   - Consolidate lab draws, nursing care, and interventions    - Diagnostics: (Lymphopenia, hyponatremia, hyperferritinemia, elevated troponin, elevated d-dimer, age, and comorbidities are significant predictors of poor clinical outcome)   - CBC:   trend Q48hrs  - CMP:        trend Q48hrs  - Procalcitonin:  - D-dimer:  repeat prior to discharge  - Ferritin:  repeat prior to discharge   - CRP:        trend Q48hrs  - LDH:   repeat prior to discharge  - BNP:  - Troponin:    - ECG:   - rapid Flu:   - RIP only if BMT/solid transplant:   - Legionella antigen:   - Blood culture x2:   - Sputum culture:   - CXR:   - UA and culture:   - CPK:    - Management:   Bundle care as able to maintain isolation & minimize in/out of room   - Supplemental O2 to maintain SpO2 92%-96%   if  requiring 6L NC or higher, place on nonrebreather and discuss case with MICU   - Telemetry & continuous pulse oximetry    - If wheezing   - albuterol inhaler 2-4 puff Q6hr PRN    - ipratropium daily    - acetaminophen 650mg PO Q6hr PRN fever   - loperamide PRN for viral diarrhea  - Empiric antibiotics per likely source & patient allergies    - CAP: x 5 day course (d/c early if low concern for bacterial co-infection)  Ceftriaxone 1g IV Q24hrs            Azithromycin 500mg IV day #1, then 250mg PO daily x4 days     If azithromycin is not available, start doxycycline                 If MRSA risk factors, add Vancomycin IV (PharmD consult)   - Investigational Treatment Protocol: (if patient meets criteria)   [email or web address]     - statin: atorvastatin 40mg po daily (if CPK WNL)    - start HCQ 400mg PO BID x1 day, then 400mg PO daily x 4 days   (check glucose 6 phosphate dehydrogenase (NOT G6PD Quant), ECG, and start Qshift POCT glucose)    Safety notes:   - Avoid NIPPV (CPAP/BiPAP) to prevent aerosolization, use on a case-by-case basis if in neg pressure room   - Cautious use of NSAIDS for fever per WHO recommendations (3/16/2020)   - No new ACEi/ARB start or discontinuation of chronic med unless hypotensive (Esler et al. Journal of Hypertension 2020, 38:000-000)   - Careful use of steroids in the absence of other indications (shock, ARDS)   - Fluid sparing resuscitation, avoid maintenance fluids           Advance Care Planning  Goals of care, counseling/discussion  - Discussed the typical clinical course of COVID19 with Tiana Boston, including the potential for acute decompensation requiring intubation and mechanical ventilation.  - Following this discussion, patient/POA requested code status of DNR/DNI, will update EMR and paper chart accordingly.     T2DM              - detemir 6U QD              -  LDSSI     HFpEF              - Signs of volume overload              -               - strict I/O; daily weights   - will hold IV lasix 2/2 increasing Cr and Bicarb              - resume home furosemide 40mg BID when euvolemic     CKD 4              - avoid nephrotoxins              - renally dose all medications     AFib              - continue apixaban 2.5mg BID     VTE High Risk Prophylaxis: apixaban    Moni Montilla MD/MS  [facility]   Internal Medicine, PGY-2

## 2020-03-31 NOTE — PLAN OF CARE
03/31/20 1108   Post-Acute Status   Post-Acute Authorization Other   Other Status   (awaiting internal medical clearance)   Discharge Delays None known at this time   Discharge Plan   Discharge Plan A   (TBD)

## 2020-04-01 LAB
ALBUMIN SERPL BCP-MCNC: 1.7 G/DL (ref 3.5–5.2)
ALP SERPL-CCNC: 120 U/L (ref 55–135)
ALT SERPL W/O P-5'-P-CCNC: 22 U/L (ref 10–44)
ANION GAP SERPL CALC-SCNC: 9 MMOL/L (ref 8–16)
AST SERPL-CCNC: 33 U/L (ref 10–40)
BILIRUB SERPL-MCNC: 0.6 MG/DL (ref 0.1–1)
BUN SERPL-MCNC: 34 MG/DL (ref 10–30)
CALCIUM SERPL-MCNC: 8.4 MG/DL (ref 8.7–10.5)
CHLORIDE SERPL-SCNC: 99 MMOL/L (ref 95–110)
CO2 SERPL-SCNC: 30 MMOL/L (ref 23–29)
CREAT SERPL-MCNC: 1.4 MG/DL (ref 0.5–1.4)
CRP SERPL-MCNC: 197.3 MG/L (ref 0–8.2)
EST. GFR  (AFRICAN AMERICAN): 36.8 ML/MIN/1.73 M^2
EST. GFR  (NON AFRICAN AMERICAN): 32 ML/MIN/1.73 M^2
GLUCOSE SERPL-MCNC: 116 MG/DL (ref 70–110)
MAGNESIUM SERPL-MCNC: 2.2 MG/DL (ref 1.6–2.6)
PHOSPHATE SERPL-MCNC: 2.7 MG/DL (ref 2.7–4.5)
POCT GLUCOSE: 118 MG/DL (ref 70–110)
POCT GLUCOSE: 121 MG/DL (ref 70–110)
POCT GLUCOSE: 155 MG/DL (ref 70–110)
POCT GLUCOSE: 173 MG/DL (ref 70–110)
POCT GLUCOSE: 233 MG/DL (ref 70–110)
POTASSIUM SERPL-SCNC: 3.8 MMOL/L (ref 3.5–5.1)
PROT SERPL-MCNC: 6.1 G/DL (ref 6–8.4)
SODIUM SERPL-SCNC: 138 MMOL/L (ref 136–145)

## 2020-04-01 PROCEDURE — 83735 ASSAY OF MAGNESIUM: CPT | Mod: HCNC

## 2020-04-01 PROCEDURE — 25000003 PHARM REV CODE 250: Mod: HCNC | Performed by: STUDENT IN AN ORGANIZED HEALTH CARE EDUCATION/TRAINING PROGRAM

## 2020-04-01 PROCEDURE — 80053 COMPREHEN METABOLIC PANEL: CPT | Mod: HCNC

## 2020-04-01 PROCEDURE — 25000003 PHARM REV CODE 250: Mod: HCNC | Performed by: INTERNAL MEDICINE

## 2020-04-01 PROCEDURE — 84100 ASSAY OF PHOSPHORUS: CPT | Mod: HCNC

## 2020-04-01 PROCEDURE — 11000001 HC ACUTE MED/SURG PRIVATE ROOM: Mod: HCNC

## 2020-04-01 PROCEDURE — 99232 SBSQ HOSP IP/OBS MODERATE 35: CPT | Mod: HCNC,GC,, | Performed by: INTERNAL MEDICINE

## 2020-04-01 PROCEDURE — 86140 C-REACTIVE PROTEIN: CPT | Mod: HCNC

## 2020-04-01 PROCEDURE — 99232 PR SUBSEQUENT HOSPITAL CARE,LEVL II: ICD-10-PCS | Mod: HCNC,GC,, | Performed by: INTERNAL MEDICINE

## 2020-04-01 RX ORDER — FUROSEMIDE 40 MG/1
40 TABLET ORAL 2 TIMES DAILY
Status: DISCONTINUED | OUTPATIENT
Start: 2020-04-01 | End: 2020-04-05 | Stop reason: HOSPADM

## 2020-04-01 RX ADMIN — APIXABAN 2.5 MG: 2.5 TABLET, FILM COATED ORAL at 08:04

## 2020-04-01 RX ADMIN — SENNOSIDES 8.6 MG: 8.6 TABLET, FILM COATED ORAL at 08:04

## 2020-04-01 RX ADMIN — ATORVASTATIN CALCIUM 40 MG: 20 TABLET, FILM COATED ORAL at 08:04

## 2020-04-01 RX ADMIN — ACETAMINOPHEN 650 MG: 325 TABLET ORAL at 08:04

## 2020-04-01 RX ADMIN — ISOSORBIDE MONONITRATE 60 MG: 60 TABLET, EXTENDED RELEASE ORAL at 08:04

## 2020-04-01 RX ADMIN — DORZOLAMIDE HYDROCHLORIDE 1 DROP: 20 SOLUTION/ DROPS OPHTHALMIC at 09:04

## 2020-04-01 RX ADMIN — LATANOPROST 1 DROP: 50 SOLUTION OPHTHALMIC at 09:04

## 2020-04-01 RX ADMIN — INSULIN DETEMIR 6 UNITS: 100 INJECTION, SOLUTION SUBCUTANEOUS at 08:04

## 2020-04-01 RX ADMIN — FUROSEMIDE 40 MG: 40 TABLET ORAL at 06:04

## 2020-04-01 RX ADMIN — INSULIN ASPART 1 UNITS: 100 INJECTION, SOLUTION INTRAVENOUS; SUBCUTANEOUS at 08:04

## 2020-04-01 RX ADMIN — LEVOTHYROXINE SODIUM 75 MCG: 75 TABLET ORAL at 08:04

## 2020-04-01 RX ADMIN — METOPROLOL TARTRATE 50 MG: 50 TABLET, FILM COATED ORAL at 08:04

## 2020-04-01 RX ADMIN — POLYETHYLENE GLYCOL 3350 17 G: 17 POWDER, FOR SOLUTION ORAL at 08:04

## 2020-04-01 RX ADMIN — AMLODIPINE BESYLATE 5 MG: 5 TABLET ORAL at 08:04

## 2020-04-01 NOTE — PHYSICIAN QUERY
"PT Name: Tiana Mead  MR #: [id number]    Physician Query Form - Heart  Condition Clarification     CDS/: Chris Soria               Contact information:   [email or web address]    This form is a permanent document in the medical record.     Query Date: April 1, 2020    By submitting this query, we are merely seeking further clarification of documentation. Please utilize your independent clinical judgment when addressing the question(s) below.    The medical record contains the following   Indicators     Supporting Clinical Findings Location in Medical Record   x  3/25 labs      x EF ......Recent echo noted EF >70% with severe biatrial enlargement.... 3/25  H&P Hospital medicine       x Radiology findings cardiomegaly 3/25 CXR       Echo Results      "Ascites" documented      "SOB" or "NOVOA" documented      "Hypoxia" documented      Heart Failure documented HFpEF:  - Signs of volume overload              -               - diurese with IV furosemide 40mg BID              - strict I/O; daily weights              - resume home furosemide 40mg BID when euvolemic 3/25 H&P - Hospital medicine    "Edema" documented     x Diuretics/Meds    Current Outpatient Medications on File Prior to Encounter:  ..... Lasix, isosorbide   mononitrate,  metoprolol tartrate,       Furosemide, 40 mg, IV; given 3/29-30  Metoprolol tartrate, oral; given 3/31-4/1  Isosorbide mononitrate, oral; given 3/30-4/1       3/25  H&P - Hospital medicine        MAR  "  "    Treatment:      Other:      Heart failure (HF) can be acute, chronic or both. It is generally further specificed as systolic, diastolic, or combined. Lastly, it is important to identify an underlying etiology if known or suspected.     Common clues to acute exacerbation:  Rapidly progressive symptoms (w/in 2 weeks of presentation), using IV diuretics to treat, using supplemental O2, pulmonary edema on Xray, MI w/in 4 weeks, and/or BNP >500    Systolic Heart " Failure: is defined as chart documentation of a left ventricular ejection fraction (LVEF) less than 40%     Diastolic Heart Failure: is defined as a left ventricular ejection fraction (LVEF) greater than 40%   +      Evidence of diastolic dysfunction on echocardiography OR    Right heart catheterization wedge pressure above 12 mm Hg OR    Left heart catheterization left ventricular end diastolic pressure 18 mm Hg or above.    References: *American Heart Association    The clinical guidelines noted below are only system guidelines, and do not replace the providers clinical judgment.     Provider, please specify the diagnosis associated with above clinical findings    Provider, please clarify the acuity of HFpEF (Diastolic Heart Failure) associated with the above clinical findings:     [   ] Acute Diastolic Heart Failure - New diagnosis.  EF > 40%  and acute HF symptoms documented   [   ] Acute on Chronic Diastolic Heart Failure -    Pre-existing diastoic HF diagnosis.  EF > 40%  and acute HF symptoms documented       [ X ] Chronic Diastolic Heart Failure - Pre-existing diastolic HF diagnosis.  EF > 40%  without  acute HF symptoms documented   [   ] Other Type of Heart Failure (please specify type):   [   ] Other (please specify):   [  ] Clinically Undetermined                           Please document in your progress notes daily for the duration of treatment until resolved and include in your discharge summary.

## 2020-04-01 NOTE — PHYSICIAN QUERY
PT Name: Tiana Mead  MR #: [id number]    Physician Query Form - Cause and Effect Relationship Clarification      CDS/: Chris Soria RN            Contact information:   [email or web address]    This form is a permanent document in the medical record.     Query Date: April 1, 2020    By submitting this query, we are merely seeking further clarification of documentation. Please utilize your independent clinical judgment when addressing the question(s) below.    The Medical record contains the following:  Supporting Clinical Findings   Location in record                                                                                                                                                                                           3/25 Other viral test- lab              - CAP: x 5 day course (d/c early if low concern for bacterial co-infection)  Ceftriaxone 1g IV Q24hrs                                      Azithromycin 500mg IV day #1, then 250mg PO daily x4 days                                      If azithromycin is not available, start doxycycline                                      If MRSA risk factors, add Vancomycin IV (PharmD consult)                                                                                                                                            Findings: ....Ill-defined patchy airspace consolidation  identified in the left upper lobe and at the lung bases.  No significant pleural effusion.            Resp: coarse bilateral breath sounds, no increased work of breathing on RA                                3/31 Progress note- Hospital medicine          3/25 CXR      3/25 H&P - Hospital medicine         Provider, please clarify if there is any correlation between:      COVID -19    and      Pneumonia/ CAP.           Are the conditions:      [ X ] Due to or associated with each other   [  ] Unrelated to each other   [  ] Other (Please Specify):  _________________________   [  ] Clinically Undetermined

## 2020-04-02 LAB
ALBUMIN SERPL BCP-MCNC: 1.6 G/DL (ref 3.5–5.2)
ALP SERPL-CCNC: 116 U/L (ref 55–135)
ALT SERPL W/O P-5'-P-CCNC: 21 U/L (ref 10–44)
ANION GAP SERPL CALC-SCNC: 9 MMOL/L (ref 8–16)
AST SERPL-CCNC: 34 U/L (ref 10–40)
BASOPHILS # BLD AUTO: 0.06 K/UL (ref 0–0.2)
BASOPHILS NFR BLD: 0.6 % (ref 0–1.9)
BILIRUB SERPL-MCNC: 0.7 MG/DL (ref 0.1–1)
BUN SERPL-MCNC: 35 MG/DL (ref 10–30)
CALCIUM SERPL-MCNC: 8.4 MG/DL (ref 8.7–10.5)
CHLORIDE SERPL-SCNC: 98 MMOL/L (ref 95–110)
CO2 SERPL-SCNC: 28 MMOL/L (ref 23–29)
CREAT SERPL-MCNC: 1.3 MG/DL (ref 0.5–1.4)
D DIMER PPP IA.FEU-MCNC: >33 MG/L FEU
DIFFERENTIAL METHOD: ABNORMAL
EOSINOPHIL # BLD AUTO: 0.1 K/UL (ref 0–0.5)
EOSINOPHIL NFR BLD: 1.3 % (ref 0–8)
ERYTHROCYTE [DISTWIDTH] IN BLOOD BY AUTOMATED COUNT: 14.9 % (ref 11.5–14.5)
EST. GFR  (AFRICAN AMERICAN): 40.3 ML/MIN/1.73 M^2
EST. GFR  (NON AFRICAN AMERICAN): 35 ML/MIN/1.73 M^2
FERRITIN SERPL-MCNC: 805 NG/ML (ref 20–300)
GLUCOSE SERPL-MCNC: 106 MG/DL (ref 70–110)
HCT VFR BLD AUTO: 35.6 % (ref 37–48.5)
HGB BLD-MCNC: 11.6 G/DL (ref 12–16)
IMM GRANULOCYTES # BLD AUTO: 0.09 K/UL (ref 0–0.04)
IMM GRANULOCYTES NFR BLD AUTO: 1 % (ref 0–0.5)
LYMPHOCYTES # BLD AUTO: 0.8 K/UL (ref 1–4.8)
LYMPHOCYTES NFR BLD: 8.7 % (ref 18–48)
MAGNESIUM SERPL-MCNC: 2.3 MG/DL (ref 1.6–2.6)
MCH RBC QN AUTO: 29.3 PG (ref 27–31)
MCHC RBC AUTO-ENTMCNC: 32.6 G/DL (ref 32–36)
MCV RBC AUTO: 90 FL (ref 82–98)
MONOCYTES # BLD AUTO: 1.1 K/UL (ref 0.3–1)
MONOCYTES NFR BLD: 11.9 % (ref 4–15)
NEUTROPHILS # BLD AUTO: 7.2 K/UL (ref 1.8–7.7)
NEUTROPHILS NFR BLD: 76.5 % (ref 38–73)
NRBC BLD-RTO: 0 /100 WBC
PLATELET # BLD AUTO: 239 K/UL (ref 150–350)
PMV BLD AUTO: 12 FL (ref 9.2–12.9)
POCT GLUCOSE: 105 MG/DL (ref 70–110)
POCT GLUCOSE: 140 MG/DL (ref 70–110)
POCT GLUCOSE: 174 MG/DL (ref 70–110)
POCT GLUCOSE: 207 MG/DL (ref 70–110)
POTASSIUM SERPL-SCNC: 4.1 MMOL/L (ref 3.5–5.1)
PROT SERPL-MCNC: 6.1 G/DL (ref 6–8.4)
RBC # BLD AUTO: 3.96 M/UL (ref 4–5.4)
SODIUM SERPL-SCNC: 135 MMOL/L (ref 136–145)
WBC # BLD AUTO: 9.43 K/UL (ref 3.9–12.7)

## 2020-04-02 PROCEDURE — 36415 COLL VENOUS BLD VENIPUNCTURE: CPT | Mod: HCNC

## 2020-04-02 PROCEDURE — 25000003 PHARM REV CODE 250: Mod: HCNC | Performed by: STUDENT IN AN ORGANIZED HEALTH CARE EDUCATION/TRAINING PROGRAM

## 2020-04-02 PROCEDURE — 25000003 PHARM REV CODE 250: Mod: HCNC | Performed by: INTERNAL MEDICINE

## 2020-04-02 PROCEDURE — 80053 COMPREHEN METABOLIC PANEL: CPT | Mod: HCNC

## 2020-04-02 PROCEDURE — 82728 ASSAY OF FERRITIN: CPT | Mod: HCNC

## 2020-04-02 PROCEDURE — 99232 SBSQ HOSP IP/OBS MODERATE 35: CPT | Mod: HCNC,,, | Performed by: INTERNAL MEDICINE

## 2020-04-02 PROCEDURE — 83735 ASSAY OF MAGNESIUM: CPT | Mod: HCNC

## 2020-04-02 PROCEDURE — 11000001 HC ACUTE MED/SURG PRIVATE ROOM: Mod: HCNC

## 2020-04-02 PROCEDURE — 85379 FIBRIN DEGRADATION QUANT: CPT | Mod: HCNC

## 2020-04-02 PROCEDURE — 85025 COMPLETE CBC W/AUTO DIFF WBC: CPT | Mod: HCNC

## 2020-04-02 PROCEDURE — 99232 PR SUBSEQUENT HOSPITAL CARE,LEVL II: ICD-10-PCS | Mod: HCNC,,, | Performed by: INTERNAL MEDICINE

## 2020-04-02 PROCEDURE — 82960 TEST FOR G6PD ENZYME: CPT | Mod: HCNC

## 2020-04-02 RX ADMIN — DORZOLAMIDE HYDROCHLORIDE 1 DROP: 20 SOLUTION/ DROPS OPHTHALMIC at 09:04

## 2020-04-02 RX ADMIN — APIXABAN 2.5 MG: 2.5 TABLET, FILM COATED ORAL at 08:04

## 2020-04-02 RX ADMIN — METOPROLOL TARTRATE 50 MG: 50 TABLET, FILM COATED ORAL at 09:04

## 2020-04-02 RX ADMIN — AMLODIPINE BESYLATE 5 MG: 5 TABLET ORAL at 08:04

## 2020-04-02 RX ADMIN — LATANOPROST 1 DROP: 50 SOLUTION OPHTHALMIC at 09:04

## 2020-04-02 RX ADMIN — POLYETHYLENE GLYCOL 3350 17 G: 17 POWDER, FOR SOLUTION ORAL at 08:04

## 2020-04-02 RX ADMIN — METOPROLOL TARTRATE 50 MG: 50 TABLET, FILM COATED ORAL at 08:04

## 2020-04-02 RX ADMIN — POLYETHYLENE GLYCOL 3350 17 G: 17 POWDER, FOR SOLUTION ORAL at 09:04

## 2020-04-02 RX ADMIN — ATORVASTATIN CALCIUM 40 MG: 20 TABLET, FILM COATED ORAL at 12:04

## 2020-04-02 RX ADMIN — LEVOTHYROXINE SODIUM 75 MCG: 75 TABLET ORAL at 12:04

## 2020-04-02 RX ADMIN — INSULIN DETEMIR 6 UNITS: 100 INJECTION, SOLUTION SUBCUTANEOUS at 09:04

## 2020-04-02 RX ADMIN — INSULIN ASPART 2 UNITS: 100 INJECTION, SOLUTION INTRAVENOUS; SUBCUTANEOUS at 02:04

## 2020-04-02 RX ADMIN — ISOSORBIDE MONONITRATE 60 MG: 60 TABLET, EXTENDED RELEASE ORAL at 08:04

## 2020-04-02 RX ADMIN — APIXABAN 2.5 MG: 2.5 TABLET, FILM COATED ORAL at 09:04

## 2020-04-02 RX ADMIN — SENNOSIDES 8.6 MG: 8.6 TABLET, FILM COATED ORAL at 09:04

## 2020-04-02 RX ADMIN — FUROSEMIDE 40 MG: 40 TABLET ORAL at 06:04

## 2020-04-02 RX ADMIN — FUROSEMIDE 40 MG: 40 TABLET ORAL at 08:04

## 2020-04-02 NOTE — PROGRESS NOTES
Hospital Medicine  Progress Note  [facility]      Patient Name: Tiana Mead  MRN:  [id number]  [place]ospital Medicine [place] [facility] [place] [facility] [place] Moni Montilla MD  Date of Admission:  3/25/2020     Length of Stay:  LOS: 8 days       Principal Problem:  COVID-19 virus infection      Hospital Course:  Patient admitted for evaluation of possible COVID-19.    Interval History: Patient was febrile this morning but was without any complaints in the morning at time of interview. Patient reports some symptomatic improvement. Patient now on 2L NC. Patient with increasing trend in Ferritin, Ddimer, and CRP    Review of Systems:  Respiratory: Positive for cough and SOB  Cardiovascular: Negative for chest pain, palpitations, and LE edema   GI: Negative for N/V, abd pain, and diarrhea     Inpatient Medications:    Current Facility-Administered Medications:     acetaminophen tablet 650 mg, 650 mg, Oral, Q6H PRN, Shivani Nelson MD, 650 mg at 04/01/20 0847    albuterol inhaler 2 puff, 2 puff, Inhalation, Q4H PRN, Min Wyatt MD    amLODIPine tablet 5 mg, 5 mg, Oral, Daily, Min Wyatt MD, 5 mg at 04/02/20 0821    apixaban tablet 2.5 mg, 2.5 mg, Oral, BID, Min Wyatt MD, 2.5 mg at 04/02/20 0821    atorvastatin tablet 40 mg, 40 mg, Oral, Daily, Min Wyatt MD, 40 mg at 04/02/20 1233    dextrose 10% (D10W) Bolus, 12.5 g, Intravenous, PRN, Min Wyatt MD    dextrose 10% (D10W) Bolus, 25 g, Intravenous, PRN, Min Wyatt MD    dorzolamide 2 % ophthalmic solution 1 drop, 1 drop, Both Eyes, BID, Min Wyatt MD, 1 drop at 04/02/20 0900    furosemide tablet 40 mg, 40 mg, Oral, BID, Moni Montilla MD, 40 mg at 04/02/20 0822    glucagon (human recombinant) injection 1 mg, 1 mg, Intramuscular, PRN, Min Wyatt MD    glucagon (human recombinant) injection 1 mg, 1 mg, Intramuscular, PRN, Kendy Landon MD    glucose chewable tablet 16 g, 16 g, Oral, PRN, Min Wyatt MD    glucose chewable tablet 16 g, 16 g,  Oral, PRN, Kendy Landon MD    glucose chewable tablet 24 g, 24 g, Oral, PRN, Min Wyatt MD    glucose chewable tablet 24 g, 24 g, Oral, PRN, Kendy Landon MD    insulin aspart U-100 pen 0-5 Units, 0-5 Units, Subcutaneous, QID (AC + HS) PRN, Kendy Landon MD, 1 Units at 04/01/20 2040    insulin detemir U-100 pen 6 Units, 6 Units, Subcutaneous, Daily, Min Wyatt MD, 6 Units at 04/02/20 0900    isosorbide mononitrate 24 hr tablet 60 mg, 60 mg, Oral, Daily, Min Wyatt MD, 60 mg at 04/02/20 0821    latanoprost 0.005 % ophthalmic solution 1 drop, 1 drop, Both Eyes, QHS, Min Wyatt MD, 1 drop at 04/01/20 2118    levothyroxine tablet 75 mcg, 75 mcg, Oral, Before breakfast, Min Wyatt MD, 75 mcg at 04/02/20 1238    metoprolol tartrate (LOPRESSOR) tablet 50 mg, 50 mg, Oral, BID, Min Wyatt MD, 50 mg at 04/02/20 0821    nitroGLYCERIN SL tablet 0.4 mg, 0.4 mg, Sublingual, Q5 Min PRN, Min Wyatt MD    ondansetron tablet 4 mg, 4 mg, Oral, Q12H PRN, Min Wyatt MD    polyethylene glycol packet 17 g, 17 g, Oral, Daily PRN, Kvng Perez MD, 17 g at 03/27/20 0201    polyethylene glycol packet 17 g, 17 g, Oral, BID, Moni Montilla MD, 17 g at 04/02/20 0821    senna tablet 8.6 mg, 8.6 mg, Oral, Daily PRN, Min Wyatt MD, 8.6 mg at 04/01/20 0846    senna tablet 8.6 mg, 8.6 mg, Oral, QHS, Shivani Nelson MD, 8.6 mg at 04/01/20 2035    sodium chloride 0.9% flush 10 mL, 10 mL, Intravenous, PRN, Claudio Baldwin MD    sodium chloride 0.9% flush 10 mL, 10 mL, Intravenous, PRN, Min Wyatt MD      Physical Exam:      Intake/Output Summary (Last 24 hours) at 4/2/2020 1501  Last data filed at 4/2/2020 0725  Gross per 24 hour   Intake 118 ml   Output 900 ml   Net -782 ml     Wt Readings from Last 3 Encounters:   03/28/20 72.4 kg (159 lb 9.8 oz)   02/04/20 72.6 kg (160 lb)   11/20/19 72.6 kg (160 lb)       /71 (Patient Position: Lying)   Pulse 97   Temp 97 °F (36.1 °C) (Oral)   Resp (!) 22   Ht 5'  "8" (1.727 m)   Wt 72.4 kg (159 lb 9.8 oz)   LMP  (LMP Unknown)   SpO2 96%   Breastfeeding? No   BMI 24.27 kg/m²     GEN: NAD, conversant  Resp: coarse bilateral breath sounds, no wheezes or rales, normal work of breathing   CV: RRR, no m/r/g, no edema  GI: soft, NTND  Skin: no rash    Laboratory:  Lab Results   Component Value Date    [id number] Detected (A) 03/25/2020       Recent Labs   Lab 03/28/20  0500 03/30/20  0442 04/02/20  0612   WBC 8.78 10.38 9.43   LYMPH 11.2*  1.0 9.3*  1.0 8.7*  0.8*   HGB 11.5* 12.1 11.6*   HCT 34.8* 37.3 35.6*    223 239     Recent Labs   Lab 03/30/20  0442 03/31/20  0542 04/01/20  0535 04/02/20  0612    138 138 135*   K 3.6 3.9 3.8 4.1    101 99 98   CO2 29 28 30* 28   BUN 27 33* 34* 35*   CREATININE 1.4 1.5* 1.4 1.3   GLU 81 112* 116* 106   CALCIUM 8.6* 8.5* 8.4* 8.4*   MG 2.0 2.1 2.2 2.3   PHOS 2.9 3.9 2.7  --      Recent Labs   Lab 03/31/20  0542 04/01/20  0535 04/02/20  0612   ALKPHOS 122 120 116   ALT 22 22 21   AST 35 33 34   ALBUMIN 1.8* 1.7* 1.6*   PROT 6.0 6.1 6.1   BILITOT 0.7 0.6 0.7        Recent Labs     03/31/20  0542 04/01/20  0535 04/02/20  0612   DDIMER >33.00*  --  >33.00*   FERRITIN 657*  --  805*   CRP  --  197.3*  --        All labs within the last 24 hours were reviewed.     Microbiology:  Microbiology Results (last 7 days)     Procedure Component Value Units Date/Time    Blood culture x two cultures. Draw prior to antibiotics. [[id number]] Collected:  03/25/20 1036    Order Status:  Completed Specimen:  Blood from Peripheral, Antecubital, Right Updated:  03/30/20 1212     Blood Culture, Routine No Growth after 4 days.     Narrative:       Aerobic and anaerobic    Blood culture x two cultures. Draw prior to antibiotics. [[id number]] Collected:  03/25/20 1037    Order Status:  Completed Specimen:  Blood from Peripheral, Hand, Right Updated:  03/30/20 1212     Blood Culture, Routine No Growth after 4 days.     Narrative:       Aerobic " and anaerobic            Imaging  ECG Results          EKG 12-lead (Final result)  Result time 03/25/20 15:08:57    Final result by Interface, Lab In [facility] (03/25/20 15:08:57)                 Narrative:    Test Reason : R00.0,    Vent. Rate : 112 BPM     Atrial Rate : 174 BPM     P-R Int : 000 ms          QRS Dur : 098 ms      QT Int : 318 ms       P-R-T Axes : 000 003 -28 degrees     QTc Int : 434 ms    Atrial fibrillation with rapid ventricular response  Nonspecific ST and/or T wave abnormalities  Abnormal ECG  When compared with ECG of 10-OCT-2019 10:30,  No significant change was found  Confirmed by Agatha Rosas MD ([id number]) on 3/25/2020 3:08:52 PM    Referred By: [email or web address]   SELF           Confirmed By:Agatha Rosas MD                              Results for orders placed during the hospital encounter of 07/17/19   Transthoracic echo (TTE) 2D with Color Flow    Narrative · Concentric left ventricular hypertrophy.  · Hyperdynamic left ventricular systolic function. The estimated ejection   fraction is >70%.  · Mildly reduced right ventricular systolic function.  · Severe batrial enlargement.  · Severe tricuspid regurgitation.  · The estimated PA systolic pressure is 56 mm Hg  · Elevated central venous pressure (15 mm Hg).          X-Ray Chest AP Portable  Narrative: EXAMINATION:  XR CHEST AP PORTABLE    CLINICAL HISTORY:  Sepsis;    TECHNIQUE:  Single frontal view of the chest was performed.    COMPARISON:  No 08/23/2019 ne    FINDINGS:  Cardiomegaly.  Ill-defined patchy airspace consolidation  identified in the left upper lobe and at the lung bases.  No significant pleural effusion.  Impression: See above    Electronically signed by: Ino Carrion MD  Date:    03/25/2020  Time:    11:32      All imaging within the last 24 hours was reviewed.     Assessment and Plan:    Active Hospital Problems    Diagnosis  POA    *COVID-19 virus infection [U07.1]  Yes    Community acquired pneumonia [J18.9]  Yes    H/O Deep  vein thrombosis (DVT) [I82.409]  Yes     Chronic    CKD (chronic kidney disease) stage 4, GFR 15-29 ml/min [N18.4]  Yes     Chronic    Type 2 diabetes mellitus with diabetic polyneuropathy, with long-term current use of insulin [E11.42, Z79.4]  Not Applicable     Chronic      Resolved Hospital Problems   No resolved problems to display.       Suspected Covid-19 Virus Infection  Person Under Investigation (PUI) for COVID-19  - COVID-19 testing: Collection Date: 3/25/2020 Collection Time:   2:37 PM  - Infection Control notified    - Isolation:   - Airborne and Droplet Precautions  - Surgical mask on patient   - N95 masks must be fit tested, wear eye protection  - 20 second hand hygiene   - Limit visitors per hospital policy   - Consolidate lab draws, nursing care, and interventions    - Diagnostics: (Lymphopenia, hyponatremia, hyperferritinemia, elevated troponin, elevated d-dimer, age, and comorbidities are significant predictors of poor clinical outcome)   - CBC:   trend Q48hrs  - CMP:        trend Q48hrs  - Procalcitonin:  - D-dimer:  repeat prior to discharge  - Ferritin:  repeat prior to discharge   - CRP:        trend Q48hrs  - LDH:   repeat prior to discharge  - BNP:  - Troponin:    - ECG:   - rapid Flu:   - RIP only if BMT/solid transplant:   - Legionella antigen:   - Blood culture x2:   - Sputum culture:   - CXR:   - UA and culture:   - CPK:    - Management:   Bundle care as able to maintain isolation & minimize in/out of room   - Supplemental O2 to maintain SpO2 92%-96%   if requiring 6L NC or higher, place on nonrebreather and discuss case with MICU   - Telemetry & continuous pulse oximetry    - If wheezing   - albuterol inhaler 2-4 puff Q6hr PRN    - ipratropium daily    - acetaminophen 650mg PO Q6hr PRN fever   - loperamide PRN for viral diarrhea  - Empiric antibiotics per likely source & patient allergies    - CAP: x 5 day course (d/c early if low concern for bacterial co-infection)  Ceftriaxone 1g IV  Q24hrs            Azithromycin 500mg IV day #1, then 250mg PO daily x4 days     If azithromycin is not available, start doxycycline                 If MRSA risk factors, add Vancomycin IV (PharmD consult)   - Investigational Treatment Protocol: (if patient meets criteria)   [email or web address]     - statin: atorvastatin 40mg po daily (if CPK WNL)    - start HCQ 400mg PO BID x1 day, then 400mg PO daily x 4 days   (check glucose 6 phosphate dehydrogenase (NOT G6PD Quant), ECG, and start Qshift POCT glucose)    Safety notes:   - Avoid NIPPV (CPAP/BiPAP) to prevent aerosolization, use on a case-by-case basis if in neg pressure room   - Cautious use of NSAIDS for fever per WHO recommendations (3/16/2020)   - No new ACEi/ARB start or discontinuation of chronic med unless hypotensive (Esler et al. Journal of Hypertension 2020, 38:000-000)   - Careful use of steroids in the absence of other indications (shock, ARDS)   - Fluid sparing resuscitation, avoid maintenance fluids    Advance Care Planning  Goals of care, counseling/discussion  - Discussed the typical clinical course of COVID19 with Tiana Boston, including the potential for acute decompensation requiring intubation and mechanical ventilation.  - Following this discussion, patient/POA requested code status of DNR/DNI, will update EMR and paper chart accordingly.     T2DM              - detemir 6U QD              - LDSSI     HFpEF              - Signs of volume overload              -               - strict I/O; daily weights   - will hold IV lasix 2/2 increasing Cr and Bicarb              - resume home furosemide 40mg BID when euvolemic     CKD 4              - avoid nephrotoxins              - renally dose all medications     AFib              - continue apixaban 2.5mg BID     VTE High Risk Prophylaxis: apixaban    Moni Montilla MD/MS  [facility]  [place] [facility]   Internal Medicine, PGY-2

## 2020-04-02 NOTE — TELEPHONE ENCOUNTER
----- Message from Audrey Banerjee sent at 1/16/2020  3:47 PM CST -----  Contact: self  [phone]  Patient is calling for an RX refill or new RX.  Is this a refill or new RX:  Refill  RX name and strength: meclizine (ANTIVERT) 25 mg tablet   Directions (copy/paste from chart): Take by mouth. - Oral   Is this a 30 day or 90 day RX:  30  Local pharmacy or mail order pharmacy:  local  Pharmacy name and phone # (copy/paste from chart): [facility]   [phone] (Phone)  [phone] (Fax)  Comments:         missing teeth no loose

## 2020-04-02 NOTE — PROGRESS NOTES
Refill Authorization Note     is requesting a refill authorization.    Brief assessment and rationale for refill: REVIEW: pt. currently in hospital          Medication Therapy Plan: Patient currently in hospital(COVID-19), review    Medication reconciliation completed: No   Pharmacist Review Requested: Yes                     Comments:   Refill Center Care Gap Closure protocols temporarily suspended.   Requested Prescriptions   Pending Prescriptions Disp Refills    TRUE METRIX GLUCOSE TEST STRIP Strp [Pharmacy Med Name: TRUE METRIX BLOOD GLUCOSE TEST STRP] 300 strip 3     Sig: USE TO TEST BLOOD SUGAR WITH MEALS THREE TIMES DAILY       Endocrinology: Diabetes - Supplies Passed - 4/2/2020  2:37 PM        Passed - Patient is at least 18 years old        Passed - Office visit in past 12 months or future 90 days.     Recent Outpatient Visits            2 weeks ago Generalized abdominal pain    [facility] Daksha Stringer PA-C    1 month ago Diabetic polyneuropathy associated with type 2 diabetes mellitus    [facility] - Podiatry Aria Krishnamurthy DPM    2 months ago Herpes zoster without complication    [facility] - Ophthalmology David Prado MD    2 months ago Acute pain of right shoulder    [facility] - Orthopedics Sidney Melgar NP    2 months ago Herpes zoster without complication    [facility] - Dermatology La Berger MD          Future Appointments              In 1 week MD [facility] - Cardiology, [facility]    In 1 month FABIANA Tang - Podiatry, [facility]    In 2 months Brissa Gonzalez NP [place] - [place] Vis[place], [place]                Passed - HBA1C is 7.9 or below and within 180 days     Hemoglobin A1C   Date Value Ref Range Status   03/12/2020 7.0 (H) 4.0 - 5.6 % Final     Comment:     ADA Screening Guidelines:  5.7-6.4%  Consistent with prediabetes  >or=6.5%  Consistent with diabetes  High levels of fetal hemoglobin  "interfere with the HbA1C  assay. Heterozygous hemoglobin variants (HbS, HgC, etc)do  not significantly interfere with this assay.   However, presence of multiple variants may affect accuracy.     11/13/2019 7.4 (H) 4.0 - 5.6 % Final     Comment:     ADA Screening Guidelines:  5.7-6.4%  Consistent with prediabetes  >or=6.5%  Consistent with diabetes  High levels of fetal hemoglobin interfere with the HbA1C  assay. Heterozygous hemoglobin variants (HbS, HgC, etc)do  not significantly interfere with this assay.   However, presence of multiple variants may affect accuracy.     08/24/2019 6.9 (H) 4.0 - 5.6 % Final     Comment:     ADA Screening Guidelines:  5.7-6.4%  Consistent with prediabetes  >or=6.5%  Consistent with diabetes  High levels of fetal hemoglobin interfere with the HbA1C  assay. Heterozygous hemoglobin variants (HbS, HgC, etc)do  not significantly interfere with this assay.   However, presence of multiple variants may affect accuracy.                Powered by [employer] - 4/2/2020  2:37 PM        The requested medication is not on the active medication list.       BD ULTRA-FINE SHORT PEN NEEDLE 31 gauge x 5/16" Ndle [Pharmacy Med Name: B-D PEN NDL SHRT [id number](5/16) GINA] 100 each 3     Sig: USE WITH INSULIN PENS AS DIRECTED       Endocrinology: Diabetes - Supplies Passed - 4/2/2020  2:37 PM        Passed - Patient is at least 18 years old        Passed - Office visit in past 12 months or future 90 days.     Recent Outpatient Visits            2 weeks ago Generalized abdominal pain    [facility] Internal Medicine Daksha Stringer PA-C    1 month ago Diabetic polyneuropathy associated with type 2 diabetes mellitus    [facility] Podiatry Aria Krishnamurthy DPM    2 months ago Herpes zoster without complication    [facility] - Ophthalmology David Prado MD    2 months ago Acute pain of right shoulder    [facility] - Orthopedics Sidney Melgar NP    2 months ago Herpes zoster without " complication    [facility] - Dermatology La Berger MD          Future Appointments              In 1 week MD [facility] - Cardiology, [facility]    In 1 month FABIANA Tang - Podiatry, [facility]    In 2 months Brissa Gonzalez, NP [place] - Home Visits, [place]                Passed - HBA1C is 7.9 or below and within 180 days     Hemoglobin A1C   Date Value Ref Range Status   03/12/2020 7.0 (H) 4.0 - 5.6 % Final     Comment:     ADA Screening Guidelines:  5.7-6.4%  Consistent with prediabetes  >or=6.5%  Consistent with diabetes  High levels of fetal hemoglobin interfere with the HbA1C  assay. Heterozygous hemoglobin variants (HbS, HgC, etc)do  not significantly interfere with this assay.   However, presence of multiple variants may affect accuracy.     11/13/2019 7.4 (H) 4.0 - 5.6 % Final     Comment:     ADA Screening Guidelines:  5.7-6.4%  Consistent with prediabetes  >or=6.5%  Consistent with diabetes  High levels of fetal hemoglobin interfere with the HbA1C  assay. Heterozygous hemoglobin variants (HbS, HgC, etc)do  not significantly interfere with this assay.   However, presence of multiple variants may affect accuracy.     08/24/2019 6.9 (H) 4.0 - 5.6 % Final     Comment:     ADA Screening Guidelines:  5.7-6.4%  Consistent with prediabetes  >or=6.5%  Consistent with diabetes  High levels of fetal hemoglobin interfere with the HbA1C  assay. Heterozygous hemoglobin variants (HbS, HgC, etc)do  not significantly interfere with this assay.   However, presence of multiple variants may affect accuracy.                Powered by [employer] - 4/2/2020  2:38 PM        Unable to evaluate active med list or whether request is a duplicate.         Appointments  past 12m or future 3m with PCP    Date Provider   Last Visit   11/20/2019 Belen Bradley MD   Next Visit   Visit date not found Belen Bradley MD   .  ED visits in past 90 days: 0       Note composed:2:43 PM  04/02/2020

## 2020-04-02 NOTE — PLAN OF CARE
Patient's VSS, AAOX4, patient tolerated 2L NC, in recliner tolerated well for a few hours, approved per MD. COVID airborne/droplet isolations followed throughout shift. Tele box & O2 monitored and maintained. Patient resting in bed

## 2020-04-03 DIAGNOSIS — E11.42 TYPE 2 DIABETES MELLITUS WITH DIABETIC POLYNEUROPATHY, WITH LONG-TERM CURRENT USE OF INSULIN: Primary | Chronic | ICD-10-CM

## 2020-04-03 DIAGNOSIS — Z79.4 TYPE 2 DIABETES MELLITUS WITH DIABETIC POLYNEUROPATHY, WITH LONG-TERM CURRENT USE OF INSULIN: Primary | Chronic | ICD-10-CM

## 2020-04-03 LAB
ALBUMIN SERPL BCP-MCNC: 1.6 G/DL (ref 3.5–5.2)
ALP SERPL-CCNC: 126 U/L (ref 55–135)
ALT SERPL W/O P-5'-P-CCNC: 21 U/L (ref 10–44)
ANION GAP SERPL CALC-SCNC: 7 MMOL/L (ref 8–16)
AST SERPL-CCNC: 31 U/L (ref 10–40)
BILIRUB SERPL-MCNC: 0.6 MG/DL (ref 0.1–1)
BUN SERPL-MCNC: 40 MG/DL (ref 10–30)
CALCIUM SERPL-MCNC: 8.3 MG/DL (ref 8.7–10.5)
CHLORIDE SERPL-SCNC: 99 MMOL/L (ref 95–110)
CO2 SERPL-SCNC: 29 MMOL/L (ref 23–29)
CREAT SERPL-MCNC: 1.4 MG/DL (ref 0.5–1.4)
CRP SERPL-MCNC: 164.8 MG/L (ref 0–8.2)
EST. GFR  (AFRICAN AMERICAN): 36.8 ML/MIN/1.73 M^2
EST. GFR  (NON AFRICAN AMERICAN): 32 ML/MIN/1.73 M^2
FERRITIN SERPL-MCNC: 767 NG/ML (ref 20–300)
GLUCOSE SERPL-MCNC: 164 MG/DL (ref 70–110)
GLUCOSE-6-PHOSPHATE DEHYDROGENASE QUAL: NORMAL
POCT GLUCOSE: 270 MG/DL (ref 70–110)
POCT GLUCOSE: 340 MG/DL (ref 70–110)
POTASSIUM SERPL-SCNC: 4.6 MMOL/L (ref 3.5–5.1)
PROT SERPL-MCNC: 6.2 G/DL (ref 6–8.4)
SODIUM SERPL-SCNC: 135 MMOL/L (ref 136–145)

## 2020-04-03 PROCEDURE — 80053 COMPREHEN METABOLIC PANEL: CPT | Mod: HCNC

## 2020-04-03 PROCEDURE — 11000001 HC ACUTE MED/SURG PRIVATE ROOM: Mod: HCNC

## 2020-04-03 PROCEDURE — 82728 ASSAY OF FERRITIN: CPT | Mod: HCNC

## 2020-04-03 PROCEDURE — 99232 SBSQ HOSP IP/OBS MODERATE 35: CPT | Mod: HCNC,,, | Performed by: INTERNAL MEDICINE

## 2020-04-03 PROCEDURE — 86140 C-REACTIVE PROTEIN: CPT | Mod: HCNC

## 2020-04-03 PROCEDURE — 36415 COLL VENOUS BLD VENIPUNCTURE: CPT | Mod: HCNC

## 2020-04-03 PROCEDURE — 63600175 PHARM REV CODE 636 W HCPCS: Mod: HCNC | Performed by: STUDENT IN AN ORGANIZED HEALTH CARE EDUCATION/TRAINING PROGRAM

## 2020-04-03 PROCEDURE — 25000003 PHARM REV CODE 250: Mod: HCNC | Performed by: STUDENT IN AN ORGANIZED HEALTH CARE EDUCATION/TRAINING PROGRAM

## 2020-04-03 PROCEDURE — 99232 PR SUBSEQUENT HOSPITAL CARE,LEVL II: ICD-10-PCS | Mod: HCNC,,, | Performed by: INTERNAL MEDICINE

## 2020-04-03 RX ORDER — PEN NEEDLE, DIABETIC 30 GX3/16"
NEEDLE, DISPOSABLE MISCELLANEOUS
Qty: 100 EACH | Refills: 3 | Status: SHIPPED | OUTPATIENT
Start: 2020-04-03 | End: 2020-04-04 | Stop reason: SDUPTHER

## 2020-04-03 RX ORDER — INSULIN ASPART 100 [IU]/ML
2 INJECTION, SOLUTION INTRAVENOUS; SUBCUTANEOUS
Status: DISCONTINUED | OUTPATIENT
Start: 2020-04-03 | End: 2020-04-05 | Stop reason: HOSPADM

## 2020-04-03 RX ADMIN — AMLODIPINE BESYLATE 5 MG: 5 TABLET ORAL at 09:04

## 2020-04-03 RX ADMIN — METOPROLOL TARTRATE 50 MG: 50 TABLET, FILM COATED ORAL at 08:04

## 2020-04-03 RX ADMIN — FUROSEMIDE 40 MG: 40 TABLET ORAL at 09:04

## 2020-04-03 RX ADMIN — INSULIN DETEMIR 6 UNITS: 100 INJECTION, SOLUTION SUBCUTANEOUS at 09:04

## 2020-04-03 RX ADMIN — INSULIN ASPART 1 UNITS: 100 INJECTION, SOLUTION INTRAVENOUS; SUBCUTANEOUS at 08:04

## 2020-04-03 RX ADMIN — ISOSORBIDE MONONITRATE 60 MG: 60 TABLET, EXTENDED RELEASE ORAL at 09:04

## 2020-04-03 RX ADMIN — APIXABAN 2.5 MG: 2.5 TABLET, FILM COATED ORAL at 08:04

## 2020-04-03 RX ADMIN — METOPROLOL TARTRATE 50 MG: 50 TABLET, FILM COATED ORAL at 09:04

## 2020-04-03 RX ADMIN — DORZOLAMIDE HYDROCHLORIDE 1 DROP: 20 SOLUTION/ DROPS OPHTHALMIC at 08:04

## 2020-04-03 RX ADMIN — DORZOLAMIDE HYDROCHLORIDE 1 DROP: 20 SOLUTION/ DROPS OPHTHALMIC at 09:04

## 2020-04-03 RX ADMIN — LATANOPROST 1 DROP: 50 SOLUTION OPHTHALMIC at 08:04

## 2020-04-03 RX ADMIN — INSULIN ASPART 2 UNITS: 100 INJECTION, SOLUTION INTRAVENOUS; SUBCUTANEOUS at 04:04

## 2020-04-03 RX ADMIN — APIXABAN 2.5 MG: 2.5 TABLET, FILM COATED ORAL at 09:04

## 2020-04-03 RX ADMIN — POLYETHYLENE GLYCOL 3350 17 G: 17 POWDER, FOR SOLUTION ORAL at 09:04

## 2020-04-03 RX ADMIN — LEVOTHYROXINE SODIUM 75 MCG: 75 TABLET ORAL at 06:04

## 2020-04-03 RX ADMIN — ATORVASTATIN CALCIUM 40 MG: 20 TABLET, FILM COATED ORAL at 09:04

## 2020-04-03 RX ADMIN — FUROSEMIDE 40 MG: 40 TABLET ORAL at 05:04

## 2020-04-03 NOTE — PLAN OF CARE
Recommendations    1. Continue current diet order as tolerated. Will monitor.   Goals: Pt to meet % EEN and EPN by RD follow-up.

## 2020-04-03 NOTE — PLAN OF CARE
Problem: Adult Inpatient Plan of Care  Goal: Plan of Care Review  Outcome: Ongoing, Progressing     Problem: Diabetes Comorbidity  Goal: Blood Glucose Level Within Desired Range  Outcome: Ongoing, Progressing   PT AAOX4. Pt NAD, respirations 16, even, and unlabored. Pt skin, warm, dry, and intact. Pt un accepting of not receiving dinner tray yesterday afternoon. Pt cbg 140's and no signs of hypoglycemia; no coverage of short acting insulin. Pt VSS. Pt remains afebrile. Pt remains free of falls. Pt informed of plan of care. Pt has purwick in place. Pt has dark yellow tea color urine in canister totaling 800mls for shift. Pt tolerating well. Pt right lower forearm iv access flushes without difficulty. Pt denies any needs throughout the night. Pt remain calm and cooperative throughout the night. Pt maintained oxygen saturation at 95-96% on 2L NC. Pt bed alarm set, wheels locked and call light within reach and bed in lowest position. Will continue to monitor.

## 2020-04-03 NOTE — TELEPHONE ENCOUNTER
Please see the following assessment. This refill request still requires some action on your part. Pt currently admitted for pneumonia. Defer to you.  Thank you.

## 2020-04-03 NOTE — PLAN OF CARE
Pt not medically ready - remains on o2 2L NC. Pt may need to d/c w/ home o2. CM will continue to follow for any discharge needs.    Elin SMITH [id number] - assisting 7th floor 4/3       04/03/20 1012   Discharge Reassessment   Assessment Type Discharge Planning Reassessment   Discharge Plan A Home with family;Home Health   Discharge Plan B Skilled Nursing Facility   Anticipated Discharge Disposition Home-Health   Post-Acute Status   Post-Acute Authorization Home Health   Home Health Status Awaiting Internal Medical Clearance   Discharge Delays None known at this time

## 2020-04-03 NOTE — PROGRESS NOTES
"[facility]  Adult Nutrition  Progress Note    SUMMARY       Recommendations    1. Continue current diet order as tolerated. Will monitor.   Goals: Pt to meet % EEN and EPN by RD follow-up.   Nutrition Goal Status: new  Communication of RD Recs: other (comment)(POC)    Reason for Assessment    Reason For Assessment: length of stay  Diagnosis: other (see comments)(COVID)  Relevant Medical History: DM, CKD 4, HF, CAD, HTN  General Information Comments: Remote assessment completed. Pt consuming approx 75% of meals. Noted that pt had approx 4-5lb wt loss pta r/t poor appetite. Due to recent hospital wide restrictions to limit the transfer of (COVID-19), we are not performing any physical exams at this time. All S/S will be observational; NFPE to be performed at a future date. Unable to assess for malnutrition at this time, but likely not malnourished as intake has improved.   Nutrition Discharge Planning: Adequate PO intake for optimal nutrition.     Nutrition/Diet History    Spiritual, Cultural Beliefs, Yarsanism Practices, Values that Affect Care: no  Factors Affecting Nutritional Intake: None identified at this time    Anthropometrics    Temp: 97.5 °F (36.4 °C)  Height Method: Stated  Height: 5' 8" (172.7 cm)  Height (inches): 68 in  Weight Method: Bed Scale  Weight: 72.4 kg (159 lb 9.8 oz)  Weight (lb): 159.61 lb  Ideal Body Weight (IBW), Female: 140 lb  % Ideal Body Weight, Female (lb): 109.29 %  BMI (Calculated): 24.3  BMI Grade: 18.5-24.9 - normal  Usual Body Weight (UBW), k kg  % Usual Body Weight: 98.04     Lab/Procedures/Meds    Pertinent Labs Reviewed: reviewed  Pertinent Medications Reviewed: reviewed  Pertinent Medications Comments: lasix, insulin    Estimated/Assessed Needs    Weight Used For Calorie Calculations: 72.4 kg (159 lb 9.8 oz)  Energy Calorie Requirements (kcal): 1459  Energy Need Method: [place](1.25 PAL)  Protein Requirements: 72-87g (1-1.2g/kg)  Weight " Used For Protein Calculations: 72.4 kg (159 lb 9.8 oz)  Fluid Requirements (mL): Per MD  RDA Method (mL): 1459     Nutrition Prescription Ordered    Current Diet Order: Cardiac, Low Phos, Fluid 1500mL    Evaluation of Received Nutrient/Fluid Intake    I/O: -6.1L since admit  Comments: LBM 3/31  % Intake of Estimated Energy Needs: 50 - 75 %  % Meal Intake: 50 - 75 %    Nutrition Risk    Level of Risk/Frequency of Follow-up: (1X/week)     Assessment and Plan    No nutrition related risk factor present at this time.     Monitor and Evaluation    Food and Nutrient Intake: energy intake, food and beverage intake  Food and Nutrient Adminstration: diet order  Anthropometric Measurements: weight, weight change  Biochemical Data, Medical Tests and Procedures: other (specify)(All labs)  Nutrition-Focused Physical Findings: overall appearance     Nutrition Follow-Up    RD Follow-up?: Yes

## 2020-04-03 NOTE — PROGRESS NOTES
"Refill Authorization Note     is requesting a refill authorization.    Brief assessment and rationale for refill: REVIEW: Pt currently in hospital          Medication Therapy Plan: Pt is in the hospital for community aquired pneumonia; please review    Medication reconciliation completed: No   Pharmacist Review Requested: Yes                     Comments:   Refill Center Care Gap Closure protocols temporarily suspended.   Requested Prescriptions   Pending Prescriptions Disp Refills    pen needle, diabetic (BD ULTRA-FINE SHORT PEN NEEDLE) 31 gauge x 5/16" Ndle 100 each 3     Sig: USE WITH INSULIN PENS AS DIRECTED       Endocrinology: Diabetes - Supplies Passed - 4/3/2020  9:09 AM        Passed - Patient is at least 18 years old        Passed - Office visit in past 12 months or future 90 days.     Recent Outpatient Visits            2 weeks ago Generalized abdominal pain    [facility] Medicine Daksha Stringer PA-C    1 month ago Diabetic polyneuropathy associated with type 2 diabetes mellitus    [facility] - Podiatry Aria Krishnamurthy DPM    2 months ago Herpes zoster without complication    [facility] - Ophthalmology David Prado MD    2 months ago Acute pain of right shoulder    [facility] - Orthopedics Sidney Melgar NP    2 months ago Herpes zoster without complication    [facility] - Dermatology La Berger MD          Future Appointments              In 1 week MD [facility] - Cardiology, [facility]    In 1 month FABIANA Tang - Podiatry, [facility]    In 2 months Brissa Gonzalez NP [place] - [place], [place]                Passed - HBA1C is 7.9 or below and within 180 days     Hemoglobin A1C   Date Value Ref Range Status   03/12/2020 7.0 (H) 4.0 - 5.6 % Final     Comment:     ADA Screening Guidelines:  5.7-6.4%  Consistent with prediabetes  >or=6.5%  Consistent with diabetes  High levels of fetal hemoglobin interfere with the " HbA1C  assay. Heterozygous hemoglobin variants (HbS, HgC, etc)do  not significantly interfere with this assay.   However, presence of multiple variants may affect accuracy.     11/13/2019 7.4 (H) 4.0 - 5.6 % Final     Comment:     ADA Screening Guidelines:  5.7-6.4%  Consistent with prediabetes  >or=6.5%  Consistent with diabetes  High levels of fetal hemoglobin interfere with the HbA1C  assay. Heterozygous hemoglobin variants (HbS, HgC, etc)do  not significantly interfere with this assay.   However, presence of multiple variants may affect accuracy.     08/24/2019 6.9 (H) 4.0 - 5.6 % Final     Comment:     ADA Screening Guidelines:  5.7-6.4%  Consistent with prediabetes  >or=6.5%  Consistent with diabetes  High levels of fetal hemoglobin interfere with the HbA1C  assay. Heterozygous hemoglobin variants (HbS, HgC, etc)do  not significantly interfere with this assay.   However, presence of multiple variants may affect accuracy.                Powered by [employer] - 4/3/2020  9:09 AM        Unable to evaluate active med list or whether request is a duplicate.         Appointments  past 12m or future 3m with PCP    Date Provider   Last Visit   11/20/2019 Belen Bradley MD   Next Visit   Visit date not found Belen Bradley MD   .  ED visits in past 90 days: 0       Note composed:9:26 AM 04/03/2020

## 2020-04-04 LAB
ALBUMIN SERPL BCP-MCNC: 1.5 G/DL (ref 3.5–5.2)
ALP SERPL-CCNC: 121 U/L (ref 55–135)
ALT SERPL W/O P-5'-P-CCNC: 20 U/L (ref 10–44)
ANION GAP SERPL CALC-SCNC: 6 MMOL/L (ref 8–16)
AST SERPL-CCNC: 33 U/L (ref 10–40)
BASOPHILS # BLD AUTO: 0.06 K/UL (ref 0–0.2)
BASOPHILS NFR BLD: 0.7 % (ref 0–1.9)
BILIRUB SERPL-MCNC: 0.5 MG/DL (ref 0.1–1)
BUN SERPL-MCNC: 36 MG/DL (ref 10–30)
CALCIUM SERPL-MCNC: 8.3 MG/DL (ref 8.7–10.5)
CHLORIDE SERPL-SCNC: 97 MMOL/L (ref 95–110)
CO2 SERPL-SCNC: 30 MMOL/L (ref 23–29)
CREAT SERPL-MCNC: 1.3 MG/DL (ref 0.5–1.4)
DIFFERENTIAL METHOD: ABNORMAL
EOSINOPHIL # BLD AUTO: 0.3 K/UL (ref 0–0.5)
EOSINOPHIL NFR BLD: 2.9 % (ref 0–8)
ERYTHROCYTE [DISTWIDTH] IN BLOOD BY AUTOMATED COUNT: 14.8 % (ref 11.5–14.5)
EST. GFR  (AFRICAN AMERICAN): 40.3 ML/MIN/1.73 M^2
EST. GFR  (NON AFRICAN AMERICAN): 35 ML/MIN/1.73 M^2
GLUCOSE SERPL-MCNC: 128 MG/DL (ref 70–110)
HCT VFR BLD AUTO: 34.5 % (ref 37–48.5)
HGB BLD-MCNC: 11.1 G/DL (ref 12–16)
IMM GRANULOCYTES # BLD AUTO: 0.06 K/UL (ref 0–0.04)
IMM GRANULOCYTES NFR BLD AUTO: 0.7 % (ref 0–0.5)
LYMPHOCYTES # BLD AUTO: 1 K/UL (ref 1–4.8)
LYMPHOCYTES NFR BLD: 10.9 % (ref 18–48)
MAGNESIUM SERPL-MCNC: 2.3 MG/DL (ref 1.6–2.6)
MCH RBC QN AUTO: 28.9 PG (ref 27–31)
MCHC RBC AUTO-ENTMCNC: 32.2 G/DL (ref 32–36)
MCV RBC AUTO: 90 FL (ref 82–98)
MONOCYTES # BLD AUTO: 1.4 K/UL (ref 0.3–1)
MONOCYTES NFR BLD: 15 % (ref 4–15)
NEUTROPHILS # BLD AUTO: 6.4 K/UL (ref 1.8–7.7)
NEUTROPHILS NFR BLD: 69.8 % (ref 38–73)
NRBC BLD-RTO: 0 /100 WBC
PLATELET # BLD AUTO: 280 K/UL (ref 150–350)
PMV BLD AUTO: 12.4 FL (ref 9.2–12.9)
POCT GLUCOSE: 176 MG/DL (ref 70–110)
POCT GLUCOSE: 198 MG/DL (ref 70–110)
POCT GLUCOSE: 214 MG/DL (ref 70–110)
POCT GLUCOSE: 223 MG/DL (ref 70–110)
POCT GLUCOSE: 253 MG/DL (ref 70–110)
POTASSIUM SERPL-SCNC: 4.7 MMOL/L (ref 3.5–5.1)
PROT SERPL-MCNC: 6.1 G/DL (ref 6–8.4)
RBC # BLD AUTO: 3.84 M/UL (ref 4–5.4)
SODIUM SERPL-SCNC: 133 MMOL/L (ref 136–145)
WBC # BLD AUTO: 9.09 K/UL (ref 3.9–12.7)

## 2020-04-04 PROCEDURE — 97165 OT EVAL LOW COMPLEX 30 MIN: CPT | Mod: HCNC

## 2020-04-04 PROCEDURE — 11000001 HC ACUTE MED/SURG PRIVATE ROOM: Mod: HCNC

## 2020-04-04 PROCEDURE — 97530 THERAPEUTIC ACTIVITIES: CPT | Mod: HCNC

## 2020-04-04 PROCEDURE — 97162 PT EVAL MOD COMPLEX 30 MIN: CPT | Mod: HCNC

## 2020-04-04 PROCEDURE — 99231 PR SUBSEQUENT HOSPITAL CARE,LEVL I: ICD-10-PCS | Mod: GT,HCNC,, | Performed by: INTERNAL MEDICINE

## 2020-04-04 PROCEDURE — 99231 SBSQ HOSP IP/OBS SF/LOW 25: CPT | Mod: GT,HCNC,, | Performed by: INTERNAL MEDICINE

## 2020-04-04 PROCEDURE — 80053 COMPREHEN METABOLIC PANEL: CPT | Mod: HCNC

## 2020-04-04 PROCEDURE — 94761 N-INVAS EAR/PLS OXIMETRY MLT: CPT | Mod: HCNC

## 2020-04-04 PROCEDURE — 25000003 PHARM REV CODE 250: Mod: HCNC | Performed by: STUDENT IN AN ORGANIZED HEALTH CARE EDUCATION/TRAINING PROGRAM

## 2020-04-04 PROCEDURE — 83735 ASSAY OF MAGNESIUM: CPT | Mod: HCNC

## 2020-04-04 PROCEDURE — 97535 SELF CARE MNGMENT TRAINING: CPT | Mod: HCNC

## 2020-04-04 PROCEDURE — 36415 COLL VENOUS BLD VENIPUNCTURE: CPT | Mod: HCNC

## 2020-04-04 PROCEDURE — 85025 COMPLETE CBC W/AUTO DIFF WBC: CPT | Mod: HCNC

## 2020-04-04 PROCEDURE — 25000003 PHARM REV CODE 250: Mod: HCNC | Performed by: INTERNAL MEDICINE

## 2020-04-04 PROCEDURE — 27000221 HC OXYGEN, UP TO 24 HOURS: Mod: HCNC

## 2020-04-04 RX ORDER — CALCIUM CITRATE/VITAMIN D3 200MG-6.25
TABLET ORAL
Qty: 300 STRIP | Refills: 3 | Status: SHIPPED | OUTPATIENT
Start: 2020-04-04 | End: 2020-04-09 | Stop reason: SDUPTHER

## 2020-04-04 RX ORDER — PEN NEEDLE, DIABETIC 31 GX5/16"
NEEDLE, DISPOSABLE MISCELLANEOUS
Qty: 100 EACH | Refills: 3 | Status: SHIPPED | OUTPATIENT
Start: 2020-04-04 | End: 2020-04-09 | Stop reason: SDUPTHER

## 2020-04-04 RX ADMIN — LATANOPROST 1 DROP: 50 SOLUTION OPHTHALMIC at 09:04

## 2020-04-04 RX ADMIN — ISOSORBIDE MONONITRATE 60 MG: 60 TABLET, EXTENDED RELEASE ORAL at 08:04

## 2020-04-04 RX ADMIN — INSULIN DETEMIR 6 UNITS: 100 INJECTION, SOLUTION SUBCUTANEOUS at 09:04

## 2020-04-04 RX ADMIN — INSULIN ASPART 4 UNITS: 100 INJECTION, SOLUTION INTRAVENOUS; SUBCUTANEOUS at 08:04

## 2020-04-04 RX ADMIN — POLYETHYLENE GLYCOL 3350 17 G: 17 POWDER, FOR SOLUTION ORAL at 08:04

## 2020-04-04 RX ADMIN — APIXABAN 2.5 MG: 2.5 TABLET, FILM COATED ORAL at 08:04

## 2020-04-04 RX ADMIN — METOPROLOL TARTRATE 50 MG: 50 TABLET, FILM COATED ORAL at 08:04

## 2020-04-04 RX ADMIN — DORZOLAMIDE HYDROCHLORIDE 1 DROP: 20 SOLUTION/ DROPS OPHTHALMIC at 09:04

## 2020-04-04 RX ADMIN — INSULIN ASPART 2 UNITS: 100 INJECTION, SOLUTION INTRAVENOUS; SUBCUTANEOUS at 04:04

## 2020-04-04 RX ADMIN — ATORVASTATIN CALCIUM 40 MG: 20 TABLET, FILM COATED ORAL at 08:04

## 2020-04-04 RX ADMIN — SENNOSIDES 8.6 MG: 8.6 TABLET, FILM COATED ORAL at 08:04

## 2020-04-04 RX ADMIN — INSULIN ASPART 2 UNITS: 100 INJECTION, SOLUTION INTRAVENOUS; SUBCUTANEOUS at 11:04

## 2020-04-04 RX ADMIN — AMLODIPINE BESYLATE 5 MG: 5 TABLET ORAL at 08:04

## 2020-04-04 RX ADMIN — FUROSEMIDE 40 MG: 40 TABLET ORAL at 05:04

## 2020-04-04 RX ADMIN — LEVOTHYROXINE SODIUM 75 MCG: 75 TABLET ORAL at 06:04

## 2020-04-04 RX ADMIN — INSULIN ASPART 2 UNITS: 100 INJECTION, SOLUTION INTRAVENOUS; SUBCUTANEOUS at 07:04

## 2020-04-04 RX ADMIN — FUROSEMIDE 40 MG: 40 TABLET ORAL at 09:04

## 2020-04-04 NOTE — PLAN OF CARE
Problem: Fall Injury Risk  Goal: Absence of Fall and Fall-Related Injury  Outcome: Ongoing, Progressing     Problem: Adult Inpatient Plan of Care  Goal: Plan of Care Review  Outcome: Ongoing, Progressing   Pt AAOx 3 Sitting up in bed. Pt NAD; respirations 18, even, and unlabored. Pt Skin is war,, dry, and intact. Pt IV access patent and flushed without difficulty. Pt VSS. Pt Afebrile throughout the night. Pt Pt taken all medications as ordered per MD. Pt informed of POC. Pt tolerating well; verbalize understanding. Pt mainatinaing a 95-96% oxygen saturation on 2 L NC. Pt tolerating well. Pt denies any SOB or distress with breathing. Pt remains free of falls. Pt bed is locked, set in lowest position and call light within reach. Pt bedside table has personal belongings within reach. Will continue to monitor. Pt airborne, droplet, and contact isolations maintained and strictly enforced.

## 2020-04-04 NOTE — PT/OT/SLP EVAL
"Occupational Therapy   Evaluation    Name: Tiana Mead  MRN: [id number]  Admitting Diagnosis:  COVID-19 virus infection      Recommendations:     Discharge Recommendations: home health PT, home health OT  Discharge Equipment Recommendations:  wheelchair  Barriers to discharge:  Other (Comment)(Oxygen demands, need for family support)    Assessment:     Tiana Mead is a 89 y.o. female with a medical diagnosis of COVID-19 virus infection.  She presents with performance deficits affecting function: weakness, impaired endurance, impaired self care skills, impaired functional mobilty, gait instability, decreased safety awareness, impaired cardiopulmonary response to activity.  Pt presenting with impaired functional endurance and fluctuating O2 sats throughout today's session, closely monitored and appropriate rest breaks encouraged, patient with fair insight into deficits. Would benefit from 24 hr supervision from family upon d/c home with HHOT.     Rehab Prognosis: Good; patient would benefit from acute skilled OT services to address these deficits and reach maximum level of function.       Plan:     Patient to be seen 4 x/week to address the above listed problems via self-care/home management, therapeutic activities, therapeutic exercises  · Plan of Care Expires: 05/02/20  · Plan of Care Reviewed with: patient    Subjective     Chief Complaint: "i'm just tired"   Patient/Family Comments/goals:  maximize return to PLOF    Occupational Profile:  Living Environment: Patient lives in [facility], [address], [facility] with tub bench with daughter  Previous level of function: Independent in ADLs, assistance from daughter with IADLs, mobility with rollator  Roles and Routines: mother, grandmother, retired aid to elderly, active socially and in community  Equipment Used at Home:  bedside commode, bath bench, walker, rolling, rollator  Assistance upon Discharge: supportive family    Pain/Comfort:  · Pain Rating 1: other (see " comments)(endorsed back pain , unrated)  · Location - Side 1: Bilateral  · Location - Orientation 1: generalized  · Location 1: back  · Pain Addressed 1: Reposition, Distraction  · Pain Rating Post-Intervention 1: 0/10(resting comfortably )    Patients cultural, spiritual, Adventism conflicts given the current situation: no    Objective:     Communicated with: RN prior to session.  Patient found HOB elevated with telemetry, oxygen, PureWick upon OT entry to room.    General Precautions: Standard, fall, contact, airborne, droplet   Orthopedic Precautions:N/A   Braces: N/A     Occupational Performance:    Bed Mobility:    · Patient completed Rolling/Turning to Right with minimum assistance  · Patient completed Scooting/Bridging with minimum assistance  · Patient completed Supine to Sit with minimum assistance  · Patient completed Sit to Supine with contact guard assistance    Functional Mobility/Transfers:  · Patient completed Sit <> Stand Transfer with contact guard assistance and minimum assistance  with  hand-held assist   · Functional Mobility: OOB endurance limited by fatigue and patient request to return to supine. HOB elevated, patient encouraged to maximize time OOB this date.     Activities of Daily Living:  · Toileting: purewcik in place, bedside commode in room 1 person assist    Cognitive/Visual Perceptual:  Cognitive/Psychosocial Skills:     -       Oriented to: Person, Place, Time, Situation and pleasantly confused   -       Follows Commands/attention:Follows one-step commands  -       Communication: clear/fluent  -       Memory: Poor immediate recall  -       Safety awareness/insight to disability: impaired and patient with limited insight into fatigue   -       Mood/Affect/Coping skills/emotional control: Appropriate to situation, Cooperative and Lethargic  Visual/Perceptual:      -Intact  pt wears glasses    Physical Exam:  Postural examination/scapula alignment:    -       Rounded shoulders  -        Forward head  -       Posterior pelvic tilt  Dominant hand:    -       R handed  Upper Extremity Range of Motion:     -       Right Upper Extremity: WFL  -       Left Upper Extremity: WFL  Upper Extremity Strength:    -       Right Upper Extremity: WFL except AROM limited by fatigue  -       Left Upper Extremity: WFL except AROM limited by fatigue   Strength:    -       Right Upper Extremity: WFL  -       Left Upper Extremity: WFL    AMPAC 6 Click ADL:  AMPAC Total Score: 19    Treatment & Education:  -Pt education on OT role and POC   -Importance of OOB activity with staff assistance  -Safety during functional transfer and mobility  -White board updated  -Multiple self-care tasks and functional mobility completed -- assistance level noted above  -All questions and concerns answered within OT scope of practice.     Education:    Patient left with bed in chair position with all lines intact, call button in reach, bed alarm on and RN notified    GOALS:   Multidisciplinary Problems     Occupational Therapy Goals        Problem: Occupational Therapy Goal    Goal Priority Disciplines Outcome Interventions   Occupational Therapy Goal     OT, PT/OT Ongoing, Progressing    Description:  Goals set on 4/4, with expiration date 4/16:  Patient will increase functional independence with ADLs by performing:    Supine <> Sit with Stand-by Assistance.  Feeding with Stand-by Assistance.  Grooming while standing at sink with Stand-by Assistance.  UB Dressing with Stand-by Assistance.  LB Dressing with Stand-by Assistance.  Step transfer with Stand-by Assistance with DME as needed.  Pt will demonstrate understanding of education provided regarding energy conservation and task modification through teach-back method.                           History:     Past Medical History:   Diagnosis Date    Allergy     Anemia     Arthritis     Asthma     CHF (congestive heart failure) 5/2/2017    Chronic kidney disease     Degenerative  disc disease     Diabetes mellitus type II     Essential hypertension 7/3/2012    Glaucoma     History of pseudogout 8/23/2012    Hyperlipidemia     Hypertension     Iritis     Myocardial infarction     Pneumonia     Thyroid disease        Past Surgical History:   Procedure Laterality Date    CARDIAC CATHETERIZATION      CATARACT EXTRACTION W/  INTRAOCULAR LENS IMPLANT Left n/a    CATARACT EXTRACTION W/  INTRAOCULAR LENS IMPLANT Right 10/8/2018    With Femtosecond LASER assist (Dr. Henson)    CHOLECYSTECTOMY      EYE SURGERY      gall stone      HYSTERECTOMY      VARICOSE VEIN SURGERY         Time Tracking:     OT Date of Treatment: 04/04/20  OT Start Time: 0942  OT Stop Time: 1016  OT Total Time (min): 34 min CoEval with PT    Billable Minutes:Evaluation 15  Self Care/Home Management 19    Christine Rivas, OT  4/4/2020

## 2020-04-04 NOTE — PROGRESS NOTES
Hospital Medicine  Progress Note  [facility]      Patient Name: Tiana Mead  MRN:  [id number]  Hospital Medicine Team: [facility] HOSP MED S Moni Montilla MD  Date of Admission:  3/25/2020     Length of Stay:  LOS: 9 days       Principal Problem:  COVID-19 virus infection      Hospital Course:  Patient admitted for evaluation of possible COVID-19. Patient is COVID-19 positive.    Interval History: Patient was afebrile. Patient reports some symptomatic improvement. Patient now on 2L NC. She was able to ambulate about the room without issue. Patient with improving  Ferritin and CRP today    Review of Systems:  Respiratory: Positive for cough and SOB  Cardiovascular: Negative for chest pain, palpitations, and LE edema   GI: Negative for N/V, abd pain, and diarrhea     Inpatient Medications:    Current Facility-Administered Medications:     acetaminophen tablet 650 mg, 650 mg, Oral, Q6H PRN, Shivani Nelson MD, 650 mg at 04/01/20 0847    albuterol inhaler 2 puff, 2 puff, Inhalation, Q4H PRN, Min Wyatt MD    amLODIPine tablet 5 mg, 5 mg, Oral, Daily, Min Wyatt MD, 5 mg at 04/03/20 0918    apixaban tablet 2.5 mg, 2.5 mg, Oral, BID, Min Wyatt MD, 2.5 mg at 04/03/20 2047    atorvastatin tablet 40 mg, 40 mg, Oral, Daily, Min Wyatt MD, 40 mg at 04/03/20 0917    dextrose 10% (D10W) Bolus, 12.5 g, Intravenous, PRN, Min Wyatt MD    dextrose 10% (D10W) Bolus, 25 g, Intravenous, PRN, Min Wyatt MD    dorzolamide 2 % ophthalmic solution 1 drop, 1 drop, Both Eyes, BID, Min Wyatt MD, 1 drop at 04/03/20 2048    furosemide tablet 40 mg, 40 mg, Oral, BID, Moni Montilla MD, 40 mg at 04/03/20 1728    glucagon (human recombinant) injection 1 mg, 1 mg, Intramuscular, PRN, Min Wyatt MD    glucagon (human recombinant) injection 1 mg, 1 mg, Intramuscular, PRN, Kendy Landon MD    glucose chewable tablet 16 g, 16 g, Oral, PRN, Min Wyatt MD    glucose chewable tablet 16 g, 16 g, Oral,  PRN, Kendy Landon MD    glucose chewable tablet 24 g, 24 g, Oral, PRN, Min Wyatt MD    glucose chewable tablet 24 g, 24 g, Oral, PRN, Kendy Landon MD    insulin aspart U-100 pen 0-5 Units, 0-5 Units, Subcutaneous, QID (AC + HS) PRN, Kendy Landon MD, 1 Units at 04/03/20 2048    insulin aspart U-100 pen 2 Units, 2 Units, Subcutaneous, TIDWM, Moni Montilla MD, 2 Units at 04/03/20 1645    insulin detemir U-100 pen 6 Units, 6 Units, Subcutaneous, Daily, Min Wyatt MD, 6 Units at 04/03/20 0900    isosorbide mononitrate 24 hr tablet 60 mg, 60 mg, Oral, Daily, Min Wyatt MD, 60 mg at 04/03/20 0917    latanoprost 0.005 % ophthalmic solution 1 drop, 1 drop, Both Eyes, QHS, Min Wyatt MD, 1 drop at 04/03/20 2048    levothyroxine tablet 75 mcg, 75 mcg, Oral, Before breakfast, Min Wyatt MD, 75 mcg at 04/03/20 0621    metoprolol tartrate (LOPRESSOR) tablet 50 mg, 50 mg, Oral, BID, Min Wyatt MD, 50 mg at 04/03/20 2048    nitroGLYCERIN SL tablet 0.4 mg, 0.4 mg, Sublingual, Q5 Min PRN, Min Wyatt MD    ondansetron tablet 4 mg, 4 mg, Oral, Q12H PRN, Min Wyatt MD    polyethylene glycol packet 17 g, 17 g, Oral, Daily PRN, Kvng Perez MD, 17 g at 03/27/20 0201    polyethylene glycol packet 17 g, 17 g, Oral, BID, Moni Montilla MD, 17 g at 04/03/20 0920    senna tablet 8.6 mg, 8.6 mg, Oral, Daily PRN, Min Wyatt MD, 8.6 mg at 04/01/20 0846    senna tablet 8.6 mg, 8.6 mg, Oral, QHS, Shivani Nelson MD, 8.6 mg at 04/02/20 2105    sodium chloride 0.9% flush 10 mL, 10 mL, Intravenous, PRN, Claudio Baldwin MD    sodium chloride 0.9% flush 10 mL, 10 mL, Intravenous, PRN, Min Wyatt MD      Physical Exam:      Intake/Output Summary (Last 24 hours) at 4/3/2020 2146  Last data filed at 4/3/2020 0600  Gross per 24 hour   Intake --   Output 800 ml   Net -800 ml     Wt Readings from Last 3 Encounters:   03/28/20 72.4 kg (159 lb 9.8 oz)   02/04/20 72.6 kg (160 lb)   11/20/19 72.6 kg (160 lb)  "      BP (!) 145/65 (BP Location: Left arm, Patient Position: Lying)   Pulse 107   Temp 99.3 °F (37.4 °C)   Resp 18   Ht 5' 8" (1.727 m)   Wt 72.4 kg (159 lb 9.8 oz)   LMP  (LMP Unknown)   SpO2 95%   Breastfeeding? No   BMI 24.27 kg/m²     GEN: NAD, conversant  Resp: coarse bilateral breath sounds, no wheezes or rales, normal work of breathing   CV: RRR, no m/r/g, no edema  GI: soft, NTND  Skin: no rash    Laboratory:  Lab Results   Component Value Date    [id number] Detected (A) 03/25/2020       Recent Labs   Lab 03/28/20  0500 03/30/20  0442 04/02/20  0612   WBC 8.78 10.38 9.43   LYMPH 11.2*  1.0 9.3*  1.0 8.7*  0.8*   HGB 11.5* 12.1 11.6*   HCT 34.8* 37.3 35.6*    223 239     Recent Labs   Lab 03/30/20  0442 03/31/20  0542 04/01/20  0535 04/02/20  0612 04/03/20  1012    138 138 135* 135*   K 3.6 3.9 3.8 4.1 4.6    101 99 98 99   CO2 29 28 30* 28 29   BUN 27 33* 34* 35* 40*   CREATININE 1.4 1.5* 1.4 1.3 1.4   GLU 81 112* 116* 106 164*   CALCIUM 8.6* 8.5* 8.4* 8.4* 8.3*   MG 2.0 2.1 2.2 2.3  --    PHOS 2.9 3.9 2.7  --   --      Recent Labs   Lab 04/01/20  0535 04/02/20  0612 04/03/20  1012   ALKPHOS 120 116 126   ALT 22 21 21   AST 33 34 31   ALBUMIN 1.7* 1.6* 1.6*   PROT 6.1 6.1 6.2   BILITOT 0.6 0.7 0.6        Recent Labs     04/01/20  0535 04/02/20  0612 04/03/20  1012   DDIMER  --  >33.00*  --    FERRITIN  --  805* 767*   .3*  --  164.8*       All labs within the last 24 hours were reviewed.     Microbiology:  Microbiology Results (last 7 days)     Procedure Component Value Units Date/Time    Blood culture x two cultures. Draw prior to antibiotics. [[id number]] Collected:  03/25/20 1036    Order Status:  Completed Specimen:  Blood from Peripheral, Antecubital, Right Updated:  03/30/20 1212     Blood Culture, Routine No Growth after 4 days.     Narrative:       Aerobic and anaerobic    Blood culture x two cultures. Draw prior to antibiotics. [[id number]] Collected:  03/25/20 " 1037    Order Status:  Completed Specimen:  Blood from Peripheral, Hand, Right Updated:  03/30/20 1212     Blood Culture, Routine No Growth after 4 days.     Narrative:       Aerobic and anaerobic            Imaging  ECG Results          EKG 12-lead (Final result)  Result time 03/25/20 15:08:57    Final result by Interface, Lab In [facility] (03/25/20 15:08:57)                 Narrative:    Test Reason : R00.0,    Vent. Rate : 112 BPM     Atrial Rate : 174 BPM     P-R Int : 000 ms          QRS Dur : 098 ms      QT Int : 318 ms       P-R-T Axes : 000 003 -28 degrees     QTc Int : 434 ms    Atrial fibrillation with rapid ventricular response  Nonspecific ST and/or T wave abnormalities  Abnormal ECG  When compared with ECG of 10-OCT-2019 10:30,  No significant change was found  Confirmed by Agatha Rosas MD ([id number]) on 3/25/2020 3:08:52 PM    Referred By: [email or web address]   SELF           Confirmed By:Agatha Rosas MD                              Results for orders placed during the hospital encounter of 07/17/19   Transthoracic echo (TTE) 2D with Color Flow    Narrative · Concentric left ventricular hypertrophy.  · Hyperdynamic left ventricular systolic function. The estimated ejection   fraction is >70%.  · Mildly reduced right ventricular systolic function.  · Severe batrial enlargement.  · Severe tricuspid regurgitation.  · The estimated PA systolic pressure is 56 mm Hg  · Elevated central venous pressure (15 mm Hg).          X-Ray Chest AP Portable  Narrative: EXAMINATION:  XR CHEST AP PORTABLE    CLINICAL HISTORY:  Sepsis;    TECHNIQUE:  Single frontal view of the chest was performed.    COMPARISON:  No 08/23/2019 ne    FINDINGS:  Cardiomegaly.  Ill-defined patchy airspace consolidation  identified in the left upper lobe and at the lung bases.  No significant pleural effusion.  Impression: See above    Electronically signed by: Ino Carrion MD  Date:    03/25/2020  Time:    11:32      All imaging within the last 24 hours was  reviewed.     Assessment and Plan:    Active Hospital Problems    Diagnosis  POA    *COVID-19 virus infection [U07.1]  Yes    Community acquired pneumonia [J18.9]  Yes    H/O Deep vein thrombosis (DVT) [I82.409]  Yes     Chronic    CKD (chronic kidney disease) stage 4, GFR 15-29 ml/min [N18.4]  Yes     Chronic    Type 2 diabetes mellitus with diabetic polyneuropathy, with long-term current use of insulin [E11.42, Z79.4]  Not Applicable     Chronic      Resolved Hospital Problems   No resolved problems to display.       Suspected Covid-19 Virus Infection  Person Under Investigation (PUI) for COVID-19  - COVID-19 testing: Collection Date: 3/25/2020 Collection Time:   2:37 PM  - Infection Control notified    - Isolation:   - Airborne and Droplet Precautions  - Surgical mask on patient   - N95 masks must be fit tested, wear eye protection  - 20 second hand hygiene   - Limit visitors per hospital policy   - Consolidate lab draws, nursing care, and interventions    - Diagnostics: (Lymphopenia, hyponatremia, hyperferritinemia, elevated troponin, elevated d-dimer, age, and comorbidities are significant predictors of poor clinical outcome)   - CBC:   trend Q48hrs  - CMP:        trend Q48hrs  - Procalcitonin:  - D-dimer:  repeat prior to discharge  - Ferritin:  repeat prior to discharge   - CRP:        trend Q48hrs  - LDH:   repeat prior to discharge  - BNP:  - Troponin:    - ECG:   - rapid Flu:   - RIP only if BMT/solid transplant:   - Legionella antigen:   - Blood culture x2:   - Sputum culture:   - CXR:   - UA and culture:   - CPK:    - Management:   Bundle care as able to maintain isolation & minimize in/out of room   - Supplemental O2 to maintain SpO2 92%-96%   if requiring 6L NC or higher, place on nonrebreather and discuss case with MICU   - Telemetry & continuous pulse oximetry    - If wheezing   - albuterol inhaler 2-4 puff Q6hr PRN    - ipratropium daily    - acetaminophen 650mg PO Q6hr PRN fever   - loperamide  PRN for viral diarrhea  - Empiric antibiotics per likely source & patient allergies    - CAP: x 5 day course (d/c early if low concern for bacterial co-infection)  Ceftriaxone 1g IV Q24hrs            Azithromycin 500mg IV day #1, then 250mg PO daily x4 days     If azithromycin is not available, start doxycycline                 If MRSA risk factors, add Vancomycin IV (PharmD consult)   - Investigational Treatment Protocol: (if patient meets criteria)   [email or web address]     - statin: atorvastatin 40mg po daily (if CPK WNL)    - start HCQ 400mg PO BID x1 day, then 400mg PO daily x 4 days   (check glucose 6 phosphate dehydrogenase (NOT G6PD Quant), ECG, and start Qshift POCT glucose)    Safety notes:   - Avoid NIPPV (CPAP/BiPAP) to prevent aerosolization, use on a case-by-case basis if in neg pressure room   - Cautious use of NSAIDS for fever per WHO recommendations (3/16/2020)   - No new ACEi/ARB start or discontinuation of chronic med unless hypotensive (Esler et al. Journal of Hypertension 2020, 38:000-000)   - Careful use of steroids in the absence of other indications (shock, ARDS)   - Fluid sparing resuscitation, avoid maintenance fluids    Advance Care Planning  Goals of care, counseling/discussion  - Discussed the typical clinical course of COVID19 with Tiana Boston, including the potential for acute decompensation requiring intubation and mechanical ventilation.  - Following this discussion, patient/POA requested code status of DNR/DNI, will update EMR and paper chart accordingly.     Hypothyroidism   - Levothyroxine 75 mcg QD  T2DM              - detemir 6U QD              - LDSSI     HFpEF              - Signs of volume overload              -               - strict I/O; daily weights   - will hold IV lasix 2/2 increasing Cr and Bicarb              - resume home furosemide 40mg BID when  euvolemic    HTN   - amlodipine 5 mg QD   - isosorbide mononitrate 60 mg QD    CKD 4              - avoid nephrotoxins              - renally dose all medications     AFib              - continue apixaban 2.5mg BID     VTE High Risk Prophylaxis: apixaban    Moni Montilla MD/MS  [facility]   Internal Medicine, PGY-2

## 2020-04-04 NOTE — CONSULTS
Tiana Mead  has been accepted for transfer to [facility] and will be followed through telemedicine services beginning 04/04/20  at 7am.    Kaela Reyes

## 2020-04-04 NOTE — PLAN OF CARE
Problem: Occupational Therapy Goal  Goal: Occupational Therapy Goal  Description  Goals set on 4/4, with expiration date 4/16:  Patient will increase functional independence with ADLs by performing:    Supine <> Sit with Stand-by Assistance.  Feeding with Stand-by Assistance.  Grooming while standing at sink with Stand-by Assistance.  UB Dressing with Stand-by Assistance.  LB Dressing with Stand-by Assistance.  Step transfer with Stand-by Assistance with DME as needed.  Pt will demonstrate understanding of education provided regarding energy conservation and task modification through teach-back method.          Outcome: Ongoing, Progressing     Eval complete. Pt tolerated session well. Initiate OT POC.    SHAKIR Govea  04/04/2020

## 2020-04-04 NOTE — PLAN OF CARE
PT evaluation complete and appropriate goals established.    Uma Bolanos, PT, DPT   4/4/2020  [phone]

## 2020-04-04 NOTE — PT/OT/SLP EVAL
Physical Therapy Evaluation    Patient Name:  Tiana Mead   MRN:  [id number]    Recommendations:     Discharge Recommendations:  home health PT, home health OT(with family assist)   Discharge Equipment Recommendations: wheelchair   Barriers to discharge: None    Assessment:     Tiana Mead is a 89 y.o. female admitted with a medical diagnosis of COVID-19 virus infection.  She presents with the following impairments/functional limitations:  weakness, impaired functional mobilty, impaired endurance, gait instability, impaired balance, impaired self care skills, impaired cardiopulmonary response to activity. Pt completing functional mobility with CGA-Kaylynn. Able to perform static standing with balance assist; however, unable to progress mobility further due to weakness, impaired endurance, and decreased activity tolerance. Pt would continue to benefit from skilled acute PT in order to address current deficits and progress functional mobility.     Rehab Prognosis: Good; patient would benefit from acute skilled PT services to address these deficits and reach maximum level of function.    Recent Surgery: * No surgery found *      Plan:     During this hospitalization, patient to be seen 4 x/week to address the identified rehab impairments via gait training, therapeutic activities, therapeutic exercises, neuromuscular re-education and progress toward the following goals:    · Plan of Care Expires:  05/03/20    Subjective     Chief Complaint: weakness   Patient/Family Comments/goals: return home  Pain/Comfort:  · Pain Rating 1: (reported back pain; did not rate)  · Pain Addressed 1: Reposition, Distraction    Patients cultural, spiritual, Orthodox conflicts given the current situation: no    Living Environment:  Pt lives with her daughter in a 1SH with 0 CHEERLLE through garage entrance.   Prior to admission, patient used rollator for mobility and was mod-I with ADLs.  Equipment used at home: bath bench, bedside commode,  walker, rolling, rollator.  Upon discharge, patient will have assistance from daughter.    Objective:     Communicated with RN prior to session.  Patient found supine with telemetry, oxygen, PureWick  upon PT entry to room.    General Precautions: Standard, fall, contact, airborne, droplet(COVID+)   Orthopedic Precautions:N/A   Braces: N/A     Exams:  · Cognitive Exam:  Patient is oriented to Person, Place, Time and Situation  · Sensation:    · -       Intact  · RLE ROM: WFL  · RLE Strength: WFL  · LLE ROM: WFL  · LLE Strength: WFL    Functional Mobility:  · Bed Mobility:     · Scooting: maximal assistance and of 2 persons, supine with drawsheet to HOB  · Supine to Sit: minimum assistance, with HOB elevated and using side rail  · Sit to Supine: contact guard assistance  · Transfers:     · Sit to Stand:  minimum assistance with no AD from EOB  · Balance:   · static standing: Kaylynn with B HHA; reported weakness while standing and requested return to supine      Therapeutic Activities and Exercises:   Pt educated on role of PT and PT POC. Pt verbalized understanding.   SpO2 monitored throughout session. SpO2 85-86% with sitting EOB and 83% with standing EOB. Increased supplemental O2 4>>5L to facilitate return to >90%. Cues for breathing technique provided. SpO2 94% on 4L at end of session.   Pt educated on importance of OOB activity and encouraged sitting UIC. Pt requesting return to supine at end of therapy session, but reports plan to transfer to chair with RN assist at approx noon. RN notified.     AM-PAC 6 CLICK MOBILITY  Total Score:16     Patient left supine with all lines intact, call button in reach and RN present.    GOALS:   Multidisciplinary Problems     Physical Therapy Goals        Problem: Physical Therapy Goal    Goal Priority Disciplines Outcome Goal Variances Interventions   Physical Therapy Goal     PT, PT/OT Ongoing, Progressing     Description:  Goals to be met by: 4/14/2020     Patient will  increase functional independence with mobility by performin. Supine to sit with Stand-by Assistance  2. Sit to stand transfer with Contact Guard Assistance  3. Bed to chair transfer with Contact Guard Assistance using AD as needed  4. Gait  x 25 feet with Contact Guard Assistance using AD as needed.  5. Lower extremity exercise program x15 reps, with supervision, in order to increase LE strength and (I) with functional mobility.                        History:     Past Medical History:   Diagnosis Date    Allergy     Anemia     Arthritis     Asthma     CHF (congestive heart failure) 2017    Chronic kidney disease     Degenerative disc disease     Diabetes mellitus type II     Essential hypertension 7/3/2012    Glaucoma     History of pseudogout 2012    Hyperlipidemia     Hypertension     Iritis     Myocardial infarction     Pneumonia     Thyroid disease        Past Surgical History:   Procedure Laterality Date    CARDIAC CATHETERIZATION      CATARACT EXTRACTION W/  INTRAOCULAR LENS IMPLANT Left n/a    CATARACT EXTRACTION W/  INTRAOCULAR LENS IMPLANT Right 10/8/2018    With Femtosecond LASER assist (Dr. Henson)    CHOLECYSTECTOMY      EYE SURGERY      gall stone      HYSTERECTOMY      VARICOSE VEIN SURGERY         Time Tracking:     PT Received On: 20  PT Start Time: 0942     PT Stop Time: 1016  PT Total Time (min): 34 min     Billable Minutes: Evaluation 20 and Therapeutic Activity 14  (co-eval with OT)    Uma Bolanos, PT, DPT   2020  [phone]

## 2020-04-04 NOTE — NURSING
Home Oxygen Evaluation    Date Performed: 4/4/2020    1) Patient's Home O2 Sat on room air, while at rest: 86%        If O2 sats on room air at rest are 88% or below, patient qualifies. No additional testing needed. Document N/A in steps 2 and 3. If 89% or above, complete steps 2.      2) Patient's O2 Sat on room air while exercising: N/A        If O2 sats on room air while exercising remain 89% or above patient does not qualify, no further testing needed Document N/A in step 3. If O2 sats on room air while exercising are 88% or below, continue to step 3.      3) Patient's O2 Sat while exercising on O2:   N/A           (Must show improvement from #2 for patients to qualify)    If O2 sats improve on oxygen, patient qualifies for portable oxygen. If not, the patient does not qualify.

## 2020-04-04 NOTE — PROGRESS NOTES
Hospital Medicine / Tele Medicine  Progress Note  [facility]      Patient Name: Tiana Mead  MRN:  [id number]  Hospital Medicine Team: VIRTUAL HOSPITAL MEDICINE TEAM 8 Rod Peterson MD  Date of Admission:  3/25/2020     Length of Stay:  LOS: 10 days     VIRTUAL TELENOTE    Start time: 8:45 AM  Chief complaint: Suspect COVID-19  End time:  9AM  Total time spent with patient: 15 mins    The attending portion of this evaluation, treatment, and documentation was performed per oRd Peterson MD via audio only.    Principal Problem:  COVID-19 virus infection      Hospital Course:  Patient admitted for COVID-19 infection.    Interval History:     Transferred to Telemedicine 4/4. Patient noted to be hemodynamically stable.  No acute events overnight.  Patient continues to complain of some shortness of breath, especially dyspnea on exertion but overall much improved.  Patient continues to have mild cough with minimal sputum production.  Appetite good,  No nausea, vomiting.  Encourage use of incentive spirometer, albuterol inhaler, p.r.n. antitussives.  Attempt to get out of bed, sit up in chair and walk around the room. 6 min oxygen ordered.     Review of Systems:  ROS (Positive in Bold, otherwise negative)  Constitutional: fever, chills, night sweats, malaise, weakness  CV: chest pain, edema, palpitations  Resp: SOB, cough, sputum production  GI: changes in appetite, NVDC, pain, melena, hematochezia, GERD, hematemesis  : Dysuria, hematuria, urinary urgency, frequency  MSK: arthralgia/myalgia, joint swelling  All other systems were reviewed & are negative.     Inpatient Medications:    Current Facility-Administered Medications:     acetaminophen tablet 650 mg, 650 mg, Oral, Q6H PRN, Shivani Nelson MD, 650 mg at 04/01/20 0847    albuterol inhaler 2 puff, 2 puff, Inhalation, Q4H PRN, Min Wyatt MD    amLODIPine tablet 5 mg, 5 mg, Oral, Daily, Min Wyatt MD, 5 mg at 04/04/20 0855     apixaban tablet 2.5 mg, 2.5 mg, Oral, BID, Min Wyatt MD, 2.5 mg at 04/04/20 0854    atorvastatin tablet 40 mg, 40 mg, Oral, Daily, Min Wyatt MD, 40 mg at 04/04/20 0855    dextrose 10% (D10W) Bolus, 12.5 g, Intravenous, PRN, Min Wyatt MD    dextrose 10% (D10W) Bolus, 25 g, Intravenous, PRN, Min Wyatt MD    dorzolamide 2 % ophthalmic solution 1 drop, 1 drop, Both Eyes, BID, Min Wyatt MD, 1 drop at 04/04/20 0900    furosemide tablet 40 mg, 40 mg, Oral, BID, Moni Montilla MD, 40 mg at 04/04/20 0900    glucagon (human recombinant) injection 1 mg, 1 mg, Intramuscular, PRN, Min Wyatt MD    glucagon (human recombinant) injection 1 mg, 1 mg, Intramuscular, PRN, Kendy Landon MD    glucose chewable tablet 16 g, 16 g, Oral, PRN, Min Wyatt MD    glucose chewable tablet 16 g, 16 g, Oral, PRN, Kendy Landon MD    glucose chewable tablet 24 g, 24 g, Oral, PRN, Min Wyatt MD    glucose chewable tablet 24 g, 24 g, Oral, PRN, Kendy Landon MD    insulin aspart U-100 pen 0-5 Units, 0-5 Units, Subcutaneous, QID (AC + HS) PRN, Kendy Landon MD, 4 Units at 04/04/20 0800    insulin aspart U-100 pen 2 Units, 2 Units, Subcutaneous, TIDWM, Moni Montilla MD, 2 Units at 04/04/20 1130    insulin detemir U-100 pen 6 Units, 6 Units, Subcutaneous, Daily, Min Wyatt MD, 6 Units at 04/04/20 0900    isosorbide mononitrate 24 hr tablet 60 mg, 60 mg, Oral, Daily, Min Wyatt MD, 60 mg at 04/04/20 0856    latanoprost 0.005 % ophthalmic solution 1 drop, 1 drop, Both Eyes, QHS, Min Wyatt MD, 1 drop at 04/03/20 2048    levothyroxine tablet 75 mcg, 75 mcg, Oral, Before breakfast, Min Wyatt MD, 75 mcg at 04/04/20 0609    metoprolol tartrate (LOPRESSOR) tablet 50 mg, 50 mg, Oral, BID, Min Wyatt MD, 50 mg at 04/04/20 0855    nitroGLYCERIN SL tablet 0.4 mg, 0.4 mg, Sublingual, Q5 Min PRN, Min Wyatt MD    ondansetron tablet 4 mg, 4 mg, Oral, Q12H PRN, Min Wyatt MD    polyethylene glycol packet 17 g, 17  "g, Oral, Daily PRN, Kvng Perez MD, 17 g at 03/27/20 0201    polyethylene glycol packet 17 g, 17 g, Oral, BID, Moni Montilla MD, 17 g at 04/04/20 0855    senna tablet 8.6 mg, 8.6 mg, Oral, Daily PRN, Min Wyatt MD, 8.6 mg at 04/01/20 0846    senna tablet 8.6 mg, 8.6 mg, Oral, QHS, Shivani Nelson MD, 8.6 mg at 04/02/20 2105    sodium chloride 0.9% flush 10 mL, 10 mL, Intravenous, PRN, Claudio Baldwin MD    sodium chloride 0.9% flush 10 mL, 10 mL, Intravenous, PRN, Min Wyatt MD      Physical Exam:    No intake or output data in the 24 hours ending 04/04/20 1543  Wt Readings from Last 3 Encounters:   03/28/20 72.4 kg (159 lb 9.8 oz)   02/04/20 72.6 kg (160 lb)   11/20/19 72.6 kg (160 lb)       /67 (Patient Position: Lying)   Pulse 87   Temp 97.3 °F (36.3 °C) (Oral)   Resp (!) 22   Ht 5' 8" (1.727 m)   Wt 72.4 kg (159 lb 9.8 oz)   LMP  (LMP Unknown)   SpO2 98%   Breastfeeding? No   BMI 24.27 kg/m²     Done by RN:  GEN: NAD  HEENT: EOMI  CV: RRR  Resp: coarse bilateral breath sounds, no wheezes or rales, normal work of breathing   GI: soft, NTND  Neuro: alert, oriented  Skin: no rash  MSK: no edema  Psych: normal affect    Laboratory:  Lab Results   Component Value Date    [id number] Detected (A) 03/25/2020       Recent Labs   Lab 03/30/20  0442 04/02/20  0612 04/04/20  0458   WBC 10.38 9.43 9.09   LYMPH 9.3*  1.0 8.7*  0.8* 10.9*  1.0   HGB 12.1 11.6* 11.1*   HCT 37.3 35.6* 34.5*    239 280     Recent Labs   Lab 03/30/20  0442 03/31/20  0542 04/01/20  0535 04/02/20  0612 04/03/20  1012 04/04/20  0458    138 138 135* 135* 133*   K 3.6 3.9 3.8 4.1 4.6 4.7    101 99 98 99 97   CO2 29 28 30* 28 29 30*   BUN 27 33* 34* 35* 40* 36*   CREATININE 1.4 1.5* 1.4 1.3 1.4 1.3   GLU 81 112* 116* 106 164* 128*   CALCIUM 8.6* 8.5* 8.4* 8.4* 8.3* 8.3*   MG 2.0 2.1 2.2 2.3  --  2.3   PHOS 2.9 3.9 2.7  --   --   --      Recent Labs   Lab 04/02/20  0612 04/03/20  1012 " 04/04/20  0458   ALKPHOS 116 126 121   ALT 21 21 20   AST 34 31 33   ALBUMIN 1.6* 1.6* 1.5*   PROT 6.1 6.2 6.1   BILITOT 0.7 0.6 0.5        Recent Labs     04/02/20  0612 04/03/20  1012   DDIMER >33.00*  --    FERRITIN 805* 767*   CRP  --  164.8*       All labs within the last 24 hours were reviewed.     Microbiology:  Microbiology Results (last 7 days)     Procedure Component Value Units Date/Time    Blood culture x two cultures. Draw prior to antibiotics. [[id number]] Collected:  03/25/20 1036    Order Status:  Completed Specimen:  Blood from Peripheral, Antecubital, Right Updated:  03/30/20 1212     Blood Culture, Routine No Growth after 4 days.     Narrative:       Aerobic and anaerobic    Blood culture x two cultures. Draw prior to antibiotics. [[id number]] Collected:  03/25/20 1037    Order Status:  Completed Specimen:  Blood from Peripheral, Hand, Right Updated:  03/30/20 1212     Blood Culture, Routine No Growth after 4 days.     Narrative:       Aerobic and anaerobic            Imaging  ECG Results          EKG 12-lead (Final result)  Result time 03/25/20 15:08:57    Final result by Interface, Lab In [facility] (03/25/20 15:08:57)                 Narrative:    Test Reason : R00.0,    Vent. Rate : 112 BPM     Atrial Rate : 174 BPM     P-R Int : 000 ms          QRS Dur : 098 ms      QT Int : 318 ms       P-R-T Axes : 000 003 -28 degrees     QTc Int : 434 ms    Atrial fibrillation with rapid ventricular response  Nonspecific ST and/or T wave abnormalities  Abnormal ECG  When compared with ECG of 10-OCT-2019 10:30,  No significant change was found  Confirmed by Alison ROBLES, Agatha ([id number]) on 3/25/2020 3:08:52 PM    Referred By: [email or web address]   SELF           Confirmed By:Agatha Rosas MD                              Results for orders placed during the hospital encounter of 07/17/19   Transthoracic echo (TTE) 2D with Color Flow    Narrative · Concentric left ventricular hypertrophy.  · Hyperdynamic left ventricular systolic  function. The estimated ejection   fraction is >70%.  · Mildly reduced right ventricular systolic function.  · Severe batrial enlargement.  · Severe tricuspid regurgitation.  · The estimated PA systolic pressure is 56 mm Hg  · Elevated central venous pressure (15 mm Hg).          X-Ray Chest AP Portable  Narrative: EXAMINATION:  XR CHEST AP PORTABLE    CLINICAL HISTORY:  Sepsis;    TECHNIQUE:  Single frontal view of the chest was performed.    COMPARISON:  No 08/23/2019 ne    FINDINGS:  Cardiomegaly.  Ill-defined patchy airspace consolidation  identified in the left upper lobe and at the lung bases.  No significant pleural effusion.  Impression: See above    Electronically signed by: Ino Carrion MD  Date:    03/25/2020  Time:    11:32      All imaging within the last 24 hours was reviewed.     Assessment and Plan:    Active Hospital Problems    Diagnosis  POA    *COVID-19 virus infection [U07.1]  Yes    Community acquired pneumonia [J18.9]  Yes    H/O Deep vein thrombosis (DVT) [I82.409]  Yes     Chronic    CKD (chronic kidney disease) stage 4, GFR 15-29 ml/min [N18.4]  Yes     Chronic    Type 2 diabetes mellitus with diabetic polyneuropathy, with long-term current use of insulin [E11.42, Z79.4]  Not Applicable     Chronic      Resolved Hospital Problems   No resolved problems to display.     Suspected Covid-19 Virus Infection  Person Under Investigation (PUI) for COVID-19  - COVID-19 testing: Collection Date: 3/25/2020 Collection Time:   2:37 PM  - Infection Control notified     - Isolation:   - Airborne and Droplet Precautions  - Surgical mask on patient   - N95 masks must be fit tested, wear eye protection  - 20 second hand hygiene              - Limit visitors per hospital policy              - Consolidate lab draws, nursing care, and interventions     - Diagnostics: (Lymphopenia, hyponatremia, hyperferritinemia, elevated troponin, elevated d-dimer, age, and comorbidities are significant predictors of poor  "clinical outcome)              - CBC:        stable                 trend Q48hrs  - CMP:        stable                 trend Q48hrs  - Procalcitonin:   - D-dimer:     > 33               repeat prior to discharge  - Ferritin:   657 --> 805 --> 767                   repeat prior to discharge   - CRP:   197 --> 164                      trend Q48hrs  - LDH:    431 --> 383                      repeat prior to discharge  - BNP: 714  - Troponin:     0.023                - ECG: 3/25 - AF with AVR              - rapid Flu: neg               - RIP only if BMT/solid transplant:              - Legionella antigen:              - Blood culture x2:              - Sputum culture:              - CXR: "Cardiomegaly.  Ill-defined patchy airspace consolidation  identified in the left upper lobe and at the lung bases.  No significant pleural effusion."              - UA and culture:              - CPK:     - Management:   Bundle care as able to maintain isolation & minimize in/out of room   - Supplemental O2 to maintain SpO2 92%-96%   if requiring 6L NC or higher, place on nonrebreather and discuss case with MICU              - Telemetry & continuous pulse oximetry               - If wheezing   - albuterol inhaler 2-4 puff Q6hr PRN    - ipratropium daily               - acetaminophen 650mg PO Q6hr PRN fever              - loperamide PRN for viral diarrhea  - Empiric antibiotics per likely source & patient allergies                          - CAP: x 5 day course (d/c early if low concern for bacterial co-infection)  Ceftriaxone 1g IV Q24hrs                                      Azithromycin 500mg IV day #1, then 250mg PO daily x4 days                                      If azithromycin is not available, start doxycycline                                      If MRSA risk factors, add Vancomycin IV (PharmD consult)              - Investigational Treatment Protocol: (if patient meets criteria) "   [email or web address]%[email or web address]%20Resources/[email or web address]_[email or web address]_Protocol.[email or web address]                           - statin: atorvastatin 40mg po daily (if CPK WNL)                          - start HCQ 400mg PO BID x1 day, then 400mg PO daily x 4 days   (check glucose 6 phosphate dehydrogenase (NOT G6PD Quant), ECG, and start Qshift POCT glucose)     Safety notes:              - Avoid NIPPV (CPAP/BiPAP) to prevent aerosolization, use on a case-by-case basis if in neg pressure room              - Cautious use of NSAIDS for fever per WHO recommendations (3/16/2020)              - No new ACEi/ARB start or discontinuation of chronic med unless hypotensive (Esler et al. Journal of Hypertension 2020, 38:000-000)              - Careful use of steroids in the absence of other indications (shock, ARDS)              - Fluid sparing resuscitation, avoid maintenance fluids         Advance Care Planning  Goals of care, counseling/discussion  - Discussed the typical clinical course of COVID19 with Tiana Boston, including the potential for acute decompensation requiring intubation and mechanical ventilation.  - Following this discussion, patient/POA requested code status of DNR/DNI, will update EMR and paper chart accordingly.       Hypothyroidism              - Levothyroxine 75 mcg QD    T2DM              - detemir 6U QD              - LDSSI     HFpEF              - Signs of mild volume overload as per prior provider with               -               - started on IV lasix then held due to increasing Cr and Bicarb               - strict I/O; daily weights              - resumed back home furosemide 40mg BID   - resolved issue      HTN              - amlodipine 5 mg QD              - isosorbide mononitrate 60 mg QD     CKD 4              - avoid nephrotoxins              - renally dose all medications     AFib              - continue apixaban 2.5mg BID      VTE High Risk  Prophylaxis: apixaban      Rod Peterson MD  4/4/2020 3:43 PM  Department of Hospital medicine

## 2020-04-05 VITALS
BODY MASS INDEX: 24.19 KG/M2 | WEIGHT: 159.63 LBS | TEMPERATURE: 98 F | OXYGEN SATURATION: 96 % | DIASTOLIC BLOOD PRESSURE: 58 MMHG | SYSTOLIC BLOOD PRESSURE: 131 MMHG | HEART RATE: 97 BPM | RESPIRATION RATE: 18 BRPM | HEIGHT: 68 IN

## 2020-04-05 LAB
CRP SERPL-MCNC: 110.6 MG/L (ref 0–8.2)
POCT GLUCOSE: 121 MG/DL (ref 70–110)
POCT GLUCOSE: 148 MG/DL (ref 70–110)
POCT GLUCOSE: 164 MG/DL (ref 70–110)

## 2020-04-05 PROCEDURE — 36415 COLL VENOUS BLD VENIPUNCTURE: CPT | Mod: HCNC

## 2020-04-05 PROCEDURE — 25000003 PHARM REV CODE 250: Mod: HCNC | Performed by: STUDENT IN AN ORGANIZED HEALTH CARE EDUCATION/TRAINING PROGRAM

## 2020-04-05 PROCEDURE — 99239 PR HOSPITAL DISCHARGE DAY,>30 MIN: ICD-10-PCS | Mod: HCNC,,, | Performed by: INTERNAL MEDICINE

## 2020-04-05 PROCEDURE — 86140 C-REACTIVE PROTEIN: CPT | Mod: HCNC

## 2020-04-05 PROCEDURE — 99239 HOSP IP/OBS DSCHRG MGMT >30: CPT | Mod: HCNC,,, | Performed by: INTERNAL MEDICINE

## 2020-04-05 RX ORDER — ALBUTEROL SULFATE 90 UG/1
2 AEROSOL, METERED RESPIRATORY (INHALATION) EVERY 4 HOURS PRN
Qty: 18 G | Refills: 2 | Status: SHIPPED | OUTPATIENT
Start: 2020-04-05 | End: 2021-05-05 | Stop reason: SDUPTHER

## 2020-04-05 RX ADMIN — POLYETHYLENE GLYCOL 3350 17 G: 17 POWDER, FOR SOLUTION ORAL at 08:04

## 2020-04-05 RX ADMIN — APIXABAN 2.5 MG: 2.5 TABLET, FILM COATED ORAL at 08:04

## 2020-04-05 RX ADMIN — AMLODIPINE BESYLATE 5 MG: 5 TABLET ORAL at 08:04

## 2020-04-05 RX ADMIN — INSULIN DETEMIR 6 UNITS: 100 INJECTION, SOLUTION SUBCUTANEOUS at 09:04

## 2020-04-05 RX ADMIN — DORZOLAMIDE HYDROCHLORIDE 1 DROP: 20 SOLUTION/ DROPS OPHTHALMIC at 09:04

## 2020-04-05 RX ADMIN — INSULIN ASPART 2 UNITS: 100 INJECTION, SOLUTION INTRAVENOUS; SUBCUTANEOUS at 07:04

## 2020-04-05 RX ADMIN — FUROSEMIDE 40 MG: 40 TABLET ORAL at 08:04

## 2020-04-05 RX ADMIN — INSULIN ASPART 2 UNITS: 100 INJECTION, SOLUTION INTRAVENOUS; SUBCUTANEOUS at 04:04

## 2020-04-05 RX ADMIN — LEVOTHYROXINE SODIUM 75 MCG: 75 TABLET ORAL at 05:04

## 2020-04-05 RX ADMIN — INSULIN ASPART 2 UNITS: 100 INJECTION, SOLUTION INTRAVENOUS; SUBCUTANEOUS at 11:04

## 2020-04-05 RX ADMIN — ATORVASTATIN CALCIUM 40 MG: 20 TABLET, FILM COATED ORAL at 08:04

## 2020-04-05 RX ADMIN — FUROSEMIDE 40 MG: 40 TABLET ORAL at 04:04

## 2020-04-05 RX ADMIN — METOPROLOL TARTRATE 50 MG: 50 TABLET, FILM COATED ORAL at 08:04

## 2020-04-05 RX ADMIN — ISOSORBIDE MONONITRATE 60 MG: 60 TABLET, EXTENDED RELEASE ORAL at 08:04

## 2020-04-05 NOTE — PLAN OF CARE
Update    [facility] accepted pt. Facility will contact pt/daughter. CM updated team.    Genna Escamilla, RN    [facility]  [phone]

## 2020-04-05 NOTE — PLAN OF CARE
UPDATE - MD confirmed pt ok to d/c without scheduled HH setup.    CM updated by MD that pt will d/c and needs HH and home o2. CM noted [facility] referral by [facility] 4/4 but was declined. CM will sent additional referrals for HH and updated MD that HH acceptance is uncertain due to Covid-19 positive. CM phoned referral to on-call, Vaughn Tamayo and forwarded documents via fax. Portable tank to be delivered to nurses station.    Genna Escamilla RN  [facility]  [phone]

## 2020-04-05 NOTE — PLAN OF CARE
04/05/20 1321   Final Note   Assessment Type Final Discharge Note   Anticipated Discharge Disposition Home   [facility] Referral Info   Referral Type SN, PT, OT   Facility Name Pending Acceptance   Post-Acute Status   Post-Acute Authorization Home Health   Home Health Status Pending Clinical Review   Other Status See Comments  (Home o2 referral to [facility])

## 2020-04-05 NOTE — PLAN OF CARE
[facility]    HOME HEALTH ORDERS  FACE TO FACE ENCOUNTER    Patient Name: Tiana Mead  YOB: 1925    PCP: Belen Wood MD   PCP Address: Malissa HUNT / [place] [address]  PCP Phone Number: [phone]  PCP Fax: [phone]    Encounter Date: 04/05/2020    Admit to Home Health    Diagnoses:  Active Hospital Problems    Diagnosis  POA    *COVID-19 virus infection [U07.1]  Yes    Community acquired pneumonia [J18.9]  Yes    H/O Deep vein thrombosis (DVT) [I82.409]  Yes     Chronic    CKD (chronic kidney disease) stage 4, GFR 15-29 ml/min [N18.4]  Yes     Chronic    Type 2 diabetes mellitus with diabetic polyneuropathy, with long-term current use of insulin [E11.42, Z79.4]  Not Applicable     Chronic      Resolved Hospital Problems   No resolved problems to display.       Future Appointments   Date Time Provider Department Center   4/14/2020  3:00 PM Mickey Erwin MD [facility] CARDIO [facility]   5/11/2020 10:30 AM Aria Krishnamurthy DPM [facility] POD [facility]   6/9/2020 11:00 AM Brissa Gonzalez NP [address]     Follow-up Information     Belen Wood MD. Schedule an appointment as soon as possible for a visit in 1 week.    Specialty:  Internal Medicine  Why:  Post hospital fu, COVID 19  Contact information:  [address]issa [address]  [facility] [address]  [phone]                     I have seen and examined this patient face to face today. My clinical findings that support the need for the home health skilled services and home bound status are the following:  Weakness/numbness causing balance and gait disturbance due to Weakness/Debility making it taxing to leave home.    Allergies:  Review of patient's allergies indicates:   Allergen Reactions    Codeine Itching and Nausea Only              Diet: diabetic diet: 2000 calorie    Activities: activity as tolerated    Nursing:   SN to complete comprehensive assessment including routine vital signs.  "Instruct on disease process and s/s of complications to report to MD. Review/verify medication list sent home with the patient at time of discharge  and instruct patient/caregiver as needed. Frequency may be adjusted depending on start of care date.    Notify MD if SBP > 160 or < 90; DBP > 90 or < 50; HR > 120 or < 50; Temp > 101; Other:         CONSULTS:    Physical Therapy to evaluate and treat. Evaluate for home safety and equipment needs; Establish/upgrade home exercise program. Perform / instruct on therapeutic exercises, gait training, transfer training, and Range of Motion.  Occupational Therapy to evaluate and treat. Evaluate home environment for safety and equipment needs. Perform/Instruct on transfers, ADL training, ROM, and therapeutic exercises.  Aide to provide assistance with personal care, ADLs, and vital signs.        Medications: Review discharge medications with patient and family and provide education.      Current Discharge Medication List      CONTINUE these medications which have CHANGED    Details   albuterol (PROVENTIL/VENTOLIN HFA) 90 mcg/actuation inhaler Inhale 2 puffs into the lungs every 4 (four) hours as needed.  Qty: 6.7 g, Refills: 2         CONTINUE these medications which have NOT CHANGED    Details   acetaminophen (TYLENOL) 500 MG tablet Take 500 mg by mouth every 6 (six) hours as needed for Pain.      amLODIPine (NORVASC) 5 MG tablet TAKE 1 TABLET(5 MG) BY MOUTH EVERY MORNING  Qty: 90 tablet, Refills: 3      apixaban (ELIQUIS) 2.5 mg Tab Take 1 tablet (2.5 mg total) by mouth 2 (two) times daily.  Qty: 60 tablet, Refills: 5      atorvastatin (LIPITOR) 40 MG tablet Take 1 tablet (40 mg total) by mouth once daily.  Qty: 90 tablet, Refills: 3      BD ULTRA-FINE SHORT PEN NEEDLE 31 gauge x 5/16" Ndle USE WITH INSULIN PENS AS DIRECTED  Qty: 100 each, Refills: 3    Comments: Please delete all prior scripts with same name and strength including on holds.       !! blood sugar diagnostic " (ONETOUCH ULTRA TEST) Strp Inject 1 strip into the skin 3 (three) times daily.  Qty: 300 each, Refills: 4    Comments: I don't know what brand the patient needs  Associated Diagnoses: Type 2 diabetes mellitus with diabetic peripheral angiopathy without gangrene, with long-term current use of insulin      clotrimazole-betamethasone 1-0.05% (LOTRISONE) cream Apply topically 2 (two) times daily. For intertriginous itching  Qty: 45 g, Refills: 3    Comments: Does not need now keep on record      diclofenac sodium (VOLTAREN) 1 % Gel Apply topically 4 (four) times daily as needed.  Qty: 100 Tube, Refills: 3    Associated Diagnoses: Primary osteoarthritis of both knees; Generalized osteoarthritis of multiple sites; Right hip pain      dorzolamide (TRUSOPT) 2 % ophthalmic solution Place 1 drop into both eyes 2 (two) times daily.  Qty: 10 mL, Refills: 4    Associated Diagnoses: Primary open-angle glaucoma(365.11)      !! fluticasone-salmeterol diskus inhaler 250-50 mcg INHALE 1 PUFF BY MOUTH INTO THE LUNGS NIGHTLY  Qty: 1 each, Refills: 11      !! fluticasone-salmeterol diskus inhaler 250-50 mcg Inhale 1 puff into the lungs 2 (two) times daily. Controller  Qty: 60 each, Refills: 11      FLUZONE HIGH-DOSE 2019-20, PF, 180 mcg/0.5 mL Syrg ADM 0.5ML IM UTD  Refills: 0      furosemide (LASIX) 40 MG tablet Take 1 tablet (40 mg total) by mouth 2 (two) times daily.  Qty: 180 tablet, Refills: 3      insulin aspart protamine-insulin aspart (NOVOLOG MIX 70-30FLEXPEN U-100) 100 unit/mL (70-30) InPn pen INJECT 10 UNITS UNDER THE SKIN ONCE DAILY EVERY MORNING BEFORE BREAKFAST  Qty: 15 mL, Refills: 11      ipratropium (ATROVENT) 0.03 % nasal spray USE 2 SPRAYS IN EACH NOSTRIL TWICE DAILY  Qty: 60 mL, Refills: 1    Comments: **Patient requests 90 days supply**  Associated Diagnoses: Rhinitis, unspecified chronicity, unspecified type      isosorbide mononitrate (IMDUR) 60 MG 24 hr tablet TAKE 1 TABLET BY MOUTH EVERY MORNING FOR HEART, to  prevent chest pain and reduce shortness of breath  Qty: 90 tablet, Refills: 3      !! lancets Misc 1 Device by Misc.(Non-Drug; Combo Route) route 3 (three) times daily before meals. Please provide the insurance company preferred product.  Qty: 300 each, Refills: 4      latanoprost 0.005 % ophthalmic solution Place 1 drop into the right eye every evening.  Qty: 3 Bottle, Refills: 6    Associated Diagnoses: POAG (primary open-angle glaucoma)      levothyroxine (SYNTHROID) 75 MCG tablet Take 1 tablet (75 mcg total) by mouth before breakfast.  Qty: 90 tablet, Refills: 3      meclizine (ANTIVERT) 12.5 mg tablet Take 1 tablet (12.5 mg total) by mouth 3 (three) times daily as needed for Dizziness.  Qty: 20 tablet, Refills: 2      metoprolol tartrate (LOPRESSOR) 50 MG tablet TAKE 1 TABLET(50 MG) BY MOUTH TWICE DAILY  Qty: 180 tablet, Refills: 3      multivit-mineral-iron-lutein (CENTRUM SILVER ULTRA WOMEN'S) Tab Take 1 tablet by mouth once daily.      nitroGLYCERIN (NITROSTAT) 0.4 MG SL tablet ONE TABLET UNDER THE TONGUE EVERY 5 MINUTES AS NEEDED FOR CHEST PAIN  Qty: 25 tablet, Refills: 6    Comments: Does not need now      nystatin (MYCOSTATIN) powder Apply topically 4 (four) times daily.  Qty: 60 g, Refills: 3      ondansetron (ZOFRAN) 4 MG tablet TAKE ONE TABLET BY MOUTH EVERY 12 HOURS AS NEEDED FOR NAUSEA OR DIZZINESS  Qty: 30 tablet, Refills: 0      triamcinolone acetonide 0.025% (KENALOG) 0.025 % Oint Apply topically 2 (two) times daily.  Qty: 80 g, Refills: 2    Associated Diagnoses: Allergic contact dermatitis, unspecified trigger      TRUE METRIX GLUCOSE METER Misc TEST TID  Refills: 4      !! TRUE METRIX GLUCOSE TEST STRIP Strp USE TO TEST BLOOD SUGAR WITH MEALS THREE TIMES DAILY  Qty: 300 strip, Refills: 3    Comments: Please delete all prior scripts with same name and strength including on holds.       !! TRUEPLUS LANCETS 30 gauge Misc USE TO TEST BLOOD SUGAR TID AC  Refills: 2      !! TRUEPLUS LANCETS 33 gauge  Misc TESTING THREE TIMES DAILY  Qty: 300 each, Refills: 0    Associated Diagnoses: Type 2 diabetes mellitus with other diabetic arthropathy, with long-term current use of insulin       !! - Potential duplicate medications found. Please discuss with provider.      STOP taking these medications       valACYclovir (VALTREX) 1000 MG tablet Comments:   Reason for Stopping:               I certify that this patient is confined to her home and needs intermittent skilled nursing care, physical therapy and occupational therapy.

## 2020-04-06 ENCOUNTER — OUTPATIENT CASE MANAGEMENT (OUTPATIENT)
Dept: ADMINISTRATIVE | Facility: OTHER | Age: 85
End: 2020-04-06

## 2020-04-06 NOTE — DISCHARGE SUMMARY
"  Discharge Summary  Hospital Medicine       Name: Tiana Mead  YOB: 1925 (Age: 89 y.o.)  Date of Admission: 3/25/2020  Date of Discharge: 4/6/2020  Attending Provider on Discharge: Rod Peterson MD  [facility] Medicine Team: [facility] MEDICINE TEAM 8  Code status: DNR (Do Not Resuscitate)    Primary Care Provider: Belen Wood MD    Discharge Diagnosis:  Active Hospital Problems    Diagnosis  POA    *COVID-19 virus infection [U07.1]  Yes    Community acquired pneumonia [J18.9]  Yes    H/O Deep vein thrombosis (DVT) [I82.409]  Yes     Chronic    CKD (chronic kidney disease) stage 4, GFR 15-29 ml/min [N18.4]  Yes     Chronic    Type 2 diabetes mellitus with diabetic polyneuropathy, with long-term current use of insulin [E11.42, Z79.4]  Not Applicable     Chronic      Resolved Hospital Problems   No resolved problems to display.       HPI:  As per Dr Landon and Dr. Wyatt "Patient is 89 y.o. F w/ DM, CKD 4, HFpEF, CAD, AFib, HTN presented to ED w/ 4 days of generalized weakness, fatigue. Patient reports insidious onset of above symptoms which has worsened in the last 24 hours. Also developed a dry cough in the past 48 hours. Denies any fevers or chills. Her daughter was tested for COVID19 one day prior.      Patient reports 4-lb weight loss due to no appetite. Denies any swelling or orthopnea. Does not smoke or drink. Has h/o ischemic cardiomyopathy. Recent echo noted EF >70% with severe biatrial enlargement. Also is on apixaban for Afib; denies hematemesis or melena. "    Hospital Course:  Tiana Mead was admitted to Hospital Medicine for treatment of suspected COVID-19 viral infection and was treated with supportive care following a comprehensive physical, radiographic, and lab evaluation tailored to the current standard of care for COVID19. Please review the admission H&P and the studies listed below for details. Pt was treated with antibiotics for CAP coverage during this " admission. She was eventually transferred to telemedicine team. She improved with supportive care and was found to be suitable for discharge home with 2L oxygen.     On the day of discharge, isolation precautions were reviewed at length verbally. The patient was also provided with written isolation guidelines modified from the [place] Department of Health and [facility] as well as the CDC, as part of discharge paperwork. An updated phone number was obtained, which will be used to contact the patient when results are available, and positive patients will be enrolled in both the [facility]- Home Symptom Monitoring program.      Labs:  Recent Labs   Lab 04/02/20  0612 04/04/20  0458   WBC 9.43 9.09   HGB 11.6* 11.1*   HCT 35.6* 34.5*    280     Recent Labs   Lab 03/31/20  0542 04/01/20  0535 04/02/20  0612 04/03/20  1012 04/04/20  0458    138 135* 135* 133*   K 3.9 3.8 4.1 4.6 4.7    99 98 99 97   CO2 28 30* 28 29 30*   BUN 33* 34* 35* 40* 36*   CREATININE 1.5* 1.4 1.3 1.4 1.3   * 116* 106 164* 128*   CALCIUM 8.5* 8.4* 8.4* 8.3* 8.3*   MG 2.1 2.2 2.3  --  2.3   PHOS 3.9 2.7  --   --   --      Recent Labs   Lab 04/02/20  0612 04/03/20  1012 04/04/20  0458   ALKPHOS 116 126 121   ALT 21 21 20   AST 34 31 33   ALBUMIN 1.6* 1.6* 1.5*   PROT 6.1 6.2 6.1   BILITOT 0.7 0.6 0.5      Recent Labs   Lab 04/04/20  1612 04/04/20  2037 04/04/20  2102 04/05/20  0904 04/05/20  1052 04/05/20  1722   POCTGLUCOSE 198* 253* 223* 121* 164* 148*     No results for input(s): CPK, CPKMB, MB, TROPONINI in the last 72 hours.    ROS (Positive in Bold, otherwise negative)  Constitutional: fever, chills, night sweats  CV: chest pain, edema, palpitations  Resp: SOB, cough, sputum production  GI: changes in appetite, NVDC, pain, melena, hematochezia, GERD, hematemesis  : Dysuria, hematuria, urinary urgency, frequency  MSK: arthralgia/myalgia, joint swelling  Neuro/Psych: anxiety, depression    Discharge Exam: Patient was  "seen and examined on the date of discharge and determined to be suitable for discharge.    Procedures: CXR    Consultants:     Discharge Medication List as of 4/5/2020  5:46 PM      CONTINUE these medications which have CHANGED    Details   albuterol (PROVENTIL/VENTOLIN HFA) 90 mcg/actuation inhaler Inhale 2 puffs into the lungs every 4 (four) hours as needed., Starting Sun 4/5/2020, Normal         CONTINUE these medications which have NOT CHANGED    Details   acetaminophen (TYLENOL) 500 MG tablet Take 500 mg by mouth every 6 (six) hours as needed for Pain., Historical Med      amLODIPine (NORVASC) 5 MG tablet TAKE 1 TABLET(5 MG) BY MOUTH EVERY MORNING, Normal      apixaban (ELIQUIS) 2.5 mg Tab Take 1 tablet (2.5 mg total) by mouth 2 (two) times daily., Starting Wed 11/20/2019, Normal      atorvastatin (LIPITOR) 40 MG tablet Take 1 tablet (40 mg total) by mouth once daily., Starting Wed 11/20/2019, Normal      BD ULTRA-FINE SHORT PEN NEEDLE 31 gauge x 5/16" Ndle USE WITH INSULIN PENS AS DIRECTED, Normal      !! blood sugar diagnostic (ONETOUCH ULTRA TEST) Strp Inject 1 strip into the skin 3 (three) times daily., Starting 4/24/2017, Until Discontinued, Normal      clotrimazole-betamethasone 1-0.05% (LOTRISONE) cream Apply topically 2 (two) times daily. For intertriginous itching, Starting Wed 11/20/2019, Normal      diclofenac sodium (VOLTAREN) 1 % Gel Apply topically 4 (four) times daily as needed., Starting Wed 11/20/2019, Normal      dorzolamide (TRUSOPT) 2 % ophthalmic solution Place 1 drop into both eyes 2 (two) times daily., Starting 10/8/2013, Until Discontinued, Normal      !! fluticasone-salmeterol diskus inhaler 250-50 mcg INHALE 1 PUFF BY MOUTH INTO THE LUNGS NIGHTLY, Normal      !! fluticasone-salmeterol diskus inhaler 250-50 mcg Inhale 1 puff into the lungs 2 (two) times daily. Controller, Starting Fri 11/22/2019, Normal      FLUZONE HIGH-DOSE 2019-20, PF, 180 mcg/0.5 mL Syrg ADM 0.5ML IM UTD, " Historical Med      furosemide (LASIX) 40 MG tablet Take 1 tablet (40 mg total) by mouth 2 (two) times daily., Starting Wed 11/20/2019, Normal      insulin aspart protamine-insulin aspart (NOVOLOG MIX 70-30FLEXPEN U-100) 100 unit/mL (70-30) InPn pen INJECT 10 UNITS UNDER THE SKIN ONCE DAILY EVERY MORNING BEFORE BREAKFAST, Normal      ipratropium (ATROVENT) 0.03 % nasal spray USE 2 SPRAYS IN EACH NOSTRIL TWICE DAILY, Normal      isosorbide mononitrate (IMDUR) 60 MG 24 hr tablet TAKE 1 TABLET BY MOUTH EVERY MORNING FOR HEART, to prevent chest pain and reduce shortness of breath, Normal      !! lancets Misc 1 Device by Misc.(Non-Drug; Combo Route) route 3 (three) times daily before meals. Please provide the insurance company preferred product., Starting 4/20/2017, Until Discontinued, Normal      latanoprost 0.005 % ophthalmic solution Place 1 drop into the right eye every evening., Starting 7/25/2013, Until Discontinued, Normal      levothyroxine (SYNTHROID) 75 MCG tablet Take 1 tablet (75 mcg total) by mouth before breakfast., Starting Wed 11/20/2019, Normal      meclizine (ANTIVERT) 12.5 mg tablet Take 1 tablet (12.5 mg total) by mouth 3 (three) times daily as needed for Dizziness., Starting Thu 1/16/2020, Normal      metoprolol tartrate (LOPRESSOR) 50 MG tablet TAKE 1 TABLET(50 MG) BY MOUTH TWICE DAILY, Normal      multivit-mineral-iron-lutein (CENTRUM SILVER ULTRA WOMEN'S) Tab Take 1 tablet by mouth once daily., Until Discontinued, Historical Med      nitroGLYCERIN (NITROSTAT) 0.4 MG SL tablet ONE TABLET UNDER THE TONGUE EVERY 5 MINUTES AS NEEDED FOR CHEST PAIN, Normal      nystatin (MYCOSTATIN) powder Apply topically 4 (four) times daily., Starting Fri 9/13/2019, Normal      ondansetron (ZOFRAN) 4 MG tablet TAKE ONE TABLET BY MOUTH EVERY 12 HOURS AS NEEDED FOR NAUSEA OR DIZZINESS, Normal      triamcinolone acetonide 0.025% (KENALOG) 0.025 % Oint Apply topically 2 (two) times daily., Starting Thu 1/9/2020, Normal       TRUE METRIX GLUCOSE METER Misc TEST TID, Historical Med      !! TRUE METRIX GLUCOSE TEST STRIP Strp USE TO TEST BLOOD SUGAR WITH MEALS THREE TIMES DAILY, Normal      !! TRUEPLUS LANCETS 30 gauge Misc USE TO TEST BLOOD SUGAR TID AC, Historical Med      !! TRUEPLUS LANCETS 33 gauge Misc TESTING THREE TIMES DAILY, Normal       !! - Potential duplicate medications found. Please discuss with provider.      STOP taking these medications       valACYclovir (VALTREX) 1000 MG tablet Comments:   Reason for Stopping:               The relevant and important risks, side effects, and benefits of their medications were reviewed with patient during hospitalization and at discharge. The patient was given the opportunity to discuss and ask questions about their medications, including target symptoms, potential risks, side effects and benefits of their medications, as well as their expected prognosis if non-medication treatment options were chosen.  The patient expresses understanding of all these options and information and voluntarily consents to treatment.    Discharge Diet:cardiac diet     Activity: activity as tolerated    Discharge Condition: Stable    Disposition: Home or Self Care     Time spent  on the discharge of the patient including review of hospital course with the patient. reviewing discharge medications and arranging follow-up care: 39 mins    Discharge examination Patient was seen and examined on the date of discharge and determined to be suitable for discharge.    Discharge plan and follow up:  It is critical that you make your follow-up appointment(s). If you are discharged on the weekend or after business hours, or if we are unable to schedule these appointments for you for any reason, you or a family member need to call during the next business day to schedule your appointment(s).    -Follow up with your PCP, Belen Wood MD within 1-2 weeks as arranged with SW and treatment team prior to  discharge  -Follow up with COVID 19 home monitoring program   -Take all medications as prescribed and listed above.     - Please return to ED or call your physician if you have:         1. Fevers > 101.5 unresponsive to tylenol.       2. Abdominal pain and/or distention       3. Intractable nausea, vomiting or diarrhea       4. Inability to tolerate adequate oral intake of food       5. Neurologic changes, chest pain or shortness of breath     6. Worsening shortness of breath        Future Appointments   Date Time Provider Department Center   4/14/2020  3:00 PM Mickey Erwin MD [facility] CARDIO [facility]   5/11/2020 10:30 AM Aria Krishnamurthy DPM [facility] POD [facility]   6/9/2020 11:00 AM Brissa Gonzalez NP [address]         Rod Peterson MD  Hospital Medicine Staff  [phone] pager

## 2020-04-06 NOTE — NURSING
Patient discharged to home. IV removed,discharge instructions given. Patient verbalized her understanding.  Patient has taken all personal belongings. Patient escorted to car in a wheelchair by escort.

## 2020-04-07 ENCOUNTER — PATIENT OUTREACH (OUTPATIENT)
Dept: ADMINISTRATIVE | Facility: CLINIC | Age: 85
End: 2020-04-07

## 2020-04-07 NOTE — PROGRESS NOTES
C3 nurse attempted to contact patient. No answer. The following message was left for the patient to return the call:  Good afternoon.  I am a nurse calling on behalf of [facility] from the Care Coordination Center.  This is a Transitional Care Call for Tiana H Boston. When you have a moment please contact us at [phone] or [phone] Monday through Friday, between the hours of 8 am to 4 pm. We look forward to speaking with you. On behalf of [facility] have a nice day.    The patient has a scheduled [facility] appointment with Belen Wood MD on 04/14/20 @ 9432. Message sent to Physician staff.

## 2020-04-07 NOTE — PROGRESS NOTES
Outpatient Care Management  COVID-19 Patient Assessment    Patient: Tiana Mead  MRN: [id number]  Date of Service: 04/06/2020  Completed by: Margy Jama RN  Program: COVID-19    Reason for Visit   Patient presents with    OPCM Chart Review     4/6/20    COVID-19 Concerns     4/7/20       Brief Summary: Patient states that she is feeling much better. She is still on home oxygen-3L and still active with [facility]. She is still having a cough but she states that it has improved. She is not taking any medications for the cough. She has not checked her temperature in a few days, but she states that it had been normal.      Education on COVID 19 provided to patient during call. Instructed patient to call [facility] first if experiencing fever greater than 100.4, coughing and SOB. Provided patient [facility] phone number [phone] as well as [facility] COVID 19 hotline [phone], verbalized understanding. Informed patient that they may contact their PCP for further guidance as well. Reviewed with patient precautions to take to help prevent the spread of this respiratory virus: Wash hands often (for 20 seconds singing Happy Birthday), cover your cough or sneeze into your elbow or a tissue, stay away from people who are sick, don't touch your eyes, nose or mouth with unwashed hands. Provided Instruction to stay home as directed by local government officials and only make trips that are of essential needs (Ex:grocery, pharmacy, medical appointments). Reviewed with patient supplies to have on hand while staying at home (food, water, medications, medical supplies and other essentials).       Assessment Documentation     COVID-19 Assessment    Nurse Assessment via Chart Review:  Was patient on a ventilator while hospitalized?:  No  Nurse Assessment with Patient:  When did you test positive for COVID-19?:  3/26/20  Are you currently experiencing any of the following symptoms?:  Coughing  For Fever:  Are you  taking any medications to reduce your fever?:  No  For Coughing:  Are you taking any medications to treat your cough?:  No  On a scale of 1-10, how bad is your Cough?:  6  For Difficulty Breathing:  Are you taking any medications to help with your breathing?:  Yes  If yes, which medications are you taking?:  Inhaler  On a scale of 1-10 how difficult is it to breathe normally?:  5  Any medical equipment in use?:  Yes  Oxygen?:  Yes (Comment: on 3L)  Nebulizer?:  No  Other? (use comment):  No  On a normal day, what is your energy level?:  9  What is your current energy level?:  5  Psycho/Social Assessment:  Do you have a support person/caregiver?:  Yes (Comment: patient lives with her daughter)  Are you and/or your caregiver able to access food?:  Yes  Are you and/or your caregiver able to access medications?:  Yes  Are you able to isolate yourself from other family members?:  Yes  Is anyone else in your home ill with COVID-19?:  Yes  Does patient need any mental health/emotional support resources?:  No  Were you working prior to the COVID-19 illness?:  No  Are you experiencing financial difficulties related to the COVID-19 pandemic?:  No  Do you need access to Community Resources?:  No  COVID-19 Patient Assessment Complete (cristy Yes only if assessment is finished):  Yes         Problem List and History     Patient Active Problem List   Diagnosis    Hyperlipemia, mixed    Generalized osteoarthritis of multiple sites    Chronic iritis - Left Eye    Osteoarthritis of both knees    Chondrocalcinosis    Peripheral angiopathy    Hyperuricemia    Helicobacter pylori gastritis    Old MI (myocardial infarction), 2013    Persistent atrial fibrillation    Type 2 diabetes mellitus with diabetic polyneuropathy, with long-term current use of insulin    Long term current use of anticoagulant therapy, eliquis    History of chest pain at rest    CKD (chronic kidney disease) stage 4, GFR 15-29 ml/min    Fuchs' corneal  dystrophy    Pericardial effusion    H/O Deep vein thrombosis (DVT)    Aortic atherosclerosis    Chronic diastolic congestive heart failure    Type 2 diabetes mellitus with diabetic nephropathy, with long-term current use of insulin    Diabetic polyneuropathy associated with type 2 diabetes mellitus    Primary hypothyroidism    Moderate persistent asthma without complication    Primary open angle glaucoma (POAG) of both eyes, moderate stage    Pulmonary hypertension    Non-rheumatic tricuspid valve insufficiency    Tortuous aorta    Chest pain at rest    Allergic conjunctivitis of both eyes    Status post cataract extraction and insertion of intraocular lens    Chest pain, rule out acute myocardial infarction    Pseudogout, knees    Fever    Effusion, left knee    Herpes zoster without complication    Community acquired pneumonia    COVID-19 virus infection       Reviewed Active Problem List with patient and/or Caregiver. The following were identified as areas of need: COVID-19    Medical History:  Reviewed medical history with patient and/or caregiver    Social History:  Reviewed social history with patient and/or caregiver    Complex Care Plan    Care plan was discussed and completed today with input from patient and/or caregiver.    Patient Instructions     Instructions were provided via the [facility] patient resources and are available for the patient to view on the patient portal, if active.      Follow up in about 4 days (around 4/10/2020) for OPCM.    Todays OPCM Self-Management Care Plan was developed with the patients/caregivers input and was based on identified barriers from todays assessment.  Goals were written today with the patient/caregiver and the patient has agreed to work towards these goals to improve his/her overall well-being. Patient verbalized understanding of the care plan, goals, and all of today's instructions. Encouraged patient/caregiver to communicate with his/her  physician and health care team about health conditions and the treatment plan.  Provided my contact information today and encouraged patient/caregiver to call me with any questions as needed.

## 2020-04-07 NOTE — PATIENT INSTRUCTIONS
Instructions for Patients Awaiting COVID-19 Test Results    You will either be called with your test result or it will be released to the patient portal.  If you have any questions about your test, please visit [email or web address] or call our COVID-19 information line at [phone].    Prevention steps for patients with confirmed or suspected COVID-19       Stay home and stay away from family members and friends. The CDC says, you can leave home after these three things have happened: 1) You have had no fever for at least 72 hours (that is three full days of no fever without the use of medicine that reduces fevers) 2) AND other symptoms have improved (for example, when your cough or shortness of breath have improved) 3) AND at least 7 days have passed since your symptoms first appeared.   Separate yourself from other people and animals in your home.   Call ahead before visiting your doctor.   Wear a facemask.   Cover your coughs and sneezes.   Wash your hands often with soap and water; hand  can be used, too.   Avoid sharing personal household items.   Wipe down surfaces used daily.   Monitor your symptoms. Seek prompt medical attention if your illness is worsening (e.g., difficulty breathing).    Before seeking care, call your healthcare provider.   If you have a medical emergency and need to call 911, notify the dispatch personnel that you have, or are being evaluated for COVID-19. If possible, put on a facemask before emergency medical services arrive.        Recommended precautions for household members, intimate partners, and caregivers in a home setting of a patient with symptomatic laboratory-confirmed COVID-19 or a patient under investigation.  Household members, intimate partners, and caregivers in the home setting awaiting tests results have close contact with a person with symptomatic, laboratory-confirmed COVID-19 or a person under investigation. Close contacts should  monitor their health; they should call their provider right away if they develop symptoms suggestive of COVID-19 (e.g., fever, cough, shortness of breath).    Close contacts should also follow these recommendations:   Make sure that you understand and can help the patient follow their provider's instructions for medication(s) and care. You should help the patient with basic needs in the home and provide support for getting groceries, prescriptions, and other personal needs.   Monitor the patient's symptoms. If the patient is getting sicker, call his or her healthcare provider and tell them that the patient has laboratory-confirmed COVID-19. If the patient has a medical emergency and you need to call 911, notify the dispatch personnel that the patient has, or is being evaluated for COVID-19.   Household members should stay in another room or be  from the patient. Household members should use a separate bedroom and bathroom, if available.   Prohibit visitors.   Household members should care for any pets in the home.   Make sure that shared spaces in the home have good air flow, such as by an air conditioner or an opened window, weather permitting.   Perform hand hygiene frequently. Wash your hands often with soap and water for at least 20 seconds or use an alcohol-based hand  (that contains > 60% alcohol) covering all surfaces of your hands and rubbing them together until they feel dry. Soap and water should be used preferentially.   Avoid touching your eyes, nose, and mouth.   The patient should wear a facemask. If the patient is not able to wear a facemask (for example, because it causes trouble breathing), caregivers should wear a mask when they are in the same room as the patient.   Wear a disposable facemask and gloves when you touch or have contact with the patient's blood, stool, or body fluids, such as saliva, sputum, nasal mucus, vomit, urine.  o Throw out disposable facemasks and  gloves after using them. Do not reuse.  o When removing personal protective equipment, first remove and dispose of gloves. Then, immediately clean your hands with soap and water or alcohol-based hand . Next, remove and dispose of facemask, and immediately clean your hands again with soap and water or alcohol-based hand .   You should not share dishes, drinking glasses, cups, eating utensils, towels, bedding, or other items with the patient. After the patient uses these items, you should wash them thoroughly (see below Wash laundry thoroughly).   Clean all high-touch surfaces, such as counters, tabletops, doorknobs, bathroom fixtures, toilets, phones, keyboards, tablets, and bedside tables, every day. Also, clean any surfaces that may have blood, stool, or body fluids on them.   Use a household cleaning spray or wipe, according to the label instructions. Labels contain instructions for safe and effective use of the cleaning product including precautions you should take when applying the product, such as wearing gloves and making sure you have good ventilation during use of the product.   Wash laundry thoroughly.  o Immediately remove and wash clothes or bedding that have blood, stool, or body fluids on them.  o Wear disposable gloves while handling soiled items and keep soiled items away from your body. Clean your hands (with soap and water or an alcohol-based hand ) immediately after removing your gloves.  o Read and follow directions on labels of laundry or clothing items and detergent. In general, using a normal laundry detergent according to washing machine instructions and dry thoroughly using the warmest temperatures recommended on the clothing label.   Place all used disposable gloves, facemasks, and other contaminated items in a lined container before disposing of them with other household waste. Clean your hands (with soap and water or an alcohol-based hand )  immediately after handling these items. Soap and water should be used preferentially if hands are visibly dirty.   Discuss any additional questions with your state or local health department or healthcare provider. Check available hours when contacting your local health department.    For more information see CDC link below.      https://www.cdc.gov/coronavirus/2019-ncov/hcp/guidance-prevent-spread.html#precautions        Sources:  [facility], [place] Department of Health and [facility]

## 2020-04-08 ENCOUNTER — TELEPHONE (OUTPATIENT)
Dept: INTERNAL MEDICINE | Facility: CLINIC | Age: 85
End: 2020-04-08

## 2020-04-08 NOTE — TELEPHONE ENCOUNTER
----- Message from Lyndon Tran sent at 4/8/2020 10:35 AM CDT -----  Contact: [facility]/ [place] [phone]  She said they received home health orders from the hospital but, they are not in network with the patient insurance therefore, you will need to put in home health orders with another agency.    Thank you

## 2020-04-09 DIAGNOSIS — E11.51 TYPE 2 DIABETES MELLITUS WITH DIABETIC PERIPHERAL ANGIOPATHY WITHOUT GANGRENE, WITH LONG-TERM CURRENT USE OF INSULIN: ICD-10-CM

## 2020-04-09 DIAGNOSIS — Z79.4 TYPE 2 DIABETES MELLITUS WITH DIABETIC PERIPHERAL ANGIOPATHY WITHOUT GANGRENE, WITH LONG-TERM CURRENT USE OF INSULIN: ICD-10-CM

## 2020-04-09 DIAGNOSIS — E11.618 TYPE 2 DIABETES MELLITUS WITH OTHER DIABETIC ARTHROPATHY, WITH LONG-TERM CURRENT USE OF INSULIN: ICD-10-CM

## 2020-04-09 DIAGNOSIS — Z79.4 TYPE 2 DIABETES MELLITUS WITH OTHER DIABETIC ARTHROPATHY, WITH LONG-TERM CURRENT USE OF INSULIN: ICD-10-CM

## 2020-04-09 RX ORDER — LANCETS 33 GAUGE
1 EACH MISCELLANEOUS 3 TIMES DAILY
Qty: 300 EACH | Refills: 3 | Status: SHIPPED | OUTPATIENT
Start: 2020-04-09 | End: 2021-09-09 | Stop reason: SDUPTHER

## 2020-04-09 RX ORDER — GLUCOSAM/CHON-MSM1/C/MANG/BOSW 500-416.6
TABLET ORAL
Refills: 2 | Status: CANCELLED | OUTPATIENT
Start: 2020-04-09

## 2020-04-09 RX ORDER — LANCETS
1 EACH MISCELLANEOUS
Qty: 300 EACH | Refills: 4 | Status: SHIPPED | OUTPATIENT
Start: 2020-04-09 | End: 2020-05-27 | Stop reason: SDUPTHER

## 2020-04-09 RX ORDER — BLOOD-GLUCOSE METER
EACH MISCELLANEOUS
Qty: 1 EACH | Refills: 4 | Status: ON HOLD | OUTPATIENT
Start: 2020-04-09 | End: 2020-11-04

## 2020-04-09 RX ORDER — PEN NEEDLE, DIABETIC 30 GX3/16"
NEEDLE, DISPOSABLE MISCELLANEOUS
Qty: 200 EACH | Refills: 3 | Status: SHIPPED | OUTPATIENT
Start: 2020-04-09 | End: 2022-03-23 | Stop reason: SDUPTHER

## 2020-04-09 NOTE — TELEPHONE ENCOUNTER
Reordered and called pt's daughter to let her know the glucometer and testing supplies have been reordered and that Dr. Bradley needs to just sign off on it for it to be sent to pharmacy.    Stephen Varner

## 2020-04-09 NOTE — TELEPHONE ENCOUNTER
----- Message from Ashlee Jimenes sent at 4/9/2020 10:31 AM CDT -----  Contact: Yolanda/ daughter [phone]  Patient needs a new glucometer and supplies as her old one is no longer working. Please call to let patient's daughter know this has been ordered.        [id number] - [facility] [address] [phone]

## 2020-04-13 ENCOUNTER — PATIENT OUTREACH (OUTPATIENT)
Dept: ADMINISTRATIVE | Facility: OTHER | Age: 85
End: 2020-04-13

## 2020-04-14 ENCOUNTER — OFFICE VISIT (OUTPATIENT)
Dept: INTERNAL MEDICINE | Facility: CLINIC | Age: 85
End: 2020-04-14
Payer: MEDICARE

## 2020-04-14 ENCOUNTER — TELEPHONE (OUTPATIENT)
Dept: INTERNAL MEDICINE | Facility: CLINIC | Age: 85
End: 2020-04-14

## 2020-04-14 ENCOUNTER — OFFICE VISIT (OUTPATIENT)
Dept: CARDIOLOGY | Facility: CLINIC | Age: 85
End: 2020-04-14
Payer: MEDICARE

## 2020-04-14 DIAGNOSIS — E11.42 TYPE 2 DIABETES MELLITUS WITH DIABETIC POLYNEUROPATHY, WITH LONG-TERM CURRENT USE OF INSULIN: Chronic | ICD-10-CM

## 2020-04-14 DIAGNOSIS — Z99.81 ON HOME OXYGEN THERAPY: ICD-10-CM

## 2020-04-14 DIAGNOSIS — U07.1 COVID-19 VIRUS INFECTION: ICD-10-CM

## 2020-04-14 DIAGNOSIS — E03.9 PRIMARY HYPOTHYROIDISM: Chronic | ICD-10-CM

## 2020-04-14 DIAGNOSIS — I50.32 CHRONIC DIASTOLIC CONGESTIVE HEART FAILURE: Primary | Chronic | ICD-10-CM

## 2020-04-14 DIAGNOSIS — Z79.4 TYPE 2 DIABETES MELLITUS WITH DIABETIC POLYNEUROPATHY, WITH LONG-TERM CURRENT USE OF INSULIN: Chronic | ICD-10-CM

## 2020-04-14 DIAGNOSIS — I48.19 PERSISTENT ATRIAL FIBRILLATION: Chronic | ICD-10-CM

## 2020-04-14 DIAGNOSIS — U07.1 COVID-19 VIRUS INFECTION: Primary | ICD-10-CM

## 2020-04-14 DIAGNOSIS — I20.89 ANGINA AT REST: ICD-10-CM

## 2020-04-14 DIAGNOSIS — Z79.01 LONG TERM CURRENT USE OF ANTICOAGULANT THERAPY: Chronic | ICD-10-CM

## 2020-04-14 DIAGNOSIS — J45.901 ASTHMA EXACERBATION IN COPD: ICD-10-CM

## 2020-04-14 DIAGNOSIS — J44.1 ASTHMA EXACERBATION IN COPD: ICD-10-CM

## 2020-04-14 DIAGNOSIS — E78.2 HYPERLIPEMIA, MIXED: Chronic | ICD-10-CM

## 2020-04-14 DIAGNOSIS — N18.30 CKD (CHRONIC KIDNEY DISEASE) STAGE 3, GFR 30-59 ML/MIN: ICD-10-CM

## 2020-04-14 DIAGNOSIS — I36.1 NON-RHEUMATIC TRICUSPID VALVE INSUFFICIENCY: ICD-10-CM

## 2020-04-14 DIAGNOSIS — J18.9 COMMUNITY ACQUIRED PNEUMONIA, UNSPECIFIED LATERALITY: ICD-10-CM

## 2020-04-14 DIAGNOSIS — M15.9 GENERALIZED OSTEOARTHRITIS OF MULTIPLE SITES: ICD-10-CM

## 2020-04-14 DIAGNOSIS — I27.20 PULMONARY HYPERTENSION: ICD-10-CM

## 2020-04-14 PROCEDURE — 1159F MED LIST DOCD IN RCRD: CPT | Mod: HCNC,95,, | Performed by: INTERNAL MEDICINE

## 2020-04-14 PROCEDURE — 99443 PR PHYSICIAN TELEPHONE EVALUATION 21-30 MIN: CPT | Mod: HCNC,95,, | Performed by: INTERNAL MEDICINE

## 2020-04-14 PROCEDURE — 1101F PR PT FALLS ASSESS DOC 0-1 FALLS W/OUT INJ PAST YR: ICD-10-PCS | Mod: HCNC,CPTII,95, | Performed by: INTERNAL MEDICINE

## 2020-04-14 PROCEDURE — 99443 PR PHYSICIAN TELEPHONE EVALUATION 21-30 MIN: ICD-10-PCS | Mod: HCNC,95,, | Performed by: INTERNAL MEDICINE

## 2020-04-14 PROCEDURE — 1101F PT FALLS ASSESS-DOCD LE1/YR: CPT | Mod: HCNC,CPTII,95, | Performed by: INTERNAL MEDICINE

## 2020-04-14 PROCEDURE — 1159F PR MEDICATION LIST DOCUMENTED IN MEDICAL RECORD: ICD-10-PCS | Mod: HCNC,95,, | Performed by: INTERNAL MEDICINE

## 2020-04-14 NOTE — TELEPHONE ENCOUNTER
----- Message from Wendy Regalado sent at 4/14/2020  2:20 PM CDT -----  Contact: [phone]  or [phone]  Patient has a 1:30 audio visit.  Patients is waiting on the call.

## 2020-04-14 NOTE — PROGRESS NOTES
Subjective:       Patient ID: Tiana Mead is a 89 y.o. female.    Chief Complaint: No chief complaint on file.  The patient location is: home with daughter Rose Marie who is involved in this visit  The chief complaint leading to consultation is: weakness, but overall improving  Visit type: audio only  Total time spent with patient: 30 min reviewing chart, visit, orders  Each patient to whom he or she provides medical services by telemedicine is:  (1) informed of the relationship between the physician and patient and the respective role of any other health care provider with respect to management of the patient; and (2) notified that he or she may decline to receive medical services by telemedicine and may withdraw from such care at any time.    Notes: This dictation was performed using using M[employer].  She required hospitalization for cytokine storm associated with COVID-19 viral infection and she is improving.    Her daughter Rose Marie was also sick with this infection, at the same time but did not require hospitalization and she is improving.      Mrs. Mead was discharged home from the hospital, on oxygen, on April 6, 2020.  She is grateful that she is improving every day.  She is wearing the nasal cannula oxygen that she was sent home from the hospital with and she does not have a pulse oximeter.  She is weak a little bit all over and would like to have a nurse come to her home to assess her and physical therapy and occupational therapy to get her back to where she was before this illness occurred.  She is not coughing and she is not short of breath.  She has not had any trouble with chest pain.    HPI  Review of Systems  doing well as above.  Appetite returning.  Objective:      Physical Exam  this morning and yesterday morning her weight was stable at 158 lbs.  Her blood pressure was 122/69  Her blood sugar was 89 this morning.  She did not feel hypoglycemic.    She is taking one injection of insulin daily, 10 units  of  novel log mix 70 30 before breakfast.    Assessment:       1. COVID-19 virus infection    2. Angina at rest, stable    3. Asthma exacerbation in COPD    4. Community acquired pneumonia, unspecified laterality    5. Generalized osteoarthritis of multiple sites    6. Type 2 diabetes mellitus with diabetic polyneuropathy, with long-term current use of insulin    7. Persistent atrial fibrillation    8. Primary hypothyroidism    9. Long term current use of anticoagulant therapy, eliquis    10. On home oxygen therapy        Plan:   Diagnoses and all orders for this visit:    COVID-19 virus infection, she is doing remarkably well.  She is thinking that she may not need supplemental oxygen at this time.  She was sent home without an oximeter.  She has been wearing her oxygen and she would like to have home health to help her get back to her functional status before she had this illness.  I have ordered home health.    Angina at rest, stable    Asthma exacerbation in COPD    Community acquired pneumonia, unspecified laterality    Generalized osteoarthritis of multiple sites    Type 2 diabetes mellitus with diabetic polyneuropathy, with long-term current use of insulin    Persistent atrial fibrillation    Primary hypothyroidism    Long term current use of anticoagulant therapy, eliquis    On home oxygen therapy

## 2020-04-14 NOTE — PROGRESS NOTES
"Subjective:   Patient ID:  Tiana Mead is a 89 y.o. female who presents for follow-up of Congestive Heart Failure      HPI:   Tiana Mead presents for Telephone  follow up of chronic diastolic congestive heart failure, severe TR, and moderate pulmonary hypertension. She is recovering from Covid 19 infection with pneumonia. Still on O2 but states she feels " fine". Ambulating to the bathroom off O2.Continues daily weights with no variation other than weight loss from the infection. Appetite has returned. Tiana Mead has persistent atrial fibrillation.Tiana Mead denies chest pain,  palpitations, presyncope , or syncope. Tiana Mead has hypertension with good control per last outpatient reading. Tiana Mead has dyslipidemia  on high intensity statin with acceptable LDL and HDL last check...    Review of Systems   Constitution: Negative for malaise/fatigue, weight gain and weight loss.   Eyes: Negative for blurred vision.   Cardiovascular: Negative for chest pain, claudication, cyanosis, dyspnea on exertion, irregular heartbeat, leg swelling, near-syncope, orthopnea, palpitations, paroxysmal nocturnal dyspnea and syncope.   Respiratory: Negative for cough, shortness of breath and wheezing.    Musculoskeletal: Positive for arthritis and joint pain. Negative for falls and myalgias.   Gastrointestinal: Positive for constipation. Negative for abdominal pain, heartburn, nausea and vomiting.   Genitourinary: Negative for nocturia.   Neurological: Negative for brief paralysis, dizziness, focal weakness, headaches, numbness, paresthesias and weakness.   Psychiatric/Behavioral: Negative for altered mental status.       Current Outpatient Medications   Medication Sig    acetaminophen (TYLENOL) 500 MG tablet Take 500 mg by mouth every 6 (six) hours as needed for Pain.    albuterol (PROVENTIL/VENTOLIN HFA) 90 mcg/actuation inhaler Inhale 2 puffs into the lungs every 4 (four) hours as needed.    " amLODIPine (NORVASC) 5 MG tablet TAKE 1 TABLET(5 MG) BY MOUTH EVERY MORNING    apixaban (ELIQUIS) 2.5 mg Tab Take 1 tablet (2.5 mg total) by mouth 2 (two) times daily.    atorvastatin (LIPITOR) 40 MG tablet Take 1 tablet (40 mg total) by mouth once daily.    blood sugar diagnostic (ONETOUCH ULTRA TEST) Strp Inject 1 strip into the skin 3 (three) times daily.    blood sugar diagnostic (TRUE METRIX GLUCOSE TEST STRIP) Strp USE TO TEST BLOOD SUGAR WITH MEALS THREE TIMES DAILY ins preferred    clotrimazole-betamethasone 1-0.05% (LOTRISONE) cream Apply topically 2 (two) times daily. For intertriginous itching    diclofenac sodium (VOLTAREN) 1 % Gel Apply topically 4 (four) times daily as needed.    dorzolamide (TRUSOPT) 2 % ophthalmic solution Place 1 drop into both eyes 2 (two) times daily.    fluticasone-salmeterol diskus inhaler 250-50 mcg INHALE 1 PUFF BY MOUTH INTO THE LUNGS NIGHTLY    fluticasone-salmeterol diskus inhaler 250-50 mcg Inhale 1 puff into the lungs 2 (two) times daily. Controller    FLUZONE HIGH-DOSE 2019-20, PF, 180 mcg/0.5 mL Syrg ADM 0.5ML IM UTD    furosemide (LASIX) 40 MG tablet Take 1 tablet (40 mg total) by mouth 2 (two) times daily.    insulin aspart protamine-insulin aspart (NOVOLOG MIX 70-30FLEXPEN U-100) 100 unit/mL (70-30) InPn pen INJECT 10 UNITS UNDER THE SKIN ONCE DAILY EVERY MORNING BEFORE BREAKFAST    ipratropium (ATROVENT) 0.03 % nasal spray USE 2 SPRAYS IN EACH NOSTRIL TWICE DAILY (Patient not taking: Reported on 4/7/2020)    isosorbide mononitrate (IMDUR) 60 MG 24 hr tablet TAKE 1 TABLET BY MOUTH EVERY MORNING FOR HEART, to prevent chest pain and reduce shortness of breath    lancets (TRUEPLUS LANCETS) 33 gauge Misc Apply 1 lancet topically 3 (three) times daily.    lancets Misc 1 Device by Misc.(Non-Drug; Combo Route) route 3 (three) times daily before meals. Please provide the insurance company preferred product.    latanoprost 0.005 % ophthalmic solution Place 1  "drop into the right eye every evening. (Patient taking differently: Place 1 drop into both eyes every evening. )    levothyroxine (SYNTHROID) 75 MCG tablet Take 1 tablet (75 mcg total) by mouth before breakfast.    meclizine (ANTIVERT) 12.5 mg tablet Take 1 tablet (12.5 mg total) by mouth 3 (three) times daily as needed for Dizziness.    metoprolol tartrate (LOPRESSOR) 50 MG tablet TAKE 1 TABLET(50 MG) BY MOUTH TWICE DAILY    multivit-mineral-iron-lutein (CENTRUM SILVER ULTRA WOMEN'S) Tab Take 1 tablet by mouth once daily.    nitroGLYCERIN (NITROSTAT) 0.4 MG SL tablet ONE TABLET UNDER THE TONGUE EVERY 5 MINUTES AS NEEDED FOR CHEST PAIN    nystatin (MYCOSTATIN) powder Apply topically 4 (four) times daily.    ondansetron (ZOFRAN) 4 MG tablet TAKE ONE TABLET BY MOUTH EVERY 12 HOURS AS NEEDED FOR NAUSEA OR DIZZINESS    pen needle, diabetic (BD ULTRA-FINE SHORT PEN NEEDLE) 31 gauge x 5/16" Ndle USE WITH INSULIN PENS twice daily or  AS DIRECTED    triamcinolone acetonide 0.025% (KENALOG) 0.025 % Oint Apply topically 2 (two) times daily.    TRUE METRIX GLUCOSE METER Misc TEST TID ins preferred    TRUEPLUS LANCETS 30 gauge Misc USE TO TEST BLOOD SUGAR TID AC     No current facility-administered medications for this visit.      Objective:   Physical Exam    Lab Results   Component Value Date     (L) 04/04/2020    K 4.7 04/04/2020    CL 97 04/04/2020    CO2 30 (H) 04/04/2020    BUN 36 (H) 04/04/2020    CREATININE 1.3 04/04/2020     (H) 04/04/2020    HGBA1C 7.0 (H) 03/12/2020    MG 2.3 04/04/2020    AST 33 04/04/2020    ALT 20 04/04/2020    ALBUMIN 1.5 (L) 04/04/2020    PROT 6.1 04/04/2020    BILITOT 0.5 04/04/2020    WBC 9.09 04/04/2020    HGB 11.1 (L) 04/04/2020    HCT 34.5 (L) 04/04/2020    MCV 90 04/04/2020     04/04/2020    TSH 3.266 03/12/2020    CHOL 154 11/13/2019    HDL 61 11/13/2019    LDLCALC 81.4 11/13/2019    TRIG 58 11/13/2019       Assessment:     1. Chronic diastolic congestive " heart failure; Per weights and sxs appears compensated    2. Non-rheumatic tricuspid valve insufficiency : Severe   3. Persistent atrial fibrillation: Rate control    4. Pulmonary hypertension : Moderate   5. COVID-19 virus infection : recovering   6. Hyperlipemia, mixed :  on high intensity statin   7. CKD (chronic kidney disease) stage 3, GFR 30-59 ml/min        Plan:     Tiana was seen today for congestive heart failure.    Diagnoses and all orders for this visit:    Chronic diastolic congestive heart failure  Continue current regimen    Non-rheumatic tricuspid valve insufficiency    Persistent atrial fibrillation  Continue current regimen    Pulmonary hypertension    COVID-19 virus infection    Hyperlipemia, mixed  Continue current regimen    CKD (chronic kidney disease) stage 3, GFR 30-59 ml/min      The patient location is:Home  The chief complaint leading to consultation is: CHF follow up  Visit type: Virtual visit with  audio .  Total time spent with patient: 30 minutes  Including chart review, conversation, and note  Each patient to whom he or she provides medical services by telemedicine is:  (1) informed of the relationship between the physician and patient and the respective role of any other health care provider with respect to management of the patient; and (2) notified that he or she may decline to receive medical services by telemedicine and may withdraw from such care at any time.

## 2020-04-15 ENCOUNTER — TELEPHONE (OUTPATIENT)
Dept: INTERNAL MEDICINE | Facility: CLINIC | Age: 85
End: 2020-04-15

## 2020-04-15 NOTE — TELEPHONE ENCOUNTER
----- Message from Dinh Gould sent at 4/15/2020  4:08 PM CDT -----  Contact: Yolanda (daughter) [phone]  Yolanda state the patient was suppose to receive a call from a home health nurse today but no one has called her. Please call and advise.

## 2020-04-15 NOTE — TELEPHONE ENCOUNTER
Spoke with daughter and gave her the home health phone number. I informed her at least give them until tomorrow to give her a call. I tried calling but I did not get thru

## 2020-04-17 ENCOUNTER — TELEPHONE (OUTPATIENT)
Dept: INTERNAL MEDICINE | Facility: CLINIC | Age: 85
End: 2020-04-17

## 2020-04-17 NOTE — TELEPHONE ENCOUNTER
----- Message from Samuel Colon sent at 4/17/2020  1:11 PM CDT -----  Contact: Esperanza Leonard [phone]  Patient would like to get medical advice.    Comments: Daughter of patient calling stating no one has called to update on the H/H service request for patient, call to discuss further. Esperanza Leonard [phone], stating would like a response back asap.    Please call an advise  Thank you

## 2020-04-28 ENCOUNTER — TELEPHONE (OUTPATIENT)
Dept: INTERNAL MEDICINE | Facility: CLINIC | Age: 85
End: 2020-04-28

## 2020-04-28 NOTE — TELEPHONE ENCOUNTER
----- Message from Anisa Paz sent at 4/28/2020  9:36 AM CDT -----  Contact: self/ [phone]  Patient would like to know if she can start back taking her Advair, patient was in the hospital with Covid, please call and advise.

## 2020-05-07 ENCOUNTER — TELEPHONE (OUTPATIENT)
Dept: ORTHOPEDICS | Facility: CLINIC | Age: 85
End: 2020-05-07

## 2020-05-07 NOTE — TELEPHONE ENCOUNTER
I called the patient regarding her request for an appointment. The patient is scheduled to come in on next Friday. The patient verbalized understanding and has no further questions.

## 2020-05-08 ENCOUNTER — TELEPHONE (OUTPATIENT)
Dept: CARDIOLOGY | Facility: CLINIC | Age: 85
End: 2020-05-08

## 2020-05-08 NOTE — TELEPHONE ENCOUNTER
Advised to continue with support hose and elevating legs as much as is comfortable. To continue to monitor and weigh daily.

## 2020-05-08 NOTE — TELEPHONE ENCOUNTER
Patient called to make Dr. Erwin aware that noticed start of some increase of LE edema day before yesterday in ankles and feet. Denies increased shortness of breath. Denies chest pain. Wt 157lb, states has been stable. BP today 127/69 today. States bought chair and feet are not able to be propped up when reclined. States wears support hose. States will get new chair on Thursday. Reports compliance with medications as prescribed. Routed to Dr. Erwin.

## 2020-05-08 NOTE — TELEPHONE ENCOUNTER
----- Message from Jojo Hussein sent at 5/8/2020  9:29 AM CDT -----  Contact: Pt called   Pt has swelling in both ankle and feet. Lv 4/14/20 Dr. Erwin. Please call pt @ [phone]. Thank you.

## 2020-05-12 ENCOUNTER — TELEPHONE (OUTPATIENT)
Dept: ORTHOPEDICS | Facility: CLINIC | Age: 85
End: 2020-05-12

## 2020-05-18 ENCOUNTER — NURSE TRIAGE (OUTPATIENT)
Dept: ADMINISTRATIVE | Facility: CLINIC | Age: 85
End: 2020-05-18

## 2020-05-18 NOTE — TELEPHONE ENCOUNTER
I don't know   I hate to admit it, but I couldn't make an appointment to save my life.  I'll send this to Seda Mead.

## 2020-05-18 NOTE — TELEPHONE ENCOUNTER
She is not getting home health. She has one more visit left with home P/T next week. She wants to see Dr Bradley only about her CHF problem. Is it possible to work her into the schedule?  She wanted to come in on 5/21/2020 because she has an appointment that day with Orthopedics to drain fluid off of her knee. Dr Bradley will not be in that day. Is there any other time sooner than next available?

## 2020-05-18 NOTE — TELEPHONE ENCOUNTER
Reason for Disposition   MILD swelling of both ankles (i.e., pedal edema) AND new onset or worsening    Additional Information   Negative: Difficulty breathing at rest   Negative: Entire foot is cool or blue in comparison to other side   Negative: SEVERE swelling (e.g., swelling extends above knee, entire leg is swollen, weeping fluid)   Negative: Thigh or calf pain and only 1 side and present > 1 hour   Negative: Thigh, calf, or ankle swelling in only one leg   Negative: Thigh, calf, or ankle swelling in both legs, but one side is definitely more swollen   Negative: Cast on leg or ankle and has increasing pain   Negative: Can't walk or can barely stand (new onset)   Negative: Fever and red area (or area very tender to touch)   Negative: Patient sounds very sick or weak to the triager   Negative: Swelling of face, arm or hands (Exception: slight puffiness of fingers during hot weather)   Negative: Pregnant > 20 weeks and sudden weight gain (i.e., > 2 lbs, 1 kg in one week)   Negative: MODERATE swelling of both ankles (e.g., swelling extends up to the knees) AND new onset or worsening   Negative: Difficulty breathing with exertion AND worsening or new onset   Negative: Looks like a boil, infected sore, deep ulcer, or other infected rash (spreading redness, pus)   Negative: Patient wants to be seen    Protocols used: LEG SWELLING AND EDEMA-A-OH     Pt is trying to reach her PCP. Pt stated she has swelling in both of her legs that is worse than normal. Advised a message will be sent to the PCP's office to contact her.   CALL BACK IF:  * Swelling persists over 3 days  * Swelling becomes red or painful to the touch  * You become worse.    Pt verbalized understanding.

## 2020-05-18 NOTE — TELEPHONE ENCOUNTER
Please call patient  Does she still have home health?  She was discharged home from the hospital having had COVID-19 pneumonia.  I need to know what medication she is currently taking  This information can come from the home health nurse if she has  Which she like an appointment?

## 2020-05-20 ENCOUNTER — TELEPHONE (OUTPATIENT)
Dept: ORTHOPEDICS | Facility: CLINIC | Age: 85
End: 2020-05-20

## 2020-05-20 NOTE — TELEPHONE ENCOUNTER
Spoke to patient, advised ok per Britni to come in at 11 AM tomorrow       ----- Message from Marcy Romo sent at 5/20/2020  9:19 AM CDT -----  Contact: self, [phone]  Patient would like to verify her appointment tomorrow is for 11 am. States she spoke with someone about coming in at that time due to having a ride then. Please advise.

## 2020-05-21 ENCOUNTER — OFFICE VISIT (OUTPATIENT)
Dept: ORTHOPEDICS | Facility: CLINIC | Age: 85
End: 2020-05-21
Payer: MEDICARE

## 2020-05-21 ENCOUNTER — TELEPHONE (OUTPATIENT)
Dept: INTERNAL MEDICINE | Facility: CLINIC | Age: 85
End: 2020-05-21

## 2020-05-21 VITALS — WEIGHT: 159.63 LBS | BODY MASS INDEX: 24.19 KG/M2 | HEIGHT: 68 IN

## 2020-05-21 DIAGNOSIS — I50.32 CHRONIC DIASTOLIC CONGESTIVE HEART FAILURE: Chronic | ICD-10-CM

## 2020-05-21 DIAGNOSIS — U07.1 COVID-19 VIRUS INFECTION: Primary | ICD-10-CM

## 2020-05-21 DIAGNOSIS — M17.0 PRIMARY OSTEOARTHRITIS OF BOTH KNEES: Primary | ICD-10-CM

## 2020-05-21 PROCEDURE — 20610 DRAIN/INJ JOINT/BURSA W/O US: CPT | Mod: 50,HCNC,S$GLB, | Performed by: NURSE PRACTITIONER

## 2020-05-21 PROCEDURE — 1101F PR PT FALLS ASSESS DOC 0-1 FALLS W/OUT INJ PAST YR: ICD-10-PCS | Mod: HCNC,CPTII,S$GLB, | Performed by: NURSE PRACTITIONER

## 2020-05-21 PROCEDURE — 1125F AMNT PAIN NOTED PAIN PRSNT: CPT | Mod: HCNC,S$GLB,, | Performed by: NURSE PRACTITIONER

## 2020-05-21 PROCEDURE — 99999 PR PBB SHADOW E&M-EST. PATIENT-LVL II: ICD-10-PCS | Mod: PBBFAC,HCNC,, | Performed by: NURSE PRACTITIONER

## 2020-05-21 PROCEDURE — 1159F PR MEDICATION LIST DOCUMENTED IN MEDICAL RECORD: ICD-10-PCS | Mod: HCNC,S$GLB,, | Performed by: NURSE PRACTITIONER

## 2020-05-21 PROCEDURE — 1159F MED LIST DOCD IN RCRD: CPT | Mod: HCNC,S$GLB,, | Performed by: NURSE PRACTITIONER

## 2020-05-21 PROCEDURE — 20610 PR DRAIN/INJECT LARGE JOINT/BURSA: ICD-10-PCS | Mod: 50,HCNC,S$GLB, | Performed by: NURSE PRACTITIONER

## 2020-05-21 PROCEDURE — 99213 PR OFFICE/OUTPT VISIT, EST, LEVL III, 20-29 MIN: ICD-10-PCS | Mod: HCNC,25,S$GLB, | Performed by: NURSE PRACTITIONER

## 2020-05-21 PROCEDURE — 1101F PT FALLS ASSESS-DOCD LE1/YR: CPT | Mod: HCNC,CPTII,S$GLB, | Performed by: NURSE PRACTITIONER

## 2020-05-21 PROCEDURE — 99999 PR PBB SHADOW E&M-EST. PATIENT-LVL II: CPT | Mod: PBBFAC,HCNC,, | Performed by: NURSE PRACTITIONER

## 2020-05-21 PROCEDURE — 1125F PR PAIN SEVERITY QUANTIFIED, PAIN PRESENT: ICD-10-PCS | Mod: HCNC,S$GLB,, | Performed by: NURSE PRACTITIONER

## 2020-05-21 PROCEDURE — 99213 OFFICE O/P EST LOW 20 MIN: CPT | Mod: HCNC,25,S$GLB, | Performed by: NURSE PRACTITIONER

## 2020-05-21 RX ADMIN — TRIAMCINOLONE ACETONIDE 80 MG: 40 INJECTION, SUSPENSION INTRA-ARTICULAR; INTRAMUSCULAR at 03:05

## 2020-05-21 NOTE — TELEPHONE ENCOUNTER
Pt states she's having symptoms similar to previous visit (fluid). Pt daughter requesting xray before visit to discuss.

## 2020-05-21 NOTE — TELEPHONE ENCOUNTER
----- Message from Tonia Doty sent at 5/21/2020 11:47 AM CDT -----  Contact: Daughter Yolanda @ [phone]  Good Morning  Daughter would like to know if it would be ok to order X-RAY before seeing     Thank you

## 2020-05-21 NOTE — TELEPHONE ENCOUNTER
Please call daughterYolanda at [phone] to schedule CXR and Labs next week before her appointment. Thank you very much.

## 2020-05-22 RX ORDER — TRIAMCINOLONE ACETONIDE 40 MG/ML
80 INJECTION, SUSPENSION INTRA-ARTICULAR; INTRAMUSCULAR
Status: COMPLETED | OUTPATIENT
Start: 2020-05-21 | End: 2020-05-21

## 2020-05-22 NOTE — PROGRESS NOTES
CC: Pain of the Right Knee and Pain of the Left Knee      HPI: Pt with c/o bilateral knee pain which is aching and worse medially for the past few weeks. There is intermittent swelling worse on the right. She uses a walker at home. She has seen Dr. Sandy in the past and is not a surgical candidate. He gave her cortisone injections in the past with good relief and she would like to repeat that today. She comes to this appt in a wheelchair and she lives with her daughter at home. She recently was hospitalized for pneumonia and is not very active.     ROS  General: denies fever and chills  Resp: no c/o sob  CVS: no c/o cp  MSK: c/o bilateral knee pain    PE  General: AAOx3, pleasant and cooperative  Resp: respirations even and unlabored  MSK: bilateral knee exam  -5 degrees extension  100 degrees flexion  No warmth or erythema   - effusion, but soft tissue swelling around the knee  + crepitus  - instability    Assessment:  Bilateral knee djd    Plan:  Cortisone injections bilateral knees today  RICE  Tylenol prn    Knee Injection Procedure Note  Diagnosis: bilateral knee degenerative arthritis  Indications: bilateral knee pain  Procedure Details: Verbal consent was obtained for the procedure. The injection site was identified and the skin was prepared with alcohol. The bilateral knee was injected from an anterolateral approach with 1 ml of Kenalog and 4 ml Lidocaine each under sterile technique using a 22 gauge needle. The needle was removed and the area cleansed and dressed.  Complications:  Patient tolerated the procedure well.    she was advised to rest the knee today, using ice and elevation as needed for comfort and swelling.Immediate relief of the knee pain may be short lived and secondary to the lidocaine. she may have an increase in discomfort tonight followed by steady improvement over the next several days. It may take 1-2 weeks following the injection to get the full benefit of the medication.

## 2020-05-26 ENCOUNTER — HOSPITAL ENCOUNTER (OUTPATIENT)
Dept: RADIOLOGY | Facility: HOSPITAL | Age: 85
Discharge: HOME | End: 2020-05-26
Attending: INTERNAL MEDICINE
Payer: MEDICARE

## 2020-05-26 DIAGNOSIS — U07.1 COVID-19 VIRUS INFECTION: ICD-10-CM

## 2020-05-26 DIAGNOSIS — I50.32 CHRONIC DIASTOLIC CONGESTIVE HEART FAILURE: ICD-10-CM

## 2020-05-26 DIAGNOSIS — H18.519 FUCHS ENDOTHELIAL CORNEAL DYSTROPHY TYPE 1: Primary | ICD-10-CM

## 2020-05-26 PROCEDURE — 71046 XR CHEST PA AND LATERAL: ICD-10-PCS | Mod: 26,HCNC,, | Performed by: RADIOLOGY

## 2020-05-26 PROCEDURE — 71046 X-RAY EXAM CHEST 2 VIEWS: CPT | Mod: 26,HCNC,, | Performed by: RADIOLOGY

## 2020-05-26 PROCEDURE — 71046 X-RAY EXAM CHEST 2 VIEWS: CPT | Mod: TC,HCNC,PN

## 2020-05-26 RX ORDER — SODIUM CHLORIDE 50 MG/ML
1 SOLUTION/ DROPS OPHTHALMIC 4 TIMES DAILY
Qty: 15 ML | Refills: 6 | Status: ON HOLD | OUTPATIENT
Start: 2020-05-26 | End: 2020-11-04

## 2020-05-28 ENCOUNTER — OFFICE VISIT (OUTPATIENT)
Dept: INTERNAL MEDICINE | Facility: CLINIC | Age: 85
End: 2020-05-28
Payer: MEDICARE

## 2020-05-28 VITALS
WEIGHT: 153.44 LBS | OXYGEN SATURATION: 95 % | HEART RATE: 68 BPM | HEIGHT: 68 IN | SYSTOLIC BLOOD PRESSURE: 118 MMHG | BODY MASS INDEX: 23.26 KG/M2 | DIASTOLIC BLOOD PRESSURE: 74 MMHG

## 2020-05-28 DIAGNOSIS — Z79.4 TYPE 2 DIABETES MELLITUS WITH DIABETIC POLYNEUROPATHY, WITH LONG-TERM CURRENT USE OF INSULIN: Chronic | ICD-10-CM

## 2020-05-28 DIAGNOSIS — N18.30 CKD (CHRONIC KIDNEY DISEASE) STAGE 3, GFR 30-59 ML/MIN: ICD-10-CM

## 2020-05-28 DIAGNOSIS — Z23 NEED FOR SHINGLES VACCINE: ICD-10-CM

## 2020-05-28 DIAGNOSIS — U07.1 COVID-19 VIRUS INFECTION: Primary | ICD-10-CM

## 2020-05-28 DIAGNOSIS — E11.21 TYPE 2 DIABETES MELLITUS WITH DIABETIC NEPHROPATHY, WITH LONG-TERM CURRENT USE OF INSULIN: ICD-10-CM

## 2020-05-28 DIAGNOSIS — E03.9 PRIMARY HYPOTHYROIDISM: Chronic | ICD-10-CM

## 2020-05-28 DIAGNOSIS — R93.89 ABNORMAL CHEST X-RAY: ICD-10-CM

## 2020-05-28 DIAGNOSIS — Z79.01 LONG TERM CURRENT USE OF ANTICOAGULANT THERAPY: Chronic | ICD-10-CM

## 2020-05-28 DIAGNOSIS — Z79.4 TYPE 2 DIABETES MELLITUS WITH DIABETIC NEPHROPATHY, WITH LONG-TERM CURRENT USE OF INSULIN: ICD-10-CM

## 2020-05-28 DIAGNOSIS — E11.42 TYPE 2 DIABETES MELLITUS WITH DIABETIC POLYNEUROPATHY, WITH LONG-TERM CURRENT USE OF INSULIN: Chronic | ICD-10-CM

## 2020-05-28 DIAGNOSIS — J45.901 ASTHMA EXACERBATION IN COPD: ICD-10-CM

## 2020-05-28 DIAGNOSIS — J44.1 ASTHMA EXACERBATION IN COPD: ICD-10-CM

## 2020-05-28 DIAGNOSIS — I48.19 PERSISTENT ATRIAL FIBRILLATION: Chronic | ICD-10-CM

## 2020-05-28 DIAGNOSIS — I50.32 CHRONIC DIASTOLIC CONGESTIVE HEART FAILURE: Chronic | ICD-10-CM

## 2020-05-28 PROCEDURE — 99215 OFFICE O/P EST HI 40 MIN: CPT | Mod: HCNC,S$GLB,, | Performed by: INTERNAL MEDICINE

## 2020-05-28 PROCEDURE — 1101F PR PT FALLS ASSESS DOC 0-1 FALLS W/OUT INJ PAST YR: ICD-10-PCS | Mod: HCNC,CPTII,S$GLB, | Performed by: INTERNAL MEDICINE

## 2020-05-28 PROCEDURE — 1101F PT FALLS ASSESS-DOCD LE1/YR: CPT | Mod: HCNC,CPTII,S$GLB, | Performed by: INTERNAL MEDICINE

## 2020-05-28 PROCEDURE — 1126F PR PAIN SEVERITY QUANTIFIED, NO PAIN PRESENT: ICD-10-PCS | Mod: HCNC,S$GLB,, | Performed by: INTERNAL MEDICINE

## 2020-05-28 PROCEDURE — 1159F MED LIST DOCD IN RCRD: CPT | Mod: HCNC,S$GLB,, | Performed by: INTERNAL MEDICINE

## 2020-05-28 PROCEDURE — 3051F HG A1C>EQUAL 7.0%<8.0%: CPT | Mod: HCNC,CPTII,S$GLB, | Performed by: INTERNAL MEDICINE

## 2020-05-28 PROCEDURE — 3051F PR MOST RECENT HEMOGLOBIN A1C LEVEL 7.0 - < 8.0%: ICD-10-PCS | Mod: HCNC,CPTII,S$GLB, | Performed by: INTERNAL MEDICINE

## 2020-05-28 PROCEDURE — 99999 PR PBB SHADOW E&M-EST. PATIENT-LVL IV: CPT | Mod: PBBFAC,HCNC,, | Performed by: INTERNAL MEDICINE

## 2020-05-28 PROCEDURE — 99999 PR PBB SHADOW E&M-EST. PATIENT-LVL IV: ICD-10-PCS | Mod: PBBFAC,HCNC,, | Performed by: INTERNAL MEDICINE

## 2020-05-28 PROCEDURE — 99215 PR OFFICE/OUTPT VISIT, EST, LEVL V, 40-54 MIN: ICD-10-PCS | Mod: HCNC,S$GLB,, | Performed by: INTERNAL MEDICINE

## 2020-05-28 PROCEDURE — 99499 UNLISTED E&M SERVICE: CPT | Mod: HCNC,S$GLB,, | Performed by: INTERNAL MEDICINE

## 2020-05-28 PROCEDURE — 1126F AMNT PAIN NOTED NONE PRSNT: CPT | Mod: HCNC,S$GLB,, | Performed by: INTERNAL MEDICINE

## 2020-05-28 PROCEDURE — 99499 RISK ADDL DX/OHS AUDIT: ICD-10-PCS | Mod: HCNC,S$GLB,, | Performed by: INTERNAL MEDICINE

## 2020-05-28 PROCEDURE — 1159F PR MEDICATION LIST DOCUMENTED IN MEDICAL RECORD: ICD-10-PCS | Mod: HCNC,S$GLB,, | Performed by: INTERNAL MEDICINE

## 2020-05-28 NOTE — PATIENT INSTRUCTIONS
Results for orders placed or performed in visit on 05/26/20   CBC auto differential   Result Value Ref Range    WBC 8.66 3.90 - 12.70 K/uL    RBC 4.17 4.00 - 5.40 M/uL    Hemoglobin 12.4 12.0 - 16.0 g/dL    Hematocrit 40.2 37.0 - 48.5 %    Mean Corpuscular Volume 96 82 - 98 fL    Mean Corpuscular Hemoglobin 29.7 27.0 - 31.0 pg    Mean Corpuscular Hemoglobin Conc 30.8 (L) 32.0 - 36.0 g/dL    RDW 16.5 (H) 11.5 - 14.5 %    Platelets 196 150 - 350 K/uL    MPV 12.1 9.2 - 12.9 fL    Immature Granulocytes 0.5 0.0 - 0.5 %    Gran # (ANC) 5.4 1.8 - 7.7 K/uL    Immature Grans (Abs) 0.04 0.00 - 0.04 K/uL    Lymph # 1.8 1.0 - 4.8 K/uL    Mono # 1.2 (H) 0.3 - 1.0 K/uL    Eos # 0.2 0.0 - 0.5 K/uL    Baso # 0.05 0.00 - 0.20 K/uL    nRBC 0 0 /100 WBC    Gran% 61.7 38.0 - 73.0 %    Lymph% 21.1 18.0 - 48.0 %    Mono% 14.1 4.0 - 15.0 %    Eosinophil% 2.0 0.0 - 8.0 %    Basophil% 0.6 0.0 - 1.9 %    Differential Method Automated    Ferritin   Result Value Ref Range    Ferritin 54 20.0 - 300.0 ng/mL   C-Reactive Protein   Result Value Ref Range    CRP 1.1 0.0 - 8.2 mg/L   Brain Natriuretic Peptide   Result Value Ref Range     (H) 0 - 99 pg/mL

## 2020-05-28 NOTE — PROGRESS NOTES
Subjective:       Patient ID: Tiana Mead is a 89 y.o. female.    Chief Complaint: Follow-up   hospital follow-up  She was admitted to the hospital on March 25th with COVID-19 diarrhea, pneumonia, hypoxemia, exacerbation of congestive heart failure and acute kidney injury.  She declined intubation but fortunately turned around on her own.    She was discharged home with supplemental oxygen which she recently discontinued using during the day.  Her O2 sats have all been around 95-97 off oxygen during the day.    She has had no paroxysmal nocturnal dyspnea, her dyspnea on exertion is improving.  She is exhausted after she takes her shower and  Gets dressed     She cooks for herself and recently cooked lamb chops and cabbage.    All 5 of her children came in for her birthday [birth year].  All 5 of her children also got COVID-19.  Only her eldest son was hospitalized and he required ventilatory support on a ventilator but is home now and recovering.    She presents the office today with Yolanda her daughter.    Recently both of her knees were injected with cortisone and they are feeling better.    Her home health and in-home physical therapy ended this week.  She is okay with that.  She can do her own exercises and she enjoys doing her exercises every day.  She wants to be independent, she wants to get her strength back and she is not depressed she is willing to press on.  New 9  Results for orders placed or performed in visit on 05/26/20   CBC auto differential   Result Value Ref Range    WBC 8.66 3.90 - 12.70 K/uL    RBC 4.17 4.00 - 5.40 M/uL    Hemoglobin 12.4 12.0 - 16.0 g/dL    Hematocrit 40.2 37.0 - 48.5 %    Mean Corpuscular Volume 96 82 - 98 fL    Mean Corpuscular Hemoglobin 29.7 27.0 - 31.0 pg    Mean Corpuscular Hemoglobin Conc 30.8 (L) 32.0 - 36.0 g/dL    RDW 16.5 (H) 11.5 - 14.5 %    Platelets 196 150 - 350 K/uL    MPV 12.1 9.2 - 12.9 fL    Immature Granulocytes 0.5 0.0 - 0.5 %    Gran # (ANC) 5.4 1.8 -  7.7 K/uL    Immature Grans (Abs) 0.04 0.00 - 0.04 K/uL    Lymph # 1.8 1.0 - 4.8 K/uL    Mono # 1.2 (H) 0.3 - 1.0 K/uL    Eos # 0.2 0.0 - 0.5 K/uL    Baso # 0.05 0.00 - 0.20 K/uL    nRBC 0 0 /100 WBC    Gran% 61.7 38.0 - 73.0 %    Lymph% 21.1 18.0 - 48.0 %    Mono% 14.1 4.0 - 15.0 %    Eosinophil% 2.0 0.0 - 8.0 %    Basophil% 0.6 0.0 - 1.9 %    Differential Method Automated    Ferritin   Result Value Ref Range    Ferritin 54 20.0 - 300.0 ng/mL   C-Reactive Protein   Result Value Ref Range    CRP 1.1 0.0 - 8.2 mg/L   Brain Natriuretic Peptide   Result Value Ref Range     (H) 0 - 99 pg/mL     Diabetes Management Status    Statin: Taking  ACE/ARB: Not taking    Screening or Prevention Patient's value Goal Complete/Controlled?   HgA1C Testing and Control   Lab Results   Component Value Date    HGBA1C 7.0 (H) 03/12/2020      Annually/Less than 8% Yes   Lipid profile : 11/13/2019 Annually Yes   LDL control Lab Results   Component Value Date    LDLCALC 81.4 11/13/2019    Annually/Less than 100 mg/dl  Yes   Nephropathy screening Lab Results   Component Value Date    LABMICR 2.7 10/25/2011     Lab Results   Component Value Date    PROTEINUA Negative 03/27/2020    Annually Yes   Blood pressure BP Readings from Last 1 Encounters:   05/28/20 118/74    Less than 140/90 Yes   Dilated retinal exam : 01/17/2020 Annually Yes   Foot exam   : 02/04/2020 Annually Yes       HPI  Review of Systems   Constitutional: Negative for activity change, appetite change, chills, fatigue, fever and unexpected weight change.   HENT: Negative for hearing loss.    Eyes: Negative for visual disturbance.   Respiratory: Positive for chest tightness and shortness of breath. Negative for cough and wheezing.    Cardiovascular: Positive for leg swelling. Negative for chest pain and palpitations.   Gastrointestinal: Negative for abdominal pain, constipation, nausea and vomiting.   Genitourinary: Negative for dysuria, frequency and urgency.    Musculoskeletal: Negative for arthralgias, back pain, gait problem, joint swelling and myalgias.   Skin: Negative for rash.   Neurological: Negative for light-headedness and headaches.   Psychiatric/Behavioral: Negative for dysphoric mood and sleep disturbance. The patient is not nervous/anxious.        Objective:      Physical Exam   Constitutional: She is oriented to person, place, and time. No distress.   She is short of breath with minimal exertion, such as speaking and standing.  She is not uncomfortable.   HENT:   Head: Normocephalic and atraumatic.   Eyes: Conjunctivae are normal. No scleral icterus.   Cardiovascular:   She is in atrial fib in her heart rate appears to be around 70.  Her blood pressure is 120/74 sitting and standing.  She is not lightheaded on standing.   Pulmonary/Chest: She has no wheezes.   Her lungs sound remarkably clear.   Abdominal: Soft. Bowel sounds are normal.   Musculoskeletal: She exhibits edema.   She is diffusely weak.  She cannot stand without pushing up with both arms.    Protective Sensation (w/ 10 gram monofilament):  Right: Decreased  Left: Decreased    Visual Inspection:  Normal -  Bilateral    Pedal Pulses:   Right: Present  Left: Present    Posterior tibialis:   Right:Present  Left: Present     Lymphadenopathy:     She has no cervical adenopathy.   Neurological: She is alert and oriented to person, place, and time.   Skin: Skin is warm and dry.   Nursing note and vitals reviewed.      Assessment:       1. COVID-19 virus infection    2. Abnormal chest x-ray    3. Persistent atrial fibrillation    4. Long term current use of anticoagulant therapy, eliquis    5. Chronic diastolic congestive heart failure    6. Primary hypothyroidism    7. CKD (chronic kidney disease) stage 3, GFR 30-59 ml/min    8. Type 2 diabetes mellitus with diabetic nephropathy, with long-term current use of insulin    9. Need for shingles vaccine, second shot of two shot series    10. Asthma  exacerbation in COPD        Plan:   Tiana was seen today for follow-up.    Diagnoses and all orders for this visit:    COVID-19 virus infection, she is slowly improving.    Abnormal chest x-ray, her chest x-ray might have a subpulmonic effusion.  Will continue to watch this.  Recheck a chest x-ray 1 month.  She does not have any chest pain and she looks pretty good.    Persistent atrial fibrillation, rate controlled and anticoagulated.    Long term current use of anticoagulant therapy, eliquis    Chronic diastolic congestive heart failure, might have a little bit too much fluid on board.  She is getting her appetite back and it sounds like she is doing a good job trying to eat healthy meals.    Primary hypothyroidism, TSH at goal    CKD (chronic kidney disease) stage 3, GFR 30-59 ml/min, acute kidney injury has resolved    Type 2 diabetes mellitus with diabetic nephropathy, with long-term current use of insulin, she needs diabetic shoes.    Need for shingles vaccine, second shot of two shot series    History of asthma in COPD, currently controlled    Medication List with Changes/Refills   Current Medications    ACETAMINOPHEN (TYLENOL) 500 MG TABLET    Take 500 mg by mouth every 6 (six) hours as needed for Pain.    ALBUTEROL (PROVENTIL/VENTOLIN HFA) 90 MCG/ACTUATION INHALER    Inhale 2 puffs into the lungs every 4 (four) hours as needed.    AMLODIPINE (NORVASC) 5 MG TABLET    TAKE 1 TABLET(5 MG) BY MOUTH EVERY MORNING    APIXABAN (ELIQUIS) 2.5 MG TAB    Take 1 tablet (2.5 mg total) by mouth 2 (two) times daily.    ATORVASTATIN (LIPITOR) 40 MG TABLET    Take 1 tablet (40 mg total) by mouth once daily.    BLOOD SUGAR DIAGNOSTIC (ONETOUCH ULTRA TEST) STRP    Inject 1 strip into the skin 3 (three) times daily.    BLOOD SUGAR DIAGNOSTIC (TRUE METRIX GLUCOSE TEST STRIP) STRP    USE TO TEST BLOOD SUGAR WITH MEALS THREE TIMES DAILY ins preferred    CLOTRIMAZOLE-BETAMETHASONE 1-0.05% (LOTRISONE) CREAM    Apply topically 2  (two) times daily. For intertriginous itching    DICLOFENAC SODIUM (VOLTAREN) 1 % GEL    Apply topically 4 (four) times daily as needed.    DORZOLAMIDE (TRUSOPT) 2 % OPHTHALMIC SOLUTION    Place 1 drop into both eyes 2 (two) times daily.    FLUTICASONE-SALMETEROL DISKUS INHALER 250-50 MCG    INHALE 1 PUFF BY MOUTH INTO THE LUNGS NIGHTLY    FLUTICASONE-SALMETEROL DISKUS INHALER 250-50 MCG    Inhale 1 puff into the lungs 2 (two) times daily. Controller    FUROSEMIDE (LASIX) 40 MG TABLET    Take 1 tablet (40 mg total) by mouth 2 (two) times daily.    INSULIN ASPART PROTAMINE-INSULIN ASPART (NOVOLOG MIX 70-30FLEXPEN U-100) 100 UNIT/ML (70-30) INPN PEN    INJECT 10 UNITS UNDER THE SKIN ONCE DAILY EVERY MORNING BEFORE BREAKFAST    IPRATROPIUM (ATROVENT) 0.03 % NASAL SPRAY    USE 2 SPRAYS IN EACH NOSTRIL TWICE DAILY    ISOSORBIDE MONONITRATE (IMDUR) 60 MG 24 HR TABLET    TAKE 1 TABLET BY MOUTH EVERY MORNING FOR HEART, to prevent chest pain and reduce shortness of breath    LANCETS (TRUEPLUS LANCETS) 33 GAUGE MISC    Apply 1 lancet topically 3 (three) times daily.    LATANOPROST 0.005 % OPHTHALMIC SOLUTION    Place 1 drop into the right eye every evening.    LEVOTHYROXINE (SYNTHROID) 75 MCG TABLET    Take 1 tablet (75 mcg total) by mouth before breakfast.    MECLIZINE (ANTIVERT) 12.5 MG TABLET    Take 1 tablet (12.5 mg total) by mouth 3 (three) times daily as needed for Dizziness.    METOPROLOL TARTRATE (LOPRESSOR) 50 MG TABLET    TAKE 1 TABLET(50 MG) BY MOUTH TWICE DAILY    MULTIVIT-MINERAL-IRON-LUTEIN (CENTRUM SILVER ULTRA WOMEN'S) TAB    Take 1 tablet by mouth once daily.    NITROGLYCERIN (NITROSTAT) 0.4 MG SL TABLET    ONE TABLET UNDER THE TONGUE EVERY 5 MINUTES AS NEEDED FOR CHEST PAIN    NYSTATIN (MYCOSTATIN) POWDER    Apply topically 4 (four) times daily.    ONDANSETRON (ZOFRAN) 4 MG TABLET    TAKE ONE TABLET BY MOUTH EVERY 12 HOURS AS NEEDED FOR NAUSEA OR DIZZINESS    PEN NEEDLE, DIABETIC (BD ULTRA-FINE SHORT PEN  "NEEDLE) 31 GAUGE X 5/16" NDLE    USE WITH INSULIN PENS twice daily or  AS DIRECTED    SODIUM CHLORIDE 5% () 5 % OPHTHALMIC SOLUTION    Place 1 drop into both eyes 4 (four) times daily.    TRIAMCINOLONE ACETONIDE 0.025% (KENALOG) 0.025 % OINT    Apply topically 2 (two) times daily.    TRUE METRIX GLUCOSE METER MISC    TEST TID ins preferred    TRUEPLUS LANCETS 30 GAUGE MISC    USE TO TEST BLOOD SUGAR TID AC   Discontinued Medications    FLUZONE HIGH-DOSE 2019-20, PF, 180 MCG/0.5 ML SYRG    ADM 0.5ML IM UTD    LANCETS MISC    1 Device by Misc.(Non-Drug; Combo Route) route 3 (three) times daily before meals. Please provide the insurance company preferred product.     Follow up in about 4 weeks (around 6/25/2020) for after cxr and blood tests.    "

## 2020-05-31 NOTE — PROGRESS NOTES
Refill Routing Note     Medication(s) are not appropriate for processing by [facility]:    Medication Outside of Protocol    Appointments  past 12m or future 3m with PCP    Date Provider   Last Visit   5/28/2020 Belen Bradley MD   Next Visit   6/25/2020 Belen Bradley MD           Automatic Epic Protocol Generated Data:    Requested Prescriptions   Pending Prescriptions Disp Refills    ELIQUIS 2.5 mg Tab [Pharmacy Med Name: ELIQUIS 2.5MG TABLETS] 60 tablet 5     Sig: TAKE 1 TABLET(2.5 MG) BY MOUTH TWICE DAILY       There is no refill protocol information for this order           Note composed:10:27 PM 05/30/2020

## 2020-06-01 RX ORDER — APIXABAN 2.5 MG/1
TABLET, FILM COATED ORAL
Qty: 60 TABLET | Refills: 5 | Status: ON HOLD | OUTPATIENT
Start: 2020-06-01 | End: 2020-11-06 | Stop reason: HOSPADM

## 2020-06-03 ENCOUNTER — PES CALL (OUTPATIENT)
Dept: ADMINISTRATIVE | Facility: CLINIC | Age: 85
End: 2020-06-03

## 2020-06-10 ENCOUNTER — PATIENT OUTREACH (OUTPATIENT)
Dept: ADMINISTRATIVE | Facility: HOSPITAL | Age: 85
End: 2020-06-10

## 2020-06-10 NOTE — PROGRESS NOTES
Health Maintenance Due   Topic Date Due    Shingles Vaccine (2 of 3) 09/09/2010     Chart review completed.

## 2020-06-15 ENCOUNTER — HOSPITAL ENCOUNTER (OUTPATIENT)
Dept: RADIOLOGY | Facility: HOSPITAL | Age: 85
Discharge: HOME | End: 2020-06-15
Attending: INTERNAL MEDICINE
Payer: MEDICARE

## 2020-06-15 DIAGNOSIS — U07.1 COVID-19 VIRUS INFECTION: ICD-10-CM

## 2020-06-15 DIAGNOSIS — R93.89 ABNORMAL CHEST X-RAY: ICD-10-CM

## 2020-06-15 PROCEDURE — 71046 XR CHEST PA AND LATERAL: ICD-10-PCS | Mod: 26,HCNC,, | Performed by: RADIOLOGY

## 2020-06-15 PROCEDURE — 71046 X-RAY EXAM CHEST 2 VIEWS: CPT | Mod: 26,HCNC,, | Performed by: RADIOLOGY

## 2020-06-15 PROCEDURE — 71046 X-RAY EXAM CHEST 2 VIEWS: CPT | Mod: TC,HCNC,PN

## 2020-06-17 ENCOUNTER — TELEPHONE (OUTPATIENT)
Dept: OPHTHALMOLOGY | Facility: CLINIC | Age: 85
End: 2020-06-17

## 2020-06-17 NOTE — TELEPHONE ENCOUNTER
----- Message from Eva Hackett sent at 6/17/2020  9:07 AM CDT -----  Contact: Tiana  Pt needed to speak with someone in the office about her appt. Pt contact number is [phone]. Pt wanted to know if she could have an appt on July 21 about 10 if possible.

## 2020-06-19 ENCOUNTER — TELEPHONE (OUTPATIENT)
Dept: INTERNAL MEDICINE | Facility: CLINIC | Age: 85
End: 2020-06-19

## 2020-06-19 NOTE — TELEPHONE ENCOUNTER
Please call patient.  Her labs and chest x-ray did not show improvement this time.  I think her home health has stopped.  Please call the patient to ask about her daily weights and vital signs and if she having any symptoms of congestive heart failure such as paroxysmal nocturnal dyspnea, orthopnea, pedal edema, weight gain, shortness of breath  Please educate her that if anything changes in her clinical status, I can reorder home health services.  Whenever there is a change in clinical status, [employer] might qualify her to receive home health services  No guarantees

## 2020-06-19 NOTE — TELEPHONE ENCOUNTER
Pt informed. She denies any problems of CHF, edema or breathing issues at this time. She will come in to see Dr Bradley next week as scheduled.

## 2020-06-25 ENCOUNTER — OFFICE VISIT (OUTPATIENT)
Dept: INTERNAL MEDICINE | Facility: CLINIC | Age: 85
End: 2020-06-25
Payer: MEDICARE

## 2020-06-25 VITALS
BODY MASS INDEX: 24.22 KG/M2 | OXYGEN SATURATION: 97 % | DIASTOLIC BLOOD PRESSURE: 66 MMHG | HEART RATE: 66 BPM | HEIGHT: 68 IN | WEIGHT: 159.81 LBS | SYSTOLIC BLOOD PRESSURE: 112 MMHG

## 2020-06-25 DIAGNOSIS — I48.19 PERSISTENT ATRIAL FIBRILLATION: Chronic | ICD-10-CM

## 2020-06-25 DIAGNOSIS — R53.1 WEAKNESS: Primary | ICD-10-CM

## 2020-06-25 DIAGNOSIS — Z79.4 TYPE 2 DIABETES MELLITUS WITH DIABETIC POLYNEUROPATHY, WITH LONG-TERM CURRENT USE OF INSULIN: Chronic | ICD-10-CM

## 2020-06-25 DIAGNOSIS — E03.9 PRIMARY HYPOTHYROIDISM: Chronic | ICD-10-CM

## 2020-06-25 DIAGNOSIS — E11.42 TYPE 2 DIABETES MELLITUS WITH DIABETIC POLYNEUROPATHY, WITH LONG-TERM CURRENT USE OF INSULIN: Chronic | ICD-10-CM

## 2020-06-25 DIAGNOSIS — Z79.4 TYPE 2 DIABETES MELLITUS WITH DIABETIC NEPHROPATHY, WITH LONG-TERM CURRENT USE OF INSULIN: ICD-10-CM

## 2020-06-25 DIAGNOSIS — E11.21 TYPE 2 DIABETES MELLITUS WITH DIABETIC NEPHROPATHY, WITH LONG-TERM CURRENT USE OF INSULIN: ICD-10-CM

## 2020-06-25 DIAGNOSIS — R06.09 DOE (DYSPNEA ON EXERTION): ICD-10-CM

## 2020-06-25 DIAGNOSIS — R93.89 ABNORMAL CXR: ICD-10-CM

## 2020-06-25 PROCEDURE — 1159F PR MEDICATION LIST DOCUMENTED IN MEDICAL RECORD: ICD-10-PCS | Mod: HCNC,S$GLB,, | Performed by: INTERNAL MEDICINE

## 2020-06-25 PROCEDURE — 99214 OFFICE O/P EST MOD 30 MIN: CPT | Mod: HCNC,S$GLB,, | Performed by: INTERNAL MEDICINE

## 2020-06-25 PROCEDURE — 1159F MED LIST DOCD IN RCRD: CPT | Mod: HCNC,S$GLB,, | Performed by: INTERNAL MEDICINE

## 2020-06-25 PROCEDURE — 3051F PR MOST RECENT HEMOGLOBIN A1C LEVEL 7.0 - < 8.0%: ICD-10-PCS | Mod: HCNC,CPTII,S$GLB, | Performed by: INTERNAL MEDICINE

## 2020-06-25 PROCEDURE — 1101F PT FALLS ASSESS-DOCD LE1/YR: CPT | Mod: HCNC,CPTII,S$GLB, | Performed by: INTERNAL MEDICINE

## 2020-06-25 PROCEDURE — 1101F PR PT FALLS ASSESS DOC 0-1 FALLS W/OUT INJ PAST YR: ICD-10-PCS | Mod: HCNC,CPTII,S$GLB, | Performed by: INTERNAL MEDICINE

## 2020-06-25 PROCEDURE — 99999 PR PBB SHADOW E&M-EST. PATIENT-LVL III: ICD-10-PCS | Mod: PBBFAC,HCNC,, | Performed by: INTERNAL MEDICINE

## 2020-06-25 PROCEDURE — 99999 PR PBB SHADOW E&M-EST. PATIENT-LVL III: CPT | Mod: PBBFAC,HCNC,, | Performed by: INTERNAL MEDICINE

## 2020-06-25 PROCEDURE — 99214 PR OFFICE/OUTPT VISIT, EST, LEVL IV, 30-39 MIN: ICD-10-PCS | Mod: HCNC,S$GLB,, | Performed by: INTERNAL MEDICINE

## 2020-06-25 PROCEDURE — 3051F HG A1C>EQUAL 7.0%<8.0%: CPT | Mod: HCNC,CPTII,S$GLB, | Performed by: INTERNAL MEDICINE

## 2020-06-25 NOTE — PATIENT INSTRUCTIONS
Hemoglobin A1C   Date Value Ref Range Status   03/12/2020 7.0 (H) 4.0 - 5.6 % Final     Comment:               11/13/2019 7.4 (H) 4.0 - 5.6 % Final     Comment:        08/24/2019 6.9 (H) 4.0 - 5.6 % Final     Comment:          GOAL fasting blood sugar 150 to 180  GOAL A1c 7.0 to 8.0 you may feel stronger closer to 8.0  Try only taking 10 units of insulin in the morning  Lets recheck an A1c in September   Metamucil ( a wheat fiber) some people have gas from it  or Citrucel (a methycellulose pure plant fiber not from wheat) both come sugar free

## 2020-06-26 NOTE — PROGRESS NOTES
Subjective:       Patient ID: Tiana Mead is a 89 y.o. female.    Chief Complaint: Follow-up   She continues to recover from COVID-19 pneumonia and COVID-19 diarrhea    Her weight has been consistently 153 lb  This dictation was performed using using MModal.    Every morning she gets up at 6:00 a.m., she does stretches, she walks outside on the porch for 30 min    She denies any hypoglycemic episodes but she reports fasting blood sugars of 89    She still taking evening insulin although I told her not to do that    She says she thinks she has outpatient management    Her home health and home physical therapy has ended but she continues to do her exercises every day    She enjoys doing cooking and she just made some pork chops, boil CABG    Diabetes Management Status    Statin: Taking  ACE/ARB: Not taking    Screening or Prevention Patient's value Goal Complete/Controlled?   HgA1C Testing and Control   Lab Results   Component Value Date    HGBA1C 7.0 (H) 03/12/2020      Annually/Less than 8% Yes   Lipid profile : 11/13/2019 Annually Yes   LDL control Lab Results   Component Value Date    LDLCALC 81.4 11/13/2019    Annually/Less than 100 mg/dl  Yes   Nephropathy screening Lab Results   Component Value Date    LABMICR 2.7 10/25/2011     Lab Results   Component Value Date    PROTEINUA Negative 03/27/2020    Annually Yes   Blood pressure BP Readings from Last 1 Encounters:   06/25/20 112/66    Less than 140/90 Yes   Dilated retinal exam : 01/17/2020 Annually Yes   Foot exam   : 05/28/2020 Annually Yes     Hemoglobin A1C   Date Value Ref Range Status   03/12/2020 7.0 (H) 4.0 - 5.6 % Final     Comment:     ADA Screening Guidelines:  5.7-6.4%  Consistent with prediabetes  >or=6.5%  Consistent with diabetes  High levels of fetal hemoglobin interfere with the HbA1C  assay. Heterozygous hemoglobin variants (HbS, HgC, etc)do  not significantly interfere with this assay.   However, presence of multiple variants may affect  accuracy.     11/13/2019 7.4 (H) 4.0 - 5.6 % Final     Comment:     ADA Screening Guidelines:  5.7-6.4%  Consistent with prediabetes  >or=6.5%  Consistent with diabetes  High levels of fetal hemoglobin interfere with the HbA1C  assay. Heterozygous hemoglobin variants (HbS, HgC, etc)do  not significantly interfere with this assay.   However, presence of multiple variants may affect accuracy.     08/24/2019 6.9 (H) 4.0 - 5.6 % Final     Comment:     ADA Screening Guidelines:  5.7-6.4%  Consistent with prediabetes  >or=6.5%  Consistent with diabetes  High levels of fetal hemoglobin interfere with the HbA1C  assay. Heterozygous hemoglobin variants (HbS, HgC, etc)do  not significantly interfere with this assay.   However, presence of multiple variants may affect accuracy.         HPI  Review of Systems   Constitutional: Negative for activity change, appetite change, chills, fatigue, fever and unexpected weight change.   HENT: Negative for hearing loss.    Eyes: Negative for visual disturbance.   Respiratory: Negative for cough, chest tightness, shortness of breath and wheezing.    Cardiovascular: Negative for chest pain, palpitations and leg swelling.   Gastrointestinal: Negative for abdominal pain, constipation, nausea and vomiting.   Genitourinary: Negative for dysuria, frequency and urgency.   Musculoskeletal: Negative for arthralgias, back pain, gait problem, joint swelling and myalgias.   Skin: Negative for rash.   Neurological: Negative for light-headedness and headaches.   Psychiatric/Behavioral: Negative for dysphoric mood and sleep disturbance. The patient is not nervous/anxious.        Objective:      Physical Exam  Constitutional:       General: She is not in acute distress.     Appearance: She is well-developed.   HENT:      Head: Normocephalic and atraumatic.      Right Ear: External ear normal.      Left Ear: External ear normal.      Nose: Nose normal.      Mouth/Throat:      Pharynx: No oropharyngeal  exudate.   Eyes:      General: No scleral icterus.        Right eye: No discharge.      Conjunctiva/sclera: Conjunctivae normal.      Pupils: Pupils are equal, round, and reactive to light.   Neck:      Musculoskeletal: Full passive range of motion without pain, normal range of motion and neck supple. No spinous process tenderness or muscular tenderness.      Thyroid: No thyromegaly.      Vascular: No carotid bruit.   Cardiovascular:      Rate and Rhythm: Normal rate and regular rhythm.      Heart sounds: Normal heart sounds, S1 normal and S2 normal. No murmur. No friction rub. No gallop.    Pulmonary:      Effort: Pulmonary effort is normal. No respiratory distress.      Breath sounds: Normal breath sounds. No wheezing or rales.   Chest:      Chest wall: No tenderness.   Abdominal:      General: Bowel sounds are normal. There is no distension.      Palpations: Abdomen is soft. There is no mass.      Tenderness: There is no abdominal tenderness. There is no guarding or rebound.   Genitourinary:     Exam position: Supine.      Cervix: No cervical motion tenderness, discharge or friability.      Uterus: Not deviated, not enlarged, not fixed and not tender.       Adnexa:         Right: No mass, tenderness or fullness.          Left: No mass, tenderness or fullness.     Musculoskeletal: Normal range of motion.         General: No tenderness.   Lymphadenopathy:      Head:      Right side of head: No submental or submandibular adenopathy.      Left side of head: No submental or submandibular adenopathy.      Cervical: No cervical adenopathy.      Right cervical: No superficial, deep or posterior cervical adenopathy.     Left cervical: No superficial, deep or posterior cervical adenopathy.      Upper Body:      Right upper body: No supraclavicular or pectoral adenopathy.      Left upper body: No supraclavicular or pectoral adenopathy.   Skin:     General: Skin is warm and dry.      Findings: No rash.   Neurological:       Mental Status: She is alert and oriented to person, place, and time.      Cranial Nerves: No cranial nerve deficit.      Motor: No abnormal muscle tone.      Coordination: Coordination normal.      Deep Tendon Reflexes: Reflexes are normal and symmetric.   Psychiatric:         Mood and Affect: Mood is not anxious or depressed.         Speech: Speech is not rapid and pressured.         Behavior: Behavior normal. Behavior is not agitated.      Comments: Normal behavior, thought content, insight and judgement.         Assessment:       1. Weakness    2. NOVOA (dyspnea on exertion)    3. Abnormal CXR    4. Type 2 diabetes mellitus with diabetic polyneuropathy, with long-term current use of insulin    5. Type 2 diabetes mellitus with diabetic nephropathy, with long-term current use of insulin    6. Primary hypothyroidism    7. Persistent atrial fibrillation        Plan:   Tiana was seen today for follow-up.    Diagnoses and all orders for this visit:    Weakness, she feels like she is improving    NOVOA (dyspnea on exertion), she denies paroxysmal nocturnal dyspnea or orthopnea    Abnormal CXR, her chest x-ray has improved    Type 2 diabetes mellitus with diabetic polyneuropathy, with long-term current use of insulin.  Again emphasize that her goal A1c is 7.0-8.0, that hypoglycemia is much more dangerous than hyperglycemia.  Reduce insulin to 10 units once daily of long-acting insulin.    -     Comprehensive metabolic panel; Future    Type 2 diabetes mellitus with diabetic nephropathy, with long-term current use of insulin  -     Hemoglobin A1C; Future  -     Comprehensive metabolic panel; Future    Primary hypothyroidism    Persistent atrial fibrillation      GOAL fasting blood sugar 150 to 180  GOAL A1c 7.0 to 8.0 you may feel stronger closer to 8.0  Try only taking 10 units of insulin in the morning  Lets recheck an A1c in September   Metamucil ( a wheat fiber) some people have gas from it  or Citrucel (a  methycellulose pure plant fiber not from wheat) both come sugar free    Follow up in about 3 months (around 9/22/2020).      ADDENDUM  08/28/2020  DIABETIC SHOES ORDERED FOR THIS WOMAN WITH DIABETIC PERIPHERAL NEUROPATHY  SEND TO [facility]

## 2020-06-29 DIAGNOSIS — M17.0 PRIMARY OSTEOARTHRITIS OF BOTH KNEES: ICD-10-CM

## 2020-06-29 DIAGNOSIS — M15.9 GENERALIZED OSTEOARTHRITIS OF MULTIPLE SITES: ICD-10-CM

## 2020-06-29 DIAGNOSIS — M25.551 RIGHT HIP PAIN: ICD-10-CM

## 2020-06-30 NOTE — PROGRESS NOTES
Refill Routing Note   Medication(s) are not appropriate for processing by [facility]:       - Outside of protocol           Medication reconciliation completed: No      Automatic Epic Generated Protocol Data:    Requested Prescriptions   Pending Prescriptions Disp Refills    diclofenac sodium (VOLTAREN) 1 % Gel [Pharmacy Med Name: DICLOFENAC 1% GEL 100GM] 100 g      Sig: APPLY EXTERNALLY TO THE AFFECTED AREA FOUR TIMES DAILY AS NEEDED       There is no refill protocol information for this order           Appointments  past 12m or future 3m with PCP    Date Provider   Last Visit   6/25/2020 Belen Bradley MD   Next Visit   9/24/2020 Belen Bradley MD   ED visits in past 90 days: 0     Note composed:12:46 PM 06/30/2020

## 2020-07-01 RX ORDER — DICLOFENAC SODIUM 10 MG/G
GEL TOPICAL
Qty: 100 G | Refills: 0 | Status: SHIPPED | OUTPATIENT
Start: 2020-07-01 | End: 2020-08-04 | Stop reason: SDUPTHER

## 2020-07-13 ENCOUNTER — TELEPHONE (OUTPATIENT)
Dept: INTERNAL MEDICINE | Facility: CLINIC | Age: 85
End: 2020-07-13

## 2020-07-13 NOTE — TELEPHONE ENCOUNTER
----- Message from Apolonia Travis sent at 7/13/2020 10:35 AM CDT -----  Contact: Self   Pt is requesting a call regarding her chaffing. Please advise

## 2020-07-15 ENCOUNTER — TELEPHONE (OUTPATIENT)
Dept: INTERNAL MEDICINE | Facility: CLINIC | Age: 85
End: 2020-07-15

## 2020-07-15 NOTE — TELEPHONE ENCOUNTER
Spoke to pt and she says she is still having some discomfort. The irritation is in the vaginal and rectal area. NO skin break downs or cuts or bleeding. Just perineal irritation from urinating many times during the night.    She is asking for an ointment.

## 2020-07-15 NOTE — TELEPHONE ENCOUNTER
Please call the patient  Where is the problem located?  Is there any itching?  Has there been any bleeding from the skin?  Does she need a powder?  An ointment?  Or a pad?  If there is skin breakdown does she need wound care?

## 2020-07-15 NOTE — PROGRESS NOTES
Assessment /Plan     For exam results, see Encounter Report.    Primary open angle glaucoma (POAG) of both eyes, moderate stage    Status post cataract extraction and insertion of intraocular lens, unspecified laterality    Fuchs' corneal dystrophy    Chronic iritis - Left Eye    Dry eye syndrome of both eyes      + Asthma  CHF    Zoster Left V1 @ 2-3 weeks ago  No ophthalmic involvement  Eye tends to be irriatated --> long standing  Trial Acular BID PRN as discussed      POAG  Stable Glaucoma & Patient near target IOP range    Steroid responder    CCT  517 / 574    Mid teens    Both Eyes --> good adherence  Trusopt BID  PG q day --> iritis quiet  Alphagan BID --> stop --> itchy // Follicles --> resolved / improved greatly  No BB --> Asthma    Hx Ryan in Past    Hold SLT OS    PC IOL OU  Quiet    Fuchs K dystrophy --> edema OS  Observe  Jony 128 2% QID    Dry Eye Syndrome: discussed use of warm compresses, preserved & non-preserved artificial tears, gel and PM ointment options.  Also discussed options utilizing medications.  EES q HS x 1 week / month --> PRN      NIDDM  No BDR or CSME  control     ERM OD  Not VS  Observe      Plan  RTC 6 months IOP / iritis OS check & DFE --> keep as scheduled  RTC sooner prn with good understanding

## 2020-07-15 NOTE — TELEPHONE ENCOUNTER
Pt stated she spoke to someone and they asked her is she would like a cream. Pt stated she is chaff around rectum and vigina because she urinate so much that she rubs .

## 2020-07-15 NOTE — TELEPHONE ENCOUNTER
----- Message from Sallie Hallman sent at 7/15/2020 12:14 PM CDT -----  Contact: Self   Pt is requesting a call regarding her chaffing. Please advise

## 2020-07-16 ENCOUNTER — PES CALL (OUTPATIENT)
Dept: ADMINISTRATIVE | Facility: CLINIC | Age: 85
End: 2020-07-16

## 2020-07-16 ENCOUNTER — TELEPHONE (OUTPATIENT)
Dept: PRIMARY CARE CLINIC | Facility: CLINIC | Age: 85
End: 2020-07-16

## 2020-07-16 NOTE — TELEPHONE ENCOUNTER
Has she tried an over-the-counter zinc oxide ointment,  such as an over-the-counter ointment such as Desitin?

## 2020-07-16 NOTE — TELEPHONE ENCOUNTER
----- Message from Tonia Doty sent at 7/16/2020  8:14 AM CDT -----  Regarding: Appointment  Contact: Patient @ [phone]  Good Morning,  Patient would like to reschedule her annual wellness. Needed to cancel today due to daughter is sick.    Please call and advise     Orthopedic Surgery  Aura Broderick  2019  Admit Date:  2019  POD: 1 Day Post-Op   Procedure(s):  HEMIARTHROPLASTY, RIGHT HIP, BIPOLAR  Confused, mumbling t/o exam.  Patient resting comfortably in bed.    Pain appears controlled.  Tolerating oral intake.    Denies nausea or vomiting  Denies chest pain or shortness of breath    Vital Sign Ranges  Temperature Temp  Av.1  F (37.3  C)  Min: 97.3  F (36.3  C)  Max: 99.5  F (37.5  C)   Blood pressure Systolic (24hrs), Av , Min:100 , Max:156        Diastolic (24hrs), Av, Min:46, Max:133      Pulse Pulse  Av.7  Min: 92  Max: 104   Respirations Resp  Avg: 15.3  Min: 10  Max: 22   Pulse oximetry SpO2  Av.6 %  Min: 89 %  Max: 98 %       Dressing is quite saturated.   Minimal erythema of the surrounding skin.   Bilateral calves are soft, non-tender.  Right lower extremity is NVI.  Sensation intact bilateral lower extremities  Patient able to dorsi and plantar flex bilaterally  +Dp pulse    Labs:  Recent Labs   Lab Test 19  0653 19  0645 19  1508   POTASSIUM 4.6 4.1 4.3     Recent Labs   Lab Test 19  0653 19  0645 19  1430   HGB 10.9* 12.5 12.8     Recent Labs   Lab Test 19  0645 16  0607 03/15/16  1622   INR 0.98 1.03 0.93     Recent Labs   Lab Test 19  0645 19  1430 19  0715    265 224       A/P  1. PLAN:   Continue Lovenox x 14 days for DVT prophylaxis.     Mobilize with PT/OT    WBAT RLE.     Continue current pain regiment.   Dressings: Change today.  Then leave intact unless saturation >60%.     Follow-up: 2 weeks post-op with Dr Prado     2. Disposition   Anticipate d/c to TCU when medically cleared and progressing in PT.    Veronica Fatima PA-C

## 2020-07-17 ENCOUNTER — TELEPHONE (OUTPATIENT)
Dept: INTERNAL MEDICINE | Facility: CLINIC | Age: 85
End: 2020-07-17

## 2020-07-17 ENCOUNTER — PES CALL (OUTPATIENT)
Dept: ADMINISTRATIVE | Facility: CLINIC | Age: 85
End: 2020-07-17

## 2020-07-17 NOTE — TELEPHONE ENCOUNTER
Please advise her to not sit for a prolonged period of time  Get up at least once an hour because prolonged sitting causes friction that promotes chafing in the gluteal fold  Use a unscented long panty liner to absorb moisture or a depends 4 or 5 change if it gets wet  Avoid wearing any panties that have seams which can rub and irritate  Dry skin thoroughly before applying A and D, adelina's butt paste,  Or any zinc oxide ointment such as  Desiten ointment   After applying the cream use the  Powder like gold bond  After BM wipe with toilet paper moisted with witch hazel  Don't use any scented soaps on this area. Use liquid unscented soap like Cetaphil  Carefully pat dry  If not better in 10 days to 2 weeks call for Home Health orders

## 2020-07-17 NOTE — TELEPHONE ENCOUNTER
Notified pt od message from PCP. She said she wrote some of the info down and will discuss it with her daughter.

## 2020-07-17 NOTE — TELEPHONE ENCOUNTER
Pt stated she called two days for a cream to help her chaffing , can a prescription be sent over to her pharmacy?

## 2020-07-21 ENCOUNTER — OFFICE VISIT (OUTPATIENT)
Dept: PODIATRY | Facility: CLINIC | Age: 85
End: 2020-07-21
Payer: MEDICARE

## 2020-07-21 ENCOUNTER — OFFICE VISIT (OUTPATIENT)
Dept: OPHTHALMOLOGY | Facility: CLINIC | Age: 85
End: 2020-07-21
Payer: MEDICARE

## 2020-07-21 VITALS
HEIGHT: 68 IN | DIASTOLIC BLOOD PRESSURE: 69 MMHG | SYSTOLIC BLOOD PRESSURE: 130 MMHG | HEART RATE: 70 BPM | BODY MASS INDEX: 24.1 KG/M2 | WEIGHT: 159 LBS

## 2020-07-21 DIAGNOSIS — H18.519 FUCHS' CORNEAL DYSTROPHY: ICD-10-CM

## 2020-07-21 DIAGNOSIS — M21.41 PES PLANUS OF BOTH FEET: ICD-10-CM

## 2020-07-21 DIAGNOSIS — H04.123 DRY EYE SYNDROME OF BOTH EYES: ICD-10-CM

## 2020-07-21 DIAGNOSIS — H20.10 CHRONIC IRITIS: ICD-10-CM

## 2020-07-21 DIAGNOSIS — H40.1132 PRIMARY OPEN ANGLE GLAUCOMA (POAG) OF BOTH EYES, MODERATE STAGE: Primary | ICD-10-CM

## 2020-07-21 DIAGNOSIS — E11.42 DIABETIC POLYNEUROPATHY ASSOCIATED WITH TYPE 2 DIABETES MELLITUS: Primary | ICD-10-CM

## 2020-07-21 DIAGNOSIS — Z98.49 STATUS POST CATARACT EXTRACTION AND INSERTION OF INTRAOCULAR LENS, UNSPECIFIED LATERALITY: ICD-10-CM

## 2020-07-21 DIAGNOSIS — I73.9 PERIPHERAL VASCULAR DISEASE: ICD-10-CM

## 2020-07-21 DIAGNOSIS — Z96.1 STATUS POST CATARACT EXTRACTION AND INSERTION OF INTRAOCULAR LENS, UNSPECIFIED LATERALITY: ICD-10-CM

## 2020-07-21 DIAGNOSIS — M21.42 PES PLANUS OF BOTH FEET: ICD-10-CM

## 2020-07-21 DIAGNOSIS — H01.009 BLEPHARITIS, UNSPECIFIED LATERALITY, UNSPECIFIED TYPE: Primary | ICD-10-CM

## 2020-07-21 PROCEDURE — 99999 PR PBB SHADOW E&M-EST. PATIENT-LVL V: ICD-10-PCS | Mod: PBBFAC,HCNC,, | Performed by: PODIATRIST

## 2020-07-21 PROCEDURE — 99999 PR PBB SHADOW E&M-EST. PATIENT-LVL IV: ICD-10-PCS | Mod: PBBFAC,HCNC,, | Performed by: OPHTHALMOLOGY

## 2020-07-21 PROCEDURE — 92012 PR EYE EXAM, EST PATIENT,INTERMED: ICD-10-PCS | Mod: HCNC,S$GLB,, | Performed by: OPHTHALMOLOGY

## 2020-07-21 PROCEDURE — 11057 PR TRIM BENIGN HYPERKERATOTIC SKIN LESION,>4: ICD-10-PCS | Mod: Q9,HCNC,S$GLB, | Performed by: PODIATRIST

## 2020-07-21 PROCEDURE — 99499 NO LOS: ICD-10-PCS | Mod: HCNC,S$GLB,, | Performed by: PODIATRIST

## 2020-07-21 PROCEDURE — 99999 PR PBB SHADOW E&M-EST. PATIENT-LVL V: CPT | Mod: PBBFAC,HCNC,, | Performed by: PODIATRIST

## 2020-07-21 PROCEDURE — 99999 PR PBB SHADOW E&M-EST. PATIENT-LVL IV: CPT | Mod: PBBFAC,HCNC,, | Performed by: OPHTHALMOLOGY

## 2020-07-21 PROCEDURE — 99499 UNLISTED E&M SERVICE: CPT | Mod: HCNC,S$GLB,, | Performed by: PODIATRIST

## 2020-07-21 PROCEDURE — 92012 INTRM OPH EXAM EST PATIENT: CPT | Mod: HCNC,S$GLB,, | Performed by: OPHTHALMOLOGY

## 2020-07-21 PROCEDURE — 11721 PR DEBRIDEMENT OF NAILS, 6 OR MORE: ICD-10-PCS | Mod: Q9,59,HCNC,S$GLB | Performed by: PODIATRIST

## 2020-07-21 PROCEDURE — 11721 DEBRIDE NAIL 6 OR MORE: CPT | Mod: Q9,59,HCNC,S$GLB | Performed by: PODIATRIST

## 2020-07-21 PROCEDURE — 11057 PARNG/CUTG B9 HYPRKR LES >4: CPT | Mod: Q9,HCNC,S$GLB, | Performed by: PODIATRIST

## 2020-07-21 RX ORDER — ERYTHROMYCIN 5 MG/G
1 OINTMENT OPHTHALMIC NIGHTLY
Qty: 3.5 TUBE | Refills: 4 | Status: SHIPPED | OUTPATIENT
Start: 2020-07-21 | End: 2020-11-03

## 2020-07-21 RX ORDER — ERYTHROMYCIN 5 MG/G
1 OINTMENT OPHTHALMIC NIGHTLY
COMMUNITY
End: 2020-07-21 | Stop reason: SDUPTHER

## 2020-07-21 NOTE — PROGRESS NOTES
Subjective:      Patient ID: Tiana Mead is a 89 y.o. female.    Chief Complaint: Diabetes Mellitus (dr ursula johnson 6/25/20) and Nail Care    Tiana is a 89 y.o. female who presents to the clinic for evaluation and treatment of high risk feet. Tiana has a past medical history of Allergy, Anemia, Arthritis, Asthma, CHF (congestive heart failure) (5/2/2017), Chronic kidney disease, Degenerative disc disease, Diabetes mellitus type II, Essential hypertension (7/3/2012), Glaucoma, History of pseudogout (8/23/2012), Hyperlipidemia, Hypertension, Iritis, Myocardial infarction, Pneumonia, and Thyroid disease. The patient's chief complaint is long, thick toenails and calluses on both feet. Routine trimming helps. No other pedal concerns today.  This patient has documented high risk feet requiring routine maintenance secondary to diabetes mellitis and those secondary complications of diabetes, as mentioned..    PCP: Ursula Johnson MD    Date Last Seen by PCP:   Chief Complaint   Patient presents with    Diabetes Mellitus     dr ursula johnson 6/25/20    Nail Care         Current shoe gear:  Dm shoes      Hemoglobin A1C   Date Value Ref Range Status   03/12/2020 7.0 (H) 4.0 - 5.6 % Final     Comment:     ADA Screening Guidelines:  5.7-6.4%  Consistent with prediabetes  >or=6.5%  Consistent with diabetes  High levels of fetal hemoglobin interfere with the HbA1C  assay. Heterozygous hemoglobin variants (HbS, HgC, etc)do  not significantly interfere with this assay.   However, presence of multiple variants may affect accuracy.     11/13/2019 7.4 (H) 4.0 - 5.6 % Final     Comment:     ADA Screening Guidelines:  5.7-6.4%  Consistent with prediabetes  >or=6.5%  Consistent with diabetes  High levels of fetal hemoglobin interfere with the HbA1C  assay. Heterozygous hemoglobin variants (HbS, HgC, etc)do  not significantly interfere with this assay.   However, presence of multiple variants may affect accuracy.      08/24/2019 6.9 (H) 4.0 - 5.6 % Final     Comment:     ADA Screening Guidelines:  5.7-6.4%  Consistent with prediabetes  >or=6.5%  Consistent with diabetes  High levels of fetal hemoglobin interfere with the HbA1C  assay. Heterozygous hemoglobin variants (HbS, HgC, etc)do  not significantly interfere with this assay.   However, presence of multiple variants may affect accuracy.               Patient Active Problem List   Diagnosis    Hyperlipemia, mixed    Generalized osteoarthritis of multiple sites    Chronic iritis - Left Eye    Osteoarthritis of both knees    Chondrocalcinosis    Peripheral angiopathy    Hyperuricemia    Helicobacter pylori gastritis    Old MI (myocardial infarction), 2013    Persistent atrial fibrillation    Type 2 diabetes mellitus with diabetic polyneuropathy, with long-term current use of insulin    Long term current use of anticoagulant therapy, eliquis    History of chest pain at rest    CKD (chronic kidney disease) stage 3, GFR 30-59 ml/min    Fuchs' corneal dystrophy    Pericardial effusion    H/O Deep vein thrombosis (DVT)    Aortic atherosclerosis    Chronic diastolic congestive heart failure    Type 2 diabetes mellitus with diabetic nephropathy, with long-term current use of insulin    Diabetic polyneuropathy associated with type 2 diabetes mellitus    Primary hypothyroidism    Moderate persistent asthma without complication    Primary open angle glaucoma (POAG) of both eyes, moderate stage    Pulmonary hypertension    Non-rheumatic tricuspid valve insufficiency    Tortuous aorta    Chest pain at rest    Allergic conjunctivitis of both eyes    Status post cataract extraction and insertion of intraocular lens    Chest pain, rule out acute myocardial infarction    Pseudogout, knees    Fever    Effusion, left knee    Herpes zoster without complication    Community acquired pneumonia    COVID-19 virus infection    Angina at rest    Asthma  exacerbation in COPD    Dry eye syndrome of both eyes       Current Outpatient Medications on File Prior to Visit   Medication Sig Dispense Refill    acetaminophen (TYLENOL) 500 MG tablet Take 500 mg by mouth every 6 (six) hours as needed for Pain.      albuterol (PROVENTIL/VENTOLIN HFA) 90 mcg/actuation inhaler Inhale 2 puffs into the lungs every 4 (four) hours as needed. 18 g 2    amLODIPine (NORVASC) 5 MG tablet TAKE 1 TABLET(5 MG) BY MOUTH EVERY MORNING 90 tablet 3    atorvastatin (LIPITOR) 40 MG tablet Take 1 tablet (40 mg total) by mouth once daily. 90 tablet 3    blood sugar diagnostic (ONETOUCH ULTRA TEST) Strp Inject 1 strip into the skin 3 (three) times daily. 300 each 4    blood sugar diagnostic (TRUE METRIX GLUCOSE TEST STRIP) Strp USE TO TEST BLOOD SUGAR WITH MEALS THREE TIMES DAILY ins preferred 300 strip 3    clotrimazole-betamethasone 1-0.05% (LOTRISONE) cream Apply topically 2 (two) times daily. For intertriginous itching 45 g 3    diclofenac sodium (VOLTAREN) 1 % Gel APPLY EXTERNALLY TO THE AFFECTED AREA FOUR TIMES DAILY AS NEEDED 100 g 0    dorzolamide (TRUSOPT) 2 % ophthalmic solution Place 1 drop into both eyes 2 (two) times daily. 10 mL 4    ELIQUIS 2.5 mg Tab TAKE 1 TABLET(2.5 MG) BY MOUTH TWICE DAILY 60 tablet 5    fluticasone-salmeterol diskus inhaler 250-50 mcg INHALE 1 PUFF BY MOUTH INTO THE LUNGS NIGHTLY 1 each 11    fluticasone-salmeterol diskus inhaler 250-50 mcg Inhale 1 puff into the lungs 2 (two) times daily. Controller 60 each 11    furosemide (LASIX) 40 MG tablet Take 1 tablet (40 mg total) by mouth 2 (two) times daily. 180 tablet 3    insulin aspart protamine-insulin aspart (NOVOLOG MIX 70-30FLEXPEN U-100) 100 unit/mL (70-30) InPn pen INJECT 10 UNITS UNDER THE SKIN ONCE DAILY EVERY MORNING BEFORE BREAKFAST 15 mL 11    ipratropium (ATROVENT) 0.03 % nasal spray USE 2 SPRAYS IN EACH NOSTRIL TWICE DAILY 60 mL 1    isosorbide mononitrate (IMDUR) 60 MG 24 hr tablet TAKE  "1 TABLET BY MOUTH EVERY MORNING FOR HEART, to prevent chest pain and reduce shortness of breath 90 tablet 3    lancets (TRUEPLUS LANCETS) 33 gauge Misc Apply 1 lancet topically 3 (three) times daily. 300 each 3    latanoprost 0.005 % ophthalmic solution Place 1 drop into the right eye every evening. (Patient taking differently: Place 1 drop into both eyes every evening. ) 3 Bottle 6    levothyroxine (SYNTHROID) 75 MCG tablet Take 1 tablet (75 mcg total) by mouth before breakfast. 90 tablet 3    meclizine (ANTIVERT) 12.5 mg tablet Take 1 tablet (12.5 mg total) by mouth 3 (three) times daily as needed for Dizziness. 20 tablet 2    metoprolol tartrate (LOPRESSOR) 50 MG tablet TAKE 1 TABLET(50 MG) BY MOUTH TWICE DAILY 180 tablet 3    multivit-mineral-iron-lutein (CENTRUM SILVER ULTRA WOMEN'S) Tab Take 1 tablet by mouth once daily.      nitroGLYCERIN (NITROSTAT) 0.4 MG SL tablet ONE TABLET UNDER THE TONGUE EVERY 5 MINUTES AS NEEDED FOR CHEST PAIN 25 tablet 6    nystatin (MYCOSTATIN) powder Apply topically 4 (four) times daily. 60 g 3    ondansetron (ZOFRAN) 4 MG tablet TAKE ONE TABLET BY MOUTH EVERY 12 HOURS AS NEEDED FOR NAUSEA OR DIZZINESS 30 tablet 0    pen needle, diabetic (BD ULTRA-FINE SHORT PEN NEEDLE) 31 gauge x 5/16" Ndle USE WITH INSULIN PENS twice daily or  AS DIRECTED 200 each 3    sodium chloride 5% () 5 % ophthalmic solution Place 1 drop into both eyes 4 (four) times daily. 15 mL 6    triamcinolone acetonide 0.025% (KENALOG) 0.025 % Oint Apply topically 2 (two) times daily. 80 g 2    TRUE METRIX GLUCOSE METER Misc TEST TID ins preferred 1 each 4    TRUEPLUS LANCETS 30 gauge Misc USE TO TEST BLOOD SUGAR TID AC  2     No current facility-administered medications on file prior to visit.        Review of patient's allergies indicates:   Allergen Reactions    Codeine      Other reaction(s): Itching  Other reaction(s): Nausea       Past Surgical History:   Procedure Laterality Date    " CARDIAC CATHETERIZATION      CATARACT EXTRACTION W/  INTRAOCULAR LENS IMPLANT Left n/a    CATARACT EXTRACTION W/  INTRAOCULAR LENS IMPLANT Right 10/8/2018    With Femtosecond LASER assist (Dr. Henson)    CHOLECYSTECTOMY      EYE SURGERY      gall stone      HYSTERECTOMY      VARICOSE VEIN SURGERY         Family History   Problem Relation Age of Onset    Diabetes Mother     Hypertension Mother     Glaucoma Mother     Hypertension Father     Diabetes Son     No Known Problems Daughter     Diabetes Daughter     No Known Problems Son     Melanoma Neg Hx        Social History     Socioeconomic History    Marital status:      Spouse name: Not on file    Number of children: Not on file    Years of education: Not on file    Highest education level: Not on file   Occupational History    Not on file   Social Needs    Financial resource strain: Not on file    Food insecurity     Worry: Not on file     Inability: Not on file    Transportation needs     Medical: Not on file     Non-medical: Not on file   Tobacco Use    Smoking status: Never Smoker    Smokeless tobacco: Never Used   Substance and Sexual Activity    Alcohol use: No    Drug use: No    Sexual activity: Never   Lifestyle    Physical activity     Days per week: Not on file     Minutes per session: Not on file    Stress: Not on file   Relationships    Social connections     Talks on phone: Not on file     Gets together: Not on file     Attends Scientologist service: Not on file     Active member of club or organization: Not on file     Attends meetings of clubs or organizations: Not on file     Relationship status: Not on file   Other Topics Concern    Are you pregnant or think you may be? Not Asked    Breast-feeding Not Asked   Social History Narrative    Not on file           Review of Systems   Constitution: Negative for chills, decreased appetite and fever.   Cardiovascular: Negative for chest pain, claudication and leg swelling.  "  Respiratory: Negative for cough.    Skin: Positive for nail changes. Negative for color change, flushing, itching, poor wound healing and rash.   Musculoskeletal: Negative for falls, joint pain, joint swelling and myalgias.   Gastrointestinal: Negative for nausea and vomiting.   Neurological: Negative for loss of balance, numbness and paresthesias.           Objective:       Vitals:    07/21/20 1009   BP: 130/69   Pulse: 70   Weight: 72.1 kg (159 lb)   Height: 5' 8" (1.727 m)   PainSc: 0-No pain        Physical Exam  Vitals signs and nursing note reviewed.   Constitutional:       Appearance: She is well-developed.   Cardiovascular:      Comments: Dorsalis pedis and posterior tibial pulses are diminished Bilaterally. Toes are cool to touch. Feet are warm proximally.There is decreased digital hair. Skin is atrophic, slightly hyperpigmented, and mildly edematous      Musculoskeletal: Normal range of motion.         General: No tenderness.      Comments: Adequate joint range of motion without pain, limitation, nor crepitation Bilateral feet and ankle joints. Muscle strength is 5/5 in all groups bilaterally.      Flexible pes planus foot type w/ medial arch collapse and mild gastroc equinus      Skin:     General: Skin is warm and dry.      Findings: No ecchymosis, erythema or lesion.      Comments: Nails x10 are elongated by  4-11 mm's, thickened by 2-5 mm's, dystrophic, and are darkened in  coloration . Xerosis Bilaterally. No open lesions noted.    Hyperkeratotic tissue noted to lateral 5th toe b/l, distal 3rd toe b/l, medial L 5th toe      Neurological:      Mental Status: She is alert and oriented to person, place, and time.      Comments: Wilton-Yolande 5.07 monofilamant testing is diminished Ney feet. Sharp/dull sensation diminished Bilaterally. Light touch absent Bilaterally.       Psychiatric:         Behavior: Behavior normal.               Assessment:       Encounter Diagnoses   Name Primary?    Diabetic " polyneuropathy associated with type 2 diabetes mellitus Yes    Peripheral vascular disease     Pes planus of both feet          Plan:       Tiana was seen today for diabetes mellitus and nail care.    Diagnoses and all orders for this visit:    Diabetic polyneuropathy associated with type 2 diabetes mellitus  -     DIABETIC SHOES FOR HOME USE    Peripheral vascular disease  -     DIABETIC SHOES FOR HOME USE    Pes planus of both feet  -     DIABETIC SHOES FOR HOME USE      I counseled the patient on her conditions, their implications and medical management.    Shoe inspection. Diabetic Foot Education. Patient reminded of the importance of good nutrition and blood sugar control to help prevent podiatric complications of diabetes. Patient instructed on proper foot hygeine. We discussed wearing proper shoe gear, daily foot inspections, never walking without protective shoe gear, never putting sharp instruments to feet    - With patient's permission, nails were aggressively reduced and debrided x 10 to their soft tissue attachment mechanically and with electric , removing all offending nail and debris. Patient relates relief following the procedure. She will continue to monitor the areas daily, inspect her feet, wear protective shoe gear when ambulatory, moisturizer to maintain skin integrity and follow in this office in approximately 2-3 months, sooner p.r.n.    - After cleansing the  area w/ alcohol prep pad the above mentioned hyperkeratosis was trimmed utilizing No 15 scapel, to a smooth base with out incident. Patient tolerated this  well and reported comfort to the area of x6    - Return to clinic in 3m or sooner if problems arise

## 2020-07-28 ENCOUNTER — HOSPITAL ENCOUNTER (OUTPATIENT)
Dept: RADIOLOGY | Facility: HOSPITAL | Age: 85
Discharge: HOME | End: 2020-07-28
Attending: INTERNAL MEDICINE
Payer: MEDICARE

## 2020-07-28 ENCOUNTER — OFFICE VISIT (OUTPATIENT)
Dept: INTERNAL MEDICINE | Facility: CLINIC | Age: 85
End: 2020-07-28
Payer: MEDICARE

## 2020-07-28 ENCOUNTER — TELEPHONE (OUTPATIENT)
Dept: INTERNAL MEDICINE | Facility: CLINIC | Age: 85
End: 2020-07-28

## 2020-07-28 VITALS
WEIGHT: 162.94 LBS | SYSTOLIC BLOOD PRESSURE: 118 MMHG | HEART RATE: 64 BPM | BODY MASS INDEX: 24.7 KG/M2 | HEIGHT: 68 IN | DIASTOLIC BLOOD PRESSURE: 62 MMHG | OXYGEN SATURATION: 97 %

## 2020-07-28 DIAGNOSIS — R10.11 RIGHT UPPER QUADRANT ABDOMINAL PAIN: Primary | ICD-10-CM

## 2020-07-28 DIAGNOSIS — R10.11 RIGHT UPPER QUADRANT ABDOMINAL PAIN: ICD-10-CM

## 2020-07-28 DIAGNOSIS — K59.00 CONSTIPATION, UNSPECIFIED CONSTIPATION TYPE: ICD-10-CM

## 2020-07-28 DIAGNOSIS — Z79.4 TYPE 2 DIABETES MELLITUS WITH DIABETIC NEPHROPATHY, WITH LONG-TERM CURRENT USE OF INSULIN: ICD-10-CM

## 2020-07-28 DIAGNOSIS — E11.21 TYPE 2 DIABETES MELLITUS WITH DIABETIC NEPHROPATHY, WITH LONG-TERM CURRENT USE OF INSULIN: ICD-10-CM

## 2020-07-28 DIAGNOSIS — I27.20 PULMONARY HYPERTENSION: ICD-10-CM

## 2020-07-28 PROCEDURE — 99999 PR PBB SHADOW E&M-EST. PATIENT-LVL V: ICD-10-PCS | Mod: PBBFAC,HCNC,, | Performed by: INTERNAL MEDICINE

## 2020-07-28 PROCEDURE — 74019 RADEX ABDOMEN 2 VIEWS: CPT | Mod: TC,HCNC

## 2020-07-28 PROCEDURE — 1159F MED LIST DOCD IN RCRD: CPT | Mod: HCNC,S$GLB,, | Performed by: INTERNAL MEDICINE

## 2020-07-28 PROCEDURE — 74019 RADEX ABDOMEN 2 VIEWS: CPT | Mod: 26,HCNC,, | Performed by: RADIOLOGY

## 2020-07-28 PROCEDURE — 1125F PR PAIN SEVERITY QUANTIFIED, PAIN PRESENT: ICD-10-PCS | Mod: HCNC,S$GLB,, | Performed by: INTERNAL MEDICINE

## 2020-07-28 PROCEDURE — 3051F PR MOST RECENT HEMOGLOBIN A1C LEVEL 7.0 - < 8.0%: ICD-10-PCS | Mod: HCNC,CPTII,S$GLB, | Performed by: INTERNAL MEDICINE

## 2020-07-28 PROCEDURE — 99999 PR PBB SHADOW E&M-EST. PATIENT-LVL V: CPT | Mod: PBBFAC,HCNC,, | Performed by: INTERNAL MEDICINE

## 2020-07-28 PROCEDURE — 1101F PR PT FALLS ASSESS DOC 0-1 FALLS W/OUT INJ PAST YR: ICD-10-PCS | Mod: HCNC,CPTII,S$GLB, | Performed by: INTERNAL MEDICINE

## 2020-07-28 PROCEDURE — 99214 OFFICE O/P EST MOD 30 MIN: CPT | Mod: HCNC,S$GLB,, | Performed by: INTERNAL MEDICINE

## 2020-07-28 PROCEDURE — 1101F PT FALLS ASSESS-DOCD LE1/YR: CPT | Mod: HCNC,CPTII,S$GLB, | Performed by: INTERNAL MEDICINE

## 2020-07-28 PROCEDURE — 1159F PR MEDICATION LIST DOCUMENTED IN MEDICAL RECORD: ICD-10-PCS | Mod: HCNC,S$GLB,, | Performed by: INTERNAL MEDICINE

## 2020-07-28 PROCEDURE — 74019 XR ABDOMEN FLAT AND ERECT: ICD-10-PCS | Mod: 26,HCNC,, | Performed by: RADIOLOGY

## 2020-07-28 PROCEDURE — 99214 PR OFFICE/OUTPT VISIT, EST, LEVL IV, 30-39 MIN: ICD-10-PCS | Mod: HCNC,S$GLB,, | Performed by: INTERNAL MEDICINE

## 2020-07-28 PROCEDURE — 1125F AMNT PAIN NOTED PAIN PRSNT: CPT | Mod: HCNC,S$GLB,, | Performed by: INTERNAL MEDICINE

## 2020-07-28 PROCEDURE — 3051F HG A1C>EQUAL 7.0%<8.0%: CPT | Mod: HCNC,CPTII,S$GLB, | Performed by: INTERNAL MEDICINE

## 2020-07-28 NOTE — PROGRESS NOTES
Subjective:       Patient ID: Tiana Mead is a 89 y.o. female.    Chief Complaint: Abdominal Pain    HPI: New pt top me. Right sided abdominal pain. Good appetite and no Nausea or Vomitting. Mild constipation. Feels better after BM. Concerned that pain in the right abdomen comes and goes. No blood in stool. Some straining. No diarrhea.   She had COVID 2 or 3 months ago and says she had fully recovered except for occasional fatigue and occasional constipation.  She previously would take Senokot and would get some relief but she says she has only taken that once in the last 1 or 2 weeks.  She has good family support from her daughter and sister.  She also has hip in knee arthritis that she says sometimes gives her trouble.    Review of Systems   Constitutional: Negative for appetite change, chills and fever.   HENT: Negative for nosebleeds and sore throat.    Eyes: Negative for visual disturbance.   Respiratory: Negative for cough, shortness of breath and wheezing.    Cardiovascular: Negative for chest pain and leg swelling.   Gastrointestinal: Positive for abdominal pain (Right-sided.  Seems a little more upper abdomen than lower). Negative for constipation and diarrhea.   Genitourinary: Negative for difficulty urinating and hematuria.   Musculoskeletal: Positive for arthralgias and gait problem. Negative for neck pain and neck stiffness.   Integumentary:  Negative for pallor and rash.   Neurological: Negative for headaches.   Psychiatric/Behavioral: Negative for dysphoric mood and suicidal ideas. The patient is not nervous/anxious.          Objective:      Physical Exam  Constitutional:       General: She is not in acute distress.     Appearance: She is well-developed.   HENT:      Head: Normocephalic and atraumatic.      Right Ear: External ear normal.      Left Ear: External ear normal.      Mouth/Throat:      Pharynx: No oropharyngeal exudate.   Eyes:      General: No scleral icterus.     Conjunctiva/sclera:  Conjunctivae normal.      Pupils: Pupils are equal, round, and reactive to light.   Neck:      Musculoskeletal: Normal range of motion and neck supple.      Thyroid: No thyromegaly.   Cardiovascular:      Rate and Rhythm: Normal rate and regular rhythm.      Heart sounds: No murmur.   Pulmonary:      Effort: Pulmonary effort is normal.      Breath sounds: Normal breath sounds. No wheezing.   Abdominal:      General: Bowel sounds are normal. There is no distension.      Palpations: Abdomen is soft. There is no mass.      Tenderness: There is abdominal tenderness. There is no right CVA tenderness, left CVA tenderness, guarding or rebound.      Hernia: No hernia is present.          Comments: Well-healed midline lower abdomen scar   Genitourinary:     Comments: Patient declined rectal exam  Musculoskeletal:         General: No tenderness.   Lymphadenopathy:      Cervical: No cervical adenopathy.   Skin:     Findings: No rash.   Neurological:      Mental Status: She is alert and oriented to person, place, and time.         Assessment:       1. Right upper quadrant abdominal pain    2. Constipation, unspecified constipation type    3. Pulmonary hypertension    4. Type 2 diabetes mellitus with diabetic nephropathy, with long-term current use of insulin        Plan:       Tiana was seen today for abdominal pain.    Diagnoses and all orders for this visit:    Right upper quadrant abdominal pain  -     CBC auto differential; Future  -     Comprehensive metabolic panel; Future  -     X-Ray Abdomen Flat And Erect; Future    Constipation, unspecified constipation type  -     CBC auto differential; Future  -     Comprehensive metabolic panel; Future  -     X-Ray Abdomen Flat And Erect; Future    Pulmonary hypertension    Type 2 diabetes mellitus with diabetic nephropathy, with long-term current use of insulin        Pt says she was told to try to avoid CT with dye due to kidneys. With no guarding or fever and with good  appetite and digestion, I do not favor appendicitis but also discussed red flag symptoms with pt and things that would suggest need to go to the ER.     Of note is that the patient's last labs showed elevated LFTs so we will repeat those as well.

## 2020-07-28 NOTE — TELEPHONE ENCOUNTER
I spoke to the pt with results.  Abdominal x-ray does not show acute obstruction or any other worrisome findings.  Liver function has improved.  CBC is stable.  I asked her to resume Senna for a few days and depending on bowel movements and whether that helps her pain she will either let me know or her PCP Dr. Bradley.  The patient felt comfortable with this plan and will let us know of any other changes

## 2020-07-28 NOTE — PATIENT INSTRUCTIONS
If you develop Nausea and vomitting call the office or have someone help you to the ER if you cannot keep down food or for severe pain.

## 2020-07-31 NOTE — PROGRESS NOTES
Refill Routing Note   Medication(s) are not appropriate for processing by [facility]:       - Outside of protocol           Medication reconciliation completed: No      Automatic Epic Generated Protocol Data:        Requested Prescriptions   Pending Prescriptions Disp Refills    isosorbide mononitrate (IMDUR) 60 MG 24 hr tablet [Pharmacy Med Name: ISOSORBIDE MONONITRATE 60MG ER TABS] 90 tablet 3     Sig: TAKE ONE TABLET BY MOUTH EVERY MORNING FOR HEART, TO PREVENT CHEST PAIN AND REDUCE SHORTNESS OF BREATH       There is no refill protocol information for this order           Appointments  past 12m or future 3m with PCP    Date Provider   Last Visit   6/25/2020 Belen Bradley MD   Next Visit   9/24/2020 Belen Bradley MD   ED visits in past 90 days: 0     Note composed:2:49 PM 07/31/2020

## 2020-08-01 RX ORDER — ISOSORBIDE MONONITRATE 60 MG/1
TABLET, EXTENDED RELEASE ORAL
Qty: 90 TABLET | Refills: 3 | Status: SHIPPED | OUTPATIENT
Start: 2020-08-01 | End: 2021-08-09 | Stop reason: SDUPTHER

## 2020-08-04 DIAGNOSIS — M17.0 PRIMARY OSTEOARTHRITIS OF BOTH KNEES: ICD-10-CM

## 2020-08-04 DIAGNOSIS — M15.9 GENERALIZED OSTEOARTHRITIS OF MULTIPLE SITES: ICD-10-CM

## 2020-08-04 DIAGNOSIS — M25.551 RIGHT HIP PAIN: ICD-10-CM

## 2020-08-04 NOTE — TELEPHONE ENCOUNTER
----- Message from Jeanie Espinoza sent at 8/4/2020  4:51 PM CDT -----  Regarding: refill  Pt Is Calling for Refills On The Following Medications     diclofenac sodium (VOLTAREN) 1 % Gel 100 g 0 7/1/2020  No  Sig: APPLY EXTERNALLY TO THE AFFECTED AREA FOUR TIMES DAILY AS NEEDED  Sent to pharmacy as: diclofenac sodium (VOLTAREN) 1 % Gel  Class: Normal        Called Into [facility]   Telephone Fax  [phone]  Pharmacy Address and Hours    Address Hours  [address]   [facility] [address] None Available          Pt Can Be Reached At [phone]

## 2020-08-05 RX ORDER — DICLOFENAC SODIUM 10 MG/G
2 GEL TOPICAL 2 TIMES DAILY
Qty: 100 G | Refills: 0 | Status: SHIPPED | OUTPATIENT
Start: 2020-08-05 | End: 2020-08-11 | Stop reason: SDUPTHER

## 2020-08-06 ENCOUNTER — TELEPHONE (OUTPATIENT)
Dept: INTERNAL MEDICINE | Facility: CLINIC | Age: 85
End: 2020-08-06

## 2020-08-06 RX ORDER — DICLOFENAC SODIUM 10 MG/G
GEL TOPICAL DAILY
Status: CANCELLED | OUTPATIENT
Start: 2020-08-07

## 2020-08-06 NOTE — TELEPHONE ENCOUNTER
----- Message from Jojo Og sent at 8/6/2020  4:33 PM CDT -----  Contact: self [phone]  Pt states she is requesting a Rx for VOLTAREN 1 % Gel only. Pt states the other medication that was called in does not work for her. Please call and advise.

## 2020-08-06 NOTE — TELEPHONE ENCOUNTER
I sent this prescription yesterday to [facility]  I think she uses Voltaren gel for knee pain relief.  If [facility] cannot fill this prescription let me know

## 2020-08-11 DIAGNOSIS — M25.551 RIGHT HIP PAIN: ICD-10-CM

## 2020-08-11 DIAGNOSIS — M15.9 GENERALIZED OSTEOARTHRITIS OF MULTIPLE SITES: ICD-10-CM

## 2020-08-11 DIAGNOSIS — M17.0 PRIMARY OSTEOARTHRITIS OF BOTH KNEES: ICD-10-CM

## 2020-08-11 RX ORDER — DICLOFENAC SODIUM 10 MG/G
2 GEL TOPICAL 2 TIMES DAILY
Qty: 100 G | Refills: 0 | Status: SHIPPED | OUTPATIENT
Start: 2020-08-11 | End: 2022-01-19 | Stop reason: SDUPTHER

## 2020-08-11 NOTE — TELEPHONE ENCOUNTER
----- Message from Yocasta Lovelaceerson sent at 8/11/2020 10:36 AM CDT -----  Contact: Zain Montiel@[phone]--  Rx Refill/Request     Is this a Refill:--Yes--    Rx Name and Strength:    1.(VOLTAREN) 1 % Gel     Preferred Pharmacy with phone number:--[facility]--[phone]--  [address]   [place] [id number]    Communication Preference:--Tiana--[phone]--    Additional Information: Pt states that  the wrong brand is going to the pharmacy she would like the brand name of the gel listed above because the generic brand (diclofenac sodium) does not work for her. Please call to advise when sent to the pharmacy

## 2020-08-17 ENCOUNTER — OFFICE VISIT (OUTPATIENT)
Dept: HOME HEALTH SERVICES | Facility: CLINIC | Age: 85
End: 2020-08-17
Payer: MEDICARE

## 2020-08-17 ENCOUNTER — TELEPHONE (OUTPATIENT)
Dept: PRIMARY CARE CLINIC | Facility: CLINIC | Age: 85
End: 2020-08-17

## 2020-08-17 VITALS
WEIGHT: 158 LBS | DIASTOLIC BLOOD PRESSURE: 65 MMHG | BODY MASS INDEX: 23.95 KG/M2 | TEMPERATURE: 98 F | SYSTOLIC BLOOD PRESSURE: 127 MMHG | HEART RATE: 68 BPM | HEIGHT: 68 IN | OXYGEN SATURATION: 98 %

## 2020-08-17 DIAGNOSIS — I77.1 TORTUOUS AORTA: ICD-10-CM

## 2020-08-17 DIAGNOSIS — I70.0 AORTIC ATHEROSCLEROSIS: ICD-10-CM

## 2020-08-17 DIAGNOSIS — Z00.00 ENCOUNTER FOR PREVENTIVE HEALTH EXAMINATION: Primary | ICD-10-CM

## 2020-08-17 DIAGNOSIS — I48.19 PERSISTENT ATRIAL FIBRILLATION: Chronic | ICD-10-CM

## 2020-08-17 DIAGNOSIS — E78.2 HYPERLIPEMIA, MIXED: Chronic | ICD-10-CM

## 2020-08-17 DIAGNOSIS — I50.32 CHRONIC DIASTOLIC CONGESTIVE HEART FAILURE: Chronic | ICD-10-CM

## 2020-08-17 DIAGNOSIS — N18.30 CKD STAGE 3 DUE TO TYPE 2 DIABETES MELLITUS: ICD-10-CM

## 2020-08-17 DIAGNOSIS — R26.9 ABNORMALITY OF GAIT AND MOBILITY: ICD-10-CM

## 2020-08-17 DIAGNOSIS — J45.901 ASTHMA EXACERBATION IN COPD: ICD-10-CM

## 2020-08-17 DIAGNOSIS — I20.89 ANGINA AT REST: ICD-10-CM

## 2020-08-17 DIAGNOSIS — E11.22 CKD STAGE 3 DUE TO TYPE 2 DIABETES MELLITUS: ICD-10-CM

## 2020-08-17 DIAGNOSIS — E11.42 DIABETIC POLYNEUROPATHY ASSOCIATED WITH TYPE 2 DIABETES MELLITUS: ICD-10-CM

## 2020-08-17 DIAGNOSIS — I27.20 PULMONARY HYPERTENSION: ICD-10-CM

## 2020-08-17 DIAGNOSIS — J44.1 ASTHMA EXACERBATION IN COPD: ICD-10-CM

## 2020-08-17 DIAGNOSIS — Z99.89 DEPENDENCE ON OTHER ENABLING MACHINES AND DEVICES: ICD-10-CM

## 2020-08-17 PROBLEM — U07.1 COVID-19 VIRUS INFECTION: Status: RESOLVED | Noted: 2020-03-25 | Resolved: 2020-08-17

## 2020-08-17 PROBLEM — J18.9 COMMUNITY ACQUIRED PNEUMONIA: Status: RESOLVED | Noted: 2020-03-25 | Resolved: 2020-08-17

## 2020-08-17 PROBLEM — B02.9 HERPES ZOSTER WITHOUT COMPLICATION: Status: RESOLVED | Noted: 2020-01-17 | Resolved: 2020-08-17

## 2020-08-17 PROCEDURE — 99499 UNLISTED E&M SERVICE: CPT | Mod: S$GLB,,, | Performed by: NURSE PRACTITIONER

## 2020-08-17 PROCEDURE — 3051F HG A1C>EQUAL 7.0%<8.0%: CPT | Mod: CPTII,S$GLB,, | Performed by: NURSE PRACTITIONER

## 2020-08-17 PROCEDURE — G0439 PR MEDICARE ANNUAL WELLNESS SUBSEQUENT VISIT: ICD-10-PCS | Mod: S$GLB,,, | Performed by: NURSE PRACTITIONER

## 2020-08-17 PROCEDURE — 3051F PR MOST RECENT HEMOGLOBIN A1C LEVEL 7.0 - < 8.0%: ICD-10-PCS | Mod: CPTII,S$GLB,, | Performed by: NURSE PRACTITIONER

## 2020-08-17 PROCEDURE — G0439 PPPS, SUBSEQ VISIT: HCPCS | Mod: S$GLB,,, | Performed by: NURSE PRACTITIONER

## 2020-08-17 PROCEDURE — 99499 RISK ADDL DX/OHS AUDIT: ICD-10-PCS | Mod: S$GLB,,, | Performed by: NURSE PRACTITIONER

## 2020-08-17 NOTE — TELEPHONE ENCOUNTER
----- Message from Yocasta Mansfield sent at 8/17/2020 10:09 AM CDT -----  Contact: CHACE Montiel@[phone]--  Pt calling to let Mrs Chasity mcmullen know that she is expecting her today for her appointment at her house for 3:30 pm

## 2020-08-17 NOTE — PATIENT INSTRUCTIONS
Counseling and Referral of Other Preventative  (Italic type indicates deductible and co-insurance are waived)    Patient Name: Tiana Mead  Today's Date: 8/17/2020    Health Maintenance       Date Due Completion Date    Shingles Vaccine (2 of 3) 09/09/2010 7/15/2010    Influenza Vaccine (1) 09/01/2020 9/24/2019    Hemoglobin A1c 09/12/2020 3/12/2020    Lipid Panel 11/13/2020 11/13/2019    Foot Exam 05/28/2021 5/28/2020    Override on 2/4/2020: Done ([facility])    Override on 6/11/2019: Done ([facility])    Override on 5/16/2018: Done    Override on 5/2/2017: Done    Eye Exam 07/21/2021 7/21/2020    Override on 6/20/2012: (N/S)    TETANUS VACCINE 02/04/2030 2/4/2020        No orders of the defined types were placed in this encounter.    The following information is provided to all patients.  This information is to help you find resources for any of the problems found today that may be affecting your health:                Living healthy guide: www.[facility].[place].gov      Understanding Diabetes: www.diabetes.org      Eating healthy: www.cdc.gov/healthyweight      CDC home safety checklist: www.cdc.gov/steadi/patient.html      Agency on Aging: www.goea.[place].[facility]      Alcoholics anonymous (AA): www.aa.org      Physical Activity: www.olinda.nih.gov/uq5ngym      Tobacco use: www.quitwithusla.org

## 2020-08-17 NOTE — PROGRESS NOTES
"  Tiana Mead presented for a  Medicare AWV and comprehensive Health Risk Assessment today. The following components were reviewed and updated:    · Medical history  · Family History  · Social history  · Allergies and Current Medications  · Health Risk Assessment  · Health Maintenance  · Care Team     ** See Completed Assessments for Annual Wellness Visit within the encounter summary.**         The following assessments were completed:  · Living Situation  · CAGE  · Depression Screening  · Timed Get Up and Go  · Whisper Test  · Cognitive Function Screening  · Nutrition Screening  · ADL Screening  · PAQ Screening        Vitals:    08/17/20 1214   BP: 127/65   Pulse: 68   Temp: 97.9 °F (36.6 °C)   SpO2: 98%   Weight: 71.7 kg (158 lb)   Height: 5' 8" (1.727 m)     Body mass index is 24.02 kg/m².  Physical Exam  Constitutional:       Appearance: Normal appearance.   HENT:      Head: Normocephalic.   Eyes:      General: No scleral icterus.  Cardiovascular:      Rate and Rhythm: Normal rate. Rhythm irregularly irregular.   Pulmonary:      Effort: No respiratory distress.      Breath sounds: Normal breath sounds.   Abdominal:      General: Bowel sounds are normal.      Palpations: Abdomen is soft.   Musculoskeletal:      Right lower leg: No edema.      Left lower leg: No edema.   Neurological:      Mental Status: She is alert and oriented to person, place, and time.   Psychiatric:         Mood and Affect: Mood normal.         Behavior: Behavior normal.               Diagnoses and health risks identified today and associated recommendations/orders:    1. Encounter for preventive health examination  - Screenings performed, as noted above. Personal preventative testing needs reviewed.     2. Dependence on other enabling machines and devices  - Continue walke ruse    3. Abnormality of gait and mobility  - Requires walker    4. Diabetic polyneuropathy associated with type 2 diabetes mellitus  - Stable, followed by PCP    5. " Asthma exacerbation in COPD  - Stable, followed by PCP    6. Pulmonary hypertension  - Stable, followed by cardiology    7. Persistent atrial fibrillation  - Stable, followed by cardiology    8. Tortuous aorta  - Stable, followed by cardiology and PCP. BP wnl.     9. Hyperlipemia, mixed  - Stable, followed by cardiology and PCP    10. Aortic atherosclerosis  - Stable, on statin.     11. Chronic diastolic congestive heart failure  - Stable, followed by cardiology    12. Angina at rest  - Stable, followed by cardiology    13. CKD stage 3 due to type 2 diabetes mellitus  - Stable, followed by PCP      Provided Tiana with a 5-10 year written screening schedule and personal prevention plan. Recommendations were developed using the USPSTF age appropriate recommendations. Education, counseling, and referrals were provided as needed. After Visit Summary printed and given to patient which includes a list of additional screenings\tests needed.    Follow up in about 1 year (around 8/17/2021) for your next annual wellness visit.    Chasity Hennessy, LISBET  I offered to discuss end of life issues, including information on how to make advance directives that the patient could use to name someone who would make medical decisions on their behalf if they became too ill to make themselves.    ___Patient declined  _X_Patient is interested, I provided paper work and offered to discuss.

## 2020-08-19 ENCOUNTER — TELEPHONE (OUTPATIENT)
Dept: PRIMARY CARE CLINIC | Facility: CLINIC | Age: 85
End: 2020-08-19

## 2020-08-19 DIAGNOSIS — M79.10 MYALGIA: Primary | ICD-10-CM

## 2020-08-19 NOTE — TELEPHONE ENCOUNTER
----- Message from Sallie Hallman sent at 8/19/2020 11:29 AM CDT -----  Contact: Pt [phone]  Patient is requesting a call about her visit from 08/17.    Please call and advise.    Thank You

## 2020-08-28 ENCOUNTER — TELEPHONE (OUTPATIENT)
Dept: INTERNAL MEDICINE | Facility: CLINIC | Age: 85
End: 2020-08-28

## 2020-08-28 NOTE — TELEPHONE ENCOUNTER
Diabetic shoes ordered  Reprint order if needed  Fax [facility]  Note 6/25/2020 with today's addendum  podiatrist note  Everything they need  There are only 3 months left to this year, hopefully she can get this

## 2020-08-28 NOTE — TELEPHONE ENCOUNTER
----- Message from Emerita Ross sent at 8/28/2020 10:29 AM CDT -----  Contact: Patient [phone]  Calling to check on the paperwork for her diabetic shoes.    Please call and advise.    Thank You

## 2020-09-17 ENCOUNTER — LAB VISIT (OUTPATIENT)
Dept: LAB | Facility: HOSPITAL | Age: 85
End: 2020-09-17
Attending: INTERNAL MEDICINE
Payer: MEDICARE

## 2020-09-17 DIAGNOSIS — Z79.4 TYPE 2 DIABETES MELLITUS WITH DIABETIC POLYNEUROPATHY, WITH LONG-TERM CURRENT USE OF INSULIN: Chronic | ICD-10-CM

## 2020-09-17 DIAGNOSIS — M79.10 MYALGIA: ICD-10-CM

## 2020-09-17 DIAGNOSIS — Z79.4 TYPE 2 DIABETES MELLITUS WITH DIABETIC NEPHROPATHY, WITH LONG-TERM CURRENT USE OF INSULIN: ICD-10-CM

## 2020-09-17 DIAGNOSIS — E11.42 TYPE 2 DIABETES MELLITUS WITH DIABETIC POLYNEUROPATHY, WITH LONG-TERM CURRENT USE OF INSULIN: Chronic | ICD-10-CM

## 2020-09-17 DIAGNOSIS — E11.21 TYPE 2 DIABETES MELLITUS WITH DIABETIC NEPHROPATHY, WITH LONG-TERM CURRENT USE OF INSULIN: ICD-10-CM

## 2020-09-17 LAB
ALBUMIN SERPL BCP-MCNC: 3.5 G/DL (ref 3.5–5.2)
ALP SERPL-CCNC: 134 U/L (ref 55–135)
ALT SERPL W/O P-5'-P-CCNC: 24 U/L (ref 10–44)
ANION GAP SERPL CALC-SCNC: 6 MMOL/L (ref 8–16)
AST SERPL-CCNC: 29 U/L (ref 10–40)
BASOPHILS # BLD AUTO: 0.05 K/UL (ref 0–0.2)
BASOPHILS NFR BLD: 0.7 % (ref 0–1.9)
BILIRUB SERPL-MCNC: 0.5 MG/DL (ref 0.1–1)
BUN SERPL-MCNC: 28 MG/DL (ref 10–30)
CALCIUM SERPL-MCNC: 9.1 MG/DL (ref 8.7–10.5)
CHLORIDE SERPL-SCNC: 103 MMOL/L (ref 95–110)
CK SERPL-CCNC: 99 U/L (ref 20–180)
CO2 SERPL-SCNC: 28 MMOL/L (ref 23–29)
CREAT SERPL-MCNC: 1.7 MG/DL (ref 0.5–1.4)
DIFFERENTIAL METHOD: ABNORMAL
EOSINOPHIL # BLD AUTO: 0.1 K/UL (ref 0–0.5)
EOSINOPHIL NFR BLD: 1.7 % (ref 0–8)
ERYTHROCYTE [DISTWIDTH] IN BLOOD BY AUTOMATED COUNT: 13.9 % (ref 11.5–14.5)
EST. GFR  (AFRICAN AMERICAN): 29.1 ML/MIN/1.73 M^2
EST. GFR  (NON AFRICAN AMERICAN): 25.3 ML/MIN/1.73 M^2
ESTIMATED AVG GLUCOSE: 160 MG/DL (ref 68–131)
GLUCOSE SERPL-MCNC: 136 MG/DL (ref 70–110)
HBA1C MFR BLD HPLC: 7.2 % (ref 4–5.6)
HCT VFR BLD AUTO: 28.7 % (ref 37–48.5)
HGB BLD-MCNC: 8.9 G/DL (ref 12–16)
IMM GRANULOCYTES # BLD AUTO: 0.02 K/UL (ref 0–0.04)
IMM GRANULOCYTES NFR BLD AUTO: 0.3 % (ref 0–0.5)
LYMPHOCYTES # BLD AUTO: 1.8 K/UL (ref 1–4.8)
LYMPHOCYTES NFR BLD: 25.8 % (ref 18–48)
MCH RBC QN AUTO: 29.2 PG (ref 27–31)
MCHC RBC AUTO-ENTMCNC: 31 G/DL (ref 32–36)
MCV RBC AUTO: 94 FL (ref 82–98)
MONOCYTES # BLD AUTO: 0.9 K/UL (ref 0.3–1)
MONOCYTES NFR BLD: 13.3 % (ref 4–15)
NEUTROPHILS # BLD AUTO: 4.1 K/UL (ref 1.8–7.7)
NEUTROPHILS NFR BLD: 58.2 % (ref 38–73)
NRBC BLD-RTO: 0 /100 WBC
PLATELET # BLD AUTO: 182 K/UL (ref 150–350)
PMV BLD AUTO: 11.8 FL (ref 9.2–12.9)
POTASSIUM SERPL-SCNC: 4.2 MMOL/L (ref 3.5–5.1)
PROT SERPL-MCNC: 6.3 G/DL (ref 6–8.4)
RBC # BLD AUTO: 3.05 M/UL (ref 4–5.4)
SODIUM SERPL-SCNC: 137 MMOL/L (ref 136–145)
WBC # BLD AUTO: 6.98 K/UL (ref 3.9–12.7)

## 2020-09-17 PROCEDURE — 80053 COMPREHEN METABOLIC PANEL: CPT | Mod: HCNC

## 2020-09-17 PROCEDURE — 36415 COLL VENOUS BLD VENIPUNCTURE: CPT | Mod: HCNC,PN

## 2020-09-17 PROCEDURE — 85025 COMPLETE CBC W/AUTO DIFF WBC: CPT | Mod: HCNC

## 2020-09-17 PROCEDURE — 82550 ASSAY OF CK (CPK): CPT | Mod: HCNC

## 2020-09-17 PROCEDURE — 83036 HEMOGLOBIN GLYCOSYLATED A1C: CPT | Mod: HCNC

## 2020-09-23 PROBLEM — N18.9 CKD (CHRONIC KIDNEY DISEASE): Status: ACTIVE | Noted: 2020-09-23

## 2020-09-24 ENCOUNTER — OFFICE VISIT (OUTPATIENT)
Dept: INTERNAL MEDICINE | Facility: CLINIC | Age: 85
End: 2020-09-24
Payer: MEDICARE

## 2020-09-24 ENCOUNTER — TELEPHONE (OUTPATIENT)
Dept: INTERNAL MEDICINE | Facility: CLINIC | Age: 85
End: 2020-09-24

## 2020-09-24 ENCOUNTER — IMMUNIZATION (OUTPATIENT)
Dept: INTERNAL MEDICINE | Facility: CLINIC | Age: 85
End: 2020-09-24
Payer: MEDICARE

## 2020-09-24 VITALS
HEART RATE: 70 BPM | WEIGHT: 165 LBS | BODY MASS INDEX: 25.01 KG/M2 | SYSTOLIC BLOOD PRESSURE: 117 MMHG | DIASTOLIC BLOOD PRESSURE: 53 MMHG | HEIGHT: 68 IN | OXYGEN SATURATION: 97 %

## 2020-09-24 DIAGNOSIS — E03.9 PRIMARY HYPOTHYROIDISM: Chronic | ICD-10-CM

## 2020-09-24 DIAGNOSIS — D64.9 ANEMIA, UNSPECIFIED TYPE: ICD-10-CM

## 2020-09-24 DIAGNOSIS — S31.831A ANAL TEAR: ICD-10-CM

## 2020-09-24 DIAGNOSIS — E11.42 TYPE 2 DIABETES MELLITUS WITH DIABETIC POLYNEUROPATHY, WITH LONG-TERM CURRENT USE OF INSULIN: Primary | Chronic | ICD-10-CM

## 2020-09-24 DIAGNOSIS — N18.30 STAGE 3 CHRONIC KIDNEY DISEASE: ICD-10-CM

## 2020-09-24 DIAGNOSIS — J45.40 MODERATE PERSISTENT ASTHMA WITHOUT COMPLICATION: Chronic | ICD-10-CM

## 2020-09-24 DIAGNOSIS — Z79.4 TYPE 2 DIABETES MELLITUS WITH DIABETIC POLYNEUROPATHY, WITH LONG-TERM CURRENT USE OF INSULIN: Primary | Chronic | ICD-10-CM

## 2020-09-24 DIAGNOSIS — Z23 NEEDS FLU SHOT: ICD-10-CM

## 2020-09-24 DIAGNOSIS — I27.20 PULMONARY HYPERTENSION: ICD-10-CM

## 2020-09-24 DIAGNOSIS — I50.32 CHRONIC DIASTOLIC CONGESTIVE HEART FAILURE: Chronic | ICD-10-CM

## 2020-09-24 PROCEDURE — 99999 PR PBB SHADOW E&M-EST. PATIENT-LVL V: CPT | Mod: PBBFAC,HCNC,, | Performed by: INTERNAL MEDICINE

## 2020-09-24 PROCEDURE — 99215 OFFICE O/P EST HI 40 MIN: CPT | Mod: HCNC,S$GLB,, | Performed by: INTERNAL MEDICINE

## 2020-09-24 PROCEDURE — 99999 PR PBB SHADOW E&M-EST. PATIENT-LVL V: ICD-10-PCS | Mod: PBBFAC,HCNC,, | Performed by: INTERNAL MEDICINE

## 2020-09-24 PROCEDURE — G0008 ADMIN INFLUENZA VIRUS VAC: HCPCS | Mod: HCNC,S$GLB,, | Performed by: NURSE PRACTITIONER

## 2020-09-24 PROCEDURE — 3051F HG A1C>EQUAL 7.0%<8.0%: CPT | Mod: HCNC,CPTII,S$GLB, | Performed by: INTERNAL MEDICINE

## 2020-09-24 PROCEDURE — 3051F PR MOST RECENT HEMOGLOBIN A1C LEVEL 7.0 - < 8.0%: ICD-10-PCS | Mod: HCNC,CPTII,S$GLB, | Performed by: INTERNAL MEDICINE

## 2020-09-24 PROCEDURE — 1159F PR MEDICATION LIST DOCUMENTED IN MEDICAL RECORD: ICD-10-PCS | Mod: HCNC,S$GLB,, | Performed by: INTERNAL MEDICINE

## 2020-09-24 PROCEDURE — 1101F PT FALLS ASSESS-DOCD LE1/YR: CPT | Mod: HCNC,CPTII,S$GLB, | Performed by: INTERNAL MEDICINE

## 2020-09-24 PROCEDURE — 1126F PR PAIN SEVERITY QUANTIFIED, NO PAIN PRESENT: ICD-10-PCS | Mod: HCNC,S$GLB,, | Performed by: INTERNAL MEDICINE

## 2020-09-24 PROCEDURE — 1126F AMNT PAIN NOTED NONE PRSNT: CPT | Mod: HCNC,S$GLB,, | Performed by: INTERNAL MEDICINE

## 2020-09-24 PROCEDURE — G0008 PR ADMIN INFLUENZA VIRUS VAC: ICD-10-PCS | Mod: HCNC,S$GLB,, | Performed by: NURSE PRACTITIONER

## 2020-09-24 PROCEDURE — 90694 VACC AIIV4 NO PRSRV 0.5ML IM: CPT | Mod: HCNC,S$GLB,, | Performed by: NURSE PRACTITIONER

## 2020-09-24 PROCEDURE — 1159F MED LIST DOCD IN RCRD: CPT | Mod: HCNC,S$GLB,, | Performed by: INTERNAL MEDICINE

## 2020-09-24 PROCEDURE — 1101F PR PT FALLS ASSESS DOC 0-1 FALLS W/OUT INJ PAST YR: ICD-10-PCS | Mod: HCNC,CPTII,S$GLB, | Performed by: INTERNAL MEDICINE

## 2020-09-24 PROCEDURE — 90694 FLU VACCINE - QUADRIVALENT - ADJUVANTED: ICD-10-PCS | Mod: HCNC,S$GLB,, | Performed by: NURSE PRACTITIONER

## 2020-09-24 PROCEDURE — 99215 PR OFFICE/OUTPT VISIT, EST, LEVL V, 40-54 MIN: ICD-10-PCS | Mod: HCNC,S$GLB,, | Performed by: INTERNAL MEDICINE

## 2020-09-24 RX ORDER — ALBUTEROL SULFATE 1.25 MG/3ML
1.25 SOLUTION RESPIRATORY (INHALATION) EVERY 6 HOURS PRN
Qty: 1 BOX | Refills: 6 | Status: SHIPPED | OUTPATIENT
Start: 2020-09-24 | End: 2021-09-24

## 2020-09-24 NOTE — TELEPHONE ENCOUNTER
----- Message from Usha Hawk sent at 9/24/2020 11:12 AM CDT -----  Contact: [phone]  Patient states she went to the pharmacy and got the solution but not the albuteral machine. Please call and advise

## 2020-09-24 NOTE — TELEPHONE ENCOUNTER
Spoke with pt, pt stated that she went to the pharmacy and got the solution, but the pharmacy stated that they don't have the machine. Pt stated that the pharmacy told her that the doctor should order the machine through her insurance.

## 2020-09-24 NOTE — TELEPHONE ENCOUNTER
Mercedes, this was our first patient this am  I ordered a nebulizer for home use  Can you explain to the patient about how she will get this?

## 2020-09-24 NOTE — PATIENT INSTRUCTIONS
Results for orders placed or performed in visit on 09/17/20   Hemoglobin A1C   Result Value Ref Range    Hemoglobin A1C 7.2 (H) 4.0 - 5.6 %    Estimated Avg Glucose 160 (H) 68 - 131 mg/dL   Comprehensive metabolic panel   Result Value Ref Range    Sodium 137 136 - 145 mmol/L    Potassium 4.2 3.5 - 5.1 mmol/L    Chloride 103 95 - 110 mmol/L    CO2 28 23 - 29 mmol/L    Glucose 136 (H) 70 - 110 mg/dL    BUN, Bld 28 10 - 30 mg/dL    Creatinine 1.7 (H) 0.5 - 1.4 mg/dL    Calcium 9.1 8.7 - 10.5 mg/dL    Total Protein 6.3 6.0 - 8.4 g/dL    Albumin 3.5 3.5 - 5.2 g/dL    Total Bilirubin 0.5 0.1 - 1.0 mg/dL    Alkaline Phosphatase 134 55 - 135 U/L    AST 29 10 - 40 U/L    ALT 24 10 - 44 U/L    Anion Gap 6 (L) 8 - 16 mmol/L    eGFR if African American 29.1 (A) >60 mL/min/1.73 m^2    eGFR if non  25.3 (A) >60 mL/min/1.73 m^2   CBC auto differential   Result Value Ref Range    WBC 6.98 3.90 - 12.70 K/uL    RBC 3.05 (L) 4.00 - 5.40 M/uL    Hemoglobin 8.9 (L) 12.0 - 16.0 g/dL    Hematocrit 28.7 (L) 37.0 - 48.5 %    Mean Corpuscular Volume 94 82 - 98 fL    Mean Corpuscular Hemoglobin 29.2 27.0 - 31.0 pg    Mean Corpuscular Hemoglobin Conc 31.0 (L) 32.0 - 36.0 g/dL    RDW 13.9 11.5 - 14.5 %    Platelets 182 150 - 350 K/uL    MPV 11.8 9.2 - 12.9 fL    Immature Granulocytes 0.3 0.0 - 0.5 %    Gran # (ANC) 4.1 1.8 - 7.7 K/uL    Immature Grans (Abs) 0.02 0.00 - 0.04 K/uL    Lymph # 1.8 1.0 - 4.8 K/uL    Mono # 0.9 0.3 - 1.0 K/uL    Eos # 0.1 0.0 - 0.5 K/uL    Baso # 0.05 0.00 - 0.20 K/uL    nRBC 0 0 /100 WBC    Gran% 58.2 38.0 - 73.0 %    Lymph% 25.8 18.0 - 48.0 %    Mono% 13.3 4.0 - 15.0 %    Eosinophil% 1.7 0.0 - 8.0 %    Basophil% 0.7 0.0 - 1.9 %    Differential Method Automated    CK   Result Value Ref Range    CPK 99 20 - 180 U/L

## 2020-09-29 ENCOUNTER — TELEPHONE (OUTPATIENT)
Dept: INTERNAL MEDICINE | Facility: CLINIC | Age: 85
End: 2020-09-29

## 2020-09-29 DIAGNOSIS — J45.40 MODERATE PERSISTENT ASTHMA WITHOUT COMPLICATION: Primary | Chronic | ICD-10-CM

## 2020-09-29 NOTE — TELEPHONE ENCOUNTER
----- Message from Apolonia Travis sent at 9/29/2020  2:58 PM CDT -----  Contact: Self   Pt is requesting to speak to  regarding her orders. Pt states pharmacy does not have orders for machine. Please advise

## 2020-09-30 ENCOUNTER — TELEPHONE (OUTPATIENT)
Dept: PODIATRY | Facility: CLINIC | Age: 85
End: 2020-09-30

## 2020-09-30 NOTE — TELEPHONE ENCOUNTER
I ordered the nebulizer again  It is my understanding that it does not go to [facility] that this goes to [facility]

## 2020-09-30 NOTE — TELEPHONE ENCOUNTER
Spoke with patient regarding her service I informed patient that I will print out message have the dr look over it and give her a call.----- Message from Ritesh Ross sent at 9/30/2020 11:05 AM CDT -----  Contact: self  Pt is calling concerning her bill not the payment but the description of service     Contact  [phone]

## 2020-10-07 ENCOUNTER — OFFICE VISIT (OUTPATIENT)
Dept: CARDIOLOGY | Facility: CLINIC | Age: 85
End: 2020-10-07
Payer: MEDICARE

## 2020-10-07 VITALS
DIASTOLIC BLOOD PRESSURE: 61 MMHG | BODY MASS INDEX: 25.46 KG/M2 | SYSTOLIC BLOOD PRESSURE: 117 MMHG | OXYGEN SATURATION: 100 % | HEIGHT: 68 IN | WEIGHT: 168 LBS | HEART RATE: 69 BPM

## 2020-10-07 DIAGNOSIS — I50.32 CHRONIC DIASTOLIC CONGESTIVE HEART FAILURE: Primary | Chronic | ICD-10-CM

## 2020-10-07 DIAGNOSIS — E78.2 HYPERLIPEMIA, MIXED: Chronic | ICD-10-CM

## 2020-10-07 DIAGNOSIS — E11.22 CKD STAGE 3 DUE TO TYPE 2 DIABETES MELLITUS: ICD-10-CM

## 2020-10-07 DIAGNOSIS — N18.30 CKD STAGE 3 DUE TO TYPE 2 DIABETES MELLITUS: ICD-10-CM

## 2020-10-07 DIAGNOSIS — I36.1 NON-RHEUMATIC TRICUSPID VALVE INSUFFICIENCY: ICD-10-CM

## 2020-10-07 DIAGNOSIS — I27.20 PULMONARY HYPERTENSION: ICD-10-CM

## 2020-10-07 DIAGNOSIS — I48.19 PERSISTENT ATRIAL FIBRILLATION: Chronic | ICD-10-CM

## 2020-10-07 PROCEDURE — 1101F PR PT FALLS ASSESS DOC 0-1 FALLS W/OUT INJ PAST YR: ICD-10-PCS | Mod: HCNC,CPTII,S$GLB, | Performed by: INTERNAL MEDICINE

## 2020-10-07 PROCEDURE — 1101F PT FALLS ASSESS-DOCD LE1/YR: CPT | Mod: HCNC,CPTII,S$GLB, | Performed by: INTERNAL MEDICINE

## 2020-10-07 PROCEDURE — 1125F AMNT PAIN NOTED PAIN PRSNT: CPT | Mod: HCNC,S$GLB,, | Performed by: INTERNAL MEDICINE

## 2020-10-07 PROCEDURE — 99999 PR PBB SHADOW E&M-EST. PATIENT-LVL V: CPT | Mod: PBBFAC,HCNC,, | Performed by: INTERNAL MEDICINE

## 2020-10-07 PROCEDURE — 1125F PR PAIN SEVERITY QUANTIFIED, PAIN PRESENT: ICD-10-PCS | Mod: HCNC,S$GLB,, | Performed by: INTERNAL MEDICINE

## 2020-10-07 PROCEDURE — 3051F PR MOST RECENT HEMOGLOBIN A1C LEVEL 7.0 - < 8.0%: ICD-10-PCS | Mod: HCNC,CPTII,S$GLB, | Performed by: INTERNAL MEDICINE

## 2020-10-07 PROCEDURE — 99214 OFFICE O/P EST MOD 30 MIN: CPT | Mod: HCNC,S$GLB,, | Performed by: INTERNAL MEDICINE

## 2020-10-07 PROCEDURE — 99214 PR OFFICE/OUTPT VISIT, EST, LEVL IV, 30-39 MIN: ICD-10-PCS | Mod: HCNC,S$GLB,, | Performed by: INTERNAL MEDICINE

## 2020-10-07 PROCEDURE — 3051F HG A1C>EQUAL 7.0%<8.0%: CPT | Mod: HCNC,CPTII,S$GLB, | Performed by: INTERNAL MEDICINE

## 2020-10-07 PROCEDURE — 99999 PR PBB SHADOW E&M-EST. PATIENT-LVL V: ICD-10-PCS | Mod: PBBFAC,HCNC,, | Performed by: INTERNAL MEDICINE

## 2020-10-07 PROCEDURE — 1159F MED LIST DOCD IN RCRD: CPT | Mod: HCNC,S$GLB,, | Performed by: INTERNAL MEDICINE

## 2020-10-07 PROCEDURE — 1159F PR MEDICATION LIST DOCUMENTED IN MEDICAL RECORD: ICD-10-PCS | Mod: HCNC,S$GLB,, | Performed by: INTERNAL MEDICINE

## 2020-10-07 NOTE — PATIENT INSTRUCTIONS
When you get home to take 2 of the ureter for erosive might tablets at approximately 4 o'clock then Weigh daily and double your diuretic dose ( take 2 tablets )  for 2-4 lb weight gain over a 2-3 day period or 5 pounds in a week until the weight has resolved then return to original dose 9 1 tablet twice a day). Repeat as necessary..

## 2020-10-07 NOTE — PROGRESS NOTES
Subjective:   Patient ID:  Tiana Mead is a 89 y.o. female who presents for follow-up of Shortness of Breath and Chronic diastolic congestive heart failure      HPI:  Chronic diastolic  Tiana Mead presents for follow up of chronic diastolic congestive heart failure.  She has noted some increase in her dyspnea on exertion stating that she would become short of breath though walking from 1 room to the other and have to sit down.  She is at limited activity.  Continues to weigh daily and has noted some mild 1-2 lb increase in her weight.  She is noted to have significant diastolic dysfunction as well as severe tricuspid regurgitation and moderate tricuspid regurgitation. She has persistent atrial fibrillation but has not been aware of palpitations.  She has had a couple of episodes of anterior rest chest pain but had a negative PET stress in the recent past for the same symptomatology.  Tiana Mead has dyslipidemia  on high intensity statin.  She is anemia with CKD 3.  Review of Systems   Constitution: Negative for malaise/fatigue, weight gain and weight loss.   Eyes: Negative for blurred vision.   Cardiovascular: Negative for chest pain, claudication, cyanosis, dyspnea on exertion, irregular heartbeat, leg swelling, near-syncope, orthopnea, palpitations, paroxysmal nocturnal dyspnea and syncope.   Respiratory: Negative for cough, shortness of breath and wheezing.    Musculoskeletal: Positive for arthritis and joint pain. Negative for falls and myalgias.   Gastrointestinal: Positive for constipation. Negative for abdominal pain, heartburn, nausea and vomiting.   Genitourinary: Positive for nocturia.   Neurological: Negative for brief paralysis, dizziness, focal weakness, headaches, numbness, paresthesias and weakness.   Psychiatric/Behavioral: Negative for altered mental status.       Current Outpatient Medications   Medication Sig    acetaminophen (TYLENOL) 500 MG tablet Take 500 mg by mouth every 6  (six) hours as needed for Pain.    albuterol (PROVENTIL/VENTOLIN HFA) 90 mcg/actuation inhaler Inhale 2 puffs into the lungs every 4 (four) hours as needed.    amLODIPine (NORVASC) 5 MG tablet TAKE 1 TABLET(5 MG) BY MOUTH EVERY MORNING    atorvastatin (LIPITOR) 40 MG tablet Take 1 tablet (40 mg total) by mouth once daily.    blood sugar diagnostic (ONETOUCH ULTRA TEST) Strp Inject 1 strip into the skin 3 (three) times daily.    blood sugar diagnostic (TRUE METRIX GLUCOSE TEST STRIP) Strp USE TO TEST BLOOD SUGAR WITH MEALS THREE TIMES DAILY ins preferred    clotrimazole-betamethasone 1-0.05% (LOTRISONE) cream Apply topically 2 (two) times daily. For intertriginous itching    diclofenac sodium (VOLTAREN) 1 % Gel Apply 2 g topically 2 (two) times daily.    dorzolamide (TRUSOPT) 2 % ophthalmic solution Place 1 drop into both eyes 2 (two) times daily.    ELIQUIS 2.5 mg Tab TAKE 1 TABLET(2.5 MG) BY MOUTH TWICE DAILY    fluticasone-salmeterol diskus inhaler 250-50 mcg Inhale 1 puff into the lungs 2 (two) times daily. Controller    furosemide (LASIX) 40 MG tablet Take 1 tablet (40 mg total) by mouth 2 (two) times daily.    insulin aspart protamine-insulin aspart (NOVOLOG MIX 70-30FLEXPEN U-100) 100 unit/mL (70-30) InPn pen INJECT 10 UNITS UNDER THE SKIN ONCE DAILY EVERY MORNING BEFORE BREAKFAST    ipratropium (ATROVENT) 0.03 % nasal spray USE 2 SPRAYS IN EACH NOSTRIL TWICE DAILY    isosorbide mononitrate (IMDUR) 60 MG 24 hr tablet TAKE ONE TABLET BY MOUTH EVERY MORNING FOR HEART, TO PREVENT CHEST PAIN AND REDUCE SHORTNESS OF BREATH    lancets (TRUEPLUS LANCETS) 33 gauge Misc Apply 1 lancet topically 3 (three) times daily.    latanoprost 0.005 % ophthalmic solution INSTILL 1 DROP IN BOTH EYES EVERY EVENING    levothyroxine (SYNTHROID) 75 MCG tablet Take 1 tablet (75 mcg total) by mouth before breakfast.    meclizine (ANTIVERT) 12.5 mg tablet Take 1 tablet (12.5 mg total) by mouth 3 (three) times daily as  "needed for Dizziness.    metoprolol tartrate (LOPRESSOR) 50 MG tablet TAKE 1 TABLET(50 MG) BY MOUTH TWICE DAILY    multivit-mineral-iron-lutein (CENTRUM SILVER ULTRA WOMEN'S) Tab Take 1 tablet by mouth once daily.    nitroGLYCERIN (NITROSTAT) 0.4 MG SL tablet ONE TABLET UNDER THE TONGUE EVERY 5 MINUTES AS NEEDED FOR CHEST PAIN    nystatin (MYCOSTATIN) powder Apply topically 4 (four) times daily.    ondansetron (ZOFRAN) 4 MG tablet TAKE ONE TABLET BY MOUTH EVERY 12 HOURS AS NEEDED FOR NAUSEA OR DIZZINESS    pen needle, diabetic (BD ULTRA-FINE SHORT PEN NEEDLE) 31 gauge x 5/16" Ndle USE WITH INSULIN PENS twice daily or  AS DIRECTED    sodium chloride 5% () 5 % ophthalmic solution Place 1 drop into both eyes 4 (four) times daily.    triamcinolone acetonide 0.025% (KENALOG) 0.025 % Oint Apply topically 2 (two) times daily.    TRUE METRIX GLUCOSE METER Misc TEST TID ins preferred    TRUEPLUS LANCETS 30 gauge Misc USE TO TEST BLOOD SUGAR TID AC    albuterol (ACCUNEB) 1.25 mg/3 mL Nebu Take 3 mLs (1.25 mg total) by nebulization every 6 (six) hours as needed. Rescue (Patient not taking: Reported on 10/7/2020)    erythromycin (ROMYCIN) ophthalmic ointment Place 1 inch into both eyes every evening. (Patient not taking: Reported on 10/7/2020)    fluticasone-salmeterol diskus inhaler 250-50 mcg INHALE 1 PUFF BY MOUTH INTO THE LUNGS NIGHTLY (Patient not taking: Reported on 10/7/2020)     No current facility-administered medications for this visit.      Objective:   Physical Exam   Constitutional: She is oriented to person, place, and time. She appears well-developed. No distress.   /61 (BP Location: Left arm, Patient Position: Sitting, BP Method: Large (Automatic))   Pulse 69   Ht 5' 8" (1.727 m)   Wt 76.2 kg (167 lb 15.9 oz)   LMP  (LMP Unknown)   SpO2 100%   BMI 25.54 kg/m²    HENT:   Head: Normocephalic.   Eyes: Pupils are equal, round, and reactive to light. Conjunctivae are normal. No " scleral icterus.   Neck: Neck supple. No JVD present. No thyromegaly present.   Cardiovascular: Normal rate and intact distal pulses. An irregularly irregular rhythm present. PMI is not displaced. Exam reveals no gallop and no friction rub.   Murmur heard.   Medium-pitched midsystolic murmur is present with a grade of 2/6 at the upper left sternal border, lower left sternal border and apex. 1-2+ presacral edema and 1+ bilateral pedal edema.  No JVD  Pulmonary/Chest: Effort normal and breath sounds normal. No respiratory distress. She has no wheezes. She has no rales.   Abdominal: Soft. She exhibits no distension. There is no splenomegaly or hepatomegaly. There is no abdominal tenderness.   Musculoskeletal:         General: No tenderness or edema.      Comments: In a wheelchair   Neurological: She is alert and oriented to person, place, and time.   Skin: Skin is warm and dry. She is not diaphoretic.   Psychiatric: She has a normal mood and affect. Her behavior is normal.       Lab Results   Component Value Date     09/17/2020    K 4.2 09/17/2020     09/17/2020    CO2 28 09/17/2020    BUN 28 09/17/2020    CREATININE 1.7 (H) 09/17/2020     (H) 09/17/2020    HGBA1C 7.2 (H) 09/17/2020    MG 2.3 04/04/2020    AST 29 09/17/2020    ALT 24 09/17/2020    ALBUMIN 3.5 09/17/2020    PROT 6.3 09/17/2020    BILITOT 0.5 09/17/2020    WBC 6.98 09/17/2020    HGB 8.9 (L) 09/17/2020    HCT 28.7 (L) 09/17/2020    MCV 94 09/17/2020     09/17/2020    TSH 3.266 03/12/2020    CHOL 154 11/13/2019    HDL 61 11/13/2019    LDLCALC 81.4 11/13/2019    TRIG 58 11/13/2019       Assessment:     1. Chronic diastolic congestive heart failure; evidence for mild volume excess    2. Non-rheumatic tricuspid valve insufficiency : severe   3. Pulmonary hypertension : moderate   4. Persistent atrial fibrillation :  Rate controlled   5. Hyperlipemia, mixed :  On high Intensity statin   6. CKD stage 3 due to type 2 diabetes mellitus         Plan:     Tiana was seen today for shortness of breath and chronic diastolic congestive heart failure.    Diagnoses and all orders for this visit:    Chronic diastolic congestive heart failure  -     Basic Metabolic Panel; Future; Expected date: 10/21/2020  I suggested the patient to double her Furosemide dose until she loses 2 lb then back to the original dose.  She was given instructions to double the diuretic for a 2-4 lb weight gain in a 2-3 day period or 5 lb in a week.    Non-rheumatic tricuspid valve insufficiency    Pulmonary hypertension    Persistent atrial fibrillation  Continue current regimen  Hyperlipemia, mixed  Continue current regimen  CKD stage 3 due to type 2 diabetes mellitus

## 2020-10-23 ENCOUNTER — TELEPHONE (OUTPATIENT)
Dept: CARDIOLOGY | Facility: CLINIC | Age: 85
End: 2020-10-23

## 2020-10-23 ENCOUNTER — LAB VISIT (OUTPATIENT)
Dept: LAB | Facility: HOSPITAL | Age: 85
End: 2020-10-23
Attending: INTERNAL MEDICINE
Payer: MEDICARE

## 2020-10-23 DIAGNOSIS — I50.32 CHRONIC DIASTOLIC CONGESTIVE HEART FAILURE: Chronic | ICD-10-CM

## 2020-10-23 LAB
ANION GAP SERPL CALC-SCNC: 10 MMOL/L (ref 8–16)
BUN SERPL-MCNC: 24 MG/DL (ref 10–30)
CALCIUM SERPL-MCNC: 8.7 MG/DL (ref 8.7–10.5)
CHLORIDE SERPL-SCNC: 103 MMOL/L (ref 95–110)
CO2 SERPL-SCNC: 25 MMOL/L (ref 23–29)
CREAT SERPL-MCNC: 1.9 MG/DL (ref 0.5–1.4)
EST. GFR  (AFRICAN AMERICAN): 25.5 ML/MIN/1.73 M^2
EST. GFR  (NON AFRICAN AMERICAN): 22.1 ML/MIN/1.73 M^2
GLUCOSE SERPL-MCNC: 209 MG/DL (ref 70–110)
POTASSIUM SERPL-SCNC: 4.4 MMOL/L (ref 3.5–5.1)
SODIUM SERPL-SCNC: 138 MMOL/L (ref 136–145)

## 2020-10-23 PROCEDURE — 36415 COLL VENOUS BLD VENIPUNCTURE: CPT | Mod: HCNC,PN

## 2020-10-23 PROCEDURE — 80048 BASIC METABOLIC PNL TOTAL CA: CPT | Mod: HCNC

## 2020-10-23 NOTE — TELEPHONE ENCOUNTER
Dr Rip Alvarez I spoke with patient and explained results and instructions as per your note below. She verbalized understanding and repeated all back. She understands about the Furosemide and said she is NOT still doubling the dose and knows to only double for short term as needed for weight gain. She will also monitor sugar at home.    Thanks  Mickey Briseno MD   10/23/2020  3:07 PM      Please inform the patient that her kidney function is slightly worse than when it was checked 1 month ago.  Make sure that she is still not taking the double dose of furosemide and only doubling for the short term weight gain that we had discussed.  Inform her that her blood sugar is elevated.

## 2020-11-02 ENCOUNTER — TELEPHONE (OUTPATIENT)
Dept: INTERNAL MEDICINE | Facility: CLINIC | Age: 85
End: 2020-11-02

## 2020-11-02 NOTE — TELEPHONE ENCOUNTER
Message left Dr Bradley has nothing available tomorrow but I can get her in today with another provider.

## 2020-11-02 NOTE — TELEPHONE ENCOUNTER
----- Message from Jojo Og sent at 11/2/2020  8:46 AM CST -----  Contact: self [phone]  Would like to get medical advice.  Symptoms (please be specific):  feeling badly, weak and vomited yesterday & twice last week  How long has patient had these symptoms:  last week  Pharmacy name and phone # (copy from chart):  [facility] #[id number] [address] [phone] (Phone)  [phone] (Fax)  Comments:  Pt states she would like to see Dr Bradley tomorrow. Pt was declined scheduling with another provider.

## 2020-11-03 ENCOUNTER — OFFICE VISIT (OUTPATIENT)
Dept: INTERNAL MEDICINE | Facility: CLINIC | Age: 85
DRG: 813 | End: 2020-11-03
Payer: MEDICARE

## 2020-11-03 ENCOUNTER — HOSPITAL ENCOUNTER (INPATIENT)
Facility: HOSPITAL | Age: 85
LOS: 3 days | Discharge: HOME | DRG: 813 | End: 2020-11-06
Attending: EMERGENCY MEDICINE | Admitting: EMERGENCY MEDICINE
Payer: MEDICARE

## 2020-11-03 VITALS
OXYGEN SATURATION: 99 % | HEIGHT: 68 IN | BODY MASS INDEX: 24.25 KG/M2 | HEART RATE: 71 BPM | DIASTOLIC BLOOD PRESSURE: 50 MMHG | SYSTOLIC BLOOD PRESSURE: 100 MMHG | WEIGHT: 160 LBS

## 2020-11-03 DIAGNOSIS — D64.9 ANEMIA, UNSPECIFIED TYPE: Primary | ICD-10-CM

## 2020-11-03 DIAGNOSIS — D62 ACUTE ON CHRONIC BLOOD LOSS ANEMIA: ICD-10-CM

## 2020-11-03 DIAGNOSIS — R06.09 DYSPNEA ON EXERTION: ICD-10-CM

## 2020-11-03 DIAGNOSIS — R10.9 ABDOMINAL PAIN: ICD-10-CM

## 2020-11-03 DIAGNOSIS — R06.09 DOE (DYSPNEA ON EXERTION): ICD-10-CM

## 2020-11-03 DIAGNOSIS — D64.9 SYMPTOMATIC ANEMIA: ICD-10-CM

## 2020-11-03 DIAGNOSIS — R10.10 UPPER ABDOMINAL PAIN: ICD-10-CM

## 2020-11-03 DIAGNOSIS — R11.2 NAUSEA AND VOMITING, INTRACTABILITY OF VOMITING NOT SPECIFIED, UNSPECIFIED VOMITING TYPE: Primary | ICD-10-CM

## 2020-11-03 PROBLEM — I25.10 CORONARY ARTERY DISEASE INVOLVING NATIVE CORONARY ARTERY OF NATIVE HEART WITHOUT ANGINA PECTORIS: Status: ACTIVE | Noted: 2020-11-03

## 2020-11-03 PROBLEM — R50.9 FEVER: Status: RESOLVED | Noted: 2019-08-25 | Resolved: 2020-11-03

## 2020-11-03 PROBLEM — E11.22 CKD STAGE 3 DUE TO TYPE 2 DIABETES MELLITUS: Status: RESOLVED | Noted: 2017-05-02 | Resolved: 2020-11-03

## 2020-11-03 PROBLEM — N18.4 STAGE 4 CHRONIC KIDNEY DISEASE: Status: ACTIVE | Noted: 2020-09-23

## 2020-11-03 PROBLEM — N18.30 CKD STAGE 3 DUE TO TYPE 2 DIABETES MELLITUS: Status: RESOLVED | Noted: 2017-05-02 | Resolved: 2020-11-03

## 2020-11-03 LAB
ABO + RH BLD: NORMAL
ALBUMIN SERPL BCP-MCNC: 3.3 G/DL (ref 3.5–5.2)
ALP SERPL-CCNC: 86 U/L (ref 55–135)
ALT SERPL W/O P-5'-P-CCNC: 19 U/L (ref 10–44)
ANION GAP SERPL CALC-SCNC: 9 MMOL/L (ref 8–16)
ANISOCYTOSIS BLD QL SMEAR: SLIGHT
APTT BLDCRRT: 26.8 SEC (ref 21–32)
AST SERPL-CCNC: 31 U/L (ref 10–40)
BACTERIA #/AREA URNS AUTO: ABNORMAL /HPF
BASO STIPL BLD QL SMEAR: ABNORMAL
BASOPHILS # BLD AUTO: 0.05 K/UL (ref 0–0.2)
BASOPHILS NFR BLD: 0.7 % (ref 0–1.9)
BILIRUB SERPL-MCNC: 0.4 MG/DL (ref 0.1–1)
BILIRUB UR QL STRIP: NEGATIVE
BLD GP AB SCN CELLS X3 SERPL QL: NORMAL
BLD PROD TYP BPU: NORMAL
BLOOD UNIT EXPIRATION DATE: NORMAL
BLOOD UNIT TYPE CODE: 5100
BLOOD UNIT TYPE: NORMAL
BUN SERPL-MCNC: 28 MG/DL (ref 10–30)
CALCIUM SERPL-MCNC: 8.9 MG/DL (ref 8.7–10.5)
CHLORIDE SERPL-SCNC: 104 MMOL/L (ref 95–110)
CLARITY UR REFRACT.AUTO: CLEAR
CO2 SERPL-SCNC: 25 MMOL/L (ref 23–29)
CODING SYSTEM: NORMAL
COLOR UR AUTO: YELLOW
CREAT SERPL-MCNC: 1.8 MG/DL (ref 0.5–1.4)
CTP QC/QA: YES
DIFFERENTIAL METHOD: ABNORMAL
DISPENSE STATUS: NORMAL
EOSINOPHIL # BLD AUTO: 0 K/UL (ref 0–0.5)
EOSINOPHIL NFR BLD: 0.4 % (ref 0–8)
ERYTHROCYTE [DISTWIDTH] IN BLOOD BY AUTOMATED COUNT: 14.8 % (ref 11.5–14.5)
EST. GFR  (AFRICAN AMERICAN): 27.2 ML/MIN/1.73 M^2
EST. GFR  (NON AFRICAN AMERICAN): 23.6 ML/MIN/1.73 M^2
FERRITIN SERPL-MCNC: 8 NG/ML (ref 20–300)
GLUCOSE SERPL-MCNC: 136 MG/DL (ref 70–110)
GLUCOSE UR QL STRIP: NEGATIVE
HCT VFR BLD AUTO: 20.4 % (ref 37–48.5)
HGB BLD-MCNC: 6 G/DL (ref 12–16)
HGB UR QL STRIP: NEGATIVE
HYPOCHROMIA BLD QL SMEAR: ABNORMAL
IMM GRANULOCYTES # BLD AUTO: 0.03 K/UL (ref 0–0.04)
IMM GRANULOCYTES NFR BLD AUTO: 0.4 % (ref 0–0.5)
INR PPP: 1 (ref 0.8–1.2)
IRON SERPL-MCNC: 19 UG/DL (ref 30–160)
KETONES UR QL STRIP: NEGATIVE
LACTATE SERPL-SCNC: 1.9 MMOL/L (ref 0.5–2.2)
LEUKOCYTE ESTERASE UR QL STRIP: ABNORMAL
LIPASE SERPL-CCNC: 248 U/L (ref 4–60)
LYMPHOCYTES # BLD AUTO: 1.1 K/UL (ref 1–4.8)
LYMPHOCYTES NFR BLD: 15.6 % (ref 18–48)
MCH RBC QN AUTO: 24.4 PG (ref 27–31)
MCHC RBC AUTO-ENTMCNC: 29.4 G/DL (ref 32–36)
MCV RBC AUTO: 83 FL (ref 82–98)
MICROSCOPIC COMMENT: ABNORMAL
MONOCYTES # BLD AUTO: 0.9 K/UL (ref 0.3–1)
MONOCYTES NFR BLD: 12.8 % (ref 4–15)
NEUTROPHILS # BLD AUTO: 5 K/UL (ref 1.8–7.7)
NEUTROPHILS NFR BLD: 70.1 % (ref 38–73)
NITRITE UR QL STRIP: NEGATIVE
NRBC BLD-RTO: 1 /100 WBC
NUM UNITS TRANS PACKED RBC: NORMAL
OVALOCYTES BLD QL SMEAR: ABNORMAL
PH UR STRIP: 6 [PH] (ref 5–8)
PLATELET # BLD AUTO: 231 K/UL (ref 150–350)
PLATELET BLD QL SMEAR: ABNORMAL
PMV BLD AUTO: 11.7 FL (ref 9.2–12.9)
POCT GLUCOSE: 153 MG/DL (ref 70–110)
POIKILOCYTOSIS BLD QL SMEAR: SLIGHT
POLYCHROMASIA BLD QL SMEAR: ABNORMAL
POTASSIUM SERPL-SCNC: 4.1 MMOL/L (ref 3.5–5.1)
PROT SERPL-MCNC: 6.2 G/DL (ref 6–8.4)
PROT UR QL STRIP: NEGATIVE
PROTHROMBIN TIME: 10.9 SEC (ref 9–12.5)
RBC # BLD AUTO: 2.46 M/UL (ref 4–5.4)
RBC #/AREA URNS AUTO: 4 /HPF (ref 0–4)
RETICS/RBC NFR AUTO: 2 % (ref 0.5–2.5)
SARS-COV-2 RDRP RESP QL NAA+PROBE: NEGATIVE
SATURATED IRON: 3 % (ref 20–50)
SCHISTOCYTES BLD QL SMEAR: ABNORMAL
SODIUM SERPL-SCNC: 138 MMOL/L (ref 136–145)
SP GR UR STRIP: 1.01 (ref 1–1.03)
SPHEROCYTES BLD QL SMEAR: ABNORMAL
SQUAMOUS #/AREA URNS AUTO: 0 /HPF
TOTAL IRON BINDING CAPACITY: 607 UG/DL (ref 250–450)
TRANSFERRIN SERPL-MCNC: 410 MG/DL (ref 200–375)
TROPONIN I SERPL DL<=0.01 NG/ML-MCNC: 0.01 NG/ML (ref 0–0.03)
URN SPEC COLLECT METH UR: ABNORMAL
WBC # BLD AUTO: 7.17 K/UL (ref 3.9–12.7)
WBC #/AREA URNS AUTO: 10 /HPF (ref 0–5)

## 2020-11-03 PROCEDURE — 25000003 PHARM REV CODE 250: Mod: HCNC | Performed by: EMERGENCY MEDICINE

## 2020-11-03 PROCEDURE — 36430 TRANSFUSION BLD/BLD COMPNT: CPT | Mod: HCNC

## 2020-11-03 PROCEDURE — 99999 PR PBB SHADOW E&M-EST. PATIENT-LVL V: ICD-10-PCS | Mod: PBBFAC,HCNC,, | Performed by: PHYSICIAN ASSISTANT

## 2020-11-03 PROCEDURE — 63600175 PHARM REV CODE 636 W HCPCS: Mod: HCNC | Performed by: STUDENT IN AN ORGANIZED HEALTH CARE EDUCATION/TRAINING PROGRAM

## 2020-11-03 PROCEDURE — 85025 COMPLETE CBC W/AUTO DIFF WBC: CPT | Mod: HCNC

## 2020-11-03 PROCEDURE — 1125F PR PAIN SEVERITY QUANTIFIED, PAIN PRESENT: ICD-10-PCS | Mod: HCNC,S$GLB,, | Performed by: PHYSICIAN ASSISTANT

## 2020-11-03 PROCEDURE — 82728 ASSAY OF FERRITIN: CPT | Mod: HCNC

## 2020-11-03 PROCEDURE — 11000001 HC ACUTE MED/SURG PRIVATE ROOM: Mod: HCNC

## 2020-11-03 PROCEDURE — 83690 ASSAY OF LIPASE: CPT | Mod: HCNC

## 2020-11-03 PROCEDURE — 80053 COMPREHEN METABOLIC PANEL: CPT | Mod: HCNC

## 2020-11-03 PROCEDURE — 93005 ELECTROCARDIOGRAM TRACING: CPT | Mod: HCNC

## 2020-11-03 PROCEDURE — 99215 OFFICE O/P EST HI 40 MIN: CPT | Mod: HCNC,S$GLB,, | Performed by: PHYSICIAN ASSISTANT

## 2020-11-03 PROCEDURE — 83540 ASSAY OF IRON: CPT | Mod: HCNC

## 2020-11-03 PROCEDURE — 99285 EMERGENCY DEPT VISIT HI MDM: CPT | Mod: 25,HCNC

## 2020-11-03 PROCEDURE — 85610 PROTHROMBIN TIME: CPT | Mod: HCNC

## 2020-11-03 PROCEDURE — 85045 AUTOMATED RETICULOCYTE COUNT: CPT | Mod: HCNC

## 2020-11-03 PROCEDURE — U0002 COVID-19 LAB TEST NON-CDC: HCPCS | Mod: HCNC | Performed by: STUDENT IN AN ORGANIZED HEALTH CARE EDUCATION/TRAINING PROGRAM

## 2020-11-03 PROCEDURE — 86901 BLOOD TYPING SEROLOGIC RH(D): CPT | Mod: HCNC

## 2020-11-03 PROCEDURE — 83605 ASSAY OF LACTIC ACID: CPT | Mod: HCNC

## 2020-11-03 PROCEDURE — P9016 RBC LEUKOCYTES REDUCED: HCPCS | Mod: HCNC

## 2020-11-03 PROCEDURE — 86920 COMPATIBILITY TEST SPIN: CPT | Mod: HCNC

## 2020-11-03 PROCEDURE — 99285 EMERGENCY DEPT VISIT HI MDM: CPT | Mod: ,,, | Performed by: EMERGENCY MEDICINE

## 2020-11-03 PROCEDURE — 1159F PR MEDICATION LIST DOCUMENTED IN MEDICAL RECORD: ICD-10-PCS | Mod: HCNC,S$GLB,, | Performed by: PHYSICIAN ASSISTANT

## 2020-11-03 PROCEDURE — 1125F AMNT PAIN NOTED PAIN PRSNT: CPT | Mod: HCNC,S$GLB,, | Performed by: PHYSICIAN ASSISTANT

## 2020-11-03 PROCEDURE — 96360 HYDRATION IV INFUSION INIT: CPT | Mod: HCNC

## 2020-11-03 PROCEDURE — 63600175 PHARM REV CODE 636 W HCPCS: Mod: HCNC | Performed by: EMERGENCY MEDICINE

## 2020-11-03 PROCEDURE — 99222 PR INITIAL HOSPITAL CARE,LEVL II: ICD-10-PCS | Mod: HCNC,AI,, | Performed by: HOSPITALIST

## 2020-11-03 PROCEDURE — 1101F PT FALLS ASSESS-DOCD LE1/YR: CPT | Mod: HCNC,CPTII,S$GLB, | Performed by: PHYSICIAN ASSISTANT

## 2020-11-03 PROCEDURE — 1101F PR PT FALLS ASSESS DOC 0-1 FALLS W/OUT INJ PAST YR: ICD-10-PCS | Mod: HCNC,CPTII,S$GLB, | Performed by: PHYSICIAN ASSISTANT

## 2020-11-03 PROCEDURE — 85730 THROMBOPLASTIN TIME PARTIAL: CPT | Mod: HCNC

## 2020-11-03 PROCEDURE — 99222 1ST HOSP IP/OBS MODERATE 55: CPT | Mod: HCNC,AI,, | Performed by: HOSPITALIST

## 2020-11-03 PROCEDURE — 99215 PR OFFICE/OUTPT VISIT, EST, LEVL V, 40-54 MIN: ICD-10-PCS | Mod: HCNC,S$GLB,, | Performed by: PHYSICIAN ASSISTANT

## 2020-11-03 PROCEDURE — 25000003 PHARM REV CODE 250: Mod: HCNC | Performed by: HOSPITALIST

## 2020-11-03 PROCEDURE — 84484 ASSAY OF TROPONIN QUANT: CPT | Mod: HCNC

## 2020-11-03 PROCEDURE — 93010 EKG 12-LEAD: ICD-10-PCS | Mod: HCNC,,, | Performed by: INTERNAL MEDICINE

## 2020-11-03 PROCEDURE — 81001 URINALYSIS AUTO W/SCOPE: CPT | Mod: HCNC

## 2020-11-03 PROCEDURE — C9113 INJ PANTOPRAZOLE SODIUM, VIA: HCPCS | Mod: HCNC | Performed by: EMERGENCY MEDICINE

## 2020-11-03 PROCEDURE — 1159F MED LIST DOCD IN RCRD: CPT | Mod: HCNC,S$GLB,, | Performed by: PHYSICIAN ASSISTANT

## 2020-11-03 PROCEDURE — 96361 HYDRATE IV INFUSION ADD-ON: CPT | Mod: HCNC

## 2020-11-03 PROCEDURE — 93010 ELECTROCARDIOGRAM REPORT: CPT | Mod: HCNC,,, | Performed by: INTERNAL MEDICINE

## 2020-11-03 PROCEDURE — 99999 PR PBB SHADOW E&M-EST. PATIENT-LVL V: CPT | Mod: PBBFAC,HCNC,, | Performed by: PHYSICIAN ASSISTANT

## 2020-11-03 PROCEDURE — 99285 PR EMERGENCY DEPT VISIT,LEVEL V: ICD-10-PCS | Mod: ,,, | Performed by: EMERGENCY MEDICINE

## 2020-11-03 RX ORDER — POLYETHYLENE GLYCOL 3350 17 G/17G
17 POWDER, FOR SOLUTION ORAL 2 TIMES DAILY PRN
Status: DISCONTINUED | OUTPATIENT
Start: 2020-11-03 | End: 2020-11-06 | Stop reason: HOSPADM

## 2020-11-03 RX ORDER — ACETAMINOPHEN 325 MG/1
325 TABLET ORAL EVERY 8 HOURS PRN
Status: DISCONTINUED | OUTPATIENT
Start: 2020-11-03 | End: 2020-11-06 | Stop reason: HOSPADM

## 2020-11-03 RX ORDER — IBUPROFEN 200 MG
24 TABLET ORAL
Status: DISCONTINUED | OUTPATIENT
Start: 2020-11-03 | End: 2020-11-06 | Stop reason: HOSPADM

## 2020-11-03 RX ORDER — IBUPROFEN 200 MG
16 TABLET ORAL
Status: DISCONTINUED | OUTPATIENT
Start: 2020-11-03 | End: 2020-11-06 | Stop reason: HOSPADM

## 2020-11-03 RX ORDER — HYDROCODONE BITARTRATE AND ACETAMINOPHEN 500; 5 MG/1; MG/1
TABLET ORAL
Status: DISCONTINUED | OUTPATIENT
Start: 2020-11-03 | End: 2020-11-05

## 2020-11-03 RX ORDER — SODIUM CHLORIDE 0.9 % (FLUSH) 0.9 %
5 SYRINGE (ML) INJECTION
Status: DISCONTINUED | OUTPATIENT
Start: 2020-11-03 | End: 2020-11-06 | Stop reason: HOSPADM

## 2020-11-03 RX ORDER — GLUCAGON 1 MG
1 KIT INJECTION
Status: DISCONTINUED | OUTPATIENT
Start: 2020-11-03 | End: 2020-11-06 | Stop reason: HOSPADM

## 2020-11-03 RX ORDER — METOPROLOL TARTRATE 25 MG/1
25 TABLET, FILM COATED ORAL 2 TIMES DAILY
Status: DISCONTINUED | OUTPATIENT
Start: 2020-11-03 | End: 2020-11-06 | Stop reason: HOSPADM

## 2020-11-03 RX ORDER — INSULIN ASPART 100 [IU]/ML
0-5 INJECTION, SOLUTION INTRAVENOUS; SUBCUTANEOUS
Status: DISCONTINUED | OUTPATIENT
Start: 2020-11-03 | End: 2020-11-06 | Stop reason: HOSPADM

## 2020-11-03 RX ORDER — FLUTICASONE FUROATE AND VILANTEROL 100; 25 UG/1; UG/1
1 POWDER RESPIRATORY (INHALATION) DAILY
Status: DISCONTINUED | OUTPATIENT
Start: 2020-11-04 | End: 2020-11-06 | Stop reason: HOSPADM

## 2020-11-03 RX ORDER — ONDANSETRON 4 MG/1
4 TABLET, ORALLY DISINTEGRATING ORAL EVERY 8 HOURS PRN
Status: DISCONTINUED | OUTPATIENT
Start: 2020-11-03 | End: 2020-11-06 | Stop reason: HOSPADM

## 2020-11-03 RX ORDER — ONDANSETRON 2 MG/ML
4 INJECTION INTRAMUSCULAR; INTRAVENOUS EVERY 8 HOURS PRN
Status: DISCONTINUED | OUTPATIENT
Start: 2020-11-03 | End: 2020-11-06 | Stop reason: HOSPADM

## 2020-11-03 RX ORDER — LEVOTHYROXINE SODIUM 75 UG/1
75 TABLET ORAL
Status: DISCONTINUED | OUTPATIENT
Start: 2020-11-04 | End: 2020-11-06 | Stop reason: HOSPADM

## 2020-11-03 RX ORDER — TALC
6 POWDER (GRAM) TOPICAL NIGHTLY PRN
Status: DISCONTINUED | OUTPATIENT
Start: 2020-11-03 | End: 2020-11-06 | Stop reason: HOSPADM

## 2020-11-03 RX ORDER — SODIUM CHLORIDE 0.9 % (FLUSH) 0.9 %
10 SYRINGE (ML) INJECTION
Status: DISCONTINUED | OUTPATIENT
Start: 2020-11-03 | End: 2020-11-06 | Stop reason: HOSPADM

## 2020-11-03 RX ORDER — PANTOPRAZOLE SODIUM 40 MG/10ML
40 INJECTION, POWDER, LYOPHILIZED, FOR SOLUTION INTRAVENOUS
Status: COMPLETED | OUTPATIENT
Start: 2020-11-03 | End: 2020-11-03

## 2020-11-03 RX ORDER — ATORVASTATIN CALCIUM 20 MG/1
40 TABLET, FILM COATED ORAL DAILY
Status: DISCONTINUED | OUTPATIENT
Start: 2020-11-04 | End: 2020-11-06 | Stop reason: HOSPADM

## 2020-11-03 RX ORDER — PANTOPRAZOLE SODIUM 40 MG/10ML
40 INJECTION, POWDER, LYOPHILIZED, FOR SOLUTION INTRAVENOUS 2 TIMES DAILY
Status: DISCONTINUED | OUTPATIENT
Start: 2020-11-04 | End: 2020-11-04

## 2020-11-03 RX ADMIN — SODIUM CHLORIDE 250 ML: 9 INJECTION, SOLUTION INTRAVENOUS at 04:11

## 2020-11-03 RX ADMIN — METOPROLOL TARTRATE 25 MG: 25 TABLET, FILM COATED ORAL at 09:11

## 2020-11-03 RX ADMIN — SODIUM CHLORIDE, SODIUM LACTATE, POTASSIUM CHLORIDE, AND CALCIUM CHLORIDE 500 ML: .6; .31; .03; .02 INJECTION, SOLUTION INTRAVENOUS at 12:11

## 2020-11-03 RX ADMIN — PANTOPRAZOLE SODIUM 40 MG: 40 INJECTION, POWDER, LYOPHILIZED, FOR SOLUTION INTRAVENOUS at 03:11

## 2020-11-03 NOTE — ED TRIAGE NOTES
Patient identifiers for Tiana Mead 89 y.o. female checked and correct.  Chief Complaint   Patient presents with    Generalized Weakness     Past Medical History:   Diagnosis Date    Allergy     Anemia     Arthritis     Asthma     CHF (congestive heart failure) 5/2/2017    Chronic kidney disease     Degenerative disc disease     Diabetes mellitus type II     Essential hypertension 7/3/2012    Glaucoma     History of pseudogout 8/23/2012    Hyperlipidemia     Hypertension     Iritis     Myocardial infarction     Pneumonia     Thyroid disease      Allergies reported:   Review of patient's allergies indicates:   Allergen Reactions    Codeine Itching and Nausea Only              LOC: The patient is awake, alert, and oriented to place, time, situation. Affect is appropriate.  Speech is appropriate and clear.     APPEARANCE: Patient resting comfortably in no acute distress.  Patient is clean and well groomed.    SKIN: The skin is warm and dry; color consistent with ethnicity.  Patient has normal skin turgor and moist mucus membranes.  Skin intact; no breakdown or bruising noted.     MUSCULOSKELETAL: Patient moving upper and lower extremities without difficulty.  Generalized weakness    RESPIRATORY: Airway is open and patent. Respirations spontaneous, even, and non-labored. SOB noted, especially on exertion.  Patient has a normal effort and rate.  No accessory muscle use noted. Denies cough.     CARDIAC:  Normal rhythm and rate noted.  No peripheral edema noted.       ABDOMEN: Soft and  tender to palpation in right upper quadrant. Nausea and vomiting. Right upper quadrant pain. No distention noted    NEUROLOGIC: Eyes open spontaneously.  Behavior appropriate to situation.  Follows commands; facial expression symmetrical.  Purposeful motor response noted; normal sensation in all extremities.

## 2020-11-03 NOTE — PROGRESS NOTES
Subjective:       Patient ID: Tiana Mead is a 89 y.o. female.        Chief Complaint: Nausea    Tiana Mead is an established patient of Belen Wood MD here today for urgent care visit with her two daughters, Yolanda and Melanie.    She has not been feeling well for the past 4-5 weeks, gradually worsening.      She is short of breath with minimal exertion.  Prior to the past few weeks, she was fairly independent in all ADLs.  Now she can't go to the bathroom without assistance because she gets short of breath and incredibly weak.  She has some ankle swelling but that resolves with elevation.  Weight has been stable.  She has also had intermittent nausea and vomiting.  Exertion seems to be a trigger for this although will occur other times as well.  Not associated with eating.  Also with upper abdominal pain.  She has not had an appetite but is eating some.         Review of Systems   Constitutional: Positive for appetite change. Negative for chills, diaphoresis, fatigue and fever.   HENT: Negative for congestion and sore throat.    Eyes: Negative for visual disturbance.   Respiratory: Positive for shortness of breath. Negative for cough and chest tightness.    Cardiovascular: Positive for leg swelling (ankles). Negative for chest pain and palpitations.   Gastrointestinal: Positive for abdominal pain, nausea and vomiting. Negative for blood in stool, constipation and diarrhea.   Genitourinary: Negative for dysuria, frequency, hematuria and urgency.   Musculoskeletal: Negative for arthralgias and back pain.   Skin: Negative for rash.   Neurological: Positive for weakness. Negative for dizziness, syncope and headaches.   Psychiatric/Behavioral: Negative for dysphoric mood and sleep disturbance. The patient is not nervous/anxious.        Objective:      Physical Exam  Vitals signs and nursing note reviewed.   Constitutional:       Appearance: Normal appearance. She is well-developed.      Comments:  Sitting in wheelchair   HENT:      Head: Normocephalic.      Right Ear: External ear normal.      Left Ear: External ear normal.   Eyes:      Pupils: Pupils are equal, round, and reactive to light.   Cardiovascular:      Rate and Rhythm: Normal rate. Rhythm irregularly irregular.      Heart sounds: Normal heart sounds. No murmur. No friction rub. No gallop.    Pulmonary:      Effort: Pulmonary effort is normal. No respiratory distress.      Breath sounds: Normal breath sounds.      Comments: Gets extremely short of breath with getting onto exam table  Abdominal:      Palpations: Abdomen is soft.      Tenderness: There is abdominal tenderness in the right upper quadrant, epigastric area and left upper quadrant.   Skin:     General: Skin is warm and dry.   Neurological:      Mental Status: She is alert.         Assessment:       1. Nausea and vomiting, intractability of vomiting not specified, unspecified vomiting type    2. NOVOA (dyspnea on exertion)    3. Upper abdominal pain        Plan:       Tiana was seen today for nausea.    Diagnoses and all orders for this visit:    Nausea and vomiting, intractability of vomiting not specified, unspecified vomiting type  -     Refer to Emergency Dept.    NOVOA (dyspnea on exertion)  -     Refer to Emergency Dept.    Upper abdominal pain  -     Refer to Emergency Dept.    Several different complaints today, ongoing and gradually worsening over past several weeks and now unable to complete ADL's secondary to weakness and shortness of breath   Given all sx, recommend evaluation in ED  Her daughters will take her    >45 minutes spent with patient today    Patient seen with Dr. Bradley today    Pt has been given instructions populated from [employer] database and has verbalized understanding of the after visit summary and information contained wherein.    Follow up with a primary care provider. May go to ER for acute shortness of breath, lightheadedness, fever, or any other emergent  "complaints or changes in condition.    "This note will be shared with the patient"    Future Appointments   Date Time Provider Department Center   12/15/2020  9:30 AM [facility]   12/23/2020 10:00 AM Belen Bradley MD [facility]                 "

## 2020-11-03 NOTE — ED PROVIDER NOTES
Encounter Date: 11/3/2020       History     Chief Complaint   Patient presents with    Generalized Weakness     89 yo female with a h/o CKD, CHF, DM type II, a fib on Eliquis, arthritis, anemia, HLD, HTN, MI, presenting with abdominal pain.    Context:  The patient denies sick contacts or unusual foods. She went to her PCP today and was referred to the ED.   Onset:  Gradual   Location: RUQ  Duration:  4 days  Quality:  aching   Associated Symptoms: last bowel movement yesterday     S/p cholecystectomy, hysterectomy     The history is provided by the patient and medical records. No  was used.     Review of patient's allergies indicates:   Allergen Reactions    Codeine Itching and Nausea Only            Past Medical History:   Diagnosis Date    Allergy     Anemia     Arthritis     Asthma     CHF (congestive heart failure) 5/2/2017    Chronic kidney disease     Degenerative disc disease     Diabetes mellitus type II     Essential hypertension 7/3/2012    Glaucoma     History of pseudogout 8/23/2012    Hyperlipidemia     Hypertension     Iritis     Myocardial infarction     Pneumonia     Thyroid disease      Past Surgical History:   Procedure Laterality Date    CARDIAC CATHETERIZATION      CATARACT EXTRACTION W/  INTRAOCULAR LENS IMPLANT Left n/a    CATARACT EXTRACTION W/  INTRAOCULAR LENS IMPLANT Right 10/8/2018    With Femtosecond LASER assist (Dr. Henson)    CHOLECYSTECTOMY      EYE SURGERY      gall stone      HYSTERECTOMY      VARICOSE VEIN SURGERY       Family History   Problem Relation Age of Onset    Diabetes Mother     Hypertension Mother     Glaucoma Mother     Hypertension Father     Diabetes Son     No Known Problems Daughter     Diabetes Daughter         borderline    No Known Problems Son     Melanoma Neg Hx      Social History     Tobacco Use    Smoking status: Never Smoker    Smokeless tobacco: Never Used   Substance Use Topics    Alcohol use: No     Drug use: No     Review of Systems   Constitutional: Negative for fever.   HENT: Negative for facial swelling.    Eyes: Negative for redness.   Respiratory: Positive for shortness of breath (dyspnea on exertion ).    Cardiovascular: Negative for chest pain.   Gastrointestinal: Positive for abdominal pain and vomiting. Negative for constipation.   Genitourinary: Negative for flank pain.   Skin: Negative for wound.   Allergic/Immunologic: Negative for immunocompromised state.   Neurological: Positive for weakness (generalized ).       Physical Exam     Initial Vitals [11/03/20 1059]   BP Pulse Resp Temp SpO2   135/63 85 18 97.9 °F (36.6 °C) 100 %      MAP       --         Physical Exam    Nursing note and vitals reviewed.  Constitutional: She is not diaphoretic. No distress.   HENT:   Head: Normocephalic and atraumatic.   Eyes: Right eye exhibits no discharge. Left eye exhibits no discharge.   Neck: Neck supple. No tracheal deviation present. No JVD present.   Cardiovascular: Normal rate and regular rhythm.   Pulmonary/Chest: Breath sounds normal. No respiratory distress.   Abdominal: Soft. There is abdominal tenderness (RUQ). There is no tenderness at McBurney's point and negative Blanca's sign.   Musculoskeletal: No edema.   Neurological: She is alert and oriented to person, place, and time.   Skin: Skin is warm. No rash noted.   Psychiatric: She has a normal mood and affect. Her behavior is normal.         ED Course   Procedures  Labs Reviewed   CBC W/ AUTO DIFFERENTIAL - Abnormal; Notable for the following components:       Result Value    RBC 2.46 (*)     Hemoglobin 6.0 (*)     Hematocrit 20.4 (*)     MCH 24.4 (*)     MCHC 29.4 (*)     RDW 14.8 (*)     nRBC 1 (*)     Lymph % 15.6 (*)     All other components within normal limits   COMPREHENSIVE METABOLIC PANEL - Abnormal; Notable for the following components:    Glucose 136 (*)     Creatinine 1.8 (*)     Albumin 3.3 (*)     eGFR if  27.2 (*)      eGFR if non  23.6 (*)     All other components within normal limits   LIPASE - Abnormal; Notable for the following components:    Lipase 248 (*)     All other components within normal limits   LACTIC ACID, PLASMA   TROPONIN I   URINALYSIS, REFLEX TO URINE CULTURE   PROTIME-INR   APTT   SARS-COV-2 RDRP GENE    Narrative:     This test utilizes isothermal nucleic acid amplification   technology to detect the SARS-CoV-2 RdRp nucleic acid segment.   The analytical sensitivity (limit of detection) is 125 genome   equivalents/mL.   A POSITIVE result implies infection with the SARS-CoV-2 virus;   the patient is presumed to be contagious.     A NEGATIVE result means that SARS-CoV-2 nucleic acids are not   present above the limit of detection. A NEGATIVE result should be   treated as presumptive. It does not rule out the possibility of   COVID-19 and should not be the sole basis for treatment decisions.   If COVID-19 is strongly suspected based on clinical and exposure   history, re-testing using an alternate molecular assay should be   considered.   This test is only for use under the Food and Drug   Administration s Emergency Use Authorization (EUA).   Commercial kits are provided by [employer].   Performance characteristics of the EUA have been independently   verified by [facility] Department of   Pathology and Laboratory Medicine.   _________________________________________________________________   The authorized Fact Sheet for Healthcare Providers and the authorized Fact   Sheet for Patients of the ID NOW COVID-19 are available on the FDA   website:     https://www.fda.gov/media/340272/download  https://www.fda.gov/media/662394/download         TYPE & SCREEN   PREPARE RBC SOFT     EKG Readings: (Independently Interpreted)   Initial Reading: No STEMI. Previous EKG: Compared with most recent EKG Rhythm: Atrial Fibrillation. Heart Rate: 92. Clinical Impression: Atrial Fibrillation      ECG Results          EKG 12-lead (In process)  Result time 11/03/20 14:04:07    In process by Interface, Lab In [facility] (11/03/20 14:04:07)                 Narrative:    Test Reason : R10.9,    Vent. Rate : 092 BPM     Atrial Rate : 086 BPM     P-R Int : 000 ms          QRS Dur : 082 ms      QT Int : 342 ms       P-R-T Axes : 000 023 094 degrees     QTc Int : 422 ms    Atrial fibrillation  Low voltage QRS  Inferior infarct ,age undetermined  Cannot rule out Anterior infarct (cited on or before 03-NOV-2020)  Abnormal ECG  When compared with ECG of 25-MAR-2020 10:34,  Inferior infarct is now Present  ST no longer depressed in Inferior leads  T wave inversion no longer evident in Inferior leads  Nonspecific T wave abnormality, worse in Lateral leads    Referred By: [email or web address]   SELF           Confirmed By:                             Imaging Results           CT Abdomen Pelvis  Without Contrast (Final result)  Result time 11/03/20 13:55:33    Final result by Demarcus Perales MD (11/03/20 13:55:33)                 Impression:      1. Moderate colonic stool burden which may reflect constipation.  Otherwise, no acute process or CT findings identified to explain patient's symptoms of nausea and vomiting.  Specifically, no evidence of bowel obstruction.  2. Cholecystectomy and hysterectomy.  3. Suspected nonspecific periportal edema.  Clinical correlation and with LFTs advised.  4. Bilateral renal vascular calcifications versus nonobstructing nephroliths.  5. Bilateral renal cortical scarring with bilateral renal simple and complex/hemorrhagic cysts, incompletely evaluated without intravenous contrast.  Solid renal mass not excluded.  Further evaluation with elective/nonemergent renal ultrasound can be obtained as warranted.  6. Coronary and systemic atherosclerosis with dense mitral annular vascular calcifications.  7. Moderate cardiomegaly with small right and trace left pleural effusions and nonspecific small volume  pericardial fluid.  8. Bibasilar nonspecific pulmonary mosaic attenuation which can be seen with small vessels disease including pulmonary edema or small airways disease.  9. Medial right breast partially imaged nonspecific subcentimeter calcification.  Clinical correlation and with mammography recommended as pertinent to the management of this patient.  10. Few additional findings as above.  This report was flagged in Epic as abnormal.  This report was flagged in Epic as containing an incidental finding.      Electronically signed by: Demarcus Perales MD  Date:    11/03/2020  Time:    13:55             Narrative:    EXAMINATION:  CT ABDOMEN PELVIS WITHOUT CONTRAST    CLINICAL HISTORY:  Nausea/vomiting;    TECHNIQUE:  Low dose axial images, sagittal and coronal reformations were obtained from the lung bases to the pubic symphysis.  No contrast media was utilized.    COMPARISON:  Chest radiograph earlier same day, abdominal series 07/28/2020, abdominal ultrasound 05/09/2018, renal ultrasound 01/16/2012    FINDINGS:  Partially imaged subcentimeter calcification at the medial aspect of the right breast.  Layering simple appearing small volume right pleural effusion and suspected trace left pleural effusion.  Scattered bandlike opacities throughout the lung bases consistent with platelike scarring versus atelectasis.  Patchy nonspecific pulmonary mosaic attenuation throughout the lung bases suggesting sequela of nonspecific small vessels or small airways disease.    Base of the heart is moderately enlarged without significant pericardial fluid.  Dense mitral annular calcifications noted.  Coronary arterial calcifications noted.    Small hiatal hernia.    Gallbladder is not identified and likely surgically absent.  No biliary ductal dilatation.  Liver is normal in size without discrete mass identified noting nonspecific 2 mm parenchymal calcification within the medial right hepatic lobe.  There is suspected nonspecific  periportal edema nonspecific.    Noncontrast appearance of the pancreas, stomach, and duodenum are within normal limits.  Bilateral adrenal gland nonspecific nodular thickening.  Spleen is normal in size noting a few scattered parenchymal calcifications.    Bilateral kidneys are normal in overall length and location noting multifocal areas of suspected cortical scarring bilaterally.  Multiple varying sized hypodense and also hyperdense cortical masses with scattered parenchymal calcifications throughout both kidneys suggesting cysts, some of which are likely hemorrhagic/complex, incompletely evaluated on this noncontrast CT.  A few scattered punctate hyperdense caliceal foci throughout each kidney which may reflect renal vascular calcifications; however, nonobstructing nephroliths not excluded.  No radiodense calculus seen within the ureters on either side or urinary bladder.  No hydronephrosis.  Bilateral nonspecific perinephric stranding.  Ureters are nondilated.  Urinary bladder is well distended without wall thickening.  Uterus is surgically absent.  No adnexal mass.  Pelvic phleboliths noted.    No ascites, free air or lymphadenopathy.    Mild to moderate scattered calcific atherosclerosis of the aorta and branch vessels.  No aortic aneurysm.    Appendix and terminal ileum are within normal limits.  Moderate amount of scattered fecal material throughout the colon and rectum.  No evidence of bowel obstruction or inflammation.  No pneumatosis or portal venous gas.  Small fat containing left inguinal hernia.    Osseous structures show generalized osteopenia, chronic appearing minimal anterior wedge deformity of T10 and T11 vertebral bodies, age-related degenerative change most prominent at the mid to lower lumbar spine and degenerative related grade 1 anterolisthesis of L4 on 5.  No acute or destructive osseous process seen.                               X-Ray Chest PA And Lateral (Final result)  Result time  11/03/20 13:27:48    Final result by Lokesh Scott MD (11/03/20 13:27:48)                 Impression:      No evidence of acute cardiopulmonary disease.  No significant change relative to 06/15/2020.      Electronically signed by: Lokesh Scott  Date:    11/03/2020  Time:    13:27             Narrative:    EXAMINATION:  XR CHEST PA AND LATERAL    CLINICAL HISTORY:  Unspecified abdominal pain    TECHNIQUE:  PA and lateral views of the chest were performed.    COMPARISON:  Chest radiograph performed 06/15/2020    FINDINGS:  EKG leads overlie the chest.  Unchanged cardiomediastinal contour, allowing for differences in patient positioning/rotation.  Cardiomegaly is again demonstrated.  Atherosclerotic calcifications of the aortic arch.  Mitral annular calcifications.  Chronic appearing interstitial increased attenuation is again appreciated.  No definite new focal airspace or interstitial opacities noted.  No convincing evidence of pneumothorax or pleural effusion.  No acute findings suggested in the visualized upper abdomen.  Osseous and soft tissue structures without acute change.                                 Medical Decision Making:   History:   I obtained history from: someone other than patient.       <> Summary of History: Patient's daughter Yolanda supplements the history   Old Medical Records: I decided to obtain old medical records.  Old Records Summarized: records from previous admission(s).  Initial Assessment:   89 yo female presenting with RUQ abdominal pain, vomiting, NOVOA.  On arrival, VSS, afebrile, well appearing.  Non-peritonitic abdomen.  Plan for CT A/P w/o contrast (due to GFR), labs, CXR.   Differential Diagnosis:   Including, but not limited to:  ACS, pancreatitis, hepatitis, volume overload, bowel obstruction   Independently Interpreted Test(s):   I have ordered and independently interpreted EKG Reading(s) - see prior notes  Clinical Tests:   Lab Tests: Ordered and Reviewed  Radiological  Study: Ordered and Reviewed  Medical Tests: Ordered and Reviewed  ED Management:  Labs notable for anemia - patient states sometimes she sees bright red blood per rectum with wiping.  Rectal exam:  Brown stool, trace heme positive.  Suspect chronic, slow GI losses.  Favor the anemia to be explanation for her NOVOA.  Her CT showed constipation, which may explain her abdominal pain +/- pancreatitis versus common duct stone.  Consented for blood transfusion.  Admitted for further management.   Other:   I have discussed this case with another health care provider.       <> Summary of the Discussion: Hospital medicine.                    ED Course as of Nov 03 1535   Tue Nov 03, 2020   1358 Elevated    Lipase(!): 248 [AB]   1358 No leukocytosis    WBC: 7.17 [AB]   1359 At baseline, most recent 1.9   Creatinine(!): 1.8 [AB]   1359 Alkaline Phosphatase: 86 [AB]   1359 AST: 31 [AB]   1359 ALT: 19 [AB]   1359 Hemoglobin(!): 6.0 [AB]   1359 Baseline 12.4   Hemoglobin(!): 6.0 [AB]      ED Course User Index  [AB] Jorje Moreno MD            Clinical Impression:       ICD-10-CM ICD-9-CM   1. Anemia, unspecified type  D64.9 285.9   2. Abdominal pain  R10.9 789.00   3. Dyspnea on exertion  R06.00 786.09                          ED Disposition Condition    Admit                             Jorje Moreno MD  11/03/20 [id number]

## 2020-11-03 NOTE — H&P
History and Physical   Hospital Medicine     Patient Name: Tiana Mead  MRN:  [id number]  [facility] Medicine Team: [facility] [place] Pau Isidro MD  Date of Admission:  11/3/2020     Principal Problem:  Acute on chronic blood loss anemia   Primary Care Physician: Belen Wood MD      History of Present Illness:     Ms. Tiana Mead is a 89 y.o. female with history of AFib on Eliquis, CKD stage 4, CHF, hx of COVID-12 infection now on RA/fully recovered, CAD, type 2 DM on insulin at home, presented to the ED on 11/03/2020, with progressive weakness, shortness of breath and abdominal pain.  Patient was seen in Internal Medicine Clinic earlier on the day presentation, and was sent to the emergency room for further evaluation.  Patient family note progressively worsening diffuse weakness and fatigue, over last several weeks, further worsening of the last couple of weeks.  Symptoms associated with lightheadedness, fatigue, increasing dyspnea on exertion and shortness of breath.  Patient has had intermittent epigastric/ right upper quadrant abdominal dull pain, associated intermittently with nausea and vomiting; patient has experience nausea and vomiting after exertion. Prior to these symptoms, patient has been independent and doing all her ADLs, she lives with two daughters. Now patient gets SOB and tired while walking on the bathrooms. Denies melena or hematochezia, but has had blood on tissue while wiping, which was deemed to be due to anal fissure. Has been able to tolerate food intake well with no worsening pain post food. No hematemesis or hematuria. + lightheadedness but no syncope or reported chest pain.   Denies any f/c, volume overload, LE edema, hemoptysis. Pt on eliquis for Afib, prior documented hx of DVT as well, last took DOAC on 11/3 AM.     In the ED, HDS with stable VS. hbg noted to be 6, from prior 8.9 in 9/2020. Prior hbg 11 and 12 in 7/2020 and 5/2020. Consented for PRBCs and given 1  units. Admitted for further eval. Mildly + hemoccult on ED rectal exam as per report , no active bleeding    Review of Systems   Constitutional: + fatigue  HENT: Negative for sore throat, trouble swallowing.    Eyes: Negative for photophobia, visual disturbance.   Respiratory: +shortness of breath.    Cardiovascular: Negative for chest pain, palpitations, leg swelling.   Gastrointestinal: + abdominal pain, constipation,nausea, vomiting.   Endocrine: Negative for cold intolerance, heat intolerance.   Genitourinary: Negative for dysuria, frequency.   Musculoskeletal: Negative for arthralgias, myalgias.   Skin: Negative for rash, wound, erythema   Neurological: Negative for dizziness, syncope, ++ weakness, +light-headedness.   Psychiatric/Behavioral: Negative for confusion, hallucinations, anxiety  All other systems reviewed and are negative.      Past Medical History:   Past Medical History:   Diagnosis Date    Allergy     Anemia     Arthritis     Asthma     CHF (congestive heart failure) 5/2/2017    Chronic kidney disease     Degenerative disc disease     Diabetes mellitus type II     Essential hypertension 7/3/2012    Glaucoma     History of pseudogout 8/23/2012    Hyperlipidemia     Hypertension     Iritis     Myocardial infarction     Pneumonia     Thyroid disease        Past Surgical History:   Past Surgical History:   Procedure Laterality Date    CARDIAC CATHETERIZATION      CATARACT EXTRACTION W/  INTRAOCULAR LENS IMPLANT Left n/a    CATARACT EXTRACTION W/  INTRAOCULAR LENS IMPLANT Right 10/8/2018    With Femtosecond LASER assist (Dr. Henson)    CHOLECYSTECTOMY      EYE SURGERY      gall stone      HYSTERECTOMY      VARICOSE VEIN SURGERY         Social History:   Social History     Tobacco Use    Smoking status: Never Smoker    Smokeless tobacco: Never Used   Substance Use Topics    Alcohol use: No    Drug use: No       Family History:   Family History   Problem Relation Age of Onset     Diabetes Mother     Hypertension Mother     Glaucoma Mother     Hypertension Father     Diabetes Son     No Known Problems Daughter     Diabetes Daughter         borderline    No Known Problems Son     Melanoma Neg Hx          Medications: Scheduled Meds:   [START ON 11/4/2020] atorvastatin  40 mg Oral Daily    [START ON 11/4/2020] fluticasone furoate-vilanteroL  1 puff Inhalation Daily    [START ON 11/4/2020] insulin detemir U-100  5 Units Subcutaneous Daily    [START ON 11/4/2020] levothyroxine  75 mcg Oral Before breakfast    metoprolol tartrate  25 mg Oral BID    [START ON 11/4/2020] pantoprazole  40 mg Intravenous BID     Continuous Infusions:  PRN Meds:.sodium chloride, acetaminophen, dextrose 50%, dextrose 50%, glucagon (human recombinant), glucose, glucose, insulin aspart U-100, melatonin, ondansetron, ondansetron, polyethylene glycol, sodium chloride 0.9%, sodium chloride 0.9%    Allergies: Patient is allergic to codeine.    Physical Exam:     Vital Signs (Most Recent):  Temp: 98 °F (36.7 °C) (11/03/20 1728)  Pulse: 90 (11/03/20 1728)  Resp: 18 (11/03/20 1728)  BP: 133/64 (11/03/20 1728)  SpO2: 100 % (11/03/20 1728) Vital Signs Range (Last 24H):  Temp:  [97.9 °F (36.6 °C)-98 °F (36.7 °C)]   Pulse:  [71-93]   Resp:  [18-20]   BP: (100-141)/(50-83)   SpO2:  [98 %-100 %]    Body mass index is 24.33 kg/m².     Physical Exam:  Constitutional: Well-developed, well-nourished, non-distressed, not diaphoretic.   HENT: NC/AT, external ears normal, oropharynx clear, MMM w/o exudates.   Eyes: PERRL, EOMI, conjunctiva pale, no discharge b/l, no scleral icterus   Neck: normal ROM, supple  CV: RRR, no m/r/g, no carotid bruits, +2 peripheral pulses.  Pulmonary/Chest wall: Breathing comfortably w/o distress, CTAB, no w/r/r, no crackles.  GI: Soft, non-tender, non-distended, (+) BS, (+) BM   Musculoskeletal: Normal ROM, no edema, no atrophy, no tenderness throughout   Neurological: AAO x 4, CN II-XI in  tact, nl sensation, nl strength/tone   Skin: warm, dry, (-) erythema, (-) rash, +pallor  Psych: normal mood and affect, normal behavior, thought content and judgement.  Vitals reviewed.    Labs:      Chemistries:   Recent Labs   Lab 11/03/20  1220      K 4.1      CO2 25   BUN 28   CREATININE 1.8*   CALCIUM 8.9   PROT 6.2   BILITOT 0.4   ALKPHOS 86   ALT 19   AST 31        WBC:   Recent Labs   Lab 11/03/20  1220   WBC 7.17     Bands:     CBC/Anemia Labs: Coags:    Recent Labs   Lab 11/03/20  1220   WBC 7.17   HGB 6.0*   HCT 20.4*      MCV 83   RDW 14.8*   IRON 19*   RETIC 2.0    Recent Labs   Lab 11/03/20  1514   INR 1.0   APTT 26.8          Assessment and Plan:     Ms. Tiana Mead is a 89 y.o. female who presented to [facility] on 11/3/2020 with  Acute on chronic blood loss anemia     Active Hospital Problems    Diagnosis  POA    *Acute on chronic blood loss anemia [D62]  Yes    Symptomatic anemia [D64.9]  Yes    Coronary artery disease involving native coronary artery of native heart without angina pectoris [I25.10]  Yes    Stage 4 chronic kidney disease [N18.4]  Yes    Chronic diastolic congestive heart failure [I50.32]  Yes     Chronic    Long term current use of anticoagulant therapy, eliquis [Z79.01]  Not Applicable     Chronic    Type 2 diabetes mellitus with diabetic polyneuropathy, with long-term current use of insulin [E11.42, Z79.4]  Not Applicable     Chronic    Persistent atrial fibrillation [I48.19]  Yes     Chronic      Resolved Hospital Problems   No resolved problems to display.       Acute on chronic blood loss anemia  Symptomatic anemia  -hbg noted to be 6, from prior 8.9 in 9/2020. Prior hbg 11 and 12 in 7/2020 and 5/2020. Consented for PRBCs and given 1 units on 11/3  - concern for chronic blood loss, likely from upper GI, possibly gastritis or ulcer (given symptoms of RUQ/ epigastric pressure and n/v). Mildly + hemoccult on ED rectal exam as per report , no active  bleeding  - trend CBC q day   -anemia studies ordered  - IV PPI BID  - GI consult. eval for EGD    Persistent atrial fibrillation  Long term current use of anticoagulant therapy, eliquis  - cont home metoprolol but at lower dose  - hold eliquis   - tele monitoring     Chronic diastolic congestive heart failure  CAD  - cont home metoprolol but at lower dose  - hold other HTN meds given anemia, will redose/ restart in the AM based on BPs      Stage 4 chronic kidney disease  - Cr stable at baseline, 1.8    Type 2 diabetes mellitus with diabetic polyneuropathy, with long-term current use of insulin  - A1C 7.2 at goal  - detemir 5 u daily and SSI, and redose insulin          Diet:  NPO after MN for EGD in the AM  GI PPx:  IV PPI  DVT PPx:  SCDs  Goals of Care:  DNR DNI as per patient's wishes, form in the chart and media tab    High Risk Conditions:  anemia    Disposition:  Admitted    Patient's note was created using MModal Dictation.  Any errors in syntax may not have been identified and edited on initial review prior to signing this note.    Signing Physician:     Pau Isidro MD  Department of Hospital Medicine   [facility]  Pager [phone]  Spectra [phone]  11/3/2020

## 2020-11-04 ENCOUNTER — ANESTHESIA (OUTPATIENT)
Dept: ENDOSCOPY | Facility: HOSPITAL | Age: 85
DRG: 813 | End: 2020-11-04
Payer: MEDICARE

## 2020-11-04 ENCOUNTER — ANESTHESIA EVENT (OUTPATIENT)
Dept: ENDOSCOPY | Facility: HOSPITAL | Age: 85
DRG: 813 | End: 2020-11-04
Payer: MEDICARE

## 2020-11-04 PROBLEM — D50.9 IRON DEFICIENCY ANEMIA: Status: ACTIVE | Noted: 2020-11-04

## 2020-11-04 LAB
ANION GAP SERPL CALC-SCNC: 8 MMOL/L (ref 8–16)
BASOPHILS # BLD AUTO: 0.04 K/UL (ref 0–0.2)
BASOPHILS NFR BLD: 0.7 % (ref 0–1.9)
BLD PROD TYP BPU: NORMAL
BLOOD UNIT EXPIRATION DATE: NORMAL
BLOOD UNIT TYPE CODE: 5100
BLOOD UNIT TYPE: NORMAL
BUN SERPL-MCNC: 25 MG/DL (ref 10–30)
CALCIUM SERPL-MCNC: 8.8 MG/DL (ref 8.7–10.5)
CHLORIDE SERPL-SCNC: 105 MMOL/L (ref 95–110)
CO2 SERPL-SCNC: 25 MMOL/L (ref 23–29)
CODING SYSTEM: NORMAL
CREAT SERPL-MCNC: 1.7 MG/DL (ref 0.5–1.4)
DIFFERENTIAL METHOD: ABNORMAL
DISPENSE STATUS: NORMAL
EOSINOPHIL # BLD AUTO: 0.1 K/UL (ref 0–0.5)
EOSINOPHIL NFR BLD: 1.8 % (ref 0–8)
ERYTHROCYTE [DISTWIDTH] IN BLOOD BY AUTOMATED COUNT: 15.3 % (ref 11.5–14.5)
EST. GFR  (AFRICAN AMERICAN): 29.1 ML/MIN/1.73 M^2
EST. GFR  (NON AFRICAN AMERICAN): 25.3 ML/MIN/1.73 M^2
GLUCOSE SERPL-MCNC: 153 MG/DL (ref 70–110)
HCT VFR BLD AUTO: 21.9 % (ref 37–48.5)
HGB BLD-MCNC: 6.7 G/DL (ref 12–16)
HGB BLD-MCNC: 8.5 G/DL (ref 12–16)
IMM GRANULOCYTES # BLD AUTO: 0.01 K/UL (ref 0–0.04)
IMM GRANULOCYTES NFR BLD AUTO: 0.2 % (ref 0–0.5)
LYMPHOCYTES # BLD AUTO: 1.1 K/UL (ref 1–4.8)
LYMPHOCYTES NFR BLD: 18.4 % (ref 18–48)
MAGNESIUM SERPL-MCNC: 2.1 MG/DL (ref 1.6–2.6)
MCH RBC QN AUTO: 24.8 PG (ref 27–31)
MCHC RBC AUTO-ENTMCNC: 30.6 G/DL (ref 32–36)
MCV RBC AUTO: 81 FL (ref 82–98)
MONOCYTES # BLD AUTO: 1.1 K/UL (ref 0.3–1)
MONOCYTES NFR BLD: 18.9 % (ref 4–15)
NEUTROPHILS # BLD AUTO: 3.6 K/UL (ref 1.8–7.7)
NEUTROPHILS NFR BLD: 60 % (ref 38–73)
NRBC BLD-RTO: 0 /100 WBC
NUM UNITS TRANS PACKED RBC: NORMAL
PHOSPHATE SERPL-MCNC: 3.3 MG/DL (ref 2.7–4.5)
PLATELET # BLD AUTO: 192 K/UL (ref 150–350)
PMV BLD AUTO: 11.6 FL (ref 9.2–12.9)
POCT GLUCOSE: 100 MG/DL (ref 70–110)
POCT GLUCOSE: 134 MG/DL (ref 70–110)
POCT GLUCOSE: 138 MG/DL (ref 70–110)
POCT GLUCOSE: 138 MG/DL (ref 70–110)
POCT GLUCOSE: 193 MG/DL (ref 70–110)
POTASSIUM SERPL-SCNC: 3.9 MMOL/L (ref 3.5–5.1)
RBC # BLD AUTO: 2.7 M/UL (ref 4–5.4)
SODIUM SERPL-SCNC: 138 MMOL/L (ref 136–145)
WBC # BLD AUTO: 6.02 K/UL (ref 3.9–12.7)

## 2020-11-04 PROCEDURE — 37000009 HC ANESTHESIA EA ADD 15 MINS: Mod: HCNC | Performed by: INTERNAL MEDICINE

## 2020-11-04 PROCEDURE — 43235 EGD DIAGNOSTIC BRUSH WASH: CPT | Mod: HCNC,GC,, | Performed by: INTERNAL MEDICINE

## 2020-11-04 PROCEDURE — C9113 INJ PANTOPRAZOLE SODIUM, VIA: HCPCS | Mod: HCNC | Performed by: HOSPITALIST

## 2020-11-04 PROCEDURE — 37000008 HC ANESTHESIA 1ST 15 MINUTES: Mod: HCNC | Performed by: INTERNAL MEDICINE

## 2020-11-04 PROCEDURE — 11000001 HC ACUTE MED/SURG PRIVATE ROOM: Mod: HCNC

## 2020-11-04 PROCEDURE — P9016 RBC LEUKOCYTES REDUCED: HCPCS | Mod: HCNC

## 2020-11-04 PROCEDURE — 99232 PR SUBSEQUENT HOSPITAL CARE,LEVL II: ICD-10-PCS | Mod: HCNC,,, | Performed by: HOSPITALIST

## 2020-11-04 PROCEDURE — 99223 1ST HOSP IP/OBS HIGH 75: CPT | Mod: 25,HCNC,, | Performed by: INTERNAL MEDICINE

## 2020-11-04 PROCEDURE — C9399 UNCLASSIFIED DRUGS OR BIOLOG: HCPCS | Mod: HCNC | Performed by: HOSPITALIST

## 2020-11-04 PROCEDURE — 25000003 PHARM REV CODE 250: Mod: HCNC | Performed by: STUDENT IN AN ORGANIZED HEALTH CARE EDUCATION/TRAINING PROGRAM

## 2020-11-04 PROCEDURE — 83735 ASSAY OF MAGNESIUM: CPT | Mod: HCNC

## 2020-11-04 PROCEDURE — 36430 TRANSFUSION BLD/BLD COMPNT: CPT | Mod: HCNC

## 2020-11-04 PROCEDURE — 82962 GLUCOSE BLOOD TEST: CPT | Mod: HCNC | Performed by: INTERNAL MEDICINE

## 2020-11-04 PROCEDURE — 25000003 PHARM REV CODE 250: Mod: HCNC | Performed by: HOSPITALIST

## 2020-11-04 PROCEDURE — D9220A PRA ANESTHESIA: ICD-10-PCS | Mod: HCNC,ANES,, | Performed by: ANESTHESIOLOGY

## 2020-11-04 PROCEDURE — 97165 OT EVAL LOW COMPLEX 30 MIN: CPT | Mod: HCNC

## 2020-11-04 PROCEDURE — 63600175 PHARM REV CODE 636 W HCPCS: Mod: HCNC | Performed by: HOSPITALIST

## 2020-11-04 PROCEDURE — 97161 PT EVAL LOW COMPLEX 20 MIN: CPT | Mod: HCNC

## 2020-11-04 PROCEDURE — 99223 PR INITIAL HOSPITAL CARE,LEVL III: ICD-10-PCS | Mod: 25,HCNC,, | Performed by: INTERNAL MEDICINE

## 2020-11-04 PROCEDURE — 85025 COMPLETE CBC W/AUTO DIFF WBC: CPT | Mod: HCNC

## 2020-11-04 PROCEDURE — 99232 SBSQ HOSP IP/OBS MODERATE 35: CPT | Mod: HCNC,,, | Performed by: HOSPITALIST

## 2020-11-04 PROCEDURE — 94761 N-INVAS EAR/PLS OXIMETRY MLT: CPT | Mod: HCNC

## 2020-11-04 PROCEDURE — 97116 GAIT TRAINING THERAPY: CPT | Mod: HCNC

## 2020-11-04 PROCEDURE — 84100 ASSAY OF PHOSPHORUS: CPT | Mod: HCNC

## 2020-11-04 PROCEDURE — 63600175 PHARM REV CODE 636 W HCPCS: Mod: HCNC | Performed by: STUDENT IN AN ORGANIZED HEALTH CARE EDUCATION/TRAINING PROGRAM

## 2020-11-04 PROCEDURE — D9220A PRA ANESTHESIA: Mod: HCNC,ANES,, | Performed by: ANESTHESIOLOGY

## 2020-11-04 PROCEDURE — 43235 EGD DIAGNOSTIC BRUSH WASH: CPT | Mod: HCNC | Performed by: INTERNAL MEDICINE

## 2020-11-04 PROCEDURE — 97535 SELF CARE MNGMENT TRAINING: CPT | Mod: HCNC

## 2020-11-04 PROCEDURE — 43235 PR EGD, FLEX, DIAGNOSTIC: ICD-10-PCS | Mod: HCNC,GC,, | Performed by: INTERNAL MEDICINE

## 2020-11-04 PROCEDURE — 36415 COLL VENOUS BLD VENIPUNCTURE: CPT | Mod: HCNC

## 2020-11-04 PROCEDURE — D9220A PRA ANESTHESIA: ICD-10-PCS | Mod: HCNC,CRNA,, | Performed by: STUDENT IN AN ORGANIZED HEALTH CARE EDUCATION/TRAINING PROGRAM

## 2020-11-04 PROCEDURE — D9220A PRA ANESTHESIA: Mod: HCNC,CRNA,, | Performed by: STUDENT IN AN ORGANIZED HEALTH CARE EDUCATION/TRAINING PROGRAM

## 2020-11-04 PROCEDURE — 85018 HEMOGLOBIN: CPT | Mod: HCNC

## 2020-11-04 PROCEDURE — 80048 BASIC METABOLIC PNL TOTAL CA: CPT | Mod: HCNC

## 2020-11-04 RX ORDER — LIDOCAINE HCL/PF 100 MG/5ML
SYRINGE (ML) INTRAVENOUS
Status: DISCONTINUED | OUTPATIENT
Start: 2020-11-04 | End: 2020-11-04

## 2020-11-04 RX ORDER — HYDROCODONE BITARTRATE AND ACETAMINOPHEN 500; 5 MG/1; MG/1
TABLET ORAL
Status: DISCONTINUED | OUTPATIENT
Start: 2020-11-04 | End: 2020-11-05

## 2020-11-04 RX ORDER — PROPOFOL 10 MG/ML
VIAL (ML) INTRAVENOUS CONTINUOUS PRN
Status: DISCONTINUED | OUTPATIENT
Start: 2020-11-04 | End: 2020-11-04

## 2020-11-04 RX ORDER — POLYETHYLENE GLYCOL 3350 17 G/17G
17 POWDER, FOR SOLUTION ORAL DAILY PRN
COMMUNITY

## 2020-11-04 RX ORDER — LATANOPROST 50 UG/ML
1 SOLUTION/ DROPS OPHTHALMIC DAILY
Status: DISCONTINUED | OUTPATIENT
Start: 2020-11-05 | End: 2020-11-06 | Stop reason: HOSPADM

## 2020-11-04 RX ORDER — PHENYLEPHRINE HYDROCHLORIDE 10 MG/ML
INJECTION INTRAVENOUS
Status: DISCONTINUED | OUTPATIENT
Start: 2020-11-04 | End: 2020-11-04

## 2020-11-04 RX ORDER — SODIUM CHLORIDE 9 MG/ML
INJECTION, SOLUTION INTRAVENOUS CONTINUOUS PRN
Status: DISCONTINUED | OUTPATIENT
Start: 2020-11-04 | End: 2020-11-04

## 2020-11-04 RX ORDER — FERROUS SULFATE 325(65) MG
325 TABLET, DELAYED RELEASE (ENTERIC COATED) ORAL DAILY
Status: DISCONTINUED | OUTPATIENT
Start: 2020-11-04 | End: 2020-11-05

## 2020-11-04 RX ORDER — LATANOPROST 50 UG/ML
1 SOLUTION/ DROPS OPHTHALMIC NIGHTLY
Status: DISCONTINUED | OUTPATIENT
Start: 2020-11-04 | End: 2020-11-04

## 2020-11-04 RX ORDER — PROPOFOL 10 MG/ML
VIAL (ML) INTRAVENOUS
Status: DISCONTINUED | OUTPATIENT
Start: 2020-11-04 | End: 2020-11-04

## 2020-11-04 RX ORDER — DORZOLAMIDE HCL 20 MG/ML
1 SOLUTION/ DROPS OPHTHALMIC 2 TIMES DAILY
Status: DISCONTINUED | OUTPATIENT
Start: 2020-11-04 | End: 2020-11-06 | Stop reason: HOSPADM

## 2020-11-04 RX ORDER — SODIUM CHLORIDE 0.9 % (FLUSH) 0.9 %
10 SYRINGE (ML) INJECTION
Status: DISCONTINUED | OUTPATIENT
Start: 2020-11-04 | End: 2020-11-04 | Stop reason: HOSPADM

## 2020-11-04 RX ADMIN — POLYETHYLENE GLYCOL 3350 17 G: 17 POWDER, FOR SOLUTION ORAL at 05:11

## 2020-11-04 RX ADMIN — PROPOFOL 50 MCG/KG/MIN: 10 INJECTION, EMULSION INTRAVENOUS at 01:11

## 2020-11-04 RX ADMIN — INSULIN DETEMIR 5 UNITS: 100 INJECTION, SOLUTION SUBCUTANEOUS at 09:11

## 2020-11-04 RX ADMIN — PROPOFOL 30 MG: 10 INJECTION, EMULSION INTRAVENOUS at 01:11

## 2020-11-04 RX ADMIN — ONDANSETRON 4 MG: 4 TABLET, ORALLY DISINTEGRATING ORAL at 11:11

## 2020-11-04 RX ADMIN — PHENYLEPHRINE HYDROCHLORIDE 100 MCG: 10 INJECTION INTRAVENOUS at 02:11

## 2020-11-04 RX ADMIN — SODIUM CHLORIDE: 9 INJECTION, SOLUTION INTRAVENOUS at 01:11

## 2020-11-04 RX ADMIN — PROPOFOL 20 MG: 10 INJECTION, EMULSION INTRAVENOUS at 01:11

## 2020-11-04 RX ADMIN — METOPROLOL TARTRATE 25 MG: 25 TABLET, FILM COATED ORAL at 09:11

## 2020-11-04 RX ADMIN — Medication 100 MG: at 01:11

## 2020-11-04 RX ADMIN — TOPICAL ANESTHETIC 0.5 ML: 200 SPRAY DENTAL; PERIODONTAL at 01:11

## 2020-11-04 RX ADMIN — DORZOLAMIDE HYDROCHLORIDE 1 DROP: 20 SOLUTION/ DROPS OPHTHALMIC at 12:11

## 2020-11-04 RX ADMIN — FERROUS SULFATE TAB EC 325 MG (65 MG FE EQUIVALENT) 325 MG: 325 (65 FE) TABLET DELAYED RESPONSE at 03:11

## 2020-11-04 RX ADMIN — PANTOPRAZOLE SODIUM 40 MG: 40 INJECTION, POWDER, LYOPHILIZED, FOR SOLUTION INTRAVENOUS at 09:11

## 2020-11-04 RX ADMIN — LEVOTHYROXINE SODIUM 75 MCG: 75 TABLET ORAL at 06:11

## 2020-11-04 RX ADMIN — DORZOLAMIDE HYDROCHLORIDE 1 DROP: 20 SOLUTION/ DROPS OPHTHALMIC at 09:11

## 2020-11-04 RX ADMIN — ATORVASTATIN CALCIUM 40 MG: 20 TABLET, FILM COATED ORAL at 09:11

## 2020-11-04 RX ADMIN — MELATONIN TAB 3 MG 6 MG: 3 TAB at 09:11

## 2020-11-04 NOTE — PLAN OF CARE
Eval & D/C    Problem: Occupational Therapy Goal  Goal: Occupational Therapy Goal  Description: Eval & D/C  Outcome: Adequate for Care Transition    HECTOR Abad  11/4/2020  Student OT

## 2020-11-04 NOTE — ANESTHESIA PREPROCEDURE EVALUATION
11/04/2020  Tiana Mead is a 89 y.o., female.    Anesthesia Evaluation    I have reviewed the Patient Summary Reports.    I have reviewed the Nursing Notes. I have reviewed the NPO Status.      Review of Systems  Anesthesia Hx:  History of prior surgery of interest to airway management or planning: Denies Family Hx of Anesthesia complications.   Denies Personal Hx of Anesthesia complications.   Hematology/Oncology:  Hematology Normal   Oncology Normal     EENT/Dental:EENT/Dental Normal   Cardiovascular:   Hypertension Past MI CAD   Angina, with exertion CHF ECG has been reviewed. Conclusion    · Concentric left ventricular hypertrophy.  · Hyperdynamic left ventricular systolic function. The estimated ejection fraction is >70%.  · Mildly reduced right ventricular systolic function.  · Severe batrial enlargement.  · Severe tricuspid regurgitation.  · The estimated PA systolic pressure is 56 mm Hg  · Elevated central venous pressure (15 mm Hg).        Pulmonary:   Pneumonia COPD Asthma    Renal/:   Chronic Renal Disease    Musculoskeletal:   Arthritis     Neurological:   Neuromuscular Disease,    Endocrine:   Diabetes Hypothyroidism        Physical Exam  General:  Well nourished    Airway/Jaw/Neck:  Airway Findings: Mouth Opening: Normal Tongue: Normal  General Airway Assessment: Adult  Mallampati: II  Improves to II with phonation.  TM Distance: Normal, at least 6 cm      Dental:  Dental Findings: Edentulous   Chest/Lungs:  Chest/Lungs Findings: Clear to auscultation, Normal Respiratory Rate     Heart/Vascular:  Heart Findings: Rate: Normal  Rhythm: Regular Rhythm        Mental Status:  Mental Status Findings:  Alert and Oriented, Cooperative         Anesthesia Plan  Type of Anesthesia, risks & benefits discussed:  Anesthesia Type:  general  Patient's Preference:   Intra-op Monitoring Plan: standard ASA  monitors  Intra-op Monitoring Plan Comments:   Post Op Pain Control Plan: multimodal analgesia  Post Op Pain Control Plan Comments:   Induction:   IV  Beta Blocker:  Patient is on a Beta-Blocker and has received one dose within the past 24 hours (No further documentation required).       Informed Consent: Patient understands risks and agrees with Anesthesia plan.  Questions answered. Anesthesia consent signed with patient.  ASA Score: 3     Day of Surgery Review of History & Physical:    H&P update referred to the surgeon.         Ready For Surgery From Anesthesia Perspective.

## 2020-11-04 NOTE — SUBJECTIVE & OBJECTIVE
Past Medical History:   Diagnosis Date    Allergy     Anemia     Arthritis     Asthma     CHF (congestive heart failure) 5/2/2017    Chronic kidney disease     Degenerative disc disease     Diabetes mellitus type II     Essential hypertension 7/3/2012    Glaucoma     History of pseudogout 8/23/2012    Hyperlipidemia     Hypertension     Iritis     Myocardial infarction     Pneumonia     Thyroid disease        Past Surgical History:   Procedure Laterality Date    CARDIAC CATHETERIZATION      CATARACT EXTRACTION W/  INTRAOCULAR LENS IMPLANT Left n/a    CATARACT EXTRACTION W/  INTRAOCULAR LENS IMPLANT Right 10/8/2018    With Femtosecond LASER assist (Dr. Henson)    CHOLECYSTECTOMY      EYE SURGERY      gall stone      HYSTERECTOMY      VARICOSE VEIN SURGERY         Review of patient's allergies indicates:   Allergen Reactions    Codeine Itching and Nausea Only            Family History     Problem Relation (Age of Onset)    Diabetes Mother, Son, Daughter    Glaucoma Mother    Hypertension Mother, Father    No Known Problems Daughter, Son        Tobacco Use    Smoking status: Never Smoker    Smokeless tobacco: Never Used   Substance and Sexual Activity    Alcohol use: No    Drug use: No    Sexual activity: Never     Review of Systems   Constitutional: Positive for fatigue. Negative for fever and unexpected weight change.   HENT: Negative.    Eyes: Negative.    Respiratory: Negative.  Negative for cough and shortness of breath.    Cardiovascular: Negative.  Negative for chest pain and palpitations.   Gastrointestinal: Positive for blood in stool. Negative for abdominal pain, constipation and diarrhea.   Endocrine: Negative.    Genitourinary: Negative.    Musculoskeletal: Negative.    Allergic/Immunologic: Negative.    Neurological: Negative.    Hematological: Negative.    Psychiatric/Behavioral: Negative.      Objective:     Vital Signs (Most Recent):  Temp: 98.1 °F (36.7 °C) (11/04/20  0802)  Pulse: 81 (11/04/20 0802)  Resp: 20 (11/04/20 0802)  BP: (!) 141/69 (11/04/20 0802)  SpO2: 99 % (11/04/20 0802) Vital Signs (24h Range):  Temp:  [97.9 °F (36.6 °C)-98.8 °F (37.1 °C)] 98.1 °F (36.7 °C)  Pulse:  [] 81  Resp:  [16-20] 20  SpO2:  [98 %-100 %] 99 %  BP: (100-151)/(50-83) 141/69     Weight: 74.1 kg (163 lb 5.8 oz) (11/03/20 1950)  Body mass index is 24.84 kg/m².      Intake/Output Summary (Last 24 hours) at 11/4/2020 0817  Last data filed at 11/4/2020 0600  Gross per 24 hour   Intake 1102.5 ml   Output 3 ml   Net 1099.5 ml       Lines/Drains/Airways     Peripheral Intravenous Line                 Peripheral IV - Single Lumen 03/30/20 1745 20 G Right Forearm 218 days         Peripheral IV - Single Lumen 11/03/20 1147 20 G Left Antecubital less than 1 day                Physical Exam    Gen: NAD, lying comfortably  HENT: NCAT, oropharynx clear  Eyes: anicteric sclerae, EOMI grossly  Neck: supple, no visible masses/goiter  Cardiac: RRR, no M/R/G, S1/S2 present  Lungs: CTAB, no crackles, no wheezes  Abd: soft, NT/ND, normoactive BS,   No rebound, no guarding  Ext: no LE edema, warm, well perfused  Skin: skin intact on exposed body parts, no visible rashes, lesions  Neuro: A&Ox4, neuro exam grossly intact, moves all extremities  Psych: appropriate mood, affect      Significant Labs:  CBC:   Recent Labs   Lab 11/03/20  1220 11/04/20  0514   WBC 7.17 6.02   HGB 6.0* 6.7*   HCT 20.4* 21.9*    192     CMP:   Recent Labs   Lab 11/03/20  1220 11/04/20  0514   * 153*   CALCIUM 8.9 8.8   ALBUMIN 3.3*  --    PROT 6.2  --     138   K 4.1 3.9   CO2 25 25    105   BUN 28 25   CREATININE 1.8* 1.7*   ALKPHOS 86  --    ALT 19  --    AST 31  --    BILITOT 0.4  --      Coagulation:   Recent Labs   Lab 11/03/20  1514   INR 1.0   APTT 26.8       Significant Imaging:  CT: I have reviewed all results within the past 24 hours and my personal findings are:  Constipation, otherwise no acute  etiologies for the patient's symptoms

## 2020-11-04 NOTE — ANESTHESIA POSTPROCEDURE EVALUATION
Anesthesia Post Evaluation    Patient: Tiana H Boston    Procedure(s) Performed: Procedure(s) (LRB):  EGD (ESOPHAGOGASTRODUODENOSCOPY) (N/A)    Final Anesthesia Type: general    Patient location during evaluation: PACU  Patient participation: Yes- Able to Participate  Level of consciousness: awake and alert and oriented  Post-procedure vital signs: reviewed and stable  Pain management: adequate  Airway patency: patent    PONV status at discharge: No PONV  Anesthetic complications: no      Cardiovascular status: blood pressure returned to baseline and hemodynamically stable  Respiratory status: unassisted  Hydration status: euvolemic  Follow-up not needed.          Vitals Value Taken Time   /60 11/04/20 1753   Temp 37.1 °C (98.7 °F) 11/04/20 1622   Pulse 82 11/04/20 1753   Resp 17 11/04/20 1753   SpO2 98 % 11/04/20 1753   Vitals shown include unvalidated device data.      Event Time   Out of Recovery 14:46:13         Pain/Lavelle Score: Lavelle Score: 10 (11/4/2020  2:30 PM)

## 2020-11-04 NOTE — PLAN OF CARE
Eval complete     Problem: Occupational Therapy Goal  Goal: Occupational Therapy Goal  Description: Goals to be met by: 11/11/2020    Patient will increase functional independence with ADLs by performing:    Feeding with [place].  UE Dressing with [place].  LE Dressing with Stand-by Assistance.  Grooming while standing at sink with Stand-by Assistance.  Toileting from toilet with Stand-by Assistance for hygiene and clothing management.     11/4/2020 1510 by HECTOR Abad  Outcome: Ongoing, Progressing    HECTOR Abad  11/4/2020  Student OT

## 2020-11-04 NOTE — [employer] PATIENT INSTRUCTIONS
Discharge Summary/Instructions after an Endoscopic Procedure  Patient Name: Tiana Mead  Patient MRN: [id number]  Patient YOB: 1925  Wednesday, November 4, 2020  Tomás Mccall MD  RESTRICTIONS:  During your procedure today, you received medications for sedation.  These   medications may affect your judgment, balance and coordination.  Therefore,   for 24 hours, you have the following restrictions:   - DO NOT drive a car, operate machinery, make legal/financial decisions,   sign important papers or drink alcohol.    ACTIVITY:  Today: no heavy lifting, straining or running due to procedural   sedation/anesthesia.  The following day: return to full activity including work.  DIET:  Eat and drink normally unless instructed otherwise.     TREATMENT FOR COMMON SIDE EFFECTS:  - Mild abdominal pain, nausea, belching, bloating or excessive gas:  rest,   eat lightly and use a heating pad.  - Sore Throat: treat with throat lozenges and/or gargle with warm salt   water.  - Because air was used during the procedure, expelling large amounts of air   from your rectum or belching is normal.  - If a bowel prep was taken, you may not have a bowel movement for 1-3 days.    This is normal.  SYMPTOMS TO WATCH FOR AND REPORT TO YOUR PHYSICIAN:  1. Abdominal pain or bloating, other than gas cramps.  2. Chest pain.  3. Back pain.  4. Signs of infection such as: chills or fever occurring within 24 hours   after the procedure.  5. Rectal bleeding, which would show as bright red, maroon, or black stools.   (A tablespoon of blood from the rectum is not serious, especially if   hemorrhoids are present.)  6. Vomiting.  7. Weakness or dizziness.  GO DIRECTLY TO THE NEAREST EMERGENCY ROOM IF YOU HAVE ANY OF THE FOLLOWING:      Difficulty breathing              Chills and/or fever over 101 F   Persistent vomiting and/or vomiting blood   Severe abdominal pain   Severe chest pain   Black, tarry stools   Bleeding- more than one  tablespoon   Any other symptom or condition that you feel may need urgent attention  Your doctor recommends these additional instructions:  If any biopsies were taken, your doctors clinic will contact you in 1 to 2   weeks with any results.  - Return patient to [facility] for [facility] care.   - Resume previous diet.   - Continue present medications.   - Will discuss with patient possible colonoscopy as next step for   evaluation  For questions, problems or results please call your physician - Tomás Mccall MD at Work:  [phone].  [facility] [place] [facility], EMERGENCY ROOM PHONE NUMBER: [phone]  IF A COMPLICATION OR EMERGENCY SITUATION ARISES AND YOU ARE UNABLE TO REACH   YOUR PHYSICIAN - GO DIRECTLY TO THE EMERGENCY ROOM.  Tomás Mccall MD  11/4/2020 2:16:39 PM  This report has been verified and signed electronically.  [employer]

## 2020-11-04 NOTE — HPI
89 F PMH AFib on Eliquis, CKD, heart failure, previous COVID infection, coronary artery disease, DM2 admitted with shortness of breath and dyspnea on exertion.  Patient was seen in primary care clinic prior to evaluation, at that time progressive dyspnea on exertion and fatigue were noted with associated orthostatic type symptoms, she was recommended to present to the ED for further evaluation.  Also endorsed intermittent epigastric pain with some nausea and vomiting.  Denied overt melena or hematochezia, thought to have a small anal fissure her initial note.      In the ED, patient was hemodynamically stable, labs notable for hemoglobin 6.0, baseline 10-12, labs otherwise unremarkable.  CT performed showing constipation, s/p cholecystectomy and hysterectomy.

## 2020-11-04 NOTE — PLAN OF CARE
Vital signs stable. Afebrile. Alert, oriented and following commands. Denies pain/nausea. Accucheck 100. POC reviewed and understanding verbalized.

## 2020-11-04 NOTE — PHARMACY MED REC
"Admission Medication Reconciliation - Pharmacy Consult Note    The home medication history was taken by Dorothy Perez CPhT.  Based on information gathered and subsequent review by the clinical pharmacist, the items below may need attention.     You may go to "Admission" then "Reconcile Home Medications" tabs to review and/or act upon these items.     Potentially problematic discrepancies with current MAR    o Patient IS taking the following which was not ordered upon admit  o Isosorbide mononitrate 60 mg PO QD     o Patient is taking a drug DIFFERENTLY than how ordered upon admit  o Lower dose metoprolol tartrate was started inpatient (25 mg PO BID). Patient reports taking 50 mg PO BID at home.     Potential issues to be addressed PRIOR TO DISCHARGE  o Currently holding blood pressure medications and eliquis for GI bleed work up. Re-start as appropriate.     Please address this information as you see fit.  Feel free to contact us if you have any questions or require assistance.    Debbie Beyer, PharmD  PGY-2 Internal Medicine Pharmacy Resident  [id number]                .    .            "

## 2020-11-04 NOTE — PT/OT/SLP EVAL
Physical Therapy Evaluation    Patient Name:  Tiana Mead   MRN:  [id number]    Recommendations:     Discharge Recommendations:  home with home health   Discharge Equipment Recommendations: none   Barriers to discharge: None    Assessment:     Tiana Mead is a 89 y.o. female admitted with a medical diagnosis of Acute on chronic blood loss anemia.  She presents with the following impairments/functional limitations:  weakness, impaired endurance, gait instability. Pt evaluated on this date presenting near functional baseline. Pt performed bed mobility and Sit-to-Stand Mod I. Pt ambulated on this date with RW SPV requesting therapy follow-up x1 more session for gait training. Pt would benefit from continued skilled acute PT 2x/wk to improve functional mobility.      Rehab Prognosis: Good; patient would benefit from acute skilled PT services to address these deficits and reach maximum level of function.    Recent Surgery: Procedure(s) (LRB):  EGD (ESOPHAGOGASTRODUODENOSCOPY) (N/A) Day of Surgery    Plan:     During this hospitalization, patient to be seen 2 x/week to address the identified rehab impairments via gait training, therapeutic activities, therapeutic exercises, neuromuscular re-education and progress toward the following goals:    · Plan of Care Expires:  12/03/20    Subjective     Chief Complaint: generalized weakness   Patient/Family Comments/goals: Pt pleasant and willing to participate with therapy on this date.    Pain/Comfort:  · Pain Rating 1: 0/10    Patients cultural, spiritual, Orthodoxy conflicts given the current situation: no    Living Environment:  Pt lives with DTR in a [facility] with 0 CHERELLE.   Prior to admission, patients level of function was Mod I with ADLs and ambulating household distances SPV.  Equipment used at home: rollator, bath bench.  DME owned (not currently used): none.      Objective:     Communicated with NSG prior to session.  Patient found in bathroom with peripheral IV  upon  PT entry to room.    General Precautions: Standard, fall   Orthopedic Precautions:N/A   Braces: N/A     Exams:  · Gross Motor Coordination:  WFL  · RLE ROM: WFL  · RLE Strength: WFL  · LLE ROM: WFL  · LLE Strength: WFL    Functional Mobility:  · Bed Mobility:     · Sit to Supine: modified independence  · Transfers:     · Sit to Stand:  modified independence with rolling walker  · Toilet Transfer: modified independence with  rolling walker  using  Step Transfer  · Gait: 23ft SPV with RW  · Balance: SPV    Therapeutic Activities and Exercises:   - Pt educated on:   -PT roles, expectations, and POC    -Safety with mobility   -Benefits of OOB activities to increase strength and functional mobility    -Performing ther ex for increasing LE ROM and strength   -Discharge recommendations     AM-PAC 6 CLICK MOBILITY  Total Score:22     Patient left HOB elevated with call button in reach.    GOALS:   Multidisciplinary Problems     Physical Therapy Goals        Problem: Physical Therapy Goal    Goal Priority Disciplines Outcome Goal Variances Interventions   Physical Therapy Goal     PT, PT/OT Ongoing, Progressing     Description: Goals to be met by: 12/3/2020    Patient will increase functional independence with mobility by performin. Gait  x 50 feet with Stand-by Assistance using LRAD  2. Lower extremity exercise program x15 reps, with independence                      History:     Past Medical History:   Diagnosis Date    Allergy     Anemia     Arthritis     Asthma     CHF (congestive heart failure) 2017    Chronic kidney disease     Degenerative disc disease     Diabetes mellitus type II     Essential hypertension 7/3/2012    Glaucoma     History of pseudogout 2012    Hyperlipidemia     Hypertension     Iritis     Myocardial infarction     Pneumonia     Thyroid disease        Past Surgical History:   Procedure Laterality Date    CARDIAC CATHETERIZATION      CATARACT EXTRACTION W/   INTRAOCULAR LENS IMPLANT Left n/a    CATARACT EXTRACTION W/  INTRAOCULAR LENS IMPLANT Right 10/8/2018    With Femtosecond LASER assist (Dr. Henson)    CHOLECYSTECTOMY      EYE SURGERY      gall stone      HYSTERECTOMY      VARICOSE VEIN SURGERY         Time Tracking:     PT Received On: 11/04/20  PT Start Time: 0834     PT Stop Time: 0850  PT Total Time (min): 16 min     Billable Minutes: Evaluation 8 and Gait Training 8      Colin Li, PT  11/04/2020

## 2020-11-04 NOTE — PLAN OF CARE
Harshil completed and POC established     Ankit Li, PT, DPT  2020     Problem: Physical Therapy Goal  Goal: Physical Therapy Goal  Description: Goals to be met by: 12/3/2020    Patient will increase functional independence with mobility by performin. Gait  x 50 feet with Stand-by Assistance using LRAD  2. Lower extremity exercise program x15 reps, with independence     Outcome: Ongoing, Progressing

## 2020-11-04 NOTE — PLAN OF CARE
Advance Care Planning     Code Status  In light of the patients advanced and life limiting illness,I engaged the the patient and family in a conversation about the patient's preferences for care  at the very end of life. The patient wishes to have a natural, peaceful death.  Along those lines, the patient does not wish to have CPR or other invasive treatments performed when her heart and/or breathing stops. I communicated to the patient and family that a DNR form was completed and will be scanned into EPIC.  I spent a total of 10 minutes engaging the patient in this advance care planning discussion.

## 2020-11-04 NOTE — ASSESSMENT & PLAN NOTE
89-year-old patient on Eliquis presenting with dyspnea on exertion symptomatic anemia, questionable history of melena, also with recent anal fissure.  Labs remarkable for significant anemia.  Will proceed with EGD today.      Recommendations:  Upper GI Bleed     Management:  -Place patient NPO  -Place 2 large bore IVs, volume resuscitation per primary  -Transfuse pRBC for Hb < 7 g/dL (Consider a higher Hb target if there is clinical evidence of intravascular volume depletion or comorbidities, such as CAD or if high suspicion of vigorous active ongoing bleeding or an uncorrected coagulopathy exists.).  -Correct coagulopathy (goal plt >50, INR <1.5) if present in patients without absolute contraindications.  -PPI 80 mg IV bolus once, then IV PPI 40 BID  -Avoid nonsteroidal agents, antiplatelet agents and anticoagulants if possible in patients without absolute contraindications  -Will plan for Endoscopy   Today  -Please call with any additional questions, concerns or changes in the patient's clinical status.

## 2020-11-04 NOTE — TRANSFER OF CARE
"Anesthesia Transfer of Care Note    Patient: Tiana H Boston    Procedure(s) Performed: Procedure(s) (LRB):  EGD (ESOPHAGOGASTRODUODENOSCOPY) (N/A)    Patient location: PACU    Anesthesia Type: general    Transport from OR: Transported from OR on 2-3 L/min O2 by NC with adequate spontaneous ventilation    Post pain: adequate analgesia    Post assessment: no apparent anesthetic complications    Post vital signs: stable    Level of consciousness: awake    Nausea/Vomiting: no nausea/vomiting    Complications: none    Transfer of care protocol was followed      Last vitals:   Visit Vitals  /66 (BP Location: Right arm, Patient Position: Lying)   Pulse 78   Temp 36.8 °C (98.2 °F) (Temporal)   Resp 16   Ht 5' 8" (1.727 m)   Wt 74.1 kg (163 lb 5.8 oz)   LMP  (LMP Unknown)   SpO2 100%   Breastfeeding No   BMI 24.84 kg/m²     "

## 2020-11-04 NOTE — PLAN OF CARE
Therapy recs for  CM placed referral in [facility] will cont to follow    Christine Mahoney, MSN  Case Management  Ext [phone]

## 2020-11-04 NOTE — PLAN OF CARE
CM assessment complete, patient AAOX4 and participated in interview at bedside. Pt lives at home with her two  daughters. She states she ambulates with rollator for safety. Pt states she sees her PCP regularly. Her anticipated dc plan is home with HH. CM will cont to follow.    Admit DX: Dyspnea on exertion [R06.00]  Abdominal pain [R10.9]  Anemia, unspecified type [D64.9]    Patient Care Team: Patient Care Team:  Belen Bradley MD as PCP - General (Internal Medicine)  Belen Crystal, RN as Registered Nurse (Internal Medicine)  David Prado MD as Consulting Physician (Ophthalmology)  Aria Krishnamurthy DPM as Consulting Physician (Podiatry)  Mickey Erwin MD as Consulting Physician (Cardiology)  Dia Murcia MD as Consulting Physician (Cardiology)  Elvia Galvan MA as Care Coordinator (Internal Medicine)  Harsh Sandy III, MD as Consulting Physician (Orthopedic Surgery)    Patient Payor: Payor: HU[employer] MANAGED MEDICARE / Plan: HU[employer] MEDICARE HMO / Product Type: Capitation /     Patient Pharmacy:   [id number] - [place], [place] - [address]  [address] [facility] [address]  Phone: [phone] Fax: [phone]    [facility] [place] - [address]  [address]  [facility] [address]  Phone: [phone] Fax: [phone]     11/04/20 0946   Discharge Assessment   Assessment Type Discharge Planning Assessment   Confirmed/corrected address and phone number on facesheet? Yes   Assessment information obtained from? Patient   Expected Length of Stay (days) 2   Communicated expected length of stay with patient/caregiver yes   Prior to hospitilization cognitive status: Alert/Oriented   Prior to hospitalization functional status: Assistive Equipment   Current cognitive status: Alert/Oriented   Current Functional Status: Assistive Equipment   Facility Arrived From: home    Lives With child(sarthak), adult   Able to Return to Prior Arrangements yes   Is patient able to care for self after discharge? Yes   Who are your caregiver(s) and their phone number(s)? [place] ([phone])   Patient's perception of discharge disposition home health   Readmission Within the Last 30 Days no previous admission in last 30 days   Patient currently being followed by outpatient case management? No   Patient currently receives any other outside agency services? No   Equipment Currently Used at Home shower chair;rollator   Do you have any problems affording any of your prescribed medications? No   Is the patient taking medications as prescribed? yes   Does the patient have transportation home? Yes   Transportation Anticipated family or friend will provide   Does the patient receive services at the [facility]? No   Discharge Plan A Home Health   Discharge Plan B Home Health   DME Needed Upon Discharge  none   Patient/Family in Agreement with Plan yes   Readmission Questionnaire   Have you felt down, depressed, or hopeless? 0   Christine Mahoney, MSN  Case Management  Ext [phone]

## 2020-11-04 NOTE — PLAN OF CARE
11/04/20 1020   Post-Acute Status   Post-Acute Authorization Home Health   Home Health Status Awaiting Internal Medical Clearance

## 2020-11-04 NOTE — PROGRESS NOTES
Progress Note  Hospital Medicine  [facility]       Patient Name: Tiana Mead  MRN:  [id number]  Hospital Medicine Team: [facility] X Pau Isidro MD  Date of Admission:  11/3/2020     Length of Stay:  LOS: 1 day   Expected Discharge Date: 11/5/2020  Principal Problem:  Acute on chronic blood loss anemia     Subjective:     Interval History/Overnight Events:      Patient is feeling much better today.  Hemoglobin still less than 7 on a.m. labs today.  Received another unit of PRBCs.  Underwent EGD which was unrevealing.  Iron studies are showing significantly profound iron deficiency anemia.  Patient placed on p.o. iron.  Patient and family deciding whether not they would like to pursue colonoscopy in the near future     atorvastatin  40 mg Oral Daily    dorzolamide  1 drop Both Eyes BID    fluticasone furoate-vilanteroL  1 puff Inhalation Daily    insulin detemir U-100  5 Units Subcutaneous Daily    latanoprost  1 drop Both Eyes QHS    levothyroxine  75 mcg Oral Before breakfast    metoprolol tartrate  25 mg Oral BID    pantoprazole  40 mg Intravenous BID           sodium chloride, sodium chloride, acetaminophen, dextrose 50%, dextrose 50%, glucagon (human recombinant), glucose, glucose, insulin aspart U-100, melatonin, ondansetron, ondansetron, polyethylene glycol, sodium chloride 0.9%, sodium chloride 0.9%    Review of Systems   Constitutional: + fatigue  HENT: Negative for sore throat, trouble swallowing.    Eyes: Negative for photophobia, visual disturbance.   Respiratory: +shortness of breath.    Cardiovascular: Negative for chest pain, palpitations, leg swelling.   Gastrointestinal: + abdominal pain, constipation,nausea, vomiting.   Endocrine: Negative for cold intolerance, heat intolerance.   Genitourinary: Negative for dysuria, frequency.   Musculoskeletal: Negative for arthralgias, myalgias.   Skin: Negative for rash, wound, erythema   Neurological: Negative  for dizziness, syncope, ++ weakness, +light-headedness.   Psychiatric/Behavioral: Negative for confusion, hallucinations, anxiety    Objective:     Temp:  [97.9 °F (36.6 °C)-98.9 °F (37.2 °C)]   Pulse:  []   Resp:  [15-20]   BP: (123-161)/(59-83)   SpO2:  [98 %-100 %]         Weight change:    Body mass index is 24.84 kg/m².       Physical Exam:  Constitutional: Well-developed, well-nourished, non-distressed, not diaphoretic.   HENT: NC/AT, external ears normal, oropharynx clear, MMM w/o exudates.   Eyes: PERRL, EOMI, conjunctiva pale, no discharge b/l, no scleral icterus   Neck: normal ROM, supple  CV: RRR, no m/r/g, no carotid bruits, +2 peripheral pulses.  Pulmonary/Chest wall: Breathing comfortably w/o distress, CTAB, no w/r/r, no crackles.  GI: Soft, non-tender, non-distended, (+) BS, (+) BM   Musculoskeletal: Normal ROM, no edema, no atrophy, no tenderness throughout   Neurological: AAO x 4, CN II-XI in tact, nl sensation, nl strength/tone   Skin: warm, dry, (-) erythema, (-) rash, +pallor  Psych: normal mood and affect, normal behavior, thought content and judgement.    Labs:    Chemistries:   Recent Labs   Lab 11/03/20  1220 11/04/20  0514    138   K 4.1 3.9    105   CO2 25 25   BUN 28 25   CREATININE 1.8* 1.7*   CALCIUM 8.9 8.8   PROT 6.2  --    BILITOT 0.4  --    ALKPHOS 86  --    ALT 19  --    AST 31  --    MG  --  2.1   PHOS  --  3.3        WBC:   Recent Labs   Lab 11/03/20  1220 11/04/20  0514   WBC 7.17 6.02     Bands:     CBC/Anemia Labs: Coags:    Recent Labs   Lab 11/03/20  1220 11/04/20  0514   WBC 7.17 6.02   HGB 6.0* 6.7*   HCT 20.4* 21.9*    192   MCV 83 81*   RDW 14.8* 15.3*   IRON 19*  --    FERRITIN 8*  --    RETIC 2.0  --     Recent Labs   Lab 11/03/20  1514   INR 1.0   APTT 26.8        Diagnostic Results:    EGD 11/4     Impression:           - Normal esophagus.                         - Small hiatal hernia.                         - Normal stomach.                          - Normal examined duodenum.                         - No specimens collected.     Assessment and Plan     Hospital Course:    Ms. Tiana Mead was admitted to Hospital Medicine for management of  Acute on chronic blood loss anemia     Active Hospital Problems    Diagnosis  POA    *Acute on chronic blood loss anemia [D62]  Yes    Symptomatic anemia [D64.9]  Yes    Coronary artery disease involving native coronary artery of native heart without angina pectoris [I25.10]  Yes    Stage 4 chronic kidney disease [N18.4]  Yes    Chronic diastolic congestive heart failure [I50.32]  Yes     Chronic    Long term current use of anticoagulant therapy, eliquis [Z79.01]  Not Applicable     Chronic    Type 2 diabetes mellitus with diabetic polyneuropathy, with long-term current use of insulin [E11.42, Z79.4]  Not Applicable     Chronic    Persistent atrial fibrillation [I48.19]  Yes     Chronic      Resolved Hospital Problems   No resolved problems to display.       Acute on chronic blood loss anemia  Symptomatic anemia  Iron deficiency anemia  -hbg noted to be 6, from prior 8.9 in 9/2020. Prior hbg 11 and 12 in 7/2020 and 5/2020. Consented for PRBCs and given 1 units on 11/3  - concern for chronic blood loss, likely from upper GI, possibly gastritis or ulcer (given symptoms of RUQ/ epigastric pressure and n/v). Mildly + hemoccult on ED rectal exam as per report , no active bleeding  - trend CBC q day  - hemoglobin less than 7 on 11/04/2020, received another unit of PRBCs   -anemia studies showing profound iron deficiency anemia  - GI consult. eval for EGD- was unrevealing overall.  Patient and family are deciding whether not they would like to pursue colonoscopy, which was offered.  Given concern for chronic blood loss anemia given profound iron deficiency anemia  - PPI stopped  - p.o. iron  started     Persistent atrial fibrillation  Long term current use of anticoagulant therapy, eliquis  - cont home  metoprolol but at lower dose  - hold eliquis - would recommend stopping on discharge  - tele monitoring      Chronic diastolic congestive heart failure  CAD  - cont home metoprolol but at lower dose  - hold other HTN meds given anemia, will redose/ restart in the AM based on BPs        Stage 4 chronic kidney disease  - Cr stable at baseline, 1.8     Type 2 diabetes mellitus with diabetic polyneuropathy, with long-term current use of insulin  - A1C 7.2 at goal  - detemir as above  and SSI, and redose insulin         Diet:  cardiac   GI PPx:    DVT PPx:  SCDs  Goals of Care:  DNR DNI as per patient's wishes, form in the chart and media tab     High Risk Conditions:  Anemia      Disposition:  Home with fam with home health on 11/5. Possible outpatient C scope based on patient wishes    Patient's note was created using MModal Dictation.  Any errors in syntax may not have been identified and edited on initial review prior to signing this note.    Signing Physician:     Pau Isidro MD  Department of Hospital Medicine   [facility]  Pager [phone]  Spectra [phone]  11/4/2020

## 2020-11-04 NOTE — CONSULTS
[facility]  Gastroenterology  Consult Note    Patient Name: Tiana Mead  MRN: [id number]  Admission Date: 11/3/2020  Hospital Length of Stay: 1 days  Code Status: DNR   Attending Provider: Pau Isidro MD   Consulting Provider: Walter Carlson MD  Primary Care Physician: Belen Wood MD  Principal Problem:Acute on chronic blood loss anemia    Inpatient consult to Gastroenterology  Consult performed by: Walter Carlson MD  Consult ordered by: Pau Isidro MD        Subjective:     HPI:  89 F PMH AFib on Eliquis, CKD, heart failure, previous COVID infection, coronary artery disease, DM2 admitted with shortness of breath and dyspnea on exertion.  Patient was seen in primary care clinic prior to evaluation, at that time progressive dyspnea on exertion and fatigue were noted with associated orthostatic type symptoms, she was recommended to present to the ED for further evaluation.  Also endorsed intermittent epigastric pain with some nausea and vomiting.  Denied overt melena or hematochezia, thought to have a small anal fissure her initial note.      In the ED, patient was hemodynamically stable, labs notable for hemoglobin 6.0, baseline 10-12, labs otherwise unremarkable.  CT performed showing constipation, s/p cholecystectomy and hysterectomy.    Past Medical History:   Diagnosis Date    Allergy     Anemia     Arthritis     Asthma     CHF (congestive heart failure) 5/2/2017    Chronic kidney disease     Degenerative disc disease     Diabetes mellitus type II     Essential hypertension 7/3/2012    Glaucoma     History of pseudogout 8/23/2012    Hyperlipidemia     Hypertension     Iritis     Myocardial infarction     Pneumonia     Thyroid disease        Past Surgical History:   Procedure Laterality Date    CARDIAC CATHETERIZATION      CATARACT EXTRACTION W/  INTRAOCULAR LENS IMPLANT Left n/a    CATARACT EXTRACTION W/  INTRAOCULAR LENS IMPLANT Right 10/8/2018     With Femtosecond LASER assist (Dr. Henson)    CHOLECYSTECTOMY      EYE SURGERY      gall stone      HYSTERECTOMY      VARICOSE VEIN SURGERY         Review of patient's allergies indicates:   Allergen Reactions    Codeine Itching and Nausea Only            Family History     Problem Relation (Age of Onset)    Diabetes Mother, Son, Daughter    Glaucoma Mother    Hypertension Mother, Father    No Known Problems Daughter, Son        Tobacco Use    Smoking status: Never Smoker    Smokeless tobacco: Never Used   Substance and Sexual Activity    Alcohol use: No    Drug use: No    Sexual activity: Never     Review of Systems   Constitutional: Positive for fatigue. Negative for fever and unexpected weight change.   HENT: Negative.    Eyes: Negative.    Respiratory: Negative.  Negative for cough and shortness of breath.    Cardiovascular: Negative.  Negative for chest pain and palpitations.   Gastrointestinal: Positive for blood in stool. Negative for abdominal pain, constipation and diarrhea.   Endocrine: Negative.    Genitourinary: Negative.    Musculoskeletal: Negative.    Allergic/Immunologic: Negative.    Neurological: Negative.    Hematological: Negative.    Psychiatric/Behavioral: Negative.      Objective:     Vital Signs (Most Recent):  Temp: 98.1 °F (36.7 °C) (11/04/20 0802)  Pulse: 81 (11/04/20 0802)  Resp: 20 (11/04/20 0802)  BP: (!) 141/69 (11/04/20 0802)  SpO2: 99 % (11/04/20 0802) Vital Signs (24h Range):  Temp:  [97.9 °F (36.6 °C)-98.8 °F (37.1 °C)] 98.1 °F (36.7 °C)  Pulse:  [] 81  Resp:  [16-20] 20  SpO2:  [98 %-100 %] 99 %  BP: (100-151)/(50-83) 141/69     Weight: 74.1 kg (163 lb 5.8 oz) (11/03/20 1950)  Body mass index is 24.84 kg/m².      Intake/Output Summary (Last 24 hours) at 11/4/2020 0817  Last data filed at 11/4/2020 0600  Gross per 24 hour   Intake 1102.5 ml   Output 3 ml   Net 1099.5 ml       Lines/Drains/Airways     Peripheral Intravenous Line                 Peripheral IV - Single  Lumen 03/30/20 1745 20 G Right Forearm 218 days         Peripheral IV - Single Lumen 11/03/20 1147 20 G Left Antecubital less than 1 day                Physical Exam    Gen: NAD, lying comfortably  HENT: NCAT, oropharynx clear  Eyes: anicteric sclerae, EOMI grossly  Neck: supple, no visible masses/goiter  Cardiac: RRR, no M/R/G, S1/S2 present  Lungs: CTAB, no crackles, no wheezes  Abd: soft, NT/ND, normoactive BS,   No rebound, no guarding  Ext: no LE edema, warm, well perfused  Skin: skin intact on exposed body parts, no visible rashes, lesions  Neuro: A&Ox4, neuro exam grossly intact, moves all extremities  Psych: appropriate mood, affect      Significant Labs:  CBC:   Recent Labs   Lab 11/03/20  1220 11/04/20  0514   WBC 7.17 6.02   HGB 6.0* 6.7*   HCT 20.4* 21.9*    192     CMP:   Recent Labs   Lab 11/03/20  1220 11/04/20  0514   * 153*   CALCIUM 8.9 8.8   ALBUMIN 3.3*  --    PROT 6.2  --     138   K 4.1 3.9   CO2 25 25    105   BUN 28 25   CREATININE 1.8* 1.7*   ALKPHOS 86  --    ALT 19  --    AST 31  --    BILITOT 0.4  --      Coagulation:   Recent Labs   Lab 11/03/20  1514   INR 1.0   APTT 26.8       Significant Imaging:  CT: I have reviewed all results within the past 24 hours and my personal findings are:  Constipation, otherwise no acute etiologies for the patient's symptoms    Assessment/Plan:     Symptomatic anemia   89-year-old patient on Eliquis presenting with dyspnea on exertion symptomatic anemia, questionable history of melena, also with recent anal fissure.  Labs remarkable for significant anemia.  Will proceed with EGD today.      Recommendations:  Upper GI Bleed     Management:  -Place patient NPO  -Place 2 large bore IVs, volume resuscitation per primary  -Transfuse pRBC for Hb < 7 g/dL (Consider a higher Hb target if there is clinical evidence of intravascular volume depletion or comorbidities, such as CAD or if high suspicion of vigorous active ongoing bleeding or an  uncorrected coagulopathy exists.).  -Correct coagulopathy (goal plt >50, INR <1.5) if present in patients without absolute contraindications.  -PPI 80 mg IV bolus once, then IV PPI 40 BID  -Avoid nonsteroidal agents, antiplatelet agents and anticoagulants if possible in patients without absolute contraindications  -Will plan for Endoscopy   Today  -Please call with any additional questions, concerns or changes in the patient's clinical status.            Thank you for your consult. I will follow-up with patient. Please contact us if you have any additional questions.    Walter Carlson MD  Gastroenterology  [facility]

## 2020-11-04 NOTE — PT/OT/SLP EVAL
Occupational Therapy   Evaluation    Name: Tiana Mead  MRN: [id number]  Admitting Diagnosis:  Acute on chronic blood loss anemia Day of Surgery    Recommendations:     Discharge Recommendations: home with home health  Discharge Equipment Recommendations:  none  Barriers to discharge:  None    Assessment:     Tiana Mead is a 89 y.o. female with a medical diagnosis of Acute on chronic blood loss anemia.  She presents with a decline in functional mobility and self-care management secondary to global deconditioning and nausea.  Pt needed max encouragement to participate in therapy secondary to nausea Pt was steady with ambulation with RW and could doff/alistair socks with SBA while seated EOB. Pt wanted to walk within room and reported needing no assistance to brush teeth while standing at sink earlier in the morning. Pt would benefit from continued acute OT services to promote OOB activity and address global deconditioning. Performance deficits affecting function: weakness, impaired self care skills, impaired functional mobilty, impaired endurance, impaired cardiopulmonary response to activity.      Rehab Prognosis: Good; patient would benefit from acute skilled OT services to address these deficits and reach maximum level of function.       Plan:     Patient to be seen 2 x/week to address the above listed problems via self-care/home management, therapeutic activities, therapeutic exercises, neuromuscular re-education  · Plan of Care Expires: 12/04/20  · Plan of Care Reviewed with: patient, daughter    Subjective     Chief Complaint: none  Patient/Family Comments/goals: Pt wishes to return home with daughter    Occupational Profile:  Living Environment: Pt lives with daughter in [facility] with ramp to enter. Pt has a tub/shower with a bath bench.   Previous level of function: Independent in all ADLs; reports needing extra time for dressing.  Roles and Routines: Mother  Equipment Used at Home:  rollator, bath  bench  Assistance upon Discharge: Daughter who works part time and can help when she is home.    Pain/Comfort:  Pain Rating 1: 0/10  Pain Rating Post-Intervention 1: 0/10    Patients cultural, spiritual, Jain conflicts given the current situation: no    Objective:     Communicated with: RN prior to session.  Patient found supine with peripheral IV upon OT entry to room.    General Precautions: Standard, fall   Orthopedic Precautions:N/A   Braces: N/A     Occupational Performance:    Bed Mobility:    · Patient completed Rolling/Turning to Left with  stand by assistance  · Patient completed Supine to Sit with stand by assistance  · Patient completed Sit to Supine with stand by assistance    Functional Mobility/Transfers:  · Patient completed Sit <> Stand Transfer with stand by assistance  with  rolling walker   · Functional Mobility: Pt ambulated in room with SBA with RW to the doorway entrance and then back to the bed.    Activities of Daily Living:  · Grooming: Pt reports brushing teeth at sink while standing earlier in the morning with no assist.   · Lower Body Dressing: stand by assistance to doff/alistair socks seated EOB.    Physical Exam:  Balance:    -       No LOB during EOB sitting and ambulation with RW.  Upper Extremity Range of Motion:     -       Right Upper Extremity: WFL  -       Left Upper Extremity: WFL  Upper Extremity Strength:    -       Right Upper Extremity: WFL  -       Left Upper Extremity: WFL   Strength:    -       Right Upper Extremity: WFL  -       Left Upper Extremity: WFL    AMPAC 6 Click ADL:  AMPAC Total Score: 20    Treatment & Education:  Pt educated on role of OT in acute care setting.   Assisted with ADLs and functional mobility with assist levels noted above   Pt educated on benefit of OOB activity.   Pt and daughter education on visitation rules.  Pt education on compensatory techniques for pericare and lower body dressing.  Education:    Patient left supine with call  button in reach and daughter present    GOALS:   Multidisciplinary Problems     Occupational Therapy Goals        Problem: Occupational Therapy Goal    Goal Priority Disciplines Outcome Interventions   Occupational Therapy Goal     OT, PT/OT Ongoing, Progressing    Description: Goals to be met by: 11/11/2020    Patient will increase functional independence with ADLs by performing:    Feeding with Meagher.  UE Dressing with Meagher.  LE Dressing with Stand-by Assistance.  Grooming while standing at sink with Stand-by Assistance.  Toileting from toilet with Stand-by Assistance for hygiene and clothing management.                      History:     Past Medical History:   Diagnosis Date    Allergy     Anemia     Arthritis     Asthma     CHF (congestive heart failure) 5/2/2017    Chronic kidney disease     Degenerative disc disease     Diabetes mellitus type II     Essential hypertension 7/3/2012    Glaucoma     History of pseudogout 8/23/2012    Hyperlipidemia     Hypertension     Iritis     Myocardial infarction     Pneumonia     Thyroid disease          Past Surgical History:   Procedure Laterality Date    CARDIAC CATHETERIZATION      CATARACT EXTRACTION W/  INTRAOCULAR LENS IMPLANT Left n/a    CATARACT EXTRACTION W/  INTRAOCULAR LENS IMPLANT Right 10/8/2018    With Femtosecond LASER assist (Dr. Henson)    CHOLECYSTECTOMY      EYE SURGERY      gall stone      HYSTERECTOMY      VARICOSE VEIN SURGERY         Time Tracking:     OT Date of Treatment: 11/04/20  OT Start Time: 1117  OT Stop Time: 1138  OT Total Time (min): 21 min    Billable Minutes:Evaluation 8  Self Care/Home Management 13    HECTOR Abad  11/4/2020

## 2020-11-04 NOTE — TREATMENT PLAN
Brief GI treatment plan    EGD performed today.  Findings, no etiology for patient's bleed, unremarkable exam.  Please see full endoscopy report for details.    Recommendations:    -trend CBC, CMP, maintain active type and screen, transfuse Hgb <7  -we will discuss with patient about whether she would like to undergo colonoscopy versus monitor hemoglobins and transfuse empirically  -okay to advance diet      GI will continue to follow.  Please call questions.    Walter Carlson MD  GI Fellow

## 2020-11-05 ENCOUNTER — TELEPHONE (OUTPATIENT)
Dept: INTERNAL MEDICINE | Facility: CLINIC | Age: 85
End: 2020-11-05

## 2020-11-05 LAB
ANION GAP SERPL CALC-SCNC: 8 MMOL/L (ref 8–16)
BASOPHILS # BLD AUTO: 0.03 K/UL (ref 0–0.2)
BASOPHILS NFR BLD: 0.6 % (ref 0–1.9)
BUN SERPL-MCNC: 23 MG/DL (ref 10–30)
CALCIUM SERPL-MCNC: 8.6 MG/DL (ref 8.7–10.5)
CHLORIDE SERPL-SCNC: 106 MMOL/L (ref 95–110)
CO2 SERPL-SCNC: 26 MMOL/L (ref 23–29)
CREAT SERPL-MCNC: 1.6 MG/DL (ref 0.5–1.4)
DIFFERENTIAL METHOD: ABNORMAL
EOSINOPHIL # BLD AUTO: 0.1 K/UL (ref 0–0.5)
EOSINOPHIL NFR BLD: 2.3 % (ref 0–8)
ERYTHROCYTE [DISTWIDTH] IN BLOOD BY AUTOMATED COUNT: 15.4 % (ref 11.5–14.5)
EST. GFR  (AFRICAN AMERICAN): 31.3 ML/MIN/1.73 M^2
EST. GFR  (NON AFRICAN AMERICAN): 27.2 ML/MIN/1.73 M^2
GLUCOSE SERPL-MCNC: 120 MG/DL (ref 70–110)
HCT VFR BLD AUTO: 25.5 % (ref 37–48.5)
HGB BLD-MCNC: 7.7 G/DL (ref 12–16)
IMM GRANULOCYTES # BLD AUTO: 0.01 K/UL (ref 0–0.04)
IMM GRANULOCYTES NFR BLD AUTO: 0.2 % (ref 0–0.5)
LYMPHOCYTES # BLD AUTO: 0.9 K/UL (ref 1–4.8)
LYMPHOCYTES NFR BLD: 17 % (ref 18–48)
MCH RBC QN AUTO: 25.4 PG (ref 27–31)
MCHC RBC AUTO-ENTMCNC: 30.2 G/DL (ref 32–36)
MCV RBC AUTO: 84 FL (ref 82–98)
MONOCYTES # BLD AUTO: 1 K/UL (ref 0.3–1)
MONOCYTES NFR BLD: 19.1 % (ref 4–15)
NEUTROPHILS # BLD AUTO: 3.2 K/UL (ref 1.8–7.7)
NEUTROPHILS NFR BLD: 60.8 % (ref 38–73)
NRBC BLD-RTO: 1 /100 WBC
PLATELET # BLD AUTO: 168 K/UL (ref 150–350)
PMV BLD AUTO: 12 FL (ref 9.2–12.9)
POCT GLUCOSE: 105 MG/DL (ref 70–110)
POCT GLUCOSE: 118 MG/DL (ref 70–110)
POCT GLUCOSE: 129 MG/DL (ref 70–110)
POCT GLUCOSE: 132 MG/DL (ref 70–110)
POTASSIUM SERPL-SCNC: 3.7 MMOL/L (ref 3.5–5.1)
RBC # BLD AUTO: 3.03 M/UL (ref 4–5.4)
SODIUM SERPL-SCNC: 140 MMOL/L (ref 136–145)
WBC # BLD AUTO: 5.28 K/UL (ref 3.9–12.7)

## 2020-11-05 PROCEDURE — 99233 PR SUBSEQUENT HOSPITAL CARE,LEVL III: ICD-10-PCS | Mod: HCNC,,, | Performed by: HOSPITALIST

## 2020-11-05 PROCEDURE — 99231 SBSQ HOSP IP/OBS SF/LOW 25: CPT | Mod: HCNC,,, | Performed by: FAMILY MEDICINE

## 2020-11-05 PROCEDURE — 25000003 PHARM REV CODE 250: Mod: HCNC | Performed by: HOSPITALIST

## 2020-11-05 PROCEDURE — 85025 COMPLETE CBC W/AUTO DIFF WBC: CPT | Mod: HCNC

## 2020-11-05 PROCEDURE — 80048 BASIC METABOLIC PNL TOTAL CA: CPT | Mod: HCNC

## 2020-11-05 PROCEDURE — 99233 SBSQ HOSP IP/OBS HIGH 50: CPT | Mod: HCNC,,, | Performed by: HOSPITALIST

## 2020-11-05 PROCEDURE — 63600175 PHARM REV CODE 636 W HCPCS: Mod: HCNC | Performed by: HOSPITALIST

## 2020-11-05 PROCEDURE — 11000001 HC ACUTE MED/SURG PRIVATE ROOM: Mod: HCNC

## 2020-11-05 PROCEDURE — 36415 COLL VENOUS BLD VENIPUNCTURE: CPT | Mod: HCNC

## 2020-11-05 PROCEDURE — 99231 PR SUBSEQUENT HOSPITAL CARE,LEVL I: ICD-10-PCS | Mod: HCNC,,, | Performed by: FAMILY MEDICINE

## 2020-11-05 RX ADMIN — METOPROLOL TARTRATE 25 MG: 25 TABLET, FILM COATED ORAL at 08:11

## 2020-11-05 RX ADMIN — LEVOTHYROXINE SODIUM 75 MCG: 75 TABLET ORAL at 06:11

## 2020-11-05 RX ADMIN — INSULIN DETEMIR 5 UNITS: 100 INJECTION, SOLUTION SUBCUTANEOUS at 08:11

## 2020-11-05 RX ADMIN — POLYETHYLENE GLYCOL 3350 17 G: 17 POWDER, FOR SOLUTION ORAL at 10:11

## 2020-11-05 RX ADMIN — DORZOLAMIDE HYDROCHLORIDE 1 DROP: 20 SOLUTION/ DROPS OPHTHALMIC at 08:11

## 2020-11-05 RX ADMIN — SODIUM CHLORIDE 125 MG: 0.9 INJECTION, SOLUTION INTRAVENOUS at 03:11

## 2020-11-05 RX ADMIN — ATORVASTATIN CALCIUM 40 MG: 20 TABLET, FILM COATED ORAL at 08:11

## 2020-11-05 RX ADMIN — FERROUS SULFATE TAB EC 325 MG (65 MG FE EQUIVALENT) 325 MG: 325 (65 FE) TABLET DELAYED RESPONSE at 08:11

## 2020-11-05 RX ADMIN — LATANOPROST 1 DROP: 50 SOLUTION OPHTHALMIC at 08:11

## 2020-11-05 RX ADMIN — POLYETHYLENE GLYCOL 3350 17 G: 17 POWDER, FOR SOLUTION ORAL at 05:11

## 2020-11-05 NOTE — SUBJECTIVE & OBJECTIVE
Subjective:     Interval History: 89 yo F with MHx  Significant for AFib on Eliquis, CKD, heart failure, coronary artery disease, and DM2. Admitted on 11/3/2020 with shortness of breath and dyspnea on exertion.    In the ED, labs notable for hemoglobin 6.0, baseline 10-12, labs otherwise unremarkable.    S/p EGD on 11/4/2020 with Dr Mccall, unremarkable except for hiatal hernia.   Today, Ms Mead states she feels much better. Reports she still experiences dyspnea on exertion, but that this has improved approximately 75%. She states she thinks she was having darker stools at home, reports BM this morning that appeared more normal brown in color.  Denies abdominal pains, nausea, vomiting, hematochezia, or melena.    Review of Systems   Constitutional: Negative for fever.   Respiratory: Positive for shortness of breath. Negative for cough.         Dyspnea on exertion, relieved with rest   Cardiovascular: Negative for chest pain.   Gastrointestinal: Positive for nausea. Negative for abdominal pain, blood in stool, constipation, diarrhea and vomiting.        Mild nausea, states this is resolving   All other systems reviewed and are negative.    Objective:     Vital Signs (Most Recent):  Temp: 98.3 °F (36.8 °C) (11/05/20 0437)  Pulse: (!) 56 (11/05/20 1105)  Resp: 18 (11/05/20 0437)  BP: (!) 122/58 (11/05/20 0437)  SpO2: (!) 93 % (11/05/20 0437) Vital Signs (24h Range):  Temp:  [97.9 °F (36.6 °C)-98.7 °F (37.1 °C)] 98.3 °F (36.8 °C)  Pulse:  [] 56  Resp:  [15-18] 18  SpO2:  [93 %-100 %] 93 %  BP: (108-161)/(58-77) 122/58     Weight: 74.1 kg (163 lb 5.8 oz) (11/03/20 1950)  Body mass index is 24.84 kg/m².      Intake/Output Summary (Last 24 hours) at 11/5/2020 1223  Last data filed at 11/4/2020 2104  Gross per 24 hour   Intake 300 ml   Output --   Net 300 ml       Lines/Drains/Airways     Peripheral Intravenous Line                 Peripheral IV - Single Lumen 03/30/20 1745 20 G Right Forearm 219 days          Peripheral IV - Single Lumen 11/03/20 1147 20 G Left Antecubital 2 days                Physical Exam  Constitutional:       General: She is not in acute distress.     Appearance: Normal appearance. She is not ill-appearing.   HENT:      Head: Normocephalic and atraumatic.   Cardiovascular:      Rate and Rhythm: Normal rate and regular rhythm.      Pulses: Normal pulses.   Pulmonary:      Effort: Pulmonary effort is normal. No respiratory distress.      Breath sounds: Normal breath sounds. No wheezing.   Abdominal:      General: Abdomen is flat. Bowel sounds are normal. There is no distension.      Palpations: Abdomen is soft.      Tenderness: There is no abdominal tenderness. There is no guarding or rebound.   Skin:     General: Skin is warm and dry.      Capillary Refill: Capillary refill takes less than 2 seconds.      Coloration: Skin is not jaundiced or pale.   Neurological:      General: No focal deficit present.      Mental Status: She is alert and oriented to person, place, and time. Mental status is at baseline.   Psychiatric:         Mood and Affect: Mood normal.         Behavior: Behavior normal.         Significant Labs:  CBC:   Recent Labs   Lab 11/04/20  0514 11/04/20  1730 11/05/20  0522   WBC 6.02  --  5.28   HGB 6.7* 8.5* 7.7*   HCT 21.9*  --  25.5*     --  168     CMP:   Recent Labs   Lab 11/05/20  0522   *   CALCIUM 8.6*      K 3.7   CO2 26      BUN 23   CREATININE 1.6*     All pertinent lab results from the last 24 hours have been reviewed.      Significant Imaging:  Imaging results since admission have been reviewed.  No new imaging results within the last 24 hours for review.

## 2020-11-05 NOTE — PROGRESS NOTES
[facility]  Gastroenterology  Progress Note    Patient Name: Tiana Mead  MRN: [id number]  Admission Date: 11/3/2020  Hospital Length of Stay: 2 days  Code Status: DNR   Attending Provider: Ronaldo Elise MD  Consulting Provider: Emily Egan DNP  Primary Care Physician: Belen Wood MD  Principal Problem: Acute on chronic blood loss anemia      Subjective:     Interval History: 89 yo F with MHx  Significant for AFib on Eliquis, CKD, heart failure, coronary artery disease, and DM2. Admitted on 11/3/2020 with shortness of breath and dyspnea on exertion.    In the ED, labs notable for hemoglobin 6.0, baseline 10-12, labs otherwise unremarkable.    S/p EGD on 11/4/2020 with Dr Mccall, unremarkable except for hiatal hernia.   Today, Ms Mead states she feels much better. Reports she still experiences dyspnea on exertion, but that this has improved approximately 75%. She states she thinks she was having darker stools at home, reports BM this morning that appeared more normal brown in color.  Denies abdominal pains, nausea, vomiting, hematochezia, or melena.    Review of Systems   Constitutional: Negative for fever.   Respiratory: Positive for shortness of breath. Negative for cough.         Dyspnea on exertion, relieved with rest   Cardiovascular: Negative for chest pain.   Gastrointestinal: Positive for nausea. Negative for abdominal pain, blood in stool, constipation, diarrhea and vomiting.        Mild nausea, states this is resolving   All other systems reviewed and are negative.    Objective:     Vital Signs (Most Recent):  Temp: 98.3 °F (36.8 °C) (11/05/20 0437)  Pulse: (!) 56 (11/05/20 1105)  Resp: 18 (11/05/20 0437)  BP: (!) 122/58 (11/05/20 0437)  SpO2: (!) 93 % (11/05/20 0437) Vital Signs (24h Range):  Temp:  [97.9 °F (36.6 °C)-98.7 °F (37.1 °C)] 98.3 °F (36.8 °C)  Pulse:  [] 56  Resp:  [15-18] 18  SpO2:  [93 %-100 %] 93 %  BP: (108-161)/(58-77) 122/58     Weight: 74.1 kg  (163 lb 5.8 oz) (11/03/20 1950)  Body mass index is 24.84 kg/m².      Intake/Output Summary (Last 24 hours) at 11/5/2020 1223  Last data filed at 11/4/2020 2104  Gross per 24 hour   Intake 300 ml   Output --   Net 300 ml       Lines/Drains/Airways     Peripheral Intravenous Line                 Peripheral IV - Single Lumen 03/30/20 1745 20 G Right Forearm 219 days         Peripheral IV - Single Lumen 11/03/20 1147 20 G Left Antecubital 2 days                Physical Exam  Constitutional:       General: She is not in acute distress.     Appearance: Normal appearance. She is not ill-appearing.   HENT:      Head: Normocephalic and atraumatic.   Cardiovascular:      Rate and Rhythm: Normal rate and regular rhythm.      Pulses: Normal pulses.   Pulmonary:      Effort: Pulmonary effort is normal. No respiratory distress.      Breath sounds: Normal breath sounds. No wheezing.   Abdominal:      General: Abdomen is flat. Bowel sounds are normal. There is no distension.      Palpations: Abdomen is soft.      Tenderness: There is no abdominal tenderness. There is no guarding or rebound.   Skin:     General: Skin is warm and dry.      Capillary Refill: Capillary refill takes less than 2 seconds.      Coloration: Skin is not jaundiced or pale.   Neurological:      General: No focal deficit present.      Mental Status: She is alert and oriented to person, place, and time. Mental status is at baseline.   Psychiatric:         Mood and Affect: Mood normal.         Behavior: Behavior normal.         Significant Labs:  CBC:   Recent Labs   Lab 11/04/20  0514 11/04/20  1730 11/05/20  0522   WBC 6.02  --  5.28   HGB 6.7* 8.5* 7.7*   HCT 21.9*  --  25.5*     --  168     CMP:   Recent Labs   Lab 11/05/20  0522   *   CALCIUM 8.6*      K 3.7   CO2 26      BUN 23   CREATININE 1.6*     All pertinent lab results from the last 24 hours have been reviewed.      Significant Imaging:  Imaging results since admission have  been reviewed.  No new imaging results within the last 24 hours for review.    Assessment/Plan:     Symptomatic anemia   89-year-old patient on Eliquis presenting to ED with dyspnea on exertion symptomatic anemia, questionable history of melena, also with recent anal fissure.  Labs remarkable for significant anemia.  S/p EGD on 11/4 with no significant findings for source of blood loss.      Recommendations:  Upper GI Bleed     Management:  - No indication of GI bleed.   - Continue to monitor blood counts while inpatient  - Avoid nonsteroidal agents, antiplatelet agents and anticoagulants if possible in patients without absolute contraindications  - please contact us if there are signs of overt GI bleeding and decompensation.  - Please call with any additional questions, concerns or changes in the patient's clinical status.  - Follow-up with GI outpatient PRN          Thank you for your consult. I will sign off. Please contact us if you have any additional questions.    Emily Egan DNP  Gastroenterology  [facility]

## 2020-11-05 NOTE — PROGRESS NOTES
Progress Note  Hospital Medicine  [facility], [facility]       Patient Name: Tiana Mead  MRN:  [id number]  Hospital Medicine Team: [facility] Ronaldo Elise MD  Date of Admission:  11/3/2020     Length of Stay:  LOS: 2 days   Expected Discharge Date: 11/5/2020  Principal Problem:  Acute on chronic blood loss anemia     Subjective:     Interval History/Overnight Events:      Patient is feeling much better today. Had a BM yesterday which she states was dark brown, however no blood seen; hemoglobin dropped to 7.7, will give IV iron today and tomorrow and plan to likely d/c tomorrow      atorvastatin  40 mg Oral Daily    dorzolamide  1 drop Both Eyes BID    ferric gluconate (FERRLECIT) IVPB  125 mg Intravenous Daily    fluticasone furoate-vilanteroL  1 puff Inhalation Daily    insulin detemir U-100  5 Units Subcutaneous Daily    latanoprost  1 drop Both Eyes Daily    levothyroxine  75 mcg Oral Before breakfast    metoprolol tartrate  25 mg Oral BID           sodium chloride, sodium chloride, acetaminophen, dextrose 50%, dextrose 50%, glucagon (human recombinant), glucose, glucose, insulin aspart U-100, melatonin, ondansetron, ondansetron, polyethylene glycol, sodium chloride 0.9%, sodium chloride 0.9%    Review of Systems   Constitutional: + fatigue  HENT: Negative for sore throat, trouble swallowing.    Eyes: Negative for photophobia, visual disturbance.   Respiratory: +shortness of breath.    Cardiovascular: Negative for chest pain, palpitations, leg swelling.   Gastrointestinal: + abdominal pain, constipation,nausea, vomiting.   Endocrine: Negative for cold intolerance, heat intolerance.   Genitourinary: Negative for dysuria, frequency.   Musculoskeletal: Negative for arthralgias, myalgias.   Skin: Negative for rash, wound, erythema   Neurological: Negative for dizziness, syncope, ++ weakness, +light-headedness.   Psychiatric/Behavioral: Negative for confusion, hallucinations,  anxiety    Objective:     Temp:  [97.9 °F (36.6 °C)-98.7 °F (37.1 °C)]   Pulse:  []   Resp:  [15-18]   BP: (108-161)/(58-77)   SpO2:  [93 %-100 %]         Weight change:    Body mass index is 24.84 kg/m².       Physical Exam:  Constitutional: Well-developed, well-nourished, non-distressed, not diaphoretic.   HENT: NC/AT, external ears normal, oropharynx clear, MMM w/o exudates.   Eyes: PERRL, EOMI, conjunctiva pale, no discharge b/l, no scleral icterus   Neck: normal ROM, supple  CV: RRR, no m/r/g, no carotid bruits, +2 peripheral pulses.  Pulmonary/Chest wall: Breathing comfortably w/o distress, CTAB, no w/r/r, no crackles.  GI: Soft, non-tender, non-distended, (+) BS, (+) BM   Musculoskeletal: Normal ROM, no edema, no atrophy, no tenderness throughout   Neurological: AAO x 4, CN II-XI in tact, nl sensation, nl strength/tone   Skin: warm, dry, (-) erythema, (-) rash, +pallor  Psych: normal mood and affect, normal behavior, thought content and judgement.    Labs:    Chemistries:   Recent Labs   Lab 11/03/20 1220 11/04/20 0514 11/05/20 0522    138 140   K 4.1 3.9 3.7    105 106   CO2 25 25 26   BUN 28 25 23   CREATININE 1.8* 1.7* 1.6*   CALCIUM 8.9 8.8 8.6*   PROT 6.2  --   --    BILITOT 0.4  --   --    ALKPHOS 86  --   --    ALT 19  --   --    AST 31  --   --    MG  --  2.1  --    PHOS  --  3.3  --         WBC:   Recent Labs   Lab 11/03/20 1220 11/04/20 0514 11/05/20 0522   WBC 7.17 6.02 5.28     Bands:     CBC/Anemia Labs: Coags:    Recent Labs   Lab 11/03/20  1220 11/04/20 0514 11/04/20  1730 11/05/20  0522   WBC 7.17 6.02  --  5.28   HGB 6.0* 6.7* 8.5* 7.7*   HCT 20.4* 21.9*  --  25.5*    192  --  168   MCV 83 81*  --  84   RDW 14.8* 15.3*  --  15.4*   IRON 19*  --   --   --    FERRITIN 8*  --   --   --    RETIC 2.0  --   --   --     Recent Labs   Lab 11/03/20  1514   INR 1.0   APTT 26.8        Diagnostic Results:    EGD 11/4     Impression:           - Normal esophagus.                          - Small hiatal hernia.                         - Normal stomach.                         - Normal examined duodenum.                         - No specimens collected.     Assessment and Plan     Hospital Course:    Ms. Tiana Mead was admitted to Hospital Medicine for management of  Acute on chronic blood loss anemia     Active Hospital Problems    Diagnosis  POA    *Acute on chronic blood loss anemia [D62]  Yes    Iron deficiency anemia [D50.9]  Yes    Symptomatic anemia [D64.9]  Yes    Coronary artery disease involving native coronary artery of native heart without angina pectoris [I25.10]  Yes    Stage 4 chronic kidney disease [N18.4]  Yes    Chronic diastolic congestive heart failure [I50.32]  Yes     Chronic    Long term current use of anticoagulant therapy, eliquis [Z79.01]  Not Applicable     Chronic    Type 2 diabetes mellitus with diabetic polyneuropathy, with long-term current use of insulin [E11.42, Z79.4]  Not Applicable     Chronic    Persistent atrial fibrillation [I48.19]  Yes     Chronic      Resolved Hospital Problems   No resolved problems to display.       Acute on chronic blood loss anemia  Symptomatic anemia  Iron deficiency anemia  -hbg noted to be 6, from prior 8.9 in 9/2020. Prior hbg 11 and 12 in 7/2020 and 5/2020. Consented for PRBCs and given 1 units on 11/3  - concern for chronic blood loss, likely from upper GI, possibly gastritis or ulcer (given symptoms of RUQ/ epigastric pressure and n/v). Mildly + hemoccult on ED rectal exam as per report , no active bleeding  - trend CBC q day  - hemoglobin less than 7 on 11/04/2020, received another unit of PRBCs   -anemia studies showing profound iron deficiency anemia  - GI consult. eval for EGD- was unrevealing overall.  Patient and family are deciding whether not they would like to pursue colonoscopy, which was offered.  Given concern for chronic blood loss anemia given profound iron deficiency anemia  - PPI  stopped    - giving IV iron today and tomorrow      Persistent atrial fibrillation  Long term current use of anticoagulant therapy, eliquis  - cont home metoprolol but at lower dose  - hold eliquis - would recommend stopping on discharge  - tele monitoring      Chronic diastolic congestive heart failure  CAD  - cont home metoprolol but at lower dose  - hold other HTN meds given anemia, will redose/ restart in the AM based on BPs        Stage 4 chronic kidney disease  - Cr stable at baseline, 1.8     Type 2 diabetes mellitus with diabetic polyneuropathy, with long-term current use of insulin  - A1C 7.2 at goal  - detemir as above  and SSI, and redose insulin         Diet:  cardiac   GI PPx:    DVT PPx:  SCDs  Goals of Care:  DNR DNI as per patient's wishes, form in the chart and media tab     High Risk Conditions:  Anemia      Disposition:  Home with fam with home health on 11/6. Possible outpatient C scope based on patient wishes

## 2020-11-05 NOTE — TELEPHONE ENCOUNTER
The patient is currently in the hospital  When she is discharged I am happy to sign her home health orders

## 2020-11-05 NOTE — ASSESSMENT & PLAN NOTE
89-year-old patient on Eliquis presenting to ED with dyspnea on exertion symptomatic anemia, questionable history of melena, also with recent anal fissure.  Labs remarkable for significant anemia.  S/p EGD on 11/4 with no significant findings for source of blood loss.      Recommendations:  Upper GI Bleed     Management:  - No indication of GI bleed.   - Continue to monitor blood counts while inpatient  - Avoid nonsteroidal agents, antiplatelet agents and anticoagulants if possible in patients without absolute contraindications  - please contact us if there are signs of overt GI bleeding and decompensation.  - Please call with any additional questions, concerns or changes in the patient's clinical status.  - Follow-up with GI outpatient PRN

## 2020-11-05 NOTE — TELEPHONE ENCOUNTER
----- Message from Alyson Dunn sent at 11/5/2020 12:08 PM CST -----  Regarding: HH care  Contact: Sirisha with [facility] [place] nurse is calling to consult with the dr about the HH care orders from the hospital on the pt and to see if the Dr sign order for the pt care  Sirisha with [facility] nurse # [phone]

## 2020-11-05 NOTE — PLAN OF CARE
11/05/20 9852   Post-Acute Status   Post-Acute Authorization Home Health   Home Health Status Awaiting Internal Medical Clearance     Pt is setup with [facility] when stable to d.c.

## 2020-11-06 ENCOUNTER — OUTPATIENT CASE MANAGEMENT (OUTPATIENT)
Dept: ADMINISTRATIVE | Facility: OTHER | Age: 85
End: 2020-11-06

## 2020-11-06 VITALS
WEIGHT: 163.38 LBS | TEMPERATURE: 97 F | HEART RATE: 82 BPM | SYSTOLIC BLOOD PRESSURE: 125 MMHG | RESPIRATION RATE: 21 BRPM | DIASTOLIC BLOOD PRESSURE: 67 MMHG | BODY MASS INDEX: 24.76 KG/M2 | OXYGEN SATURATION: 98 % | HEIGHT: 68 IN

## 2020-11-06 LAB
ANION GAP SERPL CALC-SCNC: 5 MMOL/L (ref 8–16)
BASOPHILS # BLD AUTO: 0.04 K/UL (ref 0–0.2)
BASOPHILS NFR BLD: 0.6 % (ref 0–1.9)
BUN SERPL-MCNC: 21 MG/DL (ref 10–30)
CALCIUM SERPL-MCNC: 8.4 MG/DL (ref 8.7–10.5)
CHLORIDE SERPL-SCNC: 108 MMOL/L (ref 95–110)
CO2 SERPL-SCNC: 27 MMOL/L (ref 23–29)
CREAT SERPL-MCNC: 1.5 MG/DL (ref 0.5–1.4)
DIFFERENTIAL METHOD: ABNORMAL
EOSINOPHIL # BLD AUTO: 0.1 K/UL (ref 0–0.5)
EOSINOPHIL NFR BLD: 2.2 % (ref 0–8)
ERYTHROCYTE [DISTWIDTH] IN BLOOD BY AUTOMATED COUNT: 15.5 % (ref 11.5–14.5)
EST. GFR  (AFRICAN AMERICAN): 33.9 ML/MIN/1.73 M^2
EST. GFR  (NON AFRICAN AMERICAN): 29.4 ML/MIN/1.73 M^2
GLUCOSE SERPL-MCNC: 84 MG/DL (ref 70–110)
HCT VFR BLD AUTO: 25.8 % (ref 37–48.5)
HGB BLD-MCNC: 7.8 G/DL (ref 12–16)
IMM GRANULOCYTES # BLD AUTO: 0.03 K/UL (ref 0–0.04)
IMM GRANULOCYTES NFR BLD AUTO: 0.5 % (ref 0–0.5)
LYMPHOCYTES # BLD AUTO: 1.1 K/UL (ref 1–4.8)
LYMPHOCYTES NFR BLD: 17.4 % (ref 18–48)
MCH RBC QN AUTO: 25.6 PG (ref 27–31)
MCHC RBC AUTO-ENTMCNC: 30.2 G/DL (ref 32–36)
MCV RBC AUTO: 85 FL (ref 82–98)
MONOCYTES # BLD AUTO: 1.2 K/UL (ref 0.3–1)
MONOCYTES NFR BLD: 19.1 % (ref 4–15)
NEUTROPHILS # BLD AUTO: 3.8 K/UL (ref 1.8–7.7)
NEUTROPHILS NFR BLD: 60.2 % (ref 38–73)
NRBC BLD-RTO: 1 /100 WBC
PLATELET # BLD AUTO: 180 K/UL (ref 150–350)
PMV BLD AUTO: 12 FL (ref 9.2–12.9)
POCT GLUCOSE: 156 MG/DL (ref 70–110)
POCT GLUCOSE: 88 MG/DL (ref 70–110)
POTASSIUM SERPL-SCNC: 4.2 MMOL/L (ref 3.5–5.1)
RBC # BLD AUTO: 3.05 M/UL (ref 4–5.4)
SODIUM SERPL-SCNC: 140 MMOL/L (ref 136–145)
WBC # BLD AUTO: 6.32 K/UL (ref 3.9–12.7)

## 2020-11-06 PROCEDURE — 25000003 PHARM REV CODE 250: Mod: HCNC | Performed by: HOSPITALIST

## 2020-11-06 PROCEDURE — 97535 SELF CARE MNGMENT TRAINING: CPT | Mod: HCNC

## 2020-11-06 PROCEDURE — 85025 COMPLETE CBC W/AUTO DIFF WBC: CPT | Mod: HCNC

## 2020-11-06 PROCEDURE — 36415 COLL VENOUS BLD VENIPUNCTURE: CPT | Mod: HCNC

## 2020-11-06 PROCEDURE — 99239 PR HOSPITAL DISCHARGE DAY,>30 MIN: ICD-10-PCS | Mod: HCNC,,, | Performed by: HOSPITALIST

## 2020-11-06 PROCEDURE — 63600175 PHARM REV CODE 636 W HCPCS: Mod: HCNC | Performed by: HOSPITALIST

## 2020-11-06 PROCEDURE — 25000003 PHARM REV CODE 250: Mod: HCNC | Performed by: INTERNAL MEDICINE

## 2020-11-06 PROCEDURE — 80048 BASIC METABOLIC PNL TOTAL CA: CPT | Mod: HCNC

## 2020-11-06 PROCEDURE — 25000242 PHARM REV CODE 250 ALT 637 W/ HCPCS: Mod: HCNC | Performed by: HOSPITALIST

## 2020-11-06 PROCEDURE — 99239 HOSP IP/OBS DSCHRG MGMT >30: CPT | Mod: HCNC,,, | Performed by: HOSPITALIST

## 2020-11-06 RX ORDER — METOPROLOL TARTRATE 25 MG/1
25 TABLET, FILM COATED ORAL 2 TIMES DAILY
Qty: 60 TABLET | Refills: 2 | Status: SHIPPED | OUTPATIENT
Start: 2020-11-06 | End: 2021-02-03 | Stop reason: SDUPTHER

## 2020-11-06 RX ORDER — FERROUS SULFATE 325(65) MG
325 TABLET ORAL
Qty: 30 TABLET | Refills: 3 | Status: SHIPPED | OUTPATIENT
Start: 2020-11-06 | End: 2021-03-31 | Stop reason: SDUPTHER

## 2020-11-06 RX ORDER — METOPROLOL TARTRATE 25 MG/1
25 TABLET, FILM COATED ORAL 2 TIMES DAILY
Qty: 60 TABLET | Refills: 3 | Status: SHIPPED | OUTPATIENT
Start: 2020-11-06 | End: 2020-11-06 | Stop reason: HOSPADM

## 2020-11-06 RX ORDER — SENNOSIDES 8.6 MG/1
8.6 TABLET ORAL DAILY PRN
Status: DISCONTINUED | OUTPATIENT
Start: 2020-11-06 | End: 2020-11-06 | Stop reason: HOSPADM

## 2020-11-06 RX ORDER — NAPROXEN SODIUM 220 MG/1
81 TABLET, FILM COATED ORAL DAILY
Qty: 30 TABLET | Refills: 3 | Status: SHIPPED | OUTPATIENT
Start: 2020-11-06 | End: 2021-09-09 | Stop reason: SDUPTHER

## 2020-11-06 RX ADMIN — LEVOTHYROXINE SODIUM 75 MCG: 75 TABLET ORAL at 06:11

## 2020-11-06 RX ADMIN — METOPROLOL TARTRATE 25 MG: 25 TABLET, FILM COATED ORAL at 09:11

## 2020-11-06 RX ADMIN — SENNOSIDES 8.6 MG: 8.6 TABLET, FILM COATED ORAL at 06:11

## 2020-11-06 RX ADMIN — SODIUM CHLORIDE 125 MG: 0.9 INJECTION, SOLUTION INTRAVENOUS at 09:11

## 2020-11-06 RX ADMIN — LATANOPROST 1 DROP: 50 SOLUTION OPHTHALMIC at 09:11

## 2020-11-06 RX ADMIN — ATORVASTATIN CALCIUM 40 MG: 20 TABLET, FILM COATED ORAL at 09:11

## 2020-11-06 RX ADMIN — DORZOLAMIDE HYDROCHLORIDE 1 DROP: 20 SOLUTION/ DROPS OPHTHALMIC at 09:11

## 2020-11-06 NOTE — PLAN OF CARE
11/06/20 0826   Post-Acute Status   Post-Acute Authorization Home Health   Home Health Status Awaiting Internal Medical Clearance     [facility] setup, hh orders needed at [place].

## 2020-11-06 NOTE — PLAN OF CARE
Pt is ready for dc. Will dc to home with [facility]. Hospital follow up completed ,family will provide transportation.       11/06/20 1052   Final Note   Assessment Type Final Discharge Note   Anticipated Discharge Disposition Home-Health   Hospital Follow Up  Appt(s) scheduled? Yes   Post-Acute Status   Post-Acute Authorization Home Health   Discharge Delays None known at this time     Future Appointments   Date Time Provider Department Center   11/12/2020  9:30 AM Belen Bradley MD [facility]   12/15/2020  9:30 AM [facility]   12/23/2020 10:00 AM Belen Bradley MD [facility], MSN  Case Management  Ext [phone]

## 2020-11-06 NOTE — LETTER
November 12, 2020     Tiana Mead  [address] [place] [address]      Dear Mrs. Mead,     Welcome to [facility] Complex Care Management Program.  It was a pleasure talking with you today.  My name is Margy Jama, and I look forward to being your Care Manager.  My goal is to help you function at the healthiest and highest level possible.  You can contact me directly at [phone].    As an [facility] patient with [employer], some of the services we may be able to provide include:      Development of an individualized care plan with a Registered Nurse    Connection with a    Connection with available resources and services     Coordinate communication among your care team members    Provide coaching and education    Help you understand your doctors treatment plan   Help you obtain information about your insurance coverage.     All services provided by [facility] Complex Care Managers and other care team members are coordinated with and communicated to your primary care team.    As part of your enrollment, you will be receiving education materials and more information about these services in your [facility] account, by phone or through the mail.  If you do not wish to participate or receive information, please contact our office at [phone].      Sincerely,        Margy Jama, RN Case Manager  [facility]   Out-patient RN Complex Care Manager   [phone]

## 2020-11-06 NOTE — PLAN OF CARE
Pt continues to improve, will dc home with [facility]. CM will cont to follow.  Future Appointments   Date Time Provider Department Center   12/15/2020  9:30 AM [facility], [place] [facility] [place]   12/23/2020 10:00 AM Belen Bradley MD [facility]        11/06/20 0984   Discharge Reassessment   Assessment Type Discharge Planning Reassessment   Provided patient/caregiver education on the expected discharge date and the discharge plan Yes   Do you have any problems affording any of your prescribed medications? No   Discharge Plan A Home;Home Health   Discharge Plan B Home;Home Health   DME Needed Upon Discharge  none   Anticipated Discharge Disposition Home-Health   Can the patient/caregiver answer the patient profile reliably? Yes, cognitively intact   How does the patient rate their overall health at the present time? Fair   Describe the patient's ability to walk at the present time. Walks with the help of equipment   How often would a person be available to care for the patient? Whenever needed   Number of comorbid conditions (as recorded on the chart) Two   During the past month, has the patient often been bothered by feeling down, depressed or hopeless? No   During the past month, has the patient often been bothered by little interest or pleasure in doing things? No   Post-Acute Status   Post-Acute Authorization Home Health   Home Health Status Referrals Sent   Discharge Delays None known at this time   Christine Mahoney, MSN  Case Management  Ext [phone]

## 2020-11-06 NOTE — PLAN OF CARE
Problem: Diabetes Comorbidity  Goal: Blood Glucose Level Within Desired Range  Outcome: Met     Problem: Fall Injury Risk  Goal: Absence of Fall and Fall-Related Injury  Outcome: Met     Problem: Adult Inpatient Plan of Care  Goal: Optimal Comfort and Wellbeing  Outcome: Met  Pt is planning to discharge today, reviewed discharge instructions including follow up appointments, lab work and home health. Pts daughter is here with pt and is requesting information about home health, secure chat sent to [place] case management. I also provided her with the phone number to the home health agency so she can speak to the directly. Questions answered meds recd from bedside delivery, transport requested.

## 2020-11-06 NOTE — PT/OT/SLP PROGRESS
Occupational Therapy   Treatment    Name: Tiana Mead  MRN: [id number]  Admitting Diagnosis:  Acute on chronic blood loss anemia  2 Days Post-Op    Recommendations:     Discharge Recommendations: home with home health  Discharge Equipment Recommendations:  none  Barriers to discharge:  None    Assessment:     Tiana Mead is a 89 y.o. female with a medical diagnosis of Acute on chronic blood loss anemia.  She presents with SBA level of mobility with RW in room and for standing ADL tassks. Performance deficits affecting function are impaired endurance, impaired self care skills, impaired functional mobilty, weakness. Pt. Tolerated session well on this date,     Rehab Prognosis:  Good; patient would benefit from acute skilled OT services to address these deficits and reach maximum level of function.       Plan:     Patient to be seen 2 x/week to address the above listed problems via self-care/home management, therapeutic activities, therapeutic exercises  · Plan of Care Expires: 12/04/20  · Plan of Care Reviewed with: patient    Subjective     Pt. Reported she was feeling better today.     Pain/Comfort:  · Pain Rating 1: 0/10  · Pain Rating Post-Intervention 1: 0/10    Objective:     Communicated with: nurse prior to session.  Patient found supine with   upon OT entry to room.    General Precautions: Standard, fall   Orthopedic Precautions:N/A   Braces: N/A     Occupational Performance:     Bed Mobility:    · Patient completed Supine to Sit with modified independence  · Patient completed Sit to Supine with modified independence     Functional Mobility/Transfers:  · Patient completed Sit <> Stand Transfer with stand by assistance  with  rolling walker   · Patient completed Bed <> Chair Transfer using Stand Pivot technique with stand by assistance with rolling walker  · Functional Mobility: pt. Ambulated in room x ~ 50 feet with RW and S/SBA  · Pt. Ambulated to and from bathroom ~ 22 feet in each trial with SBA and  RW    Activities of Daily Living:  · Grooming: supervision in stand at sink to wash face and brush teeth      AMPAC 6 Click ADL: 19    Treatment & Education:  Pt. Performed 1 set 10 reps for all major planes of BUE/BLE  AROM in tolerable available planes x 1 set 10 reps while seated EOB    Patient left supine with call button in reach and nurse presentEducation:      GOALS:   Multidisciplinary Problems     Occupational Therapy Goals        Problem: Occupational Therapy Goal    Goal Priority Disciplines Outcome Interventions   Occupational Therapy Goal     OT, PT/OT Ongoing, Progressing    Description: Goals to be met by: 11/11/2020    Patient will increase functional independence with ADLs by performing:    Feeding with [place].  UE Dressing with [place].  LE Dressing with Stand-by Assistance.  Grooming while standing at sink with Stand-by Assistance.  Toileting from toilet with Stand-by Assistance for hygiene and clothing management.                      Time Tracking:     OT Date of Treatment: 11/06/20  OT Start Time: 0903  OT Stop Time: 0917  OT Total Time (min): 14 min    Billable Minutes:Self Care/Home Management 14    SHAKIR Murphy  11/6/2020

## 2020-11-06 NOTE — PLAN OF CARE
CM awaiting HH orders for HH agency, MD made aware, will pass on to CM on call if necessary.    Christine Mahoney, MSN  Case Management  Ext [phone]

## 2020-11-06 NOTE — PLAN OF CARE
Problem: Fall Injury Risk  Goal: Absence of Fall and Fall-Related Injury  Outcome: Ongoing, Progressing     Problem: Adult Inpatient Plan of Care  Goal: Plan of Care Review  Outcome: Ongoing, Progressing   Alert and orient times 4. Slept most of this shift. Ambulated in room this shift using walker with standby assistance.No sign of bleeding noted. VSS. No fall this shift

## 2020-11-06 NOTE — LETTER
November 11, 2020    Tiana Mead  [address] [facility] [id number]             [facility] [address] [place] [address] Dear Ms Mead,    I work with [facility] Outpatient Case Management Department. We received a referral to call you to discuss your medical history.These services are free of charge and are offered to [facility] patients who have recently been discharged from any of our facilities or who have complex medical conditions that may require the skill of a nurse to assist with management.             I am a Registered Nurse who specializes in connecting patients with available medical and financial resources as well as addressing any educational needs that may be indicated.      I attempted to reach you by telephone, but I was unsuccessful. Please call our department so that we can go over some questions with you regarding your health.    The Outpatient Case Management Department can be reached at [phone] from 8:00AM to 4:30 PM on Monday thru Friday. [facility] also has a program where a nurse is available 24/7 to answer questions or provide medical advice, their number is [phone].    Thanks,      Margy Jama, RN Case Manager  Outpatient Complex Case Management/Disease Management   [phone]

## 2020-11-09 ENCOUNTER — PATIENT OUTREACH (OUTPATIENT)
Dept: ADMINISTRATIVE | Facility: CLINIC | Age: 85
End: 2020-11-09

## 2020-11-09 NOTE — PROGRESS NOTES
Progress Note  Hospital Medicine  [facility], [place]       Patient Name: Tiana Mead  MRN:  [id number]  [place] [facility] [place] [facility] [place] Ronaldo Elise MD  Date of Admission:  11/3/2020     Length of Stay:  LOS: 3 days   Expected Discharge Date: 11/6/2020  Principal Problem:  Acute on chronic blood loss anemia     Subjective:     Interval History/Overnight Events:      Patient is feeling much better today. Patient's H/H stable; s/p 2 IV iron infusions; d/c home today              Review of Systems   Constitutional: + fatigue  HENT: Negative for sore throat, trouble swallowing.    Eyes: Negative for photophobia, visual disturbance.   Respiratory: +shortness of breath.    Cardiovascular: Negative for chest pain, palpitations, leg swelling.   Gastrointestinal: + abdominal pain, constipation,nausea, vomiting.   Endocrine: Negative for cold intolerance, heat intolerance.   Genitourinary: Negative for dysuria, frequency.   Musculoskeletal: Negative for arthralgias, myalgias.   Skin: Negative for rash, wound, erythema   Neurological: Negative for dizziness, syncope, ++ weakness, +light-headedness.   Psychiatric/Behavioral: Negative for confusion, hallucinations, anxiety    Objective:              Weight change:    Body mass index is 24.84 kg/m².       Physical Exam:  Constitutional: Well-developed, well-nourished, non-distressed, not diaphoretic.   HENT: NC/AT, external ears normal, oropharynx clear, MMM w/o exudates.   Eyes: PERRL, EOMI, conjunctiva pale, no discharge b/l, no scleral icterus   Neck: normal ROM, supple  CV: RRR, no m/r/g, no carotid bruits, +2 peripheral pulses.  Pulmonary/Chest wall: Breathing comfortably w/o distress, CTAB, no w/r/r, no crackles.  GI: Soft, non-tender, non-distended, (+) BS, (+) BM   Musculoskeletal: Normal ROM, no edema, no atrophy, no tenderness throughout   Neurological: AAO x 4, CN II-XI in tact, nl sensation, nl strength/tone   Skin: warm, dry, (-)  erythema, (-) rash, +pallor  Psych: normal mood and affect, normal behavior, thought content and judgement.    Labs:    Chemistries:   Recent Labs   Lab 11/03/20  1220 11/04/20 0514 11/05/20 0522 11/06/20  0342    138 140 140   K 4.1 3.9 3.7 4.2    105 106 108   CO2 25 25 26 27   BUN 28 25 23 21   CREATININE 1.8* 1.7* 1.6* 1.5*   CALCIUM 8.9 8.8 8.6* 8.4*   PROT 6.2  --   --   --    BILITOT 0.4  --   --   --    ALKPHOS 86  --   --   --    ALT 19  --   --   --    AST 31  --   --   --    MG  --  2.1  --   --    PHOS  --  3.3  --   --         WBC:   Recent Labs   Lab 11/03/20  1220 11/04/20 0514 11/05/20 0522 11/06/20  0342   WBC 7.17 6.02 5.28 6.32     Bands:     CBC/Anemia Labs: Coags:    Recent Labs   Lab 11/03/20  1220 11/04/20  0514 11/04/20  1730 11/05/20 0522 11/06/20  0342   WBC 7.17 6.02  --  5.28 6.32   HGB 6.0* 6.7* 8.5* 7.7* 7.8*   HCT 20.4* 21.9*  --  25.5* 25.8*    192  --  168 180   MCV 83 81*  --  84 85   RDW 14.8* 15.3*  --  15.4* 15.5*   IRON 19*  --   --   --   --    FERRITIN 8*  --   --   --   --    RETIC 2.0  --   --   --   --     Recent Labs   Lab 11/03/20  1514   INR 1.0   APTT 26.8        Diagnostic Results:    EGD 11/4     Impression:           - Normal esophagus.                         - Small hiatal hernia.                         - Normal stomach.                         - Normal examined duodenum.                         - No specimens collected.     Assessment and Plan     Hospital Course:    Ms. iTana Mead was admitted to Hospital Medicine for management of  Acute on chronic blood loss anemia     Active Hospital Problems    Diagnosis  POA    *Acute on chronic blood loss anemia [D62]  Yes    Iron deficiency anemia [D50.9]  Yes    Symptomatic anemia [D64.9]  Yes    Coronary artery disease involving native coronary artery of native heart without angina pectoris [I25.10]  Yes    Stage 4 chronic kidney disease [N18.4]  Yes    Chronic diastolic congestive  heart failure [I50.32]  Yes     Chronic    Long term current use of anticoagulant therapy, eliquis [Z79.01]  Not Applicable     Chronic    Type 2 diabetes mellitus with diabetic polyneuropathy, with long-term current use of insulin [E11.42, Z79.4]  Not Applicable     Chronic    Persistent atrial fibrillation [I48.19]  Yes     Chronic      Resolved Hospital Problems   No resolved problems to display.       Acute on chronic blood loss anemia  Symptomatic anemia  Iron deficiency anemia  -hbg noted to be 6, from prior 8.9 in 9/2020. Prior hbg 11 and 12 in 7/2020 and 5/2020. Consented for PRBCs and given 1 units on 11/3  - concern for chronic blood loss, likely from upper GI, possibly gastritis or ulcer (given symptoms of RUQ/ epigastric pressure and n/v). Mildly + hemoccult on ED rectal exam as per report , no active bleeding  - trend CBC q day  - hemoglobin less than 7 on 11/04/2020, received another unit of PRBCs   -anemia studies showing profound iron deficiency anemia  - GI consult. eval for EGD- was unrevealing overall.  Patient and family are deciding whether not they would like to pursue colonoscopy, which was offered.  Given concern for chronic blood loss anemia given profound iron deficiency anemia  - PPI stopped    - giving IV iron today and tomorrow      Persistent atrial fibrillation  Long term current use of anticoagulant therapy, eliquis  - cont home metoprolol but at lower dose  - stopping eliquis on d/c ; will start ASA  - tele monitoring      Chronic diastolic congestive heart failure  CAD  - cont home metoprolol but at lower dose  - hold other HTN meds given anemia, will redose/ restart in the AM based on BPs        Stage 4 chronic kidney disease  - Cr stable at baseline, 1.8     Type 2 diabetes mellitus with diabetic polyneuropathy, with long-term current use of insulin  - A1C 7.2 at goal  - detemir as above  and SSI, and redose insulin         Diet:  cardiac   GI PPx:    DVT PPx:  SCDs  Goals of  Care:  DNR DNI as per patient's wishes, form in the chart and media tab     High Risk Conditions:  Anemia      Disposition:  Home with [facility] with home health on 11/6. Possible outpatient C scope based on patient wishes

## 2020-11-09 NOTE — PHYSICIAN QUERY
PT Name: Tiana Mead  MR #: [id number]     Etiology of Condition Clarification      CDS/: Jud Falk RN             Contact information:[email or web address]  This form is a permanent document in the medical record.     Query Date: November 9, 2020    By submitting this query, we are merely seeking further clarification of documentation.  Please utilize your independent clinical judgment when addressing the question(s) below.     The Medical Record contains the following:    Clinical Information Location in Medical Record   Acute on chronic blood loss anemia    Denies melena or hematochezia, but has had blood on tissue while wiping, which was deemed to be due to anal fissure    -hbg noted to be 6, from prior 8.9 in 9/2020. Prior hbg 11 and 12 in 7/2020 and 5/2020. Consented for PRBCs and given 1 units on 11/3  - concern for chronic blood loss, likely from upper GI, possibly gastritis or ulcer (given symptoms of RUQ/ epigastric pressure and n/v). Mildly + hemoccult on ED rectal exam as per report , no active bleeding    Stage 4 CKD  Long current use of anticoagulant therapy, Eliquis    Iron studies are showing significantly profound iron deficiency anemia.  Patient placed on p.o. iron    Upper GI bleed  No indication of GI bleed    Giving IV iron today and tomorrow   H & P                          Hospital medicine 11/4    Gastroenterology 11/5    Hospital medicine 11/8     Please document your best medical opinion regarding the etiology of _Acute phase in the Acute on Chronic Blood loss anemia_ _____?       [   ] Anal Fissure   [   ] GI Bleed   [x   ] Anticoagulant use -Eliquis   [   ] Other etiology (please specify):___________________   [  ] Clinically Undetermined                Please document in your progress notes daily for the duration of treatment, until resolved, and include in your discharge summary.

## 2020-11-09 NOTE — PATIENT INSTRUCTIONS
Anemia, Iron Deficiency [Adult]  Anemia is a condition where the size or number of red blood cells in the body is reduced. Iron is needed in the diet to make red cells. The red blood cells carry oxygen to all parts of the body. Anemia limits the delivery of oxygen to where it is needed. This causes a feeling of being tired and run down. When anemia becomes severe, the skin becomes pale and there is shortness of breath with exertion, headaches, dizziness, drowsiness and fatigue.  The cause of your anemia is lack of iron in your body. This may occur due to blood loss (for example, heavy menstrual periods or bleeding from the stomach or intestines) or a poor diet (not eating enough iron-containing foods), inability to absorb iron from your diet, or pregnancy.  If the blood count is low enough, an IRON SUPPLEMENT will be prescribed. It usually takes about 2-3 months of treatment with iron supplements to correct an anemia. Severe cases of anemia requires a blood transfusion to rapidly correct symptoms and deliver more oxygen to the cells.  Home Care:  1) Increase the iron stores in your body by eating foods high in iron content. This is a natural way of building your blood cells back up again. Beef, liver, spinach and other dark green leafy vegetables, whole grain products, beans and nuts are all natural sources of iron.  2) If you are having symptoms of anemia listed above:  -- Do not overexert yourself.  -- Talk to your doctor before flying on an airplane or traveling to high altitudes.  Follow Up with your doctor in 2 months for a repeat red blood cell count, or as recommended by our staff, to be sure that the anemia has been corrected.  Get Prompt Medical Attention if any of the following occur or worsen:  -- Shortness of breath or chest pain  -- Dizziness or fainting  -- Vomiting blood or passing red or black-colored stool  © 9250-8241 [employer], [address] [facility], [place] [address]. All rights reserved.  This information is not intended as a substitute for professional medical care. Always follow your healthcare professional's instructions.

## 2020-11-09 NOTE — DISCHARGE SUMMARY
Discharge Summary  Hospital Medicine    Patient Name: Tiana Mead  MRN:  [id number]  Hospital Medicine Team: [facility] [place] [facility] X Ronaldo Elise MD  Date of Admission:  11/3/2020     Date of Discharge:  11/08/2020  Length of Stay:  LOS: 3 days   Principal Problem:  Acute on chronic blood loss anemia     Active Hospital Problems    Diagnosis  POA    *Acute on chronic blood loss anemia [D62]  Yes    Iron deficiency anemia [D50.9]  Yes    Symptomatic anemia [D64.9]  Yes    Coronary artery disease involving native coronary artery of native heart without angina pectoris [I25.10]  Yes    Stage 4 chronic kidney disease [N18.4]  Yes    Chronic diastolic congestive heart failure [I50.32]  Yes     Chronic    Long term current use of anticoagulant therapy, eliquis [Z79.01]  Not Applicable     Chronic    Type 2 diabetes mellitus with diabetic polyneuropathy, with long-term current use of insulin [E11.42, Z79.4]  Not Applicable     Chronic    Persistent atrial fibrillation [I48.19]  Yes     Chronic      Resolved Hospital Problems   No resolved problems to display.       History of Present Illness:      Ms. Tiana Mead is a 89 y.o. female with history of AFib on Eliquis, CKD stage 4, CHF, hx of COVID-12 infection now on RA/fully recovered, CAD, type 2 DM on insulin at home, presented to the ED on 11/03/2020, with progressive weakness, shortness of breath and abdominal pain.  Patient was seen in Internal Medicine Clinic earlier on the day presentation, and was sent to the emergency room for further evaluation.  Patient family note progressively worsening diffuse weakness and fatigue, over last several weeks, further worsening of the last couple of weeks.  Symptoms associated with lightheadedness, fatigue, increasing dyspnea on exertion and shortness of breath.  Patient has had intermittent epigastric/ right upper quadrant abdominal dull pain, associated intermittently with nausea and vomiting; patient has experience nausea  and vomiting after exertion. Prior to these symptoms, patient has been independent and doing all her ADLs, she lives with two daughters. Now patient gets SOB and tired while walking on the bathrooms. Denies melena or hematochezia, but has had blood on tissue while wiping, which was deemed to be due to anal fissure. Has been able to tolerate food intake well with no worsening pain post food. No hematemesis or hematuria. + lightheadedness but no syncope or reported chest pain.   Denies any f/c, volume overload, LE edema, hemoptysis. Pt on eliquis for Afib, prior documented hx of DVT as well, last took DOAC on 11/3 AM.      In the ED, HDS with stable VS. hbg noted to be 6, from prior 8.9 in 9/2020. Prior hbg 11 and 12 in 7/2020 and 5/2020. Consented for PRBCs and given 1 units. Admitted for further eval. Mildly + hemoccult on ED rectal exam as per report , no active bleeding       Hospital Course of Principle Problem Addressed:       Acute on chronic blood loss anemia  Symptomatic anemia  Iron deficiency anemia  -hbg noted to be 6, from prior 8.9 in 9/2020. Prior hbg 11 and 12 in 7/2020 and 5/2020. Consented for PRBCs and given 1 units on 11/3  - concern for chronic blood loss, likely from upper GI, possibly gastritis or ulcer (given symptoms of RUQ/ epigastric pressure and n/v). Mildly + hemoccult on ED rectal exam as per report , no active bleeding  - trend CBC q day  - hemoglobin less than 7 on 11/04/2020, received another unit of PRBCs   -anemia studies showing profound iron deficiency anemia  - GI consult. eval for EGD- was unrevealing overall.  Patient and family are deciding whether not they would like to pursue colonoscopy, which was offered.  Given concern for chronic blood loss anemia given profound iron deficiency anemia  - PPI stopped     - giving IV iron today and tomorrow      Persistent atrial fibrillation  Long term current use of anticoagulant therapy, eliquis  - cont home metoprolol but at lower  dose  - stopping eliquis on d/c ; will start ASA  - tele monitoring      Chronic diastolic congestive heart failure  CAD  - cont home metoprolol but at lower dose  - hold other HTN meds given anemia, will redose/ restart in the AM based on BPs        Stage 4 chronic kidney disease  - Cr stable at baseline, 1.8     Type 2 diabetes mellitus with diabetic polyneuropathy, with long-term current use of insulin  - A1C 7.2 at goal  - detemir as above  and SSI, and redose insulin         Diet:  cardiac   GI PPx:    DVT PPx:  SCDs  Goals of Care:  DNR DNI as per patient's wishes, form in the chart and media tab     High Risk Conditions:  Anemia        Disposition:  Home with fam with home health on 11/6. Possible outpatient C scope based on patient wishes          Other Medical Problems Addressed in the Hospital:         Significant Diagnostic Tests/Imaging:     Recent Labs   Lab 11/03/20  1220 11/04/20  0514 11/04/20  1730 11/05/20  0522 11/06/20  0342   WBC 7.17 6.02  --  5.28 6.32   HGB 6.0* 6.7* 8.5* 7.7* 7.8*   HCT 20.4* 21.9*  --  25.5* 25.8*    192  --  168 180     Recent Labs   Lab 11/03/20  1220 11/04/20  0514 11/05/20  0522 11/06/20  0342    138 140 140   K 4.1 3.9 3.7 4.2    105 106 108   CO2 25 25 26 27   BUN 28 25 23 21   CREATININE 1.8* 1.7* 1.6* 1.5*   CALCIUM 8.9 8.8 8.6* 8.4*   MG  --  2.1  --   --    PHOS  --  3.3  --   --    PROT 6.2  --   --   --    BILITOT 0.4  --   --   --    ALKPHOS 86  --   --   --    ALT 19  --   --   --    AST 31  --   --   --          Special Treatments/Procedures:         Discharge Medications:      Discharge Medication List as of 11/6/2020 12:50 PM      START taking these medications    Details   aspirin 81 MG Chew Chew and swallow 1 tablet (81 mg total) by mouth once daily., Starting Fri 11/6/2020, Normal      ferrous sulfate (FEOSOL) 325 mg (65 mg iron) Tab tablet Take 1 tablet (325 mg total) by mouth daily with breakfast., Starting Fri 11/6/2020, Normal          CONTINUE these medications which have CHANGED    Details   metoprolol tartrate (LOPRESSOR) 25 MG tablet Take 1 tablet (25 mg total) by mouth 2 (two) times daily., Starting Fri 11/6/2020, Until Sat 11/6/2021, Normal         CONTINUE these medications which have NOT CHANGED    Details   acetaminophen (TYLENOL) 500 MG tablet Take 1,000 mg by mouth daily as needed for Pain. , Historical Med      albuterol (ACCUNEB) 1.25 mg/3 mL Nebu Take 3 mLs (1.25 mg total) by nebulization every 6 (six) hours as needed. Rescue, Starting Thu 9/24/2020, Until Fri 9/24/2021, Normal      albuterol (PROVENTIL/VENTOLIN HFA) 90 mcg/actuation inhaler Inhale 2 puffs into the lungs every 4 (four) hours as needed., Starting Sun 4/5/2020, Normal      amLODIPine (NORVASC) 5 MG tablet TAKE 1 TABLET(5 MG) BY MOUTH EVERY MORNING, Normal      atorvastatin (LIPITOR) 40 MG tablet Take 1 tablet (40 mg total) by mouth once daily., Starting Wed 11/20/2019, Normal      blood sugar diagnostic (ONETOUCH ULTRA TEST) Strp Inject 1 strip into the skin 3 (three) times daily., Starting Thu 4/9/2020, Normal      clotrimazole-betamethasone 1-0.05% (LOTRISONE) cream Apply topically 2 (two) times daily. For intertriginous itching, Starting Wed 11/20/2019, Normal      diclofenac sodium (VOLTAREN) 1 % Gel Apply 2 g topically 2 (two) times daily., Starting Tue 8/11/2020, Until Mon 11/9/2020, Normal      dorzolamide (TRUSOPT) 2 % ophthalmic solution Place 1 drop into both eyes 2 (two) times daily., Starting 10/8/2013, Until Discontinued, Normal      fluticasone-salmeterol diskus inhaler 250-50 mcg Inhale 1 puff into the lungs 2 (two) times daily. Controller, Starting Fri 11/22/2019, Normal      furosemide (LASIX) 40 MG tablet Take 1 tablet (40 mg total) by mouth 2 (two) times daily., Starting Wed 11/20/2019, Normal      insulin aspart protamine-insulin aspart (NOVOLOG MIX 70-30FLEXPEN U-100) 100 unit/mL (70-30) InPn pen INJECT 10 UNITS UNDER THE SKIN ONCE DAILY EVERY  "MORNING BEFORE BREAKFAST, Normal      isosorbide mononitrate (IMDUR) 60 MG 24 hr tablet TAKE ONE TABLET BY MOUTH EVERY MORNING FOR HEART, TO PREVENT CHEST PAIN AND REDUCE SHORTNESS OF BREATH, Normal      lancets (TRUEPLUS LANCETS) 33 gauge Misc Apply 1 lancet topically 3 (three) times daily., Starting Thu 4/9/2020, Normal      latanoprost 0.005 % ophthalmic solution INSTILL 1 DROP IN BOTH EYES EVERY EVENING, Normal      levothyroxine (SYNTHROID) 75 MCG tablet Take 1 tablet (75 mcg total) by mouth before breakfast., Starting Wed 11/20/2019, Normal      meclizine (ANTIVERT) 12.5 mg tablet Take 1 tablet (12.5 mg total) by mouth 3 (three) times daily as needed for Dizziness., Starting Thu 1/16/2020, Normal      multivit-mineral-iron-lutein (CENTRUM SILVER ULTRA WOMEN'S) Tab Take 1 tablet by mouth once daily., Until Discontinued, Historical Med      nitroGLYCERIN (NITROSTAT) 0.4 MG SL tablet ONE TABLET UNDER THE TONGUE EVERY 5 MINUTES AS NEEDED FOR CHEST PAIN, Normal      nystatin (MYCOSTATIN) powder Apply topically 4 (four) times daily., Starting Fri 9/13/2019, Normal      ondansetron (ZOFRAN) 4 MG tablet TAKE ONE TABLET BY MOUTH EVERY 12 HOURS AS NEEDED FOR NAUSEA OR DIZZINESS, Normal      pen needle, diabetic (BD ULTRA-FINE SHORT PEN NEEDLE) 31 gauge x 5/16" Ndle USE WITH INSULIN PENS twice daily or  AS DIRECTED, Normal      polyethylene glycol (GLYCOLAX) 17 gram/dose powder Take 17 g by mouth daily as needed (CONSTIPATION)., Historical Med      psyllium (METAMUCIL) powder Take 1 packet by mouth daily as needed (CONSTIPATION)., Historical Med      triamcinolone acetonide 0.025% (KENALOG) 0.025 % Oint Apply topically 2 (two) times daily., Starting Thu 1/9/2020, Normal         STOP taking these medications       ELIQUIS 2.5 mg Tab Comments:   Reason for Stopping:               Discharge Diet:cardiac diet    Activity: activity as tolerated    Discharge Condition: Good    [place] [facility] [place]    Time spent on " the discharge of the patient including review of hospital course with the patient. reviewing discharge medications and arranging follow-up care 35 minutes.  Patient was seen and examined on the date of discharge and determined to be suitable for discharge.    Future Appointments   Date Time Provider Department Center   11/12/2020  9:30 AM Belen Bradley MD [facility]   12/15/2020  9:30 AM [facility]   12/23/2020 10:00 AM Belen Bradley MD [facility] Gonz[place] Elise MD  Medical Director [facility] Medicine  Spectra:  [phone]  Pager: [phone]

## 2020-11-09 NOTE — PLAN OF CARE
[facility]    HOME HEALTH ORDERS  FACE TO FACE ENCOUNTER    Patient Name: Tiana Mead  YOB: 1925    PCP: Belen Wood MD   PCP Address: [address] / [place] [address]  PCP Phone Number: [phone]  PCP Fax: [phone]    Encounter Date: 11/08/2020    Admit to Home Health    Diagnoses:  Active Hospital Problems    Diagnosis  POA    *Acute on chronic blood loss anemia [D62]  Yes    Iron deficiency anemia [D50.9]  Yes    Symptomatic anemia [D64.9]  Yes    Coronary artery disease involving native coronary artery of native heart without angina pectoris [I25.10]  Yes    Stage 4 chronic kidney disease [N18.4]  Yes    Chronic diastolic congestive heart failure [I50.32]  Yes     Chronic    Long term current use of anticoagulant therapy, eliquis [Z79.01]  Not Applicable     Chronic    Type 2 diabetes mellitus with diabetic polyneuropathy, with long-term current use of insulin [E11.42, Z79.4]  Not Applicable     Chronic    Persistent atrial fibrillation [I48.19]  Yes     Chronic      Resolved Hospital Problems   No resolved problems to display.       Future Appointments   Date Time Provider Department Center   11/12/2020  9:30 AM Belen Wood MD [facility]   12/15/2020  9:30 AM [facility]   12/23/2020 10:00 AM Belen Wood MD [facility]     Follow-up Information     [facility] In 1 day.    Specialty: Home Health Services  Contact information:  [address]  [address]  [facility] [address]  [phone]                     I have seen and examined this patient face to face today. My clinical findings that support the need for the home health skilled services and home bound status are the following:  Weakness/numbness causing balance and gait disturbance due to Weakness/Debility making it taxing to leave home.    Allergies:  Review of patient's allergies indicates:   Allergen Reactions     Codeine Itching and Nausea Only              Diet: cardiac diet    Activities: activity as tolerated    Nursing:   SN to complete comprehensive assessment including routine vital signs. Instruct on disease process and s/s of complications to report to MD. Review/verify medication list sent home with the patient at time of discharge  and instruct patient/caregiver as needed. Frequency may be adjusted depending on start of care date.    Notify MD if SBP > 160 or < 90; DBP > 90 or < 50; HR > 120 or < 50; Temp > 101; Other:         CONSULTS:    Physical Therapy to evaluate and treat. Evaluate for home safety and equipment needs; Establish/upgrade home exercise program. Perform / instruct on therapeutic exercises, gait training, transfer training, and Range of Motion.  Occupational Therapy to evaluate and treat. Evaluate home environment for safety and equipment needs. Perform/Instruct on transfers, ADL training, ROM, and therapeutic exercises.              Medications: Review discharge medications with patient and family and provide education.      Discharge Medication List as of 11/6/2020 12:50 PM      START taking these medications    Details   aspirin 81 MG Chew Chew and swallow 1 tablet (81 mg total) by mouth once daily., Starting Fri 11/6/2020, Normal      ferrous sulfate (FEOSOL) 325 mg (65 mg iron) Tab tablet Take 1 tablet (325 mg total) by mouth daily with breakfast., Starting Fri 11/6/2020, Normal         CONTINUE these medications which have CHANGED    Details   metoprolol tartrate (LOPRESSOR) 25 MG tablet Take 1 tablet (25 mg total) by mouth 2 (two) times daily., Starting Fri 11/6/2020, Until Sat 11/6/2021, Normal         CONTINUE these medications which have NOT CHANGED    Details   acetaminophen (TYLENOL) 500 MG tablet Take 1,000 mg by mouth daily as needed for Pain. , Historical Med      albuterol (ACCUNEB) 1.25 mg/3 mL Nebu Take 3 mLs (1.25 mg total) by nebulization every 6 (six) hours as needed. Rescue,  Starting Thu 9/24/2020, Until Fri 9/24/2021, Normal      albuterol (PROVENTIL/VENTOLIN HFA) 90 mcg/actuation inhaler Inhale 2 puffs into the lungs every 4 (four) hours as needed., Starting Sun 4/5/2020, Normal      amLODIPine (NORVASC) 5 MG tablet TAKE 1 TABLET(5 MG) BY MOUTH EVERY MORNING, Normal      atorvastatin (LIPITOR) 40 MG tablet Take 1 tablet (40 mg total) by mouth once daily., Starting Wed 11/20/2019, Normal      blood sugar diagnostic (ONETOUCH ULTRA TEST) Strp Inject 1 strip into the skin 3 (three) times daily., Starting Thu 4/9/2020, Normal      clotrimazole-betamethasone 1-0.05% (LOTRISONE) cream Apply topically 2 (two) times daily. For intertriginous itching, Starting Wed 11/20/2019, Normal      diclofenac sodium (VOLTAREN) 1 % Gel Apply 2 g topically 2 (two) times daily., Starting Tue 8/11/2020, Until Mon 11/9/2020, Normal      dorzolamide (TRUSOPT) 2 % ophthalmic solution Place 1 drop into both eyes 2 (two) times daily., Starting 10/8/2013, Until Discontinued, Normal      fluticasone-salmeterol diskus inhaler 250-50 mcg Inhale 1 puff into the lungs 2 (two) times daily. Controller, Starting Fri 11/22/2019, Normal      furosemide (LASIX) 40 MG tablet Take 1 tablet (40 mg total) by mouth 2 (two) times daily., Starting Wed 11/20/2019, Normal      insulin aspart protamine-insulin aspart (NOVOLOG MIX 70-30FLEXPEN U-100) 100 unit/mL (70-30) InPn pen INJECT 10 UNITS UNDER THE SKIN ONCE DAILY EVERY MORNING BEFORE BREAKFAST, Normal      isosorbide mononitrate (IMDUR) 60 MG 24 hr tablet TAKE ONE TABLET BY MOUTH EVERY MORNING FOR HEART, TO PREVENT CHEST PAIN AND REDUCE SHORTNESS OF BREATH, Normal      lancets (TRUEPLUS LANCETS) 33 gauge Misc Apply 1 lancet topically 3 (three) times daily., Starting Thu 4/9/2020, Normal      latanoprost 0.005 % ophthalmic solution INSTILL 1 DROP IN BOTH EYES EVERY EVENING, Normal      levothyroxine (SYNTHROID) 75 MCG tablet Take 1 tablet (75 mcg total) by mouth before  "breakfast., Starting Wed 11/20/2019, Normal      meclizine (ANTIVERT) 12.5 mg tablet Take 1 tablet (12.5 mg total) by mouth 3 (three) times daily as needed for Dizziness., Starting Thu 1/16/2020, Normal      multivit-mineral-iron-lutein (CENTRUM SILVER ULTRA WOMEN'S) Tab Take 1 tablet by mouth once daily., Until Discontinued, Historical Med      nitroGLYCERIN (NITROSTAT) 0.4 MG SL tablet ONE TABLET UNDER THE TONGUE EVERY 5 MINUTES AS NEEDED FOR CHEST PAIN, Normal      nystatin (MYCOSTATIN) powder Apply topically 4 (four) times daily., Starting Fri 9/13/2019, Normal      ondansetron (ZOFRAN) 4 MG tablet TAKE ONE TABLET BY MOUTH EVERY 12 HOURS AS NEEDED FOR NAUSEA OR DIZZINESS, Normal      pen needle, diabetic (BD ULTRA-FINE SHORT PEN NEEDLE) 31 gauge x 5/16" Ndle USE WITH INSULIN PENS twice daily or  AS DIRECTED, Normal      polyethylene glycol (GLYCOLAX) 17 gram/dose powder Take 17 g by mouth daily as needed (CONSTIPATION)., Historical Med      psyllium (METAMUCIL) powder Take 1 packet by mouth daily as needed (CONSTIPATION)., Historical Med      triamcinolone acetonide 0.025% (KENALOG) 0.025 % Oint Apply topically 2 (two) times daily., Starting Thu 1/9/2020, Normal         STOP taking these medications       ELIQUIS 2.5 mg Tab Comments:   Reason for Stopping:               I certify that this patient is confined to her home and needs intermittent skilled nursing care, physical therapy and occupational therapy.        "

## 2020-11-12 ENCOUNTER — OFFICE VISIT (OUTPATIENT)
Dept: OPTOMETRY | Facility: CLINIC | Age: 85
End: 2020-11-12
Payer: MEDICARE

## 2020-11-12 ENCOUNTER — OFFICE VISIT (OUTPATIENT)
Dept: INTERNAL MEDICINE | Facility: CLINIC | Age: 85
End: 2020-11-12
Payer: MEDICARE

## 2020-11-12 VITALS
DIASTOLIC BLOOD PRESSURE: 64 MMHG | HEIGHT: 68 IN | HEART RATE: 70 BPM | BODY MASS INDEX: 25.53 KG/M2 | SYSTOLIC BLOOD PRESSURE: 132 MMHG | WEIGHT: 168.44 LBS | OXYGEN SATURATION: 98 %

## 2020-11-12 DIAGNOSIS — H01.001 BLEPHARITIS OF UPPER EYELIDS OF BOTH EYES, UNSPECIFIED TYPE: Primary | ICD-10-CM

## 2020-11-12 DIAGNOSIS — H01.004 BLEPHARITIS OF UPPER EYELIDS OF BOTH EYES, UNSPECIFIED TYPE: Primary | ICD-10-CM

## 2020-11-12 DIAGNOSIS — E61.1 IRON DEFICIENCY: ICD-10-CM

## 2020-11-12 DIAGNOSIS — H04.123 DRY EYE SYNDROME OF BOTH EYES: ICD-10-CM

## 2020-11-12 DIAGNOSIS — R74.8 ELEVATED LIPASE: ICD-10-CM

## 2020-11-12 DIAGNOSIS — K59.00 CONSTIPATION, UNSPECIFIED CONSTIPATION TYPE: ICD-10-CM

## 2020-11-12 DIAGNOSIS — Z09 HOSPITAL DISCHARGE FOLLOW-UP: Primary | ICD-10-CM

## 2020-11-12 DIAGNOSIS — D62 ACUTE ON CHRONIC BLOOD LOSS ANEMIA: ICD-10-CM

## 2020-11-12 DIAGNOSIS — R19.5 OCCULT GI BLEEDING: ICD-10-CM

## 2020-11-12 PROCEDURE — 99999 PR PBB SHADOW E&M-EST. PATIENT-LVL II: ICD-10-PCS | Mod: PBBFAC,HCNC,, | Performed by: OPTOMETRIST

## 2020-11-12 PROCEDURE — 99499 RISK ADDL DX/OHS AUDIT: ICD-10-PCS | Mod: S$GLB,,, | Performed by: INTERNAL MEDICINE

## 2020-11-12 PROCEDURE — 99999 PR PBB SHADOW E&M-EST. PATIENT-LVL III: CPT | Mod: PBBFAC,HCNC,, | Performed by: INTERNAL MEDICINE

## 2020-11-12 PROCEDURE — 3288F FALL RISK ASSESSMENT DOCD: CPT | Mod: HCNC,CPTII,S$GLB, | Performed by: INTERNAL MEDICINE

## 2020-11-12 PROCEDURE — 99213 PR OFFICE/OUTPT VISIT, EST, LEVL III, 20-29 MIN: ICD-10-PCS | Mod: HCNC,S$GLB,, | Performed by: INTERNAL MEDICINE

## 2020-11-12 PROCEDURE — 3288F PR FALLS RISK ASSESSMENT DOCUMENTED: ICD-10-PCS | Mod: HCNC,CPTII,S$GLB, | Performed by: INTERNAL MEDICINE

## 2020-11-12 PROCEDURE — 99499 UNLISTED E&M SERVICE: CPT | Mod: S$GLB,,, | Performed by: INTERNAL MEDICINE

## 2020-11-12 PROCEDURE — 1101F PT FALLS ASSESS-DOCD LE1/YR: CPT | Mod: HCNC,CPTII,S$GLB, | Performed by: INTERNAL MEDICINE

## 2020-11-12 PROCEDURE — 99213 OFFICE O/P EST LOW 20 MIN: CPT | Mod: HCNC,S$GLB,, | Performed by: INTERNAL MEDICINE

## 2020-11-12 PROCEDURE — 3072F LOW RISK FOR RETINOPATHY: CPT | Mod: HCNC,S$GLB,, | Performed by: INTERNAL MEDICINE

## 2020-11-12 PROCEDURE — 1126F AMNT PAIN NOTED NONE PRSNT: CPT | Mod: HCNC,S$GLB,, | Performed by: OPTOMETRIST

## 2020-11-12 PROCEDURE — 1125F AMNT PAIN NOTED PAIN PRSNT: CPT | Mod: HCNC,S$GLB,, | Performed by: INTERNAL MEDICINE

## 2020-11-12 PROCEDURE — 1126F PR PAIN SEVERITY QUANTIFIED, NO PAIN PRESENT: ICD-10-PCS | Mod: HCNC,S$GLB,, | Performed by: OPTOMETRIST

## 2020-11-12 PROCEDURE — 92012 INTRM OPH EXAM EST PATIENT: CPT | Mod: HCNC,S$GLB,, | Performed by: OPTOMETRIST

## 2020-11-12 PROCEDURE — 1125F PR PAIN SEVERITY QUANTIFIED, PAIN PRESENT: ICD-10-PCS | Mod: HCNC,S$GLB,, | Performed by: INTERNAL MEDICINE

## 2020-11-12 PROCEDURE — 99999 PR PBB SHADOW E&M-EST. PATIENT-LVL III: ICD-10-PCS | Mod: PBBFAC,HCNC,, | Performed by: INTERNAL MEDICINE

## 2020-11-12 PROCEDURE — 99999 PR PBB SHADOW E&M-EST. PATIENT-LVL II: CPT | Mod: PBBFAC,HCNC,, | Performed by: OPTOMETRIST

## 2020-11-12 PROCEDURE — 92012 PR EYE EXAM, EST PATIENT,INTERMED: ICD-10-PCS | Mod: HCNC,S$GLB,, | Performed by: OPTOMETRIST

## 2020-11-12 PROCEDURE — 3072F PR LOW RISK FOR RETINOPATHY: ICD-10-PCS | Mod: HCNC,S$GLB,, | Performed by: INTERNAL MEDICINE

## 2020-11-12 PROCEDURE — 1101F PR PT FALLS ASSESS DOC 0-1 FALLS W/OUT INJ PAST YR: ICD-10-PCS | Mod: HCNC,CPTII,S$GLB, | Performed by: INTERNAL MEDICINE

## 2020-11-12 RX ORDER — ERYTHROMYCIN 5 MG/G
OINTMENT OPHTHALMIC 3 TIMES DAILY
Qty: 1 TUBE | Refills: 0 | Status: SHIPPED | OUTPATIENT
Start: 2020-11-12 | End: 2021-02-04 | Stop reason: ALTCHOICE

## 2020-11-12 NOTE — PATIENT INSTRUCTIONS
Iron-Deficiency Anemia (Adult)  Red blood cells carry oxygen to the tissues of your body. Anemia is a condition in which you have too few red blood cells. You need iron to make red cells. Anemia makes you feel tired and run down. When anemia becomes severe, your skin becomes pale. You may feel short of breath after physical activity. Other symptoms include:  · Headaches  · Dizziness  · Leg cramps with physical activity  · Drowsiness  · Restless legs  Your anemia is caused by not having enough iron in your body. This may be because of:  · Loss of blood. This can be caused by heavy menstrual periods. It can also be caused by bleeding from the stomach or intestines.  · Poor diet. You may not be eating enough foods that contain iron.  · Inability to absorb iron from the foods you eat  · Pregnancy  If your blood count is low enough, your healthcare provider may prescribe an iron supplement. It usually takes about 2 to 3 months of treatment with iron supplements to correct anemia. Severe cases of anemia need a blood transfusion to quickly ease symptoms and deliver more oxygen to the cells.  Home care  Follow these guidelines when caring for yourself at home:  · Eat foods high in iron. This will boost the amount of iron stored in your body. It is a natural way to build up the number of blood cells. Good sources of iron include beef, liver, spinach and other dark green leafy vegetables, whole grains, beans, and nuts.  · Do not overexert yourself.  · Talk with your healthcare provider before traveling by air or traveling to high altitudes.  Follow-up care  Follow up with your healthcare provider in 2 months, or as advised. This is to have another red blood cell count to be sure your anemia has been fixed.  When to seek medical advice  Call your healthcare provider right away if any of these occur:  · Shortness of breath or chest pain  · Dizziness or fainting  · Vomiting blood or passing red or black-colored stool   Date  Last Reviewed: 2/25/2016  © 1097-9942 [employer]. [address] [place] [address], [place] [address]. All rights reserved. This information is not intended as a substitute for professional medical care. Always follow your healthcare professional's instructions.        Anemia  Anemia is a condition that occurs when your body does not have enough healthy red blood cells (RBCs). RBCs are the parts of your blood that carry oxygen throughout your body. A protein called hemoglobin allows your RBCs to absorb and release oxygen. Without enough RBCs or hemoglobin, your body doesn't get enough oxygen. Symptoms of anemia may then occur.    What are the symptoms of anemia?  Some people with anemia have no symptoms. But most people have symptoms that range from mild to severe. These can include:  · Tiredness (fatigue)  · Weakness  · Pale skin  · Shortness of breath  · Dizziness or fainting  · Rapid heartbeat  · Trouble doing normal amounts of activity  · Jaundice (yellowing of your eyes, skin, or mouth; dark urine)  What causes anemia?  Anemia can occur when your body:  · Loses too much blood  · Does not make enough RBCs  · Destroys your RBCs at a faster rate than it can replace them  · Does not make a normal amount of hemoglobin in your RBCs  These problems can occur for many reasons, including:  · A condition that you are born with (congenital or inherited), such as sickle cell disease or thalassemia  · Heavy bleeding for any reason, including injury, surgery, childbirth, or even heavy menstrual periods  · Being low in certain nutrients, such as iron, folate, or vitamin B12, possibly from a poor diet or a condition like celiac disease or Crohn's disease  · Certain chronic conditions like diabetes, arthritis, or kidney disease  · Certain chronic infections like tuberculosis or HIV  · Exposure to certain medicines, such as those used for chemotherapy  There are different types of anemia. Your healthcare provider can tell  you more about the type of anemia you have and what may have caused it.  How is anemia diagnosed?  To diagnose anemia, your healthcare provider orders blood tests. These can include:  · Complete blood cell count (CBC). This test measures the amounts of the different types of blood cells.  · Blood smear. This test checks the size and shape of your blood cells. To do the test, a drop of your blood is viewed under a microscope. A stain is used to make the blood cells easier to see.  · Iron studies. These tests measure the amount of iron in your blood. Your body needs iron to make hemoglobin in your RBCs.  · Vitamin B12 and folate studies. These tests check for some of the components that help give RBCs a normal size and shape.  · Reticulocyte count. This test measures the amount of new RBCs that your bone marrow makes.  · Hemoglobin electrophoresis. This test checks for problems with your hemoglobin in RBCs.  How is anemia treated?  Treatment for anemia is based on the type of anemia, its cause, and the severity of your symptoms. Treatments may include:  · Diet changes. This involves increasing the amount of certain nutrients in your diet, such as iron, vitamin B12, or folate. Your healthcare provider may also prescribe nutrient supplements.  · Medicines. Certain medicines treat the cause of your anemia. Others help build new RBCs or relieve symptoms. If a medicine is the cause of your anemia, you may need to stop or change it.  · Blood transfusions. Replacing some of your blood can increase the number of healthy RBCs in your body.  · Surgery. In some cases, your doctor may do surgery to treat the underlying cause of anemia. If you need surgery, your healthcare provider will explain the procedure and outline the risks and benefits for you.  What are the long-term concerns?  If you have a certain type of anemia, you can expect a full recovery after treatment. If you have other types of anemia (especially a type you're  born with), you will need to manage it for life. Your doctor can tell you more.  Date Last Reviewed: 12/1/2016  © 7576-1102 [employer]. [address] [place] [address], [place] [address]. All rights reserved. This information is not intended as a substitute for professional medical care. Always follow your healthcare professional's instructions.

## 2020-11-12 NOTE — PROGRESS NOTES
HPI     MsBraxton Mead was referred by Dr Prado for a irritated eye.    Patient complains of OS has been irritated and tearing for the past 3   days. No pain. No photophobia.  H/o fuch's, iritis, dry eyes, glaucoma.  S/p CE IOL OU    Would patient like a refraction today? no     Paitent denies diplopia, headaches, flashes/floaters, itching,  burning,   and pain.    (+)drops    (+)Diabetes  LBS   Hemoglobin A1C       Date                     Value               Ref Range             Status                09/17/2020               7.2 (H)             4.0 - 5.6 %           Final                  03/12/2020               7.0 (H)             4.0 - 5.6 %           Final                   11/13/2019               7.4 (H)             4.0 - 5.6 %           Final                  OCULAR HISTORY  Last Eye Exam: 7/21/20 with Dr Prado  (+)eye surgery, PCIOL OU    (+)diagnosed or treated for any eye conditions or diseases, glaucoma    FAMILY HISTORY  (-)Glaucoma        Last edited by Reny Merritt, OD on 11/12/2020 12:42 PM. (History)            Assessment /Plan     For exam results, see Encounter Report.    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  Dispense: 1 Tube; Refill: 0      Educated pt on findings. Blepharitis OS>>OD. Rx erythromycin valeria BID-TID along eyelid margin for 7 days then d/c. After d/c, begin J&J baby shampoo/ocusoft lid scrubs Qday OU.   ALEXI OS>OD. Recommend PF ATs QID + valeria/gel QHS.   If symptoms worsen or don't improve, RTC. Monitor.     NOTE: Discussed to continue IOP lowering gtts as directed. Continue glaucoma management and care with Dr. Prado.       RTC with me prn, with Dr. Prado as directed.

## 2020-11-12 NOTE — PATIENT INSTRUCTIONS
FUROSEMIDE (LASIX) fluid pill take town in the morning and one in the afternoon until leg swelling is at an acceptable level. Follow daily weight.

## 2020-11-12 NOTE — PROGRESS NOTES
Outpatient Care Management  Initial Patient Assessment    Patient: Tiana Mead  MRN: [id number]  Date of Service: 11/06/2020  Completed by: Margy Jaam RN  Referral Date: 11/05/2020  Program: Case Management (High Risk)    Reason for Visit   Patient presents with    OPCM Chart Review     11/6/20    OPCM RN First Assessment Attempt     11/11/20    OPCM Enrollment Call     11/12/20    Initial Assessment     11/12/20    OPCM Welcome Letter     11/12/20    PHQ-9     11/12/20    Plan Of Care     11/12/20       Brief Summary: Contacted patient to complete initial assessment for OPCM. Patient is agreeable to OPCM program. Patient was referred to OPCM for Anemia. Patient lives with her 2 adult daughters and they assist her with ADLs/IADLs when needed. Patient states that she has been feeling okay. She had a follow-up appt yesterday to have her blood work done and waiting on the results. Patient would like additional information on anemia and diet with foods high in iron. Will send patient education on anemia and high iron diet. Patient has a history of diabetes, CHF and hypertension. She reports that she does check BG twice per day, weight and BP daily. She is already keeping a log of all results for MD appts. She states that her weight recently went up slightly and MD has already adjusted her Lasix. Educated patient on when to contact MD for weight gain, swelling or shortness of breath. Patient verbalized understanding. Patient is currently receiving HH at this time. Patient is able to manage her medications but she states that her pillbox is falling apart. Will mail patient a new pillbox.     Reviewed [employer] benefits with patient. Patient would like more information on OTC and transportation benefits. Will mail patient OTC catalog along with [employer] transportation information.       Reminded patient of upcoming appointments, verbalized awareness. Discussed completing follow up call with patient, who is in  agreement with call in 2 weeks.          Assessment Documentation     OPCM Initial Assessment    Involvement of Care  Do I have permission to speak with other family members about your care?: Yes (Comment: Yolanda Jones or Rose Marie Mead (Daughters))  Assessment completed by: Patient  Identified Areas of Need  Advanced Care Planning: No  Housing: no  Medication Adherence: No  *Active medication list was reviewed and reconciled with patient and/or caregiver:   Nutrition: no  Lab Adherence: no  Depression: No  Cognitive/Behavioral Health: no  Communication: no  Health Literacy: no  Fall risk?: No  Equipment/Supplies/Services: no          Problem List and History     Problems Addressed This Visit    Atrial Fibrillation: Not identified by patient as current problem  COPD: Not identified by patient as current problem  Heart Failure: Identified as current problem  Diabetes: Not identified by patient as current problem  Anemia: Identified as current problem  Chronic Kidney Disease: Not identified by patient as current problem  Hyperlipidemia: Not identified by patient as current problem  Glaucoma: Not identified by patient as current problem  Heart Disease: Not identified by patient as current problem  Asthma: Not identified by patient as current problem         Reviewed medical and social history with patient and/or caregiver. A complex care plan was discussed and completed today, with input from patient and/or caregiver.    Patient Instructions     Instructions were provided via the [employer] patient resources and are available for the patient to view on the patient portal, if active.       Clinical Reference Documents Added to Patient Instructions       Document    ANEMIA (ENGLISH)    ANEMIA, IRON-DEFICIENCY (ADULT) (ENGLISH)        Follow-up call scheduled for 11/27/20, patient is in agreement with plan.    Todays OPCM Self-Management Care Plan was developed with the patients/caregivers input and was based on identified  barriers from todays assessment.  Goals were written today with the patient/caregiver and the patient has agreed to work towards these goals to improve his/her overall well-being. Patient verbalized understanding of the care plan, goals, and all of today's instructions. Encouraged patient/caregiver to communicate with his/her physician and health care team about health conditions and the treatment plan.  Provided my contact information today and encouraged patient/caregiver to call me with any questions as needed.

## 2020-11-16 ENCOUNTER — TELEPHONE (OUTPATIENT)
Dept: PODIATRY | Facility: CLINIC | Age: 85
End: 2020-11-16

## 2020-11-16 NOTE — TELEPHONE ENCOUNTER
I spoke with Yolanda will will call back to reschedule       ----- Message from Emilie Casey sent at 11/16/2020 11:46 AM CST -----  Pt daughter is calling about her mom appt on 11/18 at 3:30 wants to know if you have something early asking for a callback.    Contact info [phone]

## 2020-11-16 NOTE — TELEPHONE ENCOUNTER
Duplicate message     ----- Message from Renetta Mendes sent at 11/14/2020  1:14 PM CST -----  Regarding: Reqeusting an appt please  Pt called to request an appt for diabetic foot care for sooner appt than Dec. Pt would like to request an Thursday or Friday please anytime.       Pt can be reached at [phone]      Thank you!

## 2020-12-07 ENCOUNTER — OUTPATIENT CASE MANAGEMENT (OUTPATIENT)
Dept: ADMINISTRATIVE | Facility: OTHER | Age: 85
End: 2020-12-07

## 2020-12-10 ENCOUNTER — TELEPHONE (OUTPATIENT)
Dept: INTERNAL MEDICINE | Facility: CLINIC | Age: 85
End: 2020-12-10

## 2020-12-10 NOTE — TELEPHONE ENCOUNTER
----- Message from Jojo Og sent at 12/10/2020  8:25 AM CST -----  Contact: self [phone]  Pt is calling to follow up on a message sent on 12/9 for her leg swelling. Pt states she did not receive a call back from the office. Please call and advise.

## 2020-12-11 ENCOUNTER — OFFICE VISIT (OUTPATIENT)
Dept: PODIATRY | Facility: CLINIC | Age: 85
End: 2020-12-11
Payer: MEDICARE

## 2020-12-11 ENCOUNTER — OFFICE VISIT (OUTPATIENT)
Dept: INTERNAL MEDICINE | Facility: CLINIC | Age: 85
End: 2020-12-11
Payer: MEDICARE

## 2020-12-11 VITALS
HEART RATE: 56 BPM | RESPIRATION RATE: 18 BRPM | SYSTOLIC BLOOD PRESSURE: 117 MMHG | WEIGHT: 168 LBS | DIASTOLIC BLOOD PRESSURE: 64 MMHG | HEIGHT: 68 IN | BODY MASS INDEX: 25.46 KG/M2

## 2020-12-11 VITALS
WEIGHT: 169.31 LBS | DIASTOLIC BLOOD PRESSURE: 58 MMHG | BODY MASS INDEX: 25.66 KG/M2 | HEART RATE: 72 BPM | HEIGHT: 68 IN | SYSTOLIC BLOOD PRESSURE: 110 MMHG

## 2020-12-11 DIAGNOSIS — E11.42 DIABETIC POLYNEUROPATHY ASSOCIATED WITH TYPE 2 DIABETES MELLITUS: Primary | ICD-10-CM

## 2020-12-11 DIAGNOSIS — R60.0 BILATERAL LEG EDEMA: Primary | ICD-10-CM

## 2020-12-11 DIAGNOSIS — L84 CORN OR CALLUS: ICD-10-CM

## 2020-12-11 DIAGNOSIS — I73.9 PERIPHERAL VASCULAR DISEASE: ICD-10-CM

## 2020-12-11 DIAGNOSIS — B35.1 ONYCHOMYCOSIS DUE TO DERMATOPHYTE: ICD-10-CM

## 2020-12-11 PROCEDURE — 3072F PR LOW RISK FOR RETINOPATHY: ICD-10-PCS | Mod: HCNC,S$GLB,, | Performed by: PODIATRIST

## 2020-12-11 PROCEDURE — 11721 DEBRIDE NAIL 6 OR MORE: CPT | Mod: 59,Q9,HCNC,S$GLB | Performed by: PODIATRIST

## 2020-12-11 PROCEDURE — 1125F PR PAIN SEVERITY QUANTIFIED, PAIN PRESENT: ICD-10-PCS | Mod: HCNC,S$GLB,, | Performed by: PODIATRIST

## 2020-12-11 PROCEDURE — 99499 NO LOS: ICD-10-PCS | Mod: HCNC,S$GLB,, | Performed by: PODIATRIST

## 2020-12-11 PROCEDURE — 99999 PR PBB SHADOW E&M-EST. PATIENT-LVL III: ICD-10-PCS | Mod: PBBFAC,HCNC,, | Performed by: PODIATRIST

## 2020-12-11 PROCEDURE — 11721 PR DEBRIDEMENT OF NAILS, 6 OR MORE: ICD-10-PCS | Mod: 59,Q9,HCNC,S$GLB | Performed by: PODIATRIST

## 2020-12-11 PROCEDURE — 1125F AMNT PAIN NOTED PAIN PRSNT: CPT | Mod: HCNC,S$GLB,, | Performed by: PODIATRIST

## 2020-12-11 PROCEDURE — 11057 PR TRIM BENIGN HYPERKERATOTIC SKIN LESION,>4: ICD-10-PCS | Mod: Q9,HCNC,S$GLB, | Performed by: PODIATRIST

## 2020-12-11 PROCEDURE — 1125F AMNT PAIN NOTED PAIN PRSNT: CPT | Mod: HCNC,S$GLB,, | Performed by: INTERNAL MEDICINE

## 2020-12-11 PROCEDURE — 99999 PR PBB SHADOW E&M-EST. PATIENT-LVL II: CPT | Mod: PBBFAC,HCNC,, | Performed by: INTERNAL MEDICINE

## 2020-12-11 PROCEDURE — 99499 UNLISTED E&M SERVICE: CPT | Mod: HCNC,S$GLB,, | Performed by: PODIATRIST

## 2020-12-11 PROCEDURE — 99999 PR PBB SHADOW E&M-EST. PATIENT-LVL III: CPT | Mod: PBBFAC,HCNC,, | Performed by: PODIATRIST

## 2020-12-11 PROCEDURE — 99214 OFFICE O/P EST MOD 30 MIN: CPT | Mod: HCNC,S$GLB,, | Performed by: INTERNAL MEDICINE

## 2020-12-11 PROCEDURE — 11057 PARNG/CUTG B9 HYPRKR LES >4: CPT | Mod: Q9,HCNC,S$GLB, | Performed by: PODIATRIST

## 2020-12-11 PROCEDURE — 3072F PR LOW RISK FOR RETINOPATHY: ICD-10-PCS | Mod: HCNC,S$GLB,, | Performed by: INTERNAL MEDICINE

## 2020-12-11 PROCEDURE — 1159F MED LIST DOCD IN RCRD: CPT | Mod: HCNC,S$GLB,, | Performed by: INTERNAL MEDICINE

## 2020-12-11 PROCEDURE — 3072F LOW RISK FOR RETINOPATHY: CPT | Mod: HCNC,S$GLB,, | Performed by: PODIATRIST

## 2020-12-11 PROCEDURE — 99214 PR OFFICE/OUTPT VISIT, EST, LEVL IV, 30-39 MIN: ICD-10-PCS | Mod: HCNC,S$GLB,, | Performed by: INTERNAL MEDICINE

## 2020-12-11 PROCEDURE — 1125F PR PAIN SEVERITY QUANTIFIED, PAIN PRESENT: ICD-10-PCS | Mod: HCNC,S$GLB,, | Performed by: INTERNAL MEDICINE

## 2020-12-11 PROCEDURE — 99999 PR PBB SHADOW E&M-EST. PATIENT-LVL II: ICD-10-PCS | Mod: PBBFAC,HCNC,, | Performed by: INTERNAL MEDICINE

## 2020-12-11 PROCEDURE — 1159F PR MEDICATION LIST DOCUMENTED IN MEDICAL RECORD: ICD-10-PCS | Mod: HCNC,S$GLB,, | Performed by: INTERNAL MEDICINE

## 2020-12-11 PROCEDURE — 3072F LOW RISK FOR RETINOPATHY: CPT | Mod: HCNC,S$GLB,, | Performed by: INTERNAL MEDICINE

## 2020-12-11 RX ORDER — FUROSEMIDE 40 MG/1
40 TABLET ORAL 2 TIMES DAILY
Qty: 180 TABLET | Refills: 3 | Status: SHIPPED | OUTPATIENT
Start: 2020-12-11 | End: 2020-12-30

## 2020-12-11 RX ORDER — LEVOTHYROXINE SODIUM 75 UG/1
75 TABLET ORAL
Qty: 90 TABLET | Refills: 3 | Status: SHIPPED | OUTPATIENT
Start: 2020-12-11 | End: 2021-09-09 | Stop reason: SDUPTHER

## 2020-12-11 NOTE — TELEPHONE ENCOUNTER
Care Due:                  Date            Visit Type   Department     Provider  --------------------------------------------------------------------------------                                HOSPITAL     [facility] INTERNAL  Last Visit: 11-      FOLLOW UP    MEDICINE       SHERLY MARVIN                                           [facility] INTERNAL  Next Visit: 12-      None         MEDICINE       SHERLY MARVIN                                                            Last  Test          Frequency    Reason                     Performed    Due Date  --------------------------------------------------------------------------------    Lipid Panel.  12 months..  atorvastatin.............  11- 11-    TSH.........  12 months..  levothyroxine............  03- 03-    Powered by [employer]. Reference number: [id number]. 12/11/2020 11:23:47 AM   CST

## 2020-12-11 NOTE — PROGRESS NOTES
Tiana Mead presents today to urgent care for:  Leg Swelling      Edema  This is a chronic problem. The current episode started more than 1 year ago. The problem occurs daily. The problem has been waxing and waning. Associated symptoms include arthralgias (knees) and joint swelling (both knees). Pertinent negatives include no abdominal pain, anorexia, chest pain, chills, congestion, coughing, diaphoresis, fatigue, nausea, numbness, vomiting or weakness. The symptoms are aggravated by standing. Treatments tried: lasix 40mg chronic.       Past medical, social, family and surgical history was reviewed and updated today as needed. See encounter for details.     Review of Systems   Constitutional: Negative for chills, diaphoresis and fatigue.   HENT: Negative for congestion.    Respiratory: Negative for cough.    Cardiovascular: Negative for chest pain.   Gastrointestinal: Negative for abdominal pain, anorexia, nausea and vomiting.   Musculoskeletal: Positive for arthralgias (knees) and joint swelling (both knees).   Neurological: Negative for weakness and numbness.       Vitals:    20 0951   BP: (!) 110/58   Pulse: 72   Body mass index is 25.74 kg/m².   Physical Exam  Constitutional:       General: She is not in acute distress.     Appearance: Normal appearance. She is normal weight. She is not ill-appearing.   Cardiovascular:      Rate and Rhythm: Normal rate and regular rhythm.   Pulmonary:      Effort: Pulmonary effort is normal.      Breath sounds: Normal breath sounds.   Musculoskeletal:      Right lower le+ Pitting Edema present.      Left lower le+ Pitting Edema present.   Neurological:      Mental Status: She is alert.          Assessment/plan:   1. Bilateral leg edema  This is a chronic issue.  She is rather sedentary in the day time.  Her edema gets better over night. She does have stockings but she is not elevating her legs in the day time.   This was a 25 minute visit.  Greater than 50% of  today's visit was time spent on counseling and coordination of care.   More medications isn't the answer here and there is little need in doing another extensive work up. She's had numerous echos and a pet stress done ~1year ago which was normal.   Wear compression stockings daily.   Elevate feet to the level of the heart while resting in the day time.   Lots of reassurances

## 2020-12-14 ENCOUNTER — TELEPHONE (OUTPATIENT)
Dept: OPHTHALMOLOGY | Facility: CLINIC | Age: 85
End: 2020-12-14

## 2020-12-14 NOTE — TELEPHONE ENCOUNTER
----- Message from Марина Carlson sent at 12/14/2020  2:50 PM CST -----  Contact: Pt @ [phone]  Pt would like a call back from [facility] about her appt scheduled for March.

## 2020-12-15 ENCOUNTER — PATIENT MESSAGE (OUTPATIENT)
Dept: OTHER | Facility: OTHER | Age: 85
End: 2020-12-15

## 2020-12-15 ENCOUNTER — LAB VISIT (OUTPATIENT)
Dept: LAB | Facility: HOSPITAL | Age: 85
End: 2020-12-15
Attending: INTERNAL MEDICINE
Payer: MEDICARE

## 2020-12-15 DIAGNOSIS — Z79.4 TYPE 2 DIABETES MELLITUS WITH DIABETIC POLYNEUROPATHY, WITH LONG-TERM CURRENT USE OF INSULIN: Chronic | ICD-10-CM

## 2020-12-15 DIAGNOSIS — N18.30 STAGE 3 CHRONIC KIDNEY DISEASE: ICD-10-CM

## 2020-12-15 DIAGNOSIS — D64.9 ANEMIA, UNSPECIFIED TYPE: ICD-10-CM

## 2020-12-15 DIAGNOSIS — E11.42 TYPE 2 DIABETES MELLITUS WITH DIABETIC POLYNEUROPATHY, WITH LONG-TERM CURRENT USE OF INSULIN: Chronic | ICD-10-CM

## 2020-12-15 LAB
ALBUMIN SERPL BCP-MCNC: 3.3 G/DL (ref 3.5–5.2)
ALP SERPL-CCNC: 138 U/L (ref 55–135)
ALT SERPL W/O P-5'-P-CCNC: 14 U/L (ref 10–44)
ANION GAP SERPL CALC-SCNC: 7 MMOL/L (ref 8–16)
AST SERPL-CCNC: 24 U/L (ref 10–40)
BASOPHILS # BLD AUTO: 0.06 K/UL (ref 0–0.2)
BASOPHILS NFR BLD: 1.3 % (ref 0–1.9)
BILIRUB SERPL-MCNC: 0.6 MG/DL (ref 0.1–1)
BUN SERPL-MCNC: 20 MG/DL (ref 10–30)
CALCIUM SERPL-MCNC: 8.9 MG/DL (ref 8.7–10.5)
CHLORIDE SERPL-SCNC: 103 MMOL/L (ref 95–110)
CO2 SERPL-SCNC: 27 MMOL/L (ref 23–29)
CREAT SERPL-MCNC: 1.6 MG/DL (ref 0.5–1.4)
DIFFERENTIAL METHOD: ABNORMAL
EOSINOPHIL # BLD AUTO: 0.1 K/UL (ref 0–0.5)
EOSINOPHIL NFR BLD: 1.8 % (ref 0–8)
ERYTHROCYTE [DISTWIDTH] IN BLOOD BY AUTOMATED COUNT: 21.3 % (ref 11.5–14.5)
EST. GFR  (AFRICAN AMERICAN): 31.3 ML/MIN/1.73 M^2
EST. GFR  (NON AFRICAN AMERICAN): 27.2 ML/MIN/1.73 M^2
GLUCOSE SERPL-MCNC: 184 MG/DL (ref 70–110)
HCT VFR BLD AUTO: 31 % (ref 37–48.5)
HGB BLD-MCNC: 9.2 G/DL (ref 12–16)
IMM GRANULOCYTES # BLD AUTO: 0.01 K/UL (ref 0–0.04)
IMM GRANULOCYTES NFR BLD AUTO: 0.2 % (ref 0–0.5)
LYMPHOCYTES # BLD AUTO: 0.6 K/UL (ref 1–4.8)
LYMPHOCYTES NFR BLD: 13.3 % (ref 18–48)
MCH RBC QN AUTO: 26.6 PG (ref 27–31)
MCHC RBC AUTO-ENTMCNC: 29.7 G/DL (ref 32–36)
MCV RBC AUTO: 90 FL (ref 82–98)
MONOCYTES # BLD AUTO: 0.7 K/UL (ref 0.3–1)
MONOCYTES NFR BLD: 16.2 % (ref 4–15)
NEUTROPHILS # BLD AUTO: 3 K/UL (ref 1.8–7.7)
NEUTROPHILS NFR BLD: 67.2 % (ref 38–73)
NRBC BLD-RTO: 0 /100 WBC
PLATELET # BLD AUTO: 138 K/UL (ref 150–350)
PMV BLD AUTO: 12.5 FL (ref 9.2–12.9)
POTASSIUM SERPL-SCNC: 4.5 MMOL/L (ref 3.5–5.1)
PROT SERPL-MCNC: 6.1 G/DL (ref 6–8.4)
RBC # BLD AUTO: 3.46 M/UL (ref 4–5.4)
SODIUM SERPL-SCNC: 137 MMOL/L (ref 136–145)
WBC # BLD AUTO: 4.45 K/UL (ref 3.9–12.7)

## 2020-12-15 PROCEDURE — 85025 COMPLETE CBC W/AUTO DIFF WBC: CPT | Mod: HCNC

## 2020-12-15 PROCEDURE — 83036 HEMOGLOBIN GLYCOSYLATED A1C: CPT | Mod: HCNC

## 2020-12-15 PROCEDURE — 36415 COLL VENOUS BLD VENIPUNCTURE: CPT | Mod: HCNC,PN

## 2020-12-15 PROCEDURE — 80053 COMPREHEN METABOLIC PANEL: CPT | Mod: HCNC

## 2020-12-15 NOTE — PROGRESS NOTES
Subjective:      Patient ID: Tiana Mead is a 89 y.o. female.    Chief Complaint: PCP (Belen Bradley MD 11/12/20), Diabetic Foot Exam, Nail Care, and Foot Pain (leg pain )    Tiana is a 89 y.o. female who presents to the clinic for evaluation and treatment of high risk feet. Tiana has a past medical history of Allergy, Anemia, Arthritis, Asthma, CHF (congestive heart failure) (5/2/2017), Chronic kidney disease, Degenerative disc disease, Diabetes mellitus type II, Essential hypertension (7/3/2012), Glaucoma, History of pseudogout (8/23/2012), Hyperlipidemia, Hypertension, Iritis, Myocardial infarction, Pneumonia, and Thyroid disease. The patient's chief complaint is long, thick toenails and calluses on both feet. Routine trimming helps. No other pedal concerns today.  This patient has documented high risk feet requiring routine maintenance secondary to diabetes mellitis and those secondary complications of diabetes, as mentioned..    PCP: Belen Bradley MD    Date Last Seen by PCP:   Chief Complaint   Patient presents with    PCP     Belen Bradley MD 11/12/20    Diabetic Foot Exam    Nail Care    Foot Pain     leg pain          Current shoe gear:  Dm shoes      Hemoglobin A1C   Date Value Ref Range Status   09/17/2020 7.2 (H) 4.0 - 5.6 % Final     Comment:     ADA Screening Guidelines:  5.7-6.4%  Consistent with prediabetes  >or=6.5%  Consistent with diabetes  High levels of fetal hemoglobin interfere with the HbA1C  assay. Heterozygous hemoglobin variants (HbS, HgC, etc)do  not significantly interfere with this assay.   However, presence of multiple variants may affect accuracy.     03/12/2020 7.0 (H) 4.0 - 5.6 % Final     Comment:     ADA Screening Guidelines:  5.7-6.4%  Consistent with prediabetes  >or=6.5%  Consistent with diabetes  High levels of fetal hemoglobin interfere with the HbA1C  assay. Heterozygous hemoglobin variants (HbS, HgC, etc)do  not significantly interfere with  this assay.   However, presence of multiple variants may affect accuracy.     11/13/2019 7.4 (H) 4.0 - 5.6 % Final     Comment:     ADA Screening Guidelines:  5.7-6.4%  Consistent with prediabetes  >or=6.5%  Consistent with diabetes  High levels of fetal hemoglobin interfere with the HbA1C  assay. Heterozygous hemoglobin variants (HbS, HgC, etc)do  not significantly interfere with this assay.   However, presence of multiple variants may affect accuracy.               Patient Active Problem List   Diagnosis    Hyperlipemia, mixed    Generalized osteoarthritis of multiple sites    Chronic iritis - Left Eye    Osteoarthritis of both knees    Chondrocalcinosis    Peripheral angiopathy    Hyperuricemia    Helicobacter pylori gastritis    Old MI (myocardial infarction), 2013    Persistent atrial fibrillation    Type 2 diabetes mellitus with diabetic polyneuropathy, with long-term current use of insulin    Long term current use of anticoagulant therapy, eliquis    History of chest pain at rest    Fuchs' corneal dystrophy    Pericardial effusion    H/O Deep vein thrombosis (DVT)    Aortic atherosclerosis    Chronic diastolic congestive heart failure    Type 2 diabetes mellitus with diabetic nephropathy, with long-term current use of insulin    Diabetic polyneuropathy associated with type 2 diabetes mellitus    Primary hypothyroidism    Moderate persistent asthma without complication    Primary open angle glaucoma (POAG) of both eyes, moderate stage    Pulmonary hypertension    Non-rheumatic tricuspid valve insufficiency    Tortuous aorta    Chest pain at rest    Allergic conjunctivitis of both eyes    Status post cataract extraction and insertion of intraocular lens    Chest pain, rule out acute myocardial infarction    Pseudogout, knees    Effusion, left knee    Angina at rest    Asthma exacerbation in COPD    Dry eye syndrome of both eyes    Stage 4 chronic kidney disease    Acute on  chronic blood loss anemia, Sept 2020 anticoagulation discontinued    Symptomatic anemia    Coronary artery disease involving native coronary artery of native heart without angina pectoris    Iron deficiency anemia       Current Outpatient Medications on File Prior to Visit   Medication Sig Dispense Refill    acetaminophen (TYLENOL) 500 MG tablet Take 1,000 mg by mouth daily as needed for Pain.       albuterol (ACCUNEB) 1.25 mg/3 mL Nebu Take 3 mLs (1.25 mg total) by nebulization every 6 (six) hours as needed. Rescue 1 Box 6    albuterol (PROVENTIL/VENTOLIN HFA) 90 mcg/actuation inhaler Inhale 2 puffs into the lungs every 4 (four) hours as needed. 18 g 2    amLODIPine (NORVASC) 5 MG tablet TAKE 1 TABLET(5 MG) BY MOUTH EVERY MORNING 90 tablet 3    aspirin 81 MG Chew Chew and swallow 1 tablet (81 mg total) by mouth once daily. 30 tablet 3    atorvastatin (LIPITOR) 40 MG tablet Take 1 tablet (40 mg total) by mouth once daily. 90 tablet 3    blood sugar diagnostic (ONETOUCH ULTRA TEST) Strp Inject 1 strip into the skin 3 (three) times daily. (Patient taking differently: Inject 1 strip into the skin 2 (two) times a day. ) 300 each 4    clotrimazole-betamethasone 1-0.05% (LOTRISONE) cream Apply topically 2 (two) times daily. For intertriginous itching (Patient taking differently: Apply topically 2 (two) times daily as needed. For intertriginous itching) 45 g 3    dorzolamide (TRUSOPT) 2 % ophthalmic solution Place 1 drop into both eyes 2 (two) times daily. 10 mL 4    erythromycin (ROMYCIN) ophthalmic ointment Place into the left eye 3 (three) times daily. 1 Tube 0    ferrous sulfate (FEOSOL) 325 mg (65 mg iron) Tab tablet Take 1 tablet (325 mg total) by mouth daily with breakfast. 30 tablet 3    fluticasone-salmeterol diskus inhaler 250-50 mcg Inhale 1 puff into the lungs 2 (two) times daily. Controller 60 each 11    insulin aspart protamine-insulin aspart (NOVOLOG MIX 70-30FLEXPEN U-100) 100 unit/mL  "(70-30) InPn pen INJECT 10 UNITS UNDER THE SKIN ONCE DAILY EVERY MORNING BEFORE BREAKFAST (Patient taking differently: INJECT 10 UNITS UNDER THE SKIN ONCE DAILY EVERY MORNING BEFORE BREAKFAST. MAY INJECT 5 UNITS AT BEDTIME IF NEEDED) 15 mL 11    isosorbide mononitrate (IMDUR) 60 MG 24 hr tablet TAKE ONE TABLET BY MOUTH EVERY MORNING FOR HEART, TO PREVENT CHEST PAIN AND REDUCE SHORTNESS OF BREATH 90 tablet 3    lancets (TRUEPLUS LANCETS) 33 gauge Misc Apply 1 lancet topically 3 (three) times daily. (Patient taking differently: Apply 1 lancet topically 2 (two) times a day. ) 300 each 3    latanoprost 0.005 % ophthalmic solution INSTILL 1 DROP IN BOTH EYES EVERY EVENING 10 mL 12    meclizine (ANTIVERT) 12.5 mg tablet Take 1 tablet (12.5 mg total) by mouth 3 (three) times daily as needed for Dizziness. 20 tablet 2    metoprolol tartrate (LOPRESSOR) 25 MG tablet Take 1 tablet (25 mg total) by mouth 2 (two) times daily. 60 tablet 2    multivit-mineral-iron-lutein (CENTRUM SILVER ULTRA WOMEN'S) Tab Take 1 tablet by mouth once daily.      nitroGLYCERIN (NITROSTAT) 0.4 MG SL tablet ONE TABLET UNDER THE TONGUE EVERY 5 MINUTES AS NEEDED FOR CHEST PAIN 25 tablet 6    nystatin (MYCOSTATIN) powder Apply topically 4 (four) times daily. (Patient taking differently: Apply topically 4 (four) times daily as needed (ITCH). ) 60 g 3    ondansetron (ZOFRAN) 4 MG tablet TAKE ONE TABLET BY MOUTH EVERY 12 HOURS AS NEEDED FOR NAUSEA OR DIZZINESS 30 tablet 0    pen needle, diabetic (BD ULTRA-FINE SHORT PEN NEEDLE) 31 gauge x 5/16" Ndle USE WITH INSULIN PENS twice daily or  AS DIRECTED 200 each 3    polyethylene glycol (GLYCOLAX) 17 gram/dose powder Take 17 g by mouth daily as needed (CONSTIPATION).      psyllium (METAMUCIL) powder Take 1 packet by mouth daily as needed (CONSTIPATION).      triamcinolone acetonide 0.025% (KENALOG) 0.025 % Oint Apply topically 2 (two) times daily. (Patient taking differently: Apply topically 2 " (two) times daily as needed. ) 80 g 2    diclofenac sodium (VOLTAREN) 1 % Gel Apply 2 g topically 2 (two) times daily. 100 g 0     No current facility-administered medications on file prior to visit.        Review of patient's allergies indicates:   Allergen Reactions    Codeine      Other reaction(s): Itching  Other reaction(s): Nausea       Past Surgical History:   Procedure Laterality Date    CARDIAC CATHETERIZATION      CATARACT EXTRACTION W/  INTRAOCULAR LENS IMPLANT Left n/a    CATARACT EXTRACTION W/  INTRAOCULAR LENS IMPLANT Right 10/8/2018    With Femtosecond LASER assist (Dr. Henson)    CHOLECYSTECTOMY      ESOPHAGOGASTRODUODENOSCOPY N/A 11/4/2020    Procedure: EGD (ESOPHAGOGASTRODUODENOSCOPY);  Surgeon: Tomás Mccall MD;  Location: [address]);  Service: Endoscopy;  Laterality: N/A;    EYE SURGERY      gall stone      HYSTERECTOMY      VARICOSE VEIN SURGERY         Family History   Problem Relation Age of Onset    Diabetes Mother     Hypertension Mother     Glaucoma Mother     Hypertension Father     Diabetes Son     No Known Problems Daughter     Diabetes Daughter         borderline    No Known Problems Son     Melanoma Neg Hx        Social History     Socioeconomic History    Marital status:      Spouse name: Not on file    Number of children: Not on file    Years of education: Not on file    Highest education level: Not on file   Occupational History    Not on file   Social Needs    Financial resource strain: Not hard at all    Food insecurity     Worry: Never true     Inability: Never true    Transportation needs     Medical: No     Non-medical: No   Tobacco Use    Smoking status: Never Smoker    Smokeless tobacco: Never Used   Substance and Sexual Activity    Alcohol use: No    Drug use: No    Sexual activity: Never   Lifestyle    Physical activity     Days per week: 0 days     Minutes per session: 0 min    Stress: Rather much   Relationships    Social  "connections     Talks on phone: More than three times a week     Gets together: Twice a week     Attends Sikh service: More than 4 times per year     Active member of club or organization: Yes     Attends meetings of clubs or organizations: 1 to 4 times per year     Relationship status:    Other Topics Concern    Are you pregnant or think you may be? Not Asked    Breast-feeding Not Asked   Social History Narrative    Not on file           Review of Systems   Constitution: Negative for chills, decreased appetite and fever.   Cardiovascular: Negative for chest pain, claudication and leg swelling.   Respiratory: Negative for cough.    Skin: Positive for dry skin and nail changes. Negative for color change, flushing, itching, poor wound healing and rash.   Musculoskeletal: Negative for falls, joint pain, joint swelling and myalgias.   Gastrointestinal: Negative for nausea and vomiting.   Neurological: Negative for loss of balance, numbness and paresthesias.           Objective:       Vitals:    12/11/20 0909   BP: 117/64   Pulse: (!) 56   Resp: 18   Weight: 76.2 kg (168 lb)   Height: 5' 8" (1.727 m)   PainSc:   5   PainLoc: Foot        Physical Exam  Vitals signs and nursing note reviewed.   Constitutional:       Appearance: She is well-developed.   Cardiovascular:      Comments: Dorsalis pedis and posterior tibial pulses are diminished Bilaterally. Toes are cool to touch. Feet are warm proximally.There is decreased digital hair. Skin is atrophic, slightly hyperpigmented, and mildly edematous      Musculoskeletal: Normal range of motion.         General: No tenderness.      Comments: Adequate joint range of motion without pain, limitation, nor crepitation Bilateral feet and ankle joints. Muscle strength is 5/5 in all groups bilaterally.      Flexible pes planus foot type w/ medial arch collapse and mild gastroc equinus      Skin:     General: Skin is warm and dry.      Findings: No ecchymosis, erythema or " lesion.      Comments: Nails x10 are elongated by  4-9 mm's, thickened by 2-5 mm's, dystrophic, and are darkened in  coloration . Xerosis Bilaterally. No open lesions noted.    Hyperkeratotic tissue noted to lateral 5th toe b/l, distal 3rd toe b/l, medial L 5th toe      Neurological:      Mental Status: She is alert and oriented to person, place, and time.      Comments: Rockledge-Yolande 5.07 monofilamant testing is diminished Ney feet. Sharp/dull sensation diminished Bilaterally. Light touch absent Bilaterally.       Psychiatric:         Behavior: Behavior normal.               Assessment:       Encounter Diagnoses   Name Primary?    Diabetic polyneuropathy associated with type 2 diabetes mellitus Yes    Peripheral vascular disease     Onychomycosis due to dermatophyte     Corn or callus          Plan:       Tiana was seen today for pcp, diabetic foot exam, nail care and foot pain.    Diagnoses and all orders for this visit:    Diabetic polyneuropathy associated with type 2 diabetes mellitus    Peripheral vascular disease    Onychomycosis due to dermatophyte    Corn or callus      I counseled the patient on her conditions, their implications and medical management.    Shoe inspection. Diabetic Foot Education. Patient reminded of the importance of good nutrition and blood sugar control to help prevent podiatric complications of diabetes. Patient instructed on proper foot hygeine. We discussed wearing proper shoe gear, daily foot inspections, never walking without protective shoe gear, never putting sharp instruments to feet    - With patient's permission, nails were aggressively reduced and debrided x 10 to their soft tissue attachment mechanically and with electric , removing all offending nail and debris. Patient relates relief following the procedure. She will continue to monitor the areas daily, inspect her feet, wear protective shoe gear when ambulatory, moisturizer to maintain skin integrity and  follow in this office in approximately 2-3 months, sooner p.r.n.    - After cleansing the  area w/ alcohol prep pad the above mentioned hyperkeratosis was trimmed utilizing No 15 scapel, to a smooth base with out incident. Patient tolerated this  well and reported comfort to the area of x6    - Return to clinic in 3m or sooner if problems arise

## 2020-12-16 LAB
ESTIMATED AVG GLUCOSE: 117 MG/DL (ref 68–131)
HBA1C MFR BLD HPLC: 5.7 % (ref 4–5.6)

## 2020-12-21 DIAGNOSIS — E11.42 TYPE 2 DIABETES MELLITUS WITH DIABETIC POLYNEUROPATHY, WITH LONG-TERM CURRENT USE OF INSULIN: Primary | Chronic | ICD-10-CM

## 2020-12-21 DIAGNOSIS — Z79.4 TYPE 2 DIABETES MELLITUS WITH DIABETIC POLYNEUROPATHY, WITH LONG-TERM CURRENT USE OF INSULIN: Primary | Chronic | ICD-10-CM

## 2020-12-21 RX ORDER — INSULIN ASPART 100 [IU]/ML
INJECTION, SUSPENSION SUBCUTANEOUS
Qty: 15 ML | Refills: 11 | OUTPATIENT
Start: 2020-12-21

## 2020-12-21 NOTE — TELEPHONE ENCOUNTER
No new care gaps identified.  Powered by [employer]. Reference number: [id number]. 12/21/2020 2:50:26 PM   CST

## 2020-12-21 NOTE — TELEPHONE ENCOUNTER
Called patient to schedule she said she wants to talk to you about it at her appointment Wednesday.

## 2020-12-22 ENCOUNTER — HOSPITAL ENCOUNTER (EMERGENCY)
Facility: HOSPITAL | Age: 85
Discharge: HOME | End: 2020-12-22
Attending: EMERGENCY MEDICINE
Payer: MEDICARE

## 2020-12-22 ENCOUNTER — NURSE TRIAGE (OUTPATIENT)
Dept: ADMINISTRATIVE | Facility: CLINIC | Age: 85
End: 2020-12-22

## 2020-12-22 ENCOUNTER — TELEPHONE (OUTPATIENT)
Dept: INTERNAL MEDICINE | Facility: CLINIC | Age: 85
End: 2020-12-22

## 2020-12-22 VITALS
OXYGEN SATURATION: 100 % | RESPIRATION RATE: 21 BRPM | BODY MASS INDEX: 25.08 KG/M2 | DIASTOLIC BLOOD PRESSURE: 69 MMHG | HEART RATE: 69 BPM | WEIGHT: 169.31 LBS | TEMPERATURE: 98 F | HEIGHT: 69 IN | SYSTOLIC BLOOD PRESSURE: 132 MMHG

## 2020-12-22 DIAGNOSIS — M79.89 LEG SWELLING: ICD-10-CM

## 2020-12-22 DIAGNOSIS — R06.02 SHORTNESS OF BREATH: ICD-10-CM

## 2020-12-22 LAB
ALBUMIN SERPL BCP-MCNC: 3.5 G/DL (ref 3.5–5.2)
ALP SERPL-CCNC: 132 U/L (ref 55–135)
ALT SERPL W/O P-5'-P-CCNC: 16 U/L (ref 10–44)
ANION GAP SERPL CALC-SCNC: 7 MMOL/L (ref 8–16)
AST SERPL-CCNC: 29 U/L (ref 10–40)
BASOPHILS # BLD AUTO: 0.03 K/UL (ref 0–0.2)
BASOPHILS NFR BLD: 0.6 % (ref 0–1.9)
BILIRUB SERPL-MCNC: 0.6 MG/DL (ref 0.1–1)
BNP SERPL-MCNC: 832 PG/ML (ref 0–99)
BUN SERPL-MCNC: 24 MG/DL (ref 10–30)
CALCIUM SERPL-MCNC: 9.3 MG/DL (ref 8.7–10.5)
CHLORIDE SERPL-SCNC: 102 MMOL/L (ref 95–110)
CO2 SERPL-SCNC: 29 MMOL/L (ref 23–29)
CREAT SERPL-MCNC: 1.5 MG/DL (ref 0.5–1.4)
CTP QC/QA: YES
DIFFERENTIAL METHOD: ABNORMAL
EOSINOPHIL # BLD AUTO: 0.1 K/UL (ref 0–0.5)
EOSINOPHIL NFR BLD: 1.9 % (ref 0–8)
ERYTHROCYTE [DISTWIDTH] IN BLOOD BY AUTOMATED COUNT: 21.2 % (ref 11.5–14.5)
EST. GFR  (AFRICAN AMERICAN): 33.9 ML/MIN/1.73 M^2
EST. GFR  (NON AFRICAN AMERICAN): 29.4 ML/MIN/1.73 M^2
GLUCOSE SERPL-MCNC: 182 MG/DL (ref 70–110)
HCT VFR BLD AUTO: 32.7 % (ref 37–48.5)
HGB BLD-MCNC: 10 G/DL (ref 12–16)
IMM GRANULOCYTES # BLD AUTO: 0.02 K/UL (ref 0–0.04)
IMM GRANULOCYTES NFR BLD AUTO: 0.4 % (ref 0–0.5)
INR PPP: 1 (ref 0.8–1.2)
LYMPHOCYTES # BLD AUTO: 0.8 K/UL (ref 1–4.8)
LYMPHOCYTES NFR BLD: 15.7 % (ref 18–48)
MCH RBC QN AUTO: 27.5 PG (ref 27–31)
MCHC RBC AUTO-ENTMCNC: 30.6 G/DL (ref 32–36)
MCV RBC AUTO: 90 FL (ref 82–98)
MONOCYTES # BLD AUTO: 0.8 K/UL (ref 0.3–1)
MONOCYTES NFR BLD: 15.5 % (ref 4–15)
NEUTROPHILS # BLD AUTO: 3.5 K/UL (ref 1.8–7.7)
NEUTROPHILS NFR BLD: 65.9 % (ref 38–73)
NRBC BLD-RTO: 0 /100 WBC
PLATELET # BLD AUTO: 138 K/UL (ref 150–350)
PMV BLD AUTO: ABNORMAL FL (ref 9.2–12.9)
POTASSIUM SERPL-SCNC: 4.6 MMOL/L (ref 3.5–5.1)
PROT SERPL-MCNC: 6.7 G/DL (ref 6–8.4)
PROTHROMBIN TIME: 10.6 SEC (ref 9–12.5)
RBC # BLD AUTO: 3.64 M/UL (ref 4–5.4)
SARS-COV-2 RDRP RESP QL NAA+PROBE: NEGATIVE
SODIUM SERPL-SCNC: 138 MMOL/L (ref 136–145)
TROPONIN I SERPL DL<=0.01 NG/ML-MCNC: 0.02 NG/ML (ref 0–0.03)
WBC # BLD AUTO: 5.23 K/UL (ref 3.9–12.7)

## 2020-12-22 PROCEDURE — 80053 COMPREHEN METABOLIC PANEL: CPT | Mod: HCNC

## 2020-12-22 PROCEDURE — 99284 EMERGENCY DEPT VISIT MOD MDM: CPT | Mod: 25,HCNC

## 2020-12-22 PROCEDURE — 85610 PROTHROMBIN TIME: CPT | Mod: HCNC

## 2020-12-22 PROCEDURE — 96374 THER/PROPH/DIAG INJ IV PUSH: CPT | Mod: HCNC

## 2020-12-22 PROCEDURE — 94761 N-INVAS EAR/PLS OXIMETRY MLT: CPT | Mod: HCNC

## 2020-12-22 PROCEDURE — 63600175 PHARM REV CODE 636 W HCPCS: Mod: HCNC | Performed by: STUDENT IN AN ORGANIZED HEALTH CARE EDUCATION/TRAINING PROGRAM

## 2020-12-22 PROCEDURE — 84484 ASSAY OF TROPONIN QUANT: CPT | Mod: HCNC

## 2020-12-22 PROCEDURE — 83880 ASSAY OF NATRIURETIC PEPTIDE: CPT | Mod: HCNC

## 2020-12-22 PROCEDURE — U0002 COVID-19 LAB TEST NON-CDC: HCPCS | Mod: HCNC | Performed by: STUDENT IN AN ORGANIZED HEALTH CARE EDUCATION/TRAINING PROGRAM

## 2020-12-22 PROCEDURE — 99284 PR EMERGENCY DEPT VISIT,LEVEL IV: ICD-10-PCS | Mod: HCNC,GC,CS, | Performed by: EMERGENCY MEDICINE

## 2020-12-22 PROCEDURE — 85025 COMPLETE CBC W/AUTO DIFF WBC: CPT | Mod: HCNC

## 2020-12-22 PROCEDURE — 99284 EMERGENCY DEPT VISIT MOD MDM: CPT | Mod: HCNC,GC,CS, | Performed by: EMERGENCY MEDICINE

## 2020-12-22 RX ORDER — FUROSEMIDE 20 MG/1
60 TABLET ORAL 2 TIMES DAILY
Qty: 30 TABLET | Refills: 0 | Status: SHIPPED | OUTPATIENT
Start: 2020-12-22 | End: 2020-12-27

## 2020-12-22 RX ORDER — FUROSEMIDE 10 MG/ML
80 INJECTION INTRAMUSCULAR; INTRAVENOUS
Status: COMPLETED | OUTPATIENT
Start: 2020-12-22 | End: 2020-12-22

## 2020-12-22 RX ADMIN — FUROSEMIDE 80 MG: 10 INJECTION, SOLUTION INTRAMUSCULAR; INTRAVENOUS at 02:12

## 2020-12-22 NOTE — ED TRIAGE NOTES
Pt has been experiencing 2+ edema in bilateral lower extremities for a week. She takes lasix twice a day. She was also taken off of her eliquis recently because she was having black stools.

## 2020-12-22 NOTE — TELEPHONE ENCOUNTER
OCC Roberto Leal with Dr. Will office calling wanting triage nurse to call patient who is experiencing new swelling.   to see  For swelling of feet, ankles and calves.  1st call n/a lm. Called and spoke with daughter who states that mom has appointment for tomm with Dr. Bradley.  Ms. Mel of Dr. Will's office recommended that she should go to the ED for assessment.  I advised daughter who wanted me to talk to her mom.  Legs and thighs swelling, the only way it goes down laying down.   Right leg more swelling.   Care Advice is to go to ED now.      Reason for Disposition   SEVERE swelling (e.g., swelling extends above knee, entire leg is swollen, weeping fluid)    Additional Information   Negative: Sounds like a life-threatening emergency to the triager   Negative: Difficulty breathing at rest   Negative: Entire foot is cool or blue in comparison to other side    Protocols used: LEG SWELLING AND EDEMA-A-OH

## 2020-12-22 NOTE — TELEPHONE ENCOUNTER
Pt says she is still swollen and now it has gone up to her thighs. She is not sob at this moment. She says she will see PCP on tomorrow.    ----- Message from Catalina Ravi MA sent at 12/9/2020  1:17 PM CST -----  Regarding: FW: Pt [phone]    ----- Message -----  From: Lyndon Trna  Sent: 12/9/2020  12:56 PM CST  To: [facility] Staff  Subject: Pt [phone]                                      Would like to get medical advice.  Symptoms (please be specific):  Ankle and calfs swollen and she is short winded  How long has patient had these symptoms:  4 days  Pharmacy name and phone # [id number] - [place], [place] - [address] [phone] (Phone)  [phone] (Fax)

## 2020-12-22 NOTE — TELEPHONE ENCOUNTER
Called ON-call RN's and spoke to Belen March RN and gave her the update. Routed to On-call pool and PCP.

## 2020-12-22 NOTE — ED PROVIDER NOTES
"Encounter Date: 12/22/2020       History     Chief Complaint   Patient presents with    Leg Swelling     Bilateral leg swelling. Pt. reports her legs have been swollen for "over week with that fluid."      Ms. Mead is a 89-year-old female with past medical history of persistent Afib, type 2 diabetes, Diastolic CHF, DVT who presents to the emergency department due to bilateral leg swelling and shortness of breath.  She states that for the past month she has felt that her legs have become more swollen than usual. Initially her legs were swollen up on to her knees but now she thinks the swelling has extended up to her bilateral thighs. She also states that she has been more short of breath with minimal exertion.  She usually takes 40 mg of Lasix twice a day but she states it has not relieved the leg swelling she was seen by her primary care physician for leg swelling on 12/11 but says she was told to keep legs elevated and no acute intervention was offered.  She denies fevers, chills, nausea, vomiting, chest pain, or abdominal pain.         Review of patient's allergies indicates:   Allergen Reactions    Codeine Itching and Nausea Only            Past Medical History:   Diagnosis Date    Allergy     Anemia     Arthritis     Asthma     CHF (congestive heart failure) 5/2/2017    Chronic kidney disease     Degenerative disc disease     Diabetes mellitus type II     Essential hypertension 7/3/2012    Glaucoma     History of pseudogout 8/23/2012    Hyperlipidemia     Hypertension     Iritis     Myocardial infarction     Pneumonia     Thyroid disease      Past Surgical History:   Procedure Laterality Date    CARDIAC CATHETERIZATION      CATARACT EXTRACTION W/  INTRAOCULAR LENS IMPLANT Left n/a    CATARACT EXTRACTION W/  INTRAOCULAR LENS IMPLANT Right 10/8/2018    With Femtosecond LASER assist (Dr. Henson)    CHOLECYSTECTOMY      ESOPHAGOGASTRODUODENOSCOPY N/A 11/4/2020    Procedure: EGD " (ESOPHAGOGASTRODUODENOSCOPY);  Surgeon: Tomás Mccall MD;  Location: [facility] ([address] [place] [address]);  Service: Endoscopy;  Laterality: N/A;    EYE SURGERY      gall stone      HYSTERECTOMY      VARICOSE VEIN SURGERY       Family History   Problem Relation Age of Onset    Diabetes Mother     Hypertension Mother     Glaucoma Mother     Hypertension Father     Diabetes Son     No Known Problems Daughter     Diabetes Daughter         borderline    No Known Problems Son     Melanoma Neg Hx      Social History     Tobacco Use    Smoking status: Never Smoker    Smokeless tobacco: Never Used   Substance Use Topics    Alcohol use: No    Drug use: No     Review of Systems   Constitutional: Negative for activity change, appetite change, chills, fatigue and fever.   HENT: Negative for congestion, rhinorrhea, sinus pressure, sinus pain and trouble swallowing.    Eyes: Negative for photophobia and visual disturbance.   Respiratory: Negative for cough, chest tightness, shortness of breath and wheezing.    Cardiovascular: Negative for chest pain, palpitations and leg swelling.   Gastrointestinal: Negative for abdominal distention, constipation, diarrhea, nausea and vomiting.   Genitourinary: Negative for difficulty urinating, dysuria, flank pain, hematuria and urgency.   Musculoskeletal: Positive for joint swelling. Negative for arthralgias, back pain, gait problem, myalgias, neck pain and neck stiffness.   Neurological: Negative for dizziness, tremors, weakness, light-headedness, numbness and headaches.   Psychiatric/Behavioral: Negative for agitation.       Physical Exam     Initial Vitals [12/22/20 1309]   BP Pulse Resp Temp SpO2   126/63 77 18 98.4 °F (36.9 °C) 100 %      MAP       --         Physical Exam    Nursing note and vitals reviewed.  Constitutional: She appears well-developed and well-nourished. She is not diaphoretic. No distress.   HENT:   Head: Normocephalic and atraumatic.   Eyes: EOM are normal. Pupils are  equal, round, and reactive to light.   Neck: Normal range of motion. No thyromegaly present. No tracheal deviation present. JVD present.   Cardiovascular: Normal rate, regular rhythm, normal heart sounds and intact distal pulses. Exam reveals no friction rub.    No murmur heard.  Pulmonary/Chest: Breath sounds normal. No stridor. No respiratory distress. She has no wheezes. She has no rhonchi. She has no rales. She exhibits no tenderness.   Abdominal: Soft. Bowel sounds are normal. She exhibits no distension and no mass. There is no abdominal tenderness. There is no rebound and no guarding.   Musculoskeletal: Normal range of motion. Edema present. No tenderness.      Comments: 2+ edema bilaterally   Lymphadenopathy:     She has no cervical adenopathy.   Neurological: She is alert and oriented to person, place, and time. She displays normal reflexes. No cranial nerve deficit or sensory deficit.   Skin: Skin is warm. Capillary refill takes less than 2 seconds.         ED Course   Procedures  Labs Reviewed   CBC W/ AUTO DIFFERENTIAL - Abnormal; Notable for the following components:       Result Value    RBC 3.64 (*)     Hemoglobin 10.0 (*)     Hematocrit 32.7 (*)     MCHC 30.6 (*)     RDW 21.2 (*)     Platelets 138 (*)     Lymph # 0.8 (*)     Lymph % 15.7 (*)     Mono % 15.5 (*)     All other components within normal limits   COMPREHENSIVE METABOLIC PANEL   TROPONIN I   B-TYPE NATRIURETIC PEPTIDE   PROTIME-INR   SARS-COV-2 RDRP GENE          Imaging Results          X-Ray Chest AP Portable (Final result)  Result time 12/22/20 14:39:00    Final result by Ivett Sierra MD (12/22/20 14:39:00)                 Impression:      No acute disease identified.      Electronically signed by: Ivett Sierra MD  Date:    12/22/2020  Time:    14:39             Narrative:    EXAMINATION:  XR CHEST AP PORTABLE    CLINICAL HISTORY:  CHF;    TECHNIQUE:  Single frontal view of the chest was performed.    COMPARISON:  11/03/2020.   06/15/2020.  05/26/2020.    FINDINGS:  Study was obtained at the bedside with the patient in left posterior oblique rotation.    Cardiac silhouette is moderately enlarged consistent with chamber dilatation, pericardial fluid or both.  Calcification noted at the aortic arch in this 89-year-old woman.  Dense calcification is present in the mitral annulus.    I detect no acute pulmonary disease and no pleural fluid, cardiac decompensation, pneumothorax, pneumomediastinum, pneumoperitoneum or significant osseous abnormality.                                 Medical Decision Making:   Initial Assessment:   Ms. Mead is a 89-year-old female with past medical history of persistent Afib, type 2 diabetes, Diastolic CHF, DVT who presents to the emergency department due to bilateral leg swelling and shortness of breath.  Differential Diagnosis:   CHF exacerbation  Pulmonary embolism  Kidney failure    Clinical Tests:   Lab Tests: Ordered and Reviewed  Radiological Study: Ordered and Reviewed  ED Management:  Patient was examined and was stable on exam.  Labs were ordered including a CBC which was non-significant, CMP was non-significant, PT INR and troponin were normal. BMP was obtained which was elevated at 832.  Patient was given furosemide 80 mg. Ultrasound of the bilateral lower extremities was obtained which showed no evidence of a DVT. Chest x-ray was obtained which did not show any acute abnormalities.  Patient was able to walk and her oxygen saturation stayed between 97% to 100%.  Her dose of Lasix was increased to 60 mg twice daily and she was advised to follow-up with cardiology. Patient was discharged in stable condition and return precautions are given.               Attending Attestation:   Physician Attestation Statement for Resident:  As the supervising MD   Physician Attestation Statement: I have personally seen and examined this patient.   I agree with the above history. -:   As the supervising MD I agree with  the above PE.    As the supervising MD I agree with the above treatment, course, plan, and disposition.   -: 89-year-old female presents to the emergency department for lower extremity edema getting worse, weight gain.  Patient on Lasix 40 mg b.i.d., compliant with her medication.  Denies chest pain, reports being able to walk about half a block with no shortness of breath, no paroxysmal nocturnal dyspnea, no palpitations, no changes in urination, no back pain or abdominal pain.  No cough no fever.    Patient comfortable speaking full sentences  Positive for JVD, mild bibasilar crackles  Lower extremity +2 pitting edema extending above the knee    Patient received IV Lasix in the emergency department  Workup negative for pulmonary edema, chest x-ray with cardiomegaly similar to prior chest x-rays  CKD at baseline  Elevated BNP  Patient ambulated in the emergency department with no hypoxia or shortness of breath  Patient stable for discharge, Lasix was increased to 60 mg b.i.d. for 5 days, plan for follow-up with cardiology as an outpatient                              Clinical Impression:       ICD-10-CM ICD-9-CM   1. Leg swelling  M79.89 729.81   2. Shortness of breath  R06.02 786.05                                               Dwight Corrales MD  Resident  12/22/20 1731       Ginny Morse MD  12/23/20 0800

## 2020-12-22 NOTE — ED NOTES
Patient identifiers verified and correct for Tiana Boston  LOC: The patient is awake, alert and aware of environment with an appropriate affect, the patient is oriented x 3 and speaking appropriately.   APPEARANCE: Patient appears comfortable and in no acute distress, patient is clean and well groomed.  SKIN: The skin is warm and dry, color consistent with ethnicity, patient has normal skin turgor and moist mucus membranes, skin intact, no breakdown or bruising noted.   MUSCULOSKELETAL: Patient moving all extremities spontaneously, no swelling noted.  RESPIRATORY: Airway is open and patent, respirations are spontaneous, patient has a normal effort and rate, no accessory muscle use noted, pt placed on continuous pulse ox with O2 sats noted at 97% on room air.  CARDIAC: Pt placed on cardiac monitor. Patient has a normal rate and regular rhythm, peripheral edema 2+ noted in bilateral lower extremities, capillary refill < 3 seconds.   GASTRO: Soft and non tender to palpation, no distention noted, normoactive bowel sounds present in all four quadrants. Pt states bowel movements have been regular.  : Pt denies any pain or frequency with urination.  NEURO: Pt opens eyes spontaneously, behavior appropriate to situation, follows commands, facial expression symmetrical, bilateral hand grasp equal and even, purposeful motor response noted, normal sensation in all extremities when touched with a finger.

## 2020-12-23 ENCOUNTER — TELEPHONE (OUTPATIENT)
Dept: INTERNAL MEDICINE | Facility: CLINIC | Age: 85
End: 2020-12-23

## 2020-12-23 NOTE — TELEPHONE ENCOUNTER
----- Message from Catalina Ravi MA sent at 12/23/2020  9:45 AM CST -----  Contact: [phone]  Patient wants to know when you would like to see her. Was in the er yesterday so she didn't feel the need to come in today since her problems were addressed yesterday.  ----- Message -----  From: Francia Quintanilla  Sent: 12/23/2020   8:05 AM CST  To: Mirna BRICENO Staff    Patient stated that she unable to come today, she was in the hospital yesterday 12/22/20 and was told to f/u with pcp with 5 days. Patient does not want to see anyone else. Please call and advise.e thank you

## 2020-12-24 ENCOUNTER — TELEPHONE (OUTPATIENT)
Dept: INTERNAL MEDICINE | Facility: CLINIC | Age: 85
End: 2020-12-24

## 2020-12-24 NOTE — TELEPHONE ENCOUNTER
Please get the patient on the schedule for January 6  I spoke to the patient on the phone   The patient said she was called and told that her appointment was on January 6 at 2:30 or 3:30 pm she wasn't sure  I looked at the schedule and she is not on the schedule it says daily change at 2:30 and 3:30   Please schedule the patient     Nadia, I am going to route this message to you because I know you have scheduled in the daily change spot for out patients before. Let me know if I should route differently in the future.

## 2020-12-30 ENCOUNTER — OFFICE VISIT (OUTPATIENT)
Dept: CARDIOLOGY | Facility: CLINIC | Age: 85
End: 2020-12-30
Payer: MEDICARE

## 2020-12-30 VITALS
HEIGHT: 68 IN | DIASTOLIC BLOOD PRESSURE: 50 MMHG | SYSTOLIC BLOOD PRESSURE: 104 MMHG | BODY MASS INDEX: 24.66 KG/M2 | OXYGEN SATURATION: 99 % | WEIGHT: 162.69 LBS | HEART RATE: 70 BPM

## 2020-12-30 DIAGNOSIS — I25.10 CORONARY ARTERY DISEASE INVOLVING NATIVE CORONARY ARTERY OF NATIVE HEART WITHOUT ANGINA PECTORIS: Primary | ICD-10-CM

## 2020-12-30 DIAGNOSIS — D69.6 THROMBOCYTOPENIA: ICD-10-CM

## 2020-12-30 DIAGNOSIS — N18.4 STAGE 4 CHRONIC KIDNEY DISEASE: ICD-10-CM

## 2020-12-30 DIAGNOSIS — I50.32 CHRONIC DIASTOLIC CONGESTIVE HEART FAILURE: Chronic | ICD-10-CM

## 2020-12-30 DIAGNOSIS — E11.21 TYPE 2 DIABETES MELLITUS WITH DIABETIC NEPHROPATHY, WITH LONG-TERM CURRENT USE OF INSULIN: ICD-10-CM

## 2020-12-30 DIAGNOSIS — Z79.01 LONG TERM CURRENT USE OF ANTICOAGULANT THERAPY: Chronic | ICD-10-CM

## 2020-12-30 DIAGNOSIS — E03.9 PRIMARY HYPOTHYROIDISM: Chronic | ICD-10-CM

## 2020-12-30 DIAGNOSIS — I70.0 AORTIC ATHEROSCLEROSIS: ICD-10-CM

## 2020-12-30 DIAGNOSIS — I82.409 DEEP VEIN THROMBOSIS (DVT) DURING CURRENT HOSPITALIZATION: Chronic | ICD-10-CM

## 2020-12-30 DIAGNOSIS — I36.1 NON-RHEUMATIC TRICUSPID VALVE INSUFFICIENCY: ICD-10-CM

## 2020-12-30 DIAGNOSIS — E78.2 HYPERLIPEMIA, MIXED: Chronic | ICD-10-CM

## 2020-12-30 DIAGNOSIS — I27.20 PULMONARY HYPERTENSION: ICD-10-CM

## 2020-12-30 DIAGNOSIS — Z79.4 TYPE 2 DIABETES MELLITUS WITH DIABETIC NEPHROPATHY, WITH LONG-TERM CURRENT USE OF INSULIN: ICD-10-CM

## 2020-12-30 DIAGNOSIS — I48.19 PERSISTENT ATRIAL FIBRILLATION: Chronic | ICD-10-CM

## 2020-12-30 PROCEDURE — 1125F AMNT PAIN NOTED PAIN PRSNT: CPT | Mod: HCNC,S$GLB,, | Performed by: NURSE PRACTITIONER

## 2020-12-30 PROCEDURE — 1125F PR PAIN SEVERITY QUANTIFIED, PAIN PRESENT: ICD-10-PCS | Mod: HCNC,S$GLB,, | Performed by: NURSE PRACTITIONER

## 2020-12-30 PROCEDURE — 99999 PR PBB SHADOW E&M-EST. PATIENT-LVL III: CPT | Mod: PBBFAC,HCNC,, | Performed by: NURSE PRACTITIONER

## 2020-12-30 PROCEDURE — 99999 PR PBB SHADOW E&M-EST. PATIENT-LVL III: ICD-10-PCS | Mod: PBBFAC,HCNC,, | Performed by: NURSE PRACTITIONER

## 2020-12-30 PROCEDURE — 1159F PR MEDICATION LIST DOCUMENTED IN MEDICAL RECORD: ICD-10-PCS | Mod: HCNC,S$GLB,, | Performed by: NURSE PRACTITIONER

## 2020-12-30 PROCEDURE — 3072F LOW RISK FOR RETINOPATHY: CPT | Mod: HCNC,S$GLB,, | Performed by: NURSE PRACTITIONER

## 2020-12-30 PROCEDURE — 99499 UNLISTED E&M SERVICE: CPT | Mod: S$GLB,,, | Performed by: NURSE PRACTITIONER

## 2020-12-30 PROCEDURE — 99499 RISK ADDL DX/OHS AUDIT: ICD-10-PCS | Mod: S$GLB,,, | Performed by: NURSE PRACTITIONER

## 2020-12-30 PROCEDURE — 99214 PR OFFICE/OUTPT VISIT, EST, LEVL IV, 30-39 MIN: ICD-10-PCS | Mod: HCNC,S$GLB,, | Performed by: NURSE PRACTITIONER

## 2020-12-30 PROCEDURE — 1159F MED LIST DOCD IN RCRD: CPT | Mod: HCNC,S$GLB,, | Performed by: NURSE PRACTITIONER

## 2020-12-30 PROCEDURE — 99214 OFFICE O/P EST MOD 30 MIN: CPT | Mod: HCNC,S$GLB,, | Performed by: NURSE PRACTITIONER

## 2020-12-30 PROCEDURE — 3072F PR LOW RISK FOR RETINOPATHY: ICD-10-PCS | Mod: HCNC,S$GLB,, | Performed by: NURSE PRACTITIONER

## 2020-12-30 RX ORDER — INSULIN ASPART 100 [IU]/ML
INJECTION, SOLUTION INTRAVENOUS; SUBCUTANEOUS
OUTPATIENT
Start: 2020-12-30

## 2020-12-30 RX ORDER — FUROSEMIDE 80 MG/1
80 TABLET ORAL 2 TIMES DAILY
Qty: 200 TABLET | Refills: 3 | Status: SHIPPED | OUTPATIENT
Start: 2020-12-30 | End: 2021-08-10 | Stop reason: DRUGHIGH

## 2020-12-30 RX ORDER — INSULIN ASPART 100 [IU]/ML
INJECTION, SOLUTION INTRAVENOUS; SUBCUTANEOUS
COMMUNITY
Start: 2020-12-19 | End: 2021-01-20 | Stop reason: SDUPTHER

## 2021-01-04 ENCOUNTER — TELEPHONE (OUTPATIENT)
Dept: INTERNAL MEDICINE | Facility: CLINIC | Age: 86
End: 2021-01-04

## 2021-01-07 ENCOUNTER — TELEPHONE (OUTPATIENT)
Dept: INTERNAL MEDICINE | Facility: CLINIC | Age: 86
End: 2021-01-07

## 2021-01-08 ENCOUNTER — TELEPHONE (OUTPATIENT)
Dept: INTERNAL MEDICINE | Facility: CLINIC | Age: 86
End: 2021-01-08

## 2021-01-13 ENCOUNTER — HOSPITAL ENCOUNTER (OUTPATIENT)
Dept: CARDIOLOGY | Facility: OTHER | Age: 86
Discharge: HOME | End: 2021-01-13
Attending: INTERNAL MEDICINE
Payer: MEDICARE

## 2021-01-13 ENCOUNTER — PATIENT MESSAGE (OUTPATIENT)
Dept: INTERNAL MEDICINE | Facility: CLINIC | Age: 86
End: 2021-01-13

## 2021-01-13 VITALS
BODY MASS INDEX: 24.55 KG/M2 | HEIGHT: 68 IN | SYSTOLIC BLOOD PRESSURE: 104 MMHG | WEIGHT: 162 LBS | DIASTOLIC BLOOD PRESSURE: 50 MMHG

## 2021-01-13 DIAGNOSIS — D69.6 THROMBOCYTOPENIA: ICD-10-CM

## 2021-01-13 DIAGNOSIS — Z79.01 LONG TERM CURRENT USE OF ANTICOAGULANT THERAPY: Chronic | ICD-10-CM

## 2021-01-13 DIAGNOSIS — Z79.01 LONG TERM CURRENT USE OF ANTICOAGULANT THERAPY: ICD-10-CM

## 2021-01-13 DIAGNOSIS — I50.32 CHRONIC DIASTOLIC CONGESTIVE HEART FAILURE: ICD-10-CM

## 2021-01-13 LAB
ASCENDING AORTA: 2.66 CM
AV INDEX (PROSTH): 0.64
AV MEAN GRADIENT: 6 MMHG
AV PEAK GRADIENT: 9 MMHG
AV VALVE AREA: 1.96 CM2
AV VELOCITY RATIO: 0.7
BSA FOR ECHO PROCEDURE: 1.88 M2
CV ECHO LV RWT: 0.94 CM
DOP CALC AO PEAK VEL: 1.51 M/S
DOP CALC AO VTI: 40.5 CM
DOP CALC LVOT AREA: 3 CM2
DOP CALC LVOT DIAMETER: 1.97 CM
DOP CALC LVOT PEAK VEL: 1.06 M/S
DOP CALC LVOT STROKE VOLUME: 79.42 CM3
DOP CALCLVOT PEAK VEL VTI: 26.07 CM
E/E' RATIO: 35.33 M/S
ECHO LV POSTERIOR WALL: 1.66 CM (ref 0.6–1.1)
FRACTIONAL SHORTENING: 38 % (ref 28–44)
INTERVENTRICULAR SEPTUM: 1.69 CM (ref 0.6–1.1)
LA MAJOR: 7.77 CM
LA MINOR: 7.72 CM
LA WIDTH: 4.82 CM
LEFT ATRIUM SIZE: 4.8 CM
LEFT ATRIUM VOLUME INDEX MOD: 71.2 ML/M2
LEFT ATRIUM VOLUME INDEX: 81.5 ML/M2
LEFT ATRIUM VOLUME MOD: 133 CM3
LEFT ATRIUM VOLUME: 152.31 CM3
LEFT INTERNAL DIMENSION IN SYSTOLE: 2.2 CM (ref 2.1–4)
LEFT VENTRICLE DIASTOLIC VOLUME INDEX: 27.54 ML/M2
LEFT VENTRICLE DIASTOLIC VOLUME: 51.47 ML
LEFT VENTRICLE MASS INDEX: 125 G/M2
LEFT VENTRICLE SYSTOLIC VOLUME INDEX: 8.6 ML/M2
LEFT VENTRICLE SYSTOLIC VOLUME: 16.13 ML
LEFT VENTRICULAR INTERNAL DIMENSION IN DIASTOLE: 3.52 CM (ref 3.5–6)
LEFT VENTRICULAR MASS: 234.08 G
LV LATERAL E/E' RATIO: 26.5 M/S
LV SEPTAL E/E' RATIO: 53 M/S
MV PEAK E VEL: 1.59 M/S
PISA TR MAX VEL: 3.11 M/S
PV PEAK VELOCITY: 1.01 CM/S
RA MAJOR: 7.85 CM
RA PRESSURE: 15 MMHG
RA WIDTH: 5.5 CM
RIGHT VENTRICULAR END-DIASTOLIC DIMENSION: 4.24 CM
SINUS: 3.08 CM
STJ: 2.66 CM
TDI LATERAL: 0.06 M/S
TDI SEPTAL: 0.03 M/S
TDI: 0.05 M/S
TR MAX PG: 39 MMHG
TRICUSPID ANNULAR PLANE SYSTOLIC EXCURSION: 1.15 CM
TV REST PULMONARY ARTERY PRESSURE: 54 MMHG

## 2021-01-13 PROCEDURE — 93306 ECHO (CUPID ONLY): ICD-10-PCS | Mod: 26,HCNC,, | Performed by: INTERNAL MEDICINE

## 2021-01-13 PROCEDURE — 93306 TTE W/DOPPLER COMPLETE: CPT | Mod: 26,HCNC,, | Performed by: INTERNAL MEDICINE

## 2021-01-13 PROCEDURE — 93306 TTE W/DOPPLER COMPLETE: CPT | Mod: HCNC

## 2021-01-14 ENCOUNTER — TELEPHONE (OUTPATIENT)
Dept: INTERNAL MEDICINE | Facility: CLINIC | Age: 86
End: 2021-01-14

## 2021-01-14 ENCOUNTER — OFFICE VISIT (OUTPATIENT)
Dept: INTERNAL MEDICINE | Facility: CLINIC | Age: 86
End: 2021-01-14
Payer: MEDICARE

## 2021-01-14 ENCOUNTER — OFFICE VISIT (OUTPATIENT)
Dept: CARDIOLOGY | Facility: CLINIC | Age: 86
End: 2021-01-14
Payer: MEDICARE

## 2021-01-14 VITALS
SYSTOLIC BLOOD PRESSURE: 132 MMHG | WEIGHT: 157.88 LBS | OXYGEN SATURATION: 98 % | HEART RATE: 64 BPM | HEIGHT: 68 IN | BODY MASS INDEX: 23.93 KG/M2 | DIASTOLIC BLOOD PRESSURE: 68 MMHG

## 2021-01-14 VITALS
HEART RATE: 67 BPM | SYSTOLIC BLOOD PRESSURE: 135 MMHG | HEIGHT: 68 IN | DIASTOLIC BLOOD PRESSURE: 60 MMHG | WEIGHT: 157.88 LBS | OXYGEN SATURATION: 99 % | BODY MASS INDEX: 23.93 KG/M2

## 2021-01-14 DIAGNOSIS — I27.20 PULMONARY HYPERTENSION: ICD-10-CM

## 2021-01-14 DIAGNOSIS — E11.42 TYPE 2 DIABETES MELLITUS WITH DIABETIC POLYNEUROPATHY, WITH LONG-TERM CURRENT USE OF INSULIN: Chronic | ICD-10-CM

## 2021-01-14 DIAGNOSIS — E78.5 HYPERLIPIDEMIA, UNSPECIFIED HYPERLIPIDEMIA TYPE: ICD-10-CM

## 2021-01-14 DIAGNOSIS — I82.409 DEEP VEIN THROMBOSIS (DVT) DURING CURRENT HOSPITALIZATION: Chronic | ICD-10-CM

## 2021-01-14 DIAGNOSIS — I36.1 NON-RHEUMATIC TRICUSPID VALVE INSUFFICIENCY: ICD-10-CM

## 2021-01-14 DIAGNOSIS — I10 HYPERTENSION, ESSENTIAL: ICD-10-CM

## 2021-01-14 DIAGNOSIS — I50.32 CHRONIC DIASTOLIC CONGESTIVE HEART FAILURE: Primary | Chronic | ICD-10-CM

## 2021-01-14 DIAGNOSIS — I48.19 PERSISTENT ATRIAL FIBRILLATION: Chronic | ICD-10-CM

## 2021-01-14 DIAGNOSIS — I50.32 CHRONIC DIASTOLIC CONGESTIVE HEART FAILURE: ICD-10-CM

## 2021-01-14 DIAGNOSIS — I48.19 PERSISTENT ATRIAL FIBRILLATION: Primary | Chronic | ICD-10-CM

## 2021-01-14 DIAGNOSIS — I25.2 OLD MI (MYOCARDIAL INFARCTION): ICD-10-CM

## 2021-01-14 DIAGNOSIS — E78.2 HYPERLIPEMIA, MIXED: Chronic | ICD-10-CM

## 2021-01-14 DIAGNOSIS — D64.9 ANEMIA, UNSPECIFIED TYPE: ICD-10-CM

## 2021-01-14 DIAGNOSIS — Z79.4 TYPE 2 DIABETES MELLITUS WITH DIABETIC POLYNEUROPATHY, WITH LONG-TERM CURRENT USE OF INSULIN: Chronic | ICD-10-CM

## 2021-01-14 DIAGNOSIS — I31.39 PERICARDIAL EFFUSION: ICD-10-CM

## 2021-01-14 DIAGNOSIS — R19.5 OCCULT GI BLEEDING: ICD-10-CM

## 2021-01-14 PROCEDURE — 99214 PR OFFICE/OUTPT VISIT, EST, LEVL IV, 30-39 MIN: ICD-10-PCS | Mod: HCNC,S$GLB,, | Performed by: INTERNAL MEDICINE

## 2021-01-14 PROCEDURE — 3072F LOW RISK FOR RETINOPATHY: CPT | Mod: HCNC,S$GLB,, | Performed by: PHYSICIAN ASSISTANT

## 2021-01-14 PROCEDURE — 99214 OFFICE O/P EST MOD 30 MIN: CPT | Mod: HCNC,S$GLB,, | Performed by: PHYSICIAN ASSISTANT

## 2021-01-14 PROCEDURE — 99214 PR OFFICE/OUTPT VISIT, EST, LEVL IV, 30-39 MIN: ICD-10-PCS | Mod: HCNC,S$GLB,, | Performed by: PHYSICIAN ASSISTANT

## 2021-01-14 PROCEDURE — 99999 PR PBB SHADOW E&M-EST. PATIENT-LVL III: ICD-10-PCS | Mod: PBBFAC,HCNC,, | Performed by: PHYSICIAN ASSISTANT

## 2021-01-14 PROCEDURE — 3288F FALL RISK ASSESSMENT DOCD: CPT | Mod: HCNC,CPTII,S$GLB, | Performed by: PHYSICIAN ASSISTANT

## 2021-01-14 PROCEDURE — 1159F MED LIST DOCD IN RCRD: CPT | Mod: HCNC,S$GLB,, | Performed by: PHYSICIAN ASSISTANT

## 2021-01-14 PROCEDURE — 1159F MED LIST DOCD IN RCRD: CPT | Mod: HCNC,S$GLB,, | Performed by: INTERNAL MEDICINE

## 2021-01-14 PROCEDURE — 1101F PR PT FALLS ASSESS DOC 0-1 FALLS W/OUT INJ PAST YR: ICD-10-PCS | Mod: HCNC,CPTII,S$GLB, | Performed by: PHYSICIAN ASSISTANT

## 2021-01-14 PROCEDURE — 99999 PR PBB SHADOW E&M-EST. PATIENT-LVL IV: CPT | Mod: PBBFAC,HCNC,, | Performed by: INTERNAL MEDICINE

## 2021-01-14 PROCEDURE — 3072F PR LOW RISK FOR RETINOPATHY: ICD-10-PCS | Mod: HCNC,S$GLB,, | Performed by: INTERNAL MEDICINE

## 2021-01-14 PROCEDURE — 1159F PR MEDICATION LIST DOCUMENTED IN MEDICAL RECORD: ICD-10-PCS | Mod: HCNC,S$GLB,, | Performed by: INTERNAL MEDICINE

## 2021-01-14 PROCEDURE — 1101F PT FALLS ASSESS-DOCD LE1/YR: CPT | Mod: HCNC,CPTII,S$GLB, | Performed by: PHYSICIAN ASSISTANT

## 2021-01-14 PROCEDURE — 3288F PR FALLS RISK ASSESSMENT DOCUMENTED: ICD-10-PCS | Mod: HCNC,CPTII,S$GLB, | Performed by: PHYSICIAN ASSISTANT

## 2021-01-14 PROCEDURE — 99999 PR PBB SHADOW E&M-EST. PATIENT-LVL IV: ICD-10-PCS | Mod: PBBFAC,HCNC,, | Performed by: INTERNAL MEDICINE

## 2021-01-14 PROCEDURE — 1159F PR MEDICATION LIST DOCUMENTED IN MEDICAL RECORD: ICD-10-PCS | Mod: HCNC,S$GLB,, | Performed by: PHYSICIAN ASSISTANT

## 2021-01-14 PROCEDURE — 3072F PR LOW RISK FOR RETINOPATHY: ICD-10-PCS | Mod: HCNC,S$GLB,, | Performed by: PHYSICIAN ASSISTANT

## 2021-01-14 PROCEDURE — 1126F PR PAIN SEVERITY QUANTIFIED, NO PAIN PRESENT: ICD-10-PCS | Mod: HCNC,S$GLB,, | Performed by: PHYSICIAN ASSISTANT

## 2021-01-14 PROCEDURE — 99999 PR PBB SHADOW E&M-EST. PATIENT-LVL III: CPT | Mod: PBBFAC,HCNC,, | Performed by: PHYSICIAN ASSISTANT

## 2021-01-14 PROCEDURE — 3072F LOW RISK FOR RETINOPATHY: CPT | Mod: HCNC,S$GLB,, | Performed by: INTERNAL MEDICINE

## 2021-01-14 PROCEDURE — 1126F AMNT PAIN NOTED NONE PRSNT: CPT | Mod: HCNC,S$GLB,, | Performed by: INTERNAL MEDICINE

## 2021-01-14 PROCEDURE — 1126F AMNT PAIN NOTED NONE PRSNT: CPT | Mod: HCNC,S$GLB,, | Performed by: PHYSICIAN ASSISTANT

## 2021-01-14 PROCEDURE — 99214 OFFICE O/P EST MOD 30 MIN: CPT | Mod: HCNC,S$GLB,, | Performed by: INTERNAL MEDICINE

## 2021-01-14 PROCEDURE — 1126F PR PAIN SEVERITY QUANTIFIED, NO PAIN PRESENT: ICD-10-PCS | Mod: HCNC,S$GLB,, | Performed by: INTERNAL MEDICINE

## 2021-01-15 ENCOUNTER — TELEPHONE (OUTPATIENT)
Dept: INTERNAL MEDICINE | Facility: CLINIC | Age: 86
End: 2021-01-15

## 2021-01-20 RX ORDER — INSULIN ASPART 100 [IU]/ML
INJECTION, SOLUTION INTRAVENOUS; SUBCUTANEOUS
Qty: 15 ML | Refills: 9 | Status: SHIPPED | OUTPATIENT
Start: 2021-01-20 | End: 2021-01-26 | Stop reason: SDUPTHER

## 2021-01-26 ENCOUNTER — TELEPHONE (OUTPATIENT)
Dept: INTERNAL MEDICINE | Facility: CLINIC | Age: 86
End: 2021-01-26

## 2021-01-26 RX ORDER — INSULIN ASPART 100 [IU]/ML
INJECTION, SOLUTION INTRAVENOUS; SUBCUTANEOUS
Qty: 15 ML | Refills: 9 | Status: SHIPPED | OUTPATIENT
Start: 2021-01-26 | End: 2021-08-10

## 2021-01-26 RX ORDER — FLUTICASONE PROPIONATE AND SALMETEROL 250; 50 UG/1; UG/1
1 POWDER RESPIRATORY (INHALATION) 2 TIMES DAILY
Qty: 60 EACH | Refills: 11 | Status: SHIPPED | OUTPATIENT
Start: 2021-01-26 | End: 2021-09-24 | Stop reason: SINTOL

## 2021-01-27 ENCOUNTER — OUTPATIENT CASE MANAGEMENT (OUTPATIENT)
Dept: ADMINISTRATIVE | Facility: OTHER | Age: 86
End: 2021-01-27

## 2021-02-03 RX ORDER — METOPROLOL TARTRATE 25 MG/1
25 TABLET, FILM COATED ORAL 2 TIMES DAILY
Qty: 180 TABLET | Refills: 3 | Status: SHIPPED | OUTPATIENT
Start: 2021-02-03 | End: 2021-11-05

## 2021-02-03 RX ORDER — AMLODIPINE BESYLATE 5 MG/1
TABLET ORAL
Qty: 90 TABLET | Refills: 3 | Status: SHIPPED | OUTPATIENT
Start: 2021-02-03 | End: 2021-11-08

## 2021-02-04 ENCOUNTER — TELEPHONE (OUTPATIENT)
Dept: INTERNAL MEDICINE | Facility: CLINIC | Age: 86
End: 2021-02-04

## 2021-02-04 RX ORDER — MECLIZINE HCL 12.5 MG 12.5 MG/1
12.5 TABLET ORAL 3 TIMES DAILY PRN
Qty: 20 TABLET | Refills: 2 | Status: SHIPPED | OUTPATIENT
Start: 2021-02-04 | End: 2022-02-22

## 2021-02-10 ENCOUNTER — OFFICE VISIT (OUTPATIENT)
Dept: OPHTHALMOLOGY | Facility: CLINIC | Age: 86
End: 2021-02-10
Payer: MEDICARE

## 2021-02-10 DIAGNOSIS — H40.1132 PRIMARY OPEN ANGLE GLAUCOMA (POAG) OF BOTH EYES, MODERATE STAGE: Primary | ICD-10-CM

## 2021-02-10 DIAGNOSIS — H20.10 CHRONIC IRITIS: ICD-10-CM

## 2021-02-10 DIAGNOSIS — H04.123 DRY EYE SYNDROME OF BOTH EYES: ICD-10-CM

## 2021-02-10 DIAGNOSIS — H18.519 FUCHS' CORNEAL DYSTROPHY: ICD-10-CM

## 2021-02-10 PROCEDURE — 1126F AMNT PAIN NOTED NONE PRSNT: CPT | Mod: S$GLB,,, | Performed by: OPHTHALMOLOGY

## 2021-02-10 PROCEDURE — 99214 PR OFFICE/OUTPT VISIT, EST, LEVL IV, 30-39 MIN: ICD-10-PCS | Mod: S$GLB,,, | Performed by: OPHTHALMOLOGY

## 2021-02-10 PROCEDURE — 99214 OFFICE O/P EST MOD 30 MIN: CPT | Mod: S$GLB,,, | Performed by: OPHTHALMOLOGY

## 2021-02-10 PROCEDURE — 3288F FALL RISK ASSESSMENT DOCD: CPT | Mod: CPTII,S$GLB,, | Performed by: OPHTHALMOLOGY

## 2021-02-10 PROCEDURE — 1159F PR MEDICATION LIST DOCUMENTED IN MEDICAL RECORD: ICD-10-PCS | Mod: S$GLB,,, | Performed by: OPHTHALMOLOGY

## 2021-02-10 PROCEDURE — 99999 PR PBB SHADOW E&M-EST. PATIENT-LVL III: ICD-10-PCS | Mod: PBBFAC,,, | Performed by: OPHTHALMOLOGY

## 2021-02-10 PROCEDURE — 3288F PR FALLS RISK ASSESSMENT DOCUMENTED: ICD-10-PCS | Mod: CPTII,S$GLB,, | Performed by: OPHTHALMOLOGY

## 2021-02-10 PROCEDURE — 1126F PR PAIN SEVERITY QUANTIFIED, NO PAIN PRESENT: ICD-10-PCS | Mod: S$GLB,,, | Performed by: OPHTHALMOLOGY

## 2021-02-10 PROCEDURE — 1101F PT FALLS ASSESS-DOCD LE1/YR: CPT | Mod: CPTII,S$GLB,, | Performed by: OPHTHALMOLOGY

## 2021-02-10 PROCEDURE — 1101F PR PT FALLS ASSESS DOC 0-1 FALLS W/OUT INJ PAST YR: ICD-10-PCS | Mod: CPTII,S$GLB,, | Performed by: OPHTHALMOLOGY

## 2021-02-10 PROCEDURE — 1159F MED LIST DOCD IN RCRD: CPT | Mod: S$GLB,,, | Performed by: OPHTHALMOLOGY

## 2021-02-10 PROCEDURE — 99999 PR PBB SHADOW E&M-EST. PATIENT-LVL III: CPT | Mod: PBBFAC,,, | Performed by: OPHTHALMOLOGY

## 2021-02-15 RX ORDER — ATORVASTATIN CALCIUM 40 MG/1
40 TABLET, FILM COATED ORAL DAILY
Qty: 90 TABLET | Refills: 3 | Status: SHIPPED | OUTPATIENT
Start: 2021-02-15 | End: 2021-04-15

## 2021-03-29 RX ORDER — FERROUS SULFATE 325(65) MG
325 TABLET ORAL
Qty: 30 TABLET | Refills: 3 | Status: CANCELLED | OUTPATIENT
Start: 2021-03-29

## 2021-03-30 ENCOUNTER — TELEPHONE (OUTPATIENT)
Dept: INTERNAL MEDICINE | Facility: CLINIC | Age: 86
End: 2021-03-30

## 2021-03-30 RX ORDER — FERROUS SULFATE 325(65) MG
325 TABLET ORAL
Qty: 30 TABLET | Refills: 3 | Status: CANCELLED | OUTPATIENT
Start: 2021-03-30

## 2021-03-31 ENCOUNTER — TELEPHONE (OUTPATIENT)
Dept: INTERNAL MEDICINE | Facility: CLINIC | Age: 86
End: 2021-03-31

## 2021-03-31 RX ORDER — FERROUS SULFATE 325(65) MG
325 TABLET ORAL
Qty: 30 TABLET | Refills: 3 | Status: SHIPPED | OUTPATIENT
Start: 2021-03-31 | End: 2021-08-09

## 2021-03-31 RX ORDER — FERROUS SULFATE 325(65) MG
325 TABLET ORAL
Qty: 30 TABLET | Refills: 3 | OUTPATIENT
Start: 2021-03-31

## 2021-04-08 ENCOUNTER — LAB VISIT (OUTPATIENT)
Dept: LAB | Facility: HOSPITAL | Age: 86
End: 2021-04-08
Attending: INTERNAL MEDICINE
Payer: MEDICARE

## 2021-04-08 DIAGNOSIS — D64.9 ANEMIA, UNSPECIFIED TYPE: ICD-10-CM

## 2021-04-08 DIAGNOSIS — E11.42 TYPE 2 DIABETES MELLITUS WITH DIABETIC POLYNEUROPATHY, WITH LONG-TERM CURRENT USE OF INSULIN: Chronic | ICD-10-CM

## 2021-04-08 DIAGNOSIS — E78.5 HYPERLIPIDEMIA, UNSPECIFIED HYPERLIPIDEMIA TYPE: ICD-10-CM

## 2021-04-08 DIAGNOSIS — I10 HYPERTENSION, ESSENTIAL: ICD-10-CM

## 2021-04-08 DIAGNOSIS — Z79.4 TYPE 2 DIABETES MELLITUS WITH DIABETIC POLYNEUROPATHY, WITH LONG-TERM CURRENT USE OF INSULIN: Chronic | ICD-10-CM

## 2021-04-08 LAB
ALBUMIN SERPL BCP-MCNC: 3.7 G/DL (ref 3.5–5.2)
ALP SERPL-CCNC: 161 U/L (ref 55–135)
ALT SERPL W/O P-5'-P-CCNC: 32 U/L (ref 10–44)
ANION GAP SERPL CALC-SCNC: 9 MMOL/L (ref 8–16)
AST SERPL-CCNC: 42 U/L (ref 10–40)
BASOPHILS # BLD AUTO: 0.05 K/UL (ref 0–0.2)
BASOPHILS NFR BLD: 0.9 % (ref 0–1.9)
BILIRUB SERPL-MCNC: 0.6 MG/DL (ref 0.1–1)
BUN SERPL-MCNC: 33 MG/DL (ref 10–30)
CALCIUM SERPL-MCNC: 9.3 MG/DL (ref 8.7–10.5)
CHLORIDE SERPL-SCNC: 102 MMOL/L (ref 95–110)
CHOLEST SERPL-MCNC: 128 MG/DL (ref 120–199)
CHOLEST/HDLC SERPL: 2.2 {RATIO} (ref 2–5)
CO2 SERPL-SCNC: 27 MMOL/L (ref 23–29)
CREAT SERPL-MCNC: 1.8 MG/DL (ref 0.5–1.4)
DIFFERENTIAL METHOD: ABNORMAL
EOSINOPHIL # BLD AUTO: 0.2 K/UL (ref 0–0.5)
EOSINOPHIL NFR BLD: 2.8 % (ref 0–8)
ERYTHROCYTE [DISTWIDTH] IN BLOOD BY AUTOMATED COUNT: 15.7 % (ref 11.5–14.5)
EST. GFR  (AFRICAN AMERICAN): 27 ML/MIN/1.73 M^2
EST. GFR  (NON AFRICAN AMERICAN): 23.4 ML/MIN/1.73 M^2
ESTIMATED AVG GLUCOSE: 163 MG/DL (ref 68–131)
GLUCOSE SERPL-MCNC: 221 MG/DL (ref 70–110)
HBA1C MFR BLD: 7.3 % (ref 4–5.6)
HCT VFR BLD AUTO: 35.1 % (ref 37–48.5)
HDLC SERPL-MCNC: 58 MG/DL (ref 40–75)
HDLC SERPL: 45.3 % (ref 20–50)
HGB BLD-MCNC: 11.2 G/DL (ref 12–16)
IMM GRANULOCYTES # BLD AUTO: 0.02 K/UL (ref 0–0.04)
IMM GRANULOCYTES NFR BLD AUTO: 0.3 % (ref 0–0.5)
LDLC SERPL CALC-MCNC: 58.2 MG/DL (ref 63–159)
LYMPHOCYTES # BLD AUTO: 1 K/UL (ref 1–4.8)
LYMPHOCYTES NFR BLD: 17.2 % (ref 18–48)
MCH RBC QN AUTO: 29.9 PG (ref 27–31)
MCHC RBC AUTO-ENTMCNC: 31.9 G/DL (ref 32–36)
MCV RBC AUTO: 94 FL (ref 82–98)
MONOCYTES # BLD AUTO: 0.8 K/UL (ref 0.3–1)
MONOCYTES NFR BLD: 13.3 % (ref 4–15)
NEUTROPHILS # BLD AUTO: 3.8 K/UL (ref 1.8–7.7)
NEUTROPHILS NFR BLD: 65.5 % (ref 38–73)
NONHDLC SERPL-MCNC: 70 MG/DL
NRBC BLD-RTO: 0 /100 WBC
PLATELET # BLD AUTO: 165 K/UL (ref 150–450)
PMV BLD AUTO: 12.4 FL (ref 9.2–12.9)
POTASSIUM SERPL-SCNC: 4.7 MMOL/L (ref 3.5–5.1)
PROT SERPL-MCNC: 6.9 G/DL (ref 6–8.4)
RBC # BLD AUTO: 3.75 M/UL (ref 4–5.4)
SODIUM SERPL-SCNC: 138 MMOL/L (ref 136–145)
T4 FREE SERPL-MCNC: 1.27 NG/DL (ref 0.71–1.51)
TRIGL SERPL-MCNC: 59 MG/DL (ref 30–150)
TSH SERPL DL<=0.005 MIU/L-ACNC: 7.88 UIU/ML (ref 0.4–4)
WBC # BLD AUTO: 5.77 K/UL (ref 3.9–12.7)

## 2021-04-08 PROCEDURE — 80061 LIPID PANEL: CPT | Performed by: PHYSICIAN ASSISTANT

## 2021-04-08 PROCEDURE — 83036 HEMOGLOBIN GLYCOSYLATED A1C: CPT | Performed by: PHYSICIAN ASSISTANT

## 2021-04-08 PROCEDURE — 36415 COLL VENOUS BLD VENIPUNCTURE: CPT | Mod: PN | Performed by: PHYSICIAN ASSISTANT

## 2021-04-08 PROCEDURE — 80053 COMPREHEN METABOLIC PANEL: CPT | Performed by: PHYSICIAN ASSISTANT

## 2021-04-08 PROCEDURE — 84439 ASSAY OF FREE THYROXINE: CPT | Performed by: PHYSICIAN ASSISTANT

## 2021-04-08 PROCEDURE — 85025 COMPLETE CBC W/AUTO DIFF WBC: CPT | Performed by: PHYSICIAN ASSISTANT

## 2021-04-08 PROCEDURE — 84443 ASSAY THYROID STIM HORMONE: CPT | Performed by: PHYSICIAN ASSISTANT

## 2021-04-15 ENCOUNTER — OFFICE VISIT (OUTPATIENT)
Dept: CARDIOLOGY | Facility: CLINIC | Age: 86
End: 2021-04-15
Payer: MEDICARE

## 2021-04-15 ENCOUNTER — TELEPHONE (OUTPATIENT)
Dept: INTERNAL MEDICINE | Facility: CLINIC | Age: 86
End: 2021-04-15

## 2021-04-15 ENCOUNTER — HOSPITAL ENCOUNTER (OUTPATIENT)
Dept: RADIOLOGY | Facility: HOSPITAL | Age: 86
Discharge: HOME | End: 2021-04-15
Attending: INTERNAL MEDICINE
Payer: MEDICARE

## 2021-04-15 ENCOUNTER — OFFICE VISIT (OUTPATIENT)
Dept: INTERNAL MEDICINE | Facility: CLINIC | Age: 86
End: 2021-04-15
Payer: MEDICARE

## 2021-04-15 VITALS
OXYGEN SATURATION: 97 % | HEIGHT: 68 IN | WEIGHT: 155.63 LBS | HEART RATE: 87 BPM | DIASTOLIC BLOOD PRESSURE: 78 MMHG | BODY MASS INDEX: 23.59 KG/M2 | SYSTOLIC BLOOD PRESSURE: 128 MMHG

## 2021-04-15 VITALS
BODY MASS INDEX: 23.59 KG/M2 | HEART RATE: 71 BPM | SYSTOLIC BLOOD PRESSURE: 123 MMHG | DIASTOLIC BLOOD PRESSURE: 58 MMHG | HEIGHT: 68 IN | WEIGHT: 155.63 LBS

## 2021-04-15 DIAGNOSIS — I50.32 CHRONIC DIASTOLIC CONGESTIVE HEART FAILURE: Primary | Chronic | ICD-10-CM

## 2021-04-15 DIAGNOSIS — E11.42 TYPE 2 DIABETES MELLITUS WITH DIABETIC POLYNEUROPATHY, WITH LONG-TERM CURRENT USE OF INSULIN: Chronic | ICD-10-CM

## 2021-04-15 DIAGNOSIS — I25.10 CORONARY ARTERY DISEASE INVOLVING NATIVE CORONARY ARTERY OF NATIVE HEART WITHOUT ANGINA PECTORIS: ICD-10-CM

## 2021-04-15 DIAGNOSIS — I27.20 PULMONARY HYPERTENSION: ICD-10-CM

## 2021-04-15 DIAGNOSIS — N18.4 STAGE 4 CHRONIC KIDNEY DISEASE: ICD-10-CM

## 2021-04-15 DIAGNOSIS — I48.19 PERSISTENT ATRIAL FIBRILLATION: Chronic | ICD-10-CM

## 2021-04-15 DIAGNOSIS — E11.21 TYPE 2 DIABETES MELLITUS WITH DIABETIC NEPHROPATHY, WITH LONG-TERM CURRENT USE OF INSULIN: ICD-10-CM

## 2021-04-15 DIAGNOSIS — Z79.4 TYPE 2 DIABETES MELLITUS WITH DIABETIC POLYNEUROPATHY, WITH LONG-TERM CURRENT USE OF INSULIN: Chronic | ICD-10-CM

## 2021-04-15 DIAGNOSIS — I50.32 CHRONIC DIASTOLIC CONGESTIVE HEART FAILURE: Chronic | ICD-10-CM

## 2021-04-15 DIAGNOSIS — M25.50 ARTHRALGIA, UNSPECIFIED JOINT: ICD-10-CM

## 2021-04-15 DIAGNOSIS — Z79.4 TYPE 2 DIABETES MELLITUS WITH DIABETIC NEPHROPATHY, WITH LONG-TERM CURRENT USE OF INSULIN: ICD-10-CM

## 2021-04-15 DIAGNOSIS — I36.1 NON-RHEUMATIC TRICUSPID VALVE INSUFFICIENCY: ICD-10-CM

## 2021-04-15 DIAGNOSIS — E03.9 PRIMARY HYPOTHYROIDISM: Chronic | ICD-10-CM

## 2021-04-15 DIAGNOSIS — I82.409 DEEP VEIN THROMBOSIS (DVT) DURING CURRENT HOSPITALIZATION: Chronic | ICD-10-CM

## 2021-04-15 DIAGNOSIS — E78.2 HYPERLIPEMIA, MIXED: Chronic | ICD-10-CM

## 2021-04-15 DIAGNOSIS — M79.10 MUSCLE PAIN: Primary | ICD-10-CM

## 2021-04-15 PROCEDURE — 99999 PR PBB SHADOW E&M-EST. PATIENT-LVL V: CPT | Mod: PBBFAC,,, | Performed by: INTERNAL MEDICINE

## 2021-04-15 PROCEDURE — 3051F PR MOST RECENT HEMOGLOBIN A1C LEVEL 7.0 - < 8.0%: ICD-10-PCS | Mod: CPTII,S$GLB,, | Performed by: INTERNAL MEDICINE

## 2021-04-15 PROCEDURE — 1125F PR PAIN SEVERITY QUANTIFIED, PAIN PRESENT: ICD-10-PCS | Mod: S$GLB,,, | Performed by: INTERNAL MEDICINE

## 2021-04-15 PROCEDURE — 1159F MED LIST DOCD IN RCRD: CPT | Mod: S$GLB,,, | Performed by: INTERNAL MEDICINE

## 2021-04-15 PROCEDURE — 73130 X-RAY EXAM OF HAND: CPT | Mod: 26,RT,, | Performed by: RADIOLOGY

## 2021-04-15 PROCEDURE — 1126F PR PAIN SEVERITY QUANTIFIED, NO PAIN PRESENT: ICD-10-PCS | Mod: S$GLB,,, | Performed by: INTERNAL MEDICINE

## 2021-04-15 PROCEDURE — 3288F PR FALLS RISK ASSESSMENT DOCUMENTED: ICD-10-PCS | Mod: CPTII,S$GLB,, | Performed by: INTERNAL MEDICINE

## 2021-04-15 PROCEDURE — 1126F AMNT PAIN NOTED NONE PRSNT: CPT | Mod: S$GLB,,, | Performed by: INTERNAL MEDICINE

## 2021-04-15 PROCEDURE — 99999 PR PBB SHADOW E&M-EST. PATIENT-LVL V: ICD-10-PCS | Mod: PBBFAC,,, | Performed by: INTERNAL MEDICINE

## 2021-04-15 PROCEDURE — 73130 XR HAND COMPLETE 3 VIEW RIGHT: ICD-10-PCS | Mod: 26,RT,, | Performed by: RADIOLOGY

## 2021-04-15 PROCEDURE — 1159F PR MEDICATION LIST DOCUMENTED IN MEDICAL RECORD: ICD-10-PCS | Mod: S$GLB,,, | Performed by: INTERNAL MEDICINE

## 2021-04-15 PROCEDURE — 3072F LOW RISK FOR RETINOPATHY: CPT | Mod: S$GLB,,, | Performed by: INTERNAL MEDICINE

## 2021-04-15 PROCEDURE — 3051F HG A1C>EQUAL 7.0%<8.0%: CPT | Mod: CPTII,S$GLB,, | Performed by: INTERNAL MEDICINE

## 2021-04-15 PROCEDURE — 1101F PT FALLS ASSESS-DOCD LE1/YR: CPT | Mod: CPTII,S$GLB,, | Performed by: INTERNAL MEDICINE

## 2021-04-15 PROCEDURE — 99214 PR OFFICE/OUTPT VISIT, EST, LEVL IV, 30-39 MIN: ICD-10-PCS | Mod: S$GLB,,, | Performed by: INTERNAL MEDICINE

## 2021-04-15 PROCEDURE — 99214 OFFICE O/P EST MOD 30 MIN: CPT | Mod: S$GLB,,, | Performed by: INTERNAL MEDICINE

## 2021-04-15 PROCEDURE — 73130 X-RAY EXAM OF HAND: CPT | Mod: TC,RT

## 2021-04-15 PROCEDURE — 1125F AMNT PAIN NOTED PAIN PRSNT: CPT | Mod: S$GLB,,, | Performed by: INTERNAL MEDICINE

## 2021-04-15 PROCEDURE — 1101F PR PT FALLS ASSESS DOC 0-1 FALLS W/OUT INJ PAST YR: ICD-10-PCS | Mod: CPTII,S$GLB,, | Performed by: INTERNAL MEDICINE

## 2021-04-15 PROCEDURE — 3288F FALL RISK ASSESSMENT DOCD: CPT | Mod: CPTII,S$GLB,, | Performed by: INTERNAL MEDICINE

## 2021-04-15 PROCEDURE — 3072F PR LOW RISK FOR RETINOPATHY: ICD-10-PCS | Mod: S$GLB,,, | Performed by: INTERNAL MEDICINE

## 2021-04-20 PROCEDURE — G0180 PR HOME HEALTH MD CERTIFICATION: ICD-10-PCS | Mod: ,,, | Performed by: INTERNAL MEDICINE

## 2021-04-20 PROCEDURE — G0180 MD CERTIFICATION HHA PATIENT: HCPCS | Mod: ,,, | Performed by: INTERNAL MEDICINE

## 2021-04-27 ENCOUNTER — TELEPHONE (OUTPATIENT)
Dept: PODIATRY | Facility: CLINIC | Age: 86
End: 2021-04-27

## 2021-04-27 ENCOUNTER — TELEPHONE (OUTPATIENT)
Dept: INTERNAL MEDICINE | Facility: CLINIC | Age: 86
End: 2021-04-27

## 2021-04-28 ENCOUNTER — TELEPHONE (OUTPATIENT)
Dept: PODIATRY | Facility: CLINIC | Age: 86
End: 2021-04-28

## 2021-04-28 RX ORDER — ATORVASTATIN CALCIUM 40 MG/1
40 TABLET, FILM COATED ORAL DAILY
Qty: 90 TABLET | Refills: 3 | Status: SHIPPED | OUTPATIENT
Start: 2021-04-28 | End: 2021-09-09 | Stop reason: SDUPTHER

## 2021-04-29 ENCOUNTER — EXTERNAL HOME HEALTH (OUTPATIENT)
Dept: HOME HEALTH SERVICES | Facility: HOSPITAL | Age: 86
End: 2021-04-29
Payer: MEDICARE

## 2021-05-04 ENCOUNTER — PES CALL (OUTPATIENT)
Dept: ADMINISTRATIVE | Facility: CLINIC | Age: 86
End: 2021-05-04

## 2021-05-04 ENCOUNTER — OFFICE VISIT (OUTPATIENT)
Dept: ORTHOPEDICS | Facility: CLINIC | Age: 86
End: 2021-05-04
Payer: MEDICARE

## 2021-05-04 VITALS — HEIGHT: 68 IN | WEIGHT: 151 LBS | BODY MASS INDEX: 22.88 KG/M2

## 2021-05-04 DIAGNOSIS — M15.9 PRIMARY OSTEOARTHRITIS INVOLVING MULTIPLE JOINTS: Primary | ICD-10-CM

## 2021-05-04 PROCEDURE — 1101F PT FALLS ASSESS-DOCD LE1/YR: CPT | Mod: CPTII,S$GLB,, | Performed by: ORTHOPAEDIC SURGERY

## 2021-05-04 PROCEDURE — 99999 PR PBB SHADOW E&M-EST. PATIENT-LVL IV: ICD-10-PCS | Mod: PBBFAC,,, | Performed by: ORTHOPAEDIC SURGERY

## 2021-05-04 PROCEDURE — 1159F PR MEDICATION LIST DOCUMENTED IN MEDICAL RECORD: ICD-10-PCS | Mod: S$GLB,,, | Performed by: ORTHOPAEDIC SURGERY

## 2021-05-04 PROCEDURE — 1125F PR PAIN SEVERITY QUANTIFIED, PAIN PRESENT: ICD-10-PCS | Mod: S$GLB,,, | Performed by: ORTHOPAEDIC SURGERY

## 2021-05-04 PROCEDURE — 3288F FALL RISK ASSESSMENT DOCD: CPT | Mod: CPTII,S$GLB,, | Performed by: ORTHOPAEDIC SURGERY

## 2021-05-04 PROCEDURE — 3288F PR FALLS RISK ASSESSMENT DOCUMENTED: ICD-10-PCS | Mod: CPTII,S$GLB,, | Performed by: ORTHOPAEDIC SURGERY

## 2021-05-04 PROCEDURE — 1101F PR PT FALLS ASSESS DOC 0-1 FALLS W/OUT INJ PAST YR: ICD-10-PCS | Mod: CPTII,S$GLB,, | Performed by: ORTHOPAEDIC SURGERY

## 2021-05-04 PROCEDURE — 3072F PR LOW RISK FOR RETINOPATHY: ICD-10-PCS | Mod: S$GLB,,, | Performed by: ORTHOPAEDIC SURGERY

## 2021-05-04 PROCEDURE — 1125F AMNT PAIN NOTED PAIN PRSNT: CPT | Mod: S$GLB,,, | Performed by: ORTHOPAEDIC SURGERY

## 2021-05-04 PROCEDURE — 99999 PR PBB SHADOW E&M-EST. PATIENT-LVL IV: CPT | Mod: PBBFAC,,, | Performed by: ORTHOPAEDIC SURGERY

## 2021-05-04 PROCEDURE — 99499 RISK ADDL DX/OHS AUDIT: ICD-10-PCS | Mod: S$GLB,,, | Performed by: ORTHOPAEDIC SURGERY

## 2021-05-04 PROCEDURE — 99499 UNLISTED E&M SERVICE: CPT | Mod: S$GLB,,, | Performed by: ORTHOPAEDIC SURGERY

## 2021-05-04 PROCEDURE — 3072F LOW RISK FOR RETINOPATHY: CPT | Mod: S$GLB,,, | Performed by: ORTHOPAEDIC SURGERY

## 2021-05-04 PROCEDURE — 1159F MED LIST DOCD IN RCRD: CPT | Mod: S$GLB,,, | Performed by: ORTHOPAEDIC SURGERY

## 2021-05-04 PROCEDURE — 99214 OFFICE O/P EST MOD 30 MIN: CPT | Mod: S$GLB,,, | Performed by: ORTHOPAEDIC SURGERY

## 2021-05-04 PROCEDURE — 99214 PR OFFICE/OUTPT VISIT, EST, LEVL IV, 30-39 MIN: ICD-10-PCS | Mod: S$GLB,,, | Performed by: ORTHOPAEDIC SURGERY

## 2021-05-04 RX ORDER — METHOCARBAMOL 500 MG/1
500 TABLET, FILM COATED ORAL 3 TIMES DAILY PRN
Qty: 44 TABLET | Refills: 0 | Status: SHIPPED | OUTPATIENT
Start: 2021-05-04 | End: 2022-05-27

## 2021-05-05 ENCOUNTER — TELEPHONE (OUTPATIENT)
Dept: INTERNAL MEDICINE | Facility: CLINIC | Age: 86
End: 2021-05-05

## 2021-05-05 RX ORDER — ALBUTEROL SULFATE 90 UG/1
2 AEROSOL, METERED RESPIRATORY (INHALATION) EVERY 4 HOURS PRN
Qty: 18 G | Refills: 2 | Status: SHIPPED | OUTPATIENT
Start: 2021-05-05 | End: 2022-03-17 | Stop reason: SDUPTHER

## 2021-05-10 ENCOUNTER — DOCUMENT SCAN (OUTPATIENT)
Dept: HOME HEALTH SERVICES | Facility: HOSPITAL | Age: 86
End: 2021-05-10
Payer: MEDICARE

## 2021-05-10 RX ORDER — ALBUTEROL SULFATE 90 UG/1
AEROSOL, METERED RESPIRATORY (INHALATION)
Qty: 72 G | OUTPATIENT
Start: 2021-05-10

## 2021-05-27 ENCOUNTER — OFFICE VISIT (OUTPATIENT)
Dept: PODIATRY | Facility: CLINIC | Age: 86
End: 2021-05-27
Payer: MEDICARE

## 2021-05-27 VITALS
WEIGHT: 151 LBS | DIASTOLIC BLOOD PRESSURE: 60 MMHG | HEART RATE: 68 BPM | HEIGHT: 68 IN | SYSTOLIC BLOOD PRESSURE: 121 MMHG | RESPIRATION RATE: 18 BRPM | BODY MASS INDEX: 22.88 KG/M2

## 2021-05-27 DIAGNOSIS — B35.1 ONYCHOMYCOSIS DUE TO DERMATOPHYTE: ICD-10-CM

## 2021-05-27 DIAGNOSIS — I73.9 PERIPHERAL VASCULAR DISEASE: ICD-10-CM

## 2021-05-27 DIAGNOSIS — E11.42 DIABETIC POLYNEUROPATHY ASSOCIATED WITH TYPE 2 DIABETES MELLITUS: Primary | ICD-10-CM

## 2021-05-27 PROCEDURE — 11057 PARNG/CUTG B9 HYPRKR LES >4: CPT | Mod: Q9,S$GLB,, | Performed by: PODIATRIST

## 2021-05-27 PROCEDURE — 99999 PR PBB SHADOW E&M-EST. PATIENT-LVL III: CPT | Mod: PBBFAC,,, | Performed by: PODIATRIST

## 2021-05-27 PROCEDURE — 3072F LOW RISK FOR RETINOPATHY: CPT | Mod: S$GLB,,, | Performed by: PODIATRIST

## 2021-05-27 PROCEDURE — 1126F AMNT PAIN NOTED NONE PRSNT: CPT | Mod: S$GLB,,, | Performed by: PODIATRIST

## 2021-05-27 PROCEDURE — 99499 UNLISTED E&M SERVICE: CPT | Mod: S$GLB,,, | Performed by: PODIATRIST

## 2021-05-27 PROCEDURE — 99999 PR PBB SHADOW E&M-EST. PATIENT-LVL III: ICD-10-PCS | Mod: PBBFAC,,, | Performed by: PODIATRIST

## 2021-05-27 PROCEDURE — 3072F PR LOW RISK FOR RETINOPATHY: ICD-10-PCS | Mod: S$GLB,,, | Performed by: PODIATRIST

## 2021-05-27 PROCEDURE — 11057 PR TRIM BENIGN HYPERKERATOTIC SKIN LESION,>4: ICD-10-PCS | Mod: Q9,S$GLB,, | Performed by: PODIATRIST

## 2021-05-27 PROCEDURE — 99499 NO LOS: ICD-10-PCS | Mod: S$GLB,,, | Performed by: PODIATRIST

## 2021-05-27 PROCEDURE — 11721 PR DEBRIDEMENT OF NAILS, 6 OR MORE: ICD-10-PCS | Mod: Q9,59,S$GLB, | Performed by: PODIATRIST

## 2021-05-27 PROCEDURE — 1126F PR PAIN SEVERITY QUANTIFIED, NO PAIN PRESENT: ICD-10-PCS | Mod: S$GLB,,, | Performed by: PODIATRIST

## 2021-05-27 PROCEDURE — 11721 DEBRIDE NAIL 6 OR MORE: CPT | Mod: Q9,59,S$GLB, | Performed by: PODIATRIST

## 2021-06-02 ENCOUNTER — TELEPHONE (OUTPATIENT)
Dept: PODIATRY | Facility: CLINIC | Age: 86
End: 2021-06-02

## 2021-06-08 ENCOUNTER — TELEPHONE (OUTPATIENT)
Dept: PODIATRY | Facility: CLINIC | Age: 86
End: 2021-06-08

## 2021-06-11 ENCOUNTER — TELEPHONE (OUTPATIENT)
Dept: INTERNAL MEDICINE | Facility: CLINIC | Age: 86
End: 2021-06-11

## 2021-06-15 ENCOUNTER — TELEPHONE (OUTPATIENT)
Dept: INTERNAL MEDICINE | Facility: CLINIC | Age: 86
End: 2021-06-15

## 2021-06-16 DIAGNOSIS — H40.1190 PRIMARY OPEN ANGLE GLAUCOMA: ICD-10-CM

## 2021-06-16 DIAGNOSIS — H01.009 BLEPHARITIS, UNSPECIFIED LATERALITY, UNSPECIFIED TYPE: Primary | ICD-10-CM

## 2021-06-16 RX ORDER — ERYTHROMYCIN 5 MG/G
OINTMENT OPHTHALMIC NIGHTLY
Qty: 1 TUBE | Refills: 6 | Status: SHIPPED | OUTPATIENT
Start: 2021-06-16 | End: 2021-08-10 | Stop reason: ALTCHOICE

## 2021-06-16 RX ORDER — DORZOLAMIDE HCL 20 MG/ML
1 SOLUTION/ DROPS OPHTHALMIC 2 TIMES DAILY
Qty: 10 ML | Refills: 4 | Status: SHIPPED | OUTPATIENT
Start: 2021-06-16 | End: 2022-09-07

## 2021-06-17 ENCOUNTER — TELEPHONE (OUTPATIENT)
Dept: INTERNAL MEDICINE | Facility: CLINIC | Age: 86
End: 2021-06-17

## 2021-06-17 DIAGNOSIS — E11.21 TYPE 2 DIABETES MELLITUS WITH DIABETIC NEPHROPATHY, WITH LONG-TERM CURRENT USE OF INSULIN: ICD-10-CM

## 2021-06-17 DIAGNOSIS — R19.5 OCCULT GI BLEEDING: Primary | ICD-10-CM

## 2021-06-17 DIAGNOSIS — N18.4 STAGE 4 CHRONIC KIDNEY DISEASE: ICD-10-CM

## 2021-06-17 DIAGNOSIS — D50.0 IRON DEFICIENCY ANEMIA DUE TO CHRONIC BLOOD LOSS: ICD-10-CM

## 2021-06-17 DIAGNOSIS — Z79.4 TYPE 2 DIABETES MELLITUS WITH DIABETIC NEPHROPATHY, WITH LONG-TERM CURRENT USE OF INSULIN: ICD-10-CM

## 2021-06-17 DIAGNOSIS — E03.9 PRIMARY HYPOTHYROIDISM: ICD-10-CM

## 2021-06-21 ENCOUNTER — TELEPHONE (OUTPATIENT)
Dept: INTERNAL MEDICINE | Facility: CLINIC | Age: 86
End: 2021-06-21

## 2021-07-07 ENCOUNTER — TELEPHONE (OUTPATIENT)
Dept: INTERNAL MEDICINE | Facility: CLINIC | Age: 86
End: 2021-07-07

## 2021-07-13 ENCOUNTER — TELEPHONE (OUTPATIENT)
Dept: INTERNAL MEDICINE | Facility: CLINIC | Age: 86
End: 2021-07-13

## 2021-07-14 ENCOUNTER — TELEPHONE (OUTPATIENT)
Dept: INTERNAL MEDICINE | Facility: CLINIC | Age: 86
End: 2021-07-14

## 2021-08-06 ENCOUNTER — PES CALL (OUTPATIENT)
Dept: ADMINISTRATIVE | Facility: CLINIC | Age: 86
End: 2021-08-06

## 2021-08-09 ENCOUNTER — TELEPHONE (OUTPATIENT)
Dept: INTERNAL MEDICINE | Facility: CLINIC | Age: 86
End: 2021-08-09

## 2021-08-09 ENCOUNTER — PATIENT OUTREACH (OUTPATIENT)
Dept: ADMINISTRATIVE | Facility: OTHER | Age: 86
End: 2021-08-09

## 2021-08-09 DIAGNOSIS — I50.32 CHRONIC DIASTOLIC CONGESTIVE HEART FAILURE: ICD-10-CM

## 2021-08-09 DIAGNOSIS — E11.21 TYPE 2 DIABETES MELLITUS WITH DIABETIC NEPHROPATHY, WITH LONG-TERM CURRENT USE OF INSULIN: Primary | ICD-10-CM

## 2021-08-09 DIAGNOSIS — Z79.4 TYPE 2 DIABETES MELLITUS WITH DIABETIC NEPHROPATHY, WITH LONG-TERM CURRENT USE OF INSULIN: Primary | ICD-10-CM

## 2021-08-09 DIAGNOSIS — D50.0 IRON DEFICIENCY ANEMIA DUE TO CHRONIC BLOOD LOSS: ICD-10-CM

## 2021-08-09 DIAGNOSIS — E03.9 PRIMARY HYPOTHYROIDISM: ICD-10-CM

## 2021-08-09 DIAGNOSIS — D64.9 SYMPTOMATIC ANEMIA: ICD-10-CM

## 2021-08-09 DIAGNOSIS — N18.4 STAGE 4 CHRONIC KIDNEY DISEASE: ICD-10-CM

## 2021-08-09 DIAGNOSIS — I25.10 CORONARY ARTERY DISEASE INVOLVING NATIVE CORONARY ARTERY OF NATIVE HEART WITHOUT ANGINA PECTORIS: ICD-10-CM

## 2021-08-09 RX ORDER — ISOSORBIDE MONONITRATE 60 MG/1
TABLET, EXTENDED RELEASE ORAL
Qty: 90 TABLET | Refills: 3 | Status: SHIPPED | OUTPATIENT
Start: 2021-08-09 | End: 2022-05-24

## 2021-08-10 ENCOUNTER — OFFICE VISIT (OUTPATIENT)
Dept: CARDIOLOGY | Facility: CLINIC | Age: 86
End: 2021-08-10
Payer: MEDICARE

## 2021-08-10 VITALS
SYSTOLIC BLOOD PRESSURE: 123 MMHG | WEIGHT: 159.31 LBS | HEIGHT: 68 IN | BODY MASS INDEX: 24.14 KG/M2 | DIASTOLIC BLOOD PRESSURE: 59 MMHG | HEART RATE: 70 BPM

## 2021-08-10 DIAGNOSIS — Z79.4 TYPE 2 DIABETES MELLITUS WITH DIABETIC POLYNEUROPATHY, WITH LONG-TERM CURRENT USE OF INSULIN: Chronic | ICD-10-CM

## 2021-08-10 DIAGNOSIS — I77.1 TORTUOUS AORTA: ICD-10-CM

## 2021-08-10 DIAGNOSIS — I50.32 CHRONIC DIASTOLIC CONGESTIVE HEART FAILURE: Primary | Chronic | ICD-10-CM

## 2021-08-10 DIAGNOSIS — J45.901 ASTHMA EXACERBATION IN COPD: ICD-10-CM

## 2021-08-10 DIAGNOSIS — I48.21 PERMANENT ATRIAL FIBRILLATION: ICD-10-CM

## 2021-08-10 DIAGNOSIS — N18.4 STAGE 4 CHRONIC KIDNEY DISEASE: ICD-10-CM

## 2021-08-10 DIAGNOSIS — I25.10 CORONARY ARTERY DISEASE INVOLVING NATIVE CORONARY ARTERY OF NATIVE HEART WITHOUT ANGINA PECTORIS: ICD-10-CM

## 2021-08-10 DIAGNOSIS — I27.20 PULMONARY HYPERTENSION: ICD-10-CM

## 2021-08-10 DIAGNOSIS — J44.1 ASTHMA EXACERBATION IN COPD: ICD-10-CM

## 2021-08-10 DIAGNOSIS — I36.1 NON-RHEUMATIC TRICUSPID VALVE INSUFFICIENCY: ICD-10-CM

## 2021-08-10 DIAGNOSIS — I70.0 AORTIC ATHEROSCLEROSIS: ICD-10-CM

## 2021-08-10 DIAGNOSIS — E11.42 TYPE 2 DIABETES MELLITUS WITH DIABETIC POLYNEUROPATHY, WITH LONG-TERM CURRENT USE OF INSULIN: Chronic | ICD-10-CM

## 2021-08-10 PROCEDURE — 1125F AMNT PAIN NOTED PAIN PRSNT: CPT | Mod: CPTII,S$GLB,, | Performed by: INTERNAL MEDICINE

## 2021-08-10 PROCEDURE — 1159F PR MEDICATION LIST DOCUMENTED IN MEDICAL RECORD: ICD-10-PCS | Mod: CPTII,S$GLB,, | Performed by: INTERNAL MEDICINE

## 2021-08-10 PROCEDURE — 1101F PR PT FALLS ASSESS DOC 0-1 FALLS W/OUT INJ PAST YR: ICD-10-PCS | Mod: CPTII,S$GLB,, | Performed by: INTERNAL MEDICINE

## 2021-08-10 PROCEDURE — 3072F LOW RISK FOR RETINOPATHY: CPT | Mod: CPTII,S$GLB,, | Performed by: INTERNAL MEDICINE

## 2021-08-10 PROCEDURE — 99999 PR PBB SHADOW E&M-EST. PATIENT-LVL IV: CPT | Mod: PBBFAC,,, | Performed by: INTERNAL MEDICINE

## 2021-08-10 PROCEDURE — 3288F PR FALLS RISK ASSESSMENT DOCUMENTED: ICD-10-PCS | Mod: CPTII,S$GLB,, | Performed by: INTERNAL MEDICINE

## 2021-08-10 PROCEDURE — 99214 OFFICE O/P EST MOD 30 MIN: CPT | Mod: S$GLB,,, | Performed by: INTERNAL MEDICINE

## 2021-08-10 PROCEDURE — 99999 PR PBB SHADOW E&M-EST. PATIENT-LVL IV: ICD-10-PCS | Mod: PBBFAC,,, | Performed by: INTERNAL MEDICINE

## 2021-08-10 PROCEDURE — 1159F MED LIST DOCD IN RCRD: CPT | Mod: CPTII,S$GLB,, | Performed by: INTERNAL MEDICINE

## 2021-08-10 PROCEDURE — 3051F PR MOST RECENT HEMOGLOBIN A1C LEVEL 7.0 - < 8.0%: ICD-10-PCS | Mod: CPTII,S$GLB,, | Performed by: INTERNAL MEDICINE

## 2021-08-10 PROCEDURE — 1101F PT FALLS ASSESS-DOCD LE1/YR: CPT | Mod: CPTII,S$GLB,, | Performed by: INTERNAL MEDICINE

## 2021-08-10 PROCEDURE — 3072F PR LOW RISK FOR RETINOPATHY: ICD-10-PCS | Mod: CPTII,S$GLB,, | Performed by: INTERNAL MEDICINE

## 2021-08-10 PROCEDURE — 3288F FALL RISK ASSESSMENT DOCD: CPT | Mod: CPTII,S$GLB,, | Performed by: INTERNAL MEDICINE

## 2021-08-10 PROCEDURE — 3051F HG A1C>EQUAL 7.0%<8.0%: CPT | Mod: CPTII,S$GLB,, | Performed by: INTERNAL MEDICINE

## 2021-08-10 PROCEDURE — 99214 PR OFFICE/OUTPT VISIT, EST, LEVL IV, 30-39 MIN: ICD-10-PCS | Mod: S$GLB,,, | Performed by: INTERNAL MEDICINE

## 2021-08-10 PROCEDURE — 1125F PR PAIN SEVERITY QUANTIFIED, PAIN PRESENT: ICD-10-PCS | Mod: CPTII,S$GLB,, | Performed by: INTERNAL MEDICINE

## 2021-08-10 RX ORDER — NITROGLYCERIN 0.4 MG/1
TABLET SUBLINGUAL
Qty: 100 TABLET | Refills: 3 | Status: SHIPPED | OUTPATIENT
Start: 2021-08-10

## 2021-08-10 RX ORDER — FUROSEMIDE 40 MG/1
1 TABLET ORAL 2 TIMES DAILY
COMMUNITY
Start: 2021-06-23 | End: 2021-09-09 | Stop reason: SDUPTHER

## 2021-08-11 ENCOUNTER — OFFICE VISIT (OUTPATIENT)
Dept: OPHTHALMOLOGY | Facility: CLINIC | Age: 86
End: 2021-08-11
Payer: MEDICARE

## 2021-08-11 DIAGNOSIS — H40.1132 PRIMARY OPEN ANGLE GLAUCOMA (POAG) OF BOTH EYES, MODERATE STAGE: Primary | ICD-10-CM

## 2021-08-11 DIAGNOSIS — H40.32X2 GLAUCOMA OF LEFT EYE ASSOCIATED WITH OCULAR TRAUMA, MODERATE STAGE: ICD-10-CM

## 2021-08-11 DIAGNOSIS — Z98.49 STATUS POST CATARACT EXTRACTION AND INSERTION OF INTRAOCULAR LENS, UNSPECIFIED LATERALITY: ICD-10-CM

## 2021-08-11 DIAGNOSIS — Z96.1 STATUS POST CATARACT EXTRACTION AND INSERTION OF INTRAOCULAR LENS, UNSPECIFIED LATERALITY: ICD-10-CM

## 2021-08-11 DIAGNOSIS — H04.123 DRY EYE SYNDROME OF BOTH EYES: ICD-10-CM

## 2021-08-11 PROCEDURE — 1160F PR REVIEW ALL MEDS BY PRESCRIBER/CLIN PHARMACIST DOCUMENTED: ICD-10-PCS | Mod: CPTII,S$GLB,, | Performed by: OPHTHALMOLOGY

## 2021-08-11 PROCEDURE — 1126F AMNT PAIN NOTED NONE PRSNT: CPT | Mod: CPTII,S$GLB,, | Performed by: OPHTHALMOLOGY

## 2021-08-11 PROCEDURE — 3288F PR FALLS RISK ASSESSMENT DOCUMENTED: ICD-10-PCS | Mod: CPTII,S$GLB,, | Performed by: OPHTHALMOLOGY

## 2021-08-11 PROCEDURE — 1160F RVW MEDS BY RX/DR IN RCRD: CPT | Mod: CPTII,S$GLB,, | Performed by: OPHTHALMOLOGY

## 2021-08-11 PROCEDURE — 99999 PR PBB SHADOW E&M-EST. PATIENT-LVL III: CPT | Mod: PBBFAC,,, | Performed by: OPHTHALMOLOGY

## 2021-08-11 PROCEDURE — 1101F PR PT FALLS ASSESS DOC 0-1 FALLS W/OUT INJ PAST YR: ICD-10-PCS | Mod: CPTII,S$GLB,, | Performed by: OPHTHALMOLOGY

## 2021-08-11 PROCEDURE — 1101F PT FALLS ASSESS-DOCD LE1/YR: CPT | Mod: CPTII,S$GLB,, | Performed by: OPHTHALMOLOGY

## 2021-08-11 PROCEDURE — 99214 PR OFFICE/OUTPT VISIT, EST, LEVL IV, 30-39 MIN: ICD-10-PCS | Mod: S$GLB,,, | Performed by: OPHTHALMOLOGY

## 2021-08-11 PROCEDURE — 1159F MED LIST DOCD IN RCRD: CPT | Mod: CPTII,S$GLB,, | Performed by: OPHTHALMOLOGY

## 2021-08-11 PROCEDURE — 99999 PR PBB SHADOW E&M-EST. PATIENT-LVL III: ICD-10-PCS | Mod: PBBFAC,,, | Performed by: OPHTHALMOLOGY

## 2021-08-11 PROCEDURE — 3288F FALL RISK ASSESSMENT DOCD: CPT | Mod: CPTII,S$GLB,, | Performed by: OPHTHALMOLOGY

## 2021-08-11 PROCEDURE — 1126F PR PAIN SEVERITY QUANTIFIED, NO PAIN PRESENT: ICD-10-PCS | Mod: CPTII,S$GLB,, | Performed by: OPHTHALMOLOGY

## 2021-08-11 PROCEDURE — 99214 OFFICE O/P EST MOD 30 MIN: CPT | Mod: S$GLB,,, | Performed by: OPHTHALMOLOGY

## 2021-08-11 PROCEDURE — 1159F PR MEDICATION LIST DOCUMENTED IN MEDICAL RECORD: ICD-10-PCS | Mod: CPTII,S$GLB,, | Performed by: OPHTHALMOLOGY

## 2021-08-23 ENCOUNTER — LAB VISIT (OUTPATIENT)
Dept: LAB | Facility: HOSPITAL | Age: 86
End: 2021-08-23
Attending: INTERNAL MEDICINE
Payer: MEDICARE

## 2021-08-23 DIAGNOSIS — I50.32 CHRONIC DIASTOLIC CONGESTIVE HEART FAILURE: ICD-10-CM

## 2021-08-23 DIAGNOSIS — E11.21 TYPE 2 DIABETES MELLITUS WITH DIABETIC NEPHROPATHY, WITH LONG-TERM CURRENT USE OF INSULIN: ICD-10-CM

## 2021-08-23 DIAGNOSIS — E03.9 PRIMARY HYPOTHYROIDISM: ICD-10-CM

## 2021-08-23 DIAGNOSIS — Z79.4 TYPE 2 DIABETES MELLITUS WITH DIABETIC NEPHROPATHY, WITH LONG-TERM CURRENT USE OF INSULIN: ICD-10-CM

## 2021-08-23 DIAGNOSIS — N18.4 STAGE 4 CHRONIC KIDNEY DISEASE: ICD-10-CM

## 2021-08-23 DIAGNOSIS — D50.0 IRON DEFICIENCY ANEMIA DUE TO CHRONIC BLOOD LOSS: ICD-10-CM

## 2021-08-23 LAB
ALBUMIN SERPL BCP-MCNC: 3.6 G/DL (ref 3.5–5.2)
ALP SERPL-CCNC: 140 U/L (ref 55–135)
ALT SERPL W/O P-5'-P-CCNC: 25 U/L (ref 10–44)
ANION GAP SERPL CALC-SCNC: 8 MMOL/L (ref 8–16)
AST SERPL-CCNC: 25 U/L (ref 10–40)
BASOPHILS # BLD AUTO: 0.05 K/UL (ref 0–0.2)
BASOPHILS NFR BLD: 0.9 % (ref 0–1.9)
BILIRUB SERPL-MCNC: 0.5 MG/DL (ref 0.1–1)
BNP SERPL-MCNC: 808 PG/ML (ref 0–99)
BUN SERPL-MCNC: 27 MG/DL (ref 10–30)
CALCIUM SERPL-MCNC: 9.7 MG/DL (ref 8.7–10.5)
CHLORIDE SERPL-SCNC: 103 MMOL/L (ref 95–110)
CO2 SERPL-SCNC: 27 MMOL/L (ref 23–29)
CREAT SERPL-MCNC: 1.6 MG/DL (ref 0.5–1.4)
DIFFERENTIAL METHOD: ABNORMAL
EOSINOPHIL # BLD AUTO: 0.2 K/UL (ref 0–0.5)
EOSINOPHIL NFR BLD: 3.4 % (ref 0–8)
ERYTHROCYTE [DISTWIDTH] IN BLOOD BY AUTOMATED COUNT: 15 % (ref 11.5–14.5)
EST. GFR  (AFRICAN AMERICAN): 31.1 ML/MIN/1.73 M^2
EST. GFR  (NON AFRICAN AMERICAN): 27 ML/MIN/1.73 M^2
ESTIMATED AVG GLUCOSE: 169 MG/DL (ref 68–131)
FERRITIN SERPL-MCNC: 38 NG/ML (ref 20–300)
GLUCOSE SERPL-MCNC: 174 MG/DL (ref 70–110)
HBA1C MFR BLD: 7.5 % (ref 4–5.6)
HCT VFR BLD AUTO: 36.2 % (ref 37–48.5)
HGB BLD-MCNC: 11.7 G/DL (ref 12–16)
IMM GRANULOCYTES # BLD AUTO: 0.03 K/UL (ref 0–0.04)
IMM GRANULOCYTES NFR BLD AUTO: 0.6 % (ref 0–0.5)
LYMPHOCYTES # BLD AUTO: 1.1 K/UL (ref 1–4.8)
LYMPHOCYTES NFR BLD: 20.3 % (ref 18–48)
MCH RBC QN AUTO: 29.8 PG (ref 27–31)
MCHC RBC AUTO-ENTMCNC: 32.3 G/DL (ref 32–36)
MCV RBC AUTO: 92 FL (ref 82–98)
MONOCYTES # BLD AUTO: 0.8 K/UL (ref 0.3–1)
MONOCYTES NFR BLD: 14.9 % (ref 4–15)
NEUTROPHILS # BLD AUTO: 3.2 K/UL (ref 1.8–7.7)
NEUTROPHILS NFR BLD: 59.9 % (ref 38–73)
NRBC BLD-RTO: 0 /100 WBC
PHOSPHATE SERPL-MCNC: 3.6 MG/DL (ref 2.7–4.5)
PLATELET # BLD AUTO: 166 K/UL (ref 150–450)
PMV BLD AUTO: 11.7 FL (ref 9.2–12.9)
POTASSIUM SERPL-SCNC: 4.1 MMOL/L (ref 3.5–5.1)
PROT SERPL-MCNC: 6.7 G/DL (ref 6–8.4)
PTH-INTACT SERPL-MCNC: 271 PG/ML (ref 9–77)
RBC # BLD AUTO: 3.93 M/UL (ref 4–5.4)
SODIUM SERPL-SCNC: 138 MMOL/L (ref 136–145)
T4 FREE SERPL-MCNC: 1.11 NG/DL (ref 0.71–1.51)
TSH SERPL DL<=0.005 MIU/L-ACNC: 6.62 UIU/ML (ref 0.4–4)
WBC # BLD AUTO: 5.36 K/UL (ref 3.9–12.7)

## 2021-08-23 PROCEDURE — 84443 ASSAY THYROID STIM HORMONE: CPT | Performed by: INTERNAL MEDICINE

## 2021-08-23 PROCEDURE — 85025 COMPLETE CBC W/AUTO DIFF WBC: CPT | Performed by: INTERNAL MEDICINE

## 2021-08-23 PROCEDURE — 83970 ASSAY OF PARATHORMONE: CPT | Performed by: INTERNAL MEDICINE

## 2021-08-23 PROCEDURE — 36415 COLL VENOUS BLD VENIPUNCTURE: CPT | Mod: PN | Performed by: INTERNAL MEDICINE

## 2021-08-23 PROCEDURE — 82728 ASSAY OF FERRITIN: CPT | Performed by: INTERNAL MEDICINE

## 2021-08-23 PROCEDURE — 83036 HEMOGLOBIN GLYCOSYLATED A1C: CPT | Performed by: INTERNAL MEDICINE

## 2021-08-23 PROCEDURE — 83880 ASSAY OF NATRIURETIC PEPTIDE: CPT | Performed by: INTERNAL MEDICINE

## 2021-08-23 PROCEDURE — 80053 COMPREHEN METABOLIC PANEL: CPT | Performed by: INTERNAL MEDICINE

## 2021-08-23 PROCEDURE — 84439 ASSAY OF FREE THYROXINE: CPT | Performed by: INTERNAL MEDICINE

## 2021-08-23 PROCEDURE — 84100 ASSAY OF PHOSPHORUS: CPT | Performed by: INTERNAL MEDICINE

## 2021-08-24 ENCOUNTER — OFFICE VISIT (OUTPATIENT)
Dept: PODIATRY | Facility: CLINIC | Age: 86
End: 2021-08-24
Payer: MEDICARE

## 2021-08-24 ENCOUNTER — OFFICE VISIT (OUTPATIENT)
Dept: INTERNAL MEDICINE | Facility: CLINIC | Age: 86
End: 2021-08-24
Payer: MEDICARE

## 2021-08-24 VITALS
SYSTOLIC BLOOD PRESSURE: 110 MMHG | HEIGHT: 68 IN | BODY MASS INDEX: 24.29 KG/M2 | RESPIRATION RATE: 14 BRPM | HEART RATE: 70 BPM | WEIGHT: 160.25 LBS | DIASTOLIC BLOOD PRESSURE: 58 MMHG

## 2021-08-24 VITALS
BODY MASS INDEX: 24.37 KG/M2 | HEART RATE: 57 BPM | SYSTOLIC BLOOD PRESSURE: 134 MMHG | DIASTOLIC BLOOD PRESSURE: 71 MMHG | WEIGHT: 160.25 LBS

## 2021-08-24 DIAGNOSIS — E11.42 DIABETIC POLYNEUROPATHY ASSOCIATED WITH TYPE 2 DIABETES MELLITUS: ICD-10-CM

## 2021-08-24 DIAGNOSIS — J45.901 ASTHMA EXACERBATION IN COPD: ICD-10-CM

## 2021-08-24 DIAGNOSIS — I27.20 PULMONARY HYPERTENSION: ICD-10-CM

## 2021-08-24 DIAGNOSIS — I20.89 ANGINA AT REST: ICD-10-CM

## 2021-08-24 DIAGNOSIS — I50.32 CHRONIC DIASTOLIC CONGESTIVE HEART FAILURE: ICD-10-CM

## 2021-08-24 DIAGNOSIS — E11.42 DIABETIC POLYNEUROPATHY ASSOCIATED WITH TYPE 2 DIABETES MELLITUS: Primary | ICD-10-CM

## 2021-08-24 DIAGNOSIS — I77.1 TORTUOUS AORTA: ICD-10-CM

## 2021-08-24 DIAGNOSIS — E03.9 PRIMARY HYPOTHYROIDISM: Chronic | ICD-10-CM

## 2021-08-24 DIAGNOSIS — E79.0 HYPERURICEMIA WITHOUT SIGNS OF INFLAMMATORY ARTHRITIS AND TOPHACEOUS DISEASE: ICD-10-CM

## 2021-08-24 DIAGNOSIS — Z79.4 TYPE 2 DIABETES MELLITUS WITH HYPERGLYCEMIA, WITH LONG-TERM CURRENT USE OF INSULIN: ICD-10-CM

## 2021-08-24 DIAGNOSIS — Z99.89 DEPENDENCE ON OTHER ENABLING MACHINES AND DEVICES: ICD-10-CM

## 2021-08-24 DIAGNOSIS — I73.9 PERIPHERAL ANGIOPATHY: ICD-10-CM

## 2021-08-24 DIAGNOSIS — H40.1132 PRIMARY OPEN ANGLE GLAUCOMA (POAG) OF BOTH EYES, MODERATE STAGE: ICD-10-CM

## 2021-08-24 DIAGNOSIS — B35.1 ONYCHOMYCOSIS DUE TO DERMATOPHYTE: ICD-10-CM

## 2021-08-24 DIAGNOSIS — Z79.4 TYPE 2 DIABETES MELLITUS WITH DIABETIC NEPHROPATHY, WITH LONG-TERM CURRENT USE OF INSULIN: ICD-10-CM

## 2021-08-24 DIAGNOSIS — I70.0 AORTIC ATHEROSCLEROSIS: ICD-10-CM

## 2021-08-24 DIAGNOSIS — L84 CORN OR CALLUS: ICD-10-CM

## 2021-08-24 DIAGNOSIS — E11.65 TYPE 2 DIABETES MELLITUS WITH HYPERGLYCEMIA, WITH LONG-TERM CURRENT USE OF INSULIN: ICD-10-CM

## 2021-08-24 DIAGNOSIS — J44.1 ASTHMA EXACERBATION IN COPD: ICD-10-CM

## 2021-08-24 DIAGNOSIS — I48.21 PERMANENT ATRIAL FIBRILLATION: ICD-10-CM

## 2021-08-24 DIAGNOSIS — N18.4 STAGE 4 CHRONIC KIDNEY DISEASE: ICD-10-CM

## 2021-08-24 DIAGNOSIS — I73.9 PERIPHERAL VASCULAR DISEASE: ICD-10-CM

## 2021-08-24 DIAGNOSIS — M15.9 GENERALIZED OSTEOARTHRITIS OF MULTIPLE SITES: ICD-10-CM

## 2021-08-24 DIAGNOSIS — Z79.4 TYPE 2 DIABETES MELLITUS WITH DIABETIC POLYNEUROPATHY, WITH LONG-TERM CURRENT USE OF INSULIN: Chronic | ICD-10-CM

## 2021-08-24 DIAGNOSIS — Z00.00 ENCOUNTER FOR PREVENTIVE HEALTH EXAMINATION: Primary | ICD-10-CM

## 2021-08-24 DIAGNOSIS — E11.21 TYPE 2 DIABETES MELLITUS WITH DIABETIC NEPHROPATHY, WITH LONG-TERM CURRENT USE OF INSULIN: ICD-10-CM

## 2021-08-24 DIAGNOSIS — D69.6 THROMBOCYTOPENIA: ICD-10-CM

## 2021-08-24 DIAGNOSIS — I25.10 CORONARY ARTERY DISEASE INVOLVING NATIVE CORONARY ARTERY OF NATIVE HEART WITHOUT ANGINA PECTORIS: ICD-10-CM

## 2021-08-24 DIAGNOSIS — E11.42 TYPE 2 DIABETES MELLITUS WITH DIABETIC POLYNEUROPATHY, WITH LONG-TERM CURRENT USE OF INSULIN: Chronic | ICD-10-CM

## 2021-08-24 PROCEDURE — 3072F PR LOW RISK FOR RETINOPATHY: ICD-10-PCS | Mod: CPTII,S$GLB,, | Performed by: PODIATRIST

## 2021-08-24 PROCEDURE — 1101F PR PT FALLS ASSESS DOC 0-1 FALLS W/OUT INJ PAST YR: ICD-10-PCS | Mod: CPTII,S$GLB,, | Performed by: NURSE PRACTITIONER

## 2021-08-24 PROCEDURE — 3072F LOW RISK FOR RETINOPATHY: CPT | Mod: CPTII,S$GLB,, | Performed by: NURSE PRACTITIONER

## 2021-08-24 PROCEDURE — 3072F LOW RISK FOR RETINOPATHY: CPT | Mod: CPTII,S$GLB,, | Performed by: PODIATRIST

## 2021-08-24 PROCEDURE — 1160F PR REVIEW ALL MEDS BY PRESCRIBER/CLIN PHARMACIST DOCUMENTED: ICD-10-PCS | Mod: CPTII,S$GLB,, | Performed by: NURSE PRACTITIONER

## 2021-08-24 PROCEDURE — 11721 DEBRIDE NAIL 6 OR MORE: CPT | Mod: Q9,59,S$GLB, | Performed by: PODIATRIST

## 2021-08-24 PROCEDURE — 1160F RVW MEDS BY RX/DR IN RCRD: CPT | Mod: CPTII,S$GLB,, | Performed by: NURSE PRACTITIONER

## 2021-08-24 PROCEDURE — 3288F FALL RISK ASSESSMENT DOCD: CPT | Mod: CPTII,S$GLB,, | Performed by: NURSE PRACTITIONER

## 2021-08-24 PROCEDURE — G0439 PR MEDICARE ANNUAL WELLNESS SUBSEQUENT VISIT: ICD-10-PCS | Mod: S$GLB,,, | Performed by: NURSE PRACTITIONER

## 2021-08-24 PROCEDURE — 1126F PR PAIN SEVERITY QUANTIFIED, NO PAIN PRESENT: ICD-10-PCS | Mod: CPTII,S$GLB,, | Performed by: PODIATRIST

## 2021-08-24 PROCEDURE — 99499 UNLISTED E&M SERVICE: CPT | Mod: S$GLB,,, | Performed by: NURSE PRACTITIONER

## 2021-08-24 PROCEDURE — 99499 RISK ADDL DX/OHS AUDIT: ICD-10-PCS | Mod: S$GLB,,, | Performed by: NURSE PRACTITIONER

## 2021-08-24 PROCEDURE — 99999 PR PBB SHADOW E&M-EST. PATIENT-LVL IV: ICD-10-PCS | Mod: PBBFAC,,, | Performed by: NURSE PRACTITIONER

## 2021-08-24 PROCEDURE — 1101F PT FALLS ASSESS-DOCD LE1/YR: CPT | Mod: CPTII,S$GLB,, | Performed by: NURSE PRACTITIONER

## 2021-08-24 PROCEDURE — 1159F MED LIST DOCD IN RCRD: CPT | Mod: CPTII,S$GLB,, | Performed by: NURSE PRACTITIONER

## 2021-08-24 PROCEDURE — 1159F PR MEDICATION LIST DOCUMENTED IN MEDICAL RECORD: ICD-10-PCS | Mod: CPTII,S$GLB,, | Performed by: NURSE PRACTITIONER

## 2021-08-24 PROCEDURE — 1125F AMNT PAIN NOTED PAIN PRSNT: CPT | Mod: CPTII,S$GLB,, | Performed by: NURSE PRACTITIONER

## 2021-08-24 PROCEDURE — G0439 PPPS, SUBSEQ VISIT: HCPCS | Mod: S$GLB,,, | Performed by: NURSE PRACTITIONER

## 2021-08-24 PROCEDURE — 99999 PR PBB SHADOW E&M-EST. PATIENT-LVL IV: CPT | Mod: PBBFAC,,, | Performed by: NURSE PRACTITIONER

## 2021-08-24 PROCEDURE — 3288F PR FALLS RISK ASSESSMENT DOCUMENTED: ICD-10-PCS | Mod: CPTII,S$GLB,, | Performed by: NURSE PRACTITIONER

## 2021-08-24 PROCEDURE — 99499 UNLISTED E&M SERVICE: CPT | Mod: S$GLB,,, | Performed by: PODIATRIST

## 2021-08-24 PROCEDURE — 99499 NO LOS: ICD-10-PCS | Mod: S$GLB,,, | Performed by: PODIATRIST

## 2021-08-24 PROCEDURE — 3051F PR MOST RECENT HEMOGLOBIN A1C LEVEL 7.0 - < 8.0%: ICD-10-PCS | Mod: CPTII,S$GLB,, | Performed by: NURSE PRACTITIONER

## 2021-08-24 PROCEDURE — 1126F AMNT PAIN NOTED NONE PRSNT: CPT | Mod: CPTII,S$GLB,, | Performed by: PODIATRIST

## 2021-08-24 PROCEDURE — 11721 PR DEBRIDEMENT OF NAILS, 6 OR MORE: ICD-10-PCS | Mod: Q9,59,S$GLB, | Performed by: PODIATRIST

## 2021-08-24 PROCEDURE — 99999 PR PBB SHADOW E&M-EST. PATIENT-LVL IV: CPT | Mod: PBBFAC,,, | Performed by: PODIATRIST

## 2021-08-24 PROCEDURE — 3051F HG A1C>EQUAL 7.0%<8.0%: CPT | Mod: CPTII,S$GLB,, | Performed by: NURSE PRACTITIONER

## 2021-08-24 PROCEDURE — 3072F PR LOW RISK FOR RETINOPATHY: ICD-10-PCS | Mod: CPTII,S$GLB,, | Performed by: NURSE PRACTITIONER

## 2021-08-24 PROCEDURE — 11057 PARNG/CUTG B9 HYPRKR LES >4: CPT | Mod: Q9,S$GLB,, | Performed by: PODIATRIST

## 2021-08-24 PROCEDURE — 1125F PR PAIN SEVERITY QUANTIFIED, PAIN PRESENT: ICD-10-PCS | Mod: CPTII,S$GLB,, | Performed by: NURSE PRACTITIONER

## 2021-08-24 PROCEDURE — 11057 PR TRIM BENIGN HYPERKERATOTIC SKIN LESION,>4: ICD-10-PCS | Mod: Q9,S$GLB,, | Performed by: PODIATRIST

## 2021-08-24 PROCEDURE — 99999 PR PBB SHADOW E&M-EST. PATIENT-LVL IV: ICD-10-PCS | Mod: PBBFAC,,, | Performed by: PODIATRIST

## 2021-08-25 ENCOUNTER — OUTPATIENT CASE MANAGEMENT (OUTPATIENT)
Dept: ADMINISTRATIVE | Facility: OTHER | Age: 86
End: 2021-08-25

## 2021-08-25 ENCOUNTER — TELEPHONE (OUTPATIENT)
Dept: INTERNAL MEDICINE | Facility: CLINIC | Age: 86
End: 2021-08-25

## 2021-08-26 ENCOUNTER — PATIENT MESSAGE (OUTPATIENT)
Dept: ADMINISTRATIVE | Facility: OTHER | Age: 86
End: 2021-08-26

## 2021-08-27 ENCOUNTER — PATIENT MESSAGE (OUTPATIENT)
Dept: ADMINISTRATIVE | Facility: OTHER | Age: 86
End: 2021-08-27

## 2021-08-27 ENCOUNTER — OFFICE VISIT (OUTPATIENT)
Dept: INTERNAL MEDICINE | Facility: CLINIC | Age: 86
End: 2021-08-27
Payer: MEDICARE

## 2021-08-27 ENCOUNTER — LAB VISIT (OUTPATIENT)
Dept: LAB | Facility: HOSPITAL | Age: 86
End: 2021-08-27
Attending: INTERNAL MEDICINE
Payer: MEDICARE

## 2021-08-27 VITALS
OXYGEN SATURATION: 97 % | SYSTOLIC BLOOD PRESSURE: 118 MMHG | DIASTOLIC BLOOD PRESSURE: 66 MMHG | HEIGHT: 68 IN | BODY MASS INDEX: 24.09 KG/M2 | HEART RATE: 87 BPM | WEIGHT: 158.94 LBS

## 2021-08-27 DIAGNOSIS — Z79.4 TYPE 2 DIABETES MELLITUS WITH DIABETIC POLYNEUROPATHY, WITH LONG-TERM CURRENT USE OF INSULIN: Chronic | ICD-10-CM

## 2021-08-27 DIAGNOSIS — N18.4 STAGE 4 CHRONIC KIDNEY DISEASE: ICD-10-CM

## 2021-08-27 DIAGNOSIS — M79.10 MYALGIA: ICD-10-CM

## 2021-08-27 DIAGNOSIS — J45.901 ASTHMA EXACERBATION IN COPD: ICD-10-CM

## 2021-08-27 DIAGNOSIS — E11.21 TYPE 2 DIABETES MELLITUS WITH DIABETIC NEPHROPATHY, WITH LONG-TERM CURRENT USE OF INSULIN: ICD-10-CM

## 2021-08-27 DIAGNOSIS — E11.42 TYPE 2 DIABETES MELLITUS WITH DIABETIC POLYNEUROPATHY, WITH LONG-TERM CURRENT USE OF INSULIN: Chronic | ICD-10-CM

## 2021-08-27 DIAGNOSIS — J44.1 ASTHMA EXACERBATION IN COPD: ICD-10-CM

## 2021-08-27 DIAGNOSIS — Z79.4 TYPE 2 DIABETES MELLITUS WITH DIABETIC NEPHROPATHY, WITH LONG-TERM CURRENT USE OF INSULIN: ICD-10-CM

## 2021-08-27 DIAGNOSIS — I50.32 CHRONIC DIASTOLIC CONGESTIVE HEART FAILURE: Chronic | ICD-10-CM

## 2021-08-27 DIAGNOSIS — M79.10 MYALGIA: Primary | ICD-10-CM

## 2021-08-27 DIAGNOSIS — E03.9 PRIMARY HYPOTHYROIDISM: Chronic | ICD-10-CM

## 2021-08-27 LAB
CK SERPL-CCNC: 106 U/L (ref 20–180)
CRP SERPL-MCNC: 1.4 MG/L (ref 0–8.2)
ERYTHROCYTE [SEDIMENTATION RATE] IN BLOOD BY WESTERGREN METHOD: 27 MM/HR (ref 0–36)

## 2021-08-27 PROCEDURE — 3051F HG A1C>EQUAL 7.0%<8.0%: CPT | Mod: CPTII,S$GLB,, | Performed by: INTERNAL MEDICINE

## 2021-08-27 PROCEDURE — 86140 C-REACTIVE PROTEIN: CPT | Performed by: INTERNAL MEDICINE

## 2021-08-27 PROCEDURE — 3072F LOW RISK FOR RETINOPATHY: CPT | Mod: CPTII,S$GLB,, | Performed by: INTERNAL MEDICINE

## 2021-08-27 PROCEDURE — 3072F PR LOW RISK FOR RETINOPATHY: ICD-10-PCS | Mod: CPTII,S$GLB,, | Performed by: INTERNAL MEDICINE

## 2021-08-27 PROCEDURE — 82550 ASSAY OF CK (CPK): CPT | Performed by: INTERNAL MEDICINE

## 2021-08-27 PROCEDURE — 1101F PT FALLS ASSESS-DOCD LE1/YR: CPT | Mod: CPTII,S$GLB,, | Performed by: INTERNAL MEDICINE

## 2021-08-27 PROCEDURE — 1126F AMNT PAIN NOTED NONE PRSNT: CPT | Mod: CPTII,S$GLB,, | Performed by: INTERNAL MEDICINE

## 2021-08-27 PROCEDURE — 1101F PR PT FALLS ASSESS DOC 0-1 FALLS W/OUT INJ PAST YR: ICD-10-PCS | Mod: CPTII,S$GLB,, | Performed by: INTERNAL MEDICINE

## 2021-08-27 PROCEDURE — 99999 PR PBB SHADOW E&M-EST. PATIENT-LVL V: ICD-10-PCS | Mod: PBBFAC,,, | Performed by: INTERNAL MEDICINE

## 2021-08-27 PROCEDURE — 36415 COLL VENOUS BLD VENIPUNCTURE: CPT | Performed by: INTERNAL MEDICINE

## 2021-08-27 PROCEDURE — 1159F PR MEDICATION LIST DOCUMENTED IN MEDICAL RECORD: ICD-10-PCS | Mod: CPTII,S$GLB,, | Performed by: INTERNAL MEDICINE

## 2021-08-27 PROCEDURE — 99215 PR OFFICE/OUTPT VISIT, EST, LEVL V, 40-54 MIN: ICD-10-PCS | Mod: S$GLB,,, | Performed by: INTERNAL MEDICINE

## 2021-08-27 PROCEDURE — 3051F PR MOST RECENT HEMOGLOBIN A1C LEVEL 7.0 - < 8.0%: ICD-10-PCS | Mod: CPTII,S$GLB,, | Performed by: INTERNAL MEDICINE

## 2021-08-27 PROCEDURE — 85652 RBC SED RATE AUTOMATED: CPT | Performed by: INTERNAL MEDICINE

## 2021-08-27 PROCEDURE — 3288F PR FALLS RISK ASSESSMENT DOCUMENTED: ICD-10-PCS | Mod: CPTII,S$GLB,, | Performed by: INTERNAL MEDICINE

## 2021-08-27 PROCEDURE — 99999 PR PBB SHADOW E&M-EST. PATIENT-LVL V: CPT | Mod: PBBFAC,,, | Performed by: INTERNAL MEDICINE

## 2021-08-27 PROCEDURE — 1159F MED LIST DOCD IN RCRD: CPT | Mod: CPTII,S$GLB,, | Performed by: INTERNAL MEDICINE

## 2021-08-27 PROCEDURE — 99215 OFFICE O/P EST HI 40 MIN: CPT | Mod: S$GLB,,, | Performed by: INTERNAL MEDICINE

## 2021-08-27 PROCEDURE — 1126F PR PAIN SEVERITY QUANTIFIED, NO PAIN PRESENT: ICD-10-PCS | Mod: CPTII,S$GLB,, | Performed by: INTERNAL MEDICINE

## 2021-08-27 PROCEDURE — 3288F FALL RISK ASSESSMENT DOCD: CPT | Mod: CPTII,S$GLB,, | Performed by: INTERNAL MEDICINE

## 2021-08-27 RX ORDER — ACETAMINOPHEN 500 MG
1000 TABLET ORAL 3 TIMES DAILY PRN
Qty: 100 TABLET | Refills: 1 | Status: SHIPPED | OUTPATIENT
Start: 2021-08-27

## 2021-09-09 ENCOUNTER — OUTPATIENT CASE MANAGEMENT (OUTPATIENT)
Dept: ADMINISTRATIVE | Facility: OTHER | Age: 86
End: 2021-09-09

## 2021-09-09 ENCOUNTER — OFFICE VISIT (OUTPATIENT)
Dept: INTERNAL MEDICINE | Facility: CLINIC | Age: 86
End: 2021-09-09
Payer: MEDICARE

## 2021-09-09 ENCOUNTER — TELEPHONE (OUTPATIENT)
Dept: PODIATRY | Facility: CLINIC | Age: 86
End: 2021-09-09

## 2021-09-09 VITALS
BODY MASS INDEX: 24.26 KG/M2 | WEIGHT: 160.06 LBS | HEIGHT: 68 IN | OXYGEN SATURATION: 100 % | SYSTOLIC BLOOD PRESSURE: 110 MMHG | DIASTOLIC BLOOD PRESSURE: 63 MMHG | HEART RATE: 65 BPM

## 2021-09-09 DIAGNOSIS — E11.618 TYPE 2 DIABETES MELLITUS WITH OTHER DIABETIC ARTHROPATHY, WITH LONG-TERM CURRENT USE OF INSULIN: ICD-10-CM

## 2021-09-09 DIAGNOSIS — I25.2 OLD MI (MYOCARDIAL INFARCTION): ICD-10-CM

## 2021-09-09 DIAGNOSIS — I25.10 CORONARY ARTERY DISEASE INVOLVING NATIVE CORONARY ARTERY OF NATIVE HEART WITHOUT ANGINA PECTORIS: ICD-10-CM

## 2021-09-09 DIAGNOSIS — I48.21 PERMANENT ATRIAL FIBRILLATION: ICD-10-CM

## 2021-09-09 DIAGNOSIS — I70.0 AORTIC ATHEROSCLEROSIS: ICD-10-CM

## 2021-09-09 DIAGNOSIS — Z79.4 TYPE 2 DIABETES MELLITUS WITH DIABETIC PERIPHERAL ANGIOPATHY WITHOUT GANGRENE, WITH LONG-TERM CURRENT USE OF INSULIN: ICD-10-CM

## 2021-09-09 DIAGNOSIS — I82.409 DEEP VEIN THROMBOSIS (DVT) DURING CURRENT HOSPITALIZATION: Chronic | ICD-10-CM

## 2021-09-09 DIAGNOSIS — H40.1132 PRIMARY OPEN ANGLE GLAUCOMA (POAG) OF BOTH EYES, MODERATE STAGE: ICD-10-CM

## 2021-09-09 DIAGNOSIS — M79.10 MYALGIA: ICD-10-CM

## 2021-09-09 DIAGNOSIS — Z00.00 ANNUAL PHYSICAL EXAM: Primary | ICD-10-CM

## 2021-09-09 DIAGNOSIS — Z79.4 TYPE 2 DIABETES MELLITUS WITH OTHER DIABETIC ARTHROPATHY, WITH LONG-TERM CURRENT USE OF INSULIN: ICD-10-CM

## 2021-09-09 DIAGNOSIS — Z86.39 HISTORY OF INSULIN DEPENDENT DIABETES MELLITUS: ICD-10-CM

## 2021-09-09 DIAGNOSIS — E11.42 TYPE 2 DIABETES MELLITUS WITH DIABETIC POLYNEUROPATHY, WITHOUT LONG-TERM CURRENT USE OF INSULIN: ICD-10-CM

## 2021-09-09 DIAGNOSIS — J45.40 MODERATE PERSISTENT ASTHMA WITHOUT COMPLICATION: Chronic | ICD-10-CM

## 2021-09-09 DIAGNOSIS — Z79.01 LONG TERM CURRENT USE OF ANTICOAGULANT THERAPY: Chronic | ICD-10-CM

## 2021-09-09 DIAGNOSIS — D69.6 THROMBOCYTOPENIA: ICD-10-CM

## 2021-09-09 DIAGNOSIS — N18.4 STAGE 4 CHRONIC KIDNEY DISEASE: ICD-10-CM

## 2021-09-09 DIAGNOSIS — E03.9 PRIMARY HYPOTHYROIDISM: Chronic | ICD-10-CM

## 2021-09-09 DIAGNOSIS — B37.2 INTERTRIGINOUS CANDIDIASIS: ICD-10-CM

## 2021-09-09 DIAGNOSIS — E11.51 TYPE 2 DIABETES MELLITUS WITH DIABETIC PERIPHERAL ANGIOPATHY WITHOUT GANGRENE, WITH LONG-TERM CURRENT USE OF INSULIN: ICD-10-CM

## 2021-09-09 DIAGNOSIS — M19.012 PRIMARY OSTEOARTHRITIS OF LEFT SHOULDER: ICD-10-CM

## 2021-09-09 DIAGNOSIS — I50.32 CHRONIC DIASTOLIC CONGESTIVE HEART FAILURE: Chronic | ICD-10-CM

## 2021-09-09 PROBLEM — D64.9 SYMPTOMATIC ANEMIA: Status: RESOLVED | Noted: 2020-11-03 | Resolved: 2021-09-09

## 2021-09-09 PROBLEM — Z96.1 STATUS POST CATARACT EXTRACTION AND INSERTION OF INTRAOCULAR LENS: Status: RESOLVED | Noted: 2019-05-01 | Resolved: 2021-09-09

## 2021-09-09 PROBLEM — E11.21 TYPE 2 DIABETES MELLITUS WITH DIABETIC NEPHROPATHY, WITH LONG-TERM CURRENT USE OF INSULIN: Status: RESOLVED | Noted: 2017-08-15 | Resolved: 2021-09-09

## 2021-09-09 PROBLEM — R07.9 CHEST PAIN, RULE OUT ACUTE MYOCARDIAL INFARCTION: Status: RESOLVED | Noted: 2019-08-23 | Resolved: 2021-09-09

## 2021-09-09 PROBLEM — D50.9 IRON DEFICIENCY ANEMIA: Status: RESOLVED | Noted: 2020-11-04 | Resolved: 2021-09-09

## 2021-09-09 PROBLEM — I77.1 TORTUOUS AORTA: Status: RESOLVED | Noted: 2018-06-13 | Resolved: 2021-09-09

## 2021-09-09 PROBLEM — Z98.49 STATUS POST CATARACT EXTRACTION AND INSERTION OF INTRAOCULAR LENS: Status: RESOLVED | Noted: 2019-05-01 | Resolved: 2021-09-09

## 2021-09-09 PROCEDURE — 3288F PR FALLS RISK ASSESSMENT DOCUMENTED: ICD-10-PCS | Mod: CPTII,S$GLB,, | Performed by: INTERNAL MEDICINE

## 2021-09-09 PROCEDURE — 1159F PR MEDICATION LIST DOCUMENTED IN MEDICAL RECORD: ICD-10-PCS | Mod: CPTII,S$GLB,, | Performed by: INTERNAL MEDICINE

## 2021-09-09 PROCEDURE — 1101F PT FALLS ASSESS-DOCD LE1/YR: CPT | Mod: CPTII,S$GLB,, | Performed by: INTERNAL MEDICINE

## 2021-09-09 PROCEDURE — 3288F FALL RISK ASSESSMENT DOCD: CPT | Mod: CPTII,S$GLB,, | Performed by: INTERNAL MEDICINE

## 2021-09-09 PROCEDURE — 3072F LOW RISK FOR RETINOPATHY: CPT | Mod: CPTII,S$GLB,, | Performed by: INTERNAL MEDICINE

## 2021-09-09 PROCEDURE — 99999 PR PBB SHADOW E&M-EST. PATIENT-LVL V: ICD-10-PCS | Mod: PBBFAC,,, | Performed by: INTERNAL MEDICINE

## 2021-09-09 PROCEDURE — 1125F PR PAIN SEVERITY QUANTIFIED, PAIN PRESENT: ICD-10-PCS | Mod: CPTII,S$GLB,, | Performed by: INTERNAL MEDICINE

## 2021-09-09 PROCEDURE — 99999 PR PBB SHADOW E&M-EST. PATIENT-LVL V: CPT | Mod: PBBFAC,,, | Performed by: INTERNAL MEDICINE

## 2021-09-09 PROCEDURE — 3051F PR MOST RECENT HEMOGLOBIN A1C LEVEL 7.0 - < 8.0%: ICD-10-PCS | Mod: CPTII,S$GLB,, | Performed by: INTERNAL MEDICINE

## 2021-09-09 PROCEDURE — 99397 PER PM REEVAL EST PAT 65+ YR: CPT | Mod: S$GLB,,, | Performed by: INTERNAL MEDICINE

## 2021-09-09 PROCEDURE — 1125F AMNT PAIN NOTED PAIN PRSNT: CPT | Mod: CPTII,S$GLB,, | Performed by: INTERNAL MEDICINE

## 2021-09-09 PROCEDURE — 1160F RVW MEDS BY RX/DR IN RCRD: CPT | Mod: CPTII,S$GLB,, | Performed by: INTERNAL MEDICINE

## 2021-09-09 PROCEDURE — 1101F PR PT FALLS ASSESS DOC 0-1 FALLS W/OUT INJ PAST YR: ICD-10-PCS | Mod: CPTII,S$GLB,, | Performed by: INTERNAL MEDICINE

## 2021-09-09 PROCEDURE — 99397 PR PREVENTIVE VISIT,EST,65 & OVER: ICD-10-PCS | Mod: S$GLB,,, | Performed by: INTERNAL MEDICINE

## 2021-09-09 PROCEDURE — 3072F PR LOW RISK FOR RETINOPATHY: ICD-10-PCS | Mod: CPTII,S$GLB,, | Performed by: INTERNAL MEDICINE

## 2021-09-09 PROCEDURE — 1160F PR REVIEW ALL MEDS BY PRESCRIBER/CLIN PHARMACIST DOCUMENTED: ICD-10-PCS | Mod: CPTII,S$GLB,, | Performed by: INTERNAL MEDICINE

## 2021-09-09 PROCEDURE — 1159F MED LIST DOCD IN RCRD: CPT | Mod: CPTII,S$GLB,, | Performed by: INTERNAL MEDICINE

## 2021-09-09 PROCEDURE — 3051F HG A1C>EQUAL 7.0%<8.0%: CPT | Mod: CPTII,S$GLB,, | Performed by: INTERNAL MEDICINE

## 2021-09-09 RX ORDER — ATORVASTATIN CALCIUM 40 MG/1
40 TABLET, FILM COATED ORAL NIGHTLY
Qty: 90 TABLET | Refills: 3 | Status: SHIPPED | OUTPATIENT
Start: 2021-09-09 | End: 2022-11-14 | Stop reason: SDUPTHER

## 2021-09-09 RX ORDER — FUROSEMIDE 40 MG/1
TABLET ORAL
Qty: 180 TABLET | Refills: 1 | Status: SHIPPED | OUTPATIENT
Start: 2021-09-09 | End: 2022-05-19

## 2021-09-09 RX ORDER — NAPROXEN SODIUM 220 MG/1
81 TABLET, FILM COATED ORAL EVERY MORNING
Qty: 100 TABLET | Refills: 3
Start: 2021-09-09

## 2021-09-09 RX ORDER — LANCETS 33 GAUGE
1 EACH MISCELLANEOUS DAILY
Qty: 100 EACH | Refills: 3 | Status: SHIPPED | OUTPATIENT
Start: 2021-09-09 | End: 2022-09-30 | Stop reason: SDUPTHER

## 2021-09-09 RX ORDER — NYSTATIN 100000 [USP'U]/G
POWDER TOPICAL 4 TIMES DAILY
Qty: 60 G | Refills: 3 | Status: SHIPPED | OUTPATIENT
Start: 2021-09-09

## 2021-09-09 RX ORDER — LEVOTHYROXINE SODIUM 75 UG/1
75 TABLET ORAL
Qty: 90 TABLET | Refills: 3 | Status: SHIPPED | OUTPATIENT
Start: 2021-09-09 | End: 2021-11-05

## 2021-09-09 RX ORDER — CLOTRIMAZOLE AND BETAMETHASONE DIPROPIONATE 10; .64 MG/G; MG/G
CREAM TOPICAL 2 TIMES DAILY PRN
Qty: 45 G | Refills: 3 | Status: SHIPPED | OUTPATIENT
Start: 2021-09-09

## 2021-09-16 ENCOUNTER — TELEPHONE (OUTPATIENT)
Dept: INTERNAL MEDICINE | Facility: CLINIC | Age: 86
End: 2021-09-16

## 2021-09-17 ENCOUNTER — TELEPHONE (OUTPATIENT)
Dept: INTERNAL MEDICINE | Facility: CLINIC | Age: 86
End: 2021-09-17

## 2021-09-17 RX ORDER — FLUTICASONE PROPIONATE AND SALMETEROL 250; 50 UG/1; UG/1
1 POWDER RESPIRATORY (INHALATION) 2 TIMES DAILY
Qty: 60 EACH | Refills: 11 | Status: SHIPPED | OUTPATIENT
Start: 2021-09-17 | End: 2021-09-24 | Stop reason: SDUPTHER

## 2021-09-17 RX ORDER — FLUTICASONE PROPIONATE AND SALMETEROL 250; 50 UG/1; UG/1
1 POWDER RESPIRATORY (INHALATION) 2 TIMES DAILY
Qty: 60 EACH | Refills: 11 | Status: SHIPPED | OUTPATIENT
Start: 2021-09-17 | End: 2021-09-17 | Stop reason: SDUPTHER

## 2021-09-23 ENCOUNTER — PATIENT MESSAGE (OUTPATIENT)
Dept: ADMINISTRATIVE | Facility: OTHER | Age: 86
End: 2021-09-23

## 2021-09-23 ENCOUNTER — OUTPATIENT CASE MANAGEMENT (OUTPATIENT)
Dept: ADMINISTRATIVE | Facility: OTHER | Age: 86
End: 2021-09-23

## 2021-09-24 ENCOUNTER — TELEPHONE (OUTPATIENT)
Dept: INTERNAL MEDICINE | Facility: CLINIC | Age: 86
End: 2021-09-24

## 2021-09-24 RX ORDER — FLUTICASONE PROPIONATE AND SALMETEROL 250; 50 UG/1; UG/1
1 POWDER RESPIRATORY (INHALATION) 2 TIMES DAILY
Qty: 60 EACH | Refills: 11 | Status: SHIPPED | OUTPATIENT
Start: 2021-09-24 | End: 2021-11-05 | Stop reason: SDUPTHER

## 2021-10-07 ENCOUNTER — OUTPATIENT CASE MANAGEMENT (OUTPATIENT)
Dept: ADMINISTRATIVE | Facility: OTHER | Age: 86
End: 2021-10-07

## 2021-10-21 ENCOUNTER — OUTPATIENT CASE MANAGEMENT (OUTPATIENT)
Dept: ADMINISTRATIVE | Facility: OTHER | Age: 86
End: 2021-10-21

## 2021-10-29 ENCOUNTER — PATIENT MESSAGE (OUTPATIENT)
Dept: CARDIOLOGY | Facility: CLINIC | Age: 86
End: 2021-10-29
Payer: MEDICARE

## 2021-11-01 ENCOUNTER — OFFICE VISIT (OUTPATIENT)
Dept: CARDIOLOGY | Facility: CLINIC | Age: 86
End: 2021-11-01
Payer: MEDICARE

## 2021-11-01 VITALS
SYSTOLIC BLOOD PRESSURE: 122 MMHG | DIASTOLIC BLOOD PRESSURE: 60 MMHG | BODY MASS INDEX: 24.45 KG/M2 | WEIGHT: 160.81 LBS

## 2021-11-01 DIAGNOSIS — I48.21 PERMANENT ATRIAL FIBRILLATION: ICD-10-CM

## 2021-11-01 DIAGNOSIS — I70.0 AORTIC ATHEROSCLEROSIS: Primary | ICD-10-CM

## 2021-11-01 DIAGNOSIS — I50.32 CHRONIC DIASTOLIC CONGESTIVE HEART FAILURE: Chronic | ICD-10-CM

## 2021-11-01 DIAGNOSIS — I25.10 CORONARY ARTERY DISEASE INVOLVING NATIVE CORONARY ARTERY OF NATIVE HEART WITHOUT ANGINA PECTORIS: ICD-10-CM

## 2021-11-01 PROBLEM — I20.89 ANGINA AT REST: Status: RESOLVED | Noted: 2020-04-14 | Resolved: 2021-11-01

## 2021-11-01 PROBLEM — I31.39 PERICARDIAL EFFUSION: Status: RESOLVED | Noted: 2017-05-29 | Resolved: 2021-11-01

## 2021-11-01 PROCEDURE — 1159F MED LIST DOCD IN RCRD: CPT | Mod: CPTII,S$GLB,, | Performed by: INTERNAL MEDICINE

## 2021-11-01 PROCEDURE — 1126F AMNT PAIN NOTED NONE PRSNT: CPT | Mod: CPTII,S$GLB,, | Performed by: INTERNAL MEDICINE

## 2021-11-01 PROCEDURE — 3072F LOW RISK FOR RETINOPATHY: CPT | Mod: CPTII,S$GLB,, | Performed by: INTERNAL MEDICINE

## 2021-11-01 PROCEDURE — 93005 ELECTROCARDIOGRAM TRACING: CPT

## 2021-11-01 PROCEDURE — 3072F PR LOW RISK FOR RETINOPATHY: ICD-10-PCS | Mod: CPTII,S$GLB,, | Performed by: INTERNAL MEDICINE

## 2021-11-01 PROCEDURE — 99499 RISK ADDL DX/OHS AUDIT: ICD-10-PCS | Mod: S$GLB,,, | Performed by: INTERNAL MEDICINE

## 2021-11-01 PROCEDURE — 99499 UNLISTED E&M SERVICE: CPT | Mod: S$GLB,,, | Performed by: INTERNAL MEDICINE

## 2021-11-01 PROCEDURE — 93010 EKG 12-LEAD: ICD-10-PCS | Mod: S$GLB,,, | Performed by: INTERNAL MEDICINE

## 2021-11-01 PROCEDURE — 93010 ELECTROCARDIOGRAM REPORT: CPT | Mod: S$GLB,,, | Performed by: INTERNAL MEDICINE

## 2021-11-01 PROCEDURE — 1159F PR MEDICATION LIST DOCUMENTED IN MEDICAL RECORD: ICD-10-PCS | Mod: CPTII,S$GLB,, | Performed by: INTERNAL MEDICINE

## 2021-11-01 PROCEDURE — 1160F RVW MEDS BY RX/DR IN RCRD: CPT | Mod: CPTII,S$GLB,, | Performed by: INTERNAL MEDICINE

## 2021-11-01 PROCEDURE — 1160F PR REVIEW ALL MEDS BY PRESCRIBER/CLIN PHARMACIST DOCUMENTED: ICD-10-PCS | Mod: CPTII,S$GLB,, | Performed by: INTERNAL MEDICINE

## 2021-11-01 PROCEDURE — 1101F PR PT FALLS ASSESS DOC 0-1 FALLS W/OUT INJ PAST YR: ICD-10-PCS | Mod: CPTII,S$GLB,, | Performed by: INTERNAL MEDICINE

## 2021-11-01 PROCEDURE — 1101F PT FALLS ASSESS-DOCD LE1/YR: CPT | Mod: CPTII,S$GLB,, | Performed by: INTERNAL MEDICINE

## 2021-11-01 PROCEDURE — 99214 PR OFFICE/OUTPT VISIT, EST, LEVL IV, 30-39 MIN: ICD-10-PCS | Mod: 25,S$GLB,, | Performed by: INTERNAL MEDICINE

## 2021-11-01 PROCEDURE — 99214 OFFICE O/P EST MOD 30 MIN: CPT | Mod: 25,S$GLB,, | Performed by: INTERNAL MEDICINE

## 2021-11-01 PROCEDURE — 99999 PR PBB SHADOW E&M-EST. PATIENT-LVL V: CPT | Mod: PBBFAC,,, | Performed by: INTERNAL MEDICINE

## 2021-11-01 PROCEDURE — 3288F PR FALLS RISK ASSESSMENT DOCUMENTED: ICD-10-PCS | Mod: CPTII,S$GLB,, | Performed by: INTERNAL MEDICINE

## 2021-11-01 PROCEDURE — 99999 PR PBB SHADOW E&M-EST. PATIENT-LVL V: ICD-10-PCS | Mod: PBBFAC,,, | Performed by: INTERNAL MEDICINE

## 2021-11-01 PROCEDURE — 3288F FALL RISK ASSESSMENT DOCD: CPT | Mod: CPTII,S$GLB,, | Performed by: INTERNAL MEDICINE

## 2021-11-01 PROCEDURE — 1126F PR PAIN SEVERITY QUANTIFIED, NO PAIN PRESENT: ICD-10-PCS | Mod: CPTII,S$GLB,, | Performed by: INTERNAL MEDICINE

## 2021-11-04 ENCOUNTER — OUTPATIENT CASE MANAGEMENT (OUTPATIENT)
Dept: ADMINISTRATIVE | Facility: OTHER | Age: 86
End: 2021-11-04
Payer: MEDICARE

## 2021-11-04 ENCOUNTER — PATIENT MESSAGE (OUTPATIENT)
Dept: ADMINISTRATIVE | Facility: OTHER | Age: 86
End: 2021-11-04
Payer: MEDICARE

## 2021-11-05 RX ORDER — FLUTICASONE PROPIONATE AND SALMETEROL 250; 50 UG/1; UG/1
1 POWDER RESPIRATORY (INHALATION) 2 TIMES DAILY
Qty: 60 EACH | Refills: 11 | Status: SHIPPED | OUTPATIENT
Start: 2021-11-05 | End: 2021-11-05 | Stop reason: SDUPTHER

## 2021-11-08 RX ORDER — AMLODIPINE BESYLATE 5 MG/1
TABLET ORAL
Qty: 90 TABLET | Refills: 3 | Status: SHIPPED | OUTPATIENT
Start: 2021-11-08 | End: 2022-05-24

## 2021-11-18 ENCOUNTER — OUTPATIENT CASE MANAGEMENT (OUTPATIENT)
Dept: ADMINISTRATIVE | Facility: OTHER | Age: 86
End: 2021-11-18
Payer: MEDICARE

## 2021-11-23 ENCOUNTER — OFFICE VISIT (OUTPATIENT)
Dept: PODIATRY | Facility: CLINIC | Age: 86
End: 2021-11-23
Payer: MEDICARE

## 2021-11-23 VITALS
WEIGHT: 160 LBS | SYSTOLIC BLOOD PRESSURE: 134 MMHG | BODY MASS INDEX: 24.25 KG/M2 | DIASTOLIC BLOOD PRESSURE: 68 MMHG | HEART RATE: 63 BPM | RESPIRATION RATE: 18 BRPM | HEIGHT: 68 IN

## 2021-11-23 DIAGNOSIS — I73.9 PERIPHERAL VASCULAR DISEASE: ICD-10-CM

## 2021-11-23 DIAGNOSIS — B35.1 ONYCHOMYCOSIS DUE TO DERMATOPHYTE: ICD-10-CM

## 2021-11-23 DIAGNOSIS — E11.42 DIABETIC POLYNEUROPATHY ASSOCIATED WITH TYPE 2 DIABETES MELLITUS: Primary | ICD-10-CM

## 2021-11-23 DIAGNOSIS — L84 CORN OR CALLUS: ICD-10-CM

## 2021-11-23 PROCEDURE — 99999 PR PBB SHADOW E&M-EST. PATIENT-LVL IV: CPT | Mod: PBBFAC,,, | Performed by: PODIATRIST

## 2021-11-23 PROCEDURE — 3072F LOW RISK FOR RETINOPATHY: CPT | Mod: CPTII,S$GLB,, | Performed by: PODIATRIST

## 2021-11-23 PROCEDURE — 11721 DEBRIDE NAIL 6 OR MORE: CPT | Mod: Q9,59,S$GLB, | Performed by: PODIATRIST

## 2021-11-23 PROCEDURE — 99999 PR PBB SHADOW E&M-EST. PATIENT-LVL IV: ICD-10-PCS | Mod: PBBFAC,,, | Performed by: PODIATRIST

## 2021-11-23 PROCEDURE — 11057 PARNG/CUTG B9 HYPRKR LES >4: CPT | Mod: Q9,S$GLB,, | Performed by: PODIATRIST

## 2021-11-23 PROCEDURE — 99499 NO LOS: ICD-10-PCS | Mod: S$GLB,,, | Performed by: PODIATRIST

## 2021-11-23 PROCEDURE — 11057 PR TRIM BENIGN HYPERKERATOTIC SKIN LESION,>4: ICD-10-PCS | Mod: Q9,S$GLB,, | Performed by: PODIATRIST

## 2021-11-23 PROCEDURE — 99499 UNLISTED E&M SERVICE: CPT | Mod: S$GLB,,, | Performed by: PODIATRIST

## 2021-11-23 PROCEDURE — 3072F PR LOW RISK FOR RETINOPATHY: ICD-10-PCS | Mod: CPTII,S$GLB,, | Performed by: PODIATRIST

## 2021-11-23 PROCEDURE — 11721 PR DEBRIDEMENT OF NAILS, 6 OR MORE: ICD-10-PCS | Mod: Q9,59,S$GLB, | Performed by: PODIATRIST

## 2021-12-02 ENCOUNTER — OUTPATIENT CASE MANAGEMENT (OUTPATIENT)
Dept: ADMINISTRATIVE | Facility: OTHER | Age: 86
End: 2021-12-02
Payer: MEDICARE

## 2021-12-03 ENCOUNTER — OFFICE VISIT (OUTPATIENT)
Dept: INTERNAL MEDICINE | Facility: CLINIC | Age: 86
End: 2021-12-03
Payer: MEDICARE

## 2021-12-03 ENCOUNTER — LAB VISIT (OUTPATIENT)
Dept: LAB | Facility: HOSPITAL | Age: 86
End: 2021-12-03
Attending: INTERNAL MEDICINE
Payer: MEDICARE

## 2021-12-03 VITALS
OXYGEN SATURATION: 98 % | HEART RATE: 57 BPM | WEIGHT: 158.5 LBS | BODY MASS INDEX: 24.02 KG/M2 | DIASTOLIC BLOOD PRESSURE: 60 MMHG | HEIGHT: 68 IN | SYSTOLIC BLOOD PRESSURE: 124 MMHG

## 2021-12-03 DIAGNOSIS — B96.81 HELICOBACTER PYLORI GASTRITIS: ICD-10-CM

## 2021-12-03 DIAGNOSIS — R29.898 WEAKNESS OF BOTH HIPS: ICD-10-CM

## 2021-12-03 DIAGNOSIS — Z86.39 HISTORY OF INSULIN DEPENDENT DIABETES MELLITUS: ICD-10-CM

## 2021-12-03 DIAGNOSIS — K29.70 HELICOBACTER PYLORI GASTRITIS: ICD-10-CM

## 2021-12-03 DIAGNOSIS — E03.9 PRIMARY HYPOTHYROIDISM: Chronic | ICD-10-CM

## 2021-12-03 DIAGNOSIS — M54.50 ACUTE MIDLINE LOW BACK PAIN WITHOUT SCIATICA: ICD-10-CM

## 2021-12-03 DIAGNOSIS — I48.21 PERMANENT ATRIAL FIBRILLATION: ICD-10-CM

## 2021-12-03 DIAGNOSIS — N18.4 STAGE 4 CHRONIC KIDNEY DISEASE: ICD-10-CM

## 2021-12-03 DIAGNOSIS — E11.42 TYPE 2 DIABETES MELLITUS WITH DIABETIC POLYNEUROPATHY, WITHOUT LONG-TERM CURRENT USE OF INSULIN: ICD-10-CM

## 2021-12-03 DIAGNOSIS — I50.32 CHRONIC DIASTOLIC CONGESTIVE HEART FAILURE: Chronic | ICD-10-CM

## 2021-12-03 DIAGNOSIS — I25.2 OLD MI (MYOCARDIAL INFARCTION): ICD-10-CM

## 2021-12-03 DIAGNOSIS — R10.30 LOWER ABDOMINAL PAIN: Primary | ICD-10-CM

## 2021-12-03 DIAGNOSIS — R29.898 WEAKNESS OF BOTH LEGS: ICD-10-CM

## 2021-12-03 LAB
ANION GAP SERPL CALC-SCNC: 9 MMOL/L (ref 8–16)
BASOPHILS # BLD AUTO: 0.08 K/UL (ref 0–0.2)
BASOPHILS NFR BLD: 1.1 % (ref 0–1.9)
BNP SERPL-MCNC: 456 PG/ML (ref 0–99)
BUN SERPL-MCNC: 35 MG/DL (ref 10–30)
CALCIUM SERPL-MCNC: 9.9 MG/DL (ref 8.7–10.5)
CHLORIDE SERPL-SCNC: 100 MMOL/L (ref 95–110)
CK SERPL-CCNC: 100 U/L (ref 20–180)
CO2 SERPL-SCNC: 26 MMOL/L (ref 23–29)
CREAT SERPL-MCNC: 1.8 MG/DL (ref 0.5–1.4)
CRP SERPL-MCNC: 3.7 MG/L (ref 0–8.2)
DIFFERENTIAL METHOD: ABNORMAL
EOSINOPHIL # BLD AUTO: 0.2 K/UL (ref 0–0.5)
EOSINOPHIL NFR BLD: 2.8 % (ref 0–8)
ERYTHROCYTE [DISTWIDTH] IN BLOOD BY AUTOMATED COUNT: 13.9 % (ref 11.5–14.5)
ERYTHROCYTE [SEDIMENTATION RATE] IN BLOOD BY WESTERGREN METHOD: 20 MM/HR (ref 0–36)
EST. GFR  (AFRICAN AMERICAN): 27 ML/MIN/1.73 M^2
EST. GFR  (NON AFRICAN AMERICAN): 23.4 ML/MIN/1.73 M^2
ESTIMATED AVG GLUCOSE: 174 MG/DL (ref 68–131)
GLUCOSE SERPL-MCNC: 213 MG/DL (ref 70–110)
HBA1C MFR BLD: 7.7 % (ref 4–5.6)
HCT VFR BLD AUTO: 36.6 % (ref 37–48.5)
HGB BLD-MCNC: 12 G/DL (ref 12–16)
IMM GRANULOCYTES # BLD AUTO: 0.01 K/UL (ref 0–0.04)
IMM GRANULOCYTES NFR BLD AUTO: 0.1 % (ref 0–0.5)
LYMPHOCYTES # BLD AUTO: 1.1 K/UL (ref 1–4.8)
LYMPHOCYTES NFR BLD: 15.7 % (ref 18–48)
MCH RBC QN AUTO: 30.5 PG (ref 27–31)
MCHC RBC AUTO-ENTMCNC: 32.8 G/DL (ref 32–36)
MCV RBC AUTO: 93 FL (ref 82–98)
MONOCYTES # BLD AUTO: 1.1 K/UL (ref 0.3–1)
MONOCYTES NFR BLD: 15 % (ref 4–15)
NEUTROPHILS # BLD AUTO: 4.6 K/UL (ref 1.8–7.7)
NEUTROPHILS NFR BLD: 65.3 % (ref 38–73)
NRBC BLD-RTO: 0 /100 WBC
PLATELET # BLD AUTO: 163 K/UL (ref 150–450)
PMV BLD AUTO: 11.7 FL (ref 9.2–12.9)
POTASSIUM SERPL-SCNC: 4.5 MMOL/L (ref 3.5–5.1)
RBC # BLD AUTO: 3.94 M/UL (ref 4–5.4)
SODIUM SERPL-SCNC: 135 MMOL/L (ref 136–145)
WBC # BLD AUTO: 7.02 K/UL (ref 3.9–12.7)

## 2021-12-03 PROCEDURE — 99999 PR PBB SHADOW E&M-EST. PATIENT-LVL V: ICD-10-PCS | Mod: PBBFAC,,, | Performed by: INTERNAL MEDICINE

## 2021-12-03 PROCEDURE — 85025 COMPLETE CBC W/AUTO DIFF WBC: CPT | Performed by: INTERNAL MEDICINE

## 2021-12-03 PROCEDURE — 85652 RBC SED RATE AUTOMATED: CPT | Performed by: INTERNAL MEDICINE

## 2021-12-03 PROCEDURE — 81001 URINALYSIS AUTO W/SCOPE: CPT | Performed by: INTERNAL MEDICINE

## 2021-12-03 PROCEDURE — 83036 HEMOGLOBIN GLYCOSYLATED A1C: CPT | Performed by: INTERNAL MEDICINE

## 2021-12-03 PROCEDURE — 99999 PR PBB SHADOW E&M-EST. PATIENT-LVL V: CPT | Mod: PBBFAC,,, | Performed by: INTERNAL MEDICINE

## 2021-12-03 PROCEDURE — 80048 BASIC METABOLIC PNL TOTAL CA: CPT | Performed by: INTERNAL MEDICINE

## 2021-12-03 PROCEDURE — 3072F LOW RISK FOR RETINOPATHY: CPT | Mod: CPTII,S$GLB,, | Performed by: INTERNAL MEDICINE

## 2021-12-03 PROCEDURE — 3072F PR LOW RISK FOR RETINOPATHY: ICD-10-PCS | Mod: CPTII,S$GLB,, | Performed by: INTERNAL MEDICINE

## 2021-12-03 PROCEDURE — 99214 PR OFFICE/OUTPT VISIT, EST, LEVL IV, 30-39 MIN: ICD-10-PCS | Mod: S$GLB,,, | Performed by: INTERNAL MEDICINE

## 2021-12-03 PROCEDURE — 86140 C-REACTIVE PROTEIN: CPT | Performed by: INTERNAL MEDICINE

## 2021-12-03 PROCEDURE — 83880 ASSAY OF NATRIURETIC PEPTIDE: CPT | Performed by: INTERNAL MEDICINE

## 2021-12-03 PROCEDURE — 87086 URINE CULTURE/COLONY COUNT: CPT | Performed by: INTERNAL MEDICINE

## 2021-12-03 PROCEDURE — 99214 OFFICE O/P EST MOD 30 MIN: CPT | Mod: S$GLB,,, | Performed by: INTERNAL MEDICINE

## 2021-12-03 PROCEDURE — 82550 ASSAY OF CK (CPK): CPT | Performed by: INTERNAL MEDICINE

## 2021-12-03 PROCEDURE — 36415 COLL VENOUS BLD VENIPUNCTURE: CPT | Performed by: INTERNAL MEDICINE

## 2021-12-03 RX ORDER — FLUTICASONE PROPIONATE AND SALMETEROL 250; 50 UG/1; UG/1
1 POWDER RESPIRATORY (INHALATION) 2 TIMES DAILY
Qty: 60 EACH | Refills: 11 | Status: SHIPPED | OUTPATIENT
Start: 2021-12-03 | End: 2021-12-03 | Stop reason: SDUPTHER

## 2021-12-03 RX ORDER — FLUTICASONE PROPIONATE AND SALMETEROL 250; 50 UG/1; UG/1
1 POWDER RESPIRATORY (INHALATION) 2 TIMES DAILY
Qty: 60 EACH | Refills: 11 | Status: SHIPPED | OUTPATIENT
Start: 2021-12-03 | End: 2023-03-22 | Stop reason: SDUPTHER

## 2021-12-04 LAB
BACTERIA #/AREA URNS AUTO: NORMAL /HPF
BILIRUB UR QL STRIP: NEGATIVE
CLARITY UR REFRACT.AUTO: CLEAR
COLOR UR AUTO: YELLOW
GLUCOSE UR QL STRIP: NEGATIVE
HGB UR QL STRIP: NEGATIVE
KETONES UR QL STRIP: NEGATIVE
LEUKOCYTE ESTERASE UR QL STRIP: ABNORMAL
MICROSCOPIC COMMENT: NORMAL
NITRITE UR QL STRIP: NEGATIVE
PH UR STRIP: 6 [PH] (ref 5–8)
PROT UR QL STRIP: NEGATIVE
RBC #/AREA URNS AUTO: 1 /HPF (ref 0–4)
SP GR UR STRIP: 1.01 (ref 1–1.03)
SQUAMOUS #/AREA URNS AUTO: 0 /HPF
URN SPEC COLLECT METH UR: ABNORMAL
WBC #/AREA URNS AUTO: 1 /HPF (ref 0–5)

## 2021-12-05 LAB — BACTERIA UR CULT: NO GROWTH

## 2021-12-06 ENCOUNTER — TELEPHONE (OUTPATIENT)
Dept: INTERNAL MEDICINE | Facility: CLINIC | Age: 86
End: 2021-12-06
Payer: MEDICARE

## 2021-12-06 NOTE — TELEPHONE ENCOUNTER
----- Message from Edwige Watkinscuso sent at 12/6/2021  1:48 PM CST -----  Contact: Melanie/daughter [phone]  Pt's daughter Melanie is a pt of your and states she loves Dr Andres and now the Dr Wood is leaving she would like to know if Dr Andres would see her mom who is 96. I did inform her Dr Andres is not taking new pt's but she would still like to ask him if he would see her? If not, who would he refer her to because Melanie respects his opinion. Please advise.

## 2021-12-08 ENCOUNTER — PATIENT MESSAGE (OUTPATIENT)
Dept: ADMINISTRATIVE | Facility: OTHER | Age: 86
End: 2021-12-08
Payer: MEDICARE

## 2021-12-10 ENCOUNTER — PATIENT OUTREACH (OUTPATIENT)
Dept: ADMINISTRATIVE | Facility: HOSPITAL | Age: 86
End: 2021-12-10
Payer: MEDICARE

## 2022-01-10 ENCOUNTER — TELEPHONE (OUTPATIENT)
Dept: INTERNAL MEDICINE | Facility: CLINIC | Age: 87
End: 2022-01-10
Payer: MEDICARE

## 2022-01-10 NOTE — TELEPHONE ENCOUNTER
----- Message from Hallie Tineo sent at 1/10/2022 11:04 AM CST -----  Contact: [phone] pts daughter José Manuel  Pts daughter José Manuel is calling the pt has an appt on 01/19 and she is asking if it can be changed to 01/17 please advise and give a return call in regards to the appt being changed

## 2022-01-19 ENCOUNTER — OFFICE VISIT (OUTPATIENT)
Dept: INTERNAL MEDICINE | Facility: CLINIC | Age: 87
End: 2022-01-19
Payer: MEDICARE

## 2022-01-19 ENCOUNTER — LAB VISIT (OUTPATIENT)
Dept: LAB | Facility: HOSPITAL | Age: 87
End: 2022-01-19
Attending: INTERNAL MEDICINE
Payer: MEDICARE

## 2022-01-19 VITALS
BODY MASS INDEX: 24.13 KG/M2 | WEIGHT: 159.19 LBS | OXYGEN SATURATION: 99 % | HEIGHT: 68 IN | HEART RATE: 78 BPM | DIASTOLIC BLOOD PRESSURE: 62 MMHG | SYSTOLIC BLOOD PRESSURE: 142 MMHG

## 2022-01-19 DIAGNOSIS — M15.9 GENERALIZED OSTEOARTHRITIS OF MULTIPLE SITES: ICD-10-CM

## 2022-01-19 DIAGNOSIS — E03.9 PRIMARY HYPOTHYROIDISM: ICD-10-CM

## 2022-01-19 DIAGNOSIS — M25.551 RIGHT HIP PAIN: ICD-10-CM

## 2022-01-19 DIAGNOSIS — M17.0 PRIMARY OSTEOARTHRITIS OF BOTH KNEES: ICD-10-CM

## 2022-01-19 DIAGNOSIS — N18.4 STAGE 4 CHRONIC KIDNEY DISEASE: ICD-10-CM

## 2022-01-19 DIAGNOSIS — M79.10 MYALGIA: Primary | ICD-10-CM

## 2022-01-19 DIAGNOSIS — Z86.39 HISTORY OF INSULIN DEPENDENT DIABETES MELLITUS: ICD-10-CM

## 2022-01-19 LAB
T4 FREE SERPL-MCNC: 1.06 NG/DL (ref 0.71–1.51)
T4 SERPL-MCNC: 9.7 UG/DL (ref 4.5–11.5)
TSH SERPL DL<=0.005 MIU/L-ACNC: 4.31 UIU/ML (ref 0.4–4)

## 2022-01-19 PROCEDURE — 1101F PT FALLS ASSESS-DOCD LE1/YR: CPT | Mod: CPTII,S$GLB,, | Performed by: INTERNAL MEDICINE

## 2022-01-19 PROCEDURE — 99499 RISK ADDL DX/OHS AUDIT: ICD-10-PCS | Mod: S$GLB,,, | Performed by: INTERNAL MEDICINE

## 2022-01-19 PROCEDURE — 3288F FALL RISK ASSESSMENT DOCD: CPT | Mod: CPTII,S$GLB,, | Performed by: INTERNAL MEDICINE

## 2022-01-19 PROCEDURE — 1160F PR REVIEW ALL MEDS BY PRESCRIBER/CLIN PHARMACIST DOCUMENTED: ICD-10-PCS | Mod: CPTII,S$GLB,, | Performed by: INTERNAL MEDICINE

## 2022-01-19 PROCEDURE — 99214 PR OFFICE/OUTPT VISIT, EST, LEVL IV, 30-39 MIN: ICD-10-PCS | Mod: S$GLB,,, | Performed by: INTERNAL MEDICINE

## 2022-01-19 PROCEDURE — 99999 PR PBB SHADOW E&M-EST. PATIENT-LVL V: ICD-10-PCS | Mod: PBBFAC,,, | Performed by: INTERNAL MEDICINE

## 2022-01-19 PROCEDURE — 1159F MED LIST DOCD IN RCRD: CPT | Mod: CPTII,S$GLB,, | Performed by: INTERNAL MEDICINE

## 2022-01-19 PROCEDURE — 1159F PR MEDICATION LIST DOCUMENTED IN MEDICAL RECORD: ICD-10-PCS | Mod: CPTII,S$GLB,, | Performed by: INTERNAL MEDICINE

## 2022-01-19 PROCEDURE — 1125F AMNT PAIN NOTED PAIN PRSNT: CPT | Mod: CPTII,S$GLB,, | Performed by: INTERNAL MEDICINE

## 2022-01-19 PROCEDURE — 3288F PR FALLS RISK ASSESSMENT DOCUMENTED: ICD-10-PCS | Mod: CPTII,S$GLB,, | Performed by: INTERNAL MEDICINE

## 2022-01-19 PROCEDURE — 36415 COLL VENOUS BLD VENIPUNCTURE: CPT | Performed by: INTERNAL MEDICINE

## 2022-01-19 PROCEDURE — 84439 ASSAY OF FREE THYROXINE: CPT | Performed by: INTERNAL MEDICINE

## 2022-01-19 PROCEDURE — 99499 UNLISTED E&M SERVICE: CPT | Mod: S$GLB,,, | Performed by: INTERNAL MEDICINE

## 2022-01-19 PROCEDURE — 99214 OFFICE O/P EST MOD 30 MIN: CPT | Mod: S$GLB,,, | Performed by: INTERNAL MEDICINE

## 2022-01-19 PROCEDURE — 84443 ASSAY THYROID STIM HORMONE: CPT | Performed by: INTERNAL MEDICINE

## 2022-01-19 PROCEDURE — 1101F PR PT FALLS ASSESS DOC 0-1 FALLS W/OUT INJ PAST YR: ICD-10-PCS | Mod: CPTII,S$GLB,, | Performed by: INTERNAL MEDICINE

## 2022-01-19 PROCEDURE — 99999 PR PBB SHADOW E&M-EST. PATIENT-LVL V: CPT | Mod: PBBFAC,,, | Performed by: INTERNAL MEDICINE

## 2022-01-19 PROCEDURE — 1125F PR PAIN SEVERITY QUANTIFIED, PAIN PRESENT: ICD-10-PCS | Mod: CPTII,S$GLB,, | Performed by: INTERNAL MEDICINE

## 2022-01-19 PROCEDURE — 1160F RVW MEDS BY RX/DR IN RCRD: CPT | Mod: CPTII,S$GLB,, | Performed by: INTERNAL MEDICINE

## 2022-01-19 PROCEDURE — 84436 ASSAY OF TOTAL THYROXINE: CPT | Performed by: INTERNAL MEDICINE

## 2022-01-19 RX ORDER — DICLOFENAC SODIUM 10 MG/G
2 GEL TOPICAL 2 TIMES DAILY
Qty: 100 G | Refills: 0 | Status: SHIPPED | OUTPATIENT
Start: 2022-01-19 | End: 2022-11-19

## 2022-01-19 NOTE — PROGRESS NOTES
CC:  Establishing care    HPI:  The patient is a 89-year-old female with asthma/COPD, diabetes currently diet controlled, hypertension, hyperlipidemia, CKD stage 4, coronary artery disease status post MI who presents today to establish care plus body aches.  She reports that she has generalized body aches.  She feels that whenever she tries to raise her arms or when she goes from sitting to standing.  When she reaches up, she will get pain in her arms.  When she tries to go from sitting to standing, she gets pain in her thighs.  This been going on for 6 months.  She did see her primary care doctor who discontinued some of her medications.  Symptoms do not resolve so she put her back on them; however, she does not know which medications were stopped.  Inflammatory markers were negative.  Patient also was seen for some abdominal pain.  She reports her stomach tends to make noise.    ROS:  Patient reports weight is staying stable twin [id number].  She does check her blood pressure almost daily.  Her systolic ranges anywhere from 107-137.  Her diastolic ranging was read 67-73.  No nausea vomiting.  She does use Metamucil for her bowels.  No bladder changes.  She reports being off of insulin for several months.  She has not checked her sugars at home.  Her last A1c in December was 7.7.    Physical exam:  General appearance:  No acute distress  HEENT:  Conjunctiva is clear.  She has bilateral lens replacements.  The left pupil little bit irregular compared to the right.  TMs were partially obscured by wax.  Nasal septum is midline without discharge.  Oropharynx is without erythema.  Trachea is midline without JVD or thyromegaly.  Pulmonary:  Good inspiratory, expiratory breath sounds are heard.  Lungs clear auscultation.  Cardiovascular:  S1-S2, rhythm is normal.  Extremities without edema.  GI:  Abdomen is nontender, nondistended without hepatosplenomegaly.  She had no rebound or guarding.    Assessment:  1. Generalized  muscle pain  2. Diabetes currently on no medication  3. Hypertension  4. Hypothyroidism  5. CKD stage 4    Plan:  1. Did discuss with the patient she can take 2 500 mg tabs to Tylenol twice a day for pain  2. Will check a TSH and T4.

## 2022-01-24 ENCOUNTER — TELEPHONE (OUTPATIENT)
Dept: INTERNAL MEDICINE | Facility: CLINIC | Age: 87
End: 2022-01-24
Payer: MEDICARE

## 2022-01-24 DIAGNOSIS — E11.42 TYPE 2 DIABETES MELLITUS WITH DIABETIC POLYNEUROPATHY, WITHOUT LONG-TERM CURRENT USE OF INSULIN: ICD-10-CM

## 2022-01-24 DIAGNOSIS — N18.4 STAGE 4 CHRONIC KIDNEY DISEASE: Primary | ICD-10-CM

## 2022-01-24 DIAGNOSIS — E03.9 PRIMARY HYPOTHYROIDISM: ICD-10-CM

## 2022-01-24 NOTE — TELEPHONE ENCOUNTER
----- Message from Wendy Regalado sent at 1/24/2022  9:59 AM CST -----  Regarding: lab order  Contact: Daughter Yolanda [phone]  Patients daughter needs to know if patient needs to have lab work before next visit.  Please call and advise.

## 2022-01-25 ENCOUNTER — TELEPHONE (OUTPATIENT)
Dept: INTERNAL MEDICINE | Facility: CLINIC | Age: 87
End: 2022-01-25
Payer: MEDICARE

## 2022-01-25 NOTE — TELEPHONE ENCOUNTER
----- Message from Hallie Tineo sent at 1/25/2022  9:58 AM CST -----  Contact: [phone]  Doctor appointment and lab have been scheduled.  Please link lab orders to the lab appointment.  Date of doctor appointment:  05/20  Date of lab appointment:  05/16  Physical or F/U: physical  Comments:

## 2022-02-07 ENCOUNTER — OFFICE VISIT (OUTPATIENT)
Dept: RHEUMATOLOGY | Facility: CLINIC | Age: 87
End: 2022-02-07
Payer: MEDICARE

## 2022-02-07 DIAGNOSIS — M11.20 CALCIUM PYROPHOSPHATE DEPOSITION DISEASE (CPPD): ICD-10-CM

## 2022-02-07 DIAGNOSIS — M17.12 PRIMARY OSTEOARTHRITIS OF LEFT KNEE: ICD-10-CM

## 2022-02-07 DIAGNOSIS — M17.11 PRIMARY OSTEOARTHRITIS OF RIGHT KNEE: Primary | ICD-10-CM

## 2022-02-07 LAB
BODY FLD TYPE: NORMAL
CRYSTALS FLD MICRO: NEGATIVE

## 2022-02-07 PROCEDURE — 99999 PR PBB SHADOW E&M-EST. PATIENT-LVL I: CPT | Mod: PBBFAC,,, | Performed by: INTERNAL MEDICINE

## 2022-02-07 PROCEDURE — 1125F AMNT PAIN NOTED PAIN PRSNT: CPT | Mod: CPTII,S$GLB,, | Performed by: INTERNAL MEDICINE

## 2022-02-07 PROCEDURE — 99999 PR PBB SHADOW E&M-EST. PATIENT-LVL I: ICD-10-PCS | Mod: PBBFAC,,, | Performed by: INTERNAL MEDICINE

## 2022-02-07 PROCEDURE — 20610 PR DRAIN/INJECT LARGE JOINT/BURSA: ICD-10-PCS | Mod: RT,S$GLB,, | Performed by: INTERNAL MEDICINE

## 2022-02-07 PROCEDURE — 89060 EXAM SYNOVIAL FLUID CRYSTALS: CPT | Performed by: INTERNAL MEDICINE

## 2022-02-07 PROCEDURE — 99499 UNLISTED E&M SERVICE: CPT | Mod: S$GLB,,, | Performed by: INTERNAL MEDICINE

## 2022-02-07 PROCEDURE — 99214 PR OFFICE/OUTPT VISIT, EST, LEVL IV, 30-39 MIN: ICD-10-PCS | Mod: 25,S$GLB,, | Performed by: INTERNAL MEDICINE

## 2022-02-07 PROCEDURE — 99499 RISK ADDL DX/OHS AUDIT: ICD-10-PCS | Mod: S$GLB,,, | Performed by: INTERNAL MEDICINE

## 2022-02-07 PROCEDURE — 99214 OFFICE O/P EST MOD 30 MIN: CPT | Mod: 25,S$GLB,, | Performed by: INTERNAL MEDICINE

## 2022-02-07 PROCEDURE — 20610 DRAIN/INJ JOINT/BURSA W/O US: CPT | Mod: RT,S$GLB,, | Performed by: INTERNAL MEDICINE

## 2022-02-07 PROCEDURE — 1125F PR PAIN SEVERITY QUANTIFIED, PAIN PRESENT: ICD-10-PCS | Mod: CPTII,S$GLB,, | Performed by: INTERNAL MEDICINE

## 2022-02-07 RX ORDER — LIDOCAINE HYDROCHLORIDE 20 MG/ML
2 INJECTION, SOLUTION INFILTRATION; PERINEURAL
Status: COMPLETED | OUTPATIENT
Start: 2022-02-07 | End: 2022-02-07

## 2022-02-07 RX ORDER — TRIAMCINOLONE ACETONIDE 40 MG/ML
40 INJECTION, SUSPENSION INTRA-ARTICULAR; INTRAMUSCULAR
Status: COMPLETED | OUTPATIENT
Start: 2022-02-07 | End: 2022-02-07

## 2022-02-07 RX ADMIN — LIDOCAINE HYDROCHLORIDE 2 ML: 20 INJECTION, SOLUTION INFILTRATION; PERINEURAL at 09:02

## 2022-02-07 RX ADMIN — TRIAMCINOLONE ACETONIDE 40 MG: 40 INJECTION, SUSPENSION INTRA-ARTICULAR; INTRAMUSCULAR at 09:02

## 2022-02-07 NOTE — PROGRESS NOTES
"Chief Complaint   Patient presents with    Disease Management    Injections       Patient was referred by     History of presenting illness    89 year old  female with Diabetes,asthma,CKD,iritis,h/o DVT on eliquis, HTN,HLD comes for a follow up.  She has seen 3 rheumatologists already in the system  They have diagnosed her to have right knee osteoarthritis,CPPD left knee      Right knee xray 2019    Tri compartmental joint space narrowing and hypertrophic findings, greater at the patellofemoral articulation and medially on the right are again demonstrated with stable radiographic appearance of the included osseous structures of the right knee.  There is indication of "bone on bone" contact medially.  There is also stable radiographic appearance of the soft tissues on the right, including joint effusion and atherosclerotic calcifications posteriorly as well as fabella.     Osteoarthritic findings are also again demonstrated on the left, particularly involving the lateral patellofemoral articulation, without acute osseous abnormality.     Small calcifications are again demonstrated within both knee joint spaces consistent with chondrocalcinosis/chronic meniscal tears.     Impression:     Osteoarthritic findings of the right knee again demonstrated.  No radiographic evidence of acute osseous abnormality.    She used to see orthopedics-not a surgical candidate  She gets cortisone injections-which help her      Other xrays reviewed    10/22/2019: B knee XR with severe tricompartmental changes, diffuse osteopenia  1/2020: R shoulder XR with evidence of rotator cuff arthropathy  4/15/2021: R hand XR with severe degenerative changes in all 5 PIP and DIPJ- R IF with most severe changes       The left knee hurts as well but not as much as the right one    Left knee xray    Increased moderate left suprapatellar synovial complex/effusion.     Unchanged severe left patellofemoral DJD.     Unchanged " amorphous calcifications projecting in the cartilage of the medial and lateral compartments of the left knee femoral tibial articulation.     No displaced fracture or subluxation.  No suspicious bone lesion.     Impression:     Since May 20, 2017, increased moderate left suprapatellar synovial complex/effusion.      Past history : chronic iritis,glaucoma,thrombocytopenia,diastolic HF, CPPD,anemia,asthma,CKD,Deg disc disease,diabetes,HLD,htn,CAD,thyroid disease,allergic conjuctivitis,pul htn,asthma     Family history : diabetes,htn,glaucoma    Social history : not a smoker,alcohol user        Review of Systems         No skin rashes,malar rash,photosensitivity   No telangiectasias   No calcinosis   No psoriasis   No patchy alopecia   No oral and nasal ulcers   No dry eyes and dry mouth   No pleurisy or any cardiopulmonary complaints   No dysphagia,diplopia and dysphonia and muscle weakness   No n/v/d/c   No acid reflux+   No raynaud's+   No digital ulcers   No cytopenias   No renal issues   No blood clots   No fever,chills,night sweats,weight loss and loss of appetite   No pregnancy losses/pre term deliveries /pregnancy complications   No new onset headaches   No recurrent conjunctivitis or uveitis or scleritis or episcleritis   No chronic or bloody diarrhea with no u colitis or crohn's /inflammatory bowel disease   No vaginal or urethral  d/c/STDs/no ulcers   No unexplained lymphadenopathy,parotitis   No seizures,strokes,psychosis  No sclerodactyly  No puffy hands  No perioral tightness           Physical Exam   Constitutional: She is oriented to person, place, and time and well-developed, well-nourished, and in no distress. No distress.   HENT:   Head: Normocephalic.   Mouth/Throat: Oropharynx is clear and moist.   Eyes: Pupils are equal, round, and reactive to light. Conjunctivae are normal. Right eye exhibits no discharge. Left eye exhibits no discharge. No scleral icterus.   Neck: No thyromegaly present.    Cardiovascular: Normal rate, regular rhythm, normal heart sounds and intact distal pulses.   Pulmonary/Chest: Effort normal and breath sounds normal. No stridor.   Abdominal: Soft. Bowel sounds are normal.   Musculoskeletal:         General: Normal range of motion.      Cervical back: Normal range of motion.   Lymphadenopathy:     She has no cervical adenopathy.   Neurological: She is alert and oriented to person, place, and time.   Skin: Skin is warm. No rash noted. She is not diaphoretic.   Psychiatric: Affect and judgment normal.       Laboratory abnormalities    RF,CCP neg    Assessment     89 year old  female with Diabetes,asthma,CKD,iritis,h/o DVT on eliquis, HTN,HLD comes for a follow up.  She has bilateral knee osteoarthritis and chondrocalcinosis with pseudogout flare in the left knee as per knee aspirate in 2019    Today she comes in with a flare in the right knee      1. Primary osteoarthritis of right knee    2. Primary osteoarthritis of left knee    3. Calcium pyrophosphate deposition disease (CPPD)        Reviewed labs/xrays  Reviewed medications    Ordered labs /xrays    I have independently reviewed       New problem     Plan    Will aspirate fluid from the right knee  Send it to the lab    Inject it with steroids    Procedure note    Verbal consent taken  Right knee superolateral pole identified  Site cleaned  Local anesthetic sprayed  2 ml lidocaine injected  30 cc fluid taken out  40 mg kenalog injected  She did well  Fluid sent to the lab for analysis    Home PT arranged       Tiana was seen today for disease management and injections.    Diagnoses and all orders for this visit:    Primary osteoarthritis of right knee  -     SUBSEQUENT HOME HEALTH ORDERS    Primary osteoarthritis of left knee  -     SUBSEQUENT HOME HEALTH ORDERS    Calcium pyrophosphate deposition disease (CPPD)  -     SUBSEQUENT HOME HEALTH ORDERS    Other orders  -     triamcinolone acetonide injection 40  mg  -     LIDOcaine HCL 20 mg/ml (2%) injection 2 mL

## 2022-02-08 LAB — PATH INTERP FLD-IMP: NORMAL

## 2022-02-09 ENCOUNTER — TELEPHONE (OUTPATIENT)
Dept: INTERNAL MEDICINE | Facility: CLINIC | Age: 87
End: 2022-02-09
Payer: MEDICARE

## 2022-02-09 DIAGNOSIS — M79.89 RIGHT LEG SWELLING: Primary | ICD-10-CM

## 2022-02-09 DIAGNOSIS — R60.1 GENERALIZED EDEMA: ICD-10-CM

## 2022-02-09 PROCEDURE — G0180 PR HOME HEALTH MD CERTIFICATION: ICD-10-PCS | Mod: ,,, | Performed by: INTERNAL MEDICINE

## 2022-02-09 PROCEDURE — G0180 MD CERTIFICATION HHA PATIENT: HCPCS | Mod: ,,, | Performed by: INTERNAL MEDICINE

## 2022-02-09 NOTE — TELEPHONE ENCOUNTER
I cannot order an u/s. I will send message to Dr Heath who is covering for Dr Andres. She may need to be seen first.

## 2022-02-09 NOTE — TELEPHONE ENCOUNTER
----- Message from Kenya Burns sent at 2/9/2022  3:41 PM CST -----  Contact: [phone] or [phone]  Pt called to advise her whole right leg is swollen and heavy and the nurse who comes out is advising she be tested for blood clots. Pt would like to have test to make sure she does not have a blood clot. Please Advise

## 2022-02-16 ENCOUNTER — PATIENT OUTREACH (OUTPATIENT)
Dept: ADMINISTRATIVE | Facility: OTHER | Age: 87
End: 2022-02-16
Payer: MEDICARE

## 2022-02-16 NOTE — PROGRESS NOTES
Care Everywhere: updated  Immunization:  Health Maintenance: updated  Media Review:   Legacy Review:   DIS:  Order placed:   Upcoming appts:ophthalmology 4.5.  EFAX:  Task Tickets:  Referrals:

## 2022-02-18 ENCOUNTER — TELEPHONE (OUTPATIENT)
Dept: INTERNAL MEDICINE | Facility: CLINIC | Age: 87
End: 2022-02-18
Payer: MEDICARE

## 2022-02-18 ENCOUNTER — HOSPITAL ENCOUNTER (OUTPATIENT)
Dept: RADIOLOGY | Facility: OTHER | Age: 87
Discharge: HOME | End: 2022-02-18
Attending: STUDENT IN AN ORGANIZED HEALTH CARE EDUCATION/TRAINING PROGRAM
Payer: MEDICARE

## 2022-02-18 DIAGNOSIS — M79.89 RIGHT LEG SWELLING: ICD-10-CM

## 2022-02-18 DIAGNOSIS — R60.1 GENERALIZED EDEMA: ICD-10-CM

## 2022-02-18 PROCEDURE — 93971 EXTREMITY STUDY: CPT | Mod: 26,RT,, | Performed by: RADIOLOGY

## 2022-02-18 PROCEDURE — 93971 US LOWER EXTREMITY VEINS RIGHT: ICD-10-PCS | Mod: 26,RT,, | Performed by: RADIOLOGY

## 2022-02-18 PROCEDURE — 93971 EXTREMITY STUDY: CPT | Mod: TC,RT

## 2022-02-18 NOTE — TELEPHONE ENCOUNTER
----- Message from Tomás Andres MD sent at 2/18/2022  2:12 PM CST -----  Her ultrasound showed no evidence of a blood clot. Please schedule a follow up appointment if she still having swelling in her leg.

## 2022-02-25 ENCOUNTER — OFFICE VISIT (OUTPATIENT)
Dept: PODIATRY | Facility: CLINIC | Age: 87
End: 2022-02-25
Payer: MEDICARE

## 2022-02-25 VITALS
DIASTOLIC BLOOD PRESSURE: 78 MMHG | HEART RATE: 67 BPM | RESPIRATION RATE: 18 BRPM | SYSTOLIC BLOOD PRESSURE: 138 MMHG | WEIGHT: 159 LBS | HEIGHT: 68 IN | BODY MASS INDEX: 24.1 KG/M2

## 2022-02-25 DIAGNOSIS — E11.42 DIABETIC POLYNEUROPATHY ASSOCIATED WITH TYPE 2 DIABETES MELLITUS: Primary | ICD-10-CM

## 2022-02-25 DIAGNOSIS — L84 CORN OR CALLUS: ICD-10-CM

## 2022-02-25 DIAGNOSIS — B35.1 ONYCHOMYCOSIS DUE TO DERMATOPHYTE: ICD-10-CM

## 2022-02-25 PROCEDURE — 11057 PARNG/CUTG B9 HYPRKR LES >4: CPT | Mod: Q9,S$GLB,, | Performed by: PODIATRIST

## 2022-02-25 PROCEDURE — 1126F PR PAIN SEVERITY QUANTIFIED, NO PAIN PRESENT: ICD-10-PCS | Mod: CPTII,S$GLB,, | Performed by: PODIATRIST

## 2022-02-25 PROCEDURE — 99499 NO LOS: ICD-10-PCS | Mod: S$GLB,,, | Performed by: PODIATRIST

## 2022-02-25 PROCEDURE — 11721 PR DEBRIDEMENT OF NAILS, 6 OR MORE: ICD-10-PCS | Mod: Q9,59,S$GLB, | Performed by: PODIATRIST

## 2022-02-25 PROCEDURE — 11057 PR TRIM BENIGN HYPERKERATOTIC SKIN LESION,>4: ICD-10-PCS | Mod: Q9,S$GLB,, | Performed by: PODIATRIST

## 2022-02-25 PROCEDURE — 99999 PR PBB SHADOW E&M-EST. PATIENT-LVL IV: ICD-10-PCS | Mod: PBBFAC,,, | Performed by: PODIATRIST

## 2022-02-25 PROCEDURE — 99999 PR PBB SHADOW E&M-EST. PATIENT-LVL IV: CPT | Mod: PBBFAC,,, | Performed by: PODIATRIST

## 2022-02-25 PROCEDURE — 1126F AMNT PAIN NOTED NONE PRSNT: CPT | Mod: CPTII,S$GLB,, | Performed by: PODIATRIST

## 2022-02-25 PROCEDURE — 99499 UNLISTED E&M SERVICE: CPT | Mod: S$GLB,,, | Performed by: PODIATRIST

## 2022-02-25 PROCEDURE — 11721 DEBRIDE NAIL 6 OR MORE: CPT | Mod: Q9,59,S$GLB, | Performed by: PODIATRIST

## 2022-02-28 ENCOUNTER — OFFICE VISIT (OUTPATIENT)
Dept: INTERNAL MEDICINE | Facility: CLINIC | Age: 87
End: 2022-02-28
Payer: MEDICARE

## 2022-02-28 VITALS
WEIGHT: 156.31 LBS | DIASTOLIC BLOOD PRESSURE: 68 MMHG | HEART RATE: 65 BPM | SYSTOLIC BLOOD PRESSURE: 116 MMHG | HEIGHT: 68 IN | OXYGEN SATURATION: 98 % | BODY MASS INDEX: 23.69 KG/M2

## 2022-02-28 DIAGNOSIS — G89.29 CHRONIC RIGHT LOWER QUADRANT PAIN: Primary | ICD-10-CM

## 2022-02-28 DIAGNOSIS — R10.31 CHRONIC RIGHT LOWER QUADRANT PAIN: Primary | ICD-10-CM

## 2022-02-28 DIAGNOSIS — E03.9 PRIMARY HYPOTHYROIDISM: ICD-10-CM

## 2022-02-28 DIAGNOSIS — N18.4 STAGE 4 CHRONIC KIDNEY DISEASE: ICD-10-CM

## 2022-02-28 DIAGNOSIS — E11.42 TYPE 2 DIABETES MELLITUS WITH DIABETIC POLYNEUROPATHY, WITHOUT LONG-TERM CURRENT USE OF INSULIN: ICD-10-CM

## 2022-02-28 PROCEDURE — 99213 PR OFFICE/OUTPT VISIT, EST, LEVL III, 20-29 MIN: ICD-10-PCS | Mod: S$GLB,,, | Performed by: INTERNAL MEDICINE

## 2022-02-28 PROCEDURE — 3051F HG A1C>EQUAL 7.0%<8.0%: CPT | Mod: CPTII,S$GLB,, | Performed by: INTERNAL MEDICINE

## 2022-02-28 PROCEDURE — 1125F PR PAIN SEVERITY QUANTIFIED, PAIN PRESENT: ICD-10-PCS | Mod: CPTII,S$GLB,, | Performed by: INTERNAL MEDICINE

## 2022-02-28 PROCEDURE — 99499 RISK ADDL DX/OHS AUDIT: ICD-10-PCS | Mod: S$GLB,,, | Performed by: INTERNAL MEDICINE

## 2022-02-28 PROCEDURE — 99213 OFFICE O/P EST LOW 20 MIN: CPT | Mod: S$GLB,,, | Performed by: INTERNAL MEDICINE

## 2022-02-28 PROCEDURE — 99499 UNLISTED E&M SERVICE: CPT | Mod: S$GLB,,, | Performed by: INTERNAL MEDICINE

## 2022-02-28 PROCEDURE — 1160F PR REVIEW ALL MEDS BY PRESCRIBER/CLIN PHARMACIST DOCUMENTED: ICD-10-PCS | Mod: CPTII,S$GLB,, | Performed by: INTERNAL MEDICINE

## 2022-02-28 PROCEDURE — 1125F AMNT PAIN NOTED PAIN PRSNT: CPT | Mod: CPTII,S$GLB,, | Performed by: INTERNAL MEDICINE

## 2022-02-28 PROCEDURE — 1101F PR PT FALLS ASSESS DOC 0-1 FALLS W/OUT INJ PAST YR: ICD-10-PCS | Mod: CPTII,S$GLB,, | Performed by: INTERNAL MEDICINE

## 2022-02-28 PROCEDURE — 99999 PR PBB SHADOW E&M-EST. PATIENT-LVL V: ICD-10-PCS | Mod: PBBFAC,,, | Performed by: INTERNAL MEDICINE

## 2022-02-28 PROCEDURE — 1159F PR MEDICATION LIST DOCUMENTED IN MEDICAL RECORD: ICD-10-PCS | Mod: CPTII,S$GLB,, | Performed by: INTERNAL MEDICINE

## 2022-02-28 PROCEDURE — 1101F PT FALLS ASSESS-DOCD LE1/YR: CPT | Mod: CPTII,S$GLB,, | Performed by: INTERNAL MEDICINE

## 2022-02-28 PROCEDURE — 1160F RVW MEDS BY RX/DR IN RCRD: CPT | Mod: CPTII,S$GLB,, | Performed by: INTERNAL MEDICINE

## 2022-02-28 PROCEDURE — 99999 PR PBB SHADOW E&M-EST. PATIENT-LVL V: CPT | Mod: PBBFAC,,, | Performed by: INTERNAL MEDICINE

## 2022-02-28 PROCEDURE — 3051F PR MOST RECENT HEMOGLOBIN A1C LEVEL 7.0 - < 8.0%: ICD-10-PCS | Mod: CPTII,S$GLB,, | Performed by: INTERNAL MEDICINE

## 2022-02-28 PROCEDURE — 1159F MED LIST DOCD IN RCRD: CPT | Mod: CPTII,S$GLB,, | Performed by: INTERNAL MEDICINE

## 2022-02-28 PROCEDURE — 3288F PR FALLS RISK ASSESSMENT DOCUMENTED: ICD-10-PCS | Mod: CPTII,S$GLB,, | Performed by: INTERNAL MEDICINE

## 2022-02-28 PROCEDURE — 3288F FALL RISK ASSESSMENT DOCD: CPT | Mod: CPTII,S$GLB,, | Performed by: INTERNAL MEDICINE

## 2022-03-01 NOTE — PROGRESS NOTES
CC:  Abdominal pain    HPI:  The patient is a 89-year-old female with asthma/COPD, diet-controlled diabetes, hypertension, hypothyroidism, CKD stage 4, coronary artery disease status post MI who presents today with complaints abdominal pain the right lower side.  Comes and goes.  Symptoms started about 4 weeks ago.  She feels a 2 to 3 times a week.  A bowel movement does help.  Her last episode was yesterday.    ROS:  Patient reports no fever chills.  No chest pain.  No shortness of breath.  She does get a little nausea on occasion.  This is can occur with or without the abdominal discomfort no bowel changes.  She does report she woke up around 3:00 a.m. today with her left shoulder being wet-no other symptoms.    Physical exam:  General appearance:  No acute distress  Pulmonary:  Good inspiratory, expiratory breath sounds are heard.  Lungs clear auscultation.  Cardiovascular:  S1-S2, rhythm is normal.  Extremities without edema.  GI:  Patient was some right lower quadrant discomfort on palpation without guarding or rebound    Assessment:  1. Right lower quadrant pain  2. Diet-controlled diabetes  3. Hypertension  4. Hypothyroidism  5. CKD stage 4  6. Coronary artery disease    Plan:  1. Will schedule ultrasound the right lower quadrant  2. The patient follow-up pending test results.

## 2022-03-03 ENCOUNTER — TELEPHONE (OUTPATIENT)
Dept: INTERNAL MEDICINE | Facility: CLINIC | Age: 87
End: 2022-03-03
Payer: MEDICARE

## 2022-03-03 NOTE — TELEPHONE ENCOUNTER
----- Message from Tomás Andres MD sent at 3/3/2022  6:45 AM CST -----  Regarding: RE: Questions and Concerns  Contact: [phone]  She can hold her lasix on the day she has her test.  ----- Message -----  From: Seda Mead MA  Sent: 3/2/2022  10:07 AM CST  To: Tomás Andres MD  Subject: FW: Questions and Concerns                       Please advise,  Thank You.    ----- Message -----  From: Melvi Rahman  Sent: 3/2/2022   9:48 AM CST  To: Dmitry SMITH Staff  Subject: Questions and Concerns                           Patient Tiana is calling. Patient is due to have an u/s on 3/4. Patient takes 2 fluid pills in the morning daily. Patient stated she can't hold her bladder after taking these pills. Patient would like to know if she should wait to take her medication until after she finish her u/s. Please call patient at [phone]    Thank You

## 2022-03-04 ENCOUNTER — HOSPITAL ENCOUNTER (OUTPATIENT)
Dept: RADIOLOGY | Facility: OTHER | Age: 87
Discharge: HOME | End: 2022-03-04
Attending: INTERNAL MEDICINE
Payer: MEDICARE

## 2022-03-04 DIAGNOSIS — G89.29 CHRONIC RIGHT LOWER QUADRANT PAIN: ICD-10-CM

## 2022-03-04 DIAGNOSIS — R10.31 CHRONIC RIGHT LOWER QUADRANT PAIN: ICD-10-CM

## 2022-03-04 PROCEDURE — 76857 US PELVIS LIMITED NON OB: ICD-10-PCS | Mod: 26,,, | Performed by: RADIOLOGY

## 2022-03-04 PROCEDURE — 76857 US EXAM PELVIC LIMITED: CPT | Mod: 26,,, | Performed by: RADIOLOGY

## 2022-03-04 PROCEDURE — 76857 US EXAM PELVIC LIMITED: CPT | Mod: TC

## 2022-03-07 ENCOUNTER — TELEPHONE (OUTPATIENT)
Dept: INTERNAL MEDICINE | Facility: CLINIC | Age: 87
End: 2022-03-07
Payer: MEDICARE

## 2022-03-07 ENCOUNTER — OFFICE VISIT (OUTPATIENT)
Dept: CARDIOLOGY | Facility: CLINIC | Age: 87
End: 2022-03-07
Payer: MEDICARE

## 2022-03-07 VITALS
HEART RATE: 66 BPM | OXYGEN SATURATION: 98 % | DIASTOLIC BLOOD PRESSURE: 70 MMHG | WEIGHT: 157 LBS | BODY MASS INDEX: 23.87 KG/M2 | SYSTOLIC BLOOD PRESSURE: 128 MMHG

## 2022-03-07 DIAGNOSIS — I48.21 PERMANENT ATRIAL FIBRILLATION: Primary | ICD-10-CM

## 2022-03-07 DIAGNOSIS — E11.22 TYPE 2 DIABETES MELLITUS WITH STAGE 4 CHRONIC KIDNEY DISEASE, WITHOUT LONG-TERM CURRENT USE OF INSULIN: ICD-10-CM

## 2022-03-07 DIAGNOSIS — N18.4 TYPE 2 DIABETES MELLITUS WITH STAGE 4 CHRONIC KIDNEY DISEASE, WITHOUT LONG-TERM CURRENT USE OF INSULIN: ICD-10-CM

## 2022-03-07 DIAGNOSIS — I70.0 AORTIC ATHEROSCLEROSIS: ICD-10-CM

## 2022-03-07 DIAGNOSIS — I50.32 CHRONIC DIASTOLIC HEART FAILURE: ICD-10-CM

## 2022-03-07 PROCEDURE — 3051F HG A1C>EQUAL 7.0%<8.0%: CPT | Mod: CPTII,S$GLB,, | Performed by: INTERNAL MEDICINE

## 2022-03-07 PROCEDURE — 1159F PR MEDICATION LIST DOCUMENTED IN MEDICAL RECORD: ICD-10-PCS | Mod: CPTII,S$GLB,, | Performed by: INTERNAL MEDICINE

## 2022-03-07 PROCEDURE — 3288F PR FALLS RISK ASSESSMENT DOCUMENTED: ICD-10-PCS | Mod: CPTII,S$GLB,, | Performed by: INTERNAL MEDICINE

## 2022-03-07 PROCEDURE — 1101F PR PT FALLS ASSESS DOC 0-1 FALLS W/OUT INJ PAST YR: ICD-10-PCS | Mod: CPTII,S$GLB,, | Performed by: INTERNAL MEDICINE

## 2022-03-07 PROCEDURE — 99999 PR PBB SHADOW E&M-EST. PATIENT-LVL V: CPT | Mod: PBBFAC,,, | Performed by: INTERNAL MEDICINE

## 2022-03-07 PROCEDURE — 99214 PR OFFICE/OUTPT VISIT, EST, LEVL IV, 30-39 MIN: ICD-10-PCS | Mod: S$GLB,,, | Performed by: INTERNAL MEDICINE

## 2022-03-07 PROCEDURE — 99214 OFFICE O/P EST MOD 30 MIN: CPT | Mod: S$GLB,,, | Performed by: INTERNAL MEDICINE

## 2022-03-07 PROCEDURE — 1126F PR PAIN SEVERITY QUANTIFIED, NO PAIN PRESENT: ICD-10-PCS | Mod: CPTII,S$GLB,, | Performed by: INTERNAL MEDICINE

## 2022-03-07 PROCEDURE — 1159F MED LIST DOCD IN RCRD: CPT | Mod: CPTII,S$GLB,, | Performed by: INTERNAL MEDICINE

## 2022-03-07 PROCEDURE — 99999 PR PBB SHADOW E&M-EST. PATIENT-LVL V: ICD-10-PCS | Mod: PBBFAC,,, | Performed by: INTERNAL MEDICINE

## 2022-03-07 PROCEDURE — 1126F AMNT PAIN NOTED NONE PRSNT: CPT | Mod: CPTII,S$GLB,, | Performed by: INTERNAL MEDICINE

## 2022-03-07 PROCEDURE — 3288F FALL RISK ASSESSMENT DOCD: CPT | Mod: CPTII,S$GLB,, | Performed by: INTERNAL MEDICINE

## 2022-03-07 PROCEDURE — 1101F PT FALLS ASSESS-DOCD LE1/YR: CPT | Mod: CPTII,S$GLB,, | Performed by: INTERNAL MEDICINE

## 2022-03-07 PROCEDURE — 3051F PR MOST RECENT HEMOGLOBIN A1C LEVEL 7.0 - < 8.0%: ICD-10-PCS | Mod: CPTII,S$GLB,, | Performed by: INTERNAL MEDICINE

## 2022-03-07 NOTE — TELEPHONE ENCOUNTER
----- Message from Tomás Andres MD sent at 3/6/2022  9:38 AM CST -----  Please contact patient. Her ultrasound did not show any cause for her pain. Please see if she is doing better, worse or the same. Please see if her pain has moved to a different location.

## 2022-03-07 NOTE — PROGRESS NOTES
[facility]    Chief Complaint  Chief Complaint   Patient presents with    Atrial Fibrillation       HPI:    Exertional dyspnea at 1 block.  No associated chest discomfort.  No palpitations or syncope.  Gets around with a walker.  Biggest limitation is arthritic pain in her legs and arms.    Medications  Current Outpatient Medications   Medication Sig Dispense Refill    acetaminophen (TYLENOL) 500 MG tablet Take 2 tablets (1,000 mg total) by mouth 3 (three) times daily as needed for Pain (muscle pain in arms and legs). 100 tablet 1    albuterol (PROVENTIL/VENTOLIN HFA) 90 mcg/actuation inhaler Inhale 2 puffs into the lungs every 4 (four) hours as needed for Wheezing. 18 g 2    amLODIPine (NORVASC) 5 MG tablet TAKE 1 TABLET(5 MG) BY MOUTH EVERY MORNING 90 tablet 3    aspirin 81 MG Chew Take 1 tablet (81 mg total) by mouth every morning. 100 tablet 3    atorvastatin (LIPITOR) 40 MG tablet Take 1 tablet (40 mg total) by mouth every evening. To lower cholesterol and for heart and blood vessels 90 tablet 3    blood sugar diagnostic (ONETOUCH ULTRA TEST) Strp Inject 1 strip into the skin once daily. No longer on insulin because of  each 4    clotrimazole-betamethasone 1-0.05% (LOTRISONE) cream Apply topically 2 (two) times daily as needed. For intertriginous itching in the bend of the leg 45 g 3    diclofenac sodium (VOLTAREN) 1 % Gel Apply 2 g topically 2 (two) times daily. 100 g 0    dorzolamide (TRUSOPT) 2 % ophthalmic solution Place 1 drop into both eyes 2 (two) times daily. 10 mL 4    fluticasone-salmeterol diskus inhaler 250-50 mcg Inhale 1 puff into the lungs 2 (two) times daily. Controller Patient wants name brand ADVAIR 60 each 11    furosemide (LASIX) 40 MG tablet For fluid management take one tablet first thing in the morning and one tablet at 4:00PM (Patient taking differently: Take 40 mg by mouth 2 (two) times a day. For fluid management take one tablet first thing in the  "morning and one tablet at 4:00PM) 180 tablet 1    isosorbide mononitrate (IMDUR) 60 MG 24 hr tablet TAKE ONE TABLET BY MOUTH EVERY MORNING FOR HEART, TO PREVENT CHEST PAIN AND REDUCE SHORTNESS OF BREATH 90 tablet 3    lancets (TRUEPLUS LANCETS) 33 gauge Misc Apply 1 lancet topically once daily. No longer on insulin because of  each 3    latanoprost 0.005 % ophthalmic solution INSTILL 1 DROP IN BOTH EYES EVERY EVENING 10 mL 12    levothyroxine (SYNTHROID) 75 MCG tablet TAKE 1 TABLET(75 MCG) BY MOUTH BEFORE BREAKFAST 90 tablet 3    meclizine (ANTIVERT) 12.5 mg tablet TAKE 1 TABLET(12.5 MG) BY MOUTH THREE TIMES DAILY AS NEEDED FOR DIZZINESS 20 tablet 2    methocarbamoL (ROBAXIN) 500 MG Tab Take 1 tablet (500 mg total) by mouth 3 (three) times daily as needed (muscle pain/spasms). (Patient not taking: Reported on 2/28/2022) 44 tablet 0    metoprolol tartrate (LOPRESSOR) 25 MG tablet TAKE 1 TABLET(25 MG) BY MOUTH TWICE DAILY 180 tablet 3    multivit-mineral-iron-lutein Tab Take 1 tablet by mouth once daily.      nitroGLYCERIN (NITROSTAT) 0.4 MG SL tablet ONE TABLET UNDER THE TONGUE EVERY 5 MINUTES AS NEEDED FOR CHEST PAIN 100 tablet 3    nystatin (MYCOSTATIN) powder Apply topically 4 (four) times daily. 60 g 3    pen needle, diabetic (BD ULTRA-FINE SHORT PEN NEEDLE) 31 gauge x 5/16" Ndle USE WITH INSULIN PENS twice daily or  AS DIRECTED 200 each 3    polyethylene glycol (GLYCOLAX) 17 gram/dose powder Take 17 g by mouth daily as needed (CONSTIPATION).      psyllium (METAMUCIL) powder Take 1 packet by mouth daily as needed (CONSTIPATION).      triamcinolone acetonide 0.025% (KENALOG) 0.025 % Oint Apply topically 2 (two) times daily. 80 g 2     No current facility-administered medications for this visit.        History  Past Medical History:   Diagnosis Date    Allergy     Anemia     Arthritis     Asthma     Chronic kidney disease     Degenerative disc disease     Diabetes mellitus type II     " Diastolic heart failure 05/02/2017    Essential hypertension 7/3/2012    Glaucoma     History of insulin dependent diabetes mellitus, for many years stopped because of chronic kidney November 2020.  9/9/2021    History of pseudogout 8/23/2012    Hyperlipidemia     Hypertension     Iritis     Myocardial infarction     Pneumonia     Thrombocytopenia 8/24/2021    Thyroid disease      Past Surgical History:   Procedure Laterality Date    CARDIAC CATHETERIZATION  2010    no obstructive disease    CATARACT EXTRACTION W/  INTRAOCULAR LENS IMPLANT Left n/a    CATARACT EXTRACTION W/  INTRAOCULAR LENS IMPLANT Right 10/8/2018    With Femtosecond LASER assist (Dr. Henson)    CHOLECYSTECTOMY      ESOPHAGOGASTRODUODENOSCOPY N/A 11/4/2020    Procedure: EGD (ESOPHAGOGASTRODUODENOSCOPY);  Surgeon: Tomás Mccall MD;  Location: [address]);  Service: Endoscopy;  Laterality: N/A;    EYE SURGERY      gall stone      HYSTERECTOMY      VARICOSE VEIN SURGERY       Social History     Socioeconomic History    Marital status:    Tobacco Use    Smoking status: Never Smoker    Smokeless tobacco: Never Used   Substance and Sexual Activity    Alcohol use: No    Drug use: No    Sexual activity: Never     Social Determinants of Health     Financial Resource Strain: Medium Risk    Difficulty of Paying Living Expenses: Somewhat hard   Food Insecurity: No Food Insecurity    Worried About Running Out of Food in the Last Year: Never true    Ran Out of Food in the Last Year: Never true   Transportation Needs: No Transportation Needs    Lack of Transportation (Medical): No    Lack of Transportation (Non-Medical): No   Physical Activity: Inactive    Days of Exercise per Week: 0 days    Minutes of Exercise per Session: 0 min   Stress: No Stress Concern Present    Feeling of Stress : Not at all   Social Connections: Moderately Integrated    Frequency of Communication with Friends and Family: More than three  times a week    Frequency of Social Gatherings with Friends and Family: Once a week    Attends Sabianism Services: More than 4 times per year    Active Member of Clubs or Organizations: Yes    Attends Club or Organization Meetings: More than 4 times per year    Marital Status:    Housing Stability: Low Risk     Unable to Pay for Housing in the Last Year: No    Number of Places Lived in the Last Year: 1    Unstable Housing in the Last Year: No     Family History   Problem Relation Age of Onset    Diabetes Mother     Hypertension Mother     Glaucoma Mother     Hypertension Father     Diabetes Son     No Known Problems Daughter     Diabetes Daughter         borderline    No Known Problems Son     Melanoma Neg Hx         Allergies  Review of patient's allergies indicates:   Allergen Reactions    Codeine Itching and Nausea Only              Review of Systems   Review of Systems   Constitutional: Negative for malaise/fatigue, weight gain and weight loss.   Eyes: Negative for visual disturbance.   Cardiovascular: Negative for chest pain, claudication, cyanosis, dyspnea on exertion, irregular heartbeat, leg swelling, near-syncope, orthopnea, palpitations, paroxysmal nocturnal dyspnea and syncope.   Respiratory: Negative for cough, hemoptysis, shortness of breath, sleep disturbances due to breathing and wheezing.    Hematologic/Lymphatic: Negative for bleeding problem. Does not bruise/bleed easily.   Skin: Negative for poor wound healing.   Musculoskeletal: Positive for joint pain. Negative for muscle cramps and myalgias.   Gastrointestinal: Negative for abdominal pain, anorexia, diarrhea, heartburn, hematemesis, hematochezia, melena, nausea and vomiting.   Genitourinary: Negative for hematuria and nocturia.   Neurological: Negative for excessive daytime sleepiness, dizziness, focal weakness, light-headedness and weakness.       Physical Exam  Vitals:    03/07/22 1054   BP: 128/70   Pulse: 66     Wt  Readings from Last 1 Encounters:   03/07/22 71.2 kg (157 lb)     Physical Exam  Vitals and nursing note reviewed.   Constitutional:       General: She is not in acute distress.     Appearance: She is not toxic-appearing or diaphoretic.   HENT:      Head: Normocephalic and atraumatic.      Mouth/Throat:      Lips: Pink.      Mouth: Mucous membranes are moist.   Eyes:      General: No scleral icterus.     Conjunctiva/sclera: Conjunctivae normal.   Neck:      Thyroid: No thyromegaly.      Vascular: No carotid bruit, hepatojugular reflux or JVD.      Trachea: Trachea normal.   Cardiovascular:      Rate and Rhythm: Normal rate. Rhythm irregularly irregular.  No extrasystoles are present.     Chest Wall: PMI is not displaced.      Pulses:           Carotid pulses are 2+ on the right side and 2+ on the left side.       Radial pulses are 2+ on the right side and 2+ on the left side.      Heart sounds: S1 normal and S2 normal. Murmur heard.    Systolic murmur is present with a grade of 2/6.    No friction rub. No S3 or S4 sounds.   Pulmonary:      Effort: Pulmonary effort is normal. No accessory muscle usage or respiratory distress.      Breath sounds: Normal breath sounds and air entry. No decreased breath sounds, wheezing, rhonchi or rales.   Abdominal:      General: Bowel sounds are normal. There is no distension or abdominal bruit.      Palpations: Abdomen is soft. There is no hepatomegaly, splenomegaly or pulsatile mass.      Tenderness: There is no abdominal tenderness.   Musculoskeletal:         General: No tenderness or deformity.      Right lower leg: No edema.      Left lower leg: No edema.   Skin:     General: Skin is warm and dry.      Capillary Refill: Capillary refill takes less than 2 seconds.      Coloration: Skin is not cyanotic or pale.      Nails: There is no clubbing.   Neurological:      General: No focal deficit present.      Mental Status: She is alert and oriented to person, place, and time.    Psychiatric:         Attention and Perception: Attention normal.         Mood and Affect: Mood normal.         Speech: Speech normal.         Behavior: Behavior normal. Behavior is cooperative.         Labs  Office Visit on 02/07/2022   Component Date Value Ref Range Status    Body Fluid Source, Crystal Exam 02/07/2022 Joint Fluid, Right Knee   Final    Body Fluid Crystal 02/07/2022 Negative  Negative Final    No urate or pyrophosphate crystals identified    Pathologist Review, Body Fluid 02/07/2022 REVIEWED   Final    Comment:   Electronically reviewed and signed by:  Chi Lechuga MD  Signed on 02/08/22 at 11:03  Calcium pyrophosphate crystals present     Lab Visit on 01/19/2022   Component Date Value Ref Range Status    TSH 01/19/2022 4.310 (A) 0.400 - 4.000 uIU/mL Final    T4, Total 01/19/2022 9.7  4.5 - 11.5 ug/dL Final    Free T4 01/19/2022 1.06  0.71 - 1.51 ng/dL Final   Lab Visit on 12/03/2021   Component Date Value Ref Range Status    WBC 12/03/2021 7.02  3.90 - 12.70 K/uL Final    RBC 12/03/2021 3.94 (A) 4.00 - 5.40 M/uL Final    Hemoglobin 12/03/2021 12.0  12.0 - 16.0 g/dL Final    Hematocrit 12/03/2021 36.6 (A) 37.0 - 48.5 % Final    MCV 12/03/2021 93  82 - 98 fL Final    MCH 12/03/2021 30.5  27.0 - 31.0 pg Final    MCHC 12/03/2021 32.8  32.0 - 36.0 g/dL Final    RDW 12/03/2021 13.9  11.5 - 14.5 % Final    Platelets 12/03/2021 163  150 - 450 K/uL Final    MPV 12/03/2021 11.7  9.2 - 12.9 fL Final    Immature Granulocytes 12/03/2021 0.1  0.0 - 0.5 % Final    Gran # (ANC) 12/03/2021 4.6  1.8 - 7.7 K/uL Final    Immature Grans (Abs) 12/03/2021 0.01  0.00 - 0.04 K/uL Final    Comment: Mild elevation in immature granulocytes is non specific and   can be seen in a variety of conditions including stress response,   acute inflammation, trauma and pregnancy. Correlation with other   laboratory and clinical findings is essential.      Lymph # 12/03/2021 1.1  1.0 - 4.8 K/uL Final    Mono #  12/03/2021 1.1 (A) 0.3 - 1.0 K/uL Final    Eos # 12/03/2021 0.2  0.0 - 0.5 K/uL Final    Baso # 12/03/2021 0.08  0.00 - 0.20 K/uL Final    nRBC 12/03/2021 0  0 /100 WBC Final    Gran % 12/03/2021 65.3  38.0 - 73.0 % Final    Lymph % 12/03/2021 15.7 (A) 18.0 - 48.0 % Final    Mono % 12/03/2021 15.0  4.0 - 15.0 % Final    Eosinophil % 12/03/2021 2.8  0.0 - 8.0 % Final    Basophil % 12/03/2021 1.1  0.0 - 1.9 % Final    Differential Method 12/03/2021 Automated   Final    BNP 12/03/2021 456 (A) 0 - 99 pg/mL Final    Values of less than 100 pg/ml are consistent with non-CHF populations.    CRP 12/03/2021 3.7  0.0 - 8.2 mg/L Final    Sed Rate 12/03/2021 20  0 - 36 mm/Hr Final    CPK 12/03/2021 100  20 - 180 U/L Final    Sodium 12/03/2021 135 (A) 136 - 145 mmol/L Final    Potassium 12/03/2021 4.5  3.5 - 5.1 mmol/L Final    Chloride 12/03/2021 100  95 - 110 mmol/L Final    CO2 12/03/2021 26  23 - 29 mmol/L Final    Glucose 12/03/2021 213 (A) 70 - 110 mg/dL Final    BUN 12/03/2021 35 (A) 10 - 30 mg/dL Final    Creatinine 12/03/2021 1.8 (A) 0.5 - 1.4 mg/dL Final    Calcium 12/03/2021 9.9  8.7 - 10.5 mg/dL Final    Anion Gap 12/03/2021 9  8 - 16 mmol/L Final    eGFR if  12/03/2021 27.0 (A) >60 mL/min/1.73 m^2 Final    eGFR if non African American 12/03/2021 23.4 (A) >60 mL/min/1.73 m^2 Final    Comment: Calculation used to obtain the estimated glomerular filtration  rate (eGFR) is the CKD-EPI equation.       Hemoglobin A1C 12/03/2021 7.7 (A) 4.0 - 5.6 % Final    Comment: ADA Screening Guidelines:  5.7-6.4%  Consistent with prediabetes  >or=6.5%  Consistent with diabetes    High levels of fetal hemoglobin interfere with the HbA1C  assay. Heterozygous hemoglobin variants (HbS, HgC, etc)do  not significantly interfere with this assay.   However, presence of multiple variants may affect accuracy.      Estimated Avg Glucose 12/03/2021 174 (A) 68 - 131 mg/dL Final   Office Visit on 12/03/2021    Component Date Value Ref Range Status    Specimen UA 12/03/2021 Urine, Clean Catch   Final    Color, UA 12/03/2021 Yellow  Yellow, Straw, [place] Final    Appearance, UA 12/03/2021 Clear  Clear Final    pH, UA 12/03/2021 6.0  5.0 - 8.0 Final    Specific Gravity, UA 12/03/2021 1.010  1.005 - 1.030 Final    Protein, UA 12/03/2021 Negative  Negative Final    Comment: Recommend a 24 hour urine protein or a urine   protein/creatinine ratio if globulin induced proteinuria is  clinically suspected.      Glucose, UA 12/03/2021 Negative  Negative Final    Ketones, UA 12/03/2021 Negative  Negative Final    Bilirubin (UA) 12/03/2021 Negative  Negative Final    Occult Blood UA 12/03/2021 Negative  Negative Final    Nitrite, UA 12/03/2021 Negative  Negative Final    Leukocytes, UA 12/03/2021 1+ (A) Negative Final    Urine Culture, Routine 12/03/2021 No growth   Final    RBC, UA 12/03/2021 1  0 - 4 /hpf Final    WBC, UA 12/03/2021 1  0 - 5 /hpf Final    Bacteria 12/03/2021 Rare  None-Occ /hpf Final    Squam Epithel, UA 12/03/2021 0  /hpf Final    Microscopic Comment 12/03/2021 SEE COMMENT   Final    Comment: Other formed elements not mentioned in the report are not   present in the microscopic examination.          Imaging  US Pelvis Limited Non OB    Result Date: 3/4/2022  EXAMINATION: US PELVIS LIMITED NON OB CLINICAL HISTORY: Right lower quadrant pain TECHNIQUE: Transabdominal imaging of the pelvis was performed.. COMPARISON: None FINDINGS: Uterus and ovaries are surgically absent.  No suspicious mass within the limits of this transabdominal ultrasound examination.  No free fluid.     No acute process seen following hysterectomy and bilateral oophorectomy. Electronically signed by: Catalina Campoverde Date:    03/04/2022 Time:    10:01    US Lower Extremity Veins Right    Result Date: 2/18/2022  EXAMINATION: US LOWER EXTREMITY VEINS RIGHT CLINICAL HISTORY: Other specified soft tissue disorders TECHNIQUE: Duplex  and color flow Doppler evaluation and graded compression of the right lower extremity veins was performed. COMPARISON: None FINDINGS: Right thigh veins: The common femoral, femoral, popliteal, upper greater saphenous, and deep femoral veins are patent and free of thrombus. The veins are normally compressible and have normal phasic flow and augmentation response. Right calf veins: The visualized calf veins are patent. Contralateral CFV: The contralateral (left) common femoral vein is patent and free of thrombus. Miscellaneous: None     No evidence of deep venous thrombosis in the right lower extremity. Electronically signed by: Catalina Campoverde Date:    02/18/2022 Time:    13:23      Assessment  1. Permanent atrial fibrillation  Controlled.  Not on anticoagulation because of prior bleeding and patient preference    2. Chronic diastolic heart failure  Compensated    3. Aortic atherosclerosis  Continue risk factor modification    4. Type 2 diabetes mellitus with stage 4 chronic kidney disease, without long-term current use of insulin  Dr. Andres      Plan and Discussion    Continue current guideline directed medical therapy.    Follow Up  Follow up in about 6 months (around 9/7/2022).      Adán Alanis MD

## 2022-03-09 ENCOUNTER — EXTERNAL HOME HEALTH (OUTPATIENT)
Dept: HOME HEALTH SERVICES | Facility: HOSPITAL | Age: 87
End: 2022-03-09
Payer: MEDICARE

## 2022-03-17 ENCOUNTER — TELEPHONE (OUTPATIENT)
Dept: INTERNAL MEDICINE | Facility: CLINIC | Age: 87
End: 2022-03-17
Payer: MEDICARE

## 2022-03-17 RX ORDER — ALBUTEROL SULFATE 90 UG/1
2 AEROSOL, METERED RESPIRATORY (INHALATION) EVERY 4 HOURS PRN
Qty: 18 G | Refills: 6 | Status: SHIPPED | OUTPATIENT
Start: 2022-03-17

## 2022-03-17 NOTE — TELEPHONE ENCOUNTER
----- Message from José Miguel Leyva sent at 3/17/2022  8:02 AM CDT -----  Contact: pt  Requesting an RX refill or new RX.  Is this a refill or new RX: refill  RX name and strength (copy/paste from chart):  albuterol (PROVENTIL/VENTOLIN HFA) 90 mcg/actuation inhaler  Is this a 30 day or 90 day RX:   Pharmacy name and phone # (copy/paste from chart):    [id number] - [place], [place] - [email or web address]  [address] [facility] [address]  Phone: [phone] Fax: [phone]        The doctors have asked that we provide their patients with the following 2 reminders -- prescription refills can take up to 72 hours, and a friendly reminder that in the future you can use your [facility] account to request refills:       Also she would like cough syrup ordered she stated she can not take OTC meds because she has high blood pressure

## 2022-03-17 NOTE — TELEPHONE ENCOUNTER
Also she would like cough syrup ordered she stated she can not take OTC meds because she has high blood pressure       Refill Request.

## 2022-03-17 NOTE — TELEPHONE ENCOUNTER
No new care gaps identified.  Powered by [employer]. Reference number: [id number].   3/17/2022 8:10:59 AM CDT

## 2022-03-17 NOTE — TELEPHONE ENCOUNTER
Pt is requesting a prescription cough medication be sent to the pharmacy because she is having a bad cough. Per pt she can not take OTC medication edel to blood pressure.    Please advise,  Thank You.

## 2022-03-17 NOTE — TELEPHONE ENCOUNTER
----- Message from Ashlee Joyce sent at 3/17/2022  2:05 PM CDT -----  Contact: self/[phone]  Pt called in regards to checking to see if the dr called in a Rx for her bad cough. Pt would like a call back.       [id number] - [facility] [address]  [address]  [facility] [address]  Phone: [phone] Fax: [phone]    Please advise

## 2022-03-19 ENCOUNTER — TELEPHONE (OUTPATIENT)
Dept: INTERNAL MEDICINE | Facility: CLINIC | Age: 87
End: 2022-03-19
Payer: MEDICARE

## 2022-03-19 RX ORDER — BENZONATATE 100 MG/1
100 CAPSULE ORAL 3 TIMES DAILY PRN
Qty: 30 CAPSULE | Refills: 0 | Status: SHIPPED | OUTPATIENT
Start: 2022-03-19 | End: 2022-03-19 | Stop reason: SDUPTHER

## 2022-03-19 RX ORDER — BENZONATATE 100 MG/1
100 CAPSULE ORAL 3 TIMES DAILY PRN
Qty: 30 CAPSULE | Refills: 0 | Status: SHIPPED | OUTPATIENT
Start: 2022-03-19 | End: 2022-03-29

## 2022-03-20 ENCOUNTER — HOSPITAL ENCOUNTER (OUTPATIENT)
Facility: OTHER | Age: 87
Discharge: HOME | End: 2022-03-21
Attending: EMERGENCY MEDICINE | Admitting: EMERGENCY MEDICINE
Payer: MEDICARE

## 2022-03-20 DIAGNOSIS — I50.32 CHRONIC DIASTOLIC CONGESTIVE HEART FAILURE: Chronic | ICD-10-CM

## 2022-03-20 DIAGNOSIS — J40 BRONCHITIS: ICD-10-CM

## 2022-03-20 DIAGNOSIS — J45.901 EXACERBATION OF ASTHMA, UNSPECIFIED ASTHMA SEVERITY, UNSPECIFIED WHETHER PERSISTENT: Primary | ICD-10-CM

## 2022-03-20 LAB
ALBUMIN SERPL BCP-MCNC: 3.3 G/DL (ref 3.5–5.2)
ALP SERPL-CCNC: 95 U/L (ref 55–135)
ALT SERPL W/O P-5'-P-CCNC: 25 U/L (ref 10–44)
ANION GAP SERPL CALC-SCNC: 12 MMOL/L (ref 8–16)
AST SERPL-CCNC: 27 U/L (ref 10–40)
BASOPHILS # BLD AUTO: 0.05 K/UL (ref 0–0.2)
BASOPHILS NFR BLD: 0.7 % (ref 0–1.9)
BILIRUB SERPL-MCNC: 0.7 MG/DL (ref 0.1–1)
BUN SERPL-MCNC: 32 MG/DL (ref 10–30)
CALCIUM SERPL-MCNC: 9.4 MG/DL (ref 8.7–10.5)
CHLORIDE SERPL-SCNC: 99 MMOL/L (ref 95–110)
CO2 SERPL-SCNC: 24 MMOL/L (ref 23–29)
CREAT SERPL-MCNC: 1.8 MG/DL (ref 0.5–1.4)
CTP QC/QA: YES
CTP QC/QA: YES
DIFFERENTIAL METHOD: ABNORMAL
EOSINOPHIL # BLD AUTO: 0.2 K/UL (ref 0–0.5)
EOSINOPHIL NFR BLD: 2.2 % (ref 0–8)
ERYTHROCYTE [DISTWIDTH] IN BLOOD BY AUTOMATED COUNT: 13.9 % (ref 11.5–14.5)
EST. GFR  (AFRICAN AMERICAN): 27 ML/MIN/1.73 M^2
EST. GFR  (NON AFRICAN AMERICAN): 23 ML/MIN/1.73 M^2
GLUCOSE SERPL-MCNC: 258 MG/DL (ref 70–110)
HCT VFR BLD AUTO: 36 % (ref 37–48.5)
HGB BLD-MCNC: 12.1 G/DL (ref 12–16)
IMM GRANULOCYTES # BLD AUTO: 0.03 K/UL (ref 0–0.04)
IMM GRANULOCYTES NFR BLD AUTO: 0.4 % (ref 0–0.5)
LYMPHOCYTES # BLD AUTO: 0.9 K/UL (ref 1–4.8)
LYMPHOCYTES NFR BLD: 12.1 % (ref 18–48)
MCH RBC QN AUTO: 30.4 PG (ref 27–31)
MCHC RBC AUTO-ENTMCNC: 33.6 G/DL (ref 32–36)
MCV RBC AUTO: 91 FL (ref 82–98)
MONOCYTES # BLD AUTO: 1 K/UL (ref 0.3–1)
MONOCYTES NFR BLD: 13.4 % (ref 4–15)
NEUTROPHILS # BLD AUTO: 5.5 K/UL (ref 1.8–7.7)
NEUTROPHILS NFR BLD: 71.2 % (ref 38–73)
NRBC BLD-RTO: 0 /100 WBC
PLATELET # BLD AUTO: 141 K/UL (ref 150–450)
PMV BLD AUTO: 10.7 FL (ref 9.2–12.9)
POCT GLUCOSE: 262 MG/DL (ref 70–110)
POCT GLUCOSE: 294 MG/DL (ref 70–110)
POTASSIUM SERPL-SCNC: 4.2 MMOL/L (ref 3.5–5.1)
PROT SERPL-MCNC: 6.6 G/DL (ref 6–8.4)
RBC # BLD AUTO: 3.98 M/UL (ref 4–5.4)
SARS-COV-2 RDRP RESP QL NAA+PROBE: NEGATIVE
SARS-COV-2 RDRP RESP QL NAA+PROBE: NEGATIVE
SODIUM SERPL-SCNC: 135 MMOL/L (ref 136–145)
WBC # BLD AUTO: 7.66 K/UL (ref 3.9–12.7)

## 2022-03-20 PROCEDURE — 94761 N-INVAS EAR/PLS OXIMETRY MLT: CPT

## 2022-03-20 PROCEDURE — G0378 HOSPITAL OBSERVATION PER HR: HCPCS

## 2022-03-20 PROCEDURE — 25000003 PHARM REV CODE 250: Performed by: NURSE PRACTITIONER

## 2022-03-20 PROCEDURE — 25000242 PHARM REV CODE 250 ALT 637 W/ HCPCS: Performed by: NURSE PRACTITIONER

## 2022-03-20 PROCEDURE — 63700000 PHARM REV CODE 250 ALT 637 W/O HCPCS: Performed by: NURSE PRACTITIONER

## 2022-03-20 PROCEDURE — 99285 EMERGENCY DEPT VISIT HI MDM: CPT | Mod: 25

## 2022-03-20 PROCEDURE — 80053 COMPREHEN METABOLIC PANEL: CPT | Performed by: EMERGENCY MEDICINE

## 2022-03-20 PROCEDURE — 96372 THER/PROPH/DIAG INJ SC/IM: CPT | Performed by: NURSE PRACTITIONER

## 2022-03-20 PROCEDURE — 96374 THER/PROPH/DIAG INJ IV PUSH: CPT

## 2022-03-20 PROCEDURE — 94640 AIRWAY INHALATION TREATMENT: CPT

## 2022-03-20 PROCEDURE — 63600175 PHARM REV CODE 636 W HCPCS: Performed by: NURSE PRACTITIONER

## 2022-03-20 PROCEDURE — U0002 COVID-19 LAB TEST NON-CDC: HCPCS | Performed by: EMERGENCY MEDICINE

## 2022-03-20 PROCEDURE — 82962 GLUCOSE BLOOD TEST: CPT | Mod: 91

## 2022-03-20 PROCEDURE — 25000242 PHARM REV CODE 250 ALT 637 W/ HCPCS: Performed by: EMERGENCY MEDICINE

## 2022-03-20 PROCEDURE — 85025 COMPLETE CBC W/AUTO DIFF WBC: CPT | Performed by: EMERGENCY MEDICINE

## 2022-03-20 RX ORDER — FUROSEMIDE 40 MG/1
40 TABLET ORAL 2 TIMES DAILY
Status: DISCONTINUED | OUTPATIENT
Start: 2022-03-20 | End: 2022-03-21 | Stop reason: HOSPADM

## 2022-03-20 RX ORDER — GUAIFENESIN/DEXTROMETHORPHAN 100-10MG/5
5 SYRUP ORAL EVERY 4 HOURS PRN
Status: DISCONTINUED | OUTPATIENT
Start: 2022-03-20 | End: 2022-03-21 | Stop reason: HOSPADM

## 2022-03-20 RX ORDER — ONDANSETRON 4 MG/1
4 TABLET, ORALLY DISINTEGRATING ORAL EVERY 8 HOURS PRN
Status: DISCONTINUED | OUTPATIENT
Start: 2022-03-20 | End: 2022-03-21 | Stop reason: HOSPADM

## 2022-03-20 RX ORDER — PREDNISONE 20 MG/1
40 TABLET ORAL DAILY
Status: DISCONTINUED | OUTPATIENT
Start: 2022-03-20 | End: 2022-03-21 | Stop reason: HOSPADM

## 2022-03-20 RX ORDER — SODIUM CHLORIDE 0.9 % (FLUSH) 0.9 %
10 SYRINGE (ML) INJECTION
Status: DISCONTINUED | OUTPATIENT
Start: 2022-03-20 | End: 2022-03-21 | Stop reason: HOSPADM

## 2022-03-20 RX ORDER — ACETAMINOPHEN 325 MG/1
650 TABLET ORAL EVERY 8 HOURS PRN
Status: DISCONTINUED | OUTPATIENT
Start: 2022-03-20 | End: 2022-03-21 | Stop reason: HOSPADM

## 2022-03-20 RX ORDER — INSULIN ASPART 100 [IU]/ML
0-5 INJECTION, SOLUTION INTRAVENOUS; SUBCUTANEOUS
Status: DISCONTINUED | OUTPATIENT
Start: 2022-03-20 | End: 2022-03-21 | Stop reason: HOSPADM

## 2022-03-20 RX ORDER — IPRATROPIUM BROMIDE AND ALBUTEROL SULFATE 2.5; .5 MG/3ML; MG/3ML
3 SOLUTION RESPIRATORY (INHALATION)
Status: COMPLETED | OUTPATIENT
Start: 2022-03-20 | End: 2022-03-20

## 2022-03-20 RX ORDER — IBUPROFEN 200 MG
16 TABLET ORAL
Status: DISCONTINUED | OUTPATIENT
Start: 2022-03-20 | End: 2022-03-21 | Stop reason: HOSPADM

## 2022-03-20 RX ORDER — IPRATROPIUM BROMIDE AND ALBUTEROL SULFATE 2.5; .5 MG/3ML; MG/3ML
3 SOLUTION RESPIRATORY (INHALATION) EVERY 4 HOURS PRN
Status: DISCONTINUED | OUTPATIENT
Start: 2022-03-20 | End: 2022-03-21 | Stop reason: HOSPADM

## 2022-03-20 RX ORDER — NAPROXEN SODIUM 220 MG/1
81 TABLET, FILM COATED ORAL EVERY MORNING
Status: DISCONTINUED | OUTPATIENT
Start: 2022-03-21 | End: 2022-03-21 | Stop reason: HOSPADM

## 2022-03-20 RX ORDER — AZITHROMYCIN 250 MG/1
500 TABLET, FILM COATED ORAL
Status: COMPLETED | OUTPATIENT
Start: 2022-03-20 | End: 2022-03-20

## 2022-03-20 RX ORDER — IBUPROFEN 200 MG
24 TABLET ORAL
Status: DISCONTINUED | OUTPATIENT
Start: 2022-03-20 | End: 2022-03-21 | Stop reason: HOSPADM

## 2022-03-20 RX ORDER — LEVOTHYROXINE SODIUM 25 UG/1
75 TABLET ORAL
Status: DISCONTINUED | OUTPATIENT
Start: 2022-03-21 | End: 2022-03-21 | Stop reason: HOSPADM

## 2022-03-20 RX ORDER — TALC
6 POWDER (GRAM) TOPICAL NIGHTLY PRN
Status: DISCONTINUED | OUTPATIENT
Start: 2022-03-20 | End: 2022-03-21 | Stop reason: HOSPADM

## 2022-03-20 RX ORDER — AZITHROMYCIN 250 MG/1
250 TABLET, FILM COATED ORAL DAILY
Status: DISCONTINUED | OUTPATIENT
Start: 2022-03-21 | End: 2022-03-21 | Stop reason: HOSPADM

## 2022-03-20 RX ORDER — METOPROLOL TARTRATE 25 MG/1
25 TABLET, FILM COATED ORAL 2 TIMES DAILY
Status: DISCONTINUED | OUTPATIENT
Start: 2022-03-20 | End: 2022-03-21 | Stop reason: HOSPADM

## 2022-03-20 RX ORDER — ATORVASTATIN CALCIUM 20 MG/1
40 TABLET, FILM COATED ORAL DAILY
Status: DISCONTINUED | OUTPATIENT
Start: 2022-03-20 | End: 2022-03-21 | Stop reason: HOSPADM

## 2022-03-20 RX ORDER — BENZONATATE 100 MG/1
100 CAPSULE ORAL 3 TIMES DAILY PRN
Status: DISCONTINUED | OUTPATIENT
Start: 2022-03-20 | End: 2022-03-21 | Stop reason: HOSPADM

## 2022-03-20 RX ORDER — ISOSORBIDE MONONITRATE 30 MG/1
60 TABLET, EXTENDED RELEASE ORAL DAILY
Status: DISCONTINUED | OUTPATIENT
Start: 2022-03-21 | End: 2022-03-21 | Stop reason: HOSPADM

## 2022-03-20 RX ORDER — GLUCAGON 1 MG
1 KIT INJECTION
Status: DISCONTINUED | OUTPATIENT
Start: 2022-03-20 | End: 2022-03-21 | Stop reason: HOSPADM

## 2022-03-20 RX ORDER — AMLODIPINE BESYLATE 5 MG/1
5 TABLET ORAL DAILY
Status: DISCONTINUED | OUTPATIENT
Start: 2022-03-21 | End: 2022-03-21 | Stop reason: HOSPADM

## 2022-03-20 RX ADMIN — IPRATROPIUM BROMIDE AND ALBUTEROL SULFATE 3 ML: 2.5; .5 SOLUTION RESPIRATORY (INHALATION) at 11:03

## 2022-03-20 RX ADMIN — ATORVASTATIN CALCIUM 40 MG: 20 TABLET, FILM COATED ORAL at 05:03

## 2022-03-20 RX ADMIN — GUAIFENESIN AND DEXTROMETHORPHAN 5 ML: 100; 10 SYRUP ORAL at 09:03

## 2022-03-20 RX ADMIN — BENZONATATE 100 MG: 100 CAPSULE ORAL at 05:03

## 2022-03-20 RX ADMIN — PREDNISONE 40 MG: 20 TABLET ORAL at 02:03

## 2022-03-20 RX ADMIN — IPRATROPIUM BROMIDE AND ALBUTEROL SULFATE 3 ML: 2.5; .5 SOLUTION RESPIRATORY (INHALATION) at 10:03

## 2022-03-20 RX ADMIN — METOPROLOL TARTRATE 25 MG: 25 TABLET, FILM COATED ORAL at 09:03

## 2022-03-20 RX ADMIN — INSULIN ASPART 3 UNITS: 100 INJECTION, SOLUTION INTRAVENOUS; SUBCUTANEOUS at 05:03

## 2022-03-20 RX ADMIN — FUROSEMIDE 40 MG: 40 TABLET ORAL at 08:03

## 2022-03-20 RX ADMIN — AZITHROMYCIN MONOHYDRATE 500 MG: 250 TABLET ORAL at 01:03

## 2022-03-20 RX ADMIN — IPRATROPIUM BROMIDE AND ALBUTEROL SULFATE 3 ML: 2.5; .5 SOLUTION RESPIRATORY (INHALATION) at 04:03

## 2022-03-20 NOTE — ED NOTES
BBS=, very few wheezes, requesting cough medicine and administered per order.  Sitting up in bed eating supper

## 2022-03-20 NOTE — ED NOTES
-arrived from ER via wheelchair, ambulated to bathroom w/ steady gait,  Informed of current treatment plan, agreeable, given menu w/ instructions for ordering.  LOC: The patient is awake, alert and aware of environment with an appropriate affect, the patient is oriented x 3 and speaking appropriately.  APPEARANCE: Patient resting comfortably and in no acute distress, patient is clean and well groomed, patient's clothing is properly fastened.  SKIN: The skin is warm and dry, patient has normal skin turgor and moist mucus membranes, skin intact, no breakdown or brusing noted.  MUSKULOSKELETAL: Patient moving all extremities well, no obvious swelling or deformities noted.  RESPIRATORY: Airway is open and patent, respirations are spontaneous, patient has a normal effort and rate. Breath sounds w/ + wheezes , +productive cough  CARDIAC: Normal heart sounds. No peripheral edema.  ABDOMEN: Soft and non tender to palpation, no distention noted. Bowel sounds present.

## 2022-03-20 NOTE — H&P
ED Observation Unit  History and Physical      I assumed care of this patient from the Main ED at onset of observation time, 1315 on 03/20/2022.       History of Present Illness:    This is a 89 y.o. female, with a PMHx of asthma, HTN, HLD, MI and CKD, who presents to the ED with complaint of wheezing. Patient reports that 5 days ago, her throat started to hurt and then, she started sneezing and wheezing. She further states that she has been vomiting after coughing. Patient has taken home medications with no relief. Additionally, she reports that her symptoms have been progressively worsening, prompting her to the ED. Patient denies having a fever.  She uses an inhaler at home secondary to her asthma. Patient has received all three doses of the COVID-19 vaccine.    ED Course:  Labs are stable.  No leukocytosis.  No fever.  CMP with hyperglycemia and elevated creatinine, near baseline.  COVID is negative. Chest xray: Interstitial findings accentuated by shallow inspiratory effort and habitus, superimposed early congestive change/edema is a consideration.    Patient continues to wheeze in the ER despite treatment.  Placed in observation for DuoNebs, steroids, and azithromycin.  Will reassess respiratory status in the morning.    I reviewed the ED Provider Note prior to my evaluation of this patient.  I reviewed all labs and imaging performed in the Main ED.  PMHx   Past Medical History:   Diagnosis Date    Allergy     Anemia     Arthritis     Asthma     Chronic kidney disease     Degenerative disc disease     Diabetes mellitus type II     Diastolic heart failure 05/02/2017    Essential hypertension 7/3/2012    Glaucoma     History of insulin dependent diabetes mellitus, for many years stopped because of chronic kidney November 2020.  9/9/2021    History of pseudogout 8/23/2012    Hyperlipidemia     Hypertension     Iritis     Myocardial infarction     Pneumonia     Thrombocytopenia 8/24/2021     Thyroid disease       Past Surgical History:   Procedure Laterality Date    CARDIAC CATHETERIZATION  2010    no obstructive disease    CATARACT EXTRACTION W/  INTRAOCULAR LENS IMPLANT Left n/a    CATARACT EXTRACTION W/  INTRAOCULAR LENS IMPLANT Right 10/8/2018    With Femtosecond LASER assist (Dr. Henson)    CHOLECYSTECTOMY      ESOPHAGOGASTRODUODENOSCOPY N/A 11/4/2020    Procedure: EGD (ESOPHAGOGASTRODUODENOSCOPY);  Surgeon: Tomás Mccall MD;  Location: [address];  Service: Endoscopy;  Laterality: N/A;    EYE SURGERY      gall stone      HYSTERECTOMY      VARICOSE VEIN SURGERY          Family Hx   Family History   Problem Relation Age of Onset    Diabetes Mother     Hypertension Mother     Glaucoma Mother     Hypertension Father     Diabetes Son     No Known Problems Daughter     Diabetes Daughter         borderline    No Known Problems Son     Melanoma Neg Hx         Social Hx   Social History     Socioeconomic History    Marital status:    Tobacco Use    Smoking status: Never Smoker    Smokeless tobacco: Never Used   Substance and Sexual Activity    Alcohol use: No    Drug use: No    Sexual activity: Never     Social Determinants of Health     Financial Resource Strain: Medium Risk    Difficulty of Paying Living Expenses: Somewhat hard   Food Insecurity: No Food Insecurity    Worried About Running Out of Food in the Last Year: Never true    Ran Out of Food in the Last Year: Never true   Transportation Needs: No Transportation Needs    Lack of Transportation (Medical): No    Lack of Transportation (Non-Medical): No   Physical Activity: Inactive    Days of Exercise per Week: 0 days    Minutes of Exercise per Session: 0 min   Stress: No Stress Concern Present    Feeling of Stress : Not at all   Social Connections: Moderately Integrated    Frequency of Communication with Friends and Family: More than three times a week    Frequency of Social Gatherings with Friends  and Family: Once a week    Attends [employer] Services: More than 4 times per year    Active Member of Clubs or Organizations: Yes    Attends Club or Organization Meetings: More than 4 times per year    Marital Status:    Housing Stability: Low Risk     Unable to Pay for Housing in the Last Year: No    Number of Places Lived in the Last Year: 1    Unstable Housing in the Last Year: No        Vital Signs   Vitals:    03/20/22 1302 03/20/22 1306 03/20/22 1402 03/20/22 1414   BP: 126/60  135/70 135/70   BP Location:    Left arm   Patient Position:    Lying   Pulse:  87 93 93   Resp:   19 19   Temp:   98.3 °F (36.8 °C) 98.3 °F (36.8 °C)   TempSrc:    Oral   SpO2: 100%  98% (!) 94%        Review of Systems  Review of Systems   Constitutional: Negative for fever.   HENT: Negative for congestion.    Respiratory: Positive for cough, shortness of breath and wheezing.    Cardiovascular: Negative for chest pain.   Gastrointestinal: Negative for abdominal pain.   Genitourinary: Negative for dysuria.   Musculoskeletal: Negative for joint pain and myalgias.   Skin: Negative for rash.   Neurological: Negative for weakness and headaches.   Psychiatric/Behavioral: Negative.        Physical Exam  Physical Exam  Constitutional:       General: She is not in acute distress.  HENT:      Head: Normocephalic and atraumatic.   Cardiovascular:      Rate and Rhythm: Normal rate. Rhythm irregularly irregular.      Heart sounds: Normal heart sounds.   Pulmonary:      Effort: Pulmonary effort is normal.      Breath sounds: Wheezing present.   Musculoskeletal:         General: Normal range of motion.   Skin:     General: Skin is warm and dry.   Neurological:      General: No focal deficit present.      Mental Status: She is alert.   Psychiatric:         Mood and Affect: Mood normal.         Behavior: Behavior normal.         Medications:   Scheduled Meds:  Azithromycin, prednisone  Continuous Infusions:  PRN Meds:  DuoNeb, Tylenol,  melatonin, Zofran, sliding-scale insulin      Assessment/Plan:  1. Bronchitis   DuoNeb   Prednisone   Azithromycin   Repeat a.m. labs    Case was discussed with the ED provider, SUDHA Melendez MD.

## 2022-03-20 NOTE — ED TRIAGE NOTES
Pt reports to ED with c/o wheezing x 4 days. Pt has hx of asthma and has been having an exacerbation that began on Tuesday with productive cough. Pt has audible inspiratory and expiratory wheezing, no relief from at home meds. Pt reports spo2 of 80% at home, 98% RA upon arrival to ED.

## 2022-03-20 NOTE — Clinical Note
Diagnosis: Exacerbation of asthma, unspecified asthma severity, unspecified whether persistent [[id number]]   Future Attending Provider: URIAH CLAY II [[id number]]   Is the patient being sent to ED Observation?: Yes   Admitting Provider:: URIAH CLAY II [[id number]]

## 2022-03-21 VITALS
OXYGEN SATURATION: 98 % | HEART RATE: 98 BPM | DIASTOLIC BLOOD PRESSURE: 90 MMHG | TEMPERATURE: 99 F | SYSTOLIC BLOOD PRESSURE: 127 MMHG | RESPIRATION RATE: 15 BRPM

## 2022-03-21 LAB
ANION GAP SERPL CALC-SCNC: 13 MMOL/L (ref 8–16)
BASOPHILS # BLD AUTO: 0.02 K/UL (ref 0–0.2)
BASOPHILS NFR BLD: 0.2 % (ref 0–1.9)
BNP SERPL-MCNC: 750 PG/ML (ref 0–99)
BUN SERPL-MCNC: 37 MG/DL (ref 10–30)
CALCIUM SERPL-MCNC: 9.2 MG/DL (ref 8.7–10.5)
CHLORIDE SERPL-SCNC: 99 MMOL/L (ref 95–110)
CO2 SERPL-SCNC: 23 MMOL/L (ref 23–29)
CREAT SERPL-MCNC: 1.8 MG/DL (ref 0.5–1.4)
DIFFERENTIAL METHOD: ABNORMAL
EOSINOPHIL # BLD AUTO: 0 K/UL (ref 0–0.5)
EOSINOPHIL NFR BLD: 0.1 % (ref 0–8)
ERYTHROCYTE [DISTWIDTH] IN BLOOD BY AUTOMATED COUNT: 13.9 % (ref 11.5–14.5)
EST. GFR  (AFRICAN AMERICAN): 27 ML/MIN/1.73 M^2
EST. GFR  (NON AFRICAN AMERICAN): 23 ML/MIN/1.73 M^2
ESTIMATED AVG GLUCOSE: 197 MG/DL (ref 68–131)
GLUCOSE SERPL-MCNC: 219 MG/DL (ref 70–110)
HBA1C MFR BLD: 8.5 % (ref 4–5.6)
HCT VFR BLD AUTO: 34.6 % (ref 37–48.5)
HGB BLD-MCNC: 11.7 G/DL (ref 12–16)
IMM GRANULOCYTES # BLD AUTO: 0.05 K/UL (ref 0–0.04)
IMM GRANULOCYTES NFR BLD AUTO: 0.6 % (ref 0–0.5)
LYMPHOCYTES # BLD AUTO: 0.5 K/UL (ref 1–4.8)
LYMPHOCYTES NFR BLD: 5.5 % (ref 18–48)
MCH RBC QN AUTO: 31 PG (ref 27–31)
MCHC RBC AUTO-ENTMCNC: 33.8 G/DL (ref 32–36)
MCV RBC AUTO: 92 FL (ref 82–98)
MONOCYTES # BLD AUTO: 0.5 K/UL (ref 0.3–1)
MONOCYTES NFR BLD: 6.3 % (ref 4–15)
NEUTROPHILS # BLD AUTO: 7.5 K/UL (ref 1.8–7.7)
NEUTROPHILS NFR BLD: 87.3 % (ref 38–73)
NRBC BLD-RTO: 0 /100 WBC
PLATELET # BLD AUTO: 154 K/UL (ref 150–450)
PMV BLD AUTO: 11.8 FL (ref 9.2–12.9)
POCT GLUCOSE: 210 MG/DL (ref 70–110)
POCT GLUCOSE: 230 MG/DL (ref 70–110)
POCT GLUCOSE: 312 MG/DL (ref 70–110)
POTASSIUM SERPL-SCNC: 5 MMOL/L (ref 3.5–5.1)
RBC # BLD AUTO: 3.77 M/UL (ref 4–5.4)
SODIUM SERPL-SCNC: 135 MMOL/L (ref 136–145)
WBC # BLD AUTO: 8.59 K/UL (ref 3.9–12.7)

## 2022-03-21 PROCEDURE — G0378 HOSPITAL OBSERVATION PER HR: HCPCS

## 2022-03-21 PROCEDURE — 25000003 PHARM REV CODE 250: Performed by: NURSE PRACTITIONER

## 2022-03-21 PROCEDURE — 94640 AIRWAY INHALATION TREATMENT: CPT

## 2022-03-21 PROCEDURE — 85025 COMPLETE CBC W/AUTO DIFF WBC: CPT | Performed by: NURSE PRACTITIONER

## 2022-03-21 PROCEDURE — 80048 BASIC METABOLIC PNL TOTAL CA: CPT | Performed by: NURSE PRACTITIONER

## 2022-03-21 PROCEDURE — 83880 ASSAY OF NATRIURETIC PEPTIDE: CPT | Performed by: NURSE PRACTITIONER

## 2022-03-21 PROCEDURE — 36415 COLL VENOUS BLD VENIPUNCTURE: CPT | Performed by: NURSE PRACTITIONER

## 2022-03-21 PROCEDURE — 25000003 PHARM REV CODE 250: Performed by: PHYSICIAN ASSISTANT

## 2022-03-21 PROCEDURE — 25000242 PHARM REV CODE 250 ALT 637 W/ HCPCS: Performed by: NURSE PRACTITIONER

## 2022-03-21 PROCEDURE — 82962 GLUCOSE BLOOD TEST: CPT

## 2022-03-21 PROCEDURE — 63600175 PHARM REV CODE 636 W HCPCS: Performed by: NURSE PRACTITIONER

## 2022-03-21 PROCEDURE — 94761 N-INVAS EAR/PLS OXIMETRY MLT: CPT

## 2022-03-21 PROCEDURE — 63700000 PHARM REV CODE 250 ALT 637 W/O HCPCS: Performed by: NURSE PRACTITIONER

## 2022-03-21 PROCEDURE — 83036 HEMOGLOBIN GLYCOSYLATED A1C: CPT | Performed by: NURSE PRACTITIONER

## 2022-03-21 RX ORDER — DORZOLAMIDE HCL 20 MG/ML
1 SOLUTION/ DROPS OPHTHALMIC 2 TIMES DAILY
Status: DISCONTINUED | OUTPATIENT
Start: 2022-03-21 | End: 2022-03-21 | Stop reason: HOSPADM

## 2022-03-21 RX ORDER — GUAIFENESIN/DEXTROMETHORPHAN 100-10MG/5
5 SYRUP ORAL 4 TIMES DAILY PRN
Qty: 120 ML | Refills: 0 | Status: SHIPPED | OUTPATIENT
Start: 2022-03-21 | End: 2022-03-31

## 2022-03-21 RX ORDER — IPRATROPIUM BROMIDE AND ALBUTEROL SULFATE 2.5; .5 MG/3ML; MG/3ML
3 SOLUTION RESPIRATORY (INHALATION) EVERY 4 HOURS
Status: DISCONTINUED | OUTPATIENT
Start: 2022-03-21 | End: 2022-03-21 | Stop reason: HOSPADM

## 2022-03-21 RX ORDER — PREDNISONE 20 MG/1
40 TABLET ORAL DAILY
Qty: 10 TABLET | Refills: 0 | Status: SHIPPED | OUTPATIENT
Start: 2022-03-21 | End: 2022-03-26

## 2022-03-21 RX ORDER — AZITHROMYCIN 250 MG/1
250 TABLET, FILM COATED ORAL DAILY
Qty: 6 TABLET | Refills: 0 | Status: SHIPPED | OUTPATIENT
Start: 2022-03-21 | End: 2022-05-27

## 2022-03-21 RX ORDER — FUROSEMIDE 10 MG/ML
10 INJECTION INTRAMUSCULAR; INTRAVENOUS
Status: COMPLETED | OUTPATIENT
Start: 2022-03-21 | End: 2022-03-21

## 2022-03-21 RX ORDER — ALBUTEROL SULFATE 90 UG/1
1-2 AEROSOL, METERED RESPIRATORY (INHALATION) EVERY 6 HOURS PRN
Qty: 6.7 G | Refills: 0 | Status: SHIPPED | OUTPATIENT
Start: 2022-03-21

## 2022-03-21 RX ADMIN — AMLODIPINE BESYLATE 5 MG: 5 TABLET ORAL at 09:03

## 2022-03-21 RX ADMIN — FUROSEMIDE 10 MG: 10 INJECTION, SOLUTION INTRAMUSCULAR; INTRAVENOUS at 04:03

## 2022-03-21 RX ADMIN — ATORVASTATIN CALCIUM 40 MG: 20 TABLET, FILM COATED ORAL at 09:03

## 2022-03-21 RX ADMIN — FUROSEMIDE 40 MG: 40 TABLET ORAL at 09:03

## 2022-03-21 RX ADMIN — INSULIN ASPART 2 UNITS: 100 INJECTION, SOLUTION INTRAVENOUS; SUBCUTANEOUS at 12:03

## 2022-03-21 RX ADMIN — ISOSORBIDE MONONITRATE 60 MG: 30 TABLET, EXTENDED RELEASE ORAL at 09:03

## 2022-03-21 RX ADMIN — DORZOLAMIDE HYDROCHLORIDE 1 DROP: 20 SOLUTION/ DROPS OPHTHALMIC at 09:03

## 2022-03-21 RX ADMIN — IPRATROPIUM BROMIDE AND ALBUTEROL SULFATE 3 ML: .5; 2.5 SOLUTION RESPIRATORY (INHALATION) at 04:03

## 2022-03-21 RX ADMIN — METOPROLOL TARTRATE 25 MG: 25 TABLET, FILM COATED ORAL at 09:03

## 2022-03-21 RX ADMIN — LEVOTHYROXINE SODIUM 75 MCG: 25 TABLET ORAL at 06:03

## 2022-03-21 RX ADMIN — INSULIN ASPART 4 UNITS: 100 INJECTION, SOLUTION INTRAVENOUS; SUBCUTANEOUS at 07:03

## 2022-03-21 RX ADMIN — PREDNISONE 40 MG: 20 TABLET ORAL at 09:03

## 2022-03-21 RX ADMIN — IPRATROPIUM BROMIDE AND ALBUTEROL SULFATE 3 ML: .5; 2.5 SOLUTION RESPIRATORY (INHALATION) at 11:03

## 2022-03-21 RX ADMIN — BENZONATATE 100 MG: 100 CAPSULE ORAL at 04:03

## 2022-03-21 RX ADMIN — AZITHROMYCIN MONOHYDRATE 250 MG: 250 TABLET ORAL at 09:03

## 2022-03-21 RX ADMIN — ASPIRIN 81 MG CHEWABLE TABLET 81 MG: 81 TABLET CHEWABLE at 06:03

## 2022-03-21 NOTE — PROGRESS NOTES
[facility] ED Observation Unit  Progress Note      HPI   This is a 89 y.o. female, with a PMHx of asthma, HTN, HLD, MI and CKD, who presents to the ED with complaint of wheezing. Patient reports that 5 days ago, her throat started to hurt and then, she started sneezing and wheezing. She further states that she has been vomiting after coughing. Patient has taken home medications with no relief. Additionally, she reports that her symptoms have been progressively worsening, prompting her to the ED. Patient denies having a fever.  She uses an inhaler at home secondary to her asthma. Patient has received all three doses of the COVID-19 vaccine.     ED Course:  Labs are stable.  No leukocytosis.  No fever.  CMP with hyperglycemia and elevated creatinine, near baseline.  COVID is negative. Chest xray: Interstitial findings accentuated by shallow inspiratory effort and habitus, superimposed early congestive change/edema is a consideration.     Patient continues to wheeze in the ER despite treatment.  Placed in observation for DuoNebs, steroids, and azithromycin.  Will reassess respiratory status in the morning.    Interval History   Monitored overnight in the EDOU, patient reports feeling much better but not 100%.  Last duoneb received at 1030 last night, changed order from PRN to scheduled.  Lungs have decreased airflow throughout with wheezing and coarse BS    PMHx   Past Medical History:   Diagnosis Date    Allergy     Anemia     Arthritis     Asthma     Chronic kidney disease     Degenerative disc disease     Diabetes mellitus type II     Diastolic heart failure 05/02/2017    Essential hypertension 7/3/2012    Glaucoma     History of insulin dependent diabetes mellitus, for many years stopped because of chronic kidney November 2020.  9/9/2021    History of pseudogout 8/23/2012    Hyperlipidemia     Hypertension     Iritis     Myocardial infarction     Pneumonia     Thrombocytopenia 8/24/2021     Thyroid disease       Past Surgical History:   Procedure Laterality Date    CARDIAC CATHETERIZATION  2010    no obstructive disease    CATARACT EXTRACTION W/  INTRAOCULAR LENS IMPLANT Left n/a    CATARACT EXTRACTION W/  INTRAOCULAR LENS IMPLANT Right 10/8/2018    With Femtosecond LASER assist (Dr. Henson)    CHOLECYSTECTOMY      ESOPHAGOGASTRODUODENOSCOPY N/A 11/4/2020    Procedure: EGD (ESOPHAGOGASTRODUODENOSCOPY);  Surgeon: Tomás Mccall MD;  Location: [address];  Service: Endoscopy;  Laterality: N/A;    EYE SURGERY      gall stone      HYSTERECTOMY      VARICOSE VEIN SURGERY          Family Hx   Family History   Problem Relation Age of Onset    Diabetes Mother     Hypertension Mother     Glaucoma Mother     Hypertension Father     Diabetes Son     No Known Problems Daughter     Diabetes Daughter         borderline    No Known Problems Son     Melanoma Neg Hx         Social Hx   Social History     Socioeconomic History    Marital status:    Tobacco Use    Smoking status: Never Smoker    Smokeless tobacco: Never Used   Substance and Sexual Activity    Alcohol use: No    Drug use: No    Sexual activity: Never     Social Determinants of Health     Financial Resource Strain: Medium Risk    Difficulty of Paying Living Expenses: Somewhat hard   Food Insecurity: No Food Insecurity    Worried About Running Out of Food in the Last Year: Never true    Ran Out of Food in the Last Year: Never true   Transportation Needs: No Transportation Needs    Lack of Transportation (Medical): No    Lack of Transportation (Non-Medical): No   Physical Activity: Inactive    Days of Exercise per Week: 0 days    Minutes of Exercise per Session: 0 min   Stress: No Stress Concern Present    Feeling of Stress : Not at all   Social Connections: Moderately Integrated    Frequency of Communication with Friends and Family: More than three times a week    Frequency of Social Gatherings with Friends  and Family: Once a week    Attends Yazdanism Services: More than 4 times per year    Active Member of Clubs or Organizations: Yes    Attends Club or Organization Meetings: More than 4 times per year    Marital Status:    Housing Stability: Low Risk     Unable to Pay for Housing in the Last Year: No    Number of Places Lived in the Last Year: 1    Unstable Housing in the Last Year: No        Vital Signs   Vitals:    03/21/22 0724 03/21/22 1100 03/21/22 1130 03/21/22 1520   BP: (!) 153/73 119/61  (!) 127/90   BP Location: Left arm Left arm  Left arm   Patient Position: Lying Lying  Lying   Pulse: 87 78 78 102   Resp:   16    Temp: 98.9 °F (37.2 °C) 98.6 °F (37 °C)  99.3 °F (37.4 °C)   TempSrc: Oral Oral  Oral   SpO2: 99% 100% 100% 99%        Review of Systems  Review of Systems   Constitutional: Negative for chills and fever.   Respiratory: Positive for cough, shortness of breath (improved) and wheezing (improved).    Cardiovascular: Negative for chest pain and palpitations.   Neurological: Negative for dizziness and weakness.   All other systems reviewed and are negative.      Brief Physical Exam/Reassessment   Physical Exam    Nursing note and vitals reviewed.  Constitutional: Vital signs are normal. She appears well-developed and well-nourished. She does not appear ill. No distress.   HENT:   Head: Normocephalic and atraumatic.   Neck: Neck supple.   Cardiovascular: Normal rate. An irregularly irregular rhythm present.    Pulmonary/Chest: Effort normal. No tachypnea. She has decreased breath sounds. She has wheezes in the right upper field, the right middle field, the right lower field, the left upper field, the left middle field and the left lower field.   Musculoskeletal:         General: Edema (+1 lower ext) present.      Cervical back: Neck supple. No spinous process tenderness or muscular tenderness.     Neurological: She is alert and oriented to person, place, and time. GCS eye subscore is 4.  GCS verbal subscore is 5. GCS motor subscore is 6.   Skin: Skin is warm, dry and intact.   Psychiatric: She has a normal mood and affect. Her behavior is normal.         Labs/Imaging   Labs Reviewed   CBC W/ AUTO DIFFERENTIAL - Abnormal; Notable for the following components:       Result Value    RBC 3.98 (*)     Hematocrit 36.0 (*)     Platelets 141 (*)     Lymph # 0.9 (*)     Lymph % 12.1 (*)     All other components within normal limits   COMPREHENSIVE METABOLIC PANEL - Abnormal; Notable for the following components:    Sodium 135 (*)     Glucose 258 (*)     BUN 32 (*)     Creatinine 1.8 (*)     Albumin 3.3 (*)     eGFR if  27 (*)     eGFR if non  23 (*)     All other components within normal limits   CBC W/ AUTO DIFFERENTIAL - Abnormal; Notable for the following components:    RBC 3.77 (*)     Hemoglobin 11.7 (*)     Hematocrit 34.6 (*)     Immature Granulocytes 0.6 (*)     Immature Grans (Abs) 0.05 (*)     Lymph # 0.5 (*)     Gran % 87.3 (*)     Lymph % 5.5 (*)     All other components within normal limits    Narrative:     If patient is diabetic and not done in past 6 weeks   BASIC METABOLIC PANEL - Abnormal; Notable for the following components:    Sodium 135 (*)     Glucose 219 (*)     BUN 37 (*)     Creatinine 1.8 (*)     eGFR if  27 (*)     eGFR if non  23 (*)     All other components within normal limits    Narrative:     If patient is diabetic and not done in past 6 weeks   B-TYPE NATRIURETIC PEPTIDE - Abnormal; Notable for the following components:     (*)     All other components within normal limits    Narrative:     If patient is diabetic and not done in past 6 weeks   POCT GLUCOSE - Abnormal; Notable for the following components:    POCT Glucose 294 (*)     All other components within normal limits   POCT GLUCOSE - Abnormal; Notable for the following components:    POCT Glucose 262 (*)     All other components within normal  limits   POCT GLUCOSE - Abnormal; Notable for the following components:    POCT Glucose 312 (*)     All other components within normal limits   POCT GLUCOSE - Abnormal; Notable for the following components:    POCT Glucose 210 (*)     All other components within normal limits   POCT GLUCOSE - Abnormal; Notable for the following components:    POCT Glucose 230 (*)     All other components within normal limits   B-TYPE NATRIURETIC PEPTIDE   HEMOGLOBIN A1C   SARS-COV-2 RDRP GENE   SARS-COV-2 RDRP GENE   POCT GLUCOSE MONITORING CONTINUOUS      Imaging Results          X-Ray Chest AP Portable (Final result)  Result time 03/20/22 12:17:00    Final result by Leland Santiago MD (03/20/22 12:17:00)                 Impression:      1. Interstitial findings accentuated by shallow inspiratory effort and habitus, superimposed early congestive change/edema is a consideration, correlation is advised.      Electronically signed by: Leland Santiago MD  Date:    03/20/2022  Time:    12:17             Narrative:    EXAMINATION:  XR CHEST AP PORTABLE    CLINICAL HISTORY:  Chest Pain;    TECHNIQUE:  Single frontal view of the chest was performed.    COMPARISON:  12/22/2020    FINDINGS:  The cardiomediastinal silhouette is prominent, similar to the previous examination noting calcification of the aorta..  There is no pleural effusion.  The trachea is midline.  The lungs are symmetrically expanded bilaterally with mildly coarse interstitial attenuation accentuated by habitus and shallow inspiratory effort..  No large focal consolidation seen.  There is no pneumothorax.  The osseous structures are remarkable for degenerative changes..                                 I reviewed all labs, imaging, EKGs.     Plan   Asthma/bronchitis - q4h duonebs, steroids, azithromycin    CHF hx - cxr showed possible edema, BNP lab added     I have discussed this case with Mari Harman NP.

## 2022-03-21 NOTE — DISCHARGE SUMMARY
[facility] ED Observation Unit  Discharge Summary        History of Present Illness:    This is a 89 y.o. female, with a PMHx of asthma, HTN, HLD, MI and CKD, who presents to the ED with complaint of wheezing. Patient reports that 5 days ago, her throat started to hurt and then, she started sneezing and wheezing. She further states that she has been vomiting after coughing. Patient has taken home medications with no relief. Additionally, she reports that her symptoms have been progressively worsening, prompting her to the ED. Patient denies having a fever.  She uses an inhaler at home secondary to her asthma. Patient has received all three doses of the COVID-19 vaccine.     ED Course:  Labs are stable.  No leukocytosis.  No fever.  CMP with hyperglycemia and elevated creatinine, near baseline.  COVID is negative. Chest xray: Interstitial findings accentuated by shallow inspiratory effort and habitus, superimposed early congestive change/edema is a consideration.     Patient continues to wheeze in the ER despite treatment.  Placed in observation for DuoNebs, steroids, and azithromycin.  Will reassess respiratory status in the morning.     Interval History   Monitored overnight in the EDOU, patient reports feeling much better but not 100%.  Last duoneb received at 1030 last night, changed order from PRN to scheduled.  Lungs have decreased airflow throughout with wheezing and coarse BS     Observation Course:    Received additional DuoNebs and prednisone this morning.  Upon recheck breath sounds had significantly improved, good air movement.  Minimal wheezing noted at the bases.  Ambulatory pulse ox was 100% on room air without shortness of breath or NOVOA.    BMP was also checked due to x-ray results from yesterday showing edema.  Elevated at 750, received an additional 10 mg IV Lasix.  Confirmed with daughter that she is taking her Lasix prescribed.    Consultants:    n/a    ED/OBS Workup:  Vitals:    03/21/22 0724  03/21/22 1100 03/21/22 1130 03/21/22 1520   BP: (!) 153/73 119/61  (!) 127/90   Pulse: 87 78 78 102   Resp:   16    Temp: 98.9 °F (37.2 °C) 98.6 °F (37 °C)  99.3 °F (37.4 °C)   TempSrc: Oral Oral  Oral   SpO2: 99% 100% 100% 99%    03/21/22 1600   BP:    Pulse: 98   Resp: 15   Temp:    TempSrc:    SpO2: 98%       Labs Reviewed   CBC W/ AUTO DIFFERENTIAL - Abnormal; Notable for the following components:       Result Value    RBC 3.98 (*)     Hematocrit 36.0 (*)     Platelets 141 (*)     Lymph # 0.9 (*)     Lymph % 12.1 (*)     All other components within normal limits   COMPREHENSIVE METABOLIC PANEL - Abnormal; Notable for the following components:    Sodium 135 (*)     Glucose 258 (*)     BUN 32 (*)     Creatinine 1.8 (*)     Albumin 3.3 (*)     eGFR if  27 (*)     eGFR if non  23 (*)     All other components within normal limits   CBC W/ AUTO DIFFERENTIAL - Abnormal; Notable for the following components:    RBC 3.77 (*)     Hemoglobin 11.7 (*)     Hematocrit 34.6 (*)     Immature Granulocytes 0.6 (*)     Immature Grans (Abs) 0.05 (*)     Lymph # 0.5 (*)     Gran % 87.3 (*)     Lymph % 5.5 (*)     All other components within normal limits    Narrative:     If patient is diabetic and not done in past 6 weeks   BASIC METABOLIC PANEL - Abnormal; Notable for the following components:    Sodium 135 (*)     Glucose 219 (*)     BUN 37 (*)     Creatinine 1.8 (*)     eGFR if  27 (*)     eGFR if non  23 (*)     All other components within normal limits    Narrative:     If patient is diabetic and not done in past 6 weeks   HEMOGLOBIN A1C - Abnormal; Notable for the following components:    Hemoglobin A1C 8.5 (*)     Estimated Avg Glucose 197 (*)     All other components within normal limits    Narrative:     If patient is diabetic and not done in past 6 weeks   B-TYPE NATRIURETIC PEPTIDE - Abnormal; Notable for the following components:     (*)     All  other components within normal limits    Narrative:     If patient is diabetic and not done in past 6 weeks   POCT GLUCOSE - Abnormal; Notable for the following components:    POCT Glucose 294 (*)     All other components within normal limits   POCT GLUCOSE - Abnormal; Notable for the following components:    POCT Glucose 262 (*)     All other components within normal limits   POCT GLUCOSE - Abnormal; Notable for the following components:    POCT Glucose 312 (*)     All other components within normal limits   POCT GLUCOSE - Abnormal; Notable for the following components:    POCT Glucose 210 (*)     All other components within normal limits   POCT GLUCOSE - Abnormal; Notable for the following components:    POCT Glucose 230 (*)     All other components within normal limits   B-TYPE NATRIURETIC PEPTIDE   SARS-COV-2 RDRP GENE   SARS-COV-2 RDRP GENE   POCT GLUCOSE MONITORING CONTINUOUS       X-Ray Chest AP Portable   Final Result      1. Interstitial findings accentuated by shallow inspiratory effort and habitus, superimposed early congestive change/edema is a consideration, correlation is advised.         Electronically signed by: Leland Santiago MD   Date:    03/20/2022   Time:    12:17          Final Diagnosis:  1. Exacerbation of asthma, unspecified asthma severity, unspecified whether persistent    2. Bronchitis    3. Chronic diastolic congestive heart failure        Plan:  Discharge home with azithromycin, prednisone, albuterol and cough medications.  Discussed the need to follow-up with Dr. Patrick rutherford to see if medication adjustment for her CHF is needed.  Patient also has an elevated A1c, she has not been on insulin for multiple years.  I discussed with the patient and daughter to follow up with primary care for possible medication changes.  They stated understanding.    Discharge Condition: Good    Disposition: Home or Self Care     Time spent on the discharge of the patient including review of hospital course  with the patient. reviewing discharge medications and arranging follow-up care 35 minutes.  Patient was seen and examined on the date of discharge and determined to be suitable for discharge.    Follow Up:   ED Disposition Condition    Discharge Stable          ED Prescriptions     Medication Sig Dispense Start Date End Date Auth. Provider    azithromycin (Z-DUNIA) 250 MG tablet Take 1 tablet (250 mg total) by mouth once daily. Take first 2 tablets together, then 1 every day until finished. 6 tablet 3/21/2022  SRINATH Mesa    predniSONE (DELTASONE) 20 MG tablet Take 2 tablets (40 mg total) by mouth once daily. for 5 days 10 tablet 3/21/2022 3/26/2022 SRINATH [place]    albuterol (PROVENTIL/VENTOLIN HFA) 90 mcg/actuation inhaler Inhale 1-2 puffs into the lungs every 6 (six) hours as needed for Wheezing. Rescue 6.7 g 3/21/2022  SRINATH [place]a    dextromethorphan-guaiFENesin  mg/5 ml (ROBITUSSIN-DM)  mg/5 mL liquid Take 5 mLs by mouth 4 (four) times daily as needed (cough). 120 mL 3/21/2022 3/31/2022 SRINATH [place]a        Follow-up Information     Follow up With Specialties Details Why Contact Info    Tomás Andres MD Internal Medicine   [address]  [place] [id number] [phone]      Adán Alanis MD Cardiovascular Disease, Cardiology, Interventional Cardiology Schedule an appointment as soon as possible for a visit in 3 days  [address]  [address]  [place] [id number] [phone]      [facility] - Emergency Dept (Observation) Emergency Medicine Go to  If symptoms worsen [address] [address] [phone]          Future Appointments   Date Time Provider Department Center   4/5/2022  8:00 AM David Prado MD [facility] [address] [facility]   5/16/2022  8:00 AM [facility]   5/20/2022  9:00 AM Tomás Andres MD [facility] [facility]   5/31/2022  8:30 AM Aria Krishnamurthy DPM [facility] [place]   6/7/2022   9:00 AM Tommy Rosenthal MD [facility] [facility]   9/16/2022  9:00 AM Adán Alanis MD [facility]

## 2022-03-22 ENCOUNTER — TELEPHONE (OUTPATIENT)
Dept: INTERNAL MEDICINE | Facility: CLINIC | Age: 87
End: 2022-03-22
Payer: MEDICARE

## 2022-03-22 NOTE — TELEPHONE ENCOUNTER
----- Message from David Khan sent at 3/22/2022  9:54 AM CDT -----  Contact: [phone]  Pt is calling in, she was in the ER and she would like a call back, please return call thanks

## 2022-03-23 ENCOUNTER — TELEPHONE (OUTPATIENT)
Dept: INTERNAL MEDICINE | Facility: CLINIC | Age: 87
End: 2022-03-23
Payer: MEDICARE

## 2022-03-23 ENCOUNTER — OFFICE VISIT (OUTPATIENT)
Dept: INTERNAL MEDICINE | Facility: CLINIC | Age: 87
End: 2022-03-23
Payer: MEDICARE

## 2022-03-23 DIAGNOSIS — J45.901 ASTHMA EXACERBATION IN COPD: ICD-10-CM

## 2022-03-23 DIAGNOSIS — E11.42 TYPE 2 DIABETES MELLITUS WITH DIABETIC POLYNEUROPATHY, WITHOUT LONG-TERM CURRENT USE OF INSULIN: Primary | ICD-10-CM

## 2022-03-23 DIAGNOSIS — N18.4 STAGE 4 CHRONIC KIDNEY DISEASE: ICD-10-CM

## 2022-03-23 DIAGNOSIS — J44.1 ASTHMA EXACERBATION IN COPD: ICD-10-CM

## 2022-03-23 PROCEDURE — 1160F RVW MEDS BY RX/DR IN RCRD: CPT | Mod: CPTII,95,, | Performed by: NURSE PRACTITIONER

## 2022-03-23 PROCEDURE — 1159F MED LIST DOCD IN RCRD: CPT | Mod: CPTII,95,, | Performed by: NURSE PRACTITIONER

## 2022-03-23 PROCEDURE — 1160F PR REVIEW ALL MEDS BY PRESCRIBER/CLIN PHARMACIST DOCUMENTED: ICD-10-PCS | Mod: CPTII,95,, | Performed by: NURSE PRACTITIONER

## 2022-03-23 PROCEDURE — 1159F PR MEDICATION LIST DOCUMENTED IN MEDICAL RECORD: ICD-10-PCS | Mod: CPTII,95,, | Performed by: NURSE PRACTITIONER

## 2022-03-23 PROCEDURE — 99442 PR PHYSICIAN TELEPHONE EVALUATION 11-20 MIN: ICD-10-PCS | Mod: 95,,, | Performed by: NURSE PRACTITIONER

## 2022-03-23 PROCEDURE — 99442 PR PHYSICIAN TELEPHONE EVALUATION 11-20 MIN: CPT | Mod: 95,,, | Performed by: NURSE PRACTITIONER

## 2022-03-23 RX ORDER — PEN NEEDLE, DIABETIC 30 GX3/16"
NEEDLE, DISPOSABLE MISCELLANEOUS
Qty: 100 EACH | Refills: 2 | Status: SHIPPED | OUTPATIENT
Start: 2022-03-23 | End: 2023-10-10

## 2022-03-23 RX ORDER — INSULIN ASPART 100 [IU]/ML
INJECTION, SOLUTION INTRAVENOUS; SUBCUTANEOUS
Qty: 15 ML | Refills: 2 | Status: SHIPPED | OUTPATIENT
Start: 2022-03-23 | End: 2023-08-22 | Stop reason: SDUPTHER

## 2022-03-23 NOTE — PROGRESS NOTES
"Established Patient - Audio Only Telehealth Visit     The patient location is: home   The chief complaint leading to consultation is: type 2 dm   Visit type: Virtual visit with audio only (telephone)  Total time spent with patient: 12 mins       The reason for the audio only service rather than synchronous audio and video virtual visit was related to technical difficulties or patient preference/necessity.     Each patient to whom I provide medical services by telemedicine is:  (1) informed of the relationship between the physician and patient and the respective role of any other health care provider with respect to management of the patient; and (2) notified that they may decline to receive medical services by telemedicine and may withdraw from such care at any time. Patient verbally consented to receive this service via voice-only telephone call.       HPI:Type 2 dm  Pt of Dr. Bradley  Recent ER visit admission 3/20/22  Given steroid pack  , 469 mg/dl  Previously on levemir and novolog > 1 year ago.  Stopped r/t better a1c, etc.  Recent a1c 8.5%, last 7.7%     Assessment and plan:   1. Type 2 diabetes mellitus with diabetic polyneuropathy, without long-term current use of insulin  insulin aspart U-100 (NOVOLOG FLEXPEN U-100 INSULIN) 100 unit/mL (3 mL) InPn pen    pen needle, diabetic (BD ULTRA-FINE SHORT PEN NEEDLE) 31 gauge x 5/16" Ndle   2. Stage 4 chronic kidney disease     3. Asthma exacerbation in COPD           1-3. Follow prn   Check in with patient on Friday  Send rx for novolog correction scale 180-230+2, etc  Pen needles rx sent  Instructed daughter to check sugar before meals  Use correction scale only if sugar levels are > 180   Steroids-cause insulin resistance  May linger for 5-7 days w/ hyperglycemia                      This service was not originating from a related E/M service provided within the previous 7 days nor will  to an E/M service or procedure within the next 24 hours or my " soonest available appointment.  Prevailing standard of care was able to be met in this audio-only visit.

## 2022-03-23 NOTE — TELEPHONE ENCOUNTER
----- Message from Francia Quintanilla sent at 3/23/2022  8:50 AM CDT -----  Contact: Yolanda/Daughter/[phone]  Patient daughter called, stated that patient is concern that has not receive a call from Dr Andres about patient concern about her insulin, and blood sugar been really high. Please call and advise. Thank you.

## 2022-03-23 NOTE — TELEPHONE ENCOUNTER
Spoke with pt and daughter informed that  Has to respond and prescribe the medication for pt. Rescheduled to sooner appt.

## 2022-03-23 NOTE — TELEPHONE ENCOUNTER
----- Message from Hallie Tineo sent at 3/23/2022  9:20 AM CDT -----  Contact: [phone]  Pt states she was in the hospital and she states she is neding her insulin and she states the hospital put her back on it and she is needing a new prescription for her insulin please advise she has an appt on Friday please call as soon as possible she states they have called several times for this

## 2022-03-24 ENCOUNTER — OFFICE VISIT (OUTPATIENT)
Dept: INTERNAL MEDICINE | Facility: CLINIC | Age: 87
End: 2022-03-24
Payer: MEDICARE

## 2022-03-24 VITALS
BODY MASS INDEX: 23.52 KG/M2 | HEIGHT: 68 IN | OXYGEN SATURATION: 97 % | HEART RATE: 78 BPM | DIASTOLIC BLOOD PRESSURE: 70 MMHG | SYSTOLIC BLOOD PRESSURE: 124 MMHG | WEIGHT: 155.19 LBS

## 2022-03-24 DIAGNOSIS — E03.9 PRIMARY HYPOTHYROIDISM: ICD-10-CM

## 2022-03-24 DIAGNOSIS — J44.1 ASTHMA EXACERBATION IN COPD: Primary | ICD-10-CM

## 2022-03-24 DIAGNOSIS — Z79.4 INSULIN-REQUIRING OR DEPENDENT TYPE II DIABETES MELLITUS: ICD-10-CM

## 2022-03-24 DIAGNOSIS — J45.901 ASTHMA EXACERBATION IN COPD: Primary | ICD-10-CM

## 2022-03-24 DIAGNOSIS — E11.9 INSULIN-REQUIRING OR DEPENDENT TYPE II DIABETES MELLITUS: ICD-10-CM

## 2022-03-24 DIAGNOSIS — N18.4 STAGE 4 CHRONIC KIDNEY DISEASE: ICD-10-CM

## 2022-03-24 PROCEDURE — 99999 PR PBB SHADOW E&M-EST. PATIENT-LVL V: CPT | Mod: PBBFAC,,, | Performed by: INTERNAL MEDICINE

## 2022-03-24 PROCEDURE — 3288F PR FALLS RISK ASSESSMENT DOCUMENTED: ICD-10-PCS | Mod: CPTII,S$GLB,, | Performed by: INTERNAL MEDICINE

## 2022-03-24 PROCEDURE — 3052F HG A1C>EQUAL 8.0%<EQUAL 9.0%: CPT | Mod: CPTII,S$GLB,, | Performed by: INTERNAL MEDICINE

## 2022-03-24 PROCEDURE — 99214 PR OFFICE/OUTPT VISIT, EST, LEVL IV, 30-39 MIN: ICD-10-PCS | Mod: S$GLB,,, | Performed by: INTERNAL MEDICINE

## 2022-03-24 PROCEDURE — 99499 RISK ADDL DX/OHS AUDIT: ICD-10-PCS | Mod: S$GLB,,, | Performed by: INTERNAL MEDICINE

## 2022-03-24 PROCEDURE — 1101F PT FALLS ASSESS-DOCD LE1/YR: CPT | Mod: CPTII,S$GLB,, | Performed by: INTERNAL MEDICINE

## 2022-03-24 PROCEDURE — 1101F PR PT FALLS ASSESS DOC 0-1 FALLS W/OUT INJ PAST YR: ICD-10-PCS | Mod: CPTII,S$GLB,, | Performed by: INTERNAL MEDICINE

## 2022-03-24 PROCEDURE — 1159F PR MEDICATION LIST DOCUMENTED IN MEDICAL RECORD: ICD-10-PCS | Mod: CPTII,S$GLB,, | Performed by: INTERNAL MEDICINE

## 2022-03-24 PROCEDURE — 3052F PR MOST RECENT HEMOGLOBIN A1C LEVEL 8.0 - < 9.0%: ICD-10-PCS | Mod: CPTII,S$GLB,, | Performed by: INTERNAL MEDICINE

## 2022-03-24 PROCEDURE — 1126F AMNT PAIN NOTED NONE PRSNT: CPT | Mod: CPTII,S$GLB,, | Performed by: INTERNAL MEDICINE

## 2022-03-24 PROCEDURE — 3288F FALL RISK ASSESSMENT DOCD: CPT | Mod: CPTII,S$GLB,, | Performed by: INTERNAL MEDICINE

## 2022-03-24 PROCEDURE — 99214 OFFICE O/P EST MOD 30 MIN: CPT | Mod: S$GLB,,, | Performed by: INTERNAL MEDICINE

## 2022-03-24 PROCEDURE — 1159F MED LIST DOCD IN RCRD: CPT | Mod: CPTII,S$GLB,, | Performed by: INTERNAL MEDICINE

## 2022-03-24 PROCEDURE — 1126F PR PAIN SEVERITY QUANTIFIED, NO PAIN PRESENT: ICD-10-PCS | Mod: CPTII,S$GLB,, | Performed by: INTERNAL MEDICINE

## 2022-03-24 PROCEDURE — 99499 UNLISTED E&M SERVICE: CPT | Mod: S$GLB,,, | Performed by: INTERNAL MEDICINE

## 2022-03-24 PROCEDURE — 99999 PR PBB SHADOW E&M-EST. PATIENT-LVL V: ICD-10-PCS | Mod: PBBFAC,,, | Performed by: INTERNAL MEDICINE

## 2022-03-24 RX ORDER — ALBUTEROL SULFATE 0.83 MG/ML
2.5 SOLUTION RESPIRATORY (INHALATION) EVERY 6 HOURS PRN
Qty: 25 EACH | Refills: 1 | Status: SHIPPED | OUTPATIENT
Start: 2022-03-24 | End: 2023-03-24 | Stop reason: SDUPTHER

## 2022-03-24 NOTE — PROGRESS NOTES
CC:  ER follow-up    HPI:  The patient is a 89-year-old female with asthma, COPD, insulin-requiring diabetes, hypertension, hypothyroidism , coronary artery disease status post MI and CKD stage 4 presents today as ER follow-up.  Patient was seen for an asthma exacerbation and placed on prednisone and azithromycin.  The patient's blood sugars elevated.  She was on diet alone.  Her A1c was 8.5.  She was referred to Ms. Almeida.  The patient was placed back on NovoLog sliding scale.  She reports her blood sugar this morning was 190. Her breathing is better by about 75%.    ROS:  Patient reports no fever chills.  No chest pain.  No shortness of breath except when she coughs.  No nausea vomiting.    Physical exam:  General appearance:  No acute distress  HEENT:  Pupils are irregular.  She it.  She has bilateral lens replacements.  The corneas are little bit opaque.  TMs were clear.  Nasal septum is midline without discharge.  Oropharynx without erythema.  Trachea is midline without JVD.  Pulmonary:  Fair inspiratory, expiratory breath sounds were heard.  She had inspiratory wheezing.  Pulse ox was 97-98% on room air.  Cardiovascular S1-S2, with a 1/6 systolic ejection murmur heard best at the right upper sternal border.  Extremities with trace edema.  GI:  Abdomen was nontender, nondistended without hepatosplenomegaly    Assessment:  1. Asthma exacerbation  2. Insulin-requiring diabetes not controlled  3. Hypertension  4. Hypothyroidism  5. Coronary artery disease  6. CKD stage 4    Plan:  1. Will order albuterol for home nebulizer  2. The patient follow-up in 1 week for re-evaluation.

## 2022-04-01 ENCOUNTER — OFFICE VISIT (OUTPATIENT)
Dept: INTERNAL MEDICINE | Facility: CLINIC | Age: 87
End: 2022-04-01
Payer: MEDICARE

## 2022-04-01 VITALS
SYSTOLIC BLOOD PRESSURE: 110 MMHG | HEART RATE: 52 BPM | WEIGHT: 151.44 LBS | DIASTOLIC BLOOD PRESSURE: 56 MMHG | HEIGHT: 68 IN | BODY MASS INDEX: 22.95 KG/M2 | OXYGEN SATURATION: 96 %

## 2022-04-01 DIAGNOSIS — E03.9 PRIMARY HYPOTHYROIDISM: ICD-10-CM

## 2022-04-01 DIAGNOSIS — J44.1 ASTHMA EXACERBATION IN COPD: ICD-10-CM

## 2022-04-01 DIAGNOSIS — E11.42 TYPE 2 DIABETES MELLITUS WITH DIABETIC POLYNEUROPATHY, WITHOUT LONG-TERM CURRENT USE OF INSULIN: Primary | ICD-10-CM

## 2022-04-01 DIAGNOSIS — N18.4 STAGE 4 CHRONIC KIDNEY DISEASE: ICD-10-CM

## 2022-04-01 DIAGNOSIS — J45.901 ASTHMA EXACERBATION IN COPD: ICD-10-CM

## 2022-04-01 PROCEDURE — 99499 UNLISTED E&M SERVICE: CPT | Mod: S$GLB,,, | Performed by: INTERNAL MEDICINE

## 2022-04-01 PROCEDURE — 99999 PR PBB SHADOW E&M-EST. PATIENT-LVL V: ICD-10-PCS | Mod: PBBFAC,,, | Performed by: INTERNAL MEDICINE

## 2022-04-01 PROCEDURE — 1160F RVW MEDS BY RX/DR IN RCRD: CPT | Mod: CPTII,S$GLB,, | Performed by: INTERNAL MEDICINE

## 2022-04-01 PROCEDURE — 99499 RISK ADDL DX/OHS AUDIT: ICD-10-PCS | Mod: S$GLB,,, | Performed by: INTERNAL MEDICINE

## 2022-04-01 PROCEDURE — 1159F MED LIST DOCD IN RCRD: CPT | Mod: CPTII,S$GLB,, | Performed by: INTERNAL MEDICINE

## 2022-04-01 PROCEDURE — 1101F PT FALLS ASSESS-DOCD LE1/YR: CPT | Mod: CPTII,S$GLB,, | Performed by: INTERNAL MEDICINE

## 2022-04-01 PROCEDURE — 1125F AMNT PAIN NOTED PAIN PRSNT: CPT | Mod: CPTII,S$GLB,, | Performed by: INTERNAL MEDICINE

## 2022-04-01 PROCEDURE — 3288F PR FALLS RISK ASSESSMENT DOCUMENTED: ICD-10-PCS | Mod: CPTII,S$GLB,, | Performed by: INTERNAL MEDICINE

## 2022-04-01 PROCEDURE — 99214 OFFICE O/P EST MOD 30 MIN: CPT | Mod: S$GLB,,, | Performed by: INTERNAL MEDICINE

## 2022-04-01 PROCEDURE — 1159F PR MEDICATION LIST DOCUMENTED IN MEDICAL RECORD: ICD-10-PCS | Mod: CPTII,S$GLB,, | Performed by: INTERNAL MEDICINE

## 2022-04-01 PROCEDURE — 1125F PR PAIN SEVERITY QUANTIFIED, PAIN PRESENT: ICD-10-PCS | Mod: CPTII,S$GLB,, | Performed by: INTERNAL MEDICINE

## 2022-04-01 PROCEDURE — 3052F PR MOST RECENT HEMOGLOBIN A1C LEVEL 8.0 - < 9.0%: ICD-10-PCS | Mod: CPTII,S$GLB,, | Performed by: INTERNAL MEDICINE

## 2022-04-01 PROCEDURE — 3052F HG A1C>EQUAL 8.0%<EQUAL 9.0%: CPT | Mod: CPTII,S$GLB,, | Performed by: INTERNAL MEDICINE

## 2022-04-01 PROCEDURE — 1160F PR REVIEW ALL MEDS BY PRESCRIBER/CLIN PHARMACIST DOCUMENTED: ICD-10-PCS | Mod: CPTII,S$GLB,, | Performed by: INTERNAL MEDICINE

## 2022-04-01 PROCEDURE — 99214 PR OFFICE/OUTPT VISIT, EST, LEVL IV, 30-39 MIN: ICD-10-PCS | Mod: S$GLB,,, | Performed by: INTERNAL MEDICINE

## 2022-04-01 PROCEDURE — 99999 PR PBB SHADOW E&M-EST. PATIENT-LVL V: CPT | Mod: PBBFAC,,, | Performed by: INTERNAL MEDICINE

## 2022-04-01 PROCEDURE — 1101F PR PT FALLS ASSESS DOC 0-1 FALLS W/OUT INJ PAST YR: ICD-10-PCS | Mod: CPTII,S$GLB,, | Performed by: INTERNAL MEDICINE

## 2022-04-01 PROCEDURE — 3288F FALL RISK ASSESSMENT DOCD: CPT | Mod: CPTII,S$GLB,, | Performed by: INTERNAL MEDICINE

## 2022-04-02 NOTE — PROGRESS NOTES
CC:  Follow-up of asthma    HPI:  The patient is a 89-year-old female with asthma, COPD, insulin-requiring diabetes, hypertension, hypothyroidism, coronary artery disease status post MI and CKD stage 4 who presents today for one-week follow-up of asthma exacerbation.  Patient was placed on steroids in the emergency department.  Patient's blood sugars did elevate.  She had been on insulin in the past.  Patient was referred to endocrinology and restarted on NovoLog sliding scale.  Blood sugar today is 159. Her breathing is almost back to baseline.  She has been using her home nebulizer once a day as well as her rescue inhaler once a day.  She does report compliance with her Advair inhaler    ROS:  Patient reports no fever chills.  She still has a cough.  Her daughter, who is with her today, reports she is much better.    Physical exam:  General appearance:  No acute distress  HEENT:  Conjunctiva is clear.  Left pupil is irregular secondary to lens replacement..  TMs partially obscured by wax.  Nasal septum is midline without discharge.  Oropharynx without erythema.  Pulmonary:  Good inspiratory, expiratory breath sounds are heard.  Lungs clear auscultation.  I did not appreciate any crackles, wheezing or rhonchi.  Cardiovascular:  S1-S2, rhythm was normal.  Extremities with trace edema.  GI:  Abdomen was nontender, nondistended without hepatosplenomegaly    Assessment:  1. Asthma appears to be almost resolved  2. Insulin-requiring diabetes with blood sugars trending down off of steroids  3. Hypertension  4. Hypothyroidism  5. CKD  6. Coronary artery disease    Plan:  1. The patient has continued to use her aerosol treatments twice a day as needed  2. She is to continue use Advair twice a day  3. Continue use insulin sliding scale.    4. The patient has a follow-up scheduled for May 20th.

## 2022-04-05 ENCOUNTER — OFFICE VISIT (OUTPATIENT)
Dept: OPHTHALMOLOGY | Facility: CLINIC | Age: 87
End: 2022-04-05
Payer: MEDICARE

## 2022-04-05 DIAGNOSIS — H18.513 FUCHS' CORNEAL DYSTROPHY OF BOTH EYES: ICD-10-CM

## 2022-04-05 DIAGNOSIS — H40.32X2 GLAUCOMA OF LEFT EYE ASSOCIATED WITH OCULAR TRAUMA, MODERATE STAGE: ICD-10-CM

## 2022-04-05 DIAGNOSIS — H20.10 CHRONIC IRITIS: ICD-10-CM

## 2022-04-05 DIAGNOSIS — H04.123 DRY EYE SYNDROME OF BOTH EYES: ICD-10-CM

## 2022-04-05 DIAGNOSIS — H40.1132 PRIMARY OPEN ANGLE GLAUCOMA (POAG) OF BOTH EYES, MODERATE STAGE: Primary | ICD-10-CM

## 2022-04-05 PROCEDURE — 1126F PR PAIN SEVERITY QUANTIFIED, NO PAIN PRESENT: ICD-10-PCS | Mod: CPTII,S$GLB,, | Performed by: OPHTHALMOLOGY

## 2022-04-05 PROCEDURE — 99999 PR PBB SHADOW E&M-EST. PATIENT-LVL III: CPT | Mod: PBBFAC,,, | Performed by: OPHTHALMOLOGY

## 2022-04-05 PROCEDURE — 1159F PR MEDICATION LIST DOCUMENTED IN MEDICAL RECORD: ICD-10-PCS | Mod: CPTII,S$GLB,, | Performed by: OPHTHALMOLOGY

## 2022-04-05 PROCEDURE — 99999 PR PBB SHADOW E&M-EST. PATIENT-LVL III: ICD-10-PCS | Mod: PBBFAC,,, | Performed by: OPHTHALMOLOGY

## 2022-04-05 PROCEDURE — 3288F FALL RISK ASSESSMENT DOCD: CPT | Mod: CPTII,S$GLB,, | Performed by: OPHTHALMOLOGY

## 2022-04-05 PROCEDURE — 92133 POSTERIOR SEGMENT OCT OPTIC NERVE(OCULAR COHERENCE TOMOGRAPHY) - OU - BOTH EYES: ICD-10-PCS | Mod: S$GLB,,, | Performed by: OPHTHALMOLOGY

## 2022-04-05 PROCEDURE — 1101F PR PT FALLS ASSESS DOC 0-1 FALLS W/OUT INJ PAST YR: ICD-10-PCS | Mod: CPTII,S$GLB,, | Performed by: OPHTHALMOLOGY

## 2022-04-05 PROCEDURE — 1159F MED LIST DOCD IN RCRD: CPT | Mod: CPTII,S$GLB,, | Performed by: OPHTHALMOLOGY

## 2022-04-05 PROCEDURE — 1101F PT FALLS ASSESS-DOCD LE1/YR: CPT | Mod: CPTII,S$GLB,, | Performed by: OPHTHALMOLOGY

## 2022-04-05 PROCEDURE — 99214 PR OFFICE/OUTPT VISIT, EST, LEVL IV, 30-39 MIN: ICD-10-PCS | Mod: S$GLB,,, | Performed by: OPHTHALMOLOGY

## 2022-04-05 PROCEDURE — 92133 CPTRZD OPH DX IMG PST SGM ON: CPT | Mod: S$GLB,,, | Performed by: OPHTHALMOLOGY

## 2022-04-05 PROCEDURE — 1126F AMNT PAIN NOTED NONE PRSNT: CPT | Mod: CPTII,S$GLB,, | Performed by: OPHTHALMOLOGY

## 2022-04-05 PROCEDURE — 99214 OFFICE O/P EST MOD 30 MIN: CPT | Mod: S$GLB,,, | Performed by: OPHTHALMOLOGY

## 2022-04-05 PROCEDURE — 1160F RVW MEDS BY RX/DR IN RCRD: CPT | Mod: CPTII,S$GLB,, | Performed by: OPHTHALMOLOGY

## 2022-04-05 PROCEDURE — 3288F PR FALLS RISK ASSESSMENT DOCUMENTED: ICD-10-PCS | Mod: CPTII,S$GLB,, | Performed by: OPHTHALMOLOGY

## 2022-04-05 PROCEDURE — 1160F PR REVIEW ALL MEDS BY PRESCRIBER/CLIN PHARMACIST DOCUMENTED: ICD-10-PCS | Mod: CPTII,S$GLB,, | Performed by: OPHTHALMOLOGY

## 2022-04-05 RX ORDER — ZOSTER VACCINE RECOMBINANT, ADJUVANTED 50 MCG/0.5
KIT INTRAMUSCULAR
COMMUNITY
Start: 2021-11-29

## 2022-04-05 NOTE — PROGRESS NOTES
Assessment /Plan     For exam results, see Encounter Report.    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  COVID was on vent 12 days --> visiting mom    + Asthma  CHF    Zoster Left V1 --> resolved  No ophthalmic involvement  Eye tends to be irriatated --> long standing        POAG  Stable Glaucoma & Patient near target IOP range    Steroid responder    CCT  517 / 574    Mid teens  --> achieved      Both Eyes --> good adherence --> CSM  Trusopt BID  Xal q day --> iritis quiet   Alphagan BID  --> itchy // Follicles --> intolerant  No BB --> Asthma    Hx Ryan in Past    Hold SLT OS    PC IOL OU  Quiet  Observe    Fuchs K dystrophy --> edema OU  Observe  Jony 128 2% restart --> TID    Dry Eye Syndrome: discussed use of warm compresses, preserved & non-preserved artificial tears, gel and PM ointment options.  Also discussed options utilizing medications.  EES q HS x 1 week / month --> PRN      NIDDM  No BDR or CSME  control     ERM OD  Not VS  Observe      Plan  RTC 4 months IOP / iritis OS check  RTC sooner prn with good understanding

## 2022-04-05 NOTE — PROGRESS NOTES
Subjective:      Patient ID: Tiana Mead is a 89 y.o. female.    Chief Complaint: PCP (Tomás Andres MD  1/19/22 /), Diabetes Mellitus, Nail Care, and Callouses (Corns )    Tiana is a 89 y.o. female who presents to the clinic for evaluation and treatment of high risk feet. Tiana has a past medical history of Allergy, Anemia, Arthritis, Asthma, Chronic kidney disease, Degenerative disc disease, Diabetes mellitus type II, Diastolic heart failure (05/02/2017), Essential hypertension (7/3/2012), Glaucoma, History of insulin dependent diabetes mellitus, for many years stopped because of chronic kidney November 2020.  (9/9/2021), History of pseudogout (8/23/2012), Hyperlipidemia, Hypertension, Iritis, Myocardial infarction, Pneumonia, Thrombocytopenia (8/24/2021), and Thyroid disease. The patient's chief complaint is long, thick toenails and calluses on both feet. Routine trimming helps. No other pedal concerns today.  This patient has documented high risk feet requiring routine maintenance secondary to diabetes mellitis and those secondary complications of diabetes, as mentioned..    PCP: Tomás Andres MD    Date Last Seen by PCP:   Chief Complaint   Patient presents with    PCP     Tomás Andres MD  1/19/22       Diabetes Mellitus    Nail Care    Callouses     Corns          Current shoe gear:  Dm shoes      Hemoglobin A1C   Date Value Ref Range Status   03/21/2022 8.5 (H) 4.0 - 5.6 % Final     Comment:     ADA Screening Guidelines:  5.7-6.4%  Consistent with prediabetes  >or=6.5%  Consistent with diabetes    High levels of fetal hemoglobin interfere with the HbA1C  assay. Heterozygous hemoglobin variants (HbS, HgC, etc)do  not significantly interfere with this assay.   However, presence of multiple variants may affect accuracy.     12/03/2021 7.7 (H) 4.0 - 5.6 % Final     Comment:     ADA Screening Guidelines:  5.7-6.4%  Consistent with prediabetes  >or=6.5%  Consistent with  diabetes    High levels of fetal hemoglobin interfere with the HbA1C  assay. Heterozygous hemoglobin variants (HbS, HgC, etc)do  not significantly interfere with this assay.   However, presence of multiple variants may affect accuracy.     08/23/2021 7.5 (H) 4.0 - 5.6 % Final     Comment:     ADA Screening Guidelines:  5.7-6.4%  Consistent with prediabetes  >or=6.5%  Consistent with diabetes    High levels of fetal hemoglobin interfere with the HbA1C  assay. Heterozygous hemoglobin variants (HbS, HgC, etc)do  not significantly interfere with this assay.   However, presence of multiple variants may affect accuracy.               Patient Active Problem List   Diagnosis    Hyperlipidemia associated with type 2 diabetes mellitus    Generalized osteoarthritis of multiple sites    Chronic iritis - Left Eye    Osteoarthritis of both knees    Chondrocalcinosis    Peripheral angiopathy    Hyperuricemia    Helicobacter pylori gastritis    Old MI (myocardial infarction), 2013    Permanent atrial fibrillation    Long term current use of anticoagulant therapy, eliquis, stopped because of cryptic GI bleeding    History of chest pain at rest    Glaucoma associated with ocular trauma    Fuchs' corneal dystrophy    H/O Deep vein thrombosis (DVT)    Aortic atherosclerosis    Chronic diastolic congestive heart failure    Type 2 diabetes mellitus with diabetic polyneuropathy, without long-term current use of insulin    Primary hypothyroidism    Moderate persistent asthma without complication    Primary open angle glaucoma (POAG) of both eyes, moderate stage    Pulmonary hypertension    Non-rheumatic tricuspid valve insufficiency    Chest pain at rest    Allergic conjunctivitis of both eyes    Pseudogout, knees    Asthma exacerbation in COPD    Dry eye syndrome of both eyes    Stage 4 chronic kidney disease    Acute on chronic blood loss anemia, Sept 2020 anticoagulation discontinued    Coronary artery  disease involving native coronary artery of native heart without angina pectoris    Myalgia, since April 20221 both arms and both legs    History of insulin dependent diabetes mellitus, for many years stopped because of chronic kidney November 2020.        Current Outpatient Medications on File Prior to Visit   Medication Sig Dispense Refill    acetaminophen (TYLENOL) 500 MG tablet Take 2 tablets (1,000 mg total) by mouth 3 (three) times daily as needed for Pain (muscle pain in arms and legs). 100 tablet 1    amLODIPine (NORVASC) 5 MG tablet TAKE 1 TABLET(5 MG) BY MOUTH EVERY MORNING 90 tablet 3    aspirin 81 MG Chew Take 1 tablet (81 mg total) by mouth every morning. 100 tablet 3    atorvastatin (LIPITOR) 40 MG tablet Take 1 tablet (40 mg total) by mouth every evening. To lower cholesterol and for heart and blood vessels 90 tablet 3    blood sugar diagnostic (ONETOUCH ULTRA TEST) Strp Inject 1 strip into the skin once daily. No longer on insulin because of  each 4    clotrimazole-betamethasone 1-0.05% (LOTRISONE) cream Apply topically 2 (two) times daily as needed. For intertriginous itching in the bend of the leg 45 g 3    diclofenac sodium (VOLTAREN) 1 % Gel Apply 2 g topically 2 (two) times daily. 100 g 0    dorzolamide (TRUSOPT) 2 % ophthalmic solution Place 1 drop into both eyes 2 (two) times daily. 10 mL 4    fluticasone-salmeterol diskus inhaler 250-50 mcg Inhale 1 puff into the lungs 2 (two) times daily. Controller Patient wants name brand ADVAIR 60 each 11    furosemide (LASIX) 40 MG tablet For fluid management take one tablet first thing in the morning and one tablet at 4:00PM (Patient taking differently: Take 40 mg by mouth 2 (two) times a day. For fluid management take one tablet first thing in the morning and one tablet at 4:00PM) 180 tablet 1    isosorbide mononitrate (IMDUR) 60 MG 24 hr tablet TAKE ONE TABLET BY MOUTH EVERY MORNING FOR HEART, TO PREVENT CHEST PAIN AND REDUCE  SHORTNESS OF BREATH 90 tablet 3    lancets (TRUEPLUS LANCETS) 33 gauge Misc Apply 1 lancet topically once daily. No longer on insulin because of  each 3    latanoprost 0.005 % ophthalmic solution INSTILL 1 DROP IN BOTH EYES EVERY EVENING 10 mL 12    levothyroxine (SYNTHROID) 75 MCG tablet TAKE 1 TABLET(75 MCG) BY MOUTH BEFORE BREAKFAST 90 tablet 3    meclizine (ANTIVERT) 12.5 mg tablet TAKE 1 TABLET(12.5 MG) BY MOUTH THREE TIMES DAILY AS NEEDED FOR DIZZINESS 20 tablet 2    methocarbamoL (ROBAXIN) 500 MG Tab Take 1 tablet (500 mg total) by mouth 3 (three) times daily as needed (muscle pain/spasms). 44 tablet 0    metoprolol tartrate (LOPRESSOR) 25 MG tablet TAKE 1 TABLET(25 MG) BY MOUTH TWICE DAILY 180 tablet 3    multivit-mineral-iron-lutein Tab Take 1 tablet by mouth once daily.      nitroGLYCERIN (NITROSTAT) 0.4 MG SL tablet ONE TABLET UNDER THE TONGUE EVERY 5 MINUTES AS NEEDED FOR CHEST PAIN 100 tablet 3    nystatin (MYCOSTATIN) powder Apply topically 4 (four) times daily. 60 g 3    polyethylene glycol (GLYCOLAX) 17 gram/dose powder Take 17 g by mouth daily as needed (CONSTIPATION).      psyllium (METAMUCIL) powder Take 1 packet by mouth daily as needed (CONSTIPATION).      triamcinolone acetonide 0.025% (KENALOG) 0.025 % Oint Apply topically 2 (two) times daily. 80 g 2     No current facility-administered medications on file prior to visit.       Review of patient's allergies indicates:   Allergen Reactions    Codeine      Other reaction(s): Itching  Other reaction(s): Nausea       Past Surgical History:   Procedure Laterality Date    CARDIAC CATHETERIZATION  2010    no obstructive disease    CATARACT EXTRACTION W/  INTRAOCULAR LENS IMPLANT Left n/a    CATARACT EXTRACTION W/  INTRAOCULAR LENS IMPLANT Right 10/8/2018    With Femtosecond LASER assist (Dr. Henson)    CHOLECYSTECTOMY      ESOPHAGOGASTRODUODENOSCOPY N/A 11/4/2020    Procedure: EGD (ESOPHAGOGASTRODUODENOSCOPY);  Surgeon: Tomás COYNE  MD Cal;  Location: [facility] ([address] [place] [address]);  Service: Endoscopy;  Laterality: N/A;    EYE SURGERY      gall stone      HYSTERECTOMY      VARICOSE VEIN SURGERY         Family History   Problem Relation Age of Onset    Diabetes Mother     Hypertension Mother     Glaucoma Mother     Hypertension Father     Diabetes Son     No Known Problems Daughter     Diabetes Daughter         borderline    No Known Problems Son     Melanoma Neg Hx        Social History     Socioeconomic History    Marital status:    Tobacco Use    Smoking status: Never Smoker    Smokeless tobacco: Never Used   Substance and Sexual Activity    Alcohol use: No    Drug use: No    Sexual activity: Never     Social Determinants of Health     Financial Resource Strain: Medium Risk    Difficulty of Paying Living Expenses: Somewhat hard   Food Insecurity: No Food Insecurity    Worried About Running Out of Food in the Last Year: Never true    Ran Out of Food in the Last Year: Never true   Transportation Needs: No Transportation Needs    Lack of Transportation (Medical): No    Lack of Transportation (Non-Medical): No   Physical Activity: Inactive    Days of Exercise per Week: 0 days    Minutes of Exercise per Session: 0 min   Stress: No Stress Concern Present    Feeling of Stress : Not at all   Social Connections: Moderately Integrated    Frequency of Communication with Friends and Family: More than three times a week    Frequency of Social Gatherings with Friends and Family: Once a week    Attends Latter day Services: More than 4 times per year    Active Member of Clubs or Organizations: Yes    Attends Club or Organization Meetings: More than 4 times per year    Marital Status:    Housing Stability: Low Risk     Unable to Pay for Housing in the Last Year: No    Number of Places Lived in the Last Year: 1    Unstable Housing in the Last Year: No           Review of Systems   Constitutional: Negative for chills,  "decreased appetite and fever.   Cardiovascular: Negative for chest pain, claudication and leg swelling.   Respiratory: Negative for cough and shortness of breath.    Skin: Positive for color change, dry skin and nail changes. Negative for flushing, itching, poor wound healing and rash.   Musculoskeletal: Negative for back pain, falls, gout, joint pain, joint swelling and myalgias.   Gastrointestinal: Negative for nausea and vomiting.   Neurological: Positive for numbness and paresthesias. Negative for loss of balance.           Objective:       Vitals:    02/25/22 0821   BP: 138/78   Pulse: 67   Resp: 18   Weight: 72.1 kg (159 lb)   Height: 5' 8" (1.727 m)   PainSc: 0-No pain        Physical Exam  Vitals and nursing note reviewed.   Constitutional:       Appearance: She is well-developed.   Cardiovascular:      Comments: Dorsalis pedis and posterior tibial pulses are diminished Bilaterally. Toes are cool to touch. Feet are warm proximally.There is decreased digital hair. Skin is atrophic, slightly hyperpigmented, and mildly edematous      Musculoskeletal:         General: No tenderness, deformity or signs of injury. Normal range of motion.      Comments: Adequate joint range of motion without pain, limitation, nor crepitation Bilateral feet and ankle joints. Muscle strength is 5/5 in all groups bilaterally.      Flexible pes planus foot type w/ medial arch collapse and mild gastroc equinus      Skin:     General: Skin is warm and dry.      Coloration: Skin is not ashen or cyanotic.      Findings: No ecchymosis, erythema or lesion.      Comments: Nails x10 are elongated by  5-7 mm's, thickened by 2-5 mm's, dystrophic, and are darkened in  coloration . Xerosis Bilaterally. No open lesions noted.    Hyperkeratotic tissue noted to lateral 5th toe b/l, distal 3rd toe b/l, medial L 5th toe      Neurological:      Mental Status: She is alert and oriented to person, place, and time.      Comments: Oelrichs-Yolande 5.07 " monofilamant testing is diminished Ney feet. Sharp/dull sensation diminished Bilaterally. Light touch absent Bilaterally.       Psychiatric:         Behavior: Behavior normal.               Assessment:       Encounter Diagnoses   Name Primary?    Diabetic polyneuropathy associated with type 2 diabetes mellitus Yes    Corn or callus     Onychomycosis due to dermatophyte          Plan:       Tiana was seen today for pcp, diabetes mellitus, nail care and callouses.    Diagnoses and all orders for this visit:    Diabetic polyneuropathy associated with type 2 diabetes mellitus    Corn or callus    Onychomycosis due to dermatophyte      I counseled the patient on her conditions, their implications and medical management.    Shoe inspection. Diabetic Foot Education. Patient reminded of the importance of good nutrition and blood sugar control to help prevent podiatric complications of diabetes. Patient instructed on proper foot hygeine. We discussed wearing proper shoe gear, daily foot inspections, never walking without protective shoe gear, never putting sharp instruments to feet    - With patient's permission, nails were aggressively reduced and debrided x 10 to their soft tissue attachment mechanically and with electric , removing all offending nail and debris. Patient relates relief following the procedure. She will continue to monitor the areas daily, inspect her feet, wear protective shoe gear when ambulatory, moisturizer to maintain skin integrity and follow in this office in approximately 2-3 months, sooner p.r.n.    - After cleansing the  area w/ alcohol prep pad the above mentioned hyperkeratosis was trimmed utilizing No 15 scapel, to a smooth base with out incident. Patient tolerated this  well and reported comfort to the area of x6    - Return to clinic in 3m or sooner if problems arise

## 2022-04-18 ENCOUNTER — TELEPHONE (OUTPATIENT)
Dept: OPHTHALMOLOGY | Facility: CLINIC | Age: 87
End: 2022-04-18
Payer: MEDICARE

## 2022-04-18 NOTE — TELEPHONE ENCOUNTER
Explained that she needs to continue her Jony 128 and use preservative free artificial tears.   Explained that nature of dry eyes and how to treat them.   Pt verbalized understanding.

## 2022-04-18 NOTE — TELEPHONE ENCOUNTER
----- Message from Consuelo Zamora sent at 4/18/2022  9:14 AM CDT -----  Contact: Tiana @ [phone]  Pt needs a call back regarding her eye lids are swollen with some pain and constantly draining. She states it may be due to the drops she is on (get).

## 2022-05-16 ENCOUNTER — LAB VISIT (OUTPATIENT)
Dept: LAB | Facility: HOSPITAL | Age: 87
End: 2022-05-16
Attending: INTERNAL MEDICINE
Payer: MEDICARE

## 2022-05-16 DIAGNOSIS — N18.4 STAGE 4 CHRONIC KIDNEY DISEASE: ICD-10-CM

## 2022-05-16 DIAGNOSIS — D50.0 IRON DEFICIENCY ANEMIA DUE TO CHRONIC BLOOD LOSS: ICD-10-CM

## 2022-05-16 DIAGNOSIS — E03.9 PRIMARY HYPOTHYROIDISM: ICD-10-CM

## 2022-05-16 DIAGNOSIS — E11.42 TYPE 2 DIABETES MELLITUS WITH DIABETIC POLYNEUROPATHY, WITHOUT LONG-TERM CURRENT USE OF INSULIN: ICD-10-CM

## 2022-05-16 DIAGNOSIS — R19.5 OCCULT GI BLEEDING: ICD-10-CM

## 2022-05-16 LAB
ALBUMIN SERPL BCP-MCNC: 3.8 G/DL (ref 3.5–5.2)
ALP SERPL-CCNC: 107 U/L (ref 55–135)
ALT SERPL W/O P-5'-P-CCNC: 20 U/L (ref 10–44)
ANION GAP SERPL CALC-SCNC: 10 MMOL/L (ref 8–16)
AST SERPL-CCNC: 23 U/L (ref 10–40)
BASOPHILS # BLD AUTO: 0.06 K/UL (ref 0–0.2)
BASOPHILS NFR BLD: 1.1 % (ref 0–1.9)
BILIRUB SERPL-MCNC: 0.6 MG/DL (ref 0.1–1)
BUN SERPL-MCNC: 29 MG/DL (ref 10–30)
CALCIUM SERPL-MCNC: 9.8 MG/DL (ref 8.7–10.5)
CHLORIDE SERPL-SCNC: 106 MMOL/L (ref 95–110)
CHOLEST SERPL-MCNC: 152 MG/DL (ref 120–199)
CHOLEST/HDLC SERPL: 2.7 {RATIO} (ref 2–5)
CO2 SERPL-SCNC: 25 MMOL/L (ref 23–29)
CREAT SERPL-MCNC: 1.5 MG/DL (ref 0.5–1.4)
DIFFERENTIAL METHOD: NORMAL
EOSINOPHIL # BLD AUTO: 0.2 K/UL (ref 0–0.5)
EOSINOPHIL NFR BLD: 3.6 % (ref 0–8)
ERYTHROCYTE [DISTWIDTH] IN BLOOD BY AUTOMATED COUNT: 13.9 % (ref 11.5–14.5)
EST. GFR  (AFRICAN AMERICAN): 33.4 ML/MIN/1.73 M^2
EST. GFR  (NON AFRICAN AMERICAN): 29 ML/MIN/1.73 M^2
ESTIMATED AVG GLUCOSE: 183 MG/DL (ref 68–131)
FERRITIN SERPL-MCNC: 73 NG/ML (ref 20–300)
GLUCOSE SERPL-MCNC: 114 MG/DL (ref 70–110)
HBA1C MFR BLD: 8 % (ref 4–5.6)
HCT VFR BLD AUTO: 38.9 % (ref 37–48.5)
HDLC SERPL-MCNC: 56 MG/DL (ref 40–75)
HDLC SERPL: 36.8 % (ref 20–50)
HGB BLD-MCNC: 12.5 G/DL (ref 12–16)
IMM GRANULOCYTES # BLD AUTO: 0.02 K/UL (ref 0–0.04)
IMM GRANULOCYTES NFR BLD AUTO: 0.4 % (ref 0–0.5)
LDLC SERPL CALC-MCNC: 80.6 MG/DL (ref 63–159)
LYMPHOCYTES # BLD AUTO: 1.5 K/UL (ref 1–4.8)
LYMPHOCYTES NFR BLD: 27.2 % (ref 18–48)
MAGNESIUM SERPL-MCNC: 2 MG/DL (ref 1.6–2.6)
MCH RBC QN AUTO: 30.5 PG (ref 27–31)
MCHC RBC AUTO-ENTMCNC: 32.1 G/DL (ref 32–36)
MCV RBC AUTO: 95 FL (ref 82–98)
MONOCYTES # BLD AUTO: 0.8 K/UL (ref 0.3–1)
MONOCYTES NFR BLD: 13.7 % (ref 4–15)
NEUTROPHILS # BLD AUTO: 3 K/UL (ref 1.8–7.7)
NEUTROPHILS NFR BLD: 54 % (ref 38–73)
NONHDLC SERPL-MCNC: 96 MG/DL
NRBC BLD-RTO: 0 /100 WBC
PHOSPHATE SERPL-MCNC: 3.4 MG/DL (ref 2.7–4.5)
PLATELET # BLD AUTO: 162 K/UL (ref 150–450)
PMV BLD AUTO: 11.7 FL (ref 9.2–12.9)
POTASSIUM SERPL-SCNC: 4.1 MMOL/L (ref 3.5–5.1)
PROT SERPL-MCNC: 6.8 G/DL (ref 6–8.4)
PTH-INTACT SERPL-MCNC: 230.4 PG/ML (ref 9–77)
RBC # BLD AUTO: 4.1 M/UL (ref 4–5.4)
SODIUM SERPL-SCNC: 141 MMOL/L (ref 136–145)
T4 FREE SERPL-MCNC: 1.08 NG/DL (ref 0.71–1.51)
T4 SERPL-MCNC: 9.2 UG/DL (ref 4.5–11.5)
TRIGL SERPL-MCNC: 77 MG/DL (ref 30–150)
TSH SERPL DL<=0.005 MIU/L-ACNC: 5.15 UIU/ML (ref 0.4–4)
WBC # BLD AUTO: 5.56 K/UL (ref 3.9–12.7)

## 2022-05-16 PROCEDURE — 83735 ASSAY OF MAGNESIUM: CPT | Performed by: INTERNAL MEDICINE

## 2022-05-16 PROCEDURE — 80053 COMPREHEN METABOLIC PANEL: CPT | Performed by: INTERNAL MEDICINE

## 2022-05-16 PROCEDURE — 84439 ASSAY OF FREE THYROXINE: CPT | Performed by: INTERNAL MEDICINE

## 2022-05-16 PROCEDURE — 83036 HEMOGLOBIN GLYCOSYLATED A1C: CPT | Performed by: INTERNAL MEDICINE

## 2022-05-16 PROCEDURE — 80061 LIPID PANEL: CPT | Performed by: INTERNAL MEDICINE

## 2022-05-16 PROCEDURE — 84436 ASSAY OF TOTAL THYROXINE: CPT | Performed by: INTERNAL MEDICINE

## 2022-05-16 PROCEDURE — 82728 ASSAY OF FERRITIN: CPT | Performed by: INTERNAL MEDICINE

## 2022-05-16 PROCEDURE — 84443 ASSAY THYROID STIM HORMONE: CPT | Performed by: INTERNAL MEDICINE

## 2022-05-16 PROCEDURE — 36415 COLL VENOUS BLD VENIPUNCTURE: CPT | Mod: PN | Performed by: INTERNAL MEDICINE

## 2022-05-16 PROCEDURE — 84100 ASSAY OF PHOSPHORUS: CPT | Performed by: INTERNAL MEDICINE

## 2022-05-16 PROCEDURE — 83970 ASSAY OF PARATHORMONE: CPT | Performed by: INTERNAL MEDICINE

## 2022-05-16 PROCEDURE — 85025 COMPLETE CBC W/AUTO DIFF WBC: CPT | Performed by: INTERNAL MEDICINE

## 2022-05-17 ENCOUNTER — TELEPHONE (OUTPATIENT)
Dept: INTERNAL MEDICINE | Facility: CLINIC | Age: 87
End: 2022-05-17
Payer: MEDICARE

## 2022-05-17 NOTE — TELEPHONE ENCOUNTER
Spoke with pt informed that the Doctor will be out of the office on 05/20/2022 and tried rescheduling. The pt asked that I contact her daughter Yolanda because she bring her to appts, tried contacting daughter no success lvm.

## 2022-05-17 NOTE — TELEPHONE ENCOUNTER
----- Message from Jairo Mott sent at 5/17/2022  2:37 PM CDT -----  Contact: [phone]  Pt missed a call from the office and wants a call back.

## 2022-05-19 ENCOUNTER — OFFICE VISIT (OUTPATIENT)
Dept: INTERNAL MEDICINE | Facility: CLINIC | Age: 87
End: 2022-05-19
Payer: MEDICARE

## 2022-05-19 VITALS
HEART RATE: 62 BPM | WEIGHT: 154.75 LBS | SYSTOLIC BLOOD PRESSURE: 124 MMHG | BODY MASS INDEX: 23.45 KG/M2 | OXYGEN SATURATION: 98 % | HEIGHT: 68 IN | DIASTOLIC BLOOD PRESSURE: 62 MMHG

## 2022-05-19 DIAGNOSIS — E03.9 PRIMARY HYPOTHYROIDISM: ICD-10-CM

## 2022-05-19 DIAGNOSIS — N18.4 STAGE 4 CHRONIC KIDNEY DISEASE: ICD-10-CM

## 2022-05-19 DIAGNOSIS — E11.9 INSULIN-REQUIRING OR DEPENDENT TYPE II DIABETES MELLITUS: Primary | ICD-10-CM

## 2022-05-19 DIAGNOSIS — I48.21 PERMANENT ATRIAL FIBRILLATION: ICD-10-CM

## 2022-05-19 DIAGNOSIS — Z79.4 INSULIN-REQUIRING OR DEPENDENT TYPE II DIABETES MELLITUS: Primary | ICD-10-CM

## 2022-05-19 PROCEDURE — 1125F PR PAIN SEVERITY QUANTIFIED, PAIN PRESENT: ICD-10-PCS | Mod: CPTII,S$GLB,, | Performed by: INTERNAL MEDICINE

## 2022-05-19 PROCEDURE — 99499 RISK ADDL DX/OHS AUDIT: ICD-10-PCS | Mod: S$GLB,,, | Performed by: INTERNAL MEDICINE

## 2022-05-19 PROCEDURE — 3288F PR FALLS RISK ASSESSMENT DOCUMENTED: ICD-10-PCS | Mod: CPTII,S$GLB,, | Performed by: INTERNAL MEDICINE

## 2022-05-19 PROCEDURE — 1125F AMNT PAIN NOTED PAIN PRSNT: CPT | Mod: CPTII,S$GLB,, | Performed by: INTERNAL MEDICINE

## 2022-05-19 PROCEDURE — 99999 PR PBB SHADOW E&M-EST. PATIENT-LVL III: CPT | Mod: PBBFAC,,, | Performed by: INTERNAL MEDICINE

## 2022-05-19 PROCEDURE — 99499 UNLISTED E&M SERVICE: CPT | Mod: S$GLB,,, | Performed by: INTERNAL MEDICINE

## 2022-05-19 PROCEDURE — 3052F PR MOST RECENT HEMOGLOBIN A1C LEVEL 8.0 - < 9.0%: ICD-10-PCS | Mod: CPTII,S$GLB,, | Performed by: INTERNAL MEDICINE

## 2022-05-19 PROCEDURE — 99214 PR OFFICE/OUTPT VISIT, EST, LEVL IV, 30-39 MIN: ICD-10-PCS | Mod: S$GLB,,, | Performed by: INTERNAL MEDICINE

## 2022-05-19 PROCEDURE — 1101F PR PT FALLS ASSESS DOC 0-1 FALLS W/OUT INJ PAST YR: ICD-10-PCS | Mod: CPTII,S$GLB,, | Performed by: INTERNAL MEDICINE

## 2022-05-19 PROCEDURE — 3052F HG A1C>EQUAL 8.0%<EQUAL 9.0%: CPT | Mod: CPTII,S$GLB,, | Performed by: INTERNAL MEDICINE

## 2022-05-19 PROCEDURE — 1101F PT FALLS ASSESS-DOCD LE1/YR: CPT | Mod: CPTII,S$GLB,, | Performed by: INTERNAL MEDICINE

## 2022-05-19 PROCEDURE — 1160F RVW MEDS BY RX/DR IN RCRD: CPT | Mod: CPTII,S$GLB,, | Performed by: INTERNAL MEDICINE

## 2022-05-19 PROCEDURE — 1160F PR REVIEW ALL MEDS BY PRESCRIBER/CLIN PHARMACIST DOCUMENTED: ICD-10-PCS | Mod: CPTII,S$GLB,, | Performed by: INTERNAL MEDICINE

## 2022-05-19 PROCEDURE — 99214 OFFICE O/P EST MOD 30 MIN: CPT | Mod: S$GLB,,, | Performed by: INTERNAL MEDICINE

## 2022-05-19 PROCEDURE — 1159F MED LIST DOCD IN RCRD: CPT | Mod: CPTII,S$GLB,, | Performed by: INTERNAL MEDICINE

## 2022-05-19 PROCEDURE — 3288F FALL RISK ASSESSMENT DOCD: CPT | Mod: CPTII,S$GLB,, | Performed by: INTERNAL MEDICINE

## 2022-05-19 PROCEDURE — 99999 PR PBB SHADOW E&M-EST. PATIENT-LVL III: ICD-10-PCS | Mod: PBBFAC,,, | Performed by: INTERNAL MEDICINE

## 2022-05-19 PROCEDURE — 1159F PR MEDICATION LIST DOCUMENTED IN MEDICAL RECORD: ICD-10-PCS | Mod: CPTII,S$GLB,, | Performed by: INTERNAL MEDICINE

## 2022-05-19 RX ORDER — LEVOTHYROXINE SODIUM 88 UG/1
88 TABLET ORAL
Qty: 90 TABLET | Refills: 1 | Status: SHIPPED | OUTPATIENT
Start: 2022-05-19 | End: 2022-11-01

## 2022-05-19 NOTE — PROGRESS NOTES
CC:  Follow-up    HPI:  The patient is a 89-year-old female with asthma, COPD, insulin-requiring diabetes, hypertension, hypothyroidism, coronary artery disease status post MI, CKD stage 4 and atrial fibrillation presents today for routine follow-up.  The patient did have blood test done prior to today's visit.  Her A1c was 8.0.  She is on a NovoLog sliding scale.  She checks her blood sugars once day in the morning.  According to her sliding scale, she does not take any insulin unless over 180. Patient is on thyroid replacement.  Her TSH was 5.2.  She is on 75 mcg of Synthroid.  Patient is on  atorvastatin 40 mg. Her LDL is 80.6.    ROS:  Patient reports no fever chills.  No chest pain.  No shortness of breath.  No nausea vomiting.  No abdominal pain.  Patient reports she is only taking 3 doses of insulin since we last saw her    Physical exam:  General appearance:  No acute distress  HEENT:  Conjunctiva is clear.  She has bilateral lens replacements.  TMs are clear.  Nasal septum is midline without discharge.  Oropharynx without erythema.  Trachea is midline without JVD or thyromegaly.  Pulmonary:  Good inspiratory, expiratory breath sounds are heard.  Lungs auscultation.  Cardiovascular:  S1-S2, rhythm appear to be irregular.  Extremities without edema.  GI:  Abdomen is nontender, nondistended without hepatosplenomegaly  Comments:  Did discuss the patient and her daughter that are likely her see our endocrinologist.  Her A1c needs to be close to 7.0.  About also like to increase her levothyroxine to 88 mcg.    Assessment:  1. Insulin requiring diabetes.  Patient not taking insulin on her regular basis.  2. CKD stage 3 close to 4  3. AFib  4.  Hypothyroidism    5. COPD  6. Hyperlipidemia    Plan:  1. Will refer the patient to his packs  2. Will increase levothyroxine to a mcg  3. Have for muscle pain is not improve, will consider stopping her statin therapy for.

## 2022-05-26 NOTE — PROGRESS NOTES
Established Patient - Audio Only Telehealth Visit     The patient location is: home   The chief complaint leading to consultation is: type 2 dm  Visit type: Virtual visit with audio only (telephone)  Total time spent with patient: 11 mins       The reason for the audio only service rather than synchronous audio and video virtual visit was related to technical difficulties or patient preference/necessity.     Each patient to whom I provide medical services by telemedicine is:  (1) informed of the relationship between the physician and patient and the respective role of any other health care provider with respect to management of the patient; and (2) notified that they may decline to receive medical services by telemedicine and may withdraw from such care at any time. Patient verbally consented to receive this service via voice-only telephone call.       HPI:   Type 2  DM  novolog correction scale prn   180-230+2, etc.  Not really using   1 x 240 mg/dl reading  Mostly in am < 125 mg/dl   Lab Results   Component Value Date    HGBA1C 8.0 (H) 05/16/2022     Off prednisone  Checks sugar levels 1-2 x a day -postprandial or fasting  Patient is willing and able to use the device  Demonstrated an understanding of the technology and is motivated to use CGM  Patient expected to adhere to a comprehensive diabetes treatment plan and patient has adequate medical supervision  Patient experiences multiple impaired awareness of hypoglycemia (hypoglycemia unawareness)    Assessment and plan:    1. Type 2 diabetes mellitus with diabetic polyneuropathy, without long-term current use of insulin  Hemoglobin A1C    Microalbumin/Creatinine Ratio, Urine   2. Stage 4 chronic kidney disease     3. Primary hypothyroidism     4. Old MI (myocardial infarction), 2013     5. Hyperlipidemia associated with type 2 diabetes mellitus     6. Coronary artery disease involving native coronary artery of native heart without angina pectoris     7. Chronic  diastolic congestive heart failure     8. History of insulin dependent diabetes mellitus, for many years stopped because of chronic kidney November 2020.          1. Follow up via telephone encounter per reminder  Check labs; a1c, urine mac in august   Continue regimen  Check throughout day -alt postprandial 2 hours after or pre dinner or lunch  Not just fasting  Reviewed juice glass option for juice selections, less than 20 gm of carbs   Fruits- watch portions   And other food items  Off steroids- which helps level BG   2. Avoid hypoglycemia  3.   Lab Results   Component Value Date    TSH 5.148 (H) 05/16/2022     Managed per pcp  4. Avoid hypoglycemia  F/u with cards  5.   Lab Results   Component Value Date    LDLCALC 80.6 05/16/2022     At goal   6. See above  7. F/u with cards  8. See above                    This service was not originating from a related E/M service provided within the previous 7 days nor will  to an E/M service or procedure within the next 24 hours or my soonest available appointment.  Prevailing standard of care was able to be met in this audio-only visit.

## 2022-05-27 ENCOUNTER — OFFICE VISIT (OUTPATIENT)
Dept: INTERNAL MEDICINE | Facility: CLINIC | Age: 87
End: 2022-05-27
Payer: MEDICARE

## 2022-05-27 DIAGNOSIS — E11.69 HYPERLIPIDEMIA ASSOCIATED WITH TYPE 2 DIABETES MELLITUS: ICD-10-CM

## 2022-05-27 DIAGNOSIS — I25.2 OLD MI (MYOCARDIAL INFARCTION): ICD-10-CM

## 2022-05-27 DIAGNOSIS — E03.9 PRIMARY HYPOTHYROIDISM: Chronic | ICD-10-CM

## 2022-05-27 DIAGNOSIS — N18.4 STAGE 4 CHRONIC KIDNEY DISEASE: ICD-10-CM

## 2022-05-27 DIAGNOSIS — Z86.39 HISTORY OF INSULIN DEPENDENT DIABETES MELLITUS: ICD-10-CM

## 2022-05-27 DIAGNOSIS — E11.42 TYPE 2 DIABETES MELLITUS WITH DIABETIC POLYNEUROPATHY, WITHOUT LONG-TERM CURRENT USE OF INSULIN: Primary | ICD-10-CM

## 2022-05-27 DIAGNOSIS — I50.32 CHRONIC DIASTOLIC CONGESTIVE HEART FAILURE: Chronic | ICD-10-CM

## 2022-05-27 DIAGNOSIS — E78.5 HYPERLIPIDEMIA ASSOCIATED WITH TYPE 2 DIABETES MELLITUS: ICD-10-CM

## 2022-05-27 DIAGNOSIS — I25.10 CORONARY ARTERY DISEASE INVOLVING NATIVE CORONARY ARTERY OF NATIVE HEART WITHOUT ANGINA PECTORIS: ICD-10-CM

## 2022-05-27 PROCEDURE — 99442 PR PHYSICIAN TELEPHONE EVALUATION 11-20 MIN: ICD-10-PCS | Mod: 95,,, | Performed by: NURSE PRACTITIONER

## 2022-05-27 PROCEDURE — 99442 PR PHYSICIAN TELEPHONE EVALUATION 11-20 MIN: CPT | Mod: 95,,, | Performed by: NURSE PRACTITIONER

## 2022-05-27 NOTE — PATIENT INSTRUCTIONS
Novolog correction scale   180-230 2 units, 231-280 4 units etc.  Check sugar before meals or 2 hours after meal  At least 1x a day- alternate timing     Lab Results   Component Value Date    HGBA1C 8.0 (H) 05/16/2022       Goal less than 8%    [email or web address]  Eat fit federico  [employer] feder[employer]  [email or web address]

## 2022-05-31 ENCOUNTER — TELEPHONE (OUTPATIENT)
Dept: INTERNAL MEDICINE | Facility: CLINIC | Age: 87
End: 2022-05-31
Payer: MEDICARE

## 2022-05-31 NOTE — TELEPHONE ENCOUNTER
----- Message from JENNIFFER Cee, KARLOSP sent at 5/27/2022  5:00 PM CDT -----  Regarding: labs only  A1c urine mac in august  No f/u -f/u based on labs  Thanks  Mayra

## 2022-06-17 ENCOUNTER — TELEPHONE (OUTPATIENT)
Dept: INTERNAL MEDICINE | Facility: CLINIC | Age: 87
End: 2022-06-17
Payer: MEDICARE

## 2022-06-17 NOTE — TELEPHONE ENCOUNTER
----- Message from JENNIFFER Cee, KARLOSP sent at 6/17/2022  3:28 PM CDT -----  Regarding: log check  Check to see if pt is doing okay with BG levels  Goal to stay under 220 throughout the day  Goal   Thanks  Mayra  Keep me posted

## 2022-07-12 ENCOUNTER — TELEPHONE (OUTPATIENT)
Dept: PODIATRY | Facility: CLINIC | Age: 87
End: 2022-07-12
Payer: MEDICARE

## 2022-07-12 NOTE — TELEPHONE ENCOUNTER
Patient was called regarding the need to reschedule   their 7/21/22 appointment with  .    Patient was contacted via telephone.    Patient's daughter did answer.     Appointment was rescheduled

## 2022-07-26 ENCOUNTER — OFFICE VISIT (OUTPATIENT)
Dept: PODIATRY | Facility: CLINIC | Age: 87
End: 2022-07-26
Payer: MEDICARE

## 2022-07-26 VITALS
BODY MASS INDEX: 23.82 KG/M2 | WEIGHT: 157.19 LBS | SYSTOLIC BLOOD PRESSURE: 140 MMHG | DIASTOLIC BLOOD PRESSURE: 71 MMHG | HEIGHT: 68 IN | HEART RATE: 69 BPM

## 2022-07-26 DIAGNOSIS — B35.1 ONYCHOMYCOSIS DUE TO DERMATOPHYTE: ICD-10-CM

## 2022-07-26 DIAGNOSIS — E11.42 DIABETIC POLYNEUROPATHY ASSOCIATED WITH TYPE 2 DIABETES MELLITUS: Primary | ICD-10-CM

## 2022-07-26 DIAGNOSIS — I73.9 PERIPHERAL VASCULAR DISEASE: ICD-10-CM

## 2022-07-26 PROCEDURE — 11056 PR TRIM BENIGN HYPERKERATOTIC SKIN LESION,2-4: ICD-10-PCS | Mod: Q9,S$GLB,, | Performed by: PODIATRIST

## 2022-07-26 PROCEDURE — 1126F PR PAIN SEVERITY QUANTIFIED, NO PAIN PRESENT: ICD-10-PCS | Mod: CPTII,S$GLB,, | Performed by: PODIATRIST

## 2022-07-26 PROCEDURE — 99999 PR PBB SHADOW E&M-EST. PATIENT-LVL II: ICD-10-PCS | Mod: PBBFAC,,, | Performed by: PODIATRIST

## 2022-07-26 PROCEDURE — 3288F PR FALLS RISK ASSESSMENT DOCUMENTED: ICD-10-PCS | Mod: CPTII,S$GLB,, | Performed by: PODIATRIST

## 2022-07-26 PROCEDURE — 1101F PR PT FALLS ASSESS DOC 0-1 FALLS W/OUT INJ PAST YR: ICD-10-PCS | Mod: CPTII,S$GLB,, | Performed by: PODIATRIST

## 2022-07-26 PROCEDURE — 1126F AMNT PAIN NOTED NONE PRSNT: CPT | Mod: CPTII,S$GLB,, | Performed by: PODIATRIST

## 2022-07-26 PROCEDURE — 3288F FALL RISK ASSESSMENT DOCD: CPT | Mod: CPTII,S$GLB,, | Performed by: PODIATRIST

## 2022-07-26 PROCEDURE — 99499 UNLISTED E&M SERVICE: CPT | Mod: S$GLB,,, | Performed by: PODIATRIST

## 2022-07-26 PROCEDURE — 99499 NO LOS: ICD-10-PCS | Mod: S$GLB,,, | Performed by: PODIATRIST

## 2022-07-26 PROCEDURE — 99999 PR PBB SHADOW E&M-EST. PATIENT-LVL II: CPT | Mod: PBBFAC,,, | Performed by: PODIATRIST

## 2022-07-26 PROCEDURE — 11721 DEBRIDE NAIL 6 OR MORE: CPT | Mod: Q9,59,S$GLB, | Performed by: PODIATRIST

## 2022-07-26 PROCEDURE — 1101F PT FALLS ASSESS-DOCD LE1/YR: CPT | Mod: CPTII,S$GLB,, | Performed by: PODIATRIST

## 2022-07-26 PROCEDURE — 11721 PR DEBRIDEMENT OF NAILS, 6 OR MORE: ICD-10-PCS | Mod: Q9,59,S$GLB, | Performed by: PODIATRIST

## 2022-07-26 PROCEDURE — 11056 PARNG/CUTG B9 HYPRKR LES 2-4: CPT | Mod: Q9,S$GLB,, | Performed by: PODIATRIST

## 2022-07-26 NOTE — PROGRESS NOTES
Subjective:      Patient ID: Tiana Mead is a 89 y.o. female.    Chief Complaint: Diabetic Foot Exam    Tiana is a 89 y.o. female who presents to the clinic for evaluation and treatment of high risk feet. Tiana has a past medical history of Allergy, Anemia, Arthritis, Asthma, Chronic kidney disease, Degenerative disc disease, Diabetes mellitus type II, Diastolic heart failure (05/02/2017), Essential hypertension (7/3/2012), Glaucoma, History of insulin dependent diabetes mellitus, for many years stopped because of chronic kidney November 2020.  (9/9/2021), History of pseudogout (8/23/2012), Hyperlipidemia, Hypertension, Iritis, Myocardial infarction, Pneumonia, Thrombocytopenia (8/24/2021), and Thyroid disease. The patient's chief complaint is long, thick toenails and calluses on both feet. Routine trimming helps. No other pedal concerns today.  This patient has documented high risk feet requiring routine maintenance secondary to diabetes mellitis and those secondary complications of diabetes, as mentioned..    PCP: Tomás Andres MD    Date Last Seen by PCP:   Chief Complaint   Patient presents with    Diabetic Foot Exam         Current shoe gear:  Dm shoes      Hemoglobin A1C   Date Value Ref Range Status   05/16/2022 8.0 (H) 4.0 - 5.6 % Final     Comment:     ADA Screening Guidelines:  5.7-6.4%  Consistent with prediabetes  >or=6.5%  Consistent with diabetes    High levels of fetal hemoglobin interfere with the HbA1C  assay. Heterozygous hemoglobin variants (HbS, HgC, etc)do  not significantly interfere with this assay.   However, presence of multiple variants may affect accuracy.     03/21/2022 8.5 (H) 4.0 - 5.6 % Final     Comment:     ADA Screening Guidelines:  5.7-6.4%  Consistent with prediabetes  >or=6.5%  Consistent with diabetes    High levels of fetal hemoglobin interfere with the HbA1C  assay. Heterozygous hemoglobin variants (HbS, HgC, etc)do  not significantly interfere with  this assay.   However, presence of multiple variants may affect accuracy.     12/03/2021 7.7 (H) 4.0 - 5.6 % Final     Comment:     ADA Screening Guidelines:  5.7-6.4%  Consistent with prediabetes  >or=6.5%  Consistent with diabetes    High levels of fetal hemoglobin interfere with the HbA1C  assay. Heterozygous hemoglobin variants (HbS, HgC, etc)do  not significantly interfere with this assay.   However, presence of multiple variants may affect accuracy.               Patient Active Problem List   Diagnosis    Hyperlipidemia associated with type 2 diabetes mellitus    Generalized osteoarthritis of multiple sites    Chronic iritis - Left Eye    Osteoarthritis of both knees    Chondrocalcinosis    Peripheral angiopathy    Hyperuricemia    Helicobacter pylori gastritis    Old MI (myocardial infarction), 2013    Permanent atrial fibrillation    Long term current use of anticoagulant therapy, eliquis, stopped because of cryptic GI bleeding    History of chest pain at rest    Glaucoma associated with ocular trauma    H/O Deep vein thrombosis (DVT)    Aortic atherosclerosis    Chronic diastolic congestive heart failure    Type 2 diabetes mellitus with diabetic polyneuropathy, without long-term current use of insulin    Primary hypothyroidism    Moderate persistent asthma without complication    Primary open angle glaucoma (POAG) of both eyes, moderate stage    Pulmonary hypertension    Non-rheumatic tricuspid valve insufficiency    Chest pain at rest    Allergic conjunctivitis of both eyes    Pseudogout, knees    Asthma exacerbation in COPD    Dry eye syndrome of both eyes    Stage 4 chronic kidney disease    Acute on chronic blood loss anemia, Sept 2020 anticoagulation discontinued    Coronary artery disease involving native coronary artery of native heart without angina pectoris    Myalgia, since April 20221 both arms and both legs    History of insulin dependent diabetes mellitus, for  many years stopped because of chronic kidney November 2020.        Current Outpatient Medications on File Prior to Visit   Medication Sig Dispense Refill    acetaminophen (TYLENOL) 500 MG tablet Take 2 tablets (1,000 mg total) by mouth 3 (three) times daily as needed for Pain (muscle pain in arms and legs). 100 tablet 1    albuterol (PROVENTIL) 2.5 mg /3 mL (0.083 %) nebulizer solution Take 3 mLs (2.5 mg total) by nebulization every 6 (six) hours as needed for Wheezing. Rescue 25 each 1    albuterol (PROVENTIL/VENTOLIN HFA) 90 mcg/actuation inhaler Inhale 2 puffs into the lungs every 4 (four) hours as needed for Wheezing. 18 g 6    albuterol (PROVENTIL/VENTOLIN HFA) 90 mcg/actuation inhaler Inhale 1-2 puffs into the lungs every 6 (six) hours as needed for Wheezing. Rescue 6.7 g 0    amLODIPine (NORVASC) 5 MG tablet TAKE 1 TABLET(5 MG) BY MOUTH EVERY MORNING 90 tablet 3    aspirin 81 MG Chew Take 1 tablet (81 mg total) by mouth every morning. 100 tablet 3    atorvastatin (LIPITOR) 40 MG tablet Take 1 tablet (40 mg total) by mouth every evening. To lower cholesterol and for heart and blood vessels 90 tablet 3    blood sugar diagnostic (ONETOUCH ULTRA TEST) Strp Inject 1 strip into the skin once daily. No longer on insulin because of  each 4    clotrimazole-betamethasone 1-0.05% (LOTRISONE) cream Apply topically 2 (two) times daily as needed. For intertriginous itching in the bend of the leg 45 g 3    dorzolamide (TRUSOPT) 2 % ophthalmic solution Place 1 drop into both eyes 2 (two) times daily. 10 mL 4    fluticasone-salmeterol diskus inhaler 250-50 mcg Inhale 1 puff into the lungs 2 (two) times daily. Controller Patient wants name brand ADVAIR 60 each 11    furosemide (LASIX) 40 MG tablet TAKE 1 TABLET BY MOUTH EVERY MORNING AND 1 AT 4  tablet 1    insulin aspart U-100 (NOVOLOG FLEXPEN U-100 INSULIN) 100 unit/mL (3 mL) InPn pen Inject if blood sugar is >180, 180-230+2, 231-280+4, 281-330+6,  "331-380+8,>380+10 MAX daily 30 units. 15 mL 2    isosorbide mononitrate (IMDUR) 60 MG 24 hr tablet TAKE ONE TABLET BY MOUTH EVERY MORNING FOR HEART, TO PREVENT CHEST PAIN AND SHORTNESS OF BREATH 90 tablet 3    lancets (TRUEPLUS LANCETS) 33 gauge Misc Apply 1 lancet topically once daily. No longer on insulin because of  each 3    latanoprost 0.005 % ophthalmic solution INSTILL 1 DROP IN BOTH EYES EVERY EVENING 10 mL 12    levothyroxine (SYNTHROID) 88 MCG tablet Take 1 tablet (88 mcg total) by mouth before breakfast. 90 tablet 1    meclizine (ANTIVERT) 12.5 mg tablet TAKE 1 TABLET(12.5 MG) BY MOUTH THREE TIMES DAILY AS NEEDED FOR DIZZINESS 20 tablet 2    metoprolol tartrate (LOPRESSOR) 25 MG tablet TAKE 1 TABLET(25 MG) BY MOUTH TWICE DAILY 180 tablet 3    multivit-mineral-iron-lutein Tab Take 1 tablet by mouth once daily.      nitroGLYCERIN (NITROSTAT) 0.4 MG SL tablet ONE TABLET UNDER THE TONGUE EVERY 5 MINUTES AS NEEDED FOR CHEST PAIN 100 tablet 3    nystatin (MYCOSTATIN) powder Apply topically 4 (four) times daily. 60 g 3    pen needle, diabetic (BD ULTRA-FINE SHORT PEN NEEDLE) 31 gauge x 5/16" Ndle USE WITH INSULIN PENS up to 3 x a day. 100 each 2    polyethylene glycol (GLYCOLAX) 17 gram/dose powder Take 17 g by mouth daily as needed (CONSTIPATION).      psyllium (METAMUCIL) powder Take 1 packet by mouth daily as needed (CONSTIPATION).      triamcinolone acetonide 0.025% (KENALOG) 0.025 % Oint Apply topically 2 (two) times daily. 80 g 2    diclofenac sodium (VOLTAREN) 1 % Gel Apply 2 g topically 2 (two) times daily. 100 g 0    SHINGRIX, PF, 50 mcg/0.5 mL injection        No current facility-administered medications on file prior to visit.       Review of patient's allergies indicates:   Allergen Reactions    Codeine      Other reaction(s): Itching  Other reaction(s): Nausea       Past Surgical History:   Procedure Laterality Date    CARDIAC CATHETERIZATION  2010    no obstructive disease "    CATARACT EXTRACTION W/  INTRAOCULAR LENS IMPLANT Left n/a    CATARACT EXTRACTION W/  INTRAOCULAR LENS IMPLANT Right 10/8/2018    With Femtosecond LASER assist (Dr. Henson)    CHOLECYSTECTOMY      ESOPHAGOGASTRODUODENOSCOPY N/A 11/4/2020    Procedure: EGD (ESOPHAGOGASTRODUODENOSCOPY);  Surgeon: Tomás Mccall MD;  Location: [address];  Service: Endoscopy;  Laterality: N/A;    EYE SURGERY      gall stone      HYSTERECTOMY      VARICOSE VEIN SURGERY         Family History   Problem Relation Age of Onset    Diabetes Mother     Hypertension Mother     Glaucoma Mother     Hypertension Father     Diabetes Son     No Known Problems Daughter     Diabetes Daughter         borderline    No Known Problems Son     Melanoma Neg Hx        Social History     Socioeconomic History    Marital status:    Tobacco Use    Smoking status: Never Smoker    Smokeless tobacco: Never Used   Substance and Sexual Activity    Alcohol use: No    Drug use: No    Sexual activity: Never     Social Determinants of Health     Financial Resource Strain: Medium Risk    Difficulty of Paying Living Expenses: Somewhat hard   Food Insecurity: No Food Insecurity    Worried About Running Out of Food in the Last Year: Never true    Ran Out of Food in the Last Year: Never true   Transportation Needs: No Transportation Needs    Lack of Transportation (Medical): No    Lack of Transportation (Non-Medical): No   Physical Activity: Inactive    Days of Exercise per Week: 0 days    Minutes of Exercise per Session: 0 min   Stress: No Stress Concern Present    Feeling of Stress : Not at all   Social Connections: Moderately Integrated    Frequency of Communication with Friends and Family: More than three times a week    Frequency of Social Gatherings with Friends and Family: Once a week    Attends Mandaeism Services: More than 4 times per year    Active Member of Clubs or Organizations: Yes    Attends Club or  "Organization Meetings: More than 4 times per year    Marital Status:    Housing Stability: Low Risk     Unable to Pay for Housing in the Last Year: No    Number of Places Lived in the Last Year: 1    Unstable Housing in the Last Year: No           Review of Systems   Constitutional: Negative for chills, decreased appetite and fever.   Cardiovascular: Negative for chest pain, claudication and leg swelling.   Respiratory: Negative for cough, hemoptysis and shortness of breath.    Skin: Positive for dry skin and nail changes. Negative for color change, flushing, itching, poor wound healing and rash.   Musculoskeletal: Negative for back pain, falls, gout, joint pain, joint swelling and myalgias.   Gastrointestinal: Negative for nausea and vomiting.   Neurological: Negative for loss of balance, numbness and paresthesias.           Objective:       Vitals:    07/26/22 0843   BP: (!) 140/71   Pulse: 69   Weight: 71.3 kg (157 lb 3 oz)   Height: 5' 8" (1.727 m)   PainSc: 0-No pain        Physical Exam  Vitals and nursing note reviewed.   Constitutional:       Appearance: She is well-developed.   Cardiovascular:      Comments: Dorsalis pedis and posterior tibial pulses are diminished Bilaterally. Toes are cool to touch. Feet are warm proximally.There is decreased digital hair. Skin is atrophic, slightly hyperpigmented, and mildly edematous      Musculoskeletal:         General: No tenderness, deformity or signs of injury. Normal range of motion.      Comments: Adequate joint range of motion without pain, limitation, nor crepitation Bilateral feet and ankle joints. Muscle strength is 5/5 in all groups bilaterally.      Flexible pes planus foot type w/ medial arch collapse and mild gastroc equinus      Skin:     General: Skin is warm and dry.      Coloration: Skin is not ashen, cyanotic or pale.      Findings: No ecchymosis, erythema or lesion.      Comments: Nails x10 are elongated by  2-5 mm's, thickened by 2-5 " mm's, dystrophic, and are darkened in  coloration . Xerosis Bilaterally. No open lesions noted.    Hyperkeratotic tissue noted to lateral 5th toe b/l   Neurological:      Mental Status: She is alert and oriented to person, place, and time.      Comments: [place] 5.07 monofilamant testing is diminished Ney feet. Sharp/dull sensation diminished Bilaterally. Light touch absent Bilaterally.       Psychiatric:         Behavior: Behavior normal.               Assessment:       Encounter Diagnoses   Name Primary?    Diabetic polyneuropathy associated with type 2 diabetes mellitus Yes    Onychomycosis due to dermatophyte     Peripheral vascular disease          Plan:       Tiana was seen today for diabetic foot exam.    Diagnoses and all orders for this visit:    Diabetic polyneuropathy associated with type 2 diabetes mellitus  -     DIABETIC SHOES FOR HOME USE    Onychomycosis due to dermatophyte  -     DIABETIC SHOES FOR HOME USE    Peripheral vascular disease  -     DIABETIC SHOES FOR HOME USE      I counseled the patient on her conditions, their implications and medical management.    Shoe inspection. Diabetic Foot Education. Patient reminded of the importance of good nutrition and blood sugar control to help prevent podiatric complications of diabetes. Patient instructed on proper foot hygeine. We discussed wearing proper shoe gear, daily foot inspections, never walking without protective shoe gear, never putting sharp instruments to feet    - With patient's permission, nails were aggressively reduced and debrided x 10 to their soft tissue attachment mechanically and with electric , removing all offending nail and debris. Patient relates relief following the procedure. She will continue to monitor the areas daily, inspect her feet, wear protective shoe gear when ambulatory, moisturizer to maintain skin integrity and follow in this office in approximately 2-3 months, sooner p.r.n.    - After  cleansing the  area w/ alcohol prep pad the above mentioned hyperkeratosis was trimmed utilizing No 15 scapel, to a smooth base with out incident. Patient tolerated this  well and reported comfort to the area of x2    - Return to clinic in 3m or sooner if problems arise

## 2022-08-16 ENCOUNTER — LAB VISIT (OUTPATIENT)
Dept: LAB | Facility: HOSPITAL | Age: 87
End: 2022-08-16
Payer: MEDICARE

## 2022-08-16 ENCOUNTER — OFFICE VISIT (OUTPATIENT)
Dept: OPHTHALMOLOGY | Facility: CLINIC | Age: 87
End: 2022-08-16
Payer: MEDICARE

## 2022-08-16 DIAGNOSIS — H40.32X2 GLAUCOMA OF LEFT EYE ASSOCIATED WITH OCULAR TRAUMA, MODERATE STAGE: ICD-10-CM

## 2022-08-16 DIAGNOSIS — H04.123 DRY EYE SYNDROME OF BOTH EYES: ICD-10-CM

## 2022-08-16 DIAGNOSIS — E11.42 TYPE 2 DIABETES MELLITUS WITH DIABETIC POLYNEUROPATHY, WITHOUT LONG-TERM CURRENT USE OF INSULIN: ICD-10-CM

## 2022-08-16 DIAGNOSIS — H40.1132 PRIMARY OPEN ANGLE GLAUCOMA (POAG) OF BOTH EYES, MODERATE STAGE: ICD-10-CM

## 2022-08-16 DIAGNOSIS — H20.10 CHRONIC IRITIS: Primary | ICD-10-CM

## 2022-08-16 DIAGNOSIS — H00.025 HORDEOLUM INTERNUM OF LEFT LOWER EYELID: ICD-10-CM

## 2022-08-16 LAB
ALBUMIN/CREAT UR: 10.8 UG/MG (ref 0–30)
CREAT UR-MCNC: 74 MG/DL (ref 15–325)
MICROALBUMIN UR DL<=1MG/L-MCNC: 8 UG/ML

## 2022-08-16 PROCEDURE — 99999 PR PBB SHADOW E&M-EST. PATIENT-LVL II: ICD-10-PCS | Mod: PBBFAC,,, | Performed by: OPHTHALMOLOGY

## 2022-08-16 PROCEDURE — 1160F PR REVIEW ALL MEDS BY PRESCRIBER/CLIN PHARMACIST DOCUMENTED: ICD-10-PCS | Mod: CPTII,S$GLB,, | Performed by: OPHTHALMOLOGY

## 2022-08-16 PROCEDURE — 82570 ASSAY OF URINE CREATININE: CPT | Performed by: NURSE PRACTITIONER

## 2022-08-16 PROCEDURE — 1160F RVW MEDS BY RX/DR IN RCRD: CPT | Mod: CPTII,S$GLB,, | Performed by: OPHTHALMOLOGY

## 2022-08-16 PROCEDURE — 82043 UR ALBUMIN QUANTITATIVE: CPT | Performed by: NURSE PRACTITIONER

## 2022-08-16 PROCEDURE — 2023F DILAT RTA XM W/O RTNOPTHY: CPT | Mod: CPTII,S$GLB,, | Performed by: OPHTHALMOLOGY

## 2022-08-16 PROCEDURE — 1126F PR PAIN SEVERITY QUANTIFIED, NO PAIN PRESENT: ICD-10-PCS | Mod: CPTII,S$GLB,, | Performed by: OPHTHALMOLOGY

## 2022-08-16 PROCEDURE — 1101F PT FALLS ASSESS-DOCD LE1/YR: CPT | Mod: CPTII,S$GLB,, | Performed by: OPHTHALMOLOGY

## 2022-08-16 PROCEDURE — 3288F FALL RISK ASSESSMENT DOCD: CPT | Mod: CPTII,S$GLB,, | Performed by: OPHTHALMOLOGY

## 2022-08-16 PROCEDURE — 1159F PR MEDICATION LIST DOCUMENTED IN MEDICAL RECORD: ICD-10-PCS | Mod: CPTII,S$GLB,, | Performed by: OPHTHALMOLOGY

## 2022-08-16 PROCEDURE — 2023F PR DILATED RETINAL EXAM W/O EVID OF RETINOPATHY: ICD-10-PCS | Mod: CPTII,S$GLB,, | Performed by: OPHTHALMOLOGY

## 2022-08-16 PROCEDURE — 1159F MED LIST DOCD IN RCRD: CPT | Mod: CPTII,S$GLB,, | Performed by: OPHTHALMOLOGY

## 2022-08-16 PROCEDURE — 99214 OFFICE O/P EST MOD 30 MIN: CPT | Mod: S$GLB,,, | Performed by: OPHTHALMOLOGY

## 2022-08-16 PROCEDURE — 3288F PR FALLS RISK ASSESSMENT DOCUMENTED: ICD-10-PCS | Mod: CPTII,S$GLB,, | Performed by: OPHTHALMOLOGY

## 2022-08-16 PROCEDURE — 99999 PR PBB SHADOW E&M-EST. PATIENT-LVL II: CPT | Mod: PBBFAC,,, | Performed by: OPHTHALMOLOGY

## 2022-08-16 PROCEDURE — 1101F PR PT FALLS ASSESS DOC 0-1 FALLS W/OUT INJ PAST YR: ICD-10-PCS | Mod: CPTII,S$GLB,, | Performed by: OPHTHALMOLOGY

## 2022-08-16 PROCEDURE — 1126F AMNT PAIN NOTED NONE PRSNT: CPT | Mod: CPTII,S$GLB,, | Performed by: OPHTHALMOLOGY

## 2022-08-16 PROCEDURE — 99214 PR OFFICE/OUTPT VISIT, EST, LEVL IV, 30-39 MIN: ICD-10-PCS | Mod: S$GLB,,, | Performed by: OPHTHALMOLOGY

## 2022-08-17 ENCOUNTER — PATIENT MESSAGE (OUTPATIENT)
Dept: INTERNAL MEDICINE | Facility: CLINIC | Age: 87
End: 2022-08-17
Payer: MEDICARE

## 2022-08-18 ENCOUNTER — LAB VISIT (OUTPATIENT)
Dept: LAB | Facility: HOSPITAL | Age: 87
End: 2022-08-18
Attending: INTERNAL MEDICINE
Payer: MEDICARE

## 2022-08-18 ENCOUNTER — OFFICE VISIT (OUTPATIENT)
Dept: INTERNAL MEDICINE | Facility: CLINIC | Age: 87
End: 2022-08-18
Payer: MEDICARE

## 2022-08-18 VITALS
HEIGHT: 68 IN | SYSTOLIC BLOOD PRESSURE: 130 MMHG | DIASTOLIC BLOOD PRESSURE: 66 MMHG | BODY MASS INDEX: 22.86 KG/M2 | OXYGEN SATURATION: 97 % | WEIGHT: 150.81 LBS | HEART RATE: 67 BPM

## 2022-08-18 DIAGNOSIS — E11.69 HYPERLIPIDEMIA ASSOCIATED WITH TYPE 2 DIABETES MELLITUS: ICD-10-CM

## 2022-08-18 DIAGNOSIS — R10.11 RIGHT UPPER QUADRANT ABDOMINAL PAIN: Primary | ICD-10-CM

## 2022-08-18 DIAGNOSIS — M79.10 MYALGIA: ICD-10-CM

## 2022-08-18 DIAGNOSIS — E11.42 TYPE 2 DIABETES MELLITUS WITH DIABETIC POLYNEUROPATHY, WITHOUT LONG-TERM CURRENT USE OF INSULIN: ICD-10-CM

## 2022-08-18 DIAGNOSIS — N18.4 STAGE 4 CHRONIC KIDNEY DISEASE: ICD-10-CM

## 2022-08-18 DIAGNOSIS — R10.11 RIGHT UPPER QUADRANT ABDOMINAL PAIN: ICD-10-CM

## 2022-08-18 DIAGNOSIS — E03.9 PRIMARY HYPOTHYROIDISM: ICD-10-CM

## 2022-08-18 DIAGNOSIS — E78.5 HYPERLIPIDEMIA ASSOCIATED WITH TYPE 2 DIABETES MELLITUS: ICD-10-CM

## 2022-08-18 DIAGNOSIS — Z86.39 HISTORY OF INSULIN DEPENDENT DIABETES MELLITUS: ICD-10-CM

## 2022-08-18 LAB
ALBUMIN SERPL BCP-MCNC: 3.8 G/DL (ref 3.5–5.2)
ALP SERPL-CCNC: 132 U/L (ref 55–135)
ALT SERPL W/O P-5'-P-CCNC: 17 U/L (ref 10–44)
ANION GAP SERPL CALC-SCNC: 7 MMOL/L (ref 8–16)
AST SERPL-CCNC: 21 U/L (ref 10–40)
BASOPHILS # BLD AUTO: 0.06 K/UL (ref 0–0.2)
BASOPHILS NFR BLD: 1.1 % (ref 0–1.9)
BILIRUB SERPL-MCNC: 0.5 MG/DL (ref 0.1–1)
BUN SERPL-MCNC: 32 MG/DL (ref 10–30)
CALCIUM SERPL-MCNC: 9.7 MG/DL (ref 8.7–10.5)
CHLORIDE SERPL-SCNC: 105 MMOL/L (ref 95–110)
CK SERPL-CCNC: 75 U/L (ref 20–180)
CO2 SERPL-SCNC: 27 MMOL/L (ref 23–29)
CREAT SERPL-MCNC: 1.8 MG/DL (ref 0.5–1.4)
CRP SERPL-MCNC: 0.9 MG/L (ref 0–8.2)
DIFFERENTIAL METHOD: ABNORMAL
EOSINOPHIL # BLD AUTO: 0.1 K/UL (ref 0–0.5)
EOSINOPHIL NFR BLD: 2.1 % (ref 0–8)
ERYTHROCYTE [DISTWIDTH] IN BLOOD BY AUTOMATED COUNT: 13.6 % (ref 11.5–14.5)
ERYTHROCYTE [SEDIMENTATION RATE] IN BLOOD BY PHOTOMETRIC METHOD: 21 MM/HR (ref 0–36)
EST. GFR  (NO RACE VARIABLE): 25.3 ML/MIN/1.73 M^2
ESTIMATED AVG GLUCOSE: 151 MG/DL (ref 68–131)
GLUCOSE SERPL-MCNC: 197 MG/DL (ref 70–110)
HBA1C MFR BLD: 6.9 % (ref 4–5.6)
HCT VFR BLD AUTO: 36.4 % (ref 37–48.5)
HGB BLD-MCNC: 11.6 G/DL (ref 12–16)
IMM GRANULOCYTES # BLD AUTO: 0.02 K/UL (ref 0–0.04)
IMM GRANULOCYTES NFR BLD AUTO: 0.4 % (ref 0–0.5)
LYMPHOCYTES # BLD AUTO: 1.2 K/UL (ref 1–4.8)
LYMPHOCYTES NFR BLD: 21.5 % (ref 18–48)
MCH RBC QN AUTO: 30.6 PG (ref 27–31)
MCHC RBC AUTO-ENTMCNC: 31.9 G/DL (ref 32–36)
MCV RBC AUTO: 96 FL (ref 82–98)
MONOCYTES # BLD AUTO: 0.7 K/UL (ref 0.3–1)
MONOCYTES NFR BLD: 13.6 % (ref 4–15)
NEUTROPHILS # BLD AUTO: 3.3 K/UL (ref 1.8–7.7)
NEUTROPHILS NFR BLD: 61.3 % (ref 38–73)
NRBC BLD-RTO: 0 /100 WBC
PLATELET # BLD AUTO: 182 K/UL (ref 150–450)
PMV BLD AUTO: 11.9 FL (ref 9.2–12.9)
POTASSIUM SERPL-SCNC: 4.8 MMOL/L (ref 3.5–5.1)
PROT SERPL-MCNC: 6.3 G/DL (ref 6–8.4)
RBC # BLD AUTO: 3.79 M/UL (ref 4–5.4)
SODIUM SERPL-SCNC: 139 MMOL/L (ref 136–145)
T4 SERPL-MCNC: 11 UG/DL (ref 4.5–11.5)
TSH SERPL DL<=0.005 MIU/L-ACNC: 1.33 UIU/ML (ref 0.4–4)
WBC # BLD AUTO: 5.35 K/UL (ref 3.9–12.7)

## 2022-08-18 PROCEDURE — 3288F FALL RISK ASSESSMENT DOCD: CPT | Mod: CPTII,S$GLB,, | Performed by: INTERNAL MEDICINE

## 2022-08-18 PROCEDURE — 85025 COMPLETE CBC W/AUTO DIFF WBC: CPT | Performed by: INTERNAL MEDICINE

## 2022-08-18 PROCEDURE — 83036 HEMOGLOBIN GLYCOSYLATED A1C: CPT | Performed by: INTERNAL MEDICINE

## 2022-08-18 PROCEDURE — 86140 C-REACTIVE PROTEIN: CPT | Performed by: INTERNAL MEDICINE

## 2022-08-18 PROCEDURE — 1125F PR PAIN SEVERITY QUANTIFIED, PAIN PRESENT: ICD-10-PCS | Mod: CPTII,S$GLB,, | Performed by: INTERNAL MEDICINE

## 2022-08-18 PROCEDURE — 82550 ASSAY OF CK (CPK): CPT | Performed by: INTERNAL MEDICINE

## 2022-08-18 PROCEDURE — 99999 PR PBB SHADOW E&M-EST. PATIENT-LVL III: CPT | Mod: PBBFAC,,, | Performed by: INTERNAL MEDICINE

## 2022-08-18 PROCEDURE — 99214 PR OFFICE/OUTPT VISIT, EST, LEVL IV, 30-39 MIN: ICD-10-PCS | Mod: S$GLB,,, | Performed by: INTERNAL MEDICINE

## 2022-08-18 PROCEDURE — 80053 COMPREHEN METABOLIC PANEL: CPT | Performed by: INTERNAL MEDICINE

## 2022-08-18 PROCEDURE — 99214 OFFICE O/P EST MOD 30 MIN: CPT | Mod: S$GLB,,, | Performed by: INTERNAL MEDICINE

## 2022-08-18 PROCEDURE — 85652 RBC SED RATE AUTOMATED: CPT | Performed by: INTERNAL MEDICINE

## 2022-08-18 PROCEDURE — 1101F PR PT FALLS ASSESS DOC 0-1 FALLS W/OUT INJ PAST YR: ICD-10-PCS | Mod: CPTII,S$GLB,, | Performed by: INTERNAL MEDICINE

## 2022-08-18 PROCEDURE — 1160F RVW MEDS BY RX/DR IN RCRD: CPT | Mod: CPTII,S$GLB,, | Performed by: INTERNAL MEDICINE

## 2022-08-18 PROCEDURE — 84443 ASSAY THYROID STIM HORMONE: CPT | Performed by: INTERNAL MEDICINE

## 2022-08-18 PROCEDURE — 1160F PR REVIEW ALL MEDS BY PRESCRIBER/CLIN PHARMACIST DOCUMENTED: ICD-10-PCS | Mod: CPTII,S$GLB,, | Performed by: INTERNAL MEDICINE

## 2022-08-18 PROCEDURE — 1159F MED LIST DOCD IN RCRD: CPT | Mod: CPTII,S$GLB,, | Performed by: INTERNAL MEDICINE

## 2022-08-18 PROCEDURE — 1125F AMNT PAIN NOTED PAIN PRSNT: CPT | Mod: CPTII,S$GLB,, | Performed by: INTERNAL MEDICINE

## 2022-08-18 PROCEDURE — 3288F PR FALLS RISK ASSESSMENT DOCUMENTED: ICD-10-PCS | Mod: CPTII,S$GLB,, | Performed by: INTERNAL MEDICINE

## 2022-08-18 PROCEDURE — 84436 ASSAY OF TOTAL THYROXINE: CPT | Performed by: INTERNAL MEDICINE

## 2022-08-18 PROCEDURE — 1101F PT FALLS ASSESS-DOCD LE1/YR: CPT | Mod: CPTII,S$GLB,, | Performed by: INTERNAL MEDICINE

## 2022-08-18 PROCEDURE — 36415 COLL VENOUS BLD VENIPUNCTURE: CPT | Performed by: INTERNAL MEDICINE

## 2022-08-18 PROCEDURE — 99999 PR PBB SHADOW E&M-EST. PATIENT-LVL III: ICD-10-PCS | Mod: PBBFAC,,, | Performed by: INTERNAL MEDICINE

## 2022-08-18 PROCEDURE — 1159F PR MEDICATION LIST DOCUMENTED IN MEDICAL RECORD: ICD-10-PCS | Mod: CPTII,S$GLB,, | Performed by: INTERNAL MEDICINE

## 2022-08-19 ENCOUNTER — TELEPHONE (OUTPATIENT)
Dept: PODIATRY | Facility: CLINIC | Age: 87
End: 2022-08-19
Payer: MEDICARE

## 2022-08-19 NOTE — PROGRESS NOTES
CC:  Follow-up     HPI:  The patient is a 89-year-old female with asthma, COPD, insulin-requiring diabetes, hypertension, hypothyroidism, coronary artery disease status post MI, CKD stage 4 and atrial fibrillation presents today for follow-up.  Patient does complain hurting all over.  This is muscles as well as joints.  This has been going on for few months.  She checks her blood sugars twice a day and they average under 180.  Her last A1c had improved from 8.0-6.6.  Her blood test in May showed that her thyroid medications therapeutic.  Her total cholesterol in May was 152 and her LDL was 80.6.    ROS:  Patient reports her weight stays around 145-150.  No chest pain.  No shortness of breath.  She does sleep with her feet elevated.  She does report some abdominal discomfort with eating.  It appears to be right side sometimes left side.    Physical exam:   General appearance:  No acute distress   HEENT:  Left lens is opaque.  Right shows a lens replacement.  TMs were clear.  Nasal septum is midline without discharge.  Oropharynx was without erythema.  Trachea is midline.    Pulmonary:  Good inspiratory, expiratory breath sounds are heard.  Her lungs are clear to auscultation.    Cardiovascular:  S1-S2, rhythm appear to be normal.  2+ carotid pulse of bruits.  Extremities without edema.    GI: Abdomen is nontender, nondistended without hepatosplenomegaly    Assessment:  1. Abdominal discomfort  2.  Hypertension  3.  Hyperlipidemia  4.  Hypothyroidism  5. Non insulin-dependent diabetes  6. Muscle aches  and joint pain     Plan:   1. Will schedule a CBC, CMP, CPK, ESR and CRP  2. Will schedule a ultrasound of the abdomen  3.  Recheck a TSH.

## 2022-08-22 ENCOUNTER — TELEPHONE (OUTPATIENT)
Dept: INTERNAL MEDICINE | Facility: CLINIC | Age: 87
End: 2022-08-22
Payer: MEDICARE

## 2022-08-22 NOTE — TELEPHONE ENCOUNTER
----- Message from Tomás Andres MD sent at 8/20/2022 10:33 AM CDT -----  Her blood sugars are stable. Her blood count and kidney function are stable. She needs to keep her appointment for her abdominal ultrasound.

## 2022-08-23 ENCOUNTER — TELEPHONE (OUTPATIENT)
Dept: FAMILY MEDICINE | Facility: CLINIC | Age: 87
End: 2022-08-23
Payer: MEDICARE

## 2022-08-30 ENCOUNTER — HOSPITAL ENCOUNTER (OUTPATIENT)
Dept: RADIOLOGY | Facility: OTHER | Age: 87
Discharge: HOME | End: 2022-08-30
Attending: INTERNAL MEDICINE
Payer: MEDICARE

## 2022-08-30 DIAGNOSIS — R10.11 RIGHT UPPER QUADRANT ABDOMINAL PAIN: ICD-10-CM

## 2022-08-30 PROCEDURE — 76700 US EXAM ABDOM COMPLETE: CPT | Mod: 26,,, | Performed by: RADIOLOGY

## 2022-08-30 PROCEDURE — 76700 US EXAM ABDOM COMPLETE: CPT | Mod: TC

## 2022-08-30 PROCEDURE — 76700 US ABDOMEN COMPLETE: ICD-10-PCS | Mod: 26,,, | Performed by: RADIOLOGY

## 2022-09-05 DIAGNOSIS — Q61.02 MULTIPLE RENAL CYSTS: ICD-10-CM

## 2022-09-05 DIAGNOSIS — N18.4 STAGE 4 CHRONIC KIDNEY DISEASE: Primary | ICD-10-CM

## 2022-09-07 ENCOUNTER — TELEPHONE (OUTPATIENT)
Dept: INTERNAL MEDICINE | Facility: CLINIC | Age: 87
End: 2022-09-07
Payer: MEDICARE

## 2022-09-07 ENCOUNTER — PATIENT MESSAGE (OUTPATIENT)
Dept: INTERNAL MEDICINE | Facility: CLINIC | Age: 87
End: 2022-09-07
Payer: MEDICARE

## 2022-09-07 DIAGNOSIS — H40.1190 PRIMARY OPEN ANGLE GLAUCOMA: ICD-10-CM

## 2022-09-07 RX ORDER — DORZOLAMIDE HCL 20 MG/ML
1 SOLUTION/ DROPS OPHTHALMIC 2 TIMES DAILY
Qty: 10 ML | Refills: 4 | Status: SHIPPED | OUTPATIENT
Start: 2022-09-07 | End: 2023-10-05

## 2022-09-07 NOTE — TELEPHONE ENCOUNTER
----- Message from Tomás Andres MD sent at 9/5/2022  7:20 AM CDT -----  Her ultrasound showed that she had multiple cysts in her kidneys. I would like for her to see a Nephrologist for an opinion about this as well as her kidney function. A referral is in.

## 2022-09-14 ENCOUNTER — TELEPHONE (OUTPATIENT)
Dept: NEPHROLOGY | Facility: CLINIC | Age: 87
End: 2022-09-14
Payer: MEDICARE

## 2022-09-14 DIAGNOSIS — N18.4 STAGE 4 CHRONIC KIDNEY DISEASE: Primary | ICD-10-CM

## 2022-09-16 ENCOUNTER — OFFICE VISIT (OUTPATIENT)
Dept: CARDIOLOGY | Facility: CLINIC | Age: 87
End: 2022-09-16
Payer: MEDICARE

## 2022-09-16 VITALS
HEART RATE: 65 BPM | BODY MASS INDEX: 23.4 KG/M2 | SYSTOLIC BLOOD PRESSURE: 124 MMHG | OXYGEN SATURATION: 98 % | DIASTOLIC BLOOD PRESSURE: 68 MMHG | WEIGHT: 153.88 LBS

## 2022-09-16 DIAGNOSIS — E11.22 TYPE 2 DIABETES MELLITUS WITH STAGE 4 CHRONIC KIDNEY DISEASE, WITHOUT LONG-TERM CURRENT USE OF INSULIN: ICD-10-CM

## 2022-09-16 DIAGNOSIS — I48.21 PERMANENT ATRIAL FIBRILLATION: Primary | ICD-10-CM

## 2022-09-16 DIAGNOSIS — I70.0 AORTIC ATHEROSCLEROSIS: ICD-10-CM

## 2022-09-16 DIAGNOSIS — I50.32 CHRONIC DIASTOLIC HEART FAILURE: ICD-10-CM

## 2022-09-16 DIAGNOSIS — N18.4 TYPE 2 DIABETES MELLITUS WITH STAGE 4 CHRONIC KIDNEY DISEASE, WITHOUT LONG-TERM CURRENT USE OF INSULIN: ICD-10-CM

## 2022-09-16 PROCEDURE — 1160F PR REVIEW ALL MEDS BY PRESCRIBER/CLIN PHARMACIST DOCUMENTED: ICD-10-PCS | Mod: CPTII,S$GLB,, | Performed by: INTERNAL MEDICINE

## 2022-09-16 PROCEDURE — 99214 PR OFFICE/OUTPT VISIT, EST, LEVL IV, 30-39 MIN: ICD-10-PCS | Mod: S$GLB,,, | Performed by: INTERNAL MEDICINE

## 2022-09-16 PROCEDURE — 1159F MED LIST DOCD IN RCRD: CPT | Mod: CPTII,S$GLB,, | Performed by: INTERNAL MEDICINE

## 2022-09-16 PROCEDURE — 1160F RVW MEDS BY RX/DR IN RCRD: CPT | Mod: CPTII,S$GLB,, | Performed by: INTERNAL MEDICINE

## 2022-09-16 PROCEDURE — 1126F PR PAIN SEVERITY QUANTIFIED, NO PAIN PRESENT: ICD-10-PCS | Mod: CPTII,S$GLB,, | Performed by: INTERNAL MEDICINE

## 2022-09-16 PROCEDURE — 1126F AMNT PAIN NOTED NONE PRSNT: CPT | Mod: CPTII,S$GLB,, | Performed by: INTERNAL MEDICINE

## 2022-09-16 PROCEDURE — 99499 RISK ADDL DX/OHS AUDIT: ICD-10-PCS | Mod: S$GLB,,, | Performed by: INTERNAL MEDICINE

## 2022-09-16 PROCEDURE — 1101F PT FALLS ASSESS-DOCD LE1/YR: CPT | Mod: CPTII,S$GLB,, | Performed by: INTERNAL MEDICINE

## 2022-09-16 PROCEDURE — 99499 UNLISTED E&M SERVICE: CPT | Mod: S$GLB,,, | Performed by: INTERNAL MEDICINE

## 2022-09-16 PROCEDURE — 3288F PR FALLS RISK ASSESSMENT DOCUMENTED: ICD-10-PCS | Mod: CPTII,S$GLB,, | Performed by: INTERNAL MEDICINE

## 2022-09-16 PROCEDURE — 3288F FALL RISK ASSESSMENT DOCD: CPT | Mod: CPTII,S$GLB,, | Performed by: INTERNAL MEDICINE

## 2022-09-16 PROCEDURE — 1101F PR PT FALLS ASSESS DOC 0-1 FALLS W/OUT INJ PAST YR: ICD-10-PCS | Mod: CPTII,S$GLB,, | Performed by: INTERNAL MEDICINE

## 2022-09-16 PROCEDURE — 1159F PR MEDICATION LIST DOCUMENTED IN MEDICAL RECORD: ICD-10-PCS | Mod: CPTII,S$GLB,, | Performed by: INTERNAL MEDICINE

## 2022-09-16 PROCEDURE — 99214 OFFICE O/P EST MOD 30 MIN: CPT | Mod: S$GLB,,, | Performed by: INTERNAL MEDICINE

## 2022-09-16 PROCEDURE — 99999 PR PBB SHADOW E&M-EST. PATIENT-LVL III: CPT | Mod: PBBFAC,,, | Performed by: INTERNAL MEDICINE

## 2022-09-16 PROCEDURE — 99999 PR PBB SHADOW E&M-EST. PATIENT-LVL III: ICD-10-PCS | Mod: PBBFAC,,, | Performed by: INTERNAL MEDICINE

## 2022-09-16 NOTE — PROGRESS NOTES
[facility]    Chief Complaint  Chief Complaint   Patient presents with    Atrial Fibrillation       HPI:    Presents for routine follow-up without complaints.  She has been doing well.  No palpitations.  No syncope or presyncope.  Still not interested in anticoagulation.    Medications  Current Outpatient Medications   Medication Sig Dispense Refill    acetaminophen (TYLENOL) 500 MG tablet Take 2 tablets (1,000 mg total) by mouth 3 (three) times daily as needed for Pain (muscle pain in arms and legs). 100 tablet 1    albuterol (PROVENTIL) 2.5 mg /3 mL (0.083 %) nebulizer solution Take 3 mLs (2.5 mg total) by nebulization every 6 (six) hours as needed for Wheezing. Rescue 25 each 1    albuterol (PROVENTIL/VENTOLIN HFA) 90 mcg/actuation inhaler Inhale 2 puffs into the lungs every 4 (four) hours as needed for Wheezing. 18 g 6    albuterol (PROVENTIL/VENTOLIN HFA) 90 mcg/actuation inhaler Inhale 1-2 puffs into the lungs every 6 (six) hours as needed for Wheezing. Rescue 6.7 g 0    amLODIPine (NORVASC) 5 MG tablet TAKE 1 TABLET(5 MG) BY MOUTH EVERY MORNING 90 tablet 3    aspirin 81 MG Chew Take 1 tablet (81 mg total) by mouth every morning. 100 tablet 3    atorvastatin (LIPITOR) 40 MG tablet Take 1 tablet (40 mg total) by mouth every evening. To lower cholesterol and for heart and blood vessels 90 tablet 3    blood sugar diagnostic (ONETOUCH ULTRA TEST) Strp Inject 1 strip into the skin once daily. No longer on insulin because of  each 4    clotrimazole-betamethasone 1-0.05% (LOTRISONE) cream Apply topically 2 (two) times daily as needed. For intertriginous itching in the bend of the leg 45 g 3    diclofenac sodium (VOLTAREN) 1 % Gel Apply 2 g topically 2 (two) times daily. 100 g 0    dorzolamide (TRUSOPT) 2 % ophthalmic solution Place 1 drop into both eyes 2 (two) times daily. 10 mL 4    flu vac 2022 [id number], 60 mcg (15 mcg x 4)/0.5 mL Syrg Inject 0.5 mLs into the muscle once. for 1 dose  "0.5 mL 0    fluticasone-salmeterol diskus inhaler 250-50 mcg Inhale 1 puff into the lungs 2 (two) times daily. Controller Patient wants name brand ADVAIR 60 each 11    furosemide (LASIX) 40 MG tablet TAKE 1 TABLET BY MOUTH EVERY MORNING AND 1 AT 4  tablet 1    insulin aspart U-100 (NOVOLOG FLEXPEN U-100 INSULIN) 100 unit/mL (3 mL) InPn pen Inject if blood sugar is >180, 180-230+2, 231-280+4, 281-330+6, 331-380+8,>380+10 MAX daily 30 units. 15 mL 2    isosorbide mononitrate (IMDUR) 60 MG 24 hr tablet TAKE ONE TABLET BY MOUTH EVERY MORNING FOR HEART, TO PREVENT CHEST PAIN AND SHORTNESS OF BREATH 90 tablet 3    lancets (TRUEPLUS LANCETS) 33 gauge Misc Apply 1 lancet topically once daily. No longer on insulin because of  each 3    latanoprost 0.005 % ophthalmic solution INSTILL 1 DROP IN BOTH EYES EVERY EVENING 10 mL 12    levothyroxine (SYNTHROID) 88 MCG tablet Take 1 tablet (88 mcg total) by mouth before breakfast. 90 tablet 1    meclizine (ANTIVERT) 12.5 mg tablet TAKE 1 TABLET(12.5 MG) BY MOUTH THREE TIMES DAILY AS NEEDED FOR DIZZINESS 20 tablet 2    metoprolol tartrate (LOPRESSOR) 25 MG tablet TAKE 1 TABLET(25 MG) BY MOUTH TWICE DAILY 180 tablet 3    multivit-mineral-iron-lutein Tab Take 1 tablet by mouth once daily.      nitroGLYCERIN (NITROSTAT) 0.4 MG SL tablet ONE TABLET UNDER THE TONGUE EVERY 5 MINUTES AS NEEDED FOR CHEST PAIN 100 tablet 3    nystatin (MYCOSTATIN) powder Apply topically 4 (four) times daily. 60 g 3    pen needle, diabetic (BD ULTRA-FINE SHORT PEN NEEDLE) 31 gauge x 5/16" Ndle USE WITH INSULIN PENS up to 3 x a day. 100 each 2    polyethylene glycol (GLYCOLAX) 17 gram/dose powder Take 17 g by mouth daily as needed (CONSTIPATION).      psyllium (METAMUCIL) powder Take 1 packet by mouth daily as needed (CONSTIPATION).      SHINGRIX, PF, 50 mcg/0.5 mL injection       triamcinolone acetonide 0.025% (KENALOG) 0.025 % Oint Apply topically 2 (two) times daily. 80 g 2     No current " facility-administered medications for this visit.        History  Past Medical History:   Diagnosis Date    Allergy     Anemia     Arthritis     Asthma     Chronic kidney disease     Degenerative disc disease     Diabetes mellitus type II     Diastolic heart failure 05/02/2017    Essential hypertension 7/3/2012    Glaucoma     History of insulin dependent diabetes mellitus, for many years stopped because of chronic kidney November 2020.  9/9/2021    History of pseudogout 8/23/2012    Hyperlipidemia     Hypertension     Iritis     Myocardial infarction     Pneumonia     Thrombocytopenia 8/24/2021    Thyroid disease      Past Surgical History:   Procedure Laterality Date    CARDIAC CATHETERIZATION  2010    no obstructive disease    CATARACT EXTRACTION W/  INTRAOCULAR LENS IMPLANT Left n/a    CATARACT EXTRACTION W/  INTRAOCULAR LENS IMPLANT Right 10/8/2018    With Femtosecond LASER assist (Dr. Henson)    CHOLECYSTECTOMY      ESOPHAGOGASTRODUODENOSCOPY N/A 11/4/2020    Procedure: EGD (ESOPHAGOGASTRODUODENOSCOPY);  Surgeon: Tomás Mccall MD;  Location: [address];  Service: Endoscopy;  Laterality: N/A;    EYE SURGERY      gall stone      HYSTERECTOMY      VARICOSE VEIN SURGERY       Social History     Socioeconomic History    Marital status:    Tobacco Use    Smoking status: Never    Smokeless tobacco: Never   Substance and Sexual Activity    Alcohol use: No    Drug use: No    Sexual activity: Never     Family History   Problem Relation Age of Onset    Diabetes Mother     Hypertension Mother     Glaucoma Mother     Hypertension Father     Diabetes Son     No Known Problems Daughter     Diabetes Daughter         borderline    No Known Problems Son     Melanoma Neg Hx         Allergies  Review of patient's allergies indicates:   Allergen Reactions    Codeine Itching and Nausea Only              Review of Systems   Review of Systems   Constitutional: Negative for malaise/fatigue, weight gain and weight loss.    Eyes:  Negative for visual disturbance.   Cardiovascular:  Negative for chest pain, claudication, cyanosis, dyspnea on exertion, irregular heartbeat, leg swelling, near-syncope, orthopnea, palpitations, paroxysmal nocturnal dyspnea and syncope.   Respiratory:  Negative for cough, hemoptysis, shortness of breath, sleep disturbances due to breathing and wheezing.    Hematologic/Lymphatic: Negative for bleeding problem. Does not bruise/bleed easily.   Skin:  Negative for poor wound healing.   Musculoskeletal:  Positive for joint pain. Negative for muscle cramps and myalgias.   Gastrointestinal:  Negative for abdominal pain, anorexia, diarrhea, heartburn, hematemesis, hematochezia, melena, nausea and vomiting.   Genitourinary:  Negative for hematuria and nocturia.   Neurological:  Negative for excessive daytime sleepiness, dizziness, focal weakness, light-headedness and weakness.     Physical Exam  Vitals:    09/16/22 0917   BP: 124/68   Pulse: 65     Wt Readings from Last 1 Encounters:   09/16/22 69.8 kg (153 lb 14.1 oz)     Physical Exam  Vitals and nursing note reviewed.   Constitutional:       General: She is not in acute distress.     Appearance: She is not toxic-appearing or diaphoretic.   HENT:      Head: Normocephalic and atraumatic.      Mouth/Throat:      Lips: Pink.      Mouth: Mucous membranes are moist.   Eyes:      General: No scleral icterus.     Conjunctiva/sclera: Conjunctivae normal.   Neck:      Thyroid: No thyromegaly.      Vascular: No carotid bruit, hepatojugular reflux or JVD.      Trachea: Trachea normal.   Cardiovascular:      Rate and Rhythm: Normal rate. Rhythm irregularly irregular. No extrasystoles are present.     Chest Wall: PMI is not displaced.      Pulses:           Carotid pulses are 2+ on the right side and 2+ on the left side.       Radial pulses are 2+ on the right side and 2+ on the left side.      Heart sounds: S1 normal and S2 normal. Murmur heard.   Systolic murmur is present  with a grade of 2/6.     No friction rub. No S3 or S4 sounds.   Pulmonary:      Effort: Pulmonary effort is normal. No accessory muscle usage or respiratory distress.      Breath sounds: Normal breath sounds and air entry. No decreased breath sounds, wheezing, rhonchi or rales.   Abdominal:      General: Bowel sounds are normal. There is no distension or abdominal bruit.      Palpations: Abdomen is soft. There is no hepatomegaly, splenomegaly or pulsatile mass.      Tenderness: There is no abdominal tenderness.   Musculoskeletal:         General: No tenderness or deformity.      Right lower leg: No edema.      Left lower leg: No edema.   Skin:     General: Skin is warm and dry.      Capillary Refill: Capillary refill takes less than 2 seconds.      Coloration: Skin is not cyanotic or pale.      Nails: There is no clubbing.   Neurological:      General: No focal deficit present.      Mental Status: She is alert and oriented to person, place, and time.   Psychiatric:         Attention and Perception: Attention normal.         Mood and Affect: Mood normal.         Speech: Speech normal.         Behavior: Behavior normal. Behavior is cooperative.       Labs  Lab Visit on 08/18/2022   Component Date Value Ref Range Status    Hemoglobin A1C 08/18/2022 6.9 (H)  4.0 - 5.6 % Final    Comment: ADA Screening Guidelines:  5.7-6.4%  Consistent with prediabetes  >or=6.5%  Consistent with diabetes    High levels of fetal hemoglobin interfere with the HbA1C  assay. Heterozygous hemoglobin variants (HbS, HgC, etc)do  not significantly interfere with this assay.   However, presence of multiple variants may affect accuracy.      Estimated Avg Glucose 08/18/2022 151 (H)  68 - 131 mg/dL Final    WBC 08/18/2022 5.35  3.90 - 12.70 K/uL Final    RBC 08/18/2022 3.79 (L)  4.00 - 5.40 M/uL Final    Hemoglobin 08/18/2022 11.6 (L)  12.0 - 16.0 g/dL Final    Hematocrit 08/18/2022 36.4 (L)  37.0 - 48.5 % Final    MCV 08/18/2022 96  82 - 98 fL  Final    MCH 08/18/2022 30.6  27.0 - 31.0 pg Final    MCHC 08/18/2022 31.9 (L)  32.0 - 36.0 g/dL Final    RDW 08/18/2022 13.6  11.5 - 14.5 % Final    Platelets 08/18/2022 182  150 - 450 K/uL Final    MPV 08/18/2022 11.9  9.2 - 12.9 fL Final    Immature Granulocytes 08/18/2022 0.4  0.0 - 0.5 % Final    Gran # (ANC) 08/18/2022 3.3  1.8 - 7.7 K/uL Final    Immature Grans (Abs) 08/18/2022 0.02  0.00 - 0.04 K/uL Final    Comment: Mild elevation in immature granulocytes is non specific and   can be seen in a variety of conditions including stress response,   acute inflammation, trauma and pregnancy. Correlation with other   laboratory and clinical findings is essential.      Lymph # 08/18/2022 1.2  1.0 - 4.8 K/uL Final    Mono # 08/18/2022 0.7  0.3 - 1.0 K/uL Final    Eos # 08/18/2022 0.1  0.0 - 0.5 K/uL Final    Baso # 08/18/2022 0.06  0.00 - 0.20 K/uL Final    nRBC 08/18/2022 0  0 /100 WBC Final    Gran % 08/18/2022 61.3  38.0 - 73.0 % Final    Lymph % 08/18/2022 21.5  18.0 - 48.0 % Final    Mono % 08/18/2022 13.6  4.0 - 15.0 % Final    Eosinophil % 08/18/2022 2.1  0.0 - 8.0 % Final    Basophil % 08/18/2022 1.1  0.0 - 1.9 % Final    Differential Method 08/18/2022 Automated   Final    Sodium 08/18/2022 139  136 - 145 mmol/L Final    Potassium 08/18/2022 4.8  3.5 - 5.1 mmol/L Final    Chloride 08/18/2022 105  95 - 110 mmol/L Final    CO2 08/18/2022 27  23 - 29 mmol/L Final    Glucose 08/18/2022 197 (H)  70 - 110 mg/dL Final    BUN 08/18/2022 32 (H)  10 - 30 mg/dL Final    Creatinine 08/18/2022 1.8 (H)  0.5 - 1.4 mg/dL Final    Calcium 08/18/2022 9.7  8.7 - 10.5 mg/dL Final    Total Protein 08/18/2022 6.3  6.0 - 8.4 g/dL Final    Albumin 08/18/2022 3.8  3.5 - 5.2 g/dL Final    Total Bilirubin 08/18/2022 0.5  0.1 - 1.0 mg/dL Final    Comment: For infants and newborns, interpretation of results should be based  on gestational age, weight and in agreement with clinical  observations.    Premature Infant recommended reference  ranges:  Up to 24 hours.............<8.0 mg/dL  Up to 48 hours............<12.0 mg/dL  3-5 days..................<15.0 mg/dL  6-29 days.................<15.0 mg/dL      Alkaline Phosphatase 08/18/2022 132  55 - 135 U/L Final    AST 08/18/2022 21  10 - 40 U/L Final    ALT 08/18/2022 17  10 - 44 U/L Final    Anion Gap 08/18/2022 7 (L)  8 - 16 mmol/L Final    eGFR 08/18/2022 25.3 (A)  >60 mL/min/1.73 m^2 Final    TSH 08/18/2022 1.334  0.400 - 4.000 uIU/mL Final    T4, Total 08/18/2022 11.0  4.5 - 11.5 ug/dL Final    Sed Rate 08/18/2022 21  0 - 36 mm/Hr Final    CRP 08/18/2022 0.9  0.0 - 8.2 mg/L Final    CPK 08/18/2022 75  20 - 180 U/L Final   Lab Visit on 08/16/2022   Component Date Value Ref Range Status    Hemoglobin A1C 08/16/2022 6.6 (H)  4.0 - 5.6 % Final    Comment: ADA Screening Guidelines:  5.7-6.4%  Consistent with prediabetes  >or=6.5%  Consistent with diabetes    High levels of fetal hemoglobin interfere with the HbA1C  assay. Heterozygous hemoglobin variants (HbS, HgC, etc)do  not significantly interfere with this assay.   However, presence of multiple variants may affect accuracy.      Estimated Avg Glucose 08/16/2022 143 (H)  68 - 131 mg/dL Final   Lab Visit on 08/16/2022   Component Date Value Ref Range Status    Microalbumin, Urine 08/16/2022 8.0  ug/mL Final    Creatinine, Urine 08/16/2022 74.0  15.0 - 325.0 mg/dL Final    Microalb/Creat Ratio 08/16/2022 10.8  0.0 - 30.0 ug/mg Final   Lab Visit on 05/16/2022   Component Date Value Ref Range Status    Phosphorus 05/16/2022 3.4  2.7 - 4.5 mg/dL Final    PTH, Intact 05/16/2022 230.4 (H)  9.0 - 77.0 pg/mL Final    Magnesium 05/16/2022 2.0  1.6 - 2.6 mg/dL Final    Ferritin 05/16/2022 73  20.0 - 300.0 ng/mL Final    WBC 05/16/2022 5.56  3.90 - 12.70 K/uL Final    RBC 05/16/2022 4.10  4.00 - 5.40 M/uL Final    Hemoglobin 05/16/2022 12.5  12.0 - 16.0 g/dL Final    Hematocrit 05/16/2022 38.9  37.0 - 48.5 % Final    MCV 05/16/2022 95  82 - 98 fL Final    MCH  05/16/2022 30.5  27.0 - 31.0 pg Final    MCHC 05/16/2022 32.1  32.0 - 36.0 g/dL Final    RDW 05/16/2022 13.9  11.5 - 14.5 % Final    Platelets 05/16/2022 162  150 - 450 K/uL Final    MPV 05/16/2022 11.7  9.2 - 12.9 fL Final    Immature Granulocytes 05/16/2022 0.4  0.0 - 0.5 % Final    Gran # (ANC) 05/16/2022 3.0  1.8 - 7.7 K/uL Final    Immature Grans (Abs) 05/16/2022 0.02  0.00 - 0.04 K/uL Final    Comment: Mild elevation in immature granulocytes is non specific and   can be seen in a variety of conditions including stress response,   acute inflammation, trauma and pregnancy. Correlation with other   laboratory and clinical findings is essential.      Lymph # 05/16/2022 1.5  1.0 - 4.8 K/uL Final    Mono # 05/16/2022 0.8  0.3 - 1.0 K/uL Final    Eos # 05/16/2022 0.2  0.0 - 0.5 K/uL Final    Baso # 05/16/2022 0.06  0.00 - 0.20 K/uL Final    nRBC 05/16/2022 0  0 /100 WBC Final    Gran % 05/16/2022 54.0  38.0 - 73.0 % Final    Lymph % 05/16/2022 27.2  18.0 - 48.0 % Final    Mono % 05/16/2022 13.7  4.0 - 15.0 % Final    Eosinophil % 05/16/2022 3.6  0.0 - 8.0 % Final    Basophil % 05/16/2022 1.1  0.0 - 1.9 % Final    Differential Method 05/16/2022 Automated   Final    Cholesterol 05/16/2022 152  120 - 199 mg/dL Final    Comment: The National Cholesterol Education Program (NCEP) has set the  following guidelines (reference ranges) for Cholesterol:  Optimal.....................<200 mg/dL  Borderline High.............200-239 mg/dL  High........................> or = 240 mg/dL      Triglycerides 05/16/2022 77  30 - 150 mg/dL Final    Comment: The National Cholesterol Education Program (NCEP) has set the  following guidelines (reference values) for triglycerides:  Normal......................<150 mg/dL  Borderline High.............150-199 mg/dL  High........................200-499 mg/dL      HDL 05/16/2022 56  40 - 75 mg/dL Final    Comment: The National Cholesterol Education Program (NCEP) has set the  following guidelines  (reference values) for HDL Cholesterol:  Low...............<40 mg/dL  Optimal...........>60 mg/dL      LDL Cholesterol 05/16/2022 80.6  63.0 - 159.0 mg/dL Final    Comment: The National Cholesterol Education Program (NCEP) has set the  following guidelines (reference values) for LDL Cholesterol:  Optimal.......................<130 mg/dL  Borderline High...............130-159 mg/dL  High..........................160-189 mg/dL  Very High.....................>190 mg/dL      HDL/Cholesterol Ratio 05/16/2022 36.8  20.0 - 50.0 % Final    Total Cholesterol/HDL Ratio 05/16/2022 2.7  2.0 - 5.0 Final    Non-HDL Cholesterol 05/16/2022 96  mg/dL Final    Comment: Risk category and Non-HDL cholesterol goals:  Coronary heart disease (CHD)or equivalent (10-year risk of CHD >20%):  Non-HDL cholesterol goal     <130 mg/dL  Two or more CHD risk factors and 10-year risk of CHD <= 20%:  Non-HDL cholesterol goal     <160 mg/dL  0 to 1 CHD risk factor:  Non-HDL cholesterol goal     <190 mg/dL      Sodium 05/16/2022 141  136 - 145 mmol/L Final    Potassium 05/16/2022 4.1  3.5 - 5.1 mmol/L Final    Chloride 05/16/2022 106  95 - 110 mmol/L Final    CO2 05/16/2022 25  23 - 29 mmol/L Final    Glucose 05/16/2022 114 (H)  70 - 110 mg/dL Final    BUN 05/16/2022 29  10 - 30 mg/dL Final    Creatinine 05/16/2022 1.5 (H)  0.5 - 1.4 mg/dL Final    Calcium 05/16/2022 9.8  8.7 - 10.5 mg/dL Final    Total Protein 05/16/2022 6.8  6.0 - 8.4 g/dL Final    Albumin 05/16/2022 3.8  3.5 - 5.2 g/dL Final    Total Bilirubin 05/16/2022 0.6  0.1 - 1.0 mg/dL Final    Comment: For infants and newborns, interpretation of results should be based  on gestational age, weight and in agreement with clinical  observations.    Premature Infant recommended reference ranges:  Up to 24 hours.............<8.0 mg/dL  Up to 48 hours............<12.0 mg/dL  3-5 days..................<15.0 mg/dL  6-29 days.................<15.0 mg/dL      Alkaline Phosphatase 05/16/2022 107  55 -  135 U/L Final    AST 05/16/2022 23  10 - 40 U/L Final    ALT 05/16/2022 20  10 - 44 U/L Final    Anion Gap 05/16/2022 10  8 - 16 mmol/L Final    eGFR if African American 05/16/2022 33.4 (A)  >60 mL/min/1.73 m^2 Final    eGFR if non African American 05/16/2022 29.0 (A)  >60 mL/min/1.73 m^2 Final    Comment: Calculation used to obtain the estimated glomerular filtration  rate (eGFR) is the CKD-EPI equation.       TSH 05/16/2022 5.148 (H)  0.400 - 4.000 uIU/mL Final    T4, Total 05/16/2022 9.2  4.5 - 11.5 ug/dL Final    Hemoglobin A1C 05/16/2022 8.0 (H)  4.0 - 5.6 % Final    Comment: ADA Screening Guidelines:  5.7-6.4%  Consistent with prediabetes  >or=6.5%  Consistent with diabetes    High levels of fetal hemoglobin interfere with the HbA1C  assay. Heterozygous hemoglobin variants (HbS, HgC, etc)do  not significantly interfere with this assay.   However, presence of multiple variants may affect accuracy.      Estimated Avg Glucose 05/16/2022 183 (H)  68 - 131 mg/dL Final    Free T4 05/16/2022 1.08  0.71 - 1.51 ng/dL Final   Admission on 03/20/2022, Discharged on 03/21/2022   Component Date Value Ref Range Status    POC Rapid COVID 03/20/2022 Negative  Negative Final     Acceptable 03/20/2022 Yes   Final    WBC 03/20/2022 7.66  3.90 - 12.70 K/uL Final    RBC 03/20/2022 3.98 (L)  4.00 - 5.40 M/uL Final    Hemoglobin 03/20/2022 12.1  12.0 - 16.0 g/dL Final    Hematocrit 03/20/2022 36.0 (L)  37.0 - 48.5 % Final    MCV 03/20/2022 91  82 - 98 fL Final    MCH 03/20/2022 30.4  27.0 - 31.0 pg Final    MCHC 03/20/2022 33.6  32.0 - 36.0 g/dL Final    RDW 03/20/2022 13.9  11.5 - 14.5 % Final    Platelets 03/20/2022 141 (L)  150 - 450 K/uL Final    MPV 03/20/2022 10.7  9.2 - 12.9 fL Final    Immature Granulocytes 03/20/2022 0.4  0.0 - 0.5 % Final    Gran # (ANC) 03/20/2022 5.5  1.8 - 7.7 K/uL Final    Immature Grans (Abs) 03/20/2022 0.03  0.00 - 0.04 K/uL Final    Comment: Mild elevation in immature granulocytes is  non specific and   can be seen in a variety of conditions including stress response,   acute inflammation, trauma and pregnancy. Correlation with other   laboratory and clinical findings is essential.      Lymph # 03/20/2022 0.9 (L)  1.0 - 4.8 K/uL Final    Mono # 03/20/2022 1.0  0.3 - 1.0 K/uL Final    Eos # 03/20/2022 0.2  0.0 - 0.5 K/uL Final    Baso # 03/20/2022 0.05  0.00 - 0.20 K/uL Final    nRBC 03/20/2022 0  0 /100 WBC Final    Gran % 03/20/2022 71.2  38.0 - 73.0 % Final    Lymph % 03/20/2022 12.1 (L)  18.0 - 48.0 % Final    Mono % 03/20/2022 13.4  4.0 - 15.0 % Final    Eosinophil % 03/20/2022 2.2  0.0 - 8.0 % Final    Basophil % 03/20/2022 0.7  0.0 - 1.9 % Final    Differential Method 03/20/2022 Automated   Final    Sodium 03/20/2022 135 (L)  136 - 145 mmol/L Final    Potassium 03/20/2022 4.2  3.5 - 5.1 mmol/L Final    Chloride 03/20/2022 99  95 - 110 mmol/L Final    CO2 03/20/2022 24  23 - 29 mmol/L Final    Glucose 03/20/2022 258 (H)  70 - 110 mg/dL Final    BUN 03/20/2022 32 (H)  10 - 30 mg/dL Final    Creatinine 03/20/2022 1.8 (H)  0.5 - 1.4 mg/dL Final    Calcium 03/20/2022 9.4  8.7 - 10.5 mg/dL Final    Total Protein 03/20/2022 6.6  6.0 - 8.4 g/dL Final    Albumin 03/20/2022 3.3 (L)  3.5 - 5.2 g/dL Final    Total Bilirubin 03/20/2022 0.7  0.1 - 1.0 mg/dL Final    Comment: For infants and newborns, interpretation of results should be based  on gestational age, weight and in agreement with clinical  observations.    Premature Infant recommended reference ranges:  Up to 24 hours.............<8.0 mg/dL  Up to 48 hours............<12.0 mg/dL  3-5 days..................<15.0 mg/dL  6-29 days.................<15.0 mg/dL      Alkaline Phosphatase 03/20/2022 95  55 - 135 U/L Final    AST 03/20/2022 27  10 - 40 U/L Final    ALT 03/20/2022 25  10 - 44 U/L Final    Anion Gap 03/20/2022 12  8 - 16 mmol/L Final    eGFR if  03/20/2022 27 (A)  >60 mL/min/1.73 m^2 Final    eGFR if non African American  03/20/2022 23 (A)  >60 mL/min/1.73 m^2 Final    Comment: Calculation used to obtain the estimated glomerular filtration  rate (eGFR) is the CKD-EPI equation.       POC Rapid COVID 03/20/2022 Negative  Negative Final     Acceptable 03/20/2022 Yes   Final    POCT Glucose 03/20/2022 294 (H)  70 - 110 mg/dL Final    POCT Glucose 03/20/2022 262 (H)  70 - 110 mg/dL Final    WBC 03/21/2022 8.59  3.90 - 12.70 K/uL Final    RBC 03/21/2022 3.77 (L)  4.00 - 5.40 M/uL Final    Hemoglobin 03/21/2022 11.7 (L)  12.0 - 16.0 g/dL Final    Hematocrit 03/21/2022 34.6 (L)  37.0 - 48.5 % Final    MCV 03/21/2022 92  82 - 98 fL Final    MCH 03/21/2022 31.0  27.0 - 31.0 pg Final    MCHC 03/21/2022 33.8  32.0 - 36.0 g/dL Final    RDW 03/21/2022 13.9  11.5 - 14.5 % Final    Platelets 03/21/2022 154  150 - 450 K/uL Final    MPV 03/21/2022 11.8  9.2 - 12.9 fL Final    Immature Granulocytes 03/21/2022 0.6 (H)  0.0 - 0.5 % Final    Gran # (ANC) 03/21/2022 7.5  1.8 - 7.7 K/uL Final    Immature Grans (Abs) 03/21/2022 0.05 (H)  0.00 - 0.04 K/uL Final    Comment: Mild elevation in immature granulocytes is non specific and   can be seen in a variety of conditions including stress response,   acute inflammation, trauma and pregnancy. Correlation with other   laboratory and clinical findings is essential.      Lymph # 03/21/2022 0.5 (L)  1.0 - 4.8 K/uL Final    Mono # 03/21/2022 0.5  0.3 - 1.0 K/uL Final    Eos # 03/21/2022 0.0  0.0 - 0.5 K/uL Final    Baso # 03/21/2022 0.02  0.00 - 0.20 K/uL Final    nRBC 03/21/2022 0  0 /100 WBC Final    Gran % 03/21/2022 87.3 (H)  38.0 - 73.0 % Final    Lymph % 03/21/2022 5.5 (L)  18.0 - 48.0 % Final    Mono % 03/21/2022 6.3  4.0 - 15.0 % Final    Eosinophil % 03/21/2022 0.1  0.0 - 8.0 % Final    Basophil % 03/21/2022 0.2  0.0 - 1.9 % Final    Differential Method 03/21/2022 Automated   Final    Sodium 03/21/2022 135 (L)  136 - 145 mmol/L Final    Potassium 03/21/2022 5.0  3.5 - 5.1 mmol/L Final     Chloride 03/21/2022 99  95 - 110 mmol/L Final    CO2 03/21/2022 23  23 - 29 mmol/L Final    Glucose 03/21/2022 219 (H)  70 - 110 mg/dL Final    BUN 03/21/2022 37 (H)  10 - 30 mg/dL Final    Creatinine 03/21/2022 1.8 (H)  0.5 - 1.4 mg/dL Final    Calcium 03/21/2022 9.2  8.7 - 10.5 mg/dL Final    Anion Gap 03/21/2022 13  8 - 16 mmol/L Final    eGFR if African American 03/21/2022 27 (A)  >60 mL/min/1.73 m^2 Final    eGFR if non  03/21/2022 23 (A)  >60 mL/min/1.73 m^2 Final    Comment: Calculation used to obtain the estimated glomerular filtration  rate (eGFR) is the CKD-EPI equation.       Hemoglobin A1C 03/21/2022 8.5 (H)  4.0 - 5.6 % Final    Comment: ADA Screening Guidelines:  5.7-6.4%  Consistent with prediabetes  >or=6.5%  Consistent with diabetes    High levels of fetal hemoglobin interfere with the HbA1C  assay. Heterozygous hemoglobin variants (HbS, HgC, etc)do  not significantly interfere with this assay.   However, presence of multiple variants may affect accuracy.      Estimated Avg Glucose 03/21/2022 197 (H)  68 - 131 mg/dL Final    POCT Glucose 03/21/2022 312 (H)  70 - 110 mg/dL Final    POCT Glucose 03/21/2022 210 (H)  70 - 110 mg/dL Final    BNP 03/21/2022 750 (H)  0 - 99 pg/mL Final    Values of less than 100 pg/ml are consistent with non-CHF populations.    POCT Glucose 03/21/2022 230 (H)  70 - 110 mg/dL Final       Imaging  US Abdomen Complete    Result Date: 8/30/2022  EXAMINATION: US ABDOMEN COMPLETE CLINICAL HISTORY: Right upper quadrant pain TECHNIQUE: Complete abdominal ultrasound (including pancreas, aorta, liver, gallbladder, common bile duct, IVC, kidneys, and spleen) was performed. COMPARISON: 11/03/2012 FINDINGS: Pancreas: The visualized portions of pancreas appear normal. Aorta: No aneurysm. Liver: 11.8 cm, normal in size. Homogeneous parenchymal echotexture. No focal lesions. Gallbladder: Surgically absent. Biliary system: 4 mm common bile duct.  No intrahepatic ductal  dilatation. Inferior vena cava: Normal in appearance. Right kidney: 12.8 cm. No hydronephrosis.  Multiple simple and complex cystic lesions are present, incompletely evaluated on this exam. Left kidney: 9.9 cm. No hydronephrosis.  Multiple simple and cystic lesions are present, incompletely evaluated on this exam. Spleen: 7.1 cm.  Normal in size with homogeneous echotexture. Miscellaneous: No ascites.     Numerous bilateral simple and complex cystic renal lesions, incompletely evaluated on this exam.  Cross-sectional imaging could be considered for further evaluation.  Otherwise unremarkable. Electronically signed by: Edwige Chavez Date:    08/30/2022 Time:    08:18      Assessment  1. Permanent atrial fibrillation  Controlled    2. Chronic diastolic heart failure  Compensated    3. Aortic atherosclerosis  On appropriate risk factor modification    4. Type 2 diabetes mellitus with stage 4 chronic kidney disease, without long-term current use of insulin  Dr. Andres      Plan and Discussion    Continue current guideline directed medical therapy.    The ASCVD Risk score (Deane DK, et al., 2019) failed to calculate for the following reasons:    The 2019 ASCVD risk score is only valid for ages 40 to 79    The patient has a prior MI or stroke diagnosis     Follow Up  Follow up in about 6 months (around 3/16/2023).      Adán Alanis MD

## 2022-09-21 NOTE — TELEPHONE ENCOUNTER
----- Message from Jojo Lenka sent at 9/21/2022 10:37 AM CDT -----  Contact: [phone] [place]  Requesting an RX refill or new RX.  Is this a refill or new RX: new  RX name and strength (copy/paste from chart):  meclizine (ANTIVERT) 12.5 mg tablet  Is this a 30 day or 90 day RX:   Pharmacy name and phone # (copy/paste from chart):    [facility] #[id number] - [place], [place] - [address] AT [address]  [address]  [facility] [address]  Phone: [phone] Fax: [phone]    Requesting an RX refill or new RX.  Is this a refill or new RX: new  RX name and strength (copy/paste from chart):  ondansetron disintegrating tablet 4 mg     Is this a 30 day or 90 day RX:   Pharmacy name and phone # (copy/paste from chart):  [facility] #[id number] [place], [place] - [address] AT [address]  [address]  [facility] [address]  Phone: [phone] Fax: [phone]

## 2022-09-21 NOTE — TELEPHONE ENCOUNTER
No new care gaps identified.  [facility] Embedded Care Gaps. Reference number: [id number]. 9/21/2022   12:16:10 PM CDT

## 2022-09-23 RX ORDER — MECLIZINE HCL 12.5 MG 12.5 MG/1
TABLET ORAL
Qty: 20 TABLET | Refills: 0 | Status: SHIPPED | OUTPATIENT
Start: 2022-09-23 | End: 2023-02-13

## 2022-09-23 RX ORDER — ONDANSETRON 4 MG/1
4 TABLET, ORALLY DISINTEGRATING ORAL ONCE
Qty: 10 TABLET | Refills: 0 | Status: SHIPPED | OUTPATIENT
Start: 2022-09-23 | End: 2022-09-23

## 2022-09-30 ENCOUNTER — TELEPHONE (OUTPATIENT)
Dept: INTERNAL MEDICINE | Facility: CLINIC | Age: 87
End: 2022-09-30
Payer: MEDICARE

## 2022-09-30 DIAGNOSIS — E11.42 TYPE 2 DIABETES MELLITUS WITH DIABETIC POLYNEUROPATHY, WITHOUT LONG-TERM CURRENT USE OF INSULIN: ICD-10-CM

## 2022-09-30 DIAGNOSIS — Z79.4 TYPE 2 DIABETES MELLITUS WITH OTHER DIABETIC ARTHROPATHY, WITH LONG-TERM CURRENT USE OF INSULIN: ICD-10-CM

## 2022-09-30 DIAGNOSIS — E11.618 TYPE 2 DIABETES MELLITUS WITH OTHER DIABETIC ARTHROPATHY, WITH LONG-TERM CURRENT USE OF INSULIN: ICD-10-CM

## 2022-09-30 DIAGNOSIS — N18.4 STAGE 4 CHRONIC KIDNEY DISEASE: ICD-10-CM

## 2022-09-30 NOTE — TELEPHONE ENCOUNTER
----- Message from Padma Mead sent at 9/30/2022  2:38 PM CDT -----  Contact: [phone]  Requesting an RX refill or new RX.  Is this a refill or new RX: refill  RX name and strength (copy/paste from chart):  blood sugar diagnostic (ONETOUCH ULTRA TEST) Strp  Is this a 30 day or 90 day RX: 30  Pharmacy name and phone # (copy/paste from chart):  [facility] #[id number] - [place], [place] - [address] AT [address]  [address] [facility] [address]  Phone: [phone] Fax: [phone]        The doctors have asked that we provide their patients with the following 2 reminders -- prescription refills can take up to 72 hours, and a friendly reminder that in the future you can use your [facility] account to request refills: aware

## 2022-09-30 NOTE — TELEPHONE ENCOUNTER
----- Message from Tomás Andres MD sent at 9/29/2022  7:03 PM CDT -----  Regarding: Flying  Contact: [phone] Patient  The patient recently saw her Cardiologist. She appears to be stable. She should be fine to fly. Has she had any problems flying before?  ----- Message -----  From: Seda Mead MA  Sent: 9/21/2022  12:11 PM CDT  To: Tomás Andres MD    Please advise,  Thank You.    ----- Message -----  From: Jojo Og  Sent: 9/21/2022  10:44 AM CDT  To: Dmitry SMITH Staff    Pt is requesting a call back about her travelling to [place] on 10/06 to see her son. Pt is wanting to see if Dr Andres recommends her travelling. Pt states she will be flying with her 2 daughters and her son.

## 2022-09-30 NOTE — TELEPHONE ENCOUNTER
Refill Routing Note   Medication(s) are not appropriate for processing by [facility] for the following reason(s):      - Medication not previously prescribed by PCP    [facility] action(s):  Defer          Medication reconciliation completed: No     Appointments  past 12m or future 3m with PCP    Date Provider   Last Visit   8/18/2022 Tomás Andres MD   Next Visit   Visit date not found Tomás Andres MD   ED visits in past 90 days: 0        Note composed:5:07 PM 09/30/2022

## 2022-09-30 NOTE — TELEPHONE ENCOUNTER
No new care gaps identified.  [facility] Embedded Care Gaps. Reference number: [id number]. 9/30/2022   2:36:03 PM CDT  
57.2

## 2022-09-30 NOTE — TELEPHONE ENCOUNTER
No new care gaps identified.  [facility] Embedded Care Gaps. Reference number: [id number]. 9/30/2022   2:46:44 PM CDT

## 2022-10-02 RX ORDER — LANCETS 33 GAUGE
1 EACH MISCELLANEOUS DAILY
Qty: 100 EACH | Refills: 3 | Status: SHIPPED | OUTPATIENT
Start: 2022-10-02

## 2022-10-13 ENCOUNTER — LAB VISIT (OUTPATIENT)
Dept: LAB | Facility: HOSPITAL | Age: 87
End: 2022-10-13
Attending: INTERNAL MEDICINE
Payer: MEDICARE

## 2022-10-13 DIAGNOSIS — N18.4 STAGE 4 CHRONIC KIDNEY DISEASE: ICD-10-CM

## 2022-10-13 LAB
ALBUMIN SERPL BCP-MCNC: 3.6 G/DL (ref 3.5–5.2)
ALP SERPL-CCNC: 110 U/L (ref 55–135)
ALT SERPL W/O P-5'-P-CCNC: 33 U/L (ref 10–44)
ANION GAP SERPL CALC-SCNC: 6 MMOL/L (ref 8–16)
AST SERPL-CCNC: 34 U/L (ref 10–40)
BASOPHILS # BLD AUTO: 0.05 K/UL (ref 0–0.2)
BASOPHILS NFR BLD: 0.9 % (ref 0–1.9)
BILIRUB SERPL-MCNC: 0.5 MG/DL (ref 0.1–1)
BILIRUB UR QL STRIP: NEGATIVE
BUN SERPL-MCNC: 27 MG/DL (ref 10–30)
CALCIUM SERPL-MCNC: 9.8 MG/DL (ref 8.7–10.5)
CHLORIDE SERPL-SCNC: 107 MMOL/L (ref 95–110)
CLARITY UR REFRACT.AUTO: CLEAR
CO2 SERPL-SCNC: 29 MMOL/L (ref 23–29)
COLOR UR AUTO: YELLOW
CREAT SERPL-MCNC: 1.5 MG/DL (ref 0.5–1.4)
DIFFERENTIAL METHOD: ABNORMAL
EOSINOPHIL # BLD AUTO: 0.2 K/UL (ref 0–0.5)
EOSINOPHIL NFR BLD: 3.2 % (ref 0–8)
ERYTHROCYTE [DISTWIDTH] IN BLOOD BY AUTOMATED COUNT: 14.5 % (ref 11.5–14.5)
EST. GFR  (NO RACE VARIABLE): 31.5 ML/MIN/1.73 M^2
GLUCOSE SERPL-MCNC: 162 MG/DL (ref 70–110)
GLUCOSE UR QL STRIP: NEGATIVE
HCT VFR BLD AUTO: 34.6 % (ref 37–48.5)
HGB BLD-MCNC: 10.8 G/DL (ref 12–16)
HGB UR QL STRIP: NEGATIVE
IMM GRANULOCYTES # BLD AUTO: 0.02 K/UL (ref 0–0.04)
IMM GRANULOCYTES NFR BLD AUTO: 0.4 % (ref 0–0.5)
KETONES UR QL STRIP: NEGATIVE
LEUKOCYTE ESTERASE UR QL STRIP: NEGATIVE
LYMPHOCYTES # BLD AUTO: 1.2 K/UL (ref 1–4.8)
LYMPHOCYTES NFR BLD: 20.8 % (ref 18–48)
MCH RBC QN AUTO: 29.8 PG (ref 27–31)
MCHC RBC AUTO-ENTMCNC: 31.2 G/DL (ref 32–36)
MCV RBC AUTO: 95 FL (ref 82–98)
MONOCYTES # BLD AUTO: 0.8 K/UL (ref 0.3–1)
MONOCYTES NFR BLD: 15 % (ref 4–15)
NEUTROPHILS # BLD AUTO: 3.3 K/UL (ref 1.8–7.7)
NEUTROPHILS NFR BLD: 59.7 % (ref 38–73)
NITRITE UR QL STRIP: NEGATIVE
NRBC BLD-RTO: 0 /100 WBC
PH UR STRIP: 7 [PH] (ref 5–8)
PLATELET # BLD AUTO: 161 K/UL (ref 150–450)
PMV BLD AUTO: 12.1 FL (ref 9.2–12.9)
POTASSIUM SERPL-SCNC: 4.3 MMOL/L (ref 3.5–5.1)
PROT SERPL-MCNC: 6.2 G/DL (ref 6–8.4)
PROT UR QL STRIP: NEGATIVE
PTH-INTACT SERPL-MCNC: 154.6 PG/ML (ref 9–77)
RBC # BLD AUTO: 3.63 M/UL (ref 4–5.4)
SODIUM SERPL-SCNC: 142 MMOL/L (ref 136–145)
SP GR UR STRIP: 1.01 (ref 1–1.03)
URN SPEC COLLECT METH UR: NORMAL
WBC # BLD AUTO: 5.59 K/UL (ref 3.9–12.7)

## 2022-10-13 PROCEDURE — 83970 ASSAY OF PARATHORMONE: CPT | Performed by: INTERNAL MEDICINE

## 2022-10-13 PROCEDURE — 85025 COMPLETE CBC W/AUTO DIFF WBC: CPT | Performed by: INTERNAL MEDICINE

## 2022-10-13 PROCEDURE — 36415 COLL VENOUS BLD VENIPUNCTURE: CPT | Mod: PN | Performed by: INTERNAL MEDICINE

## 2022-10-13 PROCEDURE — 81003 URINALYSIS AUTO W/O SCOPE: CPT | Performed by: INTERNAL MEDICINE

## 2022-10-13 PROCEDURE — 80053 COMPREHEN METABOLIC PANEL: CPT | Performed by: INTERNAL MEDICINE

## 2022-10-20 ENCOUNTER — OFFICE VISIT (OUTPATIENT)
Dept: NEPHROLOGY | Facility: CLINIC | Age: 87
End: 2022-10-20
Payer: MEDICARE

## 2022-10-20 VITALS
WEIGHT: 153.88 LBS | BODY MASS INDEX: 23.32 KG/M2 | OXYGEN SATURATION: 99 % | HEIGHT: 68 IN | SYSTOLIC BLOOD PRESSURE: 120 MMHG | DIASTOLIC BLOOD PRESSURE: 62 MMHG | HEART RATE: 67 BPM

## 2022-10-20 DIAGNOSIS — Q61.02 MULTIPLE RENAL CYSTS: ICD-10-CM

## 2022-10-20 DIAGNOSIS — N18.4 STAGE 4 CHRONIC KIDNEY DISEASE: ICD-10-CM

## 2022-10-20 PROCEDURE — 99999 PR PBB SHADOW E&M-EST. PATIENT-LVL III: ICD-10-PCS | Mod: PBBFAC,GC,, | Performed by: INTERNAL MEDICINE

## 2022-10-20 PROCEDURE — 99999 PR PBB SHADOW E&M-EST. PATIENT-LVL III: CPT | Mod: PBBFAC,GC,, | Performed by: INTERNAL MEDICINE

## 2022-10-20 NOTE — PROGRESS NOTES
Nephrology Clinic Note   10/20/2022    CC: establish care for CKD 3      History of present illness:  Patient is a 89 y.o. female. With history of DM, HTN, new to me presents to establish care for CKD 3 scr at 1.5 at baseline has been stable for years. Patient denies SOB, no LE edema. Denies any NSAID use , no recent contrast exposure, no lithium use . Denies any urinary symptoms. She knows that she has been having kidney disease for all these years and was told about it by her primary care doctor       Review of Systems   All other systems reviewed and are negative.    History:  Past Medical History:   Diagnosis Date    Allergy     Anemia     Arthritis     Asthma     Chronic kidney disease     Degenerative disc disease     Diabetes mellitus type II     Diastolic heart failure 05/02/2017    Essential hypertension 7/3/2012    Glaucoma     History of insulin dependent diabetes mellitus, for many years stopped because of chronic kidney November 2020.  9/9/2021    History of pseudogout 8/23/2012    Hyperlipidemia     Hypertension     Iritis     Myocardial infarction     Pneumonia     Thrombocytopenia 8/24/2021    Thyroid disease       Past Surgical History:   Procedure Laterality Date    CARDIAC CATHETERIZATION  2010    no obstructive disease    CATARACT EXTRACTION W/  INTRAOCULAR LENS IMPLANT Left n/a    CATARACT EXTRACTION W/  INTRAOCULAR LENS IMPLANT Right 10/8/2018    With Femtosecond LASER assist (Dr. Henson)    CHOLECYSTECTOMY      ESOPHAGOGASTRODUODENOSCOPY N/A 11/4/2020    Procedure: EGD (ESOPHAGOGASTRODUODENOSCOPY);  Surgeon: Tomás Mccall MD;  Location: [facility] ([address] [place] [address]);  Service: Endoscopy;  Laterality: N/A;    EYE SURGERY      gall stone      HYSTERECTOMY      VARICOSE VEIN SURGERY          Current Outpatient Medications:     acetaminophen (TYLENOL) 500 MG tablet, Take 2 tablets (1,000 mg total) by mouth 3 (three) times daily as needed for Pain (muscle pain in arms and legs)., Disp: 100 tablet, Rfl: 1     albuterol (PROVENTIL) 2.5 mg /3 mL (0.083 %) nebulizer solution, Take 3 mLs (2.5 mg total) by nebulization every 6 (six) hours as needed for Wheezing. Rescue, Disp: 25 each, Rfl: 1    albuterol (PROVENTIL/VENTOLIN HFA) 90 mcg/actuation inhaler, Inhale 2 puffs into the lungs every 4 (four) hours as needed for Wheezing., Disp: 18 g, Rfl: 6    albuterol (PROVENTIL/VENTOLIN HFA) 90 mcg/actuation inhaler, Inhale 1-2 puffs into the lungs every 6 (six) hours as needed for Wheezing. Rescue, Disp: 6.7 g, Rfl: 0    amLODIPine (NORVASC) 5 MG tablet, TAKE 1 TABLET(5 MG) BY MOUTH EVERY MORNING, Disp: 90 tablet, Rfl: 3    aspirin 81 MG Chew, Take 1 tablet (81 mg total) by mouth every morning., Disp: 100 tablet, Rfl: 3    atorvastatin (LIPITOR) 40 MG tablet, Take 1 tablet (40 mg total) by mouth every evening. To lower cholesterol and for heart and blood vessels, Disp: 90 tablet, Rfl: 3    blood sugar diagnostic (ONETOUCH ULTRA TEST) Strp, Inject 1 strip into the skin once daily. No longer on insulin because of CKD, Disp: 100 each, Rfl: 4    clotrimazole-betamethasone 1-0.05% (LOTRISONE) cream, Apply topically 2 (two) times daily as needed. For intertriginous itching in the bend of the leg, Disp: 45 g, Rfl: 3    diclofenac sodium (VOLTAREN) 1 % Gel, Apply 2 g topically 2 (two) times daily., Disp: 100 g, Rfl: 0    dorzolamide (TRUSOPT) 2 % ophthalmic solution, Place 1 drop into both eyes 2 (two) times daily., Disp: 10 mL, Rfl: 4    fluticasone-salmeterol diskus inhaler 250-50 mcg, Inhale 1 puff into the lungs 2 (two) times daily. Controller Patient wants name brand ADVAIR, Disp: 60 each, Rfl: 11    furosemide (LASIX) 40 MG tablet, TAKE 1 TABLET BY MOUTH EVERY MORNING AND 1 AT 4 PM, Disp: 180 tablet, Rfl: 1    insulin aspart U-100 (NOVOLOG FLEXPEN U-100 INSULIN) 100 unit/mL (3 mL) InPn pen, Inject if blood sugar is >180, 180-230+2, 231-280+4, 281-330+6, 331-380+8,>380+10 MAX daily 30 units., Disp: 15 mL, Rfl: 2    isosorbide  "mononitrate (IMDUR) 60 MG 24 hr tablet, TAKE ONE TABLET BY MOUTH EVERY MORNING FOR HEART, TO PREVENT CHEST PAIN AND SHORTNESS OF BREATH, Disp: 90 tablet, Rfl: 3    lancets (TRUEPLUS LANCETS) 33 gauge Misc, Apply 1 lancet topically once daily. No longer on insulin because of CKD, Disp: 100 each, Rfl: 3    latanoprost 0.005 % ophthalmic solution, INSTILL 1 DROP IN BOTH EYES EVERY EVENING, Disp: 10 mL, Rfl: 12    levothyroxine (SYNTHROID) 88 MCG tablet, Take 1 tablet (88 mcg total) by mouth before breakfast., Disp: 90 tablet, Rfl: 1    meclizine (ANTIVERT) 12.5 mg tablet, TAKE 1 TABLET(12.5 MG) BY MOUTH THREE TIMES DAILY AS NEEDED FOR DIZZINESS Strength: 12.5 mg, Disp: 20 tablet, Rfl: 0    metoprolol tartrate (LOPRESSOR) 25 MG tablet, TAKE 1 TABLET(25 MG) BY MOUTH TWICE DAILY, Disp: 180 tablet, Rfl: 3    multivit-mineral-iron-lutein Tab, Take 1 tablet by mouth once daily., Disp: , Rfl:     nitroGLYCERIN (NITROSTAT) 0.4 MG SL tablet, ONE TABLET UNDER THE TONGUE EVERY 5 MINUTES AS NEEDED FOR CHEST PAIN, Disp: 100 tablet, Rfl: 3    nystatin (MYCOSTATIN) powder, Apply topically 4 (four) times daily., Disp: 60 g, Rfl: 3    pen needle, diabetic (BD ULTRA-FINE SHORT PEN NEEDLE) 31 gauge x 5/16" Ndle, USE WITH INSULIN PENS up to 3 x a day., Disp: 100 each, Rfl: 2    polyethylene glycol (GLYCOLAX) 17 gram/dose powder, Take 17 g by mouth daily as needed (CONSTIPATION)., Disp: , Rfl:     psyllium (METAMUCIL) powder, Take 1 packet by mouth daily as needed (CONSTIPATION)., Disp: , Rfl:     SHINGRIX, PF, 50 mcg/0.5 mL injection, , Disp: , Rfl:     triamcinolone acetonide 0.025% (KENALOG) 0.025 % Oint, Apply topically 2 (two) times daily., Disp: 80 g, Rfl: 2  Review of patient's allergies indicates:   Allergen Reactions    Codeine Itching and Nausea Only             Social History     Tobacco Use    Smoking status: Never    Smokeless tobacco: Never   Substance Use Topics    Alcohol use: No      Family History   Problem Relation Age " of Onset    Diabetes Mother     Hypertension Mother     Glaucoma Mother     Hypertension Father     Diabetes Son     No Known Problems Daughter     Diabetes Daughter         borderline    No Known Problems Son     Melanoma Neg Hx         Physical Exam :  There were no vitals filed for this visit.  Physical Exam  Vitals reviewed.   Constitutional:       Appearance: Normal appearance.   HENT:      Head: Normocephalic and atraumatic.      Mouth/Throat:      Mouth: Mucous membranes are moist.   Eyes:      Conjunctiva/sclera: Conjunctivae normal.      Pupils: Pupils are equal, round, and reactive to light.   Cardiovascular:      Rate and Rhythm: Normal rate and regular rhythm.      Heart sounds: Normal heart sounds.   Pulmonary:      Effort: Pulmonary effort is normal.      Breath sounds: Normal breath sounds.   Abdominal:      General: Bowel sounds are normal.      Palpations: Abdomen is soft.      Tenderness: There is no abdominal tenderness. There is no right CVA tenderness, left CVA tenderness or guarding.   Musculoskeletal:         General: No swelling.      Right lower leg: No edema.      Left lower leg: No edema.   Skin:     Coloration: Skin is not jaundiced.      Findings: No rash.   Neurological:      General: No focal deficit present.   Psychiatric:         Mood and Affect: Mood normal.         Behavior: Behavior normal.       Labs reviewed   Images Reviewed    Assessment:    CKD 3 secondary to Dm/HTN/ nephron loss from advanced age:   Bicarb 29 off bicarbonate   Electrolytes within normal   Renal US from 2012 showed simple cysts and non obstructing nephrolithiasis  , phos 3.4 off binders. At goal   No proteinuria   No RBCs on UA  Advised to avoid NSAIDS  DM control per primary   HTN controlled     CKD-MBD  See CKD    DM and HTN: plan per primary     RTC in 6 months

## 2022-10-31 ENCOUNTER — TELEPHONE (OUTPATIENT)
Dept: INTERNAL MEDICINE | Facility: CLINIC | Age: 87
End: 2022-10-31
Payer: MEDICARE

## 2022-11-14 DIAGNOSIS — I25.10 CORONARY ARTERY DISEASE INVOLVING NATIVE CORONARY ARTERY OF NATIVE HEART WITHOUT ANGINA PECTORIS: ICD-10-CM

## 2022-11-14 RX ORDER — ATORVASTATIN CALCIUM 40 MG/1
40 TABLET, FILM COATED ORAL NIGHTLY
Qty: 90 TABLET | Refills: 3 | Status: SHIPPED | OUTPATIENT
Start: 2022-11-14 | End: 2023-10-09

## 2022-11-14 NOTE — TELEPHONE ENCOUNTER
No new care gaps identified.  [facility] Embedded Care Gaps. Reference number: [id number]. 11/14/2022   10:10:35 AM CST

## 2022-11-17 ENCOUNTER — OFFICE VISIT (OUTPATIENT)
Dept: PODIATRY | Facility: CLINIC | Age: 87
End: 2022-11-17
Payer: MEDICARE

## 2022-11-17 VITALS
WEIGHT: 153.88 LBS | HEIGHT: 68 IN | SYSTOLIC BLOOD PRESSURE: 123 MMHG | HEART RATE: 67 BPM | DIASTOLIC BLOOD PRESSURE: 67 MMHG | BODY MASS INDEX: 23.32 KG/M2

## 2022-11-17 DIAGNOSIS — E11.42 DIABETIC POLYNEUROPATHY ASSOCIATED WITH TYPE 2 DIABETES MELLITUS: Primary | ICD-10-CM

## 2022-11-17 DIAGNOSIS — B35.1 ONYCHOMYCOSIS DUE TO DERMATOPHYTE: ICD-10-CM

## 2022-11-17 DIAGNOSIS — I73.9 PERIPHERAL VASCULAR DISEASE: ICD-10-CM

## 2022-11-17 DIAGNOSIS — L84 CORN OR CALLUS: ICD-10-CM

## 2022-11-17 PROCEDURE — 11721 PR DEBRIDEMENT OF NAILS, 6 OR MORE: ICD-10-PCS | Mod: Q9,59,S$GLB, | Performed by: PODIATRIST

## 2022-11-17 PROCEDURE — 1159F PR MEDICATION LIST DOCUMENTED IN MEDICAL RECORD: ICD-10-PCS | Mod: CPTII,S$GLB,, | Performed by: PODIATRIST

## 2022-11-17 PROCEDURE — 11056 PR TRIM BENIGN HYPERKERATOTIC SKIN LESION,2-4: ICD-10-PCS | Mod: Q9,S$GLB,, | Performed by: PODIATRIST

## 2022-11-17 PROCEDURE — 11056 PARNG/CUTG B9 HYPRKR LES 2-4: CPT | Mod: Q9,S$GLB,, | Performed by: PODIATRIST

## 2022-11-17 PROCEDURE — 99499 NO LOS: ICD-10-PCS | Mod: S$GLB,,, | Performed by: PODIATRIST

## 2022-11-17 PROCEDURE — 99999 PR PBB SHADOW E&M-EST. PATIENT-LVL IV: CPT | Mod: PBBFAC,,, | Performed by: PODIATRIST

## 2022-11-17 PROCEDURE — 11721 DEBRIDE NAIL 6 OR MORE: CPT | Mod: Q9,59,S$GLB, | Performed by: PODIATRIST

## 2022-11-17 PROCEDURE — 1159F MED LIST DOCD IN RCRD: CPT | Mod: CPTII,S$GLB,, | Performed by: PODIATRIST

## 2022-11-17 PROCEDURE — 1126F PR PAIN SEVERITY QUANTIFIED, NO PAIN PRESENT: ICD-10-PCS | Mod: CPTII,S$GLB,, | Performed by: PODIATRIST

## 2022-11-17 PROCEDURE — 1126F AMNT PAIN NOTED NONE PRSNT: CPT | Mod: CPTII,S$GLB,, | Performed by: PODIATRIST

## 2022-11-17 PROCEDURE — 99999 PR PBB SHADOW E&M-EST. PATIENT-LVL IV: ICD-10-PCS | Mod: PBBFAC,,, | Performed by: PODIATRIST

## 2022-11-17 PROCEDURE — 99499 UNLISTED E&M SERVICE: CPT | Mod: S$GLB,,, | Performed by: PODIATRIST

## 2022-11-17 RX ORDER — LEVOTHYROXINE SODIUM 75 UG/1
TABLET ORAL
COMMUNITY
Start: 2022-08-09 | End: 2023-04-10

## 2022-11-17 RX ORDER — ONDANSETRON 4 MG/1
TABLET, ORALLY DISINTEGRATING ORAL
COMMUNITY
Start: 2022-09-23 | End: 2023-02-13

## 2022-11-17 NOTE — PROGRESS NOTES
Subjective:      Patient ID: Tiana Mead is a 89 y.o. female.    Chief Complaint: Diabetes Mellitus and Nail Care    Tiana is a 89 y.o. female who presents to the clinic for evaluation and treatment of high risk feet. Tiana has a past medical history of Allergy, Anemia, Arthritis, Asthma, Chronic kidney disease, Degenerative disc disease, Diabetes mellitus type II, Diastolic heart failure (05/02/2017), Essential hypertension (7/3/2012), Glaucoma, History of insulin dependent diabetes mellitus, for many years stopped because of chronic kidney November 2020.  (9/9/2021), History of pseudogout (8/23/2012), Hyperlipidemia, Hypertension, Iritis, Myocardial infarction, Pneumonia, Thrombocytopenia (8/24/2021), and Thyroid disease. The patient's chief complaint is long, thick toenails and calluses on both feet. Routine trimming helps. No other pedal concerns today.  This patient has documented high risk feet requiring routine maintenance secondary to diabetes mellitis and those secondary complications of diabetes, as mentioned..    PCP: Tomás Andres MD    Date Last Seen by PCP:   Chief Complaint   Patient presents with    Diabetes Mellitus    Nail Care         Current shoe gear:  Dm shoes      Hemoglobin A1C   Date Value Ref Range Status   08/18/2022 6.9 (H) 4.0 - 5.6 % Final     Comment:     ADA Screening Guidelines:  5.7-6.4%  Consistent with prediabetes  >or=6.5%  Consistent with diabetes    High levels of fetal hemoglobin interfere with the HbA1C  assay. Heterozygous hemoglobin variants (HbS, HgC, etc)do  not significantly interfere with this assay.   However, presence of multiple variants may affect accuracy.     08/16/2022 6.6 (H) 4.0 - 5.6 % Final     Comment:     ADA Screening Guidelines:  5.7-6.4%  Consistent with prediabetes  >or=6.5%  Consistent with diabetes    High levels of fetal hemoglobin interfere with the HbA1C  assay. Heterozygous hemoglobin variants (HbS, HgC, etc)do  not significantly  interfere with this assay.   However, presence of multiple variants may affect accuracy.     05/16/2022 8.0 (H) 4.0 - 5.6 % Final     Comment:     ADA Screening Guidelines:  5.7-6.4%  Consistent with prediabetes  >or=6.5%  Consistent with diabetes    High levels of fetal hemoglobin interfere with the HbA1C  assay. Heterozygous hemoglobin variants (HbS, HgC, etc)do  not significantly interfere with this assay.   However, presence of multiple variants may affect accuracy.               Patient Active Problem List   Diagnosis    Hyperlipidemia associated with type 2 diabetes mellitus    Generalized osteoarthritis of multiple sites    Chronic iritis - Left Eye    Osteoarthritis of both knees    Chondrocalcinosis    Peripheral angiopathy    Hyperuricemia    Helicobacter pylori gastritis    Old MI (myocardial infarction), 2013    Permanent atrial fibrillation    Long term current use of anticoagulant therapy, eliquis, stopped because of cryptic GI bleeding    History of chest pain at rest    Glaucoma associated with ocular trauma    H/O Deep vein thrombosis (DVT)    Aortic atherosclerosis    Chronic diastolic congestive heart failure    Type 2 diabetes mellitus with diabetic polyneuropathy, without long-term current use of insulin    Primary hypothyroidism    Moderate persistent asthma without complication    Primary open angle glaucoma (POAG) of both eyes, moderate stage    Pulmonary hypertension    Non-rheumatic tricuspid valve insufficiency    Chest pain at rest    Allergic conjunctivitis of both eyes    Pseudogout, knees    Asthma exacerbation in COPD    Dry eye syndrome of both eyes    Stage 4 chronic kidney disease    Acute on chronic blood loss anemia, Sept 2020 anticoagulation discontinued    Coronary artery disease involving native coronary artery of native heart without angina pectoris    Myalgia, since April 20221 both arms and both legs    History of insulin dependent diabetes mellitus, for many years stopped  because of chronic kidney November 2020.     Hordeolum internum of left lower eyelid       Current Outpatient Medications on File Prior to Visit   Medication Sig Dispense Refill    acetaminophen (TYLENOL) 500 MG tablet Take 2 tablets (1,000 mg total) by mouth 3 (three) times daily as needed for Pain (muscle pain in arms and legs). 100 tablet 1    albuterol (PROVENTIL) 2.5 mg /3 mL (0.083 %) nebulizer solution Take 3 mLs (2.5 mg total) by nebulization every 6 (six) hours as needed for Wheezing. Rescue 25 each 1    albuterol (PROVENTIL/VENTOLIN HFA) 90 mcg/actuation inhaler Inhale 2 puffs into the lungs every 4 (four) hours as needed for Wheezing. 18 g 6    albuterol (PROVENTIL/VENTOLIN HFA) 90 mcg/actuation inhaler Inhale 1-2 puffs into the lungs every 6 (six) hours as needed for Wheezing. Rescue 6.7 g 0    amLODIPine (NORVASC) 5 MG tablet TAKE 1 TABLET(5 MG) BY MOUTH EVERY MORNING 90 tablet 3    aspirin 81 MG Chew Take 1 tablet (81 mg total) by mouth every morning. 100 tablet 3    atorvastatin (LIPITOR) 40 MG tablet Take 1 tablet (40 mg total) by mouth every evening. To lower cholesterol and for heart and blood vessels 90 tablet 3    blood sugar diagnostic (ONETOUCH ULTRA TEST) Strp Inject 1 strip into the skin once daily. No longer on insulin because of  each 4    clotrimazole-betamethasone 1-0.05% (LOTRISONE) cream Apply topically 2 (two) times daily as needed. For intertriginous itching in the bend of the leg 45 g 3    dorzolamide (TRUSOPT) 2 % ophthalmic solution Place 1 drop into both eyes 2 (two) times daily. 10 mL 4    fluticasone-salmeterol diskus inhaler 250-50 mcg Inhale 1 puff into the lungs 2 (two) times daily. Controller Patient wants name brand ADVAIR 60 each 11    furosemide (LASIX) 40 MG tablet TAKE 1 TABLET BY MOUTH EVERY MORNING AND 1 AT 4  tablet 1    insulin aspart U-100 (NOVOLOG FLEXPEN U-100 INSULIN) 100 unit/mL (3 mL) InPn pen Inject if blood sugar is >180, 180-230+2, 231-280+4,  "281-330+6, 331-380+8,>380+10 MAX daily 30 units. 15 mL 2    isosorbide mononitrate (IMDUR) 60 MG 24 hr tablet TAKE ONE TABLET BY MOUTH EVERY MORNING FOR HEART, TO PREVENT CHEST PAIN AND SHORTNESS OF BREATH 90 tablet 3    lancets (TRUEPLUS LANCETS) 33 gauge Misc Apply 1 lancet topically once daily. No longer on insulin because of  each 3    latanoprost 0.005 % ophthalmic solution INSTILL 1 DROP IN BOTH EYES EVERY EVENING 10 mL 12    levothyroxine (SYNTHROID) 75 MCG tablet       levothyroxine (SYNTHROID) 88 MCG tablet Take 1 tablet (88 mcg total) by mouth before breakfast. 90 tablet 3    meclizine (ANTIVERT) 12.5 mg tablet TAKE 1 TABLET(12.5 MG) BY MOUTH THREE TIMES DAILY AS NEEDED FOR DIZZINESS  Strength: 12.5 mg 20 tablet 0    metoprolol tartrate (LOPRESSOR) 25 MG tablet TAKE 1 TABLET(25 MG) BY MOUTH TWICE DAILY 180 tablet 3    multivit-mineral-iron-lutein Tab Take 1 tablet by mouth once daily.      nitroGLYCERIN (NITROSTAT) 0.4 MG SL tablet ONE TABLET UNDER THE TONGUE EVERY 5 MINUTES AS NEEDED FOR CHEST PAIN 100 tablet 3    nystatin (MYCOSTATIN) powder Apply topically 4 (four) times daily. 60 g 3    ondansetron (ZOFRAN-ODT) 4 MG TbDL       pen needle, diabetic (BD ULTRA-FINE SHORT PEN NEEDLE) 31 gauge x 5/16" Ndle USE WITH INSULIN PENS up to 3 x a day. 100 each 2    polyethylene glycol (GLYCOLAX) 17 gram/dose powder Take 17 g by mouth daily as needed (CONSTIPATION).      psyllium (METAMUCIL) powder Take 1 packet by mouth daily as needed (CONSTIPATION).      SHINGRIX, PF, 50 mcg/0.5 mL injection       triamcinolone acetonide 0.025% (KENALOG) 0.025 % Oint Apply topically 2 (two) times daily. 80 g 2    diclofenac sodium (VOLTAREN) 1 % Gel Apply 2 g topically 2 (two) times daily. 100 g 0     No current facility-administered medications on file prior to visit.       Review of patient's allergies indicates:   Allergen Reactions    Codeine      Other reaction(s): Itching  Other reaction(s): Nausea       Past " "Surgical History:   Procedure Laterality Date    CARDIAC CATHETERIZATION  2010    no obstructive disease    CATARACT EXTRACTION W/  INTRAOCULAR LENS IMPLANT Left n/a    CATARACT EXTRACTION W/  INTRAOCULAR LENS IMPLANT Right 10/8/2018    With Femtosecond LASER assist (Dr. Henson)    CHOLECYSTECTOMY      ESOPHAGOGASTRODUODENOSCOPY N/A 11/4/2020    Procedure: EGD (ESOPHAGOGASTRODUODENOSCOPY);  Surgeon: Tomás Mccall MD;  Location: [address]);  Service: Endoscopy;  Laterality: N/A;    EYE SURGERY      gall stone      HYSTERECTOMY      VARICOSE VEIN SURGERY         Family History   Problem Relation Age of Onset    Diabetes Mother     Hypertension Mother     Glaucoma Mother     Hypertension Father     Diabetes Son     No Known Problems Daughter     Diabetes Daughter         borderline    No Known Problems Son     Melanoma Neg Hx        Social History     Socioeconomic History    Marital status:    Tobacco Use    Smoking status: Never    Smokeless tobacco: Never   Substance and Sexual Activity    Alcohol use: No    Drug use: No    Sexual activity: Never           Review of Systems   Constitutional: Negative for chills, decreased appetite and fever.   Cardiovascular:  Negative for chest pain, claudication and leg swelling.   Respiratory:  Negative for cough, hemoptysis and shortness of breath.    Skin:  Positive for dry skin and nail changes. Negative for color change, flushing, itching, poor wound healing, rash and suspicious lesions.   Musculoskeletal:  Negative for back pain, falls, gout, joint pain, joint swelling and myalgias.   Gastrointestinal:  Negative for nausea and vomiting.   Neurological:  Negative for loss of balance, numbness and paresthesias.         Objective:       Vitals:    11/17/22 0846   BP: 123/67   Pulse: 67   Weight: 69.8 kg (153 lb 14.1 oz)   Height: 5' 8" (1.727 m)   PainSc: 0-No pain        Physical Exam  Vitals and nursing note reviewed.   Constitutional:       Appearance: She is " well-developed.   Cardiovascular:      Comments: Dorsalis pedis and posterior tibial pulses are diminished Bilaterally. Toes are cool to touch. Feet are warm proximally.There is decreased digital hair. Skin is atrophic, slightly hyperpigmented, and mildly edematous      Musculoskeletal:         General: No tenderness, deformity or signs of injury. Normal range of motion.      Comments: Adequate joint range of motion without pain, limitation, nor crepitation Bilateral feet and ankle joints. Muscle strength is 5/5 in all groups bilaterally.      Flexible pes planus foot type w/ medial arch collapse and mild gastroc equinus      Skin:     General: Skin is warm and dry.      Coloration: Skin is not ashen, cyanotic or pale.      Findings: No ecchymosis, erythema or lesion.      Comments: Nails x10 are elongated by  3-7 mm's, thickened by 2-5 mm's, dystrophic, and are darkened in  coloration . Xerosis Bilaterally. No open lesions noted.    Hyperkeratotic tissue noted to lateral 5th toe b/l   Neurological:      Mental Status: She is alert and oriented to person, place, and time.      Comments: Mackay-Yolande 5.07 monofilamant testing is diminished Ney feet. Sharp/dull sensation diminished Bilaterally. Light touch absent Bilaterally.       Psychiatric:         Behavior: Behavior normal.             Assessment:       Encounter Diagnoses   Name Primary?    Diabetic polyneuropathy associated with type 2 diabetes mellitus Yes    Peripheral vascular disease     Onychomycosis due to dermatophyte     Corn or callus          Plan:       Tiana was seen today for diabetes mellitus and nail care.    Diagnoses and all orders for this visit:    Diabetic polyneuropathy associated with type 2 diabetes mellitus    Peripheral vascular disease    Onychomycosis due to dermatophyte    Corn or callus    I counseled the patient on her conditions, their implications and medical management.    Shoe inspection. Diabetic Foot Education. Patient  reminded of the importance of good nutrition and blood sugar control to help prevent podiatric complications of diabetes. Patient instructed on proper foot hygeine. We discussed wearing proper shoe gear, daily foot inspections, never walking without protective shoe gear, never putting sharp instruments to feet    - With patient's permission, nails were aggressively reduced and debrided x 10 to their soft tissue attachment mechanically and with electric , removing all offending nail and debris. Patient relates relief following the procedure. She will continue to monitor the areas daily, inspect her feet, wear protective shoe gear when ambulatory, moisturizer to maintain skin integrity and follow in this office in approximately 2-3 months, sooner p.r.n.    - After cleansing the  area w/ alcohol prep pad the above mentioned hyperkeratosis was trimmed utilizing No 15 scapel, to a smooth base with out incident. Patient tolerated this  well and reported comfort to the area of x2    - Return to clinic in 3m or sooner if problems arise

## 2022-11-21 ENCOUNTER — PES CALL (OUTPATIENT)
Dept: ADMINISTRATIVE | Facility: OTHER | Age: 87
End: 2022-11-21
Payer: MEDICARE

## 2022-12-14 ENCOUNTER — TELEPHONE (OUTPATIENT)
Dept: PRIMARY CARE CLINIC | Facility: CLINIC | Age: 87
End: 2022-12-14
Payer: MEDICARE

## 2022-12-14 NOTE — TELEPHONE ENCOUNTER
----- Message from Jairo Mott sent at 12/14/2022 11:18 AM CST -----  Contact: [phone]  Pt needs a call back about her appt on 12/27/22 Pt need it on Monday 12/26/22. Please call pt back.

## 2022-12-21 ENCOUNTER — TELEPHONE (OUTPATIENT)
Dept: OPHTHALMOLOGY | Facility: CLINIC | Age: 87
End: 2022-12-21
Payer: MEDICARE

## 2022-12-21 ENCOUNTER — PES CALL (OUTPATIENT)
Dept: ADMINISTRATIVE | Facility: CLINIC | Age: 87
End: 2022-12-21
Payer: MEDICARE

## 2022-12-21 NOTE — TELEPHONE ENCOUNTER
----- Message from Raphael Rivera sent at 12/21/2022  9:51 AM CST -----  Contact: [phone] or [phone]  Pt is calling to schedule her 6 month recall. There were no dates available. Please call back to further assist.

## 2022-12-21 NOTE — PROGRESS NOTES
[facility] Note    Chief Complaint      Chief Complaint   Patient presents with    Health Risk Assessment       History of Present Illness      Tiana Mead is a 89 y.o. female who presents today for   Chief Complaint   Patient presents with    Health Risk Assessment         HPI   Mrs. Mead is a very pleasant 89 y/o female who presents with her daughter, Yolanda, to clinic today for her annual wellness exam. Patient reports feeling well today. She ambulates with assistance of a rollator. She reports being depressed today because she recently lost her best friend of 60 years. She denies any SOB, chest pain, N/V, unintentional weight loss, loss of appetite, fatigue, diarrhea, constipation. She is active daily and remains independent with ADL's.   Review of Systems   Constitutional: Negative.    HENT: Negative.     Eyes: Negative.    Respiratory: Negative.     Cardiovascular: Negative.    Gastrointestinal: Negative.    Genitourinary:         + Patient wears incontinent pads   Musculoskeletal:         + right hip and knee pain related to arthritis.   Skin: Negative.    Neurological: Negative.    Endo/Heme/Allergies: Negative.    Psychiatric/Behavioral: Negative.     All 12 systems otherwise negative.     Family History:  family history includes Atrial fibrillation in her daughter; Diabetes in her daughter, mother, paternal grandfather, sister, sister, and son; Glaucoma in her mother; Heart attack in her sister; Heart disease in her brother, brother, sister, and son; Hypertension in her daughter, daughter, father, mother, sister, and son; Hypotension in her sister; Kidney disease in her mother, sister, and sister; Leukemia in her sister; No Known Problems in her brother, maternal grandfather, maternal grandmother, paternal grandmother, sister, sister, and son.     Medications:  Outpatient Encounter Medications as of 12/27/2022   Medication Sig Note Dispense Refill    acetaminophen (TYLENOL) 500 MG  tablet Take 2 tablets (1,000 mg total) by mouth 3 (three) times daily as needed for Pain (muscle pain in arms and legs).  100 tablet 1    albuterol (PROVENTIL) 2.5 mg /3 mL (0.083 %) nebulizer solution Take 3 mLs (2.5 mg total) by nebulization every 6 (six) hours as needed for Wheezing. Rescue  25 each 1    albuterol (PROVENTIL/VENTOLIN HFA) 90 mcg/actuation inhaler Inhale 2 puffs into the lungs every 4 (four) hours as needed for Wheezing.  18 g 6    albuterol (PROVENTIL/VENTOLIN HFA) 90 mcg/actuation inhaler Inhale 1-2 puffs into the lungs every 6 (six) hours as needed for Wheezing. Rescue  6.7 g 0    amLODIPine (NORVASC) 5 MG tablet TAKE 1 TABLET(5 MG) BY MOUTH EVERY MORNING  90 tablet 3    aspirin 81 MG Chew Take 1 tablet (81 mg total) by mouth every morning.  100 tablet 3    atorvastatin (LIPITOR) 40 MG tablet Take 1 tablet (40 mg total) by mouth every evening. To lower cholesterol and for heart and blood vessels  90 tablet 3    blood sugar diagnostic (ONETOUCH ULTRA TEST) Strp Inject 1 strip into the skin once daily. No longer on insulin because of CKD  100 each 4    clotrimazole-betamethasone 1-0.05% (LOTRISONE) cream Apply topically 2 (two) times daily as needed. For intertriginous itching in the bend of the leg  45 g 3    diclofenac sodium (VOLTAREN) 1 % Gel APPLY 2 GRAMS EXTERNALLY TO THE AFFECTED AREA TWICE DAILY  100 g 0    dorzolamide (TRUSOPT) 2 % ophthalmic solution Place 1 drop into both eyes 2 (two) times daily.  10 mL 4    furosemide (LASIX) 40 MG tablet TAKE 1 TABLET BY MOUTH EVERY MORNING AND 1 AT 4 PM  180 tablet 1    insulin aspart U-100 (NOVOLOG FLEXPEN U-100 INSULIN) 100 unit/mL (3 mL) InPn pen Inject if blood sugar is >180, 180-230+2, 231-280+4, 281-330+6, 331-380+8,>380+10 MAX daily 30 units.  15 mL 2    isosorbide mononitrate (IMDUR) 60 MG 24 hr tablet TAKE ONE TABLET BY MOUTH EVERY MORNING FOR HEART, TO PREVENT CHEST PAIN AND SHORTNESS OF BREATH  90 tablet 3    lancets (TRUEPLUS LANCETS)  "33 gauge Misc Apply 1 lancet topically once daily. No longer on insulin because of CKD  100 each 3    latanoprost 0.005 % ophthalmic solution INSTILL 1 DROP IN BOTH EYES EVERY EVENING  10 mL 12    levothyroxine (SYNTHROID) 75 MCG tablet        levothyroxine (SYNTHROID) 88 MCG tablet Take 1 tablet (88 mcg total) by mouth before breakfast.  90 tablet 3    meclizine (ANTIVERT) 12.5 mg tablet TAKE 1 TABLET(12.5 MG) BY MOUTH THREE TIMES DAILY AS NEEDED FOR DIZZINESS  Strength: 12.5 mg  20 tablet 0    metoprolol tartrate (LOPRESSOR) 25 MG tablet TAKE 1 TABLET(25 MG) BY MOUTH TWICE DAILY  180 tablet 3    multivit-mineral-iron-lutein Tab Take 1 tablet by mouth once daily.       nitroGLYCERIN (NITROSTAT) 0.4 MG SL tablet ONE TABLET UNDER THE TONGUE EVERY 5 MINUTES AS NEEDED FOR CHEST PAIN 2/28/2022: prn 100 tablet 3    nystatin (MYCOSTATIN) powder Apply topically 4 (four) times daily.  60 g 3    ondansetron (ZOFRAN-ODT) 4 MG TbDL        pen needle, diabetic (BD ULTRA-FINE SHORT PEN NEEDLE) 31 gauge x 5/16" Ndle USE WITH INSULIN PENS up to 3 x a day.  100 each 2    polyethylene glycol (GLYCOLAX) 17 gram/dose powder Take 17 g by mouth daily as needed (CONSTIPATION).       psyllium (METAMUCIL) powder Take 1 packet by mouth daily as needed (CONSTIPATION).       triamcinolone acetonide 0.025% (KENALOG) 0.025 % Oint Apply topically 2 (two) times daily.  80 g 2    fluticasone-salmeterol diskus inhaler 250-50 mcg Inhale 1 puff into the lungs 2 (two) times daily. Controller Patient wants name brand ADVAIR  60 each 11    SHINGRIX, PF, 50 mcg/0.5 mL injection         No facility-administered encounter medications on file as of 12/27/2022.     Review for Opioid Screening: Pt does not have Rx for Opioids    Review for Substance Use Disorders: Patient does not use controlled substances       Allergies:  Review of patient's allergies indicates:   Allergen Reactions    Codeine Itching and Nausea Only              Health Maintenance:  Health " "Maintenance   Topic Date Due    Hemoglobin A1c  02/18/2023    Lipid Panel  05/16/2023    Eye Exam  08/16/2023    Foot Exam  11/17/2023    Aspirin/Antiplatelet Therapy  12/27/2023    TETANUS VACCINE  02/04/2030     Health Maintenance Topics with due status: Not Due       Topic Last Completion Date    TETANUS VACCINE 02/04/2020    Lipid Panel 05/16/2022    Diabetes Urine Screening 08/16/2022    Eye Exam 08/16/2022    Hemoglobin A1c 08/18/2022    Foot Exam 11/17/2022    Aspirin/Antiplatelet Therapy 12/27/2022       Physical Exam      Vital Signs  Temp: 97.6 °F (36.4 °C)  Temp src: Oral  Pulse: 70  Resp: 18  SpO2: 99 %  BP: 124/68  BP Location: Right arm  Patient Position: Sitting  Height and Weight  Height: 5' 8" (172.7 cm)  Weight: 70.9 kg (156 lb 4.9 oz)  BSA (Calculated - sq m): 1.84 sq meters  BMI (Calculated): 23.8  Weight in (lb) to have BMI = 25: 164.1]    Physical Exam  Constitutional:       Appearance: Normal appearance. She is normal weight.   HENT:      Head: Normocephalic and atraumatic.   Eyes:      Extraocular Movements: Extraocular movements intact.      Conjunctiva/sclera: Conjunctivae normal.      Pupils: Pupils are equal, round, and reactive to light.   Cardiovascular:      Rate and Rhythm: Normal rate and regular rhythm.      Pulses: Normal pulses.      Heart sounds: Normal heart sounds.   Pulmonary:      Effort: Pulmonary effort is normal.      Breath sounds: Normal breath sounds.   Abdominal:      General: Abdomen is flat. Bowel sounds are normal.      Palpations: Abdomen is soft.   Musculoskeletal:         General: Normal range of motion.      Cervical back: Normal range of motion and neck supple.   Skin:     General: Skin is warm and dry.      Capillary Refill: Capillary refill takes less than 2 seconds.   Neurological:      General: No focal deficit present.      Mental Status: She is alert and oriented to person, place, and time. Mental status is at baseline.   Psychiatric:         Mood and " Affect: Mood normal.         Behavior: Behavior normal.         Thought Content: Thought content normal.         Judgment: Judgment normal.          Assessment/Plan     Tiana Mead is a 89 y.o.female with:    There are no diagnoses linked to this encounter.    As above, continue current medications and maintain follow up with specialists.  Return to clinic as needed.    I spent 39 minutes on the day of this encounter for preparing, evaluating, examining, treating, and discussing plan of care with this patient.  Greater than 50% of this time was spent face to face with patient.  All questions were answered to patient's satisfaction.         Francia Galindo, NP-C  [facility]

## 2022-12-23 ENCOUNTER — TELEPHONE (OUTPATIENT)
Dept: ADMINISTRATIVE | Facility: CLINIC | Age: 87
End: 2022-12-23
Payer: MEDICARE

## 2022-12-27 ENCOUNTER — OFFICE VISIT (OUTPATIENT)
Dept: PRIMARY CARE CLINIC | Facility: CLINIC | Age: 87
End: 2022-12-27
Payer: MEDICARE

## 2022-12-27 VITALS
HEART RATE: 70 BPM | DIASTOLIC BLOOD PRESSURE: 68 MMHG | WEIGHT: 156.31 LBS | TEMPERATURE: 98 F | RESPIRATION RATE: 18 BRPM | SYSTOLIC BLOOD PRESSURE: 124 MMHG | BODY MASS INDEX: 23.69 KG/M2 | HEIGHT: 68 IN | OXYGEN SATURATION: 99 %

## 2022-12-27 DIAGNOSIS — Z00.00 ENCOUNTER FOR PREVENTIVE HEALTH EXAMINATION: ICD-10-CM

## 2022-12-27 DIAGNOSIS — Z00.00 ENCOUNTER FOR ANNUAL WELLNESS EXAM IN MEDICARE PATIENT: Primary | ICD-10-CM

## 2022-12-27 PROCEDURE — G9920 SCRNING PERF AND NEGATIVE: HCPCS | Mod: CPTII,S$GLB,, | Performed by: NURSE PRACTITIONER

## 2022-12-27 PROCEDURE — 1170F PR FUNCTIONAL STATUS ASSESSED: ICD-10-PCS | Mod: CPTII,S$GLB,, | Performed by: NURSE PRACTITIONER

## 2022-12-27 PROCEDURE — 99999 PR PBB SHADOW E&M-EST. PATIENT-LVL V: CPT | Mod: PBBFAC,,, | Performed by: NURSE PRACTITIONER

## 2022-12-27 PROCEDURE — 3288F PR FALLS RISK ASSESSMENT DOCUMENTED: ICD-10-PCS | Mod: CPTII,S$GLB,, | Performed by: NURSE PRACTITIONER

## 2022-12-27 PROCEDURE — 3288F FALL RISK ASSESSMENT DOCD: CPT | Mod: CPTII,S$GLB,, | Performed by: NURSE PRACTITIONER

## 2022-12-27 PROCEDURE — 1159F PR MEDICATION LIST DOCUMENTED IN MEDICAL RECORD: ICD-10-PCS | Mod: CPTII,S$GLB,, | Performed by: NURSE PRACTITIONER

## 2022-12-27 PROCEDURE — 99999 PR PBB SHADOW E&M-EST. PATIENT-LVL V: ICD-10-PCS | Mod: PBBFAC,,, | Performed by: NURSE PRACTITIONER

## 2022-12-27 PROCEDURE — G9920 PR SCREENING AND NEGATIVE: ICD-10-PCS | Mod: CPTII,S$GLB,, | Performed by: NURSE PRACTITIONER

## 2022-12-27 PROCEDURE — G0439 PR MEDICARE ANNUAL WELLNESS SUBSEQUENT VISIT: ICD-10-PCS | Mod: S$GLB,,, | Performed by: NURSE PRACTITIONER

## 2022-12-27 PROCEDURE — 1170F FXNL STATUS ASSESSED: CPT | Mod: CPTII,S$GLB,, | Performed by: NURSE PRACTITIONER

## 2022-12-27 PROCEDURE — 1159F MED LIST DOCD IN RCRD: CPT | Mod: CPTII,S$GLB,, | Performed by: NURSE PRACTITIONER

## 2022-12-27 PROCEDURE — G0439 PPPS, SUBSEQ VISIT: HCPCS | Mod: S$GLB,,, | Performed by: NURSE PRACTITIONER

## 2022-12-27 PROCEDURE — 1101F PR PT FALLS ASSESS DOC 0-1 FALLS W/OUT INJ PAST YR: ICD-10-PCS | Mod: CPTII,S$GLB,, | Performed by: NURSE PRACTITIONER

## 2022-12-27 PROCEDURE — 1160F PR REVIEW ALL MEDS BY PRESCRIBER/CLIN PHARMACIST DOCUMENTED: ICD-10-PCS | Mod: CPTII,S$GLB,, | Performed by: NURSE PRACTITIONER

## 2022-12-27 PROCEDURE — 1101F PT FALLS ASSESS-DOCD LE1/YR: CPT | Mod: CPTII,S$GLB,, | Performed by: NURSE PRACTITIONER

## 2022-12-27 PROCEDURE — 1160F RVW MEDS BY RX/DR IN RCRD: CPT | Mod: CPTII,S$GLB,, | Performed by: NURSE PRACTITIONER

## 2023-01-20 NOTE — PATIENT INSTRUCTIONS
Counseling and Referral of Other Preventative  (Italic type indicates deductible and co-insurance are waived)    Patient Name: Tiana Mead  Today's Date: 1/20/2023    Health Maintenance       Date Due Completion Date    Hemoglobin A1c 02/18/2023 8/18/2022    Lipid Panel 05/16/2023 5/16/2022    Diabetes Urine Screening 08/16/2023 8/16/2022    Eye Exam 08/16/2023 8/16/2022    Override on 6/20/2012: (N/S)    Foot Exam 11/17/2023 11/17/2022    Override on 2/4/2020: Done ([facility])    Override on 6/11/2019: Done ([facility])    Override on 5/16/2018: Done    Override on 5/2/2017: Done    Aspirin/Antiplatelet Therapy 12/27/2023 12/27/2022    TETANUS VACCINE 02/04/2030 2/4/2020        No orders of the defined types were placed in this encounter.    The following information is provided to all patients.  This information is to help you find resources for any of the problems found today that may be affecting your health:                Living healthy guide: www.[facility].[place].gov      Understanding Diabetes: www.diabetes.org      Eating healthy: www.cdc.gov/healthyweight      CDC home safety checklist: www.cdc.gov/steadi/patient.html      Agency on Aging: www.goea.[place].[facility]      Alcoholics anonymous (AA): www.aa.org      Physical Activity: www.olinda.nih.gov/ja9rkwa      Tobacco use: www.quitwithusla.org

## 2023-02-07 DIAGNOSIS — Z00.00 ENCOUNTER FOR MEDICARE ANNUAL WELLNESS EXAM: ICD-10-CM

## 2023-02-09 DIAGNOSIS — Z00.00 ENCOUNTER FOR MEDICARE ANNUAL WELLNESS EXAM: ICD-10-CM

## 2023-02-28 ENCOUNTER — OFFICE VISIT (OUTPATIENT)
Dept: PODIATRY | Facility: CLINIC | Age: 88
End: 2023-02-28
Payer: MEDICARE

## 2023-02-28 VITALS
HEART RATE: 71 BPM | DIASTOLIC BLOOD PRESSURE: 66 MMHG | HEIGHT: 68 IN | SYSTOLIC BLOOD PRESSURE: 139 MMHG | WEIGHT: 156.31 LBS | BODY MASS INDEX: 23.69 KG/M2

## 2023-02-28 DIAGNOSIS — E11.42 DIABETIC POLYNEUROPATHY ASSOCIATED WITH TYPE 2 DIABETES MELLITUS: Primary | ICD-10-CM

## 2023-02-28 DIAGNOSIS — B35.1 ONYCHOMYCOSIS DUE TO DERMATOPHYTE: ICD-10-CM

## 2023-02-28 DIAGNOSIS — L84 CORN OR CALLUS: ICD-10-CM

## 2023-02-28 DIAGNOSIS — I73.9 PERIPHERAL VASCULAR DISEASE: ICD-10-CM

## 2023-02-28 PROCEDURE — 99499 NO LOS: ICD-10-PCS | Mod: S$GLB,,, | Performed by: PODIATRIST

## 2023-02-28 PROCEDURE — 1125F AMNT PAIN NOTED PAIN PRSNT: CPT | Mod: CPTII,S$GLB,, | Performed by: PODIATRIST

## 2023-02-28 PROCEDURE — 11056 PARNG/CUTG B9 HYPRKR LES 2-4: CPT | Mod: Q9,S$GLB,, | Performed by: PODIATRIST

## 2023-02-28 PROCEDURE — 11056 PR TRIM BENIGN HYPERKERATOTIC SKIN LESION,2-4: ICD-10-PCS | Mod: Q9,S$GLB,, | Performed by: PODIATRIST

## 2023-02-28 PROCEDURE — 99999 PR PBB SHADOW E&M-EST. PATIENT-LVL II: ICD-10-PCS | Mod: PBBFAC,,, | Performed by: PODIATRIST

## 2023-02-28 PROCEDURE — 1159F MED LIST DOCD IN RCRD: CPT | Mod: CPTII,S$GLB,, | Performed by: PODIATRIST

## 2023-02-28 PROCEDURE — 11721 DEBRIDE NAIL 6 OR MORE: CPT | Mod: Q9,59,S$GLB, | Performed by: PODIATRIST

## 2023-02-28 PROCEDURE — 1125F PR PAIN SEVERITY QUANTIFIED, PAIN PRESENT: ICD-10-PCS | Mod: CPTII,S$GLB,, | Performed by: PODIATRIST

## 2023-02-28 PROCEDURE — 99499 UNLISTED E&M SERVICE: CPT | Mod: S$GLB,,, | Performed by: PODIATRIST

## 2023-02-28 PROCEDURE — 11721 PR DEBRIDEMENT OF NAILS, 6 OR MORE: ICD-10-PCS | Mod: Q9,59,S$GLB, | Performed by: PODIATRIST

## 2023-02-28 PROCEDURE — 99999 PR PBB SHADOW E&M-EST. PATIENT-LVL II: CPT | Mod: PBBFAC,,, | Performed by: PODIATRIST

## 2023-02-28 PROCEDURE — 1159F PR MEDICATION LIST DOCUMENTED IN MEDICAL RECORD: ICD-10-PCS | Mod: CPTII,S$GLB,, | Performed by: PODIATRIST

## 2023-02-28 NOTE — PROGRESS NOTES
Subjective:      Patient ID: Tiana Mead is a 89 y.o. female.    Chief Complaint: Diabetic Foot Exam (DM nail care last PCP visit 08/18/22 Tomás Andres MD)      Tiana is a 89 y.o. female who presents to the clinic for evaluation and treatment of high risk feet. Tiana has a past medical history of Allergy, Anemia, Arthritis, Asthma, Chronic kidney disease, Degenerative disc disease, Diabetes mellitus type II, Diastolic heart failure (05/02/2017), Essential hypertension (7/3/2012), Glaucoma, History of insulin dependent diabetes mellitus, for many years stopped because of chronic kidney November 2020.  (9/9/2021), History of pseudogout (8/23/2012), Hyperlipidemia, Hypertension, Iritis, Myocardial infarction, Pneumonia, Thrombocytopenia (8/24/2021), and Thyroid disease. The patient's chief complaint is long, thick toenails and calluses on both feet. Routine trimming helps. No other pedal concerns today.  This patient has documented high risk feet requiring routine maintenance secondary to diabetes mellitis and those secondary complications of diabetes, as mentioned..    PCP: Tomás Andres MD    Date Last Seen by PCP:   Chief Complaint   Patient presents with    Diabetic Foot Exam     DM nail care last PCP visit 08/18/22 Tomás Andres MD         Current shoe gear:  Dm shoes      Hemoglobin A1C   Date Value Ref Range Status   08/18/2022 6.9 (H) 4.0 - 5.6 % Final     Comment:     ADA Screening Guidelines:  5.7-6.4%  Consistent with prediabetes  >or=6.5%  Consistent with diabetes    High levels of fetal hemoglobin interfere with the HbA1C  assay. Heterozygous hemoglobin variants (HbS, HgC, etc)do  not significantly interfere with this assay.   However, presence of multiple variants may affect accuracy.     08/16/2022 6.6 (H) 4.0 - 5.6 % Final     Comment:     ADA Screening Guidelines:  5.7-6.4%  Consistent with prediabetes  >or=6.5%  Consistent with diabetes    High levels of fetal hemoglobin  interfere with the HbA1C  assay. Heterozygous hemoglobin variants (HbS, HgC, etc)do  not significantly interfere with this assay.   However, presence of multiple variants may affect accuracy.     05/16/2022 8.0 (H) 4.0 - 5.6 % Final     Comment:     ADA Screening Guidelines:  5.7-6.4%  Consistent with prediabetes  >or=6.5%  Consistent with diabetes    High levels of fetal hemoglobin interfere with the HbA1C  assay. Heterozygous hemoglobin variants (HbS, HgC, etc)do  not significantly interfere with this assay.   However, presence of multiple variants may affect accuracy.               Patient Active Problem List   Diagnosis    Hyperlipidemia associated with type 2 diabetes mellitus    Generalized osteoarthritis of multiple sites    Chronic iritis - Left Eye    Osteoarthritis of both knees    Chondrocalcinosis    Peripheral angiopathy    Hyperuricemia    Helicobacter pylori gastritis    Old MI (myocardial infarction), 2013    Permanent atrial fibrillation    Long term current use of anticoagulant therapy, eliquis, stopped because of cryptic GI bleeding    History of chest pain at rest    Glaucoma associated with ocular trauma    H/O Deep vein thrombosis (DVT)    Aortic atherosclerosis    Chronic diastolic congestive heart failure    Type 2 diabetes mellitus with diabetic polyneuropathy, without long-term current use of insulin    Primary hypothyroidism    Moderate persistent asthma without complication    Primary open angle glaucoma (POAG) of both eyes, moderate stage    Pulmonary hypertension    Non-rheumatic tricuspid valve insufficiency    Chest pain at rest    Allergic conjunctivitis of both eyes    Pseudogout, knees    Asthma exacerbation in COPD    Dry eye syndrome of both eyes    Stage 4 chronic kidney disease    Acute on chronic blood loss anemia, Sept 2020 anticoagulation discontinued    Coronary artery disease involving native coronary artery of native heart without angina pectoris    Myalgia, since April  20221 both arms and both legs    History of insulin dependent diabetes mellitus, for many years stopped because of chronic kidney November 2020.     Hordeolum internum of left lower eyelid       Current Outpatient Medications on File Prior to Visit   Medication Sig Dispense Refill    acetaminophen (TYLENOL) 500 MG tablet Take 2 tablets (1,000 mg total) by mouth 3 (three) times daily as needed for Pain (muscle pain in arms and legs). 100 tablet 1    albuterol (PROVENTIL) 2.5 mg /3 mL (0.083 %) nebulizer solution Take 3 mLs (2.5 mg total) by nebulization every 6 (six) hours as needed for Wheezing. Rescue 25 each 1    albuterol (PROVENTIL/VENTOLIN HFA) 90 mcg/actuation inhaler Inhale 2 puffs into the lungs every 4 (four) hours as needed for Wheezing. 18 g 6    albuterol (PROVENTIL/VENTOLIN HFA) 90 mcg/actuation inhaler Inhale 1-2 puffs into the lungs every 6 (six) hours as needed for Wheezing. Rescue 6.7 g 0    amLODIPine (NORVASC) 5 MG tablet TAKE 1 TABLET(5 MG) BY MOUTH EVERY MORNING 90 tablet 3    aspirin 81 MG Chew Take 1 tablet (81 mg total) by mouth every morning. 100 tablet 3    atorvastatin (LIPITOR) 40 MG tablet Take 1 tablet (40 mg total) by mouth every evening. To lower cholesterol and for heart and blood vessels 90 tablet 3    blood sugar diagnostic (ONETOUCH ULTRA TEST) Strp Inject 1 strip into the skin once daily. No longer on insulin because of  each 4    clotrimazole-betamethasone 1-0.05% (LOTRISONE) cream Apply topically 2 (two) times daily as needed. For intertriginous itching in the bend of the leg 45 g 3    diclofenac sodium (VOLTAREN) 1 % Gel APPLY 2 GRAMS TOPICALLY TO THE AFFECTED AREA TWICE DAILY 100 g 4    dorzolamide (TRUSOPT) 2 % ophthalmic solution Place 1 drop into both eyes 2 (two) times daily. 10 mL 4    furosemide (LASIX) 40 MG tablet TAKE 1 TABLET BY MOUTH EVERY MORNING AND 1 TABLET AT 4PM. 180 tablet 0    insulin aspart U-100 (NOVOLOG FLEXPEN U-100 INSULIN) 100 unit/mL (3 mL)  "InPn pen Inject if blood sugar is >180, 180-230+2, 231-280+4, 281-330+6, 331-380+8,>380+10 MAX daily 30 units. 15 mL 2    isosorbide mononitrate (IMDUR) 60 MG 24 hr tablet TAKE ONE TABLET BY MOUTH EVERY MORNING FOR HEART, TO PREVENT CHEST PAIN AND SHORTNESS OF BREATH 90 tablet 3    lancets (TRUEPLUS LANCETS) 33 gauge Misc Apply 1 lancet topically once daily. No longer on insulin because of  each 3    latanoprost 0.005 % ophthalmic solution INSTILL 1 DROP IN BOTH EYES EVERY EVENING 10 mL 12    levothyroxine (SYNTHROID) 75 MCG tablet       levothyroxine (SYNTHROID) 88 MCG tablet Take 1 tablet (88 mcg total) by mouth before breakfast. 90 tablet 3    meclizine (ANTIVERT) 12.5 mg tablet TAKE 1 TABLET BY MOUTH THREE TIMES DAILY AS NEEDED FOR DIZZINESS 20 tablet 3    metoprolol tartrate (LOPRESSOR) 25 MG tablet TAKE 1 TABLET(25 MG) BY MOUTH TWICE DAILY 180 tablet 3    multivit-mineral-iron-lutein Tab Take 1 tablet by mouth once daily.      nitroGLYCERIN (NITROSTAT) 0.4 MG SL tablet ONE TABLET UNDER THE TONGUE EVERY 5 MINUTES AS NEEDED FOR CHEST PAIN 100 tablet 3    nystatin (MYCOSTATIN) powder Apply topically 4 (four) times daily. 60 g 3    pen needle, diabetic (BD ULTRA-FINE SHORT PEN NEEDLE) 31 gauge x 5/16" Ndle USE WITH INSULIN PENS up to 3 x a day. 100 each 2    polyethylene glycol (GLYCOLAX) 17 gram/dose powder Take 17 g by mouth daily as needed (CONSTIPATION).      psyllium (METAMUCIL) powder Take 1 packet by mouth daily as needed (CONSTIPATION).      SHINGRIX, PF, 50 mcg/0.5 mL injection       triamcinolone acetonide 0.025% (KENALOG) 0.025 % Oint Apply topically 2 (two) times daily. 80 g 2    fluticasone-salmeterol diskus inhaler 250-50 mcg Inhale 1 puff into the lungs 2 (two) times daily. Controller Patient wants name brand ADVAIR 60 each 11     No current facility-administered medications on file prior to visit.       Review of patient's allergies indicates:   Allergen Reactions    Codeine      Other " reaction(s): Itching  Other reaction(s): Nausea       Past Surgical History:   Procedure Laterality Date    CARDIAC CATHETERIZATION  2010    no obstructive disease    CATARACT EXTRACTION W/  INTRAOCULAR LENS IMPLANT Left n/a    CATARACT EXTRACTION W/  INTRAOCULAR LENS IMPLANT Right 10/8/2018    With Femtosecond LASER assist (Dr. Henson)    CHOLECYSTECTOMY      ESOPHAGOGASTRODUODENOSCOPY N/A 11/4/2020    Procedure: EGD (ESOPHAGOGASTRODUODENOSCOPY);  Surgeon: Tomás Mccall MD;  Location: [facility] ([address] [place] [address]);  Service: Endoscopy;  Laterality: N/A;    EYE SURGERY      gall stone      HYSTERECTOMY      VARICOSE VEIN SURGERY         Family History   Problem Relation Age of Onset    Diabetes Mother     Hypertension Mother     Glaucoma Mother     Kidney disease Mother     Hypertension Father     Hypotension Sister     Heart attack Sister     Heart disease Sister     No Known Problems Sister     Diabetes Sister     Kidney disease Sister     Leukemia Sister     Hypertension Sister     Diabetes Sister     Kidney disease Sister     No Known Problems Sister     Heart disease Brother     Heart disease Brother     No Known Problems Brother     No Known Problems Maternal Grandmother     No Known Problems Maternal Grandfather     No Known Problems Paternal Grandmother     Diabetes Paternal Grandfather     Hypertension Daughter     Atrial fibrillation Daughter     Diabetes Daughter         borderline    Hypertension Daughter     No Known Problems Son     Diabetes Son     Heart disease Son     Hypertension Son     Melanoma Neg Hx        Social History     Socioeconomic History    Marital status:    Tobacco Use    Smoking status: Never    Smokeless tobacco: Never   Substance and Sexual Activity    Alcohol use: No    Drug use: No    Sexual activity: Never           Review of Systems   Constitutional: Negative for chills, decreased appetite and fever.   Cardiovascular:  Negative for chest pain and claudication.   Respiratory:   "Negative for cough, hemoptysis and shortness of breath.    Skin:  Positive for dry skin and nail changes. Negative for color change, flushing, itching, poor wound healing, rash and suspicious lesions.   Musculoskeletal:  Negative for back pain, falls, gout, joint pain, joint swelling and myalgias.   Gastrointestinal:  Negative for nausea and vomiting.   Neurological:  Negative for loss of balance, numbness and paresthesias.         Objective:       Vitals:    02/28/23 0836   BP: 139/66   Pulse: 71   Weight: 70.9 kg (156 lb 4.9 oz)   Height: 5' 8" (1.727 m)   PainSc:   2   PainLoc: Ear        Physical Exam  Vitals and nursing note reviewed.   Constitutional:       Appearance: She is well-developed.   Cardiovascular:      Comments: Dorsalis pedis and posterior tibial pulses are diminished Bilaterally. Toes are cool to touch. Feet are warm proximally.There is decreased digital hair. Skin is atrophic, slightly hyperpigmented, and mildly edematous      Musculoskeletal:         General: No tenderness, deformity or signs of injury. Normal range of motion.      Comments: Adequate joint range of motion without pain, limitation, nor crepitation Bilateral feet and ankle joints. Muscle strength is 5/5 in all groups bilaterally.      Flexible pes planus foot type w/ medial arch collapse and mild gastroc equinus      Skin:     General: Skin is warm and dry.      Coloration: Skin is not ashen, cyanotic or pale.      Findings: No ecchymosis, erythema or lesion.      Comments: Nails x10 are elongated by  3-5 mm's, thickened by 2-5 mm's, dystrophic, and are darkened in  coloration . Xerosis Bilaterally. No open lesions noted.    Hyperkeratotic tissue noted to lateral 5th toe b/l   Neurological:      Mental Status: She is alert and oriented to person, place, and time.      Comments: [place] 5.07 monofilamant testing is diminished Ney feet. Sharp/dull sensation diminished Bilaterally. Light touch absent Bilaterally.     "   Psychiatric:         Behavior: Behavior normal.             Assessment:       Encounter Diagnoses   Name Primary?    Diabetic polyneuropathy associated with type 2 diabetes mellitus Yes    Peripheral vascular disease     Onychomycosis due to dermatophyte     Corn or callus          Plan:       Tiana was seen today for diabetic foot exam.    Diagnoses and all orders for this visit:    Diabetic polyneuropathy associated with type 2 diabetes mellitus    Peripheral vascular disease    Onychomycosis due to dermatophyte    Corn or callus    I counseled the patient on her conditions, their implications and medical management.    Shoe inspection. Diabetic Foot Education. Patient reminded of the importance of good nutrition and blood sugar control to help prevent podiatric complications of diabetes. Patient instructed on proper foot hygeine. We discussed wearing proper shoe gear, daily foot inspections, never walking without protective shoe gear, never putting sharp instruments to feet    - With patient's permission, nails were aggressively reduced and debrided x 10 to their soft tissue attachment mechanically and with electric , removing all offending nail and debris. Patient relates relief following the procedure. She will continue to monitor the areas daily, inspect her feet, wear protective shoe gear when ambulatory, moisturizer to maintain skin integrity and follow in this office in approximately 2-3 months, sooner p.r.n.    - After cleansing the  area w/ alcohol prep pad the above mentioned hyperkeratosis was trimmed utilizing No 15 scapel, to a smooth base with out incident. Patient tolerated this  well and reported comfort to the area of x2    - Return to clinic in 3m or sooner if problems arise

## 2023-03-13 NOTE — PROGRESS NOTES
Subjective:       Patient ID: Tiana Mead is a 89 y.o. female.    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  She is content.  She denies any hypoglycemia.  She is seeing a podiatrist once every 3 months and may consider getting a pair of diabetic shoes.    At this time, everything is going well in her life.  Diabetes Management Status    Statin: Taking  ACE/ARB: Not taking    Screening or Prevention Patient's value Goal Complete/Controlled?   HgA1C Testing and Control   Lab Results   Component Value Date    HGBA1C 7.3 (H) 02/13/2019      Annually/Less than 8% Yes   Lipid profile : 07/12/2018 Annually Yes   LDL control Lab Results   Component Value Date    LDLCALC 78.8 07/12/2018    Annually/Less than 100 mg/dl  Yes   Nephropathy screening Lab Results   Component Value Date    LABMICR 2.7 10/25/2011     Lab Results   Component Value Date    PROTEINUA Negative 04/02/2013    Annually No   Blood pressure BP Readings from Last 1 Encounters:   02/20/19 110/68    Less than 140/90 Yes   Dilated retinal exam : 09/26/2018 Annually Yes   Foot exam   : 10/17/2018 Annually Yes     A1C = 7.3  HPI  Review of Systems   Constitutional: Negative for activity change, appetite change, chills, fatigue, fever and unexpected weight change.   HENT: Negative for hearing loss.    Eyes: Negative for visual disturbance.   Respiratory: Negative for cough, chest tightness, shortness of breath and wheezing.    Cardiovascular: Negative for chest pain, palpitations and leg swelling.   Gastrointestinal: Negative for abdominal pain, constipation, nausea and vomiting.   Genitourinary: Negative for dysuria, frequency and urgency.   Musculoskeletal: Negative for arthralgias, back pain, gait problem, joint swelling and myalgias.   Skin: Negative for rash.   Neurological: Negative for light-headedness and headaches.   Psychiatric/Behavioral:  Negative for dysphoric mood and sleep disturbance. The patient is not nervous/anxious.        Objective:      Physical Exam   Constitutional: She is oriented to person, place, and time. She appears well-developed and well-nourished. No distress.   HENT:   Head: Normocephalic and atraumatic.   Right Ear: External ear normal.   Left Ear: External ear normal.   Nose: Nose normal.   Mouth/Throat: Oropharynx is clear and moist. No oropharyngeal exudate.   Eyes: Conjunctivae and EOM are normal. Pupils are equal, round, and reactive to light. Right eye exhibits no discharge. No scleral icterus.   Neck: Normal range of motion and full passive range of motion without pain. Neck supple. No spinous process tenderness and no muscular tenderness present. Carotid bruit is not present. No thyromegaly present.   Cardiovascular: Normal rate, regular rhythm, S1 normal, S2 normal, normal heart sounds and intact distal pulses. Exam reveals no gallop and no friction rub.   No murmur heard.  Pulmonary/Chest: Effort normal and breath sounds normal. No respiratory distress. She has no wheezes. She has no rales. She exhibits no tenderness.   Abdominal: Soft. Bowel sounds are normal. She exhibits no distension and no mass. There is no tenderness. There is no rebound and no guarding.   Genitourinary: Pelvic exam was performed with patient supine. Uterus is not deviated, not enlarged, not fixed and not tender. Cervix exhibits no motion tenderness, no discharge and no friability. Right adnexum displays no mass, no tenderness and no fullness. Left adnexum displays no mass, no tenderness and no fullness.   Musculoskeletal: Normal range of motion. She exhibits no edema or tenderness.   Lymphadenopathy:        Head (right side): No submental and no submandibular adenopathy present.        Head (left side): No submental and no submandibular adenopathy present.     She has no cervical adenopathy.        Right cervical: No superficial cervical, no  deep cervical and no posterior cervical adenopathy present.       Left cervical: No superficial cervical, no deep cervical and no posterior cervical adenopathy present.        Right axillary: No pectoral and no lateral adenopathy present.        Left axillary: No pectoral and no lateral adenopathy present.       Right: No supraclavicular adenopathy present.        Left: No supraclavicular adenopathy present.   Neurological: She is alert and oriented to person, place, and time. She has normal reflexes. No cranial nerve deficit. She exhibits normal muscle tone. Coordination normal.   Skin: Skin is warm and dry. No rash noted.   Psychiatric: She has a normal mood and affect. Her behavior is normal. Her mood appears not anxious. Her speech is not rapid and/or pressured. She is not agitated. She does not exhibit a depressed mood.   Normal behavior, thought content, insight and judgement.   Nursing note and vitals reviewed.      Assessment:       1. Type 2 diabetes mellitus with diabetic polyneuropathy, with long-term current use of insulin    2. Type 2 diabetes mellitus with diabetic nephropathy, with long-term current use of insulin    3. Aortic atherosclerosis    4. CKD (chronic kidney disease) stage 3, GFR 30-59 ml/min, eGFR 32     5. Diabetic polyneuropathy associated with type 2 diabetes mellitus    6. Generalized osteoarthritis of multiple sites    7. H/O Deep vein thrombosis (DVT)    8. Hyperlipemia, mixed    9. Long term current use of anticoagulant therapy, eliquis    10. Moderate persistent asthma with exacerbation    11. Old MI (myocardial infarction), 2013    12. Persistent atrial fibrillation    13. Primary hypothyroidism    14. Pulmonary hypertension    15. Tortuous aorta    16. Bilateral impacted cerumen    17. Noise-induced hearing loss of right ear, unspecified hearing status on contralateral side        Plan:   Tiana was seen today for follow-up and diabetes.    Diagnoses and all orders for  this visit:    Type 2 diabetes mellitus with diabetic polyneuropathy, with long-term current use of insulin.  Will check A1c in 3 months and again at 6 months.  -     CBC auto differential; Future  -     Comprehensive metabolic panel; Future  -     Hemoglobin A1c; Future    Type 2 diabetes mellitus with diabetic nephropathy, with long-term current use of insulin    Aortic atherosclerosis    CKD (chronic kidney disease) stage 3, GFR 30-59 ml/min, eGFR 32 .  Her renal function is stable and will recheck in 6 months.    Diabetic polyneuropathy associated with type 2 diabetes mellitus    Generalized osteoarthritis of multiple sites, currently doing well.    H/O Deep vein thrombosis (DVT), no venous insufficiency.    Hyperlipemia, mixed    Long term current use of anticoagulant therapy, eliquis  -     CBC auto differential; Future  -     Comprehensive metabolic panel; Future  -     Hemoglobin A1c; Future    Moderate persistent asthma with exacerbation    Old MI (myocardial infarction), 2013    Persistent atrial fibrillation    Primary hypothyroidism    Pulmonary hypertension    Tortuous aorta    Bilateral impacted cerumen    Noise-induced hearing loss of right ear, unspecified hearing status on contralateral side  -     Ambulatory Referral to Audiology  -     Ambulatory Referral to ENT    Other orders  -     isosorbide mononitrate (IMDUR) 30 MG 24 hr tablet; TAKE 1 TABLET BY MOUTH EVERY MORNING FOR HEART, to prevent chest pain and reduce shortness of breath  -     atorvastatin (LIPITOR) 40 MG tablet; Take 1 tablet (40 mg total) by mouth once daily.  -     fluticasone-salmeterol 250-50 mcg/dose (ADVAIR DISKUS) 250-50 mcg/dose diskus inhaler; INHALE 1 PUFF BY MOUTH INTO THE LUNGS NIGHTLY  -     apixaban (ELIQUIS) 2.5 mg Tab; Take 1 tablet (2.5 mg total) by mouth 2 (two) times daily.  -     furosemide (LASIX) 40 MG tablet; Take 1 tablet (40 mg total) by mouth 2 (two) times daily.  -     insulin aspart  "protamine-insulin aspart (NOVOLOG MIX 70-30FLEXPEN U-100) 100 unit/mL (70-30) InPn pen; INJECT 10 UNITS UNDER THE SKIN EVERY MORNING BEFORE BREAKFAST      Medication List with Changes/Refills   Current Medications    ACETAMINOPHEN (TYLENOL) 500 MG TABLET    Take 500 mg by mouth every 6 (six) hours as needed for Pain.    ALBUTEROL 90 MCG/ACTUATION INHALER    Inhale 2 puffs into the lungs every 4 (four) hours as needed.    AMLODIPINE (NORVASC) 5 MG TABLET    Take 1 tablet (5 mg total) by mouth every morning.    BD ULTRA-FINE SHORT PEN NEEDLE 31 GAUGE X 5/16" NDLE    USE WITH INSULIN PENS AS DIRECTED    BLOOD SUGAR DIAGNOSTIC (ONETOUCH ULTRA TEST) STRP    Inject 1 strip into the skin 3 (three) times daily.    BRIMONIDINE 0.2% (ALPHAGAN) 0.2 % DROP    INT 1 GTT INTO OU BID    DICLOFENAC SODIUM (VOLTAREN) 1 % GEL    Apply topically 4 (four) times daily as needed.    DORZOLAMIDE (TRUSOPT) 2 % OPHTHALMIC SOLUTION    Place 1 drop into both eyes 2 (two) times daily.    IPRATROPIUM (ATROVENT) 0.03 % NASAL SPRAY    USE 2 SPRAYS IN EACH NOSTRIL TWICE DAILY    LANCETS MISC    1 Device by Misc.(Non-Drug; Combo Route) route 3 (three) times daily before meals. Please provide the insurance company preferred product.    LATANOPROST 0.005 % OPHTHALMIC SOLUTION    Place 1 drop into the right eye every evening.    LEVOTHYROXINE (SYNTHROID) 75 MCG TABLET    TAKE 1 TABLET BY MOUTH EVERY DAY BEFORE BREAKFAST    MECLIZINE (ANTIVERT) 12.5 MG TABLET    Take 1 tablet (12.5 mg total) by mouth 3 (three) times daily as needed.    METOPROLOL TARTRATE (LOPRESSOR) 50 MG TABLET    Take 1 tablet (50 mg total) by mouth 2 (two) times daily.    MULTIVIT-MINERAL-IRON-LUTEIN (CENTRUM SILVER ULTRA WOMEN'S) TAB    Take 1 tablet by mouth once daily.    NITROGLYCERIN (NITROSTAT) 0.4 MG SL TABLET    ONE TABLET UNDER THE TONGUE EVERY 5 MINUTES AS NEEDED FOR CHEST PAIN    TRUE METRIX GLUCOSE METER MISC    TEST TID    TRUEPLUS LANCETS 30 GAUGE [facility]    USE TO TEST " [Up to Date] : Up to Date BLOOD SUGAR TID AC    TRUEPLUS LANCETS 33 GAUGE MISC    TESTING THREE TIMES DAILY   Changed and/or Refilled Medications    Modified Medication Previous Medication    APIXABAN (ELIQUIS) 2.5 MG TAB apixaban (ELIQUIS) 2.5 mg Tab       Take 1 tablet (2.5 mg total) by mouth 2 (two) times daily.    Take 1 tablet (2.5 mg total) by mouth 2 (two) times daily.    ATORVASTATIN (LIPITOR) 40 MG TABLET atorvastatin (LIPITOR) 40 MG tablet       Take 1 tablet (40 mg total) by mouth once daily.    TAKE 1 TABLET BY MOUTH EVERY DAY    FLUTICASONE-SALMETEROL 250-50 MCG/DOSE (ADVAIR DISKUS) 250-50 MCG/DOSE DISKUS INHALER ADVAIR DISKUS 250-50 mcg/dose diskus inhaler       INHALE 1 PUFF BY MOUTH INTO THE LUNGS NIGHTLY    INHALE 1 PUFF BY MOUTH INTO THE LUNGS NIGHTLY    FUROSEMIDE (LASIX) 40 MG TABLET furosemide (LASIX) 40 MG tablet       Take 1 tablet (40 mg total) by mouth 2 (two) times daily.    Take 1 tablet (40 mg total) by mouth 2 (two) times daily.    INSULIN ASPART PROTAMINE-INSULIN ASPART (NOVOLOG MIX 70-30FLEXPEN U-100) 100 UNIT/ML (70-30) INPN PEN insulin aspart protamine-insulin aspart (NOVOLOG MIX 70-30FLEXPEN U-100) 100 unit/mL (70-30) InPn pen       INJECT 10 UNITS UNDER THE SKIN EVERY MORNING BEFORE BREAKFAST    INJECT 15 UNITS UNDER THE SKIN EVERY MORNING BEFORE BREAKFAST    ISOSORBIDE MONONITRATE (IMDUR) 30 MG 24 HR TABLET isosorbide mononitrate (IMDUR) 30 MG 24 hr tablet       TAKE 1 TABLET BY MOUTH EVERY MORNING FOR HEART, to prevent chest pain and reduce shortness of breath    TAKE 1 TABLET BY MOUTH EVERY MORNING FOR HEART    TRUE METRIX GLUCOSE TEST STRIP STRP TRUE METRIX GLUCOSE TEST STRIP Strp       USE TO TEST BLOOD SUGAR WITH MEALS THREE TIMES DAILY    USE TO TEST BLOOD SUGAR WITH MEALS THREE TIMES DAILY   Discontinued Medications    FUROSEMIDE (LASIX) 40 MG TABLET    TAKE 1 TABLET BY MOUTH TWICE DAILY. ONE TABLET IN THE MORNING AND 1AM TABLET BY MOUTH AT 4PM       Follow-up in about 5 months (around 7/20/2019)  for after labs.

## 2023-03-15 ENCOUNTER — TELEPHONE (OUTPATIENT)
Dept: OTOLARYNGOLOGY | Facility: CLINIC | Age: 88
End: 2023-03-15
Payer: MEDICARE

## 2023-03-15 NOTE — TELEPHONE ENCOUNTER
Returned call to patient. Verified address information. Will send appointment reminders to patient. Was thanked for call   ----- Message from Yue Tenorio sent at 3/15/2023 11:46 AM CDT -----  Regarding: Appt Scheduled  Contact: Patient  Patient is scheduled for an appt on 03/27/2023 due to bilateral hearing loss   Audiogram was scheduled but is requiring a referral for insurance   Patient would also like a letter in the mail with appt details   Please Assist   Patient can be reached at [phone]

## 2023-03-21 ENCOUNTER — OFFICE VISIT (OUTPATIENT)
Dept: OPHTHALMOLOGY | Facility: CLINIC | Age: 88
End: 2023-03-21
Payer: MEDICARE

## 2023-03-21 DIAGNOSIS — H04.123 DRY EYE SYNDROME OF BOTH EYES: ICD-10-CM

## 2023-03-21 DIAGNOSIS — H40.32X2 GLAUCOMA OF LEFT EYE ASSOCIATED WITH OCULAR TRAUMA, MODERATE STAGE: ICD-10-CM

## 2023-03-21 DIAGNOSIS — H40.1132 PRIMARY OPEN ANGLE GLAUCOMA (POAG) OF BOTH EYES, MODERATE STAGE: Primary | ICD-10-CM

## 2023-03-21 DIAGNOSIS — H20.10 CHRONIC IRITIS: ICD-10-CM

## 2023-03-21 PROBLEM — H00.025 HORDEOLUM INTERNUM OF LEFT LOWER EYELID: Status: RESOLVED | Noted: 2022-08-16 | Resolved: 2023-03-21

## 2023-03-21 PROCEDURE — 99999 PR PBB SHADOW E&M-EST. PATIENT-LVL II: ICD-10-PCS | Mod: PBBFAC,,, | Performed by: OPHTHALMOLOGY

## 2023-03-21 PROCEDURE — 1159F PR MEDICATION LIST DOCUMENTED IN MEDICAL RECORD: ICD-10-PCS | Mod: CPTII,S$GLB,, | Performed by: OPHTHALMOLOGY

## 2023-03-21 PROCEDURE — 3288F FALL RISK ASSESSMENT DOCD: CPT | Mod: CPTII,S$GLB,, | Performed by: OPHTHALMOLOGY

## 2023-03-21 PROCEDURE — 99999 PR PBB SHADOW E&M-EST. PATIENT-LVL II: CPT | Mod: PBBFAC,,, | Performed by: OPHTHALMOLOGY

## 2023-03-21 PROCEDURE — 1101F PR PT FALLS ASSESS DOC 0-1 FALLS W/OUT INJ PAST YR: ICD-10-PCS | Mod: CPTII,S$GLB,, | Performed by: OPHTHALMOLOGY

## 2023-03-21 PROCEDURE — 1160F RVW MEDS BY RX/DR IN RCRD: CPT | Mod: CPTII,S$GLB,, | Performed by: OPHTHALMOLOGY

## 2023-03-21 PROCEDURE — 2023F DILAT RTA XM W/O RTNOPTHY: CPT | Mod: CPTII,S$GLB,, | Performed by: OPHTHALMOLOGY

## 2023-03-21 PROCEDURE — 2023F PR DILATED RETINAL EXAM W/O EVID OF RETINOPATHY: ICD-10-PCS | Mod: CPTII,S$GLB,, | Performed by: OPHTHALMOLOGY

## 2023-03-21 PROCEDURE — 3288F PR FALLS RISK ASSESSMENT DOCUMENTED: ICD-10-PCS | Mod: CPTII,S$GLB,, | Performed by: OPHTHALMOLOGY

## 2023-03-21 PROCEDURE — 1160F PR REVIEW ALL MEDS BY PRESCRIBER/CLIN PHARMACIST DOCUMENTED: ICD-10-PCS | Mod: CPTII,S$GLB,, | Performed by: OPHTHALMOLOGY

## 2023-03-21 PROCEDURE — 99214 PR OFFICE/OUTPT VISIT, EST, LEVL IV, 30-39 MIN: ICD-10-PCS | Mod: S$GLB,,, | Performed by: OPHTHALMOLOGY

## 2023-03-21 PROCEDURE — 99214 OFFICE O/P EST MOD 30 MIN: CPT | Mod: S$GLB,,, | Performed by: OPHTHALMOLOGY

## 2023-03-21 PROCEDURE — 1126F AMNT PAIN NOTED NONE PRSNT: CPT | Mod: CPTII,S$GLB,, | Performed by: OPHTHALMOLOGY

## 2023-03-21 PROCEDURE — 1126F PR PAIN SEVERITY QUANTIFIED, NO PAIN PRESENT: ICD-10-PCS | Mod: CPTII,S$GLB,, | Performed by: OPHTHALMOLOGY

## 2023-03-21 PROCEDURE — 1159F MED LIST DOCD IN RCRD: CPT | Mod: CPTII,S$GLB,, | Performed by: OPHTHALMOLOGY

## 2023-03-21 PROCEDURE — 1101F PT FALLS ASSESS-DOCD LE1/YR: CPT | Mod: CPTII,S$GLB,, | Performed by: OPHTHALMOLOGY

## 2023-03-21 NOTE — PROGRESS NOTES
Assessment /Plan     For exam results, see Encounter Report.    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, moderate stage    Chronic iritis - Left Eye      Son lives in [facility]  COVID was on vent 12 days --> visiting mom    + Asthma  CHF    Zoster Left V1 --> resolved  No ophthalmic involvement  Eye tends to be irritated --> long standing    Trial FML q day prn --> discussed steroid response      POAG  Stable Glaucoma & Patient near target IOP range    Steroid responder    CCT  517 / 574    Mid teens --> LWIT    Both Eyes --> good adherence --> CSM  Trusopt BID  Xal q day --> iritis quiet   Alphagan BID  --> itchy // Follicles --> intolerant  No BB --> Asthma    Hx Ryan in Past    Hold SLT OS    PC IOL OU  Quiet  Observe    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  Also discussed options utilizing medications.  EES q HS x 1 week / month --> PRN      NIDDM  No BDR or CSME  control     ERM OD  Not VS  Observe      Plan  RTC 6 weeks IOP / iritis OS check --> FML OS steroid response check  RTC sooner prn with good understanding

## 2023-03-22 ENCOUNTER — TELEPHONE (OUTPATIENT)
Dept: INTERNAL MEDICINE | Facility: CLINIC | Age: 88
End: 2023-03-22
Payer: MEDICARE

## 2023-03-22 RX ORDER — FLUTICASONE PROPIONATE AND SALMETEROL 250; 50 UG/1; UG/1
1 POWDER RESPIRATORY (INHALATION) 2 TIMES DAILY
Qty: 60 EACH | Refills: 5 | Status: SHIPPED | OUTPATIENT
Start: 2023-03-22

## 2023-03-22 NOTE — TELEPHONE ENCOUNTER
----- Message from Ana Luisa Lopez sent at 3/22/2023  3:45 PM CDT -----  Type:  Patient Returning Call    Who Called: pt   Who Left Message for Patient: pt   Does the patient know what this is regarding?: pt need a referral  to see an ENT  for   Would the patient rather a call back or a response via [facility]?  Call   Best Call Back Number:[phone]  Additional Information:   referral

## 2023-03-22 NOTE — TELEPHONE ENCOUNTER
Pt has appt on Monday 03/27/23 with ENT for bilateral hearing loss and ear pain but requesting referral.    Please advise,  Thank You.

## 2023-03-22 NOTE — TELEPHONE ENCOUNTER
Refill Routing Note   Medication(s) are not appropriate for processing by [facility] for the following reason(s):      No active prescription written by PCP    [facility] action(s):  Defer          Medication reconciliation completed: No     Appointments  past 12m or future 3m with PCP    Date Provider   Last Visit   8/18/2022 Tomás Andres MD   Next Visit   4/10/2023 Tomás Andres MD   ED visits in past 90 days: 0        Note composed:7:31 AM 03/22/2023

## 2023-03-22 NOTE — TELEPHONE ENCOUNTER
----- Message from Anca Paz sent at 3/21/2023  4:36 PM CDT -----  Contact: [phone]  Pt would like to find out why her fluticasone-salmeterol diskus inhaler 250-50 mcg 60 each was denied. Pt would like to know is there another ENT you can refer her to. Pt has an appt scheduled for 03/27 with a NP. Pt would like a call in regards to these matters.  Per pt, she would like a callback as soon as possible.               Thank you

## 2023-03-22 NOTE — TELEPHONE ENCOUNTER
No new care gaps identified.  [facility] Embedded Care Gaps. Reference number: [id number]. 3/22/2023   6:36:28 AM CONCETTAT

## 2023-03-23 ENCOUNTER — TELEPHONE (OUTPATIENT)
Dept: INTERNAL MEDICINE | Facility: CLINIC | Age: 88
End: 2023-03-23
Payer: MEDICARE

## 2023-03-23 DIAGNOSIS — H91.90 HEARING LOSS, UNSPECIFIED HEARING LOSS TYPE, UNSPECIFIED LATERALITY: Primary | ICD-10-CM

## 2023-03-23 RX ORDER — FLUTICASONE PROPIONATE AND SALMETEROL 250; 50 UG/1; UG/1
1 POWDER RESPIRATORY (INHALATION) 2 TIMES DAILY
Qty: 60 EACH | Refills: 5 | OUTPATIENT
Start: 2023-03-23

## 2023-03-23 NOTE — TELEPHONE ENCOUNTER
----- Message from Carol Covarrubias sent at 3/22/2023  5:26 PM CDT -----  Contact: LOUISA MANNING [[id number]][phone]  General Call Back     Patient is requesting a call in regards to medication ADVAIR DISKUS 250-50 mcg/dose diskus inhaler that is not being refill and would like to know why she can not get the medication. Patient states that she getting asthma. Please advise patient at   [phone]

## 2023-03-23 NOTE — TELEPHONE ENCOUNTER
----- Message from JANIE Bolanos sent at 3/23/2023  1:10 PM CDT -----  Your patient, Tiana Mead, has been scheduled for a hearing test.  Please place order to Audiology for Tiana Mead then respond to this message so I can link the requests.     Thank you!

## 2023-03-23 NOTE — TELEPHONE ENCOUNTER
No new care gaps identified.  [facility] Embedded Care Gaps. Reference number: [id number]. 3/23/2023   9:45:26 AM CONCETTAT

## 2023-03-24 ENCOUNTER — PATIENT MESSAGE (OUTPATIENT)
Dept: OTOLARYNGOLOGY | Facility: CLINIC | Age: 88
End: 2023-03-24
Payer: MEDICARE

## 2023-03-24 DIAGNOSIS — J45.901 ASTHMA EXACERBATION IN COPD: ICD-10-CM

## 2023-03-24 DIAGNOSIS — J44.1 ASTHMA EXACERBATION IN COPD: ICD-10-CM

## 2023-03-24 RX ORDER — ALBUTEROL SULFATE 0.83 MG/ML
2.5 SOLUTION RESPIRATORY (INHALATION) EVERY 6 HOURS PRN
Qty: 25 EACH | Refills: 1 | Status: SHIPPED | OUTPATIENT
Start: 2023-03-24 | End: 2024-03-23

## 2023-03-24 NOTE — TELEPHONE ENCOUNTER
No new care gaps identified.  [facility] Embedded Care Gaps. Reference number: [id number]. 3/24/2023   4:31:36 PM CDT

## 2023-03-24 NOTE — TELEPHONE ENCOUNTER
----- Message from Ranjeetrobert Lopez sent at 3/24/2023  3:50 PM CDT -----  Contact: LOUISA MANNING [[id number]]  Type:  RX Refill Request    Who Called: LOUISA MANNING [[id number]]  Refill or New Rx: Refill  RX Name and Strength: albuterol (PROVENTIL) 2.5 mg /3 mL (0.083 %) nebulizer solution  How is the patient currently taking it? (ex. 1XDay): Take 3 mLs (2.5 mg total) by nebulization every 6 (six) hours as needed for Wheezing. Rescue - Nebulization  Is this a 30 day or 90 day RX: N/A  Preferred Pharmacy with phone number: [id number] - [facility] [address] , [phone]  Local or Mail Order: Local  Would the patient rather a call back or a response via [facility]? Phone call   Best Call Back Number: [phone] or [phone]  Additional Information: Patient indicates she submitted a refill request yesterday but has not heard anything back yet. She would like someone to call her before end of day today and stressed that she will be waiting by the phone.

## 2023-03-29 ENCOUNTER — PATIENT OUTREACH (OUTPATIENT)
Dept: ADMINISTRATIVE | Facility: HOSPITAL | Age: 88
End: 2023-03-29
Payer: MEDICARE

## 2023-03-29 NOTE — PROGRESS NOTES
Health Maintenance Due   Topic Date Due    Hemoglobin A1c  02/18/2023       HM Updated. Triggered LINKS and Care Everywhere. Reconciled immunizations .Chart reviewed. Edited appt notes regarding labs.    Margy Gaitan LPN   Clinical Care Coordinator  Primary Care and Wellness

## 2023-03-31 ENCOUNTER — OFFICE VISIT (OUTPATIENT)
Dept: CARDIOLOGY | Facility: CLINIC | Age: 88
End: 2023-03-31
Payer: MEDICARE

## 2023-03-31 VITALS
DIASTOLIC BLOOD PRESSURE: 64 MMHG | BODY MASS INDEX: 23.33 KG/M2 | HEART RATE: 60 BPM | SYSTOLIC BLOOD PRESSURE: 132 MMHG | WEIGHT: 153.44 LBS

## 2023-03-31 DIAGNOSIS — N18.4 TYPE 2 DIABETES MELLITUS WITH STAGE 4 CHRONIC KIDNEY DISEASE, WITHOUT LONG-TERM CURRENT USE OF INSULIN: ICD-10-CM

## 2023-03-31 DIAGNOSIS — I48.21 PERMANENT ATRIAL FIBRILLATION: ICD-10-CM

## 2023-03-31 DIAGNOSIS — I70.0 AORTIC ATHEROSCLEROSIS: ICD-10-CM

## 2023-03-31 DIAGNOSIS — E11.22 TYPE 2 DIABETES MELLITUS WITH STAGE 4 CHRONIC KIDNEY DISEASE, WITHOUT LONG-TERM CURRENT USE OF INSULIN: ICD-10-CM

## 2023-03-31 DIAGNOSIS — R07.2 PRECORDIAL PAIN: ICD-10-CM

## 2023-03-31 DIAGNOSIS — I50.32 CHRONIC DIASTOLIC HEART FAILURE: Primary | ICD-10-CM

## 2023-03-31 PROBLEM — Z87.898 HISTORY OF CHEST PAIN AT REST: Status: RESOLVED | Noted: 2017-05-02 | Resolved: 2023-03-31

## 2023-03-31 PROBLEM — R07.9 CHEST PAIN AT REST: Status: RESOLVED | Noted: 2019-01-09 | Resolved: 2023-03-31

## 2023-03-31 PROCEDURE — 1160F PR REVIEW ALL MEDS BY PRESCRIBER/CLIN PHARMACIST DOCUMENTED: ICD-10-PCS | Mod: CPTII,S$GLB,, | Performed by: INTERNAL MEDICINE

## 2023-03-31 PROCEDURE — 1126F PR PAIN SEVERITY QUANTIFIED, NO PAIN PRESENT: ICD-10-PCS | Mod: CPTII,S$GLB,, | Performed by: INTERNAL MEDICINE

## 2023-03-31 PROCEDURE — 1159F MED LIST DOCD IN RCRD: CPT | Mod: CPTII,S$GLB,, | Performed by: INTERNAL MEDICINE

## 2023-03-31 PROCEDURE — 93000 ELECTROCARDIOGRAM COMPLETE: CPT | Mod: S$GLB,,, | Performed by: INTERNAL MEDICINE

## 2023-03-31 PROCEDURE — 99215 PR OFFICE/OUTPT VISIT, EST, LEVL V, 40-54 MIN: ICD-10-PCS | Mod: S$GLB,,, | Performed by: INTERNAL MEDICINE

## 2023-03-31 PROCEDURE — 93000 EKG 12-LEAD: ICD-10-PCS | Mod: S$GLB,,, | Performed by: INTERNAL MEDICINE

## 2023-03-31 PROCEDURE — 99215 OFFICE O/P EST HI 40 MIN: CPT | Mod: S$GLB,,, | Performed by: INTERNAL MEDICINE

## 2023-03-31 PROCEDURE — 99999 PR PBB SHADOW E&M-EST. PATIENT-LVL II: ICD-10-PCS | Mod: PBBFAC,,, | Performed by: INTERNAL MEDICINE

## 2023-03-31 PROCEDURE — 1101F PT FALLS ASSESS-DOCD LE1/YR: CPT | Mod: CPTII,S$GLB,, | Performed by: INTERNAL MEDICINE

## 2023-03-31 PROCEDURE — 3288F FALL RISK ASSESSMENT DOCD: CPT | Mod: CPTII,S$GLB,, | Performed by: INTERNAL MEDICINE

## 2023-03-31 PROCEDURE — 1101F PR PT FALLS ASSESS DOC 0-1 FALLS W/OUT INJ PAST YR: ICD-10-PCS | Mod: CPTII,S$GLB,, | Performed by: INTERNAL MEDICINE

## 2023-03-31 PROCEDURE — 99999 PR PBB SHADOW E&M-EST. PATIENT-LVL II: CPT | Mod: PBBFAC,,, | Performed by: INTERNAL MEDICINE

## 2023-03-31 PROCEDURE — 1160F RVW MEDS BY RX/DR IN RCRD: CPT | Mod: CPTII,S$GLB,, | Performed by: INTERNAL MEDICINE

## 2023-03-31 PROCEDURE — 1126F AMNT PAIN NOTED NONE PRSNT: CPT | Mod: CPTII,S$GLB,, | Performed by: INTERNAL MEDICINE

## 2023-03-31 PROCEDURE — 3288F PR FALLS RISK ASSESSMENT DOCUMENTED: ICD-10-PCS | Mod: CPTII,S$GLB,, | Performed by: INTERNAL MEDICINE

## 2023-03-31 PROCEDURE — 99212 OFFICE O/P EST SF 10 MIN: CPT | Mod: PBBFAC,PN | Performed by: INTERNAL MEDICINE

## 2023-03-31 PROCEDURE — 1159F PR MEDICATION LIST DOCUMENTED IN MEDICAL RECORD: ICD-10-PCS | Mod: CPTII,S$GLB,, | Performed by: INTERNAL MEDICINE

## 2023-03-31 NOTE — PROGRESS NOTES
[facility]    Chief Complaint  Chief Complaint   Patient presents with    Chest Pain       HPI:    Presents for routine follow-up.  Concerned about an episode of chest pain she had yesterday.  Woke up yesterday morning feeling weak.  Had left precordial pain which felt like a soreness.  Worse when she made certain movements of twisting her torso.  Worse when she bent over.  She had to lie in a certain position to get a better.  Rested most of the day.  Pain is resolved today.  Otherwise, she has been doing well    Medications  Current Outpatient Medications   Medication Sig Dispense Refill    acetaminophen (TYLENOL) 500 MG tablet Take 2 tablets (1,000 mg total) by mouth 3 (three) times daily as needed for Pain (muscle pain in arms and legs). 100 tablet 1    albuterol (PROVENTIL) 2.5 mg /3 mL (0.083 %) nebulizer solution Take 3 mLs (2.5 mg total) by nebulization every 6 (six) hours as needed for Wheezing. Rescue 25 each 1    albuterol (PROVENTIL/VENTOLIN HFA) 90 mcg/actuation inhaler Inhale 2 puffs into the lungs every 4 (four) hours as needed for Wheezing. 18 g 6    albuterol (PROVENTIL/VENTOLIN HFA) 90 mcg/actuation inhaler Inhale 1-2 puffs into the lungs every 6 (six) hours as needed for Wheezing. Rescue 6.7 g 0    amLODIPine (NORVASC) 5 MG tablet TAKE 1 TABLET(5 MG) BY MOUTH EVERY MORNING 90 tablet 3    aspirin 81 MG Chew Take 1 tablet (81 mg total) by mouth every morning. 100 tablet 3    atorvastatin (LIPITOR) 40 MG tablet Take 1 tablet (40 mg total) by mouth every evening. To lower cholesterol and for heart and blood vessels 90 tablet 3    blood sugar diagnostic (ONETOUCH ULTRA TEST) Strp Inject 1 strip into the skin once daily. No longer on insulin because of  each 4    clotrimazole-betamethasone 1-0.05% (LOTRISONE) cream Apply topically 2 (two) times daily as needed. For intertriginous itching in the bend of the leg 45 g 3    diclofenac sodium (VOLTAREN) 1 % Gel APPLY 2 GRAMS TOPICALLY  "TO THE AFFECTED AREA TWICE DAILY 100 g 4    dorzolamide (TRUSOPT) 2 % ophthalmic solution Place 1 drop into both eyes 2 (two) times daily. 10 mL 4    fluticasone-salmeterol diskus inhaler 250-50 mcg Inhale 1 puff into the lungs 2 (two) times daily. 60 each 5    furosemide (LASIX) 40 MG tablet TAKE 1 TABLET BY MOUTH EVERY MORNING AND 1 TABLET AT 4PM. 180 tablet 0    insulin aspart U-100 (NOVOLOG FLEXPEN U-100 INSULIN) 100 unit/mL (3 mL) InPn pen Inject if blood sugar is >180, 180-230+2, 231-280+4, 281-330+6, 331-380+8,>380+10 MAX daily 30 units. 15 mL 2    isosorbide mononitrate (IMDUR) 60 MG 24 hr tablet TAKE ONE TABLET BY MOUTH EVERY MORNING FOR HEART, TO PREVENT CHEST PAIN AND SHORTNESS OF BREATH 90 tablet 3    lancets (TRUEPLUS LANCETS) 33 gauge Misc Apply 1 lancet topically once daily. No longer on insulin because of  each 3    latanoprost 0.005 % ophthalmic solution INSTILL 1 DROP IN BOTH EYES EVERY EVENING 10 mL 12    levothyroxine (SYNTHROID) 75 MCG tablet       levothyroxine (SYNTHROID) 88 MCG tablet Take 1 tablet (88 mcg total) by mouth before breakfast. 90 tablet 3    meclizine (ANTIVERT) 12.5 mg tablet TAKE 1 TABLET BY MOUTH THREE TIMES DAILY AS NEEDED FOR DIZZINESS 20 tablet 3    metoprolol tartrate (LOPRESSOR) 25 MG tablet TAKE 1 TABLET(25 MG) BY MOUTH TWICE DAILY 180 tablet 3    multivit-mineral-iron-lutein Tab Take 1 tablet by mouth once daily.      nitroGLYCERIN (NITROSTAT) 0.4 MG SL tablet ONE TABLET UNDER THE TONGUE EVERY 5 MINUTES AS NEEDED FOR CHEST PAIN 100 tablet 3    nystatin (MYCOSTATIN) powder Apply topically 4 (four) times daily. 60 g 3    pen needle, diabetic (BD ULTRA-FINE SHORT PEN NEEDLE) 31 gauge x 5/16" Ndle USE WITH INSULIN PENS up to 3 x a day. 100 each 2    polyethylene glycol (GLYCOLAX) 17 gram/dose powder Take 17 g by mouth daily as needed (CONSTIPATION).      psyllium (METAMUCIL) powder Take 1 packet by mouth daily as needed (CONSTIPATION).      SHINGRIX, PF, 50 mcg/0.5 " mL injection       triamcinolone acetonide 0.025% (KENALOG) 0.025 % Oint Apply topically 2 (two) times daily. 80 g 2     No current facility-administered medications for this visit.        History  Past Medical History:   Diagnosis Date    Allergy     Anemia     Arthritis     Asthma     Chronic kidney disease     Degenerative disc disease     Diabetes mellitus type II     Diastolic heart failure 05/02/2017    Essential hypertension 7/3/2012    Glaucoma     History of insulin dependent diabetes mellitus, for many years stopped because of chronic kidney November 2020.  9/9/2021    History of pseudogout 8/23/2012    Hyperlipidemia     Hypertension     Iritis     Myocardial infarction     Pneumonia     Thrombocytopenia 8/24/2021    Thyroid disease      Past Surgical History:   Procedure Laterality Date    CARDIAC CATHETERIZATION  2010    no obstructive disease    CATARACT EXTRACTION W/  INTRAOCULAR LENS IMPLANT Left n/a    CATARACT EXTRACTION W/  INTRAOCULAR LENS IMPLANT Right 10/8/2018    With Femtosecond LASER assist (Dr. Henson)    CHOLECYSTECTOMY      ESOPHAGOGASTRODUODENOSCOPY N/A 11/4/2020    Procedure: EGD (ESOPHAGOGASTRODUODENOSCOPY);  Surgeon: Tomás Mccall MD;  Location: [facility] ([address] [place] [address]);  Service: Endoscopy;  Laterality: N/A;    EYE SURGERY      gall stone      HYSTERECTOMY      VARICOSE VEIN SURGERY       Social History     Socioeconomic History    Marital status:    Tobacco Use    Smoking status: Never    Smokeless tobacco: Never   Substance and Sexual Activity    Alcohol use: No    Drug use: No    Sexual activity: Never     Family History   Problem Relation Age of Onset    Diabetes Mother     Hypertension Mother     Glaucoma Mother     Kidney disease Mother     Hypertension Father     Hypotension Sister     Heart attack Sister     Heart disease Sister     No Known Problems Sister     Diabetes Sister     Kidney disease Sister     Leukemia Sister     Hypertension Sister     Diabetes Sister      Kidney disease Sister     No Known Problems Sister     Heart disease Brother     Heart disease Brother     No Known Problems Brother     No Known Problems Maternal Grandmother     No Known Problems Maternal Grandfather     No Known Problems Paternal Grandmother     Diabetes Paternal Grandfather     Hypertension Daughter     Atrial fibrillation Daughter     Diabetes Daughter         borderline    Hypertension Daughter     No Known Problems Son     Diabetes Son     Heart disease Son     Hypertension Son     Melanoma Neg Hx         Allergies  Review of patient's allergies indicates:   Allergen Reactions    Codeine Itching and Nausea Only              Review of Systems   Review of Systems   Constitutional: Negative for malaise/fatigue, weight gain and weight loss.   Eyes:  Negative for visual disturbance.   Cardiovascular:  Positive for chest pain. Negative for claudication, cyanosis, dyspnea on exertion, irregular heartbeat, leg swelling, near-syncope, orthopnea, palpitations, paroxysmal nocturnal dyspnea and syncope.   Respiratory:  Negative for cough, hemoptysis, shortness of breath, sleep disturbances due to breathing and wheezing.    Hematologic/Lymphatic: Negative for bleeding problem. Does not bruise/bleed easily.   Skin:  Negative for poor wound healing.   Musculoskeletal:  Positive for joint pain. Negative for muscle cramps and myalgias.   Gastrointestinal:  Negative for abdominal pain, anorexia, diarrhea, heartburn, hematemesis, hematochezia, melena, nausea and vomiting.   Genitourinary:  Negative for hematuria and nocturia.   Neurological:  Negative for excessive daytime sleepiness, dizziness, focal weakness, light-headedness and weakness.     Physical Exam  There were no vitals filed for this visit.  Wt Readings from Last 1 Encounters:   03/31/23 69.6 kg (153 lb 7 oz)     Physical Exam  Vitals and nursing note reviewed.   Constitutional:       General: She is not in acute distress.     Appearance: She is not  toxic-appearing or diaphoretic.   HENT:      Head: Normocephalic and atraumatic.      Mouth/Throat:      Lips: Pink.      Mouth: Mucous membranes are moist.   Eyes:      General: No scleral icterus.     Conjunctiva/sclera: Conjunctivae normal.   Neck:      Thyroid: No thyromegaly.      Vascular: No carotid bruit, hepatojugular reflux or JVD.      Trachea: Trachea normal.   Cardiovascular:      Rate and Rhythm: Normal rate. Rhythm irregularly irregular. No extrasystoles are present.     Chest Wall: PMI is not displaced.      Pulses:           Carotid pulses are 2+ on the right side and 2+ on the left side.       Radial pulses are 2+ on the right side and 2+ on the left side.      Heart sounds: S1 normal and S2 normal. Murmur heard.   Systolic murmur is present with a grade of 2/6.     No friction rub. No S3 or S4 sounds.   Pulmonary:      Effort: Pulmonary effort is normal. No accessory muscle usage or respiratory distress.      Breath sounds: Normal breath sounds and air entry. No decreased breath sounds, wheezing, rhonchi or rales.   Abdominal:      General: Bowel sounds are normal. There is no distension or abdominal bruit.      Palpations: Abdomen is soft. There is no hepatomegaly, splenomegaly or pulsatile mass.      Tenderness: There is no abdominal tenderness.   Musculoskeletal:         General: No tenderness or deformity.      Right lower leg: No edema.      Left lower leg: No edema.   Skin:     General: Skin is warm and dry.      Capillary Refill: Capillary refill takes less than 2 seconds.      Coloration: Skin is not cyanotic or pale.      Nails: There is no clubbing.   Neurological:      General: No focal deficit present.      Mental Status: She is alert and oriented to person, place, and time.   Psychiatric:         Attention and Perception: Attention normal.         Mood and Affect: Mood normal.         Speech: Speech normal.         Behavior: Behavior normal. Behavior is cooperative.       Labs  Lab  Visit on 10/13/2022   Component Date Value Ref Range Status    Sodium 10/13/2022 142  136 - 145 mmol/L Final    Potassium 10/13/2022 4.3  3.5 - 5.1 mmol/L Final    Chloride 10/13/2022 107  95 - 110 mmol/L Final    CO2 10/13/2022 29  23 - 29 mmol/L Final    Glucose 10/13/2022 162 (H)  70 - 110 mg/dL Final    BUN 10/13/2022 27  10 - 30 mg/dL Final    Creatinine 10/13/2022 1.5 (H)  0.5 - 1.4 mg/dL Final    Calcium 10/13/2022 9.8  8.7 - 10.5 mg/dL Final    Total Protein 10/13/2022 6.2  6.0 - 8.4 g/dL Final    Albumin 10/13/2022 3.6  3.5 - 5.2 g/dL Final    Total Bilirubin 10/13/2022 0.5  0.1 - 1.0 mg/dL Final    Comment: For infants and newborns, interpretation of results should be based  on gestational age, weight and in agreement with clinical  observations.    Premature Infant recommended reference ranges:  Up to 24 hours.............<8.0 mg/dL  Up to 48 hours............<12.0 mg/dL  3-5 days..................<15.0 mg/dL  6-29 days.................<15.0 mg/dL      Alkaline Phosphatase 10/13/2022 110  55 - 135 U/L Final    AST 10/13/2022 34  10 - 40 U/L Final    ALT 10/13/2022 33  10 - 44 U/L Final    Anion Gap 10/13/2022 6 (L)  8 - 16 mmol/L Final    eGFR 10/13/2022 31.5 (A)  >60 mL/min/1.73 m^2 Final    WBC 10/13/2022 5.59  3.90 - 12.70 K/uL Final    RBC 10/13/2022 3.63 (L)  4.00 - 5.40 M/uL Final    Hemoglobin 10/13/2022 10.8 (L)  12.0 - 16.0 g/dL Final    Hematocrit 10/13/2022 34.6 (L)  37.0 - 48.5 % Final    MCV 10/13/2022 95  82 - 98 fL Final    MCH 10/13/2022 29.8  27.0 - 31.0 pg Final    MCHC 10/13/2022 31.2 (L)  32.0 - 36.0 g/dL Final    RDW 10/13/2022 14.5  11.5 - 14.5 % Final    Platelets 10/13/2022 161  150 - 450 K/uL Final    MPV 10/13/2022 12.1  9.2 - 12.9 fL Final    Immature Granulocytes 10/13/2022 0.4  0.0 - 0.5 % Final    Gran # (ANC) 10/13/2022 3.3  1.8 - 7.7 K/uL Final    Immature Grans (Abs) 10/13/2022 0.02  0.00 - 0.04 K/uL Final    Comment: Mild elevation in immature granulocytes is non specific  and   can be seen in a variety of conditions including stress response,   acute inflammation, trauma and pregnancy. Correlation with other   laboratory and clinical findings is essential.      Lymph # 10/13/2022 1.2  1.0 - 4.8 K/uL Final    Mono # 10/13/2022 0.8  0.3 - 1.0 K/uL Final    Eos # 10/13/2022 0.2  0.0 - 0.5 K/uL Final    Baso # 10/13/2022 0.05  0.00 - 0.20 K/uL Final    nRBC 10/13/2022 0  0 /100 WBC Final    Gran % 10/13/2022 59.7  38.0 - 73.0 % Final    Lymph % 10/13/2022 20.8  18.0 - 48.0 % Final    Mono % 10/13/2022 15.0  4.0 - 15.0 % Final    Eosinophil % 10/13/2022 3.2  0.0 - 8.0 % Final    Basophil % 10/13/2022 0.9  0.0 - 1.9 % Final    Differential Method 10/13/2022 Automated   Final    Specimen UA 10/13/2022 Urine, Clean Catch   Final    Color, UA 10/13/2022 Yellow  Yellow, Straw, Hanna Final    Appearance, UA 10/13/2022 Clear  Clear Final    pH, UA 10/13/2022 7.0  5.0 - 8.0 Final    Specific Gravity, UA 10/13/2022 1.010  1.005 - 1.030 Final    Protein, UA 10/13/2022 Negative  Negative Final    Comment: Recommend a 24 hour urine protein or a urine   protein/creatinine ratio if globulin induced proteinuria is  clinically suspected.      Glucose, UA 10/13/2022 Negative  Negative Final    Ketones, UA 10/13/2022 Negative  Negative Final    Bilirubin (UA) 10/13/2022 Negative  Negative Final    Occult Blood UA 10/13/2022 Negative  Negative Final    Nitrite, UA 10/13/2022 Negative  Negative Final    Leukocytes, UA 10/13/2022 Negative  Negative Final    PTH, Intact 10/13/2022 154.6 (H)  9.0 - 77.0 pg/mL Final   Lab Visit on 10/13/2022   Component Date Value Ref Range Status    Protein, Urine Random 10/13/2022 <7  0 - 15 mg/dL Final    Creatinine, Urine 10/13/2022 43.0  15.0 - 325.0 mg/dL Final    Prot/Creat Ratio, Urine 10/13/2022 Unable to calculate  0.00 - 0.20 Final       Imaging  No results found.    Assessment  1. Chronic diastolic heart failure  Compensated    2. Permanent atrial  fibrillation  Controlled.  Declines anticoagulation.    3. Aortic atherosclerosis  On appropriate risk reduction    4. Type 2 diabetes mellitus with stage 4 chronic kidney disease, without long-term current use of insulin  Seems to be adequately controlled and stable    5. Precordial pain  Musculoskeletal      Plan and Discussion    Reassured that her episode of chest pain yesterday was noncardiac.  Continue current guideline directed medical therapy.    The ASCVD Risk score (Lanette DK, et al., 2019) failed to calculate for the following reasons:    The 2019 ASCVD risk score is only valid for ages 40 to 79    The patient has a prior MI or stroke diagnosis     Follow Up  Follow up in about 6 months (around 9/30/2023).      Adán Alanis MD

## 2023-04-10 ENCOUNTER — OFFICE VISIT (OUTPATIENT)
Dept: INTERNAL MEDICINE | Facility: CLINIC | Age: 88
End: 2023-04-10
Payer: MEDICARE

## 2023-04-10 VITALS
SYSTOLIC BLOOD PRESSURE: 126 MMHG | HEIGHT: 68 IN | OXYGEN SATURATION: 99 % | DIASTOLIC BLOOD PRESSURE: 64 MMHG | BODY MASS INDEX: 23.56 KG/M2 | HEART RATE: 51 BPM | WEIGHT: 155.44 LBS

## 2023-04-10 DIAGNOSIS — I50.32 CHRONIC DIASTOLIC CONGESTIVE HEART FAILURE: ICD-10-CM

## 2023-04-10 DIAGNOSIS — I48.21 PERMANENT ATRIAL FIBRILLATION: ICD-10-CM

## 2023-04-10 DIAGNOSIS — E03.9 PRIMARY HYPOTHYROIDISM: ICD-10-CM

## 2023-04-10 DIAGNOSIS — E11.9 INSULIN-REQUIRING OR DEPENDENT TYPE II DIABETES MELLITUS: ICD-10-CM

## 2023-04-10 DIAGNOSIS — I25.10 CORONARY ARTERY DISEASE INVOLVING NATIVE CORONARY ARTERY OF NATIVE HEART WITHOUT ANGINA PECTORIS: Primary | ICD-10-CM

## 2023-04-10 DIAGNOSIS — Z79.4 INSULIN-REQUIRING OR DEPENDENT TYPE II DIABETES MELLITUS: ICD-10-CM

## 2023-04-10 DIAGNOSIS — N18.4 STAGE 4 CHRONIC KIDNEY DISEASE: ICD-10-CM

## 2023-04-10 PROCEDURE — 1160F PR REVIEW ALL MEDS BY PRESCRIBER/CLIN PHARMACIST DOCUMENTED: ICD-10-PCS | Mod: CPTII,S$GLB,, | Performed by: INTERNAL MEDICINE

## 2023-04-10 PROCEDURE — 99214 PR OFFICE/OUTPT VISIT, EST, LEVL IV, 30-39 MIN: ICD-10-PCS | Mod: S$GLB,,, | Performed by: INTERNAL MEDICINE

## 2023-04-10 PROCEDURE — 1125F AMNT PAIN NOTED PAIN PRSNT: CPT | Mod: CPTII,S$GLB,, | Performed by: INTERNAL MEDICINE

## 2023-04-10 PROCEDURE — 99213 OFFICE O/P EST LOW 20 MIN: CPT | Mod: PBBFAC | Performed by: INTERNAL MEDICINE

## 2023-04-10 PROCEDURE — 1159F MED LIST DOCD IN RCRD: CPT | Mod: CPTII,S$GLB,, | Performed by: INTERNAL MEDICINE

## 2023-04-10 PROCEDURE — 99999 PR PBB SHADOW E&M-EST. PATIENT-LVL III: ICD-10-PCS | Mod: PBBFAC,,, | Performed by: INTERNAL MEDICINE

## 2023-04-10 PROCEDURE — 1160F RVW MEDS BY RX/DR IN RCRD: CPT | Mod: CPTII,S$GLB,, | Performed by: INTERNAL MEDICINE

## 2023-04-10 PROCEDURE — 99214 OFFICE O/P EST MOD 30 MIN: CPT | Mod: S$GLB,,, | Performed by: INTERNAL MEDICINE

## 2023-04-10 PROCEDURE — 1159F PR MEDICATION LIST DOCUMENTED IN MEDICAL RECORD: ICD-10-PCS | Mod: CPTII,S$GLB,, | Performed by: INTERNAL MEDICINE

## 2023-04-10 PROCEDURE — 1125F PR PAIN SEVERITY QUANTIFIED, PAIN PRESENT: ICD-10-PCS | Mod: CPTII,S$GLB,, | Performed by: INTERNAL MEDICINE

## 2023-04-10 PROCEDURE — 99999 PR PBB SHADOW E&M-EST. PATIENT-LVL III: CPT | Mod: PBBFAC,,, | Performed by: INTERNAL MEDICINE

## 2023-04-10 NOTE — PROGRESS NOTES
CC:  Annual exam     HPI:  The patient is a 89-year-old female with asthma, COPD, insulin-requiring diabetes, hypertension, hypothyroidism, coronary artery disease status post MI, CKD stage 4 and atrial fibrillation presents today for annual checkup.  The patient was recently seen by Cardiology.  She reported she was having problems with chest pain.  It was deemed that it was noncardiac.  He does report decreased hearing and has a follow-up appointment scheduled.    ROS:  Patient reports her asthma is stable.  She does report using nitroglycerin about 3 times a month.  She does walk with a walker.  No numbness or tingling arms or legs.  She does check her blood sugars daily.    Physical exam:   General appearance:  No acute distress   HEENT:  She has bilateral lens replacements, left pupil is irregular.  TMs are obscured with wax.  Nasal septum is midline without discharge.  Oropharynx is without erythema.  She does have dentures in place.  Trachea is midline without thyromegaly.    Pulmonary:  Good inspiratory, expiratory breath sounds are heard.  Lungs are clear to auscultation.    Cardiovascular:  S1-S2, rhythm is regular.  Extremities with trace edema.    GI: Abdomen is nontender, nondistended without hepatosplenomegaly    Assessment:  1. Annual exam   2.  Coronary artery disease  3. CKD stage 4  5.  Chronic diastolic heart failure  6.  Insulin-requiring diabetes  7.  AFib   8. Hypothyroidism     Plan:  1.  Will schedule a CBC, CMP, lipid panel, TSH, T4, A1c and urine for protein

## 2023-04-11 ENCOUNTER — LAB VISIT (OUTPATIENT)
Dept: LAB | Facility: HOSPITAL | Age: 88
End: 2023-04-11
Attending: INTERNAL MEDICINE
Payer: MEDICARE

## 2023-04-11 DIAGNOSIS — E03.9 PRIMARY HYPOTHYROIDISM: ICD-10-CM

## 2023-04-11 DIAGNOSIS — E11.9 INSULIN-REQUIRING OR DEPENDENT TYPE II DIABETES MELLITUS: ICD-10-CM

## 2023-04-11 DIAGNOSIS — Z79.4 INSULIN-REQUIRING OR DEPENDENT TYPE II DIABETES MELLITUS: ICD-10-CM

## 2023-04-11 LAB
ALBUMIN SERPL BCP-MCNC: 3.9 G/DL (ref 3.5–5.2)
ALP SERPL-CCNC: 127 U/L (ref 55–135)
ALT SERPL W/O P-5'-P-CCNC: 26 U/L (ref 10–44)
ANION GAP SERPL CALC-SCNC: 11 MMOL/L (ref 8–16)
AST SERPL-CCNC: 29 U/L (ref 10–40)
BASOPHILS # BLD AUTO: 0.06 K/UL (ref 0–0.2)
BASOPHILS NFR BLD: 1.1 % (ref 0–1.9)
BILIRUB SERPL-MCNC: 0.6 MG/DL (ref 0.1–1)
BUN SERPL-MCNC: 25 MG/DL (ref 10–30)
CALCIUM SERPL-MCNC: 9.7 MG/DL (ref 8.7–10.5)
CHLORIDE SERPL-SCNC: 105 MMOL/L (ref 95–110)
CHOLEST SERPL-MCNC: 132 MG/DL (ref 120–199)
CHOLEST/HDLC SERPL: 2.6 {RATIO} (ref 2–5)
CO2 SERPL-SCNC: 24 MMOL/L (ref 23–29)
CREAT SERPL-MCNC: 1.7 MG/DL (ref 0.5–1.4)
DIFFERENTIAL METHOD: ABNORMAL
EOSINOPHIL # BLD AUTO: 0.2 K/UL (ref 0–0.5)
EOSINOPHIL NFR BLD: 3.4 % (ref 0–8)
ERYTHROCYTE [DISTWIDTH] IN BLOOD BY AUTOMATED COUNT: 14.4 % (ref 11.5–14.5)
EST. GFR  (NO RACE VARIABLE): 26.9 ML/MIN/1.73 M^2
ESTIMATED AVG GLUCOSE: 157 MG/DL (ref 68–131)
GLUCOSE SERPL-MCNC: 111 MG/DL (ref 70–110)
HBA1C MFR BLD: 7.1 % (ref 4–5.6)
HCT VFR BLD AUTO: 36.7 % (ref 37–48.5)
HDLC SERPL-MCNC: 51 MG/DL (ref 40–75)
HDLC SERPL: 38.6 % (ref 20–50)
HGB BLD-MCNC: 11.5 G/DL (ref 12–16)
IMM GRANULOCYTES # BLD AUTO: 0.02 K/UL (ref 0–0.04)
IMM GRANULOCYTES NFR BLD AUTO: 0.4 % (ref 0–0.5)
LDLC SERPL CALC-MCNC: 69.2 MG/DL (ref 63–159)
LYMPHOCYTES # BLD AUTO: 1.8 K/UL (ref 1–4.8)
LYMPHOCYTES NFR BLD: 33.3 % (ref 18–48)
MCH RBC QN AUTO: 29.1 PG (ref 27–31)
MCHC RBC AUTO-ENTMCNC: 31.3 G/DL (ref 32–36)
MCV RBC AUTO: 93 FL (ref 82–98)
MONOCYTES # BLD AUTO: 0.8 K/UL (ref 0.3–1)
MONOCYTES NFR BLD: 16 % (ref 4–15)
NEUTROPHILS # BLD AUTO: 2.4 K/UL (ref 1.8–7.7)
NEUTROPHILS NFR BLD: 45.8 % (ref 38–73)
NONHDLC SERPL-MCNC: 81 MG/DL
NRBC BLD-RTO: 0 /100 WBC
PLATELET # BLD AUTO: 168 K/UL (ref 150–450)
PMV BLD AUTO: 11.8 FL (ref 9.2–12.9)
POTASSIUM SERPL-SCNC: 4.2 MMOL/L (ref 3.5–5.1)
PROT SERPL-MCNC: 6.8 G/DL (ref 6–8.4)
RBC # BLD AUTO: 3.95 M/UL (ref 4–5.4)
SODIUM SERPL-SCNC: 140 MMOL/L (ref 136–145)
TRIGL SERPL-MCNC: 59 MG/DL (ref 30–150)
TSH SERPL DL<=0.005 MIU/L-ACNC: 3.15 UIU/ML (ref 0.4–4)
WBC # BLD AUTO: 5.26 K/UL (ref 3.9–12.7)

## 2023-04-11 PROCEDURE — 83036 HEMOGLOBIN GLYCOSYLATED A1C: CPT | Performed by: INTERNAL MEDICINE

## 2023-04-11 PROCEDURE — 85025 COMPLETE CBC W/AUTO DIFF WBC: CPT | Performed by: INTERNAL MEDICINE

## 2023-04-11 PROCEDURE — 80061 LIPID PANEL: CPT | Performed by: INTERNAL MEDICINE

## 2023-04-11 PROCEDURE — 80053 COMPREHEN METABOLIC PANEL: CPT | Performed by: INTERNAL MEDICINE

## 2023-04-11 PROCEDURE — 84436 ASSAY OF TOTAL THYROXINE: CPT | Performed by: INTERNAL MEDICINE

## 2023-04-11 PROCEDURE — 36415 COLL VENOUS BLD VENIPUNCTURE: CPT | Mod: PN | Performed by: INTERNAL MEDICINE

## 2023-04-11 PROCEDURE — 84443 ASSAY THYROID STIM HORMONE: CPT | Performed by: INTERNAL MEDICINE

## 2023-04-12 LAB — T4 SERPL-MCNC: 10.6 UG/DL (ref 4.5–11.5)

## 2023-04-19 NOTE — TELEPHONE ENCOUNTER
----- Message from Cheryl Oglesby sent at 4/24/2017 10:31 AM CDT -----  Contact: pt [phone]  Pt would like a call from the nurse in regards to her new blood sugar machine([employer])  the pt said she need a scripts for needles and the lacets, sent to [facility] [id number] @ [phone]  
Again sent orders for lancets and test strips  The pharmacist is asked to provide the product the patient needs to check her blood sugar 3 x daily  
Spoke to pt. Refer to message below. Pt would also like results from labs and x ray . Please make sure pt has RX for lacets and needles. Pt has machine with no supplies to go with them.   
19-Apr-2023 10:59

## 2023-04-25 ENCOUNTER — TELEPHONE (OUTPATIENT)
Dept: OPHTHALMOLOGY | Facility: CLINIC | Age: 88
End: 2023-04-25
Payer: MEDICARE

## 2023-04-25 NOTE — TELEPHONE ENCOUNTER
Left message on voicemail.    ----- Message from Christal Coe sent at 4/25/2023  1:45 PM CDT -----  Contact: arnol @ [phone]  LOUISA MANNING calling regarding Appointment Verification for #pt is calling to speak with someone to get a letter so she can get her handicap plate. Asking for call back

## 2023-05-02 ENCOUNTER — OFFICE VISIT (OUTPATIENT)
Dept: OPHTHALMOLOGY | Facility: CLINIC | Age: 88
End: 2023-05-02
Payer: MEDICARE

## 2023-05-02 DIAGNOSIS — H04.123 DRY EYE SYNDROME OF BOTH EYES: ICD-10-CM

## 2023-05-02 DIAGNOSIS — H40.1132 PRIMARY OPEN ANGLE GLAUCOMA (POAG) OF BOTH EYES, MODERATE STAGE: ICD-10-CM

## 2023-05-02 DIAGNOSIS — H40.32X2 GLAUCOMA OF LEFT EYE ASSOCIATED WITH OCULAR TRAUMA, MODERATE STAGE: Primary | ICD-10-CM

## 2023-05-02 DIAGNOSIS — H18.20 CORNEAL EDEMA OF LEFT EYE: ICD-10-CM

## 2023-05-02 PROCEDURE — 2023F DILAT RTA XM W/O RTNOPTHY: CPT | Mod: CPTII,S$GLB,, | Performed by: OPHTHALMOLOGY

## 2023-05-02 PROCEDURE — 1159F MED LIST DOCD IN RCRD: CPT | Mod: CPTII,S$GLB,, | Performed by: OPHTHALMOLOGY

## 2023-05-02 PROCEDURE — 1101F PR PT FALLS ASSESS DOC 0-1 FALLS W/OUT INJ PAST YR: ICD-10-PCS | Mod: CPTII,S$GLB,, | Performed by: OPHTHALMOLOGY

## 2023-05-02 PROCEDURE — 1101F PT FALLS ASSESS-DOCD LE1/YR: CPT | Mod: CPTII,S$GLB,, | Performed by: OPHTHALMOLOGY

## 2023-05-02 PROCEDURE — 99214 PR OFFICE/OUTPT VISIT, EST, LEVL IV, 30-39 MIN: ICD-10-PCS | Mod: S$GLB,,, | Performed by: OPHTHALMOLOGY

## 2023-05-02 PROCEDURE — 1160F PR REVIEW ALL MEDS BY PRESCRIBER/CLIN PHARMACIST DOCUMENTED: ICD-10-PCS | Mod: CPTII,S$GLB,, | Performed by: OPHTHALMOLOGY

## 2023-05-02 PROCEDURE — 3288F FALL RISK ASSESSMENT DOCD: CPT | Mod: CPTII,S$GLB,, | Performed by: OPHTHALMOLOGY

## 2023-05-02 PROCEDURE — 1159F PR MEDICATION LIST DOCUMENTED IN MEDICAL RECORD: ICD-10-PCS | Mod: CPTII,S$GLB,, | Performed by: OPHTHALMOLOGY

## 2023-05-02 PROCEDURE — 1160F RVW MEDS BY RX/DR IN RCRD: CPT | Mod: CPTII,S$GLB,, | Performed by: OPHTHALMOLOGY

## 2023-05-02 PROCEDURE — 99999 PR PBB SHADOW E&M-EST. PATIENT-LVL II: ICD-10-PCS | Mod: PBBFAC,,, | Performed by: OPHTHALMOLOGY

## 2023-05-02 PROCEDURE — 99999 PR PBB SHADOW E&M-EST. PATIENT-LVL II: CPT | Mod: PBBFAC,,, | Performed by: OPHTHALMOLOGY

## 2023-05-02 PROCEDURE — 1126F AMNT PAIN NOTED NONE PRSNT: CPT | Mod: CPTII,S$GLB,, | Performed by: OPHTHALMOLOGY

## 2023-05-02 PROCEDURE — 3288F PR FALLS RISK ASSESSMENT DOCUMENTED: ICD-10-PCS | Mod: CPTII,S$GLB,, | Performed by: OPHTHALMOLOGY

## 2023-05-02 PROCEDURE — 99214 OFFICE O/P EST MOD 30 MIN: CPT | Mod: S$GLB,,, | Performed by: OPHTHALMOLOGY

## 2023-05-02 PROCEDURE — 1126F PR PAIN SEVERITY QUANTIFIED, NO PAIN PRESENT: ICD-10-PCS | Mod: CPTII,S$GLB,, | Performed by: OPHTHALMOLOGY

## 2023-05-02 PROCEDURE — 2023F PR DILATED RETINAL EXAM W/O EVID OF RETINOPATHY: ICD-10-PCS | Mod: CPTII,S$GLB,, | Performed by: OPHTHALMOLOGY

## 2023-05-02 RX ORDER — FLUOROMETHOLONE 1 MG/ML
1 SUSPENSION/ DROPS OPHTHALMIC DAILY
COMMUNITY

## 2023-05-02 NOTE — PROGRESS NOTES
Assessment /Plan     For exam results, see Encounter Report.    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, moderate stage    Primary open angle glaucoma (POAG) of both eyes, moderate stage    Dry eye syndrome of both eyes    Corneal edema of left eye      Son lives in [facility]  COVID was on vent 12 days --> visiting mom    + Asthma  CHF    Zoster Left V1 --> resolved  No ophthalmic involvement  Eye tends to be irritated --> long standing    FML q day prn --> using QOD -> Re-discussed steroid response --> none 05/02/2023      POAG  Stable Glaucoma & Patient near target IOP range    Steroid responder    CCT  517 / 574    Mid teens --> LWIT    Both Eyes --> good adherence & tolerating well --> CSM  Trusopt BID  Xal q day --> iritis quiet  Alphagan BID  --> itchy // Follicles --> intolerant  No BB --> Asthma    Hx Ryan in Past    Hold SLT OS    PC IOL OU  Quiet  Observe    Corneal edema OS > OD  Fuchs K dystrophy  Trauma OS  Jony 128 2% BID PRN      Left lower hordeolum --> lateral [place]hus area --> resolved  WC's BID maintenance      Dry Eye Syndrome: re-discussed use of warm compresses, preserved & non-preserved artificial tears, gel and PM ointment options.  Also discussed options utilizing medications.  EES q HS x 1 week / month --> PRN      NIDDM  No BDR or CSME  control     ERM OD  Not VS  Observe      Plan  RTC 4 months IOP / iritis OS check --> FML OS steroid response check & OCT RNFL  RTC sooner prn with good understanding

## 2023-05-05 ENCOUNTER — TELEPHONE (OUTPATIENT)
Dept: INTERNAL MEDICINE | Facility: CLINIC | Age: 88
End: 2023-05-05
Payer: MEDICARE

## 2023-05-05 NOTE — TELEPHONE ENCOUNTER
----- Message from Audrey Banerjee sent at 5/5/2023  8:23 AM CDT -----  Contact: LOUISA MANNING [[id number]]@ [phone]  Please mail patient a copy of her Labs from 4/11. She do not go on the My Chart.

## 2023-05-16 ENCOUNTER — OFFICE VISIT (OUTPATIENT)
Dept: OTOLARYNGOLOGY | Facility: CLINIC | Age: 88
End: 2023-05-16
Payer: MEDICARE

## 2023-05-16 DIAGNOSIS — H61.23 BILATERAL IMPACTED CERUMEN: Primary | ICD-10-CM

## 2023-05-16 DIAGNOSIS — H91.90 HEARING LOSS, UNSPECIFIED HEARING LOSS TYPE, UNSPECIFIED LATERALITY: ICD-10-CM

## 2023-05-16 DIAGNOSIS — J34.2 NASAL SEPTAL DEVIATION: ICD-10-CM

## 2023-05-16 PROCEDURE — 69210 EAR CERUMEN REMOVAL: ICD-10-PCS | Mod: ,,, | Performed by: SPECIALIST

## 2023-05-16 PROCEDURE — 99204 OFFICE O/P NEW MOD 45 MIN: CPT | Mod: 25,,, | Performed by: SPECIALIST

## 2023-05-16 PROCEDURE — 69210 REMOVE IMPACTED EAR WAX UNI: CPT | Mod: ,,, | Performed by: SPECIALIST

## 2023-05-16 PROCEDURE — 1160F RVW MEDS BY RX/DR IN RCRD: CPT | Mod: CPTII,,, | Performed by: SPECIALIST

## 2023-05-16 PROCEDURE — 99999 PR PBB SHADOW E&M-EST. PATIENT-LVL II: CPT | Mod: PBBFAC,,, | Performed by: SPECIALIST

## 2023-05-16 PROCEDURE — 99204 PR OFFICE/OUTPT VISIT, NEW, LEVL IV, 45-59 MIN: ICD-10-PCS | Mod: 25,,, | Performed by: SPECIALIST

## 2023-05-16 PROCEDURE — 1160F PR REVIEW ALL MEDS BY PRESCRIBER/CLIN PHARMACIST DOCUMENTED: ICD-10-PCS | Mod: CPTII,,, | Performed by: SPECIALIST

## 2023-05-16 PROCEDURE — 1159F PR MEDICATION LIST DOCUMENTED IN MEDICAL RECORD: ICD-10-PCS | Mod: CPTII,,, | Performed by: SPECIALIST

## 2023-05-16 PROCEDURE — 1159F MED LIST DOCD IN RCRD: CPT | Mod: CPTII,,, | Performed by: SPECIALIST

## 2023-05-16 PROCEDURE — 99999 PR PBB SHADOW E&M-EST. PATIENT-LVL II: ICD-10-PCS | Mod: PBBFAC,,, | Performed by: SPECIALIST

## 2023-05-16 NOTE — PROCEDURES
"Ear Cerumen Removal    Date/Time: 5/16/2023 9:00 AM  Performed by: SANTIAGO Baker MD  Authorized by: SANTIAGO Baker MD     Time out: Immediately prior to procedure a "time out" was called to verify the correct patient, procedure, equipment, support staff and site/side marked as required.    Consent Done?:  Yes (Verbal)    Local anesthetic:  None  Location details:  Both ears  Procedure type comment:  Wire loop on the right, wire loop, bayonet forceps and 8 Czech suction on the left  Cerumen  Removal Results:  Cerumen completely removed  Patient tolerance:  Patient tolerated the procedure well with no immediate complications  "

## 2023-05-16 NOTE — PROGRESS NOTES
Subjective:       Patient ID: Tiana Mead is a 89 y.o. female.    Chief Complaint: No chief complaint on file.    The patient is referred by Dr. Tomás Andres for hearing evaluation.  She has blockage of her left ear for over a year.  The right 1 is been blocked for short period of time.  She is not having pain or otorrhea.  She is not having vertigo.  She does not have significant allergy/sinus problems.        Review of Systems     Constitutional: Negative for appetite change, chills, fatigue, fever and unexpected weight loss.      HENT: Positive for hearing loss.  Negative for ear discharge, ear infection, ear pain, facial swelling, mouth sores, nosebleeds, postnasal drip, ringing in the ears, runny nose, sinus infection, sinus pressure, sore throat, stuffy nose, tonsil infection, dental problems, trouble swallowing and voice change.      Eyes:  Positive for eye drainage and eye itching. Negative for change in eyesight and photophobia. Glaucoma      Respiratory:  Positive for cough, shortness of breath and wheezing. Negative for sleep apnea and snoring.      Cardiovascular:  Positive for irregular heartbeat. Negative for chest pain, foot swelling and swollen veins. Hypertension    Gastrointestinal:  Negative for abdominal pain, acid reflux, constipation, diarrhea, heartburn and vomiting.     Genitourinary: Negative for difficulty urinating, sexual problems and frequent urination.     Musc: Negative for aching joints, aching muscles, back pain and neck pain.     Skin: Negative for rash.     Allergy: Negative for food allergies and seasonal allergies.     Endocrine: Negative for cold intolerance and heat intolerance.  Type 2 diabetes    Neurological: Negative for dizziness, headaches, light-headedness, seizures and tremors.  Peripheral neuropathy    Hematologic: Negative for bruises/bleeds easily and swollen glands.      Psychiatric: Negative for decreased concentration, depression, nervous/anxious and sleep  disturbance.              Objective:      Physical Exam  Vitals and nursing note reviewed.   Constitutional:       General: She is awake.      Appearance: Normal appearance. She is well-developed, well-groomed and normal weight.   HENT:      Head: Normocephalic.      Jaw: There is normal jaw occlusion.      Salivary Glands: Right salivary gland is not diffusely enlarged or tender. Left salivary gland is not diffusely enlarged or tender.      Right Ear: Ear canal and external ear normal. Decreased hearing noted. There is impacted cerumen. Tympanic membrane is retracted.      Left Ear: Ear canal and external ear normal. Decreased hearing noted. There is impacted cerumen. Tympanic membrane is retracted.      Nose: Septal deviation (To the right), mucosal edema (cyanotic, boggy inferior turbinates bilaterally) and rhinorrhea (clear mucus bilaterally) present. No nasal deformity. Rhinorrhea is clear.      Right Turbinates: Enlarged and pale.      Left Turbinates: Enlarged and pale.      Mouth/Throat:      Lips: No lesions.      Mouth: No oral lesions.      Dentition: No gum lesions.      Tongue: No lesions.      Palate: No mass and lesions.      Pharynx: Oropharynx is clear. Uvula midline.   Eyes:      General: Lids are normal.         Right eye: No discharge.         Left eye: No discharge.      Conjunctiva/sclera:      Right eye: Right conjunctiva is injected. No exudate.     Left eye: Left conjunctiva is injected. No exudate.     Pupils: Pupils are equal, round, and reactive to light.   Neck:      Thyroid: No thyroid mass or thyromegaly.      Trachea: Trachea normal. No tracheal deviation.   Cardiovascular:      Rate and Rhythm: Normal rate and regular rhythm.      Pulses: Normal pulses.      Heart sounds: Normal heart sounds.   Pulmonary:      Effort: Pulmonary effort is normal.      Breath sounds: Normal breath sounds. No stridor. No decreased breath sounds, wheezing, rhonchi or rales.   Abdominal:      General:  Bowel sounds are normal.      Palpations: Abdomen is soft.      Tenderness: There is no abdominal tenderness.   Musculoskeletal:      Cervical back: Neck supple. No muscular tenderness. Decreased range of motion.   Lymphadenopathy:      Head:      Right side of head: No submental, submandibular, preauricular, posterior auricular or occipital adenopathy.      Left side of head: No submental, submandibular, preauricular, posterior auricular or occipital adenopathy.      Cervical: No cervical adenopathy.   Skin:     General: Skin is warm and dry.      Findings: No petechiae or rash.      Nails: There is no clubbing.   Neurological:      Mental Status: She is alert and oriented to person, place, and time.      Cranial Nerves: No cranial nerve deficit.      Sensory: No sensory deficit.      Gait: Gait normal.   Psychiatric:         Speech: Speech normal.         Behavior: Behavior normal. Behavior is cooperative.         Thought Content: Thought content normal.         Judgment: Judgment normal.       Procedure-cerumen impactions removed from both ears using wire loop, bayonet forceps and 8 Georgian suction.  The patient tolerated the procedure well and was discharged post procedure.    Assessment:       1. Bilateral impacted cerumen    2. Hearing loss, unspecified hearing loss type, unspecified laterality    3. Nasal septal deviation        Plan:       The patient has an audiologic evaluation scheduled for later this week.  I will call results to her unless in-person explanation is required.

## 2023-05-18 ENCOUNTER — CLINICAL SUPPORT (OUTPATIENT)
Dept: OTOLARYNGOLOGY | Facility: CLINIC | Age: 88
End: 2023-05-18
Payer: MEDICARE

## 2023-05-18 DIAGNOSIS — H90.3 ASYMMETRICAL SENSORINEURAL HEARING LOSS: Primary | ICD-10-CM

## 2023-05-18 DIAGNOSIS — H93.299 REDUCED SPEECH DISCRIMINATION: ICD-10-CM

## 2023-05-18 DIAGNOSIS — R29.2 ABNORMAL ACOUSTIC REFLEX: ICD-10-CM

## 2023-05-18 PROCEDURE — 92550 TYMPANOMETRY & REFLEX THRESH: CPT | Mod: S$GLB,,, | Performed by: AUDIOLOGIST-HEARING AID FITTER

## 2023-05-18 PROCEDURE — 92557 PR COMPREHENSIVE HEARING TEST: ICD-10-PCS | Mod: S$GLB,,, | Performed by: AUDIOLOGIST-HEARING AID FITTER

## 2023-05-18 PROCEDURE — 92557 COMPREHENSIVE HEARING TEST: CPT | Mod: S$GLB,,, | Performed by: AUDIOLOGIST-HEARING AID FITTER

## 2023-05-18 PROCEDURE — 92550 PR TYMPANOMETRY AND REFLEX THRESHOLD MEASUREMENTS: ICD-10-PCS | Mod: S$GLB,,, | Performed by: AUDIOLOGIST-HEARING AID FITTER

## 2023-05-18 NOTE — Clinical Note
Your patient, Tiana Mead, was recently seen for an audiogram.  My assessment and recommendations are enclosed.  If you should have any questions or concerns, please contact me at [phone].   Sincerely, Paco Bolanos, CCC-A Audiologist [facility]

## 2023-05-22 NOTE — PROGRESS NOTES
Paco Bolanos, CCC-A  Audiologist - [facility] [address] [place] [address]  [email or web address]  [phone]    Patient: Tiana Mead   MRN: [id number]  [facility]   Home Phone [phone]   Work Phone Not on file.   Mobile [phone]   : 3/14/1925  NOVOA: 2023      AUDIOLOGICAL EVALUATION      RECOMMENDATIONS:   It is recommended that Tiana Mead:  Follow up medically with Dr. Baker to assess her asymmetrical hearing loss.  Receive binaural hearing aids to improve speech understanding, pending medical clearance.  Continue to receive audiological monitoring annually.  Use precaution and/or hearing protection in noisy environments.    If you should have any questions or concerns regarding the above information, please do not hesitate to contact me at [phone].      _______________________________  Paco Bolanos, WALLY-A  Audiologist

## 2023-06-01 ENCOUNTER — OFFICE VISIT (OUTPATIENT)
Dept: PODIATRY | Facility: CLINIC | Age: 88
End: 2023-06-01
Payer: MEDICARE

## 2023-06-01 VITALS
HEIGHT: 68 IN | SYSTOLIC BLOOD PRESSURE: 136 MMHG | HEART RATE: 53 BPM | DIASTOLIC BLOOD PRESSURE: 62 MMHG | WEIGHT: 155 LBS | BODY MASS INDEX: 23.49 KG/M2 | RESPIRATION RATE: 17 BRPM

## 2023-06-01 DIAGNOSIS — E11.42 DIABETIC POLYNEUROPATHY ASSOCIATED WITH TYPE 2 DIABETES MELLITUS: Primary | ICD-10-CM

## 2023-06-01 DIAGNOSIS — I73.9 PERIPHERAL VASCULAR DISEASE: ICD-10-CM

## 2023-06-01 DIAGNOSIS — L84 CORN OR CALLUS: ICD-10-CM

## 2023-06-01 DIAGNOSIS — B35.1 ONYCHOMYCOSIS DUE TO DERMATOPHYTE: ICD-10-CM

## 2023-06-01 PROCEDURE — 99499 UNLISTED E&M SERVICE: CPT | Mod: S$GLB,,, | Performed by: PODIATRIST

## 2023-06-01 PROCEDURE — 11721 PR DEBRIDEMENT OF NAILS, 6 OR MORE: ICD-10-PCS | Mod: Q9,59,S$GLB, | Performed by: PODIATRIST

## 2023-06-01 PROCEDURE — 99999 PR PBB SHADOW E&M-EST. PATIENT-LVL II: CPT | Mod: PBBFAC,,, | Performed by: PODIATRIST

## 2023-06-01 PROCEDURE — 99999 PR PBB SHADOW E&M-EST. PATIENT-LVL II: ICD-10-PCS | Mod: PBBFAC,,, | Performed by: PODIATRIST

## 2023-06-01 PROCEDURE — 11056 PR TRIM BENIGN HYPERKERATOTIC SKIN LESION,2-4: ICD-10-PCS | Mod: Q9,S$GLB,, | Performed by: PODIATRIST

## 2023-06-01 PROCEDURE — 11721 DEBRIDE NAIL 6 OR MORE: CPT | Mod: Q9,59,S$GLB, | Performed by: PODIATRIST

## 2023-06-01 PROCEDURE — 11056 PARNG/CUTG B9 HYPRKR LES 2-4: CPT | Mod: Q9,S$GLB,, | Performed by: PODIATRIST

## 2023-06-01 PROCEDURE — 99499 NO LOS: ICD-10-PCS | Mod: S$GLB,,, | Performed by: PODIATRIST

## 2023-06-01 NOTE — PROGRESS NOTES
Subjective:      Patient ID: Tiana Mead is a 89 y.o. female.    Chief Complaint: PCP (Tomás Andres MD  4/10/23), Diabetic Foot Exam, and Nail Care      Tiana is a 89 y.o. female who presents to the clinic for evaluation and treatment of high risk feet. Tiana has a past medical history of Allergy, Anemia, Arthritis, Asthma, Chronic kidney disease, Degenerative disc disease, Diabetes mellitus type II, Diastolic heart failure (05/02/2017), Essential hypertension (7/3/2012), Glaucoma, History of insulin dependent diabetes mellitus, for many years stopped because of chronic kidney November 2020.  (9/9/2021), History of pseudogout (8/23/2012), Hyperlipidemia, Hypertension, Iritis, Myocardial infarction, Pneumonia, Thrombocytopenia (8/24/2021), and Thyroid disease. The patient's chief complaint is long, thick toenails and calluses on both feet. Routine trimming helps. No other pedal concerns today.  This patient has documented high risk feet requiring routine maintenance secondary to diabetes mellitis and those secondary complications of diabetes, as mentioned..    PCP: Tomás Andres MD    Date Last Seen by PCP:   Chief Complaint   Patient presents with    PCP     Tomás Andres MD  4/10/23    Diabetic Foot Exam    Nail Care         Current shoe gear:  Dm shoes      Hemoglobin A1C   Date Value Ref Range Status   04/11/2023 7.1 (H) 4.0 - 5.6 % Final     Comment:     ADA Screening Guidelines:  5.7-6.4%  Consistent with prediabetes  >or=6.5%  Consistent with diabetes    High levels of fetal hemoglobin interfere with the HbA1C  assay. Heterozygous hemoglobin variants (HbS, HgC, etc)do  not significantly interfere with this assay.   However, presence of multiple variants may affect accuracy.     08/18/2022 6.9 (H) 4.0 - 5.6 % Final     Comment:     ADA Screening Guidelines:  5.7-6.4%  Consistent with prediabetes  >or=6.5%  Consistent with diabetes    High levels of fetal hemoglobin interfere with the  HbA1C  assay. Heterozygous hemoglobin variants (HbS, HgC, etc)do  not significantly interfere with this assay.   However, presence of multiple variants may affect accuracy.     08/16/2022 6.6 (H) 4.0 - 5.6 % Final     Comment:     ADA Screening Guidelines:  5.7-6.4%  Consistent with prediabetes  >or=6.5%  Consistent with diabetes    High levels of fetal hemoglobin interfere with the HbA1C  assay. Heterozygous hemoglobin variants (HbS, HgC, etc)do  not significantly interfere with this assay.   However, presence of multiple variants may affect accuracy.               Patient Active Problem List   Diagnosis    Hyperlipidemia associated with type 2 diabetes mellitus    Generalized osteoarthritis of multiple sites    Chronic iritis - Left Eye    Osteoarthritis of both knees    Chondrocalcinosis    Peripheral angiopathy    Hyperuricemia    Helicobacter pylori gastritis    Old MI (myocardial infarction), 2013    Permanent atrial fibrillation    Long term current use of anticoagulant therapy, eliquis, stopped because of cryptic GI bleeding    Glaucoma associated with ocular trauma    H/O Deep vein thrombosis (DVT)    Aortic atherosclerosis    Chronic diastolic congestive heart failure    Type 2 diabetes mellitus with diabetic polyneuropathy, without long-term current use of insulin    Primary hypothyroidism    Moderate persistent asthma without complication    Primary open angle glaucoma (POAG) of both eyes, moderate stage    Pulmonary hypertension    Non-rheumatic tricuspid valve insufficiency    Allergic conjunctivitis of both eyes    Pseudogout, knees    Asthma exacerbation in COPD    Dry eye syndrome of both eyes    Stage 4 chronic kidney disease    Acute on chronic blood loss anemia, Sept 2020 anticoagulation discontinued    Coronary artery disease involving native coronary artery of native heart without angina pectoris    Myalgia, since April 20221 both arms and both legs    History of insulin dependent diabetes  mellitus, for many years stopped because of chronic kidney November 2020.     Corneal edema of left eye       Current Outpatient Medications on File Prior to Visit   Medication Sig Dispense Refill    acetaminophen (TYLENOL) 500 MG tablet Take 2 tablets (1,000 mg total) by mouth 3 (three) times daily as needed for Pain (muscle pain in arms and legs). 100 tablet 1    albuterol (PROVENTIL) 2.5 mg /3 mL (0.083 %) nebulizer solution Take 3 mLs (2.5 mg total) by nebulization every 6 (six) hours as needed for Wheezing. Rescue 25 each 1    albuterol (PROVENTIL/VENTOLIN HFA) 90 mcg/actuation inhaler Inhale 2 puffs into the lungs every 4 (four) hours as needed for Wheezing. 18 g 6    albuterol (PROVENTIL/VENTOLIN HFA) 90 mcg/actuation inhaler Inhale 1-2 puffs into the lungs every 6 (six) hours as needed for Wheezing. Rescue 6.7 g 0    amLODIPine (NORVASC) 5 MG tablet TAKE 1 TABLET(5 MG) BY MOUTH EVERY MORNING 90 tablet 3    aspirin 81 MG Chew Take 1 tablet (81 mg total) by mouth every morning. 100 tablet 3    atorvastatin (LIPITOR) 40 MG tablet Take 1 tablet (40 mg total) by mouth every evening. To lower cholesterol and for heart and blood vessels 90 tablet 3    blood sugar diagnostic (ONETOUCH ULTRA TEST) Strp Inject 1 strip into the skin once daily. No longer on insulin because of  each 4    clotrimazole-betamethasone 1-0.05% (LOTRISONE) cream Apply topically 2 (two) times daily as needed. For intertriginous itching in the bend of the leg 45 g 3    diclofenac sodium (VOLTAREN) 1 % Gel APPLY 2 GRAMS TOPICALLY TO THE AFFECTED AREA TWICE DAILY 100 g 4    dorzolamide (TRUSOPT) 2 % ophthalmic solution Place 1 drop into both eyes 2 (two) times daily. 10 mL 4    fluorometholone 0.1% (FML) 0.1 % DrpS Place 1 drop into both eyes once daily.      fluticasone-salmeterol diskus inhaler 250-50 mcg Inhale 1 puff into the lungs 2 (two) times daily. 60 each 5    furosemide (LASIX) 40 MG tablet TAKE 1 TABLET BY MOUTH EVERY MORNING  "AND 1 TABLET AT 4  tablet 3    insulin aspart U-100 (NOVOLOG FLEXPEN U-100 INSULIN) 100 unit/mL (3 mL) InPn pen Inject if blood sugar is >180, 180-230+2, 231-280+4, 281-330+6, 331-380+8,>380+10 MAX daily 30 units. 15 mL 2    isosorbide mononitrate (IMDUR) 60 MG 24 hr tablet TAKE ONE TABLET BY MOUTH EVERY MORNING FOR HEART, TO PREVENT CHEST PAIN AND SHORTNESS OF BREATH 90 tablet 3    lancets (TRUEPLUS LANCETS) 33 gauge Misc Apply 1 lancet topically once daily. No longer on insulin because of  each 3    latanoprost 0.005 % ophthalmic solution INSTILL 1 DROP IN BOTH EYES EVERY EVENING 10 mL 12    levothyroxine (SYNTHROID) 88 MCG tablet Take 1 tablet (88 mcg total) by mouth before breakfast. 90 tablet 3    meclizine (ANTIVERT) 12.5 mg tablet TAKE 1 TABLET BY MOUTH THREE TIMES DAILY AS NEEDED FOR DIZZINESS 20 tablet 3    metoprolol tartrate (LOPRESSOR) 25 MG tablet TAKE 1 TABLET(25 MG) BY MOUTH TWICE DAILY 180 tablet 3    multivit-mineral-iron-lutein Tab Take 1 tablet by mouth once daily.      nitroGLYCERIN (NITROSTAT) 0.4 MG SL tablet ONE TABLET UNDER THE TONGUE EVERY 5 MINUTES AS NEEDED FOR CHEST PAIN 100 tablet 3    nystatin (MYCOSTATIN) powder Apply topically 4 (four) times daily. 60 g 3    pen needle, diabetic (BD ULTRA-FINE SHORT PEN NEEDLE) 31 gauge x 5/16" Ndle USE WITH INSULIN PENS up to 3 x a day. 100 each 2    polyethylene glycol (GLYCOLAX) 17 gram/dose powder Take 17 g by mouth daily as needed (CONSTIPATION).      psyllium (METAMUCIL) powder Take 1 packet by mouth daily as needed (CONSTIPATION).      SHINGRIX, PF, 50 mcg/0.5 mL injection       triamcinolone acetonide 0.025% (KENALOG) 0.025 % Oint Apply topically 2 (two) times daily. 80 g 2     No current facility-administered medications on file prior to visit.       Review of patient's allergies indicates:   Allergen Reactions    Codeine      Other reaction(s): Itching  Other reaction(s): Nausea       Past Surgical History:   Procedure " Laterality Date    CARDIAC CATHETERIZATION  2010    no obstructive disease    CATARACT EXTRACTION W/  INTRAOCULAR LENS IMPLANT Left n/a    CATARACT EXTRACTION W/  INTRAOCULAR LENS IMPLANT Right 10/8/2018    With Femtosecond LASER assist (Dr. Henson)    CHOLECYSTECTOMY      ESOPHAGOGASTRODUODENOSCOPY N/A 11/4/2020    Procedure: EGD (ESOPHAGOGASTRODUODENOSCOPY);  Surgeon: Tomás Mccall MD;  Location: [address]);  Service: Endoscopy;  Laterality: N/A;    EYE SURGERY      gall stone      HYSTERECTOMY      VARICOSE VEIN SURGERY         Family History   Problem Relation Age of Onset    Diabetes Mother     Hypertension Mother     Glaucoma Mother     Kidney disease Mother     Hypertension Father     Hypotension Sister     Heart attack Sister     Heart disease Sister     No Known Problems Sister     Diabetes Sister     Kidney disease Sister     Leukemia Sister     Hypertension Sister     Diabetes Sister     Kidney disease Sister     No Known Problems Sister     Heart disease Brother     Heart disease Brother     No Known Problems Brother     No Known Problems Maternal Grandmother     No Known Problems Maternal Grandfather     No Known Problems Paternal Grandmother     Diabetes Paternal Grandfather     Hypertension Daughter     Atrial fibrillation Daughter     Diabetes Daughter         borderline    Hypertension Daughter     No Known Problems Son     Diabetes Son     Heart disease Son     Hypertension Son     Melanoma Neg Hx        Social History     Socioeconomic History    Marital status:    Tobacco Use    Smoking status: Never    Smokeless tobacco: Never   Substance and Sexual Activity    Alcohol use: No    Drug use: No    Sexual activity: Never           Review of Systems   Constitutional: Negative for chills, decreased appetite and fever.   Cardiovascular:  Negative for chest pain and claudication.   Respiratory:  Negative for cough, hemoptysis and shortness of breath.    Skin:  Positive for dry skin and  "nail changes. Negative for color change, flushing, itching, poor wound healing, rash and suspicious lesions.   Musculoskeletal:  Negative for back pain, falls, gout, joint pain, joint swelling and myalgias.   Gastrointestinal:  Negative for nausea and vomiting.   Neurological:  Negative for loss of balance, numbness and paresthesias.         Objective:       Vitals:    06/01/23 0853   BP: 136/62   Pulse: (!) 53   Resp: 17   Weight: 70.3 kg (155 lb)   Height: 5' 8" (1.727 m)        Physical Exam  Vitals and nursing note reviewed.   Constitutional:       Appearance: She is well-developed.   Cardiovascular:      Comments: Dorsalis pedis and posterior tibial pulses are diminished Bilaterally. Toes are cool to touch. Feet are warm proximally.There is decreased digital hair. Skin is atrophic, slightly hyperpigmented, and mildly edematous      Musculoskeletal:         General: No tenderness, deformity or signs of injury. Normal range of motion.      Comments: Adequate joint range of motion without pain, limitation, nor crepitation Bilateral feet and ankle joints. Muscle strength is 5/5 in all groups bilaterally.      Flexible pes planus foot type w/ medial arch collapse and mild gastroc equinus      Skin:     General: Skin is warm and dry.      Coloration: Skin is not ashen, cyanotic or pale.      Findings: No ecchymosis, erythema or lesion.      Comments: Nails x10 are elongated by  3-4 mm's, thickened by 2-5 mm's, dystrophic, and are darkened in  coloration . Xerosis Bilaterally. No open lesions noted.    Hyperkeratotic tissue noted to lateral 5th toe b/l   Neurological:      Mental Status: She is alert and oriented to person, place, and time.      Comments: [place] 5.07 monofilamant testing is diminished Ney feet. Sharp/dull sensation diminished Bilaterally. Light touch absent Bilaterally.       Psychiatric:         Behavior: Behavior normal.             Assessment:       Encounter Diagnoses   Name Primary?    " Diabetic polyneuropathy associated with type 2 diabetes mellitus Yes    Peripheral vascular disease     Onychomycosis due to dermatophyte     Corn or callus          Plan:       Tiana was seen today for pcp, diabetic foot exam and nail care.    Diagnoses and all orders for this visit:    Diabetic polyneuropathy associated with type 2 diabetes mellitus    Peripheral vascular disease    Onychomycosis due to dermatophyte    Corn or callus      I counseled the patient on her conditions, their implications and medical management.    Shoe inspection. Diabetic Foot Education. Patient reminded of the importance of good nutrition and blood sugar control to help prevent podiatric complications of diabetes. Patient instructed on proper foot hygeine. We discussed wearing proper shoe gear, daily foot inspections, never walking without protective shoe gear, never putting sharp instruments to feet    - With patient's permission, nails were aggressively reduced and debrided x 10 to their soft tissue attachment mechanically and with electric , removing all offending nail and debris. Patient relates relief following the procedure. She will continue to monitor the areas daily, inspect her feet, wear protective shoe gear when ambulatory, moisturizer to maintain skin integrity and follow in this office in approximately 2-3 months, sooner p.r.n.    - After cleansing the  area w/ alcohol prep pad the above mentioned hyperkeratosis was trimmed utilizing No 15 scapel, to a smooth base with out incident. Patient tolerated this  well and reported comfort to the area of x2    - Return to clinic in 3m or sooner if problems arise

## 2023-07-14 ENCOUNTER — TELEPHONE (OUTPATIENT)
Dept: SURGERY | Facility: CLINIC | Age: 88
End: 2023-07-14
Payer: MEDICARE

## 2023-07-14 ENCOUNTER — OFFICE VISIT (OUTPATIENT)
Dept: INTERNAL MEDICINE | Facility: CLINIC | Age: 88
End: 2023-07-14
Payer: MEDICARE

## 2023-07-14 VITALS
WEIGHT: 155 LBS | HEART RATE: 60 BPM | DIASTOLIC BLOOD PRESSURE: 64 MMHG | OXYGEN SATURATION: 99 % | SYSTOLIC BLOOD PRESSURE: 120 MMHG | HEIGHT: 68 IN | BODY MASS INDEX: 23.49 KG/M2

## 2023-07-14 DIAGNOSIS — L72.3 SEBACEOUS CYST: Primary | ICD-10-CM

## 2023-07-14 PROCEDURE — 1159F PR MEDICATION LIST DOCUMENTED IN MEDICAL RECORD: ICD-10-PCS | Mod: CPTII,GC,S$GLB,

## 2023-07-14 PROCEDURE — 99999 PR PBB SHADOW E&M-EST. PATIENT-LVL III: CPT | Mod: PBBFAC,GC,,

## 2023-07-14 PROCEDURE — 99213 PR OFFICE/OUTPT VISIT, EST, LEVL III, 20-29 MIN: ICD-10-PCS | Mod: GC,S$GLB,,

## 2023-07-14 PROCEDURE — 1159F MED LIST DOCD IN RCRD: CPT | Mod: CPTII,GC,S$GLB,

## 2023-07-14 PROCEDURE — 99213 OFFICE O/P EST LOW 20 MIN: CPT | Mod: GC,S$GLB,,

## 2023-07-14 PROCEDURE — 1160F RVW MEDS BY RX/DR IN RCRD: CPT | Mod: CPTII,GC,S$GLB,

## 2023-07-14 PROCEDURE — 1160F PR REVIEW ALL MEDS BY PRESCRIBER/CLIN PHARMACIST DOCUMENTED: ICD-10-PCS | Mod: CPTII,GC,S$GLB,

## 2023-07-14 PROCEDURE — 99999 PR PBB SHADOW E&M-EST. PATIENT-LVL III: ICD-10-PCS | Mod: PBBFAC,GC,,

## 2023-07-14 NOTE — PROGRESS NOTES
Internal Medicine Clinic Note  2023    Subjective   Patient Name: Tiana Mead  : 3/14/1925    Chief Complaint:   Chief Complaint   Patient presents with    Abscess       History of Present Illness:  Ms. Tiana Mead is a 89 y.o. female with a PMHx of HLD, T2DM, a fib, hypothyroid, dCHF, CKD4 presenting to clinic for an Urgent Care visit regarding a bump on the back of her head. She states that this bump first appeared about 3 days ago, starting as a small pimple. She reports that it got larger in size during this time, as well as became more painful and itchy. She reports that she has been unable to sleep at night as laying her head back on the pillow aggravates it. She has previously used Dial soaps and Neosporin on the area, however she has not tried any topicals this time due to pain. She states that she has had similar bumps in the past, once where she went to clinic and had it lanced. Another episode, she had a friend squeeze it and express some of the purulence. The most recent episode was 3 months ago. She denies fever, chills, dizziness. She denies recent trauma or insect bites to the area. No falls or loss of consciousness.     Review of Systems   Constitutional:  Negative for chills, diaphoresis and fever.   HENT:  Negative for sinus pain and sore throat.    Respiratory:  Positive for cough (chronic, baseline) and shortness of breath (chronic, baseline). Negative for wheezing.    Cardiovascular:  Negative for chest pain and palpitations.   Gastrointestinal:  Negative for abdominal pain, constipation, diarrhea, nausea and vomiting.   Genitourinary:  Negative for dysuria.   Musculoskeletal:  Positive for myalgias (back of head on right side). Negative for back pain and falls.   Skin:  Positive for itching (right occiput). Negative for rash.   Neurological:  Positive for headaches. Negative for dizziness, tingling and weakness.   Psychiatric/Behavioral:  The patient has insomnia (sleep  disturbance from pain). The patient is not nervous/anxious.      PAST HISTORY:     Past Medical History:   Diagnosis Date    Allergy     Anemia     Arthritis     Asthma     Chronic kidney disease     Degenerative disc disease     Diabetes mellitus type II     Diastolic heart failure 05/02/2017    Essential hypertension 7/3/2012    Glaucoma     History of insulin dependent diabetes mellitus, for many years stopped because of chronic kidney November 2020.  9/9/2021    History of pseudogout 8/23/2012    Hyperlipidemia     Hypertension     Iritis     Myocardial infarction     Pneumonia     Thrombocytopenia 8/24/2021    Thyroid disease        Past Surgical History:   Procedure Laterality Date    CARDIAC CATHETERIZATION  2010    no obstructive disease    CATARACT EXTRACTION W/  INTRAOCULAR LENS IMPLANT Left n/a    CATARACT EXTRACTION W/  INTRAOCULAR LENS IMPLANT Right 10/8/2018    With Femtosecond LASER assist (Dr. Henson)    CHOLECYSTECTOMY      ESOPHAGOGASTRODUODENOSCOPY N/A 11/4/2020    Procedure: EGD (ESOPHAGOGASTRODUODENOSCOPY);  Surgeon: Tomás Mccall MD;  Location: [address]);  Service: Endoscopy;  Laterality: N/A;    EYE SURGERY      gall stone      HYSTERECTOMY      VARICOSE VEIN SURGERY         Family History   Problem Relation Age of Onset    Diabetes Mother     Hypertension Mother     Glaucoma Mother     Kidney disease Mother     Hypertension Father     Hypotension Sister     Heart attack Sister     Heart disease Sister     No Known Problems Sister     Diabetes Sister     Kidney disease Sister     Leukemia Sister     Hypertension Sister     Diabetes Sister     Kidney disease Sister     No Known Problems Sister     Heart disease Brother     Heart disease Brother     No Known Problems Brother     No Known Problems Maternal Grandmother     No Known Problems Maternal Grandfather     No Known Problems Paternal Grandmother     Diabetes Paternal Grandfather     Hypertension Daughter     Atrial fibrillation  Daughter     Diabetes Daughter         borderline    Hypertension Daughter     No Known Problems Son     Diabetes Son     Heart disease Son     Hypertension Son     Melanoma Neg Hx          MEDICATIONS & ALLERGIES:       Current Outpatient Medications:     acetaminophen (TYLENOL) 500 MG tablet, Take 2 tablets (1,000 mg total) by mouth 3 (three) times daily as needed for Pain (muscle pain in arms and legs)., Disp: 100 tablet, Rfl: 1    albuterol (PROVENTIL) 2.5 mg /3 mL (0.083 %) nebulizer solution, Take 3 mLs (2.5 mg total) by nebulization every 6 (six) hours as needed for Wheezing. Rescue, Disp: 25 each, Rfl: 1    albuterol (PROVENTIL/VENTOLIN HFA) 90 mcg/actuation inhaler, Inhale 2 puffs into the lungs every 4 (four) hours as needed for Wheezing., Disp: 18 g, Rfl: 6    albuterol (PROVENTIL/VENTOLIN HFA) 90 mcg/actuation inhaler, Inhale 1-2 puffs into the lungs every 6 (six) hours as needed for Wheezing. Rescue, Disp: 6.7 g, Rfl: 0    amLODIPine (NORVASC) 5 MG tablet, TAKE 1 TABLET(5 MG) BY MOUTH EVERY MORNING, Disp: 90 tablet, Rfl: 3    aspirin 81 MG Chew, Take 1 tablet (81 mg total) by mouth every morning., Disp: 100 tablet, Rfl: 3    atorvastatin (LIPITOR) 40 MG tablet, Take 1 tablet (40 mg total) by mouth every evening. To lower cholesterol and for heart and blood vessels, Disp: 90 tablet, Rfl: 3    blood sugar diagnostic (ONETOUCH ULTRA TEST) Strp, Inject 1 strip into the skin once daily. No longer on insulin because of CKD, Disp: 100 each, Rfl: 4    clotrimazole-betamethasone 1-0.05% (LOTRISONE) cream, Apply topically 2 (two) times daily as needed. For intertriginous itching in the bend of the leg, Disp: 45 g, Rfl: 3    diclofenac sodium (VOLTAREN) 1 % Gel, APPLY 2 GRAMS TOPICALLY TO THE AFFECTED AREA TWICE DAILY, Disp: 100 g, Rfl: 4    dorzolamide (TRUSOPT) 2 % ophthalmic solution, Place 1 drop into both eyes 2 (two) times daily., Disp: 10 mL, Rfl: 4    fluorometholone 0.1% (FML) 0.1 % DrpS, Place 1 drop  "into both eyes once daily., Disp: , Rfl:     fluticasone-salmeterol diskus inhaler 250-50 mcg, Inhale 1 puff into the lungs 2 (two) times daily., Disp: 60 each, Rfl: 5    furosemide (LASIX) 40 MG tablet, TAKE 1 TABLET BY MOUTH EVERY MORNING AND 1 TABLET AT 4 PM, Disp: 180 tablet, Rfl: 3    insulin aspart U-100 (NOVOLOG FLEXPEN U-100 INSULIN) 100 unit/mL (3 mL) InPn pen, Inject if blood sugar is >180, 180-230+2, 231-280+4, 281-330+6, 331-380+8,>380+10 MAX daily 30 units., Disp: 15 mL, Rfl: 2    isosorbide mononitrate (IMDUR) 60 MG 24 hr tablet, TAKE ONE TABLET BY MOUTH EVERY MORNING FOR HEART, TO PREVENT CHEST PAIN AND SHORTNESS OF BREATH, Disp: 90 tablet, Rfl: 3    lancets (TRUEPLUS LANCETS) 33 gauge Misc, Apply 1 lancet topically once daily. No longer on insulin because of CKD, Disp: 100 each, Rfl: 3    latanoprost 0.005 % ophthalmic solution, INSTILL 1 DROP IN BOTH EYES EVERY EVENING, Disp: 10 mL, Rfl: 12    levothyroxine (SYNTHROID) 88 MCG tablet, Take 1 tablet (88 mcg total) by mouth before breakfast., Disp: 90 tablet, Rfl: 3    meclizine (ANTIVERT) 12.5 mg tablet, TAKE 1 TABLET BY MOUTH THREE TIMES DAILY AS NEEDED FOR DIZZINESS, Disp: 20 tablet, Rfl: 3    metoprolol tartrate (LOPRESSOR) 25 MG tablet, TAKE 1 TABLET(25 MG) BY MOUTH TWICE DAILY, Disp: 180 tablet, Rfl: 3    multivit-mineral-iron-lutein Tab, Take 1 tablet by mouth once daily., Disp: , Rfl:     nitroGLYCERIN (NITROSTAT) 0.4 MG SL tablet, ONE TABLET UNDER THE TONGUE EVERY 5 MINUTES AS NEEDED FOR CHEST PAIN, Disp: 100 tablet, Rfl: 3    nystatin (MYCOSTATIN) powder, Apply topically 4 (four) times daily., Disp: 60 g, Rfl: 3    pen needle, diabetic (BD ULTRA-FINE SHORT PEN NEEDLE) 31 gauge x 5/16" Ndle, USE WITH INSULIN PENS up to 3 x a day., Disp: 100 each, Rfl: 2    polyethylene glycol (GLYCOLAX) 17 gram/dose powder, Take 17 g by mouth daily as needed (CONSTIPATION)., Disp: , Rfl:     psyllium (METAMUCIL) powder, Take 1 packet by mouth daily as needed " "(CONSTIPATION)., Disp: , Rfl:     SHINGRIX, PF, 50 mcg/0.5 mL injection, , Disp: , Rfl:     triamcinolone acetonide 0.025% (KENALOG) 0.025 % Oint, Apply topically 2 (two) times daily., Disp: 80 g, Rfl: 2    Review of patient's allergies indicates:   Allergen Reactions    Codeine Itching and Nausea Only              OBJECTIVE:     Vital Signs:  Vitals:    07/14/23 0834   BP: 120/64   BP Location: Left arm   Pulse: 60   SpO2: 99%   Weight: 70.3 kg (154 lb 15.7 oz)   Height: 5' 8" (1.727 m)       Body mass index is 23.57 kg/m².     Physical Exam  Vitals and nursing note reviewed.   Constitutional:       General: She is awake. She is not in acute distress.     Appearance: Normal appearance. She is normal weight. She is not ill-appearing or toxic-appearing.   HENT:      Head: Normocephalic and atraumatic.      Comments: ~1cm purulent lesion on an ~1in erythematous, fluctuant base located slightly right of midline at the occipital area. Tender to palpation     Nose: Nose normal.      Mouth/Throat:      Mouth: Mucous membranes are moist.   Eyes:      General: No scleral icterus.     Extraocular Movements: Extraocular movements intact.      Pupils: Pupils are equal, round, and reactive to light.   Cardiovascular:      Rate and Rhythm: Normal rate. Rhythm irregular.      Pulses: Normal pulses.   Pulmonary:      Effort: Pulmonary effort is normal. No respiratory distress.      Breath sounds: No wheezing or rales.   Abdominal:      General: There is no distension.      Palpations: Abdomen is soft.      Tenderness: There is no abdominal tenderness.   Musculoskeletal:         General: No swelling or tenderness.      Cervical back: Normal range of motion.   Skin:     General: Skin is warm and dry.      Capillary Refill: Capillary refill takes less than 2 seconds.      Findings: Erythema (right occiput) and lesion (right occiput) present.   Neurological:      General: No focal deficit present.      Mental Status: She is alert and " oriented to person, place, and time.   Psychiatric:         Mood and Affect: Mood normal.         Behavior: Behavior normal. Behavior is cooperative.         Thought Content: Thought content normal.     Laboratory  No results found for this or any previous visit (from the past 336 hour(s)).     Health Maintenance Due   Topic Date Due    COVID-19 Vaccine (6 - Pfizer series) 03/05/2023       ASSESSMENT & PLAN:       Sebaceous cyst    Patient with sebaceous cyst of right occiput x 3 days duration. States she has had similar presentations in the past, most recently 3 months ago, for some of which she underwent I&D.  - Planned for in-office I&D, however sebaceous material expressed during sterilization of area. Expressed as much material as possible  - Discussed referral to Gen Surg if recurrence for removal of core  - Encouraged to keep area clean and dry after procedure  - Tylenol for pain control as needed, heat packs  - Advised to monitor for worsening swelling, fever    Preventative Health: Not discussed at this time    RTC as needed    Discussed with Dr. Toshia Jurado, DO  Internal Medicine PGY-2  [facility]

## 2023-07-15 DIAGNOSIS — I25.10 CORONARY ARTERY DISEASE INVOLVING NATIVE CORONARY ARTERY OF NATIVE HEART WITHOUT ANGINA PECTORIS: ICD-10-CM

## 2023-07-15 RX ORDER — METOPROLOL TARTRATE 25 MG/1
TABLET, FILM COATED ORAL
Qty: 180 TABLET | Refills: 2 | Status: SHIPPED | OUTPATIENT
Start: 2023-07-15

## 2023-07-15 RX ORDER — ISOSORBIDE MONONITRATE 60 MG/1
TABLET, EXTENDED RELEASE ORAL
Qty: 90 TABLET | Refills: 2 | Status: SHIPPED | OUTPATIENT
Start: 2023-07-15

## 2023-07-15 RX ORDER — AMLODIPINE BESYLATE 5 MG/1
TABLET ORAL
Qty: 90 TABLET | Refills: 2 | Status: SHIPPED | OUTPATIENT
Start: 2023-07-15 | End: 2024-01-16

## 2023-07-15 NOTE — TELEPHONE ENCOUNTER
Refill Decision Note   Tianadanny Mead  is requesting a refill authorization.  Brief Assessment and Rationale for Refill:  Approve     Medication Therapy Plan:       Medication Reconciliation Completed: No   Comments:     No Care Gaps recommended.     Note composed:5:46 PM 07/15/2023

## 2023-07-15 NOTE — TELEPHONE ENCOUNTER
No care due was identified.  [facility] Embedded Care Due Messages. Reference number: [id number].   7/15/2023 5:47:23 AM CDT

## 2023-07-31 RX ORDER — LEVOTHYROXINE SODIUM 88 UG/1
88 TABLET ORAL
Qty: 90 TABLET | Refills: 2 | Status: SHIPPED | OUTPATIENT
Start: 2023-07-31

## 2023-08-01 ENCOUNTER — TELEPHONE (OUTPATIENT)
Dept: INTERNAL MEDICINE | Facility: CLINIC | Age: 88
End: 2023-08-01
Payer: MEDICARE

## 2023-08-01 NOTE — TELEPHONE ENCOUNTER
----- Message from Nimco Yuna sent at 7/31/2023  4:48 PM CDT -----  Contact: [phone] Patient  Requesting an RX refill or new RX.  Is this a refill or new RX: new  RX name and strength (copy/paste from chart):  levothyroxine (SYNTHROID) 88 MCG tablet  Is this a 30 day or 90 day RX:   Pharmacy name and phone # (copy/paste from chart):    [id number] - [facility] [address]  [address]  [facility] [address]  Phone: [phone] Fax: [phone]

## 2023-08-01 NOTE — TELEPHONE ENCOUNTER
Prescription for Levothyroxine was filled on 07/31/2023 at 2018 and sent to local [facility]'s pharmacy as requested on file.

## 2023-08-17 NOTE — PROGRESS NOTES
Assessment /Plan     For exam results, see Encounter Report.    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, moderate stage    Primary open angle glaucoma (POAG) of both eyes, moderate stage    Dry eye syndrome of both eyes    Hordeolum internum of left lower eyelid      Son lives in [facility]  COVID was on vent 12 days --> visiting mom    + Asthma  CHF    Zoster Left V1 --> resolved  No ophthalmic involvement  Eye tends to be irriatated --> long standing        POAG  Stable Glaucoma & Patient near target IOP range    Steroid responder    CCT  517 / 574    Mid teens --> LWIT      Both Eyes --> good adherence --> CSM  Trusopt BID  Xal q day --> iritis quiet   Alphagan BID  --> itchy // Follicles --> intolerant  No BB --> Asthma    Hx Ryan in Past    Hold SLT OS    PC IOL OU  Quiet  Observe    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  Also discussed options utilizing medications.  EES q HS x 1 week / month --> PRN      NIDDM  No BDR or CSME  control     ERM OD  Not VS  Observe      Plan  RTC 6 months IOP / iritis OS check & DFE  RTC sooner prn with good understanding                     
This patient has been assessed with a concern for Malnutrition and was treated during this hospitalization for the following Nutrition diagnosis/diagnoses:     -  08/15/2023: Severe protein-calorie malnutrition   -  08/15/2023: Underweight (BMI < 19)

## 2023-08-22 DIAGNOSIS — E11.42 TYPE 2 DIABETES MELLITUS WITH DIABETIC POLYNEUROPATHY, WITHOUT LONG-TERM CURRENT USE OF INSULIN: ICD-10-CM

## 2023-08-22 RX ORDER — INSULIN ASPART 100 [IU]/ML
INJECTION, SOLUTION INTRAVENOUS; SUBCUTANEOUS
Qty: 15 ML | Refills: 2 | Status: SHIPPED | OUTPATIENT
Start: 2023-08-22

## 2023-08-22 NOTE — TELEPHONE ENCOUNTER
No care due was identified.  [facility] Embedded Care Due Messages. Reference number: [id number].   8/22/2023 11:14:24 AM CDT

## 2023-08-22 NOTE — TELEPHONE ENCOUNTER
Spoke to pt. Pt stated she requested a refill from pharmacy but was told her last refill was over a year ago so she needs provider to send new Rx.

## 2023-08-22 NOTE — TELEPHONE ENCOUNTER
----- Message from Pavithra Coburn sent at 8/22/2023  9:43 AM CDT -----  Contact: [phone]  Ms Montiel is calling in regards to needing a callback from the nurse in regards to insulin insulin aspart U-100 (NOVOLOG FLEXPEN U-100 INSULIN) 100 unit/mL (3 mL) InPn pen 15 mL 2 3/23/2022   Sig: Inject if blood sugar is >180, 180-230+2, 231-280+4, 281-330+6, 331-380+8,>380+10 MAX daily 30 units      Please call and advise @ # [phone]     --------------------------------------------------------------------------------------    Requesting an RX refill or new RX. new  Is this a refill or new RX:  new    RX name and strength (copy/paste from chart):  insulin aspart U-100 (NOVOLOG FLEXPEN U-100 INSULIN) 100 unit/mL (3 mL) InPn pen 15 mL 2 3/23/2022   Sig: Inject if blood sugar is >180, 180-230+2, 231-280+4, 281-330+6, 331-380+8,>380+10 MAX daily 30 units      Is this a 30 day or 90 day RX: 30    Pharmacy name and phone # (copy/paste from chart):        [id number] - [place], [place] - [address]   [address]  [facility] [address]  Phone: [phone] Fax: [phone]

## 2023-09-22 ENCOUNTER — TELEPHONE (OUTPATIENT)
Dept: INTERNAL MEDICINE | Facility: CLINIC | Age: 88
End: 2023-09-22
Payer: MEDICARE

## 2023-09-22 NOTE — TELEPHONE ENCOUNTER
Spoke with pt informed that BP machines are either bought OTC or given from insurance as part of insurance plan. Pt verbalized understanding and had no other questions.

## 2023-09-22 NOTE — TELEPHONE ENCOUNTER
----- Message from Francia Quintanilla sent at 9/22/2023 10:52 AM CDT -----  Contact: [phone]  Patient called, requested a courtesy call from nurse in regards needing a prescription to get a new blood pressure machine, because the one that she have is broke. Please call and advise. Thank you.

## 2023-09-29 ENCOUNTER — OFFICE VISIT (OUTPATIENT)
Dept: CARDIOLOGY | Facility: CLINIC | Age: 88
End: 2023-09-29
Payer: MEDICARE

## 2023-09-29 VITALS — DIASTOLIC BLOOD PRESSURE: 86 MMHG | SYSTOLIC BLOOD PRESSURE: 142 MMHG | OXYGEN SATURATION: 99 % | HEART RATE: 64 BPM

## 2023-09-29 DIAGNOSIS — I70.0 AORTIC ATHEROSCLEROSIS: ICD-10-CM

## 2023-09-29 DIAGNOSIS — I50.32 CHRONIC DIASTOLIC HEART FAILURE: Primary | ICD-10-CM

## 2023-09-29 DIAGNOSIS — I48.21 PERMANENT ATRIAL FIBRILLATION: ICD-10-CM

## 2023-09-29 PROCEDURE — 3288F PR FALLS RISK ASSESSMENT DOCUMENTED: ICD-10-PCS | Mod: CPTII,S$GLB,, | Performed by: INTERNAL MEDICINE

## 2023-09-29 PROCEDURE — 1160F PR REVIEW ALL MEDS BY PRESCRIBER/CLIN PHARMACIST DOCUMENTED: ICD-10-PCS | Mod: CPTII,S$GLB,, | Performed by: INTERNAL MEDICINE

## 2023-09-29 PROCEDURE — 1159F PR MEDICATION LIST DOCUMENTED IN MEDICAL RECORD: ICD-10-PCS | Mod: CPTII,S$GLB,, | Performed by: INTERNAL MEDICINE

## 2023-09-29 PROCEDURE — 99999 PR PBB SHADOW E&M-EST. PATIENT-LVL III: CPT | Mod: PBBFAC,,, | Performed by: INTERNAL MEDICINE

## 2023-09-29 PROCEDURE — 3288F FALL RISK ASSESSMENT DOCD: CPT | Mod: CPTII,S$GLB,, | Performed by: INTERNAL MEDICINE

## 2023-09-29 PROCEDURE — 99214 OFFICE O/P EST MOD 30 MIN: CPT | Mod: S$GLB,,, | Performed by: INTERNAL MEDICINE

## 2023-09-29 PROCEDURE — 1126F AMNT PAIN NOTED NONE PRSNT: CPT | Mod: CPTII,S$GLB,, | Performed by: INTERNAL MEDICINE

## 2023-09-29 PROCEDURE — 99214 PR OFFICE/OUTPT VISIT, EST, LEVL IV, 30-39 MIN: ICD-10-PCS | Mod: S$GLB,,, | Performed by: INTERNAL MEDICINE

## 2023-09-29 PROCEDURE — 1160F RVW MEDS BY RX/DR IN RCRD: CPT | Mod: CPTII,S$GLB,, | Performed by: INTERNAL MEDICINE

## 2023-09-29 PROCEDURE — 1126F PR PAIN SEVERITY QUANTIFIED, NO PAIN PRESENT: ICD-10-PCS | Mod: CPTII,S$GLB,, | Performed by: INTERNAL MEDICINE

## 2023-09-29 PROCEDURE — 1159F MED LIST DOCD IN RCRD: CPT | Mod: CPTII,S$GLB,, | Performed by: INTERNAL MEDICINE

## 2023-09-29 PROCEDURE — 1101F PT FALLS ASSESS-DOCD LE1/YR: CPT | Mod: CPTII,S$GLB,, | Performed by: INTERNAL MEDICINE

## 2023-09-29 PROCEDURE — 1101F PR PT FALLS ASSESS DOC 0-1 FALLS W/OUT INJ PAST YR: ICD-10-PCS | Mod: CPTII,S$GLB,, | Performed by: INTERNAL MEDICINE

## 2023-09-29 PROCEDURE — 99999 PR PBB SHADOW E&M-EST. PATIENT-LVL III: ICD-10-PCS | Mod: PBBFAC,,, | Performed by: INTERNAL MEDICINE

## 2023-09-29 NOTE — PROGRESS NOTES
[facility]    Chief Complaint  Chief Complaint   Patient presents with    Congestive Heart Failure       HPI:    Here for routine follow-up without complaints.  No change in her activity level.  With what she does, no exertional dyspnea or chest discomfort.  No falls.  No palpitations.    Medications  Current Outpatient Medications   Medication Sig Dispense Refill    acetaminophen (TYLENOL) 500 MG tablet Take 2 tablets (1,000 mg total) by mouth 3 (three) times daily as needed for Pain (muscle pain in arms and legs). 100 tablet 1    albuterol (PROVENTIL) 2.5 mg /3 mL (0.083 %) nebulizer solution Take 3 mLs (2.5 mg total) by nebulization every 6 (six) hours as needed for Wheezing. Rescue 25 each 1    albuterol (PROVENTIL/VENTOLIN HFA) 90 mcg/actuation inhaler Inhale 2 puffs into the lungs every 4 (four) hours as needed for Wheezing. 18 g 6    albuterol (PROVENTIL/VENTOLIN HFA) 90 mcg/actuation inhaler Inhale 1-2 puffs into the lungs every 6 (six) hours as needed for Wheezing. Rescue 6.7 g 0    amLODIPine (NORVASC) 5 MG tablet TAKE 1 TABLET(5 MG) BY MOUTH EVERY MORNING 90 tablet 2    aspirin 81 MG Chew Take 1 tablet (81 mg total) by mouth every morning. 100 tablet 3    atorvastatin (LIPITOR) 40 MG tablet Take 1 tablet (40 mg total) by mouth every evening. To lower cholesterol and for heart and blood vessels 90 tablet 3    blood sugar diagnostic (ONETOUCH ULTRA TEST) Strp Inject 1 strip into the skin once daily. No longer on insulin because of  each 4    clotrimazole-betamethasone 1-0.05% (LOTRISONE) cream Apply topically 2 (two) times daily as needed. For intertriginous itching in the bend of the leg 45 g 3    diclofenac sodium (VOLTAREN) 1 % Gel APPLY 2 GRAMS TOPICALLY TO THE AFFECTED AREA TWICE DAILY 100 g 4    dorzolamide (TRUSOPT) 2 % ophthalmic solution Place 1 drop into both eyes 2 (two) times daily. 10 mL 4    fluorometholone 0.1% (FML) 0.1 % DrpS Place 1 drop into both eyes once daily.    "   fluticasone-salmeterol diskus inhaler 250-50 mcg Inhale 1 puff into the lungs 2 (two) times daily. 60 each 5    furosemide (LASIX) 40 MG tablet TAKE 1 TABLET BY MOUTH EVERY MORNING AND 1 TABLET AT 4  tablet 3    insulin aspart U-100 (NOVOLOG FLEXPEN U-100 INSULIN) 100 unit/mL (3 mL) InPn pen Inject if blood sugar is >180, 180-230+2, 231-280+4, 281-330+6, 331-380+8,>380+10 MAX daily 30 units. 15 mL 2    isosorbide mononitrate (IMDUR) 60 MG 24 hr tablet TAKE 1 TABLET BY MOUTH EVERY MORNING FOR HEART TO PREVENT CHEST PAINS OR SHORTNESS OF BREATH 90 tablet 2    lancets (TRUEPLUS LANCETS) 33 gauge Misc Apply 1 lancet topically once daily. No longer on insulin because of  each 3    latanoprost 0.005 % ophthalmic solution INSTILL 1 DROP IN BOTH EYES EVERY EVENING 10 mL 12    levothyroxine (SYNTHROID) 88 MCG tablet TAKE 1 TABLET(88 MCG) BY MOUTH BEFORE BREAKFAST 90 tablet 2    meclizine (ANTIVERT) 12.5 mg tablet TAKE 1 TABLET BY MOUTH THREE TIMES DAILY AS NEEDED FOR DIZZINESS 20 tablet 3    metoprolol tartrate (LOPRESSOR) 25 MG tablet TAKE 1 TABLET(25 MG) BY MOUTH TWICE DAILY 180 tablet 2    multivit-mineral-iron-lutein Tab Take 1 tablet by mouth once daily.      nitroGLYCERIN (NITROSTAT) 0.4 MG SL tablet ONE TABLET UNDER THE TONGUE EVERY 5 MINUTES AS NEEDED FOR CHEST PAIN 100 tablet 3    nystatin (MYCOSTATIN) powder Apply topically 4 (four) times daily. 60 g 3    pen needle, diabetic (BD ULTRA-FINE SHORT PEN NEEDLE) 31 gauge x 5/16" Ndle USE WITH INSULIN PENS up to 3 x a day. 100 each 2    polyethylene glycol (GLYCOLAX) 17 gram/dose powder Take 17 g by mouth daily as needed (CONSTIPATION).      psyllium (METAMUCIL) powder Take 1 packet by mouth daily as needed (CONSTIPATION).      SHINGRIX, PF, 50 mcg/0.5 mL injection       triamcinolone acetonide 0.025% (KENALOG) 0.025 % Oint Apply topically 2 (two) times daily. 80 g 2     No current facility-administered medications for this visit.        History  Past " Medical History:   Diagnosis Date    Allergy     Anemia     Arthritis     Asthma     Chronic kidney disease     Degenerative disc disease     Diabetes mellitus type II     Diastolic heart failure 05/02/2017    Essential hypertension 7/3/2012    Glaucoma     History of insulin dependent diabetes mellitus, for many years stopped because of chronic kidney November 2020.  9/9/2021    History of pseudogout 8/23/2012    Hyperlipidemia     Hypertension     Iritis     Myocardial infarction     Pneumonia     Thrombocytopenia 8/24/2021    Thyroid disease      Past Surgical History:   Procedure Laterality Date    CARDIAC CATHETERIZATION  2010    no obstructive disease    CATARACT EXTRACTION W/  INTRAOCULAR LENS IMPLANT Left n/a    CATARACT EXTRACTION W/  INTRAOCULAR LENS IMPLANT Right 10/8/2018    With Femtosecond LASER assist (Dr. Henson)    CHOLECYSTECTOMY      ESOPHAGOGASTRODUODENOSCOPY N/A 11/4/2020    Procedure: EGD (ESOPHAGOGASTRODUODENOSCOPY);  Surgeon: Tomás Mccall MD;  Location: [address]);  Service: Endoscopy;  Laterality: N/A;    EYE SURGERY      gall stone      HYSTERECTOMY      VARICOSE VEIN SURGERY       Social History     Socioeconomic History    Marital status:    Tobacco Use    Smoking status: Never    Smokeless tobacco: Never   Substance and Sexual Activity    Alcohol use: No    Drug use: No    Sexual activity: Never     Social Determinants of Health     Financial Resource Strain: Medium Risk (8/24/2021)    Overall Financial Resource Strain (CARDIA)     Difficulty of Paying Living Expenses: Somewhat hard   Food Insecurity: No Food Insecurity (8/24/2021)    Hunger Vital Sign     Worried About Running Out of Food in the Last Year: Never true     Ran Out of Food in the Last Year: Never true   Transportation Needs: No Transportation Needs (8/24/2021)    PRAPARE - Transportation     Lack of Transportation (Medical): No     Lack of Transportation (Non-Medical): No   Physical Activity: Inactive  (8/24/2021)    Exercise Vital Sign     Days of Exercise per Week: 0 days     Minutes of Exercise per Session: 0 min   Stress: No Stress Concern Present (8/24/2021)    [employer] - Occupational Stress Questionnaire     Feeling of Stress : Not at all   Social Connections: Moderately Integrated (8/24/2021)    Social Connection and Isolation Panel [NHANES]     Frequency of Communication with Friends and Family: More than three times a week     Frequency of Social Gatherings with Friends and Family: Once a week     Attends Religion Services: More than 4 times per year     Active Member of Clubs or Organizations: Yes     Attends Club or Organization Meetings: More than 4 times per year     Marital Status:    Housing Stability: Low Risk  (8/24/2021)    Housing Stability Vital Sign     Unable to Pay for Housing in the Last Year: No     Number of Places Lived in the Last Year: 1     Unstable Housing in the Last Year: No     Family History   Problem Relation Age of Onset    Diabetes Mother     Hypertension Mother     Glaucoma Mother     Kidney disease Mother     Hypertension Father     Hypotension Sister     Heart attack Sister     Heart disease Sister     No Known Problems Sister     Diabetes Sister     Kidney disease Sister     Leukemia Sister     Hypertension Sister     Diabetes Sister     Kidney disease Sister     No Known Problems Sister     Heart disease Brother     Heart disease Brother     No Known Problems Brother     No Known Problems Maternal Grandmother     No Known Problems Maternal Grandfather     No Known Problems Paternal Grandmother     Diabetes Paternal Grandfather     Hypertension Daughter     Atrial fibrillation Daughter     Diabetes Daughter         borderline    Hypertension Daughter     No Known Problems Son     Diabetes Son     Heart disease Son     Hypertension Son     Melanoma Neg Hx         Allergies  Review of patient's allergies indicates:   Allergen Reactions     Codeine Itching and Nausea Only              Review of Systems   Review of Systems   Constitutional: Negative for malaise/fatigue, weight gain and weight loss.   Eyes:  Negative for visual disturbance.   Cardiovascular:  Negative for chest pain, claudication, cyanosis, dyspnea on exertion, irregular heartbeat, leg swelling, near-syncope, orthopnea, palpitations, paroxysmal nocturnal dyspnea and syncope.   Respiratory:  Negative for cough, hemoptysis, shortness of breath, sleep disturbances due to breathing and wheezing.    Hematologic/Lymphatic: Negative for bleeding problem. Does not bruise/bleed easily.   Skin:  Negative for poor wound healing.   Musculoskeletal:  Negative for muscle cramps and myalgias.   Gastrointestinal:  Negative for abdominal pain, anorexia, diarrhea, heartburn, hematemesis, hematochezia, melena, nausea and vomiting.   Genitourinary:  Negative for hematuria and nocturia.   Neurological:  Negative for excessive daytime sleepiness, dizziness, focal weakness, light-headedness and weakness.       Physical Exam  Vitals:    09/29/23 0906   BP: (!) 142/86   Pulse: 64     Wt Readings from Last 1 Encounters:   07/14/23 70.3 kg (154 lb 15.7 oz)     Physical Exam  Vitals and nursing note reviewed.   Constitutional:       General: She is not in acute distress.     Appearance: She is not toxic-appearing or diaphoretic.   HENT:      Head: Normocephalic and atraumatic.      Mouth/Throat:      Lips: Pink.      Mouth: Mucous membranes are moist.   Eyes:      General: No scleral icterus.     Conjunctiva/sclera: Conjunctivae normal.   Neck:      Thyroid: No thyromegaly.      Vascular: No carotid bruit, hepatojugular reflux or JVD.      Trachea: Trachea normal.   Cardiovascular:      Rate and Rhythm: Normal rate. Rhythm irregularly irregular. No extrasystoles are present.     Chest Wall: PMI is not displaced.      Pulses:           Carotid pulses are 2+ on the right side and 2+ on the left side.       Radial  pulses are 2+ on the right side and 2+ on the left side.      Heart sounds: S1 normal and S2 normal. Murmur heard.      Systolic murmur is present with a grade of 2/6.      No friction rub. No S3 or S4 sounds.   Pulmonary:      Effort: Pulmonary effort is normal. No accessory muscle usage or respiratory distress.      Breath sounds: Normal breath sounds and air entry. No decreased breath sounds, wheezing, rhonchi or rales.   Abdominal:      General: Bowel sounds are normal. There is no distension or abdominal bruit.      Palpations: Abdomen is soft. There is no hepatomegaly, splenomegaly or pulsatile mass.      Tenderness: There is no abdominal tenderness.   Musculoskeletal:         General: No tenderness or deformity.      Right lower leg: No edema.      Left lower leg: No edema.   Skin:     General: Skin is warm and dry.      Capillary Refill: Capillary refill takes less than 2 seconds.      Coloration: Skin is not cyanotic or pale.      Nails: There is no clubbing.   Neurological:      General: No focal deficit present.      Mental Status: She is alert and oriented to person, place, and time.   Psychiatric:         Attention and Perception: Attention normal.         Mood and Affect: Mood normal.         Speech: Speech normal.         Behavior: Behavior normal. Behavior is cooperative.         Labs  Lab Visit on 04/11/2023   Component Date Value Ref Range Status    Microalbumin, Urine 04/11/2023 11.0  ug/mL Final    Creatinine, Urine 04/11/2023 70.0  15.0 - 325.0 mg/dL Final    Microalb/Creat Ratio 04/11/2023 15.7  0.0 - 30.0 ug/mg Final   Lab Visit on 04/11/2023   Component Date Value Ref Range Status    WBC 04/11/2023 5.26  3.90 - 12.70 K/uL Final    RBC 04/11/2023 3.95 (L)  4.00 - 5.40 M/uL Final    Hemoglobin 04/11/2023 11.5 (L)  12.0 - 16.0 g/dL Final    Hematocrit 04/11/2023 36.7 (L)  37.0 - 48.5 % Final    MCV 04/11/2023 93  82 - 98 fL Final    MCH 04/11/2023 29.1  27.0 - 31.0 pg Final    MCHC 04/11/2023  31.3 (L)  32.0 - 36.0 g/dL Final    RDW 04/11/2023 14.4  11.5 - 14.5 % Final    Platelets 04/11/2023 168  150 - 450 K/uL Final    MPV 04/11/2023 11.8  9.2 - 12.9 fL Final    Immature Granulocytes 04/11/2023 0.4  0.0 - 0.5 % Final    Gran # (ANC) 04/11/2023 2.4  1.8 - 7.7 K/uL Final    Immature Grans (Abs) 04/11/2023 0.02  0.00 - 0.04 K/uL Final    Comment: Mild elevation in immature granulocytes is non specific and   can be seen in a variety of conditions including stress response,   acute inflammation, trauma and pregnancy. Correlation with other   laboratory and clinical findings is essential.      Lymph # 04/11/2023 1.8  1.0 - 4.8 K/uL Final    Mono # 04/11/2023 0.8  0.3 - 1.0 K/uL Final    Eos # 04/11/2023 0.2  0.0 - 0.5 K/uL Final    Baso # 04/11/2023 0.06  0.00 - 0.20 K/uL Final    nRBC 04/11/2023 0  0 /100 WBC Final    Gran % 04/11/2023 45.8  38.0 - 73.0 % Final    Lymph % 04/11/2023 33.3  18.0 - 48.0 % Final    Mono % 04/11/2023 16.0 (H)  4.0 - 15.0 % Final    Eosinophil % 04/11/2023 3.4  0.0 - 8.0 % Final    Basophil % 04/11/2023 1.1  0.0 - 1.9 % Final    Differential Method 04/11/2023 Automated   Final    Sodium 04/11/2023 140  136 - 145 mmol/L Final    Potassium 04/11/2023 4.2  3.5 - 5.1 mmol/L Final    Chloride 04/11/2023 105  95 - 110 mmol/L Final    CO2 04/11/2023 24  23 - 29 mmol/L Final    Glucose 04/11/2023 111 (H)  70 - 110 mg/dL Final    BUN 04/11/2023 25  10 - 30 mg/dL Final    Creatinine 04/11/2023 1.7 (H)  0.5 - 1.4 mg/dL Final    Calcium 04/11/2023 9.7  8.7 - 10.5 mg/dL Final    Total Protein 04/11/2023 6.8  6.0 - 8.4 g/dL Final    Albumin 04/11/2023 3.9  3.5 - 5.2 g/dL Final    Total Bilirubin 04/11/2023 0.6  0.1 - 1.0 mg/dL Final    Comment: For infants and newborns, interpretation of results should be based  on gestational age, weight and in agreement with clinical  observations.    Premature Infant recommended reference ranges:  Up to 24 hours.............<8.0 mg/dL  Up to 48  hours............<12.0 mg/dL  3-5 days..................<15.0 mg/dL  6-29 days.................<15.0 mg/dL      Alkaline Phosphatase 04/11/2023 127  55 - 135 U/L Final    AST 04/11/2023 29  10 - 40 U/L Final    ALT 04/11/2023 26  10 - 44 U/L Final    Anion Gap 04/11/2023 11  8 - 16 mmol/L Final    eGFR 04/11/2023 26.9 (A)  >60 mL/min/1.73 m^2 Final    Hemoglobin A1C 04/11/2023 7.1 (H)  4.0 - 5.6 % Final    Comment: ADA Screening Guidelines:  5.7-6.4%  Consistent with prediabetes  >or=6.5%  Consistent with diabetes    High levels of fetal hemoglobin interfere with the HbA1C  assay. Heterozygous hemoglobin variants (HbS, HgC, etc)do  not significantly interfere with this assay.   However, presence of multiple variants may affect accuracy.      Estimated Avg Glucose 04/11/2023 157 (H)  68 - 131 mg/dL Final    Cholesterol 04/11/2023 132  120 - 199 mg/dL Final    Comment: The National Cholesterol Education Program (NCEP) has set the  following guidelines (reference ranges) for Cholesterol:  Optimal.....................<200 mg/dL  Borderline High.............200-239 mg/dL  High........................> or = 240 mg/dL      Triglycerides 04/11/2023 59  30 - 150 mg/dL Final    Comment: The National Cholesterol Education Program (NCEP) has set the  following guidelines (reference values) for triglycerides:  Normal......................<150 mg/dL  Borderline High.............150-199 mg/dL  High........................200-499 mg/dL      HDL 04/11/2023 51  40 - 75 mg/dL Final    Comment: The National Cholesterol Education Program (NCEP) has set the  following guidelines (reference values) for HDL Cholesterol:  Low...............<40 mg/dL  Optimal...........>60 mg/dL      LDL Cholesterol 04/11/2023 69.2  63.0 - 159.0 mg/dL Final    Comment: The National Cholesterol Education Program (NCEP) has set the  following guidelines (reference values) for LDL Cholesterol:  Optimal.......................<130 mg/dL  Borderline  High...............130-159 mg/dL  High..........................160-189 mg/dL  Very High.....................>190 mg/dL      HDL/Cholesterol Ratio 04/11/2023 38.6  20.0 - 50.0 % Final    Total Cholesterol/HDL Ratio 04/11/2023 2.6  2.0 - 5.0 Final    Non-HDL Cholesterol 04/11/2023 81  mg/dL Final    Comment: Risk category and Non-HDL cholesterol goals:  Coronary heart disease (CHD)or equivalent (10-year risk of CHD >20%):  Non-HDL cholesterol goal     <130 mg/dL  Two or more CHD risk factors and 10-year risk of CHD <= 20%:  Non-HDL cholesterol goal     <160 mg/dL  0 to 1 CHD risk factor:  Non-HDL cholesterol goal     <190 mg/dL      TSH 04/11/2023 3.154  0.400 - 4.000 uIU/mL Final    T4, Total 04/11/2023 10.6  4.5 - 11.5 ug/dL Final       Imaging  No results found.    Assessment  1. Chronic diastolic heart failure  Compensated    2. Permanent atrial fibrillation  Controlled.  Elects not to be on anticoagulation    3. Aortic atherosclerosis  Continue risk factor modification efforts that are appropriate for a 89-year-old      Plan and Discussion    No change to current guideline directed medical therapy.    The ASCVD Risk score (Lanette DK, et al., 2019) failed to calculate for the following reasons:    The 2019 ASCVD risk score is only valid for ages 40 to 79    The patient has a prior MI or stroke diagnosis     Follow Up  Follow up in about 6 months (around 3/29/2024).      Adán Alanis MD

## 2023-10-05 DIAGNOSIS — H40.1190 PRIMARY OPEN ANGLE GLAUCOMA: ICD-10-CM

## 2023-10-05 RX ORDER — DORZOLAMIDE HCL 20 MG/ML
1 SOLUTION/ DROPS OPHTHALMIC 2 TIMES DAILY
Qty: 10 ML | Refills: 4 | Status: SHIPPED | OUTPATIENT
Start: 2023-10-05 | End: 2023-11-27 | Stop reason: SDUPTHER

## 2023-10-09 ENCOUNTER — LAB VISIT (OUTPATIENT)
Dept: LAB | Facility: HOSPITAL | Age: 88
End: 2023-10-09
Attending: INTERNAL MEDICINE
Payer: MEDICARE

## 2023-10-09 ENCOUNTER — OFFICE VISIT (OUTPATIENT)
Dept: INTERNAL MEDICINE | Facility: CLINIC | Age: 88
End: 2023-10-09
Payer: MEDICARE

## 2023-10-09 VITALS
WEIGHT: 153 LBS | HEIGHT: 68 IN | HEART RATE: 60 BPM | OXYGEN SATURATION: 98 % | BODY MASS INDEX: 23.19 KG/M2 | SYSTOLIC BLOOD PRESSURE: 128 MMHG | DIASTOLIC BLOOD PRESSURE: 64 MMHG

## 2023-10-09 DIAGNOSIS — E11.9 INSULIN-REQUIRING OR DEPENDENT TYPE II DIABETES MELLITUS: ICD-10-CM

## 2023-10-09 DIAGNOSIS — N18.4 STAGE 4 CHRONIC KIDNEY DISEASE: ICD-10-CM

## 2023-10-09 DIAGNOSIS — G89.29 CHRONIC NONINTRACTABLE HEADACHE, UNSPECIFIED HEADACHE TYPE: ICD-10-CM

## 2023-10-09 DIAGNOSIS — E03.9 PRIMARY HYPOTHYROIDISM: ICD-10-CM

## 2023-10-09 DIAGNOSIS — I48.21 PERMANENT ATRIAL FIBRILLATION: ICD-10-CM

## 2023-10-09 DIAGNOSIS — I25.10 CORONARY ARTERY DISEASE INVOLVING NATIVE CORONARY ARTERY OF NATIVE HEART WITHOUT ANGINA PECTORIS: Primary | ICD-10-CM

## 2023-10-09 DIAGNOSIS — E11.42 TYPE 2 DIABETES MELLITUS WITH DIABETIC POLYNEUROPATHY, WITHOUT LONG-TERM CURRENT USE OF INSULIN: ICD-10-CM

## 2023-10-09 DIAGNOSIS — R51.9 CHRONIC NONINTRACTABLE HEADACHE, UNSPECIFIED HEADACHE TYPE: ICD-10-CM

## 2023-10-09 DIAGNOSIS — Z79.4 INSULIN-REQUIRING OR DEPENDENT TYPE II DIABETES MELLITUS: ICD-10-CM

## 2023-10-09 DIAGNOSIS — I25.10 CORONARY ARTERY DISEASE INVOLVING NATIVE CORONARY ARTERY OF NATIVE HEART WITHOUT ANGINA PECTORIS: ICD-10-CM

## 2023-10-09 LAB
ALBUMIN SERPL BCP-MCNC: 3.8 G/DL (ref 3.5–5.2)
ALP SERPL-CCNC: 120 U/L (ref 55–135)
ALT SERPL W/O P-5'-P-CCNC: 18 U/L (ref 10–44)
ANION GAP SERPL CALC-SCNC: 7 MMOL/L (ref 8–16)
AST SERPL-CCNC: 26 U/L (ref 10–40)
BACTERIA #/AREA URNS AUTO: NORMAL /HPF
BASOPHILS # BLD AUTO: 0.04 K/UL (ref 0–0.2)
BASOPHILS NFR BLD: 0.7 % (ref 0–1.9)
BILIRUB SERPL-MCNC: 0.6 MG/DL (ref 0.1–1)
BILIRUB UR QL STRIP: NEGATIVE
BUN SERPL-MCNC: 22 MG/DL (ref 10–30)
CALCIUM SERPL-MCNC: 9.5 MG/DL (ref 8.7–10.5)
CHLORIDE SERPL-SCNC: 105 MMOL/L (ref 95–110)
CHOLEST SERPL-MCNC: 131 MG/DL (ref 120–199)
CHOLEST/HDLC SERPL: 2.4 {RATIO} (ref 2–5)
CLARITY UR REFRACT.AUTO: CLEAR
CO2 SERPL-SCNC: 27 MMOL/L (ref 23–29)
COLOR UR AUTO: YELLOW
CREAT SERPL-MCNC: 1.5 MG/DL (ref 0.5–1.4)
CRP SERPL-MCNC: 0.8 MG/L (ref 0–8.2)
DIFFERENTIAL METHOD: ABNORMAL
EOSINOPHIL # BLD AUTO: 0.2 K/UL (ref 0–0.5)
EOSINOPHIL NFR BLD: 2.7 % (ref 0–8)
ERYTHROCYTE [DISTWIDTH] IN BLOOD BY AUTOMATED COUNT: 14.3 % (ref 11.5–14.5)
ERYTHROCYTE [SEDIMENTATION RATE] IN BLOOD BY PHOTOMETRIC METHOD: 33 MM/HR (ref 0–36)
EST. GFR  (NO RACE VARIABLE): 31.3 ML/MIN/1.73 M^2
ESTIMATED AVG GLUCOSE: 148 MG/DL (ref 68–131)
GLUCOSE SERPL-MCNC: 96 MG/DL (ref 70–110)
GLUCOSE UR QL STRIP: NEGATIVE
HBA1C MFR BLD: 6.8 % (ref 4–5.6)
HCT VFR BLD AUTO: 37 % (ref 37–48.5)
HDLC SERPL-MCNC: 54 MG/DL (ref 40–75)
HDLC SERPL: 41.2 % (ref 20–50)
HGB BLD-MCNC: 11.8 G/DL (ref 12–16)
HGB UR QL STRIP: NEGATIVE
HYALINE CASTS UR QL AUTO: 1 /LPF
IMM GRANULOCYTES # BLD AUTO: 0.01 K/UL (ref 0–0.04)
IMM GRANULOCYTES NFR BLD AUTO: 0.2 % (ref 0–0.5)
KETONES UR QL STRIP: NEGATIVE
LDLC SERPL CALC-MCNC: 67.8 MG/DL (ref 63–159)
LEUKOCYTE ESTERASE UR QL STRIP: NEGATIVE
LYMPHOCYTES # BLD AUTO: 1.3 K/UL (ref 1–4.8)
LYMPHOCYTES NFR BLD: 22.8 % (ref 18–48)
MCH RBC QN AUTO: 29.9 PG (ref 27–31)
MCHC RBC AUTO-ENTMCNC: 31.9 G/DL (ref 32–36)
MCV RBC AUTO: 94 FL (ref 82–98)
MICROSCOPIC COMMENT: NORMAL
MONOCYTES # BLD AUTO: 0.8 K/UL (ref 0.3–1)
MONOCYTES NFR BLD: 15 % (ref 4–15)
NEUTROPHILS # BLD AUTO: 3.3 K/UL (ref 1.8–7.7)
NEUTROPHILS NFR BLD: 58.6 % (ref 38–73)
NITRITE UR QL STRIP: NEGATIVE
NONHDLC SERPL-MCNC: 77 MG/DL
NRBC BLD-RTO: 0 /100 WBC
PH UR STRIP: 6 [PH] (ref 5–8)
PLATELET # BLD AUTO: 148 K/UL (ref 150–450)
PMV BLD AUTO: 11.8 FL (ref 9.2–12.9)
POTASSIUM SERPL-SCNC: 4.1 MMOL/L (ref 3.5–5.1)
PROT SERPL-MCNC: 6.8 G/DL (ref 6–8.4)
PROT UR QL STRIP: NEGATIVE
RBC # BLD AUTO: 3.95 M/UL (ref 4–5.4)
RBC #/AREA URNS AUTO: 0 /HPF (ref 0–4)
SODIUM SERPL-SCNC: 139 MMOL/L (ref 136–145)
SP GR UR STRIP: 1.01 (ref 1–1.03)
T4 SERPL-MCNC: 11 UG/DL (ref 4.5–11.5)
TRIGL SERPL-MCNC: 46 MG/DL (ref 30–150)
TSH SERPL DL<=0.005 MIU/L-ACNC: 1.4 UIU/ML (ref 0.4–4)
URN SPEC COLLECT METH UR: NORMAL
WBC # BLD AUTO: 5.61 K/UL (ref 3.9–12.7)
WBC #/AREA URNS AUTO: 0 /HPF (ref 0–5)

## 2023-10-09 PROCEDURE — 99999 PR PBB SHADOW E&M-EST. PATIENT-LVL V: ICD-10-PCS | Mod: PBBFAC,,, | Performed by: INTERNAL MEDICINE

## 2023-10-09 PROCEDURE — 81001 URINALYSIS AUTO W/SCOPE: CPT | Performed by: INTERNAL MEDICINE

## 2023-10-09 PROCEDURE — 99999 PR PBB SHADOW E&M-EST. PATIENT-LVL V: CPT | Mod: PBBFAC,,, | Performed by: INTERNAL MEDICINE

## 2023-10-09 PROCEDURE — 1101F PR PT FALLS ASSESS DOC 0-1 FALLS W/OUT INJ PAST YR: ICD-10-PCS | Mod: CPTII,S$GLB,, | Performed by: INTERNAL MEDICINE

## 2023-10-09 PROCEDURE — 1159F PR MEDICATION LIST DOCUMENTED IN MEDICAL RECORD: ICD-10-PCS | Mod: CPTII,S$GLB,, | Performed by: INTERNAL MEDICINE

## 2023-10-09 PROCEDURE — 3288F PR FALLS RISK ASSESSMENT DOCUMENTED: ICD-10-PCS | Mod: CPTII,S$GLB,, | Performed by: INTERNAL MEDICINE

## 2023-10-09 PROCEDURE — 80053 COMPREHEN METABOLIC PANEL: CPT | Performed by: INTERNAL MEDICINE

## 2023-10-09 PROCEDURE — 80061 LIPID PANEL: CPT | Performed by: INTERNAL MEDICINE

## 2023-10-09 PROCEDURE — 86140 C-REACTIVE PROTEIN: CPT | Performed by: INTERNAL MEDICINE

## 2023-10-09 PROCEDURE — 36415 COLL VENOUS BLD VENIPUNCTURE: CPT | Performed by: INTERNAL MEDICINE

## 2023-10-09 PROCEDURE — 99214 PR OFFICE/OUTPT VISIT, EST, LEVL IV, 30-39 MIN: ICD-10-PCS | Mod: S$GLB,,, | Performed by: INTERNAL MEDICINE

## 2023-10-09 PROCEDURE — 1159F MED LIST DOCD IN RCRD: CPT | Mod: CPTII,S$GLB,, | Performed by: INTERNAL MEDICINE

## 2023-10-09 PROCEDURE — 99214 OFFICE O/P EST MOD 30 MIN: CPT | Mod: S$GLB,,, | Performed by: INTERNAL MEDICINE

## 2023-10-09 PROCEDURE — 84436 ASSAY OF TOTAL THYROXINE: CPT | Performed by: INTERNAL MEDICINE

## 2023-10-09 PROCEDURE — 1125F AMNT PAIN NOTED PAIN PRSNT: CPT | Mod: CPTII,S$GLB,, | Performed by: INTERNAL MEDICINE

## 2023-10-09 PROCEDURE — 85652 RBC SED RATE AUTOMATED: CPT | Performed by: INTERNAL MEDICINE

## 2023-10-09 PROCEDURE — 1101F PT FALLS ASSESS-DOCD LE1/YR: CPT | Mod: CPTII,S$GLB,, | Performed by: INTERNAL MEDICINE

## 2023-10-09 PROCEDURE — 3288F FALL RISK ASSESSMENT DOCD: CPT | Mod: CPTII,S$GLB,, | Performed by: INTERNAL MEDICINE

## 2023-10-09 PROCEDURE — 83036 HEMOGLOBIN GLYCOSYLATED A1C: CPT | Performed by: INTERNAL MEDICINE

## 2023-10-09 PROCEDURE — 1125F PR PAIN SEVERITY QUANTIFIED, PAIN PRESENT: ICD-10-PCS | Mod: CPTII,S$GLB,, | Performed by: INTERNAL MEDICINE

## 2023-10-09 PROCEDURE — 85025 COMPLETE CBC W/AUTO DIFF WBC: CPT | Performed by: INTERNAL MEDICINE

## 2023-10-09 PROCEDURE — 84443 ASSAY THYROID STIM HORMONE: CPT | Performed by: INTERNAL MEDICINE

## 2023-10-09 RX ORDER — ATORVASTATIN CALCIUM 40 MG/1
40 TABLET, FILM COATED ORAL NIGHTLY
Qty: 90 TABLET | Refills: 1 | Status: SHIPPED | OUTPATIENT
Start: 2023-10-09 | End: 2024-01-16

## 2023-10-09 NOTE — TELEPHONE ENCOUNTER
No care due was identified.  [facility] Embedded Care Due Messages. Reference number: [id number].   10/09/2023 5:40:03 AM CDT

## 2023-10-09 NOTE — TELEPHONE ENCOUNTER
Refill Decision Note   Tiana Mead  is requesting a refill authorization.  Brief Assessment and Rationale for Refill:  Approve     Medication Therapy Plan:         Comments:     Note composed:6:56 AM 10/09/2023

## 2023-10-09 NOTE — PROGRESS NOTES
CC:  Follow-up     HPI:  The patient is a 89-year-old female with asthma, COPD, insulin-requiring diabetes, hypertension, hypothyroidism, coronary artery disease status post MI, CKD stage 4 and atrial fibrillation presents today for six-month follow-up.  The patient was recently seen by Cardiology.  She does complain chronic headache for the last 2 months.  They occur almost daily.  They vary in intensity.  She will take 2 Tylenol which helps.  The headaches are described as either sharp or throbbing.  She does not have a headache currently.  Her last headache was yesterday.  She did nose swollen vein over the left temple.  The headache is on left side of her head and involving left eye.  The patient is on insulin for diabetes.  He uses it on average twice a month.  She only takes insulin for blood sugars over 180.  Her last A1c was 7.1.    ROS:  She has decreased vision in the left eye.  She follows up with Ophthalmology next month.  She does wear hearing aids.  No chest pain.  She is breathing at baseline.  No nausea vomiting.  Does report some abdominal discomfort sometimes after eating.  It only lasts a few minutes.  Does use MiraLax to help with her bowels.  She has a bowel movement daily.  No weakness in arms or legs.  No numbness or tingling arms or legs.    Physical exam:   General appearance:  No acute distress  HEENT: Conjunctiva is clear.  He has bilateral lens replacements.  She had no photophobia.  Hearing aids were in place.  Nasal septum is midline without discharge.  Oropharynx without erythema.  Trachea is midline without JVD.  Vein over left temporal was prominent.    Pulmonary:  Good inspiratory, expiratory breath sounds are heard.  Lungs are clear to auscultation.    Cardiovascular:  S1-S2, rhythm appear to be regular.  Extremities without edema.  No JVD   GI: Abdomen was nontender, nondistended without hepatosplenomegaly      Assessment:   1. Follow-up  2.  AFib  3.  Insulin-requiring  diabetes  4.  Hypertension  5.  CKD stage 4  6.  Headache  7. Hypothyroidism     Plan:  1. Will schedule a TSH, T4, CBC, A1c, lipid panel, ESR and CRP.

## 2023-10-10 RX ORDER — PEN NEEDLE, DIABETIC 30 GX3/16"
NEEDLE, DISPOSABLE MISCELLANEOUS
Qty: 100 EACH | Refills: 2 | Status: SHIPPED | OUTPATIENT
Start: 2023-10-10 | End: 2024-03-15

## 2023-10-21 DIAGNOSIS — E11.42 TYPE 2 DIABETES MELLITUS WITH DIABETIC POLYNEUROPATHY, WITHOUT LONG-TERM CURRENT USE OF INSULIN: ICD-10-CM

## 2023-10-21 DIAGNOSIS — N18.4 STAGE 4 CHRONIC KIDNEY DISEASE: ICD-10-CM

## 2023-10-21 NOTE — TELEPHONE ENCOUNTER
No care due was identified.  [facility] Embedded Care Due Messages. Reference number: [id number].   10/21/2023 12:27:51 PM CDT

## 2023-10-23 ENCOUNTER — TELEPHONE (OUTPATIENT)
Dept: INTERNAL MEDICINE | Facility: CLINIC | Age: 88
End: 2023-10-23
Payer: MEDICARE

## 2023-10-23 NOTE — TELEPHONE ENCOUNTER
----- Message from Francia Quintanilla sent at 10/23/2023  1:05 PM CDT -----  Contact: [phone]  Requesting an RX refill or new RX.  Is this a refill or new RX: Refill 1  RX name and strength (copy/paste from chart):  test strips  Is this a 30 day or 90 day RX:   Pharmacy name and phone # (copy/paste from chart):  [id number] - [place] [address]    Phone:  [phone]  Fax:  [phone]        The doctors have asked that we provide their patients with the following 2 reminders -- prescription refills can take up to 72 hours, and a friendly reminder that in the future you can use your [facility] account to request refills:     Patient only have 5 left and have to use it twice per day. Thank you

## 2023-10-23 NOTE — TELEPHONE ENCOUNTER
Refill Decision Note   Tianadanny Mead  is requesting a refill authorization.  Brief Assessment and Rationale for Refill:  Quick Discontinue  Approve     Medication Therapy Plan:  Refused due to error in request and pended manually.      Comments:     Note composed:12:41 PM 10/23/2023

## 2023-11-01 ENCOUNTER — PATIENT MESSAGE (OUTPATIENT)
Dept: PODIATRY | Facility: CLINIC | Age: 88
End: 2023-11-01
Payer: MEDICARE

## 2023-11-02 ENCOUNTER — TELEPHONE (OUTPATIENT)
Dept: PODIATRY | Facility: CLINIC | Age: 88
End: 2023-11-02
Payer: MEDICARE

## 2023-11-02 NOTE — TELEPHONE ENCOUNTER
----- Message from Amy Rayo sent at 11/2/2023 10:35 AM CDT -----  Regarding: Appt  Contact: Yolanda/daughter  [phone]  Yolanda/daughter is calling to speak with nurse to schedule sooner appt, pt was rescheduled from today to 11/28, but sooner appt was offered with another open provider, would like to schedule if still possible, please call daughter @ [phone]

## 2023-11-16 ENCOUNTER — PATIENT OUTREACH (OUTPATIENT)
Dept: ADMINISTRATIVE | Facility: HOSPITAL | Age: 88
End: 2023-11-16
Payer: MEDICARE

## 2023-11-16 ENCOUNTER — PATIENT MESSAGE (OUTPATIENT)
Dept: ADMINISTRATIVE | Facility: HOSPITAL | Age: 88
End: 2023-11-16
Payer: MEDICARE

## 2023-11-16 NOTE — PROGRESS NOTES

## 2023-11-22 ENCOUNTER — TELEPHONE (OUTPATIENT)
Dept: INTERNAL MEDICINE | Facility: CLINIC | Age: 88
End: 2023-11-22
Payer: MEDICARE

## 2023-11-22 NOTE — TELEPHONE ENCOUNTER
----- Message from Nimco Yuan sent at 11/21/2023  4:42 PM CST -----  Contact: [phone] Patient  Requesting an RX refill or new RX.  Is this a refill or new RX: new  RX name and strength (copy/paste from chart):  blood sugar diagnostic (ONETOUCH ULTRA TEST) Strp  Is this a 30 day or 90 day RX: 3 month supply  Pharmacy name and phone # (copy/paste from chart):    [id number] - [place], [place] - [address] AT [address]  [address]  [facility] [address]  Phone: [phone] Fax: [phone]

## 2023-11-27 ENCOUNTER — OFFICE VISIT (OUTPATIENT)
Dept: OPHTHALMOLOGY | Facility: CLINIC | Age: 88
End: 2023-11-27
Payer: MEDICARE

## 2023-11-27 DIAGNOSIS — L25.9 CONTACT DERMATITIS, UNSPECIFIED CONTACT DERMATITIS TYPE, UNSPECIFIED TRIGGER: ICD-10-CM

## 2023-11-27 DIAGNOSIS — H04.123 DRY EYE SYNDROME OF BOTH EYES: ICD-10-CM

## 2023-11-27 DIAGNOSIS — H01.01A ULCERATIVE BLEPHARITIS OF UPPER AND LOWER EYELIDS OF BOTH EYES: ICD-10-CM

## 2023-11-27 DIAGNOSIS — H01.01B ULCERATIVE BLEPHARITIS OF UPPER AND LOWER EYELIDS OF BOTH EYES: ICD-10-CM

## 2023-11-27 DIAGNOSIS — H40.1190 PRIMARY OPEN ANGLE GLAUCOMA: ICD-10-CM

## 2023-11-27 DIAGNOSIS — H40.32X2 GLAUCOMA OF LEFT EYE ASSOCIATED WITH OCULAR TRAUMA, MODERATE STAGE: ICD-10-CM

## 2023-11-27 DIAGNOSIS — H40.1132 PRIMARY OPEN ANGLE GLAUCOMA (POAG) OF BOTH EYES, MODERATE STAGE: Primary | ICD-10-CM

## 2023-11-27 DIAGNOSIS — H18.20 CORNEAL EDEMA: ICD-10-CM

## 2023-11-27 PROCEDURE — 2023F PR DILATED RETINAL EXAM W/O EVID OF RETINOPATHY: ICD-10-PCS | Mod: CPTII,S$GLB,, | Performed by: OPHTHALMOLOGY

## 2023-11-27 PROCEDURE — 1101F PT FALLS ASSESS-DOCD LE1/YR: CPT | Mod: CPTII,S$GLB,, | Performed by: OPHTHALMOLOGY

## 2023-11-27 PROCEDURE — 99214 OFFICE O/P EST MOD 30 MIN: CPT | Mod: S$GLB,,, | Performed by: OPHTHALMOLOGY

## 2023-11-27 PROCEDURE — 1126F PR PAIN SEVERITY QUANTIFIED, NO PAIN PRESENT: ICD-10-PCS | Mod: CPTII,S$GLB,, | Performed by: OPHTHALMOLOGY

## 2023-11-27 PROCEDURE — 1159F PR MEDICATION LIST DOCUMENTED IN MEDICAL RECORD: ICD-10-PCS | Mod: CPTII,S$GLB,, | Performed by: OPHTHALMOLOGY

## 2023-11-27 PROCEDURE — 3288F FALL RISK ASSESSMENT DOCD: CPT | Mod: CPTII,S$GLB,, | Performed by: OPHTHALMOLOGY

## 2023-11-27 PROCEDURE — 1159F MED LIST DOCD IN RCRD: CPT | Mod: CPTII,S$GLB,, | Performed by: OPHTHALMOLOGY

## 2023-11-27 PROCEDURE — 1126F AMNT PAIN NOTED NONE PRSNT: CPT | Mod: CPTII,S$GLB,, | Performed by: OPHTHALMOLOGY

## 2023-11-27 PROCEDURE — 1160F PR REVIEW ALL MEDS BY PRESCRIBER/CLIN PHARMACIST DOCUMENTED: ICD-10-PCS | Mod: CPTII,S$GLB,, | Performed by: OPHTHALMOLOGY

## 2023-11-27 PROCEDURE — 1101F PR PT FALLS ASSESS DOC 0-1 FALLS W/OUT INJ PAST YR: ICD-10-PCS | Mod: CPTII,S$GLB,, | Performed by: OPHTHALMOLOGY

## 2023-11-27 PROCEDURE — 1160F RVW MEDS BY RX/DR IN RCRD: CPT | Mod: CPTII,S$GLB,, | Performed by: OPHTHALMOLOGY

## 2023-11-27 PROCEDURE — 2023F DILAT RTA XM W/O RTNOPTHY: CPT | Mod: CPTII,S$GLB,, | Performed by: OPHTHALMOLOGY

## 2023-11-27 PROCEDURE — 3288F PR FALLS RISK ASSESSMENT DOCUMENTED: ICD-10-PCS | Mod: CPTII,S$GLB,, | Performed by: OPHTHALMOLOGY

## 2023-11-27 PROCEDURE — 99999 PR PBB SHADOW E&M-EST. PATIENT-LVL II: CPT | Mod: PBBFAC,,, | Performed by: OPHTHALMOLOGY

## 2023-11-27 PROCEDURE — 92133 CPTRZD OPH DX IMG PST SGM ON: CPT | Mod: S$GLB,,, | Performed by: OPHTHALMOLOGY

## 2023-11-27 PROCEDURE — 92133 POSTERIOR SEGMENT OCT OPTIC NERVE(OCULAR COHERENCE TOMOGRAPHY) - OU - BOTH EYES: ICD-10-PCS | Mod: S$GLB,,, | Performed by: OPHTHALMOLOGY

## 2023-11-27 PROCEDURE — 99999 PR PBB SHADOW E&M-EST. PATIENT-LVL II: ICD-10-PCS | Mod: PBBFAC,,, | Performed by: OPHTHALMOLOGY

## 2023-11-27 PROCEDURE — 99214 PR OFFICE/OUTPT VISIT, EST, LEVL IV, 30-39 MIN: ICD-10-PCS | Mod: S$GLB,,, | Performed by: OPHTHALMOLOGY

## 2023-11-27 RX ORDER — DORZOLAMIDE HCL 20 MG/ML
1 SOLUTION/ DROPS OPHTHALMIC 3 TIMES DAILY
Qty: 10 ML | Refills: 4 | Status: SHIPPED | OUTPATIENT
Start: 2023-11-27

## 2023-11-27 NOTE — PROGRESS NOTES
Assessment /Plan     For exam results, see Encounter Report.    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, moderate stage    Dry eye syndrome of both eyes    Contact dermatitis, unspecified contact dermatitis type, unspecified trigger    Ulcerative blepharitis of upper and lower eyelids of both eyes      Son lives in [facility]  COVID was on vent 12 days --> visiting mom    + Asthma  CHF    Zoster Left V1 --> resolved  No ophthalmic involvement  Eye tends to be irritated --> long standing    FML q day prn --> using QOD -> Re-discussed steroid response --> none 05/02/2023      POAG  Stable Glaucoma & Patient near target IOP range    Steroid responder    CCT  517 / 574    Mid teens --> LWIT    Both Eyes --> good adherence & tolerating well --> CSM  Trusopt BID  Xal q day --> iritis quiet  Alphagan BID  --> itchy // Follicles --> intolerant  No BB --> Asthma    Hx Ryan in Past    Hold SLT OS    PC IOL OU  Quiet  Observe    Corneal edema OS > OD  Fuchs K dystrophy  Trauma OS  Jony 128 2% BID PRN --> re-discussed & restart      Dry Eye Syndrome: re-discussed use of warm compresses, preserved & non-preserved artificial tears, gel and PM ointment options.  Also discussed options utilizing medications.  EES q HS x 1 week / month --> PRN      NIDDM  No BDR or CSME  control     ERM OD  Not VS  Observe    Blepharitis & Left Facial dermatitis  Blepharitis: Discussed treatment options including warm compresses, lid scrubs and medications.    EES q HS x 1 week / month  WCs  Lid scrubs       Hold Dial soap  Try Cetaphil  Cerave    11/27/2023  Left facial dermatitis and Bilateral blepharitis            Plan  RTC 3-4 weeks IOP / iritis OS check  RTC 3 month IOP & DFE  RTC sooner prn with good understanding

## 2023-11-28 ENCOUNTER — OFFICE VISIT (OUTPATIENT)
Dept: PODIATRY | Facility: CLINIC | Age: 88
End: 2023-11-28
Payer: MEDICARE

## 2023-11-28 VITALS
SYSTOLIC BLOOD PRESSURE: 138 MMHG | RESPIRATION RATE: 18 BRPM | DIASTOLIC BLOOD PRESSURE: 72 MMHG | HEIGHT: 68 IN | WEIGHT: 153 LBS | BODY MASS INDEX: 23.19 KG/M2 | HEART RATE: 58 BPM

## 2023-11-28 DIAGNOSIS — I73.9 PERIPHERAL VASCULAR DISEASE: ICD-10-CM

## 2023-11-28 DIAGNOSIS — B35.1 ONYCHOMYCOSIS DUE TO DERMATOPHYTE: ICD-10-CM

## 2023-11-28 DIAGNOSIS — L84 CORN OR CALLUS: ICD-10-CM

## 2023-11-28 DIAGNOSIS — E11.42 DIABETIC POLYNEUROPATHY ASSOCIATED WITH TYPE 2 DIABETES MELLITUS: Primary | ICD-10-CM

## 2023-11-28 PROCEDURE — 99999 PR PBB SHADOW E&M-EST. PATIENT-LVL III: ICD-10-PCS | Mod: PBBFAC,,, | Performed by: PODIATRIST

## 2023-11-28 PROCEDURE — 11721 PR DEBRIDEMENT OF NAILS, 6 OR MORE: ICD-10-PCS | Mod: 59,Q9,S$GLB, | Performed by: PODIATRIST

## 2023-11-28 PROCEDURE — 11056 PR TRIM BENIGN HYPERKERATOTIC SKIN LESION,2-4: ICD-10-PCS | Mod: Q9,S$GLB,, | Performed by: PODIATRIST

## 2023-11-28 PROCEDURE — 99499 UNLISTED E&M SERVICE: CPT | Mod: S$GLB,,, | Performed by: PODIATRIST

## 2023-11-28 PROCEDURE — 11056 PARNG/CUTG B9 HYPRKR LES 2-4: CPT | Mod: Q9,S$GLB,, | Performed by: PODIATRIST

## 2023-11-28 PROCEDURE — 99499 NO LOS: ICD-10-PCS | Mod: S$GLB,,, | Performed by: PODIATRIST

## 2023-11-28 PROCEDURE — 11721 DEBRIDE NAIL 6 OR MORE: CPT | Mod: 59,Q9,S$GLB, | Performed by: PODIATRIST

## 2023-11-28 PROCEDURE — 99999 PR PBB SHADOW E&M-EST. PATIENT-LVL III: CPT | Mod: PBBFAC,,, | Performed by: PODIATRIST

## 2023-11-28 NOTE — PROGRESS NOTES
Subjective:      Patient ID: Tiana Mead is a 89 y.o. female.    Chief Complaint: PCP (/Tomás Andres MD  10/09/23/Internal Medicine/), Diabetic Foot Exam, and Nail Care      Tiana is a 89 y.o. female who presents to the clinic for evaluation and treatment of high risk feet. Tiana has a past medical history of Allergy, Anemia, Arthritis, Asthma, Chronic kidney disease, Degenerative disc disease, Diabetes mellitus type II, Diastolic heart failure (05/02/2017), Essential hypertension (7/3/2012), Glaucoma, History of insulin dependent diabetes mellitus, for many years stopped because of chronic kidney November 2020.  (9/9/2021), History of pseudogout (8/23/2012), Hyperlipidemia, Hypertension, Iritis, Myocardial infarction, Pneumonia, Thrombocytopenia (8/24/2021), and Thyroid disease. The patient's chief complaint is long, thick toenails and calluses on both feet. Routine trimming helps. No other pedal concerns today.  This patient has documented high risk feet requiring routine maintenance secondary to diabetes mellitis and those secondary complications of diabetes, as mentioned..    PCP: Tomás Andres MD    Date Last Seen by PCP:   Chief Complaint   Patient presents with    PCP       Tomás Andres MD  10/09/23  Internal Medicine      Diabetic Foot Exam    Nail Care         Current shoe gear:  Dm shoes      Hemoglobin A1C   Date Value Ref Range Status   10/09/2023 6.8 (H) 4.0 - 5.6 % Final     Comment:     ADA Screening Guidelines:  5.7-6.4%  Consistent with prediabetes  >or=6.5%  Consistent with diabetes    High levels of fetal hemoglobin interfere with the HbA1C  assay. Heterozygous hemoglobin variants (HbS, HgC, etc)do  not significantly interfere with this assay.   However, presence of multiple variants may affect accuracy.     04/11/2023 7.1 (H) 4.0 - 5.6 % Final     Comment:     ADA Screening Guidelines:  5.7-6.4%  Consistent with prediabetes  >or=6.5%  Consistent with diabetes    High levels  of fetal hemoglobin interfere with the HbA1C  assay. Heterozygous hemoglobin variants (HbS, HgC, etc)do  not significantly interfere with this assay.   However, presence of multiple variants may affect accuracy.     08/18/2022 6.9 (H) 4.0 - 5.6 % Final     Comment:     ADA Screening Guidelines:  5.7-6.4%  Consistent with prediabetes  >or=6.5%  Consistent with diabetes    High levels of fetal hemoglobin interfere with the HbA1C  assay. Heterozygous hemoglobin variants (HbS, HgC, etc)do  not significantly interfere with this assay.   However, presence of multiple variants may affect accuracy.               Patient Active Problem List   Diagnosis    Hyperlipidemia associated with type 2 diabetes mellitus    Generalized osteoarthritis of multiple sites    Chronic iritis - Left Eye    Osteoarthritis of both knees    Chondrocalcinosis    Peripheral angiopathy    Hyperuricemia    Helicobacter pylori gastritis    Old MI (myocardial infarction), 2013    Permanent atrial fibrillation    Long term current use of anticoagulant therapy, eliquis, stopped because of cryptic GI bleeding    Glaucoma associated with ocular trauma    H/O Deep vein thrombosis (DVT)    Aortic atherosclerosis    Chronic diastolic congestive heart failure    Type 2 diabetes mellitus with diabetic polyneuropathy, without long-term current use of insulin    Primary hypothyroidism    Moderate persistent asthma without complication    Primary open angle glaucoma (POAG) of both eyes, moderate stage    Pulmonary hypertension    Non-rheumatic tricuspid valve insufficiency    Allergic conjunctivitis of both eyes    Pseudogout, knees    Asthma exacerbation in COPD    Dry eye syndrome of both eyes    Stage 4 chronic kidney disease    Acute on chronic blood loss anemia, Sept 2020 anticoagulation discontinued    Coronary artery disease involving native coronary artery of native heart without angina pectoris    Myalgia, since April 20221 both arms and both legs     History of insulin dependent diabetes mellitus, for many years stopped because of chronic kidney November 2020.     Corneal edema    Contact dermatitis    Ulcerative blepharitis of upper and lower eyelids of both eyes       Current Outpatient Medications on File Prior to Visit   Medication Sig Dispense Refill    acetaminophen (TYLENOL) 500 MG tablet Take 2 tablets (1,000 mg total) by mouth 3 (three) times daily as needed for Pain (muscle pain in arms and legs). 100 tablet 1    albuterol (PROVENTIL) 2.5 mg /3 mL (0.083 %) nebulizer solution Take 3 mLs (2.5 mg total) by nebulization every 6 (six) hours as needed for Wheezing. Rescue 25 each 1    albuterol (PROVENTIL/VENTOLIN HFA) 90 mcg/actuation inhaler Inhale 2 puffs into the lungs every 4 (four) hours as needed for Wheezing. 18 g 6    albuterol (PROVENTIL/VENTOLIN HFA) 90 mcg/actuation inhaler Inhale 1-2 puffs into the lungs every 6 (six) hours as needed for Wheezing. Rescue 6.7 g 0    amLODIPine (NORVASC) 5 MG tablet TAKE 1 TABLET(5 MG) BY MOUTH EVERY MORNING 90 tablet 2    aspirin 81 MG Chew Take 1 tablet (81 mg total) by mouth every morning. 100 tablet 3    atorvastatin (LIPITOR) 40 MG tablet TAKE 1 TABLET BY MOUTH EVERY EVENING 90 tablet 1    blood sugar diagnostic (ONETOUCH ULTRA TEST) Strp 1 strip by In Vitro route once daily. No longer on insulin because of  each 3    clotrimazole-betamethasone 1-0.05% (LOTRISONE) cream Apply topically 2 (two) times daily as needed. For intertriginous itching in the bend of the leg 45 g 3    diclofenac sodium (VOLTAREN) 1 % Gel APPLY 2 GRAMS TOPICALLY TO THE AFFECTED AREA TWICE DAILY 100 g 4    dorzolamide (TRUSOPT) 2 % ophthalmic solution Place 1 drop into both eyes 3 (three) times daily. 10 mL 4    fluorometholone 0.1% (FML) 0.1 % DrpS Place 1 drop into both eyes once daily.      fluticasone-salmeterol diskus inhaler 250-50 mcg Inhale 1 puff into the lungs 2 (two) times daily. 60 each 5    furosemide (LASIX) 40 MG  "tablet TAKE 1 TABLET BY MOUTH EVERY MORNING AND 1 TABLET AT 4  tablet 3    insulin aspart U-100 (NOVOLOG FLEXPEN U-100 INSULIN) 100 unit/mL (3 mL) InPn pen Inject if blood sugar is >180, 180-230+2, 231-280+4, 281-330+6, 331-380+8,>380+10 MAX daily 30 units. 15 mL 2    isosorbide mononitrate (IMDUR) 60 MG 24 hr tablet TAKE 1 TABLET BY MOUTH EVERY MORNING FOR HEART TO PREVENT CHEST PAINS OR SHORTNESS OF BREATH 90 tablet 2    lancets (TRUEPLUS LANCETS) 33 gauge Misc Apply 1 lancet topically once daily. No longer on insulin because of  each 3    latanoprost 0.005 % ophthalmic solution INSTILL 1 DROP IN BOTH EYES EVERY EVENING 10 mL 12    levothyroxine (SYNTHROID) 88 MCG tablet TAKE 1 TABLET(88 MCG) BY MOUTH BEFORE BREAKFAST 90 tablet 2    meclizine (ANTIVERT) 12.5 mg tablet TAKE 1 TABLET BY MOUTH THREE TIMES DAILY AS NEEDED FOR DIZZINESS 20 tablet 3    metoprolol tartrate (LOPRESSOR) 25 MG tablet TAKE 1 TABLET(25 MG) BY MOUTH TWICE DAILY 180 tablet 2    multivit-mineral-iron-lutein Tab Take 1 tablet by mouth once daily.      nitroGLYCERIN (NITROSTAT) 0.4 MG SL tablet ONE TABLET UNDER THE TONGUE EVERY 5 MINUTES AS NEEDED FOR CHEST PAIN 100 tablet 3    nystatin (MYCOSTATIN) powder Apply topically 4 (four) times daily. 60 g 3    pen needle, diabetic (BD ULTRA-FINE SHORT PEN NEEDLE) 31 gauge x 5/16" Ndle USE WITH INSULIN PENS UP TO THREE TIMES A  each 2    polyethylene glycol (GLYCOLAX) 17 gram/dose powder Take 17 g by mouth daily as needed (CONSTIPATION).      psyllium (METAMUCIL) powder Take 1 packet by mouth daily as needed (CONSTIPATION).      SHINGRIX, PF, 50 mcg/0.5 mL injection       triamcinolone acetonide 0.025% (KENALOG) 0.025 % Oint Apply topically 2 (two) times daily. 80 g 2     No current facility-administered medications on file prior to visit.       Review of patient's allergies indicates:   Allergen Reactions    Codeine      Other reaction(s): Itching  Other reaction(s): Nausea       Past " Surgical History:   Procedure Laterality Date    CARDIAC CATHETERIZATION  2010    no obstructive disease    CATARACT EXTRACTION W/  INTRAOCULAR LENS IMPLANT Left n/a    CATARACT EXTRACTION W/  INTRAOCULAR LENS IMPLANT Right 10/8/2018    With Femtosecond LASER assist (Dr. Henson)    CHOLECYSTECTOMY      ESOPHAGOGASTRODUODENOSCOPY N/A 11/4/2020    Procedure: EGD (ESOPHAGOGASTRODUODENOSCOPY);  Surgeon: Tomás Mccall MD;  Location: [address]);  Service: Endoscopy;  Laterality: N/A;    EYE SURGERY      gall stone      HYSTERECTOMY      VARICOSE VEIN SURGERY         Family History   Problem Relation Age of Onset    Diabetes Mother     Hypertension Mother     Glaucoma Mother     Kidney disease Mother     Hypertension Father     Hypotension Sister     Heart attack Sister     Heart disease Sister     No Known Problems Sister     Diabetes Sister     Kidney disease Sister     Leukemia Sister     Hypertension Sister     Diabetes Sister     Kidney disease Sister     No Known Problems Sister     Heart disease Brother     Heart disease Brother     No Known Problems Brother     No Known Problems Maternal Grandmother     No Known Problems Maternal Grandfather     No Known Problems Paternal Grandmother     Diabetes Paternal Grandfather     Hypertension Daughter     Atrial fibrillation Daughter     Diabetes Daughter         borderline    Hypertension Daughter     No Known Problems Son     Diabetes Son     Heart disease Son     Hypertension Son     Melanoma Neg Hx        Social History     Socioeconomic History    Marital status:    Tobacco Use    Smoking status: Never    Smokeless tobacco: Never   Substance and Sexual Activity    Alcohol use: No    Drug use: No    Sexual activity: Never     Social Determinants of Health     Financial Resource Strain: Medium Risk (8/24/2021)    Overall Financial Resource Strain (CARDIA)     Difficulty of Paying Living Expenses: Somewhat hard   Food Insecurity: No Food Insecurity  (8/24/2021)    Hunger Vital Sign     Worried About Running Out of Food in the Last Year: Never true     Ran Out of Food in the Last Year: Never true   Transportation Needs: No Transportation Needs (8/24/2021)    PRAPARE - Transportation     Lack of Transportation (Medical): No     Lack of Transportation (Non-Medical): No   Physical Activity: Inactive (8/24/2021)    Exercise Vital Sign     Days of Exercise per Week: 0 days     Minutes of Exercise per Session: 0 min   Stress: No Stress Concern Present (8/24/2021)    [employer] [place] [employer] - Occupational Stress Questionnaire     Feeling of Stress : Not at all   Social Connections: Moderately Integrated (8/24/2021)    Social Connection and Isolation Panel [NHANES]     Frequency of Communication with Friends and Family: More than three times a week     Frequency of Social Gatherings with Friends and Family: Once a week     Attends Orthodox Services: More than 4 times per year     Active Member of Clubs or Organizations: Yes     Attends Club or Organization Meetings: More than 4 times per year     Marital Status:    Housing Stability: Low Risk  (8/24/2021)    Housing Stability Vital Sign     Unable to Pay for Housing in the Last Year: No     Number of Places Lived in the Last Year: 1     Unstable Housing in the Last Year: No           Review of Systems   Constitutional: Negative for chills, decreased appetite and fever.   Cardiovascular:  Negative for chest pain and claudication.   Respiratory:  Negative for cough, hemoptysis and shortness of breath.    Skin:  Positive for dry skin and nail changes. Negative for color change, flushing, itching, poor wound healing, rash and suspicious lesions.   Musculoskeletal:  Negative for back pain, falls, gout, joint pain, joint swelling and myalgias.   Gastrointestinal:  Negative for nausea and vomiting.   Neurological:  Negative for loss of balance, numbness and paresthesias.           Objective:      "  Vitals:    11/28/23 0907   BP: 138/72   Pulse: (!) 58   Resp: 18   Weight: 69.4 kg (153 lb)   Height: 5' 8" (1.727 m)   PainSc: 0-No pain        Physical Exam  Vitals and nursing note reviewed.   Constitutional:       Appearance: She is well-developed.   Cardiovascular:      Comments: Dorsalis pedis and posterior tibial pulses are diminished Bilaterally. Toes are cool to touch. Feet are warm proximally.There is decreased digital hair. Skin is atrophic, slightly hyperpigmented, and mildly edematous      Musculoskeletal:         General: No tenderness, deformity or signs of injury. Normal range of motion.      Comments: Adequate joint range of motion without pain, limitation, nor crepitation Bilateral feet and ankle joints. Muscle strength is 5/5 in all groups bilaterally.    1st MPJ exostosis w/ lateral deviation of hallux, non trackbound. No pain w/ ROM to b/l  hallux      Flexible pes planus foot type w/ medial arch collapse and mild gastroc equinus      Skin:     General: Skin is warm and dry.      Coloration: Skin is not ashen, cyanotic or pale.      Findings: No ecchymosis, erythema or lesion.      Comments: Nails x10 are elongated by  3-6 mm's, thickened by 2-5 mm's, dystrophic, and are darkened in  coloration . Xerosis Bilaterally. No open lesions noted.    Hyperkeratotic tissue noted to lateral 5th toe b/l   Neurological:      Mental Status: She is alert and oriented to person, place, and time.      Comments: [place] 5.07 monofilamant testing is diminished Ney feet. Sharp/dull sensation diminished Bilaterally. Light touch absent Bilaterally.       Psychiatric:         Behavior: Behavior normal.               Assessment:       Encounter Diagnoses   Name Primary?    Diabetic polyneuropathy associated with type 2 diabetes mellitus Yes    Peripheral vascular disease     Onychomycosis due to dermatophyte     Corn or callus          Plan:       Tiana was seen today for pcp, diabetic foot exam and nail " care.    Diagnoses and all orders for this visit:    Diabetic polyneuropathy associated with type 2 diabetes mellitus  -     DIABETIC SHOES FOR HOME USE    Peripheral vascular disease  -     DIABETIC SHOES FOR HOME USE    Onychomycosis due to dermatophyte    Corn or callus      I counseled the patient on her conditions, their implications and medical management.    Shoe inspection. Diabetic Foot Education. Patient reminded of the importance of good nutrition and blood sugar control to help prevent podiatric complications of diabetes. Patient instructed on proper foot hygeine. We discussed wearing proper shoe gear, daily foot inspections, never walking without protective shoe gear, never putting sharp instruments to feet    - With patient's permission, nails were aggressively reduced and debrided x 10 to their soft tissue attachment mechanically removing all offending nail and debris. Patient relates relief following the procedure. She will continue to monitor the areas daily, inspect her feet, wear protective shoe gear when ambulatory, moisturizer to maintain skin integrity and follow in this office in approximately 2-3 months, sooner p.r.n.    - After cleansing the  area w/ alcohol prep pad the above mentioned hyperkeratosis was trimmed utilizing No 15 scapel, to a smooth base with out incident. Patient tolerated this  well and reported comfort to the area of x2    - Return to clinic in 3m or sooner if problems arise

## 2023-11-29 ENCOUNTER — PATIENT MESSAGE (OUTPATIENT)
Dept: INTERNAL MEDICINE | Facility: CLINIC | Age: 88
End: 2023-11-29
Payer: MEDICARE

## 2023-11-29 DIAGNOSIS — L25.9 CONTACT DERMATITIS, UNSPECIFIED CONTACT DERMATITIS TYPE, UNSPECIFIED TRIGGER: Primary | ICD-10-CM

## 2023-12-05 ENCOUNTER — OFFICE VISIT (OUTPATIENT)
Dept: DERMATOLOGY | Facility: CLINIC | Age: 88
End: 2023-12-05
Payer: MEDICARE

## 2023-12-05 DIAGNOSIS — B02.9 HERPES ZOSTER WITHOUT COMPLICATION: Primary | ICD-10-CM

## 2023-12-05 DIAGNOSIS — L72.0 MILIA: ICD-10-CM

## 2023-12-05 DIAGNOSIS — L29.9 SCALP ITCH: ICD-10-CM

## 2023-12-05 DIAGNOSIS — L25.9 CONTACT DERMATITIS, UNSPECIFIED CONTACT DERMATITIS TYPE, UNSPECIFIED TRIGGER: ICD-10-CM

## 2023-12-05 PROCEDURE — 1101F PT FALLS ASSESS-DOCD LE1/YR: CPT | Mod: CPTII,S$GLB,, | Performed by: DERMATOLOGY

## 2023-12-05 PROCEDURE — 1159F PR MEDICATION LIST DOCUMENTED IN MEDICAL RECORD: ICD-10-PCS | Mod: CPTII,S$GLB,, | Performed by: DERMATOLOGY

## 2023-12-05 PROCEDURE — 99999 PR PBB SHADOW E&M-EST. PATIENT-LVL III: ICD-10-PCS | Mod: PBBFAC,,, | Performed by: DERMATOLOGY

## 2023-12-05 PROCEDURE — 3288F PR FALLS RISK ASSESSMENT DOCUMENTED: ICD-10-PCS | Mod: CPTII,S$GLB,, | Performed by: DERMATOLOGY

## 2023-12-05 PROCEDURE — 99999 PR PBB SHADOW E&M-EST. PATIENT-LVL III: CPT | Mod: PBBFAC,,, | Performed by: DERMATOLOGY

## 2023-12-05 PROCEDURE — 1126F PR PAIN SEVERITY QUANTIFIED, NO PAIN PRESENT: ICD-10-PCS | Mod: CPTII,S$GLB,, | Performed by: DERMATOLOGY

## 2023-12-05 PROCEDURE — 1101F PR PT FALLS ASSESS DOC 0-1 FALLS W/OUT INJ PAST YR: ICD-10-PCS | Mod: CPTII,S$GLB,, | Performed by: DERMATOLOGY

## 2023-12-05 PROCEDURE — 99204 OFFICE O/P NEW MOD 45 MIN: CPT | Mod: S$GLB,,, | Performed by: DERMATOLOGY

## 2023-12-05 PROCEDURE — 1159F MED LIST DOCD IN RCRD: CPT | Mod: CPTII,S$GLB,, | Performed by: DERMATOLOGY

## 2023-12-05 PROCEDURE — 1126F AMNT PAIN NOTED NONE PRSNT: CPT | Mod: CPTII,S$GLB,, | Performed by: DERMATOLOGY

## 2023-12-05 PROCEDURE — 3288F FALL RISK ASSESSMENT DOCD: CPT | Mod: CPTII,S$GLB,, | Performed by: DERMATOLOGY

## 2023-12-05 PROCEDURE — 99204 PR OFFICE/OUTPT VISIT, NEW, LEVL IV, 45-59 MIN: ICD-10-PCS | Mod: S$GLB,,, | Performed by: DERMATOLOGY

## 2023-12-05 RX ORDER — VALACYCLOVIR HYDROCHLORIDE 1 G/1
1000 TABLET, FILM COATED ORAL 3 TIMES DAILY
Qty: 30 TABLET | Refills: 0 | Status: SHIPPED | OUTPATIENT
Start: 2023-12-05 | End: 2023-12-15

## 2023-12-05 RX ORDER — KETOCONAZOLE 20 MG/ML
SHAMPOO, SUSPENSION TOPICAL
Qty: 120 ML | Refills: 6 | Status: SHIPPED | OUTPATIENT
Start: 2023-12-05

## 2023-12-05 NOTE — PROGRESS NOTES
Subjective:      Patient ID:  Tiana Mead is a 89 y.o. female who presents for   Chief Complaint   Patient presents with    Rash     Scalp, Face and eyelids      Rash - Initial  Affected locations: face  Duration: 2 weeks  Signs / symptoms: itching  Severity: mild to moderate  Timing: constant      Review of Systems   Constitutional: Negative.    HENT: Negative.  Negative for headaches.    Respiratory: Negative.     Musculoskeletal: Negative.    Skin:  Positive for itching (left eye) and rash.   Neurological:  Negative for headaches.       Objective:   Physical Exam   Constitutional: She appears well-developed and well-nourished.   Eyes: Conjunctival injection present.   Neurological: She is alert and oriented to person, place, and time.   Psychiatric: She has a normal mood and affect.   Skin:               Diagram Legend     Erythematous scaling macule/papule c/w actinic keratosis       Vascular papule c/w angioma      Pigmented verrucoid papule/plaque c/w seborrheic keratosis      Yellow umbilicated papule c/w sebaceous hyperplasia      Irregularly shaped tan macule c/w lentigo     1-2 mm smooth white papules consistent with Milia      Movable subcutaneous cyst with punctum c/w epidermal inclusion cyst      Subcutaneous movable cyst c/w pilar cyst      Firm pink to brown papule c/w dermatofibroma      Pedunculated fleshy papule(s) c/w skin tag(s)      Evenly pigmented macule c/w junctional nevus     Mildly variegated pigmented, slightly irregular-bordered macule c/w mildly atypical nevus      Flesh colored to evenly pigmented papule c/w intradermal nevus       Pink pearly papule/plaque c/w basal cell carcinoma      Erythematous hyperkeratotic cursted plaque c/w SCC      Surgical scar with no sign of skin cancer recurrence      Open and closed comedones      Inflammatory papules and pustules      Verrucoid papule consistent consistent with wart     Erythematous eczematous patches and plaques     Dystrophic  onycholytic nail with subungual debris c/w onychomycosis     Umbilicated papule    Erythematous-base heme-crusted tan verrucoid plaque consistent with inflamed seborrheic keratosis     Erythematous Silvery Scaling Plaque c/w Psoriasis     See annotation                Assessment / Plan:        Herpes zoster without complication  -     valACYclovir (VALTREX) 1000 MG tablet; Take 1 tablet (1,000 mg total) by mouth 3 (three) times daily. for 10 days  Dispense: 30 tablet; Refill: 0  Discussed with patient the etiology and pathogenesis of the disease or skin lesion(s) and possible treatments and aggravators.    Reviewed with patient different treatment options and associated risks.  Patient to watch for recurrence or flares or worsening and to call the clinic for a follow up appointment for such.  Independent historian was in exam room or on virtual today to provide information and assist in delivering therapy and treatment at home.  Previous [facility] labs and or records and notes reviewed and considered for their impact on our clinical decision making today.  Has had shingles vax.  May have dampened outbreak?    Contact dermatitis, unspecified contact dermatitis type, unspecified trigger  -     Ambulatory referral/consult to Dermatology    Milia  Discussed with patient the benign nature of these lesions and that no treatment is indicated.  Scalp.  Chronic nature of this condition discussed with patient.    Scalp itch  -     ketoconazole (NIZORAL) 2 % shampoo; Apply topically every 7 days. Soak scalp for 15-30 minutes  Dispense: 120 mL; Refill: 6  Reviewed with patient different treatment options and associated risks.  Proper application of medications and or care for affected area(s) and condition(s) reviewed.             Follow up if symptoms worsen or fail to improve.

## 2023-12-08 ENCOUNTER — TELEPHONE (OUTPATIENT)
Dept: INTERNAL MEDICINE | Facility: CLINIC | Age: 88
End: 2023-12-08
Payer: MEDICARE

## 2023-12-08 ENCOUNTER — TELEPHONE (OUTPATIENT)
Dept: PODIATRY | Facility: CLINIC | Age: 88
End: 2023-12-08
Payer: MEDICARE

## 2023-12-08 NOTE — TELEPHONE ENCOUNTER
Aury Carey Staff  Caller: Pt @[phone] (Today,  1:33 PM)  Pt calling to follow up on Rx for shoes Please call Pt @[phone]    Left voice message for patient to give our office a call back at [phone].

## 2023-12-08 NOTE — TELEPHONE ENCOUNTER
Spoke with pt, she stated that the Podiatrist stated she would send you information on her diabetic foot exam so she can get diabetic shoes from [employer]    [employer]  Shoe store  [place] · [phone]  Pt informed you are out of the office until 12/19/2023

## 2023-12-08 NOTE — TELEPHONE ENCOUNTER
----- Message from Dania Interiano sent at 12/8/2023  1:18 PM CST -----  Contact: Self/ [phone]  [email or web address]     Patient is calling for Medical Advice regarding: pt calling to check the status of orders for Diabetic shoes     Would like response via [employer]:  Would like a return call     Comments: pt stated that her Podiatrist told her that she would be sending an order for Diabetic shoes for patient to Dr Andres and she is calling to check the status of it. Please advise

## 2023-12-15 NOTE — PROGRESS NOTES
Hospital Medicine  Progress Note  [facility]      Patient Name: Tiana Mead  MRN:  [id number]  Hospital Medicine Team: [facility] Moin Montilla MD  Date of Admission:  3/25/2020     Length of Stay:  LOS: 7 days       Principal Problem:  COVID-19 virus infection      Hospital Course:  Patient admitted for evaluation of possible COVID-19.    Interval History: Patient was febrile this morning but was without any complaints in the morning at time of interview. Patient reports some symptomatic improvement. Patient now on 2L NC. Patient with increasing trend in Ferritin, Ddimer, and CRP    Review of Systems:  Respiratory: Positive for cough and SOB  Cardiovascular: Negative for chest pain, palpitations, and LE edema   GI: Negative for N/V, abd pain, and diarrhea     Inpatient Medications:    Current Facility-Administered Medications:     acetaminophen tablet 650 mg, 650 mg, Oral, Q6H PRN, Shivani Nelson MD, 650 mg at 04/01/20 0847    albuterol inhaler 2 puff, 2 puff, Inhalation, Q4H PRN, Min Wyatt MD    amLODIPine tablet 5 mg, 5 mg, Oral, Daily, Min Wyatt MD, 5 mg at 04/01/20 0846    apixaban tablet 2.5 mg, 2.5 mg, Oral, BID, Min Wyatt MD, 2.5 mg at 04/01/20 0846    atorvastatin tablet 40 mg, 40 mg, Oral, Daily, Min Wyatt MD, 40 mg at 04/01/20 0846    dextrose 10% (D10W) Bolus, 12.5 g, Intravenous, PRN, Min Wyatt MD    dextrose 10% (D10W) Bolus, 25 g, Intravenous, PRN, Min Wyatt MD    dorzolamide 2 % ophthalmic solution 1 drop, 1 drop, Both Eyes, BID, Min Wyatt MD, 1 drop at 04/01/20 0900    furosemide tablet 40 mg, 40 mg, Oral, BID, Moni Montilla MD    glucagon (human recombinant) injection 1 mg, 1 mg, Intramuscular, PRN, Min Wyatt MD    glucagon (human recombinant) injection 1 mg, 1 mg, Intramuscular, PRN, Kendy Landon MD    glucose chewable tablet 16 g, 16 g, Oral, PRN, Min Wyatt MD    glucose chewable tablet 16 g, 16 g, Oral, PRN, Kendy G.  VSS, afebrile, pt currently reports having 7/10 pain in her mouth for which PRN Roxicodone and Tylenol were administered per orders. Pt a/o x4. This RN provided education to pt and mom regarding multinodal pain regimen, encouraged incorporation of Tylenol. Mom and pt agreeable. Mom voiced concerns regarding lack of pain control, this RN allowed plenty of time for mom to express her concerns and also provided verbal encouragement. POC discussed with pt and mom. MD Dipesh    glucose chewable tablet 24 g, 24 g, Oral, PRN, Min Wyatt MD    glucose chewable tablet 24 g, 24 g, Oral, PRN, Kendy Landon MD    glycerin adult suppository 1 suppository, 1 suppository, Rectal, Once, Moni Montilla MD    insulin aspart U-100 pen 0-5 Units, 0-5 Units, Subcutaneous, QID (AC + HS) PRN, Kendy Landon MD, 1 Units at 03/31/20 2049    insulin detemir U-100 pen 6 Units, 6 Units, Subcutaneous, Daily, Min Wyatt MD, 6 Units at 04/01/20 0846    isosorbide mononitrate 24 hr tablet 60 mg, 60 mg, Oral, Daily, Min Wyatt MD, 60 mg at 04/01/20 0846    latanoprost 0.005 % ophthalmic solution 1 drop, 1 drop, Both Eyes, QHS, Min Wyatt MD, 1 drop at 03/31/20 2100    levothyroxine tablet 75 mcg, 75 mcg, Oral, Before breakfast, Min Wyatt MD, 75 mcg at 04/01/20 0845    metoprolol tartrate (LOPRESSOR) tablet 50 mg, 50 mg, Oral, BID, Min Wyatt MD, 50 mg at 04/01/20 0846    nitroGLYCERIN SL tablet 0.4 mg, 0.4 mg, Sublingual, Q5 Min PRN, Min Wyatt MD    ondansetron tablet 4 mg, 4 mg, Oral, Q12H PRN, Min Wyatt MD    polyethylene glycol packet 17 g, 17 g, Oral, Daily PRN, Kvng Perez MD, 17 g at 03/27/20 0201    polyethylene glycol packet 17 g, 17 g, Oral, BID, Moni Montilla MD, 17 g at 03/31/20 2022    senna tablet 8.6 mg, 8.6 mg, Oral, Daily PRN, Min Wyatt MD, 8.6 mg at 04/01/20 0846    senna tablet 8.6 mg, 8.6 mg, Oral, QHS, Shivani Nelson MD, 8.6 mg at 03/31/20 2021    sodium chloride 0.9% flush 10 mL, 10 mL, Intravenous, PRN, Claudio Baldwin MD    sodium chloride 0.9% flush 10 mL, 10 mL, Intravenous, PRN, Min Wyatt MD      Physical Exam:      Intake/Output Summary (Last 24 hours) at 4/1/2020 1733  Last data filed at 4/1/2020 0900  Gross per 24 hour   Intake --   Output 450 ml   Net -450 ml     Wt Readings from Last 3 Encounters:   03/28/20 72.4 kg (159 lb 9.8 oz)   02/04/20 72.6 kg (160 lb)   11/20/19 72.6 kg (160 lb)       BP (!) 114/59 (BP Location:  "Right arm, Patient Position: Sitting)   Pulse 89   Temp 96.2 °F (35.7 °C) (Oral)   Resp 16   Ht 5' 8" (1.727 m)   Wt 72.4 kg (159 lb 9.8 oz)   LMP  (LMP Unknown)   SpO2 (!) 94%   Breastfeeding? No   BMI 24.27 kg/m²     GEN: NAD, conversant  Resp: coarse bilateral breath sounds, no wheezes or rales, normal work of breathing   CV: RRR, no m/r/g, no edema  GI: soft, NTND  Skin: no rash    Laboratory:  Lab Results   Component Value Date    [id number] Detected (A) 03/25/2020       Recent Labs   Lab 03/26/20  0710 03/28/20  0500 03/30/20  0442   WBC 12.80* 8.78 10.38   LYMPH 6.2*  0.8* 11.2*  1.0 9.3*  1.0   HGB 12.6 11.5* 12.1   HCT 38.8 34.8* 37.3    179 223     Recent Labs   Lab 03/30/20 0442 03/31/20  0542 04/01/20  0535    138 138   K 3.6 3.9 3.8    101 99   CO2 29 28 30*   BUN 27 33* 34*   CREATININE 1.4 1.5* 1.4   GLU 81 112* 116*   CALCIUM 8.6* 8.5* 8.4*   MG 2.0 2.1 2.2   PHOS 2.9 3.9 2.7     Recent Labs   Lab 03/30/20 0442 03/31/20  0542 04/01/20  0535   ALKPHOS 122 122 120   ALT 19 22 22   AST 31 35 33   ALBUMIN 1.9* 1.8* 1.7*   PROT 6.1 6.0 6.1   BILITOT 0.7 0.7 0.6        Recent Labs     03/30/20  0442 03/31/20  0542 04/01/20  0535   DDIMER  --  >33.00*  --    FERRITIN  --  657*  --    .1*  --  197.3*       All labs within the last 24 hours were reviewed.     Microbiology:  Microbiology Results (last 7 days)     Procedure Component Value Units Date/Time    Blood culture x two cultures. Draw prior to antibiotics. [[id number]] Collected:  03/25/20 1036    Order Status:  Completed Specimen:  Blood from Peripheral, Antecubital, Right Updated:  03/30/20 1212     Blood Culture, Routine No Growth after 4 days.     Narrative:       Aerobic and anaerobic    Blood culture x two cultures. Draw prior to antibiotics. [[id number]] Collected:  03/25/20 1037    Order Status:  Completed Specimen:  Blood from Peripheral, Hand, Right Updated:  03/30/20 1212     Blood Culture, Routine No " Growth after 4 days.     Narrative:       Aerobic and anaerobic            Imaging  ECG Results          EKG 12-lead (Final result)  Result time 03/25/20 15:08:57    Final result by Interface, Lab In [facility] (03/25/20 15:08:57)                 Narrative:    Test Reason : R00.0,    Vent. Rate : 112 BPM     Atrial Rate : 174 BPM     P-R Int : 000 ms          QRS Dur : 098 ms      QT Int : 318 ms       P-R-T Axes : 000 003 -28 degrees     QTc Int : 434 ms    Atrial fibrillation with rapid ventricular response  Nonspecific ST and/or T wave abnormalities  Abnormal ECG  When compared with ECG of 10-OCT-2019 10:30,  No significant change was found  Confirmed by Agatha Rosas MD ([id number]) on 3/25/2020 3:08:52 PM    Referred By: [email or web address]   SELF           Confirmed By:Agatha Rosas MD                              Results for orders placed during the hospital encounter of 07/17/19   Transthoracic echo (TTE) 2D with Color Flow    Narrative · Concentric left ventricular hypertrophy.  · Hyperdynamic left ventricular systolic function. The estimated ejection   fraction is >70%.  · Mildly reduced right ventricular systolic function.  · Severe batrial enlargement.  · Severe tricuspid regurgitation.  · The estimated PA systolic pressure is 56 mm Hg  · Elevated central venous pressure (15 mm Hg).          X-Ray Chest AP Portable  Narrative: EXAMINATION:  XR CHEST AP PORTABLE    CLINICAL HISTORY:  Sepsis;    TECHNIQUE:  Single frontal view of the chest was performed.    COMPARISON:  No 08/23/2019 ne    FINDINGS:  Cardiomegaly.  Ill-defined patchy airspace consolidation  identified in the left upper lobe and at the lung bases.  No significant pleural effusion.  Impression: See above    Electronically signed by: Ino Carrion MD  Date:    03/25/2020  Time:    11:32      All imaging within the last 24 hours was reviewed.     Assessment and Plan:    Active Hospital Problems    Diagnosis  POA    *COVID-19 virus infection [U07.1]  Yes     Community acquired pneumonia [J18.9]  Yes    H/O Deep vein thrombosis (DVT) [I82.409]  Yes     Chronic    CKD (chronic kidney disease) stage 4, GFR 15-29 ml/min [N18.4]  Yes     Chronic    Type 2 diabetes mellitus with diabetic polyneuropathy, with long-term current use of insulin [E11.42, Z79.4]  Not Applicable     Chronic      Resolved Hospital Problems   No resolved problems to display.       Suspected Covid-19 Virus Infection  Person Under Investigation (PUI) for COVID-19  - COVID-19 testing: Collection Date: 3/25/2020 Collection Time:   2:37 PM  - Infection Control notified    - Isolation:   - Airborne and Droplet Precautions  - Surgical mask on patient   - N95 masks must be fit tested, wear eye protection  - 20 second hand hygiene   - Limit visitors per hospital policy   - Consolidate lab draws, nursing care, and interventions    - Diagnostics: (Lymphopenia, hyponatremia, hyperferritinemia, elevated troponin, elevated d-dimer, age, and comorbidities are significant predictors of poor clinical outcome)   - CBC:   trend Q48hrs  - CMP:        trend Q48hrs  - Procalcitonin:  - D-dimer:  repeat prior to discharge  - Ferritin:  repeat prior to discharge   - CRP:        trend Q48hrs  - LDH:   repeat prior to discharge  - BNP:  - Troponin:    - ECG:   - rapid Flu:   - RIP only if BMT/solid transplant:   - Legionella antigen:   - Blood culture x2:   - Sputum culture:   - CXR:   - UA and culture:   - CPK:    - Management:   Bundle care as able to maintain isolation & minimize in/out of room   - Supplemental O2 to maintain SpO2 92%-96%   if requiring 6L NC or higher, place on nonrebreather and discuss case with MICU   - Telemetry & continuous pulse oximetry    - If wheezing   - albuterol inhaler 2-4 puff Q6hr PRN    - ipratropium daily    - acetaminophen 650mg PO Q6hr PRN fever   - loperamide PRN for viral diarrhea  - Empiric antibiotics per likely source & patient allergies    - CAP: x 5 day course (d/c early if low  concern for bacterial co-infection)  Ceftriaxone 1g IV Q24hrs            Azithromycin 500mg IV day #1, then 250mg PO daily x4 days     If azithromycin is not available, start doxycycline                 If MRSA risk factors, add Vancomycin IV (PharmD consult)   - Investigational Treatment Protocol: (if patient meets criteria)   [email or web address].[email or web address]     - statin: atorvastatin 40mg po daily (if CPK WNL)    - start HCQ 400mg PO BID x1 day, then 400mg PO daily x 4 days   (check glucose 6 phosphate dehydrogenase (NOT G6PD Quant), ECG, and start Qshift POCT glucose)    Safety notes:   - Avoid NIPPV (CPAP/BiPAP) to prevent aerosolization, use on a case-by-case basis if in neg pressure room   - Cautious use of NSAIDS for fever per WHO recommendations (3/16/2020)   - No new ACEi/ARB start or discontinuation of chronic med unless hypotensive (Esler et al. Journal of Hypertension 2020, 38:000-000)   - Careful use of steroids in the absence of other indications (shock, ARDS)   - Fluid sparing resuscitation, avoid maintenance fluids    Advance Care Planning  Goals of care, counseling/discussion  - Discussed the typical clinical course of COVID19 with Tiana Boston, including the potential for acute decompensation requiring intubation and mechanical ventilation.  - Following this discussion, patient/POA requested code status of DNR/DNI, will update EMR and paper chart accordingly.     T2DM              - detemir 6U QD              - LDSSI     HFpEF              - Signs of volume overload              -               - strict I/O; daily weights   - will hold IV lasix 2/2 increasing Cr and Bicarb              - resume home furosemide 40mg BID when euvolemic     CKD 4              - avoid nephrotoxins              - renally dose all medications     AFib              - continue apixaban 2.5mg BID     VTE High Risk  Prophylaxis: apixaban    Moni Montilla MD/MS  [facility]   Internal Medicine, PGY-2

## 2023-12-22 DIAGNOSIS — Z79.4 INSULIN-REQUIRING OR DEPENDENT TYPE II DIABETES MELLITUS: Primary | ICD-10-CM

## 2023-12-22 DIAGNOSIS — E11.9 INSULIN-REQUIRING OR DEPENDENT TYPE II DIABETES MELLITUS: Primary | ICD-10-CM

## 2023-12-22 RX ORDER — INSULIN PUMP SYRINGE, 3 ML
EACH MISCELLANEOUS
Qty: 1 EACH | Refills: 0 | Status: SHIPPED | OUTPATIENT
Start: 2023-12-22 | End: 2024-12-21

## 2023-12-22 RX ORDER — LANCETS
EACH MISCELLANEOUS
Qty: 100 EACH | Refills: 3 | Status: SHIPPED | OUTPATIENT
Start: 2023-12-22

## 2023-12-31 DIAGNOSIS — H40.1190 POAG (PRIMARY OPEN-ANGLE GLAUCOMA): ICD-10-CM

## 2024-01-02 RX ORDER — LATANOPROST 50 UG/ML
SOLUTION/ DROPS OPHTHALMIC
Qty: 10 ML | Refills: 12 | Status: SHIPPED | OUTPATIENT
Start: 2024-01-02

## 2024-01-08 ENCOUNTER — TELEPHONE (OUTPATIENT)
Dept: INTERNAL MEDICINE | Facility: CLINIC | Age: 89
End: 2024-01-08
Payer: MEDICARE

## 2024-01-08 NOTE — TELEPHONE ENCOUNTER
----- Message from Xander Carvajal MA sent at 1/8/2024 11:59 AM CST -----  Contact: [email or web address]@ [phone]  Ernie pt daughter called                    In regards to needing provider or staff to please give a call back discuss upcoming appt.     n/a

## 2024-01-08 NOTE — TELEPHONE ENCOUNTER
Spoke with patient daughter, she had some questions about her mothers upcoming appointment regarding diabetic shoes. I informed her that I would have to ask Dr. Andres her questions because I was unsure about the paperwork she was talking about. She said she was okay to wait until tomorrow for the answers to her questions.

## 2024-01-09 ENCOUNTER — TELEPHONE (OUTPATIENT)
Dept: INTERNAL MEDICINE | Facility: CLINIC | Age: 89
End: 2024-01-09
Payer: MEDICARE

## 2024-01-09 NOTE — TELEPHONE ENCOUNTER
Spoke with pt's daughter Yolanda informed that an in office appt for Diabetic Foot exam is required to get Diabetic shoes. Pt has scheduled appt 01/12/24 with Dr. Andres.

## 2024-01-10 ENCOUNTER — TELEPHONE (OUTPATIENT)
Dept: INTERNAL MEDICINE | Facility: CLINIC | Age: 89
End: 2024-01-10
Payer: MEDICARE

## 2024-01-10 NOTE — TELEPHONE ENCOUNTER
----- Message from Rinku Ro sent at 1/10/2024  1:46 PM CST -----  Contact: [phone]@[email or web address]  Good afternoon patient daughter would like a call back to discuss getting her mom another. Patient had a apt on 1/12 but it needs to be reschedule by the office. Patient daughter says her mom needs to be seen before Feb which is the next available apt. Please give patient daughter a call back  [phone]

## 2024-01-15 DIAGNOSIS — I25.10 CORONARY ARTERY DISEASE INVOLVING NATIVE CORONARY ARTERY OF NATIVE HEART WITHOUT ANGINA PECTORIS: ICD-10-CM

## 2024-01-15 NOTE — TELEPHONE ENCOUNTER
No care due was identified.  [facility] Embedded Care Due Messages. Reference number: [id number].   1/15/2024 5:46:08 AM CST

## 2024-01-16 RX ORDER — ATORVASTATIN CALCIUM 40 MG/1
40 TABLET, FILM COATED ORAL NIGHTLY
Qty: 90 TABLET | Refills: 2 | Status: SHIPPED | OUTPATIENT
Start: 2024-01-16

## 2024-01-16 RX ORDER — AMLODIPINE BESYLATE 5 MG/1
TABLET ORAL
Qty: 90 TABLET | Refills: 2 | Status: SHIPPED | OUTPATIENT
Start: 2024-01-16

## 2024-01-16 NOTE — TELEPHONE ENCOUNTER
Refill Decision Note   Tiana Mead  is requesting a refill authorization.  Brief Assessment and Rationale for Refill:  Approve     Medication Therapy Plan:         Comments:     Note composed:11:38 AM 01/16/2024

## 2024-01-19 ENCOUNTER — OFFICE VISIT (OUTPATIENT)
Dept: INTERNAL MEDICINE | Facility: CLINIC | Age: 89
End: 2024-01-19
Payer: MEDICARE

## 2024-01-19 VITALS
BODY MASS INDEX: 22.88 KG/M2 | OXYGEN SATURATION: 95 % | WEIGHT: 151 LBS | SYSTOLIC BLOOD PRESSURE: 130 MMHG | HEIGHT: 68 IN | DIASTOLIC BLOOD PRESSURE: 58 MMHG | HEART RATE: 79 BPM

## 2024-01-19 DIAGNOSIS — R51.9 CHRONIC NONINTRACTABLE HEADACHE, UNSPECIFIED HEADACHE TYPE: Primary | ICD-10-CM

## 2024-01-19 DIAGNOSIS — E11.9 INSULIN-REQUIRING OR DEPENDENT TYPE II DIABETES MELLITUS: ICD-10-CM

## 2024-01-19 DIAGNOSIS — E11.9 ENCOUNTER FOR DIABETIC FOOT EXAM: ICD-10-CM

## 2024-01-19 DIAGNOSIS — Z79.4 INSULIN-REQUIRING OR DEPENDENT TYPE II DIABETES MELLITUS: ICD-10-CM

## 2024-01-19 DIAGNOSIS — G89.29 CHRONIC NONINTRACTABLE HEADACHE, UNSPECIFIED HEADACHE TYPE: Primary | ICD-10-CM

## 2024-01-19 PROCEDURE — 1125F AMNT PAIN NOTED PAIN PRSNT: CPT | Mod: CPTII,S$GLB,, | Performed by: INTERNAL MEDICINE

## 2024-01-19 PROCEDURE — 1159F MED LIST DOCD IN RCRD: CPT | Mod: CPTII,S$GLB,, | Performed by: INTERNAL MEDICINE

## 2024-01-19 PROCEDURE — 3288F FALL RISK ASSESSMENT DOCD: CPT | Mod: CPTII,S$GLB,, | Performed by: INTERNAL MEDICINE

## 2024-01-19 PROCEDURE — 99212 OFFICE O/P EST SF 10 MIN: CPT | Mod: S$GLB,,, | Performed by: INTERNAL MEDICINE

## 2024-01-19 PROCEDURE — 99999 PR PBB SHADOW E&M-EST. PATIENT-LVL V: CPT | Mod: PBBFAC,,, | Performed by: INTERNAL MEDICINE

## 2024-01-19 PROCEDURE — 1101F PT FALLS ASSESS-DOCD LE1/YR: CPT | Mod: CPTII,S$GLB,, | Performed by: INTERNAL MEDICINE

## 2024-01-19 PROCEDURE — 3072F LOW RISK FOR RETINOPATHY: CPT | Mod: CPTII,S$GLB,, | Performed by: INTERNAL MEDICINE

## 2024-01-19 NOTE — PROGRESS NOTES
CC:  Diabetic foot exam     HPI:  The patient is a 89 year female with asthma, COPD, insulin-requiring diabetes, hypertension, hypothyroidism, coronary artery disease status post MI, CKD stage 4, atrial fibrillation who presents today for diabetic foot exam.  The patient has been seen by Podiatry and has forms for her shoes.    ROS:  Patient reports having problems with vision her left thigh.  She sees Ophthalmology for this.  No chest pain.  No shortness of breath.  Does complain of headaches still.  Happened every other day.  Sometimes of the left sometimes a whole head.  Tylenol does help.    Physical exam:   General appearance:  No acute distress  Ortho:Protective Sensation (w/ 10 gram monofilament):  Right: Decreased  Left: Decreased    Visual Inspection:  Onychomycosis -  Bilateral    Pedal Pulses:   Right: Diminished  Left: Diminished    Posterior Tibialis Pulses:   Right:Diminished  Left: Diminished     Assessment:  1. Diabetic foot exam  2.  Chronic headaches     Plan:  1. Will fill out forms for [employer]   2. Will order CT of the head without contrast.

## 2024-01-25 ENCOUNTER — TELEPHONE (OUTPATIENT)
Dept: INTERNAL MEDICINE | Facility: CLINIC | Age: 89
End: 2024-01-25
Payer: MEDICARE

## 2024-01-25 NOTE — TELEPHONE ENCOUNTER
----- Message from Francia Quintanilla sent at 1/25/2024  1:23 PM CST -----  Contact: yolanda/ daughter/[phone]  Yolanda called, to inform that Ned is asking please to do not forget to add the notes on the diabetic visit exam for dos 01/19/24. Please call and advise. Thank you.

## 2024-01-26 ENCOUNTER — TELEPHONE (OUTPATIENT)
Dept: INTERNAL MEDICINE | Facility: CLINIC | Age: 89
End: 2024-01-26
Payer: MEDICARE

## 2024-01-26 NOTE — TELEPHONE ENCOUNTER
Called and spoke to Aditi. Office was requesting chart note from 10- visit. I faxed notes to Aditi  @ [phone] 01/26/2024

## 2024-01-26 NOTE — TELEPHONE ENCOUNTER
----- Message from Francia Quintanilla sent at 1/26/2024 11:31 AM CST -----  Contact: yolanda/ daughter/[phone]  Yolanda called, requested a courtesy call, stated that there is miscommunication on what the shoe company is requesting and what office is sending. Yolanda said that what the shoe company needs is the doctors October notes. Please call and advise. Thank you.

## 2024-01-26 NOTE — TELEPHONE ENCOUNTER
Attempted to contact pt's daughter no success, left voice message. Notes to [employer] was faxed this morning, 01/26/2024 @10:55 am.

## 2024-01-27 DIAGNOSIS — M15.9 GENERALIZED OSTEOARTHRITIS OF MULTIPLE SITES: ICD-10-CM

## 2024-01-27 DIAGNOSIS — M25.551 RIGHT HIP PAIN: ICD-10-CM

## 2024-01-27 DIAGNOSIS — M17.0 PRIMARY OSTEOARTHRITIS OF BOTH KNEES: ICD-10-CM

## 2024-01-27 NOTE — TELEPHONE ENCOUNTER
No care due was identified.  [facility] Embedded Care Due Messages. Reference number: [id number].   1/27/2024 2:56:09 PM CST

## 2024-01-29 ENCOUNTER — HOSPITAL ENCOUNTER (OUTPATIENT)
Dept: RADIOLOGY | Facility: OTHER | Age: 89
Discharge: HOME | End: 2024-01-29
Attending: INTERNAL MEDICINE
Payer: MEDICARE

## 2024-01-29 DIAGNOSIS — G89.29 CHRONIC NONINTRACTABLE HEADACHE, UNSPECIFIED HEADACHE TYPE: ICD-10-CM

## 2024-01-29 DIAGNOSIS — R51.9 CHRONIC NONINTRACTABLE HEADACHE, UNSPECIFIED HEADACHE TYPE: ICD-10-CM

## 2024-01-29 PROCEDURE — 70450 CT HEAD/BRAIN W/O DYE: CPT | Mod: TC

## 2024-01-29 PROCEDURE — 70450 CT HEAD/BRAIN W/O DYE: CPT | Mod: 26,,, | Performed by: INTERNAL MEDICINE

## 2024-01-29 RX ORDER — DICLOFENAC SODIUM 10 MG/G
GEL TOPICAL
Qty: 100 G | Refills: 4 | Status: SHIPPED | OUTPATIENT
Start: 2024-01-29

## 2024-01-29 NOTE — TELEPHONE ENCOUNTER
Refill Routing Note   Medication(s) are not appropriate for processing by [facility] for the following reason(s):        Outside of protocol    [facility] action(s):  Route               Appointments  past 12m or future 3m with PCP    Date Provider   Last Visit   1/19/2024 Tomás Andres MD   Next Visit   Visit date not found Tomás Andres MD   ED visits in past 90 days: 0        Note composed:7:28 AM 01/29/2024

## 2024-02-05 NOTE — TELEPHONE ENCOUNTER
No care due was identified.  [facility] Embedded Care Due Messages. Reference number: [id number].   7/31/2023 1:22:20 PM CDT   Declines

## 2024-02-15 DIAGNOSIS — Z79.4 INSULIN-REQUIRING OR DEPENDENT TYPE II DIABETES MELLITUS: ICD-10-CM

## 2024-02-15 DIAGNOSIS — E11.9 INSULIN-REQUIRING OR DEPENDENT TYPE II DIABETES MELLITUS: ICD-10-CM

## 2024-02-15 RX ORDER — CALCIUM CITRATE/VITAMIN D3 200MG-6.25
TABLET ORAL 3 TIMES DAILY
Qty: 300 STRIP | Refills: 3 | Status: SHIPPED | OUTPATIENT
Start: 2024-02-15

## 2024-02-15 NOTE — TELEPHONE ENCOUNTER
Tiana Mead  is requesting a refill authorization.  Brief Assessment and Rationale for Refill:  Approve     Medication Therapy Plan:         Comments:     Note composed:2:44 PM 02/15/2024

## 2024-02-15 NOTE — TELEPHONE ENCOUNTER
No care due was identified.  [facility] Embedded Care Due Messages. Reference number: [id number].   2/15/2024 12:31:17 PM CST

## 2024-02-21 ENCOUNTER — TELEPHONE (OUTPATIENT)
Dept: INTERNAL MEDICINE | Facility: CLINIC | Age: 89
End: 2024-02-21
Payer: MEDICARE

## 2024-02-21 DIAGNOSIS — E11.69 HYPERLIPIDEMIA ASSOCIATED WITH TYPE 2 DIABETES MELLITUS: ICD-10-CM

## 2024-02-21 DIAGNOSIS — E03.9 PRIMARY HYPOTHYROIDISM: ICD-10-CM

## 2024-02-21 DIAGNOSIS — E78.5 HYPERLIPIDEMIA ASSOCIATED WITH TYPE 2 DIABETES MELLITUS: ICD-10-CM

## 2024-02-21 DIAGNOSIS — E11.42 TYPE 2 DIABETES MELLITUS WITH DIABETIC POLYNEUROPATHY, WITHOUT LONG-TERM CURRENT USE OF INSULIN: ICD-10-CM

## 2024-02-21 DIAGNOSIS — I27.20 PULMONARY HYPERTENSION: ICD-10-CM

## 2024-02-21 DIAGNOSIS — Z00.00 ANNUAL PHYSICAL EXAM: Primary | ICD-10-CM

## 2024-02-21 DIAGNOSIS — I48.21 PERMANENT ATRIAL FIBRILLATION: ICD-10-CM

## 2024-02-21 NOTE — TELEPHONE ENCOUNTER
"----- Message from Edwige Godinez sent at 2/21/2024 10:28 AM CST -----  Contact: Oneal [phone]  type: Lab    Caller is requesting to schedule their Lab appointment prior to annual appointment.  Order is not listed in EPIC.  Please enter order and contact patient to schedule.    Name of Caller: Yolanda    [place] Date and Time of Labs: morning    Date of Annual Physical Appointment: 4/11/2024    Where would they like the lab performed? [facility]    Would the patient rather a call back or a response via [facility]? call    Best Call Back Number: Oneal [phone]    Additional Information: none    "Do not send messages requesting lab orders prior to Physical appt on these providers: Chaya Sanchez, Carl Christine,  Ivanna Leung and Ste[facility]."        "

## 2024-03-15 DIAGNOSIS — E11.42 TYPE 2 DIABETES MELLITUS WITH DIABETIC POLYNEUROPATHY, WITHOUT LONG-TERM CURRENT USE OF INSULIN: ICD-10-CM

## 2024-03-15 RX ORDER — PEN NEEDLE, DIABETIC 30 GX3/16"
NEEDLE, DISPOSABLE MISCELLANEOUS
Qty: 100 EACH | Refills: 6 | Status: SHIPPED | OUTPATIENT
Start: 2024-03-15

## 2024-03-18 ENCOUNTER — TELEPHONE (OUTPATIENT)
Dept: INTERNAL MEDICINE | Facility: CLINIC | Age: 89
End: 2024-03-18
Payer: MEDICARE

## 2024-03-18 NOTE — TELEPHONE ENCOUNTER
----- Message from Billydianelys Soto sent at 3/18/2024 10:14 AM CDT -----  Contact: Daughter [phone]  Would like to receive medical advice.    Would they like a call back or a response via [facility]:  call back    Additional information:  Calling to request pt information to be sent over to [facility]. Fax # is [phone] name is Ching . Requesting  doctors note from last visit as well as A1C paperwork. Daughter work like for her to be listed as the main contact.

## 2024-03-22 ENCOUNTER — OFFICE VISIT (OUTPATIENT)
Dept: CARDIOLOGY | Facility: CLINIC | Age: 89
End: 2024-03-22
Payer: MEDICARE

## 2024-03-22 VITALS — HEART RATE: 61 BPM | OXYGEN SATURATION: 98 % | SYSTOLIC BLOOD PRESSURE: 132 MMHG | DIASTOLIC BLOOD PRESSURE: 60 MMHG

## 2024-03-22 DIAGNOSIS — I70.0 AORTIC ATHEROSCLEROSIS: ICD-10-CM

## 2024-03-22 DIAGNOSIS — I50.32 CHRONIC DIASTOLIC HEART FAILURE: Primary | ICD-10-CM

## 2024-03-22 DIAGNOSIS — I10 PRIMARY HYPERTENSION: ICD-10-CM

## 2024-03-22 DIAGNOSIS — E11.22 TYPE 2 DIABETES MELLITUS WITH STAGE 4 CHRONIC KIDNEY DISEASE, WITH LONG-TERM CURRENT USE OF INSULIN: ICD-10-CM

## 2024-03-22 DIAGNOSIS — I48.21 PERMANENT ATRIAL FIBRILLATION: ICD-10-CM

## 2024-03-22 DIAGNOSIS — E78.00 HYPERCHOLESTEROLEMIA: ICD-10-CM

## 2024-03-22 DIAGNOSIS — N18.4 TYPE 2 DIABETES MELLITUS WITH STAGE 4 CHRONIC KIDNEY DISEASE, WITH LONG-TERM CURRENT USE OF INSULIN: ICD-10-CM

## 2024-03-22 DIAGNOSIS — Z79.4 TYPE 2 DIABETES MELLITUS WITH STAGE 4 CHRONIC KIDNEY DISEASE, WITH LONG-TERM CURRENT USE OF INSULIN: ICD-10-CM

## 2024-03-22 PROBLEM — I82.409 DEEP VEIN THROMBOSIS (DVT) DURING CURRENT HOSPITALIZATION: Chronic | Status: RESOLVED | Noted: 2017-06-15 | Resolved: 2024-03-22

## 2024-03-22 PROCEDURE — 99999 PR PBB SHADOW E&M-EST. PATIENT-LVL V: CPT | Mod: PBBFAC,,, | Performed by: INTERNAL MEDICINE

## 2024-03-22 PROCEDURE — 3072F LOW RISK FOR RETINOPATHY: CPT | Mod: CPTII,S$GLB,, | Performed by: INTERNAL MEDICINE

## 2024-03-22 PROCEDURE — 99214 OFFICE O/P EST MOD 30 MIN: CPT | Mod: S$GLB,,, | Performed by: INTERNAL MEDICINE

## 2024-03-22 PROCEDURE — 1160F RVW MEDS BY RX/DR IN RCRD: CPT | Mod: CPTII,S$GLB,, | Performed by: INTERNAL MEDICINE

## 2024-03-22 PROCEDURE — 1126F AMNT PAIN NOTED NONE PRSNT: CPT | Mod: CPTII,S$GLB,, | Performed by: INTERNAL MEDICINE

## 2024-03-22 PROCEDURE — 1101F PT FALLS ASSESS-DOCD LE1/YR: CPT | Mod: CPTII,S$GLB,, | Performed by: INTERNAL MEDICINE

## 2024-03-22 PROCEDURE — 3288F FALL RISK ASSESSMENT DOCD: CPT | Mod: CPTII,S$GLB,, | Performed by: INTERNAL MEDICINE

## 2024-03-22 PROCEDURE — 1159F MED LIST DOCD IN RCRD: CPT | Mod: CPTII,S$GLB,, | Performed by: INTERNAL MEDICINE

## 2024-03-22 NOTE — PROGRESS NOTES
[facility]    Chief Complaint  Chief Complaint   Patient presents with    Atrial Fibrillation    Congestive Heart Failure       HPI:    Presents to be seen for routine follow-up.  Just celebrated her 89th birthday.  Seems to be doing well.  Only complaint is that she is not sleeping.  Does report dyspnea but not very active.  No chest discomfort.  Occasional dependent edema which resolves with overnight elevation    Medications  Current Outpatient Medications   Medication Sig Dispense Refill    acetaminophen (TYLENOL) 500 MG tablet Take 2 tablets (1,000 mg total) by mouth 3 (three) times daily as needed for Pain (muscle pain in arms and legs). 100 tablet 1    albuterol (PROVENTIL) 2.5 mg /3 mL (0.083 %) nebulizer solution Take 3 mLs (2.5 mg total) by nebulization every 6 (six) hours as needed for Wheezing. Rescue 25 each 1    albuterol (PROVENTIL/VENTOLIN HFA) 90 mcg/actuation inhaler Inhale 2 puffs into the lungs every 4 (four) hours as needed for Wheezing. 18 g 6    albuterol (PROVENTIL/VENTOLIN HFA) 90 mcg/actuation inhaler Inhale 1-2 puffs into the lungs every 6 (six) hours as needed for Wheezing. Rescue 6.7 g 0    amLODIPine (NORVASC) 5 MG tablet TAKE 1 TABLET(5 MG) BY MOUTH EVERY MORNING 90 tablet 2    aspirin 81 MG Chew Take 1 tablet (81 mg total) by mouth every morning. 100 tablet 3    atorvastatin (LIPITOR) 40 MG tablet TAKE 1 TABLET BY MOUTH EVERY EVENING 90 tablet 2    blood sugar diagnostic (ONETOUCH ULTRA TEST) Strp 1 strip by In Vitro route once daily. No longer on insulin because of  each 3    blood-glucose meter kit To check BG 1 times daily, to use with insurance preferred meter 1 each 0    clotrimazole-betamethasone 1-0.05% (LOTRISONE) cream Apply topically 2 (two) times daily as needed. For intertriginous itching in the bend of the leg 45 g 3    diclofenac sodium (VOLTAREN) 1 % Gel APPLY 2 GRAMS TOPICALLY TO THE AFFECTED AREA TWICE DAILY 100 g 4    dorzolamide (TRUSOPT) 2 %  "ophthalmic solution Place 1 drop into both eyes 3 (three) times daily. 10 mL 4    fluorometholone 0.1% (FML) 0.1 % DrpS Place 1 drop into both eyes once daily.      fluticasone-salmeterol diskus inhaler 250-50 mcg Inhale 1 puff into the lungs 2 (two) times daily. 60 each 5    furosemide (LASIX) 40 MG tablet TAKE 1 TABLET BY MOUTH EVERY MORNING AND 1 TABLET AT 4  tablet 3    insulin aspart U-100 (NOVOLOG FLEXPEN U-100 INSULIN) 100 unit/mL (3 mL) InPn pen Inject if blood sugar is >180, 180-230+2, 231-280+4, 281-330+6, 331-380+8,>380+10 MAX daily 30 units. 15 mL 2    isosorbide mononitrate (IMDUR) 60 MG 24 hr tablet TAKE 1 TABLET BY MOUTH EVERY MORNING FOR HEART TO PREVENT CHEST PAINS OR SHORTNESS OF BREATH 90 tablet 2    ketoconazole (NIZORAL) 2 % shampoo Apply topically every 7 days. Soak scalp for 15-30 minutes 120 mL 6    lancets (TRUEPLUS LANCETS) 33 gauge Misc Apply 1 lancet topically once daily. No longer on insulin because of  each 3    lancets Misc To check BG 3 time daily, to use with insurance preferred meter 100 each 3    latanoprost 0.005 % ophthalmic solution INSTILL 1 DROP IN BOTH EYES EVERY EVENING 10 mL 12    levothyroxine (SYNTHROID) 88 MCG tablet TAKE 1 TABLET(88 MCG) BY MOUTH BEFORE BREAKFAST 90 tablet 2    meclizine (ANTIVERT) 12.5 mg tablet TAKE 1 TABLET BY MOUTH THREE TIMES DAILY AS NEEDED FOR DIZZINESS 20 tablet 3    metoprolol tartrate (LOPRESSOR) 25 MG tablet TAKE 1 TABLET(25 MG) BY MOUTH TWICE DAILY 180 tablet 2    multivit-mineral-iron-lutein Tab Take 1 tablet by mouth once daily.      nitroGLYCERIN (NITROSTAT) 0.4 MG SL tablet ONE TABLET UNDER THE TONGUE EVERY 5 MINUTES AS NEEDED FOR CHEST PAIN 100 tablet 3    nystatin (MYCOSTATIN) powder Apply topically 4 (four) times daily. 60 g 3    pen needle, diabetic (BD ULTRA-FINE SHORT PEN NEEDLE) 31 gauge x 5/16" Ndle USE WITH INSULIN PENS UP TO THREE TIMES DAILY 100 each 6    polyethylene glycol (GLYCOLAX) 17 gram/dose powder Take 17 " g by mouth daily as needed (CONSTIPATION).      psyllium (METAMUCIL) powder Take 1 packet by mouth daily as needed (CONSTIPATION).      SHINGRIX, PF, 50 mcg/0.5 mL injection       triamcinolone acetonide 0.025% (KENALOG) 0.025 % Oint Apply topically 2 (two) times daily. 80 g 2    TRUE METRIX GLUCOSE TEST STRIP Strp TEST THREE TIMES DAILY 300 strip 3    valACYclovir (VALTREX) 1000 MG tablet Take 1 tablet (1,000 mg total) by mouth 3 (three) times daily. for 10 days 30 tablet 0     No current facility-administered medications for this visit.        History  Past Medical History:   Diagnosis Date    Allergy     Anemia     Arthritis     Asthma     Chronic kidney disease     Degenerative disc disease     Diabetes mellitus type II     Diastolic heart failure 05/02/2017    Essential hypertension 7/3/2012    Glaucoma     History of insulin dependent diabetes mellitus, for many years stopped because of chronic kidney November 2020.  9/9/2021    History of pseudogout 8/23/2012    Hyperlipidemia     Hypertension     Iritis     Myocardial infarction     Pneumonia     Thrombocytopenia 8/24/2021    Thyroid disease      Past Surgical History:   Procedure Laterality Date    CARDIAC CATHETERIZATION  2010    no obstructive disease    CATARACT EXTRACTION W/  INTRAOCULAR LENS IMPLANT Left n/a    CATARACT EXTRACTION W/  INTRAOCULAR LENS IMPLANT Right 10/8/2018    With Femtosecond LASER assist (Dr. Henson)    CHOLECYSTECTOMY      ESOPHAGOGASTRODUODENOSCOPY N/A 11/4/2020    Procedure: EGD (ESOPHAGOGASTRODUODENOSCOPY);  Surgeon: Tomás Mccall MD;  Location: [address];  Service: Endoscopy;  Laterality: N/A;    EYE SURGERY      gall stone      HYSTERECTOMY      VARICOSE VEIN SURGERY       Social History     Socioeconomic History    Marital status:    Tobacco Use    Smoking status: Never    Smokeless tobacco: Never   Substance and Sexual Activity    Alcohol use: No    Drug use: No    Sexual activity: Never     Social  Determinants of Health     Financial Resource Strain: Medium Risk (8/24/2021)    Overall Financial Resource Strain (CARDIA)     Difficulty of Paying Living Expenses: Somewhat hard   Food Insecurity: No Food Insecurity (8/24/2021)    Hunger Vital Sign     Worried About Running Out of Food in the Last Year: Never true     Ran Out of Food in the Last Year: Never true   Transportation Needs: No Transportation Needs (8/24/2021)    PRAPARE - Transportation     Lack of Transportation (Medical): No     Lack of Transportation (Non-Medical): No   Physical Activity: Inactive (8/24/2021)    Exercise Vital Sign     Days of Exercise per Week: 0 days     Minutes of Exercise per Session: 0 min   Stress: No Stress Concern Present (8/24/2021)    [employer] [place] [employer] - Occupational Stress Questionnaire     Feeling of Stress : Not at all   Social Connections: Moderately Integrated (8/24/2021)    Social Connection and Isolation Panel [NHANES]     Frequency of Communication with Friends and Family: More than three times a week     Frequency of Social Gatherings with Friends and Family: Once a week     Attends Jainism Services: More than 4 times per year     Active Member of Clubs or Organizations: Yes     Attends Club or Organization Meetings: More than 4 times per year     Marital Status:    Housing Stability: Low Risk  (8/24/2021)    Housing Stability Vital Sign     Unable to Pay for Housing in the Last Year: No     Number of Places Lived in the Last Year: 1     Unstable Housing in the Last Year: No     Family History   Problem Relation Age of Onset    Diabetes Mother     Hypertension Mother     Glaucoma Mother     Kidney disease Mother     Hypertension Father     Hypotension Sister     Heart attack Sister     Heart disease Sister     No Known Problems Sister     Diabetes Sister     Kidney disease Sister     Leukemia Sister     Hypertension Sister     Diabetes Sister     Kidney disease Sister     No Known  Problems Sister     Heart disease Brother     Heart disease Brother     No Known Problems Brother     No Known Problems Maternal Grandmother     No Known Problems Maternal Grandfather     No Known Problems Paternal Grandmother     Diabetes Paternal Grandfather     Hypertension Daughter     Atrial fibrillation Daughter     Diabetes Daughter         borderline    Hypertension Daughter     No Known Problems Son     Diabetes Son     Heart disease Son     Hypertension Son     Melanoma Neg Hx         Allergies  Review of patient's allergies indicates:   Allergen Reactions    Codeine Itching and Nausea Only              Review of Systems   Review of Systems   Constitutional: Negative for malaise/fatigue, weight gain and weight loss.   Eyes:  Negative for visual disturbance.   Cardiovascular:  Positive for leg swelling. Negative for chest pain, claudication, cyanosis, dyspnea on exertion, irregular heartbeat, near-syncope, orthopnea, palpitations, paroxysmal nocturnal dyspnea and syncope.   Respiratory:  Negative for cough, hemoptysis, shortness of breath, sleep disturbances due to breathing and wheezing.    Hematologic/Lymphatic: Negative for bleeding problem. Does not bruise/bleed easily.   Skin:  Negative for poor wound healing.   Musculoskeletal:  Negative for muscle cramps and myalgias.   Gastrointestinal:  Negative for abdominal pain, anorexia, diarrhea, heartburn, hematemesis, hematochezia, melena, nausea and vomiting.   Genitourinary:  Negative for hematuria and nocturia.   Neurological:  Negative for excessive daytime sleepiness, dizziness, focal weakness, light-headedness and weakness.       Physical Exam  Vitals:    03/22/24 0858   BP: 132/60   Pulse: 61     Wt Readings from Last 1 Encounters:   01/19/24 68.5 kg (151 lb 0.2 oz)     Physical Exam  Vitals and nursing note reviewed.   Constitutional:       General: She is not in acute distress.     Appearance: She is not toxic-appearing or diaphoretic.   HENT:       Head: Normocephalic and atraumatic.      Mouth/Throat:      Lips: Pink.      Mouth: Mucous membranes are moist.   Eyes:      General: No scleral icterus.     Conjunctiva/sclera: Conjunctivae normal.   Neck:      Thyroid: No thyromegaly.      Vascular: No carotid bruit, hepatojugular reflux or JVD.      Trachea: Trachea normal.   Cardiovascular:      Rate and Rhythm: Normal rate. Rhythm irregularly irregular. No extrasystoles are present.     Chest Wall: PMI is not displaced.      Pulses:           Carotid pulses are 2+ on the right side and 2+ on the left side.       Radial pulses are 2+ on the right side and 2+ on the left side.      Heart sounds: S1 normal and S2 normal. Murmur heard.      Systolic murmur is present with a grade of 2/6.      No friction rub. No S3 or S4 sounds.   Pulmonary:      Effort: Pulmonary effort is normal. No accessory muscle usage or respiratory distress.      Breath sounds: Normal breath sounds and air entry. No decreased breath sounds, wheezing, rhonchi or rales.   Abdominal:      General: Bowel sounds are normal. There is no distension or abdominal bruit.      Palpations: Abdomen is soft. There is no hepatomegaly, splenomegaly or pulsatile mass.      Tenderness: There is no abdominal tenderness.   Musculoskeletal:         General: No tenderness or deformity.      Right lower leg: No edema.      Left lower leg: No edema.   Skin:     General: Skin is warm and dry.      Capillary Refill: Capillary refill takes less than 2 seconds.      Coloration: Skin is not cyanotic or pale.      Nails: There is no clubbing.   Neurological:      General: No focal deficit present.      Mental Status: She is alert and oriented to person, place, and time.   Psychiatric:         Attention and Perception: Attention normal.         Mood and Affect: Mood normal.         Speech: Speech normal.         Behavior: Behavior normal. Behavior is cooperative.         Labs  Lab Visit on 10/09/2023   Component Date  Value Ref Range Status    WBC 10/09/2023 5.61  3.90 - 12.70 K/uL Final    RBC 10/09/2023 3.95 (L)  4.00 - 5.40 M/uL Final    Hemoglobin 10/09/2023 11.8 (L)  12.0 - 16.0 g/dL Final    Hematocrit 10/09/2023 37.0  37.0 - 48.5 % Final    MCV 10/09/2023 94  82 - 98 fL Final    MCH 10/09/2023 29.9  27.0 - 31.0 pg Final    MCHC 10/09/2023 31.9 (L)  32.0 - 36.0 g/dL Final    RDW 10/09/2023 14.3  11.5 - 14.5 % Final    Platelets 10/09/2023 148 (L)  150 - 450 K/uL Final    MPV 10/09/2023 11.8  9.2 - 12.9 fL Final    Immature Granulocytes 10/09/2023 0.2  0.0 - 0.5 % Final    Gran # (ANC) 10/09/2023 3.3  1.8 - 7.7 K/uL Final    Immature Grans (Abs) 10/09/2023 0.01  0.00 - 0.04 K/uL Final    Comment: Mild elevation in immature granulocytes is non specific and   can be seen in a variety of conditions including stress response,   acute inflammation, trauma and pregnancy. Correlation with other   laboratory and clinical findings is essential.      Lymph # 10/09/2023 1.3  1.0 - 4.8 K/uL Final    Mono # 10/09/2023 0.8  0.3 - 1.0 K/uL Final    Eos # 10/09/2023 0.2  0.0 - 0.5 K/uL Final    Baso # 10/09/2023 0.04  0.00 - 0.20 K/uL Final    nRBC 10/09/2023 0  0 /100 WBC Final    Gran % 10/09/2023 58.6  38.0 - 73.0 % Final    Lymph % 10/09/2023 22.8  18.0 - 48.0 % Final    Mono % 10/09/2023 15.0  4.0 - 15.0 % Final    Eosinophil % 10/09/2023 2.7  0.0 - 8.0 % Final    Basophil % 10/09/2023 0.7  0.0 - 1.9 % Final    Differential Method 10/09/2023 Automated   Final    Sodium 10/09/2023 139  136 - 145 mmol/L Final    Potassium 10/09/2023 4.1  3.5 - 5.1 mmol/L Final    Chloride 10/09/2023 105  95 - 110 mmol/L Final    CO2 10/09/2023 27  23 - 29 mmol/L Final    Glucose 10/09/2023 96  70 - 110 mg/dL Final    BUN 10/09/2023 22  10 - 30 mg/dL Final    Creatinine 10/09/2023 1.5 (H)  0.5 - 1.4 mg/dL Final    Calcium 10/09/2023 9.5  8.7 - 10.5 mg/dL Final    Total Protein 10/09/2023 6.8  6.0 - 8.4 g/dL Final    Albumin 10/09/2023 3.8  3.5 - 5.2 g/dL  Final    Total Bilirubin 10/09/2023 0.6  0.1 - 1.0 mg/dL Final    Comment: For infants and newborns, interpretation of results should be based  on gestational age, weight and in agreement with clinical  observations.    Premature Infant recommended reference ranges:  Up to 24 hours.............<8.0 mg/dL  Up to 48 hours............<12.0 mg/dL  3-5 days..................<15.0 mg/dL  6-29 days.................<15.0 mg/dL      Alkaline Phosphatase 10/09/2023 120  55 - 135 U/L Final    AST 10/09/2023 26  10 - 40 U/L Final    ALT 10/09/2023 18  10 - 44 U/L Final    eGFR 10/09/2023 31.3 (A)  >60 mL/min/1.73 m^2 Final    Anion Gap 10/09/2023 7 (L)  8 - 16 mmol/L Final    Hemoglobin A1C 10/09/2023 6.8 (H)  4.0 - 5.6 % Final    Comment: ADA Screening Guidelines:  5.7-6.4%  Consistent with prediabetes  >or=6.5%  Consistent with diabetes    High levels of fetal hemoglobin interfere with the HbA1C  assay. Heterozygous hemoglobin variants (HbS, HgC, etc)do  not significantly interfere with this assay.   However, presence of multiple variants may affect accuracy.      Estimated Avg Glucose 10/09/2023 148 (H)  68 - 131 mg/dL Final    Cholesterol 10/09/2023 131  120 - 199 mg/dL Final    Comment: The National Cholesterol Education Program (NCEP) has set the  following guidelines (reference ranges) for Cholesterol:  Optimal.....................<200 mg/dL  Borderline High.............200-239 mg/dL  High........................> or = 240 mg/dL      Triglycerides 10/09/2023 46  30 - 150 mg/dL Final    Comment: The National Cholesterol Education Program (NCEP) has set the  following guidelines (reference values) for triglycerides:  Normal......................<150 mg/dL  Borderline High.............150-199 mg/dL  High........................200-499 mg/dL      HDL 10/09/2023 54  40 - 75 mg/dL Final    Comment: The National Cholesterol Education Program (NCEP) has set the  following guidelines (reference values) for HDL  Cholesterol:  Low...............<40 mg/dL  Optimal...........>60 mg/dL      LDL Cholesterol 10/09/2023 67.8  63.0 - 159.0 mg/dL Final    Comment: The National Cholesterol Education Program (NCEP) has set the  following guidelines (reference values) for LDL Cholesterol:  Optimal.......................<130 mg/dL  Borderline High...............130-159 mg/dL  High..........................160-189 mg/dL  Very High.....................>190 mg/dL      HDL/Cholesterol Ratio 10/09/2023 41.2  20.0 - 50.0 % Final    Total Cholesterol/HDL Ratio 10/09/2023 2.4  2.0 - 5.0 Final    Non-HDL Cholesterol 10/09/2023 77  mg/dL Final    Comment: Risk category and Non-HDL cholesterol goals:  Coronary heart disease (CHD)or equivalent (10-year risk of CHD >20%):  Non-HDL cholesterol goal     <130 mg/dL  Two or more CHD risk factors and 10-year risk of CHD <= 20%:  Non-HDL cholesterol goal     <160 mg/dL  0 to 1 CHD risk factor:  Non-HDL cholesterol goal     <190 mg/dL      TSH 10/09/2023 1.400  0.400 - 4.000 uIU/mL Final    T4, Total 10/09/2023 11.0  4.5 - 11.5 ug/dL Final    Sed Rate 10/09/2023 33  0 - 36 mm/Hr Final    CRP 10/09/2023 0.8  0.0 - 8.2 mg/L Final   Office Visit on 10/09/2023   Component Date Value Ref Range Status    RBC, UA 10/09/2023 0  0 - 4 /hpf Final    WBC, UA 10/09/2023 0  0 - 5 /hpf Final    Bacteria 10/09/2023 Rare  None-Occ /hpf Final    Hyaline Casts, UA 10/09/2023 1  0-1/lpf /lpf Final    Microscopic Comment 10/09/2023 SEE COMMENT   Final    Comment: Other formed elements not mentioned in the report are not   present in the microscopic examination.       Specimen UA 10/09/2023 Urine, Clean Catch   Final    Color, UA 10/09/2023 Yellow  Yellow, Straw, [place]anna Final    Appearance, UA 10/09/2023 Clear  Clear Final    pH, UA 10/09/2023 6.0  5.0 - 8.0 Final    Specific Gravity, UA 10/09/2023 1.010  1.005 - 1.030 Final    Protein, UA 10/09/2023 Negative  Negative Final    Comment: Recommend a 24 hour urine protein or a  urine   protein/creatinine ratio if globulin induced proteinuria is  clinically suspected.      Glucose, UA 10/09/2023 Negative  Negative Final    Ketones, UA 10/09/2023 Negative  Negative Final    Bilirubin (UA) 10/09/2023 Negative  Negative Final    Occult Blood UA 10/09/2023 Negative  Negative Final    Nitrite, UA 10/09/2023 Negative  Negative Final    Leukocytes, UA 10/09/2023 Negative  Negative Final       Imaging  No results found.    Assessment  1. Chronic diastolic heart failure  Compensated    2. Permanent atrial fibrillation  Controlled.  Declines anticoagulation    3. Primary hypertension  Controlled    4. Hypercholesterolemia  Controlled and has upcoming blood work    5. Aortic atherosclerosis  Continue with risk factor modification efforts    6. Type 2 diabetes mellitus with stage 4 chronic kidney disease, with long-term current use of insulin  Has upcoming blood work to assess stability      Plan and Discussion    No change to present cardiac management    The ASCVD Risk score (Lanette DK, et al., 2019) failed to calculate for the following reasons:    The 2019 ASCVD risk score is only valid for ages 40 to 79    The patient has a prior MI or stroke diagnosis     Follow Up  Follow up in about 6 months (around 9/22/2024).      Adán Alanis MD

## 2024-03-28 ENCOUNTER — PATIENT OUTREACH (OUTPATIENT)
Dept: ADMINISTRATIVE | Facility: HOSPITAL | Age: 89
End: 2024-03-28
Payer: MEDICARE

## 2024-03-28 ENCOUNTER — TELEPHONE (OUTPATIENT)
Dept: INTERNAL MEDICINE | Facility: CLINIC | Age: 89
End: 2024-03-28
Payer: MEDICARE

## 2024-03-28 NOTE — TELEPHONE ENCOUNTER
----- Message from Betty Munson sent at 3/28/2024 12:10 PM CDT -----  Contact: [phone] raad [facility]  Diabetic or Medical Supplies.  What supplies are needed: diabetic shoes  What is the brand name of the supplies:   Is this a refill or new prescription:  new  Who prescribed the supplies:  Dr. Andres  Pharmacy or company name, phone # and location: [facility] [phone] fax number [facility]  Would the patient like a call back, or a response through their [facility] portal?:  callback  Comments:  they will her A1C information and the new Prescript.

## 2024-03-28 NOTE — PROGRESS NOTES
Health Maintenance Due   Topic Date Due    RSV Vaccine (Age 60+ and Pregnant patients) (1 - 1-dose 60+ series) Never done    Hemoglobin A1c  04/09/2024    Diabetes Urine Screening  04/11/2024       Chart reviewed and updated.     Margy Gaitan LPN   Clinical Care Coordinator  Primary Care [facility] Wellness

## 2024-04-04 ENCOUNTER — LAB VISIT (OUTPATIENT)
Dept: LAB | Facility: HOSPITAL | Age: 89
End: 2024-04-04
Attending: INTERNAL MEDICINE
Payer: MEDICARE

## 2024-04-04 DIAGNOSIS — E11.42 TYPE 2 DIABETES MELLITUS WITH DIABETIC POLYNEUROPATHY, WITHOUT LONG-TERM CURRENT USE OF INSULIN: ICD-10-CM

## 2024-04-04 DIAGNOSIS — I27.20 PULMONARY HYPERTENSION: ICD-10-CM

## 2024-04-04 DIAGNOSIS — Z00.00 ANNUAL PHYSICAL EXAM: ICD-10-CM

## 2024-04-04 LAB
ALBUMIN/CREAT UR: 26 UG/MG (ref 0–30)
BACTERIA #/AREA URNS AUTO: NORMAL /HPF
BILIRUB UR QL STRIP: NEGATIVE
CLARITY UR REFRACT.AUTO: CLEAR
COLOR UR AUTO: YELLOW
CREAT UR-MCNC: 73 MG/DL (ref 15–325)
GLUCOSE UR QL STRIP: NEGATIVE
HGB UR QL STRIP: NEGATIVE
KETONES UR QL STRIP: NEGATIVE
LEUKOCYTE ESTERASE UR QL STRIP: NEGATIVE
MICROALBUMIN UR DL<=1MG/L-MCNC: 19 UG/ML
MICROSCOPIC COMMENT: NORMAL
NITRITE UR QL STRIP: NEGATIVE
PH UR STRIP: 7 [PH] (ref 5–8)
PROT UR QL STRIP: NEGATIVE
SP GR UR STRIP: 1.01 (ref 1–1.03)
URN SPEC COLLECT METH UR: NORMAL

## 2024-04-04 PROCEDURE — 81001 URINALYSIS AUTO W/SCOPE: CPT | Performed by: INTERNAL MEDICINE

## 2024-04-04 PROCEDURE — 81003 URINALYSIS AUTO W/O SCOPE: CPT | Mod: 59 | Performed by: INTERNAL MEDICINE

## 2024-04-04 PROCEDURE — 82043 UR ALBUMIN QUANTITATIVE: CPT | Performed by: INTERNAL MEDICINE

## 2024-04-11 ENCOUNTER — OFFICE VISIT (OUTPATIENT)
Dept: INTERNAL MEDICINE | Facility: CLINIC | Age: 89
End: 2024-04-11
Payer: MEDICARE

## 2024-04-11 VITALS
SYSTOLIC BLOOD PRESSURE: 138 MMHG | HEART RATE: 63 BPM | WEIGHT: 151.69 LBS | BODY MASS INDEX: 22.99 KG/M2 | HEIGHT: 68 IN | OXYGEN SATURATION: 100 % | DIASTOLIC BLOOD PRESSURE: 60 MMHG

## 2024-04-11 DIAGNOSIS — M54.2 NECK PAIN ON RIGHT SIDE: ICD-10-CM

## 2024-04-11 DIAGNOSIS — Z79.4 INSULIN-REQUIRING OR DEPENDENT TYPE II DIABETES MELLITUS: ICD-10-CM

## 2024-04-11 DIAGNOSIS — I48.21 PERMANENT ATRIAL FIBRILLATION: ICD-10-CM

## 2024-04-11 DIAGNOSIS — E11.69 HYPERLIPIDEMIA ASSOCIATED WITH TYPE 2 DIABETES MELLITUS: ICD-10-CM

## 2024-04-11 DIAGNOSIS — E78.5 HYPERLIPIDEMIA ASSOCIATED WITH TYPE 2 DIABETES MELLITUS: ICD-10-CM

## 2024-04-11 DIAGNOSIS — E03.9 PRIMARY HYPOTHYROIDISM: Chronic | ICD-10-CM

## 2024-04-11 DIAGNOSIS — Z00.00 ANNUAL PHYSICAL EXAM: Primary | ICD-10-CM

## 2024-04-11 DIAGNOSIS — E11.9 INSULIN-REQUIRING OR DEPENDENT TYPE II DIABETES MELLITUS: ICD-10-CM

## 2024-04-11 PROCEDURE — 1126F AMNT PAIN NOTED NONE PRSNT: CPT | Mod: CPTII,S$GLB,, | Performed by: INTERNAL MEDICINE

## 2024-04-11 PROCEDURE — 1101F PT FALLS ASSESS-DOCD LE1/YR: CPT | Mod: CPTII,S$GLB,, | Performed by: INTERNAL MEDICINE

## 2024-04-11 PROCEDURE — 3288F FALL RISK ASSESSMENT DOCD: CPT | Mod: CPTII,S$GLB,, | Performed by: INTERNAL MEDICINE

## 2024-04-11 PROCEDURE — 99999 PR PBB SHADOW E&M-EST. PATIENT-LVL III: CPT | Mod: PBBFAC,,, | Performed by: INTERNAL MEDICINE

## 2024-04-11 PROCEDURE — 99397 PER PM REEVAL EST PAT 65+ YR: CPT | Mod: S$GLB,,, | Performed by: INTERNAL MEDICINE

## 2024-04-11 PROCEDURE — 1160F RVW MEDS BY RX/DR IN RCRD: CPT | Mod: CPTII,S$GLB,, | Performed by: INTERNAL MEDICINE

## 2024-04-11 PROCEDURE — 3072F LOW RISK FOR RETINOPATHY: CPT | Mod: CPTII,S$GLB,, | Performed by: INTERNAL MEDICINE

## 2024-04-11 PROCEDURE — 1159F MED LIST DOCD IN RCRD: CPT | Mod: CPTII,S$GLB,, | Performed by: INTERNAL MEDICINE

## 2024-04-11 NOTE — PROGRESS NOTES
CC:  Annual exam     HPI:  The patient is a 89-year-old female with asthma, insulin-requiring diabetes, hypertension, hypothyroidism, coronary artery disease, history of MI, CKD stage 4, and atrial fibrillation presents today for exam.  Does report having some problems with right neck pain radiating to the right shoulder.  He does get some relief with positional changes.  Sneezing makes it worse.    ROS:  Weight is stable around 145.  She reports she can not see out of her left eye.  She also has hearing aids.  Her left ear is worse than the right.  No chest pain.  No shortness of breath.  Clarification, she does get out of breath with walking from the front the back of her house.  She breathes heavy.  She feels like this has gotten worse over the past year.  No nausea vomiting.  No abdominal pain.  He has a bowel movement every other day.  She was getting up every 2 hours to urinate.  No weakness in arms or legs.  No breast changes.  No skin changes.  No numbness or tingling arms or legs.    Physical exam:   General appearance:  No acute distress   HEENT: Conjunctiva is clear.  Left pupil is a little bit irregular compared to the right.  Does have bilateral lens replacements.  The TMs are obscured by wax.  Nasal septum is midline without discharge.  The patient has an upper denture in place.  Trachea is midline without JVD or thyromegaly.  Pulmonary:  Good inspiratory, expiratory breath sounds are heard.  Lungs are clear auscultation.  Cardiovascular:  S1-S2, rhythm is regular.  2+ carotid pulse of bruits.  Extremities without edema.    GI: Abdomen is nontender, nondistended without hepatosplenomegaly  Ortho:  The patient's feet were inspected.  The patient has decreased protective sensation in both feet.  She would decreased pulses in both feet.    Assessment:  1. Annual exam    2.  Insulin-requiring diabetes  3. Hypothyroidism   4.  Hyperlipidemia  5.  AFib  6.  Neck pain involving right side of the  neck    Plan:   1.  The patient's blood and urine test results were reviewed with her in her daughter  2. The patient can use Voltaren gel on her neck.  She can also take 1000 mg of Tylenol 3 times a day as needed for pain  3. Reorder her diabetic shoes.

## 2024-04-14 RX ORDER — METOPROLOL TARTRATE 25 MG/1
TABLET, FILM COATED ORAL
Qty: 180 TABLET | Refills: 3 | Status: SHIPPED | OUTPATIENT
Start: 2024-04-14

## 2024-04-14 RX ORDER — FUROSEMIDE 40 MG/1
TABLET ORAL
Qty: 180 TABLET | Refills: 3 | Status: SHIPPED | OUTPATIENT
Start: 2024-04-14

## 2024-04-14 NOTE — PLAN OF CARE
Patient's VSS, AAOx4, COVID airborne/droplet isolation protocol followed throughout shift. Patient up with assistance to recliner, tolerated well. Blood glucose monitored and managed, tele box and P-Ox maintained throughout shift. Afebrile. Patient resting in bed.   78

## 2024-04-14 NOTE — TELEPHONE ENCOUNTER
No care due was identified.  [facility] Embedded Care Due Messages. Reference number: [id number].   4/14/2024 5:41:40 AM CDT

## 2024-04-15 DIAGNOSIS — E11.42 DIABETIC POLYNEUROPATHY ASSOCIATED WITH TYPE 2 DIABETES MELLITUS: Primary | ICD-10-CM

## 2024-04-15 NOTE — TELEPHONE ENCOUNTER
Refill Decision Note   Tiana [facility]  is requesting a refill authorization.  Brief Assessment and Rationale for Refill:  Approve     Medication Therapy Plan:       Medication Reconciliation Completed: No   Comments:     No Care Gaps recommended.     Note composed:7:18 PM 04/14/2024

## 2024-04-16 RX ORDER — LEVOTHYROXINE SODIUM 88 UG/1
TABLET ORAL
Qty: 90 TABLET | Refills: 3 | Status: SHIPPED | OUTPATIENT
Start: 2024-04-16

## 2024-04-16 NOTE — TELEPHONE ENCOUNTER
No care due was identified.  [facility] Embedded Care Due Messages. Reference number: [id number].   4/16/2024 1:25:00 PM CDT

## 2024-04-17 NOTE — TELEPHONE ENCOUNTER
Refill Decision Note   Tiana [facility]  is requesting a refill authorization.  Brief Assessment and Rationale for Refill:  Approve     Medication Therapy Plan:         Comments:     Note composed:10:39 PM 04/16/2024

## 2024-04-23 DIAGNOSIS — I25.10 CORONARY ARTERY DISEASE INVOLVING NATIVE CORONARY ARTERY OF NATIVE HEART WITHOUT ANGINA PECTORIS: ICD-10-CM

## 2024-04-23 RX ORDER — ISOSORBIDE MONONITRATE 60 MG/1
TABLET, EXTENDED RELEASE ORAL
Qty: 90 TABLET | Refills: 3 | Status: SHIPPED | OUTPATIENT
Start: 2024-04-23

## 2024-04-23 NOTE — TELEPHONE ENCOUNTER
No care due was identified.  [facility] Embedded Care Due Messages. Reference number: [id number].   4/23/2024 11:15:53 AM CDT

## 2024-04-24 ENCOUNTER — TELEPHONE (OUTPATIENT)
Dept: OPHTHALMOLOGY | Facility: CLINIC | Age: 89
End: 2024-04-24
Payer: MEDICARE

## 2024-04-24 NOTE — TELEPHONE ENCOUNTER
Contacted pt- scheduled appointment with Dr. Prado.      ----- Message from Nila Menchaca sent at 4/23/2024  5:32 PM CDT -----  Type:  Sooner Appointment Request    Caller is requesting a sooner appointment.  Caller declined first available appointment listed below.  Caller will not accept being placed on the waitlist and is requesting a message be sent to doctor.  Name of Caller:pt  When is the first available appointment?n/a  Symptoms:Left eye running  Would the patient rather a call back or a response via [facility]? call  Best Call Back Number: [phone]  Additional Information:

## 2024-04-24 NOTE — TELEPHONE ENCOUNTER
Refill Decision Note   Tiana Mead  is requesting a refill authorization.  Brief Assessment and Rationale for Refill:  Approve     Medication Therapy Plan:        Pharmacist review requested: Yes   Extended chart review required: Yes   Comments:     Note composed:8:52 PM 04/23/2024

## 2024-04-24 NOTE — TELEPHONE ENCOUNTER
Refill Routing Note   Medication(s) are not appropriate for processing by [facility] for the following reason(s):        Drug-disease interaction    [facility] action(s):  Defer        Medication Therapy Plan: Drug-Disease: isosorbide mononitrate and Acute on chronic blood loss anemia    Pharmacist review requested: Yes     Appointments  past 12m or future 3m with PCP    Date Provider   Last Visit   4/11/2024 Tomás Andres MD   Next Visit   Visit date not found Tomás Andres MD   ED visits in past 90 days: 0        Note composed:7:45 PM 04/23/2024

## 2024-05-08 NOTE — PROGRESS NOTES
Tiana Mead presented for a  Medicare AWV and comprehensive Health Risk Assessment today.  She is a patient of Dr. Andres and is new to me.  The following components were reviewed and updated:    Medical history  Family History  Social history  Allergies and Current Medications  Health Risk Assessment  Health Maintenance  Care Team     ** See Completed Assessments for Annual Wellness Visit within the encounter summary.**    The following assessments were completed:  Living Situation  CAGE  Depression Screening  Timed Get Up and Go  Whisper Test  Cognitive Function Screening  Nutrition Screening  ADL Screening  PAQ Screening  Review for opioid screen: pt does not have rx for opioid  Review for substance use disorder:  pt does not use substance        Vitals:    05/09/24 0916   BP: 128/60   BP Location: Left arm   Patient Position: Sitting   Pulse: (!) 58   SpO2: 97%     There is no height or weight on file to calculate BMI.  Physical Exam  Vitals reviewed.   Constitutional:       Appearance: Normal appearance.   HENT:      Head: Normocephalic and atraumatic.   Cardiovascular:      Rate and Rhythm: Normal rate and regular rhythm.      Pulses: Normal pulses.           Radial pulses are 2+ on the right side and 2+ on the left side.        Dorsalis pedis pulses are 2+ on the right side and 2+ on the left side.        Posterior tibial pulses are 2+ on the right side and 2+ on the left side.      Heart sounds: Normal heart sounds.   Pulmonary:      Effort: Pulmonary effort is normal.      Breath sounds: Normal breath sounds.   Musculoskeletal:      Right lower leg: No edema.      Left lower leg: No edema.   Skin:     General: Skin is warm and dry.      Capillary Refill: Capillary refill takes less than 2 seconds.   Neurological:      General: No focal deficit present.      Mental Status: She is alert and oriented to person, place, and time.   Psychiatric:         Mood and Affect: Mood normal.         Behavior: Behavior  normal.        Diagnoses and health risks identified today and associated recommendations/orders:    1. Encounter for Medicare annual wellness exam  Assessment and evaluation performed as stated above  -     Ambulatory Referral/Consult to Enhanced Annual Wellness Visit (eAWV)    2. Aortic atherosclerosis  3. Hyperlipidemia associated with type 2 diabetes mellitus  Problem is stable on atorvastatin. Will continue current medication and treatment regimen.  Encouraged pt to follow low fat, low cholesterol, low sugar, mediterranean diet.  Increase exercise/movement as tolerated.  Follow up with PCP     4. Type 2 diabetes mellitus with diabetic polyneuropathy, without long-term current use of insulin  Problem is stable on insulin aspart sliding scale. Will continue current medication and treatment regimen.  Encouraged pt to follow low fat, low cholesterol, low sugar diet.  Increase exercise as tolerated.  Follow up with PCP and diabetic RASHAUN.     5. Chronic diastolic congestive heart failure  Problem is stable on   . Will continue current medication and treatment regimen.  Encouraged pt to follow low fat, low cholesterol, low sugar, mediterranean diet.  Increase exercise, at least 10-20 min per day of walking.  Follow up with PCP     6. Permanent atrial fibrillation  Stable on aspirin. Followed by cardiology    7. Pulmonary hypertension  Problem is stable. Will continue current medication and treatment regimen.  Encouraged pt to follow low fat, low cholesterol, low sugar diet.  Increase exercise as tolerated  Follow up with Cardiology    8. Stage 4 chronic kidney disease  Problem is stable. Will continue current medication and treatment regimen.  Encouraged pt to avoid nephrotoxic medications.  Increase hydration.  Followed by pcp.    9. Primary hypothyroidism  Problem is stable on levothyroxine. Will continue current medication and treatment regimen.  Follow up with PCP       Provided Tiana with a 5-10 year written  screening schedule and personal prevention plan. Recommendations were developed using the USPSTF age appropriate recommendations. Education, counseling, and referrals were provided as needed. After Visit Summary printed and given to patient which includes a list of additional screenings\tests needed.    Follow up in about 1 year (around 5/9/2025), or if symptoms worsen or fail to improve.        Margy Bose NP      Portions of this note may have been generated using voice recognition software.  Please excuse any spelling/grammatical errors. Occasional wrong-word or sound-a-like substitutions may have also occurred due to the inherent limitations of voice recognition software. Please read the chart carefully and recognize, using context, where substitutions have occurred.    I offered to discuss advanced care planning, including how to pick a person who would make decisions for you if you were unable to make them for yourself, called a health care power of , and what kind of decisions you might make such as use of life sustaining treatments such as ventilators and tube feeding when faced with a life limiting illness recorded on a living will that they will need to know. (How you want to be cared for as you near the end of your natural life)     X Patient is interested in learning more about how to make advanced directives.  I provided them paperwork and offered to discuss this with them.

## 2024-05-08 NOTE — PATIENT INSTRUCTIONS
Counseling and Referral of Other Preventative  (Italic type indicates deductible and co-insurance are waived)    Patient Name: Tiana Mead  Today's Date: 5/8/2024    Health Maintenance       Date Due Completion Date    RSV Vaccine (Age 60+ and Pregnant patients) (1 - 1-dose 60+ series) Never done ---    COVID-19 Vaccine (7 - 2023-24 season) 03/28/2024 11/28/2023    Hemoglobin A1c 10/04/2024 4/4/2024    Eye Exam 11/27/2024 11/27/2023    Override on 6/20/2012: (N/S)    Aspirin/Antiplatelet Therapy 01/19/2025 1/19/2024    Foot Exam 01/19/2025 1/19/2024    Override on 2/4/2020: Done ([facility])    Override on 6/11/2019: Done ([facility])    Override on 5/16/2018: Done    Override on 5/2/2017: Done    Diabetes Urine Screening 04/04/2025 4/4/2024    Lipid Panel 04/04/2025 4/4/2024    TETANUS VACCINE 02/04/2030 2/4/2020        No orders of the defined types were placed in this encounter.    The following information is provided to all patients.  This information is to help you find resources for any of the problems found today that may be affecting your health:                  Living healthy guide: www.[facility].[place].gov      Understanding Diabetes: www.diabetes.org      Eating healthy: www.cdc.gov/healthyweight      CDC home safety checklist: www.cdc.gov/steadi/patient.html      Agency on Aging: www.goea.[place].gov      Alcoholics anonymous (AA): www.aa.org      Physical Activity: www.olinda.nih.gov/cg2nhka      Tobacco use: www.quitwithusla.org

## 2024-05-09 ENCOUNTER — OFFICE VISIT (OUTPATIENT)
Dept: PRIMARY CARE CLINIC | Facility: CLINIC | Age: 89
End: 2024-05-09
Payer: MEDICARE

## 2024-05-09 VITALS — SYSTOLIC BLOOD PRESSURE: 128 MMHG | HEART RATE: 58 BPM | OXYGEN SATURATION: 97 % | DIASTOLIC BLOOD PRESSURE: 60 MMHG

## 2024-05-09 DIAGNOSIS — Z00.00 ENCOUNTER FOR MEDICARE ANNUAL WELLNESS EXAM: Primary | ICD-10-CM

## 2024-05-09 DIAGNOSIS — I70.0 AORTIC ATHEROSCLEROSIS: ICD-10-CM

## 2024-05-09 DIAGNOSIS — N18.4 STAGE 4 CHRONIC KIDNEY DISEASE: ICD-10-CM

## 2024-05-09 DIAGNOSIS — E11.42 TYPE 2 DIABETES MELLITUS WITH DIABETIC POLYNEUROPATHY, WITHOUT LONG-TERM CURRENT USE OF INSULIN: ICD-10-CM

## 2024-05-09 DIAGNOSIS — I48.21 PERMANENT ATRIAL FIBRILLATION: ICD-10-CM

## 2024-05-09 DIAGNOSIS — I50.32 CHRONIC DIASTOLIC CONGESTIVE HEART FAILURE: Chronic | ICD-10-CM

## 2024-05-09 DIAGNOSIS — E11.69 HYPERLIPIDEMIA ASSOCIATED WITH TYPE 2 DIABETES MELLITUS: ICD-10-CM

## 2024-05-09 DIAGNOSIS — E78.5 HYPERLIPIDEMIA ASSOCIATED WITH TYPE 2 DIABETES MELLITUS: ICD-10-CM

## 2024-05-09 DIAGNOSIS — E03.9 PRIMARY HYPOTHYROIDISM: Chronic | ICD-10-CM

## 2024-05-09 DIAGNOSIS — I27.20 PULMONARY HYPERTENSION: ICD-10-CM

## 2024-05-09 PROCEDURE — G9919 SCRN ND POS ND PROV OF REC: HCPCS | Mod: HCNC,CPTII,S$GLB,

## 2024-05-09 PROCEDURE — 1160F RVW MEDS BY RX/DR IN RCRD: CPT | Mod: HCNC,CPTII,S$GLB,

## 2024-05-09 PROCEDURE — 1158F ADVNC CARE PLAN TLK DOCD: CPT | Mod: HCNC,CPTII,S$GLB,

## 2024-05-09 PROCEDURE — 1159F MED LIST DOCD IN RCRD: CPT | Mod: HCNC,CPTII,S$GLB,

## 2024-05-09 PROCEDURE — 99999 PR PBB SHADOW E&M-EST. PATIENT-LVL V: CPT | Mod: PBBFAC,HCNC,,

## 2024-05-09 PROCEDURE — G0439 PPPS, SUBSEQ VISIT: HCPCS | Mod: HCNC,S$GLB,,

## 2024-05-09 PROCEDURE — 3072F LOW RISK FOR RETINOPATHY: CPT | Mod: HCNC,CPTII,S$GLB,

## 2024-05-09 PROCEDURE — 1170F FXNL STATUS ASSESSED: CPT | Mod: HCNC,CPTII,S$GLB,

## 2024-06-06 ENCOUNTER — OFFICE VISIT (OUTPATIENT)
Dept: OPHTHALMOLOGY | Facility: CLINIC | Age: 89
End: 2024-06-06
Payer: MEDICARE

## 2024-06-06 DIAGNOSIS — H40.1190 POAG (PRIMARY OPEN-ANGLE GLAUCOMA): ICD-10-CM

## 2024-06-06 DIAGNOSIS — H01.01A ULCERATIVE BLEPHARITIS OF UPPER AND LOWER EYELIDS OF BOTH EYES: ICD-10-CM

## 2024-06-06 DIAGNOSIS — H18.20 CORNEAL EDEMA: ICD-10-CM

## 2024-06-06 DIAGNOSIS — H40.1132 PRIMARY OPEN ANGLE GLAUCOMA (POAG) OF BOTH EYES, MODERATE STAGE: Primary | ICD-10-CM

## 2024-06-06 DIAGNOSIS — H20.10 CHRONIC IRITIS: ICD-10-CM

## 2024-06-06 DIAGNOSIS — H04.123 DRY EYE SYNDROME OF BOTH EYES: ICD-10-CM

## 2024-06-06 DIAGNOSIS — H40.32X2 GLAUCOMA OF LEFT EYE ASSOCIATED WITH OCULAR TRAUMA, MODERATE STAGE: ICD-10-CM

## 2024-06-06 DIAGNOSIS — H40.1190 PRIMARY OPEN ANGLE GLAUCOMA: ICD-10-CM

## 2024-06-06 DIAGNOSIS — H01.01B ULCERATIVE BLEPHARITIS OF UPPER AND LOWER EYELIDS OF BOTH EYES: ICD-10-CM

## 2024-06-06 PROCEDURE — 1160F RVW MEDS BY RX/DR IN RCRD: CPT | Mod: HCNC,CPTII,S$GLB, | Performed by: OPHTHALMOLOGY

## 2024-06-06 PROCEDURE — 2023F DILAT RTA XM W/O RTNOPTHY: CPT | Mod: HCNC,CPTII,S$GLB, | Performed by: OPHTHALMOLOGY

## 2024-06-06 PROCEDURE — 99999 PR PBB SHADOW E&M-EST. PATIENT-LVL III: CPT | Mod: PBBFAC,HCNC,, | Performed by: OPHTHALMOLOGY

## 2024-06-06 PROCEDURE — 99214 OFFICE O/P EST MOD 30 MIN: CPT | Mod: HCNC,S$GLB,, | Performed by: OPHTHALMOLOGY

## 2024-06-06 PROCEDURE — 3288F FALL RISK ASSESSMENT DOCD: CPT | Mod: HCNC,CPTII,S$GLB, | Performed by: OPHTHALMOLOGY

## 2024-06-06 PROCEDURE — 1126F AMNT PAIN NOTED NONE PRSNT: CPT | Mod: HCNC,CPTII,S$GLB, | Performed by: OPHTHALMOLOGY

## 2024-06-06 PROCEDURE — G2211 COMPLEX E/M VISIT ADD ON: HCPCS | Mod: HCNC,S$GLB,, | Performed by: OPHTHALMOLOGY

## 2024-06-06 PROCEDURE — 1159F MED LIST DOCD IN RCRD: CPT | Mod: HCNC,CPTII,S$GLB, | Performed by: OPHTHALMOLOGY

## 2024-06-06 PROCEDURE — 1101F PT FALLS ASSESS-DOCD LE1/YR: CPT | Mod: HCNC,CPTII,S$GLB, | Performed by: OPHTHALMOLOGY

## 2024-06-06 RX ORDER — LATANOPROST 50 UG/ML
1 SOLUTION/ DROPS OPHTHALMIC NIGHTLY
Qty: 7.5 ML | Refills: 4 | Status: SHIPPED | OUTPATIENT
Start: 2024-06-06

## 2024-06-06 RX ORDER — DORZOLAMIDE HCL 20 MG/ML
1 SOLUTION/ DROPS OPHTHALMIC 3 TIMES DAILY
Qty: 30 ML | Refills: 4 | Status: SHIPPED | OUTPATIENT
Start: 2024-06-06

## 2024-06-06 NOTE — PROGRESS NOTES
Assessment /Plan     For exam results, see Encounter Report.    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, moderate stage    Dry eye syndrome of both eyes    Corneal edema    Chronic iritis - Left Eye      Son lives in [facility]  COVID was on vent 12 days --> visiting mom    + Asthma  CHF    Zoster Left V1 --> resolved  No ophthalmic involvement  Eye tends to be irritated --> long standing    FML q day prn --> using QOD -> Re-discussed steroid response --> none 05/02/2023      POAG  Stable Glaucoma & Patient near target IOP range    Steroid responder    CCT  517 / 574    Mid teens --> achieved    Both Eyes --> good adherence & tolerating well --> CSM  Trusopt BID  Xal q day --> iritis quiet    Alphagan BID  --> itchy // Follicles --> intolerant  No BB --> Asthma    Hx Ryan in Past    Hold SLT OS    PC IOL OU  Quiet  Observe    Corneal edema OS > OD  Fuchs K dystrophy  Trauma OS  Jony 128 2% BID PRN --> re-discussed & restart      Dry Eye Syndrome: re-discussed use of warm compresses, preserved & non-preserved artificial tears, gel and PM ointment options.  Also discussed options utilizing medications.  EES q HS x 1 week / month --> PRN      NIDDM  No BDR or CSME  control     ERM OD  Not VS  Observe    Blepharitis & Left Facial dermatitis  Blepharitis: Discussed treatment options including warm compresses, lid scrubs and medications.    Try Ocusoft Tea tree oil // allergy pads --> instructed & Given   EES q HS x 1 week / month  WCs  Lid scrubs     Hold Dial soap  Try Cetaphil  Cerave      11/27/2023  Left facial dermatitis and Bilateral blepharitis        Plan  Happy BD!  RTC 4-6 month Gonio, IOP & OCT RNFL & adherence  RTC sooner prn with good understanding

## 2024-06-10 ENCOUNTER — PATIENT MESSAGE (OUTPATIENT)
Dept: INTERNAL MEDICINE | Facility: CLINIC | Age: 89
End: 2024-06-10
Payer: MEDICARE

## 2024-06-25 ENCOUNTER — HOSPITAL ENCOUNTER (OUTPATIENT)
Facility: OTHER | Age: 89
Discharge: HOME | End: 2024-06-26
Attending: EMERGENCY MEDICINE | Admitting: HOSPITALIST
Payer: MEDICARE

## 2024-06-25 DIAGNOSIS — Q61.02 MULTIPLE RENAL CYSTS: ICD-10-CM

## 2024-06-25 DIAGNOSIS — I50.9 CHF (CONGESTIVE HEART FAILURE): ICD-10-CM

## 2024-06-25 DIAGNOSIS — I10 ESSENTIAL HYPERTENSION: Primary | ICD-10-CM

## 2024-06-25 DIAGNOSIS — I50.9 ACUTE ON CHRONIC CONGESTIVE HEART FAILURE, UNSPECIFIED HEART FAILURE TYPE: ICD-10-CM

## 2024-06-25 DIAGNOSIS — E78.5 HYPERLIPIDEMIA ASSOCIATED WITH TYPE 2 DIABETES MELLITUS: ICD-10-CM

## 2024-06-25 DIAGNOSIS — R07.9 CHEST PAIN: ICD-10-CM

## 2024-06-25 DIAGNOSIS — E11.69 HYPERLIPIDEMIA ASSOCIATED WITH TYPE 2 DIABETES MELLITUS: ICD-10-CM

## 2024-06-25 DIAGNOSIS — R06.02 SHORTNESS OF BREATH: ICD-10-CM

## 2024-06-25 PROBLEM — I50.33 ACUTE ON CHRONIC DIASTOLIC CONGESTIVE HEART FAILURE: Status: ACTIVE | Noted: 2017-08-15

## 2024-06-25 PROBLEM — Z79.4 TYPE 2 DIABETES MELLITUS, WITH LONG-TERM CURRENT USE OF INSULIN: Status: ACTIVE | Noted: 2017-08-15

## 2024-06-25 PROBLEM — E11.9 TYPE 2 DIABETES MELLITUS, WITH LONG-TERM CURRENT USE OF INSULIN: Status: ACTIVE | Noted: 2017-08-15

## 2024-06-25 LAB
ALBUMIN SERPL BCP-MCNC: 3.9 G/DL (ref 3.5–5.2)
ALP SERPL-CCNC: 106 U/L (ref 55–135)
ALT SERPL W/O P-5'-P-CCNC: 24 U/L (ref 10–44)
ANION GAP SERPL CALC-SCNC: 6 MMOL/L (ref 8–16)
AST SERPL-CCNC: 25 U/L (ref 10–40)
BASOPHILS # BLD AUTO: 0.04 K/UL (ref 0–0.2)
BASOPHILS NFR BLD: 0.8 % (ref 0–1.9)
BILIRUB SERPL-MCNC: 0.6 MG/DL (ref 0.1–1)
BNP SERPL-MCNC: 918 PG/ML (ref 0–99)
BUN SERPL-MCNC: 38 MG/DL (ref 10–30)
CALCIUM SERPL-MCNC: 9.8 MG/DL (ref 8.7–10.5)
CHLORIDE SERPL-SCNC: 105 MMOL/L (ref 95–110)
CO2 SERPL-SCNC: 28 MMOL/L (ref 23–29)
CREAT SERPL-MCNC: 1.8 MG/DL (ref 0.5–1.4)
DIFFERENTIAL METHOD BLD: ABNORMAL
EOSINOPHIL # BLD AUTO: 0.1 K/UL (ref 0–0.5)
EOSINOPHIL NFR BLD: 1.7 % (ref 0–8)
ERYTHROCYTE [DISTWIDTH] IN BLOOD BY AUTOMATED COUNT: 14.6 % (ref 11.5–14.5)
ERYTHROCYTE [DISTWIDTH] IN BLOOD BY AUTOMATED COUNT: 14.6 % (ref 11.5–14.5)
EST. GFR  (NO RACE VARIABLE): 25 ML/MIN/1.73 M^2
ESTIMATED AVG GLUCOSE: 143 MG/DL (ref 68–131)
GLUCOSE SERPL-MCNC: 128 MG/DL (ref 70–110)
HBA1C MFR BLD: 6.6 % (ref 4–5.6)
HCT VFR BLD AUTO: 35.9 % (ref 37–48.5)
HCT VFR BLD AUTO: 37.6 % (ref 37–48.5)
HGB BLD-MCNC: 11.7 G/DL (ref 12–16)
HGB BLD-MCNC: 12.3 G/DL (ref 12–16)
IMM GRANULOCYTES # BLD AUTO: 0.01 K/UL (ref 0–0.04)
IMM GRANULOCYTES NFR BLD AUTO: 0.2 % (ref 0–0.5)
LYMPHOCYTES # BLD AUTO: 1.1 K/UL (ref 1–4.8)
LYMPHOCYTES NFR BLD: 21.2 % (ref 18–48)
MAGNESIUM SERPL-MCNC: 2.1 MG/DL (ref 1.6–2.6)
MCH RBC QN AUTO: 29.5 PG (ref 27–31)
MCH RBC QN AUTO: 29.7 PG (ref 27–31)
MCHC RBC AUTO-ENTMCNC: 32.6 G/DL (ref 32–36)
MCHC RBC AUTO-ENTMCNC: 32.7 G/DL (ref 32–36)
MCV RBC AUTO: 91 FL (ref 82–98)
MCV RBC AUTO: 91 FL (ref 82–98)
MONOCYTES # BLD AUTO: 0.7 K/UL (ref 0.3–1)
MONOCYTES NFR BLD: 13.1 % (ref 4–15)
NEUTROPHILS # BLD AUTO: 3.3 K/UL (ref 1.8–7.7)
NEUTROPHILS NFR BLD: 63 % (ref 38–73)
NRBC BLD-RTO: 0 /100 WBC
OHS QRS DURATION: 84 MS
OHS QTC CALCULATION: 412 MS
PLATELET # BLD AUTO: 133 K/UL (ref 150–450)
PLATELET # BLD AUTO: 151 K/UL (ref 150–450)
PMV BLD AUTO: 11.1 FL (ref 9.2–12.9)
PMV BLD AUTO: 11.5 FL (ref 9.2–12.9)
POCT GLUCOSE: 125 MG/DL (ref 70–110)
POCT GLUCOSE: 150 MG/DL (ref 70–110)
POTASSIUM SERPL-SCNC: 4 MMOL/L (ref 3.5–5.1)
PROT SERPL-MCNC: 7 G/DL (ref 6–8.4)
RBC # BLD AUTO: 3.96 M/UL (ref 4–5.4)
RBC # BLD AUTO: 4.14 M/UL (ref 4–5.4)
SODIUM SERPL-SCNC: 139 MMOL/L (ref 136–145)
TROPONIN I SERPL DL<=0.01 NG/ML-MCNC: 0.01 NG/ML (ref 0–0.03)
WBC # BLD AUTO: 5.19 K/UL (ref 3.9–12.7)
WBC # BLD AUTO: 5.51 K/UL (ref 3.9–12.7)

## 2024-06-25 PROCEDURE — G0378 HOSPITAL OBSERVATION PER HR: HCPCS | Mod: HCNC

## 2024-06-25 PROCEDURE — 25000242 PHARM REV CODE 250 ALT 637 W/ HCPCS: Mod: HCNC | Performed by: EMERGENCY MEDICINE

## 2024-06-25 PROCEDURE — 80053 COMPREHEN METABOLIC PANEL: CPT | Mod: HCNC | Performed by: EMERGENCY MEDICINE

## 2024-06-25 PROCEDURE — 83880 ASSAY OF NATRIURETIC PEPTIDE: CPT | Mod: HCNC | Performed by: EMERGENCY MEDICINE

## 2024-06-25 PROCEDURE — 84484 ASSAY OF TROPONIN QUANT: CPT | Mod: HCNC | Performed by: EMERGENCY MEDICINE

## 2024-06-25 PROCEDURE — 93010 ELECTROCARDIOGRAM REPORT: CPT | Mod: HCNC,,, | Performed by: INTERNAL MEDICINE

## 2024-06-25 PROCEDURE — 25000003 PHARM REV CODE 250: Mod: HCNC | Performed by: NURSE PRACTITIONER

## 2024-06-25 PROCEDURE — 63600175 PHARM REV CODE 636 W HCPCS: Mod: HCNC | Performed by: EMERGENCY MEDICINE

## 2024-06-25 PROCEDURE — 93005 ELECTROCARDIOGRAM TRACING: CPT | Mod: HCNC

## 2024-06-25 PROCEDURE — 85025 COMPLETE CBC W/AUTO DIFF WBC: CPT | Mod: HCNC | Performed by: NURSE PRACTITIONER

## 2024-06-25 PROCEDURE — 25000003 PHARM REV CODE 250: Mod: HCNC | Performed by: EMERGENCY MEDICINE

## 2024-06-25 PROCEDURE — 83735 ASSAY OF MAGNESIUM: CPT | Mod: HCNC | Performed by: NURSE PRACTITIONER

## 2024-06-25 PROCEDURE — 83036 HEMOGLOBIN GLYCOSYLATED A1C: CPT | Mod: HCNC | Performed by: NURSE PRACTITIONER

## 2024-06-25 PROCEDURE — 85027 COMPLETE CBC AUTOMATED: CPT | Mod: HCNC | Performed by: EMERGENCY MEDICINE

## 2024-06-25 RX ORDER — FUROSEMIDE 10 MG/ML
40 INJECTION INTRAMUSCULAR; INTRAVENOUS EVERY 12 HOURS
Status: DISCONTINUED | OUTPATIENT
Start: 2024-06-26 | End: 2024-06-26 | Stop reason: HOSPADM

## 2024-06-25 RX ORDER — NITROGLYCERIN 0.4 MG/1
0.4 TABLET SUBLINGUAL
Status: COMPLETED | OUTPATIENT
Start: 2024-06-25 | End: 2024-06-25

## 2024-06-25 RX ORDER — ISOSORBIDE MONONITRATE 30 MG/1
60 TABLET, EXTENDED RELEASE ORAL DAILY
Status: DISCONTINUED | OUTPATIENT
Start: 2024-06-26 | End: 2024-06-26 | Stop reason: HOSPADM

## 2024-06-25 RX ORDER — GLUCAGON 1 MG
1 KIT INJECTION
Status: DISCONTINUED | OUTPATIENT
Start: 2024-06-25 | End: 2024-06-26 | Stop reason: HOSPADM

## 2024-06-25 RX ORDER — ENOXAPARIN SODIUM 100 MG/ML
30 INJECTION SUBCUTANEOUS EVERY 24 HOURS
Status: DISCONTINUED | OUTPATIENT
Start: 2024-06-25 | End: 2024-06-25

## 2024-06-25 RX ORDER — FLUOROMETHOLONE 1 MG/ML
1 SUSPENSION/ DROPS OPHTHALMIC NIGHTLY
Status: DISCONTINUED | OUTPATIENT
Start: 2024-06-25 | End: 2024-06-26 | Stop reason: HOSPADM

## 2024-06-25 RX ORDER — METOPROLOL TARTRATE 25 MG/1
25 TABLET, FILM COATED ORAL 2 TIMES DAILY
Status: DISCONTINUED | OUTPATIENT
Start: 2024-06-25 | End: 2024-06-26 | Stop reason: HOSPADM

## 2024-06-25 RX ORDER — IBUPROFEN 200 MG
24 TABLET ORAL
Status: DISCONTINUED | OUTPATIENT
Start: 2024-06-25 | End: 2024-06-26 | Stop reason: HOSPADM

## 2024-06-25 RX ORDER — INSULIN ASPART 100 [IU]/ML
0-5 INJECTION, SOLUTION INTRAVENOUS; SUBCUTANEOUS
Status: DISCONTINUED | OUTPATIENT
Start: 2024-06-25 | End: 2024-06-26 | Stop reason: HOSPADM

## 2024-06-25 RX ORDER — DICLOFENAC SODIUM 10 MG/G
2 GEL TOPICAL DAILY
Status: DISCONTINUED | OUTPATIENT
Start: 2024-06-26 | End: 2024-06-26 | Stop reason: HOSPADM

## 2024-06-25 RX ORDER — FLUTICASONE FUROATE AND VILANTEROL 100; 25 UG/1; UG/1
1 POWDER RESPIRATORY (INHALATION) DAILY
Status: DISCONTINUED | OUTPATIENT
Start: 2024-06-26 | End: 2024-06-26 | Stop reason: HOSPADM

## 2024-06-25 RX ORDER — POLYETHYLENE GLYCOL 3350 17 G/17G
17 POWDER, FOR SOLUTION ORAL DAILY
Status: DISCONTINUED | OUTPATIENT
Start: 2024-06-26 | End: 2024-06-26 | Stop reason: HOSPADM

## 2024-06-25 RX ORDER — ACETAMINOPHEN 500 MG
500 TABLET ORAL
Status: COMPLETED | OUTPATIENT
Start: 2024-06-25 | End: 2024-06-25

## 2024-06-25 RX ORDER — IBUPROFEN 200 MG
16 TABLET ORAL
Status: DISCONTINUED | OUTPATIENT
Start: 2024-06-25 | End: 2024-06-26 | Stop reason: HOSPADM

## 2024-06-25 RX ORDER — AMLODIPINE BESYLATE 5 MG/1
5 TABLET ORAL DAILY
Status: DISCONTINUED | OUTPATIENT
Start: 2024-06-26 | End: 2024-06-26 | Stop reason: HOSPADM

## 2024-06-25 RX ORDER — FUROSEMIDE 10 MG/ML
20 INJECTION INTRAMUSCULAR; INTRAVENOUS
Status: COMPLETED | OUTPATIENT
Start: 2024-06-25 | End: 2024-06-25

## 2024-06-25 RX ORDER — LEVOTHYROXINE SODIUM 88 UG/1
88 TABLET ORAL
Status: DISCONTINUED | OUTPATIENT
Start: 2024-06-26 | End: 2024-06-26 | Stop reason: HOSPADM

## 2024-06-25 RX ORDER — LATANOPROST 50 UG/ML
1 SOLUTION/ DROPS OPHTHALMIC DAILY
Status: DISCONTINUED | OUTPATIENT
Start: 2024-06-26 | End: 2024-06-26 | Stop reason: HOSPADM

## 2024-06-25 RX ORDER — NAPROXEN SODIUM 220 MG/1
81 TABLET, FILM COATED ORAL EVERY MORNING
Status: DISCONTINUED | OUTPATIENT
Start: 2024-06-26 | End: 2024-06-26 | Stop reason: HOSPADM

## 2024-06-25 RX ORDER — SODIUM CHLORIDE 0.9 % (FLUSH) 0.9 %
10 SYRINGE (ML) INJECTION
Status: DISCONTINUED | OUTPATIENT
Start: 2024-06-25 | End: 2024-06-26 | Stop reason: HOSPADM

## 2024-06-25 RX ORDER — ATORVASTATIN CALCIUM 20 MG/1
40 TABLET, FILM COATED ORAL NIGHTLY
Status: DISCONTINUED | OUTPATIENT
Start: 2024-06-26 | End: 2024-06-26 | Stop reason: HOSPADM

## 2024-06-25 RX ORDER — FUROSEMIDE 10 MG/ML
40 INJECTION INTRAMUSCULAR; INTRAVENOUS EVERY 12 HOURS
Status: DISCONTINUED | OUTPATIENT
Start: 2024-06-25 | End: 2024-06-25

## 2024-06-25 RX ADMIN — ACETAMINOPHEN 500 MG: 500 TABLET ORAL at 01:06

## 2024-06-25 RX ADMIN — FLUOROMETHOLONE 1 DROP: 1 SOLUTION/ DROPS OPHTHALMIC at 08:06

## 2024-06-25 RX ADMIN — NITROGLYCERIN 0.4 MG: 0.4 TABLET, ORALLY DISINTEGRATING SUBLINGUAL at 01:06

## 2024-06-25 RX ADMIN — METOPROLOL TARTRATE 25 MG: 25 TABLET, FILM COATED ORAL at 08:06

## 2024-06-25 RX ADMIN — FUROSEMIDE 20 MG: 10 INJECTION, SOLUTION INTRAMUSCULAR; INTRAVENOUS at 01:06

## 2024-06-25 NOTE — HPI
Tiana Mead is a 89 year old female with a past medical history of anemia, CKD, DM type 2, hypothyroidism, CHF, glaucoma, HLD, afib, and HLD who presents with right sided chest pain with associated shortness of breath that started a few days ago. Patient states pain is felt under her right breast and worsens with movement. Patient denies fever, chills, nausea and vomiting.     ED work up significant forelevated , stable anemia, creatinine 1.8 (baseline 1.5-1.8) and initial troponin negative. EKG showed atrial fibrillation with rate 74. Chest xray revealed cardiomegaly with mild CHF. Patient received 20mg IV lasix in the ED and was referred to hospital medicine. Patient will be placed in observation for further evaluation and management.

## 2024-06-25 NOTE — ASSESSMENT & PLAN NOTE
History noted, presenting with increased shortness of breath, mild lower extremity edema and chest pain. . Chest xray revealed cardiomegaly and mild CHF. Patient is compliant with Lasix at home and took home dose (40mg) this morning. Most recent echo performed 2021 showed EF 70%. She is followed by Dr. Alanis with cardiology    -echo pending  -continue lasix BID  -strict intake/output  -fluid restriction and daily weights

## 2024-06-25 NOTE — ED TRIAGE NOTES
"Pt presents to ED c/o chest pain onset a few days. Pt reports R sided rib pain and SOB x few days. Pt reports pains worsens with movement. Pt states "I have fluid in my lungs." Aaox4, NAD noted.   "

## 2024-06-25 NOTE — ASSESSMENT & PLAN NOTE
History noted, most recent A1C 6.8 on 4/4/24. Patient states she monitors glucose at home and takes insulin as needed    -accuchecks  -low dose SSI

## 2024-06-25 NOTE — H&P
[facility] ([address] Medicine  History & Physical    Patient Name: Tiana Mead  MRN: [id number]  Patient Class: OP- Observation  Admission Date: 6/25/2024  Attending Physician: Carli Matta MD   Primary Care Provider: Tomás Andres MD         Patient information was obtained from patient, past medical records, and ER records.     Subjective:     Principal Problem:Acute on chronic diastolic congestive heart failure    Chief Complaint:   Chief Complaint   Patient presents with    Chest Pain     Pt complains of R sided rib cage and CP and SOB that started a few days ago, pain is worse when she moves or turns her body. Pt has hx of A-fib        HPI: Tiana Mead is a 89 year old female with a past medical history of anemia, CKD, DM type 2, hypothyroidism, CHF, glaucoma, HLD, afib, and HLD who presents with right sided chest pain with associated shortness of breath that started a few days ago. Patient states pain is felt under her right breast and worsens with movement. Patient denies fever, chills, nausea and vomiting.     ED work up significant forelevated , stable anemia, creatinine 1.8 (baseline 1.5-1.8) and initial troponin negative. EKG showed atrial fibrillation with rate 74. Chest xray revealed cardiomegaly with mild CHF. Patient received 20mg IV lasix in the ED and was referred to hospital medicine. Patient will be placed in observation for further evaluation and management.     Past Medical History:   Diagnosis Date    Allergy     Anemia     Arthritis     Asthma     Chronic kidney disease     Degenerative disc disease     Diabetes mellitus type II     Diastolic heart failure 05/02/2017    Essential hypertension 7/3/2012    Glaucoma     History of insulin dependent diabetes mellitus, for many years stopped because of chronic kidney November 2020.  9/9/2021    History of pseudogout 8/23/2012    Hyperlipidemia     Hypertension     Iritis     Myocardial infarction      Pneumonia     Thrombocytopenia 8/24/2021    Thyroid disease        Past Surgical History:   Procedure Laterality Date    CARDIAC CATHETERIZATION  2010    no obstructive disease    CATARACT EXTRACTION W/  INTRAOCULAR LENS IMPLANT Left n/a    CATARACT EXTRACTION W/  INTRAOCULAR LENS IMPLANT Right 10/8/2018    With Femtosecond LASER assist (Dr. Henson)    CHOLECYSTECTOMY      ESOPHAGOGASTRODUODENOSCOPY N/A 11/4/2020    Procedure: EGD (ESOPHAGOGASTRODUODENOSCOPY);  Surgeon: Tomás Mccall MD;  Location: [address]);  Service: Endoscopy;  Laterality: N/A;    EYE SURGERY      gall stone      HYSTERECTOMY      VARICOSE VEIN SURGERY         Review of patient's allergies indicates:   Allergen Reactions    Codeine Itching and Nausea Only              No current facility-administered medications on file prior to encounter.     Current Outpatient Medications on File Prior to Encounter   Medication Sig    acetaminophen (TYLENOL) 500 MG tablet Take 2 tablets (1,000 mg total) by mouth 3 (three) times daily as needed for Pain (muscle pain in arms and legs).    albuterol (PROVENTIL/VENTOLIN HFA) 90 mcg/actuation inhaler Inhale 2 puffs into the lungs every 4 (four) hours as needed for Wheezing.    amLODIPine (NORVASC) 5 MG tablet TAKE 1 TABLET(5 MG) BY MOUTH EVERY MORNING    aspirin 81 MG Chew Take 1 tablet (81 mg total) by mouth every morning.    atorvastatin (LIPITOR) 40 MG tablet TAKE 1 TABLET BY MOUTH EVERY EVENING    diclofenac sodium (VOLTAREN) 1 % Gel APPLY 2 GRAMS TOPICALLY TO THE AFFECTED AREA TWICE DAILY    fluticasone-salmeterol diskus inhaler 250-50 mcg Inhale 1 puff into the lungs 2 (two) times daily.    furosemide (LASIX) 40 MG tablet TAKE 1 TABLET BY MOUTH EVERY MORNING AND 1 TABLET AT 4PM    isosorbide mononitrate (IMDUR) 60 MG 24 hr tablet TAKE 1 TABLET BY MOUTH EVERY MORNING FOR HEART, TO PREVENT CHEST PAINS OR SHORTNESS OF BREATH    lancets (TRUEPLUS LANCETS) 33 gauge Misc Apply 1 lancet topically once daily.  "No longer on insulin because of CKD    lancets Misc To check BG 3 time daily, to use with insurance preferred meter    levothyroxine (SYNTHROID) 88 MCG tablet TAKE 1 TABLET(88 MCG) BY MOUTH DAILY BEFORE BREAKFAST    metoprolol tartrate (LOPRESSOR) 25 MG tablet TAKE 1 TABLET(25 MG) BY MOUTH TWICE DAILY    multivit-mineral-iron-lutein Tab Take 1 tablet by mouth once daily.    blood sugar diagnostic (ONETOUCH ULTRA TEST) Strp 1 strip by In Vitro route once daily. No longer on insulin because of CKD    blood-glucose meter kit To check BG 1 times daily, to use with insurance preferred meter    clotrimazole-betamethasone 1-0.05% (LOTRISONE) cream Apply topically 2 (two) times daily as needed. For intertriginous itching in the bend of the leg    dorzolamide (TRUSOPT) 2 % ophthalmic solution Place 1 drop into both eyes 3 (three) times daily.    fluorometholone 0.1% (FML) 0.1 % DrpS Place 1 drop into both eyes once daily.    insulin aspart U-100 (NOVOLOG FLEXPEN U-100 INSULIN) 100 unit/mL (3 mL) InPn pen Inject if blood sugar is >180, 180-230+2, 231-280+4, 281-330+6, 331-380+8,>380+10 MAX daily 30 units.    ketoconazole (NIZORAL) 2 % shampoo Apply topically every 7 days. Soak scalp for 15-30 minutes    latanoprost 0.005 % ophthalmic solution Place 1 drop into both eyes every evening.    meclizine (ANTIVERT) 12.5 mg tablet TAKE 1 TABLET BY MOUTH THREE TIMES DAILY AS NEEDED FOR DIZZINESS    nitroGLYCERIN (NITROSTAT) 0.4 MG SL tablet ONE TABLET UNDER THE TONGUE EVERY 5 MINUTES AS NEEDED FOR CHEST PAIN    nystatin (MYCOSTATIN) powder Apply topically 4 (four) times daily.    pen needle, diabetic (BD ULTRA-FINE SHORT PEN NEEDLE) 31 gauge x 5/16" Ndle USE WITH INSULIN PENS UP TO THREE TIMES DAILY    polyethylene glycol (GLYCOLAX) 17 gram/dose powder Take 17 g by mouth daily as needed (CONSTIPATION).    psyllium (METAMUCIL) powder Take 1 packet by mouth daily as needed (CONSTIPATION).    triamcinolone acetonide 0.025% (KENALOG) " 0.025 % Oint Apply topically 2 (two) times daily.    TRUE METRIX GLUCOSE TEST STRIP Strp TEST THREE TIMES DAILY    valACYclovir (VALTREX) 1000 MG tablet Take 1 tablet (1,000 mg total) by mouth 3 (three) times daily. for 10 days    [DISCONTINUED] albuterol (PROVENTIL/VENTOLIN HFA) 90 mcg/actuation inhaler Inhale 1-2 puffs into the lungs every 6 (six) hours as needed for Wheezing. Rescue     Family History       Problem Relation (Age of Onset)    Atrial fibrillation Daughter    Diabetes Mother, Sister, Sister, Paternal Grandfather, Daughter, Son    Glaucoma Mother    Heart attack Sister    Heart disease Sister, Brother, Brother, Son    Hypertension Mother, Father, Sister, Daughter, Daughter, Son    Hypotension Sister    Kidney disease Mother, Sister, Sister    Leukemia Sister    No Known Problems Sister, Sister, Brother, Maternal Grandmother, Maternal Grandfather, Paternal Grandmother, Son          Tobacco Use    Smoking status: Never    Smokeless tobacco: Never   Substance and Sexual Activity    Alcohol use: No    Drug use: No    Sexual activity: Never     Review of Systems   Constitutional:  Negative for activity change, appetite change, chills and fever.   HENT:  Negative for congestion, sore throat and trouble swallowing.    Eyes:  Negative for photophobia and visual disturbance.   Respiratory:  Positive for shortness of breath. Negative for cough and chest tightness.    Cardiovascular:  Positive for chest pain. Negative for palpitations and leg swelling.   Gastrointestinal:  Negative for abdominal pain, diarrhea and nausea.   Genitourinary:  Negative for dysuria, flank pain and hematuria.   Musculoskeletal:  Negative for back pain.   Neurological:  Negative for dizziness, weakness and headaches.   Psychiatric/Behavioral:  Negative for confusion.      Objective:     Vital Signs (Most Recent):  Temp: 98 °F (36.7 °C) (06/25/24 1219)  Pulse: 72 (06/25/24 1602)  Resp: (!) 21 (06/25/24 1539)  BP: 137/68 (06/25/24  1602)  SpO2: 98 % (24 1602) Vital Signs (24h Range):  Temp:  [98 °F (36.7 °C)] 98 °F (36.7 °C)  Pulse:  [63-72] 72  Resp:  [18-22] 21  SpO2:  [98 %-100 %] 98 %  BP: (117-137)/(60-73) 137/68     Weight: 65.8 kg (145 lb)  Body mass index is 22.05 kg/m².     Physical Exam  Vitals reviewed.   Constitutional:       Appearance: Normal appearance. She is normal weight.   HENT:      Head: Normocephalic.      Mouth/Throat:      Mouth: Mucous membranes are moist.      Pharynx: Oropharynx is clear.   Eyes:      General: Lids are normal. Visual field deficit (left eye blind) present.      Conjunctiva/sclera: Conjunctivae normal.      Comments: Erythema to left eye   Cardiovascular:      Rate and Rhythm: Normal rate. Rhythm irregularly irregular.      Pulses: Normal pulses.      Heart sounds: Murmur heard.   Pulmonary:      Effort: Pulmonary effort is normal.      Breath sounds: Decreased breath sounds present.   Abdominal:      General: Bowel sounds are normal.      Palpations: Abdomen is soft.   Musculoskeletal:         General: Normal range of motion.      Cervical back: Normal range of motion.      Right lower le+ Edema present.      Left lower le+ Edema present.   Skin:     General: Skin is warm and dry.   Neurological:      Mental Status: She is alert and oriented to person, place, and time. Mental status is at baseline.   Psychiatric:         Mood and Affect: Mood normal.                Significant Labs: All pertinent labs within the past 24 hours have been reviewed.  CBC:   Recent Labs   Lab 24  1236 24  1546   WBC 5.51 5.19   HGB 12.3 11.7*   HCT 37.6 35.9*    133*     CMP:   Recent Labs   Lab 24  1236      K 4.0      CO2 28   *   BUN 38*   CREATININE 1.8*   CALCIUM 9.8   PROT 7.0   ALBUMIN 3.9   BILITOT 0.6   ALKPHOS 106   AST 25   ALT 24   ANIONGAP 6*       Significant Imaging: I have reviewed all pertinent imaging results/findings within the past 24  hours.  Imaging Results              X-Ray Chest AP Portable (Final result)  Result time 06/25/24 12:46:13      Final result by Kelton Patino III, MD (06/25/24 12:46:13)                   Impression:      Cardiomegaly and possible mild CHF.      Electronically signed by: Kelton Patino MD  Date:    06/25/2024  Time:    12:46               Narrative:    EXAMINATION:  XR CHEST AP PORTABLE    CLINICAL HISTORY:  chest pain, SOB;    FINDINGS:  There is cardiomegaly DJD and aortic plaque.  Lungs are clear.  Bones are noncontributory.                                      Assessment/Plan:     * Acute on chronic diastolic congestive heart failure  History noted, presenting with increased shortness of breath, mild lower extremity edema and chest pain. . Chest xray revealed cardiomegaly and mild CHF. Patient is compliant with Lasix at home and took home dose (40mg) this morning. Most recent echo performed 2021 showed EF 70%. She is followed by Dr. Alanis with cardiology    -echo pending  -continue lasix BID  -strict intake/output  -fluid restriction and daily weights      Stage 4 chronic kidney disease  History noted, creatinine 1.8 (baseline appears 1.5-1.8)    -continue to monitor BMP    Primary hypothyroidism  History noted, currently taking levothyroxine daily    -will continue levothyroxine 88 mcg daily      Type 2 diabetes mellitus, with long-term current use of insulin  History noted, most recent A1C 6.8 on 4/4/24. Patient states she monitors glucose at home and takes insulin as needed    -accuchecks  -low dose SSI        Glaucoma associated with ocular trauma  History noted, patient followed by ophthalmologist outpatient. 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 Takes metoprolol BID and ASA for anticoagulation    -given advanced age and fall risk will defer enoxaparin and will  continue ASA  -continue metoprolol BID  -continue to monitor on telemetry      Hyperlipidemia associated with type 2 diabetes mellitus  History noted, currently taking atorvastatin    -continue atorvastatin daily      Essential hypertension  History noted, currently controlled with amlodipine, isosorbide mononitrate and furosemide    -continue amlodipine, isosorbide mononitrate and furosemide      VTE Risk Mitigation (From admission, onward)           Ordered     IP VTE HIGH RISK PATIENT  Once         06/25/24 1520     Place sequential compression device  Until discontinued         06/25/24 1520                         On 06/25/2024, patient should be placed in hospital observation services         Elvia Poole NP  Department of Hospital Medicine  [facility] ([address])

## 2024-06-25 NOTE — NURSING
Patient arrived to room 342 via stretcher with transport.  Patient is a,a,ox4, VSS, and has no complaints at this time.  Patient resting comfortably in bed locked in lowest position, side rails up x3, and call bell in reach.  Will continue to monitor.

## 2024-06-25 NOTE — ED PROVIDER NOTES
Emergency Department Encounter  Provider Note    Tiana Mead  [id number]  6/25/2024    Evaluation:    History Acquisition:     Chief Complaint   Patient presents with    Chest Pain     Pt complains of R sided rib cage and CP and SOB that started a few days ago, pain is worse when she moves or turns her body. Pt has hx of A-fib       History of Present Illness:  Tiana Mead is a 89 y.o. female who has a past medical history of Allergy, Anemia, Arthritis, Asthma, Chronic kidney disease, Degenerative disc disease, Diabetes mellitus type II, Diastolic heart failure (05/02/2017), Essential hypertension (7/3/2012), Glaucoma, History of insulin dependent diabetes mellitus, for many years stopped because of chronic kidney November 2020.  (9/9/2021), History of pseudogout (8/23/2012), Hyperlipidemia, Hypertension, Iritis, Myocardial infarction, Pneumonia, Thrombocytopenia (8/24/2021), and Thyroid disease.    The patient presents to the ED due to chest pain and SOB.   Patient reports symptoms have been ongoing for the last few days.  She describes an intermittent sharp and pressure-like pain to the R side of her chest.  She states it feels similar to the last time she had fluid building up in her lungs.  She denies any fever, cough, nausea/vomiting, back pain, or urinary complaints.  She reports compliance with all of her home medications including Lasix.  She has been taking nitro at home without much improvement.     Additional historians utilized:  none    Prior medical records were reviewed:   PCP visit 05/09 for annual wellness exam  IM visit 04/11 for follow-up  Cardiology visit 03/22 for follow-up CHF    The patient's list of active medical history, family/social history, medications, and allergies as documented has been reviewed.     Past Medical History:   Diagnosis Date    Allergy     Anemia     Arthritis     Asthma     Chronic kidney disease     Degenerative disc disease     Diabetes mellitus type II      Diastolic heart failure 05/02/2017    Essential hypertension 7/3/2012    Glaucoma     History of insulin dependent diabetes mellitus, for many years stopped because of chronic kidney November 2020.  9/9/2021    History of pseudogout 8/23/2012    Hyperlipidemia     Hypertension     Iritis     Myocardial infarction     Pneumonia     Thrombocytopenia 8/24/2021    Thyroid disease      Past Surgical History:   Procedure Laterality Date    CARDIAC CATHETERIZATION  2010    no obstructive disease    CATARACT EXTRACTION W/  INTRAOCULAR LENS IMPLANT Left n/a    CATARACT EXTRACTION W/  INTRAOCULAR LENS IMPLANT Right 10/8/2018    With Femtosecond LASER assist (Dr. Henson)    CHOLECYSTECTOMY      ESOPHAGOGASTRODUODENOSCOPY N/A 11/4/2020    Procedure: EGD (ESOPHAGOGASTRODUODENOSCOPY);  Surgeon: Tomás Mccall MD;  Location: [facility] ([address] [place] [address]);  Service: Endoscopy;  Laterality: N/A;    EYE SURGERY      gall stone      HYSTERECTOMY      VARICOSE VEIN SURGERY       Family History   Problem Relation Name Age of Onset    Diabetes Mother      Hypertension Mother      Glaucoma Mother      Kidney disease Mother      Hypertension Father      Hypotension Sister Alberta     Heart attack Sister Alberta     Heart disease Sister Alberta     No Known Problems Sister Bekah     Diabetes Sister Violeta     Kidney disease Sister Violeta     Leukemia Sister Jane     Hypertension Sister Pallavi     Diabetes Sister Lovinia     Kidney disease Sister Lovinia     No Known Problems Sister Lacey     Heart disease Brother Mariusz     Heart disease Brother Capron     No Known Problems Brother Luis     No Known Problems Maternal Grandmother      No Known Problems Maternal Grandfather      No Known Problems Paternal Grandmother      Diabetes Paternal Grandfather      Hypertension Daughter Yolanda     Atrial fibrillation Daughter Yolanda     Diabetes Daughter Melanie         borderline    Hypertension Daughter Melanie     No Known Problems Son Trung      Diabetes Radu Starks     Heart disease Radu Starks     Hypertension Radu Starks     Melanoma Neg Hx       Social History     Socioeconomic History    Marital status:    Tobacco Use    Smoking status: Never    Smokeless tobacco: Never   Substance and Sexual Activity    Alcohol use: No    Drug use: No    Sexual activity: Never     Social Determinants of Health     Financial Resource Strain: Medium Risk (5/9/2024)    Overall Financial Resource Strain (CARDIA)     Difficulty of Paying Living Expenses: Somewhat hard   Food Insecurity: No Food Insecurity (5/9/2024)    Hunger Vital Sign     Worried About Running Out of Food in the Last Year: Never true     Ran Out of Food in the Last Year: Never true   Transportation Needs: No Transportation Needs (5/9/2024)    PRAPARE - Transportation     Lack of Transportation (Medical): No     Lack of Transportation (Non-Medical): No   Physical Activity: Inactive (5/9/2024)    Exercise Vital Sign     Days of Exercise per Week: 0 days     Minutes of Exercise per Session: 0 min   Stress: No Stress Concern Present (5/9/2024)    [employer] - Occupational Stress Questionnaire     Feeling of Stress : Not at all   Housing Stability: Low Risk  (5/9/2024)    Housing Stability Vital Sign     Unable to Pay for Housing in the Last Year: No     Homeless in the Last Year: No       Medications:  Current Discharge Medication List        CONTINUE these medications which have NOT CHANGED    Details   acetaminophen (TYLENOL) 500 MG tablet Take 2 tablets (1,000 mg total) by mouth 3 (three) times daily as needed for Pain (muscle pain in arms and legs).  Qty: 100 tablet, Refills: 1      albuterol (PROVENTIL/VENTOLIN HFA) 90 mcg/actuation inhaler Inhale 2 puffs into the lungs every 4 (four) hours as needed for Wheezing.  Qty: 18 g, Refills: 6      amLODIPine (NORVASC) 5 MG tablet TAKE 1 TABLET(5 MG) BY MOUTH EVERY MORNING  Qty: 90 tablet, Refills: 2    Comments: .       aspirin 81 MG Chew Take 1 tablet (81 mg total) by mouth every morning.  Qty: 100 tablet, Refills: 3      atorvastatin (LIPITOR) 40 MG tablet TAKE 1 TABLET BY MOUTH EVERY EVENING  Qty: 90 tablet, Refills: 2    Associated Diagnoses: Coronary artery disease involving native coronary artery of native heart without angina pectoris      diclofenac sodium (VOLTAREN) 1 % Gel APPLY 2 GRAMS TOPICALLY TO THE AFFECTED AREA TWICE DAILY  Qty: 100 g, Refills: 4    Associated Diagnoses: Primary osteoarthritis of both knees; Generalized osteoarthritis of multiple sites; Right hip pain      fluticasone-salmeterol diskus inhaler 250-50 mcg Inhale 1 puff into the lungs 2 (two) times daily.  Qty: 60 each, Refills: 5    Comments: **Please refill for Brand Advair and NOT generic**  Pharmacy name and phone # [id number] [place] [address]  Phone Phone: [phone]  Comments: Patient said that she usually get one bottle.      furosemide (LASIX) 40 MG tablet TAKE 1 TABLET BY MOUTH EVERY MORNING AND 1 TABLET AT 4PM  Qty: 180 tablet, Refills: 3      isosorbide mononitrate (IMDUR) 60 MG 24 hr tablet TAKE 1 TABLET BY MOUTH EVERY MORNING FOR HEART, TO PREVENT CHEST PAINS OR SHORTNESS OF BREATH  Qty: 90 tablet, Refills: 3    Associated Diagnoses: Coronary artery disease involving native coronary artery of native heart without angina pectoris      !! lancets (TRUEPLUS LANCETS) 33 gauge Misc Apply 1 lancet topically once daily. No longer on insulin because of CKD  Qty: 100 each, Refills: 3    Associated Diagnoses: Type 2 diabetes mellitus with other diabetic arthropathy, with long-term current use of insulin      !! lancets Misc To check BG 3 time daily, to use with insurance preferred meter  Qty: 100 each, Refills: 3    Associated Diagnoses: Insulin-requiring or dependent type II diabetes mellitus      levothyroxine (SYNTHROID) 88 MCG tablet TAKE 1 TABLET(88 MCG) BY MOUTH DAILY BEFORE BREAKFAST  Qty: 90 tablet, Refills: 3       metoprolol tartrate (LOPRESSOR) 25 MG tablet TAKE 1 TABLET(25 MG) BY MOUTH TWICE DAILY  Qty: 180 tablet, Refills: 3    Comments: .      multivit-mineral-iron-lutein Tab Take 1 tablet by mouth once daily.      !! blood sugar diagnostic (ONETOUCH ULTRA TEST) Strp 1 strip by In Vitro route once daily. No longer on insulin because of CKD  Qty: 100 each, Refills: 3    Associated Diagnoses: Type 2 diabetes mellitus with diabetic polyneuropathy, without long-term current use of insulin; Stage 4 chronic kidney disease      blood-glucose meter kit To check BG 1 times daily, to use with insurance preferred meter  Qty: 1 each, Refills: 0    Associated Diagnoses: Insulin-requiring or dependent type II diabetes mellitus      clotrimazole-betamethasone 1-0.05% (LOTRISONE) cream Apply topically 2 (two) times daily as needed. For intertriginous itching in the bend of the leg  Qty: 45 g, Refills: 3    Associated Diagnoses: Intertriginous candidiasis      dorzolamide (TRUSOPT) 2 % ophthalmic solution Place 1 drop into both eyes 3 (three) times daily.  Qty: 30 mL, Refills: 4    Associated Diagnoses: Primary open angle glaucoma; Primary open angle glaucoma (POAG) of both eyes, moderate stage      fluorometholone 0.1% (FML) 0.1 % DrpS Place 1 drop into both eyes once daily.      insulin aspart U-100 (NOVOLOG FLEXPEN U-100 INSULIN) 100 unit/mL (3 mL) InPn pen Inject if blood sugar is >180, 180-230+2, 231-280+4, 281-330+6, 331-380+8,>380+10 MAX daily 30 units.  Qty: 15 mL, Refills: 2    Associated Diagnoses: Type 2 diabetes mellitus with diabetic polyneuropathy, without long-term current use of insulin      ketoconazole (NIZORAL) 2 % shampoo Apply topically every 7 days. Soak scalp for 15-30 minutes  Qty: 120 mL, Refills: 6    Associated Diagnoses: Scalp itch      latanoprost 0.005 % ophthalmic solution Place 1 drop into both eyes every evening.  Qty: 7.5 mL, Refills: 4    Associated Diagnoses: POAG (primary open-angle glaucoma);  "Primary open angle glaucoma (POAG) of both eyes, moderate stage      meclizine (ANTIVERT) 12.5 mg tablet TAKE 1 TABLET BY MOUTH THREE TIMES DAILY AS NEEDED FOR DIZZINESS  Qty: 20 tablet, Refills: 3      nitroGLYCERIN (NITROSTAT) 0.4 MG SL tablet ONE TABLET UNDER THE TONGUE EVERY 5 MINUTES AS NEEDED FOR CHEST PAIN  Qty: 100 tablet, Refills: 3    Comments: dispense 25 tablets x4 bottles      nystatin (MYCOSTATIN) powder Apply topically 4 (four) times daily.  Qty: 60 g, Refills: 3    Associated Diagnoses: Intertriginous candidiasis      pen needle, diabetic (BD ULTRA-FINE SHORT PEN NEEDLE) 31 gauge x 5/16" Ndle USE WITH INSULIN PENS UP TO THREE TIMES DAILY  Qty: 100 each, Refills: 6    Associated Diagnoses: Type 2 diabetes mellitus with diabetic polyneuropathy, without long-term current use of insulin      polyethylene glycol (GLYCOLAX) 17 gram/dose powder Take 17 g by mouth daily as needed (CONSTIPATION).      psyllium (METAMUCIL) powder Take 1 packet by mouth daily as needed (CONSTIPATION).      triamcinolone acetonide 0.025% (KENALOG) 0.025 % Oint Apply topically 2 (two) times daily.  Qty: 80 g, Refills: 2    Associated Diagnoses: Allergic contact dermatitis, unspecified trigger      !! TRUE METRIX GLUCOSE TEST STRIP Strp TEST THREE TIMES DAILY  Qty: 300 strip, Refills: 3    Associated Diagnoses: Insulin-requiring or dependent type II diabetes mellitus      valACYclovir (VALTREX) 1000 MG tablet Take 1 tablet (1,000 mg total) by mouth 3 (three) times daily. for 10 days  Qty: 30 tablet, Refills: 0    Associated Diagnoses: Herpes zoster without complication       !! - Potential duplicate medications found. Please discuss with provider.          Allergies:  Review of patient's allergies indicates:   Allergen Reactions    Codeine Itching and Nausea Only              Review of Systems   Respiratory:  Positive for shortness of breath.    Cardiovascular:  Positive for chest pain.         Physical Exam:     Initial Vitals " [06/25/24 1219]   BP Pulse Resp Temp SpO2   136/73 72 18 98 °F (36.7 °C) 99 %      MAP       --         Physical Exam    Nursing note and vitals reviewed.  Constitutional: She appears well-developed and well-nourished. She is not diaphoretic. No distress.   Appears elderly, in no distress.    HENT:   Head: Normocephalic and atraumatic.   Mouth/Throat: Oropharynx is clear and moist.   Eyes: EOM are normal. Pupils are equal, round, and reactive to light.   Neck: No tracheal deviation present.   Cardiovascular:  Normal rate, regular rhythm, normal heart sounds and intact distal pulses.           Pulmonary/Chest: Breath sounds normal. No stridor. No respiratory distress. She has no wheezes.   Abdominal: Abdomen is soft. Bowel sounds are normal. She exhibits no distension and no mass. There is no abdominal tenderness.   Musculoskeletal:         General: No edema. Normal range of motion.     Neurological: She is alert and oriented to person, place, and time. She has normal strength. No cranial nerve deficit or sensory deficit.   Skin: Skin is warm and dry. Capillary refill takes less than 2 seconds. No pallor.   Psychiatric: She has a normal mood and affect. Her behavior is normal. Thought content normal.         Differential Diagnoses:   Based on available information and initial assessment, Differential Diagnosis includes, but is not limited to:  ACS/MI, PE, aortic dissection, pneumothorax, cardiac tamponade, pericarditis/myocarditis, pneumonia, infection/abscess, lung mass, trauma/fracture, costochondritis/pleurisy, MSK pain/contusion, GERD, biliary disease, pancreatitis, anemia      ED Management:   Procedures    Orders Placed This Encounter    X-Ray Chest AP Portable    CT Chest Abdomen Without Contrast (XPD)    CBC Without Differential    Comprehensive metabolic panel    Troponin I    Brain natriuretic peptide    Basic metabolic panel    Hemoglobin A1c    CBC auto differential    Magnesium    Ambulatory  referral/consult to Outpatient Case Management    Diet diabetic Fluid - 1500mL    Vital signs    Cardiac Monitoring - Adult    Fluid restriction    Height and weight On admission. Standing Weight Method required.    Daily weights On presentation to floor. Standing Weight Method required.    Strict intake and output 1.5 Liters maximum per 24 hours.    Strict intake and output 1.5 Liters maximum per 24 hours.    Notify Physician    Notify Physician - Potential Need of Opioid Reversal    Place sequential compression device    If any glucose result is less than 50 or greater than 400:    If 2nd result is less than 50 or greater than 400:    If glucose greater than 400 mg/dL treat per correction scale.  If glucose remains elevated above 400 mg/dL at next scheduled check, notify provider    Do not admin Aspart correction between scheduled prandial Aspart    Recheck Blood Glucose:    Delirium Precautions    Skin assessment    Moisture Management    Sacral Protection    Elevate heels off of bed    Check Medical Devices for Pressure    Full code    Inpatient consult to Social Work/Case Management    Inpatient consult to Registered Dietitian/Nutritionist    OT evaluate and treat    PT evaluate and treat    Pulse Oximetry Q4H    EKG 12-lead    Echo    Insert peripheral IV    Place in Observation    Fall precautions    Fall precautions    POCT glucose    POCT glucose    POCT glucose    POCT glucose    POCT glucose    furosemide injection 20 mg    acetaminophen tablet 500 mg    nitroGLYCERIN SL tablet 0.4 mg    amLODIPine tablet 5 mg    aspirin chewable tablet 81 mg    atorvastatin tablet 40 mg    diclofenac sodium 1 % gel 2 g    fluorometholone 0.1% ophthalmic suspension 1 drop    fluticasone furoate-vilanteroL 100-25 mcg/dose diskus inhaler 1 puff    isosorbide mononitrate 24 hr tablet 60 mg    levothyroxine tablet 88 mcg    latanoprost 0.005 % ophthalmic solution 1 drop    metoprolol tartrate (LOPRESSOR) tablet 25 mg     sodium chloride 0.9% flush 10 mL    polyethylene glycol packet 17 g    glucose chewable tablet 16 g    glucose chewable tablet 24 g    glucagon (human recombinant) injection 1 mg    insulin aspart U-100 pen 0-5 Units    dextrose 10% bolus 125 mL 125 mL    dextrose 10% bolus 250 mL 250 mL    furosemide injection 40 mg    acetaminophen tablet 650 mg    IP VTE HIGH RISK PATIENT    Ambulate With Assistance    Turn patient every 2 hours          EKG:   EKG interpretation by ED attending physician:  A-fib, rate 74, no ST changes, no ischemia, normal intervals.  Compared with prior EKG dated 03/2023, grossly stable without significant change.      Labs:     Labs Reviewed   CBC WITHOUT DIFFERENTIAL - Abnormal; Notable for the following components:       Result Value    RDW 14.6 (*)     All other components within normal limits   COMPREHENSIVE METABOLIC PANEL - Abnormal; Notable for the following components:    Glucose 128 (*)     BUN 38 (*)     Creatinine 1.8 (*)     eGFR 25 (*)     Anion Gap 6 (*)     All other components within normal limits   B-TYPE NATRIURETIC PEPTIDE - Abnormal; Notable for the following components:     (*)     All other components within normal limits   CBC W/ AUTO DIFFERENTIAL - Abnormal; Notable for the following components:    RBC 3.96 (*)     Hemoglobin 11.7 (*)     Hematocrit 35.9 (*)     RDW 14.6 (*)     Platelets 133 (*)     All other components within normal limits   TROPONIN I   POCT GLUCOSE MONITORING CONTINUOUS     Independent review of the labs ordered include:   See ED course    Imaging:     Imaging Results              X-Ray Chest AP Portable (Final result)  Result time 06/25/24 12:46:13      Final result by Kelton Patino III, MD (06/25/24 12:46:13)                   Impression:      Cardiomegaly and possible mild CHF.      Electronically signed by: Kelton Patino MD  Date:    06/25/2024  Time:    12:46               Narrative:    EXAMINATION:  XR CHEST AP PORTABLE    CLINICAL  HISTORY:  chest pain, SOB;    FINDINGS:  There is cardiomegaly DJD and aortic plaque.  Lungs are clear.  Bones are noncontributory.                                         Medications Given:     Medications   amLODIPine tablet 5 mg (5 mg Oral Given 6/26/24 0838)   aspirin chewable tablet 81 mg (81 mg Oral Given 6/26/24 0636)   atorvastatin tablet 40 mg (has no administration in time range)   diclofenac sodium 1 % gel 2 g (2 g Topical (Top) Given 6/26/24 0900)   fluorometholone 0.1% ophthalmic suspension 1 drop (1 drop Both Eyes Given 6/25/24 2033)   fluticasone furoate-vilanteroL 100-25 mcg/dose diskus inhaler 1 puff (1 puff Inhalation Given 6/26/24 1001)   isosorbide mononitrate 24 hr tablet 60 mg (60 mg Oral Given 6/26/24 0838)   levothyroxine tablet 88 mcg (88 mcg Oral Given 6/26/24 0636)   latanoprost 0.005 % ophthalmic solution 1 drop (1 drop Both Eyes Given 6/26/24 0839)   metoprolol tartrate (LOPRESSOR) tablet 25 mg (25 mg Oral Given 6/26/24 0839)   sodium chloride 0.9% flush 10 mL (has no administration in time range)   polyethylene glycol packet 17 g (17 g Oral Given 6/26/24 0838)   glucose chewable tablet 16 g (has no administration in time range)   glucose chewable tablet 24 g (has no administration in time range)   glucagon (human recombinant) injection 1 mg (has no administration in time range)   insulin aspart U-100 pen 0-5 Units (has no administration in time range)   dextrose 10% bolus 125 mL 125 mL (has no administration in time range)   dextrose 10% bolus 250 mL 250 mL (has no administration in time range)   furosemide injection 40 mg (40 mg Intravenous Given 6/26/24 0839)   acetaminophen tablet 650 mg (650 mg Oral Given 6/26/24 0337)   furosemide injection 20 mg (20 mg Intravenous Given 6/25/24 1338)   acetaminophen tablet 500 mg (500 mg Oral Given 6/25/24 1338)   nitroGLYCERIN SL tablet 0.4 mg (0.4 mg Sublingual Given 6/25/24 1359)        Medical Decision Making:    Additional Consideration:    Additional testing considered during clinical course: none    Social determinants of health considered during development of treatment plan include: poor access to care    Current co-morbidities considered which impacted clinical decision making: a-fib, asthma, HTN, DM, hypothyroidism, HLD, CKD, CAD/MI, CHF    Case discussed with additional provider:  service, Dr. Matta, will admit for diuresis, troponin trend, and ACS rule out    ED Course as of 06/26/24 1245   Tue Jun 25, 2024   1312 SpO2: 99 % [SS]   1312 Resp: 18 [SS]   1312 Pulse: 72 [SS]   1312 Temp Source: Oral [SS]   1312 Temp: 98 °F (36.7 °C) [SS]   1312 BP: 136/73  Vitals reassuring, afebrile [SS]   1312 Brain natriuretic peptide(!)  BNP elevated, chronic and stable from prior [SS]   1312 Troponin I  WNL [SS]   1312 Comprehensive metabolic panel(!)  Stable CKD [SS]   1312 CBC Without Differential(!)  Unremarkable  [SS]   1316 X-Ray Chest AP Portable  CXR independently interpreted: cardiomegaly, mild edema consistent with CHF.  Agree with radiologist interpretation.    [SS]   1420 SL nitro given with improvement in pain.  [SS]      ED Course User Index  [SS] Rod Ceja MD            Medical Decision Making  89 yo F with a-fib, asthma, HTN, DM, hypothyroidism, HLD, CKD, CAD/MI, CHF presents to ED with chest pain and SOB ongoing for the last few days.  EKG shows AFib, rate controlled.  Labs with elevated BNP, troponin normal.  CXR with cardiomegaly and mild edema.  Patient given sublingual nitro with improvement in chest pain.  Given age and risk factors, will place in observation for troponin trend, diuresis, ACS rule out.    Problems Addressed:  Acute on chronic congestive heart failure, unspecified heart failure type: acute illness or injury  Chest pain: acute illness or injury  Shortness of breath: acute illness or injury    Amount and/or Complexity of Data Reviewed  External Data Reviewed: notes.  Labs: ordered. Decision-making details  documented in ED Course.  Radiology: ordered and independent interpretation performed. Decision-making details documented in ED Course.  ECG/medicine tests: ordered and independent interpretation performed. Decision-making details documented in ED Course.    Risk  Decision regarding hospitalization.  Diagnosis or treatment significantly limited by social determinants of health.        Clinical Impression:       ICD-10-CM ICD-9-CM   1. Essential hypertension  I10 401.9   2. Chest pain  R07.9 786.50   3. CHF (congestive heart failure)  I50.9 428.0   4. Hyperlipidemia associated with type 2 diabetes mellitus  E11.69 250.80    E78.5 272.4         Follow-up Information    None          ED Disposition Condition    Observation                 On re-evaluation, the patient's status has remained stable.  At this time, I believe the patient should be admitted to the hospital for further evaluation and management.  The consulting physician/team agrees with plan and will admit under their service.   The patient and family were updated with test results, overall impression, and further plan of care. All questions were answered.       Rod Ceja MD  06/27/24 0859

## 2024-06-25 NOTE — ASSESSMENT & PLAN NOTE
History noted, currently controlled with amlodipine, isosorbide mononitrate and furosemide    -continue amlodipine, isosorbide mononitrate and furosemide

## 2024-06-25 NOTE — ASSESSMENT & PLAN NOTE
History noted, patient followed by ophthalmologist outpatient. 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

## 2024-06-25 NOTE — SUBJECTIVE & OBJECTIVE
Past Medical History:   Diagnosis Date    Allergy     Anemia     Arthritis     Asthma     Chronic kidney disease     Degenerative disc disease     Diabetes mellitus type II     Diastolic heart failure 05/02/2017    Essential hypertension 7/3/2012    Glaucoma     History of insulin dependent diabetes mellitus, for many years stopped because of chronic kidney November 2020.  9/9/2021    History of pseudogout 8/23/2012    Hyperlipidemia     Hypertension     Iritis     Myocardial infarction     Pneumonia     Thrombocytopenia 8/24/2021    Thyroid disease        Past Surgical History:   Procedure Laterality Date    CARDIAC CATHETERIZATION  2010    no obstructive disease    CATARACT EXTRACTION W/  INTRAOCULAR LENS IMPLANT Left n/a    CATARACT EXTRACTION W/  INTRAOCULAR LENS IMPLANT Right 10/8/2018    With Femtosecond LASER assist (Dr. Henson)    CHOLECYSTECTOMY      ESOPHAGOGASTRODUODENOSCOPY N/A 11/4/2020    Procedure: EGD (ESOPHAGOGASTRODUODENOSCOPY);  Surgeon: Tomás Mccall MD;  Location: [address]);  Service: Endoscopy;  Laterality: N/A;    EYE SURGERY      gall stone      HYSTERECTOMY      VARICOSE VEIN SURGERY         Review of patient's allergies indicates:   Allergen Reactions    Codeine Itching and Nausea Only              No current facility-administered medications on file prior to encounter.     Current Outpatient Medications on File Prior to Encounter   Medication Sig    acetaminophen (TYLENOL) 500 MG tablet Take 2 tablets (1,000 mg total) by mouth 3 (three) times daily as needed for Pain (muscle pain in arms and legs).    albuterol (PROVENTIL/VENTOLIN HFA) 90 mcg/actuation inhaler Inhale 2 puffs into the lungs every 4 (four) hours as needed for Wheezing.    amLODIPine (NORVASC) 5 MG tablet TAKE 1 TABLET(5 MG) BY MOUTH EVERY MORNING    aspirin 81 MG Chew Take 1 tablet (81 mg total) by mouth every morning.    atorvastatin (LIPITOR) 40 MG tablet TAKE 1 TABLET BY MOUTH EVERY EVENING    diclofenac sodium  (VOLTAREN) 1 % Gel APPLY 2 GRAMS TOPICALLY TO THE AFFECTED AREA TWICE DAILY    fluticasone-salmeterol diskus inhaler 250-50 mcg Inhale 1 puff into the lungs 2 (two) times daily.    furosemide (LASIX) 40 MG tablet TAKE 1 TABLET BY MOUTH EVERY MORNING AND 1 TABLET AT 4PM    isosorbide mononitrate (IMDUR) 60 MG 24 hr tablet TAKE 1 TABLET BY MOUTH EVERY MORNING FOR HEART, TO PREVENT CHEST PAINS OR SHORTNESS OF BREATH    lancets (TRUEPLUS LANCETS) 33 gauge Misc Apply 1 lancet topically once daily. No longer on insulin because of CKD    lancets Misc To check BG 3 time daily, to use with insurance preferred meter    levothyroxine (SYNTHROID) 88 MCG tablet TAKE 1 TABLET(88 MCG) BY MOUTH DAILY BEFORE BREAKFAST    metoprolol tartrate (LOPRESSOR) 25 MG tablet TAKE 1 TABLET(25 MG) BY MOUTH TWICE DAILY    multivit-mineral-iron-lutein Tab Take 1 tablet by mouth once daily.    blood sugar diagnostic (ONETOUCH ULTRA TEST) Strp 1 strip by In Vitro route once daily. No longer on insulin because of CKD    blood-glucose meter kit To check BG 1 times daily, to use with insurance preferred meter    clotrimazole-betamethasone 1-0.05% (LOTRISONE) cream Apply topically 2 (two) times daily as needed. For intertriginous itching in the bend of the leg    dorzolamide (TRUSOPT) 2 % ophthalmic solution Place 1 drop into both eyes 3 (three) times daily.    fluorometholone 0.1% (FML) 0.1 % DrpS Place 1 drop into both eyes once daily.    insulin aspart U-100 (NOVOLOG FLEXPEN U-100 INSULIN) 100 unit/mL (3 mL) InPn pen Inject if blood sugar is >180, 180-230+2, 231-280+4, 281-330+6, 331-380+8,>380+10 MAX daily 30 units.    ketoconazole (NIZORAL) 2 % shampoo Apply topically every 7 days. Soak scalp for 15-30 minutes    latanoprost 0.005 % ophthalmic solution Place 1 drop into both eyes every evening.    meclizine (ANTIVERT) 12.5 mg tablet TAKE 1 TABLET BY MOUTH THREE TIMES DAILY AS NEEDED FOR DIZZINESS    nitroGLYCERIN (NITROSTAT) 0.4 MG SL tablet ONE  "TABLET UNDER THE TONGUE EVERY 5 MINUTES AS NEEDED FOR CHEST PAIN    nystatin (MYCOSTATIN) powder Apply topically 4 (four) times daily.    pen needle, diabetic (BD ULTRA-FINE SHORT PEN NEEDLE) 31 gauge x 5/16" Ndle USE WITH INSULIN PENS UP TO THREE TIMES DAILY    polyethylene glycol (GLYCOLAX) 17 gram/dose powder Take 17 g by mouth daily as needed (CONSTIPATION).    psyllium (METAMUCIL) powder Take 1 packet by mouth daily as needed (CONSTIPATION).    triamcinolone acetonide 0.025% (KENALOG) 0.025 % Oint Apply topically 2 (two) times daily.    TRUE METRIX GLUCOSE TEST STRIP Strp TEST THREE TIMES DAILY    valACYclovir (VALTREX) 1000 MG tablet Take 1 tablet (1,000 mg total) by mouth 3 (three) times daily. for 10 days    [DISCONTINUED] albuterol (PROVENTIL/VENTOLIN HFA) 90 mcg/actuation inhaler Inhale 1-2 puffs into the lungs every 6 (six) hours as needed for Wheezing. Rescue     Family History       Problem Relation (Age of Onset)    Atrial fibrillation Daughter    Diabetes Mother, Sister, Sister, Paternal Grandfather, Daughter, Son    Glaucoma Mother    Heart attack Sister    Heart disease Sister, Brother, Brother, Son    Hypertension Mother, Father, Sister, Daughter, Daughter, Son    Hypotension Sister    Kidney disease Mother, Sister, Sister    Leukemia Sister    No Known Problems Sister, Sister, Brother, Maternal Grandmother, Maternal Grandfather, Paternal Grandmother, Son          Tobacco Use    Smoking status: Never    Smokeless tobacco: Never   Substance and Sexual Activity    Alcohol use: No    Drug use: No    Sexual activity: Never     Review of Systems   Constitutional:  Negative for activity change, appetite change, chills and fever.   HENT:  Negative for congestion, sore throat and trouble swallowing.    Eyes:  Negative for photophobia and visual disturbance.   Respiratory:  Positive for shortness of breath. Negative for cough and chest tightness.    Cardiovascular:  Positive for chest pain. Negative for " palpitations and leg swelling.   Gastrointestinal:  Negative for abdominal pain, diarrhea and nausea.   Genitourinary:  Negative for dysuria, flank pain and hematuria.   Musculoskeletal:  Negative for back pain.   Neurological:  Negative for dizziness, weakness and headaches.   Psychiatric/Behavioral:  Negative for confusion.      Objective:     Vital Signs (Most Recent):  Temp: 98 °F (36.7 °C) (24 1219)  Pulse: 72 (24 1602)  Resp: (!) 21 (24 1539)  BP: 137/68 (24 1602)  SpO2: 98 % (24 1602) Vital Signs (24h Range):  Temp:  [98 °F (36.7 °C)] 98 °F (36.7 °C)  Pulse:  [63-72] 72  Resp:  [18-22] 21  SpO2:  [98 %-100 %] 98 %  BP: (117-137)/(60-73) 137/68     Weight: 65.8 kg (145 lb)  Body mass index is 22.05 kg/m².     Physical Exam  Vitals reviewed.   Constitutional:       Appearance: Normal appearance. She is normal weight.   HENT:      Head: Normocephalic.      Mouth/Throat:      Mouth: Mucous membranes are moist.      Pharynx: Oropharynx is clear.   Eyes:      General: Lids are normal. Visual field deficit (left eye blind) present.      Conjunctiva/sclera: Conjunctivae normal.      Comments: Erythema to left eye   Cardiovascular:      Rate and Rhythm: Normal rate. Rhythm irregularly irregular.      Pulses: Normal pulses.      Heart sounds: Murmur heard.   Pulmonary:      Effort: Pulmonary effort is normal.      Breath sounds: Decreased breath sounds present.   Abdominal:      General: Bowel sounds are normal.      Palpations: Abdomen is soft.   Musculoskeletal:         General: Normal range of motion.      Cervical back: Normal range of motion.      Right lower le+ Edema present.      Left lower le+ Edema present.   Skin:     General: Skin is warm and dry.   Neurological:      Mental Status: She is alert and oriented to person, place, and time. Mental status is at baseline.   Psychiatric:         Mood and Affect: Mood normal.                Significant Labs: All pertinent labs  within the past 24 hours have been reviewed.  CBC:   Recent Labs   Lab 06/25/24  1236 06/25/24  1546   WBC 5.51 5.19   HGB 12.3 11.7*   HCT 37.6 35.9*    133*     CMP:   Recent Labs   Lab 06/25/24  1236      K 4.0      CO2 28   *   BUN 38*   CREATININE 1.8*   CALCIUM 9.8   PROT 7.0   ALBUMIN 3.9   BILITOT 0.6   ALKPHOS 106   AST 25   ALT 24   ANIONGAP 6*       Significant Imaging: I have reviewed all pertinent imaging results/findings within the past 24 hours.  Imaging Results              X-Ray Chest AP Portable (Final result)  Result time 06/25/24 12:46:13      Final result by Kelton Patino III, MD (06/25/24 12:46:13)                   Impression:      Cardiomegaly and possible mild CHF.      Electronically signed by: Kelton Patino MD  Date:    06/25/2024  Time:    12:46               Narrative:    EXAMINATION:  XR CHEST AP PORTABLE    CLINICAL HISTORY:  chest pain, SOB;    FINDINGS:  There is cardiomegaly DJD and aortic plaque.  Lungs are clear.  Bones are noncontributory.

## 2024-06-25 NOTE — ASSESSMENT & PLAN NOTE
History noted, currently taking levothyroxine daily    -will continue levothyroxine 88 mcg daily

## 2024-06-25 NOTE — ASSESSMENT & PLAN NOTE
History noted, patient currently rate controlled. Takes metoprolol BID and ASA for anticoagulation    -given advanced age and fall risk will defer enoxaparin and will continue ASA  -continue metoprolol BID  -continue to monitor on telemetry

## 2024-06-26 LAB
ANION GAP SERPL CALC-SCNC: 8 MMOL/L (ref 8–16)
ASCENDING AORTA: 2.98 CM
AV PEAK GRADIENT: 16 MMHG
AV VALVE AREA BY VELOCITY RATIO: 2.48 CM²
AV VELOCITY RATIO: 0.82
BSA FOR ECHO PROCEDURE: 1.84 M2
BUN SERPL-MCNC: 35 MG/DL (ref 10–30)
CALCIUM SERPL-MCNC: 9.6 MG/DL (ref 8.7–10.5)
CHLORIDE SERPL-SCNC: 104 MMOL/L (ref 95–110)
CO2 SERPL-SCNC: 29 MMOL/L (ref 23–29)
CREAT SERPL-MCNC: 1.7 MG/DL (ref 0.5–1.4)
CV ECHO LV RWT: 0.82 CM
DOP CALC AO PEAK VEL: 2 M/S
DOP CALC LVOT AREA: 3 CM2
DOP CALC LVOT DIAMETER: 1.97 CM
DOP CALC LVOT PEAK VEL: 1.63 M/S
DOP CALC LVOT STROKE VOLUME: 94.14 CM3
DOP CALC MV VTI: 71.6 CM
DOP CALC RVOT PEAK VEL: 0.63 M/S
DOP CALCLVOT PEAK VEL VTI: 30.9 CM
ECHO LV POSTERIOR WALL: 1.6 CM (ref 0.6–1.1)
EJECTION FRACTION: 80 %
EST. GFR  (NO RACE VARIABLE): 27 ML/MIN/1.73 M^2
FRACTIONAL SHORTENING: 34 % (ref 28–44)
GLOBAL LONGITUIDAL STRAIN: 15 %
GLUCOSE SERPL-MCNC: 96 MG/DL (ref 70–110)
INTERVENTRICULAR SEPTUM: 1.7 CM (ref 0.6–1.1)
IVC DIAMETER: 2.83 CM
LA MAJOR: 7.63 CM
LA MINOR: 7.18 CM
LA WIDTH: 5 CM
LEFT ATRIUM AREA SYSTOLIC (APICAL 2 CHAMBER): 32.28 CM2
LEFT ATRIUM AREA SYSTOLIC (APICAL 4 CHAMBER): 36.6 CM2
LEFT ATRIUM SIZE: 5.2 CM
LEFT ATRIUM VOLUME INDEX MOD: 72.9 ML/M2
LEFT ATRIUM VOLUME INDEX: 88.9 ML/M2
LEFT ATRIUM VOLUME MOD: 134.09 CM3
LEFT ATRIUM VOLUME: 163.5 CM3
LEFT INTERNAL DIMENSION IN SYSTOLE: 2.58 CM (ref 2.1–4)
LEFT VENTRICLE DIASTOLIC VOLUME INDEX: 36.32 ML/M2
LEFT VENTRICLE DIASTOLIC VOLUME: 66.82 ML
LEFT VENTRICLE END SYSTOLIC VOLUME APICAL 2 CHAMBER: 119.29 ML
LEFT VENTRICLE END SYSTOLIC VOLUME APICAL 4 CHAMBER: 137.64 ML
LEFT VENTRICLE MASS INDEX: 143 G/M2
LEFT VENTRICLE SYSTOLIC VOLUME INDEX: 13.2 ML/M2
LEFT VENTRICLE SYSTOLIC VOLUME: 24.25 ML
LEFT VENTRICULAR INTERNAL DIMENSION IN DIASTOLE: 3.92 CM (ref 3.5–6)
LEFT VENTRICULAR MASS: 263.62 G
LVED V (TEICH): 66.82 ML
LVES V (TEICH): 24.25 ML
LVOT MG: 7.61 MMHG
LVOT MV: 1.33 CM/S
MV MEAN GRADIENT: 6 MMHG
MV PEAK GRADIENT: 20 MMHG
MV VALVE AREA BY CONTINUITY EQUATION: 1.31 CM2
PISA MRMAX VEL: 4.37 M/S
PISA TR MAX VEL: 3.03 M/S
POCT GLUCOSE: 108 MG/DL (ref 70–110)
POCT GLUCOSE: 147 MG/DL (ref 70–110)
POTASSIUM SERPL-SCNC: 4.4 MMOL/L (ref 3.5–5.1)
PV PEAK GRADIENT: 9 MMHG
PV PEAK VELOCITY: 1.54 M/S
RA MAJOR: 7.62 CM
RA PRESSURE ESTIMATED: 3 MMHG
RA WIDTH: 4.8 CM
RIGHT VENTRICLE DIASTOLIC BASEL DIMENSION: 4.3 CM
RV TB RVSP: 6 MMHG
RV TISSUE DOPPLER FREE WALL SYSTOLIC VELOCITY 1 (APICAL 4 CHAMBER VIEW): 10.56 CM/S
SINUS: 2.7 CM
SODIUM SERPL-SCNC: 141 MMOL/L (ref 136–145)
STJ: 2.36 CM
TDI LATERAL: 0.06 M/S
TDI SEPTAL: 0.05 M/S
TDI: 0.06 M/S
TR MAX PG: 37 MMHG
TRICUSPID ANNULAR PLANE SYSTOLIC EXCURSION: 1.5 CM
TV REST PULMONARY ARTERY PRESSURE: 40 MMHG
Z-SCORE OF LEFT VENTRICULAR DIMENSION IN END DIASTOLE: -2.64
Z-SCORE OF LEFT VENTRICULAR DIMENSION IN END SYSTOLE: -1.58

## 2024-06-26 PROCEDURE — 94640 AIRWAY INHALATION TREATMENT: CPT | Mod: HCNC

## 2024-06-26 PROCEDURE — 63600175 PHARM REV CODE 636 W HCPCS: Mod: HCNC | Performed by: NURSE PRACTITIONER

## 2024-06-26 PROCEDURE — 25000242 PHARM REV CODE 250 ALT 637 W/ HCPCS: Mod: HCNC | Performed by: NURSE PRACTITIONER

## 2024-06-26 PROCEDURE — 25000003 PHARM REV CODE 250: Mod: HCNC | Performed by: NURSE PRACTITIONER

## 2024-06-26 PROCEDURE — G0378 HOSPITAL OBSERVATION PER HR: HCPCS | Mod: HCNC

## 2024-06-26 PROCEDURE — 94761 N-INVAS EAR/PLS OXIMETRY MLT: CPT | Mod: HCNC

## 2024-06-26 PROCEDURE — 36415 COLL VENOUS BLD VENIPUNCTURE: CPT | Mod: HCNC | Performed by: NURSE PRACTITIONER

## 2024-06-26 PROCEDURE — 80048 BASIC METABOLIC PNL TOTAL CA: CPT | Mod: HCNC | Performed by: NURSE PRACTITIONER

## 2024-06-26 RX ORDER — TALC
3 POWDER (GRAM) TOPICAL NIGHTLY PRN
Start: 2024-06-26

## 2024-06-26 RX ORDER — DICLOFENAC SODIUM 10 MG/G
2 GEL TOPICAL DAILY PRN
Status: DISCONTINUED | OUTPATIENT
Start: 2024-06-26 | End: 2024-06-26 | Stop reason: HOSPADM

## 2024-06-26 RX ORDER — TRAMADOL HYDROCHLORIDE 50 MG/1
25 TABLET ORAL EVERY 12 HOURS PRN
Qty: 5 TABLET | Refills: 0 | Status: SHIPPED | OUTPATIENT
Start: 2024-06-26 | End: 2024-07-01

## 2024-06-26 RX ORDER — ACETAMINOPHEN 325 MG/1
650 TABLET ORAL EVERY 6 HOURS PRN
Status: DISCONTINUED | OUTPATIENT
Start: 2024-06-26 | End: 2024-06-26 | Stop reason: HOSPADM

## 2024-06-26 RX ADMIN — AMLODIPINE BESYLATE 5 MG: 5 TABLET ORAL at 08:06

## 2024-06-26 RX ADMIN — ASPIRIN 81 MG CHEWABLE TABLET 81 MG: 81 TABLET CHEWABLE at 06:06

## 2024-06-26 RX ADMIN — POLYETHYLENE GLYCOL 3350 17 G: 17 POWDER, FOR SOLUTION ORAL at 08:06

## 2024-06-26 RX ADMIN — LATANOPROST 1 DROP: 50 SOLUTION OPHTHALMIC at 08:06

## 2024-06-26 RX ADMIN — METOPROLOL TARTRATE 25 MG: 25 TABLET, FILM COATED ORAL at 08:06

## 2024-06-26 RX ADMIN — ISOSORBIDE MONONITRATE 60 MG: 30 TABLET, EXTENDED RELEASE ORAL at 08:06

## 2024-06-26 RX ADMIN — ACETAMINOPHEN 650 MG: 325 TABLET, FILM COATED ORAL at 03:06

## 2024-06-26 RX ADMIN — DICLOFENAC SODIUM 2 G: 10 GEL TOPICAL at 09:06

## 2024-06-26 RX ADMIN — FLUTICASONE FUROATE AND VILANTEROL TRIFENATATE 1 PUFF: 100; 25 POWDER RESPIRATORY (INHALATION) at 10:06

## 2024-06-26 RX ADMIN — FUROSEMIDE 40 MG: 10 INJECTION, SOLUTION INTRAMUSCULAR; INTRAVENOUS at 08:06

## 2024-06-26 RX ADMIN — LEVOTHYROXINE SODIUM 88 MCG: 88 TABLET ORAL at 06:06

## 2024-06-26 NOTE — PLAN OF CARE
MOON Message     Medicare Outpatient and Observation Notification regarding financial responsibility Explained to patient/caregiver; Other (comments)Medicare Outpatient and Observation Notification regarding financial responsibility. Explained to patient/caregiver; Other (comments). The comment is Verbal Consent. Taken on 6/26/24 1311   Date YU was signed 6/26/2024   Time YU was signed 0229

## 2024-06-26 NOTE — CONSULTS
Food & Nutrition  Education    Diet Education: Fluid and Sodium Restriction Diet Education  Time Spent: 15 min  Learners: patient  Current HbA1c: 6.6    Home diabetes medication(s):  Diabetes Medications               insulin aspart U-100 (NOVOLOG FLEXPEN U-100 INSULIN) 100 unit/mL (3 mL) InPn pen Inject if blood sugar is >180, 180-230+2, 231-280+4, 281-330+6, 331-380+8,>380+10 MAX daily 30 units.          Comments:  Patient sitting in chair - reports tolerated ~75% breakfast this morning of sausage, grits, coffee, fruit (did not eat eggs). Patient eats 3x/day and prepares meals at home. She is aware of low sodium diet. Patient uses salt free seasonings along with herbs and spices. She prepares meals using fresh onion, garlic, bell pepper etc. Patient enjoys cereal with milk and fruit in the morning and mary greens and  cornbread. Patient states she checks BG at home. Receiving a 1500 mL fluid restriction, patient reports previously being told to monitor fluid intake. Discussed 2 bottles of water per day + iced tea (with Splenda) or diet root beer. Patient voiced understanding.       All questions and concerns answered. Dietitian's contact information provided.     Follow-Up: yes    Please Re-consult as needed    Thanks!    Nuvia Loya, CHRISTINAN, LDN

## 2024-06-26 NOTE — PLAN OF CARE
LMSW spoke with the patient's daughter.  Patient denies the use of HH. Patient has a RW. Patients PCP is correct on the face sheet. Patient choice pharmacy is bedside. Patient's family will transport the patient home at discharge.     [facility] ([address])  Initial Discharge Assessment       Primary Care Provider: Tomás Andres MD    Admission Diagnosis: CHF (congestive heart failure) [I50.9]  Chest pain [R07.9]    Admission Date: 6/25/2024  Expected Discharge Date:     Transition of Care Barriers: None    Payor: HU[employer] MANAGED MEDICARE / Plan: HU[employer] MEDICARE HMO / Product Type: Capitation /     Extended Emergency Contact Information  Primary Emergency Contact: Yolanda Jones  Address: [address] [place] [address] [place]  Mobile Phone: [phone]  Relation: Daughter  Secondary Emergency Contact: Rose Marie Mead   [facility]  Home Phone: [phone]  Relation: Daughter    Discharge Plan A: Home with family, Home  Discharge Plan B: Home Health      [id number] - [place], [place] - [address]  [address]  [facility] [address]  Phone: [phone] Fax: [phone]    [facility] [place], [place] - [address]  [address]  [facility] [address]  Phone: [phone] Fax: [phone]    [id number] - [place], [place] - [address] AT [facility]  [address]  [facility] [address]  Phone: [phone] Fax: [phone]      Initial Assessment (most recent)       Adult Discharge Assessment - 06/26/24 0827          Discharge Assessment    Assessment Type Discharge Planning Assessment     Confirmed/corrected address, phone number and insurance Yes     Confirmed Demographics Correct on Facesheet     Source of Information family;health record     People in Home child(sarthak), adult     Do you expect to return  to your current living situation? Yes     Do you have help at home or someone to help you manage your care at home? Yes     Prior to hospitilization cognitive status: Unable to Assess     Current cognitive status: Unable to Assess     Equipment Currently Used at Home walker, rolling     Readmission within 30 days? No     Patient currently being followed by outpatient case management? No     Do you currently have service(s) that help you manage your care at home? No     Do you take prescription medications? Yes     Do you have prescription coverage? Yes     Do you have any problems affording any of your prescribed medications? No     Is the patient taking medications as prescribed? yes     How do you get to doctors appointments? family or friend will provide     Are you on dialysis? No   Patient is diabetic    Do you take coumadin? No     Discharge Plan A Home with family;Home     Discharge Plan B Home Health     Discharge Plan discussed with: Adult children     Transition of Care Barriers None

## 2024-06-26 NOTE — DISCHARGE SUMMARY
[facility] ([address] Medicine  Discharge Summary      Patient Name: Tiana Mead  MRN: [id number]  RBOERTO: [id number]  Patient Class: OP- Observation  Admission Date: 6/25/2024  Hospital Length of Stay: 0 days  Discharge Date and Time:  06/26/2024 3:37 PM  Attending Physician: Carli Matta MD   Discharging Provider: Jeanine Vaughan DNP  Primary Care Provider: Tomás Andres MD    Primary Care Team: Networked reference to record PCT     HPI:   Tiana Mead is a 89 year old female with a past medical history of anemia, CKD, DM type 2, hypothyroidism, CHF, glaucoma, HLD, afib, and HLD who presents with right sided chest pain with associated shortness of breath that started a few days ago. Patient states pain is felt under her right breast and worsens with movement. Patient denies fever, chills, nausea and vomiting.     ED work up significant forelevated , stable anemia, creatinine 1.8 (baseline 1.5-1.8) and initial troponin negative. EKG showed atrial fibrillation with rate 74. Chest xray revealed cardiomegaly with mild CHF. Patient received 20mg IV lasix in the ED and was referred to hospital medicine. Patient will be placed in observation for further evaluation and management.     * No surgery found *      Hospital Course:   Mrs Montiel has been admitted for right sided chest pain that started over one week ago. It is intermittent and associated with movement and arm extensions. She has shortness of breath at baseline that is not associated with the right chest pain. Due to risk factors for ACS, a cardiac workup was initiated in the ED.  No diagnostics indicat cardiac etiology. CT chest and abdomen show small pleural effusion and small pericardial effusion.  There are also multiple renal cysts that have been present on previous imaging. Outpatient referral sent to urology for renal cyst follow up.  Assessment points towards musculoskeletal pain and is controlled with  tylenol. Mrs Montiel will work with PT and OT and continue it outpatient with .  Discussed findings and plan with daughter, Yolanda. She is in agreement. Mrs Montiel will be discharged home this afternoon and f/u with her PCP.      Goals of Care Treatment Preferences:  Code Status: Full Code      Consults:   Consults (From admission, onward)          Status Ordering Provider     Inpatient consult to Social Work/Case Management  Once        Provider:  (Not yet assigned)    Completed PHYLICIA YANCEY     Inpatient consult to Registered Dietitian/Nutritionist  Once        Provider:  (Not yet assigned)    Completed PHYLICIA YANCEY            Cardiac/Vascular  * Acute on chronic diastolic congestive heart failure  History noted, presenting with increased shortness of breath, mild lower extremity edema and chest pain. . Chest xray revealed cardiomegaly and mild CHF. Patient is compliant with Lasix at home and took home dose (40mg) this morning. Most recent echo performed 2021 showed EF 70%. She is followed by Dr. Alanis with cardiology    -echo pending  -continue lasix BID  -strict intake/output  -fluid restriction and daily weights      Permanent atrial fibrillation  History noted, patient currently rate controlled. Takes metoprolol BID and ASA for anticoagulation    -given advanced age and fall risk will defer enoxaparin and will continue ASA  -continue metoprolol BID  -continue to monitor on telemetry      Renal/  Stage 4 chronic kidney disease  History noted, creatinine 1.8 (baseline appears 1.5-1.8)    -continue to monitor BMP    Endocrine  Type 2 diabetes mellitus, with long-term current use of insulin  History noted, most recent A1C 6.8 on 4/4/24. 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] 05/29/2017 Yes    Permanent atrial fibrillation [I48.21] 04/20/2017 Yes    Essential hypertension [I10] 07/03/2012 Yes    Hyperlipidemia associated with type 2 diabetes mellitus [E11.69, E78.5] 07/03/2012 Yes      Problems Resolved During this Admission:       Discharged Condition:  baseline    Disposition: Home or Self Care    Follow Up:    Patient Instructions:      Ambulatory referral/consult to Outpatient Case Management   Referral Priority: Routine Referral Type: Consultation   Referral Reason: Specialty Services Required   Number of Visits Requested: 1     Ambulatory referral/consult to Urology   Standing Status: Future   Referral Priority: Routine Referral Type: Consultation   Referral Reason: Specialty Services Required   Requested Specialty: Urology   Number of Visits Requested: 1     Diet diabetic     Call MD for:  temperature >100.4     Call MD for:  persistent nausea and vomiting or diarrhea     Call MD for:  severe uncontrolled pain     Call MD for:  redness, tenderness, or signs of infection (pain, swelling, redness, odor or green/yellow discharge around incision site)     Call MD for:  difficulty breathing or increased cough     Call MD for:  severe persistent headache     Call MD for:  worsening rash     Call MD for:  persistent dizziness, light-headedness, or visual disturbances     Call MD for:  increased confusion or weakness     Activity as tolerated       Significant Diagnostic Studies: N/A    Pending Diagnostic Studies:       Procedure Component Value Units Date/Time    Echo [[id number]]  (Abnormal) Resulted: 06/26/24 1124    Order Status: Sent Lab Status: In process Updated: 06/26/24 1129     BSA 1.84 m2       TAPSE 1.50 cm      LA WIDTH 5.0 cm      RA Width 4.8 cm      Sinus 2.7 cm      LVOT stroke volume 94.14 cm3      LVIDd 3.92 cm      LV Systolic Volume 24.25 mL      LV Systolic Volume Index 13.2 mL/m2      LVIDs 2.58 cm      LV ESV A2C 119.29 mL      LV Diastolic Volume 66.82 mL      LV ESV A4C 137.64 mL      LV Diastolic Volume Index 36.32 mL/m2      Left Ventricular End Systolic Volume by Teichholz Method 24.25 mL      Left Ventricular End Diastolic Volume by Teichholz Method 66.82 mL      IVS 1.70 cm      LVOT diameter 1.97 cm      LVOT area 3.0 cm2      FS 34 %      Left Ventricle Relative Wall Thickness 0.53 cm      Posterior Wall 1.03 cm      LV mass 195.16 g      LV Mass Index 106 g/m2      TDI LATERAL 0.06 m/s      TDI SEPTAL 0.05 m/s      TR Max Conner 3.03 m/s      LA Volume Index 88.9 mL/m2      LA volume 163.50 cm3      LVOT peak conner 1.63 m/s      Left Ventricular Outflow Tract Mean Velocity 1.33 cm/s      Left Ventricular Outflow Tract Mean Gradient 7.61 mmHg      RV- kaba basal diam 4.3 cm      RV S' 10.56 cm/s      LA size 5.20 cm      Left Atrium Minor Axis 7.18 cm      Left Atrium Major Axis 7.63 cm      LA volume (mod) 134.09 cm3      LA Volume Index (Mod) 72.9 mL/m2      RA Major Axis 7.62 cm      AV mean gradient 19 mmHg      AV peak gradient 42 mmHg      Ao peak conner 3.25 m/s      Ao VTI 79.30 cm      LVOT peak VTI 30.90 cm      AV valve area 1.19 cm²      AV Velocity Ratio 0.50     AV index (prosthetic) 0.39     SAIMA by Velocity Ratio 1.53 cm²      Mr max conner 4.37 m/s      MV mean gradient 6 mmHg      MV peak gradient 20 mmHg      MV valve area by continuity eq 1.31 cm2      MV VTI 71.6 cm      Triscuspid Valve Regurgitation Peak Gradient 37 mmHg      PV PEAK VELOCITY 1.54 m/s      PV peak gradient 2 mmHg      RVOT peak cnoner 0.63 m/s      STJ 2.36 cm      Ascending aorta 2.98 cm      IVC diameter 2.83 cm      Mean e' 0.06 m/s      ZLVIDS -1.58     ZLVIDD -2.64     LA area A4C 36.60 cm2      LA  area A2C 32.28 cm2            Medications:  Reconciled Home Medications:      Medication List        CONTINUE taking these medications      acetaminophen 500 MG tablet  Commonly known as: TYLENOL  Take 2 tablets (1,000 mg total) by mouth 3 (three) times daily as needed for Pain (muscle pain in arms and legs).     albuterol 90 mcg/actuation inhaler  Commonly known as: PROVENTIL/VENTOLIN HFA  Inhale 2 puffs into the lungs every 4 (four) hours as needed for Wheezing.     amLODIPine 5 MG tablet  Commonly known as: NORVASC  TAKE 1 TABLET(5 MG) BY MOUTH EVERY MORNING     aspirin 81 MG Chew  Take 1 tablet (81 mg total) by mouth every morning.     atorvastatin 40 MG tablet  Commonly known as: LIPITOR  TAKE 1 TABLET BY MOUTH EVERY EVENING     * blood sugar diagnostic Strp  Commonly known as: ONETOUCH ULTRA TEST  1 strip by In Vitro route once daily. No longer on insulin because of CKD     * TRUE METRIX GLUCOSE TEST STRIP Strp  Generic drug: blood sugar diagnostic  TEST THREE TIMES DAILY     blood-glucose meter kit  To check BG 1 times daily, to use with insurance preferred meter     clotrimazole-betamethasone 1-0.05% cream  Commonly known as: LOTRISONE  Apply topically 2 (two) times daily as needed. For intertriginous itching in the bend of the leg     diclofenac sodium 1 % Gel  Commonly known as: VOLTAREN  APPLY 2 GRAMS TOPICALLY TO THE AFFECTED AREA TWICE DAILY     dorzolamide 2 % ophthalmic solution  Commonly known as: TRUSOPT  Place 1 drop into both eyes 3 (three) times daily.     fluorometholone 0.1% 0.1 % Drps  Commonly known as: FML  Place 1 drop into both eyes once daily.     fluticasone-salmeterol 250-50 mcg/dose 250-50 mcg/dose diskus inhaler  Commonly known as: ADVAIR  Inhale 1 puff into the lungs 2 (two) times daily.     furosemide 40 MG tablet  Commonly known as: LASIX  TAKE 1 TABLET BY MOUTH EVERY MORNING AND 1 TABLET AT 4PM     insulin aspart U-100 100 unit/mL (3 mL) Inpn pen  Commonly known as: NovoLOG  "Flexpen U-100 Insulin  Inject if blood sugar is >180, 180-230+2, 231-280+4, 281-330+6, 331-380+8,>380+10 MAX daily 30 units.     isosorbide mononitrate 60 MG 24 hr tablet  Commonly known as: IMDUR  TAKE 1 TABLET BY MOUTH EVERY MORNING FOR HEART, TO PREVENT CHEST PAINS OR SHORTNESS OF BREATH     ketoconazole 2 % shampoo  Commonly known as: NIZORAL  Apply topically every 7 days. Soak scalp for 15-30 minutes     * lancets 33 gauge Misc  Commonly known as: TRUEPLUS LANCETS  Apply 1 lancet topically once daily. No longer on insulin because of CKD     * lancets Misc  To check BG 3 time daily, to use with insurance preferred meter     latanoprost 0.005 % ophthalmic solution  Place 1 drop into both eyes every evening.     levothyroxine 88 MCG tablet  Commonly known as: SYNTHROID  TAKE 1 TABLET(88 MCG) BY MOUTH DAILY BEFORE BREAKFAST     meclizine 12.5 mg tablet  Commonly known as: ANTIVERT  TAKE 1 TABLET BY MOUTH THREE TIMES DAILY AS NEEDED FOR DIZZINESS     metoprolol tartrate 25 MG tablet  Commonly known as: LOPRESSOR  TAKE 1 TABLET(25 MG) BY MOUTH TWICE DAILY     multivit-mineral-iron-lutein Tab  Take 1 tablet by mouth once daily.     nitroGLYCERIN 0.4 MG SL tablet  Commonly known as: NITROSTAT  ONE TABLET UNDER THE TONGUE EVERY 5 MINUTES AS NEEDED FOR CHEST PAIN     pen needle, diabetic 31 gauge x 5/16" Ndle  Commonly known as: BD ULTRA-FINE SHORT PEN NEEDLE  USE WITH INSULIN PENS UP TO THREE TIMES DAILY     polyethylene glycol 17 gram/dose powder  Commonly known as: GLYCOLAX  Take 17 g by mouth daily as needed (CONSTIPATION).     psyllium powder  Commonly known as: METAMUCIL  Take 1 packet by mouth daily as needed (CONSTIPATION).           * This list has 4 medication(s) that are the same as other medications prescribed for you. Read the directions carefully, and ask your doctor or other care provider to review them with you.                  Indwelling Lines/Drains at time of discharge:   Lines/Drains/Airways       " Drain  Duration             Female External Urinary Catheter w/ Suction 06/25/24 1517 1 day                    Time spent on the discharge of patient: 35 minutes         Jeanine Vaughan DNP  Department of Hospital Medicine  [facility] ([address])

## 2024-06-26 NOTE — PLAN OF CARE
Problem: Adult Inpatient Plan of Care  Goal: Plan of Care Review  Outcome: Met  Goal: Patient-Specific Goal (Individualized)  Outcome: Met  Goal: Absence of Hospital-Acquired Illness or Injury  Outcome: Met  Goal: Optimal Comfort and Wellbeing  Outcome: Met  Goal: Readiness for Transition of Care  Outcome: Met     In agreement and eager for DC.  VU of DC instructions, paperwork and awaiting prescriptions. IV removed with cath tip intact, WNL.  To be DC home with family.  Will be escorted downstairs via  transport team once family has arrived. Free from falls or skin breakdown this hospital admission.

## 2024-06-26 NOTE — HOSPITAL COURSE
Mrs Montiel has been admitted for right sided chest pain that started over one week ago. It is intermittent and associated with movement and arm extensions. She has shortness of breath at baseline that is not associated with the right chest pain. Due to risk factors for ACS, a cardiac workup was initiated in the ED.  No diagnostics indicat cardiac etiology. CT chest and abdomen show small pleural effusion and small pericardial effusion.  There are also multiple renal cysts that have been present on previous imaging. Outpatient referral sent to urology for renal cyst follow up.  Assessment points towards musculoskeletal pain and is controlled with tylenol. Mrs Montiel will work with PT and OT and continue it outpatient with HH.  Discussed findings and plan with daughter, Yloanda. She is in agreement. Mrs Montiel will be discharged home this afternoon and f/u with her PCP.

## 2024-06-26 NOTE — PLAN OF CARE
[facility] ([address])      HOME HEALTH ORDERS  FACE TO FACE ENCOUNTER    Patient Name: Tiana Mead  YOB: 1925    PCP: Tomás Andres MD   PCP Address: [address] / [place] [address]  PCP Phone Number: [phone]  PCP Fax: [phone]    Encounter Date: 6/25/24    Admit to Home Health    Diagnoses:  Active Hospital Problems    Diagnosis  POA    *Acute on chronic diastolic congestive heart failure [I50.33]  Yes    Stage 4 chronic kidney disease [N18.4]  Yes    Primary hypothyroidism [E03.9]  Yes     Chronic    Type 2 diabetes mellitus, with long-term current use of insulin [E11.9, Z79.4]  Not Applicable    Glaucoma associated with ocular trauma [H40.30X0]  Yes    Permanent atrial fibrillation [I48.21]  Yes    Essential hypertension [I10]  Yes    Hyperlipidemia associated with type 2 diabetes mellitus [E11.69, E78.5]  Yes      Resolved Hospital Problems   No resolved problems to display.       Follow Up Appointments:  Future Appointments   Date Time Provider Department Center   7/10/2024  9:15 AM Aria Krishnamurthy DPM [facility]   10/11/2024  9:00 AM Adán Alanis MD [facility]       Allergies:  Review of patient's allergies indicates:   Allergen Reactions    Codeine Itching and Nausea Only              Medications: Review discharge medications with patient and family and provide education.    Current Facility-Administered Medications   Medication Dose Route Frequency Provider Last Rate Last Admin    acetaminophen tablet 650 mg  650 mg Oral Q6H PRN Abdon Jamison NP   650 mg at 06/26/24 0337    amLODIPine tablet 5 mg  5 mg Oral Daily Elvia Poole, NP   5 mg at 06/26/24 0838    aspirin chewable tablet 81 mg  81 mg Oral QAM Elvia Poole, NP   81 mg at 06/26/24 0636    atorvastatin tablet 40 mg  40 mg Oral QHS Elvia Poole, NP        dextrose 10% bolus 125 mL 125 mL  12.5 g Intravenous PRN Elvia Poole, NP         dextrose 10% bolus 250 mL 250 mL  25 g Intravenous PRN Elvia Poole, NP        diclofenac sodium 1 % gel 2 g  2 g Topical (Top) Daily Elvia Poole, NP   2 g at 06/26/24 0900    diclofenac sodium 1 % gel 2 g  2 g Topical (Top) Daily PRN Jeanine Vaughan DNP        fluorometholone 0.1% ophthalmic suspension 1 drop  1 drop Both Eyes QHS Elvia Poole, NP   1 drop at 06/25/24 2033    fluticasone furoate-vilanteroL 100-25 mcg/dose diskus inhaler 1 puff  1 puff Inhalation Daily Elvia Poole, NP   1 puff at 06/26/24 1001    furosemide injection 40 mg  40 mg Intravenous Q12H Elvia Poole NP   40 mg at 06/26/24 0839    glucagon (human recombinant) injection 1 mg  1 mg Intramuscular PRN Elvia Poole, NP        glucose chewable tablet 16 g  16 g Oral PRN Elvia Poole NP        glucose chewable tablet 24 g  24 g Oral PRN Elvia Poole NP        insulin aspart U-100 pen 0-5 Units  0-5 Units Subcutaneous QID (AC + HS) PRN Elvia Poole NP        isosorbide mononitrate 24 hr tablet 60 mg  60 mg Oral Daily Elvia Poole, NP   60 mg at 06/26/24 0838    latanoprost 0.005 % ophthalmic solution 1 drop  1 drop Both Eyes Daily Elvia Poole, NP   1 drop at 06/26/24 0839    levothyroxine tablet 88 mcg  88 mcg Oral Before breakfast Elvia Poole, NP   88 mcg at 06/26/24 0636    metoprolol tartrate (LOPRESSOR) tablet 25 mg  25 mg Oral BID Elvia Poole, NP   25 mg at 06/26/24 0839    polyethylene glycol packet 17 g  17 g Oral Daily Elvia Poole, NP   17 g at 06/26/24 0838    sodium chloride 0.9% flush 10 mL  10 mL Intravenous PRN Elvia Poole, LISBET         Current Discharge Medication List        CONTINUE these medications which have NOT CHANGED    Details   acetaminophen (TYLENOL) 500 MG tablet Take 2 tablets (1,000 mg total) by mouth 3 (three) times daily as needed for Pain (muscle pain in arms and legs).  Qty: 100 tablet, Refills: 1       albuterol (PROVENTIL/VENTOLIN HFA) 90 mcg/actuation inhaler Inhale 2 puffs into the lungs every 4 (four) hours as needed for Wheezing.  Qty: 18 g, Refills: 6      amLODIPine (NORVASC) 5 MG tablet TAKE 1 TABLET(5 MG) BY MOUTH EVERY MORNING  Qty: 90 tablet, Refills: 2    Comments: .      aspirin 81 MG Chew Take 1 tablet (81 mg total) by mouth every morning.  Qty: 100 tablet, Refills: 3      atorvastatin (LIPITOR) 40 MG tablet TAKE 1 TABLET BY MOUTH EVERY EVENING  Qty: 90 tablet, Refills: 2    Associated Diagnoses: Coronary artery disease involving native coronary artery of native heart without angina pectoris      diclofenac sodium (VOLTAREN) 1 % Gel APPLY 2 GRAMS TOPICALLY TO THE AFFECTED AREA TWICE DAILY  Qty: 100 g, Refills: 4    Associated Diagnoses: Primary osteoarthritis of both knees; Generalized osteoarthritis of multiple sites; Right hip pain      fluticasone-salmeterol diskus inhaler 250-50 mcg Inhale 1 puff into the lungs 2 (two) times daily.  Qty: 60 each, Refills: 5    Comments: **Please refill for Brand Advair and NOT generic**  Pharmacy name and phone # [id number] [place] [address]  Phone Phone: [phone]  Comments: Patient said that she usually get one bottle.      furosemide (LASIX) 40 MG tablet TAKE 1 TABLET BY MOUTH EVERY MORNING AND 1 TABLET AT 4PM  Qty: 180 tablet, Refills: 3      isosorbide mononitrate (IMDUR) 60 MG 24 hr tablet TAKE 1 TABLET BY MOUTH EVERY MORNING FOR HEART, TO PREVENT CHEST PAINS OR SHORTNESS OF BREATH  Qty: 90 tablet, Refills: 3    Associated Diagnoses: Coronary artery disease involving native coronary artery of native heart without angina pectoris      !! lancets (TRUEPLUS LANCETS) 33 gauge Misc Apply 1 lancet topically once daily. No longer on insulin because of CKD  Qty: 100 each, Refills: 3    Associated Diagnoses: Type 2 diabetes mellitus with other diabetic arthropathy, with long-term current use of insulin      !! lancets Misc To check BG 3 time daily,  to use with insurance preferred meter  Qty: 100 each, Refills: 3    Associated Diagnoses: Insulin-requiring or dependent type II diabetes mellitus      levothyroxine (SYNTHROID) 88 MCG tablet TAKE 1 TABLET(88 MCG) BY MOUTH DAILY BEFORE BREAKFAST  Qty: 90 tablet, Refills: 3      metoprolol tartrate (LOPRESSOR) 25 MG tablet TAKE 1 TABLET(25 MG) BY MOUTH TWICE DAILY  Qty: 180 tablet, Refills: 3    Comments: .      multivit-mineral-iron-lutein Tab Take 1 tablet by mouth once daily.      !! blood sugar diagnostic (ONETOUCH ULTRA TEST) Strp 1 strip by In Vitro route once daily. No longer on insulin because of CKD  Qty: 100 each, Refills: 3    Associated Diagnoses: Type 2 diabetes mellitus with diabetic polyneuropathy, without long-term current use of insulin; Stage 4 chronic kidney disease      blood-glucose meter kit To check BG 1 times daily, to use with insurance preferred meter  Qty: 1 each, Refills: 0    Associated Diagnoses: Insulin-requiring or dependent type II diabetes mellitus      clotrimazole-betamethasone 1-0.05% (LOTRISONE) cream Apply topically 2 (two) times daily as needed. For intertriginous itching in the bend of the leg  Qty: 45 g, Refills: 3    Associated Diagnoses: Intertriginous candidiasis      dorzolamide (TRUSOPT) 2 % ophthalmic solution Place 1 drop into both eyes 3 (three) times daily.  Qty: 30 mL, Refills: 4    Associated Diagnoses: Primary open angle glaucoma; Primary open angle glaucoma (POAG) of both eyes, moderate stage      fluorometholone 0.1% (FML) 0.1 % DrpS Place 1 drop into both eyes once daily.      insulin aspart U-100 (NOVOLOG FLEXPEN U-100 INSULIN) 100 unit/mL (3 mL) InPn pen Inject if blood sugar is >180, 180-230+2, 231-280+4, 281-330+6, 331-380+8,>380+10 MAX daily 30 units.  Qty: 15 mL, Refills: 2    Associated Diagnoses: Type 2 diabetes mellitus with diabetic polyneuropathy, without long-term current use of insulin      ketoconazole (NIZORAL) 2 % shampoo Apply topically every 7  "days. Soak scalp for 15-30 minutes  Qty: 120 mL, Refills: 6    Associated Diagnoses: Scalp itch      latanoprost 0.005 % ophthalmic solution Place 1 drop into both eyes every evening.  Qty: 7.5 mL, Refills: 4    Associated Diagnoses: POAG (primary open-angle glaucoma); Primary open angle glaucoma (POAG) of both eyes, moderate stage      meclizine (ANTIVERT) 12.5 mg tablet TAKE 1 TABLET BY MOUTH THREE TIMES DAILY AS NEEDED FOR DIZZINESS  Qty: 20 tablet, Refills: 3      nitroGLYCERIN (NITROSTAT) 0.4 MG SL tablet ONE TABLET UNDER THE TONGUE EVERY 5 MINUTES AS NEEDED FOR CHEST PAIN  Qty: 100 tablet, Refills: 3    Comments: dispense 25 tablets x4 bottles      nystatin (MYCOSTATIN) powder Apply topically 4 (four) times daily.  Qty: 60 g, Refills: 3    Associated Diagnoses: Intertriginous candidiasis      pen needle, diabetic (BD ULTRA-FINE SHORT PEN NEEDLE) 31 gauge x 5/16" Ndle USE WITH INSULIN PENS UP TO THREE TIMES DAILY  Qty: 100 each, Refills: 6    Associated Diagnoses: Type 2 diabetes mellitus with diabetic polyneuropathy, without long-term current use of insulin      polyethylene glycol (GLYCOLAX) 17 gram/dose powder Take 17 g by mouth daily as needed (CONSTIPATION).      psyllium (METAMUCIL) powder Take 1 packet by mouth daily as needed (CONSTIPATION).      triamcinolone acetonide 0.025% (KENALOG) 0.025 % Oint Apply topically 2 (two) times daily.  Qty: 80 g, Refills: 2    Associated Diagnoses: Allergic contact dermatitis, unspecified trigger      !! TRUE METRIX GLUCOSE TEST STRIP Strp TEST THREE TIMES DAILY  Qty: 300 strip, Refills: 3    Associated Diagnoses: Insulin-requiring or dependent type II diabetes mellitus      valACYclovir (VALTREX) 1000 MG tablet Take 1 tablet (1,000 mg total) by mouth 3 (three) times daily. for 10 days  Qty: 30 tablet, Refills: 0    Associated Diagnoses: Herpes zoster without complication       !! - Potential duplicate medications found. Please discuss with provider.            I have " seen and examined this patient within the last 30 days. My clinical findings that support the need for the home health skilled services and home bound status are the following:no   Weakness/numbness causing balance and gait disturbance due to Weakness/Debility making it taxing to leave home.  Requiring assistive device to leave home due to unsteady gait caused by  Stroke and Weakness/Debility.     Diet:   diabetic diet 2000 calorie and 2 gram sodium diet    Labs:  Routine     Referrals/ Consults  Physical Therapy to evaluate and treat. Evaluate for home safety and equipment needs; Establish/upgrade home exercise program. Perform / instruct on therapeutic exercises, gait training, transfer training, and Range of Motion.  Occupational Therapy to evaluate and treat. Evaluate home environment for safety and equipment needs. Perform/Instruct on transfers, ADL training, ROM, and therapeutic exercises.    Activities:   activity as tolerated    Nursing:   Agency to admit patient within 24 hours of hospital discharge unless specified on physician order or at patient request    SN to complete comprehensive assessment including routine vital signs. Instruct on disease process and s/s of complications to report to MD. Review/verify medication list sent home with the patient at time of discharge  and instruct patient/caregiver as needed. Frequency may be adjusted depending on start of care date.     Skilled nurse to perform up to 3 visits PRN for symptoms related to diagnosis    Notify MD if SBP > 160 or < 90; DBP > 90 or < 50; HR > 120 or < 50; Temp > 101; O2 < 88%  Ok to schedule additional visits based on staff availability and patient request on consecutive days within the home health episode.    When multiple disciplines ordered:    Start of Care occurs on Sunday - Wednesday schedule remaining discipline evaluations as ordered on separate consecutive days following the start of care.    Thursday SOC -schedule subsequent  evaluations Friday and Monday the following week.     Friday - Saturday SOC - schedule subsequent discipline evaluations on consecutive days starting Monday of the following week.    For all post-discharge communication and subsequent orders please contact patient's primary care physician. If unable to reach primary care physician or do not receive response within 30 minutes, please contact [facility] for clinical staff order clarification    Miscellaneous   Routine Skin for Bedridden Patients: Instruct patient/caregiver to apply moisture barrier cream to all skin folds and wet areas in perineal area daily and after baths and all bowel movements.    Home Health Aide:  Physical Therapy Three times weekly and Occupational Therapy Three times weekly    Wound Care Orders  no    I certify that this patient is confined to her home and needs physical therapy and occupational therapy.

## 2024-06-26 NOTE — PLAN OF CARE
LMSW spoke with patient's daughter. I certify I provided patient choice and a list to the patient/family of [facility].  Patient/Family verbally signed Patient's Choice Disclosure Form choosing the following: First available. Patient will be discharging with [facility] as HH. Sw scheduled patient;s follow up appointments. Patient's preferred pharmacy is bedside. Patient's family will provide transportation home up discharge.     [facility] ([address])  Discharge Final Note    Primary Care Provider: Tomás Andres MD    Expected Discharge Date: 6/26/2024    Final Discharge Note (most recent)       Final Note - 06/26/24 1552          Final Note    Assessment Type Final Discharge Note (P)      Anticipated Discharge Disposition Home-Health Care Svc (P)      Hospital Resources/Appts/Education Provided Provided patient/caregiver with written discharge plan information;Appointments scheduled and added to AVS;Post-Acute resouces added to AVS (P)         Post-Acute Status    Post-Acute Authorization Home Health (P)      Home Health Status Set-up Complete/Auth obtained (P)      Discharge Delays None known at this time (P)                    Contact Info       [facility]   Specialty: Home Health Services    [address]  [address]  [place] [facility] [place] [address]   Phone: [phone]       Next Steps: Follow up on 6/27/2024    Instructions: They will contact you for home healthcare appointments.

## 2024-06-26 NOTE — PLAN OF CARE
VSS and afebrile, Ox4. Plan of care reviewed with patient. No significant events during shift. External catheter in place. I&Os monitored. Frequent weight shift encouraged. Assistive devices at bedside. Purposeful rounding done, call light at bed side, bed at lowest position, brakes on, non-skid socks on, will continue to monitor.     Problem: Adult Inpatient Plan of Care  Goal: Plan of Care Review  Outcome: Progressing  Goal: Optimal Comfort and Wellbeing  Outcome: Progressing     Problem: Diabetes Comorbidity  Goal: Blood Glucose Level Within Targeted Range  Outcome: Progressing     Problem: Fall Injury Risk  Goal: Absence of Fall and Fall-Related Injury  Outcome: Progressing     Problem: Skin Injury Risk Increased  Goal: Skin Health and Integrity  Outcome: Progressing

## 2024-06-28 VITALS
BODY MASS INDEX: 23.59 KG/M2 | DIASTOLIC BLOOD PRESSURE: 57 MMHG | TEMPERATURE: 98 F | HEIGHT: 68 IN | HEART RATE: 68 BPM | RESPIRATION RATE: 12 BRPM | WEIGHT: 155.63 LBS | SYSTOLIC BLOOD PRESSURE: 116 MMHG | OXYGEN SATURATION: 96 %

## 2024-06-28 PROCEDURE — G0180 MD CERTIFICATION HHA PATIENT: HCPCS | Mod: ,,, | Performed by: INTERNAL MEDICINE

## 2024-07-01 ENCOUNTER — OFFICE VISIT (OUTPATIENT)
Dept: INTERNAL MEDICINE | Facility: CLINIC | Age: 89
End: 2024-07-01
Payer: MEDICARE

## 2024-07-01 VITALS
SYSTOLIC BLOOD PRESSURE: 112 MMHG | BODY MASS INDEX: 21.92 KG/M2 | OXYGEN SATURATION: 96 % | DIASTOLIC BLOOD PRESSURE: 50 MMHG | HEIGHT: 68 IN | WEIGHT: 144.63 LBS | HEART RATE: 73 BPM

## 2024-07-01 DIAGNOSIS — I50.33 ACUTE ON CHRONIC DIASTOLIC CONGESTIVE HEART FAILURE: Primary | ICD-10-CM

## 2024-07-01 DIAGNOSIS — R07.89 ATYPICAL CHEST PAIN: ICD-10-CM

## 2024-07-01 DIAGNOSIS — Z09 HOSPITAL DISCHARGE FOLLOW-UP: ICD-10-CM

## 2024-07-01 DIAGNOSIS — Q61.02 MULTIPLE RENAL CYSTS: ICD-10-CM

## 2024-07-01 PROBLEM — J44.1 ASTHMA EXACERBATION IN COPD: Status: RESOLVED | Noted: 2020-04-14 | Resolved: 2024-07-01

## 2024-07-01 PROBLEM — J45.901 ASTHMA EXACERBATION IN COPD: Status: RESOLVED | Noted: 2020-04-14 | Resolved: 2024-07-01

## 2024-07-01 PROCEDURE — 99214 OFFICE O/P EST MOD 30 MIN: CPT | Mod: HCNC,S$GLB,, | Performed by: STUDENT IN AN ORGANIZED HEALTH CARE EDUCATION/TRAINING PROGRAM

## 2024-07-01 PROCEDURE — 1160F RVW MEDS BY RX/DR IN RCRD: CPT | Mod: HCNC,CPTII,S$GLB, | Performed by: STUDENT IN AN ORGANIZED HEALTH CARE EDUCATION/TRAINING PROGRAM

## 2024-07-01 PROCEDURE — 1159F MED LIST DOCD IN RCRD: CPT | Mod: HCNC,CPTII,S$GLB, | Performed by: STUDENT IN AN ORGANIZED HEALTH CARE EDUCATION/TRAINING PROGRAM

## 2024-07-01 PROCEDURE — 3288F FALL RISK ASSESSMENT DOCD: CPT | Mod: HCNC,CPTII,S$GLB, | Performed by: STUDENT IN AN ORGANIZED HEALTH CARE EDUCATION/TRAINING PROGRAM

## 2024-07-01 PROCEDURE — 1101F PT FALLS ASSESS-DOCD LE1/YR: CPT | Mod: HCNC,CPTII,S$GLB, | Performed by: STUDENT IN AN ORGANIZED HEALTH CARE EDUCATION/TRAINING PROGRAM

## 2024-07-01 PROCEDURE — 99999 PR PBB SHADOW E&M-EST. PATIENT-LVL V: CPT | Mod: PBBFAC,HCNC,, | Performed by: STUDENT IN AN ORGANIZED HEALTH CARE EDUCATION/TRAINING PROGRAM

## 2024-07-01 PROCEDURE — 1125F AMNT PAIN NOTED PAIN PRSNT: CPT | Mod: HCNC,CPTII,S$GLB, | Performed by: STUDENT IN AN ORGANIZED HEALTH CARE EDUCATION/TRAINING PROGRAM

## 2024-07-01 NOTE — PROGRESS NOTES
[facility] FOLLOW-UP VISIT      CHIEF COMPLAINT:   Chief Complaint   Patient presents with    Hospital Follow Up       HISTORY OF PRESENT ILLNESS: Tiana Mead is a 89 y.o. female who presents here today for hospital follow-up. Patient was admitted in observation 6/25-6/26 after presenting with shortness of breath and chest discomfort. ED workup included , negative troponin, creatinine 1.8 (baseline 1.5-1.8), EKG with A fib rate 74 (chronic/permanent), CXR with cardiomegaly and mild CHF. She was given IV lasix and admitted.     During admission, CT chest and abdomen revealed small pleural effusion and small pericardial effusion as well as multiple renal cysts that had been present on previous imaging. Outpatient referral sent to urology for renal cyst follow up by hospital team. She worked with PT/OT and HH was arranged. She was discharged home in stable condition.    Since discharge, patient has been feeling well. Patient continues to have some chest discomfort but overall improved. She denies shortness of breath, palpitations, lightheadedness, no other symptoms of concern. Home health services have already been arranged. Patient has been compliant with medications. Pain is controlled with medication. She sees her Cardiologist in October. She sees Urology July 11.         REVIEW OF SYSTEMS:    ROS as in HPI.      MEDICAL HISTORY:    Past Medical History:   Diagnosis Date    Allergy     Anemia     Arthritis     Asthma     Chronic kidney disease     Degenerative disc disease     Diabetes mellitus type II     Diastolic heart failure 05/02/2017    Essential hypertension 7/3/2012    Glaucoma     History of insulin dependent diabetes mellitus, for many years stopped because of chronic kidney November 2020.  9/9/2021    History of pseudogout 8/23/2012    Hyperlipidemia     Hypertension     Iritis     Myocardial infarction     Pneumonia     Thrombocytopenia 8/24/2021    Thyroid disease         MEDICATIONS:    Current Outpatient Medications on File Prior to Visit   Medication Sig Dispense Refill    acetaminophen (TYLENOL) 500 MG tablet Take 2 tablets (1,000 mg total) by mouth 3 (three) times daily as needed for Pain (muscle pain in arms and legs). 100 tablet 1    albuterol (PROVENTIL/VENTOLIN HFA) 90 mcg/actuation inhaler Inhale 2 puffs into the lungs every 4 (four) hours as needed for Wheezing. 18 g 6    amLODIPine (NORVASC) 5 MG tablet TAKE 1 TABLET(5 MG) BY MOUTH EVERY MORNING 90 tablet 2    aspirin 81 MG Chew Take 1 tablet (81 mg total) by mouth every morning. 100 tablet 3    atorvastatin (LIPITOR) 40 MG tablet TAKE 1 TABLET BY MOUTH EVERY EVENING 90 tablet 2    blood sugar diagnostic (ONETOUCH ULTRA TEST) Strp 1 strip by In Vitro route once daily. No longer on insulin because of  each 3    blood-glucose meter kit To check BG 1 times daily, to use with insurance preferred meter 1 each 0    clotrimazole-betamethasone 1-0.05% (LOTRISONE) cream Apply topically 2 (two) times daily as needed. For intertriginous itching in the bend of the leg 45 g 3    diclofenac sodium (VOLTAREN) 1 % Gel APPLY 2 GRAMS TOPICALLY TO THE AFFECTED AREA TWICE DAILY 100 g 4    dorzolamide (TRUSOPT) 2 % ophthalmic solution Place 1 drop into both eyes 3 (three) times daily. 30 mL 4    fluorometholone 0.1% (FML) 0.1 % DrpS Place 1 drop into both eyes once daily.      fluticasone-salmeterol diskus inhaler 250-50 mcg Inhale 1 puff into the lungs 2 (two) times daily. 60 each 5    furosemide (LASIX) 40 MG tablet TAKE 1 TABLET BY MOUTH EVERY MORNING AND 1 TABLET AT 4PM 180 tablet 3    insulin aspart U-100 (NOVOLOG FLEXPEN U-100 INSULIN) 100 unit/mL (3 mL) InPn pen Inject if blood sugar is >180, 180-230+2, 231-280+4, 281-330+6, 331-380+8,>380+10 MAX daily 30 units. 15 mL 2    isosorbide mononitrate (IMDUR) 60 MG 24 hr tablet TAKE 1 TABLET BY MOUTH EVERY MORNING FOR HEART, TO PREVENT CHEST PAINS OR SHORTNESS OF BREATH 90  "tablet 3    ketoconazole (NIZORAL) 2 % shampoo Apply topically every 7 days. Soak scalp for 15-30 minutes 120 mL 6    lancets (TRUEPLUS LANCETS) 33 gauge Misc Apply 1 lancet topically once daily. No longer on insulin because of  each 3    lancets Misc To check BG 3 time daily, to use with insurance preferred meter 100 each 3    latanoprost 0.005 % ophthalmic solution Place 1 drop into both eyes every evening. 7.5 mL 4    levothyroxine (SYNTHROID) 88 MCG tablet TAKE 1 TABLET(88 MCG) BY MOUTH DAILY BEFORE BREAKFAST 90 tablet 3    meclizine (ANTIVERT) 12.5 mg tablet TAKE 1 TABLET BY MOUTH THREE TIMES DAILY AS NEEDED FOR DIZZINESS 20 tablet 3    melatonin (MELATIN) 3 mg tablet Take 1 tablet (3 mg total) by mouth nightly as needed for Insomnia.      metoprolol tartrate (LOPRESSOR) 25 MG tablet TAKE 1 TABLET(25 MG) BY MOUTH TWICE DAILY 180 tablet 3    multivit-mineral-iron-lutein Tab Take 1 tablet by mouth once daily.      nitroGLYCERIN (NITROSTAT) 0.4 MG SL tablet ONE TABLET UNDER THE TONGUE EVERY 5 MINUTES AS NEEDED FOR CHEST PAIN 100 tablet 3    pen needle, diabetic (BD ULTRA-FINE SHORT PEN NEEDLE) 31 gauge x 5/16" Ndle USE WITH INSULIN PENS UP TO THREE TIMES DAILY 100 each 6    polyethylene glycol (GLYCOLAX) 17 gram/dose powder Take 17 g by mouth daily as needed (CONSTIPATION).      psyllium (METAMUCIL) powder Take 1 packet by mouth daily as needed (CONSTIPATION).      traMADoL (ULTRAM) 50 mg tablet Take 0.5 tablets (25 mg total) by mouth every 12 (twelve) hours as needed for Pain (for severe musculoskeletal pain 8-10/10). 5 tablet 0    TRUE METRIX GLUCOSE TEST STRIP Strp TEST THREE TIMES DAILY 300 strip 3     No current facility-administered medications on file prior to visit.         PHYSICAL EXAM:    BP (!) 112/50 (BP Location: Left arm, Patient Position: Sitting, BP Method: Medium (Manual))   Pulse 73   Ht 5' 8" (1.727 m)   Wt 65.6 kg (144 lb 10 oz)   LMP  (LMP Unknown)   SpO2 96%   BMI 21.99 kg/m² "     Physical Exam  Vitals and nursing note reviewed.   Constitutional:       General: She is not in acute distress.     Appearance: Normal appearance. She is not ill-appearing, toxic-appearing or diaphoretic.   HENT:      Head: Normocephalic and atraumatic.      Nose: Nose normal.   Eyes:      Extraocular Movements: Extraocular movements intact.      Conjunctiva/sclera: Conjunctivae normal.      Pupils: Pupils are equal, round, and reactive to light.   Cardiovascular:      Rate and Rhythm: Normal rate. Rhythm irregular.      Heart sounds: Normal heart sounds. No murmur heard.  Pulmonary:      Effort: Pulmonary effort is normal. No respiratory distress.      Breath sounds: Normal breath sounds. No stridor. No wheezing, rhonchi or rales.   Musculoskeletal:         General: No deformity. Normal range of motion.   Skin:     Findings: No lesion or rash.   Neurological:      General: No focal deficit present.      Mental Status: She is alert.      Motor: No weakness.      Gait: Gait normal.   Psychiatric:         Mood and Affect: Mood normal.         Behavior: Behavior normal.         Thought Content: Thought content normal.         Judgment: Judgment normal.               ASSESSMENT & PLAN:    Tiana was seen today for hospital follow up.    Diagnoses and all orders for this visit:    Acute on chronic diastolic congestive heart failure  Recent hospitalization for chest pain and shortness of breath - in ED found to have elevated BNP & CXR with cardiomegaly and possible mild CHF  Treated with IV Lasix with some improvement in symptoms  No s/s of fluid overload today. VSS. Lungs clear to auscultation no rales, crackles, wheezing.   Continue home medication regimen including Lasix BID  Continue routine F/U with Cardiology - next scheduled Oct 2024    Atypical chest pain  Recent hospitalization for chest pain and shortness of breath - in ED found to have s/s of CHF but no s/s of ACS.   Chest pain thought to be MSK etiology.    Patient reports pain is still present but continues to improve  Continue Tylenol PRN  Continue PT/OT home health     Multiple renal cysts  Noted on imaging during recent hospitalization   Referral placed to Urology by hospital team.   F/U with Urology as scheduled 7/11.     Hospital discharge follow-up  Admitted in observation 6/25-6/26 for atypical chest pain and shortness of breath - details above.            Harper Cuadra MD  [facility]

## 2024-07-02 ENCOUNTER — OUTPATIENT CASE MANAGEMENT (OUTPATIENT)
Dept: ADMINISTRATIVE | Facility: OTHER | Age: 89
End: 2024-07-02
Payer: MEDICARE

## 2024-07-02 NOTE — PROGRESS NOTES
"Outpatient Care Management  Initial Patient Assessment    Patient: Tiana Mead  MRN: [id number]  Date of Service: 07/02/2024  Completed by: Kaela Shah RN  Referral Date: 06/25/2024  Date of Eligibility: 6/26/2024  Program:   High Risk  Status: Ongoing  Effective Dates: 7/2/2024 - present  Responsible Staff: Kaela Shah RN        Reason for Visit   Patient presents with    OPCM Enrollment Call       Brief Summary:  Tiana Mead was referred by Dr. Matta for congestive heart failure, essential hypertension, hyperlipidemia associated with type 2 diabetes mellitus. Patient qualifies for program based on high risk score of 70.8%.   Active problem list, medical, surgical and social history reviewed. Active comorbidities include anemia, CKD4, DM2, hypothyroidism, CHF, glaucoma, HLD, A-fib, glaucoma, and osteoarthritis. Areas of need identified by patient include "managing pain, focusing on pain relief."   Next steps:   Review pain rating/quality of pain.   Review HH PT visits.  Mrs. Montiel agreed to OPCM RN follow up on or around 5/9/24.    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 Montiel " lives with her daughter, Melanie. Her other daughter, Yolanda, drives and accompanies her to scheduled appts. She utilizes a rollator for ambulation, uses a bath bench for bathing, will also give herself sponge baths in between taking baths. Mrs. Montiel reports that she wears Depends for uninary incontinence.  Change in Functional Status Since Onset of Current Illness/Injury: yes        Spiritual Beliefs  Spiritual, Cultural Beliefs, Taoist Practices, Values that Affect Care: no      Social History     Socioeconomic History    Marital status:    Tobacco Use    Smoking status: Never    Smokeless tobacco: Never   Substance and Sexual Activity    Alcohol use: No    Drug use: No    Sexual activity: Never     Social Determinants of Health     Financial Resource Strain: Medium Risk (5/9/2024)    Overall Financial Resource Strain (CARDIA)     Difficulty of Paying Living Expenses: Somewhat hard   Food Insecurity: No Food Insecurity (5/9/2024)    Hunger Vital Sign     Worried About Running Out of Food in the Last Year: Never true     Ran Out of Food in the Last Year: Never true   Transportation Needs: No Transportation Needs (5/9/2024)    PRAPARE - Transportation     Lack of Transportation (Medical): No     Lack of Transportation (Non-Medical): No   Physical Activity: Inactive (5/9/2024)    Exercise Vital Sign     Days of Exercise per Week: 0 days     Minutes of Exercise per Session: 0 min   Stress: No Stress Concern Present (5/9/2024)    [employer] - Occupational Stress Questionnaire     Feeling of Stress : Not at all   Housing Stability: Low Risk  (5/9/2024)    Housing Stability Vital Sign     Unable to Pay for Housing in the Last Year: No     Homeless in the Last Year: No       Roles and Relationships  Primary Source of Support/Comfort: child(sarthak)  Name of Support/Comfort Primary Source: Yolanda      Advance Directives (For Healthcare)  Advance Directive  (If Adv Dir status is  received, view document under Adv Dir in header or Chart Review Media tab): Patient has advance directive, copy not received.        Patient Reported Insurance  Verified current insurance plan:: [employer]            7/2/2024     2:20 PM 5/9/2024     9:19 AM 1/19/2024     9:53 AM 4/10/2023     7:57 AM 12/27/2022     9:09 AM 12/27/2022     8:15 AM 7/26/2022     8:45 AM   Depression Patient Health Questionnaire   Over the last two weeks how often have you been bothered by little interest or pleasure in doing things Not at all Not at all Not at all Not at all Several days Several days Not at all   Over the last two weeks how often have you been bothered by feeling down, depressed or hopeless Not at all Not at all Not at all Not at all Several days Several days Not at all   PHQ-2 Total Score 0 0 0 0 2 2 0       Learning Assessment       07/08/2024 2208 [facility] (7/2/2024 - Present)   Created by Kaela Shah RN - RN (Nurse) Status: Complete                 PRIMARY LEARNER     Primary Learner Name:  Tiana Mead ED - 07/08/2024 2208    Relationship:  Patient ED - 07/08/2024 2208    Does the primary learner have any barriers to learning?:  No Barriers ED - 07/08/2024 2208    What is the preferred language of the primary learner?:  English ED - 07/08/2024 2208    Is an  required?:  No ED - 07/08/2024 2208    How does the primary learner prefer to learn new concepts?:  Reading, Listening ED - 07/08/2024 2208    How often do you need to have someone help you read instructions, pamphlets, or written material from your doctor or pharmacy?:  Never ED - 07/08/2024 2208        CO-LEARNER #1     No question answered        CO-LEARNER #2     No question answered        SPECIAL TOPICS     No question answered        ANSWERED BY:     No question answered        Comments         Edit History       Kaela Shah, RN - RN (Nurse)   07/08/2024 2208

## 2024-07-02 NOTE — LETTER
Tiana Mead  [address] [facility] [address] [facility] [address]    Dear Tiana Mead,     Welcome to [facility] Outpatient Care Management Program. We are here to assist patients with multiple long-term (chronic) conditions who often need more personalized healthcare.    It was a pleasure talking with you today. My name is Kaela Tello RN. I look forward to working with you as your Care Manager. I will be contacting you by telephone routinely to help coordinate care and resolve issues.    My goal is to help you function at the healthiest and highest level possible. You can contact me directly at [phone].    As an [facility] patient with [employer], some of the services we provide, at no cost to you, include:     Development of an individualized care plan with a Registered Nurse   Connection with a   Assistance from a Community Health Worker  Connection with available resources and services    Coordinate communication among your care team members   Provide coaching and education  Help you understand your doctor's treatment plan  Help you obtain information about your insurance coverage.    All services provided by [facility]s Outpatient Care Managers and other care team members are coordinated with and communicated to your primary care team.      As part of your enrollment, you will be receiving education materials and more information about these services in your [facility] account, by phone, or through the mail. If you do not wish to participate or receive information, you can Opt Out by contacting our office at [phone].      Sincerely,        Kaela Tello RN  [facility]   Outpatient Care Management

## 2024-07-03 NOTE — PROGRESS NOTES
"Subjective:      Tiana Mead is a 89 y.o. female who was referred by Jeanine Vaughan for evaluation of her renal cysts noted on imaging.      Recently admitted with acute on chronic heart failure and chest pain.    CT chest/abdomen pelvis without contrast- "There multiple bilateral renal cysts bilaterally some a which have wall calcification and some of which have blood products. There are bilateral areas renal parenchymal scar. "    She denies bothersome urinary symptoms, flank pain and gross hematuria. Denies a history of recurrent UTIs and nephrolithiasis.     The following portions of the patient's history were reviewed and updated as appropriate: allergies, current medications, past family history, past medical history, past social history, past surgical history and problem list.    Review of Systems  Constitutional: no fever or chills  ENT: no nasal congestion or sore throat  Respiratory: no cough or shortness of breath  Cardiovascular: no chest pain or palpitations  Gastrointestinal: no nausea or vomiting, tolerating diet  Genitourinary: as per HPI  Hematologic/Lymphatic: no easy bruising or lymphadenopathy  Musculoskeletal: no arthralgias or myalgias  Neurological: no seizures or tremors  Behavioral/Psych: no auditory or visual hallucinations     Objective:   Vital Signs:   Vitals:    07/05/24 1042   BP: 139/63   Pulse: 77     Physical Exam   General: alert and oriented, no acute distress  Head: normocephalic, atraumatic  Neck: supple, normal ROM  Respiratory: Symmetric expansion, non-labored breathing  Cardiovascular: regular rate and rhythm  Abdomen: soft, non tender, non distended  Pelvic: deferred  Skin: normal coloration and turgor, no rashes, no suspicious skin lesions noted  Neuro: alert and oriented x3, no gross deficits  Psych: normal judgment and insight, normal mood/affect, and non-anxious    Lab Review   Urinalysis demonstrates : no specimen  Lab Results   Component Value Date    WBC 5.19 " "06/25/2024    HGB 11.7 (L) 06/25/2024    HCT 35.9 (L) 06/25/2024    MCV 91 06/25/2024     (L) 06/25/2024     Lab Results   Component Value Date    CREATININE 1.7 (H) 06/26/2024    BUN 35 (H) 06/26/2024       Imaging   (all images personally reviewed; agree with report below)  CTA- "There multiple bilateral renal cysts bilaterally some a which have wall calcification and some of which have blood products. There are bilateral areas renal parenchymal scar."    Assessment:     1. Complex renal cyst          Plan:   Tiana was seen today for follow-up.    Diagnoses and all orders for this visit:    Complex renal cyst  -     US Retroperitoneal Complete; Future    Plan:  --Discussed renal findings on recent CT scan   --Follow up with repeat VIVIANA in 6 months to monitor complex renal cysts     "

## 2024-07-05 ENCOUNTER — OFFICE VISIT (OUTPATIENT)
Dept: UROLOGY | Facility: CLINIC | Age: 89
End: 2024-07-05
Payer: MEDICARE

## 2024-07-05 VITALS
HEIGHT: 68 IN | SYSTOLIC BLOOD PRESSURE: 139 MMHG | HEART RATE: 77 BPM | BODY MASS INDEX: 21.92 KG/M2 | WEIGHT: 144.63 LBS | DIASTOLIC BLOOD PRESSURE: 63 MMHG

## 2024-07-05 DIAGNOSIS — N28.1 COMPLEX RENAL CYST: Primary | ICD-10-CM

## 2024-07-05 DIAGNOSIS — I50.9 ACUTE ON CHRONIC CONGESTIVE HEART FAILURE, UNSPECIFIED HEART FAILURE TYPE: ICD-10-CM

## 2024-07-10 ENCOUNTER — OFFICE VISIT (OUTPATIENT)
Dept: PODIATRY | Facility: CLINIC | Age: 89
End: 2024-07-10
Payer: MEDICARE

## 2024-07-10 VITALS
HEIGHT: 68 IN | WEIGHT: 143.31 LBS | HEART RATE: 66 BPM | BODY MASS INDEX: 21.72 KG/M2 | RESPIRATION RATE: 17 BRPM | DIASTOLIC BLOOD PRESSURE: 71 MMHG | SYSTOLIC BLOOD PRESSURE: 132 MMHG

## 2024-07-10 DIAGNOSIS — I73.9 PERIPHERAL VASCULAR DISEASE: ICD-10-CM

## 2024-07-10 DIAGNOSIS — B35.1 ONYCHOMYCOSIS DUE TO DERMATOPHYTE: ICD-10-CM

## 2024-07-10 DIAGNOSIS — E11.42 DIABETIC POLYNEUROPATHY ASSOCIATED WITH TYPE 2 DIABETES MELLITUS: Primary | ICD-10-CM

## 2024-07-10 DIAGNOSIS — L84 CORN OR CALLUS: ICD-10-CM

## 2024-07-10 PROCEDURE — 11056 PARNG/CUTG B9 HYPRKR LES 2-4: CPT | Mod: Q9,HCNC,S$GLB, | Performed by: PODIATRIST

## 2024-07-10 PROCEDURE — 99499 UNLISTED E&M SERVICE: CPT | Mod: HCNC,S$GLB,, | Performed by: PODIATRIST

## 2024-07-10 PROCEDURE — 99999 PR PBB SHADOW E&M-EST. PATIENT-LVL II: CPT | Mod: PBBFAC,HCNC,, | Performed by: PODIATRIST

## 2024-07-10 PROCEDURE — 11721 DEBRIDE NAIL 6 OR MORE: CPT | Mod: Q9,59,HCNC,S$GLB | Performed by: PODIATRIST

## 2024-07-11 ENCOUNTER — PATIENT MESSAGE (OUTPATIENT)
Dept: ADMINISTRATIVE | Facility: OTHER | Age: 89
End: 2024-07-11
Payer: MEDICARE

## 2024-07-11 ENCOUNTER — OUTPATIENT CASE MANAGEMENT (OUTPATIENT)
Dept: ADMINISTRATIVE | Facility: OTHER | Age: 89
End: 2024-07-11
Payer: MEDICARE

## 2024-07-11 NOTE — PROGRESS NOTES
7/11/2024  1st attempt to complete Initial Assessment for Outpatient Care Management, left message. Will send unable to assess letter through portal.

## 2024-07-12 ENCOUNTER — OUTPATIENT CASE MANAGEMENT (OUTPATIENT)
Dept: ADMINISTRATIVE | Facility: OTHER | Age: 89
End: 2024-07-12
Payer: MEDICARE

## 2024-07-12 NOTE — PROGRESS NOTES
Outpatient Care Management  Plan of Care Follow Up Visit    Patient: Tiana Mead  MRN: [id number]  Date of Service: 07/12/2024  Completed by: Kaela Tello RN  Referral Date: 06/25/2024    Reason for Visit   Patient presents with    OPCM RN Follow Up Call       Brief Summary: OPCM RN followed up with Mrs. Montiel today for care plan review.    Next Steps:   Free from falls?  Assess fall risk using a validated tool when available. Consider balance and gait impairment, muscle weakness, diminished vision or hearing, environmental hazards, presence of urinary or bowel urgency and/or incontinence.   Review recent exercise habits.   Explore Mrs. Montiel' pain location/rating/quality of pain.  Mrs. Montiel agreed to OPCM RN follow up on or around 7/26/24.

## 2024-07-17 NOTE — PROGRESS NOTES
Subjective:      Patient ID: Tiana Mead is a 89 y.o. female.    Chief Complaint: No chief complaint on file.      Tiana is a 89 y.o. female who presents to the clinic for evaluation and treatment of high risk feet. Tiana has a past medical history of Allergy, Anemia, Arthritis, Asthma, Chronic kidney disease, Degenerative disc disease, Diabetes mellitus type II, Diastolic heart failure (05/02/2017), Essential hypertension (7/3/2012), Glaucoma, History of insulin dependent diabetes mellitus, for many years stopped because of chronic kidney November 2020.  (9/9/2021), History of pseudogout (8/23/2012), Hyperlipidemia, Hypertension, Iritis, Myocardial infarction, Pneumonia, Thrombocytopenia (8/24/2021), and Thyroid disease. The patient's chief complaint is long, thick toenails and calluses on both feet. Routine trimming helps. No other pedal concerns today.  This patient has documented high risk feet requiring routine maintenance secondary to diabetes mellitis and those secondary complications of diabetes, as mentioned..    PCP: Tomás Andres MD    Date Last Seen by PCP:   No chief complaint on file.        Current shoe gear:  Dm shoes      Hemoglobin A1C   Date Value Ref Range Status   06/25/2024 6.6 (H) 4.0 - 5.6 % Final     Comment:     ADA Screening Guidelines:  5.7-6.4%  Consistent with prediabetes  >or=6.5%  Consistent with diabetes    High levels of fetal hemoglobin interfere with the HbA1C  assay. Heterozygous hemoglobin variants (HbS, HgC, etc)do  not significantly interfere with this assay.   However, presence of multiple variants may affect accuracy.     04/04/2024 6.8 (H) 4.0 - 5.6 % Final     Comment:     ADA Screening Guidelines:  5.7-6.4%  Consistent with prediabetes  >or=6.5%  Consistent with diabetes    High levels of fetal hemoglobin interfere with the HbA1C  assay. Heterozygous hemoglobin variants (HbS, HgC, etc)do  not significantly interfere with this assay.   However, presence of  multiple variants may affect accuracy.     10/09/2023 6.8 (H) 4.0 - 5.6 % Final     Comment:     ADA Screening Guidelines:  5.7-6.4%  Consistent with prediabetes  >or=6.5%  Consistent with diabetes    High levels of fetal hemoglobin interfere with the HbA1C  assay. Heterozygous hemoglobin variants (HbS, HgC, etc)do  not significantly interfere with this assay.   However, presence of multiple variants may affect accuracy.               Patient Active Problem List   Diagnosis    Essential hypertension    Hyperlipidemia associated with type 2 diabetes mellitus    Generalized osteoarthritis of multiple sites    Chronic iritis - Left Eye    Osteoarthritis of both knees    Chondrocalcinosis    Hyperuricemia    Helicobacter pylori gastritis    Old MI (myocardial infarction), 2013    Permanent atrial fibrillation    Long term current use of anticoagulant therapy, eliquis, stopped because of cryptic GI bleeding    Glaucoma associated with ocular trauma    Aortic atherosclerosis    Acute on chronic diastolic congestive heart failure    Type 2 diabetes mellitus, with long-term current use of insulin    Primary hypothyroidism    Moderate persistent asthma without complication    Primary open angle glaucoma (POAG) of both eyes, moderate stage    Pulmonary hypertension    Non-rheumatic tricuspid valve insufficiency    Allergic conjunctivitis of both eyes    Pseudogout, knees    Dry eye syndrome of both eyes    Stage 4 chronic kidney disease    Acute on chronic blood loss anemia, Sept 2020 anticoagulation discontinued    Coronary artery disease involving native coronary artery of native heart without angina pectoris    Myalgia, since April 20221 both arms and both legs    History of insulin dependent diabetes mellitus, for many years stopped because of chronic kidney November 2020.     Corneal edema    Contact dermatitis    Ulcerative blepharitis of upper and lower eyelids of both eyes    Multiple renal cysts       Current  Outpatient Medications on File Prior to Visit   Medication Sig Dispense Refill    acetaminophen (TYLENOL) 500 MG tablet Take 2 tablets (1,000 mg total) by mouth 3 (three) times daily as needed for Pain (muscle pain in arms and legs). 100 tablet 1    albuterol (PROVENTIL/VENTOLIN HFA) 90 mcg/actuation inhaler Inhale 2 puffs into the lungs every 4 (four) hours as needed for Wheezing. 18 g 6    amLODIPine (NORVASC) 5 MG tablet TAKE 1 TABLET(5 MG) BY MOUTH EVERY MORNING 90 tablet 2    aspirin 81 MG Chew Take 1 tablet (81 mg total) by mouth every morning. 100 tablet 3    atorvastatin (LIPITOR) 40 MG tablet TAKE 1 TABLET BY MOUTH EVERY EVENING 90 tablet 2    blood sugar diagnostic (ONETOUCH ULTRA TEST) Strp 1 strip by In Vitro route once daily. No longer on insulin because of  each 3    blood-glucose meter kit To check BG 1 times daily, to use with insurance preferred meter 1 each 0    clotrimazole-betamethasone 1-0.05% (LOTRISONE) cream Apply topically 2 (two) times daily as needed. For intertriginous itching in the bend of the leg 45 g 3    diclofenac sodium (VOLTAREN) 1 % Gel APPLY 2 GRAMS TOPICALLY TO THE AFFECTED AREA TWICE DAILY 100 g 4    dorzolamide (TRUSOPT) 2 % ophthalmic solution Place 1 drop into both eyes 3 (three) times daily. 30 mL 4    fluorometholone 0.1% (FML) 0.1 % DrpS Place 1 drop into both eyes once daily.      fluticasone-salmeterol diskus inhaler 250-50 mcg Inhale 1 puff into the lungs 2 (two) times daily. 60 each 5    furosemide (LASIX) 40 MG tablet TAKE 1 TABLET BY MOUTH EVERY MORNING AND 1 TABLET AT 4PM 180 tablet 3    insulin aspart U-100 (NOVOLOG FLEXPEN U-100 INSULIN) 100 unit/mL (3 mL) InPn pen Inject if blood sugar is >180, 180-230+2, 231-280+4, 281-330+6, 331-380+8,>380+10 MAX daily 30 units. 15 mL 2    isosorbide mononitrate (IMDUR) 60 MG 24 hr tablet TAKE 1 TABLET BY MOUTH EVERY MORNING FOR HEART, TO PREVENT CHEST PAINS OR SHORTNESS OF BREATH 90 tablet 3    ketoconazole (NIZORAL)  "2 % shampoo Apply topically every 7 days. Soak scalp for 15-30 minutes 120 mL 6    lancets (TRUEPLUS LANCETS) 33 gauge Misc Apply 1 lancet topically once daily. No longer on insulin because of  each 3    lancets Misc To check BG 3 time daily, to use with insurance preferred meter 100 each 3    latanoprost 0.005 % ophthalmic solution Place 1 drop into both eyes every evening. 7.5 mL 4    levothyroxine (SYNTHROID) 88 MCG tablet TAKE 1 TABLET(88 MCG) BY MOUTH DAILY BEFORE BREAKFAST 90 tablet 3    meclizine (ANTIVERT) 12.5 mg tablet TAKE 1 TABLET BY MOUTH THREE TIMES DAILY AS NEEDED FOR DIZZINESS 20 tablet 3    melatonin (MELATIN) 3 mg tablet Take 1 tablet (3 mg total) by mouth nightly as needed for Insomnia.      metoprolol tartrate (LOPRESSOR) 25 MG tablet TAKE 1 TABLET(25 MG) BY MOUTH TWICE DAILY 180 tablet 3    multivit-mineral-iron-lutein Tab Take 1 tablet by mouth once daily.      nitroGLYCERIN (NITROSTAT) 0.4 MG SL tablet ONE TABLET UNDER THE TONGUE EVERY 5 MINUTES AS NEEDED FOR CHEST PAIN 100 tablet 3    pen needle, diabetic (BD ULTRA-FINE SHORT PEN NEEDLE) 31 gauge x 5/16" Ndle USE WITH INSULIN PENS UP TO THREE TIMES DAILY 100 each 6    polyethylene glycol (GLYCOLAX) 17 gram/dose powder Take 17 g by mouth daily as needed (CONSTIPATION).      psyllium (METAMUCIL) powder Take 1 packet by mouth daily as needed (CONSTIPATION).      TRUE METRIX GLUCOSE TEST STRIP Strp TEST THREE TIMES DAILY 300 strip 3     No current facility-administered medications on file prior to visit.       Review of patient's allergies indicates:   Allergen Reactions    Codeine      Other reaction(s): Itching  Other reaction(s): Nausea       Past Surgical History:   Procedure Laterality Date    CARDIAC CATHETERIZATION  2010    no obstructive disease    CATARACT EXTRACTION W/  INTRAOCULAR LENS IMPLANT Left n/a    CATARACT EXTRACTION W/  INTRAOCULAR LENS IMPLANT Right 10/8/2018    With Femtosecond LASER assist (Dr. Henson)    " CHOLECYSTECTOMY      ESOPHAGOGASTRODUODENOSCOPY N/A 11/4/2020    Procedure: EGD (ESOPHAGOGASTRODUODENOSCOPY);  Surgeon: Tomás Mccall MD;  [facility] ([address] [place] [address]);  Service: Endoscopy;  Laterality: N/A;    EYE SURGERY      gall stone      HYSTERECTOMY      VARICOSE VEIN SURGERY         Family History   Problem Relation Name Age of Onset    Diabetes Mother      Hypertension Mother      Glaucoma Mother      Kidney disease Mother      Hypertension Father      Hypotension Sister Alberta     Heart attack Sister Alberta     Heart disease Sister Alberta     No Known Problems Sister Bekah     Diabetes Sister Violeta     Kidney disease Sister Violeta     Leukemia Sister Jane     Hypertension Sister Pallavi     Diabetes Sister Lovinia     Kidney disease Sister Lovinia     No Known Problems Sister Lacey     Heart disease Brother Mariusz     Heart disease Brother John     No Known Problems Brother Luis     No Known Problems Maternal Grandmother      No Known Problems Maternal Grandfather      No Known Problems Paternal Grandmother      Diabetes Paternal Grandfather      Hypertension Daughter Yolanda     Atrial fibrillation Daughter Yolanda     Diabetes Daughter Melanie         borderline    Hypertension Daughter Melanie     No Known Problems Son Trung     Diabetes Son Raudel     Heart disease Son Raudel     Hypertension Son Raudel     Melanoma Neg Hx         Social History     Socioeconomic History    Marital status:    Tobacco Use    Smoking status: Never    Smokeless tobacco: Never   Substance and Sexual Activity    Alcohol use: No    Drug use: No    Sexual activity: Never     Social Determinants of Health     Financial Resource Strain: Medium Risk (5/9/2024)    Overall Financial Resource Strain (CARDIA)     Difficulty of Paying Living Expenses: Somewhat hard   Food Insecurity: No Food Insecurity (5/9/2024)    Hunger Vital Sign     Worried About Running Out of Food in the Last Year: Never true      "Ran Out of Food in the Last Year: Never true   Transportation Needs: No Transportation Needs (5/9/2024)    PRAPARE - Transportation     Lack of Transportation (Medical): No     Lack of Transportation (Non-Medical): No   Physical Activity: Inactive (5/9/2024)    Exercise Vital Sign     Days of Exercise per Week: 0 days     Minutes of Exercise per Session: 0 min   Stress: No Stress Concern Present (5/9/2024)    Citizen of [place] [facility] - Occupational Stress Questionnaire     Feeling of Stress : Not at all   Housing Stability: Low Risk  (5/9/2024)    Housing Stability Vital Sign     Unable to Pay for Housing in the Last Year: No     Homeless in the Last Year: No           Review of Systems   Constitutional: Negative for chills, decreased appetite and fever.   Cardiovascular:  Negative for chest pain and claudication.   Respiratory:  Negative for cough, hemoptysis and shortness of breath.    Skin:  Positive for dry skin and nail changes. Negative for color change, flushing, itching, poor wound healing, rash and suspicious lesions.   Musculoskeletal:  Positive for myalgias. Negative for back pain, falls, gout, joint pain and joint swelling.   Gastrointestinal:  Negative for nausea and vomiting.   Neurological:  Negative for loss of balance, numbness and paresthesias.           Objective:       Vitals:    07/10/24 0924   BP: 132/71   Pulse: 66   Resp: 17   Weight: 65 kg (143 lb 4.8 oz)   Height: 5' 8" (1.727 m)   PainSc: 0-No pain        Physical Exam  Vitals and nursing note reviewed.   Constitutional:       Appearance: She is well-developed.   Cardiovascular:      Comments: Dorsalis pedis and posterior tibial pulses are diminished Bilaterally. Toes are cool to touch. Feet are warm proximally.There is decreased digital hair. Skin is atrophic, slightly hyperpigmented, and mildly edematous      Musculoskeletal:         General: No tenderness, deformity or signs of injury. Normal range of motion.      " Comments: Adequate joint range of motion without pain, limitation, nor crepitation Bilateral feet and ankle joints. Muscle strength is 5/5 in all groups bilaterally.    1st MPJ exostosis w/ lateral deviation of hallux, non trackbound. No pain w/ ROM to b/l  hallux      Flexible pes planus foot type w/ medial arch collapse and mild gastroc equinus      Skin:     General: Skin is warm and dry.      Coloration: Skin is not ashen, cyanotic or pale.      Findings: No ecchymosis, erythema or lesion.      Comments: Nails x10 are elongated by  3-7 mm's, thickened by 2-5 mm's, dystrophic, and are darkened in  coloration . Xerosis Bilaterally. No open lesions noted.    Hyperkeratotic tissue noted to lateral 5th toe b/l   Neurological:      Mental Status: She is alert and oriented to person, place, and time.      Comments: [place] 5.07 monofilamant testing is diminished Ney feet. Sharp/dull sensation diminished Bilaterally. Light touch absent Bilaterally.       Psychiatric:         Behavior: Behavior normal.               Assessment:       Encounter Diagnoses   Name Primary?    Diabetic polyneuropathy associated with type 2 diabetes mellitus Yes    Peripheral vascular disease     Onychomycosis due to dermatophyte     Corn or callus          Plan:       Diagnoses and all orders for this visit:    Diabetic polyneuropathy associated with type 2 diabetes mellitus    Peripheral vascular disease    Onychomycosis due to dermatophyte    Corn or callus      I counseled the patient on her conditions, their implications and medical management.    Shoe inspection. Diabetic Foot Education. Patient reminded of the importance of good nutrition and blood sugar control to help prevent podiatric complications of diabetes. Patient instructed on proper foot hygeine. We discussed wearing proper shoe gear, daily foot inspections, never walking without protective shoe gear, never putting sharp instruments to feet    - With patient's  permission, nails were aggressively reduced and debrided x 10 to their soft tissue attachment mechanically removing all offending nail and debris. Patient relates relief following the procedure. She will continue to monitor the areas daily, inspect her feet, wear protective shoe gear when ambulatory, moisturizer to maintain skin integrity and follow in this office in approximately 2-3 months, sooner p.r.n.    - After cleansing the  area w/ alcohol prep pad the above mentioned hyperkeratosis was trimmed utilizing No 15 scapel, to a smooth base with out incident. Patient tolerated this  well and reported comfort to the area of x2    - Return to clinic in 3m or sooner if problems arise

## 2024-07-18 NOTE — PROGRESS NOTES
Outpatient Care Management   - Patient Assessment    Patient: Tiana Mead  MRN:  [id number]  Date of Service:  7/18/2024  Completed by:  Gisella Rodrigues LCSW  Referral Date: 06/25/2024    Reason for Visit   Patient presents with    Other     7/11/2024  1st attempt to complete Initial Assessment for Outpatient Care Management, left message. Will send unable to assess letter through portal.    Social Work Assessment     07/18/2024       Brief Summary:  received a referral from OPCM RN for the following HR SW psychosocial needs: needs help with home delivered meals. The patient also requests assistance with the status of her post discharge motify/echoecho meals. Spoke with patient who reports she can ambulate short distances with her rolling walker. Patient is able to use the toilet independently and uses a bath bench for bathing. Patient reports her daughter assists with meals and all IADLs, such as transportation and shopping. Patient reports she manages her own medications. Patient reports she rotates from one daughter to the other in terms of her living situation, and they both live within walking distance from each other. Patient states the [employer] was supposed to begin delivering meals starting 6/10/24 but never delivered. SW called [employer] with patient and was informed the person who assists with the MOW program will not be back in the office until Monday. Patient left her name and number for MOW director to return her call. Patient reports she has not received her motify/[employer] post discharge meals. Spoke with [employer] and was informed there appears to be a delay, but the order is out for delivery. SW updated patient on the status. Patient denies further needs at this time. Patient is agreeable for a follow up call with SW in 1 week. Care plan was created in collaboration with patient/caregiver input.  completed the SDOH questionnaire.

## 2024-07-22 DIAGNOSIS — E11.9 INSULIN-REQUIRING OR DEPENDENT TYPE II DIABETES MELLITUS: ICD-10-CM

## 2024-07-22 DIAGNOSIS — Z79.4 INSULIN-REQUIRING OR DEPENDENT TYPE II DIABETES MELLITUS: ICD-10-CM

## 2024-07-22 RX ORDER — DEXTROSE 4 G
TABLET,CHEWABLE ORAL
Refills: 0 | OUTPATIENT
Start: 2024-07-22

## 2024-07-22 RX ORDER — LANCETS
EACH MISCELLANEOUS
Qty: 200 EACH | Refills: 0 | OUTPATIENT
Start: 2024-07-22

## 2024-07-22 RX ORDER — ISOPROPYL ALCOHOL 70 ML/100ML
SWAB TOPICAL
Refills: 0 | OUTPATIENT
Start: 2024-07-22

## 2024-07-22 NOTE — TELEPHONE ENCOUNTER
No care due was identified.  [facility] Embedded Care Due Messages. Reference number: [id number].   7/22/2024 2:15:43 PM CDT

## 2024-07-23 NOTE — TELEPHONE ENCOUNTER
Refill Decision Note   Tiana Mead  is requesting a refill authorization.  Brief Assessment and Rationale for Refill:  Quick Discontinue     Medication Therapy Plan: Pharmacy is requesting new scripts for the following medications without required information, (sig/ frequency/qty/etc)       Medication Reconciliation Completed: No   Comments: Pharmacies have been requesting medications for patients without required information, (sig, frequency, qty, etc.). In addition, requests are sent for medication(s) pt. are currently not taking, and medications patients have never taken.    We have spoken to the pharmacies about these request types and advised their teams previously that we are unable to assess these New Script requests and require all details for these requests. This is a known issue and has been reported.     No Care Gaps recommended.     Note composed:11:16 PM 07/22/2024

## 2024-07-24 ENCOUNTER — OUTPATIENT CASE MANAGEMENT (OUTPATIENT)
Dept: ADMINISTRATIVE | Facility: OTHER | Age: 89
End: 2024-07-24
Payer: MEDICARE

## 2024-07-24 NOTE — PROGRESS NOTES
7/24/2024  1st attempt to complete Follow-Up for Outpatient Care Management, left message. Will mail unable to assess letter.

## 2024-07-24 NOTE — LETTER
Tiana Mead  [address] [facility] [id number]      Dear Tiana Mead,     I am writing from the Outpatient Care Management Department at [facility]. I have been unsuccessful at reaching you since we spoke on 7/18/2024.  I hope you found the assistance previously provided to you helpful.     Please contact me at [phone] from 8:00AM to 4:30 PM on Monday thru Friday.     As you know, [facility] also has a program with a nurse available 24/7 to answer questions or provide medical advice.  [facility] can be reached at [phone].    Sincerely,       Gisella Rodrigues, [facility]  [facility] Management

## 2024-07-25 ENCOUNTER — NURSE TRIAGE (OUTPATIENT)
Dept: ADMINISTRATIVE | Facility: CLINIC | Age: 89
End: 2024-07-25
Payer: MEDICARE

## 2024-07-25 ENCOUNTER — OFFICE VISIT (OUTPATIENT)
Dept: INTERNAL MEDICINE | Facility: CLINIC | Age: 89
End: 2024-07-25
Payer: MEDICARE

## 2024-07-25 VITALS
SYSTOLIC BLOOD PRESSURE: 138 MMHG | DIASTOLIC BLOOD PRESSURE: 64 MMHG | HEIGHT: 68 IN | WEIGHT: 147.5 LBS | BODY MASS INDEX: 22.35 KG/M2 | OXYGEN SATURATION: 97 % | HEART RATE: 81 BPM

## 2024-07-25 DIAGNOSIS — U07.1 COVID-19: ICD-10-CM

## 2024-07-25 DIAGNOSIS — J02.9 SORETHROAT: Primary | ICD-10-CM

## 2024-07-25 LAB
CTP QC/QA: YES
CTP QC/QA: YES
MOLECULAR STREP A: NEGATIVE
SARS-COV-2 RDRP RESP QL NAA+PROBE: POSITIVE

## 2024-07-25 PROCEDURE — 99999 PR PBB SHADOW E&M-EST. PATIENT-LVL III: CPT | Mod: PBBFAC,HCNC,, | Performed by: INTERNAL MEDICINE

## 2024-07-25 NOTE — TELEPHONE ENCOUNTER
Daughter Yolanda calling on behalf of pt. Pt called on conference. Pt c/o some weakness, nausea and sore throat. Pt is diabetic and  at this time. Pt rates throat pain as 6/10. Advised to be seen in office today per protocol. VU. Appt scheduled per protocol. Encounter routed to provider.   Reason for Disposition   Diabetes mellitus or weak immune system (e.g., HIV positive, cancer chemo, splenectomy, organ transplant, chronic steroids)    Additional Information   Negative: SEVERE difficulty breathing (e.g., struggling for each breath, speaks in single words)   Negative: Sounds like a life-threatening emergency to the triager   Negative: Drooling or spitting out saliva (because can't swallow)   Negative: Unable to open mouth completely   Negative: Drinking very little and has signs of dehydration (e.g., no urine > 12 hours, very dry mouth, very lightheaded)   Negative: Patient sounds very sick or weak to the triager   Negative: Difficulty breathing (per caller) but not severe   Negative: Fever > 103 F (39.4 C)   Negative: Refuses to drink anything for > 12 hours   Negative: SEVERE sore throat pain   Negative: Pus on tonsils (back of throat) and swollen neck lymph nodes ('glands')   Negative: Earache also present   Negative: Widespread rash (especially chest and abdomen)    Protocols used: Sore Throat-A-[place]

## 2024-07-25 NOTE — PROGRESS NOTES
INTERNAL MEDICINE CLINIC - SAME DAY APPOINTMENT  Progress Note    PRESENTING HISTORY     PCP: Tomás Andres MD    Chief Complaint/Reason for Visit:     Chief Complaint   Patient presents with    Sore Throat      History of Present Illness & ROS : Ms. Tiana Mead is a 89 y.o. female.      Started with sore throat yesterday.  No fever or chill.  No chest pain or SOB.    PAST HISTORY:     Past Medical History:   Diagnosis Date    Allergy     Anemia     Arthritis     Asthma     Chronic kidney disease     Degenerative disc disease     Diabetes mellitus type II     Diastolic heart failure 05/02/2017    Essential hypertension 7/3/2012    Glaucoma     History of insulin dependent diabetes mellitus, for many years stopped because of chronic kidney November 2020.  9/9/2021    History of pseudogout 8/23/2012    Hyperlipidemia     Hypertension     Iritis     Myocardial infarction     Pneumonia     Thrombocytopenia 8/24/2021    Thyroid disease        Past Surgical History:   Procedure Laterality Date    CARDIAC CATHETERIZATION  2010    no obstructive disease    CATARACT EXTRACTION W/  INTRAOCULAR LENS IMPLANT Left n/a    CATARACT EXTRACTION W/  INTRAOCULAR LENS IMPLANT Right 10/8/2018    With Femtosecond LASER assist (Dr. Henson)    CHOLECYSTECTOMY      ESOPHAGOGASTRODUODENOSCOPY N/A 11/4/2020    Procedure: EGD (ESOPHAGOGASTRODUODENOSCOPY);  Surgeon: Tomás Mccall MD;  Location: [facility] ([address] [place] [address]);  Service: Endoscopy;  Laterality: N/A;    EYE SURGERY      gall stone      HYSTERECTOMY      VARICOSE VEIN SURGERY         Family History   Problem Relation Name Age of Onset    Diabetes Mother      Hypertension Mother      Glaucoma Mother      Kidney disease Mother      Hypertension Father      Hypotension Sister Alber     Heart attack Sister Alberta     Heart disease Sister Alberta     No Known Problems Sister Bekah     Diabetes Sister Violeta     Kidney disease Sister Violeta     Leukemia Sister Jane      Hypertension Sister Pallavi     Diabetes Sister Lovinia     Kidney disease Sister Lovjose angel     No Known Problems Sister Lacey     Heart disease Brother Mariusz     Heart disease Brother John     No Known Problems Brother Luis     No Known Problems Maternal Grandmother      No Known Problems Maternal Grandfather      No Known Problems Paternal Grandmother      Diabetes Paternal Grandfather      Hypertension Daughter Yolanda     Atrial fibrillation Daughter Yolanda     Diabetes Daughter Melanie         borderline    Hypertension Daughter Melanie     No Known Problems Son Trung     Diabetes Son Raudel     Heart disease Son Raudel     Hypertension Son Raudel     Melanoma Neg Hx         Social History     Socioeconomic History    Marital status:    Tobacco Use    Smoking status: Never    Smokeless tobacco: Never   Substance and Sexual Activity    Alcohol use: No    Drug use: No    Sexual activity: Never     Social Determinants of Health     Financial Resource Strain: Low Risk  (7/18/2024)    Overall Financial Resource Strain (CARDIA)     Difficulty of Paying Living Expenses: Not hard at all   Recent Concern: Financial Resource Strain - Medium Risk (5/9/2024)    Overall Financial Resource Strain (CARDIA)     Difficulty of Paying Living Expenses: Somewhat hard   Food Insecurity: No Food Insecurity (7/18/2024)    Hunger Vital Sign     Worried About Running Out of Food in the Last Year: Never true     Ran Out of Food in the Last Year: Never true   Transportation Needs: No Transportation Needs (7/18/2024)    TRANSPORTATION NEEDS     Transportation : No   Physical Activity: Insufficiently Active (7/18/2024)    Exercise Vital Sign     Days of Exercise per Week: 6 days     Minutes of Exercise per Session: 10 min   Stress: No Stress Concern Present (7/18/2024)    [employer] - Occupational Stress Questionnaire     Feeling of Stress : Not at all   Housing Stability: Low Risk  (7/18/2024)     Housing Stability Vital Sign     Unable to Pay for Housing in the Last Year: No     Homeless in the Last Year: No       MEDICATIONS & ALLERGIES:     Current Outpatient Medications on File Prior to Visit   Medication Sig Dispense Refill    acetaminophen (TYLENOL) 500 MG tablet Take 2 tablets (1,000 mg total) by mouth 3 (three) times daily as needed for Pain (muscle pain in arms and legs). 100 tablet 1    albuterol (PROVENTIL/VENTOLIN HFA) 90 mcg/actuation inhaler Inhale 2 puffs into the lungs every 4 (four) hours as needed for Wheezing. 18 g 6    amLODIPine (NORVASC) 5 MG tablet TAKE 1 TABLET(5 MG) BY MOUTH EVERY MORNING 90 tablet 2    aspirin 81 MG Chew Take 1 tablet (81 mg total) by mouth every morning. 100 tablet 3    atorvastatin (LIPITOR) 40 MG tablet TAKE 1 TABLET BY MOUTH EVERY EVENING 90 tablet 2    blood sugar diagnostic (ONETOUCH ULTRA TEST) Strp 1 strip by In Vitro route once daily. No longer on insulin because of  each 3    blood-glucose meter kit To check BG 1 times daily, to use with insurance preferred meter 1 each 0    clotrimazole-betamethasone 1-0.05% (LOTRISONE) cream Apply topically 2 (two) times daily as needed. For intertriginous itching in the bend of the leg 45 g 3    diclofenac sodium (VOLTAREN) 1 % Gel APPLY 2 GRAMS TOPICALLY TO THE AFFECTED AREA TWICE DAILY 100 g 4    dorzolamide (TRUSOPT) 2 % ophthalmic solution Place 1 drop into both eyes 3 (three) times daily. 30 mL 4    fluorometholone 0.1% (FML) 0.1 % DrpS Place 1 drop into both eyes once daily.      fluticasone-salmeterol diskus inhaler 250-50 mcg Inhale 1 puff into the lungs 2 (two) times daily. 60 each 5    furosemide (LASIX) 40 MG tablet TAKE 1 TABLET BY MOUTH EVERY MORNING AND 1 TABLET AT 4PM 180 tablet 3    insulin aspart U-100 (NOVOLOG FLEXPEN U-100 INSULIN) 100 unit/mL (3 mL) InPn pen Inject if blood sugar is >180, 180-230+2, 231-280+4, 281-330+6, 331-380+8,>380+10 MAX daily 30 units. 15 mL 2    isosorbide mononitrate  "(IMDUR) 60 MG 24 hr tablet TAKE 1 TABLET BY MOUTH EVERY MORNING FOR HEART, TO PREVENT CHEST PAINS OR SHORTNESS OF BREATH 90 tablet 3    ketoconazole (NIZORAL) 2 % shampoo Apply topically every 7 days. Soak scalp for 15-30 minutes 120 mL 6    lancets (TRUEPLUS LANCETS) 33 gauge Misc Apply 1 lancet topically once daily. No longer on insulin because of  each 3    lancets Misc To check BG 3 time daily, to use with insurance preferred meter 100 each 3    latanoprost 0.005 % ophthalmic solution Place 1 drop into both eyes every evening. 7.5 mL 4    levothyroxine (SYNTHROID) 88 MCG tablet TAKE 1 TABLET(88 MCG) BY MOUTH DAILY BEFORE BREAKFAST 90 tablet 3    meclizine (ANTIVERT) 12.5 mg tablet TAKE 1 TABLET BY MOUTH THREE TIMES DAILY AS NEEDED FOR DIZZINESS 20 tablet 3    melatonin (MELATIN) 3 mg tablet Take 1 tablet (3 mg total) by mouth nightly as needed for Insomnia.      metoprolol tartrate (LOPRESSOR) 25 MG tablet TAKE 1 TABLET(25 MG) BY MOUTH TWICE DAILY 180 tablet 3    multivit-mineral-iron-lutein Tab Take 1 tablet by mouth once daily.      nitroGLYCERIN (NITROSTAT) 0.4 MG SL tablet ONE TABLET UNDER THE TONGUE EVERY 5 MINUTES AS NEEDED FOR CHEST PAIN 100 tablet 3    pen needle, diabetic (BD ULTRA-FINE SHORT PEN NEEDLE) 31 gauge x 5/16" Ndle USE WITH INSULIN PENS UP TO THREE TIMES DAILY 100 each 6    polyethylene glycol (GLYCOLAX) 17 gram/dose powder Take 17 g by mouth daily as needed (CONSTIPATION).      psyllium (METAMUCIL) powder Take 1 packet by mouth daily as needed (CONSTIPATION).      TRUE METRIX GLUCOSE TEST STRIP Strp TEST THREE TIMES DAILY 300 strip 3     No current facility-administered medications on file prior to visit.        Review of patient's allergies indicates:   Allergen Reactions    Codeine Itching and Nausea Only              Medications Reconciliation:   I have reconciled the patient's home medications with the patient/family. I have updated all changes.  Refer to After-Visit Medication " List.    OBJECTIVE:     Vital Signs:  Vitals:    07/25/24 1456   BP: 138/64   Pulse: 81     Wt Readings from Last 3 Encounters:   07/25/24 1456 66.9 kg (147 lb 7.8 oz)   07/10/24 0924 65 kg (143 lb 4.8 oz)   07/05/24 1042 65.6 kg (144 lb 10 oz)     Body mass index is 22.43 kg/m².     Physical Exam:  General: Well developed, well nourished. No distress.  HEENT: Head is normocephalic, atraumatic  Eyes: Clear conjunctiva.  Neck: Supple, symmetrical neck; trachea midline.  Lungs: Clear to auscultation bilaterally and normal respiratory effort.  Cardiovascular: Heart with regular rate and rhythm.    Abdomen: Abdomen is soft  Musculoskeletal: Normal gait.   Psychiatric: Normal affect. Alert.      Laboratory  Lab Results   Component Value Date    WBC 5.19 06/25/2024    HGB 11.7 (L) 06/25/2024    HCT 35.9 (L) 06/25/2024     (L) 06/25/2024    CHOL 131 04/04/2024    TRIG 49 04/04/2024    HDL 54 04/04/2024    ALT 24 06/25/2024    AST 25 06/25/2024     06/26/2024    K 4.4 06/26/2024     06/26/2024    CREATININE 1.7 (H) 06/26/2024    BUN 35 (H) 06/26/2024    CO2 29 06/26/2024    TSH 1.910 04/04/2024    INR 1.0 12/22/2020    GLUF 146 (H) 04/16/2004    HGBA1C 6.6 (H) 06/25/2024       ASSESSMENT & PLAN:     Sorethroat  -     POCT COVID-19 Rapid Screening: positive  -     POCT Strep A, Molecular: negative    COVID-19  - Advanced age.  CAD with CHF, DM-2, Asthma.    Plan:  -     nirmatrelvir-ritonavir 300 mg (150 mg x 2)-100 mg copackaged tablets (EUA); Take 3 tablets by mouth 2 (two) times daily for 5 days. Each dose contains 2 nirmatrelvir (pink tablets) and 1 ritonavir (white tablet). Take all 3 tablets together  Dispense: 30 tablet; Refill: 0    Instructions for the patient:  Take Paxlovid as directed.  Hold Atorvastatin for 14 days then resume.    Scheduled Follow-up :  Future Appointments   Date Time Provider Department Center   10/11/2024  8:15 AM Olga De Luna DPM [facility]   10/11/2024  9:00  Adán Delgadillo MD [facility] CARDIO [place]   1/6/2025 10:30 AM [facility] [place] USOP5 [facility] USOUNDO [facility]   1/13/2025 10:30 AM Chasity Ruelas NP [facility] UROLOGY [facility]       After Visit Medication List :     Medication List            Accurate as of July 25, 2024  3:34 PM. If you have any questions, ask your nurse or doctor.                START taking these medications      nirmatrelvir-ritonavir 300 mg (150 mg x 2)-100 mg copackaged tablets (EUA)  Take 3 tablets by mouth 2 (two) times daily for 5 days. Each dose contains 2 nirmatrelvir (pink tablets) and 1 ritonavir (white tablet). Take all 3 tablets together  Started by: Clayton López MD            CONTINUE taking these medications      acetaminophen 500 MG tablet  Commonly known as: TYLENOL  Take 2 tablets (1,000 mg total) by mouth 3 (three) times daily as needed for Pain (muscle pain in arms and legs).     albuterol 90 mcg/actuation inhaler  Commonly known as: PROVENTIL/VENTOLIN HFA  Inhale 2 puffs into the lungs every 4 (four) hours as needed for Wheezing.     amLODIPine 5 MG tablet  Commonly known as: NORVASC  TAKE 1 TABLET(5 MG) BY MOUTH EVERY MORNING     aspirin 81 MG Chew  Take 1 tablet (81 mg total) by mouth every morning.     atorvastatin 40 MG tablet  Commonly known as: LIPITOR  TAKE 1 TABLET BY MOUTH EVERY EVENING     * blood sugar diagnostic Strp  Commonly known as: ONETOUCH ULTRA TEST  1 strip by In Vitro route once daily. No longer on insulin because of CKD     * TRUE METRIX GLUCOSE TEST STRIP Strp  Generic drug: blood sugar diagnostic  TEST THREE TIMES DAILY     blood-glucose meter kit  To check BG 1 times daily, to use with insurance preferred meter     clotrimazole-betamethasone 1-0.05% cream  Commonly known as: LOTRISONE  Apply topically 2 (two) times daily as needed. For intertriginous itching in the bend of the leg     diclofenac sodium 1 % Gel  Commonly known as: VOLTAREN  APPLY 2 GRAMS TOPICALLY TO THE AFFECTED AREA TWICE DAILY    "  dorzolamide 2 % ophthalmic solution  Commonly known as: TRUSOPT  Place 1 drop into both eyes 3 (three) times daily.     fluorometholone 0.1% 0.1 % Drps  Commonly known as: FML     fluticasone-salmeterol 250-50 mcg/dose 250-50 mcg/dose diskus inhaler  Commonly known as: ADVAIR  Inhale 1 puff into the lungs 2 (two) times daily.     furosemide 40 MG tablet  Commonly known as: LASIX  TAKE 1 TABLET BY MOUTH EVERY MORNING AND 1 TABLET AT 4PM     insulin aspart U-100 100 unit/mL (3 mL) Inpn pen  Commonly known as: NovoLOG Flexpen U-100 Insulin  Inject if blood sugar is >180, 180-230+2, 231-280+4, 281-330+6, 331-380+8,>380+10 MAX daily 30 units.     isosorbide mononitrate 60 MG 24 hr tablet  Commonly known as: IMDUR  TAKE 1 TABLET BY MOUTH EVERY MORNING FOR HEART, TO PREVENT CHEST PAINS OR SHORTNESS OF BREATH     ketoconazole 2 % shampoo  Commonly known as: NIZORAL  Apply topically every 7 days. Soak scalp for 15-30 minutes     * lancets 33 gauge Misc  Commonly known as: TRUEPLUS LANCETS  Apply 1 lancet topically once daily. No longer on insulin because of CKD     * lancets Misc  To check BG 3 time daily, to use with insurance preferred meter     latanoprost 0.005 % ophthalmic solution  Place 1 drop into both eyes every evening.     levothyroxine 88 MCG tablet  Commonly known as: SYNTHROID  TAKE 1 TABLET(88 MCG) BY MOUTH DAILY BEFORE BREAKFAST     meclizine 12.5 mg tablet  Commonly known as: ANTIVERT  TAKE 1 TABLET BY MOUTH THREE TIMES DAILY AS NEEDED FOR DIZZINESS     melatonin 3 mg tablet  Commonly known as: MELATIN  Take 1 tablet (3 mg total) by mouth nightly as needed for Insomnia.     metoprolol tartrate 25 MG tablet  Commonly known as: LOPRESSOR  TAKE 1 TABLET(25 MG) BY MOUTH TWICE DAILY     multivit-mineral-iron-lutein Tab     nitroGLYCERIN 0.4 MG SL tablet  Commonly known as: NITROSTAT  ONE TABLET UNDER THE TONGUE EVERY 5 MINUTES AS NEEDED FOR CHEST PAIN     pen needle, diabetic 31 gauge x 5/16" Ndle  Commonly " known as: BD ULTRA-FINE SHORT PEN NEEDLE  USE WITH INSULIN PENS UP TO THREE TIMES DAILY     polyethylene glycol 17 gram/dose powder  Commonly known as: GLYCOLAX     psyllium powder  Commonly known as: METAMUCIL           * This list has 4 medication(s) that are the same as other medications prescribed for you. Read the directions carefully, and ask your doctor or other care provider to review them with you.                   Where to Get Your Medications        These medications were sent to [id number] - [facility] [address]  [address] [facility] [address]      Phone: [phone]   nirmatrelvir-ritonavir 300 mg (150 mg x 2)-100 mg copackaged tablets (EUA)         Signing Physician:  Clayton López MD

## 2024-07-26 ENCOUNTER — OUTPATIENT CASE MANAGEMENT (OUTPATIENT)
Dept: ADMINISTRATIVE | Facility: OTHER | Age: 89
End: 2024-07-26
Payer: MEDICARE

## 2024-07-26 NOTE — PROGRESS NOTES
Outpatient Care Management  Plan of Care Follow Up Visit    Patient: Tiana Mead  MRN: [id number]  Date of Service: 07/26/2024  Completed by: Kaela Tello RN  Referral Date: 06/25/2024    Reason for Visit   Patient presents with    OPCM RN Follow Up Call    Nursing Assessment       Brief Summary: OPCM RN followed up with Mrs. Montiel today for care plan review.    Next Steps:   Review how Mrs. Montiel is feeling.(Recovered from COVID?)  Explore pain rating/location of pain/quality of pain.   Encourage nonpharmacologic measures, such as applied heat or cold, exercise, physical or occupational therapy, orthoses, cognitive behavioral therapy, yoga, obey-chi, acupuncture, adequate sleep and weight loss.   Mrs. Montiel agreed to OPCM RN follow up on or around 8/2/24.

## 2024-07-31 NOTE — PROGRESS NOTES
Outpatient Care Management   - Care Plan Follow Up    Patient: Tiana Mead  MRN:  [id number]  Date of Service:  7/31/2024  Completed by:  Gisella Rodrigues LCSW  Referral Date: 06/25/2024    Reason for Visit   Patient presents with    Other     7/24/2024  1st attempt to complete Follow-Up for Outpatient Care Management, left message. Will mail unable to assess letter.      OPCM  Follow Up Call     07/31/2024       Brief Summary: Spoke with patient who reports she has not receive any [employer] Meals or a phone call from [place] [employer]. CHRIS conference called [facility] with patient, there was no answer so SW left a voicemail requesting a return call to either SW or patient regarding the MOW program. Per [email or web address], the order is still marked as out for delivery with a MASHA of 7/10/24. CHRIS discussed the other meal plan orders available and patient requested a new order to be placed. Patient requested the low sodium diet with no food allergies. Order # [id number]. Patient is agreeable for CHRIS to mail the [employer] meal plan benefits she has available (and the ones she has used) along with the ph# for [email or web address] should her daughter wish to speak with them regarding the orders. CHRIS will follow up with patient in 2 weeks.    Complex Care Plan     Care plan was discussed and completed today with input from patient and/or caregiver.    Patient Instructions     No follow-ups on file.

## 2024-08-02 ENCOUNTER — OUTPATIENT CASE MANAGEMENT (OUTPATIENT)
Dept: ADMINISTRATIVE | Facility: OTHER | Age: 89
End: 2024-08-02
Payer: MEDICARE

## 2024-08-02 NOTE — PROGRESS NOTES
Outpatient Care Management  Plan of Care Follow Up Visit    Patient: Tiana Mead  MRN: [id number]  Date of Service: 08/02/2024  Completed by: Kaela Tello RN  Referral Date: 06/25/2024    Reason for Visit   Patient presents with    OPCM RN Follow Up Call       Brief Summary: OPCM RN followed up with Mrs. Montiel today for care plan review.    Next Steps:   Free from falls?  Assess fall risk using a validated tool when available. Consider balance and gait impairment, muscle weakness, diminished vision or hearing, environmental hazards, presence of urinary or bowel urgency and/or incontinence.   Mrs. Montiel agreed to OPCM RN follow up on or around 8/16/24.

## 2024-08-07 ENCOUNTER — PATIENT MESSAGE (OUTPATIENT)
Dept: ADMINISTRATIVE | Facility: OTHER | Age: 89
End: 2024-08-07
Payer: MEDICARE

## 2024-08-07 ENCOUNTER — OUTPATIENT CASE MANAGEMENT (OUTPATIENT)
Dept: ADMINISTRATIVE | Facility: OTHER | Age: 89
End: 2024-08-07
Payer: MEDICARE

## 2024-08-16 ENCOUNTER — OUTPATIENT CASE MANAGEMENT (OUTPATIENT)
Dept: ADMINISTRATIVE | Facility: OTHER | Age: 89
End: 2024-08-16
Payer: MEDICARE

## 2024-08-16 DIAGNOSIS — Z79.4 INSULIN-REQUIRING OR DEPENDENT TYPE II DIABETES MELLITUS: ICD-10-CM

## 2024-08-16 DIAGNOSIS — E11.9 INSULIN-REQUIRING OR DEPENDENT TYPE II DIABETES MELLITUS: ICD-10-CM

## 2024-08-16 NOTE — PROGRESS NOTES
Outpatient Care Management  Plan of Care Follow Up Visit    Patient: Tiana Mead  MRN: [id number]  Date of Service: 08/16/2024  Completed by: Kaela Tello RN  Referral Date: 06/25/2024    Reason for Visit   Patient presents with    OPCM RN Follow Up Call       Brief Summary: OPCM RN followed up with Mrs. Montiel today for care plan review.    Next Steps:   Review pain rating.   Explore if Mrs. Montiel is able to continue attending [employer].  Review if she continues to complete HEP daily.   Mrs. Montiel agreed to OPCM RN follow up on or around 8/30/24.

## 2024-08-16 NOTE — TELEPHONE ENCOUNTER
No care due was identified.  [facility] Embedded Care Due Messages. Reference number: [id number].   8/16/2024 12:16:00 PM CDT

## 2024-08-16 NOTE — TELEPHONE ENCOUNTER
"----- Message from Kaela Shah RN sent at 8/16/2024 12:01 PM CDT -----  Good afternoon,     I am the  for Mrs. Montiel. She expressed that she received a call a couple of weeks ago regarding her glucometer and monitoring supplies, stating,"A lady asked me how old my monitor was." She voiced that she is waiting to receive a new meter and supplies, but has not heard/received anything. I see a note in her chart documented by Shivani Nguyen from pharmacy on 7/22, but it states request refusal for all of the supplies. I was unable to reach out to her as she is currently out of the office until October.    Please reach out to Mrs. Montiel at your earliest convenience for continuity of care.    Thank you for your assistance.    Kind regards,    Kaela Shah RN  [facility]  [phone]  "

## 2024-08-17 RX ORDER — INSULIN PUMP SYRINGE, 3 ML
EACH MISCELLANEOUS
Qty: 1 EACH | Refills: 0 | Status: SHIPPED | OUTPATIENT
Start: 2024-08-17 | End: 2025-08-16

## 2024-08-17 RX ORDER — LANCETS
EACH MISCELLANEOUS
Qty: 100 EACH | Refills: 3 | Status: SHIPPED | OUTPATIENT
Start: 2024-08-17

## 2024-08-17 NOTE — TELEPHONE ENCOUNTER
Refill Routing Note   Medication(s) are not appropriate for processing by [facility] for the following reason(s):        Clarification of medication (Rx) details    [facility] action(s):  Defer        Medication Therapy Plan: Unclear frequency      Appointments  past 12m or future 3m with PCP    Date Provider   Last Visit   4/11/2024 Tomás Andres MD   Next Visit   Visit date not found Tomás Andres MD   ED visits in past 90 days: 0        Note composed:3:29 AM 08/17/2024

## 2024-08-20 ENCOUNTER — TELEPHONE (OUTPATIENT)
Dept: INTERNAL MEDICINE | Facility: CLINIC | Age: 89
End: 2024-08-20
Payer: MEDICARE

## 2024-08-20 NOTE — TELEPHONE ENCOUNTER
Sent portal msg informing about both BP medications and to get a refill if she is out. Gave medication names, dosages and directions. Also informed that in office appt is required if she is taking both meds and BP is still elevated.

## 2024-08-20 NOTE — TELEPHONE ENCOUNTER
----- Message from Francia Quintanilla sent at 8/19/2024  4:22 PM CDT -----  Contact: [phone]  Patient called, requested a courtesy call from nurse in regards needing to find out if pcp can sent him some blood pressure medication. Patient stated that her blood pressure early (08/19/24) was 180/80 morning, and 1 pm 140/78, patient has been offered to be seen but refused.[id number] - [place] [address]   Phone: [phone]  Fax:[phone]   Please advise. Thank you.

## 2024-08-21 ENCOUNTER — DOCUMENT SCAN (OUTPATIENT)
Dept: HOME HEALTH SERVICES | Facility: HOSPITAL | Age: 89
End: 2024-08-21
Payer: MEDICARE

## 2024-08-23 NOTE — TELEPHONE ENCOUNTER
----- Message from Irasema Tello sent at 7/17/2017  4:25 PM CDT -----  Contact: Leland from [facility]  Leland from [facility] called, requesting to report critical glucose. Thank you  
Ramonita Donato NP notified of panic glucose level.She said the pt had the labs done prior to the office visit and the pt was fine,no c/o feeling weak or diaphoretic.Asked to contact pt and notify her of the K+=3.8 and that this level was ok.Pt said she did eat prior to having her labs done but she also took her AM insulin.Advised pt to check her glucose tomorrow an hour after taking her insulin just to make sure that her glucose is not consistently this low and if so she should contact her PCP to have her insulin regulated.Pt reports understanding of these instructions.She said she feels fine and just finished eating a salad but she checks her glucose TID.Instiructed pt on normal levels of glucose should be b/w  but to check with her PCP.  
Age/Coagulation

## 2024-08-26 ENCOUNTER — EXTERNAL HOME HEALTH (OUTPATIENT)
Dept: HOME HEALTH SERVICES | Facility: HOSPITAL | Age: 89
End: 2024-08-26
Payer: MEDICARE

## 2024-08-30 ENCOUNTER — OUTPATIENT CASE MANAGEMENT (OUTPATIENT)
Dept: ADMINISTRATIVE | Facility: OTHER | Age: 89
End: 2024-08-30
Payer: MEDICARE

## 2024-08-30 NOTE — PROGRESS NOTES
Outpatient Care Management  Plan of Care Follow Up Visit    Patient: Tiana Mead  MRN: [id number]  Date of Service: 08/30/2024  Completed by: Kaela Tello RN  Referral Date: 06/25/2024    Reason for Visit   Patient presents with    OPCM RN Follow Up Call       Brief Summary: OPCM RN followed up with Mrs. Montiel today for care plan review.    Next Steps:   Encourage physical activity, such as performance of self-care at highest level of ability, strength and balance exercise program. Explore possible chair exercises online.  Free from falls?  Mrs. Montiel agreed to follow up on or around 9/13/24.

## 2024-09-03 ENCOUNTER — OFFICE VISIT (OUTPATIENT)
Dept: URGENT CARE | Facility: CLINIC | Age: 89
End: 2024-09-03
Payer: MEDICARE

## 2024-09-03 VITALS
RESPIRATION RATE: 16 BRPM | BODY MASS INDEX: 22.35 KG/M2 | OXYGEN SATURATION: 96 % | WEIGHT: 147.5 LBS | SYSTOLIC BLOOD PRESSURE: 135 MMHG | HEIGHT: 68 IN | HEART RATE: 69 BPM | TEMPERATURE: 99 F | DIASTOLIC BLOOD PRESSURE: 65 MMHG

## 2024-09-03 DIAGNOSIS — U07.1 COVID-19 VIRUS INFECTION: Primary | ICD-10-CM

## 2024-09-03 DIAGNOSIS — R05.9 COUGH, UNSPECIFIED TYPE: ICD-10-CM

## 2024-09-03 DIAGNOSIS — Z20.818 EXPOSURE TO STREP THROAT: ICD-10-CM

## 2024-09-03 DIAGNOSIS — U07.1 COVID-19 VIRUS DETECTED: ICD-10-CM

## 2024-09-03 DIAGNOSIS — J02.9 SORE THROAT: ICD-10-CM

## 2024-09-03 LAB
CTP QC/QA: YES
CTP QC/QA: YES
MOLECULAR STREP A: NEGATIVE
SARS-COV-2 AG RESP QL IA.RAPID: POSITIVE

## 2024-09-03 PROCEDURE — 99214 OFFICE O/P EST MOD 30 MIN: CPT | Mod: S$GLB,,, | Performed by: NURSE PRACTITIONER

## 2024-09-03 PROCEDURE — 87651 STREP A DNA AMP PROBE: CPT | Mod: QW,S$GLB,, | Performed by: NURSE PRACTITIONER

## 2024-09-03 PROCEDURE — 87811 SARS-COV-2 COVID19 W/OPTIC: CPT | Mod: QW,S$GLB,, | Performed by: NURSE PRACTITIONER

## 2024-09-03 RX ORDER — BENZONATATE 200 MG/1
200 CAPSULE ORAL 3 TIMES DAILY PRN
Qty: 30 CAPSULE | Refills: 0 | Status: SHIPPED | OUTPATIENT
Start: 2024-09-03 | End: 2024-09-13

## 2024-09-03 NOTE — PROGRESS NOTES
"Subjective:      Patient ID: Tiana Mead is a 89 y.o. female.    Vitals:  height is 5' 8" (1.727 m) and weight is 66.9 kg (147 lb 7.8 oz). Her temporal temperature is 98.6 °F (37 °C). Her blood pressure is 135/65 and her pulse is 69. Her respiration is 16 and oxygen saturation is 96%.     Chief Complaint: Sore Throat (With exposure to strep throat)    Patient reports with a sore throat that presented about 2 weeks ago and have had an exposure to strep throat, she reports with gargling with salt water with mild relief. Cough started two days ago.  Sore throat 2 weeks ago.  Sore throat is improving however.    Sore Throat   This is a new problem. The current episode started yesterday. The problem has been gradually worsening. The pain is worse on the right side. There has been no fever. The pain is at a severity of 5/10. The pain is mild. Associated symptoms include congestion, coughing, swollen glands and trouble swallowing. Pertinent negatives include no diarrhea, drooling, ear discharge, ear pain, headaches, hoarse voice, plugged ear sensation, neck pain, shortness of breath, stridor or vomiting. She has had exposure to strep. She has had no exposure to mono. She has tried gargles for the symptoms. The treatment provided mild relief.       Constitution: Negative for chills, fatigue and fever.   HENT:  Positive for congestion, sore throat and trouble swallowing. Negative for ear pain, ear discharge and drooling.    Neck: Negative for neck pain.   Cardiovascular:  Negative for chest pain and sob on exertion.   Respiratory:  Positive for cough. Negative for chest tightness, sputum production, shortness of breath, stridor and wheezing.    Gastrointestinal:  Negative for vomiting and diarrhea.   Neurological:  Negative for headaches.      Objective:     Physical Exam   Constitutional: She is oriented to person, place, and time. She appears well-developed. She is cooperative.  Non-toxic appearance. She does not " appear ill. No distress.   HENT:   Head: Normocephalic and atraumatic.   Ears:   Right Ear: Hearing, tympanic membrane, external ear and ear canal normal.   Left Ear: Hearing, tympanic membrane, external ear and ear canal normal.   Nose: Nose normal. No mucosal edema, rhinorrhea or nasal deformity. No epistaxis. Right sinus exhibits no maxillary sinus tenderness and no frontal sinus tenderness. Left sinus exhibits no maxillary sinus tenderness and no frontal sinus tenderness.   Mouth/Throat: Uvula is midline, oropharynx is clear and moist and mucous membranes are normal. No trismus in the jaw. Normal dentition. No uvula swelling. No oropharyngeal exudate, posterior oropharyngeal edema or posterior oropharyngeal erythema. Tonsils are 1+ on the right. Tonsils are 1+ on the left. Tonsillar exudate.   Eyes: Conjunctivae and lids are normal. No scleral icterus.   Neck: Trachea normal and phonation normal. Neck supple. No edema present. No erythema present. No neck rigidity present.   Cardiovascular: Normal rate, regular rhythm, normal heart sounds and normal pulses.   Pulmonary/Chest: Effort normal and breath sounds normal. No respiratory distress. She has no decreased breath sounds. She has no wheezes. She has no rhonchi. She has no rales.   Abdominal: Normal appearance.   Musculoskeletal: Normal range of motion.         General: No deformity. Normal range of motion.   Lymphadenopathy:     She has no cervical adenopathy.   Neurological: She is alert and oriented to person, place, and time. She exhibits normal muscle tone. Coordination normal.   Skin: Skin is warm, dry, intact, not diaphoretic and not pale.   Psychiatric: Her speech is normal and behavior is normal. Judgment and thought content normal.   Nursing note and vitals reviewed.      Assessment:     1. COVID-19 virus infection    2. Sore throat    3. Cough, unspecified type    4. Exposure to strep throat        Plan:   Chart reviewed.  Most recent GFR in chart  is 27.    Discussed paxlovid with patient and we will defer given hx of CKD and onset of symptoms being unclear.  Possibly from 2 weeks ago when sore throat started.  Here with her daughters in clinic.  COVID-19 virus infection    Sore throat  -     POCT Strep A, Molecular  -     SARS Coronavirus 2 Antigen, POCT Manual Read    Cough, unspecified type  -     benzonatate (TESSALON) 200 MG capsule; Take 1 capsule (200 mg total) by mouth 3 (three) times daily as needed for Cough.  Dispense: 30 capsule; Refill: 0    Exposure to strep throat        Patient Instructions   Please drink plenty of fluids.  Please get plenty of rest.  Please return here or go to the Emergency Department for any concerns or worsening of condition.    If you do not have Hypertension or any history of palpitations, it is ok to take over the counter Sudafed or Mucinex D or Allegra-D or Claritin-D or Zyrtec-D.  If you do take one of the above, it is ok to combine that with plain over the counter Mucinex or Allegra or Claritin or Zyrtec.  If for example you are taking Zyrtec -D, you can combine that with Mucinex, but not Mucinex-D.  If you are taking Mucinex-D, you can combine that with plain Allegra or Claritin or Zyrtec.   If you do have Hypertension or palpitations, it is safe to take Coricidin HBP for relief of sinus symptoms.  If not allergic, please take over the counter Tylenol (Acetaminophen) and/or Motrin (Ibuprofen) as directed for control of pain and/or fever.  Please follow up with your primary care doctor or specialist as needed.    If you  smoke, please stop smoking.

## 2024-09-04 ENCOUNTER — TELEPHONE (OUTPATIENT)
Dept: INTERNAL MEDICINE | Facility: CLINIC | Age: 89
End: 2024-09-04
Payer: MEDICARE

## 2024-09-04 NOTE — TELEPHONE ENCOUNTER
----- Message from Tonia Doty sent at 9/4/2024 12:38 PM CDT -----  Contact: Daughter Antonio Guerrero @ [phone]  1MEDICALADVICE     Patient is calling for Medical Advice regarding:Daughter took patient to   . Please call and review notes from visit    How long has patient had these symptoms:    Pharmacy name and phone#:    Patient wants a call back or thru [facility]:call     Comments:    Please advise patient replies from provider may take up to 48 hours.

## 2024-09-04 NOTE — PATIENT INSTRUCTIONS
Please drink plenty of fluids.  Please get plenty of rest.  Please return here or go to the Emergency Department for any concerns or worsening of condition.    If you do not have Hypertension or any history of palpitations, it is ok to take over the counter Sudafed or Mucinex D or Allegra-D or Claritin-D or Zyrtec-D.  If you do take one of the above, it is ok to combine that with plain over the counter Mucinex or Allegra or Claritin or Zyrtec.  If for example you are taking Zyrtec -D, you can combine that with Mucinex, but not Mucinex-D.  If you are taking Mucinex-D, you can combine that with plain Allegra or Claritin or Zyrtec.   If you do have Hypertension or palpitations, it is safe to take Coricidin HBP for relief of sinus symptoms.  If not allergic, please take over the counter Tylenol (Acetaminophen) and/or Motrin (Ibuprofen) as directed for control of pain and/or fever.  Please follow up with your primary care doctor or specialist as needed.    If you  smoke, please stop smoking.

## 2024-09-06 ENCOUNTER — TELEPHONE (OUTPATIENT)
Dept: INTERNAL MEDICINE | Facility: CLINIC | Age: 89
End: 2024-09-06
Payer: MEDICARE

## 2024-09-06 NOTE — TELEPHONE ENCOUNTER
-Spoke with Mrs. Guerrero she stated that at Urgent Care on 09/03/2024 the pt tested Covid positive. Pt was positive recently on 07/25/2024 when she saw Dr. López. I informed pt's daughter Yolanda that some people test Covid positive for up to 90 days after initial test. -Daughter states that the pt does not have fever, headache, body aches, no nausea or vomiting. Pt was prescribed tessalon perles for cough. Daughter want you to review Urgent care Physician's notes from visit and if you recommend a follow up appt or anything.    Please review and respond,  Thank You.

## 2024-09-06 NOTE — TELEPHONE ENCOUNTER
----- Message from Betty Munson sent at 9/6/2024 12:38 PM CDT -----  Contact: [phone] Daughter  1MEDICALADVICE     Patient is calling for Medical Advice regarding:Yolanda pt daughter please call her about her mother's recent visit from urgent care and pt daughter Yolanda will like to discuss this.please call her back and advise    How long has patient had these symptoms:    Pharmacy name and phone#:    Patient wants a call back or thru [facility]:callback    Comments:    Please advise patient replies from provider may take up to 48 hours.

## 2024-09-07 ENCOUNTER — HOSPITAL ENCOUNTER (EMERGENCY)
Facility: HOSPITAL | Age: 89
Discharge: HOME | End: 2024-09-07
Attending: STUDENT IN AN ORGANIZED HEALTH CARE EDUCATION/TRAINING PROGRAM
Payer: MEDICARE

## 2024-09-07 VITALS
RESPIRATION RATE: 16 BRPM | WEIGHT: 147 LBS | TEMPERATURE: 99 F | SYSTOLIC BLOOD PRESSURE: 141 MMHG | HEART RATE: 70 BPM | BODY MASS INDEX: 22.28 KG/M2 | OXYGEN SATURATION: 96 % | HEIGHT: 68 IN | DIASTOLIC BLOOD PRESSURE: 69 MMHG

## 2024-09-07 DIAGNOSIS — R07.9 CHEST PAIN: ICD-10-CM

## 2024-09-07 DIAGNOSIS — R05.9 COUGH: ICD-10-CM

## 2024-09-07 LAB
ALBUMIN SERPL BCP-MCNC: 3.3 G/DL (ref 3.5–5.2)
ALP SERPL-CCNC: 109 U/L (ref 55–135)
ALT SERPL W/O P-5'-P-CCNC: 23 U/L (ref 10–44)
ANION GAP SERPL CALC-SCNC: 10 MMOL/L (ref 8–16)
AST SERPL-CCNC: 26 U/L (ref 10–40)
BASOPHILS # BLD AUTO: 0.06 K/UL (ref 0–0.2)
BASOPHILS NFR BLD: 0.8 % (ref 0–1.9)
BILIRUB SERPL-MCNC: 0.4 MG/DL (ref 0.1–1)
BNP SERPL-MCNC: 799 PG/ML (ref 0–99)
BUN SERPL-MCNC: 23 MG/DL (ref 10–30)
CALCIUM SERPL-MCNC: 9.2 MG/DL (ref 8.7–10.5)
CHLORIDE SERPL-SCNC: 106 MMOL/L (ref 95–110)
CO2 SERPL-SCNC: 21 MMOL/L (ref 23–29)
CREAT SERPL-MCNC: 1.5 MG/DL (ref 0.5–1.4)
DIFFERENTIAL METHOD BLD: ABNORMAL
EOSINOPHIL # BLD AUTO: 0.1 K/UL (ref 0–0.5)
EOSINOPHIL NFR BLD: 1.8 % (ref 0–8)
ERYTHROCYTE [DISTWIDTH] IN BLOOD BY AUTOMATED COUNT: 14.4 % (ref 11.5–14.5)
EST. GFR  (NO RACE VARIABLE): 31.1 ML/MIN/1.73 M^2
GLUCOSE SERPL-MCNC: 133 MG/DL (ref 70–110)
HCT VFR BLD AUTO: 35.4 % (ref 37–48.5)
HGB BLD-MCNC: 11.4 G/DL (ref 12–16)
IMM GRANULOCYTES # BLD AUTO: 0.02 K/UL (ref 0–0.04)
IMM GRANULOCYTES NFR BLD AUTO: 0.3 % (ref 0–0.5)
INFLUENZA A, MOLECULAR: NOT DETECTED
INFLUENZA B, MOLECULAR: NOT DETECTED
LYMPHOCYTES # BLD AUTO: 1.1 K/UL (ref 1–4.8)
LYMPHOCYTES NFR BLD: 13.8 % (ref 18–48)
MCH RBC QN AUTO: 29.8 PG (ref 27–31)
MCHC RBC AUTO-ENTMCNC: 32.2 G/DL (ref 32–36)
MCV RBC AUTO: 92 FL (ref 82–98)
MONOCYTES # BLD AUTO: 1.1 K/UL (ref 0.3–1)
MONOCYTES NFR BLD: 13.5 % (ref 4–15)
NEUTROPHILS # BLD AUTO: 5.5 K/UL (ref 1.8–7.7)
NEUTROPHILS NFR BLD: 69.8 % (ref 38–73)
NRBC BLD-RTO: 0 /100 WBC
PLATELET # BLD AUTO: 162 K/UL (ref 150–450)
PMV BLD AUTO: 12.1 FL (ref 9.2–12.9)
POTASSIUM SERPL-SCNC: 4.2 MMOL/L (ref 3.5–5.1)
PROT SERPL-MCNC: 6.3 G/DL (ref 6–8.4)
RBC # BLD AUTO: 3.83 M/UL (ref 4–5.4)
RSV AG BY MOLECULAR METHOD: NOT DETECTED
SARS-COV-2 RNA RESP QL NAA+PROBE: NOT DETECTED
SODIUM SERPL-SCNC: 137 MMOL/L (ref 136–145)
TROPONIN I SERPL DL<=0.01 NG/ML-MCNC: 0.01 NG/ML (ref 0–0.03)
WBC # BLD AUTO: 7.84 K/UL (ref 3.9–12.7)

## 2024-09-07 PROCEDURE — 83880 ASSAY OF NATRIURETIC PEPTIDE: CPT | Mod: HCNC

## 2024-09-07 PROCEDURE — 93010 ELECTROCARDIOGRAM REPORT: CPT | Mod: HCNC,,, | Performed by: INTERNAL MEDICINE

## 2024-09-07 PROCEDURE — 99285 EMERGENCY DEPT VISIT HI MDM: CPT | Mod: 25,HCNC

## 2024-09-07 PROCEDURE — 0241U SARS-COV2 (COVID) WITH FLU/RSV BY PCR: CPT | Mod: HCNC

## 2024-09-07 PROCEDURE — 80053 COMPREHEN METABOLIC PANEL: CPT | Mod: HCNC | Performed by: STUDENT IN AN ORGANIZED HEALTH CARE EDUCATION/TRAINING PROGRAM

## 2024-09-07 PROCEDURE — 84484 ASSAY OF TROPONIN QUANT: CPT | Mod: HCNC

## 2024-09-07 PROCEDURE — 85025 COMPLETE CBC W/AUTO DIFF WBC: CPT | Mod: HCNC

## 2024-09-07 PROCEDURE — 93005 ELECTROCARDIOGRAM TRACING: CPT | Mod: HCNC

## 2024-09-07 NOTE — ED NOTES
Pt c/o productive cough with yellow sputum x2 days. Pt reports she recently tested positive for covid. Endorses low grade fevers (although none today) and SOB upon exertion. Also endorses mild chest discomfort from coughing she thinks. Denies SOB at rest, chest pressure or tightness, abd pain,  pain. Pt on RA. Daughter at bedside.

## 2024-09-07 NOTE — ED PROVIDER NOTES
Encounter Date: 9/7/2024       History     Chief Complaint   Patient presents with    Cough     Covid + 3 days ago. Continued coughing that is now productive, yellow sputum. sore throat. Denies SOB     Patient is a 89-year-old female with past medical history of AFib, prior COVID infection, asthma, CHF, CKD, diabetes mellitus, MI in 2013.  Patient has had cough and yellow sputum production during the past 3-4 days.  Associated with nausea (no vomiting).  Patient does not have fever.      Review of patient's allergies indicates:   Allergen Reactions    Codeine Itching and Nausea Only            Past Medical History:   Diagnosis Date    Allergy     Anemia     Arthritis     Asthma     Chronic kidney disease     Degenerative disc disease     Diabetes mellitus type II     Diastolic heart failure 05/02/2017    Essential hypertension 7/3/2012    Glaucoma     History of insulin dependent diabetes mellitus, for many years stopped because of chronic kidney November 2020.  9/9/2021    History of pseudogout 8/23/2012    Hyperlipidemia     Hypertension     Iritis     Myocardial infarction     Pneumonia     Thrombocytopenia 8/24/2021    Thyroid disease      Past Surgical History:   Procedure Laterality Date    CARDIAC CATHETERIZATION  2010    no obstructive disease    CATARACT EXTRACTION W/  INTRAOCULAR LENS IMPLANT Left n/a    CATARACT EXTRACTION W/  INTRAOCULAR LENS IMPLANT Right 10/8/2018    With Femtosecond LASER assist (Dr. Henson)    CHOLECYSTECTOMY      ESOPHAGOGASTRODUODENOSCOPY N/A 11/4/2020    Procedure: EGD (ESOPHAGOGASTRODUODENOSCOPY);  Surgeon: Tomás Mccall MD;  Location: [address]);  Service: Endoscopy;  Laterality: N/A;    EYE SURGERY      gall stone      HYSTERECTOMY      VARICOSE VEIN SURGERY       Family History   Problem Relation Name Age of Onset    Diabetes Mother      Hypertension Mother      Glaucoma Mother      Kidney disease Mother      Hypertension Father      Hypotension Sister Alberta      Heart attack Sister Alberta     Heart disease Sister Albergurjit     No Known Problems Sister Bekah     Diabetes Sister Violeta     Kidney disease Sister Violeta     Leukemia Sister Jane     Hypertension Sister Pallavi     Diabetes Sister Lovinia     Kidney disease Sister Lovinia     No Known Problems Sister Lacey     Heart disease Brother Mariusz     Heart disease Brother John     No Known Problems Brother Luis     No Known Problems Maternal Grandmother      No Known Problems Maternal Grandfather      No Known Problems Paternal Grandmother      Diabetes Paternal Grandfather      Hypertension Daughter Yolanda     Atrial fibrillation Daughter Yolanda     Diabetes Daughter Melanie         borderline    Hypertension Daughter Melanie     No Known Problems Son Trung     Diabetes Son Raudel     Heart disease Son Raudel     Hypertension Son Raudel     Melanoma Neg Hx       Social History     Tobacco Use    Smoking status: Never     Passive exposure: Never    Smokeless tobacco: Never   Substance Use Topics    Alcohol use: No    Drug use: No     Review of Systems    Physical Exam     Initial Vitals [09/07/24 1029]   BP Pulse Resp Temp SpO2   (!) 148/63 77 18 99.5 °F (37.5 °C) 97 %      MAP       --         Physical Exam    Constitutional: She appears well-developed and well-nourished.   HENT:   Head: Normocephalic and atraumatic.   No secretions of oropharynx.   Cardiovascular:  Normal rate.           Murmur heard.  Irregular rhythm and systolic murmur.   Pulmonary/Chest: Breath sounds normal. No respiratory distress. She has no wheezes.   Abdominal: Abdomen is soft. She exhibits no distension. There is no abdominal tenderness.     Neurological: She is alert.   Psychiatric: She has a normal mood and affect.         ED Course   Procedures  Labs Reviewed   CBC W/ AUTO DIFFERENTIAL - Abnormal       Result Value    WBC 7.84      RBC 3.83 (*)     Hemoglobin 11.4 (*)     Hematocrit 35.4 (*)     MCV 92      MCH 29.8       MCHC 32.2      RDW 14.4      Platelets 162      MPV 12.1      Immature Granulocytes 0.3      Gran # (ANC) 5.5      Immature Grans (Abs) 0.02      Lymph # 1.1      Mono # 1.1 (*)     Eos # 0.1      Baso # 0.06      nRBC 0      Gran % 69.8      Lymph % 13.8 (*)     Mono % 13.5      Eosinophil % 1.8      Basophil % 0.8      Differential Method Automated     B-TYPE NATRIURETIC PEPTIDE - Abnormal     (*)    COMPREHENSIVE METABOLIC PANEL - Abnormal    Sodium 137      Potassium 4.2      Chloride 106      CO2 21 (*)     Glucose 133 (*)     BUN 23      Creatinine 1.5 (*)     Calcium 9.2      Total Protein 6.3      Albumin 3.3 (*)     Total Bilirubin 0.4      Alkaline Phosphatase 109      AST 26      ALT 23      eGFR 31.1 (*)     Anion Gap 10     TROPONIN I    Troponin I 0.014     SARS-COV2 (COVID) WITH FLU/RSV BY PCR    SARS-CoV2 (COVID-19) Qualitative PCR Not Detected      Influenza A, Molecular Not Detected      Influenza B, Molecular Not Detected      RSV Ag by Molecular Method Not Detected            Imaging Results              X-Ray Chest AP Portable (Final result)  Result time 09/07/24 12:24:00      Final result by Mainor Albert MD (09/07/24 12:24:00)                   Impression:      Cardiomegaly with mild perihilar interstitial opacities, which may represent sequela of mild interstitial edema.  No focal consolidation.      Electronically signed by: Mainor Albert  Date:    09/07/2024  Time:    12:24               Narrative:    EXAMINATION:  XR CHEST AP PORTABLE    CLINICAL HISTORY:  Cough, unspecified    TECHNIQUE:  Single frontal view of the chest was performed.    COMPARISON:  Chest radiograph 06/25/2024    FINDINGS:  Cardiomediastinal silhouette is enlarged in size.  Aortic atherosclerosis.  Cardiac leads overlie the field of view.    Left costophrenic angle not well visualized.  Coarsened perihilar and basilar interstitial opacities without evidence for consolidation or pneumothorax.    Degenerative change  of the visualized osseous structures.  No evidence for acute fracture.    No free intra-abdominal air.                                       Medications - No data to display  Medical Decision Making  Patient is a 89-year-old female with past medical history of AFib, prior COVID infection, asthma, CHF, CKD, diabetes mellitus, MI in 2013.    Differential diagnosis includes but is not limited to:     Pneumonia:  Patient has cough that is productive of yellow sputum. Patient has no fever.    SIRS: Patient has no fever, no tachycardia, no tachypnea, no leukocytosis. Low risk for sepsis.    ACS:  Patient has chest pain although likely due to cough.  Low suspicion for ACS.    CHF exacerbation:  Patient has cough that is productive along with chest pain.  There is no swelling of legs bilaterally.  Suspicion for CHF exacerbation.    Management:     Ordered chest x-ray portable which shows no focal consolidations.  Also ordered CBC, CMP, COVID PCR. CBC within normal limits. CMP shows no major electrolyte disturbance. COVID with FLU/RSV by PCR shows no detection of these viruses. BNP has improved since prior BNP. Patient has low likelihood of benefit vs risk for use of empiric antibiotics for CAP. Will discharge with recommendation for follow up with PCP and continued use of previously prescribed benzonatate as prescribed. Patient and daughter stated understanding and agreement to plan.      Amount and/or Complexity of Data Reviewed  Labs: ordered. Decision-making details documented in ED Course.     Details: CBC CMP COVID PCR  Radiology: ordered.     Details:  portable chest x-ray  ECG/medicine tests: ordered.               ED Course as of 09/07/24 1833   Sat Sep 07, 2024   1351 Creatinine(!): 1.5 [AG]      ED Course User Index  [AG] Gertrudis Oneil MD                           Clinical Impression:  Final diagnoses:  [R07.9] Chest pain  [R05.9] Cough          ED Disposition Condition    Discharge Stable          ED  Prescriptions    None       Follow-up Information       Follow up With Specialties Details Why Contact Info    Tomás Andres MD Internal Medicine In 3 days  [address]  [place] [address]  [phone]               Gertrudis Oneil MD  Resident  09/07/24 1250

## 2024-09-07 NOTE — DISCHARGE INSTRUCTIONS
Return to emergency department if you have worsening cough, worsening sputum production, worsening fever , chest pain or if you feel the need to do so.

## 2024-09-08 LAB
OHS QRS DURATION: 80 MS
OHS QTC CALCULATION: 415 MS

## 2024-09-09 ENCOUNTER — OFFICE VISIT (OUTPATIENT)
Dept: INTERNAL MEDICINE | Facility: CLINIC | Age: 89
End: 2024-09-09
Payer: MEDICARE

## 2024-09-09 VITALS
DIASTOLIC BLOOD PRESSURE: 60 MMHG | BODY MASS INDEX: 22.22 KG/M2 | HEIGHT: 68 IN | OXYGEN SATURATION: 96 % | HEART RATE: 78 BPM | SYSTOLIC BLOOD PRESSURE: 138 MMHG | WEIGHT: 146.63 LBS

## 2024-09-09 DIAGNOSIS — J01.90 ACUTE NON-RECURRENT SINUSITIS, UNSPECIFIED LOCATION: Primary | ICD-10-CM

## 2024-09-09 PROCEDURE — 99214 OFFICE O/P EST MOD 30 MIN: CPT | Mod: HCNC,S$GLB,, | Performed by: FAMILY MEDICINE

## 2024-09-09 PROCEDURE — 99999 PR PBB SHADOW E&M-EST. PATIENT-LVL V: CPT | Mod: PBBFAC,HCNC,, | Performed by: FAMILY MEDICINE

## 2024-09-09 PROCEDURE — 1101F PT FALLS ASSESS-DOCD LE1/YR: CPT | Mod: HCNC,CPTII,S$GLB, | Performed by: FAMILY MEDICINE

## 2024-09-09 PROCEDURE — 3288F FALL RISK ASSESSMENT DOCD: CPT | Mod: HCNC,CPTII,S$GLB, | Performed by: FAMILY MEDICINE

## 2024-09-09 PROCEDURE — 1125F AMNT PAIN NOTED PAIN PRSNT: CPT | Mod: HCNC,CPTII,S$GLB, | Performed by: FAMILY MEDICINE

## 2024-09-09 PROCEDURE — 1159F MED LIST DOCD IN RCRD: CPT | Mod: HCNC,CPTII,S$GLB, | Performed by: FAMILY MEDICINE

## 2024-09-09 RX ORDER — AMOXICILLIN AND CLAVULANATE POTASSIUM 875; 125 MG/1; MG/1
1 TABLET, FILM COATED ORAL EVERY 12 HOURS
Qty: 14 TABLET | Refills: 0 | Status: SHIPPED | OUTPATIENT
Start: 2024-09-09 | End: 2024-09-16

## 2024-09-09 RX ORDER — PROMETHAZINE HYDROCHLORIDE AND DEXTROMETHORPHAN HYDROBROMIDE 6.25; 15 MG/5ML; MG/5ML
5 SYRUP ORAL EVERY 8 HOURS PRN
Qty: 118 ML | Refills: 0 | Status: SHIPPED | OUTPATIENT
Start: 2024-09-09 | End: 2024-09-19
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
## 2024-09-09 NOTE — PATIENT INSTRUCTIONS
Continue to use the Tessalon Perles to help with cough.  If this doesn't help, or if you need help sleeping you can use the cough syrup that was sent today (Phenergan-DM). You can use this up to 3 times a day. It may you a little drowsy.    If you want to use another over the counter medicine, you can use Delsym, but do not take the Delsym and Phenergan-DM together. Take one or the other for that dose.    You've been sent a prescription for Augmentin. This is an antibiotic Give yourself another couple of days to see if this is a virus. If so, you should start to feel better by the end of the week. If you are not feeling better by Thursday or Friday, start the antibiotic. You'll take it twice a day for 7 days. Make sure you take it with food. If you start the antibiotic make sure you take all of it, even if you feel entirely better while on it.    Start the antibiotic sooner if you start to feel worse before the end of the week.    If you end up needing to take the antibiotic, I'd like you to follow-up with me or Dr. Andres by Tuesday or Wednesday of next week to make sure you're doing ok.

## 2024-09-13 ENCOUNTER — OUTPATIENT CASE MANAGEMENT (OUTPATIENT)
Dept: ADMINISTRATIVE | Facility: OTHER | Age: 89
End: 2024-09-13
Payer: MEDICARE

## 2024-09-13 NOTE — LETTER
Tiana Mead  [address] [address]      Dear Tiana Mead,     I am your nurse with [facility]. I was unsuccessful in reaching you today. At your earliest convenience, I would like to discuss your healthcare progress.      Please contact me at [phone] from 8:00AM to 4:30 PM on Monday thru Friday.     As you know, [facility] is a program offered to you through [facility] where a nurse is available 24/7 to answer questions or provide medical advice, their number is [phone].    Thanks,      Kaela Tello, RN  Outpatient Care Management

## 2024-09-13 NOTE — PROGRESS NOTES
9/13/2024  1st attempt to complete Follow-Up for Outpatient Care Management, left message. Will send portal message with unable to assess letter.

## 2024-09-16 ENCOUNTER — OUTPATIENT CASE MANAGEMENT (OUTPATIENT)
Dept: ADMINISTRATIVE | Facility: OTHER | Age: 89
End: 2024-09-16
Payer: MEDICARE

## 2024-09-16 NOTE — PROGRESS NOTES
Outpatient Care Management  Plan of Care Follow Up Visit    Patient: Tiana Mead  MRN: [id number]  Date of Service: 09/16/2024  Completed by: Kaela Tello RN  Referral Date: 06/25/2024    Reason for Visit   Patient presents with    OPCM RN Follow Up Call       Brief Summary: OPCM RN followed up with Mrs. Montiel today for care plan review.    Next Steps:   Free from falls?  Explore how Mrs. Montiel is feeling? Weakness?  Mrs. Montiel agrees to follow up on or around 10/7/24.

## 2024-09-17 ENCOUNTER — OFFICE VISIT (OUTPATIENT)
Dept: INTERNAL MEDICINE | Facility: CLINIC | Age: 89
End: 2024-09-17
Payer: MEDICARE

## 2024-09-17 VITALS
BODY MASS INDEX: 22.32 KG/M2 | SYSTOLIC BLOOD PRESSURE: 144 MMHG | DIASTOLIC BLOOD PRESSURE: 60 MMHG | HEIGHT: 68 IN | HEART RATE: 85 BPM | OXYGEN SATURATION: 96 % | WEIGHT: 147.25 LBS

## 2024-09-17 DIAGNOSIS — J01.90 ACUTE NON-RECURRENT SINUSITIS, UNSPECIFIED LOCATION: Primary | ICD-10-CM

## 2024-09-17 DIAGNOSIS — I10 HYPERTENSION, UNSPECIFIED TYPE: ICD-10-CM

## 2024-09-17 PROCEDURE — 1101F PT FALLS ASSESS-DOCD LE1/YR: CPT | Mod: HCNC,CPTII,S$GLB, | Performed by: FAMILY MEDICINE

## 2024-09-17 PROCEDURE — 3288F FALL RISK ASSESSMENT DOCD: CPT | Mod: HCNC,CPTII,S$GLB, | Performed by: FAMILY MEDICINE

## 2024-09-17 PROCEDURE — 99213 OFFICE O/P EST LOW 20 MIN: CPT | Mod: HCNC,S$GLB,, | Performed by: FAMILY MEDICINE

## 2024-09-17 PROCEDURE — 1159F MED LIST DOCD IN RCRD: CPT | Mod: HCNC,CPTII,S$GLB, | Performed by: FAMILY MEDICINE

## 2024-09-17 PROCEDURE — 1126F AMNT PAIN NOTED NONE PRSNT: CPT | Mod: HCNC,CPTII,S$GLB, | Performed by: FAMILY MEDICINE

## 2024-09-17 PROCEDURE — 99999 PR PBB SHADOW E&M-EST. PATIENT-LVL IV: CPT | Mod: PBBFAC,HCNC,, | Performed by: FAMILY MEDICINE

## 2024-09-17 NOTE — PROGRESS NOTES
"Subjective:     Patient ID: Tiana Mead is a 89 y.o. female.   Chief Complaint: Follow-up    HPI:  Patient of Dr. Tomás Andres, seen acutely today for follow-up after respiratory illness    Seen by me on 9/9 for continued respiratory complaints with questionable COVID diagnosis. She had continued subjective fevers and cough at that encounter. Sent rx for promethazine-DM as well as 7 day Augmentin course.    Feeling much better today. Still some minor throat sx. Has 3 days left of abx. No subjective fevers. Cough is almost entirely resolved. Sleeping better. No further facial pain or pressure. No new sx to report.      Review of Systems   Constitutional:  Negative for activity change, chills, fatigue and fever.   HENT:  Negative for nasal congestion, ear discharge, postnasal drip, rhinorrhea, sinus pressure/congestion, sneezing, sore throat and trouble swallowing.    Respiratory:  Negative for cough, chest tightness, shortness of breath and wheezing.    Cardiovascular:  Negative for chest pain and palpitations.   Gastrointestinal:  Negative for abdominal pain, constipation, diarrhea, nausea and vomiting.   Neurological:  Negative for headaches.   Psychiatric/Behavioral:  Negative for sleep disturbance.           Objective:      Vitals:    09/17/24 0815   BP: (!) 144/60   BP Location: Left arm   Patient Position: Sitting   BP Method: Medium (Manual)   Pulse: 85   SpO2: 96%   Weight: 66.8 kg (147 lb 4.3 oz)   Height: 5' 8" (1.727 m)      Physical Exam  Vitals reviewed.   Constitutional:       General: She is not in acute distress.     Appearance: Normal appearance. She is normal weight. She is not ill-appearing.   HENT:      Head: Normocephalic and atraumatic.      Right Ear: External ear normal.      Left Ear: External ear normal.      Nose: Nose normal. No congestion or rhinorrhea.      Right Sinus: No frontal sinus tenderness.      Left Sinus: No frontal sinus tenderness.      Mouth/Throat:      Mouth: Mucous " membranes are moist.      Pharynx: Oropharynx is clear. No oropharyngeal exudate or posterior oropharyngeal erythema.   Cardiovascular:      Rate and Rhythm: Normal rate and regular rhythm.      Pulses: Normal pulses.      Heart sounds: Normal heart sounds. No murmur heard.     No friction rub. No gallop.   Pulmonary:      Effort: No respiratory distress.      Breath sounds: Normal breath sounds. No stridor. No wheezing, rhonchi or rales.   Abdominal:      General: Abdomen is flat. Bowel sounds are normal.      Palpations: Abdomen is soft.   Musculoskeletal:      Cervical back: Normal range of motion.   Neurological:      General: No focal deficit present.      Mental Status: She is alert and oriented to person, place, and time. Mental status is at baseline.   Psychiatric:         Mood and Affect: Mood normal.         Behavior: Behavior normal.         Thought Content: Thought content normal.         Judgment: Judgment normal.         Assessment:       Problem List Items Addressed This Visit    None        Plan:       1. Acute non-recurrent sinusitis, unspecified location  Resolving/resolved  Continue augmentin to completion  Advised that lingering throat sx should improve over next 1 to 2 weeks, likely sequelae of coughing and large Augmentin pills  F/u if these sx persist or worsen    2. Hypertension, unspecified type  Reviewed prior BP trends; slightly above baseline  D/t age and recovering acute illness, will allow minor excursion  No s/sx of hypertensive emergency  F/u if BP trends further upward consistently        No follow-ups on file.     Jama Bradley MD, FAAFP  Family Medicine Physician  [facility]  09/17/2024      This note was produced using voice recognition technology. Some typographical or syntax errors may be present, despite best efforts with proofreading.

## 2024-10-02 ENCOUNTER — PATIENT MESSAGE (OUTPATIENT)
Dept: PODIATRY | Facility: CLINIC | Age: 89
End: 2024-10-02
Payer: MEDICARE

## 2024-10-04 DIAGNOSIS — I25.10 CORONARY ARTERY DISEASE INVOLVING NATIVE CORONARY ARTERY OF NATIVE HEART WITHOUT ANGINA PECTORIS: ICD-10-CM

## 2024-10-04 NOTE — TELEPHONE ENCOUNTER
No care due was identified.  [facility] Embedded Care Due Messages. Reference number: [id number].   10/04/2024 4:29:04 PM CDT

## 2024-10-07 ENCOUNTER — OUTPATIENT CASE MANAGEMENT (OUTPATIENT)
Dept: ADMINISTRATIVE | Facility: OTHER | Age: 89
End: 2024-10-07
Payer: MEDICARE

## 2024-10-07 RX ORDER — AMLODIPINE BESYLATE 5 MG/1
TABLET ORAL
Qty: 90 TABLET | Refills: 1 | Status: SHIPPED | OUTPATIENT
Start: 2024-10-07

## 2024-10-07 RX ORDER — ATORVASTATIN CALCIUM 40 MG/1
40 TABLET, FILM COATED ORAL NIGHTLY
Qty: 90 TABLET | Refills: 1 | Status: SHIPPED | OUTPATIENT
Start: 2024-10-07

## 2024-10-07 RX ORDER — FLUTICASONE PROPIONATE AND SALMETEROL 50; 250 UG/1; UG/1
1 POWDER RESPIRATORY (INHALATION) 2 TIMES DAILY
Qty: 180 EACH | Refills: 1 | Status: SHIPPED | OUTPATIENT
Start: 2024-10-07

## 2024-10-07 NOTE — TELEPHONE ENCOUNTER
Provider Staff:  Action required. This patient has received emergency care.     Please schedule patient for a follow up appointment.     Thanks!  [facility]     Appointments      Date Provider   Last Visit   4/11/2024 Tomás Andres MD   Next Visit   Visit date not found Tomás Andres MD

## 2024-10-07 NOTE — TELEPHONE ENCOUNTER
Refill Routing Note   Medication(s) are not appropriate for processing by [facility] for the following reason(s):        Required vitals abnormal    [facility] action(s):  Defer  Approve   Requires appointment : Yes  - prior ED visit     Medication Therapy Plan: ED notes reviewed. No change to therapy      Appointments  past 12m or future 3m with PCP    Date Provider   Last Visit   4/11/2024 Tomás Andres MD   Next Visit   Visit date not found Tomás Andres MD   ED visits in past 90 days: 1        Note composed:11:34 AM 10/07/2024

## 2024-10-07 NOTE — LETTER
October 7, 2024       Mrs. Montiel [place]  [address] [facility] [address]           Mrs. Montiel,    I wanted to send you this letter to provide you with my contact information should you have any questions or concerns in the future.    Kind regards,    Kaela Tello RN  [facility]  [phone]

## 2024-10-07 NOTE — PROGRESS NOTES
Outpatient Care Management  Plan of Care Follow Up Visit    Patient: Tiana Mead  MRN: [id number]  Date of Service: 10/07/2024  Completed by: Kaela Tello RN  Referral Date: 06/25/2024    Reason for Visit   Patient presents with    OPCM RN Follow Up Call    Case Closure     10/7/2024  Mrs. Montiel agreed to OPC graduation, she requested a letter with my contact information for any future needs, will send letter today, case closure.        Brief Summary: [facility] RN followed up with Mrs. Montiel today for care plan review.    Next Steps:   10/7/2024  Mrs. Montiel agreed to OPCM graduation, she requested a letter with my contact information for any future needs, will send letter today, case closure.

## 2024-10-07 NOTE — TELEPHONE ENCOUNTER
Refill request routed to [facility] Review Pool for Pharmacist Review. fluticasone-salmeterol 250-50 mcg/dose and Coronary artery disease involving native coronary artery of native heart without angina pectoris

## 2024-10-11 ENCOUNTER — OFFICE VISIT (OUTPATIENT)
Dept: PODIATRY | Facility: CLINIC | Age: 89
End: 2024-10-11
Payer: MEDICARE

## 2024-10-11 ENCOUNTER — OFFICE VISIT (OUTPATIENT)
Dept: CARDIOLOGY | Facility: CLINIC | Age: 89
End: 2024-10-11
Payer: MEDICARE

## 2024-10-11 VITALS
SYSTOLIC BLOOD PRESSURE: 139 MMHG | WEIGHT: 147 LBS | OXYGEN SATURATION: 96 % | BODY MASS INDEX: 22.28 KG/M2 | DIASTOLIC BLOOD PRESSURE: 65 MMHG | HEIGHT: 68 IN | HEART RATE: 59 BPM

## 2024-10-11 VITALS
DIASTOLIC BLOOD PRESSURE: 65 MMHG | HEIGHT: 68 IN | HEART RATE: 59 BPM | SYSTOLIC BLOOD PRESSURE: 139 MMHG | BODY MASS INDEX: 22.38 KG/M2 | WEIGHT: 147.69 LBS

## 2024-10-11 DIAGNOSIS — R26.89 ANTALGIC GAIT: ICD-10-CM

## 2024-10-11 DIAGNOSIS — B35.1 ONYCHOMYCOSIS DUE TO DERMATOPHYTE: ICD-10-CM

## 2024-10-11 DIAGNOSIS — E11.42 DIABETIC POLYNEUROPATHY ASSOCIATED WITH TYPE 2 DIABETES MELLITUS: Primary | ICD-10-CM

## 2024-10-11 DIAGNOSIS — I25.10 CORONARY ARTERY DISEASE INVOLVING NATIVE CORONARY ARTERY OF NATIVE HEART WITHOUT ANGINA PECTORIS: ICD-10-CM

## 2024-10-11 DIAGNOSIS — I48.21 PERMANENT ATRIAL FIBRILLATION: ICD-10-CM

## 2024-10-11 DIAGNOSIS — I50.32 CHRONIC DIASTOLIC HEART FAILURE: Primary | ICD-10-CM

## 2024-10-11 DIAGNOSIS — I10 PRIMARY HYPERTENSION: ICD-10-CM

## 2024-10-11 DIAGNOSIS — E78.00 HYPERCHOLESTEROLEMIA: ICD-10-CM

## 2024-10-11 DIAGNOSIS — R60.0 EDEMA OF LOWER EXTREMITY: ICD-10-CM

## 2024-10-11 PROCEDURE — 99999 PR PBB SHADOW E&M-EST. PATIENT-LVL III: CPT | Mod: PBBFAC,HCNC,, | Performed by: INTERNAL MEDICINE

## 2024-10-11 PROCEDURE — 99999 PR PBB SHADOW E&M-EST. PATIENT-LVL III: CPT | Mod: PBBFAC,HCNC,, | Performed by: PODIATRIST

## 2024-10-11 RX ORDER — ATORVASTATIN CALCIUM 40 MG/1
40 TABLET, FILM COATED ORAL NIGHTLY
Qty: 90 TABLET | Refills: 1 | OUTPATIENT
Start: 2024-10-11

## 2024-10-11 NOTE — PROGRESS NOTES
[facility]    Chief Complaint  Chief Complaint   Patient presents with    Atrial Fibrillation       HPI:    She has been doing well.  No complaints.  Occasional pedal edema which resolves overnight.  No syncope, presyncope, or falls.    Medications  Current Outpatient Medications   Medication Sig Dispense Refill    acetaminophen (TYLENOL) 500 MG tablet Take 2 tablets (1,000 mg total) by mouth 3 (three) times daily as needed for Pain (muscle pain in arms and legs). 100 tablet 1    ADVAIR DISKUS 250-50 mcg/dose diskus inhaler USE 1 INHALATION BY MOUTH TWICE DAILY 180 each 1    albuterol (PROVENTIL/VENTOLIN HFA) 90 mcg/actuation inhaler Inhale 2 puffs into the lungs every 4 (four) hours as needed for Wheezing. 18 g 6    amLODIPine (NORVASC) 5 MG tablet TAKE 1 TABLET(5 MG) BY MOUTH EVERY MORNING 90 tablet 1    aspirin 81 MG Chew Take 1 tablet (81 mg total) by mouth every morning. 100 tablet 3    atorvastatin (LIPITOR) 40 MG tablet Take 1 tablet (40 mg total) by mouth every evening. 90 tablet 1    blood sugar diagnostic (ONETOUCH ULTRA TEST) Strp 1 strip by In Vitro route once daily. No longer on insulin because of  each 3    blood sugar diagnostic (TRUE METRIX GLUCOSE TEST STRIP) Strp 1 strip by Other route 3 (three) times daily. test 300 strip 3    blood-glucose meter kit To check BG 1 times daily, to use with insurance preferred meter 1 each 0    clotrimazole-betamethasone 1-0.05% (LOTRISONE) cream Apply topically 2 (two) times daily as needed. For intertriginous itching in the bend of the leg 45 g 3    diclofenac sodium (VOLTAREN) 1 % Gel APPLY 2 GRAMS TOPICALLY TO THE AFFECTED AREA TWICE DAILY 100 g 4    dorzolamide (TRUSOPT) 2 % ophthalmic solution Place 1 drop into both eyes 3 (three) times daily. 30 mL 4    fluorometholone 0.1% (FML) 0.1 % DrpS Place 1 drop into both eyes once daily.      furosemide (LASIX) 40 MG tablet TAKE 1 TABLET BY MOUTH EVERY MORNING AND 1 TABLET AT 4PM 180 tablet 3  "   insulin aspart U-100 (NOVOLOG FLEXPEN U-100 INSULIN) 100 unit/mL (3 mL) InPn pen Inject if blood sugar is >180, 180-230+2, 231-280+4, 281-330+6, 331-380+8,>380+10 MAX daily 30 units. 15 mL 2    isosorbide mononitrate (IMDUR) 60 MG 24 hr tablet TAKE 1 TABLET BY MOUTH EVERY MORNING FOR HEART, TO PREVENT CHEST PAINS OR SHORTNESS OF BREATH 90 tablet 3    lancets (TRUEPLUS LANCETS) 33 gauge Misc Apply 1 lancet topically once daily. No longer on insulin because of  each 3    lancets Misc To check BG 3 time daily, to use with insurance preferred meter 100 each 3    latanoprost 0.005 % ophthalmic solution Place 1 drop into both eyes every evening. 7.5 mL 4    levothyroxine (SYNTHROID) 88 MCG tablet TAKE 1 TABLET(88 MCG) BY MOUTH DAILY BEFORE BREAKFAST 90 tablet 3    meclizine (ANTIVERT) 12.5 mg tablet TAKE 1 TABLET BY MOUTH THREE TIMES DAILY AS NEEDED FOR DIZZINESS 20 tablet 3    melatonin (MELATIN) 3 mg tablet Take 1 tablet (3 mg total) by mouth nightly as needed for Insomnia.      metoprolol tartrate (LOPRESSOR) 25 MG tablet TAKE 1 TABLET(25 MG) BY MOUTH TWICE DAILY 180 tablet 3    multivit-mineral-iron-lutein Tab Take 1 tablet by mouth once daily.      nitroGLYCERIN (NITROSTAT) 0.4 MG SL tablet ONE TABLET UNDER THE TONGUE EVERY 5 MINUTES AS NEEDED FOR CHEST PAIN 100 tablet 3    pen needle, diabetic (BD ULTRA-FINE SHORT PEN NEEDLE) 31 gauge x 5/16" Ndle USE WITH INSULIN PENS UP TO THREE TIMES DAILY 100 each 6    polyethylene glycol (GLYCOLAX) 17 gram/dose powder Take 17 g by mouth daily as needed (CONSTIPATION).      psyllium (METAMUCIL) powder Take 1 packet by mouth daily as needed (CONSTIPATION).      ketoconazole (NIZORAL) 2 % shampoo Apply topically every 7 days. Soak scalp for 15-30 minutes (Patient not taking: Reported on 10/11/2024) 120 mL 6     No current facility-administered medications for this visit.        History  Past Medical History:   Diagnosis Date    Allergy     Anemia     Arthritis     Asthma "     Chronic kidney disease     Degenerative disc disease     Diabetes mellitus type II     Diastolic heart failure 05/02/2017    Essential hypertension 7/3/2012    Glaucoma     History of insulin dependent diabetes mellitus, for many years stopped because of chronic kidney November 2020.  9/9/2021    History of pseudogout 8/23/2012    Hyperlipidemia     Hypertension     Iritis     Myocardial infarction     Pneumonia     Thrombocytopenia 8/24/2021    Thyroid disease      Past Surgical History:   Procedure Laterality Date    CARDIAC CATHETERIZATION  2010    no obstructive disease    CATARACT EXTRACTION W/  INTRAOCULAR LENS IMPLANT Left n/a    CATARACT EXTRACTION W/  INTRAOCULAR LENS IMPLANT Right 10/8/2018    With Femtosecond LASER assist (Dr. Henson)    CHOLECYSTECTOMY      ESOPHAGOGASTRODUODENOSCOPY N/A 11/4/2020    Procedure: EGD (ESOPHAGOGASTRODUODENOSCOPY);  Surgeon: Tomás Mccall MD;  Location: [address]);  Service: Endoscopy;  Laterality: N/A;    EYE SURGERY      gall stone      HYSTERECTOMY      VARICOSE VEIN SURGERY       Social History     Socioeconomic History    Marital status:    Tobacco Use    Smoking status: Never     Passive exposure: Never    Smokeless tobacco: Never   Substance and Sexual Activity    Alcohol use: No    Drug use: No    Sexual activity: Never     Social Drivers of Health     Financial Resource Strain: Low Risk  (7/18/2024)    Overall Financial Resource Strain (CARDIA)     Difficulty of Paying Living Expenses: Not hard at all   Recent Concern: Financial Resource Strain - Medium Risk (5/9/2024)    Overall Financial Resource Strain (CARDIA)     Difficulty of Paying Living Expenses: Somewhat hard   Food Insecurity: No Food Insecurity (7/18/2024)    Hunger Vital Sign     Worried About Running Out of Food in the Last Year: Never true     Ran Out of Food in the Last Year: Never true   Transportation Needs: No Transportation Needs (7/18/2024)    TRANSPORTATION NEEDS      Transportation : No   Physical Activity: Insufficiently Active (7/18/2024)    Exercise Vital Sign     Days of Exercise per Week: 6 days     Minutes of Exercise per Session: 10 min   Stress: No Stress Concern Present (7/18/2024)    [employer] - Occupational Stress Questionnaire     Feeling of Stress : Not at all   Housing Stability: Low Risk  (7/18/2024)    Housing Stability Vital Sign     Unable to Pay for Housing in the Last Year: No     Homeless in the Last Year: No     Family History   Problem Relation Name Age of Onset    Diabetes Mother      Hypertension Mother      Glaucoma Mother      Kidney disease Mother      Hypertension Father      Hypotension Sister Alberta     Heart attack Sister Alberta     Heart disease Sister Alber     No Known Problems Sister Bekah     Diabetes Sister Violeta     Kidney disease Sister Violeta     Leukemia Sister Jane     Hypertension Sister Pallavi     Diabetes Sister Lovinia     Kidney disease Sister Lovinia     No Known Problems Sister Lacey     Heart disease Brother Mariusz     Heart disease Brother John     No Known Problems Brother Luis     No Known Problems Maternal Grandmother      No Known Problems Maternal Grandfather      No Known Problems Paternal Grandmother      Diabetes Paternal Grandfather      Hypertension Daughter Yolanda     Atrial fibrillation Daughter Yolanda     Diabetes Daughter Melanie         borderline    Hypertension Daughter Melanie     No Known Problems Son Trung     Diabetes Son Raudel     Heart disease Son Raudel     Hypertension Son Raudel     Melanoma Neg Hx          Allergies  Review of patient's allergies indicates:   Allergen Reactions    Codeine Itching and Nausea Only              Review of Systems   Review of Systems   Constitutional: Negative for malaise/fatigue, weight gain and weight loss.   Eyes:  Negative for visual disturbance.   Cardiovascular:  Negative for chest pain, claudication, cyanosis,  dyspnea on exertion, irregular heartbeat, leg swelling, near-syncope, orthopnea, palpitations, paroxysmal nocturnal dyspnea and syncope.   Respiratory:  Negative for cough, hemoptysis, shortness of breath, sleep disturbances due to breathing and wheezing.    Hematologic/Lymphatic: Negative for bleeding problem. Does not bruise/bleed easily.   Skin:  Negative for poor wound healing.   Musculoskeletal:  Negative for muscle cramps and myalgias.   Gastrointestinal:  Negative for abdominal pain, anorexia, diarrhea, heartburn, hematemesis, hematochezia, melena, nausea and vomiting.   Genitourinary:  Negative for hematuria and nocturia.   Neurological:  Negative for excessive daytime sleepiness, dizziness, focal weakness, light-headedness and weakness.       Physical Exam  Vitals:    10/11/24 0912   BP: 139/65   Pulse: (!) 59     Wt Readings from Last 1 Encounters:   10/11/24 66.7 kg (147 lb)     Physical Exam  Vitals and nursing note reviewed.   Constitutional:       General: She is not in acute distress.     Appearance: She is not toxic-appearing or diaphoretic.   HENT:      Head: Normocephalic and atraumatic.      Mouth/Throat:      Lips: Pink.      Mouth: Mucous membranes are moist.   Eyes:      General: No scleral icterus.     Conjunctiva/sclera: Conjunctivae normal.   Neck:      Thyroid: No thyromegaly.      Vascular: No carotid bruit, hepatojugular reflux or JVD.      Trachea: Trachea normal.   Cardiovascular:      Rate and Rhythm: Normal rate. Rhythm irregularly irregular. No extrasystoles are present.     Chest Wall: PMI is not displaced.      Pulses:           Carotid pulses are 2+ on the right side and 2+ on the left side.       Radial pulses are 2+ on the right side and 2+ on the left side.      Heart sounds: S1 normal and S2 normal. Murmur heard.      Systolic murmur is present with a grade of 2/6.      No friction rub. No S3 or S4 sounds.   Pulmonary:      Effort: Pulmonary effort is normal. No accessory  muscle usage or respiratory distress.      Breath sounds: Normal breath sounds and air entry. No decreased breath sounds, wheezing, rhonchi or rales.   Abdominal:      General: Bowel sounds are normal. There is no distension or abdominal bruit.      Palpations: Abdomen is soft. There is no hepatomegaly, splenomegaly or pulsatile mass.      Tenderness: There is no abdominal tenderness.   Musculoskeletal:         General: No tenderness or deformity.      Right lower leg: No edema.      Left lower leg: No edema.   Skin:     General: Skin is warm and dry.      Capillary Refill: Capillary refill takes less than 2 seconds.      Coloration: Skin is not cyanotic or pale.      Nails: There is no clubbing.   Neurological:      General: No focal deficit present.      Mental Status: She is alert and oriented to person, place, and time.   Psychiatric:         Attention and Perception: Attention normal.         Mood and Affect: Mood normal.         Speech: Speech normal.         Behavior: Behavior normal. Behavior is cooperative.         Labs  Admission on 09/07/2024, Discharged on 09/07/2024   Component Date Value Ref Range Status    WBC 09/07/2024 7.84  3.90 - 12.70 K/uL Final    RBC 09/07/2024 3.83 (L)  4.00 - 5.40 M/uL Final    Hemoglobin 09/07/2024 11.4 (L)  12.0 - 16.0 g/dL Final    Hematocrit 09/07/2024 35.4 (L)  37.0 - 48.5 % Final    MCV 09/07/2024 92  82 - 98 fL Final    MCH 09/07/2024 29.8  27.0 - 31.0 pg Final    MCHC 09/07/2024 32.2  32.0 - 36.0 g/dL Final    RDW 09/07/2024 14.4  11.5 - 14.5 % Final    Platelets 09/07/2024 162  150 - 450 K/uL Final    MPV 09/07/2024 12.1  9.2 - 12.9 fL Final    Immature Granulocytes 09/07/2024 0.3  0.0 - 0.5 % Final    Gran # (ANC) 09/07/2024 5.5  1.8 - 7.7 K/uL Final    Immature Grans (Abs) 09/07/2024 0.02  0.00 - 0.04 K/uL Final    Comment: Mild elevation in immature granulocytes is non specific and   can be seen in a variety of conditions including stress response,   acute  inflammation, trauma and pregnancy. Correlation with other   laboratory and clinical findings is essential.      Lymph # 09/07/2024 1.1  1.0 - 4.8 K/uL Final    Mono # 09/07/2024 1.1 (H)  0.3 - 1.0 K/uL Final    Eos # 09/07/2024 0.1  0.0 - 0.5 K/uL Final    Baso # 09/07/2024 0.06  0.00 - 0.20 K/uL Final    nRBC 09/07/2024 0  0 /100 WBC Final    Gran % 09/07/2024 69.8  38.0 - 73.0 % Final    Lymph % 09/07/2024 13.8 (L)  18.0 - 48.0 % Final    Mono % 09/07/2024 13.5  4.0 - 15.0 % Final    Eosinophil % 09/07/2024 1.8  0.0 - 8.0 % Final    Basophil % 09/07/2024 0.8  0.0 - 1.9 % Final    Differential Method 09/07/2024 Automated   Final    QRS Duration 09/07/2024 80  ms Final    [facility] QTC Calculation 09/07/2024 415  ms Final    Troponin I 09/07/2024 0.014  0.000 - 0.026 ng/mL Final    Comment: The reference interval for Troponin I represents the 99th percentile   cutoff   for our facility and is consistent with 3rd generation assay   performance.      BNP 09/07/2024 799 (H)  0 - 99 pg/mL Final    Values of less than 100 pg/ml are consistent with non-CHF populations.    SARS-CoV2 (COVID-19) Qualitative P* 09/07/2024 Not Detected  Not Detected Final    Comment: This test utilizes a real-time reverse transcription  polymerase chain reaction procedure to amplify and  detect the SARS-CoV-2 and detect the SARS-CoV-2 N2 and E nucleic  acid targets. The analytical sensitivity (limit of detection) of  this assay is 250 copies/mL.    A Detected result implies that the patient is infected with the  SARS-CoV-2 virus and is presumed to be contagious.  A Not Detected result implies that the SARS-CoV-2 target nucleic  acids are not present above the limit of detection. It does not  rule out the possibility of COVID-19 and should not be the sole  basis for treatment decisions. If COVID-19 is strongly suspected  based on clinical and epidemiological history, re-testing should  be considered.    This test is Food and Drug Administration  (FDA) approved. Performance   characteristics of this has been independently verified by [facility] Department of Pathology and Laboratory Medicine.      Influenza A, Molecular 09/07/2024 Not Detected  Not Detected Final    Influenza B, Molecular 09/07/2024 Not Detected  Not Detected Final    RSV Ag by Molecular Method 09/07/2024 Not Detected  Not Detected Final    Sodium 09/07/2024 137  136 - 145 mmol/L Final    Potassium 09/07/2024 4.2  3.5 - 5.1 mmol/L Final    Chloride 09/07/2024 106  95 - 110 mmol/L Final    CO2 09/07/2024 21 (L)  23 - 29 mmol/L Final    Glucose 09/07/2024 133 (H)  70 - 110 mg/dL Final    BUN 09/07/2024 23  10 - 30 mg/dL Final    Creatinine 09/07/2024 1.5 (H)  0.5 - 1.4 mg/dL Final    Calcium 09/07/2024 9.2  8.7 - 10.5 mg/dL Final    Total Protein 09/07/2024 6.3  6.0 - 8.4 g/dL Final    Albumin 09/07/2024 3.3 (L)  3.5 - 5.2 g/dL Final    Total Bilirubin 09/07/2024 0.4  0.1 - 1.0 mg/dL Final    Comment: For infants and newborns, interpretation of results should be based  on gestational age, weight and in agreement with clinical  observations.    Premature Infant recommended reference ranges:  Up to 24 hours.............<8.0 mg/dL  Up to 48 hours............<12.0 mg/dL  3-5 days..................<15.0 mg/dL  6-29 days.................<15.0 mg/dL      Alkaline Phosphatase 09/07/2024 109  55 - 135 U/L Final    AST 09/07/2024 26  10 - 40 U/L Final    ALT 09/07/2024 23  10 - 44 U/L Final    eGFR 09/07/2024 31.1 (A)  >60 mL/min/1.73 m^2 Final    Anion Gap 09/07/2024 10  8 - 16 mmol/L Final   Office Visit on 09/03/2024   Component Date Value Ref Range Status    Molecular Strep A, POC 09/03/2024 Negative  Negative Final     Acceptable 09/03/2024 Yes   Final    SARS Coronavirus 2 Antigen 09/03/2024 Positive (A)  Negative Final     Acceptable 09/03/2024 Yes   Final   Office Visit on 07/25/2024   Component Date Value Ref Range Status    POC Rapid COVID  07/25/2024 Positive (A)  Negative Final     Acceptable 07/25/2024 Yes   Final    Molecular Strep A, POC 07/25/2024 Negative  Negative Final     Acceptable 07/25/2024 Yes   Final   Admission on 06/25/2024, Discharged on 06/26/2024   Component Date Value Ref Range Status    QRS Duration 06/25/2024 84  ms Final    [facility] QTC Calculation 06/25/2024 412  ms Final    WBC 06/25/2024 5.51  3.90 - 12.70 K/uL Final    RBC 06/25/2024 4.14  4.00 - 5.40 M/uL Final    Hemoglobin 06/25/2024 12.3  12.0 - 16.0 g/dL Final    Hematocrit 06/25/2024 37.6  37.0 - 48.5 % Final    MCV 06/25/2024 91  82 - 98 fL Final    MCH 06/25/2024 29.7  27.0 - 31.0 pg Final    MCHC 06/25/2024 32.7  32.0 - 36.0 g/dL Final    RDW 06/25/2024 14.6 (H)  11.5 - 14.5 % Final    Platelets 06/25/2024 151  150 - 450 K/uL Final    MPV 06/25/2024 11.1  9.2 - 12.9 fL Final    Sodium 06/25/2024 139  136 - 145 mmol/L Final    Potassium 06/25/2024 4.0  3.5 - 5.1 mmol/L Final    Chloride 06/25/2024 105  95 - 110 mmol/L Final    CO2 06/25/2024 28  23 - 29 mmol/L Final    Glucose 06/25/2024 128 (H)  70 - 110 mg/dL Final    BUN 06/25/2024 38 (H)  10 - 30 mg/dL Final    Creatinine 06/25/2024 1.8 (H)  0.5 - 1.4 mg/dL Final    Calcium 06/25/2024 9.8  8.7 - 10.5 mg/dL Final    Total Protein 06/25/2024 7.0  6.0 - 8.4 g/dL Final    Albumin 06/25/2024 3.9  3.5 - 5.2 g/dL Final    Total Bilirubin 06/25/2024 0.6  0.1 - 1.0 mg/dL Final    Comment: For infants and newborns, interpretation of results should be based  on gestational age, weight and in agreement with clinical  observations.    Premature Infant recommended reference ranges:  Up to 24 hours.............<8.0 mg/dL  Up to 48 hours............<12.0 mg/dL  3-5 days..................<15.0 mg/dL  6-29 days.................<15.0 mg/dL      Alkaline Phosphatase 06/25/2024 106  55 - 135 U/L Final    AST 06/25/2024 25  10 - 40 U/L Final    ALT 06/25/2024 24  10 - 44 U/L Final    eGFR 06/25/2024 25 (A)   >60 mL/min/1.73 m^2 Final    Anion Gap 06/25/2024 6 (L)  8 - 16 mmol/L Final    Troponin I 06/25/2024 0.008  0.000 - 0.026 ng/mL Final    Comment: The reference interval for Troponin I represents the 99th percentile   cutoff   for our facility and is consistent with 3rd generation assay   performance.      BNP 06/25/2024 918 (H)  0 - 99 pg/mL Final    Values of less than 100 pg/ml are consistent with non-CHF populations.    Hemoglobin A1C 06/25/2024 6.6 (H)  4.0 - 5.6 % Final    Comment: ADA Screening Guidelines:  5.7-6.4%  Consistent with prediabetes  >or=6.5%  Consistent with diabetes    High levels of fetal hemoglobin interfere with the HbA1C  assay. Heterozygous hemoglobin variants (HbS, HgC, etc)do  not significantly interfere with this assay.   However, presence of multiple variants may affect accuracy.      Estimated Avg Glucose 06/25/2024 143 (H)  68 - 131 mg/dL Final    WBC 06/25/2024 5.19  3.90 - 12.70 K/uL Final    RBC 06/25/2024 3.96 (L)  4.00 - 5.40 M/uL Final    Hemoglobin 06/25/2024 11.7 (L)  12.0 - 16.0 g/dL Final    Hematocrit 06/25/2024 35.9 (L)  37.0 - 48.5 % Final    MCV 06/25/2024 91  82 - 98 fL Final    MCH 06/25/2024 29.5  27.0 - 31.0 pg Final    MCHC 06/25/2024 32.6  32.0 - 36.0 g/dL Final    RDW 06/25/2024 14.6 (H)  11.5 - 14.5 % Final    Platelets 06/25/2024 133 (L)  150 - 450 K/uL Final    MPV 06/25/2024 11.5  9.2 - 12.9 fL Final    Immature Granulocytes 06/25/2024 0.2  0.0 - 0.5 % Final    Gran # (ANC) 06/25/2024 3.3  1.8 - 7.7 K/uL Final    Immature Grans (Abs) 06/25/2024 0.01  0.00 - 0.04 K/uL Final    Comment: Mild elevation in immature granulocytes is non specific and   can be seen in a variety of conditions including stress response,   acute inflammation, trauma and pregnancy. Correlation with other   laboratory and clinical findings is essential.      Lymph # 06/25/2024 1.1  1.0 - 4.8 K/uL Final    Mono # 06/25/2024 0.7  0.3 - 1.0 K/uL Final    Eos # 06/25/2024 0.1  0.0 - 0.5 K/uL  Final    Baso # 06/25/2024 0.04  0.00 - 0.20 K/uL Final    nRBC 06/25/2024 0  0 /100 WBC Final    Gran % 06/25/2024 63.0  38.0 - 73.0 % Final    Lymph % 06/25/2024 21.2  18.0 - 48.0 % Final    Mono % 06/25/2024 13.1  4.0 - 15.0 % Final    Eosinophil % 06/25/2024 1.7  0.0 - 8.0 % Final    Basophil % 06/25/2024 0.8  0.0 - 1.9 % Final    Differential Method 06/25/2024 Automated   Final    Magnesium 06/25/2024 2.1  1.6 - 2.6 mg/dL Final    BSA 06/26/2024 1.84  m2 Final    TAPSE 06/26/2024 1.50  cm Final    LA WIDTH 06/26/2024 5.0  cm Final    RA Width 06/26/2024 4.8  cm Final    Sinus 06/26/2024 2.7  cm Final    LVOT stroke volume 06/26/2024 94.14  cm3 Final    LVIDd 06/26/2024 3.92  3.5 - 6.0 cm Final    LV Systolic Volume 06/26/2024 24.25  mL Final    LV Systolic Volume Index 06/26/2024 13.2  mL/m2 Final    LVIDs 06/26/2024 2.58  2.1 - 4.0 cm Final    LV ESV A2C 06/26/2024 119.29  mL Final    LV Diastolic Volume 06/26/2024 66.82  mL Final    LV ESV A4C 06/26/2024 137.64  mL Final    LV Diastolic Volume Index 06/26/2024 36.32  mL/m2 Final    Left Ventricular End Systolic Volu* 06/26/2024 24.25  mL Final    Left Ventricular End Diastolic Vol* 06/26/2024 66.82  mL Final    IVS 06/26/2024 1.70 (A)  0.6 - 1.1 cm Final    LVOT diameter 06/26/2024 1.97  cm Final    LVOT area 06/26/2024 3.0  cm2 Final    FS 06/26/2024 34  28 - 44 % Final    Left Ventricle Relative Wall Thick* 06/26/2024 0.82  cm Final    PW 06/26/2024 1.6 (A)  0.6 - 1.1 cm Final    LV mass 06/26/2024 263.62  g Final    LV Mass Index 06/26/2024 143  g/m2 Final    TDI LATERAL 06/26/2024 0.06  m/s Final    TDI SEPTAL 06/26/2024 0.05  m/s Final    TR Max Conner 06/26/2024 3.03  m/s Final    ZI 06/26/2024 88.9  mL/m2 Final    LA Vol 06/26/2024 163.50  cm3 Final    LVOT peak conner 06/26/2024 1.63  m/s Final    Left Ventricular Outflow Tract Simona* 06/26/2024 1.33  cm/s Final    Left Ventricular Outflow Tract Simona* 06/26/2024 7.61  mmHg Final    RV- kaba basal diam  06/26/2024 4.3  cm Final    RV S' 06/26/2024 10.56  cm/s Final    LA size 06/26/2024 5.20  cm Final    Left Atrium Minor Axis 06/26/2024 7.18  cm Final    Left Atrium Major Axis 06/26/2024 7.63  cm Final    LA Vol (MOD) 06/26/2024 134.09  cm3 Final    ZI (MOD) 06/26/2024 72.9  mL/m2 Final    RA Major Axis 06/26/2024 7.62  cm Final    AV peak gradient 06/26/2024 16  mmHg Final    Ao peak dewayne 06/26/2024 2.0  m/s Final    LVOT peak VTI 06/26/2024 30.90  cm Final    AV Velocity Ratio 06/26/2024 0.82   Final    SAIMA by Velocity Ratio 06/26/2024 2.48  cm² Final    Mr max dewayne 06/26/2024 4.37  m/s Final    MV mean gradient 06/26/2024 6  mmHg Final    MV peak gradient 06/26/2024 20  mmHg Final    MV valve area by continuity eq 06/26/2024 1.31  cm2 Final    MV VTI 06/26/2024 71.6  cm Final    Triscuspid Valve Regurgitation Pea* 06/26/2024 37  mmHg Final    PV PEAK VELOCITY 06/26/2024 1.54  m/s Final    PV peak gradient 06/26/2024 9  mmHg Final    RVOT peak dewayne 06/26/2024 0.63  m/s Final    STJ 06/26/2024 2.36  cm Final    Ascending aorta 06/26/2024 2.98  cm Final    IVC diameter 06/26/2024 2.83  cm Final    Mean e' 06/26/2024 0.06  m/s Final    ZLVIDS 06/26/2024 -1.58   Final    ZLVIDD 06/26/2024 -2.64   Final    LA area A4C 06/26/2024 36.60  cm2 Final    LA area A2C 06/26/2024 32.28  cm2 Final    TV resting pulmonary artery pressu* 06/26/2024 40  mmHg Final    RV TB RVSP 06/26/2024 6  mmHg Final    Est. RA pres 06/26/2024 3  mmHg Final    EF 06/26/2024 80  % Final    GLS 06/26/2024 15.0  % Final    POCT Glucose 06/25/2024 125 (H)  70 - 110 mg/dL Final    POCT Glucose 06/25/2024 150 (H)  70 - 110 mg/dL Final    Sodium 06/26/2024 141  136 - 145 mmol/L Final    Potassium 06/26/2024 4.4  3.5 - 5.1 mmol/L Final    Chloride 06/26/2024 104  95 - 110 mmol/L Final    CO2 06/26/2024 29  23 - 29 mmol/L Final    Glucose 06/26/2024 96  70 - 110 mg/dL Final    BUN 06/26/2024 35 (H)  10 - 30 mg/dL Final    Creatinine 06/26/2024 1.7 (H)   0.5 - 1.4 mg/dL Final    Calcium 06/26/2024 9.6  8.7 - 10.5 mg/dL Final    Anion Gap 06/26/2024 8  8 - 16 mmol/L Final    eGFR 06/26/2024 27 (A)  >60 mL/min/1.73 m^2 Final    POCT Glucose 06/26/2024 108  70 - 110 mg/dL Final    POCT Glucose 06/26/2024 147 (H)  70 - 110 mg/dL Final       Imaging  No results found.    Assessment  1. Chronic diastolic heart failure  Compensated    2. Permanent atrial fibrillation  Controlled.  Has declined anticoagulation.    3. Primary hypertension  Controlled    4. Hypercholesterolemia  Controlled      Plan and Discussion    No change to current management.    The ASCVD Risk score (Lanette DK, et al., 2019) failed to calculate for the following reasons:    The 2019 ASCVD risk score is only valid for ages 40 to 79    Risk score cannot be calculated because patient has a medical history suggesting prior/existing ASCVD     Follow Up  Follow up in about 6 months (around 4/11/2025).      Adán Alanis MD

## 2024-10-11 NOTE — PROGRESS NOTES
Subjective:      Patient ID: Tiana Mead is a 89 y.o. female.    Chief Complaint: Diabetic Foot Exam (Pcp Tomás Andres MD 04/11/2024), Nail Care (Bilateral foot), and Foot Swelling (Bilateral foot)      Tiana is a 89 y.o. female who presents to the clinic for evaluation and treatment of high risk feet. Tiana has a past medical history of Allergy, Anemia, Arthritis, Asthma, Chronic kidney disease, Degenerative disc disease, Diabetes mellitus type II, Diastolic heart failure (05/02/2017), Essential hypertension (7/3/2012), Glaucoma, History of insulin dependent diabetes mellitus, for many years stopped because of chronic kidney November 2020.  (9/9/2021), History of pseudogout (8/23/2012), Hyperlipidemia, Hypertension, Iritis, Myocardial infarction, Pneumonia, Thrombocytopenia (8/24/2021), and Thyroid disease. The patient's chief complaint is long, thick toenails and calluses on both feet.   This patient has documented high risk feet requiring routine maintenance secondary to diabetes mellitis and those secondary complications of diabetes, as mentioned..    Patient new to me  Seeing Dr. Gentile last visit 7/24    Patient relates she is doing well      PCP: Tomás Andres MD    Date Last Seen by PCP: 9/17/24  Chief Complaint   Patient presents with    Diabetic Foot Exam     Pcp Tomás Andres MD 04/11/2024    Nail Care     Bilateral foot    Foot Swelling     Bilateral foot             Hemoglobin A1C   Date Value Ref Range Status   06/25/2024 6.6 (H) 4.0 - 5.6 % Final     Comment:     ADA Screening Guidelines:  5.7-6.4%  Consistent with prediabetes  >or=6.5%  Consistent with diabetes    High levels of fetal hemoglobin interfere with the HbA1C  assay. Heterozygous hemoglobin variants (HbS, HgC, etc)do  not significantly interfere with this assay.   However, presence of multiple variants may affect accuracy.     04/04/2024 6.8 (H) 4.0 - 5.6 % Final     Comment:     ADA Screening Guidelines:  5.7-6.4%   Consistent with prediabetes  >or=6.5%  Consistent with diabetes    High levels of fetal hemoglobin interfere with the HbA1C  assay. Heterozygous hemoglobin variants (HbS, HgC, etc)do  not significantly interfere with this assay.   However, presence of multiple variants may affect accuracy.     10/09/2023 6.8 (H) 4.0 - 5.6 % Final     Comment:     ADA Screening Guidelines:  5.7-6.4%  Consistent with prediabetes  >or=6.5%  Consistent with diabetes    High levels of fetal hemoglobin interfere with the HbA1C  assay. Heterozygous hemoglobin variants (HbS, HgC, etc)do  not significantly interfere with this assay.   However, presence of multiple variants may affect accuracy.               Patient Active Problem List   Diagnosis    Essential hypertension    Hyperlipidemia associated with type 2 diabetes mellitus    Generalized osteoarthritis of multiple sites    Chronic iritis - Left Eye    Osteoarthritis of both knees    Chondrocalcinosis    Hyperuricemia    Helicobacter pylori gastritis    Old MI (myocardial infarction), 2013    Permanent atrial fibrillation    Long term current use of anticoagulant therapy, eliquis, stopped because of cryptic GI bleeding    Glaucoma associated with ocular trauma    Aortic atherosclerosis    Acute on chronic diastolic congestive heart failure    Type 2 diabetes mellitus, with long-term current use of insulin    Primary hypothyroidism    Moderate persistent asthma without complication    Primary open angle glaucoma (POAG) of both eyes, moderate stage    Pulmonary hypertension    Non-rheumatic tricuspid valve insufficiency    Allergic conjunctivitis of both eyes    Pseudogout, knees    Dry eye syndrome of both eyes    Stage 4 chronic kidney disease    Acute on chronic blood loss anemia, Sept 2020 anticoagulation discontinued    Coronary artery disease involving native coronary artery of native heart without angina pectoris    Myalgia, since April 20221 both arms and both legs    History of  insulin dependent diabetes mellitus, for many years stopped because of chronic kidney November 2020.     Corneal edema    Contact dermatitis    Ulcerative blepharitis of upper and lower eyelids of both eyes    Multiple renal cysts       Current Outpatient Medications on File Prior to Visit   Medication Sig Dispense Refill    acetaminophen (TYLENOL) 500 MG tablet Take 2 tablets (1,000 mg total) by mouth 3 (three) times daily as needed for Pain (muscle pain in arms and legs). 100 tablet 1    ADVAIR DISKUS 250-50 mcg/dose diskus inhaler USE 1 INHALATION BY MOUTH TWICE DAILY 180 each 1    albuterol (PROVENTIL/VENTOLIN HFA) 90 mcg/actuation inhaler Inhale 2 puffs into the lungs every 4 (four) hours as needed for Wheezing. 18 g 6    amLODIPine (NORVASC) 5 MG tablet TAKE 1 TABLET(5 MG) BY MOUTH EVERY MORNING 90 tablet 1    aspirin 81 MG Chew Take 1 tablet (81 mg total) by mouth every morning. 100 tablet 3    atorvastatin (LIPITOR) 40 MG tablet Take 1 tablet (40 mg total) by mouth every evening. 90 tablet 1    blood sugar diagnostic (ONETOUCH ULTRA TEST) Strp 1 strip by In Vitro route once daily. No longer on insulin because of  each 3    blood sugar diagnostic (TRUE METRIX GLUCOSE TEST STRIP) Strp 1 strip by Other route 3 (three) times daily. test 300 strip 3    blood-glucose meter kit To check BG 1 times daily, to use with insurance preferred meter 1 each 0    clotrimazole-betamethasone 1-0.05% (LOTRISONE) cream Apply topically 2 (two) times daily as needed. For intertriginous itching in the bend of the leg 45 g 3    diclofenac sodium (VOLTAREN) 1 % Gel APPLY 2 GRAMS TOPICALLY TO THE AFFECTED AREA TWICE DAILY 100 g 4    dorzolamide (TRUSOPT) 2 % ophthalmic solution Place 1 drop into both eyes 3 (three) times daily. 30 mL 4    fluorometholone 0.1% (FML) 0.1 % DrpS Place 1 drop into both eyes once daily.      furosemide (LASIX) 40 MG tablet TAKE 1 TABLET BY MOUTH EVERY MORNING AND 1 TABLET AT 4PM 180 tablet 3     "insulin aspart U-100 (NOVOLOG FLEXPEN U-100 INSULIN) 100 unit/mL (3 mL) InPn pen Inject if blood sugar is >180, 180-230+2, 231-280+4, 281-330+6, 331-380+8,>380+10 MAX daily 30 units. 15 mL 2    isosorbide mononitrate (IMDUR) 60 MG 24 hr tablet TAKE 1 TABLET BY MOUTH EVERY MORNING FOR HEART, TO PREVENT CHEST PAINS OR SHORTNESS OF BREATH 90 tablet 3    lancets (TRUEPLUS LANCETS) 33 gauge Misc Apply 1 lancet topically once daily. No longer on insulin because of  each 3    lancets Misc To check BG 3 time daily, to use with insurance preferred meter 100 each 3    latanoprost 0.005 % ophthalmic solution Place 1 drop into both eyes every evening. 7.5 mL 4    levothyroxine (SYNTHROID) 88 MCG tablet TAKE 1 TABLET(88 MCG) BY MOUTH DAILY BEFORE BREAKFAST 90 tablet 3    meclizine (ANTIVERT) 12.5 mg tablet TAKE 1 TABLET BY MOUTH THREE TIMES DAILY AS NEEDED FOR DIZZINESS 20 tablet 3    melatonin (MELATIN) 3 mg tablet Take 1 tablet (3 mg total) by mouth nightly as needed for Insomnia.      metoprolol tartrate (LOPRESSOR) 25 MG tablet TAKE 1 TABLET(25 MG) BY MOUTH TWICE DAILY 180 tablet 3    multivit-mineral-iron-lutein Tab Take 1 tablet by mouth once daily.      nitroGLYCERIN (NITROSTAT) 0.4 MG SL tablet ONE TABLET UNDER THE TONGUE EVERY 5 MINUTES AS NEEDED FOR CHEST PAIN 100 tablet 3    pen needle, diabetic (BD ULTRA-FINE SHORT PEN NEEDLE) 31 gauge x 5/16" Ndle USE WITH INSULIN PENS UP TO THREE TIMES DAILY 100 each 6    polyethylene glycol (GLYCOLAX) 17 gram/dose powder Take 17 g by mouth daily as needed (CONSTIPATION).      psyllium (METAMUCIL) powder Take 1 packet by mouth daily as needed (CONSTIPATION).      ketoconazole (NIZORAL) 2 % shampoo Apply topically every 7 days. Soak scalp for 15-30 minutes (Patient not taking: Reported on 10/11/2024) 120 mL 6     No current facility-administered medications on file prior to visit.       Review of patient's allergies indicates:   Allergen Reactions    Codeine      Other " reaction(s): Itching  Other reaction(s): Nausea       Past Surgical History:   Procedure Laterality Date    CARDIAC CATHETERIZATION  2010    no obstructive disease    CATARACT EXTRACTION W/  INTRAOCULAR LENS IMPLANT Left n/a    CATARACT EXTRACTION W/  INTRAOCULAR LENS IMPLANT Right 10/8/2018    With Femtosecond LASER assist (Dr. Henson)    CHOLECYSTECTOMY      ESOPHAGOGASTRODUODENOSCOPY N/A 11/4/2020    Procedure: EGD (ESOPHAGOGASTRODUODENOSCOPY);  Surgeon: Tomás Mccall MD;  Location: [facility] ([address] [place] [address]);  Service: Endoscopy;  Laterality: N/A;    EYE SURGERY      gall stone      HYSTERECTOMY      VARICOSE VEIN SURGERY         Family History   Problem Relation Name Age of Onset    Diabetes Mother      Hypertension Mother      Glaucoma Mother      Kidney disease Mother      Hypertension Father      Hypotension Sister Alberta     Heart attack Sister Alberta     Heart disease Sister Alberta     No Known Problems Sister Bekah     Diabetes Sister Violeta     Kidney disease Sister Violeta     Leukemia Sister Jane     Hypertension Sister Pallavi     Diabetes Sister Lovinia     Kidney disease Sister Lovinia     No Known Problems Sister Lacey     Heart disease Brother Mariusz     Heart disease Brother John     No Known Problems Brother Luis     No Known Problems Maternal Grandmother      No Known Problems Maternal Grandfather      No Known Problems Paternal Grandmother      Diabetes Paternal Grandfather      Hypertension Daughter Yolanda     Atrial fibrillation Daughter Yolanda     Diabetes Daughter Melanie         borderline    Hypertension Daughter Melanie     No Known Problems Son Trung     Diabetes Son Raudel     Heart disease Son Raudel     Hypertension Son Raudel     Melanoma Neg Hx         Social History     Socioeconomic History    Marital status:    Tobacco Use    Smoking status: Never     Passive exposure: Never    Smokeless tobacco: Never   Substance and Sexual Activity    Alcohol use: No     Drug use: No    Sexual activity: Never     Social Drivers of Health     Financial Resource Strain: Low Risk  (7/18/2024)    Overall Financial Resource Strain (CARDIA)     Difficulty of Paying Living Expenses: Not hard at all   Recent Concern: Financial Resource Strain - Medium Risk (5/9/2024)    Overall Financial Resource Strain (CARDIA)     Difficulty of Paying Living Expenses: Somewhat hard   Food Insecurity: No Food Insecurity (7/18/2024)    Hunger Vital Sign     Worried About Running Out of Food in the Last Year: Never true     Ran Out of Food in the Last Year: Never true   Transportation Needs: No Transportation Needs (7/18/2024)    TRANSPORTATION NEEDS     Transportation : No   Physical Activity: Insufficiently Active (7/18/2024)    Exercise Vital Sign     Days of Exercise per Week: 6 days     Minutes of Exercise per Session: 10 min   Stress: No Stress Concern Present (7/18/2024)    [employer] [place] [employer] - Occupational Stress Questionnaire     Feeling of Stress : Not at all   Housing Stability: Low Risk  (7/18/2024)    Housing Stability Vital Sign     Unable to Pay for Housing in the Last Year: No     Homeless in the Last Year: No           Review of Systems   Constitutional: Negative for chills, decreased appetite and fever.   Cardiovascular:  Positive for leg swelling. Negative for chest pain and claudication.   Respiratory:  Negative for cough, hemoptysis and shortness of breath.    Skin:  Positive for dry skin and nail changes. Negative for color change, flushing, itching, poor wound healing, rash and suspicious lesions.   Musculoskeletal:  Positive for arthritis and myalgias. Negative for back pain, falls, gout, joint pain and joint swelling.   Gastrointestinal:  Negative for nausea and vomiting.   Neurological:  Negative for loss of balance, numbness and paresthesias.           Objective:       Vitals:    10/11/24 0831   BP: 139/65   Pulse: (!) 59   Weight: 67 kg (147 lb 11.3 oz)  "  Height: 5' 8" (1.727 m)   PainSc: 0-No pain   PainLoc: Foot        Physical Exam  Vitals reviewed.   Constitutional:       Appearance: She is well-developed.   Cardiovascular:      Comments: Dorsalis pedis and posterior tibial pulses are diminished Bilaterally. Toes are cool to touch. Feet are warm proximally.There is decreased digital hair. Skin is atrophic, slightly hyperpigmented, and mildly edematous    Decreased hair growth to lower shins with evidence of chronic venous stasis dz.           Musculoskeletal:         General: No tenderness, deformity or signs of injury. Normal range of motion.      Right lower le+ Edema present.      Left lower le+ Edema present.      Comments:   Strength 4/5 bilateral    1st MPJ exostosis w/ lateral deviation of hallux, non trackbound. No pain w/ ROM to b/l  hallux      Flexible pes planus foot type w/ medial arch collapse and mild gastroc equinus      Feet:      Right foot:      Skin integrity: Dry skin present. No ulcer, blister, skin breakdown, erythema, warmth, callus or fissure.      Toenail Condition: Right toenails are abnormally thick and long. Fungal disease present.     Left foot:      Skin integrity: Dry skin present. No ulcer, blister, skin breakdown, erythema, warmth, callus or fissure.      Toenail Condition: Left toenails are abnormally thick and long. Fungal disease present.     Comments: Toenails 1-5 bilaterally are elongated by 6-7 mm, thickened by 4-5 mm, discolored/yellowed, dystrophic, brittle with subungual debris.     Minimal Hyperkeratotic tissue noted to lateral 5th toe b/l  Skin:     General: Skin is warm and dry.      Coloration: Skin is not ashen, cyanotic or pale.      Findings: No ecchymosis, erythema or lesion.      Comments:     Neurological:      Mental Status: She is alert.      Comments: [place] 5.07 monofilamant testing is diminished Ney feet. Sharp/dull sensation diminished Bilaterally. Light touch absent Bilaterally.     "   Psychiatric:         Behavior: Behavior normal.               Assessment:       Encounter Diagnoses   Name Primary?    Diabetic polyneuropathy associated with type 2 diabetes mellitus Yes    Edema of lower extremity     Antalgic gait     Onychomycosis due to dermatophyte            Plan:       Tiana was seen today for diabetic foot exam, nail care and foot swelling.    Diagnoses and all orders for this visit:    Diabetic polyneuropathy associated with type 2 diabetes mellitus    Edema of lower extremity    Antalgic gait    Onychomycosis due to dermatophyte        I counseled the patient on her conditions, their implications and medical management.    Chart review  Foot exam      Shoe inspection. Diabetic Foot Education. Patient reminded of the importance of good nutrition and blood sugar control to help prevent podiatric complications of diabetes. Patient instructed on proper foot hygeine. We discussed wearing proper shoe gear, daily foot inspections, never walking without protective shoe gear, never putting sharp instruments to feet       With patient's permission, the toenails mentioned above were aggressively reduced and debrided using a nail nipper, removing all offending nail and debris. Utilizing a #15 scalpel, I trimmed the corns and calluses at the above mentioned location.      The patient will continue to monitor the areas daily, inspect the feet, wear protective shoe gear when ambulatory, and moisturizer to maintain skin integrity.   - Return to clinic in 3m or sooner if problems arise

## 2024-10-11 NOTE — TELEPHONE ENCOUNTER
Quick DC. Inappropriate Request    Refill Authorization Note   Tiana Mead  is requesting a refill authorization.  Brief Assessment and Rationale for Refill:  Quick Discontinue  Medication Therapy Plan:  E-Prescribing Status: Receipt confirmed by pharmacy (10/7/2024)    Medication Reconciliation Completed:  No      Comments:     Note composed:2:29 PM 10/11/2024

## 2024-10-11 NOTE — TELEPHONE ENCOUNTER
No care due was identified.  [facility] Embedded Care Due Messages. Reference number: [id number].   10/11/2024 5:52:32 AM CDT

## 2024-10-14 DIAGNOSIS — H01.01A ULCERATIVE BLEPHARITIS OF UPPER AND LOWER EYELIDS OF BOTH EYES: Primary | ICD-10-CM

## 2024-10-14 DIAGNOSIS — H01.01B ULCERATIVE BLEPHARITIS OF UPPER AND LOWER EYELIDS OF BOTH EYES: Primary | ICD-10-CM

## 2024-10-14 RX ORDER — ERYTHROMYCIN 5 MG/G
1 OINTMENT OPHTHALMIC NIGHTLY
Qty: 3.5 G | Refills: 6 | Status: SHIPPED | OUTPATIENT
Start: 2024-10-14

## 2024-10-14 RX ORDER — ERYTHROMYCIN 5 MG/G
1 OINTMENT OPHTHALMIC NIGHTLY
COMMUNITY
End: 2024-10-14 | Stop reason: SDUPTHER

## 2024-10-14 RX ORDER — ALBUTEROL SULFATE 90 UG/1
2 INHALANT RESPIRATORY (INHALATION) EVERY 4 HOURS PRN
Qty: 18 G | Refills: 6 | Status: SHIPPED | OUTPATIENT
Start: 2024-10-14

## 2024-10-14 NOTE — TELEPHONE ENCOUNTER
Pt says they send a lot in(albuterol inhaler) so it takes her time to use her medicine since she doesn't use it rapidly.    Refill Request.

## 2024-10-14 NOTE — TELEPHONE ENCOUNTER
----- Message from Carmela sent at 10/14/2024  9:07 AM CDT -----  Pt calling in regards to her left eye , pt can't see out of and burning and running for a week now     Confirmed patient's contact info below:  Contact Name: Tiana Mead  Phone Number: [phone] or  [phone]

## 2024-10-14 NOTE — TELEPHONE ENCOUNTER
No care due was identified.  [facility] Embedded Care Due Messages. Reference number: [id number].   10/14/2024 7:43:19 AM CDT

## 2024-10-14 NOTE — TELEPHONE ENCOUNTER
----- Message from Tomasz sent at 10/11/2024  3:35 PM CDT -----  Contact: [phone] .[email or web address]     Patient is calling for Medical Advice regarding: Please call patient wants to know why her medicine could not refill on aDVAIR DISKUS 250-50 mcg/dose diskus inhaler & albuterol (PROVENTIL/VENTOLIN HFA) 90 mcg/actuation inhaler. She says they send a lot in so it takes her time to use her medicine since she doesn't use it rapidly    Patient wants a call back or thru [facility]: call    Comments:    Please advise patient replies from provider may take up to 48 hours.

## 2024-10-14 NOTE — TELEPHONE ENCOUNTER
Pt complaining of itching and irritation   Dr. Prado advised to use EES valeria and artificial tears.   Pt verbalized understanding.

## 2024-10-14 NOTE — TELEPHONE ENCOUNTER
Refill Routing Note   Medication(s) are not appropriate for processing by [facility] for the following reason(s):        Due for refill >6 months ago  ED/Hospital Visit since last OV with provider    [facility] action(s):  Defer        Medication Therapy Plan: ED documentation reviewed. No changes to therapy note; ed encounter chest pain    Extended chart review required: Yes     Appointments  past 12m or future 3m with PCP    Date Provider   Last Visit   4/11/2024 Tomás Andres MD   Next Visit   Visit date not found Tomás Andres MD   ED visits in past 90 days: 1        Note composed:7:56 AM 10/14/2024

## 2024-11-07 ENCOUNTER — OFFICE VISIT (OUTPATIENT)
Dept: OPHTHALMOLOGY | Facility: CLINIC | Age: 89
End: 2024-11-07
Payer: MEDICARE

## 2024-11-07 DIAGNOSIS — H01.01A ULCERATIVE BLEPHARITIS OF UPPER AND LOWER EYELIDS OF BOTH EYES: ICD-10-CM

## 2024-11-07 DIAGNOSIS — H40.1132 PRIMARY OPEN ANGLE GLAUCOMA (POAG) OF BOTH EYES, MODERATE STAGE: ICD-10-CM

## 2024-11-07 DIAGNOSIS — H40.1190 POAG (PRIMARY OPEN-ANGLE GLAUCOMA): ICD-10-CM

## 2024-11-07 DIAGNOSIS — H01.01B ULCERATIVE BLEPHARITIS OF UPPER AND LOWER EYELIDS OF BOTH EYES: ICD-10-CM

## 2024-11-07 DIAGNOSIS — H04.123 DRY EYE SYNDROME OF BOTH EYES: ICD-10-CM

## 2024-11-07 DIAGNOSIS — H20.10 CHRONIC IRITIS: ICD-10-CM

## 2024-11-07 DIAGNOSIS — H02.056 TRICHIASIS OF LEFT EYE WITHOUT ENTROPION: ICD-10-CM

## 2024-11-07 DIAGNOSIS — H18.20 CORNEAL EDEMA: ICD-10-CM

## 2024-11-07 DIAGNOSIS — H40.1190 PRIMARY OPEN ANGLE GLAUCOMA: ICD-10-CM

## 2024-11-07 DIAGNOSIS — H40.32X2 GLAUCOMA OF LEFT EYE ASSOCIATED WITH OCULAR TRAUMA, MODERATE STAGE: Primary | ICD-10-CM

## 2024-11-07 PROCEDURE — 99999 PR PBB SHADOW E&M-EST. PATIENT-LVL III: CPT | Mod: PBBFAC,HCNC,, | Performed by: OPHTHALMOLOGY

## 2024-11-07 RX ORDER — LATANOPROST 50 UG/ML
1 SOLUTION/ DROPS OPHTHALMIC NIGHTLY
Qty: 7.5 ML | Refills: 4 | Status: SHIPPED | OUTPATIENT
Start: 2024-11-07

## 2024-11-07 RX ORDER — DORZOLAMIDE HCL 20 MG/ML
1 SOLUTION/ DROPS OPHTHALMIC 3 TIMES DAILY
Qty: 30 ML | Refills: 4 | Status: SHIPPED | OUTPATIENT
Start: 2024-11-07

## 2024-11-07 RX ORDER — ERYTHROMYCIN 5 MG/G
1 OINTMENT OPHTHALMIC NIGHTLY
Qty: 3.5 G | Refills: 6 | Status: SHIPPED | OUTPATIENT
Start: 2024-11-07

## 2024-11-07 NOTE — PROGRESS NOTES
Assessment /Plan     For exam results, see Encounter Report.    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      Son lives in [facility]  COVID was on vent 12 days --> visiting mom    + Asthma  CHF    Trichiasis JESUS  Discussed and pulled lashes at [facility] c forceps sc difficulty    Zoster Left V1 --> resolved  No ophthalmic involvement  Eye tends to be irritated --> long standing    FML q day prn --> using QOD -> Re-discussed steroid response --> none 05/02/2023      POAG  Stable Glaucoma & Patient near target IOP range    Steroid responder    CCT  517 / 574    Mid teens --> achieved    Both Eyes --> tolerating well & good adherence --> CSM  Trusopt BID  Xal q day --> iritis quiet    Alphagan BID  --> itchy // Follicles --> intolerant  No BB --> Asthma    Hx Ryan in Past    Hold SLT OS    PC IOL OU  Quiet  Observe    Corneal edema OS > OD  Fuchs K dystrophy  Trauma OS  Jony 128 2% BID PRN --> re-discussed & restart      Dry Eye Syndrome: re-discussed use of warm compresses, preserved & non-preserved artificial tears, gel and PM ointment options.  Also discussed options utilizing medications.  EES q HS x 1 week / month --> PRN      NIDDM  No BDR or CSME  control     ERM OD  Not VS  Observe    Improved  Blepharitis & Left Facial dermatitis  Blepharitis: Discussed treatment options including warm compresses, lid scrubs and medications.    Try Ocusoft Tea tree oil // allergy pads --> instructed & Given   EES q HS x 1 week / month  WCs  Lid scrubs     Hold Dial soap  Try Cetaphil  Cerave      11/27/2023  Left facial dermatitis and Bilateral blepharitis        Plan  Frames adjustment  RTC 4-6 month IOP & DFE & adherence  RTC sooner prn with good understanding

## 2024-11-26 NOTE — TELEPHONE ENCOUNTER
----- Message from Tomasz sent at 11/26/2024  3:01 PM CST -----  Contact: [phone]  Requesting an RX refill or new RX.    Is this a refill or new RX: refill    RX name and strength (copy/paste from chart):  nitroGLYCERIN (NITROSTAT) 0.4 MG SL tablet    Is this a 30 day or 90 day RX: 90    Pharmacy name and phone # (copy/paste from chart):    [id number] - [place], [place] - [address] AT [facility] [address]  [address]  [facility] [address]  Phone: [phone] Fax: [phone]    The doctors have asked that we provide their patients with the following 2 reminders -- prescription refills can take up to 72 hours, and a friendly reminder that in the future you can use your [facility] account to request refills: sara

## 2024-11-26 NOTE — TELEPHONE ENCOUNTER
No care due was identified.  [facility] Embedded Care Due Messages. Reference number: [id number].   11/26/2024 3:18:17 PM CST

## 2024-11-27 RX ORDER — NITROGLYCERIN 0.4 MG/1
TABLET SUBLINGUAL
Qty: 100 TABLET | Refills: 1 | Status: SHIPPED | OUTPATIENT
Start: 2024-11-27

## 2024-11-27 NOTE — TELEPHONE ENCOUNTER
Refill Routing Note   Medication(s) are not appropriate for processing by [facility] for the following reason(s):        No active prescription written by provider    [facility] action(s):  Defer             Appointments  past 12m or future 3m with PCP    Date Provider   Last Visit   4/11/2024 Tomás Andres MD   Next Visit   Visit date not found Tomás Andres MD   ED visits in past 90 days: 1        Note composed:11:14 PM 11/26/2024

## 2024-12-01 RX ORDER — NITROGLYCERIN 0.4 MG/1
TABLET SUBLINGUAL
Qty: 100 TABLET | Refills: 1 | Status: SHIPPED | OUTPATIENT
Start: 2024-12-01

## 2024-12-09 ENCOUNTER — TELEPHONE (OUTPATIENT)
Dept: UROLOGY | Facility: CLINIC | Age: 89
End: 2024-12-09
Payer: MEDICARE

## 2024-12-09 NOTE — TELEPHONE ENCOUNTER
----- Message from Nila sent at 12/9/2024  8:43 AM CST -----  Type:   Call    Who Called:Daughter/Yolanda Jones  Does the patient know what this is regarding?:Questions  Would the patient rather a call back or a response via [facility]? call  Best Call Back Number:[phone]  Additional Information:

## 2025-01-13 DIAGNOSIS — Z00.00 ENCOUNTER FOR MEDICARE ANNUAL WELLNESS EXAM: ICD-10-CM

## 2025-01-16 ENCOUNTER — TELEPHONE (OUTPATIENT)
Dept: PODIATRY | Facility: CLINIC | Age: OVER 89
End: 2025-01-16
Payer: MEDICARE

## 2025-01-16 NOTE — TELEPHONE ENCOUNTER
Spoke with pt daughter in regards to 1/17 appt. Pt daughter confirmed and verbalized understanding.

## 2025-01-17 ENCOUNTER — OFFICE VISIT (OUTPATIENT)
Dept: PODIATRY | Facility: CLINIC | Age: OVER 89
End: 2025-01-17
Payer: MEDICARE

## 2025-01-17 VITALS — HEIGHT: 68 IN | WEIGHT: 147.06 LBS | BODY MASS INDEX: 22.29 KG/M2

## 2025-01-17 DIAGNOSIS — R26.89 ANTALGIC GAIT: ICD-10-CM

## 2025-01-17 DIAGNOSIS — B35.1 ONYCHOMYCOSIS DUE TO DERMATOPHYTE: ICD-10-CM

## 2025-01-17 DIAGNOSIS — I73.9 PERIPHERAL VASCULAR DISEASE: ICD-10-CM

## 2025-01-17 DIAGNOSIS — R60.0 EDEMA OF LOWER EXTREMITY: ICD-10-CM

## 2025-01-17 DIAGNOSIS — E11.42 DIABETIC POLYNEUROPATHY ASSOCIATED WITH TYPE 2 DIABETES MELLITUS: Primary | ICD-10-CM

## 2025-01-17 DIAGNOSIS — L84 CORN OR CALLUS: ICD-10-CM

## 2025-01-17 PROCEDURE — 11721 DEBRIDE NAIL 6 OR MORE: CPT | Mod: Q9,59,HCNC,S$GLB | Performed by: PODIATRIST

## 2025-01-17 PROCEDURE — 11055 PARING/CUTG B9 HYPRKER LES 1: CPT | Mod: Q9,HCNC,S$GLB, | Performed by: PODIATRIST

## 2025-01-17 PROCEDURE — 99499 UNLISTED E&M SERVICE: CPT | Mod: HCNC,S$GLB,, | Performed by: PODIATRIST

## 2025-01-17 PROCEDURE — 99999 PR PBB SHADOW E&M-EST. PATIENT-LVL IV: CPT | Mod: PBBFAC,HCNC,, | Performed by: PODIATRIST

## 2025-01-17 NOTE — PROGRESS NOTES
Subjective:      Patient ID: Tiana Mead is a 89 y.o. female.    Chief Complaint: Diabetic Foot Exam (9/17/2024 - Jama Bradley MD )      Tiana is a 89 y.o. female who presents to the clinic for evaluation and treatment of high risk feet. Tiana has a past medical history of Allergy, Anemia, Arthritis, Asthma, Chronic kidney disease, Degenerative disc disease, Diabetes mellitus type II, Diastolic heart failure (05/02/2017), Essential hypertension (7/3/2012), Glaucoma, History of insulin dependent diabetes mellitus, for many years stopped because of chronic kidney November 2020.  (9/9/2021), History of pseudogout (8/23/2012), Hyperlipidemia, Hypertension, Iritis, Myocardial infarction, Pneumonia, Thrombocytopenia (8/24/2021), and Thyroid disease. The patient's chief complaint is long, thick toenails and calluses on both feet.   This patient has documented high risk feet requiring routine maintenance secondary to diabetes mellitis and those secondary complications of diabetes, as mentioned..    Some swelling..  Patient contributes this to not having her bed elevated needing new batteries  She is using diabetic lotion on her feet  Doing well overall sub paid to the callus of the side of the little toe occasionally  Daughter drove her today    PCP: Tomás Andres MD    Date Last Seen by PCP: 9/17/24  Chief Complaint   Patient presents with    Diabetic Foot Exam     9/17/2024 - Jama Bradley MD              Hemoglobin A1C   Date Value Ref Range Status   06/25/2024 6.6 (H) 4.0 - 5.6 % Final     Comment:     ADA Screening Guidelines:  5.7-6.4%  Consistent with prediabetes  >or=6.5%  Consistent with diabetes    High levels of fetal hemoglobin interfere with the HbA1C  assay. Heterozygous hemoglobin variants (HbS, HgC, etc)do  not significantly interfere with this assay.   However, presence of multiple variants may affect accuracy.     04/04/2024 6.8 (H) 4.0 - 5.6 % Final     Comment:     ADA Screening  Guidelines:  5.7-6.4%  Consistent with prediabetes  >or=6.5%  Consistent with diabetes    High levels of fetal hemoglobin interfere with the HbA1C  assay. Heterozygous hemoglobin variants (HbS, HgC, etc)do  not significantly interfere with this assay.   However, presence of multiple variants may affect accuracy.     10/09/2023 6.8 (H) 4.0 - 5.6 % Final     Comment:     ADA Screening Guidelines:  5.7-6.4%  Consistent with prediabetes  >or=6.5%  Consistent with diabetes    High levels of fetal hemoglobin interfere with the HbA1C  assay. Heterozygous hemoglobin variants (HbS, HgC, etc)do  not significantly interfere with this assay.   However, presence of multiple variants may affect accuracy.               Patient Active Problem List   Diagnosis    Essential hypertension    Hyperlipidemia associated with type 2 diabetes mellitus    Generalized osteoarthritis of multiple sites    Chronic iritis - Left Eye    Osteoarthritis of both knees    Chondrocalcinosis    Hyperuricemia    Helicobacter pylori gastritis    Old MI (myocardial infarction), 2013    Permanent atrial fibrillation    Long term current use of anticoagulant therapy, eliquis, stopped because of cryptic GI bleeding    Glaucoma associated with ocular trauma    Aortic atherosclerosis    Acute on chronic diastolic congestive heart failure    Type 2 diabetes mellitus, with long-term current use of insulin    Primary hypothyroidism    Moderate persistent asthma without complication    Primary open angle glaucoma (POAG) of both eyes, moderate stage    Pulmonary hypertension    Non-rheumatic tricuspid valve insufficiency    Allergic conjunctivitis of both eyes    Pseudogout, knees    Dry eye syndrome of both eyes    Stage 4 chronic kidney disease    Acute on chronic blood loss anemia, Sept 2020 anticoagulation discontinued    Coronary artery disease involving native coronary artery of native heart without angina pectoris    Myalgia, since April 20221 both arms and  both legs    History of insulin dependent diabetes mellitus, for many years stopped because of chronic kidney November 2020.     Corneal edema    Contact dermatitis    Ulcerative blepharitis of upper and lower eyelids of both eyes    Multiple renal cysts    Trichiasis of left eye without entropion       Current Outpatient Medications on File Prior to Visit   Medication Sig Dispense Refill    acetaminophen (TYLENOL) 500 MG tablet Take 2 tablets (1,000 mg total) by mouth 3 (three) times daily as needed for Pain (muscle pain in arms and legs). 100 tablet 1    ADVAIR DISKUS 250-50 mcg/dose diskus inhaler USE 1 INHALATION BY MOUTH TWICE DAILY 180 each 1    albuterol (PROVENTIL/VENTOLIN HFA) 90 mcg/actuation inhaler Inhale 2 puffs into the lungs every 4 (four) hours as needed for Wheezing. 18 g 6    amLODIPine (NORVASC) 5 MG tablet TAKE 1 TABLET(5 MG) BY MOUTH EVERY MORNING 90 tablet 1    aspirin 81 MG Chew Take 1 tablet (81 mg total) by mouth every morning. 100 tablet 3    atorvastatin (LIPITOR) 40 MG tablet Take 1 tablet (40 mg total) by mouth every evening. 90 tablet 1    blood sugar diagnostic (ONETOUCH ULTRA TEST) Strp 1 strip by In Vitro route once daily. No longer on insulin because of  each 3    blood sugar diagnostic (TRUE METRIX GLUCOSE TEST STRIP) Strp 1 strip by Other route 3 (three) times daily. test 300 strip 3    blood-glucose meter kit To check BG 1 times daily, to use with insurance preferred meter 1 each 0    clotrimazole-betamethasone 1-0.05% (LOTRISONE) cream Apply topically 2 (two) times daily as needed. For intertriginous itching in the bend of the leg 45 g 3    diclofenac sodium (VOLTAREN) 1 % Gel APPLY 2 GRAMS TOPICALLY TO THE AFFECTED AREA TWICE DAILY 100 g 4    dorzolamide (TRUSOPT) 2 % ophthalmic solution Place 1 drop into both eyes 3 (three) times daily. 30 mL 4    erythromycin (ROMYCIN) ophthalmic ointment Place 1 inch into both eyes every evening. Apply to eyelids at bedtimes 3.5 g 6  "   fluorometholone 0.1% (FML) 0.1 % DrpS Place 1 drop into both eyes once daily.      furosemide (LASIX) 40 MG tablet TAKE 1 TABLET BY MOUTH EVERY MORNING AND 1 TABLET AT 4PM 180 tablet 3    insulin aspart U-100 (NOVOLOG FLEXPEN U-100 INSULIN) 100 unit/mL (3 mL) InPn pen Inject if blood sugar is >180, 180-230+2, 231-280+4, 281-330+6, 331-380+8,>380+10 MAX daily 30 units. 15 mL 2    isosorbide mononitrate (IMDUR) 60 MG 24 hr tablet TAKE 1 TABLET BY MOUTH EVERY MORNING FOR HEART, TO PREVENT CHEST PAINS OR SHORTNESS OF BREATH 90 tablet 3    ketoconazole (NIZORAL) 2 % shampoo Apply topically every 7 days. Soak scalp for 15-30 minutes 120 mL 6    lancets (TRUEPLUS LANCETS) 33 gauge Misc Apply 1 lancet topically once daily. No longer on insulin because of  each 3    lancets Misc To check BG 3 time daily, to use with insurance preferred meter 100 each 3    latanoprost 0.005 % ophthalmic solution Place 1 drop into both eyes every evening. 7.5 mL 4    levothyroxine (SYNTHROID) 88 MCG tablet TAKE 1 TABLET(88 MCG) BY MOUTH DAILY BEFORE BREAKFAST 90 tablet 3    meclizine (ANTIVERT) 12.5 mg tablet TAKE 1 TABLET BY MOUTH THREE TIMES DAILY AS NEEDED FOR DIZZINESS 20 tablet 3    melatonin (MELATIN) 3 mg tablet Take 1 tablet (3 mg total) by mouth nightly as needed for Insomnia.      metoprolol tartrate (LOPRESSOR) 25 MG tablet TAKE 1 TABLET(25 MG) BY MOUTH TWICE DAILY 180 tablet 3    multivit-mineral-iron-lutein Tab Take 1 tablet by mouth once daily.      nitroGLYCERIN (NITROSTAT) 0.4 MG SL tablet ONE TABLET UNDER THE TONGUE EVERY 5 MINUTES AS NEEDED FOR CHEST PAIN.  IF NO RELIEF AFTER 3 TABLETS , GO TO THE EMERGENCY ROOM. 100 tablet 1    pen needle, diabetic (BD ULTRA-FINE SHORT PEN NEEDLE) 31 gauge x 5/16" Ndle USE WITH INSULIN PENS UP TO THREE TIMES DAILY 100 each 6    polyethylene glycol (GLYCOLAX) 17 gram/dose powder Take 17 g by mouth daily as needed (CONSTIPATION).      psyllium (METAMUCIL) powder Take 1 packet by " mouth daily as needed (CONSTIPATION).       No current facility-administered medications on file prior to visit.       Review of patient's allergies indicates:   Allergen Reactions    Codeine      Other reaction(s): Itching  Other reaction(s): Nausea       Past Surgical History:   Procedure Laterality Date    CARDIAC CATHETERIZATION  2010    no obstructive disease    CATARACT EXTRACTION W/  INTRAOCULAR LENS IMPLANT Left n/a    CATARACT EXTRACTION W/  INTRAOCULAR LENS IMPLANT Right 10/8/2018    With Femtosecond LASER assist (Dr. Henson)    CHOLECYSTECTOMY      ESOPHAGOGASTRODUODENOSCOPY N/A 11/4/2020    Procedure: EGD (ESOPHAGOGASTRODUODENOSCOPY);  Surgeon: Tomás Mccall MD;  Location: [facility] ([address] [place] [address]);  Service: Endoscopy;  Laterality: N/A;    EYE SURGERY      gall stone      HYSTERECTOMY      VARICOSE VEIN SURGERY         Family History   Problem Relation Name Age of Onset    Diabetes Mother      Hypertension Mother      Glaucoma Mother      Kidney disease Mother      Hypertension Father      Hypotension Sister Alberta     Heart attack Sister Alberta     Heart disease Sister Alberta     No Known Problems Sister Bekah     Diabetes Sister Violeta     Kidney disease Sister Violeta     Leukemia Sister Jane     Hypertension Sister Pallavi     Diabetes Sister Lovinia     Kidney disease Sister Lovinia     No Known Problems Sister Lacey     Heart disease Brother Mariusz     Heart disease Brother Louviers     No Known Problems Brother Luis     No Known Problems Maternal Grandmother      No Known Problems Maternal Grandfather      No Known Problems Paternal Grandmother      Diabetes Paternal Grandfather      Hypertension Daughter Yolanda     Atrial fibrillation Daughter Yolanda     Diabetes Daughter Melanie         borderline    Hypertension Daughter Melanie     No Known Problems Son Trung     Diabetes Son Raudel     Heart disease Son Raudel     Hypertension Son Raudel     Melanoma Neg Hx         Social History      Socioeconomic History    Marital status:    Tobacco Use    Smoking status: Never     Passive exposure: Never    Smokeless tobacco: Never   Substance and Sexual Activity    Alcohol use: No    Drug use: No    Sexual activity: Never     Social Drivers of Health     Financial Resource Strain: Low Risk  (7/18/2024)    Overall Financial Resource Strain (CARDIA)     Difficulty of Paying Living Expenses: Not hard at all   Recent Concern: Financial Resource Strain - Medium Risk (5/9/2024)    Overall Financial Resource Strain (CARDIA)     Difficulty of Paying Living Expenses: Somewhat hard   Food Insecurity: No Food Insecurity (7/18/2024)    Hunger Vital Sign     Worried About Running Out of Food in the Last Year: Never true     Ran Out of Food in the Last Year: Never true   Transportation Needs: No Transportation Needs (7/18/2024)    TRANSPORTATION NEEDS     Transportation : No   Physical Activity: Insufficiently Active (7/18/2024)    Exercise Vital Sign     Days of Exercise per Week: 6 days     Minutes of Exercise per Session: 10 min   Stress: No Stress Concern Present (7/18/2024)    [employer] [place] [employer] - Occupational Stress Questionnaire     Feeling of Stress : Not at all   Housing Stability: Low Risk  (7/18/2024)    Housing Stability Vital Sign     Unable to Pay for Housing in the Last Year: No     Homeless in the Last Year: No           Review of Systems   Constitutional: Negative for chills, decreased appetite and fever.   Cardiovascular:  Positive for leg swelling. Negative for chest pain and claudication.   Respiratory:  Negative for cough, hemoptysis and shortness of breath.    Skin:  Positive for dry skin and nail changes. Negative for color change, flushing, itching, poor wound healing, rash and suspicious lesions.   Musculoskeletal:  Positive for arthritis and myalgias. Negative for back pain, falls, gout, joint pain and joint swelling.   Gastrointestinal:  Negative for nausea and  "vomiting.   Neurological:  Negative for loss of balance, numbness and paresthesias.           Objective:       Vitals:    25 0737   Weight: 66.7 kg (147 lb 0.8 oz)   Height: 5' 8" (1.727 m)   PainSc: 0-No pain   PainLoc: Foot        Physical Exam  Vitals reviewed.   Constitutional:       Appearance: She is well-developed.   Cardiovascular:      Comments: Dorsalis pedis and posterior tibial pulses are diminished Bilaterally. Toes are cool to touch. Feet are warm proximally.There is decreased digital hair. Skin is atrophic, slightly hyperpigmented, and mildly edematous    Decreased hair growth to lower shins with evidence of chronic venous stasis dz.           Musculoskeletal:         General: No tenderness or signs of injury.      Right lower le+ Edema present.      Left lower le+ Edema present.      Right ankle: Swelling present.      Left ankle: Swelling present.      Right foot: Decreased range of motion. Swelling and deformity present.      Left foot: Decreased range of motion. Swelling and deformity present.      Comments:   Strength 4/5 bilateral    1st MPJ exostosis w/ lateral deviation of hallux, non trackbound. No pain w/ ROM to b/l  hallux    Reducible extensor and flexor contractures at the MTPJ and PIPJ of toes 2-5, bilat.      Flexible pes planus foot type w/ medial arch collapse and mild gastroc equinus      Feet:      Right foot:      Protective Sensation: 10 sites tested.  4 sites sensed.      Skin integrity: No ulcer, blister, skin breakdown, erythema, warmth, callus or fissure.      Toenail Condition: Right toenails are abnormally thick and long. Fungal disease present.     Left foot:      Protective Sensation: 10 sites tested.  3 sites sensed.      Skin integrity: Callus present. No ulcer, blister, skin breakdown, erythema, warmth or fissure.      Toenail Condition: Left toenails are abnormally thick and long. Fungal disease present.     Comments: Toenails 1-5 bilaterally are " elongated by 3-4 mm, thickened by 3-4 mm, discolored/yellowed, dystrophic, brittle with subungual debris.     Minimal Hyperkeratotic tissue noted to lateral 5th toe left  Skin:     General: Skin is warm and dry.      Coloration: Skin is not ashen, cyanotic or pale.      Findings: No ecchymosis, erythema or lesion.      Comments:     Neurological:      Mental Status: She is alert.      Sensory: Sensory deficit present.      Gait: Gait abnormal.      Comments:      Psychiatric:         Mood and Affect: Mood normal.         Speech: Speech normal.         Behavior: Behavior normal. Behavior is cooperative.               Assessment:       Encounter Diagnoses   Name Primary?    Diabetic polyneuropathy associated with type 2 diabetes mellitus Yes    Edema of lower extremity     Antalgic gait     Corn or callus     Peripheral vascular disease     Onychomycosis due to dermatophyte              Plan:       Tiana was seen today for diabetic foot exam.    Diagnoses and all orders for this visit:    Diabetic polyneuropathy associated with type 2 diabetes mellitus    Edema of lower extremity    Antalgic gait    Corn or callus    Peripheral vascular disease    Onychomycosis due to dermatophyte          I counseled the patient on her conditions, their implications and medical management.          Shoe inspection. Diabetic Foot Education. Patient reminded of the importance of good nutrition and blood sugar control to help prevent podiatric complications of diabetes. Patient instructed on proper foot hygeine. We discussed wearing proper shoe gear, daily foot inspections, never walking without protective shoe gear, never putting sharp instruments to feet       With patient's permission, the toenails mentioned above were aggressively reduced and debrided using a nail nipper, removing all offending nail and debris. Utilizing a #15 scalpel, I trimmed the corns and calluses at the above mentioned location.      The patient will  continue to monitor the areas daily, inspect the feet, wear protective shoe gear when ambulatory, and moisturizer to maintain skin integrity.     Continue diabetic lotion      - Return to clinic in 3m or sooner if problems arise

## 2025-01-29 RX ORDER — FLUTICASONE PROPIONATE AND SALMETEROL 50; 250 UG/1; UG/1
1 POWDER RESPIRATORY (INHALATION) 2 TIMES DAILY
Qty: 180 EACH | Refills: 1 | Status: SHIPPED | OUTPATIENT
Start: 2025-01-29

## 2025-01-29 NOTE — TELEPHONE ENCOUNTER
Refill Routing Note   Medication(s) are not appropriate for processing by [facility] for the following reason(s):        Drug-disease interaction    [facility] action(s):  Defer     Requires labs : Yes      Medication Therapy Plan: 2 ED visits since LOV; DDI present, deferring to [facility]    Pharmacist review requested: Yes     Appointments  past 12m or future 3m with PCP    Date Provider   Last Visit   4/11/2024 Tomás Andres MD   Next Visit   Visit date not found Tomás Andres MD   ED visits in past 90 days: 0        Note composed:8:49 AM 01/29/2025

## 2025-01-29 NOTE — TELEPHONE ENCOUNTER
Provider Staff:  Action required for this patient    Requires labs      Please see care gap opportunities below in Care Due Message.    Thanks!  [facility]     Appointments      Date Provider   Last Visit   4/11/2024 Tomás Andres MD   Next Visit   Visit date not found Tomás Andres MD     Refill Decision Note   Tiana Mead  is requesting a refill authorization.  Brief Assessment and Rationale for Refill:  Approve     Medication Therapy Plan:        Pharmacist review requested: Yes   Comments:     Note composed:10:36 AM 01/29/2025

## 2025-01-29 NOTE — TELEPHONE ENCOUNTER
Care Due:                  Date            Visit Type   Department     Provider  --------------------------------------------------------------------------------                                EP -                              PRIMARY      [facility]  Last Visit: 09-      CARE ([facility])   MEDICINE       Jama Bradley  Next Visit: None Scheduled  None         None Found                                                            Last  Test          Frequency    Reason                     Performed    Due Date  --------------------------------------------------------------------------------    Lipid Panel.  12 months..  atorvastatin.............  04- 03-    [facility] Embedded Care Due Messages. Reference number: [id number].   1/28/2025 7:04:11 PM CST

## 2025-02-04 ENCOUNTER — HOSPITAL ENCOUNTER (OUTPATIENT)
Dept: RADIOLOGY | Facility: OTHER | Age: OVER 89
Discharge: HOME | End: 2025-02-04
Attending: NURSE PRACTITIONER
Payer: MEDICARE

## 2025-02-04 DIAGNOSIS — N28.89 RENAL MASS: Primary | ICD-10-CM

## 2025-02-04 DIAGNOSIS — N28.1 COMPLEX RENAL CYST: ICD-10-CM

## 2025-02-04 PROCEDURE — 76770 US EXAM ABDO BACK WALL COMP: CPT | Mod: 26,HCNC,, | Performed by: INTERNAL MEDICINE

## 2025-02-04 PROCEDURE — 76770 US EXAM ABDO BACK WALL COMP: CPT | Mod: TC,HCNC

## 2025-02-13 ENCOUNTER — HOSPITAL ENCOUNTER (OUTPATIENT)
Dept: RADIOLOGY | Facility: OTHER | Age: OVER 89
Discharge: HOME | End: 2025-02-13
Attending: NURSE PRACTITIONER
Payer: MEDICARE

## 2025-02-13 ENCOUNTER — OFFICE VISIT (OUTPATIENT)
Dept: UROLOGY | Facility: CLINIC | Age: OVER 89
End: 2025-02-13
Payer: MEDICARE

## 2025-02-13 VITALS
SYSTOLIC BLOOD PRESSURE: 166 MMHG | HEART RATE: 83 BPM | DIASTOLIC BLOOD PRESSURE: 76 MMHG | BODY MASS INDEX: 22.29 KG/M2 | HEIGHT: 68 IN | WEIGHT: 147.06 LBS | RESPIRATION RATE: 18 BRPM

## 2025-02-13 DIAGNOSIS — N28.89 RENAL MASS: ICD-10-CM

## 2025-02-13 DIAGNOSIS — N28.89 RENAL MASS: Primary | ICD-10-CM

## 2025-02-13 DIAGNOSIS — N39.41 URGE INCONTINENCE: ICD-10-CM

## 2025-02-13 DIAGNOSIS — N28.1 COMPLEX RENAL CYST: ICD-10-CM

## 2025-02-13 PROCEDURE — 74181 MRI ABDOMEN W/O CONTRAST: CPT | Mod: 26,HCNC,, | Performed by: RADIOLOGY

## 2025-02-13 PROCEDURE — 1126F AMNT PAIN NOTED NONE PRSNT: CPT | Mod: HCNC,CPTII,S$GLB, | Performed by: NURSE PRACTITIONER

## 2025-02-13 PROCEDURE — 74181 MRI ABDOMEN W/O CONTRAST: CPT | Mod: TC,HCNC

## 2025-02-13 PROCEDURE — 1159F MED LIST DOCD IN RCRD: CPT | Mod: HCNC,CPTII,S$GLB, | Performed by: NURSE PRACTITIONER

## 2025-02-13 PROCEDURE — 1160F RVW MEDS BY RX/DR IN RCRD: CPT | Mod: HCNC,CPTII,S$GLB, | Performed by: NURSE PRACTITIONER

## 2025-02-13 PROCEDURE — 99213 OFFICE O/P EST LOW 20 MIN: CPT | Mod: HCNC,S$GLB,, | Performed by: NURSE PRACTITIONER

## 2025-02-13 PROCEDURE — 3072F LOW RISK FOR RETINOPATHY: CPT | Mod: HCNC,CPTII,S$GLB, | Performed by: NURSE PRACTITIONER

## 2025-02-13 NOTE — PROGRESS NOTES
"Subjective:      Tiana Mead is a 89 y.o. female who returns today regarding her kidney imaging.    The patient has a history of complex renal cysts.    VIVIANA- "Numerous simple and minimally complex renal cysts bilaterally. Solid-appearing masses in both kidneys measuring 3.9 cm on the right and 2.1 cm on the left, concerning for renal neoplasms.  Further evaluation with renal mass CT or abdominal MRI is recommended. Echogenic foci in both kidneys measuring up to 0.5 cm on the right, which could represent mural calcifications and/or nonobstructing stones. Signs of medical renal disease bilaterally."    MR renal mass protocol was ordered- this order however was adjusted by radiology to non contrasted MR which was completed this morning, results pending.. Scheduled with Dr. Smart later this month to review.     She denies gross hematuria and flank pain.   Wears depends. Reports leakage but prefers to avoid medications for this.     The following portions of the patient's history were reviewed and updated as appropriate: allergies, current medications, past family history, past medical history, past social history, past surgical history and problem list.    Review of Systems  Constitutional: no fever or chills  ENT: no nasal congestion or sore throat  Respiratory: no cough or shortness of breath  Cardiovascular: no chest pain or palpitations  Gastrointestinal: no nausea or vomiting, tolerating diet  Genitourinary: as per HPI  Hematologic/Lymphatic: no easy bruising or lymphadenopathy  Musculoskeletal: no arthralgias or myalgias  Neurological: no seizures or tremors  Behavioral/Psych: no auditory or visual hallucinations     Objective:   Vital Signs:BP (!) 166/76 (BP Location: Left arm, Patient Position: Sitting)   Pulse 83   Resp 18   Ht 5' 8" (1.727 m)   Wt 66.7 kg (147 lb 0.8 oz)   LMP  (LMP Unknown)   BMI 22.36 kg/m²     Physical Exam   General: alert and oriented, no acute distress  Head: normocephalic, " "atraumatic  Neck: supple, normal ROM  Respiratory: Symmetric expansion, non-labored breathing  Cardiovascular: regular rate and rhythm  Abdomen: soft, non tender, non distended  Pelvic: deferred  Skin: normal coloration and turgor, no rashes, no suspicious skin lesions noted  Neuro: alert and oriented x3, no gross deficits  Psych: normal judgment and insight, normal mood/affect, and non-anxious    Lab Review   Urinalysis demonstrates : no specimen  Lab Results   Component Value Date    WBC 7.84 09/07/2024    HGB 11.4 (L) 09/07/2024    HCT 35.4 (L) 09/07/2024    MCV 92 09/07/2024     09/07/2024     Lab Results   Component Value Date    CREATININE 1.7 (H) 02/13/2025    BUN 32 (H) 02/13/2025       Imaging   VIVIANA- "Numerous simple and minimally complex renal cysts bilaterally. Solid-appearing masses in both kidneys measuring 3.9 cm on the right and 2.1 cm on the left, concerning for renal neoplasms.  Further evaluation with renal mass CT or abdominal MRI is recommended. Echogenic foci in both kidneys measuring up to 0.5 cm on the right, which could represent mural calcifications and/or nonobstructing stones. Signs of medical renal disease bilaterally."    Assessment:     1. Renal mass    2. Complex renal cyst    3. Urge incontinence      Plan:   Diagnoses and all orders for this visit:    Renal mass    Complex renal cyst    Urge incontinence    Plan:  --Discussed options for medications for her incontinence, she declines for now   --Follow up as scheduled with Dr. Smart to review MRI and next steps    "

## 2025-02-17 ENCOUNTER — RESULTS FOLLOW-UP (OUTPATIENT)
Dept: INTERNAL MEDICINE | Facility: CLINIC | Age: OVER 89
End: 2025-02-17

## 2025-02-17 DIAGNOSIS — I50.33 ACUTE ON CHRONIC CONGESTIVE HEART FAILURE WITH RIGHT VENTRICULAR DIASTOLIC DYSFUNCTION: Primary | ICD-10-CM

## 2025-02-17 DIAGNOSIS — I50.82 ACUTE ON CHRONIC CONGESTIVE HEART FAILURE WITH RIGHT VENTRICULAR DIASTOLIC DYSFUNCTION: Primary | ICD-10-CM

## 2025-02-17 NOTE — TELEPHONE ENCOUNTER
----- Message from Tomás Andres MD sent at 2/17/2025  8:02 AM CST -----  Regarding: Cardiac echo    Please contact patient.  I would like to schedule a cardiac echo.  This has to check her heart.  An order is in.  ----- Message -----  From: Chasity Ruelas NP  Sent: 2/13/2025  10:45 AM CST  To: Tomás Andres MD    Just wanted to keep you in the loop for the non  findings for Ms. Mead!

## 2025-02-17 NOTE — TELEPHONE ENCOUNTER
----- Message from Mayelin Mitchell LPN sent at 2/17/2025  8:32 AM CST -----      ----- Message -----  From: Chasity Ruelas NP  Sent: 2/17/2025   8:18 AM CST  To: Tomás Andres MD    Perfect thank you!!!  ----- Message -----  From: Tomás Andres MD  Sent: 2/17/2025   8:03 AM CST  To: Chasity Ruelas NP      Greatly appreciated.  Will schedule a cardiac echo.  ----- Message -----  From: Chasity Ruelas NP  Sent: 2/13/2025  10:45 AM CST  To: Tomás Andres MD    Just wanted to keep you in the loop for the non  findings for Ms. Mead!

## 2025-02-23 NOTE — PROGRESS NOTES
Subjective:      Tiana Mead is a 89 y.o. female who returns today regarding her     The patient location is: home  The chief complaint leading to consultation is: small renal mass    Visit type: audiovisual    Face to Face time with patient: 11 min  25 min minutes of total time spent on the encounter, which includes face to face time and non-face to face time preparing to see the patient (eg, review of tests), Obtaining and/or reviewing separately obtained history, Documenting clinical information in the electronic or other health record, Independently interpreting results (not separately reported) and communicating results to the patient/family/caregiver, or Care coordination (not separately reported).         Each patient to whom he or she provides medical services by telemedicine is:  (1) informed of the relationship between the physician and patient and the respective role of any other health care provider with respect to management of the patient; and (2) notified that he or she may decline to receive medical services by telemedicine and may withdraw from such care at any time.    Notes:   .    The following portions of the patient's history were reviewed and updated as appropriate: allergies, current medications, past family history, past medical history, past social history, past surgical history and problem list.    Review of Systems  Pertinent items are noted in HPI.  A comprehensive multipoint review of systems was negative except as otherwise stated in the HPI.    Past Medical History:   Diagnosis Date    Allergy     Anemia     Arthritis     Asthma     Chronic kidney disease     Degenerative disc disease     Diabetes mellitus type II     Diastolic heart failure 05/02/2017    Essential hypertension 7/3/2012    Glaucoma     History of insulin dependent diabetes mellitus, for many years stopped because of chronic kidney November 2020.  9/9/2021    History of pseudogout 8/23/2012    Hyperlipidemia      Hypertension     Iritis     Myocardial infarction     Pneumonia     Thrombocytopenia 8/24/2021    Thyroid disease      Past Surgical History:   Procedure Laterality Date    CARDIAC CATHETERIZATION  2010    no obstructive disease    CATARACT EXTRACTION W/  INTRAOCULAR LENS IMPLANT Left n/a    CATARACT EXTRACTION W/  INTRAOCULAR LENS IMPLANT Right 10/8/2018    With Femtosecond LASER assist (Dr. Henson)    CHOLECYSTECTOMY      ESOPHAGOGASTRODUODENOSCOPY N/A 11/4/2020    Procedure: EGD (ESOPHAGOGASTRODUODENOSCOPY);  Surgeon: Tomás Mccall MD;  Location: [facility] ([address] [place] [address]);  Service: Endoscopy;  Laterality: N/A;    EYE SURGERY      gall stone      HYSTERECTOMY      VARICOSE VEIN SURGERY       Diabetes Medications              insulin aspart U-100 (NOVOLOG FLEXPEN U-100 INSULIN) 100 unit/mL (3 mL) InPn pen Inject if blood sugar is >180, 180-230+2, 231-280+4, 281-330+6, 331-380+8,>380+10 MAX daily 30 units.          Review of patient's allergies indicates:   Allergen Reactions    Codeine Itching and Nausea Only                 Objective:   Vitals: LMP  (LMP Unknown)     Physical Exam   General: alert and oriented, no acute distress  Respiratory: Symmetric expansion, non-labored breathing  Cardiovascular: no peripheral edema  Abdomen: non distended  Skin: normal coloration and turgor, no rashes, no suspicious skin lesions noted  Neuro: no gross deficits  Psych: normal judgment and insight, normal mood/affect, and non-anxious    Physical Exam    Lab Review   Urinalysis demonstrates no specimen    Lab Results   Component Value Date    WBC 7.84 09/07/2024    HGB 11.4 (L) 09/07/2024    HCT 35.4 (L) 09/07/2024    MCV 92 09/07/2024     09/07/2024     Lab Results   Component Value Date    CREATININE 1.7 (H) 02/13/2025    BUN 32 (H) 02/13/2025       Imaging  MRI ABDOMEN WITHOUT CONTRAST     CLINICAL HISTORY:  renal mass;  Other specified disorders of kidney and ureter     TECHNIQUE:  Multisequence, multiplanar MRI of the  abdomen performed per liver protocol before and after administration of 10 mL Gadavist intravenous contrast.     COMPARISON:  Ultrasound 02/04/2025     FINDINGS:  There is a small right pleural effusion and the heart is markedly enlarged.  Hepatic veins are dilated.     Liver: Normal homogeneous background signal.  No focal lesions.  Hepatic and portal veins are patent.     Biliary: Gallbladder is absent.  Mild intrahepatic biliary ductal dilatation the common duct tapering to a normal caliber.     Pancreas: There is mild dilatation of the proximal pancreatic duct to 5 mm.  There is a 5 mm T2 hyperintensity in the mid pancreatic body.     Spleen: Normal size.  No focal lesions.     Adrenal glands: Unremarkable.     Kidneys: There are numerous T2 hyperintense and mixed T1 and T2 hyperintense lesions throughout both kidneys compatible with simple and complex, hemorrhagic/proteinaceous cysts.  Without intravenous gadolinium it is difficult to delineate complex cyst from solid masses.  However in the superomedial pole of the left kidney there is a mixed signal mass that demonstrates mild diffusion restriction measuring 2.5 cm that may represent a solid mass.     Miscellaneous: Visualized bowel loops are unremarkable.  No evidence for lymphadenopathy.     Impression:     1. Numerous simple and complex bilateral renal cysts.  Suspected/possible solid mass measuring 2.5 cm upper pole left kidney.  In a patient of this age, of follow-up/surveillance is reasonable.  2. Findings of right heart dysfunction        Electronically signed by:Clayton Almazan Jr  Date:                                            02/13/2025  Time:                                           10:25    CXR no mets  9/2024    Assessment and Plan:   Complex renal cyst    Small renal mass    We discussed the natural history of small renal masses, the risk of malignancy and disease progression, and the small risk of mortality.  We discussed the risks and  benefits of watchful waiting, biopsy, partial and total nephrectomy.  We discussed the benefits of renal preservation when possible.  We discussed percutaneous, laparoscopic and robotic approaches.  We discussed the management of positive surgical margins.  I answered all questions.      Agree that surveillance is appropriate given his advanced age and comorbidities    Spoke with MsBraxton Mead and her family  All are in agreement with surveillance    RTC 1 yr with renal US and CXR to see Dr Smart

## 2025-02-25 ENCOUNTER — OFFICE VISIT (OUTPATIENT)
Dept: UROLOGY | Facility: CLINIC | Age: OVER 89
End: 2025-02-25
Payer: MEDICARE

## 2025-02-25 DIAGNOSIS — N28.1 COMPLEX RENAL CYST: Primary | ICD-10-CM

## 2025-02-27 NOTE — TELEPHONE ENCOUNTER
----- Message from Tomás Andres MD sent at 2/26/2025  6:29 PM CST -----  The patient's daughter, Yolanda Jones, was contacted. She understands the reason for the cardiac echo. Please contact her to schedule.  ----- Message -----  From: Christine Monzon  Sent: 2/17/2025   3:51 PM CST  To: Tomás Andres MD    ----- Message from Christine Monzon sent at 2/17/2025  3:51 PM CST -----    Daughter would like to speak to the nurse or Dr. Andres regarding the Echo  ----- Message -----  From: Mayelin Mitchell LPN  Sent: 2/17/2025   8:32 AM CST  To: Checkout Staff [facility]    ----- Message from Mayelin Mitchell LPN sent at 2/17/2025  8:32 AM CST -----      ----- Message -----  From: Chasity Ruelas NP  Sent: 2/17/2025   8:18 AM CST  To: Tomás Andres MD    Perfect thank you!!!  ----- Message -----  From: Tomás Andres MD  Sent: 2/17/2025   8:03 AM CST  To: Chasity Ruelas NP      Greatly appreciated.  Will schedule a cardiac echo.  ----- Message -----  From: Chasity Ruelas NP  Sent: 2/13/2025  10:45 AM CST  To: Tomás Andres MD    Just wanted to keep you in the loop for the non  findings for Ms. Mead!

## 2025-03-17 ENCOUNTER — HOSPITAL ENCOUNTER (OUTPATIENT)
Dept: CARDIOLOGY | Facility: HOSPITAL | Age: OVER 89
Discharge: HOME | End: 2025-03-17
Attending: INTERNAL MEDICINE
Payer: MEDICARE

## 2025-03-17 VITALS
BODY MASS INDEX: 22.29 KG/M2 | HEART RATE: 82 BPM | WEIGHT: 147.06 LBS | DIASTOLIC BLOOD PRESSURE: 80 MMHG | HEIGHT: 68 IN | SYSTOLIC BLOOD PRESSURE: 140 MMHG

## 2025-03-17 DIAGNOSIS — I50.82 ACUTE ON CHRONIC CONGESTIVE HEART FAILURE WITH RIGHT VENTRICULAR DIASTOLIC DYSFUNCTION: ICD-10-CM

## 2025-03-17 DIAGNOSIS — I50.33 ACUTE ON CHRONIC CONGESTIVE HEART FAILURE WITH RIGHT VENTRICULAR DIASTOLIC DYSFUNCTION: ICD-10-CM

## 2025-03-17 LAB
ASCENDING AORTA: 2.92 CM
AV AREA BY CONTINUOUS VTI: 2.6 CM2
AV INDEX (PROSTH): 0.86
AV LVOT MEAN GRADIENT: 6 MMHG
AV LVOT PEAK GRADIENT: 8 MMHG
AV MEAN GRADIENT: 7 MMHG
AV PEAK GRADIENT: 12 MMHG
AV VALVE AREA BY VELOCITY RATIO: 2.6 CM²
AV VALVE AREA: 2.7 CM2
AV VELOCITY RATIO: 0.82
BSA FOR ECHO PROCEDURE: 1.79 M2
CV ECHO LV RWT: 0.44 CM
DOP CALC AO PEAK VEL: 1.7 M/S
DOP CALC AO VTI: 30.3 CM
DOP CALC LVOT AREA: 3.1 CM2
DOP CALC LVOT DIAMETER: 2 CM
DOP CALC LVOT PEAK VEL: 1.4 M/S
DOP CALC LVOT STROKE VOLUME: 82.3 CM3
DOP CALCLVOT PEAK VEL VTI: 26.2 CM
ECHO EF ESTIMATED: 80 %
ECHO LV POSTERIOR WALL: 0.9 CM (ref 0.6–1.1)
EJECTION FRACTION: 65 %
FRACTIONAL SHORTENING: 48.8 % (ref 28–44)
HR MV ECHO: 60 BPM
INTERVENTRICULAR SEPTUM: 1.1 CM (ref 0.6–1.1)
LA MAJOR: 7.8 CM
LA MINOR: 8.5 CM
LA WIDTH: 4.7 CM
LEFT ATRIUM SIZE: 6.1 CM
LEFT ATRIUM VOLUME INDEX MOD: 67 ML/M2
LEFT ATRIUM VOLUME INDEX: 111 ML/M2
LEFT ATRIUM VOLUME MOD: 120 ML
LEFT ATRIUM VOLUME: 198 CM3
LEFT INTERNAL DIMENSION IN SYSTOLE: 2.1 CM (ref 2.1–4)
LEFT VENTRICLE DIASTOLIC VOLUME INDEX: 40.78 ML/M2
LEFT VENTRICLE DIASTOLIC VOLUME: 73 ML
LEFT VENTRICLE MASS INDEX: 73.8 G/M2
LEFT VENTRICLE SYSTOLIC VOLUME INDEX: 8.4 ML/M2
LEFT VENTRICLE SYSTOLIC VOLUME: 15 ML
LEFT VENTRICULAR INTERNAL DIMENSION IN DIASTOLE: 4.1 CM (ref 3.5–6)
LEFT VENTRICULAR MASS: 132.1 G
MV MEAN GRADIENT: 5 MMHG
MV PEAK E VEL: 1.67 M/S
MV PEAK GRADIENT: 12 MMHG
OHS CV RV/LV RATIO: 1.17 CM
PISA TR MAX VEL: 2.9 M/S
RA MAJOR: 8.07 CM
RA PRESSURE ESTIMATED: 15 MMHG
RA WIDTH: 5.95 CM
RIGHT VENTRICLE DIASTOLIC BASEL DIMENSION: 4.8 CM
RV TB RVSP: 18 MMHG
RV TISSUE DOPPLER FREE WALL SYSTOLIC VELOCITY 1 (APICAL 4 CHAMBER VIEW): 7.4 CM/S
SINUS: 3.16 CM
STJ: 2.27 CM
TRICUSPID ANNULAR PLANE SYSTOLIC EXCURSION: 1.44 CM
TV PEAK GRADIENT: 34 MMHG
TV REST PULMONARY ARTERY PRESSURE: 49 MMHG
Z-SCORE OF LEFT VENTRICULAR DIMENSION IN END DIASTOLE: -1.88
Z-SCORE OF LEFT VENTRICULAR DIMENSION IN END SYSTOLE: -2.97

## 2025-03-17 PROCEDURE — 93306 TTE W/DOPPLER COMPLETE: CPT | Mod: HCNC

## 2025-03-17 PROCEDURE — 93306 TTE W/DOPPLER COMPLETE: CPT | Mod: 26,HCNC,, | Performed by: INTERNAL MEDICINE

## 2025-03-20 ENCOUNTER — RESULTS FOLLOW-UP (OUTPATIENT)
Dept: INTERNAL MEDICINE | Facility: CLINIC | Age: OVER 89
End: 2025-03-20

## 2025-03-24 RX ORDER — FUROSEMIDE 40 MG/1
TABLET ORAL
Qty: 180 TABLET | Refills: 3 | Status: SHIPPED | OUTPATIENT
Start: 2025-03-24

## 2025-03-24 NOTE — TELEPHONE ENCOUNTER
No care due was identified.  [facility] Embedded Care Due Messages. Reference number: [id number].   3/24/2025 5:41:02 AM CDT

## 2025-03-24 NOTE — TELEPHONE ENCOUNTER
Refill Routing Note   Medication(s) are not appropriate for processing by [facility] for the following reason(s):        Required vitals abnormal  Required labs abnormal    [facility] action(s):  Defer             Appointments  past 12m or future 3m with PCP    Date Provider   Last Visit   4/11/2024 Tomás Andres MD   Next Visit   Visit date not found Tomás Andres MD   ED visits in past 90 days: 0        Note composed:5:43 PM 03/24/2025

## 2025-03-25 ENCOUNTER — PATIENT MESSAGE (OUTPATIENT)
Dept: ADMINISTRATIVE | Facility: HOSPITAL | Age: OVER 89
End: 2025-03-25
Payer: MEDICARE

## 2025-03-28 DIAGNOSIS — I25.10 CORONARY ARTERY DISEASE INVOLVING NATIVE CORONARY ARTERY OF NATIVE HEART WITHOUT ANGINA PECTORIS: ICD-10-CM

## 2025-03-28 RX ORDER — ISOSORBIDE MONONITRATE 60 MG/1
TABLET, EXTENDED RELEASE ORAL
Qty: 90 TABLET | Refills: 0 | Status: SHIPPED | OUTPATIENT
Start: 2025-03-28

## 2025-03-28 RX ORDER — METOPROLOL TARTRATE 25 MG/1
25 TABLET, FILM COATED ORAL 2 TIMES DAILY
Qty: 180 TABLET | Refills: 3 | Status: SHIPPED | OUTPATIENT
Start: 2025-03-28

## 2025-03-28 RX ORDER — LEVOTHYROXINE SODIUM 88 UG/1
TABLET ORAL
Qty: 90 TABLET | Refills: 0 | Status: SHIPPED | OUTPATIENT
Start: 2025-03-28

## 2025-03-28 RX ORDER — AMLODIPINE BESYLATE 5 MG/1
TABLET ORAL
Qty: 90 TABLET | Refills: 3 | Status: SHIPPED | OUTPATIENT
Start: 2025-03-28

## 2025-03-28 RX ORDER — ATORVASTATIN CALCIUM 40 MG/1
40 TABLET, FILM COATED ORAL NIGHTLY
Qty: 90 TABLET | Refills: 0 | Status: SHIPPED | OUTPATIENT
Start: 2025-03-28

## 2025-03-28 NOTE — TELEPHONE ENCOUNTER
No care due was identified.  [facility] Embedded Care Due Messages. Reference number: [id number].   3/28/2025 9:17:15 AM CDT

## 2025-03-28 NOTE — TELEPHONE ENCOUNTER
Refill Routing Note   Medication(s) are not appropriate for processing by [facility] for the following reason(s):        Required vitals abnormal - 140/80    [facility] action(s):  Approve  Defer       ED documentation reviewed. No changes to therapy noted.      Extended chart review required: Yes     Appointments  past 12m or future 3m with PCP    Date Provider   Last Visit   4/11/2024 Tomás Andres MD   Next Visit   Visit date not found Tomás Andres MD   ED visits in past 90 days: 0        Note composed:1:58 PM 03/28/2025

## 2025-04-07 DIAGNOSIS — I25.10 CORONARY ARTERY DISEASE INVOLVING NATIVE CORONARY ARTERY OF NATIVE HEART WITHOUT ANGINA PECTORIS: ICD-10-CM

## 2025-04-07 RX ORDER — ATORVASTATIN CALCIUM 40 MG/1
40 TABLET, FILM COATED ORAL NIGHTLY
Qty: 90 TABLET | Refills: 0 | Status: SHIPPED | OUTPATIENT
Start: 2025-04-07

## 2025-04-07 NOTE — TELEPHONE ENCOUNTER
Care Due:                  Date            Visit Type   Department     Provider  --------------------------------------------------------------------------------                                EP -                              PRIMARY      [facility] INTER[facility]  Last Visit: 09-      CARE ([facility])   MEDICINE       Jama Bradley  Next Visit: None Scheduled  None         None Found                                                            Last  Test          Frequency    Reason                     Performed    Due Date  --------------------------------------------------------------------------------    Lipid Panel.  12 months..  atorvastatin.............  04- 03-    [facility] Embedded Care Due Messages. Reference number: [id number].   4/07/2025 5:40:37 AM CDT

## 2025-04-25 ENCOUNTER — OFFICE VISIT (OUTPATIENT)
Dept: CARDIOLOGY | Facility: CLINIC | Age: OVER 89
End: 2025-04-25
Payer: MEDICARE

## 2025-04-25 VITALS
DIASTOLIC BLOOD PRESSURE: 70 MMHG | BODY MASS INDEX: 21.98 KG/M2 | HEART RATE: 85 BPM | WEIGHT: 145 LBS | SYSTOLIC BLOOD PRESSURE: 142 MMHG | HEIGHT: 68 IN | OXYGEN SATURATION: 95 %

## 2025-04-25 DIAGNOSIS — E78.00 HYPERCHOLESTEROLEMIA: ICD-10-CM

## 2025-04-25 DIAGNOSIS — I10 PRIMARY HYPERTENSION: ICD-10-CM

## 2025-04-25 DIAGNOSIS — I48.21 PERMANENT ATRIAL FIBRILLATION: ICD-10-CM

## 2025-04-25 DIAGNOSIS — N18.4 STAGE 4 CHRONIC KIDNEY DISEASE: ICD-10-CM

## 2025-04-25 DIAGNOSIS — I50.32 CHRONIC DIASTOLIC HEART FAILURE: Primary | ICD-10-CM

## 2025-04-25 PROCEDURE — 99999 PR PBB SHADOW E&M-EST. PATIENT-LVL V: CPT | Mod: PBBFAC,HCNC,, | Performed by: INTERNAL MEDICINE

## 2025-04-25 NOTE — PROGRESS NOTES
[facility]    Chief Complaint  Chief Complaint   Patient presents with    Follow-up       HPI:    No new problems reported.  Had an echocardiogram performed for follow-up.  Reviewed.  Taking nitroglycerin about every other day for chest pain.  It does not help with her chest pain.  Had a fall yesterday.  Lost her balance while picking up a package.  No head trauma.  No loss of consciousness.    Medications  Current Medications[1]     History  Past Medical History:   Diagnosis Date    Allergy     Anemia     Arthritis     Asthma     Chronic kidney disease     Degenerative disc disease     Diabetes mellitus type II     Diastolic heart failure 05/02/2017    Essential hypertension 7/3/2012    Glaucoma     History of insulin dependent diabetes mellitus, for many years stopped because of chronic kidney November 2020.  9/9/2021    History of pseudogout 8/23/2012    Hyperlipidemia     Hypertension     Iritis     Myocardial infarction     Pneumonia     Thrombocytopenia 8/24/2021    Thyroid disease      Past Surgical History:   Procedure Laterality Date    CARDIAC CATHETERIZATION  2010    no obstructive disease    CATARACT EXTRACTION W/  INTRAOCULAR LENS IMPLANT Left n/a    CATARACT EXTRACTION W/  INTRAOCULAR LENS IMPLANT Right 10/8/2018    With Femtosecond LASER assist (Dr. Henson)    CHOLECYSTECTOMY      ESOPHAGOGASTRODUODENOSCOPY N/A 11/4/2020    Procedure: EGD (ESOPHAGOGASTRODUODENOSCOPY);  Surgeon: Tomás Mccall MD;  Location: [address]);  Service: Endoscopy;  Laterality: N/A;    EYE SURGERY      gall stone      HYSTERECTOMY      VARICOSE VEIN SURGERY       Social History[2]  Family History   Problem Relation Name Age of Onset    Diabetes Mother      Hypertension Mother      Glaucoma Mother      Kidney disease Mother      Hypertension Father      Hypotension Sister Alberta     Heart attack Sister Alberta     Heart disease Sister Alberta     No Known Problems Sister Bekah     Diabetes Sister  Violeta     Kidney disease Sister Violeta     Leukemia Sister Jane     Hypertension Sister Pallavi     Diabetes Sister Lovinia     Kidney disease Sister Lovjose angel     No Known Problems Sister Lacey     Heart disease Brother Mariusz     Heart disease Brother John     No Known Problems Brother Luis     No Known Problems Maternal Grandmother      No Known Problems Maternal Grandfather      No Known Problems Paternal Grandmother      Diabetes Paternal Grandfather      Hypertension Daughter Yolanda     Atrial fibrillation Daughter Yolanda     Diabetes Daughter Melanie         borderline    Hypertension Daughter Melanie     No Known Problems Son Trung     Diabetes Son Raudel     Heart disease Son Raudel     Hypertension Son Raudel     Melanoma Neg Hx          Allergies  Review of patient's allergies indicates:   Allergen Reactions    Codeine Itching and Nausea Only              Review of Systems   Review of Systems   Constitutional: Negative for malaise/fatigue, weight gain and weight loss.   Eyes:  Negative for visual disturbance.   Cardiovascular:  Positive for chest pain. Negative for claudication, cyanosis, dyspnea on exertion, irregular heartbeat, leg swelling, near-syncope, orthopnea, palpitations, paroxysmal nocturnal dyspnea and syncope.   Respiratory:  Negative for cough, hemoptysis, shortness of breath, sleep disturbances due to breathing and wheezing.    Hematologic/Lymphatic: Negative for bleeding problem. Does not bruise/bleed easily.   Skin:  Negative for poor wound healing.   Musculoskeletal:  Positive for falls. Negative for muscle cramps and myalgias.   Gastrointestinal:  Negative for abdominal pain, anorexia, diarrhea, heartburn, hematemesis, hematochezia, melena, nausea and vomiting.   Genitourinary:  Negative for hematuria and nocturia.   Neurological:  Negative for excessive daytime sleepiness, dizziness, focal weakness, light-headedness and weakness.       Physical Exam  Vitals:     04/25/25 1015   BP: (!) 142/70   Pulse: 85     Wt Readings from Last 1 Encounters:   04/25/25 65.8 kg (145 lb)     Physical Exam  Vitals and nursing note reviewed.   Constitutional:       General: She is not in acute distress.     Appearance: She is not toxic-appearing or diaphoretic.   HENT:      Head: Normocephalic and atraumatic.      Mouth/Throat:      Lips: Pink.      Mouth: Mucous membranes are moist.   Eyes:      General: No scleral icterus.     Conjunctiva/sclera: Conjunctivae normal.   Neck:      Thyroid: No thyromegaly.      Vascular: No carotid bruit, hepatojugular reflux or JVD.      Trachea: Trachea normal.   Cardiovascular:      Rate and Rhythm: Normal rate. Rhythm irregularly irregular. No extrasystoles are present.     Chest Wall: PMI is not displaced.      Pulses:           Carotid pulses are 2+ on the right side and 2+ on the left side.       Radial pulses are 2+ on the right side and 2+ on the left side.      Heart sounds: S1 normal and S2 normal. Murmur heard.      Systolic murmur is present with a grade of 2/6.      No friction rub. No S3 or S4 sounds.   Pulmonary:      Effort: Pulmonary effort is normal. No accessory muscle usage or respiratory distress.      Breath sounds: Normal breath sounds and air entry. No decreased breath sounds, wheezing, rhonchi or rales.   Abdominal:      General: Bowel sounds are normal. There is no distension or abdominal bruit.      Palpations: Abdomen is soft. There is no hepatomegaly, splenomegaly or pulsatile mass.      Tenderness: There is no abdominal tenderness.   Musculoskeletal:         General: No tenderness or deformity.      Right lower leg: No edema.      Left lower leg: No edema.   Skin:     General: Skin is warm and dry.      Capillary Refill: Capillary refill takes less than 2 seconds.      Coloration: Skin is not cyanotic or pale.      Nails: There is no clubbing.   Neurological:      General: No focal deficit present.      Mental Status: She is  alert and oriented to person, place, and time.   Psychiatric:         Attention and Perception: Attention normal.         Mood and Affect: Mood normal.         Speech: Speech normal.         Behavior: Behavior normal. Behavior is cooperative.         Labs  Hospital Outpatient Visit on 03/17/2025   Component Date Value Ref Range Status    LV Diastolic Volume 03/17/2025 73  mL Final    Echo EF Estimated 03/17/2025 80  % Final    LV Systolic Volume 03/17/2025 15  mL Final    IVS 03/17/2025 1.1  0.6 - 1.1 cm Final    LVIDd 03/17/2025 4.1  3.5 - 6.0 cm Final    LVIDs 03/17/2025 2.1  2.1 - 4.0 cm Final    LVOT diameter 03/17/2025 2.0  cm Final    PW 03/17/2025 0.9  0.6 - 1.1 cm Final    AV LVOT peak gradient 03/17/2025 8  mmHg Final    LVOT mn grad 03/17/2025 6  mmHg Final    LVOT peak conner 03/17/2025 1.4  m/s Final    LVOT peak VTI 03/17/2025 26.2  cm Final    RV- kaba basal diam 03/17/2025 4.8  cm Final    RV S' 03/17/2025 7.40  cm/s Final    LA size 03/17/2025 6.1  cm Final    Left Atrium Major Axis 03/17/2025 7.8  cm Final    Left Atrium Minor Axis 03/17/2025 8.5  cm Final    LA Vol (MOD) 03/17/2025 120  mL Final    RA Major [place] 03/17/2025 8.07  cm Final    AV valve area 03/17/2025 2.7  cm2 Final    AV area by cont VTI 03/17/2025 2.6  cm2 Final    AV peak gradient 03/17/2025 12  mmHg Final    AV mean gradient 03/17/2025 7  mmHg Final    Ao peak conner 03/17/2025 1.7  m/s Final    Ao VTI 03/17/2025 30.3  cm Final    MV Peak E Conner 03/17/2025 1.67  m/s Final    MV peak gradient 03/17/2025 12  mmHg Final    MV mean gradient 03/17/2025 5  mmHg Final    TV peak gradient 03/17/2025 34  mmHg Final    TR Max Conner 03/17/2025 2.9  m/s Final    Ascending aorta 03/17/2025 2.92  cm Final    STJ 03/17/2025 2.27  cm Final    Sinus 03/17/2025 3.16  cm Final    LA WIDTH 03/17/2025 4.7  cm Final    RA Width 03/17/2025 5.95  cm Final    TAPSE 03/17/2025 1.44  cm Final    BSA 03/17/2025 1.79  m2 Final    LVOT stroke volume 03/17/2025 82.3   cm3 Final    LV Systolic Volume Index 03/17/2025 8.4  mL/m2 Final    LV Diastolic Volume Index 03/17/2025 40.78  mL/m2 Final    LVOT area 03/17/2025 3.1  cm2 Final    FS 03/17/2025 48.8 (A)  28 - 44 % Final    Left Ventricle Relative Wall Thick* 03/17/2025 0.44  cm Final    LV mass 03/17/2025 132.1  g Final    LV Mass Index 03/17/2025 73.8  g/m2 Final    ZI 03/17/2025 111  mL/m2 Final    LA Vol 03/17/2025 198  cm3 Final    RV/LV Ratio 03/17/2025 1.17  cm Final    ZI (MOD) 03/17/2025 67  mL/m2 Final    AV Velocity Ratio 03/17/2025 0.82   Final    AV index (prosthetic) 03/17/2025 0.86   Final    SAIMA by Velocity Ratio 03/17/2025 2.6  cm² Final    ZLVIDS 03/17/2025 -2.97   Final    ZLVIDD 03/17/2025 -1.88   Final    EF 03/17/2025 65  % Final    Mitral Valve Heart Rate 03/17/2025 60  bpm Final    TV resting pulmonary artery pressu* 03/17/2025 49  mmHg Final    RV TB RVSP 03/17/2025 18  mmHg Final    Est. RA pres 03/17/2025 15  mmHg Final   Lab Visit on 02/13/2025   Component Date Value Ref Range Status    Sodium 02/13/2025 139  136 - 145 mmol/L Final    Potassium 02/13/2025 4.3  3.5 - 5.1 mmol/L Final    Chloride 02/13/2025 105  95 - 110 mmol/L Final    CO2 02/13/2025 26  23 - 29 mmol/L Final    Glucose 02/13/2025 165 (H)  70 - 110 mg/dL Final    BUN 02/13/2025 32 (H)  10 - 30 mg/dL Final    Creatinine 02/13/2025 1.7 (H)  0.5 - 1.4 mg/dL Final    Calcium 02/13/2025 9.7  8.7 - 10.5 mg/dL Final    Anion Gap 02/13/2025 8  8 - 16 mmol/L Final    eGFR 02/13/2025 27 (A)  >60 mL/min/1.73 m^2 Final       Imaging  No results found.    Assessment  1. Chronic diastolic heart failure  Compensated    2. Permanent atrial fibrillation  Controlled.  Patient preference to not be on anticoagulation    3. Primary hypertension  Adequate control    4. Hypercholesterolemia  Adequate control    5. Stage 4 chronic kidney disease  Stable      Plan and Discussion    No change to present management.  Discussed nitroglycerin use.  Explained  that she does not have coronary artery disease based on prior angiography and normal stress test.  If nitroglycerin is not helping, no need to take.    The ASCVD Risk score (Lanette DK, et al., 2019) failed to calculate for the following reasons:    The 2019 ASCVD risk score is only valid for ages 40 to 79    Risk score cannot be calculated because patient has a medical history suggesting prior/existing ASCVD     Follow Up  Follow up in about 6 months (around 10/25/2025).      Adán Alanis MD         [1]   Current Outpatient Medications   Medication Sig Dispense Refill    acetaminophen (TYLENOL) 500 MG tablet Take 2 tablets (1,000 mg total) by mouth 3 (three) times daily as needed for Pain (muscle pain in arms and legs). 100 tablet 1    ADVAIR DISKUS 250-50 mcg/dose diskus inhaler USE 1 INHALATION BY MOUTH TWICE DAILY 180 each 1    albuterol (PROVENTIL/VENTOLIN HFA) 90 mcg/actuation inhaler Inhale 2 puffs into the lungs every 4 (four) hours as needed for Wheezing. 18 g 6    amLODIPine (NORVASC) 5 MG tablet TAKE 1 TABLET(5 MG) BY MOUTH EVERY MORNING 90 tablet 3    aspirin 81 MG Chew Take 1 tablet (81 mg total) by mouth every morning. 100 tablet 3    atorvastatin (LIPITOR) 40 MG tablet TAKE 1 TABLET(40 MG) BY MOUTH EVERY EVENING 90 tablet 0    blood sugar diagnostic (ONETOUCH ULTRA TEST) Strp 1 strip by In Vitro route once daily. No longer on insulin because of  each 3    blood sugar diagnostic (TRUE METRIX GLUCOSE TEST STRIP) Strp 1 strip by Other route 3 (three) times daily. test 300 strip 3    blood-glucose meter kit To check BG 1 times daily, to use with insurance preferred meter 1 each 0    clotrimazole-betamethasone 1-0.05% (LOTRISONE) cream Apply topically 2 (two) times daily as needed. For intertriginous itching in the bend of the leg 45 g 3    diclofenac sodium (VOLTAREN) 1 % Gel APPLY 2 GRAMS TOPICALLY TO THE AFFECTED AREA TWICE DAILY 100 g 4    dorzolamide (TRUSOPT) 2 % ophthalmic solution Place 1  "drop into both eyes 3 (three) times daily. 30 mL 4    erythromycin (ROMYCIN) ophthalmic ointment Place 1 inch into both eyes every evening. Apply to eyelids at bedtimes 3.5 g 6    fluorometholone 0.1% (FML) 0.1 % DrpS Place 1 drop into both eyes once daily.      furosemide (LASIX) 40 MG tablet TAKE 1 TABLET BY MOUTH EVERY MORNING AND 1 TABLET AT 4PM 180 tablet 3    insulin aspart U-100 (NOVOLOG FLEXPEN U-100 INSULIN) 100 unit/mL (3 mL) InPn pen Inject if blood sugar is >180, 180-230+2, 231-280+4, 281-330+6, 331-380+8,>380+10 MAX daily 30 units. 15 mL 2    isosorbide mononitrate (IMDUR) 60 MG 24 hr tablet TAKE 1 TABLET BY MOUTH EVERY MORNING FOR HEART, TO PREVENT CHEST PAINS OR SHORTNESS OF BREATH 90 tablet 0    ketoconazole (NIZORAL) 2 % shampoo Apply topically every 7 days. Soak scalp for 15-30 minutes 120 mL 6    lancets (TRUEPLUS LANCETS) 33 gauge Misc Apply 1 lancet topically once daily. No longer on insulin because of  each 3    lancets Misc To check BG 3 time daily, to use with insurance preferred meter 100 each 3    latanoprost 0.005 % ophthalmic solution Place 1 drop into both eyes every evening. 7.5 mL 4    levothyroxine (SYNTHROID) 88 MCG tablet TAKE 1 TABLET(88 MCG) BY MOUTH DAILY BEFORE BREAKFAST 90 tablet 0    meclizine (ANTIVERT) 12.5 mg tablet TAKE 1 TABLET BY MOUTH THREE TIMES DAILY AS NEEDED FOR DIZZINESS 20 tablet 3    melatonin (MELATIN) 3 mg tablet Take 1 tablet (3 mg total) by mouth nightly as needed for Insomnia.      metoprolol tartrate (LOPRESSOR) 25 MG tablet TAKE 1 TABLET(25 MG) BY MOUTH TWICE DAILY 180 tablet 3    multivit-mineral-iron-lutein Tab Take 1 tablet by mouth once daily.      nitroGLYCERIN (NITROSTAT) 0.4 MG SL tablet ONE TABLET UNDER THE TONGUE EVERY 5 MINUTES AS NEEDED FOR CHEST PAIN.  IF NO RELIEF AFTER 3 TABLETS , GO TO THE EMERGENCY ROOM. 100 tablet 1    pen needle, diabetic (BD ULTRA-FINE SHORT PEN NEEDLE) 31 gauge x 5/16" Ndle USE WITH INSULIN PENS UP TO THREE TIMES " DAILY 100 each 6    polyethylene glycol (GLYCOLAX) 17 gram/dose powder Take 17 g by mouth daily as needed (CONSTIPATION).      psyllium (METAMUCIL) powder Take 1 packet by mouth daily as needed (CONSTIPATION).       No current facility-administered medications for this visit.   [2]   Social History  Socioeconomic History    Marital status:    Tobacco Use    Smoking status: Never     Passive exposure: Never    Smokeless tobacco: Never   Substance and Sexual Activity    Alcohol use: No    Drug use: No    Sexual activity: Never     Social Drivers of Health     Financial Resource Strain: Low Risk  (7/18/2024)    Overall Financial Resource Strain (CARDIA)     Difficulty of Paying Living Expenses: Not hard at all   Recent Concern: Financial Resource Strain - Medium Risk (5/9/2024)    Overall Financial Resource Strain (CARDIA)     Difficulty of Paying Living Expenses: Somewhat hard   Food Insecurity: No Food Insecurity (7/18/2024)    Hunger Vital Sign     Worried About Running Out of Food in the Last Year: Never true     Ran Out of Food in the Last Year: Never true   Transportation Needs: No Transportation Needs (7/18/2024)    TRANSPORTATION NEEDS     Transportation : No   Physical Activity: Insufficiently Active (7/18/2024)    Exercise Vital Sign     Days of Exercise per Week: 6 days     Minutes of Exercise per Session: 10 min   Stress: No Stress Concern Present (7/18/2024)    [employer] - Occupational Stress Questionnaire     Feeling of Stress : Not at all   Housing Stability: Unknown (7/18/2024)    Housing Stability Vital Sign     Unable to Pay for Housing in the Last Year: No     Homeless in the Last Year: No

## 2025-05-08 ENCOUNTER — LAB VISIT (OUTPATIENT)
Dept: LAB | Facility: HOSPITAL | Age: OVER 89
End: 2025-05-08
Attending: INTERNAL MEDICINE
Payer: MEDICARE

## 2025-05-08 ENCOUNTER — TELEPHONE (OUTPATIENT)
Dept: INTERNAL MEDICINE | Facility: CLINIC | Age: OVER 89
End: 2025-05-08
Payer: MEDICARE

## 2025-05-08 ENCOUNTER — OFFICE VISIT (OUTPATIENT)
Dept: INTERNAL MEDICINE | Facility: CLINIC | Age: OVER 89
End: 2025-05-08
Payer: MEDICARE

## 2025-05-08 VITALS
SYSTOLIC BLOOD PRESSURE: 138 MMHG | BODY MASS INDEX: 22.05 KG/M2 | OXYGEN SATURATION: 100 % | HEART RATE: 67 BPM | WEIGHT: 145.5 LBS | HEIGHT: 68 IN | DIASTOLIC BLOOD PRESSURE: 70 MMHG

## 2025-05-08 DIAGNOSIS — E11.9 INSULIN-REQUIRING OR DEPENDENT TYPE II DIABETES MELLITUS: ICD-10-CM

## 2025-05-08 DIAGNOSIS — Z79.4 INSULIN-REQUIRING OR DEPENDENT TYPE II DIABETES MELLITUS: ICD-10-CM

## 2025-05-08 DIAGNOSIS — E11.69 HYPERLIPIDEMIA ASSOCIATED WITH TYPE 2 DIABETES MELLITUS: ICD-10-CM

## 2025-05-08 DIAGNOSIS — G47.9 TROUBLE IN SLEEPING: ICD-10-CM

## 2025-05-08 DIAGNOSIS — E03.9 PRIMARY HYPOTHYROIDISM: ICD-10-CM

## 2025-05-08 DIAGNOSIS — I50.82 ACUTE ON CHRONIC CONGESTIVE HEART FAILURE WITH RIGHT VENTRICULAR DIASTOLIC DYSFUNCTION: ICD-10-CM

## 2025-05-08 DIAGNOSIS — I10 HYPERTENSION, UNSPECIFIED TYPE: ICD-10-CM

## 2025-05-08 DIAGNOSIS — Z00.00 ANNUAL PHYSICAL EXAM: Primary | ICD-10-CM

## 2025-05-08 DIAGNOSIS — I50.33 ACUTE ON CHRONIC CONGESTIVE HEART FAILURE WITH RIGHT VENTRICULAR DIASTOLIC DYSFUNCTION: ICD-10-CM

## 2025-05-08 DIAGNOSIS — I48.21 PERMANENT ATRIAL FIBRILLATION: ICD-10-CM

## 2025-05-08 DIAGNOSIS — E78.5 HYPERLIPIDEMIA ASSOCIATED WITH TYPE 2 DIABETES MELLITUS: ICD-10-CM

## 2025-05-08 DIAGNOSIS — J45.909 ASTHMA, UNSPECIFIED ASTHMA SEVERITY, UNSPECIFIED WHETHER COMPLICATED, UNSPECIFIED WHETHER PERSISTENT: ICD-10-CM

## 2025-05-08 DIAGNOSIS — Z00.00 ANNUAL PHYSICAL EXAM: ICD-10-CM

## 2025-05-08 LAB
ABSOLUTE EOSINOPHIL (OHS): 0.11 K/UL
ABSOLUTE MONOCYTE (OHS): 0.6 K/UL (ref 0.3–1)
ABSOLUTE NEUTROPHIL COUNT (OHS): 2.08 K/UL (ref 1.8–7.7)
ALBUMIN SERPL BCP-MCNC: 3.7 G/DL (ref 3.5–5.2)
ALP SERPL-CCNC: 116 UNIT/L (ref 40–150)
ALT SERPL W/O P-5'-P-CCNC: 18 UNIT/L (ref 10–44)
ANION GAP (OHS): 5 MMOL/L (ref 8–16)
AST SERPL-CCNC: 28 UNIT/L (ref 11–45)
BASOPHILS # BLD AUTO: 0.03 K/UL
BASOPHILS NFR BLD AUTO: 0.8 %
BILIRUB SERPL-MCNC: 0.5 MG/DL (ref 0.1–1)
BUN SERPL-MCNC: 27 MG/DL (ref 10–30)
CALCIUM SERPL-MCNC: 9.5 MG/DL (ref 8.7–10.5)
CHLORIDE SERPL-SCNC: 101 MMOL/L (ref 95–110)
CHOLEST SERPL-MCNC: 108 MG/DL (ref 120–199)
CHOLEST/HDLC SERPL: 2.1 {RATIO} (ref 2–5)
CO2 SERPL-SCNC: 31 MMOL/L (ref 23–29)
CREAT SERPL-MCNC: 1.4 MG/DL (ref 0.5–1.4)
EAG (OHS): 137 MG/DL (ref 68–131)
ERYTHROCYTE [DISTWIDTH] IN BLOOD BY AUTOMATED COUNT: 15.3 % (ref 11.5–14.5)
GFR SERPLBLD CREATININE-BSD FMLA CKD-EPI: 34 ML/MIN/1.73/M2
GLUCOSE SERPL-MCNC: 121 MG/DL (ref 70–110)
HBA1C MFR BLD: 6.4 % (ref 4–5.6)
HCT VFR BLD AUTO: 33.9 % (ref 37–48.5)
HDLC SERPL-MCNC: 51 MG/DL (ref 40–75)
HDLC SERPL: 47.2 % (ref 20–50)
HGB BLD-MCNC: 10.8 GM/DL (ref 12–16)
IMM GRANULOCYTES # BLD AUTO: 0 K/UL (ref 0–0.04)
IMM GRANULOCYTES NFR BLD AUTO: 0 % (ref 0–0.5)
LDLC SERPL CALC-MCNC: 48.8 MG/DL (ref 63–159)
LYMPHOCYTES # BLD AUTO: 1.04 K/UL (ref 1–4.8)
MCH RBC QN AUTO: 29.6 PG (ref 27–31)
MCHC RBC AUTO-ENTMCNC: 31.9 G/DL (ref 32–36)
MCV RBC AUTO: 93 FL (ref 82–98)
NONHDLC SERPL-MCNC: 57 MG/DL
NUCLEATED RBC (/100WBC) (OHS): 0 /100 WBC
PLATELET # BLD AUTO: 105 K/UL (ref 150–450)
PMV BLD AUTO: 12.6 FL (ref 9.2–12.9)
POTASSIUM SERPL-SCNC: 4.1 MMOL/L (ref 3.5–5.1)
PROT SERPL-MCNC: 6.6 GM/DL (ref 6–8.4)
RBC # BLD AUTO: 3.65 M/UL (ref 4–5.4)
RELATIVE EOSINOPHIL (OHS): 2.8 %
RELATIVE LYMPHOCYTE (OHS): 26.9 % (ref 18–48)
RELATIVE MONOCYTE (OHS): 15.5 % (ref 4–15)
RELATIVE NEUTROPHIL (OHS): 54 % (ref 38–73)
SODIUM SERPL-SCNC: 137 MMOL/L (ref 136–145)
T4 SERPL-MCNC: 10.8 UG/DL (ref 4.5–11.5)
TRIGL SERPL-MCNC: 41 MG/DL (ref 30–150)
TSH SERPL-ACNC: 2.99 UIU/ML (ref 0.4–4)
WBC # BLD AUTO: 3.86 K/UL (ref 3.9–12.7)

## 2025-05-08 PROCEDURE — 1159F MED LIST DOCD IN RCRD: CPT | Mod: CPTII,HCNC,S$GLB, | Performed by: INTERNAL MEDICINE

## 2025-05-08 PROCEDURE — 3288F FALL RISK ASSESSMENT DOCD: CPT | Mod: CPTII,HCNC,S$GLB, | Performed by: INTERNAL MEDICINE

## 2025-05-08 PROCEDURE — 1100F PTFALLS ASSESS-DOCD GE2>/YR: CPT | Mod: CPTII,HCNC,S$GLB, | Performed by: INTERNAL MEDICINE

## 2025-05-08 PROCEDURE — 80061 LIPID PANEL: CPT | Mod: HCNC

## 2025-05-08 PROCEDURE — 1125F AMNT PAIN NOTED PAIN PRSNT: CPT | Mod: CPTII,HCNC,S$GLB, | Performed by: INTERNAL MEDICINE

## 2025-05-08 PROCEDURE — 36415 COLL VENOUS BLD VENIPUNCTURE: CPT | Mod: HCNC

## 2025-05-08 PROCEDURE — 99999 PR PBB SHADOW E&M-EST. PATIENT-LVL III: CPT | Mod: PBBFAC,HCNC,, | Performed by: INTERNAL MEDICINE

## 2025-05-08 PROCEDURE — 99397 PER PM REEVAL EST PAT 65+ YR: CPT | Mod: HCNC,S$GLB,, | Performed by: INTERNAL MEDICINE

## 2025-05-08 PROCEDURE — 83036 HEMOGLOBIN GLYCOSYLATED A1C: CPT | Mod: HCNC

## 2025-05-08 PROCEDURE — 84436 ASSAY OF TOTAL THYROXINE: CPT | Mod: HCNC

## 2025-05-08 PROCEDURE — 84443 ASSAY THYROID STIM HORMONE: CPT | Mod: HCNC

## 2025-05-08 PROCEDURE — 1160F RVW MEDS BY RX/DR IN RCRD: CPT | Mod: CPTII,HCNC,S$GLB, | Performed by: INTERNAL MEDICINE

## 2025-05-08 PROCEDURE — 3072F LOW RISK FOR RETINOPATHY: CPT | Mod: CPTII,HCNC,S$GLB, | Performed by: INTERNAL MEDICINE

## 2025-05-08 PROCEDURE — 80053 COMPREHEN METABOLIC PANEL: CPT | Mod: HCNC

## 2025-05-08 PROCEDURE — 85025 COMPLETE CBC W/AUTO DIFF WBC: CPT | Mod: HCNC

## 2025-05-08 RX ORDER — FLUTICASONE PROPIONATE AND SALMETEROL 250; 50 UG/1; UG/1
1 POWDER RESPIRATORY (INHALATION) DAILY
Qty: 30 EACH | Refills: 11 | Status: SHIPPED | OUTPATIENT
Start: 2025-05-08 | End: 2026-05-08

## 2025-05-08 RX ORDER — TRAZODONE HYDROCHLORIDE 50 MG/1
50 TABLET ORAL NIGHTLY
Qty: 30 TABLET | Refills: 11 | Status: SHIPPED | OUTPATIENT
Start: 2025-05-08 | End: 2026-05-08

## 2025-05-08 NOTE — TELEPHONE ENCOUNTER
----- Message from Chasity sent at 5/8/2025 11:22 AM CDT -----  Contact: Nhung w/ [facility] [phone]  Pharmacy is calling to clarify an RX.RX name:  Advair inhalerWhat do they need to clarify:  dosage is normally twice daily - RX reads once per day - please confirm Comments:

## 2025-05-08 NOTE — PROGRESS NOTES
CC:  Annual exam     HPI:  The patient is 89 year old female with asthma, insulin-requiring diabetes, hypertension, hypothyroidism, coronary artery disease, history of MI, CKD stage 4 and atrial fibrillation who presents today for annual exam.    ROS: The patient did lose some weight.  She has been stable between 142 and 147.  Does have problems with vision.  She does use hearing aids.  No trouble swallowing.  No chest pain.  No shortness a breath.  She does report that her asthma is stable.  No nausea vomiting.  No abdominal pain.  She does get some nausea once in awhile.  She does use MiraLax maybe once or twice a week.  She does walk with a walker at her home.  Her blood sugars never over 120.  She has not used insulin in over a year. The patient does report having trouble sleeping.    Physical exam:   General appearance: No acute distress   HEENT:  Conjunctiva is clear.  The patient does have bilateral lens replacements.  Right eyes a little cloudy.  TMs are obscured by wax.  Nasal septum is midline without discharge.  Oropharynx without erythema.  Trachea is midline without JVD or thyromegaly.    Pulmonary: Good inspiratory, expiratory breath sounds are heard.  Lungs are clear auscultation.    Cardiovascular: S1-S2, rhythm is regular.  Extremities with trace to 1+ edema.    GI:  Abdomen is nontender, nondistended without hepatosplenomegaly    Assessment:    Annual exam   2.  Insulin-requiring diabetes   3.  Acute on chronic CHF   4.  Hypothyroidism   5.  Hyperlipidemia  6.  Hypertension   7.  AFib  8.  Trouble sleeping  Plan:    Will schedule a CBC, CMP, TSH, T4, A1c and lipid panel  2.  Will call in trazodone 50 mg  3.  Will call in generic for Advair

## 2025-05-12 ENCOUNTER — OFFICE VISIT (OUTPATIENT)
Dept: OPHTHALMOLOGY | Facility: CLINIC | Age: OVER 89
End: 2025-05-12
Payer: MEDICARE

## 2025-05-12 DIAGNOSIS — H04.123 DRY EYE SYNDROME OF BOTH EYES: ICD-10-CM

## 2025-05-12 DIAGNOSIS — H40.32X2 GLAUCOMA OF LEFT EYE ASSOCIATED WITH OCULAR TRAUMA, MODERATE STAGE: ICD-10-CM

## 2025-05-12 DIAGNOSIS — H18.20 CORNEAL EDEMA: ICD-10-CM

## 2025-05-12 DIAGNOSIS — H40.1132 PRIMARY OPEN ANGLE GLAUCOMA (POAG) OF BOTH EYES, MODERATE STAGE: Primary | ICD-10-CM

## 2025-05-12 DIAGNOSIS — H20.10 CHRONIC IRITIS: ICD-10-CM

## 2025-05-12 PROCEDURE — 2023F DILAT RTA XM W/O RTNOPTHY: CPT | Mod: CPTII,HCNC,S$GLB, | Performed by: OPHTHALMOLOGY

## 2025-05-12 PROCEDURE — 99999 PR PBB SHADOW E&M-EST. PATIENT-LVL III: CPT | Mod: PBBFAC,HCNC,, | Performed by: OPHTHALMOLOGY

## 2025-05-12 PROCEDURE — G2211 COMPLEX E/M VISIT ADD ON: HCPCS | Mod: HCNC,S$GLB,, | Performed by: OPHTHALMOLOGY

## 2025-05-12 PROCEDURE — 1126F AMNT PAIN NOTED NONE PRSNT: CPT | Mod: CPTII,HCNC,S$GLB, | Performed by: OPHTHALMOLOGY

## 2025-05-12 PROCEDURE — 3288F FALL RISK ASSESSMENT DOCD: CPT | Mod: CPTII,HCNC,S$GLB, | Performed by: OPHTHALMOLOGY

## 2025-05-12 PROCEDURE — 1160F RVW MEDS BY RX/DR IN RCRD: CPT | Mod: CPTII,HCNC,S$GLB, | Performed by: OPHTHALMOLOGY

## 2025-05-12 PROCEDURE — 1101F PT FALLS ASSESS-DOCD LE1/YR: CPT | Mod: CPTII,HCNC,S$GLB, | Performed by: OPHTHALMOLOGY

## 2025-05-12 PROCEDURE — 1159F MED LIST DOCD IN RCRD: CPT | Mod: CPTII,HCNC,S$GLB, | Performed by: OPHTHALMOLOGY

## 2025-05-12 PROCEDURE — 99214 OFFICE O/P EST MOD 30 MIN: CPT | Mod: HCNC,S$GLB,, | Performed by: OPHTHALMOLOGY

## 2025-05-14 ENCOUNTER — RESULTS FOLLOW-UP (OUTPATIENT)
Dept: INTERNAL MEDICINE | Facility: CLINIC | Age: OVER 89
End: 2025-05-14

## 2025-05-14 DIAGNOSIS — D64.9 ANEMIA, UNSPECIFIED TYPE: Primary | ICD-10-CM

## 2025-05-14 NOTE — PROGRESS NOTES
Please contact the patient's daughter.  Mother's blood tests look very good overall except for her blood count.  It showed he has a little bit on the anemic side.  I would like to repeat a CBC in 2 weeks plus do an iron level.  I would also like to do a fit kit.  Orders are in.  This is not a fasting test.

## 2025-05-14 NOTE — PROGRESS NOTES
Assessment /Plan     For exam results, see Encounter Report.    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, moderate stage    Dry eye syndrome of both eyes    Corneal edema    Chronic iritis - Left Eye      Son lives in [facility]  COVID was on vent 12 days --> visiting mom    + Asthma  CHF    Trichiasis JESUS  Discussed and pulled lashes at [facility] c forceps sc difficulty    Zoster Left V1 --> resolved  No ophthalmic involvement  Eye tends to be irritated --> long standing    FML q day prn --> using QOD -> Re-discussed steroid response --> none 05/02/2023      POAG  Stable Glaucoma & Patient near target IOP range    Steroid responder    CCT  517 / 574    Mid teens --> achieved    Both Eyes --> tolerating well & good adherence --> CSM  Trusopt BID  Xal q day --> iritis quiet    Alphagan BID  --> itchy // Follicles --> intolerant  No BB --> Asthma    Hx Ryan in Past    Hold SLT OS    PC IOL OU  Quiet  Observe    Corneal edema OS > OD  Fuchs K dystrophy  Trauma OS  Holding DSEK --> discussed and deferring  Jony 128 2% BID & PRN --> re-discussed & restart & given      Dry Eye Syndrome: re-discussed use of warm compresses, non-preserved artificial tears, gel and PM ointment options.    ==> push Jony 128 2% as discussed    EES q HS x 1 week / month --> PRN      NIDDM  No BDR or CSME  control     ERM OD  Not VS  Observe    Improved  Blepharitis & Left Facial dermatitis  Blepharitis: Discussed treatment options including warm compresses, lid scrubs and medications.    Try Ocusoft Tea tree oil // allergy pads --> instructed & Given   EES q HS x 1 week / month  WCs  Lid scrubs     Hold Dial soap  Try Cetaphil  Cerave      11/27/2023  Left facial dermatitis and Bilateral blepharitis        Plan  Frames adjustment  RTC 4 month IOP  & adherence & OS K check  RTC sooner prn with good understanding

## 2025-05-15 ENCOUNTER — DOCUMENTATION ONLY (OUTPATIENT)
Dept: INTERNAL MEDICINE | Facility: CLINIC | Age: OVER 89
End: 2025-05-15
Payer: MEDICARE

## 2025-05-15 ENCOUNTER — TELEPHONE (OUTPATIENT)
Dept: INTERNAL MEDICINE | Facility: CLINIC | Age: OVER 89
End: 2025-05-15
Payer: MEDICARE

## 2025-05-15 NOTE — TELEPHONE ENCOUNTER
----- Message from Tomás Andres MD sent at 5/14/2025  4:51 PM CDT -----  Please contact the patient's daughter.  Mother's blood tests look very good overall except for her blood count.  It showed he has a little bit on the anemic side.  I would like to repeat a CBC in 2   weeks plus do an iron level.  I would also like to do a fit kit.  Orders are in.  This is not a fasting test.  ----- Message -----  From: Lab, Background User  Sent: 5/8/2025   3:28 PM CDT  To: Tomás Andres MD

## 2025-05-27 ENCOUNTER — TELEPHONE (OUTPATIENT)
Dept: PODIATRY | Facility: CLINIC | Age: OVER 89
End: 2025-05-27
Payer: MEDICARE

## 2025-05-27 NOTE — TELEPHONE ENCOUNTER
I spoke with krystyna appointment scheduled for 7/21 @ 7:45 and added to the wait list for a sooner appointment with Dr. De Luna.

## 2025-05-29 ENCOUNTER — LAB VISIT (OUTPATIENT)
Dept: LAB | Facility: HOSPITAL | Age: OVER 89
End: 2025-05-29
Attending: INTERNAL MEDICINE
Payer: MEDICARE

## 2025-05-29 DIAGNOSIS — D64.9 ANEMIA, UNSPECIFIED TYPE: ICD-10-CM

## 2025-05-29 LAB
ABSOLUTE EOSINOPHIL (OHS): 0.14 K/UL
ABSOLUTE MONOCYTE (OHS): 0.69 K/UL (ref 0.3–1)
ABSOLUTE NEUTROPHIL COUNT (OHS): 1.76 K/UL (ref 1.8–7.7)
BASOPHILS # BLD AUTO: 0.04 K/UL
BASOPHILS NFR BLD AUTO: 1.1 %
ERYTHROCYTE [DISTWIDTH] IN BLOOD BY AUTOMATED COUNT: 15.5 % (ref 11.5–14.5)
HCT VFR BLD AUTO: 34.2 % (ref 37–48.5)
HGB BLD-MCNC: 10.9 GM/DL (ref 12–16)
IMM GRANULOCYTES # BLD AUTO: 0 K/UL (ref 0–0.04)
IMM GRANULOCYTES NFR BLD AUTO: 0 % (ref 0–0.5)
IRON SATN MFR SERPL: 18 % (ref 20–50)
IRON SERPL-MCNC: 76 UG/DL (ref 30–160)
LYMPHOCYTES # BLD AUTO: 0.9 K/UL (ref 1–4.8)
MCH RBC QN AUTO: 30.4 PG (ref 27–31)
MCHC RBC AUTO-ENTMCNC: 31.9 G/DL (ref 32–36)
MCV RBC AUTO: 95 FL (ref 82–98)
NUCLEATED RBC (/100WBC) (OHS): 0 /100 WBC
PLATELET # BLD AUTO: 103 K/UL (ref 150–450)
PMV BLD AUTO: 12.2 FL (ref 9.2–12.9)
RBC # BLD AUTO: 3.59 M/UL (ref 4–5.4)
RELATIVE EOSINOPHIL (OHS): 4 %
RELATIVE LYMPHOCYTE (OHS): 25.5 % (ref 18–48)
RELATIVE MONOCYTE (OHS): 19.5 % (ref 4–15)
RELATIVE NEUTROPHIL (OHS): 49.9 % (ref 38–73)
TIBC SERPL-MCNC: 434 UG/DL (ref 250–450)
TRANSFERRIN SERPL-MCNC: 293 MG/DL (ref 200–375)
WBC # BLD AUTO: 3.53 K/UL (ref 3.9–12.7)

## 2025-05-29 PROCEDURE — 85025 COMPLETE CBC W/AUTO DIFF WBC: CPT | Mod: HCNC

## 2025-05-29 PROCEDURE — 83540 ASSAY OF IRON: CPT | Mod: HCNC

## 2025-05-29 PROCEDURE — 36415 COLL VENOUS BLD VENIPUNCTURE: CPT | Mod: HCNC,PN

## 2025-05-31 ENCOUNTER — LAB VISIT (OUTPATIENT)
Dept: LAB | Facility: HOSPITAL | Age: OVER 89
End: 2025-05-31
Attending: INTERNAL MEDICINE
Payer: MEDICARE

## 2025-05-31 DIAGNOSIS — D64.9 ANEMIA, UNSPECIFIED TYPE: ICD-10-CM

## 2025-05-31 PROCEDURE — 82274 ASSAY TEST FOR BLOOD FECAL: CPT | Mod: HCNC

## 2025-06-02 LAB — OB PNL STL IA: POSITIVE

## 2025-06-03 ENCOUNTER — RESULTS FOLLOW-UP (OUTPATIENT)
Dept: INTERNAL MEDICINE | Facility: CLINIC | Age: OVER 89
End: 2025-06-03

## 2025-06-03 DIAGNOSIS — R19.5 HEME POSITIVE STOOL: ICD-10-CM

## 2025-06-03 DIAGNOSIS — D64.89 ANEMIA DUE TO OTHER CAUSE, NOT CLASSIFIED: Primary | ICD-10-CM

## 2025-06-03 NOTE — PROGRESS NOTES
Please contact patient's daughter. Her mom's stool sample was positive for blood. A referral to GI is in. Please assist in getting an appointment ASAP. She is 89 years old and slightly anemic. Her anemia appears to be stable.

## 2025-06-04 ENCOUNTER — TELEPHONE (OUTPATIENT)
Dept: INTERNAL MEDICINE | Facility: CLINIC | Age: OVER 89
End: 2025-06-04
Payer: MEDICARE

## 2025-06-05 DIAGNOSIS — R19.5 HEME POSITIVE STOOL: ICD-10-CM

## 2025-06-05 DIAGNOSIS — D64.89 ANEMIA DUE TO OTHER CAUSE, NOT CLASSIFIED: Primary | ICD-10-CM

## 2025-06-09 ENCOUNTER — OFFICE VISIT (OUTPATIENT)
Dept: GASTROENTEROLOGY | Facility: CLINIC | Age: OVER 89
End: 2025-06-09
Payer: MEDICARE

## 2025-06-09 DIAGNOSIS — N18.4 STAGE 4 CHRONIC KIDNEY DISEASE: ICD-10-CM

## 2025-06-09 DIAGNOSIS — D50.9 IRON DEFICIENCY ANEMIA, UNSPECIFIED IRON DEFICIENCY ANEMIA TYPE: ICD-10-CM

## 2025-06-09 DIAGNOSIS — R19.5 HEME POSITIVE STOOL: Primary | ICD-10-CM

## 2025-06-09 PROCEDURE — 1160F RVW MEDS BY RX/DR IN RCRD: CPT | Mod: CPTII,HCNC,95,

## 2025-06-09 PROCEDURE — 1159F MED LIST DOCD IN RCRD: CPT | Mod: CPTII,HCNC,95,

## 2025-06-09 PROCEDURE — 98006 SYNCH AUDIO-VIDEO EST MOD 30: CPT | Mod: HCNC,95,,

## 2025-06-09 NOTE — PROGRESS NOTES
The patient location is: [place]   The chief complaint leading to consultation is: anemia     Visit type: audiovisual    Face to Face time with patient: 15  35 minutes of total time spent on the encounter, which includes face to face time and non-face to face time preparing to see the patient (eg, review of tests), Obtaining and/or reviewing separately obtained history, Documenting clinical information in the electronic or other health record, Independently interpreting results (not separately reported) and communicating results to the patient/family/caregiver, or Care coordination (not separately reported).         Each patient to whom he or she provides medical services by telemedicine is:  (1) informed of the relationship between the physician and patient and the respective role of any other health care provider with respect to management of the patient; and (2) notified that he or she may decline to receive medical services by telemedicine and may withdraw from such care at any time.    Notes:      Gastroenterology Clinic Consultation Note    Patient ID: Tiana Mead is a 89 y.o. female.    Chief Complaint: No chief complaint on file.    History of Present Illness    CHIEF COMPLAINT:  Patient presents today for follow up of abnormal stool sample and GI referral    GASTROINTESTINAL:  She reports daily bowel movements with occasional constipation. FIT test was positive for blood in stool, though she denies visible blood in stool or toilet during bowel movements. Chronic anemia. CKD 4.     HEMATOLOGIC:  She has chronic anemia with iron deficiency. Hemoglobin is 10.9, previously 10.8.      ROS:  General: -fever, -chills, -fatigue, -weight gain, -weight loss  Eyes: -vision changes, -redness, -discharge  ENT: -ear pain, -nasal congestion, -sore throat  Cardiovascular: -chest pain, -palpitations, -lower extremity edema  Respiratory: -cough, -shortness of breath  Gastrointestinal: -abdominal pain, -nausea,  -vomiting, -diarrhea, +constipation, -blood in stool, +bowel incontinence  Genitourinary: -dysuria, -hematuria, -frequency  Musculoskeletal: -joint pain, -muscle pain  Skin: -rash, -lesion  Neurological: -headache, -dizziness, -numbness, -tingling  Psychiatric: -anxiety, -depression, -sleep difficulty       Physical Exam  Constitutional:       General: She is not in acute distress.     Appearance: Normal appearance. She is not ill-appearing.   HENT:      Head: Normocephalic and atraumatic.   Eyes:      Extraocular Movements: Extraocular movements intact.   Pulmonary:      Effort: Pulmonary effort is normal. No respiratory distress.   Musculoskeletal:         General: Normal range of motion.      Cervical back: Normal range of motion.   Neurological:      Mental Status: She is alert and oriented to person, place, and time.             Medical History:  has a past medical history of Allergy, Anemia, Arthritis, Asthma, Chronic kidney disease, Degenerative disc disease, Diabetes mellitus type II, Diastolic heart failure (05/02/2017), Essential hypertension (7/3/2012), Glaucoma, History of insulin dependent diabetes mellitus, for many years stopped because of chronic kidney November 2020.  (9/9/2021), History of pseudogout (8/23/2012), Hyperlipidemia, Hypertension, Iritis, Myocardial infarction, Pneumonia, Thrombocytopenia (8/24/2021), and Thyroid disease.    Surgical History:  has a past surgical history that includes Hysterectomy; gall stone; Varicose vein surgery; Cardiac catheterization (2010); Cholecystectomy; Eye surgery; Cataract extraction w/  intraocular lens implant (Left, n/a); Cataract extraction w/  intraocular lens implant (Right, 10/8/2018); and Esophagogastroduodenoscopy (N/A, 11/4/2020).    Family History: family history includes Atrial fibrillation in her daughter; Diabetes in her daughter, mother, paternal grandfather, sister, sister, and son; Glaucoma in her mother; Heart attack in her sister; Heart  disease in her brother, brother, sister, and son; Hypertension in her daughter, daughter, father, mother, sister, and son; Hypotension in her sister; Kidney disease in her mother, sister, and sister; Leukemia in her sister; No Known Problems in her brother, maternal grandfather, maternal grandmother, paternal grandmother, sister, sister, and son..       Review of patient's allergies indicates:   Allergen Reactions    Codeine Itching and Nausea Only              Medications Ordered Prior to Encounter[1]    Labs:  Lab Results   Component Value Date    WBC 3.53 (L) 05/29/2025    HGB 10.9 (L) 05/29/2025    HCT 34.2 (L) 05/29/2025     (L) 05/29/2025    CRP 0.8 10/09/2023    CHOL 108 (L) 05/08/2025    TRIG 41 05/08/2025    HDL 51 05/08/2025    ALKPHOS 116 05/08/2025    LIPASE 248 (H) 11/03/2020    ALT 18 05/08/2025    AST 28 05/08/2025     05/08/2025    K 4.1 05/08/2025     05/08/2025    CREATININE 1.4 05/08/2025    BUN 27 05/08/2025    CO2 31 (H) 05/08/2025    TSH 2.993 05/08/2025    INR 1.0 12/22/2020    GLUF 146 (H) 04/16/2004    HGBA1C 6.4 (H) 05/08/2025       Vital Signs:  LMP  (LMP Unknown)   There is no height or weight on file to calculate BMI.    Imaging reviewed:   MRI ABDOMEN WITHOUT CONTRAST     CLINICAL HISTORY:  renal mass;  Other specified disorders of kidney and ureter     TECHNIQUE:  Multisequence, multiplanar MRI of the abdomen performed per liver protocol before and after administration of 10 mL Gadavist intravenous contrast.     COMPARISON:  Ultrasound 02/04/2025     FINDINGS:  There is a small right pleural effusion and the heart is markedly enlarged.  Hepatic veins are dilated.     Liver: Normal homogeneous background signal.  No focal lesions.  Hepatic and portal veins are patent.     Biliary: Gallbladder is absent.  Mild intrahepatic biliary ductal dilatation the common duct tapering to a normal caliber.     Pancreas: There is mild dilatation of the proximal pancreatic duct to 5  mm.  There is a 5 mm T2 hyperintensity in the mid pancreatic body.     Spleen: Normal size.  No focal lesions.     Adrenal glands: Unremarkable.     Kidneys: There are numerous T2 hyperintense and mixed T1 and T2 hyperintense lesions throughout both kidneys compatible with simple and complex, hemorrhagic/proteinaceous cysts.  Without intravenous gadolinium it is difficult to delineate complex cyst from solid masses.  However in the superomedial pole of the left kidney there is a mixed signal mass that demonstrates mild diffusion restriction measuring 2.5 cm that may represent a solid mass.     Miscellaneous: Visualized bowel loops are unremarkable.  No evidence for lymphadenopathy.     Impression:     1. Numerous simple and complex bilateral renal cysts.  Suspected/possible solid mass measuring 2.5 cm upper pole left kidney.  In a patient of this age, of follow-up/surveillance is reasonable.  2. Findings of right heart dysfunction        Electronically signed by:Clayton Almazan Jr  Date:                                            02/13/2025      Endoscopy reviewed:   Findings:       The examined esophagus was normal.        A small hiatal hernia was present.        The stomach was normal.        The cardia and gastric fundus were normal on retroflexion.        The examined duodenum was normal.   Impression:           - Normal esophagus.                         - Small hiatal hernia.                         - Normal stomach.                         - Normal examined duodenum.                         - No specimens collected.   Recommendation:       - [facility] care.                         - Resume previous diet.                         - Continue present medications.                         - Will discuss with patient possible colonoscopy as                         next step for evaluation   Attending Participation:        I was present and participated during the entire procedure  from        insertion to removal of the endoscope.   Tomás Mccall MD   11/4/2020 2:16:39 PM       Assessment:  1. Heme positive stool    2. Iron deficiency anemia, unspecified iron deficiency anemia type    3. Stage 4 chronic kidney disease      Orders Placed This Encounter    CBC Auto Differential       Assessment & Plan      GASTROINTESTINAL HEMORRHAGE:  - Reviewed fit test results indicating blood in stool, raising concern for potential GI bleed.  - Decided against colonoscopy due to risks outweighing benefits in patient's advanced age (89 years old).  - Opted to monitor hemoglobin levels through serial lab work instead of pursuing invasive procedures.  - Current hemoglobin (10.9) not critically low, but will reassess if significant changes occur.  - Considered starting proton pump inhibitor if evidence of GI bleed presents, but deemed unnecessary at this time.  - Discussed potential causes of blood in stool, including hemorrhoids and constipation.  - Advised on symptoms that would warrant immediate medical attention and to monitor for changes: dizziness, lightheadedness, shortness of breath, black or bloody stools, or feeling unwell.    ANEMIA IN CHRONIC KIDNEY DISEASE:  - Assessed anemia and iron deficiency, noting CKD as contributing factor.  - Explained that iron deficiency and anemia are common with CKD.  - Ordered repeat labs in 2 weeks (around the 23rd) to check hemoglobin levels.    CONSTIPATION:  - Recommend ensuring good bowel movement consistency to prevent straining.    FOLLOW-UP:  - Follow up in 2 weeks to complete ordered labs at nearest [facility] lab.  - Contact the office if any symptomatic changes occur or if blood is observed in stool.          VALERIA MORANC  Gastroenterology Department  [facility] Office [phone]     This note was generated with the assistance of ambient listening technology. Verbal consent was obtained by the patient and accompanying  visitor(s) for the recording of patient appointment to facilitate this note. I attest to having reviewed and edited the generated note for accuracy, though some syntax or spelling errors may persist. Please contact the author of this note for any clarification.                      [1]   Current Outpatient Medications on File Prior to Visit   Medication Sig Dispense Refill    acetaminophen (TYLENOL) 500 MG tablet Take 2 tablets (1,000 mg total) by mouth 3 (three) times daily as needed for Pain (muscle pain in arms and legs). 100 tablet 1    albuterol (PROVENTIL/VENTOLIN HFA) 90 mcg/actuation inhaler Inhale 2 puffs into the lungs every 4 (four) hours as needed for Wheezing. 18 g 6    amLODIPine (NORVASC) 5 MG tablet TAKE 1 TABLET(5 MG) BY MOUTH EVERY MORNING 90 tablet 3    aspirin 81 MG Chew Take 1 tablet (81 mg total) by mouth every morning. 100 tablet 3    atorvastatin (LIPITOR) 40 MG tablet TAKE 1 TABLET(40 MG) BY MOUTH EVERY EVENING 90 tablet 0    blood sugar diagnostic (ONETOUCH ULTRA TEST) Strp 1 strip by In Vitro route once daily. No longer on insulin because of  each 3    blood sugar diagnostic (TRUE METRIX GLUCOSE TEST STRIP) Strp 1 strip by Other route 3 (three) times daily. test 300 strip 3    blood-glucose meter kit To check BG 1 times daily, to use with insurance preferred meter 1 each 0    clotrimazole-betamethasone 1-0.05% (LOTRISONE) cream Apply topically 2 (two) times daily as needed. For intertriginous itching in the bend of the leg 45 g 3    diclofenac sodium (VOLTAREN) 1 % Gel APPLY 2 GRAMS TOPICALLY TO THE AFFECTED AREA TWICE DAILY 100 g 4    dorzolamide (TRUSOPT) 2 % ophthalmic solution Place 1 drop into both eyes 3 (three) times daily. 30 mL 4    erythromycin (ROMYCIN) ophthalmic ointment Place 1 inch into both eyes every evening. Apply to eyelids at bedtimes 3.5 g 6    fluorometholone 0.1% (FML) 0.1 % DrpS Place 1 drop into both eyes once daily.      fluticasone-salmeterol diskus  "inhaler 250-50 mcg Inhale 1 puff into the lungs once daily. Controller 30 each 11    furosemide (LASIX) 40 MG tablet TAKE 1 TABLET BY MOUTH EVERY MORNING AND 1 TABLET AT 4PM 180 tablet 3    insulin aspart U-100 (NOVOLOG FLEXPEN U-100 INSULIN) 100 unit/mL (3 mL) InPn pen Inject if blood sugar is >180, 180-230+2, 231-280+4, 281-330+6, 331-380+8,>380+10 MAX daily 30 units. 15 mL 2    isosorbide mononitrate (IMDUR) 60 MG 24 hr tablet TAKE 1 TABLET BY MOUTH EVERY MORNING FOR HEART, TO PREVENT CHEST PAINS OR SHORTNESS OF BREATH 90 tablet 0    ketoconazole (NIZORAL) 2 % shampoo Apply topically every 7 days. Soak scalp for 15-30 minutes 120 mL 6    lancets (TRUEPLUS LANCETS) 33 gauge Misc Apply 1 lancet topically once daily. No longer on insulin because of  each 3    lancets Misc To check BG 3 time daily, to use with insurance preferred meter 100 each 3    latanoprost 0.005 % ophthalmic solution Place 1 drop into both eyes every evening. 7.5 mL 4    levothyroxine (SYNTHROID) 88 MCG tablet TAKE 1 TABLET(88 MCG) BY MOUTH DAILY BEFORE BREAKFAST 90 tablet 0    meclizine (ANTIVERT) 12.5 mg tablet TAKE 1 TABLET BY MOUTH THREE TIMES DAILY AS NEEDED FOR DIZZINESS 20 tablet 3    melatonin (MELATIN) 3 mg tablet Take 1 tablet (3 mg total) by mouth nightly as needed for Insomnia.      metoprolol tartrate (LOPRESSOR) 25 MG tablet TAKE 1 TABLET(25 MG) BY MOUTH TWICE DAILY 180 tablet 3    multivit-mineral-iron-lutein Tab Take 1 tablet by mouth once daily.      nitroGLYCERIN (NITROSTAT) 0.4 MG SL tablet ONE TABLET UNDER THE TONGUE EVERY 5 MINUTES AS NEEDED FOR CHEST PAIN.  IF NO RELIEF AFTER 3 TABLETS , GO TO THE EMERGENCY ROOM. 100 tablet 1    pen needle, diabetic (BD ULTRA-FINE SHORT PEN NEEDLE) 31 gauge x 5/16" Ndle USE WITH INSULIN PENS UP TO THREE TIMES DAILY 100 each 6    polyethylene glycol (GLYCOLAX) 17 gram/dose powder Take 17 g by mouth daily as needed (CONSTIPATION).      psyllium (METAMUCIL) powder Take 1 packet by mouth " daily as needed (CONSTIPATION).      traZODone (DESYREL) 50 MG tablet Take 1 tablet (50 mg total) by mouth every evening. 30 tablet 11     No current facility-administered medications on file prior to visit.

## 2025-06-13 ENCOUNTER — TELEPHONE (OUTPATIENT)
Dept: INTERNAL MEDICINE | Facility: CLINIC | Age: OVER 89
End: 2025-06-13
Payer: MEDICARE

## 2025-06-13 NOTE — TELEPHONE ENCOUNTER
Copied from CRM #[id number]. Topic: General Inquiry - Patient Advice  >> Jun 13, 2025 10:03 AM Jamarcus wrote:  .1MEDICALADVICE     Patient is calling for Medical Advice regarding: Patient daughter called to see if she could get a call from the office to discuss patient's need for a new order for diabetic shoes. She said she needs the order and the specifics mentioned below faxed to the dept and number below. She would like a call after this has been faxed at number provided below.    [facility]  Prescription and A1C levels taken within the last 6 months.   Fax: [phone]    How long has patient had these symptoms:    Pharmacy name and phone#:     Patient wants a call back or thru [facility], provide patient's call back phone number: Call; [phone]    Comments:    Please advise patient replies from provider may take up to 48 hours.

## 2025-06-16 ENCOUNTER — PATIENT MESSAGE (OUTPATIENT)
Dept: ADMINISTRATIVE | Facility: HOSPITAL | Age: OVER 89
End: 2025-06-16
Payer: MEDICARE

## 2025-06-17 ENCOUNTER — PATIENT OUTREACH (OUTPATIENT)
Dept: ADMINISTRATIVE | Facility: HOSPITAL | Age: OVER 89
End: 2025-06-17
Payer: MEDICARE

## 2025-06-17 DIAGNOSIS — I10 ESSENTIAL HYPERTENSION: Primary | ICD-10-CM

## 2025-06-17 DIAGNOSIS — E11.9 ENCOUNTER FOR DIABETIC FOOT EXAM: Primary | ICD-10-CM

## 2025-06-17 NOTE — PROGRESS NOTES
Chart reviewed and updated. Reconciled immunizations, health maintenance and care everywhere.  Placed microalbumin urine and responded to campaign msg.      Seda Mead [facility]  Panel Care Coordinator  [facility]  [phone]  For Tomás Andres MD

## 2025-06-17 NOTE — PROGRESS NOTES
Chart reviewed and updated. Reconciled immunizations, health maintenance and care everywhere.  Placed Podiatry order and linked to scheduled Podiatry appt. Scheduled lab and urine also forwarded msg to PCP about diabetic shoe order that need to be sent to [facility]. Responded to pt by campaign msg that was in.      Seda Mead CMA  Panel Care Coordinator  [facility]  [phone]  For Tomás Andres MD

## 2025-06-20 ENCOUNTER — TELEPHONE (OUTPATIENT)
Dept: OPHTHALMOLOGY | Facility: CLINIC | Age: OVER 89
End: 2025-06-20
Payer: MEDICARE

## 2025-06-23 ENCOUNTER — TELEPHONE (OUTPATIENT)
Dept: INTERNAL MEDICINE | Facility: CLINIC | Age: OVER 89
End: 2025-06-23
Payer: MEDICARE

## 2025-06-23 NOTE — TELEPHONE ENCOUNTER
Called back pt to let her know that Dr Andres said pt must see podiatry first in order for us to write the script for inserts

## 2025-06-24 ENCOUNTER — LAB VISIT (OUTPATIENT)
Dept: LAB | Facility: HOSPITAL | Age: OVER 89
End: 2025-06-24
Attending: INTERNAL MEDICINE
Payer: MEDICARE

## 2025-06-24 ENCOUNTER — RESULTS FOLLOW-UP (OUTPATIENT)
Dept: INTERNAL MEDICINE | Facility: CLINIC | Age: OVER 89
End: 2025-06-24

## 2025-06-24 DIAGNOSIS — I10 ESSENTIAL HYPERTENSION: ICD-10-CM

## 2025-06-24 LAB
ALBUMIN/CREAT UR: 54.1 UG/MG
CREAT UR-MCNC: 61 MG/DL (ref 15–325)
MICROALBUMIN UR-MCNC: 33 UG/ML (ref ?–5000)

## 2025-06-24 PROCEDURE — 82570 ASSAY OF URINE CREATININE: CPT | Mod: HCNC

## 2025-06-25 RX ORDER — LEVOTHYROXINE SODIUM 88 UG/1
TABLET ORAL
Qty: 90 TABLET | Refills: 3 | Status: SHIPPED | OUTPATIENT
Start: 2025-06-25

## 2025-06-25 NOTE — TELEPHONE ENCOUNTER
No care due was identified.  Health [facility] Embedded Care Due Messages. Reference number: [id number].   6/25/2025 5:41:06 AM CDT

## 2025-06-25 NOTE — TELEPHONE ENCOUNTER
Refill Decision Note   Tiana Mead  is requesting a refill authorization.  Brief Assessment and Rationale for Refill:  Approve     Medication Therapy Plan:         Comments:     Note composed:9:07 AM 06/25/2025

## 2025-06-27 ENCOUNTER — TELEPHONE (OUTPATIENT)
Dept: PODIATRY | Facility: CLINIC | Age: OVER 89
End: 2025-06-27
Payer: MEDICARE

## 2025-06-27 NOTE — TELEPHONE ENCOUNTER
Spoke with pt daughter in regards to r/s appt due to provider changing location. Pt r/s and daughter verbalized understanding. Appt reminder mailed.

## 2025-07-10 DIAGNOSIS — I25.10 CORONARY ARTERY DISEASE INVOLVING NATIVE CORONARY ARTERY OF NATIVE HEART WITHOUT ANGINA PECTORIS: ICD-10-CM

## 2025-07-10 RX ORDER — ATORVASTATIN CALCIUM 40 MG/1
40 TABLET, FILM COATED ORAL NIGHTLY
Qty: 90 TABLET | Refills: 3 | Status: SHIPPED | OUTPATIENT
Start: 2025-07-10

## 2025-07-10 NOTE — TELEPHONE ENCOUNTER
Refill Decision Note   Tiana Meda  is requesting a refill authorization.  Brief Assessment and Rationale for Refill:  Approve     Medication Therapy Plan:         Comments:     Note composed:7:01 AM 07/10/2025

## 2025-07-10 NOTE — TELEPHONE ENCOUNTER
No care due was identified.  [facility] Embedded Care Due Messages. Reference number: [id number].   7/10/2025 5:39:58 AM CDT

## 2025-07-19 DIAGNOSIS — I25.10 CORONARY ARTERY DISEASE INVOLVING NATIVE CORONARY ARTERY OF NATIVE HEART WITHOUT ANGINA PECTORIS: ICD-10-CM

## 2025-07-19 NOTE — TELEPHONE ENCOUNTER
Refill Routing Note   Medication(s) are not appropriate for processing by [facility] for the following reason(s):        Drug-disease interaction    [facility] action(s):  Defer        Medication Therapy Plan: Drug-Disease: isosorbide mononitrate and Iron deficiency anemia, unspecified iron deficiency anemia type; Acute on chronic blood loss anemia; DEFER      Appointments  past 12m or future 3m with PCP    Date Provider   Last Visit   5/8/2025 Tomás Andres MD   Next Visit   Visit date not found Tomás Andres MD   ED visits in past 90 days: 0        Note composed:1:36 PM 07/19/2025

## 2025-07-19 NOTE — TELEPHONE ENCOUNTER
No care due was identified.  Health [facility] Embedded Care Due Messages. Reference number: [id number].   7/19/2025 10:16:07 AM CDT

## 2025-07-20 RX ORDER — ISOSORBIDE MONONITRATE 60 MG/1
TABLET, EXTENDED RELEASE ORAL
Qty: 90 TABLET | Refills: 3 | Status: SHIPPED | OUTPATIENT
Start: 2025-07-20

## 2025-07-21 ENCOUNTER — OFFICE VISIT (OUTPATIENT)
Dept: PODIATRY | Facility: CLINIC | Age: OVER 89
End: 2025-07-21
Payer: MEDICARE

## 2025-07-21 VITALS
RESPIRATION RATE: 18 BRPM | HEIGHT: 68 IN | HEART RATE: 74 BPM | DIASTOLIC BLOOD PRESSURE: 63 MMHG | WEIGHT: 145.5 LBS | SYSTOLIC BLOOD PRESSURE: 130 MMHG | BODY MASS INDEX: 22.05 KG/M2

## 2025-07-21 DIAGNOSIS — M20.42 HAMMER TOES OF BOTH FEET: ICD-10-CM

## 2025-07-21 DIAGNOSIS — E11.42 DIABETIC POLYNEUROPATHY ASSOCIATED WITH TYPE 2 DIABETES MELLITUS: Primary | ICD-10-CM

## 2025-07-21 DIAGNOSIS — B35.3 TINEA PEDIS OF BOTH FEET: ICD-10-CM

## 2025-07-21 DIAGNOSIS — E11.9 ENCOUNTER FOR DIABETIC FOOT EXAM: ICD-10-CM

## 2025-07-21 DIAGNOSIS — M21.42 PES PLANUS OF BOTH FEET: ICD-10-CM

## 2025-07-21 DIAGNOSIS — M20.41 HAMMER TOES OF BOTH FEET: ICD-10-CM

## 2025-07-21 DIAGNOSIS — M21.41 PES PLANUS OF BOTH FEET: ICD-10-CM

## 2025-07-21 PROCEDURE — 11720 DEBRIDE NAIL 1-5: CPT | Mod: Q9,HCNC,S$GLB, | Performed by: PODIATRIST

## 2025-07-21 PROCEDURE — 1126F AMNT PAIN NOTED NONE PRSNT: CPT | Mod: CPTII,HCNC,S$GLB, | Performed by: PODIATRIST

## 2025-07-21 PROCEDURE — 99214 OFFICE O/P EST MOD 30 MIN: CPT | Mod: 25,HCNC,S$GLB, | Performed by: PODIATRIST

## 2025-07-21 PROCEDURE — 1160F RVW MEDS BY RX/DR IN RCRD: CPT | Mod: CPTII,HCNC,S$GLB, | Performed by: PODIATRIST

## 2025-07-21 PROCEDURE — 1159F MED LIST DOCD IN RCRD: CPT | Mod: CPTII,HCNC,S$GLB, | Performed by: PODIATRIST

## 2025-07-21 PROCEDURE — 99999 PR PBB SHADOW E&M-EST. PATIENT-LVL V: CPT | Mod: PBBFAC,HCNC,, | Performed by: PODIATRIST

## 2025-07-21 RX ORDER — CLOTRIMAZOLE 1 %
CREAM (GRAM) TOPICAL DAILY
Qty: 45 G | Refills: 1 | Status: SHIPPED | OUTPATIENT
Start: 2025-07-21 | End: 2025-08-04

## 2025-07-21 NOTE — PROGRESS NOTES
Subjective:      Patient ID: Tiana Mead is a 89 y.o. female.    Chief Complaint: Diabetic Foot Exam (/Tomás Andres MD 5/08/25/) and Nail Care (Need Rx for DM shoes )      Tiana is a 89 y.o. female who presents to the clinic for evaluation and treatment of high risk feet. Tiana has a past medical history of Allergy, Anemia, Arthritis, Asthma, Chronic kidney disease, Degenerative disc disease, Diabetes mellitus type II, Diastolic heart failure (05/02/2017), Essential hypertension (7/3/2012), Glaucoma, History of insulin dependent diabetes mellitus, for many years stopped because of chronic kidney November 2020.  (9/9/2021), History of pseudogout (8/23/2012), Hyperlipidemia, Hypertension, Iritis, Myocardial infarction, Pneumonia, Thrombocytopenia (8/24/2021), and Thyroid disease. The patient's chief complaint is long, thick toenails and calluses on both feet.   This patient has documented high risk feet requiring routine maintenance secondary to diabetes mellitis and those secondary complications of diabetes, as mentioned..     Pt here for foot exam  Requesting more DM shoes   Got a pedicure because nails were long.   Also relates her feet have been smelling.   Had a good bday    PCP: Tomás Andres MD    Date Last Seen by PCP: 5/8/25   Chief Complaint   Patient presents with    Diabetic Foot Exam       Tomás Andres MD 5/08/25      Nail Care     Need Rx for DM shoes             Hemoglobin A1C   Date Value Ref Range Status   06/25/2024 6.6 (H) 4.0 - 5.6 % Final     Comment:     ADA Screening Guidelines:  5.7-6.4%  Consistent with prediabetes  >or=6.5%  Consistent with diabetes    High levels of fetal hemoglobin interfere with the HbA1C  assay. Heterozygous hemoglobin variants (HbS, HgC, etc)do  not significantly interfere with this assay.   However, presence of multiple variants may affect accuracy.     04/04/2024 6.8 (H) 4.0 - 5.6 % Final     Comment:     ADA Screening Guidelines:  5.7-6.4%   Consistent with prediabetes  >or=6.5%  Consistent with diabetes    High levels of fetal hemoglobin interfere with the HbA1C  assay. Heterozygous hemoglobin variants (HbS, HgC, etc)do  not significantly interfere with this assay.   However, presence of multiple variants may affect accuracy.     10/09/2023 6.8 (H) 4.0 - 5.6 % Final     Comment:     ADA Screening Guidelines:  5.7-6.4%  Consistent with prediabetes  >or=6.5%  Consistent with diabetes    High levels of fetal hemoglobin interfere with the HbA1C  assay. Heterozygous hemoglobin variants (HbS, HgC, etc)do  not significantly interfere with this assay.   However, presence of multiple variants may affect accuracy.       Hemoglobin A1c   Date Value Ref Range Status   05/08/2025 6.4 (H) 4.0 - 5.6 % Final     Comment:     ADA Screening Guidelines:  5.7-6.4%  Consistent with prediabetes  >=6.5%  Consistent with diabetes    High levels of fetal hemoglobin interfere with the HbA1C  assay. Heterozygous hemoglobin variants (HbS, HgC, etc)do  not significantly interfere with this assay.   However, presence of multiple variants may affect accuracy.             Patient Active Problem List   Diagnosis    Essential hypertension    Hyperlipidemia associated with type 2 diabetes mellitus    Generalized osteoarthritis of multiple sites    Chronic iritis - Left Eye    Osteoarthritis of both knees    Chondrocalcinosis    Hyperuricemia    Helicobacter pylori gastritis    Old MI (myocardial infarction), 2013    Permanent atrial fibrillation    Long term current use of anticoagulant therapy, eliquis, stopped because of cryptic GI bleeding    Glaucoma associated with ocular trauma    Aortic atherosclerosis    Acute on chronic diastolic congestive heart failure    Type 2 diabetes mellitus, with long-term current use of insulin    Primary hypothyroidism    Moderate persistent asthma without complication    Primary open angle glaucoma (POAG) of both eyes, moderate stage    Pulmonary  hypertension    Non-rheumatic tricuspid valve insufficiency    Allergic conjunctivitis of both eyes    Pseudogout, knees    Dry eye syndrome of both eyes    Stage 4 chronic kidney disease    Acute on chronic blood loss anemia, Sept 2020 anticoagulation discontinued    Coronary artery disease involving native coronary artery of native heart without angina pectoris    Myalgia, since April 20221 both arms and both legs    History of insulin dependent diabetes mellitus, for many years stopped because of chronic kidney November 2020.     Corneal edema    Contact dermatitis    Ulcerative blepharitis of upper and lower eyelids of both eyes    Multiple renal cysts    Trichiasis of left eye without entropion       Current Outpatient Medications on File Prior to Visit   Medication Sig Dispense Refill    acetaminophen (TYLENOL) 500 MG tablet Take 2 tablets (1,000 mg total) by mouth 3 (three) times daily as needed for Pain (muscle pain in arms and legs). 100 tablet 1    albuterol (PROVENTIL/VENTOLIN HFA) 90 mcg/actuation inhaler Inhale 2 puffs into the lungs every 4 (four) hours as needed for Wheezing. 18 g 6    amLODIPine (NORVASC) 5 MG tablet TAKE 1 TABLET(5 MG) BY MOUTH EVERY MORNING 90 tablet 3    aspirin 81 MG Chew Take 1 tablet (81 mg total) by mouth every morning. 100 tablet 3    atorvastatin (LIPITOR) 40 MG tablet TAKE 1 TABLET(40 MG) BY MOUTH EVERY EVENING 90 tablet 3    blood sugar diagnostic (ONETOUCH ULTRA TEST) Strp 1 strip by In Vitro route once daily. No longer on insulin because of  each 3    blood sugar diagnostic (TRUE METRIX GLUCOSE TEST STRIP) Strp 1 strip by Other route 3 (three) times daily. test 300 strip 3    blood-glucose meter kit To check BG 1 times daily, to use with insurance preferred meter 1 each 0    clotrimazole-betamethasone 1-0.05% (LOTRISONE) cream Apply topically 2 (two) times daily as needed. For intertriginous itching in the bend of the leg 45 g 3    diclofenac sodium (VOLTAREN) 1 %  Gel APPLY 2 GRAMS TOPICALLY TO THE AFFECTED AREA TWICE DAILY 100 g 4    dorzolamide (TRUSOPT) 2 % ophthalmic solution Place 1 drop into both eyes 3 (three) times daily. 30 mL 4    erythromycin (ROMYCIN) ophthalmic ointment Place 1 inch into both eyes every evening. Apply to eyelids at bedtimes 3.5 g 6    fluorometholone 0.1% (FML) 0.1 % DrpS Place 1 drop into both eyes once daily.      fluticasone-salmeterol diskus inhaler 250-50 mcg Inhale 1 puff into the lungs once daily. Controller 30 each 11    furosemide (LASIX) 40 MG tablet TAKE 1 TABLET BY MOUTH EVERY MORNING AND 1 TABLET AT 4PM 180 tablet 3    insulin aspart U-100 (NOVOLOG FLEXPEN U-100 INSULIN) 100 unit/mL (3 mL) InPn pen Inject if blood sugar is >180, 180-230+2, 231-280+4, 281-330+6, 331-380+8,>380+10 MAX daily 30 units. 15 mL 2    isosorbide mononitrate (IMDUR) 60 MG 24 hr tablet TAKE 1 TABLET BY MOUTH EVERY MORNING FOR HEART, TO PREVENT CHEST PAINS OR SHORTNESS OF BREATH 90 tablet 3    ketoconazole (NIZORAL) 2 % shampoo Apply topically every 7 days. Soak scalp for 15-30 minutes 120 mL 6    lancets (TRUEPLUS LANCETS) 33 gauge Misc Apply 1 lancet topically once daily. No longer on insulin because of  each 3    lancets Misc To check BG 3 time daily, to use with insurance preferred meter 100 each 3    latanoprost 0.005 % ophthalmic solution Place 1 drop into both eyes every evening. 7.5 mL 4    levothyroxine (SYNTHROID) 88 MCG tablet TAKE 1 TABLET(88 MCG) BY MOUTH DAILY BEFORE BREAKFAST 90 tablet 3    meclizine (ANTIVERT) 12.5 mg tablet TAKE 1 TABLET BY MOUTH THREE TIMES DAILY AS NEEDED FOR DIZZINESS 20 tablet 3    melatonin (MELATIN) 3 mg tablet Take 1 tablet (3 mg total) by mouth nightly as needed for Insomnia.      metoprolol tartrate (LOPRESSOR) 25 MG tablet TAKE 1 TABLET(25 MG) BY MOUTH TWICE DAILY 180 tablet 3    multivit-mineral-iron-lutein Tab Take 1 tablet by mouth once daily.      nitroGLYCERIN (NITROSTAT) 0.4 MG SL tablet ONE TABLET UNDER  "THE TONGUE EVERY 5 MINUTES AS NEEDED FOR CHEST PAIN.  IF NO RELIEF AFTER 3 TABLETS , GO TO THE EMERGENCY ROOM. 100 tablet 1    pen needle, diabetic (BD ULTRA-FINE SHORT PEN NEEDLE) 31 gauge x 5/16" Ndle USE WITH INSULIN PENS UP TO THREE TIMES DAILY 100 each 6    polyethylene glycol (GLYCOLAX) 17 gram/dose powder Take 17 g by mouth daily as needed (CONSTIPATION).      psyllium (METAMUCIL) powder Take 1 packet by mouth daily as needed (CONSTIPATION).      traZODone (DESYREL) 50 MG tablet Take 1 tablet (50 mg total) by mouth every evening. 30 tablet 11     No current facility-administered medications on file prior to visit.       Review of patient's allergies indicates:   Allergen Reactions    Codeine      Other reaction(s): Itching  Other reaction(s): Nausea       Past Surgical History:   Procedure Laterality Date    CARDIAC CATHETERIZATION  2010    no obstructive disease    CATARACT EXTRACTION W/  INTRAOCULAR LENS IMPLANT Left n/a    CATARACT EXTRACTION W/  INTRAOCULAR LENS IMPLANT Right 10/8/2018    With Femtosecond LASER assist (Dr. Henson)    CHOLECYSTECTOMY      ESOPHAGOGASTRODUODENOSCOPY N/A 11/4/2020    Procedure: EGD (ESOPHAGOGASTRODUODENOSCOPY);  Surgeon: Tomás Mccall MD;  Location: [facility] ([address] [place] [address]);  Service: Endoscopy;  Laterality: N/A;    EYE SURGERY      gall stone      HYSTERECTOMY      VARICOSE VEIN SURGERY         Family History   Problem Relation Name Age of Onset    Diabetes Mother      Hypertension Mother      Glaucoma Mother      Kidney disease Mother      Hypertension Father      Hypotension Sister Alberta     Heart attack Sister Alberta     Heart disease Sister Alberta     No Known Problems Sister Bekah     Diabetes Sister Violeta     Kidney disease Sister Violeta     Leukemia Sister Jane     Hypertension Sister Pallavi     Diabetes Sister Lovinia     Kidney disease Sister Lovinia     No Known Problems Sister Lacey     Heart disease Brother Mariusz     Heart disease Brother John  "    No Known Problems Brother Luis     No Known Problems Maternal Grandmother      No Known Problems Maternal Grandfather      No Known Problems Paternal Grandmother      Diabetes Paternal Grandfather      Hypertension Daughter Yolanda     Atrial fibrillation Daughter Yolanda     Diabetes Daughter Melanie         borderline    Hypertension Daughter Melanie     No Known Problems Son Trung     Diabetes Son Raudel     Heart disease Son Raudel     Hypertension Son Raudel     Melanoma Neg Hx         Social History     Socioeconomic History    Marital status:    Tobacco Use    Smoking status: Never     Passive exposure: Never    Smokeless tobacco: Never   Substance and Sexual Activity    Alcohol use: No    Drug use: No    Sexual activity: Never     Social Drivers of Health     Financial Resource Strain: Low Risk  (7/18/2024)    Overall Financial Resource Strain (CARDIA)     Difficulty of Paying Living Expenses: Not hard at all   Recent Concern: Financial Resource Strain - Medium Risk (5/9/2024)    Overall Financial Resource Strain (CARDIA)     Difficulty of Paying Living Expenses: Somewhat hard   Food Insecurity: No Food Insecurity (6/9/2025)    Hunger Vital Sign     Worried About Running Out of Food in the Last Year: Never true     Ran Out of Food in the Last Year: Never true   Transportation Needs: No Transportation Needs (6/9/2025)    PRAPARE - Transportation     Lack of Transportation (Medical): No     Lack of Transportation (Non-Medical): No   Physical Activity: Unknown (6/9/2025)    Exercise Vital Sign     Days of Exercise per Week: 0 days   Recent Concern: Physical Activity - Inactive (6/9/2025)    Exercise Vital Sign     Days of Exercise per Week: 0 days     Minutes of Exercise per Session: 10 min   Stress: No Stress Concern Present (6/9/2025)    [employer] - Occupational Stress Questionnaire     Feeling of Stress : Only a little   Housing Stability: Low Risk  (6/9/2025)     "Housing Stability Vital Sign     Unable to Pay for Housing in the Last Year: No     Number of Times Moved in the Last Year: 0     Homeless in the Last Year: No           Review of Systems   Constitutional: Negative for chills, decreased appetite and fever.   Cardiovascular:  Positive for leg swelling. Negative for chest pain and claudication.   Respiratory:  Negative for cough, hemoptysis and shortness of breath.    Skin:  Positive for dry skin and nail changes. Negative for color change, flushing, itching, poor wound healing, rash and suspicious lesions.   Musculoskeletal:  Positive for arthritis and myalgias. Negative for back pain, falls, gout, joint pain and joint swelling.   Gastrointestinal:  Negative for nausea and vomiting.   Neurological:  Negative for loss of balance, numbness and paresthesias.           Objective:       Vitals:    25 0735   BP: 130/63   Pulse: 74   Resp: 18   Weight: 66 kg (145 lb 8.1 oz)   Height: 5' 8" (1.727 m)   PainSc: 0-No pain        Physical Exam  Vitals reviewed.   Constitutional:       Appearance: She is well-developed.   Cardiovascular:      Pulses:           Dorsalis pedis pulses are 1+ on the right side and 1+ on the left side.        Posterior tibial pulses are 1+ on the right side and 1+ on the left side.      Comments: Dorsalis pedis and posterior tibial pulses are diminished Bilaterally. Toes are cool to touch. Feet are warm proximally.There is decreased digital hair. Skin is atrophic, slightly hyperpigmented, and mildly edematous    Decreased hair growth to lower shins with evidence of chronic venous stasis dz.           Musculoskeletal:         General: No tenderness or signs of injury.      Right lower le+ Edema present.      Left lower le+ Edema present.      Right ankle: Swelling present.      Left ankle: Swelling present.      Right foot: Decreased range of motion. Swelling and deformity present. No tenderness or bony tenderness.      Left foot: " Decreased range of motion. Swelling and deformity present. No tenderness or bony tenderness.      Comments:      1st MPJ exostosis w/ lateral deviation of hallux, non trackbound. No pain w/ ROM to b/l  hallux    Reducible extensor and flexor contractures at the MTPJ and PIPJ of toes 2-5, bilat.      Flexible pes planus foot type w/ medial arch collapse and mild gastroc equinus      Feet:      Right foot:      Protective Sensation: 10 sites tested.  0 sites sensed.      Skin integrity: Dry skin present. No ulcer, blister, skin breakdown, erythema, warmth, callus or fissure.      Toenail Condition: Right toenails are abnormally thick.      Left foot:      Protective Sensation: 10 sites tested.  0 sites sensed.      Skin integrity: Dry skin present. No ulcer, blister, skin breakdown, erythema, warmth, callus or fissure.      Toenail Condition: Left toenails are abnormally thick.      Comments: Toenails 1-5 bilaterally are short, thin, mild thickening to hallux nails no soi    Minimal xerosis tissue noted to lateral 5th toes   No significant hpk     No IS macerations   Skin:     General: Skin is warm and dry.      Coloration: Skin is not ashen, cyanotic or pale.      Findings: No ecchymosis, erythema or lesion.      Comments:     Neurological:      Mental Status: She is alert.      Sensory: Sensory deficit present.      Gait: Gait abnormal.      Comments:   Rolling walker    Psychiatric:         Mood and Affect: Mood normal.         Speech: Speech normal.         Behavior: Behavior normal. Behavior is cooperative.               Assessment:       Encounter Diagnoses   Name Primary?    Encounter for diabetic foot exam     Diabetic polyneuropathy associated with type 2 diabetes mellitus Yes    Hammer toes of both feet     Pes planus of both feet     Tinea pedis of both feet              Plan:       Tiana was seen today for diabetic foot exam and nail care.    Diagnoses and all orders for this visit:    Diabetic  polyneuropathy associated with type 2 diabetes mellitus  -     DIABETIC SHOES FOR HOME USE    Encounter for diabetic foot exam  -     Ambulatory referral/consult to Podiatry    Hammer toes of both feet  -     DIABETIC SHOES FOR HOME USE    Pes planus of both feet  -     DIABETIC SHOES FOR HOME USE    Tinea pedis of both feet    Other orders  -     clotrimazole (LOTRIMIN) 1 % cream; Apply topically once daily. for 14 days          I counseled the patient on her conditions, their implications and medical management.      DM shoes rx     Rx antifungal cream     Shoe inspection. Diabetic Foot Education. Patient reminded of the importance of good nutrition and blood sugar control to help prevent podiatric complications of diabetes. Patient instructed on proper foot hygeine. We discussed wearing proper shoe gear, daily foot inspections, never walking without protective shoe gear, never putting sharp instruments to feet       With patient's permission, the toenails mentioned above were aggressively reduced and debrided using a nail nipper x 2, removing all offending nail and debris.   The patient will continue to monitor the areas daily, inspect the feet, wear protective shoe gear when ambulatory, and moisturizer to maintain skin integrity.            - Return to clinic in 3-4 months or sooner if problems arise

## 2025-08-02 ENCOUNTER — HOSPITAL ENCOUNTER (INPATIENT)
Facility: OTHER | Age: OVER 89
LOS: 1 days | Discharge: HOME HEALTH | DRG: 378 | End: 2025-08-04
Attending: EMERGENCY MEDICINE | Admitting: HOSPITALIST
Payer: MEDICARE

## 2025-08-02 DIAGNOSIS — K62.5 RECTAL BLEEDING: Primary | ICD-10-CM

## 2025-08-02 DIAGNOSIS — E11.42 TYPE 2 DIABETES MELLITUS WITH DIABETIC POLYNEUROPATHY, WITHOUT LONG-TERM CURRENT USE OF INSULIN: ICD-10-CM

## 2025-08-02 PROBLEM — E11.9 TYPE 2 DIABETES MELLITUS: Status: ACTIVE | Noted: 2025-08-02

## 2025-08-02 PROBLEM — Z71.89 ACP (ADVANCE CARE PLANNING): Status: ACTIVE | Noted: 2025-08-02

## 2025-08-02 PROBLEM — E03.9 HYPOTHYROIDISM: Status: ACTIVE | Noted: 2025-08-02

## 2025-08-02 PROBLEM — D69.6 THROMBOCYTOPENIA: Status: ACTIVE | Noted: 2025-08-02

## 2025-08-02 PROBLEM — D50.9 IRON DEFICIENCY ANEMIA: Status: ACTIVE | Noted: 2025-08-02

## 2025-08-02 PROBLEM — I50.32 CHRONIC DIASTOLIC CHF (CONGESTIVE HEART FAILURE): Status: ACTIVE | Noted: 2025-08-02

## 2025-08-02 LAB
ABSOLUTE EOSINOPHIL (OHS): 0.13 K/UL
ABSOLUTE EOSINOPHIL (OHS): 0.14 K/UL
ABSOLUTE EOSINOPHIL (OHS): 0.14 K/UL
ABSOLUTE MONOCYTE (OHS): 0.6 K/UL (ref 0.3–1)
ABSOLUTE MONOCYTE (OHS): 0.7 K/UL (ref 0.3–1)
ABSOLUTE MONOCYTE (OHS): 0.73 K/UL (ref 0.3–1)
ABSOLUTE NEUTROPHIL COUNT (OHS): 1.97 K/UL (ref 1.8–7.7)
ABSOLUTE NEUTROPHIL COUNT (OHS): 2.04 K/UL (ref 1.8–7.7)
ABSOLUTE NEUTROPHIL COUNT (OHS): 2.61 K/UL (ref 1.8–7.7)
ALBUMIN SERPL BCP-MCNC: 3.9 G/DL (ref 3.5–5.2)
ALP SERPL-CCNC: 144 UNIT/L (ref 40–150)
ALT SERPL W/O P-5'-P-CCNC: 24 UNIT/L (ref 10–44)
ANION GAP (OHS): 7 MMOL/L (ref 8–16)
APTT PPP: 26.8 SECONDS (ref 21–32)
AST SERPL-CCNC: 38 UNIT/L (ref 11–45)
BASOPHILS # BLD AUTO: 0.04 K/UL
BASOPHILS # BLD AUTO: 0.04 K/UL
BASOPHILS # BLD AUTO: 0.05 K/UL
BASOPHILS NFR BLD AUTO: 0.9 %
BASOPHILS NFR BLD AUTO: 1 %
BASOPHILS NFR BLD AUTO: 1.2 %
BILIRUB SERPL-MCNC: 0.8 MG/DL (ref 0.1–1)
BUN SERPL-MCNC: 29 MG/DL (ref 10–30)
CALCIUM SERPL-MCNC: 9.8 MG/DL (ref 8.7–10.5)
CHLORIDE SERPL-SCNC: 104 MMOL/L (ref 95–110)
CO2 SERPL-SCNC: 30 MMOL/L (ref 23–29)
CREAT SERPL-MCNC: 1.7 MG/DL (ref 0.5–1.4)
CTP QC/QA: YES
ERYTHROCYTE [DISTWIDTH] IN BLOOD BY AUTOMATED COUNT: 14.7 % (ref 11.5–14.5)
ERYTHROCYTE [DISTWIDTH] IN BLOOD BY AUTOMATED COUNT: 14.8 % (ref 11.5–14.5)
ERYTHROCYTE [DISTWIDTH] IN BLOOD BY AUTOMATED COUNT: 15 % (ref 11.5–14.5)
FECAL OCCULT BLOOD, POC: POSITIVE
GFR SERPLBLD CREATININE-BSD FMLA CKD-EPI: 27 ML/MIN/1.73/M2
GLUCOSE SERPL-MCNC: 124 MG/DL (ref 70–110)
HCT VFR BLD AUTO: 27.1 % (ref 37–48.5)
HCT VFR BLD AUTO: 27.3 % (ref 37–48.5)
HCT VFR BLD AUTO: 32.7 % (ref 37–48.5)
HGB BLD-MCNC: 10.7 GM/DL (ref 12–16)
HGB BLD-MCNC: 8.9 GM/DL (ref 12–16)
HGB BLD-MCNC: 8.9 GM/DL (ref 12–16)
IMM GRANULOCYTES # BLD AUTO: 0 K/UL (ref 0–0.04)
IMM GRANULOCYTES # BLD AUTO: 0 K/UL (ref 0–0.04)
IMM GRANULOCYTES # BLD AUTO: 0.01 K/UL (ref 0–0.04)
IMM GRANULOCYTES NFR BLD AUTO: 0 % (ref 0–0.5)
IMM GRANULOCYTES NFR BLD AUTO: 0 % (ref 0–0.5)
IMM GRANULOCYTES NFR BLD AUTO: 0.3 % (ref 0–0.5)
INDIRECT COOMBS: NORMAL
INR PPP: 1 (ref 0.8–1.2)
LYMPHOCYTES # BLD AUTO: 1.12 K/UL (ref 1–4.8)
LYMPHOCYTES # BLD AUTO: 1.13 K/UL (ref 1–4.8)
LYMPHOCYTES # BLD AUTO: 1.15 K/UL (ref 1–4.8)
MCH RBC QN AUTO: 30.8 PG (ref 27–31)
MCH RBC QN AUTO: 30.8 PG (ref 27–31)
MCH RBC QN AUTO: 30.9 PG (ref 27–31)
MCHC RBC AUTO-ENTMCNC: 32.6 G/DL (ref 32–36)
MCHC RBC AUTO-ENTMCNC: 32.7 G/DL (ref 32–36)
MCHC RBC AUTO-ENTMCNC: 32.8 G/DL (ref 32–36)
MCV RBC AUTO: 94 FL (ref 82–98)
MCV RBC AUTO: 95 FL (ref 82–98)
MCV RBC AUTO: 95 FL (ref 82–98)
NUCLEATED RBC (/100WBC) (OHS): 0 /100 WBC
PLATELET # BLD AUTO: 107 K/UL (ref 150–450)
PLATELET # BLD AUTO: 116 K/UL (ref 150–450)
PLATELET # BLD AUTO: 97 K/UL (ref 150–450)
PMV BLD AUTO: 10.9 FL (ref 9.2–12.9)
PMV BLD AUTO: 11.8 FL (ref 9.2–12.9)
PMV BLD AUTO: 12.1 FL (ref 9.2–12.9)
POCT GLUCOSE: 187 MG/DL (ref 70–110)
POTASSIUM SERPL-SCNC: 4 MMOL/L (ref 3.5–5.1)
PROT SERPL-MCNC: 7 GM/DL (ref 6–8.4)
PROTHROMBIN TIME: 11.3 SECONDS (ref 9–12.5)
RBC # BLD AUTO: 2.89 M/UL (ref 4–5.4)
RBC # BLD AUTO: 2.89 M/UL (ref 4–5.4)
RBC # BLD AUTO: 3.46 M/UL (ref 4–5.4)
RELATIVE EOSINOPHIL (OHS): 3.1 %
RELATIVE EOSINOPHIL (OHS): 3.3 %
RELATIVE EOSINOPHIL (OHS): 3.4 %
RELATIVE LYMPHOCYTE (OHS): 24.8 % (ref 18–48)
RELATIVE LYMPHOCYTE (OHS): 27.6 % (ref 18–48)
RELATIVE LYMPHOCYTE (OHS): 28.8 % (ref 18–48)
RELATIVE MONOCYTE (OHS): 13.3 % (ref 4–15)
RELATIVE MONOCYTE (OHS): 17.5 % (ref 4–15)
RELATIVE MONOCYTE (OHS): 17.8 % (ref 4–15)
RELATIVE NEUTROPHIL (OHS): 49.1 % (ref 38–73)
RELATIVE NEUTROPHIL (OHS): 50 % (ref 38–73)
RELATIVE NEUTROPHIL (OHS): 57.9 % (ref 38–73)
RH BLD: NORMAL
SODIUM SERPL-SCNC: 141 MMOL/L (ref 136–145)
SPECIMEN OUTDATE: NORMAL
WBC # BLD AUTO: 4 K/UL (ref 3.9–12.7)
WBC # BLD AUTO: 4.09 K/UL (ref 3.9–12.7)
WBC # BLD AUTO: 4.51 K/UL (ref 3.9–12.7)

## 2025-08-02 PROCEDURE — 63600175 PHARM REV CODE 636 W HCPCS: Mod: HCNC | Performed by: HOSPITALIST

## 2025-08-02 PROCEDURE — G0378 HOSPITAL OBSERVATION PER HR: HCPCS | Mod: HCNC

## 2025-08-02 PROCEDURE — 82272 OCCULT BLD FECES 1-3 TESTS: CPT | Mod: HCNC

## 2025-08-02 PROCEDURE — 84466 ASSAY OF TRANSFERRIN: CPT | Mod: HCNC | Performed by: HOSPITALIST

## 2025-08-02 PROCEDURE — 96361 HYDRATE IV INFUSION ADD-ON: CPT | Mod: HCNC

## 2025-08-02 PROCEDURE — 85610 PROTHROMBIN TIME: CPT | Mod: HCNC | Performed by: EMERGENCY MEDICINE

## 2025-08-02 PROCEDURE — 85025 COMPLETE CBC W/AUTO DIFF WBC: CPT | Mod: 91,HCNC | Performed by: PHYSICIAN ASSISTANT

## 2025-08-02 PROCEDURE — 94799 UNLISTED PULMONARY SVC/PX: CPT | Mod: HCNC

## 2025-08-02 PROCEDURE — 99900035 HC TECH TIME PER 15 MIN (STAT): Mod: HCNC

## 2025-08-02 PROCEDURE — 82040 ASSAY OF SERUM ALBUMIN: CPT | Mod: HCNC | Performed by: EMERGENCY MEDICINE

## 2025-08-02 PROCEDURE — 86850 RBC ANTIBODY SCREEN: CPT | Mod: HCNC | Performed by: EMERGENCY MEDICINE

## 2025-08-02 PROCEDURE — 82746 ASSAY OF FOLIC ACID SERUM: CPT | Mod: HCNC | Performed by: HOSPITALIST

## 2025-08-02 PROCEDURE — 85025 COMPLETE CBC W/AUTO DIFF WBC: CPT | Mod: 91,HCNC | Performed by: HOSPITALIST

## 2025-08-02 PROCEDURE — 82728 ASSAY OF FERRITIN: CPT | Mod: HCNC | Performed by: HOSPITALIST

## 2025-08-02 PROCEDURE — 99285 EMERGENCY DEPT VISIT HI MDM: CPT | Mod: 25,HCNC

## 2025-08-02 PROCEDURE — 36415 COLL VENOUS BLD VENIPUNCTURE: CPT | Mod: HCNC | Performed by: HOSPITALIST

## 2025-08-02 PROCEDURE — 85730 THROMBOPLASTIN TIME PARTIAL: CPT | Mod: HCNC | Performed by: EMERGENCY MEDICINE

## 2025-08-02 PROCEDURE — 25000003 PHARM REV CODE 250: Mod: HCNC | Performed by: NURSE PRACTITIONER

## 2025-08-02 PROCEDURE — 25000003 PHARM REV CODE 250: Mod: HCNC | Performed by: HOSPITALIST

## 2025-08-02 PROCEDURE — 82607 VITAMIN B-12: CPT | Mod: HCNC | Performed by: HOSPITALIST

## 2025-08-02 PROCEDURE — 85025 COMPLETE CBC W/AUTO DIFF WBC: CPT | Mod: HCNC | Performed by: EMERGENCY MEDICINE

## 2025-08-02 PROCEDURE — 96374 THER/PROPH/DIAG INJ IV PUSH: CPT

## 2025-08-02 PROCEDURE — 93005 ELECTROCARDIOGRAM TRACING: CPT | Mod: HCNC

## 2025-08-02 PROCEDURE — 94760 N-INVAS EAR/PLS OXIMETRY 1: CPT | Mod: HCNC

## 2025-08-02 PROCEDURE — 93010 ELECTROCARDIOGRAM REPORT: CPT | Mod: HCNC,,, | Performed by: INTERNAL MEDICINE

## 2025-08-02 RX ORDER — ONDANSETRON HYDROCHLORIDE 2 MG/ML
4 INJECTION, SOLUTION INTRAVENOUS EVERY 4 HOURS PRN
Status: DISCONTINUED | OUTPATIENT
Start: 2025-08-02 | End: 2025-08-04 | Stop reason: HOSPADM

## 2025-08-02 RX ORDER — HYDROCORTISONE 25 MG/G
CREAM TOPICAL 2 TIMES DAILY
Status: DISCONTINUED | OUTPATIENT
Start: 2025-08-02 | End: 2025-08-04 | Stop reason: HOSPADM

## 2025-08-02 RX ORDER — PANTOPRAZOLE SODIUM 40 MG/10ML
40 INJECTION, POWDER, LYOPHILIZED, FOR SOLUTION INTRAVENOUS 2 TIMES DAILY
Status: DISCONTINUED | OUTPATIENT
Start: 2025-08-02 | End: 2025-08-04 | Stop reason: HOSPADM

## 2025-08-02 RX ORDER — FLUTICASONE FUROATE AND VILANTEROL 100; 25 UG/1; UG/1
1 POWDER RESPIRATORY (INHALATION) DAILY
Status: DISCONTINUED | OUTPATIENT
Start: 2025-08-02 | End: 2025-08-04 | Stop reason: HOSPADM

## 2025-08-02 RX ORDER — TALC
6 POWDER (GRAM) TOPICAL NIGHTLY PRN
Status: DISCONTINUED | OUTPATIENT
Start: 2025-08-02 | End: 2025-08-04 | Stop reason: HOSPADM

## 2025-08-02 RX ORDER — GLUCAGON 1 MG
1 KIT INJECTION
Status: DISCONTINUED | OUTPATIENT
Start: 2025-08-02 | End: 2025-08-04 | Stop reason: HOSPADM

## 2025-08-02 RX ORDER — DORZOLAMIDE HCL 20 MG/ML
1 SOLUTION/ DROPS OPHTHALMIC 3 TIMES DAILY
Status: DISCONTINUED | OUTPATIENT
Start: 2025-08-02 | End: 2025-08-04 | Stop reason: HOSPADM

## 2025-08-02 RX ORDER — TRAZODONE HYDROCHLORIDE 50 MG/1
50 TABLET ORAL NIGHTLY
Status: DISCONTINUED | OUTPATIENT
Start: 2025-08-02 | End: 2025-08-04 | Stop reason: HOSPADM

## 2025-08-02 RX ORDER — ATORVASTATIN CALCIUM 20 MG/1
40 TABLET, FILM COATED ORAL NIGHTLY
Status: DISCONTINUED | OUTPATIENT
Start: 2025-08-02 | End: 2025-08-04 | Stop reason: HOSPADM

## 2025-08-02 RX ORDER — ACETAMINOPHEN 325 MG/1
650 TABLET ORAL EVERY 6 HOURS PRN
Status: DISCONTINUED | OUTPATIENT
Start: 2025-08-02 | End: 2025-08-04 | Stop reason: HOSPADM

## 2025-08-02 RX ORDER — IBUPROFEN 200 MG
16 TABLET ORAL
Status: DISCONTINUED | OUTPATIENT
Start: 2025-08-02 | End: 2025-08-04 | Stop reason: HOSPADM

## 2025-08-02 RX ORDER — AMLODIPINE BESYLATE 5 MG/1
5 TABLET ORAL DAILY
Status: DISCONTINUED | OUTPATIENT
Start: 2025-08-03 | End: 2025-08-04 | Stop reason: HOSPADM

## 2025-08-02 RX ORDER — IBUPROFEN 200 MG
24 TABLET ORAL
Status: DISCONTINUED | OUTPATIENT
Start: 2025-08-02 | End: 2025-08-04 | Stop reason: HOSPADM

## 2025-08-02 RX ORDER — METOPROLOL TARTRATE 25 MG/1
25 TABLET, FILM COATED ORAL 2 TIMES DAILY
Status: DISCONTINUED | OUTPATIENT
Start: 2025-08-02 | End: 2025-08-04 | Stop reason: HOSPADM

## 2025-08-02 RX ORDER — INSULIN ASPART 100 [IU]/ML
0-5 INJECTION, SOLUTION INTRAVENOUS; SUBCUTANEOUS
Status: DISCONTINUED | OUTPATIENT
Start: 2025-08-02 | End: 2025-08-04 | Stop reason: HOSPADM

## 2025-08-02 RX ORDER — LEVOTHYROXINE SODIUM 88 UG/1
88 TABLET ORAL
Status: DISCONTINUED | OUTPATIENT
Start: 2025-08-03 | End: 2025-08-04 | Stop reason: HOSPADM

## 2025-08-02 RX ORDER — ISOSORBIDE MONONITRATE 30 MG/1
60 TABLET, EXTENDED RELEASE ORAL DAILY
Status: DISCONTINUED | OUTPATIENT
Start: 2025-08-03 | End: 2025-08-04 | Stop reason: HOSPADM

## 2025-08-02 RX ORDER — MECLIZINE HCL 12.5 MG 12.5 MG/1
12.5 TABLET ORAL 3 TIMES DAILY PRN
Status: DISCONTINUED | OUTPATIENT
Start: 2025-08-02 | End: 2025-08-04 | Stop reason: HOSPADM

## 2025-08-02 RX ORDER — FUROSEMIDE 40 MG/1
40 TABLET ORAL 2 TIMES DAILY
Status: DISCONTINUED | OUTPATIENT
Start: 2025-08-02 | End: 2025-08-04 | Stop reason: HOSPADM

## 2025-08-02 RX ORDER — LOPERAMIDE HYDROCHLORIDE 2 MG/1
2 CAPSULE ORAL 4 TIMES DAILY PRN
Status: DISCONTINUED | OUTPATIENT
Start: 2025-08-02 | End: 2025-08-04 | Stop reason: HOSPADM

## 2025-08-02 RX ADMIN — DORZOLAMIDE HYDROCHLORIDE 1 DROP: 20 SOLUTION/ DROPS OPHTHALMIC at 05:08

## 2025-08-02 RX ADMIN — SODIUM CHLORIDE 500 ML: 9 INJECTION, SOLUTION INTRAVENOUS at 01:08

## 2025-08-02 RX ADMIN — ATORVASTATIN CALCIUM 40 MG: 20 TABLET, FILM COATED ORAL at 08:08

## 2025-08-02 RX ADMIN — TRAZODONE HYDROCHLORIDE 50 MG: 50 TABLET ORAL at 08:08

## 2025-08-02 RX ADMIN — METOPROLOL TARTRATE 25 MG: 25 TABLET, FILM COATED ORAL at 08:08

## 2025-08-02 RX ADMIN — HYDROCORTISONE: 25 CREAM TOPICAL at 08:08

## 2025-08-02 RX ADMIN — FUROSEMIDE 40 MG: 40 TABLET ORAL at 05:08

## 2025-08-02 RX ADMIN — PANTOPRAZOLE SODIUM 40 MG: 40 INJECTION, POWDER, FOR SOLUTION INTRAVENOUS at 08:08

## 2025-08-02 RX ADMIN — DORZOLAMIDE HYDROCHLORIDE 1 DROP: 20 SOLUTION/ DROPS OPHTHALMIC at 08:08

## 2025-08-02 NOTE — ASSESSMENT & PLAN NOTE
-Placed in observation  -Presented with rectal bleeding after passing a large hard bowel movement last night.  Noted increased BRBPR and clots passing while in ED  -Noted she had positive FIT test for occult blood earlier this year and saw GI.  They opted at that time not to do colonoscopy.  -Hb today is 10.7 which is very near her more recent baseline of 10.8-11.4  -Suspect this is secondary to mild trauma from passing hard stool vs hemorrhoidal bleeding.  -Check iron, ferritin, b12 and folate  -Treat with clear liquid diet and bid ppi for now  -Trend H/H and transfuse if Hb <7  -Will consult GI for evaluation, but doubt endoscopy will be required

## 2025-08-02 NOTE — ASSESSMENT & PLAN NOTE
-A1c 6.4 5/8/25  -At home she is mostly diet controlled, but if blood sugars > 180 she will use SSI.  Requires insulin maybe once a week  -Continue SSI ac/hs for now

## 2025-08-02 NOTE — ED NOTES
Informed by family member of pt that pt has another BM in ED lobby restroom, states she passed several clots. ED charge aware. Family member informed that pt is currently waiting for room assignment where further assessment can take place. Family member verbalized understanding.

## 2025-08-02 NOTE — PLAN OF CARE
VIRTUAL NURSE: Pt arrived to unit. Permission received to turn camera to view patient. VIP model explained; Patient informed this VN will be working with bedside nurse and the rest of the care team.      Admission questions completed.  Plan of care reviewed with patient.  Educated patient on VTE and fall risk. Safety precautions in place. Call light within reach, side rails up x2.     Patient instucted to ask staff for assistance. Patient verbalized complete understanding.  Will continue to be available and intervene as needed.    Labs, notes, orders, and careplan reviewed.     08/02/25 1845   Admission Complete   Admission Complete by VN Complete     Problem: Adult Inpatient Plan of Care  Goal: Plan of Care Review  Outcome: Progressing  Flowsheets (Taken 8/2/2025 1846)  Plan of Care Reviewed With:   patient   family  Goal: Patient-Specific Goal (Individualized)  Outcome: Progressing

## 2025-08-02 NOTE — ASSESSMENT & PLAN NOTE
-Baseline Cr 1.4 - 1.8  -On admit Cr 1.7  -Avoid nephrotoxic agents and renally dose meds  -Continue home lasix  -Repeat labs in AM

## 2025-08-02 NOTE — ED PROVIDER NOTES
Encounter Date: 8/2/2025       History     Chief Complaint   Patient presents with    Rectal Bleeding     Pt. Presents to the ED with rectal bleeding that started last night. Pt. States she had a BM an after she started bleeding (bright red)blood. Now every time she stands or thinks she is going to  have a BM it starts pouring out. Pt. Also reports abdominal cramping. Pt. Is alert/oriented an all vitals are within normal limits.     Patient is 89 year old female who presents to the emergency department stating that last night she took medication because she had not had a bowel movement in some time she passed a large hard bowel movement.  Later she felt like she had to have another bowel movement and when she passed it it was all blood.  She states since that time she has been passing clots and blood in his dripping freely from her rectum.  She denies abdominal pain but endorses rectal pain.  She has taken no medication since that time.  Patient's last colonoscopy was 2009.  No current history of cancer.    The history is provided by the patient. No  was used.     Review of patient's allergies indicates:   Allergen Reactions    Codeine Itching and Nausea Only            Past Medical History:   Diagnosis Date    Allergy     Anemia     Arthritis     Asthma     Chronic kidney disease     Degenerative disc disease     Diabetes mellitus type II     Diastolic heart failure 05/02/2017    Essential hypertension 7/3/2012    Glaucoma     History of insulin dependent diabetes mellitus, for many years stopped because of chronic kidney November 2020.  9/9/2021    History of pseudogout 8/23/2012    Hyperlipidemia     Hypertension     Iritis     Myocardial infarction     Pneumonia     Thrombocytopenia 8/24/2021    Thyroid disease      Past Surgical History:   Procedure Laterality Date    CARDIAC CATHETERIZATION  2010    no obstructive disease    CATARACT EXTRACTION W/  INTRAOCULAR LENS IMPLANT Left n/a     CATARACT EXTRACTION W/  INTRAOCULAR LENS IMPLANT Right 10/8/2018    With Femtosecond LASER assist (Dr. Henson)    CHOLECYSTECTOMY      ESOPHAGOGASTRODUODENOSCOPY N/A 11/4/2020    Procedure: EGD (ESOPHAGOGASTRODUODENOSCOPY);  Surgeon: Tomás Mccall MD;  Location: [address]);  Service: Endoscopy;  Laterality: N/A;    EYE SURGERY      gall stone      HYSTERECTOMY      VARICOSE VEIN SURGERY       Family History   Problem Relation Name Age of Onset    Diabetes Mother      Hypertension Mother      Glaucoma Mother      Kidney disease Mother      Hypertension Father      Hypotension Sister Alberta     Heart attack Sister Alberta     Heart disease Sister Alberta     No Known Problems Sister Bekah     Diabetes Sister Violeta     Kidney disease Sister Violeta     Leukemia Sister Jane     Hypertension Sister Pallavi     Diabetes Sister Lovinia     Kidney disease Sister Lovinia     No Known Problems Sister Lacey     Heart disease Brother Mariusz     Heart disease Brother John     No Known Problems Brother Luis     No Known Problems Maternal Grandmother      No Known Problems Maternal Grandfather      No Known Problems Paternal Grandmother      Diabetes Paternal Grandfather      Hypertension Daughter Yolanda     Atrial fibrillation Daughter Yolanda     Diabetes Daughter Melanie         borderline    Hypertension Daughter Melanie     No Known Problems Son Trung     Diabetes Son Raudel     Heart disease Son Raudel     Hypertension Son Raudel     Melanoma Neg Hx       Social History[1]  Review of Systems   Gastrointestinal:  Positive for blood in stool and rectal pain.       Physical Exam     Initial Vitals [08/02/25 1135]   BP Pulse Resp Temp SpO2   (!) 151/70 62 18 98 °F (36.7 °C) 98 %      MAP       --         Physical Exam    Nursing note and vitals reviewed.  Constitutional: She appears well-developed and well-nourished.   HENT:   Head: Normocephalic and atraumatic.   Right Ear: External ear normal.   Left  Ear: External ear normal.   Nose: Nose normal.   Eyes: Conjunctivae and EOM are normal. Pupils are equal, round, and reactive to light. Right eye exhibits no discharge. Left eye exhibits no discharge.   Neck:   Normal range of motion.  Abdominal: Abdomen is soft. Bowel sounds are normal. She exhibits no distension. There is no abdominal tenderness.   Musculoskeletal:         General: Normal range of motion.      Cervical back: Normal range of motion.     Neurological: She is alert and oriented to person, place, and time.   Skin: Skin is dry. Capillary refill takes less than 2 seconds.         ED Course   Procedures  Labs Reviewed   COMPREHENSIVE METABOLIC PANEL - Abnormal       Result Value    Sodium 141      Potassium 4.0      Chloride 104      CO2 30 (*)     Glucose 124 (*)     BUN 29      Creatinine 1.7 (*)     Calcium 9.8      Protein Total 7.0      Albumin 3.9      Bilirubin Total 0.8            AST 38      ALT 24      Anion Gap 7 (*)     eGFR 27 (*)    CBC WITH DIFFERENTIAL - Abnormal    WBC 4.51      RBC 3.46 (*)     HGB 10.7 (*)     HCT 32.7 (*)     MCV 95      MCH 30.9      MCHC 32.7      RDW 15.0 (*)     Platelet Count 116 (*)     MPV 11.8      Nucleated RBC 0      Neut % 57.9      Lymph % 24.8      Mono % 13.3      Eos % 3.1      Basophil % 0.9      Imm Grans % 0.0      Neut # 2.61      Lymph # 1.12      Mono # 0.60      Eos # 0.14      Baso # 0.04      Imm Grans # 0.00     POCT OCCULT BLOOD STOOL - Abnormal    Fecal Occult Blood Positive (*)      Acceptable Yes     PROTIME-INR - Normal    PT 11.3      INR 1.0     APTT - Normal    PTT 26.8     CBC W/ AUTO DIFFERENTIAL    Narrative:     The following orders were created for panel order CBC auto differential.  Procedure                               Abnormality         Status                     ---------                               -----------         ------                     CBC with Differential[[id number]]       Abnormal             Final result                 Please view results for these tests on the individual orders.   TYPE & SCREEN    Specimen Outdate 08/05/2025 23:59      Group & Rh O POS      Indirect Shy NEG     ISTAT CHEM8     EKG Readings: (Independently Interpreted)   Independent EKG interpretation of the study dated August 2nd 2025 at 14:26 atrial fibrillation ventricular rate of 63 QTC interval 446 milliseconds.  No ST-elevation or depression.  No ectopy noted.       Imaging Results    None          Medications   hydrocortisone 2.5 % rectal cream (has no administration in time range)   sodium chloride 0.9% bolus 500 mL 500 mL (0 mLs Intravenous Stopped 8/2/25 8647)     Medical Decision Making  Patient is 89 year old female who presents to the emergency department stating that last night she took medication because she had not had a bowel movement in some time she passed a large hard bowel movement.  Later she felt like she had to have another bowel movement and when she passed it it was all blood.  She states since that time she has been passing clots and blood in his dripping freely from her rectum.  She denies abdominal pain but endorses rectal pain.  She has taken no medication since that time.  Patient's last colonoscopy was 2009.  No current history of cancer.    On physical exam the patient is afebrile nontoxic in no apparent distress.  The abdomen is soft and nontender.      Differential diagnosis includes internal hemorrhoidal bleeding, thrombosed external hemorrhoids, anal fissure, neoplasm, blood loss anemia    Problems Addressed:  Rectal bleeding: acute illness or injury     Details: Likely hemorrhoidal.  No hemorrhage or acute blood loss anemia at this time.  Patient is on board with hospital observation placement.  Report given to , she accepts for Dr. Bob    Amount and/or Complexity of Data Reviewed  Labs: ordered. Decision-making details documented in ED Course.  Radiology:  ordered.  ECG/medicine tests: ordered. Decision-making details documented in ED Course.    Risk  Prescription drug management.  Decision regarding hospitalization.  Diagnosis or treatment significantly limited by social determinants of health.               ED Course as of 08/02/25 1433   Sat Aug 02, 2025   1229 BP(!): 146/76 [VC]   1229 Temp: 98 °F (36.7 °C) [VC]   1229 Temp Source: Oral [VC]   1229 Pulse: 66 [VC]   1229 Resp: 18 [VC]   1230 SpO2: 100 % [VC]   1249 CBC auto differential(!)  Mild anemia,. Reduced platelet count, normal cbc otherwise. [VC]   1251 Comprehensive metabolic panel(!)  e [VC]   1251 Comprehensive metabolic panel(!)  Labs at current baseline. [VC]   1301 PT: 11.3 [VC]   1301 INR: 1.0 [VC]   1301 PTT: 26.8 [VC]      ED Course User Index  [VC] Avni Elder DNP                               Clinical Impression:  Final diagnoses:  [K62.5] Rectal bleeding          ED Disposition Condition    Observation              Launch MDCalc MDM  MDCalc MDM Module  Aug 02 2025 2:33 PM [Avni Elder]  Data:  - Discussed with external professional: Case discussed with provider from IP Consult to GI and Ino Bob MD or a provider/trainee on their team. See MDM section and/or ED Course for additional details on the discussion.  - Test/documents/historian: 3+ tests ordered  Problems: Concern for MATHIEU (acute kidney injury)  Additional encounter diagnoses: Rectal bleeding  Risk: Admitted (Decision regarding hospitalization)             [1]   Social History  Tobacco Use    Smoking status: Never     Passive exposure: Never    Smokeless tobacco: Never   Substance Use Topics    Alcohol use: No    Drug use: No        Avni Elder DNP  08/02/25 1433

## 2025-08-02 NOTE — FIRST PROVIDER EVALUATION
"Medical screening examination initiated.  I have conducted a focused provider triage encounter, findings are as follows:    Brief history of present illness:  rectal bleeding    Vitals:    08/02/25 1135   BP: (!) 151/70   BP Location: Left arm   Pulse: 62   Resp: 18   Temp: 98 °F (36.7 °C)   TempSrc: Oral   SpO2: 98%   Weight: 64 kg (141 lb)   Height: 5' 8" (1.727 m)       Pertinent physical exam:  in nad in waiting room, in wc    Brief workup plan:  likely admission.    Preliminary workup initiated; this workup will be continued and followed by the physician or advanced practice provider that is assigned to the patient when roomed.  "

## 2025-08-02 NOTE — ASSESSMENT & PLAN NOTE
-Advance Care Planning Date: 08/02/2025 I engaged patient and her daughter in a discussion of her goals surrounding the very end of life.  We explored the pros and cons of ACLS treatment in the face of cardiac arrest.  Patient and daughter were in agreement that they wish to continue aggressive care up to that point, but if it were to occur the patient wishes for a peaceful death without ACLS treatments.  DNR order placed.  I spent 10 minutes ACP time today.

## 2025-08-02 NOTE — ASSESSMENT & PLAN NOTE
-Noted intermittent thrombocytopenia since at least 2020 - etiology unclear to me  -On admit platelets 116  -Repeat cbc in AM

## 2025-08-02 NOTE — ASSESSMENT & PLAN NOTE
-On admit bp is mildly elevated and she has slight peripheral edema (baseline per patient and daughter) with no new shortness of breath  -Echo 3/17/25 showed Ef 65%, severe LA and RA dilation, severe MAC with mild MV stenosis  -Overall she is well compensated  -Strict ins/outs and daily weights  -Continue home lasix, metoprolol, norvasc and imdur  -PRN hydralazine available if SBP > 180

## 2025-08-02 NOTE — ASSESSMENT & PLAN NOTE
-Noted history  -Presently well rate controlled  -Continue home metoprolol tartrate  -Hold home aspirin  -Monitor on telemetry

## 2025-08-02 NOTE — SUBJECTIVE & OBJECTIVE
Past Medical History:   Diagnosis Date    Allergy     Anemia     Arthritis     Asthma     Chronic diastolic CHF (congestive heart failure)     Chronic kidney disease     Degenerative disc disease     Diabetes mellitus type II     Diastolic heart failure 05/02/2017    Essential hypertension 07/03/2012    Glaucoma     History of insulin dependent diabetes mellitus, for many years stopped because of chronic kidney November 2020.  09/09/2021    History of pseudogout 08/23/2012    Hyperlipidemia     Hypertension     Iritis     Myocardial infarction     Pneumonia     Thrombocytopenia 08/24/2021    Thyroid disease        Past Surgical History:   Procedure Laterality Date    CARDIAC CATHETERIZATION  2010    no obstructive disease    CATARACT EXTRACTION W/  INTRAOCULAR LENS IMPLANT Left n/a    CATARACT EXTRACTION W/  INTRAOCULAR LENS IMPLANT Right 10/8/2018    With Femtosecond LASER assist (Dr. Henson)    CHOLECYSTECTOMY      ESOPHAGOGASTRODUODENOSCOPY N/A 11/4/2020    Procedure: EGD (ESOPHAGOGASTRODUODENOSCOPY);  Surgeon: Tomás Mccall MD;  Location: [address]);  Service: Endoscopy;  Laterality: N/A;    EYE SURGERY      gall stone      HYSTERECTOMY      VARICOSE VEIN SURGERY         Review of patient's allergies indicates:   Allergen Reactions    Codeine Itching and Nausea Only              No current facility-administered medications on file prior to encounter.     Current Outpatient Medications on File Prior to Encounter   Medication Sig    acetaminophen (TYLENOL) 500 MG tablet Take 2 tablets (1,000 mg total) by mouth 3 (three) times daily as needed for Pain (muscle pain in arms and legs).    albuterol (PROVENTIL/VENTOLIN HFA) 90 mcg/actuation inhaler Inhale 2 puffs into the lungs every 4 (four) hours as needed for Wheezing.    amLODIPine (NORVASC) 5 MG tablet TAKE 1 TABLET(5 MG) BY MOUTH EVERY MORNING    aspirin 81 MG Chew Take 1 tablet (81 mg total) by mouth every morning.    atorvastatin (LIPITOR) 40 MG tablet  TAKE 1 TABLET(40 MG) BY MOUTH EVERY EVENING    blood sugar diagnostic (ONETOUCH ULTRA TEST) Strp 1 strip by In Vitro route once daily. No longer on insulin because of CKD    blood sugar diagnostic (TRUE METRIX GLUCOSE TEST STRIP) Strp 1 strip by Other route 3 (three) times daily. test    blood-glucose meter kit To check BG 1 times daily, to use with insurance preferred meter    clotrimazole (LOTRIMIN) 1 % cream Apply topically once daily. for 14 days    clotrimazole-betamethasone 1-0.05% (LOTRISONE) cream Apply topically 2 (two) times daily as needed. For intertriginous itching in the bend of the leg    diclofenac sodium (VOLTAREN) 1 % Gel APPLY 2 GRAMS TOPICALLY TO THE AFFECTED AREA TWICE DAILY    dorzolamide (TRUSOPT) 2 % ophthalmic solution Place 1 drop into both eyes 3 (three) times daily.    erythromycin (ROMYCIN) ophthalmic ointment Place 1 inch into both eyes every evening. Apply to eyelids at bedtimes    fluorometholone 0.1% (FML) 0.1 % DrpS Place 1 drop into both eyes once daily.    fluticasone-salmeterol diskus inhaler 250-50 mcg Inhale 1 puff into the lungs once daily. Controller    furosemide (LASIX) 40 MG tablet TAKE 1 TABLET BY MOUTH EVERY MORNING AND 1 TABLET AT 4PM    insulin aspart U-100 (NOVOLOG FLEXPEN U-100 INSULIN) 100 unit/mL (3 mL) InPn pen Inject if blood sugar is >180, 180-230+2, 231-280+4, 281-330+6, 331-380+8,>380+10 MAX daily 30 units.    isosorbide mononitrate (IMDUR) 60 MG 24 hr tablet TAKE 1 TABLET BY MOUTH EVERY MORNING FOR HEART, TO PREVENT CHEST PAINS OR SHORTNESS OF BREATH    ketoconazole (NIZORAL) 2 % shampoo Apply topically every 7 days. Soak scalp for 15-30 minutes    lancets (TRUEPLUS LANCETS) 33 gauge Misc Apply 1 lancet topically once daily. No longer on insulin because of CKD    lancets Misc To check BG 3 time daily, to use with insurance preferred meter    latanoprost 0.005 % ophthalmic solution Place 1 drop into both eyes every evening.    levothyroxine (SYNTHROID) 88 MCG  "tablet TAKE 1 TABLET(88 MCG) BY MOUTH DAILY BEFORE BREAKFAST    meclizine (ANTIVERT) 12.5 mg tablet TAKE 1 TABLET BY MOUTH THREE TIMES DAILY AS NEEDED FOR DIZZINESS    melatonin (MELATIN) 3 mg tablet Take 1 tablet (3 mg total) by mouth nightly as needed for Insomnia.    metoprolol tartrate (LOPRESSOR) 25 MG tablet TAKE 1 TABLET(25 MG) BY MOUTH TWICE DAILY    multivit-mineral-iron-lutein Tab Take 1 tablet by mouth once daily.    nitroGLYCERIN (NITROSTAT) 0.4 MG SL tablet ONE TABLET UNDER THE TONGUE EVERY 5 MINUTES AS NEEDED FOR CHEST PAIN.  IF NO RELIEF AFTER 3 TABLETS , GO TO THE EMERGENCY ROOM.    pen needle, diabetic (BD ULTRA-FINE SHORT PEN NEEDLE) 31 gauge x 5/16" Ndle USE WITH INSULIN PENS UP TO THREE TIMES DAILY    polyethylene glycol (GLYCOLAX) 17 gram/dose powder Take 17 g by mouth daily as needed (CONSTIPATION).    psyllium (METAMUCIL) powder Take 1 packet by mouth daily as needed (CONSTIPATION).    traZODone (DESYREL) 50 MG tablet Take 1 tablet (50 mg total) by mouth every evening.     Family History       Problem Relation (Age of Onset)    Atrial fibrillation Daughter    Diabetes Mother, Sister, Sister, Paternal Grandfather, Daughter, Son    Glaucoma Mother    Heart attack Sister    Heart disease Sister, Brother, Brother, Son    Hypertension Mother, Father, Sister, Daughter, Daughter, Son    Hypotension Sister    Kidney disease Mother, Sister, Sister    Leukemia Sister    No Known Problems Sister, Sister, Brother, Maternal Grandmother, Maternal Grandfather, Paternal Grandmother, Son          Tobacco Use    Smoking status: Never     Passive exposure: Never    Smokeless tobacco: Never   Substance and Sexual Activity    Alcohol use: No    Drug use: No    Sexual activity: Never     Review of Systems   All other systems reviewed and are negative.    Objective:     Vital Signs (Most Recent):  Temp: 98 °F (36.7 °C) (08/02/25 1135)  Pulse: 68 (08/02/25 1500)  Resp: 18 (08/02/25 1217)  BP: (!) 146/76 (08/02/25 " 1217)  SpO2: 100 % (08/02/25 1217) Vital Signs (24h Range):  Temp:  [98 °F (36.7 °C)] 98 °F (36.7 °C)  Pulse:  [62-68] 68  Resp:  [18] 18  SpO2:  [98 %-100 %] 100 %  BP: (146-151)/(70-76) 146/76     Weight: 64 kg (141 lb)  Body mass index is 21.44 kg/m².     Physical Exam  Vitals and nursing note reviewed.   Constitutional:       General: She is not in acute distress.     Appearance: She is well-developed. She is not ill-appearing, toxic-appearing or diaphoretic.   HENT:      Head: Normocephalic and atraumatic.      Right Ear: External ear normal.      Left Ear: External ear normal.      Nose: Nose normal.      Mouth/Throat:      Mouth: Mucous membranes are moist.   Eyes:      Extraocular Movements: Extraocular movements intact.      Conjunctiva/sclera: Conjunctivae normal.   Cardiovascular:      Rate and Rhythm: Normal rate. Rhythm irregular.      Heart sounds: Murmur heard.   Pulmonary:      Effort: Pulmonary effort is normal. No respiratory distress.      Breath sounds: Normal breath sounds.   Abdominal:      General: Bowel sounds are normal. There is no distension.      Palpations: Abdomen is soft.      Tenderness: There is no abdominal tenderness.   Genitourinary:     Comments: Heme positive stool  Musculoskeletal:         General: Normal range of motion.      Cervical back: Normal range of motion. No rigidity.      Right lower leg: Edema present.      Left lower leg: Edema present.   Skin:     General: Skin is warm and dry.   Neurological:      Mental Status: She is alert and oriented to person, place, and time. Mental status is at baseline.      Cranial Nerves: No cranial nerve deficit.      Coordination: Coordination normal.   Psychiatric:         Behavior: Behavior normal.                Significant Labs: All pertinent labs within the past 24 hours have been reviewed.    Significant Imaging: I have reviewed all pertinent imaging results/findings within the past 24 hours.

## 2025-08-02 NOTE — H&P
[facility]  History & Physical    Patient Name: Tiana Mead  MRN: [id number]  Patient Class: OP- Observation  Admission Date: 8/2/2025  Attending Physician: Ino Bob MD  Primary Care Provider: Tomás Andres MD         Patient information was obtained from patient, relative(s), and ER records.     Subjective:     Principal Problem:Rectal bleeding    Chief Complaint:   Chief Complaint   Patient presents with    Rectal Bleeding     Pt. Presents to the ED with rectal bleeding that started last night. Pt. States she had a BM an after she started bleeding (bright red)blood. Now every time she stands or thinks she is going to  have a BM it starts pouring out. Pt. Also reports abdominal cramping. Pt. Is alert/oriented an all vitals are within normal limits.        HPI: Mrs. Mead is a 89-year-old woman with iron deficiency anemia, prior FIT test positive, CKD IV, diabetes mellitus type-2, chronic diastolic chf, permanent atrial fibrillation, hypertension and hyperlipidemia who presented to the hospital for evaluation of rectal bleeding.  History is obtained from patient and her daughter.  She reports that she was in her usual state of health but struggled to pass a stool last night.  She took miralax and subsequently passed a large hard stool.  There was some blood in this.  Overnight she had some more bright red blood per rectum which prompted her presentation today.  While in the ED, she was noted to have a large bloody bowel movement and pass several large clots.  She notes some bhavana-rectal discomfort, but otherwise is feeling well.  She denies light headedness, fevers, chills, diarrhea, chest pain, shortness of breath, abdominal pain, nausea and vomiting.  She does take a baby aspirin daily, but no other nsaids or blood thinning medications.      Past Medical History:   Diagnosis Date    Allergy     Anemia     Arthritis     Asthma     Chronic diastolic CHF (congestive heart  failure)     Chronic kidney disease     Degenerative disc disease     Diabetes mellitus type II     Diastolic heart failure 05/02/2017    Essential hypertension 07/03/2012    Glaucoma     History of insulin dependent diabetes mellitus, for many years stopped because of chronic kidney November 2020.  09/09/2021    History of pseudogout 08/23/2012    Hyperlipidemia     Hypertension     Iritis     Myocardial infarction     Pneumonia     Thrombocytopenia 08/24/2021    Thyroid disease        Past Surgical History:   Procedure Laterality Date    CARDIAC CATHETERIZATION  2010    no obstructive disease    CATARACT EXTRACTION W/  INTRAOCULAR LENS IMPLANT Left n/a    CATARACT EXTRACTION W/  INTRAOCULAR LENS IMPLANT Right 10/8/2018    With Femtosecond LASER assist (Dr. Henson)    CHOLECYSTECTOMY      ESOPHAGOGASTRODUODENOSCOPY N/A 11/4/2020    Procedure: EGD (ESOPHAGOGASTRODUODENOSCOPY);  Surgeon: Tomás Mccall MD;  Location: [address]);  Service: Endoscopy;  Laterality: N/A;    EYE SURGERY      gall stone      HYSTERECTOMY      VARICOSE VEIN SURGERY         Review of patient's allergies indicates:   Allergen Reactions    Codeine Itching and Nausea Only              No current facility-administered medications on file prior to encounter.     Current Outpatient Medications on File Prior to Encounter   Medication Sig    acetaminophen (TYLENOL) 500 MG tablet Take 2 tablets (1,000 mg total) by mouth 3 (three) times daily as needed for Pain (muscle pain in arms and legs).    albuterol (PROVENTIL/VENTOLIN HFA) 90 mcg/actuation inhaler Inhale 2 puffs into the lungs every 4 (four) hours as needed for Wheezing.    amLODIPine (NORVASC) 5 MG tablet TAKE 1 TABLET(5 MG) BY MOUTH EVERY MORNING    aspirin 81 MG Chew Take 1 tablet (81 mg total) by mouth every morning.    atorvastatin (LIPITOR) 40 MG tablet TAKE 1 TABLET(40 MG) BY MOUTH EVERY EVENING    blood sugar diagnostic (ONETOUCH ULTRA TEST) Strp 1 strip by In Vitro route once  daily. No longer on insulin because of CKD    blood sugar diagnostic (TRUE METRIX GLUCOSE TEST STRIP) Strp 1 strip by Other route 3 (three) times daily. test    blood-glucose meter kit To check BG 1 times daily, to use with insurance preferred meter    clotrimazole (LOTRIMIN) 1 % cream Apply topically once daily. for 14 days    clotrimazole-betamethasone 1-0.05% (LOTRISONE) cream Apply topically 2 (two) times daily as needed. For intertriginous itching in the bend of the leg    diclofenac sodium (VOLTAREN) 1 % Gel APPLY 2 GRAMS TOPICALLY TO THE AFFECTED AREA TWICE DAILY    dorzolamide (TRUSOPT) 2 % ophthalmic solution Place 1 drop into both eyes 3 (three) times daily.    erythromycin (ROMYCIN) ophthalmic ointment Place 1 inch into both eyes every evening. Apply to eyelids at bedtimes    fluorometholone 0.1% (FML) 0.1 % DrpS Place 1 drop into both eyes once daily.    fluticasone-salmeterol diskus inhaler 250-50 mcg Inhale 1 puff into the lungs once daily. Controller    furosemide (LASIX) 40 MG tablet TAKE 1 TABLET BY MOUTH EVERY MORNING AND 1 TABLET AT 4PM    insulin aspart U-100 (NOVOLOG FLEXPEN U-100 INSULIN) 100 unit/mL (3 mL) InPn pen Inject if blood sugar is >180, 180-230+2, 231-280+4, 281-330+6, 331-380+8,>380+10 MAX daily 30 units.    isosorbide mononitrate (IMDUR) 60 MG 24 hr tablet TAKE 1 TABLET BY MOUTH EVERY MORNING FOR HEART, TO PREVENT CHEST PAINS OR SHORTNESS OF BREATH    ketoconazole (NIZORAL) 2 % shampoo Apply topically every 7 days. Soak scalp for 15-30 minutes    lancets (TRUEPLUS LANCETS) 33 gauge Misc Apply 1 lancet topically once daily. No longer on insulin because of CKD    lancets Misc To check BG 3 time daily, to use with insurance preferred meter    latanoprost 0.005 % ophthalmic solution Place 1 drop into both eyes every evening.    levothyroxine (SYNTHROID) 88 MCG tablet TAKE 1 TABLET(88 MCG) BY MOUTH DAILY BEFORE BREAKFAST    meclizine (ANTIVERT) 12.5 mg tablet TAKE 1 TABLET BY MOUTH THREE  "TIMES DAILY AS NEEDED FOR DIZZINESS    melatonin (MELATIN) 3 mg tablet Take 1 tablet (3 mg total) by mouth nightly as needed for Insomnia.    metoprolol tartrate (LOPRESSOR) 25 MG tablet TAKE 1 TABLET(25 MG) BY MOUTH TWICE DAILY    multivit-mineral-iron-lutein Tab Take 1 tablet by mouth once daily.    nitroGLYCERIN (NITROSTAT) 0.4 MG SL tablet ONE TABLET UNDER THE TONGUE EVERY 5 MINUTES AS NEEDED FOR CHEST PAIN.  IF NO RELIEF AFTER 3 TABLETS , GO TO THE EMERGENCY ROOM.    pen needle, diabetic (BD ULTRA-FINE SHORT PEN NEEDLE) 31 gauge x 5/16" Ndle USE WITH INSULIN PENS UP TO THREE TIMES DAILY    polyethylene glycol (GLYCOLAX) 17 gram/dose powder Take 17 g by mouth daily as needed (CONSTIPATION).    psyllium (METAMUCIL) powder Take 1 packet by mouth daily as needed (CONSTIPATION).    traZODone (DESYREL) 50 MG tablet Take 1 tablet (50 mg total) by mouth every evening.     Family History       Problem Relation (Age of Onset)    Atrial fibrillation Daughter    Diabetes Mother, Sister, Sister, Paternal Grandfather, Daughter, Son    Glaucoma Mother    Heart attack Sister    Heart disease Sister, Brother, Brother, Son    Hypertension Mother, Father, Sister, Daughter, Daughter, Son    Hypotension Sister    Kidney disease Mother, Sister, Sister    Leukemia Sister    No Known Problems Sister, Sister, Brother, Maternal Grandmother, Maternal Grandfather, Paternal Grandmother, Son          Tobacco Use    Smoking status: Never     Passive exposure: Never    Smokeless tobacco: Never   Substance and Sexual Activity    Alcohol use: No    Drug use: No    Sexual activity: Never     Review of Systems   All other systems reviewed and are negative.    Objective:     Vital Signs (Most Recent):  Temp: 98 °F (36.7 °C) (08/02/25 1135)  Pulse: 68 (08/02/25 1500)  Resp: 18 (08/02/25 1217)  BP: (!) 146/76 (08/02/25 1217)  SpO2: 100 % (08/02/25 1217) Vital Signs (24h Range):  Temp:  [98 °F (36.7 °C)] 98 °F (36.7 °C)  Pulse:  [62-68] 68  Resp:  " [18] 18  SpO2:  [98 %-100 %] 100 %  BP: (146-151)/(70-76) 146/76     Weight: 64 kg (141 lb)  Body mass index is 21.44 kg/m².     Physical Exam  Vitals and nursing note reviewed.   Constitutional:       General: She is not in acute distress.     Appearance: She is well-developed. She is not ill-appearing, toxic-appearing or diaphoretic.   HENT:      Head: Normocephalic and atraumatic.      Right Ear: External ear normal.      Left Ear: External ear normal.      Nose: Nose normal.      Mouth/Throat:      Mouth: Mucous membranes are moist.   Eyes:      Extraocular Movements: Extraocular movements intact.      Conjunctiva/sclera: Conjunctivae normal.   Cardiovascular:      Rate and Rhythm: Normal rate. Rhythm irregular.      Heart sounds: Murmur heard.   Pulmonary:      Effort: Pulmonary effort is normal. No respiratory distress.      Breath sounds: Normal breath sounds.   Abdominal:      General: Bowel sounds are normal. There is no distension.      Palpations: Abdomen is soft.      Tenderness: There is no abdominal tenderness.   Genitourinary:     Comments: Heme positive stool  Musculoskeletal:         General: Normal range of motion.      Cervical back: Normal range of motion. No rigidity.      Right lower leg: Edema present.      Left lower leg: Edema present.   Skin:     General: Skin is warm and dry.   Neurological:      Mental Status: She is alert and oriented to person, place, and time. Mental status is at baseline.      Cranial Nerves: No cranial nerve deficit.      Coordination: Coordination normal.   Psychiatric:         Behavior: Behavior normal.                Significant Labs: All pertinent labs within the past 24 hours have been reviewed.    Significant Imaging: I have reviewed all pertinent imaging results/findings within the past 24 hours.  Assessment/Plan:     Assessment & Plan  Rectal bleeding  Iron deficiency anemia  -Placed in observation  -Presented with rectal bleeding after passing a large hard  bowel movement last night.  Noted increased BRBPR and clots passing while in ED  -Noted she had positive FIT test for occult blood earlier this year and saw GI.  They opted at that time not to do colonoscopy.  -Hb today is 10.7 which is very near her more recent baseline of 10.8-11.4  -Suspect this is secondary to mild trauma from passing hard stool vs hemorrhoidal bleeding.  -Check iron, ferritin, b12 and folate  -Treat with clear liquid diet and bid ppi for now  -Trend H/H and transfuse if Hb <7  -Will consult GI for evaluation, but doubt endoscopy will be required  Thrombocytopenia  -Noted intermittent thrombocytopenia since at least 2020 - etiology unclear to me  -On admit platelets 116  -Repeat cbc in AM  Stage 4 chronic kidney disease  -Baseline Cr 1.4 - 1.8  -On admit Cr 1.7  -Avoid nephrotoxic agents and renally dose meds  -Continue home lasix  -Repeat labs in AM  Permanent atrial fibrillation  -Noted history  -Presently well rate controlled  -Continue home metoprolol tartrate  -Hold home aspirin  -Monitor on telemetry  Chronic diastolic CHF (congestive heart failure)  Essential hypertension  -On admit bp is mildly elevated and she has slight peripheral edema (baseline per patient and daughter) with no new shortness of breath  -Echo 3/17/25 showed Ef 65%, severe LA and RA dilation, severe MAC with mild MV stenosis  -Overall she is well compensated  -Strict ins/outs and daily weights  -Continue home lasix, metoprolol, norvasc and imdur  -PRN hydralazine available if SBP > 180  Hyperlipidemia associated with type 2 diabetes mellitus  -Continue home statin  Type 2 diabetes mellitus  -A1c 6.4 5/8/25  -At home she is mostly diet controlled, but if blood sugars > 180 she will use SSI.  Requires insulin maybe once a week  -Continue SSI ac/hs for now  Hypothyroidism  -Continue home levothyroxine  ACP (advance care planning)  -Advance Care Planning Date: 08/02/2025 I engaged patient and her daughter in a discussion  of her goals surrounding the very end of life.  We explored the pros and cons of ACLS treatment in the face of cardiac arrest.  Patient and daughter were in agreement that they wish to continue aggressive care up to that point, but if it were to occur the patient wishes for a peaceful death without ACLS treatments.  DNR order placed.  I spent 10 minutes ACP time today.  VTE Risk Mitigation (From admission, onward)           Ordered     IP VTE HIGH RISK PATIENT  Once         08/02/25 1453     Place sequential compression device  Until discontinued         08/02/25 1453                                   On 08/02/2025, patient should be placed in hospital observation services under my care.             Ino Bob MD  Department of Hospital Medicine  [facility] - Emergency Dept

## 2025-08-02 NOTE — NURSING
Patient received in room 312. Patient is alert and oriented. Vitals stable in room air. Patient on purewick. Telemetry monitoring initiated. Patient made aware about the call light.

## 2025-08-02 NOTE — HPI
Mrs. Mead is a 89-year-old woman with iron deficiency anemia, prior FIT test positive, CKD IV, diabetes mellitus type-2, chronic diastolic chf, permanent atrial fibrillation, hypertension and hyperlipidemia who presented to the hospital for evaluation of rectal bleeding.  History is obtained from patient and her daughter.  She reports that she was in her usual state of health but struggled to pass a stool last night.  She took miralax and subsequently passed a large hard stool.  There was some blood in this.  Overnight she had some more bright red blood per rectum which prompted her presentation today.  While in the ED, she was noted to have a large bloody bowel movement and pass several large clots.  She notes some bhavana-rectal discomfort, but otherwise is feeling well.  She denies light headedness, fevers, chills, diarrhea, chest pain, shortness of breath, abdominal pain, nausea and vomiting.  She does take a baby aspirin daily, but no other nsaids or blood thinning medications.

## 2025-08-03 LAB
ABSOLUTE EOSINOPHIL (OHS): 0.08 K/UL
ABSOLUTE EOSINOPHIL (OHS): 0.09 K/UL
ABSOLUTE EOSINOPHIL (OHS): 0.15 K/UL
ABSOLUTE MONOCYTE (OHS): 0.8 K/UL (ref 0.3–1)
ABSOLUTE MONOCYTE (OHS): 0.82 K/UL (ref 0.3–1)
ABSOLUTE MONOCYTE (OHS): 0.94 K/UL (ref 0.3–1)
ABSOLUTE NEUTROPHIL COUNT (OHS): 2.93 K/UL (ref 1.8–7.7)
ABSOLUTE NEUTROPHIL COUNT (OHS): 3.4 K/UL (ref 1.8–7.7)
ABSOLUTE NEUTROPHIL COUNT (OHS): 3.58 K/UL (ref 1.8–7.7)
ANION GAP (OHS): 6 MMOL/L (ref 8–16)
BASOPHILS # BLD AUTO: 0.03 K/UL
BASOPHILS # BLD AUTO: 0.04 K/UL
BASOPHILS # BLD AUTO: 0.04 K/UL
BASOPHILS NFR BLD AUTO: 0.5 %
BASOPHILS NFR BLD AUTO: 0.7 %
BASOPHILS NFR BLD AUTO: 0.8 %
BUN SERPL-MCNC: 21 MG/DL (ref 10–30)
CALCIUM SERPL-MCNC: 9 MG/DL (ref 8.7–10.5)
CHLORIDE SERPL-SCNC: 106 MMOL/L (ref 95–110)
CO2 SERPL-SCNC: 28 MMOL/L (ref 23–29)
CREAT SERPL-MCNC: 1.4 MG/DL (ref 0.5–1.4)
ERYTHROCYTE [DISTWIDTH] IN BLOOD BY AUTOMATED COUNT: 14.6 % (ref 11.5–14.5)
ERYTHROCYTE [DISTWIDTH] IN BLOOD BY AUTOMATED COUNT: 14.6 % (ref 11.5–14.5)
ERYTHROCYTE [DISTWIDTH] IN BLOOD BY AUTOMATED COUNT: 14.8 % (ref 11.5–14.5)
FERRITIN SERPL-MCNC: 83 NG/ML (ref 20–300)
FOLATE SERPL-MCNC: 15.9 NG/ML (ref 4–24)
GFR SERPLBLD CREATININE-BSD FMLA CKD-EPI: 34 ML/MIN/1.73/M2
GLUCOSE SERPL-MCNC: 169 MG/DL (ref 70–110)
HCT VFR BLD AUTO: 27.9 % (ref 37–48.5)
HCT VFR BLD AUTO: 28 % (ref 37–48.5)
HCT VFR BLD AUTO: 29.1 % (ref 37–48.5)
HGB BLD-MCNC: 9 GM/DL (ref 12–16)
HGB BLD-MCNC: 9.2 GM/DL (ref 12–16)
HGB BLD-MCNC: 9.4 GM/DL (ref 12–16)
IMM GRANULOCYTES # BLD AUTO: 0.01 K/UL (ref 0–0.04)
IMM GRANULOCYTES # BLD AUTO: 0.01 K/UL (ref 0–0.04)
IMM GRANULOCYTES # BLD AUTO: 0.02 K/UL (ref 0–0.04)
IMM GRANULOCYTES NFR BLD AUTO: 0.2 % (ref 0–0.5)
IMM GRANULOCYTES NFR BLD AUTO: 0.2 % (ref 0–0.5)
IMM GRANULOCYTES NFR BLD AUTO: 0.4 % (ref 0–0.5)
IRON SATN MFR SERPL: 19 % (ref 20–50)
IRON SERPL-MCNC: 68 UG/DL (ref 30–160)
LYMPHOCYTES # BLD AUTO: 1.02 K/UL (ref 1–4.8)
LYMPHOCYTES # BLD AUTO: 1.23 K/UL (ref 1–4.8)
LYMPHOCYTES # BLD AUTO: 1.23 K/UL (ref 1–4.8)
MCH RBC QN AUTO: 30.3 PG (ref 27–31)
MCH RBC QN AUTO: 30.4 PG (ref 27–31)
MCH RBC QN AUTO: 30.9 PG (ref 27–31)
MCHC RBC AUTO-ENTMCNC: 32.1 G/DL (ref 32–36)
MCHC RBC AUTO-ENTMCNC: 32.3 G/DL (ref 32–36)
MCHC RBC AUTO-ENTMCNC: 33 G/DL (ref 32–36)
MCV RBC AUTO: 94 FL (ref 82–98)
MCV RBC AUTO: 94 FL (ref 82–98)
MCV RBC AUTO: 95 FL (ref 82–98)
NUCLEATED RBC (/100WBC) (OHS): 0 /100 WBC
OHS QRS DURATION: 90 MS
OHS QTC CALCULATION: 446 MS
PLATELET # BLD AUTO: 103 K/UL (ref 150–450)
PLATELET # BLD AUTO: 111 K/UL (ref 150–450)
PLATELET # BLD AUTO: 93 K/UL (ref 150–450)
PMV BLD AUTO: 11.2 FL (ref 9.2–12.9)
PMV BLD AUTO: 11.7 FL (ref 9.2–12.9)
PMV BLD AUTO: 12.6 FL (ref 9.2–12.9)
POCT GLUCOSE: 127 MG/DL (ref 70–110)
POCT GLUCOSE: 133 MG/DL (ref 70–110)
POCT GLUCOSE: 200 MG/DL (ref 70–110)
POCT GLUCOSE: 231 MG/DL (ref 70–110)
POTASSIUM SERPL-SCNC: 3.5 MMOL/L (ref 3.5–5.1)
RBC # BLD AUTO: 2.96 M/UL (ref 4–5.4)
RBC # BLD AUTO: 2.98 M/UL (ref 4–5.4)
RBC # BLD AUTO: 3.1 M/UL (ref 4–5.4)
RELATIVE EOSINOPHIL (OHS): 1.4 %
RELATIVE EOSINOPHIL (OHS): 1.6 %
RELATIVE EOSINOPHIL (OHS): 2.8 %
RELATIVE LYMPHOCYTE (OHS): 18.5 % (ref 18–48)
RELATIVE LYMPHOCYTE (OHS): 22 % (ref 18–48)
RELATIVE LYMPHOCYTE (OHS): 23.2 % (ref 18–48)
RELATIVE MONOCYTE (OHS): 14.5 % (ref 4–15)
RELATIVE MONOCYTE (OHS): 14.6 % (ref 4–15)
RELATIVE MONOCYTE (OHS): 17.7 % (ref 4–15)
RELATIVE NEUTROPHIL (OHS): 55.3 % (ref 38–73)
RELATIVE NEUTROPHIL (OHS): 60.7 % (ref 38–73)
RELATIVE NEUTROPHIL (OHS): 64.9 % (ref 38–73)
SODIUM SERPL-SCNC: 140 MMOL/L (ref 136–145)
TIBC SERPL-MCNC: 363 UG/DL (ref 250–450)
TRANSFERRIN SERPL-MCNC: 245 MG/DL (ref 200–375)
VIT B12 SERPL-MCNC: 1008 PG/ML (ref 210–950)
WBC # BLD AUTO: 5.3 K/UL (ref 3.9–12.7)
WBC # BLD AUTO: 5.52 K/UL (ref 3.9–12.7)
WBC # BLD AUTO: 5.6 K/UL (ref 3.9–12.7)

## 2025-08-03 PROCEDURE — 85025 COMPLETE CBC W/AUTO DIFF WBC: CPT | Mod: HCNC | Performed by: HOSPITALIST

## 2025-08-03 PROCEDURE — 36415 COLL VENOUS BLD VENIPUNCTURE: CPT | Mod: HCNC | Performed by: HOSPITALIST

## 2025-08-03 PROCEDURE — 80048 BASIC METABOLIC PNL TOTAL CA: CPT | Mod: HCNC | Performed by: HOSPITALIST

## 2025-08-03 PROCEDURE — 21400001 HC TELEMETRY ROOM: Mod: HCNC

## 2025-08-03 PROCEDURE — 85025 COMPLETE CBC W/AUTO DIFF WBC: CPT | Mod: 91,HCNC | Performed by: HOSPITALIST

## 2025-08-03 PROCEDURE — 96376 TX/PRO/DX INJ SAME DRUG ADON: CPT

## 2025-08-03 PROCEDURE — 25000003 PHARM REV CODE 250: Mod: HCNC | Performed by: HOSPITALIST

## 2025-08-03 PROCEDURE — 63600175 PHARM REV CODE 636 W HCPCS: Mod: HCNC | Performed by: HOSPITALIST

## 2025-08-03 RX ORDER — DOCUSATE SODIUM 100 MG/1
100 CAPSULE, LIQUID FILLED ORAL 2 TIMES DAILY
Status: DISCONTINUED | OUTPATIENT
Start: 2025-08-03 | End: 2025-08-04 | Stop reason: HOSPADM

## 2025-08-03 RX ORDER — LATANOPROST 50 UG/ML
1 SOLUTION/ DROPS OPHTHALMIC NIGHTLY
Status: DISCONTINUED | OUTPATIENT
Start: 2025-08-03 | End: 2025-08-04 | Stop reason: HOSPADM

## 2025-08-03 RX ORDER — DICLOFENAC SODIUM 10 MG/G
2 GEL TOPICAL 2 TIMES DAILY PRN
Status: DISCONTINUED | OUTPATIENT
Start: 2025-08-03 | End: 2025-08-04 | Stop reason: HOSPADM

## 2025-08-03 RX ADMIN — HYDROCORTISONE: 25 CREAM TOPICAL at 08:08

## 2025-08-03 RX ADMIN — TRAZODONE HYDROCHLORIDE 50 MG: 50 TABLET ORAL at 08:08

## 2025-08-03 RX ADMIN — ISOSORBIDE MONONITRATE 60 MG: 30 TABLET, EXTENDED RELEASE ORAL at 08:08

## 2025-08-03 RX ADMIN — AMLODIPINE BESYLATE 5 MG: 5 TABLET ORAL at 08:08

## 2025-08-03 RX ADMIN — DORZOLAMIDE HYDROCHLORIDE 1 DROP: 20 SOLUTION/ DROPS OPHTHALMIC at 02:08

## 2025-08-03 RX ADMIN — METOPROLOL TARTRATE 25 MG: 25 TABLET, FILM COATED ORAL at 08:08

## 2025-08-03 RX ADMIN — PANTOPRAZOLE SODIUM 40 MG: 40 INJECTION, POWDER, FOR SOLUTION INTRAVENOUS at 08:08

## 2025-08-03 RX ADMIN — DICLOFENAC SODIUM 2 G: 10 GEL TOPICAL at 05:08

## 2025-08-03 RX ADMIN — DOCUSATE SODIUM 100 MG: 100 CAPSULE, LIQUID FILLED ORAL at 02:08

## 2025-08-03 RX ADMIN — LEVOTHYROXINE SODIUM 88 MCG: 88 TABLET ORAL at 05:08

## 2025-08-03 RX ADMIN — FUROSEMIDE 40 MG: 40 TABLET ORAL at 08:08

## 2025-08-03 RX ADMIN — ACETAMINOPHEN 650 MG: 325 TABLET ORAL at 07:08

## 2025-08-03 RX ADMIN — LATANOPROST 1 DROP: 50 SOLUTION OPHTHALMIC at 08:08

## 2025-08-03 RX ADMIN — DORZOLAMIDE HYDROCHLORIDE 1 DROP: 20 SOLUTION/ DROPS OPHTHALMIC at 08:08

## 2025-08-03 RX ADMIN — INSULIN ASPART 1 UNITS: 100 INJECTION, SOLUTION INTRAVENOUS; SUBCUTANEOUS at 08:08

## 2025-08-03 RX ADMIN — FUROSEMIDE 40 MG: 40 TABLET ORAL at 05:08

## 2025-08-03 RX ADMIN — ATORVASTATIN CALCIUM 40 MG: 20 TABLET, FILM COATED ORAL at 08:08

## 2025-08-03 RX ADMIN — DOCUSATE SODIUM 100 MG: 100 CAPSULE, LIQUID FILLED ORAL at 08:08

## 2025-08-03 NOTE — ASSESSMENT & PLAN NOTE
-Noted history  -Presently well rate controlled  -Continue home metoprolol tartrate  -Hold home aspirin for now  -Monitor on telemetry

## 2025-08-03 NOTE — CONSULTS
Gastroenterology Consult    8/3/2025  9:12 AM    Consulting Physician:  Flori Arita MD    Primary Care Provider: Tomás Andres MD    Reason for consultation: GI bleed    HPI:  Tiana Mead is a 89 y.o. female with a PMHx as listed below who reports that after a hard, painful  bm she passed blood in the toilet that continued to drip so she became concerned and presented to the ED. She has intermittent constipation that responds to OTC meds, and she has had issues with pain after passing a hard stool. She has not experienced bleeding in the past. She has a h/o  Iron deficiency anemia that was evaluated with EGD 5 years ago and showed a hiatal hernia. She had a normal colonoscopy 16 years ago per pt. She still has a normocytic anemia, but her iron stores have been restored. She also has ah/o thrombocytopenia. In the ED there weas some gross blood on rectal exam. She has not had any bleeding since yesterday.     Past Medical History:  Past Medical History:   Diagnosis Date    Allergy     Anemia     Arthritis     Asthma     Chronic diastolic CHF (congestive heart failure)     Chronic kidney disease     Degenerative disc disease     Diabetes mellitus type II     Diastolic heart failure 05/02/2017    Essential hypertension 07/03/2012    Glaucoma     History of insulin dependent diabetes mellitus, for many years stopped because of chronic kidney November 2020.  09/09/2021    History of pseudogout 08/23/2012    Hyperlipidemia     Hypertension     Iritis     Myocardial infarction     Pneumonia     Thrombocytopenia 08/24/2021    Thyroid disease        Allergies:   Review of patient's allergies indicates:   Allergen Reactions    Codeine Itching and Nausea Only              Current Medications:  Prescriptions Prior to Admission[1]      Social History:  Social History     Socioeconomic History    Marital status:    Tobacco Use    Smoking status: Never     Passive exposure: Never    Smokeless tobacco: Never    Substance and Sexual Activity    Alcohol use: No    Drug use: No    Sexual activity: Never     Social Drivers of Health     Financial Resource Strain: Low Risk  (8/2/2025)    Overall Financial Resource Strain (CARDIA)     Difficulty of Paying Living Expenses: Not hard at all   Food Insecurity: No Food Insecurity (8/2/2025)    Hunger Vital Sign     Worried About Running Out of Food in the Last Year: Never true     Ran Out of Food in the Last Year: Never true   Transportation Needs: No Transportation Needs (8/2/2025)    PRAPARE - Transportation     Lack of Transportation (Medical): No     Lack of Transportation (Non-Medical): No   Physical Activity: Unknown (6/9/2025)    Exercise Vital Sign     Days of Exercise per Week: 0 days   Recent Concern: Physical Activity - Inactive (6/9/2025)    Exercise Vital Sign     Days of Exercise per Week: 0 days     Minutes of Exercise per Session: 10 min   Stress: No Stress Concern Present (8/2/2025)    [employer] - Occupational Stress Questionnaire     Feeling of Stress : Not at all   Housing Stability: Low Risk  (8/2/2025)    Housing Stability Vital Sign     Unable to Pay for Housing in the Last Year: No     Number of Times Moved in the Last Year: 0     Homeless in the Last Year: No       Surgical History:  Past Surgical History:   Procedure Laterality Date    CARDIAC CATHETERIZATION  2010    no obstructive disease    CATARACT EXTRACTION W/  INTRAOCULAR LENS IMPLANT Left n/a    CATARACT EXTRACTION W/  INTRAOCULAR LENS IMPLANT Right 10/8/2018    With Femtosecond LASER assist (Dr. Henson)    CHOLECYSTECTOMY      ESOPHAGOGASTRODUODENOSCOPY N/A 11/4/2020    Procedure: EGD (ESOPHAGOGASTRODUODENOSCOPY);  Surgeon: Tomás Mccall MD;  Location: [address];  Service: Endoscopy;  Laterality: N/A;    EYE SURGERY      gall stone      HYSTERECTOMY      VARICOSE VEIN SURGERY           Family History:  Family History   Problem Relation Name Age of Onset     Diabetes Mother      Hypertension Mother      Glaucoma Mother      Kidney disease Mother      Hypertension Father      Hypotension Sister Alberta     Heart attack Sister Alberta     Heart disease Sister Alberta     No Known Problems Sister Bekah     Diabetes Sister Violeta     Kidney disease Sister Violeta     Leukemia Sister Jane     Hypertension Sister Pallavi     Diabetes Sister Lovinia     Kidney disease Sister Lovinia     No Known Problems Sister Lacey     Heart disease Brother Mariusz     Heart disease Brother John     No Known Problems Brother Luis     No Known Problems Maternal Grandmother      No Known Problems Maternal Grandfather      No Known Problems Paternal Grandmother      Diabetes Paternal Grandfather      Hypertension Daughter Yolanda     Atrial fibrillation Daughter Yolanda     Diabetes Daughter Melanie         borderline    Hypertension Daughter Melanie     No Known Problems Son Trung     Diabetes Son Raudel     Heart disease Son Raudel     Hypertension Son Raudel     Melanoma Neg Hx         Review of systems:     CONSTITUTIONAL: Negative for fever, chills, weakness, weight loss, weight gain.  HEENT: Negative for blurred vision, hearing loss, nasal congestion, dry mouth, sore throat.  CARDIOVASCULAR: Negative for chest pain or palpitations.  RESPIRATORY: Negative for SOB or cough.  GASTROINTESTINAL: See HPI  GENITOURINARY: Negative for dysuria or hematuria.  MUSCULOSKELETAL: Negative for osteoarthritis or muscle pain.  SKIN: Negative for rashes/lesions.  NEUROLOGIC: Negative for headaches, numbness/tingling.  ENDOCRINE: Negative for diabetes or thyroid abnormalities.  HEMATOLOGIC: Negative for anemia or blood dyscrasias.  Aside from above positives, complete 10 point review of systems negative.    Physical Exam:  Vital Signs (Most Recent):  Temp: 98.6 °F (37 °C) (08/03/25 0741)  Pulse: 67 (08/03/25 0741)  Resp: 18 (08/03/25 0741)  BP: 138/66 (08/03/25 0741)  SpO2: 99 % (08/03/25  0741) Vital Signs (24h Range):  Temp:  [97.6 °F (36.4 °C)-98.6 °F (37 °C)] 98.6 °F (37 °C)  Pulse:  [56-78] 67  Resp:  [16-18] 18  SpO2:  [95 %-100 %] 99 %  BP: (111-164)/(57-80) 138/66       General: Well developed, well nourished, female in no acute distress.    Eyes:  Anicteric sclera, PERRLA  ENT:  Moist mucous membranes, no drainage from ears or nose, hearing grossly intact  Lymph:  No cervical, supraclavicular or axillary lymphadenopathy  Neck:  Supple, no nodes or masses felt, no thyromegaly  Cardiovascular:  Regular rate and rhythm without murmur  Lungs:  Clear to auscultation with normal effort; no wheezes or rales noted  GI:  Soft, +bs NTND  Musculoskeletal:  5/5 strength bilaterally  Extremities: No clubbing, cyanosis, or edema, 2+ dorsalis pedis bilaterally  Neurologic:  No focal deficits, alert and oriented x 3  Psych:  Appropriate mood and affect  Skin:  No rash, no pallor, no lesions       Labs:   Latest Reference Range & Units 08/02/25 12:23 08/02/25 22:20 08/02/25 22:21 08/03/25 00:18 08/03/25 05:25 08/03/25 07:46   WBC 3.90 - 12.70 K/uL 4.51 4.09 4.00 5.30 5.60 5.52   RBC 4.00 - 5.40 M/uL 3.46 (L) 2.89 (L) 2.89 (L) 2.96 (L) 3.10 (L) 2.98 (L)   Hemoglobin 12.0 - 16.0 gm/dL 10.7 (L) 8.9 (L) 8.9 (L) 9.0 (L) 9.4 (L) 9.2 (L)   Hematocrit 37.0 - 48.5 % 32.7 (L) 27.1 (L) 27.3 (L) 28.0 (L) 29.1 (L) 27.9 (L)   MCV 82 - 98 fL 95 94 95 95 94 94   MCH 27.0 - 31.0 pg 30.9 30.8 30.8 30.4 30.3 30.9   MCHC 32.0 - 36.0 g/dL 32.7 32.8 32.6 32.1 32.3 33.0   RDW 11.5 - 14.5 % 15.0 (H) 14.8 (H) 14.7 (H) 14.6 (H) 14.6 (H) 14.8 (H)   Platelet Count 150 - 450 K/uL 116 (L) 107 (L) 97 (L) 93 (L) 111 (L) 103 (L)   MPV 9.2 - 12.9 fL 11.8 12.1 10.9 11.2 12.6 11.7   Neut % 38 - 73 % 57.9 50.0 49.1 55.3 60.7 64.9   Lymph % 18 - 48 % 24.8 27.6 28.8 23.2 22.0 18.5   Mono % 4 - 15 % 13.3 17.8 (H) 17.5 (H) 17.7 (H) 14.6 14.5   Eos % <=8 % 3.1 3.4 3.3 2.8 1.6 1.4   Basophil % <=1.9 % 0.9 1.2 1.0 0.8 0.7 0.5   Immature Granulocytes  0.0 - 0.5 % 0.0 0.0 0.3 0.2 0.4 0.2   Gran # (ANC) 1.8 - 7.7 K/uL 2.61 2.04 1.97 2.93 3.40 3.58   Lymph # 1 - 4.8 K/uL 1.12 1.13 1.15 1.23 1.23 1.02   Mono # 0.3 - 1 K/uL 0.60 0.73 0.70 0.94 0.82 0.80   Eos # <=0.5 K/uL 0.14 0.14 0.13 0.15 0.09 0.08   Baso # <=0.2 K/uL 0.04 0.05 0.04 0.04 0.04 0.03   Immature Grans (Abs) 0.00 - 0.04 K/uL 0.00 0.00 0.01 0.01 0.02 0.01   nRBC <=0 /100 WBC 0 0 0 0 0 0   (L): Data is abnormally low  (H): Data is abnormally high   Latest Reference Range & Units 08/02/25 22:20   Iron 30 - 160 ug/dL 68   TIBC 250 - 450 ug/dL 363   Transferrin 200 - 375 mg/dL 245   Ferritin 20.0 - 300.0 ng/mL 83.0   Folate 4.0 - 24.0 ng/mL 15.9   Vitamin B12 210 - 950 pg/mL 1,008 (H)   Iron Saturation 20 - 50 % 19 (L)   (H): Data is abnormally high  (L): Data is abnormally low   Latest Reference Range & Units 08/02/25 12:23   PT 9.0 - 12.5 seconds 11.3   INR 0.8 - 1.2  1.0   PTT 21.0 - 32.0 seconds 26.8           Assessment:  Pt is a 89 y/o female in remarkably good health who presents with rectal pain and bleeding after a hard stool. She has a chronic anemia that is stable. She has thrombocytopenia which may have contributed to the amount of bleeding she reports, but again her H/H is essentially stable after multiple blood draws. This is all consistent with distal bleeding- hemorrhoidal/ anal fissure    Plan:  Advance diet  Start daily stool softener  Hydrocortisone per rectum qhs x 10 days if bleeding recurs  OK to discharge from GI standpoint    Flori Cerda        [1]   Medications Prior to Admission   Medication Sig Dispense Refill Last Dose/Taking    acetaminophen (TYLENOL) 500 MG tablet Take 2 tablets (1,000 mg total) by mouth 3 (three) times daily as needed for Pain (muscle pain in arms and legs). 100 tablet 1 Past Week    albuterol (PROVENTIL/VENTOLIN HFA) 90 mcg/actuation inhaler Inhale 2 puffs into the lungs every 4 (four) hours as needed for Wheezing. 18 g 6 Past Week    amLODIPine  (NORVASC) 5 MG tablet TAKE 1 TABLET(5 MG) BY MOUTH EVERY MORNING 90 tablet 3 8/2/2025    aspirin 81 MG Chew Take 1 tablet (81 mg total) by mouth every morning. 100 tablet 3 Past Week    atorvastatin (LIPITOR) 40 MG tablet TAKE 1 TABLET(40 MG) BY MOUTH EVERY EVENING 90 tablet 3 Past Week    furosemide (LASIX) 40 MG tablet TAKE 1 TABLET BY MOUTH EVERY MORNING AND 1 TABLET AT 4PM 180 tablet 3 8/1/2025    insulin aspart U-100 (NOVOLOG FLEXPEN U-100 INSULIN) 100 unit/mL (3 mL) InPn pen Inject if blood sugar is >180, 180-230+2, 231-280+4, 281-330+6, 331-380+8,>380+10 MAX daily 30 units. 15 mL 2 Past Week    isosorbide mononitrate (IMDUR) 60 MG 24 hr tablet TAKE 1 TABLET BY MOUTH EVERY MORNING FOR HEART, TO PREVENT CHEST PAINS OR SHORTNESS OF BREATH 90 tablet 3 8/1/2025    levothyroxine (SYNTHROID) 88 MCG tablet TAKE 1 TABLET(88 MCG) BY MOUTH DAILY BEFORE BREAKFAST 90 tablet 3 Past Week    meclizine (ANTIVERT) 12.5 mg tablet TAKE 1 TABLET BY MOUTH THREE TIMES DAILY AS NEEDED FOR DIZZINESS 20 tablet 3 8/1/2025    melatonin (MELATIN) 3 mg tablet Take 1 tablet (3 mg total) by mouth nightly as needed for Insomnia.   8/1/2025    metoprolol tartrate (LOPRESSOR) 25 MG tablet TAKE 1 TABLET(25 MG) BY MOUTH TWICE DAILY 180 tablet 3 8/1/2025    multivit-mineral-iron-lutein Tab Take 1 tablet by mouth once daily.   8/1/2025    blood sugar diagnostic (ONETOUCH ULTRA TEST) Strp 1 strip by In Vitro route once daily. No longer on insulin because of  each 3     blood sugar diagnostic (TRUE METRIX GLUCOSE TEST STRIP) Strp 1 strip by Other route 3 (three) times daily. test 300 strip 3     blood-glucose meter kit To check BG 1 times daily, to use with insurance preferred meter 1 each 0     clotrimazole (LOTRIMIN) 1 % cream Apply topically once daily. for 14 days 45 g 1     clotrimazole-betamethasone 1-0.05% (LOTRISONE) cream Apply topically 2 (two) times daily as needed. For intertriginous itching in the bend of the leg 45 g 3      "diclofenac sodium (VOLTAREN) 1 % Gel APPLY 2 GRAMS TOPICALLY TO THE AFFECTED AREA TWICE DAILY 100 g 4     dorzolamide (TRUSOPT) 2 % ophthalmic solution Place 1 drop into both eyes 3 (three) times daily. 30 mL 4     erythromycin (ROMYCIN) ophthalmic ointment Place 1 inch into both eyes every evening. Apply to eyelids at bedtimes 3.5 g 6     fluorometholone 0.1% (FML) 0.1 % DrpS Place 1 drop into both eyes once daily.       fluticasone-salmeterol diskus inhaler 250-50 mcg Inhale 1 puff into the lungs once daily. Controller 30 each 11     ketoconazole (NIZORAL) 2 % shampoo Apply topically every 7 days. Soak scalp for 15-30 minutes 120 mL 6     lancets (TRUEPLUS LANCETS) 33 gauge Misc Apply 1 lancet topically once daily. No longer on insulin because of  each 3     lancets Misc To check BG 3 time daily, to use with insurance preferred meter 100 each 3     latanoprost 0.005 % ophthalmic solution Place 1 drop into both eyes every evening. 7.5 mL 4     nitroGLYCERIN (NITROSTAT) 0.4 MG SL tablet ONE TABLET UNDER THE TONGUE EVERY 5 MINUTES AS NEEDED FOR CHEST PAIN.  IF NO RELIEF AFTER 3 TABLETS , GO TO THE EMERGENCY ROOM. 100 tablet 1     pen needle, diabetic (BD ULTRA-FINE SHORT PEN NEEDLE) 31 gauge x 5/16" Ndle USE WITH INSULIN PENS UP TO THREE TIMES DAILY 100 each 6     polyethylene glycol (GLYCOLAX) 17 gram/dose powder Take 17 g by mouth daily as needed (CONSTIPATION).       psyllium (METAMUCIL) powder Take 1 packet by mouth daily as needed (CONSTIPATION).       traZODone (DESYREL) 50 MG tablet Take 1 tablet (50 mg total) by mouth every evening. 30 tablet 11 More than a month     "

## 2025-08-03 NOTE — PLAN OF CARE
Resting comfortably in bed at this time.  VSS on RA and afebrile this shift.  Patient on purewick. No episode of rectal bleeding noted this shift. Repositions self independently in bed and ambulating around room x1 person standby assist.   Free from injury or skin breakdown; Fall precautions maintained and call light in reach.  POC updated questions answered and comments acknowledged.      Problem: Hospitalized Older Adult  Goal: Optimal Cognitive Function  Outcome: Progressing  Goal: Effective Bowel Elimination  Outcome: Progressing  Goal: Optimal Coping  Outcome: Progressing  Goal: Fluid and Electrolyte Balance  Outcome: Progressing  Goal: Optimal Functional Ability  Outcome: Progressing  Goal: Improved Oral Intake  Outcome: Progressing  Goal: Adequate Sleep/Rest  Outcome: Progressing  Goal: Effective Urinary Elimination  Outcome: Progressing     Problem: Adult Inpatient Plan of Care  Goal: Plan of Care Review  Outcome: Progressing  Goal: Patient-Specific Goal (Individualized)  Outcome: Progressing  Goal: Absence of Hospital-Acquired Illness or Injury  Outcome: Progressing  Goal: Optimal Comfort and Wellbeing  Outcome: Progressing  Goal: Readiness for Transition of Care  Outcome: Progressing     Problem: Gastrointestinal Bleeding  Goal: Optimal Coping with Acute Illness  Outcome: Progressing  Goal: Hemostasis  Outcome: Progressing     Problem: Diabetes Comorbidity  Goal: Blood Glucose Level Within Targeted Range  Outcome: Progressing     Problem: Fall Injury Risk  Goal: Absence of Fall and Fall-Related Injury  Outcome: Progressing

## 2025-08-03 NOTE — ASSESSMENT & PLAN NOTE
-Placed in observation  -Presented with rectal bleeding after passing a large hard bowel movement last night.  Noted increased BRBPR and clots passing while in ED  -Noted she had positive FIT test for occult blood earlier this year and saw GI.  They opted at that time not to do colonoscopy.  -Hb on admit was 10.7 which is very near her more recent baseline of 10.8-11.4.    -Ferritin only 83 and Tsat is 19 - she is iron deficient.  Folate and B12 are replete  -noted 2 more small bloody bowel movements overnight, but none today.  Hb is down to 9.2 today, but fore some reason there were multiple lab draws throughout the night.  -GI consulted and input appreciated - most likely hemorrhoidal or anal fisure..  -Start colace.  Continue proctofoam.  Ok to advance diet  -Repeat cbc in AM.  Hopefully able to go home tomorrow if no further bleeding.

## 2025-08-03 NOTE — ASSESSMENT & PLAN NOTE
-Noted intermittent thrombocytopenia since at least 2020 - etiology unclear to me  -On admit platelets 116 and today 103.  -Repeat cbc in AM

## 2025-08-03 NOTE — SUBJECTIVE & OBJECTIVE
Interval History: No acute events overnight.  Noted reports of 2 small bloody bowel movements.  Despite this, patient is feeling reasonably well.  She is very anxious about the bleeding however.  All questions answered and patient had no further complaints.    Objective:     Vital Signs (Most Recent):  Temp: 98.1 °F (36.7 °C) (08/03/25 1128)  Pulse: 67 (08/03/25 1128)  Resp: 18 (08/03/25 1128)  BP: (!) 111/57 (08/03/25 1128)  SpO2: 97 % (08/03/25 1128) Vital Signs (24h Range):  Temp:  [97.6 °F (36.4 °C)-98.6 °F (37 °C)] 98.1 °F (36.7 °C)  Pulse:  [56-78] 67  Resp:  [16-18] 18  SpO2:  [95 %-99 %] 97 %  BP: (111-164)/(57-80) 111/57     Weight: 64 kg (141 lb 1.5 oz)  Body mass index is 21.45 kg/m².    Intake/Output Summary (Last 24 hours) at 8/3/2025 1258  Last data filed at 8/3/2025 0629  Gross per 24 hour   Intake 340 ml   Output 2100 ml   Net -1760 ml         Physical Exam  Vitals and nursing note reviewed.   Constitutional:       General: She is not in acute distress.     Appearance: She is well-developed. She is not ill-appearing, toxic-appearing or diaphoretic.   HENT:      Head: Normocephalic and atraumatic.      Right Ear: External ear normal.      Left Ear: External ear normal.      Nose: Nose normal.      Mouth/Throat:      Mouth: Mucous membranes are moist.   Eyes:      Extraocular Movements: Extraocular movements intact.      Conjunctiva/sclera: Conjunctivae normal.   Cardiovascular:      Rate and Rhythm: Normal rate. Rhythm irregular.      Heart sounds: Murmur heard.   Pulmonary:      Effort: Pulmonary effort is normal. No respiratory distress.      Breath sounds: Normal breath sounds.   Abdominal:      General: Bowel sounds are normal. There is no distension.      Palpations: Abdomen is soft.      Tenderness: There is no abdominal tenderness.   Genitourinary:     Comments: Heme positive stool  Musculoskeletal:         General: Normal range of motion.      Cervical back: Normal range of motion. No  rigidity.      Right lower leg: Edema present.      Left lower leg: Edema present.   Skin:     General: Skin is warm and dry.   Neurological:      Mental Status: She is alert and oriented to person, place, and time. Mental status is at baseline.      Cranial Nerves: No cranial nerve deficit.      Coordination: Coordination normal.   Psychiatric:         Behavior: Behavior normal.               Significant Labs: All pertinent labs within the past 24 hours have been reviewed.    Significant Imaging: I have reviewed all pertinent imaging results/findings within the past 24 hours.

## 2025-08-03 NOTE — PROGRESS NOTES
[facility], [place] Medicine  Progress Note    Patient Name: Tiana Mead  MRN: [id number]  Patient Class: OP- Observation   Admission Date: 8/2/2025  Length of Stay: 0 days  Attending Physician: Ino Bob MD  Primary Care Provider: Tomás Andres MD        Subjective     Principal Problem:Rectal bleeding        HPI:  Mrs. Mead is a 89-year-old woman with iron deficiency anemia, prior FIT test positive, CKD IV, diabetes mellitus type-2, chronic diastolic chf, permanent atrial fibrillation, hypertension and hyperlipidemia who presented to the hospital for evaluation of rectal bleeding.  History is obtained from patient and her daughter.  She reports that she was in her usual state of health but struggled to pass a stool last night.  She took miralax and subsequently passed a large hard stool.  There was some blood in this.  Overnight she had some more bright red blood per rectum which prompted her presentation today.  While in the ED, she was noted to have a large bloody bowel movement and pass several large clots.  She notes some bhavana-rectal discomfort, but otherwise is feeling well.  She denies light headedness, fevers, chills, diarrhea, chest pain, shortness of breath, abdominal pain, nausea and vomiting.  She does take a baby aspirin daily, but no other nsaids or blood thinning medications.      Overview/Hospital Course:  No notes on file    Interval History: No acute events overnight.  Noted reports of 2 small bloody bowel movements.  Despite this, patient is feeling reasonably well.  She is very anxious about the bleeding however.  All questions answered and patient had no further complaints.    Objective:     Vital Signs (Most Recent):  Temp: 98.1 °F (36.7 °C) (08/03/25 1128)  Pulse: 67 (08/03/25 1128)  Resp: 18 (08/03/25 1128)  BP: (!) 111/57 (08/03/25 1128)  SpO2: 97 % (08/03/25 1128) Vital Signs (24h Range):  Temp:  [97.6 °F (36.4 °C)-98.6 °F (37 °C)] 98.1 °F (36.7 °C)  Pulse:   [56-78] 67  Resp:  [16-18] 18  SpO2:  [95 %-99 %] 97 %  BP: (111-164)/(57-80) 111/57     Weight: 64 kg (141 lb 1.5 oz)  Body mass index is 21.45 kg/m².    Intake/Output Summary (Last 24 hours) at 8/3/2025 1258  Last data filed at 8/3/2025 0629  Gross per 24 hour   Intake 340 ml   Output 2100 ml   Net -1760 ml         Physical Exam  Vitals and nursing note reviewed.   Constitutional:       General: She is not in acute distress.     Appearance: She is well-developed. She is not ill-appearing, toxic-appearing or diaphoretic.   HENT:      Head: Normocephalic and atraumatic.      Right Ear: External ear normal.      Left Ear: External ear normal.      Nose: Nose normal.      Mouth/Throat:      Mouth: Mucous membranes are moist.   Eyes:      Extraocular Movements: Extraocular movements intact.      Conjunctiva/sclera: Conjunctivae normal.   Cardiovascular:      Rate and Rhythm: Normal rate. Rhythm irregular.      Heart sounds: Murmur heard.   Pulmonary:      Effort: Pulmonary effort is normal. No respiratory distress.      Breath sounds: Normal breath sounds.   Abdominal:      General: Bowel sounds are normal. There is no distension.      Palpations: Abdomen is soft.      Tenderness: There is no abdominal tenderness.   Genitourinary:     Comments: Heme positive stool  Musculoskeletal:         General: Normal range of motion.      Cervical back: Normal range of motion. No rigidity.      Right lower leg: Edema present.      Left lower leg: Edema present.   Skin:     General: Skin is warm and dry.   Neurological:      Mental Status: She is alert and oriented to person, place, and time. Mental status is at baseline.      Cranial Nerves: No cranial nerve deficit.      Coordination: Coordination normal.   Psychiatric:         Behavior: Behavior normal.               Significant Labs: All pertinent labs within the past 24 hours have been reviewed.    Significant Imaging: I have reviewed all pertinent imaging results/findings  within the past 24 hours.      Assessment & Plan  Rectal bleeding  Iron deficiency anemia  -Placed in observation  -Presented with rectal bleeding after passing a large hard bowel movement last night.  Noted increased BRBPR and clots passing while in ED  -Noted she had positive FIT test for occult blood earlier this year and saw GI.  They opted at that time not to do colonoscopy.  -Hb on admit was 10.7 which is very near her more recent baseline of 10.8-11.4.    -Ferritin only 83 and Tsat is 19 - she is iron deficient.  Folate and B12 are replete  -noted 2 more small bloody bowel movements overnight, but none today.  Hb is down to 9.2 today, but fore some reason there were multiple lab draws throughout the night.  -GI consulted and input appreciated - most likely hemorrhoidal or anal fisure..  -Start colace.  Continue proctofoam.  Ok to advance diet  -Repeat cbc in AM.  Hopefully able to go home tomorrow if no further bleeding.  Thrombocytopenia  -Noted intermittent thrombocytopenia since at least 2020 - etiology unclear to me  -On admit platelets 116 and today 103.  -Repeat cbc in AM  Stage 4 chronic kidney disease  -Baseline Cr 1.4 - 1.8  -On admit Cr 1.7 and now 1.4  -Avoid nephrotoxic agents and renally dose meds  -Continue home lasix  -Repeat labs in AM  Permanent atrial fibrillation  -Noted history  -Presently well rate controlled  -Continue home metoprolol tartrate  -Hold home aspirin for now  -Monitor on telemetry  Chronic diastolic CHF (congestive heart failure)  Essential hypertension  -On admit bp is mildly elevated and she has slight peripheral edema (baseline per patient and daughter) with no new shortness of breath  -Echo 3/17/25 showed Ef 65%, severe LA and RA dilation, severe MAC with mild MV stenosis  -Overall she is well compensated  -Strict ins/outs and daily weights  -Continue home lasix, metoprolol, norvasc and imdur  -PRN hydralazine available if SBP > 180  Hyperlipidemia associated with type 2  diabetes mellitus  -Continue home statin  Type 2 diabetes mellitus  -A1c 6.4 5/8/25  -At home she is mostly diet controlled, but if blood sugars > 180 she will use SSI.  Requires insulin maybe once a week  -Sugars are reasonably controlled today  -Continue SSI ac/hs for now  Hypothyroidism  -Continue home levothyroxine  ACP (advance care planning)  -Advance Care Planning Date: 08/02/2025 I engaged patient and her daughter in a discussion of her goals surrounding the very end of life.  We explored the pros and cons of ACLS treatment in the face of cardiac arrest.  Patient and daughter were in agreement that they wish to continue aggressive care up to that point, but if it were to occur the patient wishes for a peaceful death without ACLS treatments.  DNR order placed.  I spent 10 minutes ACP time 8/2/25.  VTE Risk Mitigation (From admission, onward)           Ordered     IP VTE HIGH RISK PATIENT  Once         08/02/25 1453     Place sequential compression device  Until discontinued         08/02/25 1453                    Discharge Planning   MASHA:      Code Status: DNR   Medical Readiness for Discharge Date:   Discharge Plan A: Home with family                        Ino Bob MD  Department of Hospital Medicine   [facility] ([place])

## 2025-08-03 NOTE — ASSESSMENT & PLAN NOTE
-Advance Care Planning Date: 08/02/2025 I engaged patient and her daughter in a discussion of her goals surrounding the very end of life.  We explored the pros and cons of ACLS treatment in the face of cardiac arrest.  Patient and daughter were in agreement that they wish to continue aggressive care up to that point, but if it were to occur the patient wishes for a peaceful death without ACLS treatments.  DNR order placed.  I spent 10 minutes ACP time 8/2/25.

## 2025-08-03 NOTE — PLAN OF CARE
Inpatient Upgrade Note    Tiana ERENDIRA Mead has warranted treatment spanning two or more midnights of hospital level care for the management of GI bleed. She continues to require daily labs and monitoring of vital signs. Her condition is also complicated by the following comorbidities: Hypertension, Diabetes, and Chronic kidney disease.

## 2025-08-03 NOTE — ASSESSMENT & PLAN NOTE
-Baseline Cr 1.4 - 1.8  -On admit Cr 1.7 and now 1.4  -Avoid nephrotoxic agents and renally dose meds  -Continue home lasix  -Repeat labs in AM

## 2025-08-03 NOTE — PLAN OF CARE
Report received from RN.POC reviewed with the pt.RN assumed care.AAOX4.Room air.Pt had 2 BM with active bleeding plus clots.Hospitalits informed,Wide bore iv in placed. Tele monitoring continued(NS).Bed side commode arranged.Closely monitored for vitals and HB count,All due medication administered according to the MAR.No chest pain,SOB occurred during the night shift.No fever,chills ,rigors occurred.Observation reviewed and charted.Care explained,No falls or injury occurred during the shift.No any  significant event undergo in the shift. Pt rest in the bed comfortably. Flow sheets updated timely. Purposeful rounding done every 2 hourly. Daily Weight charted,IP/OP maintained and charted. Pt kept under observation through out the shift.     Problem: Hospitalized Older Adult  Goal: Optimal Cognitive Function  Outcome: Progressing  Goal: Effective Bowel Elimination  Outcome: Progressing  Goal: Optimal Coping  Outcome: Progressing  Goal: Fluid and Electrolyte Balance  Outcome: Progressing  Goal: Optimal Functional Ability  Outcome: Progressing  Goal: Improved Oral Intake  Outcome: Progressing  Goal: Adequate Sleep/Rest  Outcome: Progressing  Goal: Effective Urinary Elimination  Outcome: Progressing     Problem: Adult Inpatient Plan of Care  Goal: Plan of Care Review  Outcome: Progressing  Goal: Patient-Specific Goal (Individualized)  Outcome: Progressing  Goal: Absence of Hospital-Acquired Illness or Injury  Outcome: Progressing  Goal: Optimal Comfort and Wellbeing  Outcome: Progressing  Goal: Readiness for Transition of Care  Outcome: Progressing     Problem: Gastrointestinal Bleeding  Goal: Optimal Coping with Acute Illness  Outcome: Progressing  Goal: Hemostasis  Outcome: Progressing     Problem: Diabetes Comorbidity  Goal: Blood Glucose Level Within Targeted Range  Outcome: Progressing     Problem: Fall Injury Risk  Goal: Absence of Fall and Fall-Related Injury  Outcome: Progressing

## 2025-08-03 NOTE — ASSESSMENT & PLAN NOTE
-A1c 6.4 5/8/25  -At home she is mostly diet controlled, but if blood sugars > 180 she will use SSI.  Requires insulin maybe once a week  -Sugars are reasonably controlled today  -Continue SSI ac/hs for now

## 2025-08-03 NOTE — PLAN OF CARE
Case Management Assessment     PCP: Tomás Andres MD  Pharmacy: at bedside    Patient Arrived From: home  Existing Help at Home: daughter Rose Marie    Barriers to Discharge: none    Discharge Plan:    A. Home with family   B. Home with family      Patient AAOx3 and uses rollator to ambulate. Also owns nebulizer for DME. Lives with Daughter Rose Marie who is also patient caregiver. Able to complete ADLs but moves really slow and often has to ask daughter for help.Alberto Trotter sim transport patient home at discharge.         [facility] ([place])  Initial Discharge Assessment       Primary Care Provider: Tomás Andres MD    Admission Diagnosis: Rectal bleeding [K62.5]    Admission Date: 8/2/2025  Expected Discharge Date:     Transition of Care Barriers: (P) None    Payor: [employer] MEDICARE / Plan: [employer] HMO PPO SPECIAL NEEDS / Product Type: Medicare Advantage /     Extended Emergency Contact Information  Primary Emergency Contact: Yolanda Jones  Address: [address] [place] [address]  Mobile Phone: [phone]  Relation: Daughter  Secondary Emergency Contact: Rose Marie Mead   [facility]  Home Phone: [phone]  Relation: Daughter    Discharge Plan A: (P) Home with family  Discharge Plan B: (P) Home with family      [id number] - [facility] [address]  [address]  [facility] [address]  Phone: [phone] Fax: [phone]    [facility] [place], [place] - [address]  [address]  [facility] [address]  Phone: [phone] Fax: [phone]    [id number] - [place], [place] - [address] AT [facility]  [address]  [facility] [address]  Phone: [phone] Fax: [phone]    [facility] - [place], [place] - [address]  [address]  [facility]  [address]  Phone: [phone] Fax: [phone]      Initial Assessment (most recent)       Adult Discharge Assessment - 08/03/25 1205          Discharge Assessment    Assessment Type Discharge Planning Assessment     Confirmed/corrected address, phone number and insurance Yes     Confirmed Demographics Correct on Facesheet     Source of Information patient     Communicated MASHA with patient/caregiver Date not available/Unable to determine     People in Home child(sarthak), adult     Name(s) of People in Home Rose Marie Gunjan [phone]     Do you expect to return to your current living situation? Yes     Prior to hospitilization cognitive status: Alert/Oriented (P)      Current cognitive status: Alert/Oriented (P)      Walking or Climbing Stairs Difficulty yes (P)      Walking or Climbing Stairs ambulation difficulty, requires equipment (P)      Mobility Management rollator (P)      Dressing/Bathing Difficulty yes (P)      Dressing/Bathing bathing difficulty, assistance 1 person;dressing difficulty, assistance 1 person (P)      Dressing/Bathing Management Rose Marie assist in bathing (P)      Home Accessibility wheelchair accessible (P)      Equipment Currently Used at Home nebulizer;rollator (P)      Readmission within 30 days? No (P)      Do you currently have service(s) that help you manage your care at home? No (P)      Do you take prescription medications? Yes (P)      Do you have prescription coverage? Yes (P)      Do you have any problems affording any of your prescribed medications? No (P)      Is the patient taking medications as prescribed? yes (P)      Who is going to help you get home at discharge? Daughter Rose Marie will transport patient home (P)      How do you get to doctors appointments? family or friend will provide (P)      Are you on dialysis? No (P)      Do you take coumadin? No (P)      Discharge Plan A Home with family (P)      Discharge Plan B Home with family (P)      DME Needed Upon Discharge  none (P)       Discharge Plan discussed with: Patient (P)      Transition of Care Barriers None (P)

## 2025-08-04 VITALS
BODY MASS INDEX: 21.39 KG/M2 | WEIGHT: 141.13 LBS | SYSTOLIC BLOOD PRESSURE: 146 MMHG | HEIGHT: 68 IN | HEART RATE: 88 BPM | RESPIRATION RATE: 18 BRPM | OXYGEN SATURATION: 97 % | DIASTOLIC BLOOD PRESSURE: 65 MMHG | TEMPERATURE: 99 F

## 2025-08-04 LAB
ABSOLUTE EOSINOPHIL (OHS): 0.1 K/UL
ABSOLUTE MONOCYTE (OHS): 1.41 K/UL (ref 0.3–1)
ABSOLUTE NEUTROPHIL COUNT (OHS): 4.88 K/UL (ref 1.8–7.7)
ANION GAP (OHS): 6 MMOL/L (ref 8–16)
BASOPHILS # BLD AUTO: 0.03 K/UL
BASOPHILS NFR BLD AUTO: 0.4 %
BUN SERPL-MCNC: 23 MG/DL (ref 10–30)
CALCIUM SERPL-MCNC: 8.7 MG/DL (ref 8.7–10.5)
CHLORIDE SERPL-SCNC: 105 MMOL/L (ref 95–110)
CO2 SERPL-SCNC: 26 MMOL/L (ref 23–29)
CREAT SERPL-MCNC: 1.7 MG/DL (ref 0.5–1.4)
ERYTHROCYTE [DISTWIDTH] IN BLOOD BY AUTOMATED COUNT: 14.7 % (ref 11.5–14.5)
GFR SERPLBLD CREATININE-BSD FMLA CKD-EPI: 27 ML/MIN/1.73/M2
GLUCOSE SERPL-MCNC: 167 MG/DL (ref 70–110)
HCT VFR BLD AUTO: 26.6 % (ref 37–48.5)
HGB BLD-MCNC: 8.9 GM/DL (ref 12–16)
IMM GRANULOCYTES # BLD AUTO: 0.03 K/UL (ref 0–0.04)
IMM GRANULOCYTES NFR BLD AUTO: 0.4 % (ref 0–0.5)
LYMPHOCYTES # BLD AUTO: 2 K/UL (ref 1–4.8)
MCH RBC QN AUTO: 31.4 PG (ref 27–31)
MCHC RBC AUTO-ENTMCNC: 33.5 G/DL (ref 32–36)
MCV RBC AUTO: 94 FL (ref 82–98)
NUCLEATED RBC (/100WBC) (OHS): 0 /100 WBC
PLATELET # BLD AUTO: 123 K/UL (ref 150–450)
PMV BLD AUTO: 12.2 FL (ref 9.2–12.9)
POCT GLUCOSE: 152 MG/DL (ref 70–110)
POCT GLUCOSE: 224 MG/DL (ref 70–110)
POCT GLUCOSE: 233 MG/DL (ref 70–110)
POTASSIUM SERPL-SCNC: 3.7 MMOL/L (ref 3.5–5.1)
RBC # BLD AUTO: 2.83 M/UL (ref 4–5.4)
RELATIVE EOSINOPHIL (OHS): 1.2 %
RELATIVE LYMPHOCYTE (OHS): 23.7 % (ref 18–48)
RELATIVE MONOCYTE (OHS): 16.7 % (ref 4–15)
RELATIVE NEUTROPHIL (OHS): 57.6 % (ref 38–73)
SODIUM SERPL-SCNC: 137 MMOL/L (ref 136–145)
WBC # BLD AUTO: 8.45 K/UL (ref 3.9–12.7)

## 2025-08-04 PROCEDURE — 25000003 PHARM REV CODE 250: Mod: HCNC | Performed by: HOSPITALIST

## 2025-08-04 PROCEDURE — 80048 BASIC METABOLIC PNL TOTAL CA: CPT | Mod: HCNC | Performed by: HOSPITALIST

## 2025-08-04 PROCEDURE — 36415 COLL VENOUS BLD VENIPUNCTURE: CPT | Mod: HCNC | Performed by: HOSPITALIST

## 2025-08-04 PROCEDURE — 63600175 PHARM REV CODE 636 W HCPCS: Mod: HCNC | Performed by: HOSPITALIST

## 2025-08-04 PROCEDURE — 85025 COMPLETE CBC W/AUTO DIFF WBC: CPT | Mod: HCNC | Performed by: HOSPITALIST

## 2025-08-04 RX ORDER — FERROUS SULFATE 325(65) MG
325 TABLET ORAL DAILY
Qty: 30 TABLET | Refills: 1 | Status: SHIPPED | OUTPATIENT
Start: 2025-08-04

## 2025-08-04 RX ORDER — DOCUSATE SODIUM 100 MG/1
100 CAPSULE, LIQUID FILLED ORAL 2 TIMES DAILY
Qty: 60 CAPSULE | Refills: 1 | Status: SHIPPED | OUTPATIENT
Start: 2025-08-04

## 2025-08-04 RX ORDER — HYDROCORTISONE 25 MG/G
CREAM TOPICAL 2 TIMES DAILY
Qty: 28 EACH | Refills: 0 | Status: SHIPPED | OUTPATIENT
Start: 2025-08-04 | End: 2025-08-18

## 2025-08-04 RX ORDER — INSULIN ASPART 100 [IU]/ML
INJECTION, SOLUTION INTRAVENOUS; SUBCUTANEOUS
Qty: 15 ML | Refills: 1 | Status: SHIPPED | OUTPATIENT
Start: 2025-08-04

## 2025-08-04 RX ADMIN — ONDANSETRON 4 MG: 2 INJECTION INTRAMUSCULAR; INTRAVENOUS at 10:08

## 2025-08-04 RX ADMIN — FUROSEMIDE 40 MG: 40 TABLET ORAL at 09:08

## 2025-08-04 RX ADMIN — DOCUSATE SODIUM 100 MG: 100 CAPSULE, LIQUID FILLED ORAL at 09:08

## 2025-08-04 RX ADMIN — ISOSORBIDE MONONITRATE 60 MG: 30 TABLET, EXTENDED RELEASE ORAL at 09:08

## 2025-08-04 RX ADMIN — LEVOTHYROXINE SODIUM 88 MCG: 88 TABLET ORAL at 05:08

## 2025-08-04 RX ADMIN — METOPROLOL TARTRATE 25 MG: 25 TABLET, FILM COATED ORAL at 09:08

## 2025-08-04 RX ADMIN — INSULIN ASPART 2 UNITS: 100 INJECTION, SOLUTION INTRAVENOUS; SUBCUTANEOUS at 01:08

## 2025-08-04 RX ADMIN — PANTOPRAZOLE SODIUM 40 MG: 40 INJECTION, POWDER, FOR SOLUTION INTRAVENOUS at 09:08

## 2025-08-04 RX ADMIN — AMLODIPINE BESYLATE 5 MG: 5 TABLET ORAL at 09:08

## 2025-08-04 NOTE — PLAN OF CARE
Met with patient/family to review discharge recommendation of home health and is agreeable to plan    Patient/family provided list of facilities in-network with patient's payor plan. Providers that are owned, operated, or affiliated with [facility] are included on the list.     Notified that referral sent to below listed facilities from in-network list based on proximity to home/family support:   [facility]  2. [facility]  3. [facility]  4. [facility]      Patient/family instructed to identify preference.    Patient has declined to select a preferred provider and elects placement with the first accepting in network provider that is available to provide services as ordered by the physician       If an additional preferred facility not listed above is identified, additional referral to be sent. If above facilities unable to accept, will send additional referrals to in-network providers.         08/04/25 1055   Post-Acute Status   Post-Acute Authorization Home Health   Home Health Status Referrals Sent   Patient choice form signed by patient/caregiver List with quality metrics by geographic area provided;List from CMS Compare;List from System Post-Acute Care   Discharge Delays None known at this time   Discharge Plan   Discharge Plan A Home Health   Discharge Plan B Home with family

## 2025-08-04 NOTE — ASSESSMENT & PLAN NOTE
-Noted intermittent thrombocytopenia since at least 2020 - etiology unclear to me  -On admit platelets 116 and today 123.

## 2025-08-04 NOTE — PLAN OF CARE
Case Management Final Discharge Note    Discharge Disposition: Home Health with [facility]    New DME ordered / company name: none    Relevant SDOH / Transition of Care Barriers:  none    Person available to provide assistance at home when needed and their contact information:     Yolanda Jones (Daughter)  [phone] (Mobile)       Scheduled followup appointment: none    Referrals placed: none    Transportation: daughter will transport patient home        Patient and family educated on discharge services and updated on DC plan. Bedside RN notified, patient clear to discharge from Case Management Perspective.      [facility] ([place])  Discharge Final Note    Primary Care Provider: Tomás Andres MD    Expected Discharge Date: 8/4/2025    Final Discharge Note (most recent)       Final Note - 08/04/25 1316          Final Note    Assessment Type Final Discharge Note     Anticipated Discharge Disposition Home-Health Care [facility]     What phone number can be called within the next 1-3 days to see how you are doing after discharge? [phone]     Hospital Resources/Appts/Education Provided Provided patient/caregiver with written discharge plan information;Appointments scheduled and added to AVS        Post-Acute Status    Home Health Status Set-up Complete/Auth obtained     Discharge Delays None known at this time                     Important Message from Medicare             After-discharge care                Home Medical Care       [facility]   Service: Home Health Services    [facility] [address]   Phone: [phone]

## 2025-08-04 NOTE — ASSESSMENT & PLAN NOTE
-Placed in observation  -Presented with rectal bleeding after passing a large hard bowel movement last night.  Noted increased BRBPR and clots passing while in ED  -Noted she had positive FIT test for occult blood earlier this year and saw GI.  They opted at that time not to do colonoscopy.  -Hb on admit was 10.7 which is very near her more recent baseline of 10.8-11.4.    -Ferritin only 83 and Tsat is 19 - she is iron deficient.  Folate and B12 are replete  -noted 2 more small bloody bowel movements overnight, but none today.  Hb is down to 9.2 today, but fore some reason there were multiple lab draws throughout the night.  -GI consulted and input appreciated - most likely hemorrhoidal or anal fisure..Cleared to PR home by GI  -She was seen and examined today.  Her Hb is slighlty lower, but no further bleeding.  She has no tachycardia.  After lengthy discussion with patient and her daughter, Yolanda, she is agreeable with discharge home.  Recommend she continue FeSo4, colace and proctofoam.  Educated that there could be some additional blood in stool at home and reviewed signs and symptoms which should prompt return to hospital.  Placed referral to GI for outpatient follow-up and should see her pcp ary 1 week

## 2025-08-04 NOTE — PLAN OF CARE
[facility] ([place])      HOME HEALTH ORDERS  FACE TO FACE ENCOUNTER    Patient Name: Tiana Mead  YOB: 1925    PCP: Tomás Andres MD   PCP Address: [address] [facility] [place] [address]  PCP Phone Number: [phone]  PCP Fax: [phone]    Encounter Date: 8/2/25    Admit to Home Health    Diagnoses:  Active Hospital Problems    Diagnosis  POA    *Rectal bleeding [K62.5]  Yes     Priority: 1 - High    Iron deficiency anemia [D50.9]  Yes     Priority: 2     Thrombocytopenia [D69.6]  Yes     Priority: 3     Stage 4 chronic kidney disease [N18.4]  Yes     Priority: 4     Permanent atrial fibrillation [I48.21]  Yes     Priority: 5     Essential hypertension [I10]  Yes     Priority: 6     Chronic diastolic CHF (congestive heart failure) [I50.32]  Yes     Priority: 7     Type 2 diabetes mellitus [E11.9]  Yes    Hypothyroidism [E03.9]  Yes    ACP (advance care planning) [Z71.89]  Not Applicable    Hyperlipidemia associated with type 2 diabetes mellitus [E11.69, E78.5]  Yes      Resolved Hospital Problems   No resolved problems to display.       Follow Up Appointments:  Future Appointments   Date Time Provider Department Center   9/23/2025  8:20 AM SANTIAGO Baker MD [facility]   10/21/2025  8:00 AM David Prado MD [facility]   10/22/2025  8:00 AM Olga De Luna DPM [facility] [address]   10/24/2025  9:20 AM Adán Alanis MD [facility]   2/25/2026 12:15 PM BAP XROP DR [facility]   2/25/2026 12:30 PM [facility] USOP6 [facility] USOUNDO [facility]   3/13/2026 10:15 AM Kai Smart MD [facility]       Allergies:  Review of patient's allergies indicates:   Allergen Reactions    Codeine Itching and Nausea Only              Medications: Review discharge medications with patient and family and provide education.    Current Medications[1]     Medication List        START taking these medications      docusate sodium  100 MG capsule  Commonly known as: COLACE  Take 1 capsule (100 mg total) by mouth 2 (two) times daily.     hydrocortisone 2.5 % rectal cream  Commonly known as: ANUSOL-HC  Place rectally 2 (two) times daily. for 14 days            CONTINUE taking these medications      acetaminophen 500 MG tablet  Commonly known as: TYLENOL  Take 2 tablets (1,000 mg total) by mouth 3 (three) times daily as needed for Pain (muscle pain in arms and legs).     albuterol 90 mcg/actuation inhaler  Commonly known as: PROVENTIL/VENTOLIN HFA  Inhale 2 puffs into the lungs every 4 (four) hours as needed for Wheezing.     amLODIPine 5 MG tablet  Commonly known as: NORVASC  TAKE 1 TABLET(5 MG) BY MOUTH EVERY MORNING     aspirin 81 MG Chew  Take 1 tablet (81 mg total) by mouth every morning.     atorvastatin 40 MG tablet  Commonly known as: LIPITOR  TAKE 1 TABLET(40 MG) BY MOUTH EVERY EVENING     * blood sugar diagnostic Strp  Commonly known as: ONETOUCH ULTRA TEST  1 strip by In Vitro route once daily. No longer on insulin because of CKD     * blood sugar diagnostic Strp  Commonly known as: TRUE METRIX GLUCOSE TEST STRIP  1 strip by Other route 3 (three) times daily. test     blood-glucose meter kit  To check BG 1 times daily, to use with insurance preferred meter     clotrimazole 1 % cream  Commonly known as: LOTRIMIN  Apply topically once daily. for 14 days     clotrimazole-betamethasone 1-0.05% cream  Commonly known as: LOTRISONE  Apply topically 2 (two) times daily as needed. For intertriginous itching in the bend of the leg     diclofenac sodium 1 % Gel  Commonly known as: VOLTAREN  APPLY 2 GRAMS TOPICALLY TO THE AFFECTED AREA TWICE DAILY     dorzolamide 2 % ophthalmic solution  Commonly known as: TRUSOPT  Place 1 drop into both eyes 3 (three) times daily.     erythromycin ophthalmic ointment  Commonly known as: ROMYCIN  Place 1 inch into both eyes every evening. Apply to eyelids at bedtimes     fluorometholone 0.1% 0.1 % Drps  Commonly  "known as: FML  Place 1 drop into both eyes once daily.     fluticasone-salmeterol 250-50 mcg/dose 250-50 mcg/dose diskus inhaler  Commonly known as: ADVAIR  Inhale 1 puff into the lungs once daily. Controller     furosemide 40 MG tablet  Commonly known as: LASIX  TAKE 1 TABLET BY MOUTH EVERY MORNING AND 1 TABLET AT 4PM     insulin aspart U-100 100 unit/mL (3 mL) Inpn pen  Commonly known as: NovoLOG Flexpen U-100 Insulin  Inject if blood sugar is >180, 180-230+2, 231-280+4, 281-330+6, 331-380+8,>380+10 MAX daily 30 units.     isosorbide mononitrate 60 MG 24 hr tablet  Commonly known as: IMDUR  TAKE 1 TABLET BY MOUTH EVERY MORNING FOR HEART, TO PREVENT CHEST PAINS OR SHORTNESS OF BREATH     ketoconazole 2 % shampoo  Commonly known as: NIZORAL  Apply topically every 7 days. Soak scalp for 15-30 minutes     * lancets 33 gauge Misc  Commonly known as: TRUEPLUS LANCETS  Apply 1 lancet topically once daily. No longer on insulin because of CKD     * lancets Misc  To check BG 3 time daily, to use with insurance preferred meter     latanoprost 0.005 % ophthalmic solution  Place 1 drop into both eyes every evening.     levothyroxine 88 MCG tablet  Commonly known as: SYNTHROID  TAKE 1 TABLET(88 MCG) BY MOUTH DAILY BEFORE BREAKFAST     meclizine 12.5 mg tablet  Commonly known as: ANTIVERT  TAKE 1 TABLET BY MOUTH THREE TIMES DAILY AS NEEDED FOR DIZZINESS     melatonin 3 mg tablet  Commonly known as: MELATIN  Take 1 tablet (3 mg total) by mouth nightly as needed for Insomnia.     metoprolol tartrate 25 MG tablet  Commonly known as: LOPRESSOR  TAKE 1 TABLET(25 MG) BY MOUTH TWICE DAILY     multivit-mineral-iron-lutein Tab  Take 1 tablet by mouth once daily.     pen needle, diabetic 31 gauge x 5/16" Ndle  Commonly known as: BD ULTRA-FINE SHORT PEN NEEDLE  USE WITH INSULIN PENS UP TO THREE TIMES DAILY     polyethylene glycol 17 gram/dose powder  Commonly known as: GLYCOLAX  Take 17 g by mouth daily as needed (CONSTIPATION).   "   psyllium powder  Commonly known as: METAMUCIL  Take 1 packet by mouth daily as needed (CONSTIPATION).     traZODone 50 MG tablet  Commonly known as: DESYREL  Take 1 tablet (50 mg total) by mouth every evening.           * This list has 4 medication(s) that are the same as other medications prescribed for you. Read the directions carefully, and ask your doctor or other care provider to review them with you.                STOP taking these medications      nitroGLYCERIN 0.4 MG SL tablet  Commonly known as: NITROSTAT                I have seen and examined this patient within the last 30 days. My clinical findings that support the need for the home health skilled services and home bound status are the following:no   Weakness/numbness causing balance and gait disturbance due to Weakness/Debility making it taxing to leave home.     Diet:   cardiac diet and diabetic diet 1800 calorie    Referrals/ Consults  Physical Therapy to evaluate and treat. Evaluate for home safety and equipment needs; Establish/upgrade home exercise program. Perform / instruct on therapeutic exercises, gait training, transfer training, and Range of Motion.  Occupational Therapy to evaluate and treat. Evaluate home environment for safety and equipment needs. Perform/Instruct on transfers, ADL training, ROM, and therapeutic exercises.  Aide to provide assistance with personal care, ADLs, and vital signs.    Activities:   ambulate in house     Nursing:   Agency to admit patient within 24 hours of hospital discharge unless specified on physician order or at patient request    SN to complete comprehensive assessment including routine vital signs. Instruct on disease process and s/s of complications to report to MD. Review/verify medication list sent home with the patient at time of discharge  and instruct patient/caregiver as needed. Frequency may be adjusted depending on start of care date.     Skilled nurse to perform up to 3 visits PRN for  symptoms related to diagnosis    Notify MD if SBP > 160 or < 90; DBP > 90 or < 50; HR > 120 or < 50; Temp > 101; O2 < 88%; Other:       Ok to schedule additional visits based on staff availability and patient request on consecutive days within the home health episode.    When multiple disciplines ordered:    Start of Care occurs on Sunday - Wednesday schedule remaining discipline evaluations as ordered on separate consecutive days following the start of care.    Thursday SOC -schedule subsequent evaluations Friday and Monday the following week.     Friday - Saturday SOC - schedule subsequent discipline evaluations on consecutive days starting Monday of the following week.    For all post-discharge communication and subsequent orders please contact patient's primary care physician.     Miscellaneous   Diabetic Care:   SN to perform and educate Diabetic management with blood glucose monitoring:, Fingerstick blood sugar a.m. and p.m., and Report CBG < 60 or > 350 to physician.    Home Health Aide:  Nursing Twice weekly, Physical Therapy Three times weekly, Occupational Therapy Three times weekly, and Home Health Aide Twice weekly    I certify that this patient is confined to her home and needs intermittent skilled nursing care, physical therapy, and occupational therapy.               [1]   Current Facility-Administered Medications   Medication Dose Route Frequency Provider Last Rate Last Admin    acetaminophen tablet 650 mg  650 mg Oral Q6H PRN Ino Bob MD   650 mg at 08/03/25 1921    amLODIPine tablet 5 mg  5 mg Oral Daily Ino Bob MD   5 mg at 08/04/25 0903    atorvastatin tablet 40 mg  40 mg Oral QHS Ino Bob MD   40 mg at 08/03/25 2023    dextrose 50% injection 12.5 g  12.5 g Intravenous PRN Ino Bob MD        dextrose 50% injection 25 g  25 g Intravenous PRN Ino Bob MD        diclofenac sodium 1 % gel 2 g  2 g Topical (Top) BID PRN Ino Bob MD   2 g at 08/03/25  1721    docusate sodium capsule 100 mg  100 mg Oral BID Marker, Ino OLIVERA MD   100 mg at 08/04/25 0903    dorzolamide 2 % ophthalmic solution 1 drop  1 drop Both Eyes TID Marker, Ino OLIVERA MD   1 drop at 08/03/25 2024    fluticasone furoate-vilanteroL 100-25 mcg/dose diskus inhaler 1 puff  1 puff Inhalation Daily Marker, Ino OLIVERA MD        furosemide tablet 40 mg  40 mg Oral BID Marker, Ino OLIVERA MD   40 mg at 08/04/25 0903    glucagon (human recombinant) injection 1 mg  1 mg Intramuscular PRN Marker, Ino OLIVERA MD        glucose chewable tablet 16 g  16 g Oral PRN Marker, Ino OLIVERA MD        glucose chewable tablet 24 g  24 g Oral PRN Marker, Ino OLIVERA MD        hydrocortisone 2.5 % rectal cream   Rectal BID Marker, Ino OLIVERA MD   Given at 08/03/25 2024    insulin aspart U-100 pen 0-5 Units  0-5 Units Subcutaneous QID (AC + HS) PRN Marker, Ino OLIVERA MD   1 Units at 08/03/25 2031    isosorbide mononitrate 24 hr tablet 60 mg  60 mg Oral Daily Marker, Ino OLIVERA MD   60 mg at 08/04/25 0903    latanoprost 0.005 % ophthalmic solution 1 drop  1 drop Both Eyes QHS Marker, Ino OLIVERA MD   1 drop at 08/03/25 2030    levothyroxine tablet 88 mcg  88 mcg Oral Before breakfast Marker, Ino OLIVERA MD   88 mcg at 08/04/25 0514    loperamide capsule 2 mg  2 mg Oral QID PRN Marker, Ino OLIVERA MD        meclizine tablet 12.5 mg  12.5 mg Oral TID PRN Marker, Ino OLIVERA MD        melatonin tablet 6 mg  6 mg Oral Nightly PRN Marker, Ino OLIVERA MD        metoprolol tartrate (LOPRESSOR) tablet 25 mg  25 mg Oral BID Marker, Ino OLIVERA MD   25 mg at 08/04/25 0903    ondansetron injection 4 mg  4 mg Intravenous Q4H PRN Marker, Ino OLIVERA MD        pantoprazole injection 40 mg  40 mg Intravenous BID Marker, Ino OLIVERA MD   40 mg at 08/04/25 0903    traZODone tablet 50 mg  50 mg Oral QHS Marker, Ino OLIVERA MD   50 mg at 08/03/25 2023

## 2025-08-04 NOTE — PLAN OF CARE
Report received from RN.POC reviewed with the pt.RN assumed care.AAOX4.Room air.No BM to night..Voided to female external catheter. Tele monitoring continued(NS).Bed side commode arranged.Closely monitored for vitals and HB count,All due medication administered according to the MAR.No chest pain,SOB occurred during the night shift.No fever,chills ,rigors occurred.Observation reviewed and charted.Care explained,No falls or injury occurred during the shift.No any  significant event undergo in the shift. Pt rest in the bed comfortably. Flow sheets updated timely. Purposeful rounding done every 2 hourly. Daily Weight charted,IP/OP maintained and charted. Pt kept under observation through out the shift.     Problem: Hospitalized Older Adult  Goal: Optimal Cognitive Function  Outcome: Progressing  Goal: Effective Bowel Elimination  Outcome: Progressing  Goal: Optimal Coping  Outcome: Progressing  Goal: Fluid and Electrolyte Balance  Outcome: Progressing  Goal: Optimal Functional Ability  Outcome: Progressing  Goal: Improved Oral Intake  Outcome: Progressing  Goal: Adequate Sleep/Rest  Outcome: Progressing  Goal: Effective Urinary Elimination  Outcome: Progressing     Problem: Adult Inpatient Plan of Care  Goal: Plan of Care Review  Outcome: Progressing  Goal: Patient-Specific Goal (Individualized)  Outcome: Progressing  Goal: Absence of Hospital-Acquired Illness or Injury  Outcome: Progressing  Goal: Optimal Comfort and Wellbeing  Outcome: Progressing  Goal: Readiness for Transition of Care  Outcome: Progressing     Problem: Gastrointestinal Bleeding  Goal: Optimal Coping with Acute Illness  Outcome: Progressing  Goal: Hemostasis  Outcome: Progressing     Problem: Diabetes Comorbidity  Goal: Blood Glucose Level Within Targeted Range  Outcome: Progressing     Problem: Fall Injury Risk  Goal: Absence of Fall and Fall-Related Injury  Outcome: Progressing

## 2025-08-04 NOTE — PLAN OF CARE
Nurse reviewed AVS and discharge instructions at bedside. Daughter declined virtual nurse to review discharge instructions

## 2025-08-04 NOTE — ASSESSMENT & PLAN NOTE
-Baseline Cr 1.4 - 1.8  -On admit Cr 1.7 and now 1.7  -Avoid nephrotoxic agents and renally dose meds  -Continue home lasix

## 2025-08-04 NOTE — PLAN OF CARE
Problem: Hospitalized Older Adult  Goal: Optimal Cognitive Function  Outcome: Adequate for Care Transition  Goal: Effective Bowel Elimination  Outcome: Adequate for Care Transition  Goal: Optimal Coping  Outcome: Adequate for Care Transition  Goal: Fluid and Electrolyte Balance  Outcome: Adequate for Care Transition  Goal: Optimal Functional Ability  Outcome: Adequate for Care Transition  Goal: Improved Oral Intake  Outcome: Adequate for Care Transition  Goal: Adequate Sleep/Rest  Outcome: Adequate for Care Transition  Goal: Effective Urinary Elimination  Outcome: Adequate for Care Transition     Problem: Adult Inpatient Plan of Care  Goal: Plan of Care Review  Outcome: Adequate for Care Transition  Goal: Patient-Specific Goal (Individualized)  Outcome: Adequate for Care Transition  Goal: Absence of Hospital-Acquired Illness or Injury  Outcome: Adequate for Care Transition  Goal: Optimal Comfort and Wellbeing  Outcome: Adequate for Care Transition  Goal: Readiness for Transition of Care  Outcome: Adequate for Care Transition     Problem: Gastrointestinal Bleeding  Goal: Optimal Coping with Acute Illness  Outcome: Adequate for Care Transition  Goal: Hemostasis  Outcome: Adequate for Care Transition     Problem: Diabetes Comorbidity  Goal: Blood Glucose Level Within Targeted Range  Outcome: Adequate for Care Transition     Problem: Fall Injury Risk  Goal: Absence of Fall and Fall-Related Injury  Outcome: Adequate for Care Transition

## 2025-08-04 NOTE — ASSESSMENT & PLAN NOTE
-Placed in observation  -Presented with rectal bleeding after passing a large hard bowel movement last night.  Noted increased BRBPR and clots passing while in ED  -Noted she had positive FIT test for occult blood earlier this year and saw GI.  They opted at that time not to do colonoscopy.  -Hb on admit was 10.7 which is very near her more recent baseline of 10.8-11.4.    -Ferritin only 83 and Tsat is 19 - she is iron deficient.  Folate and B12 are replete  -noted 2 more small bloody bowel movements overnight, but none today.  Hb is down to 9.2 today, but fore some reason there were multiple lab draws throughout the night.  -GI consulted and input appreciated - most likely hemorrhoidal or anal fisure..Cleared to CA home by GI  -She was seen and examined today.  Her Hb is slighlty lower, but no further bleeding.  She has no tachycardia.  After lengthy discussion with patient and her daughter, Yolanda, she is agreeable with discharge home.  Recommend she continue FeSo4, colace and proctofoam.  Educated that there could be some additional blood in stool at home and reviewed signs and symptoms which should prompt return to hospital.  Placed referral to GI for outpatient follow-up and should see her pcp ary 1 week

## 2025-08-04 NOTE — DISCHARGE SUMMARY
[facility], [place], operated by [facility]  Discharge Summary      Patient Name: Tiana Mead  MRN: [id number]  ROBERTO: [id number]  Patient Class: IP- Inpatient  Admission Date: 8/2/2025  Hospital Length of Stay: 1 days  Discharge Date and Time: 08/04/2025 9:58 AM  Attending Physician: Misael Bob MD   Discharging Provider: Misael Bob MD  Primary Care Provider: Tomás Andres MD    Primary Care Team: Networked reference to record PCT     HPI:   Mrs. Mead is a 89-year-old woman with iron deficiency anemia, prior FIT test positive, CKD IV, diabetes mellitus type-2, chronic diastolic chf, permanent atrial fibrillation, hypertension and hyperlipidemia who presented to the hospital for evaluation of rectal bleeding.  History is obtained from patient and her daughter.  She reports that she was in her usual state of health but struggled to pass a stool last night.  She took miralax and subsequently passed a large hard stool.  There was some blood in this.  Overnight she had some more bright red blood per rectum which prompted her presentation today.  While in the ED, she was noted to have a large bloody bowel movement and pass several large clots.  She notes some bhavana-rectal discomfort, but otherwise is feeling well.  She denies light headedness, fevers, chills, diarrhea, chest pain, shortness of breath, abdominal pain, nausea and vomiting.  She does take a baby aspirin daily, but no other nsaids or blood thinning medications.      Goals of Care Treatment Preferences:  Code Status: DNR         Consults:   Consults (From admission, onward)          Status Ordering Provider     Inpatient consult to Gastroenterology  Once        Provider:  Flori Cerda MD    Completed MISAEL BOB     Inpatient consult to Gastroenterology  Once        Provider:  Flori Cerda MD    Acknowledged MISAEL BOB          Hospital Course By Problem:   Assessment & Plan  Rectal bleeding  Iron deficiency  anemia  -Placed in observation  -Presented with rectal bleeding after passing a large hard bowel movement last night.  Noted increased BRBPR and clots passing while in ED  -Noted she had positive FIT test for occult blood earlier this year and saw GI.  They opted at that time not to do colonoscopy.  -Hb on admit was 10.7 which is very near her more recent baseline of 10.8-11.4.    -Ferritin only 83 and Tsat is 19 - she is iron deficient.  Folate and B12 are replete  -noted 2 more small bloody bowel movements overnight, but none today.  Hb is down to 9.2 today, but fore some reason there were multiple lab draws throughout the night.  -GI consulted and input appreciated - most likely hemorrhoidal or anal fisure..Cleared to IN home by GI  -She was seen and examined today.  Her Hb is slighlty lower, but no further bleeding.  She has no tachycardia.  After lengthy discussion with patient and her daughter, Yolanda, she is agreeable with discharge home.  Recommend she continue FeSo4, colace and proctofoam.  Educated that there could be some additional blood in stool at home and reviewed signs and symptoms which should prompt return to hospital.  Placed referral to GI for outpatient follow-up and should see her pcp ary 1 week  Thrombocytopenia  -Noted intermittent thrombocytopenia since at least 2020 - etiology unclear to me  -On admit platelets 116 and today 123.  Stage 4 chronic kidney disease  -Baseline Cr 1.4 - 1.8  -On admit Cr 1.7 and now 1.7  -Avoid nephrotoxic agents and renally dose meds  -Continue home lasix  Permanent atrial fibrillation  -Noted history  -Presently well rate controlled  -Continue home metoprolol tartrate  -Continue home aspirin at discharge  Chronic diastolic CHF (congestive heart failure)  Essential hypertension  -On admit bp is mildly elevated and she has slight peripheral edema (baseline per patient and daughter) with no new shortness of breath  -Echo 3/17/25 showed Ef 65%, severe LA and RA  dilation, severe MAC with mild MV stenosis  -Overall she is well compensated  -Strict ins/outs and daily weights  -Continue home lasix, metoprolol, norvasc and imdur  -PRN hydralazine available if SBP > 180  Hyperlipidemia associated with type 2 diabetes mellitus  -Continue home statin  Type 2 diabetes mellitus  -A1c 6.4 5/8/25  -At home she is mostly diet controlled, but if blood sugars > 180 she will use SSI.  Requires insulin maybe once a week  -Sugars are reasonably controlled today  -Continue SSI ac/hs for now  Hypothyroidism  -Continue home levothyroxine  ACP (advance care planning)  -Advance Care Planning Date: 08/02/2025 I engaged patient and her daughter in a discussion of her goals surrounding the very end of life.  We explored the pros and cons of ACLS treatment in the face of cardiac arrest.  Patient and daughter were in agreement that they wish to continue aggressive care up to that point, but if it were to occur the patient wishes for a peaceful death without ACLS treatments.  DNR order placed.  I spent 10 minutes ACP time 8/2/25.  Final Active Diagnoses:    Diagnosis Date Noted POA    PRINCIPAL PROBLEM:  Rectal bleeding [K62.5] 08/02/2025 Yes    Iron deficiency anemia [D50.9] 08/02/2025 Yes    Thrombocytopenia [D69.6] 08/02/2025 Yes    Stage 4 chronic kidney disease [N18.4] 09/23/2020 Yes    Permanent atrial fibrillation [I48.21] 04/20/2017 Yes    Essential hypertension [I10] 07/03/2012 Yes    Chronic diastolic CHF (congestive heart failure) [I50.32] 08/02/2025 Yes    Type 2 diabetes mellitus [E11.9] 08/02/2025 Yes    Hypothyroidism [E03.9] 08/02/2025 Yes    ACP (advance care planning) [Z71.89] 08/02/2025 Not Applicable    Hyperlipidemia associated with type 2 diabetes mellitus [E11.69, E78.5] 07/03/2012 Yes      Problems Resolved During this Admission:       Discharged Condition: stable    Disposition: Home-Health Care [facility]    Follow Up:    Patient Instructions:      Ambulatory referral/consult to  "Gastroenterology   Standing Status: Future   Referral Priority: Routine Referral Type: Consultation   Referral Reason: Specialty Services Required   Requested Specialty: Gastroenterology   Number of Visits Requested: 1     Diet Cardiac     Diet diabetic     Activity as tolerated       Significant Diagnostic Studies: Labs: BMP:   Recent Labs   Lab 08/02/25  1223 08/03/25  0913 08/04/25  0254   * 169* 167*    140 137   K 4.0 3.5 3.7    106 105   CO2 30* 28 26   BUN 29 21 23   CREATININE 1.7* 1.4 1.7*   CALCIUM 9.8 9.0 8.7   , CMP   Recent Labs   Lab 08/02/25  1223 08/03/25  0913 08/04/25  0254    140 137   K 4.0 3.5 3.7    106 105   CO2 30* 28 26   * 169* 167*   BUN 29 21 23   CREATININE 1.7* 1.4 1.7*   CALCIUM 9.8 9.0 8.7   PROT 7.0  --   --    ALBUMIN 3.9  --   --    BILITOT 0.8  --   --    ALKPHOS 144  --   --    AST 38  --   --    ALT 24  --   --    ANIONGAP 7* 6* 6*   , CBC   Recent Labs   Lab 08/03/25  0525 08/03/25  0746 08/04/25  0254   WBC 5.60 5.52 8.45   HGB 9.4* 9.2* 8.9*   HCT 29.1* 27.9* 26.6*   * 103* 123*   , INR   Lab Results   Component Value Date    INR 1.0 08/02/2025    INR 1.0 12/22/2020    INR 1.0 11/03/2020    PROTIME 11.3 08/02/2025   , Lipid Panel   Lab Results   Component Value Date    CHOL 108 (L) 05/08/2025    HDL 51 05/08/2025    LDLCALC 48.8 (L) 05/08/2025    TRIG 41 05/08/2025    CHOLHDL 47.2 05/08/2025   , Troponin No results for input(s): "TROPONINI" in the last 168 hours., and A1C:   Recent Labs   Lab 05/08/25  1049   HGBA1C 6.4*       Pending Diagnostic Studies:       None           Medications:  Reconciled Home Medications:      Medication List        START taking these medications      docusate sodium 100 MG capsule  Commonly known as: COLACE  Take 1 capsule (100 mg total) by mouth 2 (two) times daily.     hydrocortisone 2.5 % rectal cream  Commonly known as: ANUSOL-HC  Place rectally 2 (two) times daily. for 14 days            CONTINUE " taking these medications      acetaminophen 500 MG tablet  Commonly known as: TYLENOL  Take 2 tablets (1,000 mg total) by mouth 3 (three) times daily as needed for Pain (muscle pain in arms and legs).     albuterol 90 mcg/actuation inhaler  Commonly known as: PROVENTIL/VENTOLIN HFA  Inhale 2 puffs into the lungs every 4 (four) hours as needed for Wheezing.     amLODIPine 5 MG tablet  Commonly known as: NORVASC  TAKE 1 TABLET(5 MG) BY MOUTH EVERY MORNING     aspirin 81 MG Chew  Take 1 tablet (81 mg total) by mouth every morning.     atorvastatin 40 MG tablet  Commonly known as: LIPITOR  TAKE 1 TABLET(40 MG) BY MOUTH EVERY EVENING     * blood sugar diagnostic Strp  Commonly known as: ONETOUCH ULTRA TEST  1 strip by In Vitro route once daily. No longer on insulin because of CKD     * blood sugar diagnostic Strp  Commonly known as: TRUE METRIX GLUCOSE TEST STRIP  1 strip by Other route 3 (three) times daily. test     blood-glucose meter kit  To check BG 1 times daily, to use with insurance preferred meter     clotrimazole 1 % cream  Commonly known as: LOTRIMIN  Apply topically once daily. for 14 days     clotrimazole-betamethasone 1-0.05% cream  Commonly known as: LOTRISONE  Apply topically 2 (two) times daily as needed. For intertriginous itching in the bend of the leg     diclofenac sodium 1 % Gel  Commonly known as: VOLTAREN  APPLY 2 GRAMS TOPICALLY TO THE AFFECTED AREA TWICE DAILY     dorzolamide 2 % ophthalmic solution  Commonly known as: TRUSOPT  Place 1 drop into both eyes 3 (three) times daily.     erythromycin ophthalmic ointment  Commonly known as: ROMYCIN  Place 1 inch into both eyes every evening. Apply to eyelids at bedtimes     fluorometholone 0.1% 0.1 % Drps  Commonly known as: FML  Place 1 drop into both eyes once daily.     fluticasone-salmeterol 250-50 mcg/dose 250-50 mcg/dose diskus inhaler  Commonly known as: ADVAIR  Inhale 1 puff into the lungs once daily. Controller     furosemide 40 MG  "tablet  Commonly known as: LASIX  TAKE 1 TABLET BY MOUTH EVERY MORNING AND 1 TABLET AT 4PM     insulin aspart U-100 100 unit/mL (3 mL) Inpn pen  Commonly known as: NovoLOG Flexpen U-100 Insulin  Inject if blood sugar is >180, 180-230+2, 231-280+4, 281-330+6, 331-380+8,>380+10 MAX daily 30 units.     isosorbide mononitrate 60 MG 24 hr tablet  Commonly known as: IMDUR  TAKE 1 TABLET BY MOUTH EVERY MORNING FOR HEART, TO PREVENT CHEST PAINS OR SHORTNESS OF BREATH     ketoconazole 2 % shampoo  Commonly known as: NIZORAL  Apply topically every 7 days. Soak scalp for 15-30 minutes     * lancets 33 gauge Misc  Commonly known as: TRUEPLUS LANCETS  Apply 1 lancet topically once daily. No longer on insulin because of CKD     * lancets Misc  To check BG 3 time daily, to use with insurance preferred meter     latanoprost 0.005 % ophthalmic solution  Place 1 drop into both eyes every evening.     levothyroxine 88 MCG tablet  Commonly known as: SYNTHROID  TAKE 1 TABLET(88 MCG) BY MOUTH DAILY BEFORE BREAKFAST     meclizine 12.5 mg tablet  Commonly known as: ANTIVERT  TAKE 1 TABLET BY MOUTH THREE TIMES DAILY AS NEEDED FOR DIZZINESS     melatonin 3 mg tablet  Commonly known as: MELATIN  Take 1 tablet (3 mg total) by mouth nightly as needed for Insomnia.     metoprolol tartrate 25 MG tablet  Commonly known as: LOPRESSOR  TAKE 1 TABLET(25 MG) BY MOUTH TWICE DAILY     multivit-mineral-iron-lutein Tab  Take 1 tablet by mouth once daily.     pen needle, diabetic 31 gauge x 5/16" Ndle  Commonly known as: BD ULTRA-FINE SHORT PEN NEEDLE  USE WITH INSULIN PENS UP TO THREE TIMES DAILY     polyethylene glycol 17 gram/dose powder  Commonly known as: GLYCOLAX  Take 17 g by mouth daily as needed (CONSTIPATION).     psyllium powder  Commonly known as: METAMUCIL  Take 1 packet by mouth daily as needed (CONSTIPATION).     traZODone 50 MG tablet  Commonly known as: DESYREL  Take 1 tablet (50 mg total) by mouth every evening.           * This list has " 4 medication(s) that are the same as other medications prescribed for you. Read the directions carefully, and ask your doctor or other care provider to review them with you.                STOP taking these medications      nitroGLYCERIN 0.4 MG SL tablet  Commonly known as: NITROSTAT              Indwelling Lines/Drains at time of discharge:   Lines/Drains/Airways       Drain  Duration             Female External Urinary Catheter w/ Suction 08/02/25 1252 1 day                        Time spent on the discharge of patient: 35 minutes         Ino Bob MD  Department of Hospital Medicine  [facility]

## 2025-08-04 NOTE — PLAN OF CARE
Problem: Hospitalized Older Adult  Goal: Optimal Cognitive Function  Outcome: Progressing  Goal: Effective Bowel Elimination  Outcome: Progressing  Goal: Optimal Coping  Outcome: Progressing  Goal: Fluid and Electrolyte Balance  Outcome: Progressing  Goal: Optimal Functional Ability  Outcome: Progressing  Goal: Improved Oral Intake  Outcome: Progressing  Goal: Adequate Sleep/Rest  Outcome: Progressing  Goal: Effective Urinary Elimination  Outcome: Progressing     Problem: Adult Inpatient Plan of Care  Goal: Plan of Care Review  Outcome: Progressing  Goal: Patient-Specific Goal (Individualized)  Outcome: Progressing  Goal: Absence of Hospital-Acquired Illness or Injury  Outcome: Progressing  Goal: Optimal Comfort and Wellbeing  Outcome: Progressing  Goal: Readiness for Transition of Care  Outcome: Progressing     Problem: Gastrointestinal Bleeding  Goal: Optimal Coping with Acute Illness  Outcome: Progressing  Goal: Hemostasis  Outcome: Progressing     Problem: Diabetes Comorbidity  Goal: Blood Glucose Level Within Targeted Range  Outcome: Progressing     Problem: Fall Injury Risk  Goal: Absence of Fall and Fall-Related Injury  Outcome: Progressing

## 2025-08-04 NOTE — NURSING
IV removed with catheter intact, no complaints of pain or discomfort. Patient asked for a refill of insulin pen that Dr. Bob sent to [facility] along with the rest of her prescriptions.

## 2025-08-04 NOTE — DISCHARGE INSTRUCTIONS
Take all medications as prescribed.  Eat a strict low salt cardiac and diabetic diet.  Follow up with your physicians as scheduled - pcp within 1 week.  We placed referral to gastroenterology for you to get an appointment.  We ordered a home health service for you as well.  Thank you for trusting [facility] and Dr. Bob with your care.  We are honored that you entrusted us with your healthcare needs. Your satisfaction is very important to us and we hope you have been very pleased with your experience at [facility]. After your discharge you may receive a survey asking you to rate your hospital experience. We encourage you to take the time to complete the survey as your feedback allows us to identify areas for improvement as well as recognize our staff.   We hope that you have received the very best care possible during your hospitalization at [facility], as your satisfaction is our top priority.

## 2025-08-04 NOTE — ASSESSMENT & PLAN NOTE
-Noted history  -Presently well rate controlled  -Continue home metoprolol tartrate  -Continue home aspirin at discharge

## 2025-08-05 NOTE — PLAN OF CARE
CM received a call August 5th at 8am asking when the home health would arrive.    CM redirected them to the AVS where the home health was listed.     Patient daughter called back and stated [facility] told them they were not in network and couldn't accept     CM sent out more referrals and followed up with [facility]    [facility] accepted patient on August 5th at 3:56pm but would not be able to see patient until Thursday August 7th    Family is agreeable to wait and asked for additional services     CM sent a referral to [facility]

## 2025-08-06 ENCOUNTER — PATIENT OUTREACH (OUTPATIENT)
Dept: ADMINISTRATIVE | Facility: CLINIC | Age: OVER 89
End: 2025-08-06
Payer: MEDICARE

## 2025-08-06 DIAGNOSIS — K62.5 RECTAL BLEEDING: Primary | ICD-10-CM

## 2025-08-06 DIAGNOSIS — D50.9 IRON DEFICIENCY ANEMIA, UNSPECIFIED IRON DEFICIENCY ANEMIA TYPE: ICD-10-CM

## 2025-08-06 NOTE — TELEPHONE ENCOUNTER
Yolanda (daughter)    MRN [id number], [place]  [address] [facility] [address]   P: [phone]  Speak with Yolanda, daughter

## 2025-08-06 NOTE — PROGRESS NOTES
C3 nurse spoke with Tiana Mead's daughter, Yolanda for a [facility] Post Discharge follow-up call. Requesting cb on 08/07/25. The patient has a referral to schedule [facility] appointment with [facility]. The NP will call you to provide a time-frame for the appointment.

## 2025-08-07 NOTE — PROGRESS NOTES
Subjective:       Patient ID: Tiana Mead is a 89 y.o. female.    Chief Complaint: Follow-up    This dictation was performed using using M[employer].   She suspects her dry weight is 153-160    She weighs every morning and usually 162-163 this week  She has been a little bit short of breath she tends to get asthma this time of year    She has not been walking as much because her legs have been hurting her specially her knees she has been using diclofenac gel which does help    She is concerned because day before yesterday she had a hard bowel movement that hurt to pass and when she wiped there was some bright red blood on the toilet paper then today when she had a bowel movement it hurt to pass and there was again some red blood on the toilet paper  She had a area of skin breakdown in this area when she was sick with COVID diarrhea and hospitalized April HPI  Review of Systems   Constitutional: Negative for activity change, appetite change, chills, fatigue, fever and unexpected weight change.   HENT: Negative for hearing loss.    Eyes: Negative for visual disturbance.   Respiratory: Positive for shortness of breath. Negative for cough, chest tightness and wheezing.    Cardiovascular: Negative for chest pain, palpitations and leg swelling.   Gastrointestinal: Positive for rectal pain. Negative for abdominal pain, constipation, nausea and vomiting.        Bright red blood on toilet paper   Genitourinary: Negative for dysuria, frequency and urgency.   Musculoskeletal: Positive for arthralgias and gait problem. Negative for back pain, joint swelling and myalgias.   Skin: Negative for rash.   Neurological: Negative for light-headedness and headaches.   Psychiatric/Behavioral: Negative for dysphoric mood and sleep disturbance. The patient is not nervous/anxious.      Review of systems is negative unless noted.  Objective:      Physical Exam  Constitutional:       Appearance: Normal appearance.   HENT:      Head:  The patient location is: [place]  The chief complaint leading to consultation is: Medically supervised diet counseling and surveillance pending laparoscopic lap band removal to sleeve  Visit type: audiovisual     Face to Face time with patient: 20 min    30 minutes of total time spent on the encounter, which includes face to face time and non-face to face time preparing to see the patient (eg, review of tests), Obtaining and/or reviewing separately obtained history, Documenting clinical information in the electronic or other health record, Independently interpreting results (not separately reported) and communicating results to the patient/family/caregiver, or Care coordination (not separately reported).       Each patient to whom he or she provides medical services by telemedicine is:  (1) informed of the relationship between the physician and patient and the respective role of any other health care provider with respect to management of the patient; and (2) notified that he or she may decline to receive medical services by telemedicine and may withdraw from such care at any time.    NUTRITION NOTE    Referring Physician: Maverick Pink M.D.   Reason for MNT Referral: Medically supervised diet counseling and surveillance pending laparoscopic lap band removal to sleeve    Patient presents for follow-up visit for MSD with weight gain over the past month    CLINICAL DATA:  52 y.o. female.    Initial weight: 238 lbs  Most recent weight: 247 lbs on 8/4/25  Weight change since last visit: +9 lbs  BMI: 42.48    DAILY NUTRITIONAL NEEDS: pre-op nutritional bariatric guidelines to promote weight loss  8021-7119 Calories    Grams Protein    CURRENT DIET:  Regular diet.  Diet Recall: Food records are not present.  Greatest challenge: starchy CHO  Update: eating from smaller plate, protein shake at breakfast, cut out starchy CHO and juice     Current diet recall:      Coffee with milk and 4 Splenda  B: Protein shake  L:  Normocephalic and atraumatic.   Eyes:      General: No scleral icterus.     Conjunctiva/sclera: Conjunctivae normal.   Neck:      Musculoskeletal: Neck supple.   Cardiovascular:      Rate and Rhythm: Normal rate.      Heart sounds: No murmur. No friction rub. No gallop.       Comments: Her blood pressure is low but she has no complaints of lightheadedness upon standing  Pulmonary:      Effort: Pulmonary effort is normal.      Breath sounds: Normal breath sounds.   Abdominal:      Palpations: Abdomen is soft.      Tenderness: There is no abdominal tenderness.   Genitourinary:     Comments: With the patient standing and leaning over the exam table her perianal area is examined and there is no area any skin breakdown.  Her skin is in perfect condition.  At the anus there might be a very slight tear at 4:00 o'clock.  On digital rectal examination there is no mass felt there is a large quantity of soft stool present in the rectum and the patient reports she has already had a bowel movement today.  Digital rectal examination did not cause any bleeding.  Lymphadenopathy:      Cervical: No cervical adenopathy.   Skin:     General: Skin is warm and dry.   Neurological:      Mental Status: She is alert.   Psychiatric:         Behavior: Behavior normal.         Assessment:       1. Type 2 diabetes mellitus with diabetic polyneuropathy, with long-term current use of insulin    2. Stage 3 chronic kidney disease    3. Chronic diastolic congestive heart failure    4. Pulmonary hypertension    5. Primary hypothyroidism    6. Moderate persistent asthma without complication    7. Anemia, unspecified type    8. Anal tear, minor    9. Needs flu shot        Plan:   Tiana was seen today for follow-up.    Diagnoses and all orders for this visit:    Type 2 diabetes mellitus with diabetic polyneuropathy, with long-term current use of insulin, blood tests reviewed.  Reports 1 episode of hypoglycemia that was minor  -     Hemoglobin A1C;  Salad with baby spinach, shredded cheese, sometimes bangura bits, sliced turkey, tomatoes, pickles, and lite Italian dressing  S: If snacks, grapes or bananas  D: Salad or protein shake     Current Diet:  Meal pattern: Regular  Protein supplements: 1-2/day. [employer], Equate   Snackin-1 / day. Fruit  Vegetables: Likes salads, peas, corn. Eats daily.  Fruits: Likes grapes . Eats 2-3 times per week.  Beverages: water, diet soda  Dining out/delivery: None in the past month  Cooking at home: Almost daily. Mostly prepares salads      Food Allergies:   NKFA     Vitamins / Minerals / Herbs:   Women's multivitamin  Rx Vit D     Labs:   Reviewed  Low Vit D     Exercise:  Current exercise: 2 x week treadmill 1 hour each, 9% incline, walks around [employer] 2 x week     Restrictions to Exercise: None.     Social:  7:30am-4:30 pm M-F  Lives with adult son.  Grocery shopping and food prep: PT.  Patient believes the household will be supportive after surgery.  Alcohol: Beer 1-3 x week.  Smoking: None.       ASSESSMENT:  Patient demonstrates some willingness/progress to change lifestyle habits as evidenced by dietary changes, including protein drinks, and reduced eating out/delivery    Doing well with working on greatest challenges (starchy CHO).    Barriers to Education:  none  Stage of Change:  action    NUTRITION DIAGNOSIS:  Morbid Obesity related to Excessive carbohydrate intake and Excessive calorie intake as evidence by BMI.  Obesity Status: Same    BARIATRIC DIET DISCUSSION:  Discussed diet after surgery and related to patient's food record.  Reviewed nutrition guidelines for before and after surgery.  Discussed adding more protein to salads - tuna, chicken, shrimp, steak, boiled eggs  Answered all questions.    BARIATRIC PLAN/RECOMMENDATIONS:  Pt is a potential candidate for bariatric surgery and needs additional medically supervised diet counseling and surveillance    Diet: Maintain diet plan.  increase  Future    Stage 3 chronic kidney disease, there is a slight downward trend in her renal function and she is asked to watch it with the diclofenac gel on her knees   Okay to use diclofenac gel twice a day  -     CBC auto differential; Future  -     Comprehensive metabolic panel; Future    Chronic diastolic congestive heart failure, she seems compensated, she is a little bit short of breath today and it could be pulmonary and not cardiac    Pulmonary hypertension    Primary hypothyroidism    Moderate persistent asthma without complication, I think she should up her Advair to 1 inhalation twice a day and it would be good for her to have a nebulizer at the house  -     NEBULIZER FOR HOME USE  -     albuterol (ACCUNEB) 1.25 mg/3 mL Nebu; Take 3 mLs (1.25 mg total) by nebulization every 6 (six) hours as needed. Rescue    Anemia, unspecified type, her hemoglobin hematocrit is trending down. I do not know if this is a function of her renal function will have to monitor this closely.  This patient is very in tune to her body and she has not noticed any blood loss other than the minor bright red blood on the toilet paper that is in the history of present illness  -     CBC auto differential; Future    Anal tear, minor, reassurance it should heal up without difficulty.  She might want to take MiraLax once a day and a fiber supplement  such as Metamucil once a day    Needs flu shot, today    Follow up in about 3 months (around 12/22/2020).       protein    Exercise: Maintain.    Behavior Modification: Begin to document food & activity logs daily.    Return to clinic in one month.    Needs additional visits with RD.    Communicated nutrition plan with bariatric team.    SESSION TIME:  30 minutes

## 2025-08-11 ENCOUNTER — HOSPITAL ENCOUNTER (INPATIENT)
Facility: OTHER | Age: OVER 89
LOS: 4 days | Discharge: NURSING, REHAB OR LONG-TERM CARE | DRG: 065 | End: 2025-08-15
Attending: EMERGENCY MEDICINE | Admitting: EMERGENCY MEDICINE
Payer: MEDICARE

## 2025-08-11 ENCOUNTER — TELEPHONE (OUTPATIENT)
Dept: INTERNAL MEDICINE | Facility: CLINIC | Age: OVER 89
End: 2025-08-11
Payer: MEDICARE

## 2025-08-11 DIAGNOSIS — R29.818 ACUTE FOCAL NEUROLOGICAL DEFICIT: ICD-10-CM

## 2025-08-11 DIAGNOSIS — M79.604 BILATERAL LEG PAIN: ICD-10-CM

## 2025-08-11 DIAGNOSIS — M79.605 BILATERAL LEG PAIN: ICD-10-CM

## 2025-08-11 DIAGNOSIS — I63.531 ACUTE CEREBROVASCULAR ACCIDENT (CVA) DUE TO OCCLUSION OF RIGHT POSTERIOR CEREBRAL ARTERY: Primary | ICD-10-CM

## 2025-08-11 DIAGNOSIS — I63.9 STROKE: ICD-10-CM
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
## 2025-08-11 LAB
ABSOLUTE EOSINOPHIL (OHS): 0.14 K/UL
ABSOLUTE MONOCYTE (OHS): 0.85 K/UL (ref 0.3–1)
ABSOLUTE NEUTROPHIL COUNT (OHS): 4.02 K/UL (ref 1.8–7.7)
ALBUMIN SERPL BCP-MCNC: 3.3 G/DL (ref 3.5–5.2)
ALP SERPL-CCNC: 136 UNIT/L (ref 40–150)
ALT SERPL W/O P-5'-P-CCNC: 21 UNIT/L (ref 10–44)
ANION GAP (OHS): 8 MMOL/L (ref 8–16)
AST SERPL-CCNC: 33 UNIT/L (ref 11–45)
BASOPHILS # BLD AUTO: 0.04 K/UL
BASOPHILS NFR BLD AUTO: 0.6 %
BILIRUB SERPL-MCNC: 0.4 MG/DL (ref 0.1–1)
BUN SERPL-MCNC: 36 MG/DL (ref 10–30)
CALCIUM SERPL-MCNC: 9.7 MG/DL (ref 8.7–10.5)
CHLORIDE SERPL-SCNC: 101 MMOL/L (ref 95–110)
CHOLEST SERPL-MCNC: 106 MG/DL (ref 120–199)
CHOLEST/HDLC SERPL: 2.7 {RATIO} (ref 2–5)
CK SERPL-CCNC: 73 U/L (ref 20–180)
CO2 SERPL-SCNC: 27 MMOL/L (ref 23–29)
CREAT SERPL-MCNC: 2 MG/DL (ref 0.5–1.4)
CREAT SERPL-MCNC: 2.1 MG/DL (ref 0.5–1.4)
ERYTHROCYTE [DISTWIDTH] IN BLOOD BY AUTOMATED COUNT: 14.8 % (ref 11.5–14.5)
GFR SERPLBLD CREATININE-BSD FMLA CKD-EPI: 22 ML/MIN/1.73/M2
GLUCOSE SERPL-MCNC: 187 MG/DL (ref 70–110)
HCT VFR BLD AUTO: 25.9 % (ref 37–48.5)
HCV AB SERPL QL IA: NORMAL
HDLC SERPL-MCNC: 40 MG/DL (ref 40–75)
HDLC SERPL: 37.7 % (ref 20–50)
HGB BLD-MCNC: 8.6 GM/DL (ref 12–16)
HIV 1+2 AB+HIV1 P24 AG SERPL QL IA: NORMAL
HOLD SPECIMEN: NORMAL
IMM GRANULOCYTES # BLD AUTO: 0.02 K/UL (ref 0–0.04)
IMM GRANULOCYTES NFR BLD AUTO: 0.3 % (ref 0–0.5)
INR PPP: 1 (ref 0.8–1.2)
LDLC SERPL CALC-MCNC: 54.4 MG/DL (ref 63–159)
LYMPHOCYTES # BLD AUTO: 1.22 K/UL (ref 1–4.8)
MCH RBC QN AUTO: 30.6 PG (ref 27–31)
MCHC RBC AUTO-ENTMCNC: 33.2 G/DL (ref 32–36)
MCV RBC AUTO: 92 FL (ref 82–98)
NONHDLC SERPL-MCNC: 66 MG/DL
NUCLEATED RBC (/100WBC) (OHS): 0 /100 WBC
PLATELET # BLD AUTO: 262 K/UL (ref 150–450)
PMV BLD AUTO: 11 FL (ref 9.2–12.9)
POC PTINR: 1.2 (ref 0.9–1.2)
POCT GLUCOSE: 201 MG/DL (ref 70–110)
POCT GLUCOSE: 258 MG/DL (ref 70–110)
POTASSIUM SERPL-SCNC: 4.4 MMOL/L (ref 3.5–5.1)
PROT SERPL-MCNC: 6.5 GM/DL (ref 6–8.4)
PROTHROMBIN TIME: 10.9 SECONDS (ref 9–12.5)
RBC # BLD AUTO: 2.81 M/UL (ref 4–5.4)
RELATIVE EOSINOPHIL (OHS): 2.2 %
RELATIVE LYMPHOCYTE (OHS): 19.4 % (ref 18–48)
RELATIVE MONOCYTE (OHS): 13.5 % (ref 4–15)
RELATIVE NEUTROPHIL (OHS): 64 % (ref 38–73)
SAMPLE: ABNORMAL
SAMPLE: NORMAL
SODIUM SERPL-SCNC: 136 MMOL/L (ref 136–145)
T4 FREE SERPL-MCNC: 1.37 NG/DL (ref 0.71–1.51)
TRIGL SERPL-MCNC: 58 MG/DL (ref 30–150)
TSH SERPL-ACNC: 4.55 UIU/ML (ref 0.4–4)
WBC # BLD AUTO: 6.29 K/UL (ref 3.9–12.7)

## 2025-08-11 PROCEDURE — 82962 GLUCOSE BLOOD TEST: CPT | Mod: HCNC

## 2025-08-11 PROCEDURE — 85025 COMPLETE CBC W/AUTO DIFF WBC: CPT | Mod: HCNC | Performed by: NURSE PRACTITIONER

## 2025-08-11 PROCEDURE — 63600175 PHARM REV CODE 636 W HCPCS: Mod: HCNC | Performed by: NURSE PRACTITIONER

## 2025-08-11 PROCEDURE — 80061 LIPID PANEL: CPT | Mod: HCNC | Performed by: NURSE PRACTITIONER

## 2025-08-11 PROCEDURE — 82565 ASSAY OF CREATININE: CPT | Mod: HCNC

## 2025-08-11 PROCEDURE — 93005 ELECTROCARDIOGRAM TRACING: CPT | Mod: HCNC

## 2025-08-11 PROCEDURE — 80053 COMPREHEN METABOLIC PANEL: CPT | Mod: HCNC | Performed by: NURSE PRACTITIONER

## 2025-08-11 PROCEDURE — 99900035 HC TECH TIME PER 15 MIN (STAT): Mod: HCNC

## 2025-08-11 PROCEDURE — 82550 ASSAY OF CK (CPK): CPT | Mod: HCNC | Performed by: EMERGENCY MEDICINE

## 2025-08-11 PROCEDURE — 93010 ELECTROCARDIOGRAM REPORT: CPT | Mod: HCNC,,, | Performed by: INTERNAL MEDICINE

## 2025-08-11 PROCEDURE — 84443 ASSAY THYROID STIM HORMONE: CPT | Mod: HCNC | Performed by: NURSE PRACTITIONER

## 2025-08-11 PROCEDURE — 87389 HIV-1 AG W/HIV-1&-2 AB AG IA: CPT | Mod: HCNC | Performed by: NURSE PRACTITIONER

## 2025-08-11 PROCEDURE — 99285 EMERGENCY DEPT VISIT HI MDM: CPT | Mod: 25,HCNC

## 2025-08-11 PROCEDURE — 85610 PROTHROMBIN TIME: CPT | Mod: HCNC | Performed by: NURSE PRACTITIONER

## 2025-08-11 PROCEDURE — 25000003 PHARM REV CODE 250: Mod: HCNC | Performed by: NURSE PRACTITIONER

## 2025-08-11 PROCEDURE — 85610 PROTHROMBIN TIME: CPT | Mod: HCNC

## 2025-08-11 PROCEDURE — 25500020 PHARM REV CODE 255: Mod: HCNC | Performed by: EMERGENCY MEDICINE

## 2025-08-11 PROCEDURE — 11000001 HC ACUTE MED/SURG PRIVATE ROOM: Mod: HCNC

## 2025-08-11 PROCEDURE — 86803 HEPATITIS C AB TEST: CPT | Mod: HCNC | Performed by: NURSE PRACTITIONER

## 2025-08-11 PROCEDURE — G0426 INPT/ED TELECONSULT50: HCPCS | Mod: HCNC,95,, | Performed by: STUDENT IN AN ORGANIZED HEALTH CARE EDUCATION/TRAINING PROGRAM

## 2025-08-11 RX ORDER — ONDANSETRON HYDROCHLORIDE 2 MG/ML
4 INJECTION, SOLUTION INTRAVENOUS EVERY 12 HOURS PRN
Status: DISCONTINUED | OUTPATIENT
Start: 2025-08-11 | End: 2025-08-15 | Stop reason: HOSPADM

## 2025-08-11 RX ORDER — SODIUM CHLORIDE, SODIUM LACTATE, POTASSIUM CHLORIDE, CALCIUM CHLORIDE 600; 310; 30; 20 MG/100ML; MG/100ML; MG/100ML; MG/100ML
INJECTION, SOLUTION INTRAVENOUS CONTINUOUS
Status: DISCONTINUED | OUTPATIENT
Start: 2025-08-11 | End: 2025-08-11

## 2025-08-11 RX ORDER — DORZOLAMIDE HCL 20 MG/ML
1 SOLUTION/ DROPS OPHTHALMIC 3 TIMES DAILY
Status: DISCONTINUED | OUTPATIENT
Start: 2025-08-11 | End: 2025-08-15 | Stop reason: HOSPADM

## 2025-08-11 RX ORDER — ASPIRIN 81 MG/1
81 TABLET ORAL DAILY
Status: DISCONTINUED | OUTPATIENT
Start: 2025-08-12 | End: 2025-08-15 | Stop reason: HOSPADM

## 2025-08-11 RX ORDER — SODIUM CHLORIDE, SODIUM LACTATE, POTASSIUM CHLORIDE, CALCIUM CHLORIDE 600; 310; 30; 20 MG/100ML; MG/100ML; MG/100ML; MG/100ML
INJECTION, SOLUTION INTRAVENOUS CONTINUOUS
Status: ACTIVE | OUTPATIENT
Start: 2025-08-11 | End: 2025-08-12

## 2025-08-11 RX ORDER — LANOLIN ALCOHOL/MO/W.PET/CERES
1 CREAM (GRAM) TOPICAL DAILY
Status: DISCONTINUED | OUTPATIENT
Start: 2025-08-12 | End: 2025-08-15 | Stop reason: HOSPADM

## 2025-08-11 RX ORDER — BISACODYL 5 MG
5 TABLET, DELAYED RELEASE (ENTERIC COATED) ORAL DAILY PRN
Status: DISCONTINUED | OUTPATIENT
Start: 2025-08-12 | End: 2025-08-15 | Stop reason: HOSPADM

## 2025-08-11 RX ORDER — CLOPIDOGREL BISULFATE 75 MG/1
75 TABLET ORAL DAILY
Status: DISCONTINUED | OUTPATIENT
Start: 2025-08-12 | End: 2025-08-15 | Stop reason: HOSPADM

## 2025-08-11 RX ORDER — LATANOPROST 50 UG/ML
1 SOLUTION/ DROPS OPHTHALMIC NIGHTLY
Status: DISCONTINUED | OUTPATIENT
Start: 2025-08-11 | End: 2025-08-15 | Stop reason: HOSPADM

## 2025-08-11 RX ORDER — ATORVASTATIN CALCIUM 20 MG/1
40 TABLET, FILM COATED ORAL NIGHTLY
Status: DISCONTINUED | OUTPATIENT
Start: 2025-08-11 | End: 2025-08-15 | Stop reason: HOSPADM

## 2025-08-11 RX ORDER — INSULIN ASPART 100 [IU]/ML
0-10 INJECTION, SOLUTION INTRAVENOUS; SUBCUTANEOUS
Status: DISCONTINUED | OUTPATIENT
Start: 2025-08-11 | End: 2025-08-15 | Stop reason: HOSPADM

## 2025-08-11 RX ORDER — IBUPROFEN 200 MG
24 TABLET ORAL
Status: DISCONTINUED | OUTPATIENT
Start: 2025-08-11 | End: 2025-08-15 | Stop reason: HOSPADM

## 2025-08-11 RX ORDER — IBUPROFEN 200 MG
16 TABLET ORAL
Status: DISCONTINUED | OUTPATIENT
Start: 2025-08-11 | End: 2025-08-15 | Stop reason: HOSPADM

## 2025-08-11 RX ORDER — LEVOTHYROXINE SODIUM 88 UG/1
88 TABLET ORAL
Status: DISCONTINUED | OUTPATIENT
Start: 2025-08-12 | End: 2025-08-15 | Stop reason: HOSPADM

## 2025-08-11 RX ORDER — GLUCAGON 1 MG
1 KIT INJECTION
Status: DISCONTINUED | OUTPATIENT
Start: 2025-08-11 | End: 2025-08-15 | Stop reason: HOSPADM

## 2025-08-11 RX ORDER — FLUTICASONE FUROATE AND VILANTEROL 100; 25 UG/1; UG/1
1 POWDER RESPIRATORY (INHALATION) DAILY
Status: DISCONTINUED | OUTPATIENT
Start: 2025-08-12 | End: 2025-08-15 | Stop reason: HOSPADM

## 2025-08-11 RX ORDER — SODIUM CHLORIDE 0.9 % (FLUSH) 0.9 %
10 SYRINGE (ML) INJECTION
Status: DISCONTINUED | OUTPATIENT
Start: 2025-08-11 | End: 2025-08-15 | Stop reason: HOSPADM

## 2025-08-11 RX ORDER — FLUOROMETHOLONE 1 MG/ML
1 SUSPENSION/ DROPS OPHTHALMIC DAILY
Status: DISCONTINUED | OUTPATIENT
Start: 2025-08-12 | End: 2025-08-15 | Stop reason: HOSPADM

## 2025-08-11 RX ORDER — LABETALOL HYDROCHLORIDE 5 MG/ML
10 INJECTION, SOLUTION INTRAVENOUS
Status: DISCONTINUED | OUTPATIENT
Start: 2025-08-11 | End: 2025-08-15 | Stop reason: HOSPADM

## 2025-08-11 RX ORDER — FUROSEMIDE 40 MG/1
40 TABLET ORAL 2 TIMES DAILY
Status: DISCONTINUED | OUTPATIENT
Start: 2025-08-12 | End: 2025-08-12

## 2025-08-11 RX ORDER — HYDROCORTISONE 25 MG/G
CREAM TOPICAL 2 TIMES DAILY
Status: DISCONTINUED | OUTPATIENT
Start: 2025-08-11 | End: 2025-08-15 | Stop reason: HOSPADM

## 2025-08-11 RX ORDER — MECLIZINE HCL 12.5 MG 12.5 MG/1
12.5 TABLET ORAL 3 TIMES DAILY PRN
Status: DISCONTINUED | OUTPATIENT
Start: 2025-08-11 | End: 2025-08-15 | Stop reason: HOSPADM

## 2025-08-11 RX ADMIN — DORZOLAMIDE HYDROCHLORIDE 1 DROP: 20 SOLUTION/ DROPS OPHTHALMIC at 11:08

## 2025-08-11 RX ADMIN — LATANOPROST 1 DROP: 50 SOLUTION OPHTHALMIC at 11:08

## 2025-08-11 RX ADMIN — IOHEXOL 100 ML: 350 INJECTION, SOLUTION INTRAVENOUS at 05:08

## 2025-08-11 RX ADMIN — INSULIN ASPART 3 UNITS: 100 INJECTION, SOLUTION INTRAVENOUS; SUBCUTANEOUS at 11:08

## 2025-08-11 RX ADMIN — SODIUM CHLORIDE, POTASSIUM CHLORIDE, SODIUM LACTATE AND CALCIUM CHLORIDE: 600; 310; 30; 20 INJECTION, SOLUTION INTRAVENOUS at 11:08

## 2025-08-11 RX ADMIN — ATORVASTATIN CALCIUM 40 MG: 20 TABLET, FILM COATED ORAL at 11:08

## 2025-08-12 LAB
ABSOLUTE EOSINOPHIL (OHS): 0.19 K/UL
ABSOLUTE MONOCYTE (OHS): 0.83 K/UL (ref 0.3–1)
ABSOLUTE NEUTROPHIL COUNT (OHS): 2.92 K/UL (ref 1.8–7.7)
ALBUMIN SERPL BCP-MCNC: 2.8 G/DL (ref 3.5–5.2)
ALP SERPL-CCNC: 116 UNIT/L (ref 40–150)
ALT SERPL W/O P-5'-P-CCNC: 17 UNIT/L (ref 10–44)
ANION GAP (OHS): 5 MMOL/L (ref 8–16)
AORTIC ROOT ANNULUS: 3 CM
AORTIC SIZE INDEX (SOV): 1.4 CM/M2
AORTIC SIZE INDEX: 1.2 CM/M2
ASCENDING AORTA: 2.2 CM
AST SERPL-CCNC: 27 UNIT/L (ref 11–45)
AV INDEX (PROSTH): 0.36
AV MEAN GRADIENT: 10 MMHG
AV PEAK GRADIENT: 19 MMHG
AV VALVE AREA BY VELOCITY RATIO: 1.2 CM²
AV VALVE AREA: 0.8 CM²
AV VELOCITY RATIO: 0.55
BASOPHILS # BLD AUTO: 0.04 K/UL
BASOPHILS NFR BLD AUTO: 0.8 %
BILIRUB SERPL-MCNC: 0.4 MG/DL (ref 0.1–1)
BSA FOR ECHO PROCEDURE: 1.86 M2
BUN SERPL-MCNC: 30 MG/DL (ref 10–30)
CALCIUM SERPL-MCNC: 9 MG/DL (ref 8.7–10.5)
CHLORIDE SERPL-SCNC: 103 MMOL/L (ref 95–110)
CO2 SERPL-SCNC: 29 MMOL/L (ref 23–29)
CREAT SERPL-MCNC: 1.6 MG/DL (ref 0.5–1.4)
CV ECHO LV RWT: 0.47 CM
DOP CALC AO PEAK VEL: 2.2 M/S
DOP CALC AO VTI: 54.1 CM
DOP CALC LVOT AREA: 2.3 CM2
DOP CALC LVOT DIAMETER: 1.7 CM
DOP CALC LVOT PEAK VEL: 1.2 M/S
DOP CALC MV VTI: 58.5 CM
DOP CALCLVOT PEAK VEL VTI: 19.3 CM
E WAVE DECELERATION TIME: 332 MSEC
EAG (OHS): 140 MG/DL (ref 68–131)
ECHO LV POSTERIOR WALL: 1 CM (ref 0.6–1.1)
ERYTHROCYTE [DISTWIDTH] IN BLOOD BY AUTOMATED COUNT: 14.9 % (ref 11.5–14.5)
FRACTIONAL SHORTENING: 51.2 % (ref 28–44)
GFR SERPLBLD CREATININE-BSD FMLA CKD-EPI: 29 ML/MIN/1.73/M2
GLUCOSE SERPL-MCNC: 112 MG/DL (ref 70–110)
HBA1C MFR BLD: 6.5 % (ref 4–5.6)
HCT VFR BLD AUTO: 23.6 % (ref 37–48.5)
HGB BLD-MCNC: 7.8 GM/DL (ref 12–16)
IMM GRANULOCYTES # BLD AUTO: 0.02 K/UL (ref 0–0.04)
IMM GRANULOCYTES NFR BLD AUTO: 0.4 % (ref 0–0.5)
INTERVENTRICULAR SEPTUM: 1.4 CM (ref 0.6–1.1)
IVC DIAMETER: 2.93 CM
IVRT: 53 MSEC
LA MAJOR: 7.3 CM
LA MINOR: 7.7 CM
LA WIDTH: 5.2 CM
LAAS-AP2: 37 CM2
LAAS-AP4: 34 CM2
LEFT ATRIUM AREA SYSTOLIC (APICAL 2 CHAMBER): 38.57 CM2
LEFT ATRIUM AREA SYSTOLIC (APICAL 4 CHAMBER): 33.49 CM2
LEFT ATRIUM SIZE: 4.6 CM
LEFT ATRIUM VOLUME INDEX MOD: 78 ML/M2
LEFT ATRIUM VOLUME INDEX: 82 ML/M2
LEFT ATRIUM VOLUME MOD: 144 ML
LEFT ATRIUM VOLUME: 152 CM3
LEFT INTERNAL DIMENSION IN SYSTOLE: 2.1 CM (ref 2.1–4)
LEFT VENTRICLE DIASTOLIC VOLUME INDEX: 45.95 ML/M2
LEFT VENTRICLE DIASTOLIC VOLUME: 85 ML
LEFT VENTRICLE END SYSTOLIC VOLUME APICAL 2 CHAMBER: 148.67 ML
LEFT VENTRICLE END SYSTOLIC VOLUME APICAL 4 CHAMBER: 128.92 ML
LEFT VENTRICLE MASS INDEX: 99.8 G/M2
LEFT VENTRICLE SYSTOLIC VOLUME INDEX: 7.6 ML/M2
LEFT VENTRICLE SYSTOLIC VOLUME: 14 ML
LEFT VENTRICULAR INTERNAL DIMENSION IN DIASTOLE: 4.3 CM (ref 3.5–6)
LEFT VENTRICULAR MASS: 184.7 G
LVED V (TEICH): 85.03 ML
LVES V (TEICH): 13.53 ML
LVOT MG: 3.38 MMHG
LVOT MV: 0.86 CM/S
LYMPHOCYTES # BLD AUTO: 1.21 K/UL (ref 1–4.8)
Lab: 1.3 CM/M
Lab: 1.4 CM/M
MAGNESIUM SERPL-MCNC: 2.1 MG/DL (ref 1.6–2.6)
MCH RBC QN AUTO: 30.5 PG (ref 27–31)
MCHC RBC AUTO-ENTMCNC: 33.1 G/DL (ref 32–36)
MCV RBC AUTO: 92 FL (ref 82–98)
MV MEAN GRADIENT: 8 MMHG
MV PEAK GRADIENT: 20 MMHG
MV STENOSIS PRESSURE HALF TIME: 96.32 MS
MV VALVE AREA BY CONTINUITY EQUATION: 0.75 CM2
MV VALVE AREA P 1/2 METHOD: 2.28 CM2
NUCLEATED RBC (/100WBC) (OHS): 0 /100 WBC
OHS CV CPX PATIENT HEIGHT IN: 68
OHS CV RV/LV RATIO: 0.88 CM
OHS QRS DURATION: 86 MS
OHS QTC CALCULATION: 431 MS
PHOSPHATE SERPL-MCNC: 3.1 MG/DL (ref 2.7–4.5)
PISA MRMAX VEL: 3.36 M/S
PISA TR MAX VEL: 4 M/S
PLATELET # BLD AUTO: 222 K/UL (ref 150–450)
PMV BLD AUTO: 10.9 FL (ref 9.2–12.9)
POCT GLUCOSE: 103 MG/DL (ref 70–110)
POCT GLUCOSE: 107 MG/DL (ref 70–110)
POCT GLUCOSE: 186 MG/DL (ref 70–110)
POCT GLUCOSE: 212 MG/DL (ref 70–110)
POTASSIUM SERPL-SCNC: 4.2 MMOL/L (ref 3.5–5.1)
PROT SERPL-MCNC: 5.5 GM/DL (ref 6–8.4)
PV MV: 1.14 M/S
PV PEAK GRADIENT: 9 MMHG
PV PEAK VELOCITY: 1.5 M/S
RA MAJOR: 7.34 CM
RA PRESSURE ESTIMATED: 8 MMHG
RA VOL SYS: 84.95 ML
RA WIDTH: 4.2 CM
RBC # BLD AUTO: 2.56 M/UL (ref 4–5.4)
RELATIVE EOSINOPHIL (OHS): 3.6 %
RELATIVE LYMPHOCYTE (OHS): 23.2 % (ref 18–48)
RELATIVE MONOCYTE (OHS): 15.9 % (ref 4–15)
RELATIVE NEUTROPHIL (OHS): 56.1 % (ref 38–73)
RIGHT ATRIAL AREA: 27.5 CM2
RIGHT ATRIUM END SYSTOLIC VOLUME APICAL 4 CHAMBER INDEX BSA: 43.8 ML/M2
RIGHT ATRIUM VOLUME AREA LENGTH APICAL 4 CHAMBER: 81.03 ML
RIGHT VENTRICLE DIASTOLIC BASEL DIMENSION: 3.8 CM
RIGHT VENTRICULAR END-DIASTOLIC DIMENSION: 3.76 CM
RV TB RVSP: 12 MMHG
RV TISSUE DOPPLER FREE WALL SYSTOLIC VELOCITY 1 (APICAL 4 CHAMBER VIEW): 7.05 CM/S
SINUS: 2.5 CM
SODIUM SERPL-SCNC: 137 MMOL/L (ref 136–145)
STJ: 2.4 CM
TR MAX PG: 65 MMHG
TRICUSPID ANNULAR PLANE SYSTOLIC EXCURSION: 1.9 CM
TV REST PULMONARY ARTERY PRESSURE: 72 MMHG
WBC # BLD AUTO: 5.21 K/UL (ref 3.9–12.7)
Z-SCORE OF LEFT VENTRICULAR DIMENSION IN END DIASTOLE: -1.78
Z-SCORE OF LEFT VENTRICULAR DIMENSION IN END SYSTOLE: -3.26

## 2025-08-12 PROCEDURE — 21400001 HC TELEMETRY ROOM: Mod: HCNC

## 2025-08-12 PROCEDURE — 25000003 PHARM REV CODE 250: Mod: HCNC | Performed by: NURSE PRACTITIONER

## 2025-08-12 PROCEDURE — 36415 COLL VENOUS BLD VENIPUNCTURE: CPT | Mod: HCNC | Performed by: NURSE PRACTITIONER

## 2025-08-12 PROCEDURE — 92610 EVALUATE SWALLOWING FUNCTION: CPT | Mod: HCNC

## 2025-08-12 PROCEDURE — 25000242 PHARM REV CODE 250 ALT 637 W/ HCPCS: Mod: HCNC | Performed by: NURSE PRACTITIONER

## 2025-08-12 PROCEDURE — 83036 HEMOGLOBIN GLYCOSYLATED A1C: CPT | Mod: HCNC | Performed by: NURSE PRACTITIONER

## 2025-08-12 PROCEDURE — 94760 N-INVAS EAR/PLS OXIMETRY 1: CPT | Mod: HCNC

## 2025-08-12 PROCEDURE — 84520 ASSAY OF UREA NITROGEN: CPT | Mod: HCNC | Performed by: NURSE PRACTITIONER

## 2025-08-12 PROCEDURE — 83735 ASSAY OF MAGNESIUM: CPT | Mod: HCNC | Performed by: NURSE PRACTITIONER

## 2025-08-12 PROCEDURE — 85025 COMPLETE CBC W/AUTO DIFF WBC: CPT | Mod: HCNC | Performed by: NURSE PRACTITIONER

## 2025-08-12 PROCEDURE — 97162 PT EVAL MOD COMPLEX 30 MIN: CPT | Mod: HCNC

## 2025-08-12 PROCEDURE — 97530 THERAPEUTIC ACTIVITIES: CPT | Mod: HCNC

## 2025-08-12 PROCEDURE — 25000003 PHARM REV CODE 250: Mod: HCNC | Performed by: STUDENT IN AN ORGANIZED HEALTH CARE EDUCATION/TRAINING PROGRAM

## 2025-08-12 PROCEDURE — 97535 SELF CARE MNGMENT TRAINING: CPT | Mod: HCNC

## 2025-08-12 PROCEDURE — 97166 OT EVAL MOD COMPLEX 45 MIN: CPT | Mod: HCNC

## 2025-08-12 PROCEDURE — 84100 ASSAY OF PHOSPHORUS: CPT | Mod: HCNC | Performed by: NURSE PRACTITIONER

## 2025-08-12 RX ORDER — FUROSEMIDE 40 MG/1
40 TABLET ORAL 2 TIMES DAILY
Status: DISCONTINUED | OUTPATIENT
Start: 2025-08-13 | End: 2025-08-15 | Stop reason: HOSPADM

## 2025-08-12 RX ORDER — POLYETHYLENE GLYCOL 3350 17 G/17G
17 POWDER, FOR SOLUTION ORAL DAILY
Status: DISCONTINUED | OUTPATIENT
Start: 2025-08-12 | End: 2025-08-15 | Stop reason: HOSPADM

## 2025-08-12 RX ADMIN — FLUTICASONE FUROATE AND VILANTEROL TRIFENATATE 1 PUFF: 100; 25 POWDER RESPIRATORY (INHALATION) at 08:08

## 2025-08-12 RX ADMIN — POLYETHYLENE GLYCOL 3350 17 G: 17 POWDER, FOR SOLUTION ORAL at 10:08

## 2025-08-12 RX ADMIN — LEVOTHYROXINE SODIUM 88 MCG: 0.09 TABLET ORAL at 06:08

## 2025-08-12 RX ADMIN — LATANOPROST 1 DROP: 50 SOLUTION OPHTHALMIC at 10:08

## 2025-08-12 RX ADMIN — ATORVASTATIN CALCIUM 40 MG: 20 TABLET, FILM COATED ORAL at 10:08

## 2025-08-12 RX ADMIN — DORZOLAMIDE HYDROCHLORIDE 1 DROP: 20 SOLUTION/ DROPS OPHTHALMIC at 10:08

## 2025-08-12 RX ADMIN — CLOPIDOGREL 75 MG: 75 TABLET ORAL at 10:08

## 2025-08-12 RX ADMIN — BISACODYL 5 MG: 5 TABLET, COATED ORAL at 10:08

## 2025-08-12 RX ADMIN — ASPIRIN 81 MG: 81 TABLET, COATED ORAL at 10:08

## 2025-08-12 RX ADMIN — FLUOROMETHOLONE 1 DROP: 1 SOLUTION/ DROPS OPHTHALMIC at 03:08

## 2025-08-12 RX ADMIN — DORZOLAMIDE HYDROCHLORIDE 1 DROP: 20 SOLUTION/ DROPS OPHTHALMIC at 03:08

## 2025-08-12 RX ADMIN — HYDROCORTISONE 2.5%: 25 CREAM TOPICAL at 12:08

## 2025-08-12 RX ADMIN — FUROSEMIDE 40 MG: 40 TABLET ORAL at 10:08

## 2025-08-12 RX ADMIN — HYDROCORTISONE 2.5%: 25 CREAM TOPICAL at 10:08

## 2025-08-12 RX ADMIN — THERA TABS 1 TABLET: TAB at 10:08

## 2025-08-12 RX ADMIN — FERROUS SULFATE TAB 325 MG (65 MG ELEMENTAL FE) 1 EACH: 325 (65 FE) TAB at 10:08

## 2025-08-13 LAB
ABO GROUP BLD: NORMAL
ANION GAP (OHS): 4 MMOL/L (ref 8–16)
BUN SERPL-MCNC: 27 MG/DL (ref 10–30)
CALCIUM SERPL-MCNC: 8.8 MG/DL (ref 8.7–10.5)
CHLORIDE SERPL-SCNC: 104 MMOL/L (ref 95–110)
CO2 SERPL-SCNC: 29 MMOL/L (ref 23–29)
CREAT SERPL-MCNC: 1.7 MG/DL (ref 0.5–1.4)
ERYTHROCYTE [DISTWIDTH] IN BLOOD BY AUTOMATED COUNT: 14.9 % (ref 11.5–14.5)
GFR SERPLBLD CREATININE-BSD FMLA CKD-EPI: 27 ML/MIN/1.73/M2
GLUCOSE SERPL-MCNC: 89 MG/DL (ref 70–110)
HCT VFR BLD AUTO: 24.6 % (ref 37–48.5)
HGB BLD-MCNC: 8 GM/DL (ref 12–16)
INDIRECT COOMBS: NORMAL
MCH RBC QN AUTO: 30.1 PG (ref 27–31)
MCHC RBC AUTO-ENTMCNC: 32.5 G/DL (ref 32–36)
MCV RBC AUTO: 93 FL (ref 82–98)
PLATELET # BLD AUTO: 232 K/UL (ref 150–450)
PMV BLD AUTO: 11 FL (ref 9.2–12.9)
POCT GLUCOSE: 145 MG/DL (ref 70–110)
POCT GLUCOSE: 157 MG/DL (ref 70–110)
POCT GLUCOSE: 231 MG/DL (ref 70–110)
POCT GLUCOSE: 93 MG/DL (ref 70–110)
POTASSIUM SERPL-SCNC: 4.2 MMOL/L (ref 3.5–5.1)
RBC # BLD AUTO: 2.66 M/UL (ref 4–5.4)
RH BLD: NORMAL
RH BLD: NORMAL
SODIUM SERPL-SCNC: 137 MMOL/L (ref 136–145)
SPECIMEN OUTDATE: NORMAL
WBC # BLD AUTO: 5.4 K/UL (ref 3.9–12.7)

## 2025-08-13 PROCEDURE — 80048 BASIC METABOLIC PNL TOTAL CA: CPT | Mod: HCNC | Performed by: STUDENT IN AN ORGANIZED HEALTH CARE EDUCATION/TRAINING PROGRAM

## 2025-08-13 PROCEDURE — 25000003 PHARM REV CODE 250: Mod: HCNC | Performed by: NURSE PRACTITIONER

## 2025-08-13 PROCEDURE — 94640 AIRWAY INHALATION TREATMENT: CPT | Mod: HCNC

## 2025-08-13 PROCEDURE — 92523 SPEECH SOUND LANG COMPREHEN: CPT | Mod: HCNC

## 2025-08-13 PROCEDURE — 97116 GAIT TRAINING THERAPY: CPT | Mod: HCNC,CQ

## 2025-08-13 PROCEDURE — 21400001 HC TELEMETRY ROOM: Mod: HCNC

## 2025-08-13 PROCEDURE — 94760 N-INVAS EAR/PLS OXIMETRY 1: CPT | Mod: HCNC

## 2025-08-13 PROCEDURE — 25000003 PHARM REV CODE 250: Mod: HCNC | Performed by: STUDENT IN AN ORGANIZED HEALTH CARE EDUCATION/TRAINING PROGRAM

## 2025-08-13 PROCEDURE — 85027 COMPLETE CBC AUTOMATED: CPT | Mod: HCNC | Performed by: STUDENT IN AN ORGANIZED HEALTH CARE EDUCATION/TRAINING PROGRAM

## 2025-08-13 PROCEDURE — 97535 SELF CARE MNGMENT TRAINING: CPT | Mod: HCNC

## 2025-08-13 PROCEDURE — 86850 RBC ANTIBODY SCREEN: CPT | Mod: HCNC | Performed by: STUDENT IN AN ORGANIZED HEALTH CARE EDUCATION/TRAINING PROGRAM

## 2025-08-13 PROCEDURE — 86901 BLOOD TYPING SEROLOGIC RH(D): CPT | Mod: HCNC | Performed by: STUDENT IN AN ORGANIZED HEALTH CARE EDUCATION/TRAINING PROGRAM

## 2025-08-13 PROCEDURE — 36415 COLL VENOUS BLD VENIPUNCTURE: CPT | Mod: HCNC | Performed by: STUDENT IN AN ORGANIZED HEALTH CARE EDUCATION/TRAINING PROGRAM

## 2025-08-13 PROCEDURE — 86900 BLOOD TYPING SEROLOGIC ABO: CPT | Mod: HCNC | Performed by: STUDENT IN AN ORGANIZED HEALTH CARE EDUCATION/TRAINING PROGRAM

## 2025-08-13 RX ORDER — AMOXICILLIN 250 MG
1 CAPSULE ORAL DAILY
Status: DISCONTINUED | OUTPATIENT
Start: 2025-08-13 | End: 2025-08-15 | Stop reason: HOSPADM

## 2025-08-13 RX ADMIN — INSULIN ASPART 2 UNITS: 100 INJECTION, SOLUTION INTRAVENOUS; SUBCUTANEOUS at 05:08

## 2025-08-13 RX ADMIN — FUROSEMIDE 40 MG: 40 TABLET ORAL at 05:08

## 2025-08-13 RX ADMIN — LEVOTHYROXINE SODIUM 88 MCG: 0.09 TABLET ORAL at 05:08

## 2025-08-13 RX ADMIN — DORZOLAMIDE HYDROCHLORIDE 1 DROP: 20 SOLUTION/ DROPS OPHTHALMIC at 09:08

## 2025-08-13 RX ADMIN — LATANOPROST 1 DROP: 50 SOLUTION OPHTHALMIC at 09:08

## 2025-08-13 RX ADMIN — FLUOROMETHOLONE 1 DROP: 1 SOLUTION/ DROPS OPHTHALMIC at 09:08

## 2025-08-13 RX ADMIN — HYDROCORTISONE 2.5%: 25 CREAM TOPICAL at 09:08

## 2025-08-13 RX ADMIN — DORZOLAMIDE HYDROCHLORIDE 1 DROP: 20 SOLUTION/ DROPS OPHTHALMIC at 05:08

## 2025-08-13 RX ADMIN — FUROSEMIDE 40 MG: 40 TABLET ORAL at 09:08

## 2025-08-13 RX ADMIN — ASPIRIN 81 MG: 81 TABLET, COATED ORAL at 09:08

## 2025-08-13 RX ADMIN — ATORVASTATIN CALCIUM 40 MG: 20 TABLET, FILM COATED ORAL at 09:08

## 2025-08-13 RX ADMIN — THERA TABS 1 TABLET: TAB at 09:08

## 2025-08-13 RX ADMIN — FERROUS SULFATE TAB 325 MG (65 MG ELEMENTAL FE) 1 EACH: 325 (65 FE) TAB at 09:08

## 2025-08-13 RX ADMIN — POLYETHYLENE GLYCOL 3350 17 G: 17 POWDER, FOR SOLUTION ORAL at 09:08

## 2025-08-13 RX ADMIN — SENNOSIDES, DOCUSATE SODIUM 1 TABLET: 50; 8.6 TABLET, FILM COATED ORAL at 09:08

## 2025-08-13 RX ADMIN — CLOPIDOGREL 75 MG: 75 TABLET ORAL at 09:08

## 2025-08-13 RX ADMIN — FLUTICASONE FUROATE AND VILANTEROL TRIFENATATE 1 PUFF: 100; 25 POWDER RESPIRATORY (INHALATION) at 07:08

## 2025-08-14 ENCOUNTER — PATIENT MESSAGE (OUTPATIENT)
Dept: INTERNAL MEDICINE | Facility: CLINIC | Age: OVER 89
End: 2025-08-14
Payer: MEDICARE

## 2025-08-14 LAB
POCT GLUCOSE: 110 MG/DL (ref 70–110)
POCT GLUCOSE: 171 MG/DL (ref 70–110)
POCT GLUCOSE: 222 MG/DL (ref 70–110)
POCT GLUCOSE: 250 MG/DL (ref 70–110)

## 2025-08-14 PROCEDURE — 25000003 PHARM REV CODE 250: Mod: HCNC | Performed by: STUDENT IN AN ORGANIZED HEALTH CARE EDUCATION/TRAINING PROGRAM

## 2025-08-14 PROCEDURE — 94761 N-INVAS EAR/PLS OXIMETRY MLT: CPT | Mod: HCNC

## 2025-08-14 PROCEDURE — 92526 ORAL FUNCTION THERAPY: CPT | Mod: HCNC

## 2025-08-14 PROCEDURE — 94640 AIRWAY INHALATION TREATMENT: CPT | Mod: HCNC

## 2025-08-14 PROCEDURE — 21400001 HC TELEMETRY ROOM: Mod: HCNC

## 2025-08-14 PROCEDURE — 97116 GAIT TRAINING THERAPY: CPT | Mod: HCNC,CQ

## 2025-08-14 PROCEDURE — 25000003 PHARM REV CODE 250: Mod: HCNC | Performed by: NURSE PRACTITIONER

## 2025-08-14 PROCEDURE — 92507 TX SP LANG VOICE COMM INDIV: CPT | Mod: HCNC

## 2025-08-14 PROCEDURE — 97530 THERAPEUTIC ACTIVITIES: CPT | Mod: HCNC,CQ

## 2025-08-14 RX ORDER — METOPROLOL TARTRATE 25 MG/1
25 TABLET, FILM COATED ORAL 2 TIMES DAILY
Status: DISCONTINUED | OUTPATIENT
Start: 2025-08-14 | End: 2025-08-15 | Stop reason: HOSPADM

## 2025-08-14 RX ADMIN — DORZOLAMIDE HYDROCHLORIDE 1 DROP: 20 SOLUTION/ DROPS OPHTHALMIC at 08:08

## 2025-08-14 RX ADMIN — INSULIN ASPART 2 UNITS: 100 INJECTION, SOLUTION INTRAVENOUS; SUBCUTANEOUS at 05:08

## 2025-08-14 RX ADMIN — FLUTICASONE FUROATE AND VILANTEROL TRIFENATATE 1 PUFF: 100; 25 POWDER RESPIRATORY (INHALATION) at 08:08

## 2025-08-14 RX ADMIN — POLYETHYLENE GLYCOL 3350 17 G: 17 POWDER, FOR SOLUTION ORAL at 08:08

## 2025-08-14 RX ADMIN — THERA TABS 1 TABLET: TAB at 08:08

## 2025-08-14 RX ADMIN — INSULIN ASPART 4 UNITS: 100 INJECTION, SOLUTION INTRAVENOUS; SUBCUTANEOUS at 12:08

## 2025-08-14 RX ADMIN — LEVOTHYROXINE SODIUM 88 MCG: 0.09 TABLET ORAL at 05:08

## 2025-08-14 RX ADMIN — DORZOLAMIDE HYDROCHLORIDE 1 DROP: 20 SOLUTION/ DROPS OPHTHALMIC at 05:08

## 2025-08-14 RX ADMIN — METOPROLOL TARTRATE 25 MG: 25 TABLET, FILM COATED ORAL at 01:08

## 2025-08-14 RX ADMIN — INSULIN ASPART 2 UNITS: 100 INJECTION, SOLUTION INTRAVENOUS; SUBCUTANEOUS at 09:08

## 2025-08-14 RX ADMIN — HYDROCORTISONE 2.5%: 25 CREAM TOPICAL at 08:08

## 2025-08-14 RX ADMIN — ATORVASTATIN CALCIUM 40 MG: 20 TABLET, FILM COATED ORAL at 08:08

## 2025-08-14 RX ADMIN — ASPIRIN 81 MG: 81 TABLET, COATED ORAL at 08:08

## 2025-08-14 RX ADMIN — SENNOSIDES, DOCUSATE SODIUM 1 TABLET: 50; 8.6 TABLET, FILM COATED ORAL at 08:08

## 2025-08-14 RX ADMIN — FUROSEMIDE 40 MG: 40 TABLET ORAL at 08:08

## 2025-08-14 RX ADMIN — LATANOPROST 1 DROP: 50 SOLUTION OPHTHALMIC at 08:08

## 2025-08-14 RX ADMIN — FLUOROMETHOLONE 1 DROP: 1 SOLUTION/ DROPS OPHTHALMIC at 08:08

## 2025-08-14 RX ADMIN — FERROUS SULFATE TAB 325 MG (65 MG ELEMENTAL FE) 1 EACH: 325 (65 FE) TAB at 08:08

## 2025-08-14 RX ADMIN — FUROSEMIDE 40 MG: 40 TABLET ORAL at 05:08

## 2025-08-14 RX ADMIN — METOPROLOL TARTRATE 25 MG: 25 TABLET, FILM COATED ORAL at 08:08

## 2025-08-14 RX ADMIN — CLOPIDOGREL 75 MG: 75 TABLET ORAL at 08:08

## 2025-08-15 VITALS
TEMPERATURE: 98 F | RESPIRATION RATE: 16 BRPM | OXYGEN SATURATION: 100 % | HEART RATE: 69 BPM | DIASTOLIC BLOOD PRESSURE: 60 MMHG | BODY MASS INDEX: 24.09 KG/M2 | HEIGHT: 68 IN | WEIGHT: 158.94 LBS | SYSTOLIC BLOOD PRESSURE: 130 MMHG

## 2025-08-15 LAB
POCT GLUCOSE: 167 MG/DL (ref 70–110)
POCT GLUCOSE: 172 MG/DL (ref 70–110)
POCT GLUCOSE: 99 MG/DL (ref 70–110)
SARS-COV-2 RDRP RESP QL NAA+PROBE: NEGATIVE

## 2025-08-15 PROCEDURE — 97116 GAIT TRAINING THERAPY: CPT | Mod: HCNC,CQ

## 2025-08-15 PROCEDURE — U0002 COVID-19 LAB TEST NON-CDC: HCPCS | Mod: HCNC | Performed by: STUDENT IN AN ORGANIZED HEALTH CARE EDUCATION/TRAINING PROGRAM

## 2025-08-15 PROCEDURE — 25000003 PHARM REV CODE 250: Mod: HCNC | Performed by: STUDENT IN AN ORGANIZED HEALTH CARE EDUCATION/TRAINING PROGRAM

## 2025-08-15 PROCEDURE — 94640 AIRWAY INHALATION TREATMENT: CPT | Mod: HCNC

## 2025-08-15 PROCEDURE — 97535 SELF CARE MNGMENT TRAINING: CPT | Mod: HCNC

## 2025-08-15 PROCEDURE — 25000003 PHARM REV CODE 250: Mod: HCNC | Performed by: NURSE PRACTITIONER

## 2025-08-15 PROCEDURE — 94760 N-INVAS EAR/PLS OXIMETRY 1: CPT | Mod: HCNC

## 2025-08-15 RX ORDER — CLOPIDOGREL BISULFATE 75 MG/1
75 TABLET ORAL DAILY
Status: ON HOLD
Start: 2025-08-16 | End: 2025-09-02

## 2025-08-15 RX ORDER — POLYETHYLENE GLYCOL 3350 17 G/17G
17 POWDER, FOR SOLUTION ORAL DAILY
Status: ON HOLD
Start: 2025-08-15

## 2025-08-15 RX ORDER — INSULIN ASPART 100 [IU]/ML
0-10 INJECTION, SOLUTION INTRAVENOUS; SUBCUTANEOUS
Status: ON HOLD
Start: 2025-08-15 | End: 2026-08-15

## 2025-08-15 RX ADMIN — ASPIRIN 81 MG: 81 TABLET, COATED ORAL at 08:08

## 2025-08-15 RX ADMIN — DORZOLAMIDE HYDROCHLORIDE 1 DROP: 20 SOLUTION/ DROPS OPHTHALMIC at 05:08

## 2025-08-15 RX ADMIN — THERA TABS 1 TABLET: TAB at 08:08

## 2025-08-15 RX ADMIN — FUROSEMIDE 40 MG: 40 TABLET ORAL at 05:08

## 2025-08-15 RX ADMIN — FLUTICASONE FUROATE AND VILANTEROL TRIFENATATE 1 PUFF: 100; 25 POWDER RESPIRATORY (INHALATION) at 07:08

## 2025-08-15 RX ADMIN — INSULIN ASPART 2 UNITS: 100 INJECTION, SOLUTION INTRAVENOUS; SUBCUTANEOUS at 05:08

## 2025-08-15 RX ADMIN — CLOPIDOGREL 75 MG: 75 TABLET ORAL at 08:08

## 2025-08-15 RX ADMIN — LEVOTHYROXINE SODIUM 88 MCG: 0.09 TABLET ORAL at 05:08

## 2025-08-15 RX ADMIN — FLUOROMETHOLONE 1 DROP: 1 SOLUTION/ DROPS OPHTHALMIC at 09:08

## 2025-08-15 RX ADMIN — HYDROCORTISONE 2.5%: 25 CREAM TOPICAL at 09:08

## 2025-08-15 RX ADMIN — METOPROLOL TARTRATE 25 MG: 25 TABLET, FILM COATED ORAL at 08:08

## 2025-08-15 RX ADMIN — FERROUS SULFATE TAB 325 MG (65 MG ELEMENTAL FE) 1 EACH: 325 (65 FE) TAB at 08:08

## 2025-08-15 RX ADMIN — POLYETHYLENE GLYCOL 3350 17 G: 17 POWDER, FOR SOLUTION ORAL at 09:08

## 2025-08-15 RX ADMIN — DORZOLAMIDE HYDROCHLORIDE 1 DROP: 20 SOLUTION/ DROPS OPHTHALMIC at 09:08

## 2025-08-15 RX ADMIN — SENNOSIDES, DOCUSATE SODIUM 1 TABLET: 50; 8.6 TABLET, FILM COATED ORAL at 09:08

## 2025-08-15 RX ADMIN — FUROSEMIDE 40 MG: 40 TABLET ORAL at 08:08

## 2025-08-27 ENCOUNTER — TELEPHONE (OUTPATIENT)
Dept: HOME HEALTH SERVICES | Facility: CLINIC | Age: OVER 89
End: 2025-08-27

## (undated) DEVICE — GLOVE BIOGEL SKINSENSE PI 6.5

## (undated) DEVICE — SHIELD EYE PLASTIC 3100G

## (undated) DEVICE — Device

## (undated) DEVICE — SOL BETADINE 5%

## (undated) DEVICE — GLOVE BIOGEL SKINSENSE PI 7.5

## (undated) DEVICE — SYR SLIP TIP 1CC

## (undated) DEVICE — CASSETTE INFINITI